# Patient Record
Sex: MALE | Race: OTHER | NOT HISPANIC OR LATINO
[De-identification: names, ages, dates, MRNs, and addresses within clinical notes are randomized per-mention and may not be internally consistent; named-entity substitution may affect disease eponyms.]

---

## 2017-01-04 ENCOUNTER — RX RENEWAL (OUTPATIENT)
Age: 57
End: 2017-01-04

## 2017-02-04 ENCOUNTER — RX RENEWAL (OUTPATIENT)
Age: 57
End: 2017-02-04

## 2017-02-21 ENCOUNTER — APPOINTMENT (OUTPATIENT)
Dept: INTERNAL MEDICINE | Facility: CLINIC | Age: 57
End: 2017-02-21

## 2017-02-21 VITALS
HEART RATE: 66 BPM | DIASTOLIC BLOOD PRESSURE: 78 MMHG | BODY MASS INDEX: 29.47 KG/M2 | RESPIRATION RATE: 14 BRPM | SYSTOLIC BLOOD PRESSURE: 126 MMHG | WEIGHT: 199 LBS | HEIGHT: 69 IN

## 2017-02-21 DIAGNOSIS — Z86.010 PERSONAL HISTORY OF COLONIC POLYPS: ICD-10-CM

## 2017-03-24 ENCOUNTER — RX RENEWAL (OUTPATIENT)
Age: 57
End: 2017-03-24

## 2017-04-02 ENCOUNTER — LABORATORY RESULT (OUTPATIENT)
Age: 57
End: 2017-04-02

## 2017-04-03 ENCOUNTER — APPOINTMENT (OUTPATIENT)
Dept: FAMILY MEDICINE | Facility: CLINIC | Age: 57
End: 2017-04-03

## 2017-04-03 VITALS
DIASTOLIC BLOOD PRESSURE: 82 MMHG | OXYGEN SATURATION: 97 % | TEMPERATURE: 99.1 F | SYSTOLIC BLOOD PRESSURE: 150 MMHG | HEART RATE: 77 BPM

## 2017-04-03 DIAGNOSIS — R73.09 OTHER ABNORMAL GLUCOSE: ICD-10-CM

## 2017-04-04 LAB
ALBUMIN SERPL ELPH-MCNC: 4.3 G/DL
ALP BLD-CCNC: 85 U/L
ALT SERPL-CCNC: 19 U/L
ANION GAP SERPL CALC-SCNC: 14 MMOL/L
AST SERPL-CCNC: 24 U/L
BASOPHILS # BLD AUTO: 0.17 K/UL
BASOPHILS NFR BLD AUTO: 1.7 %
BILIRUB SERPL-MCNC: 0.4 MG/DL
BUN SERPL-MCNC: 10 MG/DL
CALCIUM SERPL-MCNC: 9 MG/DL
CHLORIDE SERPL-SCNC: 102 MMOL/L
CHOLEST SERPL-MCNC: 159 MG/DL
CHOLEST/HDLC SERPL: 4 RATIO
CO2 SERPL-SCNC: 24 MMOL/L
CREAT SERPL-MCNC: 1.17 MG/DL
EOSINOPHIL # BLD AUTO: 0.68 K/UL
EOSINOPHIL NFR BLD AUTO: 7 %
GLUCOSE SERPL-MCNC: 121 MG/DL
HBA1C MFR BLD HPLC: 6 %
HCT VFR BLD CALC: 34 %
HDLC SERPL-MCNC: 40 MG/DL
HGB BLD-MCNC: 11.1 G/DL
IMM GRANULOCYTES NFR BLD AUTO: 0.4 %
LDLC SERPL CALC-MCNC: 97 MG/DL
LYMPHOCYTES # BLD AUTO: 1.76 K/UL
LYMPHOCYTES NFR BLD AUTO: 18 %
MAN DIFF?: NORMAL
MCHC RBC-ENTMCNC: 21.6 PG
MCHC RBC-ENTMCNC: 32.6 GM/DL
MCV RBC AUTO: 66.1 FL
MONOCYTES # BLD AUTO: 0.8 K/UL
MONOCYTES NFR BLD AUTO: 8.2 %
NEUTROPHILS # BLD AUTO: 6.32 K/UL
NEUTROPHILS NFR BLD AUTO: 64.7 %
PLATELET # BLD AUTO: 232 K/UL
POTASSIUM SERPL-SCNC: 4 MMOL/L
PROT SERPL-MCNC: 7 G/DL
RBC # BLD: 5.14 M/UL
RBC # FLD: 20 %
SODIUM SERPL-SCNC: 140 MMOL/L
TRIGL SERPL-MCNC: 110 MG/DL
TSH SERPL-ACNC: 1.21 UIU/ML
WBC # FLD AUTO: 9.77 K/UL

## 2017-06-15 ENCOUNTER — APPOINTMENT (OUTPATIENT)
Dept: FAMILY MEDICINE | Facility: CLINIC | Age: 57
End: 2017-06-15

## 2017-06-15 VITALS
DIASTOLIC BLOOD PRESSURE: 80 MMHG | HEIGHT: 69 IN | SYSTOLIC BLOOD PRESSURE: 140 MMHG | TEMPERATURE: 98.1 F | RESPIRATION RATE: 15 BRPM | WEIGHT: 202 LBS | OXYGEN SATURATION: 98 % | BODY MASS INDEX: 29.92 KG/M2 | HEART RATE: 74 BPM

## 2017-07-18 ENCOUNTER — NON-APPOINTMENT (OUTPATIENT)
Age: 57
End: 2017-07-18

## 2017-07-18 ENCOUNTER — APPOINTMENT (OUTPATIENT)
Dept: CARDIOLOGY | Facility: CLINIC | Age: 57
End: 2017-07-18

## 2017-07-18 VITALS
HEIGHT: 69 IN | WEIGHT: 205 LBS | OXYGEN SATURATION: 98 % | HEART RATE: 70 BPM | BODY MASS INDEX: 30.36 KG/M2 | SYSTOLIC BLOOD PRESSURE: 139 MMHG | RESPIRATION RATE: 17 BRPM | DIASTOLIC BLOOD PRESSURE: 83 MMHG

## 2017-07-18 VITALS — HEART RATE: 60 BPM | SYSTOLIC BLOOD PRESSURE: 138 MMHG | DIASTOLIC BLOOD PRESSURE: 80 MMHG

## 2017-07-31 ENCOUNTER — RX RENEWAL (OUTPATIENT)
Age: 57
End: 2017-07-31

## 2017-08-01 ENCOUNTER — APPOINTMENT (OUTPATIENT)
Dept: CARDIOLOGY | Facility: CLINIC | Age: 57
End: 2017-08-01
Payer: COMMERCIAL

## 2017-08-01 PROCEDURE — 93015 CV STRESS TEST SUPVJ I&R: CPT

## 2017-08-07 ENCOUNTER — RX RENEWAL (OUTPATIENT)
Age: 57
End: 2017-08-07

## 2017-08-22 ENCOUNTER — OUTPATIENT (OUTPATIENT)
Dept: OUTPATIENT SERVICES | Facility: HOSPITAL | Age: 57
LOS: 1 days | End: 2017-08-22
Payer: COMMERCIAL

## 2017-08-22 ENCOUNTER — APPOINTMENT (OUTPATIENT)
Dept: CARDIOLOGY | Facility: HOSPITAL | Age: 57
End: 2017-08-22

## 2017-08-22 DIAGNOSIS — I25.10 ATHEROSCLEROTIC HEART DISEASE OF NATIVE CORONARY ARTERY WITHOUT ANGINA PECTORIS: ICD-10-CM

## 2017-08-22 DIAGNOSIS — I35.0 NONRHEUMATIC AORTIC (VALVE) STENOSIS: ICD-10-CM

## 2017-08-22 DIAGNOSIS — R07.9 CHEST PAIN, UNSPECIFIED: ICD-10-CM

## 2017-08-22 DIAGNOSIS — I45.10 UNSPECIFIED RIGHT BUNDLE-BRANCH BLOCK: ICD-10-CM

## 2017-08-22 PROCEDURE — 75574 CT ANGIO HRT W/3D IMAGE: CPT

## 2017-09-19 ENCOUNTER — APPOINTMENT (OUTPATIENT)
Dept: FAMILY MEDICINE | Facility: CLINIC | Age: 57
End: 2017-09-19
Payer: COMMERCIAL

## 2017-09-19 ENCOUNTER — NON-APPOINTMENT (OUTPATIENT)
Age: 57
End: 2017-09-19

## 2017-09-19 ENCOUNTER — APPOINTMENT (OUTPATIENT)
Dept: CARDIOLOGY | Facility: CLINIC | Age: 57
End: 2017-09-19
Payer: COMMERCIAL

## 2017-09-19 VITALS
TEMPERATURE: 98 F | RESPIRATION RATE: 15 BRPM | DIASTOLIC BLOOD PRESSURE: 78 MMHG | SYSTOLIC BLOOD PRESSURE: 139 MMHG | WEIGHT: 202 LBS | OXYGEN SATURATION: 98 % | HEIGHT: 69 IN | BODY MASS INDEX: 29.92 KG/M2 | HEART RATE: 70 BPM

## 2017-09-19 VITALS
HEART RATE: 79 BPM | OXYGEN SATURATION: 97 % | DIASTOLIC BLOOD PRESSURE: 73 MMHG | RESPIRATION RATE: 17 BRPM | WEIGHT: 202 LBS | HEIGHT: 69 IN | BODY MASS INDEX: 29.92 KG/M2 | SYSTOLIC BLOOD PRESSURE: 138 MMHG

## 2017-09-19 VITALS — SYSTOLIC BLOOD PRESSURE: 130 MMHG | DIASTOLIC BLOOD PRESSURE: 82 MMHG

## 2017-09-19 VITALS — HEART RATE: 72 BPM

## 2017-09-19 PROCEDURE — 99213 OFFICE O/P EST LOW 20 MIN: CPT | Mod: 25

## 2017-09-19 PROCEDURE — 99215 OFFICE O/P EST HI 40 MIN: CPT

## 2017-09-19 PROCEDURE — 93000 ELECTROCARDIOGRAM COMPLETE: CPT

## 2017-09-19 PROCEDURE — 90688 IIV4 VACCINE SPLT 0.5 ML IM: CPT

## 2017-09-19 PROCEDURE — G0008: CPT

## 2017-11-30 ENCOUNTER — APPOINTMENT (OUTPATIENT)
Dept: FAMILY MEDICINE | Facility: CLINIC | Age: 57
End: 2017-11-30
Payer: COMMERCIAL

## 2017-11-30 ENCOUNTER — LABORATORY RESULT (OUTPATIENT)
Age: 57
End: 2017-11-30

## 2017-11-30 ENCOUNTER — APPOINTMENT (OUTPATIENT)
Dept: INTERNAL MEDICINE | Facility: CLINIC | Age: 57
End: 2017-11-30
Payer: COMMERCIAL

## 2017-11-30 VITALS
RESPIRATION RATE: 14 BRPM | SYSTOLIC BLOOD PRESSURE: 136 MMHG | BODY MASS INDEX: 30.51 KG/M2 | TEMPERATURE: 98.4 F | HEART RATE: 68 BPM | WEIGHT: 206 LBS | DIASTOLIC BLOOD PRESSURE: 78 MMHG | HEIGHT: 69 IN

## 2017-11-30 VITALS
HEIGHT: 69 IN | HEART RATE: 67 BPM | DIASTOLIC BLOOD PRESSURE: 86 MMHG | WEIGHT: 205 LBS | BODY MASS INDEX: 30.36 KG/M2 | SYSTOLIC BLOOD PRESSURE: 140 MMHG | OXYGEN SATURATION: 94 %

## 2017-11-30 DIAGNOSIS — Z78.9 OTHER SPECIFIED HEALTH STATUS: ICD-10-CM

## 2017-11-30 DIAGNOSIS — Z87.898 PERSONAL HISTORY OF OTHER SPECIFIED CONDITIONS: ICD-10-CM

## 2017-11-30 DIAGNOSIS — Z00.00 ENCOUNTER FOR GENERAL ADULT MEDICAL EXAMINATION W/OUT ABNORMAL FINDINGS: ICD-10-CM

## 2017-11-30 PROCEDURE — 99245 OFF/OP CONSLTJ NEW/EST HI 55: CPT

## 2017-11-30 PROCEDURE — 99214 OFFICE O/P EST MOD 30 MIN: CPT | Mod: 25

## 2017-11-30 RX ORDER — AMOXICILLIN AND CLAVULANATE POTASSIUM 500; 125 MG/1; MG/1
500-125 TABLET, FILM COATED ORAL 3 TIMES DAILY
Qty: 30 | Refills: 0 | Status: COMPLETED | COMMUNITY
Start: 2017-09-19 | End: 2017-11-30

## 2017-11-30 RX ORDER — BENZONATATE 200 MG/1
200 CAPSULE ORAL
Qty: 20 | Refills: 0 | Status: COMPLETED | COMMUNITY
Start: 2017-09-19 | End: 2017-11-30

## 2017-11-30 RX ORDER — RABEPRAZOLE SODIUM 20 MG/1
20 TABLET, DELAYED RELEASE ORAL DAILY
Qty: 30 | Refills: 3 | Status: COMPLETED | COMMUNITY
Start: 2017-07-31 | End: 2017-11-30

## 2017-12-01 LAB
25(OH)D3 SERPL-MCNC: 18.6 NG/ML
ALBUMIN SERPL ELPH-MCNC: 4.3 G/DL
ALP BLD-CCNC: 76 U/L
ALT SERPL-CCNC: 29 U/L
ANION GAP SERPL CALC-SCNC: 15 MMOL/L
APPEARANCE: CLEAR
AST SERPL-CCNC: 37 U/L
BASOPHILS # BLD AUTO: 0 K/UL
BASOPHILS NFR BLD AUTO: 0 %
BILIRUB DIRECT SERPL-MCNC: 0.1 MG/DL
BILIRUB INDIRECT SERPL-MCNC: 0.3 MG/DL
BILIRUB SERPL-MCNC: 0.4 MG/DL
BILIRUBIN URINE: NEGATIVE
BLOOD URINE: NEGATIVE
BUN SERPL-MCNC: 10 MG/DL
CALCIUM SERPL-MCNC: 8.8 MG/DL
CHLORIDE SERPL-SCNC: 103 MMOL/L
CHOLEST SERPL-MCNC: 175 MG/DL
CHOLEST/HDLC SERPL: 4 RATIO
CO2 SERPL-SCNC: 26 MMOL/L
COLOR: YELLOW
CREAT SERPL-MCNC: 1.23 MG/DL
CREAT SPEC-SCNC: 174 MG/DL
EOSINOPHIL # BLD AUTO: 1.04 K/UL
EOSINOPHIL NFR BLD AUTO: 11 %
GLUCOSE QUALITATIVE U: NEGATIVE MG/DL
GLUCOSE SERPL-MCNC: 87 MG/DL
HAV IGM SER QL: NONREACTIVE
HBA1C MFR BLD HPLC: 5.9 %
HBV CORE IGM SER QL: NONREACTIVE
HBV SURFACE AG SER QL: NONREACTIVE
HCT VFR BLD CALC: 32.3 %
HCV AB SER QL: NONREACTIVE
HCV S/CO RATIO: 0.28 S/CO
HDLC SERPL-MCNC: 44 MG/DL
HGB BLD-MCNC: 10.7 G/DL
HIV1+2 AB SPEC QL IA.RAPID: NONREACTIVE
KETONES URINE: NEGATIVE
LDLC SERPL CALC-MCNC: 116 MG/DL
LEUKOCYTE ESTERASE URINE: NEGATIVE
LYMPHOCYTES # BLD AUTO: 1.99 K/UL
LYMPHOCYTES NFR BLD AUTO: 21 %
MAN DIFF?: NORMAL
MCHC RBC-ENTMCNC: 21.8 PG
MCHC RBC-ENTMCNC: 33.1 GM/DL
MCV RBC AUTO: 65.9 FL
MICROALBUMIN 24H UR DL<=1MG/L-MCNC: 0.7 MG/DL
MICROALBUMIN/CREAT 24H UR-RTO: 4 MG/G
MONOCYTES # BLD AUTO: 0.66 K/UL
MONOCYTES NFR BLD AUTO: 7 %
NEUTROPHILS # BLD AUTO: 5.68 K/UL
NEUTROPHILS NFR BLD AUTO: 58 %
NITRITE URINE: NEGATIVE
PH URINE: 6.5
PLATELET # BLD AUTO: 250 K/UL
POTASSIUM SERPL-SCNC: 3.6 MMOL/L
PROT SERPL-MCNC: 7.3 G/DL
PROTEIN URINE: NEGATIVE MG/DL
RBC # BLD: 4.9 M/UL
RBC # FLD: 19.5 %
SODIUM SERPL-SCNC: 144 MMOL/L
SPECIFIC GRAVITY URINE: 1.01
TRIGL SERPL-MCNC: 74 MG/DL
TSH SERPL-ACNC: 1.12 UIU/ML
UROBILINOGEN URINE: NEGATIVE MG/DL
WBC # FLD AUTO: 9.47 K/UL

## 2018-01-11 ENCOUNTER — APPOINTMENT (OUTPATIENT)
Dept: FAMILY MEDICINE | Facility: CLINIC | Age: 58
End: 2018-01-11
Payer: COMMERCIAL

## 2018-01-11 VITALS
HEART RATE: 59 BPM | TEMPERATURE: 102.1 F | HEIGHT: 69 IN | DIASTOLIC BLOOD PRESSURE: 80 MMHG | WEIGHT: 198 LBS | BODY MASS INDEX: 29.33 KG/M2 | OXYGEN SATURATION: 97 % | SYSTOLIC BLOOD PRESSURE: 120 MMHG

## 2018-01-11 DIAGNOSIS — H91.90 UNSPECIFIED HEARING LOSS, UNSPECIFIED EAR: ICD-10-CM

## 2018-01-11 PROCEDURE — 99213 OFFICE O/P EST LOW 20 MIN: CPT

## 2018-01-11 RX ORDER — DOXYCYCLINE HYCLATE 100 MG/1
100 TABLET ORAL
Qty: 20 | Refills: 0 | Status: DISCONTINUED | COMMUNITY
Start: 2017-11-30 | End: 2018-01-11

## 2018-01-11 RX ORDER — PREDNISONE 10 MG/1
10 TABLET ORAL
Qty: 12 | Refills: 0 | Status: DISCONTINUED | COMMUNITY
Start: 2017-11-30 | End: 2018-01-11

## 2018-01-31 ENCOUNTER — APPOINTMENT (OUTPATIENT)
Dept: CARDIOLOGY | Facility: CLINIC | Age: 58
End: 2018-01-31
Payer: COMMERCIAL

## 2018-01-31 ENCOUNTER — NON-APPOINTMENT (OUTPATIENT)
Age: 58
End: 2018-01-31

## 2018-01-31 VITALS
SYSTOLIC BLOOD PRESSURE: 147 MMHG | OXYGEN SATURATION: 99 % | RESPIRATION RATE: 15 BRPM | DIASTOLIC BLOOD PRESSURE: 90 MMHG | HEIGHT: 69 IN | BODY MASS INDEX: 29.03 KG/M2 | WEIGHT: 196 LBS | HEART RATE: 85 BPM

## 2018-01-31 VITALS — SYSTOLIC BLOOD PRESSURE: 128 MMHG | HEART RATE: 76 BPM | DIASTOLIC BLOOD PRESSURE: 62 MMHG

## 2018-01-31 DIAGNOSIS — R94.31 ABNORMAL ELECTROCARDIOGRAM [ECG] [EKG]: ICD-10-CM

## 2018-01-31 DIAGNOSIS — I45.10 UNSPECIFIED RIGHT BUNDLE-BRANCH BLOCK: ICD-10-CM

## 2018-01-31 PROCEDURE — 93000 ELECTROCARDIOGRAM COMPLETE: CPT

## 2018-01-31 PROCEDURE — 99214 OFFICE O/P EST MOD 30 MIN: CPT

## 2018-03-26 ENCOUNTER — RX RENEWAL (OUTPATIENT)
Age: 58
End: 2018-03-26

## 2018-06-04 ENCOUNTER — RX RENEWAL (OUTPATIENT)
Age: 58
End: 2018-06-04

## 2018-06-07 ENCOUNTER — APPOINTMENT (OUTPATIENT)
Dept: FAMILY MEDICINE | Facility: CLINIC | Age: 58
End: 2018-06-07
Payer: COMMERCIAL

## 2018-06-07 VITALS
BODY MASS INDEX: 30.51 KG/M2 | HEIGHT: 69 IN | WEIGHT: 206 LBS | HEART RATE: 72 BPM | DIASTOLIC BLOOD PRESSURE: 82 MMHG | SYSTOLIC BLOOD PRESSURE: 144 MMHG | OXYGEN SATURATION: 97 % | TEMPERATURE: 98.5 F

## 2018-06-07 DIAGNOSIS — Z12.5 ENCOUNTER FOR SCREENING FOR MALIGNANT NEOPLASM OF PROSTATE: ICD-10-CM

## 2018-06-07 PROCEDURE — G0444 DEPRESSION SCREEN ANNUAL: CPT

## 2018-06-07 PROCEDURE — 99214 OFFICE O/P EST MOD 30 MIN: CPT | Mod: 25

## 2018-06-07 PROCEDURE — G0447 BEHAVIOR COUNSEL OBESITY 15M: CPT

## 2018-06-07 RX ORDER — AZITHROMYCIN 250 MG/1
250 TABLET, FILM COATED ORAL
Qty: 1 | Refills: 0 | Status: DISCONTINUED | COMMUNITY
Start: 2018-01-11 | End: 2018-06-07

## 2018-06-07 NOTE — HISTORY OF PRESENT ILLNESS
[Cough] : cough [Moderate] : moderate [___ Weeks ago] :  [unfilled] weeks ago [Constant] : constant [Congestion] : congestion [Sore Throat] : sore throat [OTC Remedies] : OTC remedies [Activity] : with activity [In Morning] : in the morning [Worsening] : worsening [Weight Gain ___ Pounds] : Weight gain of [unfilled] pounds [None] : No weight lost attempts [Sedentary] : Patient is sedentary [Contemplation] : Contemplation: patient is considering to lose weight within the next 6 months, patient did not attempt to lose weight in the last year. [Needs reinforcement, provided] : Patient needs reinforcement on understanding of disease, goals and obesity follow-up plan; reinforcement was provided [Wheezing] : no wheezing [Chills] : no chills [Anorexia] : no anorexia [Shortness Of Breath] : no shortness of breath [Earache] : no earache [Fatigue] : not fatigue [Headache] : no headache [Fever] : no fever [FreeTextEntry2] : small amount of phlegm , clear [FreeTextEntry5] : cold and cough remedies [de-identified] : no [FreeTextEntry8] : here for cough for 2 weeks. no sick contacts, no recent travel.

## 2018-06-07 NOTE — PLAN
[FreeTextEntry1] : f/u CPE to discuss labs. \par  weight loss, monitor BP. cardiology f/u. \par flonase nasal spray and tessalon. \par

## 2018-06-07 NOTE — REVIEW OF SYSTEMS
[Nasal Discharge] : nasal discharge [Cough] : cough [Negative] : Heme/Lymph [Hearing Loss] : no hearing loss [Nosebleeds] : no nosebleeds [Postnasal Drip] : no postnasal drip [Sore Throat] : no sore throat [Hoarseness] : no hoarseness [Shortness Of Breath] : no shortness of breath [Wheezing] : no wheezing

## 2018-06-07 NOTE — PHYSICAL EXAM

## 2018-06-07 NOTE — HEALTH RISK ASSESSMENT
[No falls in past year] : Patient reported no falls in the past year [0] : 2) Feeling down, depressed, or hopeless: Not at all (0) [Discussed at today's visit] : Advance Directives Discussed at today's visit [Patient reported colonoscopy was normal] : Patient reported colonoscopy was normal [Fully functional (bathing, dressing, toileting, transferring, walking, feeding)] : Fully functional (bathing, dressing, toileting, transferring, walking, feeding) [Fully functional (using the telephone, shopping, preparing meals, housekeeping, doing laundry, using] : Fully functional and needs no help or supervision to perform IADLs (using the telephone, shopping, preparing meals, housekeeping, doing laundry, using transportation, managing medications and managing finances) [Independent] : managing finances [Change in mental status noted] : No change in mental status noted [ColonoscopyDate] : 2016 [ColonoscopyComments] : polyp removed by dr. Layton [facility] [HIVDate] : 12/2017 [HepatitisCDate] : 12/2017 [] : No [[id number]] : 0

## 2018-06-07 NOTE — COUNSELING
[Activity counseling provided] : activity [Behavioral health counseling provided] : behavioral health  [Walking] : Walking [Engage in a relaxing activity] : Engage in a relaxing activity [None] : None [Needs reinforcement, provided] : Patient needs reinforcement on understanding lifestyle changes and  the steps needed to achieve self management goals and reinforcement was provided [ - Behavioral Counseling for Obesity (Face-to-Face for 15 Minutes)] : Behavioral Counseling for Obesity (Face-to-Face for 15 Minutes) [ - Annual Depression Screening] : Annual Depression Screening

## 2018-06-09 ENCOUNTER — LABORATORY RESULT (OUTPATIENT)
Age: 58
End: 2018-06-09

## 2018-06-22 ENCOUNTER — APPOINTMENT (OUTPATIENT)
Dept: FAMILY MEDICINE | Facility: CLINIC | Age: 58
End: 2018-06-22

## 2018-06-25 LAB
25(OH)D3 SERPL-MCNC: 22.3 NG/ML
ALBUMIN SERPL ELPH-MCNC: 4.3 G/DL
ALP BLD-CCNC: 81 U/L
ALT SERPL-CCNC: 23 U/L
ANION GAP SERPL CALC-SCNC: 14 MMOL/L
APPEARANCE: CLEAR
AST SERPL-CCNC: 26 U/L
BACTERIA: NEGATIVE
BASOPHILS # BLD AUTO: 0.05 K/UL
BASOPHILS NFR BLD AUTO: 0.6 %
BILIRUB SERPL-MCNC: 0.6 MG/DL
BILIRUBIN URINE: NEGATIVE
BLOOD URINE: NEGATIVE
BUN SERPL-MCNC: 15 MG/DL
CALCIUM SERPL-MCNC: 9.2 MG/DL
CHLORIDE SERPL-SCNC: 104 MMOL/L
CHOLEST SERPL-MCNC: 137 MG/DL
CHOLEST/HDLC SERPL: 3.5 RATIO
CO2 SERPL-SCNC: 24 MMOL/L
COLOR: YELLOW
CREAT SERPL-MCNC: 1.37 MG/DL
EOSINOPHIL # BLD AUTO: 0.37 K/UL
EOSINOPHIL NFR BLD AUTO: 4.5 %
GLUCOSE QUALITATIVE U: NEGATIVE MG/DL
GLUCOSE SERPL-MCNC: 89 MG/DL
HBA1C MFR BLD HPLC: 6 %
HCT VFR BLD CALC: 35.5 %
HDLC SERPL-MCNC: 39 MG/DL
HGB BLD-MCNC: 11.3 G/DL
HYALINE CASTS: 10 /LPF
IMM GRANULOCYTES NFR BLD AUTO: 0.5 %
KETONES URINE: NEGATIVE
LDLC SERPL CALC-MCNC: 79 MG/DL
LEUKOCYTE ESTERASE URINE: ABNORMAL
LYMPHOCYTES # BLD AUTO: 2.37 K/UL
LYMPHOCYTES NFR BLD AUTO: 28.7 %
MAN DIFF?: NORMAL
MCHC RBC-ENTMCNC: 20.8 PG
MCHC RBC-ENTMCNC: 31.8 GM/DL
MCV RBC AUTO: 65.4 FL
MICROSCOPIC-UA: NORMAL
MONOCYTES # BLD AUTO: 0.63 K/UL
MONOCYTES NFR BLD AUTO: 7.6 %
NEUTROPHILS # BLD AUTO: 4.8 K/UL
NEUTROPHILS NFR BLD AUTO: 58.1 %
NITRITE URINE: NEGATIVE
PH URINE: 6
PLATELET # BLD AUTO: 249 K/UL
POTASSIUM SERPL-SCNC: 4.3 MMOL/L
PROT SERPL-MCNC: 7.2 G/DL
PROTEIN URINE: ABNORMAL MG/DL
PSA FREE FLD-MCNC: 17.9
PSA FREE SERPL-MCNC: 0.36 NG/ML
PSA SERPL-MCNC: 2.01 NG/ML
RBC # BLD: 5.43 M/UL
RBC # FLD: 17.3 %
RED BLOOD CELLS URINE: 4 /HPF
SODIUM SERPL-SCNC: 142 MMOL/L
SPECIFIC GRAVITY URINE: 1.02
SQUAMOUS EPITHELIAL CELLS: 2 /HPF
TRIGL SERPL-MCNC: 97 MG/DL
TSH SERPL-ACNC: 1.28 UIU/ML
UROBILINOGEN URINE: NEGATIVE MG/DL
WBC # FLD AUTO: 8.26 K/UL
WHITE BLOOD CELLS URINE: 3 /HPF

## 2018-06-29 ENCOUNTER — OTHER (OUTPATIENT)
Age: 58
End: 2018-06-29

## 2018-06-30 ENCOUNTER — LABORATORY RESULT (OUTPATIENT)
Age: 58
End: 2018-06-30

## 2018-07-03 LAB
BASOPHILS # BLD AUTO: 0.08 K/UL
BASOPHILS NFR BLD AUTO: 1 %
EOSINOPHIL # BLD AUTO: 0.26 K/UL
EOSINOPHIL NFR BLD AUTO: 3.2 %
FERRITIN SERPL-MCNC: 148 NG/ML
FOLATE SERPL-MCNC: 10.7 NG/ML
HCT VFR BLD CALC: 33.4 %
HGB A MFR BLD: 94.4 %
HGB A2 MFR BLD: 5.6 %
HGB BLD-MCNC: 10.4 G/DL
HGB FRACT BLD-IMP: NORMAL
IMM GRANULOCYTES NFR BLD AUTO: 0.2 %
IRON SATN MFR SERPL: 26 %
IRON SERPL-MCNC: 75 UG/DL
LYMPHOCYTES # BLD AUTO: 1.86 K/UL
LYMPHOCYTES NFR BLD AUTO: 22.8 %
MAN DIFF?: NORMAL
MCHC RBC-ENTMCNC: 19.6 PG
MCHC RBC-ENTMCNC: 31.1 GM/DL
MCV RBC AUTO: 62.9 FL
MONOCYTES # BLD AUTO: 0.63 K/UL
MONOCYTES NFR BLD AUTO: 7.7 %
NEUTROPHILS # BLD AUTO: 5.32 K/UL
NEUTROPHILS NFR BLD AUTO: 65.1 %
PLATELET # BLD AUTO: 200 K/UL
RBC # BLD: 5.31 M/UL
RBC # FLD: 17 %
TIBC SERPL-MCNC: 289 UG/DL
UIBC SERPL-MCNC: 214 UG/DL
VIT B12 SERPL-MCNC: 368 PG/ML
WBC # FLD AUTO: 8.17 K/UL

## 2018-07-18 ENCOUNTER — RX RENEWAL (OUTPATIENT)
Age: 58
End: 2018-07-18

## 2018-07-26 ENCOUNTER — NON-APPOINTMENT (OUTPATIENT)
Age: 58
End: 2018-07-26

## 2018-07-26 ENCOUNTER — APPOINTMENT (OUTPATIENT)
Dept: CARDIOLOGY | Facility: CLINIC | Age: 58
End: 2018-07-26
Payer: COMMERCIAL

## 2018-07-26 VITALS
BODY MASS INDEX: 30.21 KG/M2 | RESPIRATION RATE: 15 BRPM | HEIGHT: 69 IN | DIASTOLIC BLOOD PRESSURE: 90 MMHG | HEART RATE: 66 BPM | OXYGEN SATURATION: 98 % | WEIGHT: 204 LBS | SYSTOLIC BLOOD PRESSURE: 163 MMHG

## 2018-07-26 VITALS — HEART RATE: 60 BPM | DIASTOLIC BLOOD PRESSURE: 84 MMHG | SYSTOLIC BLOOD PRESSURE: 150 MMHG

## 2018-07-26 PROCEDURE — 93000 ELECTROCARDIOGRAM COMPLETE: CPT

## 2018-07-26 PROCEDURE — 93306 TTE W/DOPPLER COMPLETE: CPT

## 2018-07-26 PROCEDURE — 99214 OFFICE O/P EST MOD 30 MIN: CPT

## 2018-08-09 ENCOUNTER — APPOINTMENT (OUTPATIENT)
Dept: FAMILY MEDICINE | Facility: CLINIC | Age: 58
End: 2018-08-09
Payer: COMMERCIAL

## 2018-08-09 VITALS
TEMPERATURE: 98 F | DIASTOLIC BLOOD PRESSURE: 80 MMHG | SYSTOLIC BLOOD PRESSURE: 140 MMHG | RESPIRATION RATE: 15 BRPM | BODY MASS INDEX: 30.21 KG/M2 | HEIGHT: 69 IN | OXYGEN SATURATION: 98 % | WEIGHT: 204 LBS | HEART RATE: 65 BPM

## 2018-08-09 PROCEDURE — 99214 OFFICE O/P EST MOD 30 MIN: CPT

## 2018-08-09 RX ORDER — BENZONATATE 200 MG/1
200 CAPSULE ORAL 3 TIMES DAILY
Qty: 20 | Refills: 0 | Status: DISCONTINUED | COMMUNITY
Start: 2018-06-07 | End: 2018-08-09

## 2018-08-09 NOTE — PLAN
[FreeTextEntry1] : meds refilled. low salt diet , low chol. diet. \par declined hematology referral. f/u in 3 months. pt. declined repeat blood test and urine test to monitor renal function and protein in urine.  home BP monitoring.\par  avoid sweets.

## 2018-08-09 NOTE — COUNSELING
[Activity counseling provided] : activity [Behavioral health counseling provided] : behavioral health  [Walking] : Walking [Engage in a relaxing activity] : Engage in a relaxing activity [Patient Non-adherent to care plan] : Patient non-adherent to care plan [Needs reinforcement, provided] : Patient needs reinforcement on understanding lifestyle changes and  the steps needed to achieve self management goals and reinforcement was provided

## 2018-08-09 NOTE — HEALTH RISK ASSESSMENT
[No falls in past year] : Patient reported no falls in the past year [0] : 2) Feeling down, depressed, or hopeless: Not at all (0) [] : No [de-identified] : cardiologist

## 2018-08-09 NOTE — HISTORY OF PRESENT ILLNESS
[FreeTextEntry1] : here for f/u HTN, HLD.  [de-identified] : here for f/u HTN, hyperlipidemia. denies chest pain , sob , palpitation, cough. He is taking his meds regularly. He saw cardiologist. He is reluctant to do CTA. 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

## 2018-08-30 ENCOUNTER — RX RENEWAL (OUTPATIENT)
Age: 58
End: 2018-08-30

## 2018-09-14 ENCOUNTER — APPOINTMENT (OUTPATIENT)
Dept: FAMILY MEDICINE | Facility: CLINIC | Age: 58
End: 2018-09-14
Payer: COMMERCIAL

## 2018-09-14 VITALS
OXYGEN SATURATION: 96 % | BODY MASS INDEX: 29.62 KG/M2 | HEIGHT: 69 IN | WEIGHT: 200 LBS | DIASTOLIC BLOOD PRESSURE: 78 MMHG | SYSTOLIC BLOOD PRESSURE: 140 MMHG | HEART RATE: 53 BPM | TEMPERATURE: 98.2 F

## 2018-09-14 PROCEDURE — 99213 OFFICE O/P EST LOW 20 MIN: CPT

## 2018-09-14 NOTE — HISTORY OF PRESENT ILLNESS
[Moderate] : moderate [___ Days ago] : [unfilled] days ago [Constant] : constant [Congestion] : congestion [Cough] : cough [Sore Throat] : sore throat [Fatigue] : fatigue [Rest] : rest [At Night] : at night [Worsening] : worsening [Wheezing] : no wheezing [Chills] : no chills [Anorexia] : no anorexia [Shortness Of Breath] : no shortness of breath [Earache] : no earache [Headache] : no headache [Fever] : no fever [FreeTextEntry8] : Patient is a 58-year-old gentleman presents today for an acute visit complaining of sore throat, congestion, mild cough. Conservative management has not been working it appears that the patient has an acute upper respiratory tract infection. Patient denies any chest pain, shortness of breath, nausea, or vomiting. Has not had any fevers or chills.

## 2018-09-14 NOTE — ASSESSMENT
[FreeTextEntry1] : Assessment and plan:\par \par By physical exam and symptoms. Patient has an acute upper sore tract infection. Recommend increase fluids, gargle with warm or red cells, Tylenol for the aches and pains and azithromycin 5 day course.

## 2018-09-14 NOTE — PHYSICAL EXAM
[No Acute Distress] : no acute distress [Well Nourished] : well nourished [Well Developed] : well developed [Well-Appearing] : well-appearing [Normal Sclera/Conjunctiva] : normal sclera/conjunctiva [PERRL] : pupils equal round and reactive to light [EOMI] : extraocular movements intact [No JVD] : no jugular venous distention [Supple] : supple [No Lymphadenopathy] : no lymphadenopathy [Thyroid Normal, No Nodules] : the thyroid was normal and there were no nodules present [No Respiratory Distress] : no respiratory distress  [Clear to Auscultation] : lungs were clear to auscultation bilaterally [No Accessory Muscle Use] : no accessory muscle use [Normal Rate] : normal rate  [Regular Rhythm] : with a regular rhythm [Normal S1, S2] : normal S1 and S2 [No Murmur] : no murmur heard [No Carotid Bruits] : no carotid bruits [No Abdominal Bruit] : a ~M bruit was not heard ~T in the abdomen [No Varicosities] : no varicosities [Pedal Pulses Present] : the pedal pulses are present [No Edema] : there was no peripheral edema [No Extremity Clubbing/Cyanosis] : no extremity clubbing/cyanosis [No Palpable Aorta] : no palpable aorta [Soft] : abdomen soft [Non Tender] : non-tender [Non-distended] : non-distended [No Masses] : no abdominal mass palpated [No HSM] : no HSM [Normal Bowel Sounds] : normal bowel sounds [Normal Posterior Cervical Nodes] : no posterior cervical lymphadenopathy [Normal Anterior Cervical Nodes] : no anterior cervical lymphadenopathy [No CVA Tenderness] : no CVA  tenderness [No Spinal Tenderness] : no spinal tenderness [No Joint Swelling] : no joint swelling [Grossly Normal Strength/Tone] : grossly normal strength/tone [No Rash] : no rash [Normal Gait] : normal gait [Coordination Grossly Intact] : coordination grossly intact [No Focal Deficits] : no focal deficits [Deep Tendon Reflexes (DTR)] : deep tendon reflexes were 2+ and symmetric [Normal Affect] : the affect was normal [Normal Insight/Judgement] : insight and judgment were intact [de-identified] : Mucous membranes injected, increased mucus production

## 2018-09-14 NOTE — REVIEW OF SYSTEMS
[Fatigue] : fatigue [Postnasal Drip] : postnasal drip [Sore Throat] : sore throat [Cough] : cough [Negative] : Heme/Lymph

## 2018-09-14 NOTE — HEALTH RISK ASSESSMENT
[No falls in past year] : Patient reported no falls in the past year [0] : 2) Feeling down, depressed, or hopeless: Not at all (0) [] : No [[id number]] : 0

## 2018-10-09 ENCOUNTER — RX RENEWAL (OUTPATIENT)
Age: 58
End: 2018-10-09

## 2018-10-22 ENCOUNTER — RX RENEWAL (OUTPATIENT)
Age: 58
End: 2018-10-22

## 2018-10-23 ENCOUNTER — APPOINTMENT (OUTPATIENT)
Dept: INTERNAL MEDICINE | Facility: CLINIC | Age: 58
End: 2018-10-23
Payer: COMMERCIAL

## 2018-10-23 VITALS — HEART RATE: 60 BPM | RESPIRATION RATE: 14 BRPM | DIASTOLIC BLOOD PRESSURE: 74 MMHG | SYSTOLIC BLOOD PRESSURE: 134 MMHG

## 2018-10-23 VITALS — WEIGHT: 205 LBS | BODY MASS INDEX: 30.36 KG/M2 | HEIGHT: 69 IN

## 2018-10-23 PROCEDURE — 99215 OFFICE O/P EST HI 40 MIN: CPT

## 2018-10-23 RX ORDER — AZITHROMYCIN 250 MG/1
250 TABLET, FILM COATED ORAL
Qty: 1 | Refills: 2 | Status: DISCONTINUED | COMMUNITY
Start: 2018-09-14 | End: 2018-10-23

## 2018-10-23 NOTE — REVIEW OF SYSTEMS
[Heartburn] : heartburn [Recent Change In Weight] : ~T recent weight change [Fever] : no fever [Chills] : no chills [Fatigue] : no fatigue [Hot Flashes] : no hot flashes [Night Sweats] : no night sweats [Discharge] : no discharge [Pain] : no pain [Redness] : no redness [Dryness] : no dryness [Vision Problems] : no vision problems [Itching] : no itching [Earache] : no earache [Hearing Loss] : no hearing loss [Nosebleeds] : no nosebleeds [Postnasal Drip] : no postnasal drip [Nasal Discharge] : no nasal discharge [Sore Throat] : no sore throat [Hoarseness] : no hoarseness [Chest Pain] : no chest pain [Palpitations] : no palpitations [Claudication] : no  leg claudication [Lower Ext Edema] : no lower extremity edema [Orthopena] : no orthopnea [Paroysmal Nocturnal Dyspnea] : no paroysmal nocturnal dyspnea [Shortness Of Breath] : no shortness of breath [Wheezing] : no wheezing [Dyspnea on Exertion] : not dyspnea on exertion [Abdominal Pain] : no abdominal pain [Nausea] : no nausea [Constipation] : no constipation [Diarrhea] : no diarrhea [Vomiting] : no vomiting [Melena] : no melena [Dysuria] : no dysuria [Incontinence] : no incontinence [Hesitancy] : no hesitancy [Nocturia] : no nocturia [Hematuria] : no hematuria [Frequency] : no frequency [Impotence] : no impotency [Poor Libido] : libido not poor [Joint Pain] : no joint pain [Joint Stiffness] : no joint stiffness [Muscle Pain] : no muscle pain [Muscle Weakness] : no muscle weakness [Back Pain] : no back pain [Joint Swelling] : no joint swelling [Itching] : no itching [Mole Changes] : no mole changes [Nail Changes] : no nail changes [Hair Changes] : no hair changes [Skin Rash] : no skin rash [Headache] : no headache [Dizziness] : no dizziness [Fainting] : no fainting [Confusion] : no confusion [Unsteady Walk] : no ataxia [Memory Loss] : no memory loss [Suicidal] : not suicidal [Insomnia] : no insomnia [Anxiety] : no anxiety [Depression] : no depression [Easy Bleeding] : no easy bleeding [Easy Bruising] : no easy bruising [Swollen Glands] : no swollen glands [FreeTextEntry2] : gain

## 2018-10-23 NOTE — PHYSICAL EXAM
[No Acute Distress] : no acute distress [Well Nourished] : well nourished [Well Developed] : well developed [Well-Appearing] : well-appearing [Normal Voice/Communication] : normal voice/communication [Normal Sclera/Conjunctiva] : normal sclera/conjunctiva [PERRL] : pupils equal round and reactive to light [EOMI] : extraocular movements intact [Normal Outer Ear/Nose] : the outer ears and nose were normal in appearance [Normal Oropharynx] : the oropharynx was normal [Normal TMs] : both tympanic membranes were normal [Normal Nasal Mucosa] : the nasal mucosa was normal [No JVD] : no jugular venous distention [Supple] : supple [No Lymphadenopathy] : no lymphadenopathy [Thyroid Normal, No Nodules] : the thyroid was normal and there were no nodules present [No Respiratory Distress] : no respiratory distress  [Clear to Auscultation] : lungs were clear to auscultation bilaterally [No Accessory Muscle Use] : no accessory muscle use [Normal Percussion] : the chest was normal to percussion [Normal Rate] : normal rate  [Regular Rhythm] : with a regular rhythm [Normal S1, S2] : normal S1 and S2 [No Murmur] : no murmur heard [No Carotid Bruits] : no carotid bruits [No Abdominal Bruit] : a ~M bruit was not heard ~T in the abdomen [No Varicosities] : no varicosities [Pedal Pulses Present] : the pedal pulses are present [No Edema] : there was no peripheral edema [No Extremity Clubbing/Cyanosis] : no extremity clubbing/cyanosis [No Palpable Aorta] : no palpable aorta [Declined Breast Exam] : declined breast exam  [Soft] : abdomen soft [Non Tender] : non-tender [Non-distended] : non-distended [No Masses] : no abdominal mass palpated [No HSM] : no HSM [Normal Bowel Sounds] : normal bowel sounds [No Hernias] : no hernias [Declined Rectal Exam] : declined rectal exam [Normal Supraclavicular Nodes] : no supraclavicular lymphadenopathy [Normal Posterior Cervical Nodes] : no posterior cervical lymphadenopathy [Normal Femoral Nodes] : no femoral lymphadenopathy [Normal Anterior Cervical Nodes] : no anterior cervical lymphadenopathy [No CVA Tenderness] : no CVA  tenderness [No Spinal Tenderness] : no spinal tenderness [No Joint Swelling] : no joint swelling [Grossly Normal Strength/Tone] : grossly normal strength/tone [No Rash] : no rash [Normal Gait] : normal gait [Coordination Grossly Intact] : coordination grossly intact [No Focal Deficits] : no focal deficits [Deep Tendon Reflexes (DTR)] : deep tendon reflexes were 2+ and symmetric [Normal Affect] : the affect was normal [Normal Insight/Judgement] : insight and judgment were intact [Kyphosis] : no kyphosis [Scoliosis] : no scoliosis

## 2018-10-23 NOTE — ASSESSMENT
[FreeTextEntry1] : Physical exam shows a well-developed man in no acute distress... blood pressure 134/74 height 5 foot 9 inches weight 205 pounds pulse is 60 respiratory rate of 15 HEENT was unremarkable chest was clear cardiovascular exam showed normal S1-S2 with a 2/6 systolic murmur abdomen was soft and it showed no clubbing cyanosis or edema neurologic is nonfocal.. patient's medication was renewed for his acid reflux . Long discussion with him concerning his diet and his increase in weight he is informed that these things are not going to help his acid reflux present no dysphasia minimal symptom will hold off on any endoscopies at this time

## 2018-10-23 NOTE — HISTORY OF PRESENT ILLNESS
[FreeTextEntry1] : Comes to the office referred by Dr. Neftali Hagan( his PCP ) gastroenterology consultation pertaining to his acid reflux [de-identified] : 58-year-old man him so the office for gastrointestinal followup with a history of esophagitis and hiatal hernia patient reports no dysphasia and only heartburn when he eats the wrong foods. His appetite has been good and his weight if anything has increased he denies abdominal pain nausea vomiting diarrhea or constipation bright red blood per rectum or dark stools he has had no chest pain shortness of breath exertional dyspnea

## 2018-11-08 ENCOUNTER — APPOINTMENT (OUTPATIENT)
Dept: FAMILY MEDICINE | Facility: CLINIC | Age: 58
End: 2018-11-08
Payer: COMMERCIAL

## 2018-11-08 VITALS
HEIGHT: 69 IN | BODY MASS INDEX: 29.92 KG/M2 | RESPIRATION RATE: 15 BRPM | HEART RATE: 75 BPM | SYSTOLIC BLOOD PRESSURE: 140 MMHG | DIASTOLIC BLOOD PRESSURE: 84 MMHG | TEMPERATURE: 97.5 F | OXYGEN SATURATION: 98 % | WEIGHT: 202 LBS

## 2018-11-08 PROCEDURE — 99214 OFFICE O/P EST MOD 30 MIN: CPT

## 2018-11-08 NOTE — PHYSICAL EXAM
[No Acute Distress] : no acute distress [Well Nourished] : well nourished [Well Developed] : well developed [Well-Appearing] : well-appearing [Normal Sclera/Conjunctiva] : normal sclera/conjunctiva [PERRL] : pupils equal round and reactive to light [EOMI] : extraocular movements intact [No JVD] : no jugular venous distention [Supple] : supple [No Lymphadenopathy] : no lymphadenopathy [Thyroid Normal, No Nodules] : the thyroid was normal and there were no nodules present [No Respiratory Distress] : no respiratory distress  [Clear to Auscultation] : lungs were clear to auscultation bilaterally [No Accessory Muscle Use] : no accessory muscle use [Normal Rate] : normal rate  [Regular Rhythm] : with a regular rhythm [Normal S1, S2] : normal S1 and S2 [No Carotid Bruits] : no carotid bruits [Pedal Pulses Present] : the pedal pulses are present [No Edema] : there was no peripheral edema [No Extremity Clubbing/Cyanosis] : no extremity clubbing/cyanosis [Soft] : abdomen soft [Non Tender] : non-tender [Non-distended] : non-distended [No Masses] : no abdominal mass palpated [No HSM] : no HSM [Normal Bowel Sounds] : normal bowel sounds [Normal Posterior Cervical Nodes] : no posterior cervical lymphadenopathy [Normal Anterior Cervical Nodes] : no anterior cervical lymphadenopathy [No CVA Tenderness] : no CVA  tenderness [No Spinal Tenderness] : no spinal tenderness [No Joint Swelling] : no joint swelling [Grossly Normal Strength/Tone] : grossly normal strength/tone [No Rash] : no rash [Normal Gait] : normal gait [Coordination Grossly Intact] : coordination grossly intact [No Focal Deficits] : no focal deficits [Deep Tendon Reflexes (DTR)] : deep tendon reflexes were 2+ and symmetric [Normal Affect] : the affect was normal [Normal Insight/Judgement] : insight and judgment were intact [de-identified] : systolic murmur near left and right upper sternal border

## 2018-11-08 NOTE — HEALTH RISK ASSESSMENT
[No falls in past year] : Patient reported no falls in the past year [0] : 2) Feeling down, depressed, or hopeless: Not at all (0) [] : No [[id number]] : 0

## 2018-11-08 NOTE — PLAN
[FreeTextEntry1] : Continue current meds. \par keep BP log at home. patient declined getting Bp monitor . Recommend CT coronary, pt. deferred. \par  f/u cardiology. \par  recommend weight loss. \par  low salt diet, low chol. diet. f/u in a month.

## 2018-11-08 NOTE — HISTORY OF PRESENT ILLNESS
[FreeTextEntry1] : Here for f/u HTN.  [de-identified] : Here for f/u HTN. no chest pain , sob , fever, chills, cough , nausea, dizziness, palpitations. He is taking his meds regularly. He is  not exercising but walks a lot.

## 2018-11-10 ENCOUNTER — LABORATORY RESULT (OUTPATIENT)
Age: 58
End: 2018-11-10

## 2018-11-12 LAB
25(OH)D3 SERPL-MCNC: 23.9 NG/ML
ALBUMIN SERPL ELPH-MCNC: 3.9 G/DL
ALP BLD-CCNC: 72 U/L
ALT SERPL-CCNC: 21 U/L
ANION GAP SERPL CALC-SCNC: 11 MMOL/L
APPEARANCE: CLEAR
AST SERPL-CCNC: 23 U/L
BACTERIA: NEGATIVE
BASOPHILS # BLD AUTO: 0.08 K/UL
BASOPHILS NFR BLD AUTO: 1 %
BILIRUB SERPL-MCNC: 0.7 MG/DL
BILIRUBIN URINE: NEGATIVE
BLOOD URINE: NEGATIVE
BUN SERPL-MCNC: 13 MG/DL
CALCIUM SERPL-MCNC: 9.1 MG/DL
CHLORIDE SERPL-SCNC: 105 MMOL/L
CHOLEST SERPL-MCNC: 125 MG/DL
CHOLEST/HDLC SERPL: 2.7 RATIO
CO2 SERPL-SCNC: 26 MMOL/L
COLOR: YELLOW
CREAT SERPL-MCNC: 1.12 MG/DL
EOSINOPHIL # BLD AUTO: 0.51 K/UL
EOSINOPHIL NFR BLD AUTO: 6.2 %
FERRITIN SERPL-MCNC: 166 NG/ML
FOLATE SERPL-MCNC: 12.7 NG/ML
GLUCOSE QUALITATIVE U: NEGATIVE MG/DL
GLUCOSE SERPL-MCNC: 99 MG/DL
HBA1C MFR BLD HPLC: 6 %
HCT VFR BLD CALC: 30.3 %
HDLC SERPL-MCNC: 46 MG/DL
HGB BLD-MCNC: 9.9 G/DL
HYALINE CASTS: 0 /LPF
IMM GRANULOCYTES NFR BLD AUTO: 0.2 %
IRON SATN MFR SERPL: 26 %
IRON SERPL-MCNC: 85 UG/DL
KETONES URINE: NEGATIVE
LDLC SERPL CALC-MCNC: 66 MG/DL
LEUKOCYTE ESTERASE URINE: NEGATIVE
LYMPHOCYTES # BLD AUTO: 1.88 K/UL
LYMPHOCYTES NFR BLD AUTO: 23 %
MAN DIFF?: NORMAL
MCHC RBC-ENTMCNC: 20.9 PG
MCHC RBC-ENTMCNC: 32.7 GM/DL
MCV RBC AUTO: 63.9 FL
MICROSCOPIC-UA: NORMAL
MONOCYTES # BLD AUTO: 0.72 K/UL
MONOCYTES NFR BLD AUTO: 8.8 %
NEUTROPHILS # BLD AUTO: 4.97 K/UL
NEUTROPHILS NFR BLD AUTO: 60.8 %
NITRITE URINE: NEGATIVE
PH URINE: 7.5
PLATELET # BLD AUTO: 200 K/UL
POTASSIUM SERPL-SCNC: 4.2 MMOL/L
PROT SERPL-MCNC: 6.9 G/DL
PROTEIN URINE: NEGATIVE MG/DL
RBC # BLD: 4.74 M/UL
RBC # FLD: 17.5 %
RED BLOOD CELLS URINE: 0 /HPF
SODIUM SERPL-SCNC: 142 MMOL/L
SPECIFIC GRAVITY URINE: 1.01
SQUAMOUS EPITHELIAL CELLS: 0 /HPF
TIBC SERPL-MCNC: 332 UG/DL
TRIGL SERPL-MCNC: 63 MG/DL
TSH SERPL-ACNC: 1.08 UIU/ML
UIBC SERPL-MCNC: 247 UG/DL
UROBILINOGEN URINE: NEGATIVE MG/DL
VIT B12 SERPL-MCNC: 357 PG/ML
WBC # FLD AUTO: 8.18 K/UL
WHITE BLOOD CELLS URINE: 0 /HPF

## 2019-01-02 ENCOUNTER — APPOINTMENT (OUTPATIENT)
Dept: FAMILY MEDICINE | Facility: CLINIC | Age: 59
End: 2019-01-02
Payer: COMMERCIAL

## 2019-01-02 VITALS
SYSTOLIC BLOOD PRESSURE: 124 MMHG | WEIGHT: 206 LBS | BODY MASS INDEX: 30.51 KG/M2 | TEMPERATURE: 99 F | HEIGHT: 69 IN | OXYGEN SATURATION: 96 % | HEART RATE: 88 BPM | DIASTOLIC BLOOD PRESSURE: 74 MMHG

## 2019-01-02 PROCEDURE — 99214 OFFICE O/P EST MOD 30 MIN: CPT

## 2019-01-02 NOTE — PHYSICAL EXAM
[No Acute Distress] : no acute distress [Well Nourished] : well nourished [Well Developed] : well developed [Well-Appearing] : well-appearing [Normal Sclera/Conjunctiva] : normal sclera/conjunctiva [PERRL] : pupils equal round and reactive to light [EOMI] : extraocular movements intact [Normal Oropharynx] : the oropharynx was normal [No JVD] : no jugular venous distention [Supple] : supple [No Lymphadenopathy] : no lymphadenopathy [Thyroid Normal, No Nodules] : the thyroid was normal and there were no nodules present [No Respiratory Distress] : no respiratory distress  [Clear to Auscultation] : lungs were clear to auscultation bilaterally [No Accessory Muscle Use] : no accessory muscle use [Normal Rate] : normal rate  [Regular Rhythm] : with a regular rhythm [Normal S1, S2] : normal S1 and S2 [No Murmur] : no murmur heard [No Carotid Bruits] : no carotid bruits [No Abdominal Bruit] : a ~M bruit was not heard ~T in the abdomen [No Varicosities] : no varicosities [Pedal Pulses Present] : the pedal pulses are present [No Edema] : there was no peripheral edema [No Extremity Clubbing/Cyanosis] : no extremity clubbing/cyanosis [No Palpable Aorta] : no palpable aorta [Soft] : abdomen soft [Non Tender] : non-tender [Non-distended] : non-distended [No Masses] : no abdominal mass palpated [No HSM] : no HSM [Normal Bowel Sounds] : normal bowel sounds [Normal Posterior Cervical Nodes] : no posterior cervical lymphadenopathy [Normal Anterior Cervical Nodes] : no anterior cervical lymphadenopathy [No CVA Tenderness] : no CVA  tenderness [No Spinal Tenderness] : no spinal tenderness [No Joint Swelling] : no joint swelling [Grossly Normal Strength/Tone] : grossly normal strength/tone [No Rash] : no rash [Normal Gait] : normal gait [Coordination Grossly Intact] : coordination grossly intact [No Focal Deficits] : no focal deficits [Deep Tendon Reflexes (DTR)] : deep tendon reflexes were 2+ and symmetric [Normal Affect] : the affect was normal [Normal Insight/Judgement] : insight and judgment were intact [de-identified] : nasal congestion

## 2019-01-02 NOTE — PLAN
[FreeTextEntry1] : salt water gargles.\par supportive care, fluids. \par  rest. tylenol prn. cough suppressant and  nasal spray. \par if no improvement, return to office.\par

## 2019-01-02 NOTE — HEALTH RISK ASSESSMENT
[No falls in past year] : Patient reported no falls in the past year [0] : 2) Feeling down, depressed, or hopeless: Not at all (0) [] : No [[id number]] : 0

## 2019-01-02 NOTE — HISTORY OF PRESENT ILLNESS
[Moderate] : moderate [___ Weeks ago] :  [unfilled] weeks ago [Paroxysmal] : paroxysmal [Congestion] : congestion [Cough] : cough [Fatigue] : fatigue [OTC Remedies] : OTC remedies [At Night] : at night [Worsening] : worsening [Sore Throat] : no sore throat [Wheezing] : no wheezing [Chills] : no chills [Anorexia] : no anorexia [Shortness Of Breath] : no shortness of breath [Earache] : no earache [Headache] : no headache [Fever] : no fever [FreeTextEntry2] : nasal congestion, mucus clear [FreeTextEntry5] : decongestant [FreeTextEntry8] : He has sick contacts at home.

## 2019-01-02 NOTE — REVIEW OF SYSTEMS
[Fatigue] : fatigue [Cough] : cough [Negative] : Heme/Lymph [Fever] : no fever [Chills] : no chills [Nasal Discharge] : no nasal discharge [Shortness Of Breath] : no shortness of breath [Wheezing] : no wheezing

## 2019-01-09 ENCOUNTER — APPOINTMENT (OUTPATIENT)
Dept: FAMILY MEDICINE | Facility: CLINIC | Age: 59
End: 2019-01-09
Payer: COMMERCIAL

## 2019-01-09 VITALS
DIASTOLIC BLOOD PRESSURE: 75 MMHG | OXYGEN SATURATION: 96 % | BODY MASS INDEX: 31.1 KG/M2 | TEMPERATURE: 98.5 F | HEART RATE: 76 BPM | WEIGHT: 210 LBS | HEIGHT: 69 IN | SYSTOLIC BLOOD PRESSURE: 136 MMHG

## 2019-01-09 PROCEDURE — 99214 OFFICE O/P EST MOD 30 MIN: CPT

## 2019-01-09 NOTE — HISTORY OF PRESENT ILLNESS
[Moderate] : moderate [___ Days ago] : [unfilled] days ago [Paroxysmal] : paroxysmal [Congestion] : congestion [Cough] : cough [At Night] : at night [Stable] : stable [Sore Throat] : no sore throat [Wheezing] : no wheezing [Chills] : no chills [Anorexia] : no anorexia [Shortness Of Breath] : no shortness of breath [Earache] : no earache [Fatigue] : not fatigue [Headache] : no headache [Fever] : no fever [FreeTextEntry5] : cough medicine [FreeTextEntry8] : no improvement with flonase and tessalon.

## 2019-01-09 NOTE — PLAN
[FreeTextEntry1] : \par supportive care, fluids. \par  rest. tylenol prn.\par if no improvement, return to office.\par medrol dose pack. \par  change cough medicine. \par

## 2019-01-09 NOTE — HEALTH RISK ASSESSMENT
[No falls in past year] : Patient reported no falls in the past year [0] : 2) Feeling down, depressed, or hopeless: Not at all (0) [] : No [[id number]] : 0

## 2019-05-14 ENCOUNTER — APPOINTMENT (OUTPATIENT)
Age: 59
End: 2019-05-14
Payer: COMMERCIAL

## 2019-05-14 VITALS
TEMPERATURE: 98 F | SYSTOLIC BLOOD PRESSURE: 120 MMHG | BODY MASS INDEX: 30.91 KG/M2 | HEIGHT: 69 IN | RESPIRATION RATE: 15 BRPM | OXYGEN SATURATION: 98 % | HEART RATE: 78 BPM | DIASTOLIC BLOOD PRESSURE: 80 MMHG | WEIGHT: 208.7 LBS

## 2019-05-14 PROCEDURE — 99214 OFFICE O/P EST MOD 30 MIN: CPT

## 2019-05-14 RX ORDER — FLUTICASONE PROPIONATE 50 UG/1
50 SPRAY, METERED NASAL
Qty: 16 | Refills: 0 | Status: DISCONTINUED | COMMUNITY
Start: 2018-06-07 | End: 2019-05-14

## 2019-05-14 RX ORDER — METHYLPREDNISOLONE 4 MG/1
4 TABLET ORAL
Qty: 1 | Refills: 0 | Status: DISCONTINUED | COMMUNITY
Start: 2019-01-09 | End: 2019-05-14

## 2019-05-14 RX ORDER — BENZONATATE 200 MG/1
200 CAPSULE ORAL 3 TIMES DAILY
Qty: 20 | Refills: 0 | Status: DISCONTINUED | COMMUNITY
Start: 2019-01-02 | End: 2019-05-14

## 2019-05-14 RX ORDER — PROMETHAZINE HYDROCHLORIDE AND DEXTROMETHORPHAN HYDROBROMIDE ORAL SOLUTION 15; 6.25 MG/5ML; MG/5ML
6.25-15 SOLUTION ORAL
Qty: 100 | Refills: 0 | Status: DISCONTINUED | COMMUNITY
Start: 2019-01-09 | End: 2019-05-14

## 2019-05-14 NOTE — HISTORY OF PRESENT ILLNESS
[de-identified] : Here for f/u HTN, HLD. C/o frequent urination. denies chest pain , SOB, fever,. chills, dizziness. He needs medication refills. He is AAOx3. he DENIES medication side effects.  [FreeTextEntry1] : HTN, HLD

## 2019-05-14 NOTE — PHYSICAL EXAM
[No Acute Distress] : no acute distress [Well Nourished] : well nourished [Well Developed] : well developed [Well-Appearing] : well-appearing [Normal Sclera/Conjunctiva] : normal sclera/conjunctiva [EOMI] : extraocular movements intact [PERRL] : pupils equal round and reactive to light [No JVD] : no jugular venous distention [No Lymphadenopathy] : no lymphadenopathy [Supple] : supple [Thyroid Normal, No Nodules] : the thyroid was normal and there were no nodules present [Clear to Auscultation] : lungs were clear to auscultation bilaterally [No Respiratory Distress] : no respiratory distress  [No Accessory Muscle Use] : no accessory muscle use [Normal Rate] : normal rate  [Regular Rhythm] : with a regular rhythm [Normal S1, S2] : normal S1 and S2 [No Murmur] : no murmur heard [No Abdominal Bruit] : a ~M bruit was not heard ~T in the abdomen [No Carotid Bruits] : no carotid bruits [No Varicosities] : no varicosities [Pedal Pulses Present] : the pedal pulses are present [No Edema] : there was no peripheral edema [No Palpable Aorta] : no palpable aorta [No Extremity Clubbing/Cyanosis] : no extremity clubbing/cyanosis [Soft] : abdomen soft [Non Tender] : non-tender [Non-distended] : non-distended [No Masses] : no abdominal mass palpated [No HSM] : no HSM [Normal Bowel Sounds] : normal bowel sounds [Normal Posterior Cervical Nodes] : no posterior cervical lymphadenopathy [Normal Anterior Cervical Nodes] : no anterior cervical lymphadenopathy [No CVA Tenderness] : no CVA  tenderness [No Spinal Tenderness] : no spinal tenderness [No Joint Swelling] : no joint swelling [Grossly Normal Strength/Tone] : grossly normal strength/tone [No Rash] : no rash [Normal Gait] : normal gait [Coordination Grossly Intact] : coordination grossly intact [Deep Tendon Reflexes (DTR)] : deep tendon reflexes were 2+ and symmetric [No Focal Deficits] : no focal deficits [Normal Affect] : the affect was normal [Normal Insight/Judgement] : insight and judgment were intact

## 2019-05-14 NOTE — COUNSELING
[Activity counseling provided] : activity [Behavioral health counseling provided] : behavioral health  [Healthy eating counseling provided] : healthy eating [Walking] : Walking [Engage in a relaxing activity] : Engage in a relaxing activity [Needs reinforcement, provided] : Patient needs reinforcement on understanding lifestyle changes and  the steps needed to achieve self management goals and reinforcement was provided [None] : None

## 2019-05-14 NOTE — HEALTH RISK ASSESSMENT
[No falls in past year] : Patient reported no falls in the past year [0] : 2) Feeling down, depressed, or hopeless: Not at all (0) [] : No [de-identified] : walking  [de-identified] : regular [[id number]] : 0

## 2019-05-14 NOTE — PLAN
[FreeTextEntry1] : HTN stable, refilled medication.\par  HLD: continue current med.\par Weight loss.

## 2019-05-18 ENCOUNTER — LABORATORY RESULT (OUTPATIENT)
Age: 59
End: 2019-05-18

## 2019-05-24 LAB
25(OH)D3 SERPL-MCNC: 18.8 NG/ML
ALBUMIN SERPL ELPH-MCNC: 4.5 G/DL
ALP BLD-CCNC: 85 U/L
ALT SERPL-CCNC: 23 U/L
ANION GAP SERPL CALC-SCNC: 13 MMOL/L
APPEARANCE: CLEAR
AST SERPL-CCNC: 26 U/L
BACTERIA: NEGATIVE
BASOPHILS # BLD AUTO: 0.14 K/UL
BASOPHILS NFR BLD AUTO: 1.7 %
BILIRUB SERPL-MCNC: 0.9 MG/DL
BILIRUBIN URINE: NEGATIVE
BLOOD URINE: NEGATIVE
BUN SERPL-MCNC: 11 MG/DL
CALCIUM SERPL-MCNC: 9.3 MG/DL
CHLORIDE SERPL-SCNC: 104 MMOL/L
CHOLEST SERPL-MCNC: 136 MG/DL
CHOLEST/HDLC SERPL: 3.1 RATIO
CO2 SERPL-SCNC: 25 MMOL/L
COLOR: NORMAL
CREAT SERPL-MCNC: 1.21 MG/DL
EOSINOPHIL # BLD AUTO: 0.84 K/UL
EOSINOPHIL NFR BLD AUTO: 10.3 %
ESTIMATED AVERAGE GLUCOSE: 126 MG/DL
FERRITIN SERPL-MCNC: 159 NG/ML
FOLATE SERPL-MCNC: 17.7 NG/ML
GLUCOSE QUALITATIVE U: NEGATIVE
GLUCOSE SERPL-MCNC: 95 MG/DL
HBA1C MFR BLD HPLC: 6 %
HCT VFR BLD CALC: 32.7 %
HDLC SERPL-MCNC: 44 MG/DL
HGB BLD-MCNC: 10.8 G/DL
HYALINE CASTS: 0 /LPF
IRON SATN MFR SERPL: 27 %
IRON SERPL-MCNC: 85 UG/DL
KETONES URINE: NEGATIVE
LDLC SERPL CALC-MCNC: 74 MG/DL
LEUKOCYTE ESTERASE URINE: NEGATIVE
LYMPHOCYTES # BLD AUTO: 1.33 K/UL
LYMPHOCYTES NFR BLD AUTO: 16.4 %
MAN DIFF?: NORMAL
MCHC RBC-ENTMCNC: 22.4 PG
MCHC RBC-ENTMCNC: 33 GM/DL
MCV RBC AUTO: 67.7 FL
MICROSCOPIC-UA: NORMAL
MONOCYTES # BLD AUTO: 0.63 K/UL
MONOCYTES NFR BLD AUTO: 7.8 %
NEUTROPHILS # BLD AUTO: 5.19 K/UL
NEUTROPHILS NFR BLD AUTO: 63.8 %
NITRITE URINE: NEGATIVE
PH URINE: 7
PLATELET # BLD AUTO: 228 K/UL
POTASSIUM SERPL-SCNC: 4.2 MMOL/L
PROT SERPL-MCNC: 6.8 G/DL
PROTEIN URINE: NORMAL
PSA FREE FLD-MCNC: 23 %
PSA FREE SERPL-MCNC: 0.62 NG/ML
PSA SERPL-MCNC: 2.71 NG/ML
RBC # BLD: 4.83 M/UL
RBC # FLD: 20.4 %
RED BLOOD CELLS URINE: 4 /HPF
SODIUM SERPL-SCNC: 142 MMOL/L
SPECIFIC GRAVITY URINE: 1.01
SQUAMOUS EPITHELIAL CELLS: 3 /HPF
TIBC SERPL-MCNC: 320 UG/DL
TRIGL SERPL-MCNC: 92 MG/DL
TSH SERPL-ACNC: 1.91 UIU/ML
UIBC SERPL-MCNC: 235 UG/DL
UROBILINOGEN URINE: NORMAL
VIT B12 SERPL-MCNC: 364 PG/ML
WBC # FLD AUTO: 8.13 K/UL
WHITE BLOOD CELLS URINE: 2 /HPF

## 2019-06-27 ENCOUNTER — APPOINTMENT (OUTPATIENT)
Dept: CARDIOLOGY | Facility: CLINIC | Age: 59
End: 2019-06-27

## 2019-07-10 ENCOUNTER — APPOINTMENT (OUTPATIENT)
Dept: CARDIOLOGY | Facility: CLINIC | Age: 59
End: 2019-07-10
Payer: COMMERCIAL

## 2019-07-10 ENCOUNTER — NON-APPOINTMENT (OUTPATIENT)
Age: 59
End: 2019-07-10

## 2019-07-10 VITALS
RESPIRATION RATE: 16 BRPM | WEIGHT: 208 LBS | BODY MASS INDEX: 30.81 KG/M2 | SYSTOLIC BLOOD PRESSURE: 146 MMHG | OXYGEN SATURATION: 96 % | HEIGHT: 69 IN | DIASTOLIC BLOOD PRESSURE: 75 MMHG

## 2019-07-10 VITALS — SYSTOLIC BLOOD PRESSURE: 130 MMHG | DIASTOLIC BLOOD PRESSURE: 80 MMHG

## 2019-07-10 PROCEDURE — 99214 OFFICE O/P EST MOD 30 MIN: CPT

## 2019-07-10 PROCEDURE — 93000 ELECTROCARDIOGRAM COMPLETE: CPT

## 2019-07-10 NOTE — REASON FOR VISIT
[Follow-Up - Clinic] : a clinic follow-up of [Aortic Stenosis] : aortic stenosis [Coronary Artery Disease] : coronary artery disease [FreeTextEntry1] : Patient returns for followup. He is a very vague historian but states that several days ago he had some "chest pain". He describes it with his fingers as a pulsating it lasted for several seconds.

## 2019-07-10 NOTE — ASSESSMENT
[FreeTextEntry1] : Depression:\par 1. Moderate calcific aortic stenosis \par 2.Presumed coronary disease based on stress test patient unable to cooperate on CTA and does not wish to consider further testing.\par 3.atrial arrythmia\par \par Plan:\par 1. Continue current medical regimen\par 2.Patient to have echocardiogram\par 3. Patient to report to me if symptoms persist or worsen

## 2019-07-10 NOTE — PHYSICAL EXAM
[General Appearance - Well Developed] : well developed [Normal Appearance] : normal appearance [Well Groomed] : well groomed [General Appearance - Well Nourished] : well nourished [General Appearance - In No Acute Distress] : no acute distress [No Deformities] : no deformities [No Oral Pallor] : no oral pallor [Normal Oral Mucosa] : normal oral mucosa [Normal Jugular Venous A Waves Present] : normal jugular venous A waves present [No Oral Cyanosis] : no oral cyanosis [Normal Jugular Venous V Waves Present] : normal jugular venous V waves present [No Jugular Venous Calderon A Waves] : no jugular venous calderon A waves [Respiration, Rhythm And Depth] : normal respiratory rhythm and effort [Exaggerated Use Of Accessory Muscles For Inspiration] : no accessory muscle use [Auscultation Breath Sounds / Voice Sounds] : lungs were clear to auscultation bilaterally [Abdomen Soft] : soft [Abdomen Tenderness] : non-tender [Abdomen Mass (___ Cm)] : no abdominal mass palpated [Abnormal Walk] : normal gait [Gait - Sufficient For Exercise Testing] : the gait was sufficient for exercise testing [Nail Clubbing] : no clubbing of the fingernails [Cyanosis, Localized] : no localized cyanosis [Petechial Hemorrhages (___cm)] : no petechial hemorrhages [Skin Color & Pigmentation] : normal skin color and pigmentation [] : no rash [No Venous Stasis] : no venous stasis [Skin Lesions] : no skin lesions [No Skin Ulcers] : no skin ulcer [No Xanthoma] : no  xanthoma was observed [Oriented To Time, Place, And Person] : oriented to person, place, and time [Affect] : the affect was normal [Mood] : the mood was normal [No Anxiety] : not feeling anxious [5th Left ICS - MCL] : palpated at the 5th LICS in the midclavicular line [Normal] : normal [Normal S1] : abnormal S1 [S1 Diminished] : was diminished [Normal S2] : normal S2 [II] : a grade 2 [Right Carotid Bruit] : no bruit heard over the right carotid [Left Carotid Bruit] : no bruit heard over the left carotid [Right Femoral Bruit] : no bruit heard over the right femoral artery [Left Femoral Bruit] : no bruit heard over the left femoral artery [No Abnormalities] : the abdominal aorta was not enlarged and no bruit was heard [No Pitting Edema] : no pitting edema present [Lt] : no varicose veins of the left leg

## 2019-09-05 ENCOUNTER — APPOINTMENT (OUTPATIENT)
Dept: CARDIOLOGY | Facility: CLINIC | Age: 59
End: 2019-09-05

## 2019-12-13 ENCOUNTER — APPOINTMENT (OUTPATIENT)
Dept: INTERNAL MEDICINE | Facility: CLINIC | Age: 59
End: 2019-12-13

## 2020-02-19 ENCOUNTER — APPOINTMENT (OUTPATIENT)
Age: 60
End: 2020-02-19
Payer: COMMERCIAL

## 2020-02-19 ENCOUNTER — RX RENEWAL (OUTPATIENT)
Age: 60
End: 2020-02-19

## 2020-02-19 VITALS
HEART RATE: 70 BPM | OXYGEN SATURATION: 97 % | BODY MASS INDEX: 31.1 KG/M2 | WEIGHT: 210 LBS | SYSTOLIC BLOOD PRESSURE: 134 MMHG | TEMPERATURE: 98 F | HEIGHT: 69 IN | DIASTOLIC BLOOD PRESSURE: 86 MMHG

## 2020-02-19 DIAGNOSIS — J06.9 ACUTE UPPER RESPIRATORY INFECTION, UNSPECIFIED: ICD-10-CM

## 2020-02-19 DIAGNOSIS — J31.0 CHRONIC RHINITIS: ICD-10-CM

## 2020-02-19 DIAGNOSIS — H65.90 UNSPECIFIED NONSUPPURATIVE OTITIS MEDIA, UNSPECIFIED EAR: ICD-10-CM

## 2020-02-19 DIAGNOSIS — Z87.09 PERSONAL HISTORY OF OTHER DISEASES OF THE RESPIRATORY SYSTEM: ICD-10-CM

## 2020-02-19 DIAGNOSIS — Z86.39 PERSONAL HISTORY OF OTHER ENDOCRINE, NUTRITIONAL AND METABOLIC DISEASE: ICD-10-CM

## 2020-02-19 PROCEDURE — 99214 OFFICE O/P EST MOD 30 MIN: CPT

## 2020-02-19 RX ORDER — FLUTICASONE PROPIONATE 50 UG/1
50 SPRAY, METERED NASAL
Qty: 1 | Refills: 2 | Status: DISCONTINUED | COMMUNITY
Start: 2019-01-02 | End: 2020-02-19

## 2020-02-19 NOTE — PLAN
[FreeTextEntry1] : f/u for CPE.\par  Htn: BP goal below 130/80, low salt diet.\par  Meds refilled.\par  HLD: check labs, statin refilled.\par  f/u cardiology.

## 2020-02-19 NOTE — HISTORY OF PRESENT ILLNESS
[de-identified] : Here for f/u HTN,HLD. He needs med refills. No chest pain,sob , fever, dizziness, cough. He needs medication refills, No side effects. He does not monitor BP at home. He eats junk food at times. c/o frequent urination after drinking soda after taking BP medicine with HCTZ.  [FreeTextEntry1] : HTN, HLD

## 2020-02-19 NOTE — HEALTH RISK ASSESSMENT
[No] : In the past 12 months have you used drugs other than those required for medical reasons? No [No falls in past year] : Patient reported no falls in the past year [0] : 2) Feeling down, depressed, or hopeless: Not at all (0) [] : No [de-identified] : regular [de-identified] : sedentary  [[id number]] : 0

## 2020-02-19 NOTE — PHYSICAL EXAM
[No Acute Distress] : no acute distress [Well Nourished] : well nourished [Well Developed] : well developed [Well-Appearing] : well-appearing [Normal Sclera/Conjunctiva] : normal sclera/conjunctiva [PERRL] : pupils equal round and reactive to light [EOMI] : extraocular movements intact [No JVD] : no jugular venous distention [No Lymphadenopathy] : no lymphadenopathy [Supple] : supple [Thyroid Normal, No Nodules] : the thyroid was normal and there were no nodules present [No Respiratory Distress] : no respiratory distress  [No Accessory Muscle Use] : no accessory muscle use [Clear to Auscultation] : lungs were clear to auscultation bilaterally [Normal Rate] : normal rate  [Regular Rhythm] : with a regular rhythm [Normal S1, S2] : normal S1 and S2 [No Carotid Bruits] : no carotid bruits [No Murmur] : no murmur heard [No Varicosities] : no varicosities [No Abdominal Bruit] : a ~M bruit was not heard ~T in the abdomen [Pedal Pulses Present] : the pedal pulses are present [No Edema] : there was no peripheral edema [No Palpable Aorta] : no palpable aorta [No Extremity Clubbing/Cyanosis] : no extremity clubbing/cyanosis [Soft] : abdomen soft [Non Tender] : non-tender [Non-distended] : non-distended [No Masses] : no abdominal mass palpated [No HSM] : no HSM [Normal Posterior Cervical Nodes] : no posterior cervical lymphadenopathy [Normal Bowel Sounds] : normal bowel sounds [Normal Anterior Cervical Nodes] : no anterior cervical lymphadenopathy [No CVA Tenderness] : no CVA  tenderness [No Spinal Tenderness] : no spinal tenderness [No Joint Swelling] : no joint swelling [Grossly Normal Strength/Tone] : grossly normal strength/tone [No Rash] : no rash [Coordination Grossly Intact] : coordination grossly intact [Normal Gait] : normal gait [No Focal Deficits] : no focal deficits [Normal Affect] : the affect was normal [Deep Tendon Reflexes (DTR)] : deep tendon reflexes were 2+ and symmetric [Normal Insight/Judgement] : insight and judgment were intact

## 2020-02-22 ENCOUNTER — LABORATORY RESULT (OUTPATIENT)
Age: 60
End: 2020-02-22

## 2020-02-26 LAB
25(OH)D3 SERPL-MCNC: 23 NG/ML
ALBUMIN SERPL ELPH-MCNC: 4.2 G/DL
ALP BLD-CCNC: 82 U/L
ALT SERPL-CCNC: 20 U/L
ANION GAP SERPL CALC-SCNC: 14 MMOL/L
APPEARANCE: CLEAR
AST SERPL-CCNC: 21 U/L
BACTERIA UR CULT: NORMAL
BACTERIA: NEGATIVE
BASOPHILS # BLD AUTO: 0.11 K/UL
BASOPHILS NFR BLD AUTO: 1.5 %
BILIRUB SERPL-MCNC: 0.6 MG/DL
BILIRUBIN URINE: NEGATIVE
BLOOD URINE: NEGATIVE
BUN SERPL-MCNC: 12 MG/DL
CALCIUM SERPL-MCNC: 8.9 MG/DL
CHLORIDE SERPL-SCNC: 103 MMOL/L
CHOLEST SERPL-MCNC: 154 MG/DL
CHOLEST/HDLC SERPL: 3.5 RATIO
CO2 SERPL-SCNC: 26 MMOL/L
COLOR: NORMAL
CREAT SERPL-MCNC: 1.1 MG/DL
EOSINOPHIL # BLD AUTO: 0.44 K/UL
EOSINOPHIL NFR BLD AUTO: 6 %
ESTIMATED AVERAGE GLUCOSE: 128 MG/DL
FOLATE SERPL-MCNC: 8.6 NG/ML
GLUCOSE QUALITATIVE U: NEGATIVE
GLUCOSE SERPL-MCNC: 91 MG/DL
HBA1C MFR BLD HPLC: 6.1 %
HCT VFR BLD CALC: 34.6 %
HDLC SERPL-MCNC: 44 MG/DL
HGB BLD-MCNC: 11.7 G/DL
HYALINE CASTS: 0 /LPF
IMM GRANULOCYTES NFR BLD AUTO: 0.3 %
KETONES URINE: NEGATIVE
LDLC SERPL CALC-MCNC: 91 MG/DL
LEUKOCYTE ESTERASE URINE: NEGATIVE
LYMPHOCYTES # BLD AUTO: 1.64 K/UL
LYMPHOCYTES NFR BLD AUTO: 22.4 %
MAN DIFF?: NORMAL
MCHC RBC-ENTMCNC: 23 PG
MCHC RBC-ENTMCNC: 33.8 GM/DL
MCV RBC AUTO: 68.1 FL
MICROSCOPIC-UA: NORMAL
MONOCYTES # BLD AUTO: 0.72 K/UL
MONOCYTES NFR BLD AUTO: 9.8 %
NEUTROPHILS # BLD AUTO: 4.38 K/UL
NEUTROPHILS NFR BLD AUTO: 60 %
NITRITE URINE: NEGATIVE
PH URINE: 8
PLATELET # BLD AUTO: 208 K/UL
POTASSIUM SERPL-SCNC: 4.3 MMOL/L
PROT SERPL-MCNC: 6.7 G/DL
PROTEIN URINE: NORMAL
PSA FREE FLD-MCNC: 20 %
PSA FREE SERPL-MCNC: 0.43 NG/ML
PSA SERPL-MCNC: 2.18 NG/ML
RBC # BLD: 5.08 M/UL
RBC # FLD: 21.2 %
RED BLOOD CELLS URINE: 2 /HPF
SODIUM SERPL-SCNC: 143 MMOL/L
SPECIFIC GRAVITY URINE: 1.02
SQUAMOUS EPITHELIAL CELLS: 0 /HPF
TRIGL SERPL-MCNC: 96 MG/DL
TSH SERPL-ACNC: 1.99 UIU/ML
UROBILINOGEN URINE: NORMAL
VIT B12 SERPL-MCNC: 349 PG/ML
WBC # FLD AUTO: 7.31 K/UL
WHITE BLOOD CELLS URINE: 1 /HPF

## 2020-05-20 ENCOUNTER — APPOINTMENT (OUTPATIENT)
Dept: FAMILY MEDICINE | Facility: CLINIC | Age: 60
End: 2020-05-20
Payer: COMMERCIAL

## 2020-05-20 VITALS
OXYGEN SATURATION: 97 % | HEIGHT: 69 IN | BODY MASS INDEX: 30.51 KG/M2 | TEMPERATURE: 98.1 F | HEART RATE: 74 BPM | DIASTOLIC BLOOD PRESSURE: 86 MMHG | WEIGHT: 206 LBS | SYSTOLIC BLOOD PRESSURE: 142 MMHG

## 2020-05-20 VITALS — SYSTOLIC BLOOD PRESSURE: 142 MMHG | DIASTOLIC BLOOD PRESSURE: 85 MMHG

## 2020-05-20 PROCEDURE — 99214 OFFICE O/P EST MOD 30 MIN: CPT

## 2020-05-20 RX ORDER — OMEPRAZOLE 20 MG/1
20 CAPSULE, DELAYED RELEASE ORAL
Qty: 180 | Refills: 3 | Status: DISCONTINUED | COMMUNITY
Start: 2017-03-24 | End: 2020-05-20

## 2020-05-20 NOTE — PLAN
[FreeTextEntry1] : f/u GI.\par  antireflux therapy discussed.\par  eat small frequent meals.  quit soda, caffeine.\par switch to Nexium. wt. loss. \par aortic stenosis,ashd-f/u cardiology. last seen in 07/2019.\par f/u next month for CPE and labs. \par  monitor BP.

## 2020-05-20 NOTE — REVIEW OF SYSTEMS
[Cough] : cough [Vomiting] : vomiting [Negative] : Psychiatric [Abdominal Pain] : no abdominal pain [Nausea] : no nausea [Constipation] : no constipation [Diarrhea] : no diarrhea [Melena] : no melena [Heartburn] : no heartburn

## 2020-05-20 NOTE — HEALTH RISK ASSESSMENT
[No] : No [No falls in past year] : Patient reported no falls in the past year [0] : 2) Feeling down, depressed, or hopeless: Not at all (0) [[id number]] : 0 [] : No

## 2020-05-20 NOTE — HISTORY OF PRESENT ILLNESS
[FreeTextEntry8] : c/o dry  cough and occasional vomiting an hour after lying down since several months. Coughing can last whole night if he does not vomit. No trouble swallowing, blood in stool, abd. pain, diarrhea, constipation. denies acid burning feeling in chest. \par He has HTN and takes hie medicine in morning. He drinks soda every day. He does not drink water much. \par He sees dr. Jackson and he had endoscopy and colonoscopy 3 years ago.. He has prediabetes, Vit. D def. and Heart disease. He eats regular diet.

## 2020-05-20 NOTE — PHYSICAL EXAM
[No Acute Distress] : no acute distress [Well Developed] : well developed [Well-Appearing] : well-appearing [Well Nourished] : well nourished [PERRL] : pupils equal round and reactive to light [Normal Sclera/Conjunctiva] : normal sclera/conjunctiva [EOMI] : extraocular movements intact [Normal Oropharynx] : the oropharynx was normal [Normal Outer Ear/Nose] : the outer ears and nose were normal in appearance [No Lymphadenopathy] : no lymphadenopathy [No JVD] : no jugular venous distention [Thyroid Normal, No Nodules] : the thyroid was normal and there were no nodules present [Supple] : supple [No Accessory Muscle Use] : no accessory muscle use [No Respiratory Distress] : no respiratory distress  [Regular Rhythm] : with a regular rhythm [Clear to Auscultation] : lungs were clear to auscultation bilaterally [Normal Rate] : normal rate  [Normal S1, S2] : normal S1 and S2 [No Murmur] : no murmur heard [No Carotid Bruits] : no carotid bruits [No Abdominal Bruit] : a ~M bruit was not heard ~T in the abdomen [Pedal Pulses Present] : the pedal pulses are present [No Varicosities] : no varicosities [No Edema] : there was no peripheral edema [No Extremity Clubbing/Cyanosis] : no extremity clubbing/cyanosis [No Palpable Aorta] : no palpable aorta [Soft] : abdomen soft [Non Tender] : non-tender [Non-distended] : non-distended [No Masses] : no abdominal mass palpated [No HSM] : no HSM [Normal Bowel Sounds] : normal bowel sounds [Normal Posterior Cervical Nodes] : no posterior cervical lymphadenopathy [Normal Anterior Cervical Nodes] : no anterior cervical lymphadenopathy [No CVA Tenderness] : no CVA  tenderness [No Spinal Tenderness] : no spinal tenderness [No Joint Swelling] : no joint swelling [Grossly Normal Strength/Tone] : grossly normal strength/tone [No Rash] : no rash [Coordination Grossly Intact] : coordination grossly intact [Normal Gait] : normal gait [No Focal Deficits] : no focal deficits [Deep Tendon Reflexes (DTR)] : deep tendon reflexes were 2+ and symmetric [Normal Affect] : the affect was normal [Normal Insight/Judgement] : insight and judgment were intact

## 2020-06-24 ENCOUNTER — APPOINTMENT (OUTPATIENT)
Dept: FAMILY MEDICINE | Facility: CLINIC | Age: 60
End: 2020-06-24
Payer: COMMERCIAL

## 2020-06-24 ENCOUNTER — MED ADMIN CHARGE (OUTPATIENT)
Age: 60
End: 2020-06-24

## 2020-06-24 VITALS
SYSTOLIC BLOOD PRESSURE: 130 MMHG | DIASTOLIC BLOOD PRESSURE: 80 MMHG | HEART RATE: 76 BPM | HEIGHT: 69 IN | TEMPERATURE: 98.5 F | OXYGEN SATURATION: 96 % | BODY MASS INDEX: 30.51 KG/M2 | WEIGHT: 206 LBS

## 2020-06-24 DIAGNOSIS — L23.7 ALLERGIC CONTACT DERMATITIS DUE TO PLANTS, EXCEPT FOOD: ICD-10-CM

## 2020-06-24 PROCEDURE — 96372 THER/PROPH/DIAG INJ SC/IM: CPT

## 2020-06-24 PROCEDURE — 99214 OFFICE O/P EST MOD 30 MIN: CPT | Mod: 25

## 2020-06-24 RX ORDER — METHYLPRED ACET/NACL,ISO-OS/PF 40 MG/ML
40 VIAL (ML) INJECTION
Qty: 1 | Refills: 0 | Status: COMPLETED | OUTPATIENT
Start: 2020-06-24

## 2020-06-24 RX ADMIN — METHYLPREDNISOLONE ACETATE 0 MG/ML: 40 INJECTION, SUSPENSION INTRA-ARTICULAR; INTRALESIONAL; INTRAMUSCULAR; SOFT TISSUE at 00:00

## 2020-06-29 NOTE — PHYSICAL EXAM
[Well Developed] : well developed [No Acute Distress] : no acute distress [Well Nourished] : well nourished [PERRL] : pupils equal round and reactive to light [Normal Sclera/Conjunctiva] : normal sclera/conjunctiva [Well-Appearing] : well-appearing [Normal Outer Ear/Nose] : the outer ears and nose were normal in appearance [EOMI] : extraocular movements intact [Normal Oropharynx] : the oropharynx was normal [No Lymphadenopathy] : no lymphadenopathy [Supple] : supple [No JVD] : no jugular venous distention [Thyroid Normal, No Nodules] : the thyroid was normal and there were no nodules present [No Respiratory Distress] : no respiratory distress  [Normal Rate] : normal rate  [Clear to Auscultation] : lungs were clear to auscultation bilaterally [No Accessory Muscle Use] : no accessory muscle use [Regular Rhythm] : with a regular rhythm [Normal S1, S2] : normal S1 and S2 [No Abdominal Bruit] : a ~M bruit was not heard ~T in the abdomen [No Murmur] : no murmur heard [No Carotid Bruits] : no carotid bruits [Pedal Pulses Present] : the pedal pulses are present [No Varicosities] : no varicosities [No Extremity Clubbing/Cyanosis] : no extremity clubbing/cyanosis [No Palpable Aorta] : no palpable aorta [No Edema] : there was no peripheral edema [Soft] : abdomen soft [Non Tender] : non-tender [Non-distended] : non-distended [No Masses] : no abdominal mass palpated [No HSM] : no HSM [Normal Bowel Sounds] : normal bowel sounds [Normal Posterior Cervical Nodes] : no posterior cervical lymphadenopathy [Normal Anterior Cervical Nodes] : no anterior cervical lymphadenopathy [No CVA Tenderness] : no CVA  tenderness [No Spinal Tenderness] : no spinal tenderness [No Joint Swelling] : no joint swelling [Grossly Normal Strength/Tone] : grossly normal strength/tone [Coordination Grossly Intact] : coordination grossly intact [No Focal Deficits] : no focal deficits [Normal Affect] : the affect was normal [Deep Tendon Reflexes (DTR)] : deep tendon reflexes were 2+ and symmetric [Normal Gait] : normal gait [Normal Insight/Judgement] : insight and judgment were intact [de-identified] : erythematous confluent papules on bilateral forearms and chest. NO warmth, induration or tenderness noted.

## 2020-06-29 NOTE — HISTORY OF PRESENT ILLNESS
[FreeTextEntry8] : Patient presents with an itchy rash located on both arms and chest. 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 Believes he came into contact with poison ivy on Monday and noted the rash on Tuesday morning. NOtes mild relief with Benadryl.\par \par Patient requesting "shot", and cream. declines po steroids. \par \par No fever or chills noted.

## 2020-07-01 ENCOUNTER — APPOINTMENT (OUTPATIENT)
Dept: INTERNAL MEDICINE | Facility: CLINIC | Age: 60
End: 2020-07-01
Payer: COMMERCIAL

## 2020-07-01 VITALS
SYSTOLIC BLOOD PRESSURE: 148 MMHG | RESPIRATION RATE: 16 BRPM | TEMPERATURE: 98.8 F | DIASTOLIC BLOOD PRESSURE: 80 MMHG | HEIGHT: 69 IN | WEIGHT: 208 LBS | HEART RATE: 81 BPM | BODY MASS INDEX: 30.81 KG/M2 | OXYGEN SATURATION: 97 %

## 2020-07-01 PROCEDURE — 99215 OFFICE O/P EST HI 40 MIN: CPT

## 2020-07-01 NOTE — REVIEW OF SYSTEMS
[Fever] : no fever [Chills] : no chills [Fatigue] : no fatigue [Hot Flashes] : no hot flashes [Discharge] : no discharge [Night Sweats] : no night sweats [Recent Change In Weight] : ~T recent weight change [Pain] : no pain [Redness] : no redness [Dryness] : no dryness [Vision Problems] : no vision problems [Hearing Loss] : no hearing loss [Itching] : no itching [Earache] : no earache [Postnasal Drip] : no postnasal drip [Nosebleeds] : no nosebleeds [Sore Throat] : no sore throat [Nasal Discharge] : no nasal discharge [Palpitations] : no palpitations [Chest Pain] : no chest pain [Hoarseness] : no hoarseness [Lower Ext Edema] : no lower extremity edema [Claudication] : no  leg claudication [Paroxysmal Nocturnal Dyspnea] : no paroxysmal nocturnal dyspnea [Orthopena] : no orthopnea [Shortness Of Breath] : no shortness of breath [Wheezing] : no wheezing [Abdominal Pain] : no abdominal pain [Dyspnea on Exertion] : not dyspnea on exertion [Nausea] : no nausea [Vomiting] : no vomiting [Constipation] : no constipation [Diarrhea] : no diarrhea [Melena] : no melena [Heartburn] : heartburn [Incontinence] : no incontinence [Dysuria] : no dysuria [Nocturia] : no nocturia [Hematuria] : no hematuria [Hesitancy] : no hesitancy [Frequency] : no frequency [Impotence] : no impotency [Poor Libido] : libido not poor [Joint Stiffness] : no joint stiffness [Joint Pain] : no joint pain [Muscle Pain] : no muscle pain [Muscle Weakness] : no muscle weakness [Joint Swelling] : no joint swelling [Back Pain] : no back pain [Nail Changes] : no nail changes [Mole Changes] : no mole changes [Hair Changes] : no hair changes [Skin Rash] : no skin rash [Headache] : no headache [Confusion] : no confusion [Fainting] : no fainting [Dizziness] : no dizziness [Memory Loss] : no memory loss [Unsteady Walk] : no ataxia [Insomnia] : no insomnia [Suicidal] : not suicidal [Anxiety] : no anxiety [Depression] : no depression [Easy Bleeding] : no easy bleeding [Easy Bruising] : no easy bruising [Swollen Glands] : no swollen glands [FreeTextEntry2] : gain

## 2020-07-01 NOTE — ASSESSMENT
[FreeTextEntry1] : Physical exam shows a well-developed man in no acute distress blood pressure 148/80 heart rate 81 respirations 16 height of 5 foot 9 inches weight of 208 pounds BMI 30.72 HEENT was unremarkable chest was clear cardiovascular exam showed normal S1 and S2 with no extra heart sounds or murmurs abdomen was soft and nontender with no hepatosplenomegaly extremities showed no edema neurologic exam was nonfocal agree with the previous suggestion of him eating earlier at least 3-4 hours before going to bed patient's weight was discussed at length and he was told a significant effect of weight on acid reflux methods of losing weight such as carbohydrate restriction portion control proper choices and exercise were discussed in weight watchers was suggested patient was told to limit caffeine citrus juices chocolate and to eat slowly agree with after being on omeprazole for a long time to switch to another agent I have called than the generic of Nexium and hopefully it will be cheaper for him to by this way then multiple boxes of over-the-counter medication he will call me in 2-4 weeks to let me know how he is feeling well not recommend an endoscopy at this point.is not due for a colonoscopy until 2021

## 2020-07-01 NOTE — HISTORY OF PRESENT ILLNESS
[FreeTextEntry1] : 59-year-old man referred by his PCP Dr CORTES for gastroenterology consultation concerning his GERD [de-identified] : 59 year-old man comes to the office the patient of Dr. Lynch for gastroenterology consultation patient is known to me for years for gastroesophageal reflux disease he has had previous endoscopies as well as colonoscopy in 2016 recently the patient has gained significant weight and has had an exacerbation of his acid reflux despite being on omeprazole twice a day patient has recently been suggested to switch to a different PPI and generic Nexium he has been obtaining from the pharmacy he believes it has made any impact he has also been trying to eat his meals earlier than he has as most of his severe reflux is at night or at bedtime temperature chills sweats or myalgias he's had no headache sinus congestion sore throat cough wheezing pleurisy chest pain shortness of breath exertional dyspnea lightheadedness palpitations dizziness vertigo or syncope he has had no urinary symptoms leg edema skin rashes or any problems sleeping he denies abdominal pain nausea vomiting diarrhea constipation bright red blood per rectum or black stools his appetite has been good his weight has been increasing

## 2020-07-01 NOTE — PHYSICAL EXAM
[Well Nourished] : well nourished [No Acute Distress] : no acute distress [Well Developed] : well developed [Well-Appearing] : well-appearing [Normal Voice/Communication] : normal voice/communication [Normal Sclera/Conjunctiva] : normal sclera/conjunctiva [PERRL] : pupils equal round and reactive to light [EOMI] : extraocular movements intact [Normal Outer Ear/Nose] : the outer ears and nose were normal in appearance [Normal Oropharynx] : the oropharynx was normal [Normal TMs] : both tympanic membranes were normal [Normal Nasal Mucosa] : the nasal mucosa was normal [No JVD] : no jugular venous distention [No Lymphadenopathy] : no lymphadenopathy [Supple] : supple [No Respiratory Distress] : no respiratory distress  [Thyroid Normal, No Nodules] : the thyroid was normal and there were no nodules present [No Accessory Muscle Use] : no accessory muscle use [Clear to Auscultation] : lungs were clear to auscultation bilaterally [Normal Rate] : normal rate  [No Murmur] : no murmur heard [Regular Rhythm] : with a regular rhythm [Normal S1, S2] : normal S1 and S2 [No Abdominal Bruit] : a ~M bruit was not heard ~T in the abdomen [No Carotid Bruits] : no carotid bruits [No Varicosities] : no varicosities [Pedal Pulses Present] : the pedal pulses are present [No Edema] : there was no peripheral edema [No Extremity Clubbing/Cyanosis] : no extremity clubbing/cyanosis [No Palpable Aorta] : no palpable aorta [Soft] : abdomen soft [Declined Breast Exam] : declined breast exam  [Non-distended] : non-distended [Non Tender] : non-tender [No Masses] : no abdominal mass palpated [No HSM] : no HSM [No Hernias] : no hernias [Declined Rectal Exam] : declined rectal exam [Normal Bowel Sounds] : normal bowel sounds [Normal Supraclavicular Nodes] : no supraclavicular lymphadenopathy [Normal Axillary Nodes] : no axillary lymphadenopathy [Normal Posterior Cervical Nodes] : no posterior cervical lymphadenopathy [Normal Anterior Cervical Nodes] : no anterior cervical lymphadenopathy [Normal Inguinal Nodes] : no inguinal lymphadenopathy [Normal Femoral Nodes] : no femoral lymphadenopathy [No Spinal Tenderness] : no spinal tenderness [No CVA Tenderness] : no CVA  tenderness [Scoliosis] : no scoliosis [Kyphosis] : no kyphosis [No Joint Swelling] : no joint swelling [Grossly Normal Strength/Tone] : grossly normal strength/tone [No Rash] : no rash [No Skin Lesions] : no skin lesions [Acne] : no acne [Coordination Grossly Intact] : coordination grossly intact [Deep Tendon Reflexes (DTR)] : deep tendon reflexes were 2+ and symmetric [Normal Gait] : normal gait [No Focal Deficits] : no focal deficits [Normal Affect] : the affect was normal [Memory Grossly Normal] : memory grossly normal [Speech Grossly Normal] : speech grossly normal [Normal Mood] : the mood was normal [Alert and Oriented x3] : oriented to person, place, and time [Normal Insight/Judgement] : insight and judgment were intact

## 2020-07-07 ENCOUNTER — APPOINTMENT (OUTPATIENT)
Dept: FAMILY MEDICINE | Facility: CLINIC | Age: 60
End: 2020-07-07

## 2020-09-04 ENCOUNTER — RX RENEWAL (OUTPATIENT)
Age: 60
End: 2020-09-04

## 2020-09-08 ENCOUNTER — RX RENEWAL (OUTPATIENT)
Age: 60
End: 2020-09-08

## 2020-10-27 ENCOUNTER — APPOINTMENT (OUTPATIENT)
Dept: FAMILY MEDICINE | Facility: CLINIC | Age: 60
End: 2020-10-27
Payer: COMMERCIAL

## 2020-10-27 ENCOUNTER — MED ADMIN CHARGE (OUTPATIENT)
Age: 60
End: 2020-10-27

## 2020-10-27 VITALS
RESPIRATION RATE: 16 BRPM | SYSTOLIC BLOOD PRESSURE: 134 MMHG | WEIGHT: 211 LBS | BODY MASS INDEX: 31.25 KG/M2 | DIASTOLIC BLOOD PRESSURE: 80 MMHG | OXYGEN SATURATION: 96 % | HEART RATE: 73 BPM | TEMPERATURE: 98.9 F | HEIGHT: 69 IN

## 2020-10-27 DIAGNOSIS — R80.9 PROTEINURIA, UNSPECIFIED: ICD-10-CM

## 2020-10-27 DIAGNOSIS — R35.0 FREQUENCY OF MICTURITION: ICD-10-CM

## 2020-10-27 DIAGNOSIS — Z23 ENCOUNTER FOR IMMUNIZATION: ICD-10-CM

## 2020-10-27 PROCEDURE — G0008: CPT

## 2020-10-27 PROCEDURE — 90686 IIV4 VACC NO PRSV 0.5 ML IM: CPT

## 2020-10-27 PROCEDURE — 99214 OFFICE O/P EST MOD 30 MIN: CPT | Mod: 25

## 2020-10-27 PROCEDURE — 36415 COLL VENOUS BLD VENIPUNCTURE: CPT

## 2020-10-27 PROCEDURE — 99072 ADDL SUPL MATRL&STAF TM PHE: CPT

## 2020-10-28 ENCOUNTER — LABORATORY RESULT (OUTPATIENT)
Age: 60
End: 2020-10-28

## 2020-10-28 PROBLEM — Z23 ENCOUNTER FOR IMMUNIZATION: Status: ACTIVE | Noted: 2020-10-28

## 2020-10-28 PROBLEM — Z23 NEED FOR IMMUNIZATION AGAINST INFLUENZA: Status: ACTIVE | Noted: 2017-09-19

## 2020-10-28 NOTE — HISTORY OF PRESENT ILLNESS
[FreeTextEntry1] : c/o increased urinary frequency , f/u HTN, obesity, HLD  [de-identified] : c/o urine frequency at night for few months. He gets up 3 to 4 times. No thin stream, urgency or hesitancy. No family h/o prostate cancer. He has  HTN, obesity and HLD. He is sedentary. No chest pain, SOB, dizziness, fever, chills. He is AAOX3, NAD.

## 2020-10-28 NOTE — HEALTH RISK ASSESSMENT
[No] : In the past 12 months have you used drugs other than those required for medical reasons? No [No falls in past year] : Patient reported no falls in the past year [0] : 2) Feeling down, depressed, or hopeless: Not at all (0) [] : No [de-identified] : regular [[id number]] : 0

## 2020-10-28 NOTE — PLAN
[FreeTextEntry1] : f/u urology for increased urinary frequency. \par  labs done. check urine test.\par low chol. diet.\par weight loss.\par healthy diet.\par HTN:LOW sodium diet. \par Flu vaccine given. \par

## 2020-10-29 LAB
25(OH)D3 SERPL-MCNC: 24.4 NG/ML
ALBUMIN SERPL ELPH-MCNC: 4.4 G/DL
ALP BLD-CCNC: 89 U/L
ALT SERPL-CCNC: 22 U/L
ANION GAP SERPL CALC-SCNC: 12 MMOL/L
APPEARANCE: CLEAR
AST SERPL-CCNC: 23 U/L
BASOPHILS # BLD AUTO: 0.12 K/UL
BASOPHILS NFR BLD AUTO: 1.4 %
BILIRUB SERPL-MCNC: 0.6 MG/DL
BILIRUBIN URINE: NEGATIVE
BLOOD URINE: NEGATIVE
BUN SERPL-MCNC: 11 MG/DL
CALCIUM SERPL-MCNC: 9 MG/DL
CHLORIDE SERPL-SCNC: 105 MMOL/L
CHOLEST SERPL-MCNC: 127 MG/DL
CO2 SERPL-SCNC: 24 MMOL/L
COLOR: NORMAL
CREAT SERPL-MCNC: 1.02 MG/DL
CREAT SPEC-SCNC: 122 MG/DL
EOSINOPHIL # BLD AUTO: 0.43 K/UL
EOSINOPHIL NFR BLD AUTO: 5.2 %
ESTIMATED AVERAGE GLUCOSE: 126 MG/DL
FERRITIN SERPL-MCNC: 179 NG/ML
FOLATE SERPL-MCNC: 12.4 NG/ML
GLUCOSE QUALITATIVE U: NEGATIVE
GLUCOSE SERPL-MCNC: 95 MG/DL
HBA1C MFR BLD HPLC: 6 %
HCT VFR BLD CALC: 30.5 %
HDLC SERPL-MCNC: 43 MG/DL
HGB BLD-MCNC: 10.5 G/DL
IMM GRANULOCYTES NFR BLD AUTO: 0.2 %
IRON SATN MFR SERPL: 26 %
IRON SERPL-MCNC: 86 UG/DL
KETONES URINE: NEGATIVE
LDLC SERPL CALC-MCNC: 62 MG/DL
LEUKOCYTE ESTERASE URINE: NEGATIVE
LYMPHOCYTES # BLD AUTO: 1.66 K/UL
LYMPHOCYTES NFR BLD AUTO: 20 %
MAN DIFF?: NORMAL
MCHC RBC-ENTMCNC: 24.1 PG
MCHC RBC-ENTMCNC: 34.4 GM/DL
MCV RBC AUTO: 70 FL
MICROALBUMIN 24H UR DL<=1MG/L-MCNC: 2 MG/DL
MICROALBUMIN/CREAT 24H UR-RTO: 16 MG/G
MONOCYTES # BLD AUTO: 0.75 K/UL
MONOCYTES NFR BLD AUTO: 9 %
NEUTROPHILS # BLD AUTO: 5.34 K/UL
NEUTROPHILS NFR BLD AUTO: 64.2 %
NITRITE URINE: NEGATIVE
NONHDLC SERPL-MCNC: 84 MG/DL
PH URINE: 6.5
PLATELET # BLD AUTO: 203 K/UL
POTASSIUM SERPL-SCNC: 3.4 MMOL/L
PROT SERPL-MCNC: 6.7 G/DL
PROTEIN URINE: NEGATIVE
RBC # BLD: 4.36 M/UL
RBC # FLD: 21.2 %
SODIUM SERPL-SCNC: 141 MMOL/L
SPECIFIC GRAVITY URINE: 1.01
TIBC SERPL-MCNC: 325 UG/DL
TRIGL SERPL-MCNC: 113 MG/DL
TSH SERPL-ACNC: 1.43 UIU/ML
UIBC SERPL-MCNC: 239 UG/DL
UROBILINOGEN URINE: NORMAL
VIT B12 SERPL-MCNC: 328 PG/ML
WBC # FLD AUTO: 8.32 K/UL

## 2020-11-10 ENCOUNTER — APPOINTMENT (OUTPATIENT)
Dept: INTERNAL MEDICINE | Facility: CLINIC | Age: 60
End: 2020-11-10
Payer: COMMERCIAL

## 2020-11-10 VITALS
BODY MASS INDEX: 30.51 KG/M2 | HEART RATE: 74 BPM | HEIGHT: 69 IN | TEMPERATURE: 98.1 F | RESPIRATION RATE: 16 BRPM | WEIGHT: 206 LBS | DIASTOLIC BLOOD PRESSURE: 80 MMHG | OXYGEN SATURATION: 96 % | SYSTOLIC BLOOD PRESSURE: 138 MMHG

## 2020-11-10 PROCEDURE — 99072 ADDL SUPL MATRL&STAF TM PHE: CPT

## 2020-11-10 PROCEDURE — 99215 OFFICE O/P EST HI 40 MIN: CPT

## 2020-11-10 RX ORDER — AMOXICILLIN 500 MG/1
500 TABLET, FILM COATED ORAL
Qty: 21 | Refills: 0 | Status: COMPLETED | COMMUNITY
Start: 2020-09-16

## 2020-11-10 RX ORDER — ESOMEPRAZOLE MAGNESIUM 40 MG/1
40 CAPSULE, DELAYED RELEASE ORAL TWICE DAILY
Qty: 60 | Refills: 2 | Status: DISCONTINUED | COMMUNITY
Start: 2020-05-20 | End: 2020-11-10

## 2020-11-10 NOTE — PHYSICAL EXAM
[No Acute Distress] : no acute distress [Well Nourished] : well nourished [Well Developed] : well developed [Well-Appearing] : well-appearing [Normal Voice/Communication] : normal voice/communication [Normal Sclera/Conjunctiva] : normal sclera/conjunctiva [PERRL] : pupils equal round and reactive to light [EOMI] : extraocular movements intact [Normal Outer Ear/Nose] : the outer ears and nose were normal in appearance [Normal Oropharynx] : the oropharynx was normal [Normal TMs] : both tympanic membranes were normal [Normal Nasal Mucosa] : the nasal mucosa was normal [No JVD] : no jugular venous distention [No Lymphadenopathy] : no lymphadenopathy [Supple] : supple [Thyroid Normal, No Nodules] : the thyroid was normal and there were no nodules present [No Respiratory Distress] : no respiratory distress  [No Accessory Muscle Use] : no accessory muscle use [Clear to Auscultation] : lungs were clear to auscultation bilaterally [Normal Rate] : normal rate  [Regular Rhythm] : with a regular rhythm [Normal S1, S2] : normal S1 and S2 [No Murmur] : no murmur heard [No Carotid Bruits] : no carotid bruits [No Abdominal Bruit] : a ~M bruit was not heard ~T in the abdomen [No Varicosities] : no varicosities [Pedal Pulses Present] : the pedal pulses are present [No Edema] : there was no peripheral edema [No Palpable Aorta] : no palpable aorta [No Extremity Clubbing/Cyanosis] : no extremity clubbing/cyanosis [Declined Breast Exam] : declined breast exam  [Soft] : abdomen soft [Non Tender] : non-tender [Non-distended] : non-distended [No Masses] : no abdominal mass palpated [No HSM] : no HSM [Normal Bowel Sounds] : normal bowel sounds [No Hernias] : no hernias [Declined Rectal Exam] : declined rectal exam [Normal Supraclavicular Nodes] : no supraclavicular lymphadenopathy [Normal Axillary Nodes] : no axillary lymphadenopathy [Normal Posterior Cervical Nodes] : no posterior cervical lymphadenopathy [Normal Anterior Cervical Nodes] : no anterior cervical lymphadenopathy [Normal Inguinal Nodes] : no inguinal lymphadenopathy [Normal Femoral Nodes] : no femoral lymphadenopathy [No CVA Tenderness] : no CVA  tenderness [No Spinal Tenderness] : no spinal tenderness [Kyphosis] : no kyphosis [Scoliosis] : no scoliosis [No Joint Swelling] : no joint swelling [Grossly Normal Strength/Tone] : grossly normal strength/tone [No Rash] : no rash [No Skin Lesions] : no skin lesions [Acne] : no acne [Coordination Grossly Intact] : coordination grossly intact [No Focal Deficits] : no focal deficits [Normal Gait] : normal gait [Deep Tendon Reflexes (DTR)] : deep tendon reflexes were 2+ and symmetric [Speech Grossly Normal] : speech grossly normal [Memory Grossly Normal] : memory grossly normal [Normal Affect] : the affect was normal [Alert and Oriented x3] : oriented to person, place, and time [Normal Mood] : the mood was normal [Normal Insight/Judgement] : insight and judgment were intact

## 2020-11-10 NOTE — ASSESSMENT
[FreeTextEntry1] : Physical  examination shows a well-developed man in no acute distress blood pressure 138/80 height 5 foot 9 inches weight 206 pounds BMI 30.42 temperature of 98.1 °F heart rate of 74 respirations at 16 HEENT was unremarkable chest was clear cardiovascular exam showed a normal S1 and S2 with no extra heart sounds or murmurs abdomen was soft and nontender extremities showed no clubbing cyanosis or edema neurologic exam was nonfocal patient to be scheduled for an upper endoscopy surveillance his symptoms have been minimal over the recent months with his Nexium 20 mg twice a day patient will be due for colonoscopy in 2021 in view of his history of polyps patient's diet for acid reflux was reviewed and suggestions made he has adapted to try to not eat food late at night

## 2020-11-10 NOTE — HISTORY OF PRESENT ILLNESS
[FreeTextEntry1] : 60-year-old man well-known to me comes to the office for a gastrointestinal consultation referred by his PCP Dr. Jay to review his known gastroesophageal reflux disease and colonic polyps [de-identified] : Comes to the office for gastroenterology consultation with a known history of severe esophagitis and GERD and colonic polyps patient recently denies abdominal pain has occasional heartburn with no dysphagia denies nausea vomiting constipation diarrhea bright red blood per rectum black stools his appetite has been good his weight is been stable if not increased his past medical history is also significant for hypertension atherosclerotic heart disease and hyperlipidemia currently denies temperature chills sweats or myalgias he has had no headache sinus congestion sore throat cough wheezing pleurisy chest pain shortness of breath exertional dyspnea lightheadedness palpitations dizziness vertigo or syncope he has had no significant musculoskeletal plane leg edema skin rashes and usually sleeps well with no episodes of nocturnal reflux

## 2020-11-10 NOTE — REVIEW OF SYSTEMS
[Fever] : no fever [Chills] : no chills [Fatigue] : no fatigue [Hot Flashes] : no hot flashes [Night Sweats] : no night sweats [Recent Change In Weight] : ~T recent weight change [Discharge] : no discharge [Pain] : no pain [Redness] : no redness [Dryness] : no dryness [Vision Problems] : no vision problems [Itching] : no itching [Earache] : no earache [Hearing Loss] : no hearing loss [Nosebleeds] : no nosebleeds [Postnasal Drip] : no postnasal drip [Nasal Discharge] : no nasal discharge [Sore Throat] : no sore throat [Hoarseness] : no hoarseness [Chest Pain] : no chest pain [Palpitations] : no palpitations [Claudication] : no  leg claudication [Lower Ext Edema] : no lower extremity edema [Orthopena] : no orthopnea [Paroxysmal Nocturnal Dyspnea] : no paroxysmal nocturnal dyspnea [Shortness Of Breath] : no shortness of breath [Wheezing] : no wheezing [Dyspnea on Exertion] : not dyspnea on exertion [Abdominal Pain] : no abdominal pain [Nausea] : no nausea [Constipation] : no constipation [Diarrhea] : no diarrhea [Vomiting] : no vomiting [Heartburn] : heartburn [Melena] : no melena [Dysuria] : no dysuria [Incontinence] : no incontinence [Hesitancy] : no hesitancy [Nocturia] : no nocturia [Hematuria] : no hematuria [Frequency] : no frequency [Impotence] : no impotency [Poor Libido] : libido not poor [Joint Pain] : no joint pain [Joint Stiffness] : no joint stiffness [Muscle Pain] : no muscle pain [Muscle Weakness] : no muscle weakness [Back Pain] : no back pain [Joint Swelling] : no joint swelling [Itching] : no itching [Mole Changes] : no mole changes [Nail Changes] : no nail changes [Hair Changes] : no hair changes [Skin Rash] : no skin rash [Headache] : no headache [Dizziness] : no dizziness [Fainting] : no fainting [Confusion] : no confusion [Unsteady Walk] : no ataxia [Memory Loss] : no memory loss [Suicidal] : not suicidal [Insomnia] : no insomnia [Anxiety] : no anxiety [Depression] : no depression [Easy Bleeding] : no easy bleeding [Easy Bruising] : no easy bruising [Swollen Glands] : no swollen glands [FreeTextEntry2] : gain [FreeTextEntry7] : no dysphagia

## 2020-11-14 ENCOUNTER — RX RENEWAL (OUTPATIENT)
Age: 60
End: 2020-11-14

## 2020-11-20 ENCOUNTER — APPOINTMENT (OUTPATIENT)
Dept: UROLOGY | Facility: CLINIC | Age: 60
End: 2020-11-20

## 2020-12-02 ENCOUNTER — NON-APPOINTMENT (OUTPATIENT)
Age: 60
End: 2020-12-02

## 2021-01-28 ENCOUNTER — LABORATORY RESULT (OUTPATIENT)
Age: 61
End: 2021-01-28

## 2021-01-28 ENCOUNTER — APPOINTMENT (OUTPATIENT)
Dept: FAMILY MEDICINE | Facility: CLINIC | Age: 61
End: 2021-01-28
Payer: COMMERCIAL

## 2021-01-28 VITALS
DIASTOLIC BLOOD PRESSURE: 80 MMHG | OXYGEN SATURATION: 95 % | HEIGHT: 69 IN | HEART RATE: 73 BPM | TEMPERATURE: 97.5 F | SYSTOLIC BLOOD PRESSURE: 140 MMHG | BODY MASS INDEX: 31.1 KG/M2 | RESPIRATION RATE: 18 BRPM | WEIGHT: 210 LBS

## 2021-01-28 DIAGNOSIS — Z11.59 ENCOUNTER FOR SCREENING FOR OTHER VIRAL DISEASES: ICD-10-CM

## 2021-01-28 PROCEDURE — 36415 COLL VENOUS BLD VENIPUNCTURE: CPT

## 2021-01-28 PROCEDURE — 99072 ADDL SUPL MATRL&STAF TM PHE: CPT

## 2021-01-28 PROCEDURE — 99214 OFFICE O/P EST MOD 30 MIN: CPT | Mod: 25

## 2021-01-28 NOTE — PHYSICAL EXAM
[Well Nourished] : well nourished [Well Developed] : well developed [Well-Appearing] : well-appearing [Normal Sclera/Conjunctiva] : normal sclera/conjunctiva [PERRL] : pupils equal round and reactive to light [EOMI] : extraocular movements intact [No JVD] : no jugular venous distention [No Lymphadenopathy] : no lymphadenopathy [Supple] : supple [Thyroid Normal, No Nodules] : the thyroid was normal and there were no nodules present [No Respiratory Distress] : no respiratory distress  [No Accessory Muscle Use] : no accessory muscle use [Clear to Auscultation] : lungs were clear to auscultation bilaterally [Normal Rate] : normal rate  [Regular Rhythm] : with a regular rhythm [Normal S1, S2] : normal S1 and S2 [No Murmur] : no murmur heard [No Carotid Bruits] : no carotid bruits [No Abdominal Bruit] : a ~M bruit was not heard ~T in the abdomen [No Varicosities] : no varicosities [Pedal Pulses Present] : the pedal pulses are present [No Edema] : there was no peripheral edema [No Extremity Clubbing/Cyanosis] : no extremity clubbing/cyanosis [No Palpable Aorta] : no palpable aorta [Soft] : abdomen soft [Non Tender] : non-tender [Non-distended] : non-distended [No Masses] : no abdominal mass palpated [No HSM] : no HSM [Normal Bowel Sounds] : normal bowel sounds [Normal Posterior Cervical Nodes] : no posterior cervical lymphadenopathy [Normal Anterior Cervical Nodes] : no anterior cervical lymphadenopathy [No CVA Tenderness] : no CVA  tenderness [No Spinal Tenderness] : no spinal tenderness [No Joint Swelling] : no joint swelling [Grossly Normal Strength/Tone] : grossly normal strength/tone [No Rash] : no rash [Coordination Grossly Intact] : coordination grossly intact [No Focal Deficits] : no focal deficits [Normal Gait] : normal gait [Normal Affect] : the affect was normal [Normal Insight/Judgement] : insight and judgment were intact

## 2021-01-28 NOTE — HISTORY OF PRESENT ILLNESS
[FreeTextEntry1] : f/u HTN, obesity, HLD ,GERD [de-identified] :  He has  HTN, obesity and HLD. He is sedentary. No chest pain, SOB, dizziness, fever, chills. He is AAOX3, NAD. He is here for refills for Atorvastatin for high cholesterol, and Amlodipine and Losartan.

## 2021-01-28 NOTE — HEALTH RISK ASSESSMENT
[No] : In the past 12 months have you used drugs other than those required for medical reasons? No [No falls in past year] : Patient reported no falls in the past year [0] : 2) Feeling down, depressed, or hopeless: Not at all (0) [] : No [de-identified] : regular [[id number]] : 0

## 2021-01-28 NOTE — PLAN
[FreeTextEntry1] : low chol. diet.Statin refilled.\par GERD: PPI, pt. is following GI.antireflux therapy \par weight loss.\par healthy diet.\par HTN:meds refilled. Low sodium diet. \par blood work.

## 2021-01-29 LAB
25(OH)D3 SERPL-MCNC: 31.9 NG/ML
ALBUMIN SERPL ELPH-MCNC: 4.6 G/DL
ALP BLD-CCNC: 88 U/L
ALT SERPL-CCNC: 18 U/L
ANION GAP SERPL CALC-SCNC: 11 MMOL/L
AST SERPL-CCNC: 17 U/L
BASOPHILS # BLD AUTO: 0.12 K/UL
BASOPHILS NFR BLD AUTO: 1.5 %
BILIRUB SERPL-MCNC: 0.6 MG/DL
BUN SERPL-MCNC: 14 MG/DL
CALCIUM SERPL-MCNC: 9.1 MG/DL
CHLORIDE SERPL-SCNC: 102 MMOL/L
CHOLEST SERPL-MCNC: 122 MG/DL
CO2 SERPL-SCNC: 24 MMOL/L
CREAT SERPL-MCNC: 1.12 MG/DL
EOSINOPHIL # BLD AUTO: 0.3 K/UL
EOSINOPHIL NFR BLD AUTO: 3.8 %
ESTIMATED AVERAGE GLUCOSE: 131 MG/DL
FOLATE SERPL-MCNC: 13.4 NG/ML
GLUCOSE SERPL-MCNC: 113 MG/DL
HBA1C MFR BLD HPLC: 6.2 %
HCT VFR BLD CALC: 32.8 %
HDLC SERPL-MCNC: 39 MG/DL
HGB BLD-MCNC: 11.5 G/DL
IMM GRANULOCYTES NFR BLD AUTO: 0.3 %
LDLC SERPL CALC-MCNC: 54 MG/DL
LYMPHOCYTES # BLD AUTO: 1.64 K/UL
LYMPHOCYTES NFR BLD AUTO: 21 %
MAN DIFF?: NORMAL
MCHC RBC-ENTMCNC: 24.3 PG
MCHC RBC-ENTMCNC: 35.1 GM/DL
MCV RBC AUTO: 69.2 FL
MONOCYTES # BLD AUTO: 0.78 K/UL
MONOCYTES NFR BLD AUTO: 10 %
NEUTROPHILS # BLD AUTO: 4.94 K/UL
NEUTROPHILS NFR BLD AUTO: 63.4 %
NONHDLC SERPL-MCNC: 83 MG/DL
PLATELET # BLD AUTO: 220 K/UL
POTASSIUM SERPL-SCNC: 4 MMOL/L
PROT SERPL-MCNC: 7.1 G/DL
RBC # BLD: 4.74 M/UL
RBC # FLD: 21 %
SODIUM SERPL-SCNC: 137 MMOL/L
TRIGL SERPL-MCNC: 144 MG/DL
VIT B12 SERPL-MCNC: 357 PG/ML
WBC # FLD AUTO: 7.8 K/UL

## 2021-02-12 ENCOUNTER — APPOINTMENT (OUTPATIENT)
Dept: INTERNAL MEDICINE | Facility: CLINIC | Age: 61
End: 2021-02-12
Payer: COMMERCIAL

## 2021-02-12 VITALS
SYSTOLIC BLOOD PRESSURE: 132 MMHG | HEIGHT: 69 IN | TEMPERATURE: 97.9 F | BODY MASS INDEX: 31.25 KG/M2 | WEIGHT: 211 LBS | DIASTOLIC BLOOD PRESSURE: 70 MMHG | RESPIRATION RATE: 16 BRPM | HEART RATE: 74 BPM | OXYGEN SATURATION: 98 %

## 2021-02-12 DIAGNOSIS — I35.0 NONRHEUMATIC AORTIC (VALVE) STENOSIS: ICD-10-CM

## 2021-02-12 PROCEDURE — 99215 OFFICE O/P EST HI 40 MIN: CPT | Mod: 25

## 2021-02-12 PROCEDURE — 99072 ADDL SUPL MATRL&STAF TM PHE: CPT

## 2021-02-12 PROCEDURE — G0444 DEPRESSION SCREEN ANNUAL: CPT | Mod: 59

## 2021-02-12 RX ORDER — TRIAMCINOLONE ACETONIDE 1 MG/G
0.1 OINTMENT TOPICAL 4 TIMES DAILY
Qty: 30 | Refills: 0 | Status: DISCONTINUED | COMMUNITY
Start: 2020-06-24 | End: 2021-02-12

## 2021-02-12 RX ORDER — CLOTRIMAZOLE AND BETAMETHASONE DIPROPIONATE 10; .5 MG/G; MG/G
1-0.05 CREAM TOPICAL
Qty: 45 | Refills: 0 | Status: DISCONTINUED | COMMUNITY
Start: 2020-04-30 | End: 2021-02-12

## 2021-02-12 RX ORDER — SKIN CLEANSER COMB NO.41
CLEANSER (ML) TOPICAL
Qty: 1 | Refills: 2 | Status: DISCONTINUED | COMMUNITY
Start: 2020-06-24 | End: 2021-02-12

## 2021-02-12 RX ORDER — BLOOD PRESSURE TEST KIT-LARGE
KIT MISCELLANEOUS
Qty: 1 | Refills: 0 | Status: DISCONTINUED | COMMUNITY
Start: 2020-05-20 | End: 2021-02-12

## 2021-02-13 NOTE — HISTORY OF PRESENT ILLNESS
[FreeTextEntry1] : 60 year-old man comes to the office for gastroenterology follow-up referred by his primary care physician Dr. Lynch with a past history of GERD and eosinophilic esophagitis and colonic polyps [de-identified] : Gastrointestinal follow-up with a history of GERD eosinophilic esophagitis colonic polyps his medical history is that of atherosclerotic heart disease aortic stenosis hypertension and hyperlipidemia patient denies abdominal pain has heartburn he denies dysphagia on a regular basis has had a few episodes with dry meat he has had no nausea vomiting diarrhea constipation bright red blood per rectum or black stools his appetite has been good his weight has been stable he denies temperature chills sweats or myalgias.He has had no headache sinus congestion sore throat cough wheezing pleurisy chest pain shortness of breath exertional dyspnea lightheadedness palpitations dizziness vertigo or syncope does get up at night to urinate but denies dysuria or gross hematuria has had no leg edema skin rashes and has been sleeping well

## 2021-02-13 NOTE — REVIEW OF SYSTEMS
[Heartburn] : heartburn [Fever] : no fever [Chills] : no chills [Fatigue] : no fatigue [Hot Flashes] : no hot flashes [Night Sweats] : no night sweats [Discharge] : no discharge [Recent Change In Weight] : ~T no recent weight change [Pain] : no pain [Redness] : no redness [Dryness] : no dryness [Itching] : no itching [Vision Problems] : no vision problems [Earache] : no earache [Hearing Loss] : no hearing loss [Nosebleeds] : no nosebleeds [Nasal Discharge] : no nasal discharge [Postnasal Drip] : no postnasal drip [Sore Throat] : no sore throat [Hoarseness] : no hoarseness [Chest Pain] : no chest pain [Palpitations] : no palpitations [Claudication] : no  leg claudication [Lower Ext Edema] : no lower extremity edema [Orthopena] : no orthopnea [Paroxysmal Nocturnal Dyspnea] : no paroxysmal nocturnal dyspnea [Shortness Of Breath] : no shortness of breath [Wheezing] : no wheezing [Dyspnea on Exertion] : not dyspnea on exertion [Abdominal Pain] : no abdominal pain [Nausea] : no nausea [Constipation] : no constipation [Diarrhea] : no diarrhea [Vomiting] : no vomiting [Melena] : no melena [Dysuria] : no dysuria [Incontinence] : no incontinence [Hesitancy] : no hesitancy [Hematuria] : no hematuria [Nocturia] : no nocturia [Frequency] : no frequency [Impotence] : no impotency [Poor Libido] : libido not poor [Joint Pain] : no joint pain [Joint Stiffness] : no joint stiffness [Muscle Pain] : no muscle pain [Muscle Weakness] : no muscle weakness [Back Pain] : no back pain [Joint Swelling] : no joint swelling [Itching] : no itching [Mole Changes] : no mole changes [Nail Changes] : no nail changes [Hair Changes] : no hair changes [Skin Rash] : no skin rash [Headache] : no headache [Dizziness] : no dizziness [Fainting] : no fainting [Confusion] : no confusion [Unsteady Walk] : no ataxia [Memory Loss] : no memory loss [Suicidal] : not suicidal [Insomnia] : no insomnia [Anxiety] : no anxiety [Depression] : no depression [Easy Bleeding] : no easy bleeding [Swollen Glands] : no swollen glands [Easy Bruising] : no easy bruising [FreeTextEntry7] : no dysphagia

## 2021-02-13 NOTE — HEALTH RISK ASSESSMENT
[No] : No [No falls in past year] : Patient reported no falls in the past year [0] : 2) Feeling down, depressed, or hopeless: Not at all (0) [[id number]] : 0 [] : No

## 2021-02-13 NOTE — ASSESSMENT
[FreeTextEntry1] : Physical examination shows a well-developed anxious man in no acute distress pressure 132/70 height 5 feet 9 inches weight 211 pounds BMI 31.16 heart rate is 74 respiration 16 HEENT was unremarkable chest was clear cardiovascular exam was regular with no extra heart sounds or murmurs abdomen was soft and nontender extremities showed no clubbing cyanosis or edema neurologic exam was nonfocal patient was advised to make sure she does not eat dry foods or meats and that he cut all foods into her appropriate small sizes patient will be scheduled for an endoscopy over the next several weeks he had his last colonoscopy in October 2016 making him eligible at the end of 2021 his last upper endoscopy was approximately 5 years ago he will continue his current medications he is followed regularly by his primary care physician

## 2021-02-13 NOTE — PHYSICAL EXAM
[Well Nourished] : well nourished [Well Developed] : well developed [Well-Appearing] : well-appearing [Normal Voice/Communication] : normal voice/communication [Normal Sclera/Conjunctiva] : normal sclera/conjunctiva [PERRL] : pupils equal round and reactive to light [EOMI] : extraocular movements intact [Normal Outer Ear/Nose] : the outer ears and nose were normal in appearance [Normal Oropharynx] : the oropharynx was normal [Normal TMs] : both tympanic membranes were normal [Normal Nasal Mucosa] : the nasal mucosa was normal [No JVD] : no jugular venous distention [No Lymphadenopathy] : no lymphadenopathy [Supple] : supple [Thyroid Normal, No Nodules] : the thyroid was normal and there were no nodules present [No Respiratory Distress] : no respiratory distress  [No Accessory Muscle Use] : no accessory muscle use [Clear to Auscultation] : lungs were clear to auscultation bilaterally [Normal Rate] : normal rate  [Regular Rhythm] : with a regular rhythm [Normal S1, S2] : normal S1 and S2 [No Murmur] : no murmur heard [No Carotid Bruits] : no carotid bruits [No Abdominal Bruit] : a ~M bruit was not heard ~T in the abdomen [No Varicosities] : no varicosities [Pedal Pulses Present] : the pedal pulses are present [No Edema] : there was no peripheral edema [No Palpable Aorta] : no palpable aorta [No Extremity Clubbing/Cyanosis] : no extremity clubbing/cyanosis [Soft] : abdomen soft [Declined Breast Exam] : declined breast exam  [Non Tender] : non-tender [Non-distended] : non-distended [No Masses] : no abdominal mass palpated [Normal Bowel Sounds] : normal bowel sounds [No HSM] : no HSM [No Hernias] : no hernias [Declined Rectal Exam] : declined rectal exam [Normal Supraclavicular Nodes] : no supraclavicular lymphadenopathy [Normal Axillary Nodes] : no axillary lymphadenopathy [Normal Anterior Cervical Nodes] : no anterior cervical lymphadenopathy [Normal Posterior Cervical Nodes] : no posterior cervical lymphadenopathy [Normal Inguinal Nodes] : no inguinal lymphadenopathy [Normal Femoral Nodes] : no femoral lymphadenopathy [No CVA Tenderness] : no CVA  tenderness [No Spinal Tenderness] : no spinal tenderness [No Joint Swelling] : no joint swelling [Grossly Normal Strength/Tone] : grossly normal strength/tone [No Rash] : no rash [No Skin Lesions] : no skin lesions [Coordination Grossly Intact] : coordination grossly intact [No Focal Deficits] : no focal deficits [Normal Gait] : normal gait [Deep Tendon Reflexes (DTR)] : deep tendon reflexes were 2+ and symmetric [Speech Grossly Normal] : speech grossly normal [Memory Grossly Normal] : memory grossly normal [Normal Affect] : the affect was normal [Alert and Oriented x3] : oriented to person, place, and time [Normal Mood] : the mood was normal [Normal Insight/Judgement] : insight and judgment were intact [No Acute Distress] : no acute distress [Kyphosis] : no kyphosis [Scoliosis] : no scoliosis [Acne] : no acne

## 2021-02-22 ENCOUNTER — OUTPATIENT (OUTPATIENT)
Dept: OUTPATIENT SERVICES | Facility: HOSPITAL | Age: 61
LOS: 1 days | End: 2021-02-22
Payer: COMMERCIAL

## 2021-02-22 DIAGNOSIS — Z20.828 CONTACT WITH AND (SUSPECTED) EXPOSURE TO OTHER VIRAL COMMUNICABLE DISEASES: ICD-10-CM

## 2021-02-22 LAB — SARS-COV-2 RNA SPEC QL NAA+PROBE: SIGNIFICANT CHANGE UP

## 2021-02-22 PROCEDURE — U0005: CPT

## 2021-02-22 PROCEDURE — U0003: CPT

## 2021-02-24 ENCOUNTER — RESULT REVIEW (OUTPATIENT)
Age: 61
End: 2021-02-24

## 2021-02-24 ENCOUNTER — OUTPATIENT (OUTPATIENT)
Dept: OUTPATIENT SERVICES | Facility: HOSPITAL | Age: 61
LOS: 1 days | End: 2021-02-24
Payer: COMMERCIAL

## 2021-02-24 ENCOUNTER — APPOINTMENT (OUTPATIENT)
Dept: INTERNAL MEDICINE | Facility: HOSPITAL | Age: 61
End: 2021-02-24
Payer: COMMERCIAL

## 2021-02-24 DIAGNOSIS — K21.9 GASTRO-ESOPHAGEAL REFLUX DISEASE WITHOUT ESOPHAGITIS: ICD-10-CM

## 2021-02-24 PROCEDURE — 88312 SPECIAL STAINS GROUP 1: CPT

## 2021-02-24 PROCEDURE — 43239 EGD BIOPSY SINGLE/MULTIPLE: CPT

## 2021-02-24 PROCEDURE — 88312 SPECIAL STAINS GROUP 1: CPT | Mod: 26

## 2021-02-24 PROCEDURE — 88305 TISSUE EXAM BY PATHOLOGIST: CPT

## 2021-02-24 PROCEDURE — 88305 TISSUE EXAM BY PATHOLOGIST: CPT | Mod: 26

## 2021-02-24 RX ORDER — SODIUM CHLORIDE 9 MG/ML
500 INJECTION INTRAMUSCULAR; INTRAVENOUS; SUBCUTANEOUS
Refills: 0 | Status: COMPLETED | OUTPATIENT
Start: 2021-02-24 | End: 2021-02-24

## 2021-02-24 RX ADMIN — SODIUM CHLORIDE 75 MILLILITER(S): 9 INJECTION INTRAMUSCULAR; INTRAVENOUS; SUBCUTANEOUS at 07:43

## 2021-02-26 LAB — SURGICAL PATHOLOGY STUDY: SIGNIFICANT CHANGE UP

## 2021-07-06 ENCOUNTER — TRANSCRIPTION ENCOUNTER (OUTPATIENT)
Age: 61
End: 2021-07-06

## 2021-07-08 ENCOUNTER — APPOINTMENT (OUTPATIENT)
Dept: FAMILY MEDICINE | Facility: CLINIC | Age: 61
End: 2021-07-08
Payer: COMMERCIAL

## 2021-07-08 VITALS
RESPIRATION RATE: 16 BRPM | SYSTOLIC BLOOD PRESSURE: 130 MMHG | BODY MASS INDEX: 30.81 KG/M2 | OXYGEN SATURATION: 95 % | HEIGHT: 69 IN | HEART RATE: 91 BPM | TEMPERATURE: 97.1 F | DIASTOLIC BLOOD PRESSURE: 80 MMHG | WEIGHT: 208 LBS

## 2021-07-08 DIAGNOSIS — R09.81 NASAL CONGESTION: ICD-10-CM

## 2021-07-08 PROCEDURE — 99072 ADDL SUPL MATRL&STAF TM PHE: CPT

## 2021-07-08 PROCEDURE — 99214 OFFICE O/P EST MOD 30 MIN: CPT

## 2021-07-08 NOTE — PHYSICAL EXAM
[No Acute Distress] : no acute distress [Well Nourished] : well nourished [Well Developed] : well developed [Well-Appearing] : well-appearing [Normal Sclera/Conjunctiva] : normal sclera/conjunctiva [PERRL] : pupils equal round and reactive to light [EOMI] : extraocular movements intact [Normal Oropharynx] : the oropharynx was normal [No JVD] : no jugular venous distention [No Lymphadenopathy] : no lymphadenopathy [Supple] : supple [Thyroid Normal, No Nodules] : the thyroid was normal and there were no nodules present [No Respiratory Distress] : no respiratory distress  [No Accessory Muscle Use] : no accessory muscle use [Clear to Auscultation] : lungs were clear to auscultation bilaterally [Normal Rate] : normal rate  [Regular Rhythm] : with a regular rhythm [Normal S1, S2] : normal S1 and S2 [No Carotid Bruits] : no carotid bruits [No Abdominal Bruit] : a ~M bruit was not heard ~T in the abdomen [No Varicosities] : no varicosities [Pedal Pulses Present] : the pedal pulses are present [No Edema] : there was no peripheral edema [No Palpable Aorta] : no palpable aorta [No Extremity Clubbing/Cyanosis] : no extremity clubbing/cyanosis [Soft] : abdomen soft [Non Tender] : non-tender [Non-distended] : non-distended [No Masses] : no abdominal mass palpated [No HSM] : no HSM [Normal Bowel Sounds] : normal bowel sounds [Normal Posterior Cervical Nodes] : no posterior cervical lymphadenopathy [Normal Anterior Cervical Nodes] : no anterior cervical lymphadenopathy [No CVA Tenderness] : no CVA  tenderness [No Spinal Tenderness] : no spinal tenderness [No Joint Swelling] : no joint swelling [Grossly Normal Strength/Tone] : grossly normal strength/tone [No Rash] : no rash [Coordination Grossly Intact] : coordination grossly intact [No Focal Deficits] : no focal deficits [Normal Gait] : normal gait [Deep Tendon Reflexes (DTR)] : deep tendon reflexes were 2+ and symmetric [Normal Affect] : the affect was normal [Normal Insight/Judgement] : insight and judgment were intact [de-identified] : nasal congestion , sinus pressure [de-identified] : cardiac murmur

## 2021-07-08 NOTE — HISTORY OF PRESENT ILLNESS
[Moderate] : moderate [___ Weeks ago] :  [unfilled] weeks ago [Sudden] : suddenly [Paroxysmal] : paroxysmal [Congestion] : congestion [Cough] : cough [Worsening] : worsening [Sore Throat] : no sore throat [Wheezing] : no wheezing [Chills] : no chills [Anorexia] : no anorexia [Shortness Of Breath] : no shortness of breath [Earache] : no earache [Fatigue] : not fatigue [Headache] : no headache [Fever] : no fever [FreeTextEntry8] : No sick contacts. He went to urgent care [facility] A FEW DAYS AGO which was negative. He has GERD controlled with POI. He had endoscopy this year. He feels SOB after coughing a lot. He has not followed up with cardiology in few years. No chest pain, dizziness.

## 2021-07-08 NOTE — HEALTH RISK ASSESSMENT
[No] : In the past 12 months have you used drugs other than those required for medical reasons? No [No falls in past year] : Patient reported no falls in the past year [0] : 2) Feeling down, depressed, or hopeless: Not at all (0) [PHQ-2 Negative - No further assessment needed] : PHQ-2 Negative - No further assessment needed [] : No

## 2021-07-08 NOTE — PLAN
[FreeTextEntry1] : salt water gargles.\par supportive care, fluids. \par  rest. Tylenol prn.\par if no improvement, return to office. \par GERD: pt. follows GI. PPI. \par  F/U CARDIOLOGY.

## 2021-07-08 NOTE — CURRENT MEDS
[None] : Patient does not have any barriers to medication adherence [Yes] : Reviewed medication list for presence of high-risk medications. [Blood Thinners] : blood thinners

## 2021-08-23 ENCOUNTER — APPOINTMENT (OUTPATIENT)
Dept: INTERNAL MEDICINE | Facility: CLINIC | Age: 61
End: 2021-08-23
Payer: COMMERCIAL

## 2021-08-23 VITALS
SYSTOLIC BLOOD PRESSURE: 126 MMHG | TEMPERATURE: 97.8 F | DIASTOLIC BLOOD PRESSURE: 70 MMHG | BODY MASS INDEX: 30.96 KG/M2 | HEART RATE: 78 BPM | WEIGHT: 209 LBS | HEIGHT: 69 IN | RESPIRATION RATE: 16 BRPM | OXYGEN SATURATION: 97 %

## 2021-08-23 DIAGNOSIS — I25.10 ATHEROSCLEROTIC HEART DISEASE OF NATIVE CORONARY ARTERY W/OUT ANGINA PECTORIS: ICD-10-CM

## 2021-08-23 PROCEDURE — 99215 OFFICE O/P EST HI 40 MIN: CPT

## 2021-08-23 RX ORDER — AZITHROMYCIN 250 MG/1
250 TABLET, FILM COATED ORAL
Qty: 1 | Refills: 0 | Status: DISCONTINUED | COMMUNITY
Start: 2021-07-08 | End: 2021-08-23

## 2021-08-26 NOTE — HISTORY OF PRESENT ILLNESS
[FreeTextEntry1] : 61-year-old man comes to the office for gastrointestinal follow-up referred by his PCP  to review his medications and discuss his overall health main symptoms are heartburn recently undergoing an upper endoscopy [de-identified] : Comes to the office for gastro enterology follow-up with a history of gastroesophageal reflux small hiatal hernia eosinophilic esophagitis and colonic polyps medically with hypertension hyperlipidemia aortic stenosis and atherosclerotic heart disease patient denies abdominal pain has occasional heartburn without dysphagia he has had no nausea vomiting diarrhea constipation bright red blood per rectum or black stools his appetite has been good his weight has been stable otherwise he denies temperature chills sweats or myalgias he has had no headaches sinus congestion sore throat cough wheezing pleurisy chest pain shortness of breath exertional dyspnea headedness palpitations dizziness use vertigo or syncope patient has had no urinary symptoms leg edema skin rashes and has been sleeping adequately

## 2021-08-26 NOTE — REVIEW OF SYSTEMS
[Heartburn] : heartburn [Fever] : no fever [Chills] : no chills [Fatigue] : no fatigue [Hot Flashes] : no hot flashes [Night Sweats] : no night sweats [Recent Change In Weight] : ~T no recent weight change [Discharge] : no discharge [Pain] : no pain [Redness] : no redness [Dryness] : no dryness [Vision Problems] : no vision problems [Itching] : no itching [Earache] : no earache [Hearing Loss] : no hearing loss [Nosebleeds] : no nosebleeds [Postnasal Drip] : no postnasal drip [Nasal Discharge] : no nasal discharge [Sore Throat] : no sore throat [Hoarseness] : no hoarseness [Chest Pain] : no chest pain [Palpitations] : no palpitations [Claudication] : no  leg claudication [Lower Ext Edema] : no lower extremity edema [Orthopena] : no orthopnea [Paroxysmal Nocturnal Dyspnea] : no paroxysmal nocturnal dyspnea [Shortness Of Breath] : no shortness of breath [Wheezing] : no wheezing [Dyspnea on Exertion] : not dyspnea on exertion [Abdominal Pain] : no abdominal pain [Nausea] : no nausea [Constipation] : no constipation [Diarrhea] : no diarrhea [Vomiting] : no vomiting [Melena] : no melena [Dysuria] : no dysuria [Incontinence] : no incontinence [Hesitancy] : no hesitancy [Nocturia] : no nocturia [Hematuria] : no hematuria [Frequency] : no frequency [Impotence] : no impotency [Poor Libido] : libido not poor [Joint Pain] : no joint pain [Joint Stiffness] : no joint stiffness [Muscle Pain] : no muscle pain [Muscle Weakness] : no muscle weakness [Back Pain] : no back pain [Joint Swelling] : no joint swelling [Itching] : no itching [Mole Changes] : no mole changes [Nail Changes] : no nail changes [Hair Changes] : no hair changes [Skin Rash] : no skin rash [Headache] : no headache [Dizziness] : no dizziness [Fainting] : no fainting [Confusion] : no confusion [Unsteady Walk] : no ataxia [Memory Loss] : no memory loss [Suicidal] : not suicidal [Insomnia] : no insomnia [Anxiety] : no anxiety [Depression] : no depression [Easy Bleeding] : no easy bleeding [Easy Bruising] : no easy bruising [Swollen Glands] : no swollen glands [FreeTextEntry7] : no dysphagia

## 2021-08-26 NOTE — ASSESSMENT
[FreeTextEntry1] : Physical exam shows a well-developed man in no acute distress blood pressure 126/70 height 5 feet 9 inches weight 209 pounds BMI 30.86 temperature 97.8 °F heart rate of 78 respirations 16 HEENT was unremarkable chest was clear cardiovascular exam was regular abdomen was soft and nontender extremities showed no clubbing cyanosis or edema neurologic exam was nonfocal patient had a recent upper endoscopy in February 2021 showing mild distal esophagitis hiatal hernia biopsies showing inflammation questioning eosinophilic esophagitis patient has been doing well on his proton pump inhibitor with no evidence of dysphagia and minimal episodes of heartburn will be due for his colonoscopy the end of 2021 or early 2022 patient's esomeprazole was renewed

## 2021-10-13 ENCOUNTER — APPOINTMENT (OUTPATIENT)
Dept: FAMILY MEDICINE | Facility: CLINIC | Age: 61
End: 2021-10-13
Payer: COMMERCIAL

## 2021-10-13 VITALS
DIASTOLIC BLOOD PRESSURE: 78 MMHG | RESPIRATION RATE: 17 BRPM | WEIGHT: 209 LBS | BODY MASS INDEX: 30.96 KG/M2 | TEMPERATURE: 97.3 F | OXYGEN SATURATION: 95 % | HEIGHT: 69 IN | SYSTOLIC BLOOD PRESSURE: 132 MMHG | HEART RATE: 64 BPM

## 2021-10-13 DIAGNOSIS — R79.89 OTHER SPECIFIED ABNORMAL FINDINGS OF BLOOD CHEMISTRY: ICD-10-CM

## 2021-10-13 DIAGNOSIS — Z23 ENCOUNTER FOR IMMUNIZATION: ICD-10-CM

## 2021-10-13 DIAGNOSIS — E55.9 VITAMIN D DEFICIENCY, UNSPECIFIED: ICD-10-CM

## 2021-10-13 PROCEDURE — G0008: CPT

## 2021-10-13 PROCEDURE — 90686 IIV4 VACC NO PRSV 0.5 ML IM: CPT

## 2021-10-13 PROCEDURE — 99214 OFFICE O/P EST MOD 30 MIN: CPT | Mod: 25

## 2021-10-13 PROCEDURE — 36415 COLL VENOUS BLD VENIPUNCTURE: CPT

## 2021-10-13 NOTE — PHYSICAL EXAM
[Well Nourished] : well nourished [Well Developed] : well developed [Well-Appearing] : well-appearing [Normal Sclera/Conjunctiva] : normal sclera/conjunctiva [PERRL] : pupils equal round and reactive to light [EOMI] : extraocular movements intact [No JVD] : no jugular venous distention [No Lymphadenopathy] : no lymphadenopathy [Supple] : supple [Thyroid Normal, No Nodules] : the thyroid was normal and there were no nodules present [No Respiratory Distress] : no respiratory distress  [No Accessory Muscle Use] : no accessory muscle use [Clear to Auscultation] : lungs were clear to auscultation bilaterally [Normal Rate] : normal rate  [Regular Rhythm] : with a regular rhythm [Normal S1, S2] : normal S1 and S2 [No Carotid Bruits] : no carotid bruits [No Abdominal Bruit] : a ~M bruit was not heard ~T in the abdomen [No Varicosities] : no varicosities [Pedal Pulses Present] : the pedal pulses are present [No Edema] : there was no peripheral edema [No Palpable Aorta] : no palpable aorta [No Extremity Clubbing/Cyanosis] : no extremity clubbing/cyanosis [Soft] : abdomen soft [Non Tender] : non-tender [Non-distended] : non-distended [No Masses] : no abdominal mass palpated [No HSM] : no HSM [Normal Bowel Sounds] : normal bowel sounds [Normal Posterior Cervical Nodes] : no posterior cervical lymphadenopathy [Normal Anterior Cervical Nodes] : no anterior cervical lymphadenopathy [No CVA Tenderness] : no CVA  tenderness [No Spinal Tenderness] : no spinal tenderness [No Joint Swelling] : no joint swelling [Grossly Normal Strength/Tone] : grossly normal strength/tone [No Rash] : no rash [Coordination Grossly Intact] : coordination grossly intact [No Focal Deficits] : no focal deficits [Normal Gait] : normal gait [Normal Affect] : the affect was normal [Normal Insight/Judgement] : insight and judgment were intact [de-identified] : LUSB systolic murmur

## 2021-10-13 NOTE — PLAN
[FreeTextEntry1] : low chol. diet.Statin refilled.\par GERD: PPI, pt. is following GI.antireflux therapy \par weight loss recommended.\par healthy diet.\par HTN:Meds refilled. Low sodium diet. \par Flu vaccine.

## 2021-10-13 NOTE — HEALTH RISK ASSESSMENT
[No] : In the past 12 months have you used drugs other than those required for medical reasons? No [No falls in past year] : Patient reported no falls in the past year [0] : 2) Feeling down, depressed, or hopeless: Not at all (0) [With Patient/Caregiver] : , with patient/caregiver [] : No [de-identified] : regular [[id number]] : 0 [AdvancecareDate] : 10/21

## 2021-10-13 NOTE — HISTORY OF PRESENT ILLNESS
[FreeTextEntry1] : f/u HTN, obesity, HLD ,GERD [de-identified] :  He has  HTN, obesity and HLD. He is sedentary. No chest pain, SOB, dizziness, fever, chills. He is AAOX3, NAD. He is here for refills for his medicine.He takes Atorvastatin for high cholesterol, and Amlodipine and Losartan for HTN. He is requesting Flu vaccine. He was taking Amoxicillin for? dental infection and he thought it was for constipation . He is watching his diet and exercising. He follos GI for GERD and had endoscopy done this year. hE IS HERE TODAY FOR COMPREHENSIVE BLOOD WORK.

## 2021-10-14 LAB
25(OH)D3 SERPL-MCNC: 25.9 NG/ML
ALBUMIN SERPL ELPH-MCNC: 4.4 G/DL
ALP BLD-CCNC: 91 U/L
ALT SERPL-CCNC: 19 U/L
ANION GAP SERPL CALC-SCNC: 13 MMOL/L
APPEARANCE: CLEAR
AST SERPL-CCNC: 20 U/L
BASOPHILS # BLD AUTO: 0.15 K/UL
BASOPHILS NFR BLD AUTO: 1.8 %
BILIRUB SERPL-MCNC: 0.6 MG/DL
BILIRUBIN URINE: NEGATIVE
BLOOD URINE: NEGATIVE
BUN SERPL-MCNC: 9 MG/DL
CALCIUM SERPL-MCNC: 9.2 MG/DL
CHLORIDE SERPL-SCNC: 104 MMOL/L
CHOLEST SERPL-MCNC: 173 MG/DL
CO2 SERPL-SCNC: 24 MMOL/L
COLOR: NORMAL
CREAT SERPL-MCNC: 0.99 MG/DL
CREAT SPEC-SCNC: 69 MG/DL
EOSINOPHIL # BLD AUTO: 0.43 K/UL
EOSINOPHIL NFR BLD AUTO: 5.2 %
FERRITIN SERPL-MCNC: 131 NG/ML
FOLATE SERPL-MCNC: 10.6 NG/ML
GLUCOSE QUALITATIVE U: NEGATIVE
GLUCOSE SERPL-MCNC: 104 MG/DL
HCT VFR BLD CALC: 36 %
HDLC SERPL-MCNC: 43 MG/DL
HGB BLD-MCNC: 11.6 G/DL
IMM GRANULOCYTES NFR BLD AUTO: 0.4 %
IRON SATN MFR SERPL: 29 %
IRON SERPL-MCNC: 95 UG/DL
KETONES URINE: NEGATIVE
LDLC SERPL CALC-MCNC: 109 MG/DL
LEUKOCYTE ESTERASE URINE: NEGATIVE
LYMPHOCYTES # BLD AUTO: 1.16 K/UL
LYMPHOCYTES NFR BLD AUTO: 13.9 %
MAN DIFF?: NORMAL
MCHC RBC-ENTMCNC: 21.4 PG
MCHC RBC-ENTMCNC: 32.2 GM/DL
MCV RBC AUTO: 66.3 FL
MICROALBUMIN 24H UR DL<=1MG/L-MCNC: 1.5 MG/DL
MICROALBUMIN/CREAT 24H UR-RTO: 22 MG/G
MONOCYTES # BLD AUTO: 0.76 K/UL
MONOCYTES NFR BLD AUTO: 9.1 %
NEUTROPHILS # BLD AUTO: 5.8 K/UL
NEUTROPHILS NFR BLD AUTO: 69.6 %
NITRITE URINE: NEGATIVE
NONHDLC SERPL-MCNC: 130 MG/DL
PH URINE: 8
PLATELET # BLD AUTO: 205 K/UL
POTASSIUM SERPL-SCNC: 4 MMOL/L
PROT SERPL-MCNC: 6.9 G/DL
PROTEIN URINE: NEGATIVE
PSA SERPL-MCNC: 2.11 NG/ML
RBC # BLD: 5.43 M/UL
RBC # FLD: 20 %
SODIUM SERPL-SCNC: 142 MMOL/L
SPECIFIC GRAVITY URINE: 1.01
TIBC SERPL-MCNC: 329 UG/DL
TRIGL SERPL-MCNC: 106 MG/DL
UIBC SERPL-MCNC: 234 UG/DL
UROBILINOGEN URINE: NORMAL
VIT B12 SERPL-MCNC: 327 PG/ML
WBC # FLD AUTO: 8.33 K/UL

## 2021-10-18 LAB
ESTIMATED AVERAGE GLUCOSE: 126 MG/DL
HBA1C MFR BLD HPLC: 6 %
HGB A MFR BLD: 94.6 %
HGB A2 MFR BLD: 5.4 %
HGB FRACT BLD-IMP: NORMAL

## 2021-11-10 ENCOUNTER — TRANSCRIPTION ENCOUNTER (OUTPATIENT)
Age: 61
End: 2021-11-10

## 2021-11-16 ENCOUNTER — APPOINTMENT (OUTPATIENT)
Dept: FAMILY MEDICINE | Facility: CLINIC | Age: 61
End: 2021-11-16
Payer: COMMERCIAL

## 2021-11-16 VITALS
WEIGHT: 210 LBS | RESPIRATION RATE: 15 BRPM | HEART RATE: 86 BPM | SYSTOLIC BLOOD PRESSURE: 130 MMHG | HEIGHT: 69 IN | OXYGEN SATURATION: 98 % | BODY MASS INDEX: 31.1 KG/M2 | TEMPERATURE: 98.3 F | DIASTOLIC BLOOD PRESSURE: 60 MMHG

## 2021-11-16 PROCEDURE — 99213 OFFICE O/P EST LOW 20 MIN: CPT

## 2021-11-16 NOTE — HISTORY OF PRESENT ILLNESS
[FreeTextEntry8] : Productive cough, with associated mild VANCE that has been persistent for two weeks. Cough is worse at night. Lisa fever, chills,  wheezing, sob, chest pain.\par Nonsmoker. Denies hx of chronic lung conditions. Has received 2 injections of the covid vaccine ( unsure if Pfizer or Moderna). Denies sick contacts. Bring in negative COVID test from two weeks ago. \par \par Has taken halls cough drops with minimal relief\par

## 2021-11-16 NOTE — PHYSICAL EXAM
[No Acute Distress] : no acute distress [Well Nourished] : well nourished [Well Developed] : well developed [Well-Appearing] : well-appearing [Normal Sclera/Conjunctiva] : normal sclera/conjunctiva [PERRL] : pupils equal round and reactive to light [EOMI] : extraocular movements intact [Normal Outer Ear/Nose] : the outer ears and nose were normal in appearance [Normal Oropharynx] : the oropharynx was normal [No JVD] : no jugular venous distention [No Lymphadenopathy] : no lymphadenopathy [Supple] : supple [Thyroid Normal, No Nodules] : the thyroid was normal and there were no nodules present [No Respiratory Distress] : no respiratory distress  [No Accessory Muscle Use] : no accessory muscle use [Clear to Auscultation] : lungs were clear to auscultation bilaterally [Normal Rate] : normal rate  [Regular Rhythm] : with a regular rhythm [Normal S1, S2] : normal S1 and S2 [No Carotid Bruits] : no carotid bruits [No Abdominal Bruit] : a ~M bruit was not heard ~T in the abdomen [No Varicosities] : no varicosities [Pedal Pulses Present] : the pedal pulses are present [No Edema] : there was no peripheral edema [No Palpable Aorta] : no palpable aorta [No Extremity Clubbing/Cyanosis] : no extremity clubbing/cyanosis [Soft] : abdomen soft [Non Tender] : non-tender [Non-distended] : non-distended [No Masses] : no abdominal mass palpated [No HSM] : no HSM [Normal Bowel Sounds] : normal bowel sounds [Normal Posterior Cervical Nodes] : no posterior cervical lymphadenopathy [Normal Anterior Cervical Nodes] : no anterior cervical lymphadenopathy [No CVA Tenderness] : no CVA  tenderness [No Spinal Tenderness] : no spinal tenderness [No Joint Swelling] : no joint swelling [Grossly Normal Strength/Tone] : grossly normal strength/tone [No Rash] : no rash [Coordination Grossly Intact] : coordination grossly intact [No Focal Deficits] : no focal deficits [Normal Gait] : normal gait [Deep Tendon Reflexes (DTR)] : deep tendon reflexes were 2+ and symmetric [Normal Affect] : the affect was normal [Normal Insight/Judgement] : insight and judgment were intact [de-identified] : INjected nasal turbinates  [de-identified] : Clear breath sounds  [de-identified] : Significant murmur noted

## 2021-11-16 NOTE — HEALTH RISK ASSESSMENT
[No] : In the past 12 months have you used drugs other than those required for medical reasons? No [No falls in past year] : Patient reported no falls in the past year [0] : 2) Feeling down, depressed, or hopeless: Not at all (0) [] : No [de-identified] : none

## 2021-11-17 ENCOUNTER — APPOINTMENT (OUTPATIENT)
Dept: FAMILY MEDICINE | Facility: CLINIC | Age: 61
End: 2021-11-17

## 2022-01-12 ENCOUNTER — APPOINTMENT (OUTPATIENT)
Dept: FAMILY MEDICINE | Facility: CLINIC | Age: 62
End: 2022-01-12

## 2022-01-12 ENCOUNTER — APPOINTMENT (OUTPATIENT)
Dept: FAMILY MEDICINE | Facility: CLINIC | Age: 62
End: 2022-01-12
Payer: COMMERCIAL

## 2022-01-12 PROCEDURE — 99442: CPT

## 2022-01-12 NOTE — HISTORY OF PRESENT ILLNESS
[FreeTextEntry8] : Productive cough that has been persistent for two weeks that is worse at night. \par Denies fever, chills, wheezing, sob, or chest pain.\par Sick contact - other family members with cough\par Has not been tested for COVID.\par Requesting Z pack

## 2022-01-12 NOTE — PLAN
[FreeTextEntry1] : Will treat with  Z Pack\par fluids\par rest\par RVP - RO COIVD\par RTO if symptoms worsen or persist

## 2022-06-25 ENCOUNTER — NON-APPOINTMENT (OUTPATIENT)
Age: 62
End: 2022-06-25

## 2022-07-14 ENCOUNTER — RX RENEWAL (OUTPATIENT)
Age: 62
End: 2022-07-14

## 2022-07-27 ENCOUNTER — EMERGENCY (EMERGENCY)
Facility: HOSPITAL | Age: 62
LOS: 1 days | Discharge: HOME | End: 2022-07-27
Attending: STUDENT IN AN ORGANIZED HEALTH CARE EDUCATION/TRAINING PROGRAM | Admitting: STUDENT IN AN ORGANIZED HEALTH CARE EDUCATION/TRAINING PROGRAM
Payer: COMMERCIAL

## 2022-07-27 VITALS — WEIGHT: 210.98 LBS | HEIGHT: 68 IN

## 2022-07-27 PROCEDURE — 90715 TDAP VACCINE 7 YRS/> IM: CPT

## 2022-07-27 PROCEDURE — 12001 RPR S/N/AX/GEN/TRNK 2.5CM/<: CPT

## 2022-07-27 PROCEDURE — 99283 EMERGENCY DEPT VISIT LOW MDM: CPT | Mod: 25

## 2022-07-27 PROCEDURE — 90471 IMMUNIZATION ADMIN: CPT

## 2022-07-27 RX ORDER — TETANUS TOXOID, REDUCED DIPHTHERIA TOXOID AND ACELLULAR PERTUSSIS VACCINE, ADSORBED 5; 2.5; 8; 8; 2.5 [IU]/.5ML; [IU]/.5ML; UG/.5ML; UG/.5ML; UG/.5ML
0.5 SUSPENSION INTRAMUSCULAR ONCE
Refills: 0 | Status: COMPLETED | OUTPATIENT
Start: 2022-07-27 | End: 2022-07-27

## 2022-07-27 RX ADMIN — TETANUS TOXOID, REDUCED DIPHTHERIA TOXOID AND ACELLULAR PERTUSSIS VACCINE, ADSORBED 0.5 MILLILITER(S): 5; 2.5; 8; 8; 2.5 SUSPENSION INTRAMUSCULAR at 20:19

## 2022-07-27 RX ADMIN — Medication 1 TABLET(S): at 20:19

## 2022-07-27 NOTE — ED PROVIDER NOTE - ATTENDING APP SHARED VISIT CONTRIBUTION OF CARE
Agree with PA assessment and plan. Pt with finger laceration. Full flexion and extension. No sign of tednon injury. N/V intact. Tetanus updated. Wound irrigated and repaired with demrabond. DC with keflex for ppx against wound infection.

## 2022-07-27 NOTE — ED ADULT NURSE NOTE - OBJECTIVE STATEMENT
Pt came from home with c/o left 4th finger laceration. Pt states that he sliced his finger with a can lid earlier this afternoon. Pt states that bleeding stopped as soon as he put pressure on his finger and wrapped it with several band aids. Pt states that he takes aspirin at home. PMH HTN. Pt not UTD with tdap vaccine.

## 2022-07-27 NOTE — ED PROCEDURE NOTE - CPROC ED POST PROC CARE GUIDE1
Verbal/written post procedure instructions were given to patient/caregiver./Instructed patient/caregiver to follow-up with primary care physician./Instructed patient/caregiver regarding signs and symptoms of infection./Keep the cast/splint/dressing clean and dry. Paramedian Forehead Flap Text: A decision was made to reconstruct the defect utilizing an interpolation axial flap and a staged reconstruction.  A telfa template was made of the defect.  This telfa template was then used to outline the paramedian forehead pedicle flap.  The donor area for the pedicle flap was then injected with anesthesia.  The flap was excised through the skin and subcutaneous tissue down to the layer of the underlying musculature.  The pedicle flap was carefully excised within this deep plane to maintain its blood supply.  The edges of the donor site were undermined.   The donor site was closed in a primary fashion.  The pedicle was then rotated into position and sutured.  Once the tube was sutured into place, adequate blood supply was confirmed with blanching and refill.  The pedicle was then wrapped with xeroform gauze and dressed appropriately with a telfa and gauze bandage to ensure continued blood supply and protect the attached pedicle.

## 2022-07-27 NOTE — ED PROVIDER NOTE - OBJECTIVE STATEMENT
pt c/o left 4th finger laceration. Pt states that he sliced his finger with a can lid this AM. Pt states that bleeding stopped as soon as he put pressure on his finger and wrapped it with several band aids. Pt states that he takes aspirin at home.   Pt not UTD with tdap vaccine.  denies pain or bleeding, denies numbness, tingling, weakness

## 2022-07-27 NOTE — ED PROVIDER NOTE - PATIENT PORTAL LINK FT
You can access the FollowMyHealth Patient Portal offered by [facility] by registering at the following website: [email or web address]. By joining [facility]’s FollowMyHealth portal, you will also be able to view your health information using other applications (apps) compatible with our system.

## 2022-07-27 NOTE — ED PROVIDER NOTE - CLINICAL SUMMARY MEDICAL DECISION MAKING FREE TEXT BOX
pt c/o left 4th finger laceration. Pt states that he sliced his finger with a can lid this AM. Pt states that bleeding stopped as soon as he put pressure on his finger and wrapped it with several band aids. Pt states that he takes aspirin at home.   Pt not UTD with tdap vaccine.  denies pain or bleeding, denies numbness, tingling, weakness  superficial laceration. dermabond applied.   Discussed with patient need to return to ED if symptoms don't continue to improve or recur or develops any new or worsening symptoms that are of concern.

## 2022-08-12 ENCOUNTER — APPOINTMENT (OUTPATIENT)
Dept: INTERNAL MEDICINE | Facility: CLINIC | Age: 62
End: 2022-08-12

## 2022-08-12 VITALS
RESPIRATION RATE: 16 BRPM | TEMPERATURE: 98 F | HEART RATE: 74 BPM | SYSTOLIC BLOOD PRESSURE: 118 MMHG | BODY MASS INDEX: 30.51 KG/M2 | WEIGHT: 206 LBS | HEIGHT: 69 IN | OXYGEN SATURATION: 97 % | DIASTOLIC BLOOD PRESSURE: 60 MMHG

## 2022-08-12 PROCEDURE — 99215 OFFICE O/P EST HI 40 MIN: CPT

## 2022-08-12 RX ORDER — AZITHROMYCIN 250 MG/1
250 TABLET, FILM COATED ORAL
Qty: 1 | Refills: 0 | Status: DISCONTINUED | COMMUNITY
Start: 2021-11-16 | End: 2022-08-12

## 2022-08-12 RX ORDER — ESOMEPRAZOLE MAGNESIUM 20 MG/1
20 CAPSULE, DELAYED RELEASE ORAL TWICE DAILY
Qty: 180 | Refills: 1 | Status: DISCONTINUED | COMMUNITY
Start: 2020-11-10 | End: 2022-08-12

## 2022-08-12 RX ORDER — BENZONATATE 100 MG/1
100 CAPSULE ORAL 3 TIMES DAILY
Qty: 21 | Refills: 0 | Status: DISCONTINUED | COMMUNITY
Start: 2022-04-14 | End: 2022-08-12

## 2022-08-22 NOTE — ASSESSMENT
[FreeTextEntry1] : Physical examination shows a well-developed man in no acute distress blood pressure 118/60 heart rate of 74 respirations 16 height 5 foot 9 inches weight 206 pounds BMI 30.42 HEENT was unremarkable chest was clear cardiovascular exam was regular abdomen was soft and nontender extremities showed no clubbing cyanosis or edema and neurologic exam was nonfocal patient has had minimal symptoms on his current dose of his omeprazole 20 mg twice daily patient is due for a follow-up colonoscopy" he will call to schedule an endoscopy will be performed as well

## 2022-08-22 NOTE — HISTORY OF PRESENT ILLNESS
[FreeTextEntry1] : 62-year-old man comes to the office for gastroenterology follow-up to review his medications and discuss his overall health referred by his primary care physician Dr. Rod [de-identified] : Comes to the office for gastroenterology follow-up with a history of GERD hiatal hernia and colonic polyps has occasional heartburn.  Denies dysphagia aphasia has had no nausea vomiting diarrhea constipation bright red blood per rectum or black stools his appetite has been good his weight has been stable

## 2022-08-22 NOTE — HEALTH RISK ASSESSMENT
[Never] : Never [No] : No [No falls in past year] : Patient reported no falls in the past year [0] : 2) Feeling down, depressed, or hopeless: Not at all (0) [PHQ-2 Negative - No further assessment needed] : PHQ-2 Negative - No further assessment needed [[id number]] : 0

## 2022-08-22 NOTE — REVIEW OF SYSTEMS
[Heartburn] : heartburn [Fever] : no fever [Chills] : no chills [Fatigue] : no fatigue [Hot Flashes] : no hot flashes [Night Sweats] : no night sweats [Recent Change In Weight] : ~T no recent weight change [Discharge] : no discharge [Pain] : no pain [Redness] : no redness [Dryness] : no dryness [Vision Problems] : no vision problems [Itching] : no itching [Earache] : no earache [Hearing Loss] : no hearing loss [Nosebleeds] : no nosebleeds [Postnasal Drip] : no postnasal drip [Nasal Discharge] : no nasal discharge [Sore Throat] : no sore throat [Hoarseness] : no hoarseness [Chest Pain] : no chest pain [Palpitations] : no palpitations [Claudication] : no  leg claudication [Lower Ext Edema] : no lower extremity edema [Orthopena] : no orthopnea [Paroxysmal Nocturnal Dyspnea] : no paroxysmal nocturnal dyspnea [Shortness Of Breath] : no shortness of breath [Wheezing] : no wheezing [Cough] : no cough [Dyspnea on Exertion] : not dyspnea on exertion [Abdominal Pain] : no abdominal pain [Nausea] : no nausea [Constipation] : no constipation [Diarrhea] : no diarrhea [Vomiting] : no vomiting [Melena] : no melena [Dysuria] : no dysuria [Incontinence] : no incontinence [Hesitancy] : no hesitancy [Nocturia] : no nocturia [Hematuria] : no hematuria [Frequency] : no frequency [Impotence] : no impotency [Poor Libido] : libido not poor [Joint Pain] : no joint pain [Joint Stiffness] : no joint stiffness [Muscle Pain] : no muscle pain [Muscle Weakness] : no muscle weakness [Back Pain] : no back pain [Joint Swelling] : no joint swelling [Itching] : no itching [Mole Changes] : no mole changes [Nail Changes] : no nail changes [Hair Changes] : no hair changes [Skin Rash] : no skin rash [Headache] : no headache [Dizziness] : no dizziness [Fainting] : no fainting [Confusion] : no confusion [Unsteady Walk] : no ataxia [Memory Loss] : no memory loss [Suicidal] : not suicidal [Insomnia] : no insomnia [Anxiety] : no anxiety [Depression] : no depression [Easy Bleeding] : no easy bleeding [Easy Bruising] : no easy bruising [Swollen Glands] : no swollen glands [FreeTextEntry7] : no dysphagia

## 2022-09-12 ENCOUNTER — LABORATORY RESULT (OUTPATIENT)
Age: 62
End: 2022-09-12

## 2022-09-12 ENCOUNTER — APPOINTMENT (OUTPATIENT)
Dept: FAMILY MEDICINE | Facility: CLINIC | Age: 62
End: 2022-09-12

## 2022-09-12 VITALS
WEIGHT: 208 LBS | BODY MASS INDEX: 30.81 KG/M2 | HEART RATE: 76 BPM | HEIGHT: 69 IN | SYSTOLIC BLOOD PRESSURE: 126 MMHG | DIASTOLIC BLOOD PRESSURE: 76 MMHG | RESPIRATION RATE: 20 BRPM

## 2022-09-12 DIAGNOSIS — Z23 ENCOUNTER FOR IMMUNIZATION: ICD-10-CM

## 2022-09-12 DIAGNOSIS — R73.01 IMPAIRED FASTING GLUCOSE: ICD-10-CM

## 2022-09-12 PROCEDURE — 90686 IIV4 VACC NO PRSV 0.5 ML IM: CPT

## 2022-09-12 PROCEDURE — 36415 COLL VENOUS BLD VENIPUNCTURE: CPT

## 2022-09-12 PROCEDURE — G0008: CPT

## 2022-09-12 PROCEDURE — 99214 OFFICE O/P EST MOD 30 MIN: CPT | Mod: 25

## 2022-09-12 NOTE — HISTORY OF PRESENT ILLNESS
[de-identified] : Presents for BP check, labs, and general follow up.  Also  requests flu vaccine at this encounter.  Discussed diet with goal of wt loss.

## 2022-09-13 LAB
ALBUMIN SERPL ELPH-MCNC: 4.4 G/DL
ALP BLD-CCNC: 86 U/L
ALT SERPL-CCNC: 15 U/L
ANION GAP SERPL CALC-SCNC: 10 MMOL/L
AST SERPL-CCNC: 19 U/L
BASOPHILS # BLD AUTO: 0.09 K/UL
BASOPHILS NFR BLD AUTO: 1.1 %
BILIRUB SERPL-MCNC: 0.6 MG/DL
BUN SERPL-MCNC: 6 MG/DL
CALCIUM SERPL-MCNC: 9 MG/DL
CHLORIDE SERPL-SCNC: 107 MMOL/L
CHOLEST SERPL-MCNC: 171 MG/DL
CO2 SERPL-SCNC: 25 MMOL/L
CREAT SERPL-MCNC: 1.13 MG/DL
EGFR: 73 ML/MIN/1.73M2
EOSINOPHIL # BLD AUTO: 0.24 K/UL
EOSINOPHIL NFR BLD AUTO: 3.1 %
ESTIMATED AVERAGE GLUCOSE: 131 MG/DL
FOLATE SERPL-MCNC: 7.5 NG/ML
GLUCOSE SERPL-MCNC: 107 MG/DL
HBA1C MFR BLD HPLC: 6.2 %
HCT VFR BLD CALC: 33.5 %
HDLC SERPL-MCNC: 42 MG/DL
HGB BLD-MCNC: 11.1 G/DL
IMM GRANULOCYTES NFR BLD AUTO: 0.4 %
LDLC SERPL CALC-MCNC: 104 MG/DL
LYMPHOCYTES # BLD AUTO: 1.6 K/UL
LYMPHOCYTES NFR BLD AUTO: 20.4 %
MAN DIFF?: NORMAL
MCHC RBC-ENTMCNC: 22.2 PG
MCHC RBC-ENTMCNC: 33.1 GM/DL
MCV RBC AUTO: 66.9 FL
MONOCYTES # BLD AUTO: 0.77 K/UL
MONOCYTES NFR BLD AUTO: 9.8 %
NEUTROPHILS # BLD AUTO: 5.12 K/UL
NEUTROPHILS NFR BLD AUTO: 65.2 %
NONHDLC SERPL-MCNC: 128 MG/DL
PLATELET # BLD AUTO: 222 K/UL
POTASSIUM SERPL-SCNC: 3.4 MMOL/L
PROT SERPL-MCNC: 7.1 G/DL
RBC # BLD: 5.01 M/UL
RBC # FLD: 20.6 %
SODIUM SERPL-SCNC: 142 MMOL/L
T4 FREE SERPL-MCNC: 1.1 NG/DL
TRIGL SERPL-MCNC: 120 MG/DL
TSH SERPL-ACNC: 1.3 UIU/ML
VIT B12 SERPL-MCNC: 249 PG/ML
WBC # FLD AUTO: 7.85 K/UL

## 2022-09-15 ENCOUNTER — NON-APPOINTMENT (OUTPATIENT)
Age: 62
End: 2022-09-15

## 2022-11-29 ENCOUNTER — APPOINTMENT (OUTPATIENT)
Dept: FAMILY MEDICINE | Facility: CLINIC | Age: 62
End: 2022-11-29

## 2022-11-29 VITALS
RESPIRATION RATE: 18 BRPM | TEMPERATURE: 97.7 F | HEART RATE: 98 BPM | HEIGHT: 69 IN | SYSTOLIC BLOOD PRESSURE: 144 MMHG | WEIGHT: 211 LBS | BODY MASS INDEX: 31.25 KG/M2 | DIASTOLIC BLOOD PRESSURE: 88 MMHG | OXYGEN SATURATION: 98 %

## 2022-11-29 DIAGNOSIS — M54.2 CERVICALGIA: ICD-10-CM

## 2022-11-29 DIAGNOSIS — B02.9 ZOSTER W/OUT COMPLICATIONS: ICD-10-CM

## 2022-11-29 PROCEDURE — 99214 OFFICE O/P EST MOD 30 MIN: CPT

## 2022-11-29 RX ORDER — AZITHROMYCIN 250 MG/1
250 TABLET, FILM COATED ORAL
Qty: 1 | Refills: 0 | Status: DISCONTINUED | COMMUNITY
Start: 2022-09-22 | End: 2022-11-29

## 2022-11-29 NOTE — PLAN
[FreeTextEntry1] : cooler shower.\par  Gabapentin at bedtime for neck pain and Valtrex for Shingles. \par Contact precautions.

## 2022-11-29 NOTE — PHYSICAL EXAM
[No Acute Distress] : no acute distress [Well Nourished] : well nourished [Well Developed] : well developed [Well-Appearing] : well-appearing [Normal Sclera/Conjunctiva] : normal sclera/conjunctiva [PERRL] : pupils equal round and reactive to light [EOMI] : extraocular movements intact [Normal Outer Ear/Nose] : the outer ears and nose were normal in appearance [No JVD] : no jugular venous distention [No Lymphadenopathy] : no lymphadenopathy [Supple] : supple [Thyroid Normal, No Nodules] : the thyroid was normal and there were no nodules present [No Respiratory Distress] : no respiratory distress  [No Accessory Muscle Use] : no accessory muscle use [Clear to Auscultation] : lungs were clear to auscultation bilaterally [Normal Rate] : normal rate  [Regular Rhythm] : with a regular rhythm [Normal S1, S2] : normal S1 and S2 [No Murmur] : no murmur heard [Pedal Pulses Present] : the pedal pulses are present [No Edema] : there was no peripheral edema [Soft] : abdomen soft [Non Tender] : non-tender [Non-distended] : non-distended [No Masses] : no abdominal mass palpated [No HSM] : no HSM [Normal Bowel Sounds] : normal bowel sounds [Normal Posterior Cervical Nodes] : no posterior cervical lymphadenopathy [Normal Anterior Cervical Nodes] : no anterior cervical lymphadenopathy [No CVA Tenderness] : no CVA  tenderness [No Spinal Tenderness] : no spinal tenderness [No Joint Swelling] : no joint swelling [Grossly Normal Strength/Tone] : grossly normal strength/tone [Coordination Grossly Intact] : coordination grossly intact [No Focal Deficits] : no focal deficits [Normal Gait] : normal gait [Deep Tendon Reflexes (DTR)] : deep tendon reflexes were 2+ and symmetric [Normal Affect] : the affect was normal [Normal Insight/Judgement] : insight and judgment were intact [de-identified] : right anterior chest and neck red raised rash with papules in patches

## 2022-11-29 NOTE — HISTORY OF PRESENT ILLNESS
[Rash (Location) ___] : rash on [unfilled] [Moderate] : moderate [___ Days ago] : [unfilled] days ago [Constant] : constant [At Night] : at night [In Morning] : in the morning [Worsening] : worsening [de-identified] : right upper chest  [FreeTextEntry2] : neck pain  [FreeTextEntry5] : none [FreeTextEntry8] : No sick contacts.

## 2022-11-29 NOTE — HEALTH RISK ASSESSMENT
[Never] : Never [No] : In the past 12 months have you used drugs other than those required for medical reasons? No [No falls in past year] : Patient reported no falls in the past year [0] : 2) Feeling down, depressed, or hopeless: Not at all (0) [PHQ-2 Negative - No further assessment needed] : PHQ-2 Negative - No further assessment needed [de-identified] : regular [[id number]] : 0

## 2022-12-02 ENCOUNTER — EMERGENCY (EMERGENCY)
Facility: HOSPITAL | Age: 62
LOS: 1 days | Discharge: HOME | End: 2022-12-02
Attending: INTERNAL MEDICINE | Admitting: INTERNAL MEDICINE
Payer: COMMERCIAL

## 2022-12-02 VITALS
DIASTOLIC BLOOD PRESSURE: 78 MMHG | SYSTOLIC BLOOD PRESSURE: 155 MMHG | RESPIRATION RATE: 18 BRPM | HEART RATE: 104 BPM | OXYGEN SATURATION: 96 %

## 2022-12-02 VITALS
HEART RATE: 130 BPM | DIASTOLIC BLOOD PRESSURE: 79 MMHG | HEIGHT: 66 IN | RESPIRATION RATE: 18 BRPM | SYSTOLIC BLOOD PRESSURE: 164 MMHG | WEIGHT: 210.98 LBS | OXYGEN SATURATION: 96 % | TEMPERATURE: 98 F

## 2022-12-02 PROCEDURE — 99282 EMERGENCY DEPT VISIT SF MDM: CPT

## 2022-12-02 PROCEDURE — 99283 EMERGENCY DEPT VISIT LOW MDM: CPT

## 2022-12-02 RX ORDER — BENZOCAINE/CALAMINE
1 CREAM (GRAM) TOPICAL
Qty: 1 | Refills: 0
Start: 2022-12-02

## 2022-12-02 NOTE — ED PROVIDER NOTE - NSFOLLOWUPINSTRUCTIONS_ED_ALL_ED_FT
Follow up with your PMD within 1-2 days.  Rest, increase your fluids, advance your activity as tolerated.   Take all of your other medications as previously prescribed.   Worsening, continued or ANY new concerning symptoms return to the emergency department.  continue valtrex and gabapentin as rx   calamine lotion for itching             SHINGLES - Discharge Care            Shingles    WHAT YOU NEED TO KNOW:    Shingles is a viral infection that causes a painful rash. Shingles is caused by the varicella-zoster virus. This is the same virus that causes chickenpox. The virus stays in your body after you have chickenpox, without causing any symptoms. Shingles occurs when the virus becomes active again. The active virus travels along a nerve to your skin and causes a rash. The rash usually lasts 2 to 3 weeks. Most people have shingles one time, but it is possible to develop it again.  Shingles         DISCHARGE INSTRUCTIONS:    Call your local emergency number (911 in the [place]) if:   •You have trouble moving your arms, legs, or face.      •You become confused, or have trouble speaking.      •You have a seizure.      Seek care immediately if:   •You have weakness in an arm or leg.      •You have dizziness, a severe headache, or hearing or vision loss.      •You have painful, red, warm skin around the blisters, or the blisters drain pus.      •Your neck is stiff or you have trouble moving it.      Call your doctor if:   •A painful rash appears near your eye.      •The rash spreads to more areas and your pain worsens.      •You feel weak or have a headache.      •You have a cough, chills, or a fever.      •You have abdominal pain or nausea, or you are vomiting.      •You have questions or concerns about your condition or care.      Medicines: You may need any of the following:  •Antiviral medicine fights the virus causing your shingles. Start this medicine within 3 days after you notice the first symptoms. This may help prevent nerve pain. A shingles outbreak can cause nerve pain called post-herpetic neuralgia (PHN). PHN can last a long time after you heal from shingles.      •Topical anesthetics are used to numb the skin and decrease pain. They can be a cream, gel, spray, or patch.      •Anticonvulsants and antidepressants decrease nerve pain and may help you sleep at night.      •Antihistamines may help decrease itching.      •Acetaminophen decreases pain and fever. It is available without a doctor's order. Ask how much to take and how often to take it. Follow directions. Read the labels of all other medicines you are using to see if they also contain acetaminophen, or ask your doctor or pharmacist. Acetaminophen can cause liver damage if not taken correctly.      •NSAIDs, such as ibuprofen, help decrease swelling, pain, and fever. This medicine is available with or without a doctor's order. NSAIDs can cause stomach bleeding or kidney problems in certain people. If you take blood thinner medicine, always ask your healthcare provider if NSAIDs are safe for you. Always read the medicine label and follow directions.      •A steroid and numbing medicine injection may decrease severe pain that does not get better with other medicines.      •Take your medicine as directed. Contact your healthcare provider if you think your medicine is not helping or if you have side effects. Tell your provider if you are allergic to any medicine. Keep a list of the medicines, vitamins, and herbs you take. Include the amounts, and when and why you take them. Bring the list or the pill bottles to follow-up visits. Carry your medicine list with you in case of an emergency.      Self-care:   •Apply a cool, wet compress or take a cool bath. This may help decrease itching and pain.      •Keep your rash clean and dry. Cover your rash with a bandage. Do not use bandages with adhesive. Clothes may irritate your skin.      Prevent the spread of the shingles virus: The virus can be passed to a person who has never had chickenpox. This usually happens if the other person comes in contact with your open sores. This person may get chickenpox, but not shingles. You are contagious until your blisters scab over. Stay away from people who have not had chickenpox or the chickenpox vaccine. Avoid pregnant women, newborns, and people with weak immune systems. They have a higher risk of infection.   •Wash your hands often. Wash your hands several times each day. Wash after you use the bathroom, change a child's diaper, and before you prepare or eat food. Use soap and water every time. Rub your soapy hands together, lacing your fingers. Wash the front and back of your hands, and in between your fingers. Use the fingers of one hand to scrub under the fingernails of the other hand. Wash for at least 20 seconds. Rinse with warm, running water for several seconds. Then dry your hands with a clean towel or paper towel. Use hand  that contains alcohol if soap and water are not available. Do not touch your eyes, nose, or mouth without washing your hands first.  Handwashing           •Cover a sneeze or cough. Use a tissue that covers your mouth and nose. Throw the tissue away in a trash can right away. Use the bend of your arm if a tissue is not available. Wash your hands well with soap and water or use a hand .             Prevent shingles or another shingles outbreak:   •A vaccine may be given to help prevent shingles. You can get the vaccine even if you already had shingles. The vaccine comes in 2 forms. A 2-dose vaccine is usually given to adults 50 years or older. A 1-dose vaccine may be given to adults 60 years or older.      •The vaccine can help prevent a future outbreak. If you do get shingles again, the vaccine can keep it from becoming severe. Ask your healthcare provider about other vaccines you may need.      Follow up with your doctor as directed: Write down your questions so you remember to ask them during your visits.    For more information:   •[employer] for Disease Control and Prevention  [address] [place], [place]  Phone: 0-171-7686626  Phone: 2-267-6444832  Web Address: http://www.cdc.gov           © Copyright [employer] 2022           back to top                          © Copyright [employer] 2022

## 2022-12-02 NOTE — ED ADULT NURSE NOTE - OBJECTIVE STATEMENT
pt axo3 c/o  rash to right side of chest and back since x 5 days. Was started on Valacyclovir and Gabapentin for shingles. pt states that he did not get shingles vaccine. pt denies chest pain or SOB. safety maintained.

## 2022-12-02 NOTE — ED PROVIDER NOTE - PATIENT PORTAL LINK FT
You can access the FollowMyHealth Patient Portal offered by [facility] by registering at the following website: [email or web address]. By joining [employer]s FollowMyHealth portal, you will also be able to view your health information using other applications (apps) compatible with our system.

## 2022-12-02 NOTE — ED ADULT TRIAGE NOTE - CHIEF COMPLAINT QUOTE
Pt c/o rash to right side of chest and back since x 5 days. Was started on Valacyclovir and Gabapentin

## 2022-12-02 NOTE — ED PROVIDER NOTE - PHYSICAL EXAMINATION
General:     NAD, well-nourished, well-appearing  Head:     NC/AT, EOMI, oral mucosa moist  Neck:     trachea midline  Lungs:     CTA b/l, no w/r/r  CVS:     S1S2, RRR, no m/g/r  Abd:     +BS, s/nt/nd, no organomegaly  Ext:    2+ radial and pedal pulses, no c/c/e  Neuro: AAOx3, no sensory/motor deficits  derm R upper back and chest wall area maculopapular rash with scabs no eye or ear involvement

## 2022-12-02 NOTE — ED PROVIDER NOTE - OBJECTIVE STATEMENT
rash on upper back and upper chest area 4 days started on valtrex and gabapentin came to ed for second opinion

## 2023-03-31 ENCOUNTER — RX RENEWAL (OUTPATIENT)
Age: 63
End: 2023-03-31

## 2023-05-29 ENCOUNTER — EMERGENCY (EMERGENCY)
Facility: HOSPITAL | Age: 63
LOS: 1 days | Discharge: HOME | End: 2023-05-29
Attending: EMERGENCY MEDICINE | Admitting: EMERGENCY MEDICINE
Payer: COMMERCIAL

## 2023-05-29 VITALS
WEIGHT: 213.19 LBS | HEART RATE: 82 BPM | RESPIRATION RATE: 16 BRPM | DIASTOLIC BLOOD PRESSURE: 77 MMHG | TEMPERATURE: 98 F | OXYGEN SATURATION: 96 % | SYSTOLIC BLOOD PRESSURE: 161 MMHG

## 2023-05-29 PROCEDURE — 99284 EMERGENCY DEPT VISIT MOD MDM: CPT

## 2023-05-29 PROCEDURE — 73562 X-RAY EXAM OF KNEE 3: CPT | Mod: 26,RT

## 2023-05-29 PROCEDURE — 73562 X-RAY EXAM OF KNEE 3: CPT

## 2023-05-29 PROCEDURE — 99283 EMERGENCY DEPT VISIT LOW MDM: CPT

## 2023-05-29 RX ORDER — ACETAMINOPHEN 500 MG
650 TABLET ORAL ONCE
Refills: 0 | Status: COMPLETED | OUTPATIENT
Start: 2023-05-29 | End: 2023-05-29

## 2023-05-29 RX ADMIN — Medication 650 MILLIGRAM(S): at 16:32

## 2023-05-29 NOTE — ED PROVIDER NOTE - ATTENDING APP SHARED VISIT CONTRIBUTION OF CARE
right knee pain s/p twisting x 1 week ago  denies direct trauma  ecchymoses and abrasion to medial knee  NV intact  ambulatory    nv intact  no open wound

## 2023-05-29 NOTE — ED ADULT TRIAGE NOTE - ARRIVAL INFO ADDITIONAL COMMENTS
Patient BIB self after fall down stairs last Friday, sustaining significant bruising to inner left knee. Patient has been icing his knee for the week, though wound is now open, bleeding. Patient has increasing pain in knee and presents today for evaluation. Denies fevers. Patient has not taken any painkillers today.

## 2023-05-29 NOTE — ED ADULT NURSE NOTE - OBJECTIVE STATEMENT
Pt presents to ED from home for right knee pain. Pt states he twisted his knee 1 week ago and fell to the ground. A blood blister developed and then opened. Able to ambulate.

## 2023-05-29 NOTE — ED PROVIDER NOTE - NSFOLLOWUPINSTRUCTIONS_ED_ALL_ED_FT
Follow up with your pmd within 48 hours- show copies of ER results   Take motrin 400mg every 6 hours as needed for pain   Rest, ice, elevate   Follow up with ortho if pain persists     clean abrasion with soap and water and apply bacitracin.   Return to the ED if any worsening or persistent symptoms       Acute Knee Pain, Adult  Acute knee pain is sudden and may be caused by damage, swelling, or irritation of the muscles and tissues that support the knee. Pain may result from:  A fall.  An injury to the knee from twisting motions.  A hit to the knee.  Infection.  Acute knee pain may go away on its own with time and rest. If it does not, your health care provider may order tests to find the cause of the pain. These may include:  Imaging tests, such as an X-ray, MRI, CT scan, or ultrasound.  Joint aspiration. In this test, fluid is removed from the knee and evaluated.  Arthroscopy. In this test, a lighted tube is inserted into the knee and an image is projected onto a TV screen.  Biopsy. In this test, a sample of tissue is removed from the body and studied under a microscope.  Follow these instructions at home:  If you have a knee sleeve or brace:      Wear the knee sleeve or brace as told by your health care provider. Remove it only as told by your health care provider.  Loosen it if your toes tingle, become numb, or turn cold and blue.  Keep it clean.  If the knee sleeve or brace is not waterproof:  Do not let it get wet.  Cover it with a watertight covering when you take a bath or shower.  Activity    Rest your knee.  Do not do things that cause pain or make pain worse.  Avoid high-impact activities or exercises, such as running, jumping rope, or doing jumping jacks.  Work with a physical therapist to make a safe exercise program, as recommended by your health care provider. Do exercises as told by your physical therapist.  Managing pain, stiffness, and swelling      If directed, put ice on the affected knee. To do this:  If you have a removable knee sleeve or brace, remove it as told by your health care provider.  Put ice in a plastic bag.  Place a towel between your skin and the bag.  Leave the ice on for 20 minutes, 2–3 times a day.  Remove the ice if your skin turns bright red. This is very important. If you cannot feel pain, heat, or cold, you have a greater risk of damage to the area.  If directed, use an elastic bandage to put pressure (compression) on your injured knee. This may control swelling, give support, and help with discomfort.  Raise (elevate) your knee above the level of your heart while you are sitting or lying down.  Sleep with a pillow under your knee.  General instructions    Take over-the-counter and prescription medicines only as told by your health care provider.  Do not use any products that contain nicotine or tobacco, such as cigarettes, e-cigarettes, and chewing tobacco. If you need help quitting, ask your health care provider.  If you are overweight, work with your health care provider and a dietitian to set a weight-loss goal that is healthy and reasonable for you. Extra weight can put pressure on your knee.  Pay attention to any changes in your symptoms.  Keep all follow-up visits. This is important.  Contact a health care provider if:  Your knee pain continues, changes, or gets worse.  You have a fever along with knee pain.  Your knee feels warm to the touch or is red.  Your knee yolanda or locks up.  Get help right away if:  Your knee swells, and the swelling becomes worse.  You cannot move your knee.  You have severe pain in your knee that cannot be managed with pain medicine.  Summary  Acute knee pain can be caused by a fall, an injury, an infection, or damage, swelling, or irritation of the tissues that support your knee.  Your health care provider may perform tests to find out the cause of the pain.  Pay attention to any changes in your symptoms. Relieve your pain with rest, medicines, light activity, and the use of ice.  Get help right away if your knee swells, you cannot move your knee, or you have severe pain that cannot be managed with medicine.  This information is not intended to replace advice given to you by your health care provider. Make sure you discuss any questions you have with your health care provider.    Document Revised: 06/02/2021 Document Reviewed: 06/02/2021

## 2023-05-29 NOTE — ED ADULT NURSE NOTE - NSFALLUNIVINTERV_ED_ALL_ED
Bed/Stretcher in lowest position, wheels locked, appropriate side rails in place/Call bell, personal items and telephone in reach/Instruct patient to call for assistance before getting out of bed/chair/stretcher/Non-slip footwear applied when patient is off stretcher/[place] to call system/Physically safe environment - no spills, clutter or unnecessary equipment/Purposeful proactive rounding/Room/bathroom lighting operational, light cord in reach

## 2023-05-29 NOTE — ED PROVIDER NOTE - PATIENT PORTAL LINK FT
You can access the FollowMyHealth Patient Portal offered by [facility] by registering at the following website: [email or web address]. By joining [employer]’s FollowMyHealth portal, you will also be able to view your health information using other applications (apps) compatible with our system.

## 2023-05-29 NOTE — ED PROVIDER NOTE - PHYSICAL EXAMINATION
right knee: slight medial tenderness, abrasion noted, no pain on ROM, positive 2+ pedal pulse, less than 2 sec cap refill. no signs of infection.

## 2023-05-29 NOTE — ED PROVIDER NOTE - CLINICAL SUMMARY MEDICAL DECISION MAKING FREE TEXT BOX
62 yr old male with hx of HTN, HLD presents with right knee pain s/p twisting knee about 1 week ago. Pt denies falling to the ground. Pt reports he had a blood blister to knee which popped. No other injuries. Pt ambulating.  right knee: slight medial tenderness, abrasion noted, no pain on ROM, positive 2+ pedal pulse, less than 2 sec cap refill. no signs of infection.   xray showing no acute findings   pt stable for dc and to follow up with ortho if pain persists.

## 2023-05-29 NOTE — ED PROVIDER NOTE - CARE PROVIDER_API CALL
Marty Seymour.  Orthopaedic Surgery  [address], [address]  [place] [address]  Phone: [phone]  Fax: [phone]  Follow Up Time:

## 2023-06-08 ENCOUNTER — APPOINTMENT (OUTPATIENT)
Dept: MRI IMAGING | Facility: HOSPITAL | Age: 63
End: 2023-06-08

## 2023-06-26 ENCOUNTER — APPOINTMENT (OUTPATIENT)
Dept: FAMILY MEDICINE | Facility: CLINIC | Age: 63
End: 2023-06-26
Payer: COMMERCIAL

## 2023-06-26 VITALS
SYSTOLIC BLOOD PRESSURE: 135 MMHG | OXYGEN SATURATION: 97 % | BODY MASS INDEX: 30.36 KG/M2 | TEMPERATURE: 97.2 F | WEIGHT: 205 LBS | DIASTOLIC BLOOD PRESSURE: 82 MMHG | HEART RATE: 89 BPM | HEIGHT: 69 IN | RESPIRATION RATE: 16 BRPM

## 2023-06-26 DIAGNOSIS — M25.561 PAIN IN RIGHT KNEE: ICD-10-CM

## 2023-06-26 PROCEDURE — 99214 OFFICE O/P EST MOD 30 MIN: CPT

## 2023-06-26 NOTE — PHYSICAL EXAM
[No Acute Distress] : no acute distress [Well Nourished] : well nourished [EOMI] : extraocular movements intact [Supple] : supple [Clear to Auscultation] : lungs were clear to auscultation bilaterally [Normal S1, S2] : normal S1 and S2 [No Edema] : there was no peripheral edema [Non Tender] : non-tender [Normal Affect] : the affect was normal [de-identified] : right knee pain with rom, pain in the medial aspect [de-identified] : antalgic gait

## 2023-06-26 NOTE — HISTORY OF PRESENT ILLNESS
[FreeTextEntry8] : Mr Robert Butcher is a 61 yo male presents today for right knee pain, that has been persistent for the last 2.5 weeks, states he had twisted his right knee. Pt was seen in the ER had xray which was WNL. Pt then followed up with Dr. Arreaga orthopedist who had MRI ordered, currently pending. Pt presents today for continued pain, states has been using NSAIDs, ibuprofen and naproxen which helps but not completely reducing pain. Pt advised will send for Mobic in the interim, and recommended to follow up back with orthopedist.

## 2023-06-26 NOTE — ASSESSMENT
[FreeTextEntry1] : Approximately 30 min of face to face time spent, reviewed chart, prior labwork, addressed  health concerns, ordered Mobic, advised to take post meals, AM and PM, extra strength tylenol in the afternoon if needed. Advised get MRI and follow up back with orthopedist. Answered all pt questions, coordinated care for patient.\par

## 2023-07-26 ENCOUNTER — APPOINTMENT (OUTPATIENT)
Dept: FAMILY MEDICINE | Facility: CLINIC | Age: 63
End: 2023-07-26
Payer: COMMERCIAL

## 2023-07-26 VITALS
HEART RATE: 87 BPM | BODY MASS INDEX: 30.72 KG/M2 | WEIGHT: 208 LBS | SYSTOLIC BLOOD PRESSURE: 132 MMHG | OXYGEN SATURATION: 97 % | RESPIRATION RATE: 17 BRPM | DIASTOLIC BLOOD PRESSURE: 76 MMHG | TEMPERATURE: 97.3 F

## 2023-07-26 DIAGNOSIS — R05.9 COUGH, UNSPECIFIED: ICD-10-CM

## 2023-07-26 PROCEDURE — 99213 OFFICE O/P EST LOW 20 MIN: CPT

## 2023-07-26 NOTE — HISTORY OF PRESENT ILLNESS
[de-identified] : Patient presents for persistent cough that is most notable at night for 1 week now.  Overall symptoms are worsening.  Denies any shortness of breath, wheezing, or chest pain.  Denies any history of smoking or any other lung conditions.  Does suffer from chronic GERD and is unaware of what medications he is taking.  He believes he takes 3 and NSAIDs a day.  Also believes the cough is not from GERD.\par Requesting Z-Piter

## 2023-07-26 NOTE — PHYSICAL EXAM
[No Acute Distress] : no acute distress [Well Nourished] : well nourished [Well Developed] : well developed [Well-Appearing] : well-appearing [Normal Sclera/Conjunctiva] : normal sclera/conjunctiva [PERRL] : pupils equal round and reactive to light [EOMI] : extraocular movements intact [Normal Outer Ear/Nose] : the outer ears and nose were normal in appearance [Normal Oropharynx] : the oropharynx was normal [No JVD] : no jugular venous distention [No Lymphadenopathy] : no lymphadenopathy [Supple] : supple [Thyroid Normal, No Nodules] : the thyroid was normal and there were no nodules present [No Respiratory Distress] : no respiratory distress  [No Accessory Muscle Use] : no accessory muscle use [Clear to Auscultation] : lungs were clear to auscultation bilaterally [Normal Rate] : normal rate  [Regular Rhythm] : with a regular rhythm [Normal S1, S2] : normal S1 and S2 [No Murmur] : no murmur heard [No Carotid Bruits] : no carotid bruits [No Abdominal Bruit] : a ~M bruit was not heard ~T in the abdomen [No Varicosities] : no varicosities [Pedal Pulses Present] : the pedal pulses are present [No Edema] : there was no peripheral edema [No Palpable Aorta] : no palpable aorta [No Extremity Clubbing/Cyanosis] : no extremity clubbing/cyanosis [Soft] : abdomen soft [Non Tender] : non-tender [Non-distended] : non-distended [No Masses] : no abdominal mass palpated [No HSM] : no HSM [Normal Bowel Sounds] : normal bowel sounds [Normal Posterior Cervical Nodes] : no posterior cervical lymphadenopathy [Normal Anterior Cervical Nodes] : no anterior cervical lymphadenopathy [No CVA Tenderness] : no CVA  tenderness [No Spinal Tenderness] : no spinal tenderness [No Joint Swelling] : no joint swelling [Grossly Normal Strength/Tone] : grossly normal strength/tone [No Rash] : no rash [Coordination Grossly Intact] : coordination grossly intact [No Focal Deficits] : no focal deficits [Normal Gait] : normal gait [Deep Tendon Reflexes (DTR)] : deep tendon reflexes were 2+ and symmetric [Normal Affect] : the affect was normal [Normal Insight/Judgement] : insight and judgment were intact [de-identified] : Slight expiratory wheeze noted on the left base.

## 2023-07-26 NOTE — PLAN
[FreeTextEntry1] : Questionable bronchitis versus GERD.\par Due to persistent moderate symptoms will cover with azithromycin.\par Discussed rest and hydration.\par Follow-up in 1 week to reassess-bring in medications to further assess management for GERD.\par

## 2023-08-15 ENCOUNTER — EMERGENCY (EMERGENCY)
Facility: HOSPITAL | Age: 63
LOS: 1 days | Discharge: HOME | End: 2023-08-15
Attending: STUDENT IN AN ORGANIZED HEALTH CARE EDUCATION/TRAINING PROGRAM | Admitting: STUDENT IN AN ORGANIZED HEALTH CARE EDUCATION/TRAINING PROGRAM
Payer: COMMERCIAL

## 2023-08-15 VITALS
OXYGEN SATURATION: 94 % | RESPIRATION RATE: 16 BRPM | WEIGHT: 289.91 LBS | HEIGHT: 66 IN | TEMPERATURE: 98 F | SYSTOLIC BLOOD PRESSURE: 152 MMHG | DIASTOLIC BLOOD PRESSURE: 87 MMHG | HEART RATE: 89 BPM

## 2023-08-15 PROCEDURE — 99283 EMERGENCY DEPT VISIT LOW MDM: CPT

## 2023-08-15 RX ADMIN — Medication 60 MILLIGRAM(S): at 11:08

## 2023-08-15 NOTE — ED PROVIDER NOTE - CLINICAL SUMMARY MEDICAL DECISION MAKING FREE TEXT BOX
63-year-old male past medical history of high blood pressure, acid reflux, CAD, BPH presents to the ED with complaint of swelling around the eyes since last night status post bee sting to the forehead at 3:30 PM yesterday.  Patient denies any hives or rash, chest pain, shortness of breath, wheezing, difficulty speaking or feeling like his throat is tightening up.  Reports he took Benadryl last night and this morning which helped with the eye swelling. PE as noted above. will start prednisone and instructed patient to continue taking Benadryl as needed. Denies hx of allergies to bee stings, reports he was sting 1 week ago in the leg by a bee, had no reaction 63-year-old male past medical history of high blood pressure, acid reflux, CAD, BPH presents to the ED with complaint of swelling around the eyes since last night status post bee sting to the forehead at 3:30 PM yesterday.  Patient denies any hives or rash, chest pain, shortness of breath, wheezing, difficulty speaking or feeling like his throat is tightening up.  Reports he took Benadryl last night and this morning which helped with the eye swelling. PE as noted above. will start prednisone and instructed patient to continue taking Benadryl as needed. Denies hx of allergies to bee stings, reports he was sting 1 week ago in the leg by a bee, had no reaction    Attending Lex: 62 y/o M w/ PMH of GERD, CAD, BPH, HTN presenting w/ eye swelling s/p bee sting. Agree w/ above documented HPI/PE/MDM. Pt w/ bee sting yesterday, developed swelling below eyes. Swelling improved w/ benadryl. No difficulty breathing or tongue swelling. Exam w/ mild b/l periorbital swelling. Remainder of exam unremarkable. Will start on prednisone, recommend to continue benadryl as needed. Will send w/ epi pen in case develops anaphylaxis w/ future stings.

## 2023-08-15 NOTE — ED ADULT TRIAGE NOTE - CHIEF COMPLAINT QUOTE
Pt states he got stung by a bee yesterday at around 1530, c/o bilateral eye swelling. Airway patent. Took benadryl 2 tabs at 0600 this morning.

## 2023-08-15 NOTE — ED ADULT NURSE NOTE - NSFALLUNIVINTERV_ED_ALL_ED
Bed/Stretcher in lowest position, wheels locked, appropriate side rails in place/Call bell, personal items and telephone in reach/Instruct patient to call for assistance before getting out of bed/chair/stretcher/Non-slip footwear applied when patient is off stretcher/[place] to call system/Physically safe environment - no spills, clutter or unnecessary equipment/Purposeful proactive rounding/Room/bathroom lighting operational, light cord in reach

## 2023-08-15 NOTE — ED PROVIDER NOTE - PATIENT PORTAL LINK FT
You can access the FollowMyHealth Patient Portal offered by [facility] by registering at the following website: [email or web address]. By joining [employer]’s FollowMyHealth portal, you will also be able to view your health information using other applications (apps) compatible with our system.

## 2023-08-15 NOTE — ED PROVIDER NOTE - NSFOLLOWUPINSTRUCTIONS_ED_ALL_ED_FT
Follow up with your primary care physician within 2-3 days.     Take the prescribed medication as directed. Continue taking benadryl as needed      Stay hydrated    Return to the ER if your symptoms worsen or for any other medical emergencies  ********    Bee, Wasp, or Hornet Sting, Adult  Bees, wasps, and hornets are part of a family of insects that sting. Normally, a sting will cause pain, redness, and swelling at the sting site. However, some people have an allergy to these stings, and their reactions can be much more serious.    What increases the risk?  You may be at a greater risk of getting stung if you:  Provoke a stinging insect by swatting or disturbing it.  Wear strong-smelling soaps, deodorants, or body sprays.  Spend time outdoors near gardens with flowers or fruit trees or in clothes that expose skin.  Eat or drink outside.  What are the signs or symptoms?  The reaction to an insect sting can vary from a mild, normal response to life-threatening anaphylaxis. The sting site is often a red lump in the skin, sometimes with a tiny hole in the center, that may still have the stinger in the center of the wound.    Normal reaction    A normal reaction is experienced by most people after an insect sting. Symptoms include:  Pain, redness, and swelling at the sting site. These can develop over 24–48 hours.  Pain, redness, and swelling that may spread to a larger, connected area beyond the sting site. The spreading can continue over 24–48 hours.  Allergic reaction    An allergic reaction can vary in severity and includes symptoms in other areas of the body beyond the sting site. People who experience an allergic reaction have a higher risk of having similar or worse symptoms the next time they are stung. Symptoms may include:  Hives, itching, and swelling.  Abdominal symptoms including cramping, nausea, vomiting, and diarrhea.  Severe symptoms that require immediate medical attention include:  Chest pain or tightness.  Wheezing or trouble breathing.  Swelling of the tongue, throat, or lips.  Trouble swallowing or hoarse voice.  Anaphylactic reaction    An anaphylactic reaction is a severe, life-threatening allergy and requires immediate medical attention. The symptoms often include severe allergic reaction symptoms that develop rapidly and lead to:  A sudden and sharp drop in blood pressure.  Dizziness.  Loss of consciousness.  How is this diagnosed?  This condition is usually diagnosed based on your symptoms and medical history as well as a physical exam. You may have an allergy test to determine if you are allergic to the insect venom.    How is this treated?  If you were stung by a bee, the stinger and a small sac of venom may be in the wound. Remove the stinger as soon as possible. Do this by brushing across the wound with gauze, a clean fingernail, or a flat card such as a credit card. This can help reduce the severity of your body's reaction to the sting.    Normal reactions can be treated with:  Washing the area thoroughly with soap and water.  Applying ice to the area to reduce swelling.  Oral or topical medicines to help reduce pain and itching, if present.  Pay close attention to your symptoms after you have been stung. If possible, have someone stay with you to see if an allergic reaction develops. If allergic symptoms develop, oral antihistamines can be taken and you will need medical help right away.    If you had an allergic reaction before, you may need to:  Use an auto-injector "pen"(pre-filled automatic epinephrine injection device)at the first sign of an allergic reaction.  Get medical help right away because the epinephrine is short-acting. It is intended to give you more time to get to an emergency room.  Follow these instructions at home:  Bag of ice on a towel on the skin.   Wash the sting site 2–3 times a day with soap and water as told by your health care provider.  Apply or take over-the-counter and prescription medicines only as told by your health care provider.  If directed, apply ice to the sting area.  Put ice in a plastic bag.  Place a towel between your skin and the bag.  Leave the ice on for 20 minutes, 2–3 times a day.  Do not scratch the sting area.  If you had a severe allergic reaction to a sting, you may need to:  Wear a medical bracelet or necklace that lists the allergy.  Carry an anaphylaxis kit or an epinephrine auto-injector "pen" with you at all times. Tell your family members, friends, and coworkers when and how to use it. Use it at the first sign of an allergic reaction.  How is this prevented?  Avoid swatting at stinging insects and disturbing insect nests.  Do not use fragrant soaps or lotions and avoid sitting near flowering plants, if possible.  Wear shoes, pants, and long sleeves when spending time outdoors, especially in grassy areas where stinging insects are common.  Keep outdoor areas free from nests or hives.  Keep food and drink containers covered when eating outdoors.  Wear gloves if you are gardening or working outdoors.  Find a barrier between you and the insect(s), such as a door, if an attack by a stinging insect or a swarm seems likely.  Contact a health care provider if:  Your symptoms do not get better in 2–3 days.  You have redness, swelling, or pain that spreads beyond the area of the sting.  You have a fever.  Get help right away if:  You have symptoms of a severe allergic reaction. These include:  Chest tightness or pain.  Wheezing, or trouble swallowing or breathing.  Light-headedness, dizziness, or fainting.  Itchy, raised, red patches on the skin beyond the sting site.  Abdominal cramping, nausea, vomiting, or diarrhea.  Trouble swallowing or a swollen tongue, throat, or lips.  These symptoms may be an emergency. Get help right away. Call 911.  Do not wait to see if the symptoms will go away.  Do not drive yourself to the hospital.  Summary  Stings from bees, wasps, and hornets can cause pain and swelling, but they are usually not serious. However, some people may have an allergic reaction to a sting. This can cause the symptoms to be more severe.  Pay close attention to your symptoms after you have been stung. If possible, have someone stay with you to look for signs of worsening symptoms.  Call your health care provider if you have any signs of an allergic reaction.  This information is not intended to replace advice given to you by your health care provider. Make sure you discuss any questions you have with your health care provider.

## 2023-08-15 NOTE — ED PROVIDER NOTE - OBJECTIVE STATEMENT
63-year-old male past medical history of high blood pressure, acid reflux, CAD, BPH presents to the ED with complaint of swelling around the eyes since last night status post bee sting to the forehead at 3:30 PM yesterday.  Patient denies any hives or rash, chest pain, shortness of breath, wheezing, difficulty speaking or feeling like his throat is tightening up.  Reports he took Benadryl last night and this morning which helped with the eye swelling.

## 2023-08-15 NOTE — ED PROVIDER NOTE - PHYSICAL EXAMINATION
Gen: Well appearing in NAD.   Eyes: PERRLA, EOMI. +b/l mild periorbital swelling  ENT: oral mucosa moist. No Uvular edema, tongue swelling or lip swelling  Head: atraumatic  Heart: s1/s2, RRR  Lung: CTA b/l, no wheezing/rhonchi or rales.   Abd: soft, NT/ND  Neuro: AAO x3,  Skin: Normal for race. No rashes  Psych: Alert and oriented

## 2023-08-15 NOTE — ED PROVIDER NOTE - ATTENDING APP SHARED VISIT CONTRIBUTION OF CARE
Attending Lex: I performed a history and physical exam of the patient and discussed their management with the SABA. I have reviewed the SABA note and agree with the documented findings and plan of care, except as noted. This was a shared visit with an SABA. I reviewed and verified the documentation and independently performed my own history/exam/medical decision making. My medical decision making and observations are found above. Please refer to any progress notes for updates on clinical course. My notes supersedes the above ACP note in case of discrepancy

## 2023-08-15 NOTE — ED ADULT NURSE NOTE - OBJECTIVE STATEMENT
Pt states he got stung by a bee yesterday at around 1530, c/o bilateral eye swelling. Airway patent. Took benadryl 2 tabs at 0600 this morning and last night.

## 2023-08-18 PROBLEM — K21.9 GASTRO-ESOPHAGEAL REFLUX DISEASE WITHOUT ESOPHAGITIS: Chronic | Status: ACTIVE | Noted: 2023-08-15

## 2023-09-13 ENCOUNTER — APPOINTMENT (OUTPATIENT)
Dept: FAMILY MEDICINE | Facility: CLINIC | Age: 63
End: 2023-09-13
Payer: COMMERCIAL

## 2023-09-13 VITALS
WEIGHT: 209 LBS | BODY MASS INDEX: 30.96 KG/M2 | TEMPERATURE: 97.6 F | OXYGEN SATURATION: 96 % | HEART RATE: 96 BPM | DIASTOLIC BLOOD PRESSURE: 74 MMHG | SYSTOLIC BLOOD PRESSURE: 140 MMHG | RESPIRATION RATE: 18 BRPM | HEIGHT: 69 IN

## 2023-09-13 PROCEDURE — 99213 OFFICE O/P EST LOW 20 MIN: CPT

## 2023-09-28 ENCOUNTER — RX RENEWAL (OUTPATIENT)
Age: 63
End: 2023-09-28

## 2023-10-16 ENCOUNTER — APPOINTMENT (OUTPATIENT)
Dept: GASTROENTEROLOGY | Facility: CLINIC | Age: 63
End: 2023-10-16
Payer: COMMERCIAL

## 2023-10-16 VITALS
TEMPERATURE: 98 F | OXYGEN SATURATION: 97 % | RESPIRATION RATE: 16 BRPM | DIASTOLIC BLOOD PRESSURE: 90 MMHG | HEIGHT: 69 IN | SYSTOLIC BLOOD PRESSURE: 162 MMHG | BODY MASS INDEX: 30.81 KG/M2 | HEART RATE: 98 BPM | WEIGHT: 208 LBS

## 2023-10-16 DIAGNOSIS — K44.9 DIAPHRAGMATIC HERNIA W/OUT OBSTRUCTION OR GANGRENE: ICD-10-CM

## 2023-10-16 DIAGNOSIS — Z86.010 PERSONAL HISTORY OF COLONIC POLYPS: ICD-10-CM

## 2023-10-16 PROCEDURE — 99214 OFFICE O/P EST MOD 30 MIN: CPT

## 2023-10-16 RX ORDER — ESOMEPRAZOLE MAGNESIUM 20 MG/1
20 CAPSULE, DELAYED RELEASE ORAL TWICE DAILY
Qty: 180 | Refills: 3 | Status: DISCONTINUED | COMMUNITY
Start: 2021-08-23 | End: 2023-10-16

## 2023-10-16 RX ORDER — AZITHROMYCIN 250 MG/1
250 TABLET, FILM COATED ORAL
Qty: 1 | Refills: 0 | Status: COMPLETED | COMMUNITY
Start: 2023-09-15 | End: 2023-10-16

## 2023-10-16 RX ORDER — AZITHROMYCIN 250 MG/1
250 TABLET, FILM COATED ORAL
Qty: 1 | Refills: 0 | Status: COMPLETED | COMMUNITY
Start: 2023-07-26 | End: 2023-10-16

## 2023-10-16 RX ORDER — PROMETHAZINE HYDROCHLORIDE AND DEXTROMETHORPHAN HYDROBROMIDE ORAL SOLUTION 15; 6.25 MG/5ML; MG/5ML
6.25-15 SOLUTION ORAL
Qty: 200 | Refills: 0 | Status: COMPLETED | COMMUNITY
Start: 2023-09-13 | End: 2023-10-16

## 2023-11-24 ENCOUNTER — APPOINTMENT (OUTPATIENT)
Dept: ULTRASOUND IMAGING | Facility: CLINIC | Age: 63
End: 2023-11-24

## 2023-12-01 ENCOUNTER — APPOINTMENT (OUTPATIENT)
Dept: FAMILY MEDICINE | Facility: CLINIC | Age: 63
End: 2023-12-01

## 2023-12-05 ENCOUNTER — EMERGENCY (EMERGENCY)
Facility: HOSPITAL | Age: 63
LOS: 1 days | Discharge: HOME | End: 2023-12-05
Attending: EMERGENCY MEDICINE | Admitting: EMERGENCY MEDICINE
Payer: COMMERCIAL

## 2023-12-05 VITALS
HEIGHT: 70 IN | RESPIRATION RATE: 18 BRPM | DIASTOLIC BLOOD PRESSURE: 107 MMHG | WEIGHT: 210.98 LBS | TEMPERATURE: 97 F | OXYGEN SATURATION: 98 % | SYSTOLIC BLOOD PRESSURE: 157 MMHG | HEART RATE: 116 BPM

## 2023-12-05 PROCEDURE — 99283 EMERGENCY DEPT VISIT LOW MDM: CPT

## 2023-12-05 RX ORDER — IBUPROFEN 200 MG
200 TABLET ORAL ONCE
Refills: 0 | Status: COMPLETED | OUTPATIENT
Start: 2023-12-05 | End: 2023-12-05

## 2023-12-05 RX ORDER — ACETAMINOPHEN 500 MG
650 TABLET ORAL ONCE
Refills: 0 | Status: COMPLETED | OUTPATIENT
Start: 2023-12-05 | End: 2023-12-05

## 2023-12-05 RX ORDER — LIDOCAINE 4 G/100G
1 CREAM TOPICAL ONCE
Refills: 0 | Status: COMPLETED | OUTPATIENT
Start: 2023-12-05 | End: 2023-12-05

## 2023-12-05 RX ADMIN — Medication 200 MILLIGRAM(S): at 08:36

## 2023-12-05 RX ADMIN — Medication 200 MILLIGRAM(S): at 07:33

## 2023-12-05 RX ADMIN — Medication 650 MILLIGRAM(S): at 08:36

## 2023-12-05 RX ADMIN — Medication 650 MILLIGRAM(S): at 07:33

## 2023-12-05 RX ADMIN — LIDOCAINE 1 PATCH: 4 CREAM TOPICAL at 07:33

## 2023-12-05 NOTE — ED PROVIDER NOTE - PATIENT PORTAL LINK FT
You can access the FollowMyHealth Patient Portal offered by [facility] by registering at the following website: [email or web address]. By joining [employer]’s FollowMyHealth portal, you will also be able to view your health information using other applications (apps) compatible with our system. You can access the FollowMyHealth Patient Portal offered by [facility] by registering at the following website: [email or web address]. By joining [email or web address]’s FollowMyHealth portal, you will also be able to view your health information using other applications (apps) compatible with our system.

## 2023-12-05 NOTE — ED ADULT NURSE NOTE - CADM POA PRESS ULCER
pt presenting with worsening cough, sob, hypoxia, fever, rash to arms. +wheezing. will obtain cardiac and pulm workup, abx, sepsis workup, needs admission given hypoxia No

## 2023-12-05 NOTE — ED PROVIDER NOTE - PROGRESS NOTE DETAILS
The patient has expressed the desire to leave against medical advice.  It has been explained to the patient that leaving prior to completion of work up and treatment may result in recurrent or worsening of symptoms, severe permanent disability, pain and suffering, and/or even death.  The patient has demonstrated comprehension and verbalizes understanding of these risks.  The patient has been told that he/she must return to the ER immediately for return of symptoms, worsening symptoms, or any concerning symptoms.  The patient has also been told that he/she may return at any time if he/she wishes to resume care.The patient has been given the opportunity to ask questions.  Pt deferred blood work, xr and repeat vital signs.

## 2023-12-05 NOTE — ED PROVIDER NOTE - OBJECTIVE STATEMENT
Low back pain since about 12am today.  Pt has been walking with a cane for past 3-4 weeks due to right knee injury.  Pt took ibuprofen 400mg about 4am and applied Hot n Cold patch c some relief.   Pt had similar about 2 months but resolved.  No other complaints.

## 2023-12-05 NOTE — ED PROVIDER NOTE - CARE PROVIDER_API CALL
Marty Seymour.  Orthopaedic Surgery  [address], [address]  [place] [address]  Phone: [phone]  Fax: [phone]  Follow Up Time: 1-3 Days   Marty Seymour.  Orthopaedic Surgery  [address], [address]  [place] [address]  Phone: [phone]  Fax: [phone]  Follow Up Time: 1-3 Days

## 2023-12-05 NOTE — ED PROVIDER NOTE - NSFOLLOWUPINSTRUCTIONS_ED_ALL_ED_FT
1.  Take Motrin 400mg every 6 hours for 5 days with meal.  2.  Take Tylenol 650mg every 6 hours for 5 days.  1.  Apply Lidocaine patch to the affected area as prescribed over the counter for 5 days.

## 2023-12-05 NOTE — ED ADULT NURSE NOTE - NSFALLUNIVINTERV_ED_ALL_ED
Bed/Stretcher in lowest position, wheels locked, appropriate side rails in place/Call bell, personal items and telephone in reach/Instruct patient to call for assistance before getting out of bed/chair/stretcher/Non-slip footwear applied when patient is off stretcher/[place] to call system/Physically safe environment - no spills, clutter or unnecessary equipment/Purposeful proactive rounding/Room/bathroom lighting operational, light cord in reach Bed/Stretcher in lowest position, wheels locked, appropriate side rails in place/Call bell, personal items and telephone in reach/Instruct patient to call for assistance before getting out of bed/chair/stretcher/Non-slip footwear applied when patient is off stretcher/[place] to call system/Physically safe environment - no spills, clutter or unnecessary equipment/Purposeful proactive rounding/Room/bathroom lighting operational, light cord in reach

## 2023-12-05 NOTE — ED PROVIDER NOTE - PROVIDER TOKENS
PROVIDER:[TOKEN:[[id number]],FOLLOWUP:[1-3 Days]] PROVIDER:[TOKEN:[[id number]],FOLLOWUP:[1-3 Days]]

## 2023-12-05 NOTE — ED PROVIDER NOTE - CHPI ED SYMPTOMS NEG
no bladder dysfunction/no bowel dysfunction/no constipation/no neck tenderness/no numbness/no tingling/no difficulty bearing weight/no motor function loss

## 2023-12-05 NOTE — ED ADULT NURSE NOTE - OBJECTIVE STATEMENT
Pt with atraumatic back pain x1 day. Ambulatory in triage. No urinary symptoms.  Pt states he has had back pain for some time, "on and off".    Pt took advil, used heating pad and pain patch but no relief.

## 2023-12-11 ENCOUNTER — APPOINTMENT (OUTPATIENT)
Dept: FAMILY MEDICINE | Facility: CLINIC | Age: 63
End: 2023-12-11
Payer: COMMERCIAL

## 2023-12-11 VITALS
BODY MASS INDEX: 31.1 KG/M2 | WEIGHT: 210 LBS | TEMPERATURE: 97.8 F | HEIGHT: 69 IN | DIASTOLIC BLOOD PRESSURE: 88 MMHG | RESPIRATION RATE: 16 BRPM | HEART RATE: 97 BPM | OXYGEN SATURATION: 97 % | SYSTOLIC BLOOD PRESSURE: 138 MMHG

## 2023-12-11 DIAGNOSIS — J20.9 ACUTE BRONCHITIS, UNSPECIFIED: ICD-10-CM

## 2023-12-11 PROCEDURE — 99214 OFFICE O/P EST MOD 30 MIN: CPT

## 2024-01-07 ENCOUNTER — EMERGENCY (EMERGENCY)
Facility: HOSPITAL | Age: 64
LOS: 1 days | Discharge: HOME | End: 2024-01-07
Attending: INTERNAL MEDICINE | Admitting: INTERNAL MEDICINE
Payer: COMMERCIAL

## 2024-01-07 VITALS
HEART RATE: 89 BPM | OXYGEN SATURATION: 99 % | SYSTOLIC BLOOD PRESSURE: 133 MMHG | TEMPERATURE: 99 F | DIASTOLIC BLOOD PRESSURE: 84 MMHG | RESPIRATION RATE: 16 BRPM

## 2024-01-07 VITALS
DIASTOLIC BLOOD PRESSURE: 135 MMHG | OXYGEN SATURATION: 94 % | SYSTOLIC BLOOD PRESSURE: 203 MMHG | HEART RATE: 84 BPM | RESPIRATION RATE: 16 BRPM | WEIGHT: 210.98 LBS | HEIGHT: 70 IN | TEMPERATURE: 98 F

## 2024-01-07 LAB
ALBUMIN SERPL ELPH-MCNC: 2.8 G/DL — LOW (ref 3.3–5)
ALBUMIN SERPL ELPH-MCNC: 2.8 G/DL — LOW (ref 3.3–5)
ALP SERPL-CCNC: 94 U/L — SIGNIFICANT CHANGE UP (ref 40–120)
ALP SERPL-CCNC: 94 U/L — SIGNIFICANT CHANGE UP (ref 40–120)
ALT FLD-CCNC: 37 U/L — SIGNIFICANT CHANGE UP (ref 10–45)
ALT FLD-CCNC: 37 U/L — SIGNIFICANT CHANGE UP (ref 10–45)
ANION GAP SERPL CALC-SCNC: 10 MMOL/L — SIGNIFICANT CHANGE UP (ref 5–17)
ANION GAP SERPL CALC-SCNC: 10 MMOL/L — SIGNIFICANT CHANGE UP (ref 5–17)
AST SERPL-CCNC: 18 U/L — SIGNIFICANT CHANGE UP (ref 10–40)
AST SERPL-CCNC: 18 U/L — SIGNIFICANT CHANGE UP (ref 10–40)
BASOPHILS # BLD AUTO: 0.06 K/UL — SIGNIFICANT CHANGE UP (ref 0–0.2)
BASOPHILS # BLD AUTO: 0.06 K/UL — SIGNIFICANT CHANGE UP (ref 0–0.2)
BASOPHILS NFR BLD AUTO: 0.7 % — SIGNIFICANT CHANGE UP (ref 0–2)
BASOPHILS NFR BLD AUTO: 0.7 % — SIGNIFICANT CHANGE UP (ref 0–2)
BILIRUB SERPL-MCNC: 1.2 MG/DL — SIGNIFICANT CHANGE UP (ref 0.2–1.2)
BILIRUB SERPL-MCNC: 1.2 MG/DL — SIGNIFICANT CHANGE UP (ref 0.2–1.2)
BUN SERPL-MCNC: 23 MG/DL — SIGNIFICANT CHANGE UP (ref 7–23)
BUN SERPL-MCNC: 23 MG/DL — SIGNIFICANT CHANGE UP (ref 7–23)
CALCIUM SERPL-MCNC: 8.6 MG/DL — SIGNIFICANT CHANGE UP (ref 8.4–10.5)
CALCIUM SERPL-MCNC: 8.6 MG/DL — SIGNIFICANT CHANGE UP (ref 8.4–10.5)
CHLORIDE SERPL-SCNC: 104 MMOL/L — SIGNIFICANT CHANGE UP (ref 96–108)
CHLORIDE SERPL-SCNC: 104 MMOL/L — SIGNIFICANT CHANGE UP (ref 96–108)
CO2 SERPL-SCNC: 26 MMOL/L — SIGNIFICANT CHANGE UP (ref 22–31)
CO2 SERPL-SCNC: 26 MMOL/L — SIGNIFICANT CHANGE UP (ref 22–31)
CREAT SERPL-MCNC: 1.4 MG/DL — HIGH (ref 0.5–1.3)
CREAT SERPL-MCNC: 1.4 MG/DL — HIGH (ref 0.5–1.3)
EGFR: 57 ML/MIN/1.73M2 — LOW
EGFR: 57 ML/MIN/1.73M2 — LOW
EOSINOPHIL # BLD AUTO: 0.04 K/UL — SIGNIFICANT CHANGE UP (ref 0–0.5)
EOSINOPHIL # BLD AUTO: 0.04 K/UL — SIGNIFICANT CHANGE UP (ref 0–0.5)
EOSINOPHIL NFR BLD AUTO: 0.5 % — SIGNIFICANT CHANGE UP (ref 0–6)
EOSINOPHIL NFR BLD AUTO: 0.5 % — SIGNIFICANT CHANGE UP (ref 0–6)
GLUCOSE SERPL-MCNC: 105 MG/DL — HIGH (ref 70–99)
GLUCOSE SERPL-MCNC: 105 MG/DL — HIGH (ref 70–99)
HCT VFR BLD CALC: 29.9 % — LOW (ref 39–50)
HCT VFR BLD CALC: 29.9 % — LOW (ref 39–50)
HGB BLD-MCNC: 9.1 G/DL — LOW (ref 13–17)
HGB BLD-MCNC: 9.1 G/DL — LOW (ref 13–17)
IMM GRANULOCYTES NFR BLD AUTO: 0.4 % — SIGNIFICANT CHANGE UP (ref 0–0.9)
IMM GRANULOCYTES NFR BLD AUTO: 0.4 % — SIGNIFICANT CHANGE UP (ref 0–0.9)
LYMPHOCYTES # BLD AUTO: 1.15 K/UL — SIGNIFICANT CHANGE UP (ref 1–3.3)
LYMPHOCYTES # BLD AUTO: 1.15 K/UL — SIGNIFICANT CHANGE UP (ref 1–3.3)
LYMPHOCYTES # BLD AUTO: 13.8 % — SIGNIFICANT CHANGE UP (ref 13–44)
LYMPHOCYTES # BLD AUTO: 13.8 % — SIGNIFICANT CHANGE UP (ref 13–44)
MCHC RBC-ENTMCNC: 17.6 PG — LOW (ref 27–34)
MCHC RBC-ENTMCNC: 17.6 PG — LOW (ref 27–34)
MCHC RBC-ENTMCNC: 30.4 GM/DL — LOW (ref 32–36)
MCHC RBC-ENTMCNC: 30.4 GM/DL — LOW (ref 32–36)
MCV RBC AUTO: 57.7 FL — LOW (ref 80–100)
MCV RBC AUTO: 57.7 FL — LOW (ref 80–100)
MONOCYTES # BLD AUTO: 0.82 K/UL — SIGNIFICANT CHANGE UP (ref 0–0.9)
MONOCYTES # BLD AUTO: 0.82 K/UL — SIGNIFICANT CHANGE UP (ref 0–0.9)
MONOCYTES NFR BLD AUTO: 9.8 % — SIGNIFICANT CHANGE UP (ref 2–14)
MONOCYTES NFR BLD AUTO: 9.8 % — SIGNIFICANT CHANGE UP (ref 2–14)
NEUTROPHILS # BLD AUTO: 6.25 K/UL — SIGNIFICANT CHANGE UP (ref 1.8–7.4)
NEUTROPHILS # BLD AUTO: 6.25 K/UL — SIGNIFICANT CHANGE UP (ref 1.8–7.4)
NEUTROPHILS NFR BLD AUTO: 74.8 % — SIGNIFICANT CHANGE UP (ref 43–77)
NEUTROPHILS NFR BLD AUTO: 74.8 % — SIGNIFICANT CHANGE UP (ref 43–77)
NRBC # BLD: 0 /100 WBCS — SIGNIFICANT CHANGE UP (ref 0–0)
NRBC # BLD: 0 /100 WBCS — SIGNIFICANT CHANGE UP (ref 0–0)
PLATELET # BLD AUTO: 200 K/UL — SIGNIFICANT CHANGE UP (ref 150–400)
PLATELET # BLD AUTO: 200 K/UL — SIGNIFICANT CHANGE UP (ref 150–400)
POTASSIUM SERPL-MCNC: 3.9 MMOL/L — SIGNIFICANT CHANGE UP (ref 3.5–5.3)
POTASSIUM SERPL-MCNC: 3.9 MMOL/L — SIGNIFICANT CHANGE UP (ref 3.5–5.3)
POTASSIUM SERPL-SCNC: 3.9 MMOL/L — SIGNIFICANT CHANGE UP (ref 3.5–5.3)
POTASSIUM SERPL-SCNC: 3.9 MMOL/L — SIGNIFICANT CHANGE UP (ref 3.5–5.3)
PROT SERPL-MCNC: 6.7 G/DL — SIGNIFICANT CHANGE UP (ref 6–8.3)
PROT SERPL-MCNC: 6.7 G/DL — SIGNIFICANT CHANGE UP (ref 6–8.3)
RBC # BLD: 5.18 M/UL — SIGNIFICANT CHANGE UP (ref 4.2–5.8)
RBC # BLD: 5.18 M/UL — SIGNIFICANT CHANGE UP (ref 4.2–5.8)
RBC # FLD: 18.6 % — HIGH (ref 10.3–14.5)
RBC # FLD: 18.6 % — HIGH (ref 10.3–14.5)
SODIUM SERPL-SCNC: 140 MMOL/L — SIGNIFICANT CHANGE UP (ref 135–145)
SODIUM SERPL-SCNC: 140 MMOL/L — SIGNIFICANT CHANGE UP (ref 135–145)
WBC # BLD: 8.35 K/UL — SIGNIFICANT CHANGE UP (ref 3.8–10.5)
WBC # BLD: 8.35 K/UL — SIGNIFICANT CHANGE UP (ref 3.8–10.5)
WBC # FLD AUTO: 8.35 K/UL — SIGNIFICANT CHANGE UP (ref 3.8–10.5)
WBC # FLD AUTO: 8.35 K/UL — SIGNIFICANT CHANGE UP (ref 3.8–10.5)

## 2024-01-07 PROCEDURE — 87070 CULTURE OTHR SPECIMN AEROBIC: CPT

## 2024-01-07 PROCEDURE — 99285 EMERGENCY DEPT VISIT HI MDM: CPT

## 2024-01-07 PROCEDURE — 87186 SC STD MICRODIL/AGAR DIL: CPT

## 2024-01-07 PROCEDURE — 80053 COMPREHEN METABOLIC PANEL: CPT

## 2024-01-07 PROCEDURE — 96365 THER/PROPH/DIAG IV INF INIT: CPT

## 2024-01-07 PROCEDURE — 96375 TX/PRO/DX INJ NEW DRUG ADDON: CPT

## 2024-01-07 PROCEDURE — 93971 EXTREMITY STUDY: CPT

## 2024-01-07 PROCEDURE — 36415 COLL VENOUS BLD VENIPUNCTURE: CPT

## 2024-01-07 PROCEDURE — 99285 EMERGENCY DEPT VISIT HI MDM: CPT | Mod: 25

## 2024-01-07 PROCEDURE — 87077 CULTURE AEROBIC IDENTIFY: CPT

## 2024-01-07 PROCEDURE — 93971 EXTREMITY STUDY: CPT | Mod: 26,RT

## 2024-01-07 PROCEDURE — 85025 COMPLETE CBC W/AUTO DIFF WBC: CPT

## 2024-01-07 PROCEDURE — 93005 ELECTROCARDIOGRAM TRACING: CPT

## 2024-01-07 PROCEDURE — 93010 ELECTROCARDIOGRAM REPORT: CPT

## 2024-01-07 RX ORDER — VANCOMYCIN HCL 1 G
1000 VIAL (EA) INTRAVENOUS ONCE
Refills: 0 | Status: COMPLETED | OUTPATIENT
Start: 2024-01-07 | End: 2024-01-07

## 2024-01-07 RX ORDER — MUPIROCIN 20 MG/G
1 OINTMENT TOPICAL
Qty: 1 | Refills: 1
Start: 2024-01-07 | End: 2024-03-06

## 2024-01-07 RX ORDER — PIPERACILLIN AND TAZOBACTAM 4; .5 G/20ML; G/20ML
3.38 INJECTION, POWDER, LYOPHILIZED, FOR SOLUTION INTRAVENOUS ONCE
Refills: 0 | Status: COMPLETED | OUTPATIENT
Start: 2024-01-07 | End: 2024-01-07

## 2024-01-07 RX ORDER — SODIUM CHLORIDE 9 MG/ML
1000 INJECTION INTRAMUSCULAR; INTRAVENOUS; SUBCUTANEOUS ONCE
Refills: 0 | Status: COMPLETED | OUTPATIENT
Start: 2024-01-07 | End: 2024-01-07

## 2024-01-07 RX ADMIN — SODIUM CHLORIDE 1000 MILLILITER(S): 9 INJECTION INTRAMUSCULAR; INTRAVENOUS; SUBCUTANEOUS at 11:21

## 2024-01-07 RX ADMIN — SODIUM CHLORIDE 1000 MILLILITER(S): 9 INJECTION INTRAMUSCULAR; INTRAVENOUS; SUBCUTANEOUS at 10:21

## 2024-01-07 RX ADMIN — PIPERACILLIN AND TAZOBACTAM 3.38 GRAM(S): 4; .5 INJECTION, POWDER, LYOPHILIZED, FOR SOLUTION INTRAVENOUS at 10:51

## 2024-01-07 RX ADMIN — Medication 250 MILLIGRAM(S): at 11:19

## 2024-01-07 RX ADMIN — PIPERACILLIN AND TAZOBACTAM 200 GRAM(S): 4; .5 INJECTION, POWDER, LYOPHILIZED, FOR SOLUTION INTRAVENOUS at 10:21

## 2024-01-07 NOTE — ED ADULT NURSE NOTE - NSFALLRISKINTERV_ED_ALL_ED
room air Assistance OOB with selected safe patient handling equipment if applicable/Assistance with ambulation/Communicate fall risk and risk factors to all staff, patient, and family/Monitor gait and stability/Provide visual cue: yellow wristband, yellow gown, etc/Reinforce activity limits and safety measures with patient and family/Call bell, personal items and telephone in reach/Instruct patient to call for assistance before getting out of bed/chair/stretcher/Non-slip footwear applied when patient is off stretcher/[place] to call system/Physically safe environment - no spills, clutter or unnecessary equipment/Purposeful Proactive Rounding/Room/bathroom lighting operational, light cord in reach Assistance OOB with selected safe patient handling equipment if applicable/Assistance with ambulation/Communicate fall risk and risk factors to all staff, patient, and family/Monitor gait and stability/Provide visual cue: yellow wristband, yellow gown, etc/Reinforce activity limits and safety measures with patient and family/Call bell, personal items and telephone in reach/Instruct patient to call for assistance before getting out of bed/chair/stretcher/Non-slip footwear applied when patient is off stretcher/[place] to call system/Physically safe environment - no spills, clutter or unnecessary equipment/Purposeful Proactive Rounding/Room/bathroom lighting operational, light cord in reach

## 2024-01-07 NOTE — ED PROVIDER NOTE - CLINICAL SUMMARY MEDICAL DECISION MAKING FREE TEXT BOX
Progress Note    Patient Name: Edis Villagomez  Date: 11/23/2021         Service Type: Individual  Video Visit: No     Session Start Time: 4:00pm  Session End Time: 4:45pm     Session Length: 45minutes    Session #: 67    Attendees: Client attended alone      Telemedicine Visit: The patient's condition can be safely assessed and treated via synchronous audio and visual telemedicine encounter.      Reason for Telemedicine Visit: Services only offered telehealth    Originating Site (Patient Location): Patient's home    Distant Site (Provider Location): Provider Remote Setting    Consent:  The patient/guardian has verbally consented to: the potential risks and benefits of telemedicine (video visit) versus in person care; bill my insurance or make self-payment for services provided; and responsibility for payment of non-covered services.     Mode of Communication:  Video Conference via [employer]    As the provider I attest to compliance with applicable laws and regulations related to telemedicine.     Treatment Plan Last Reviewed: 10/5/2021  PHQ-9 / SOHEILA-7 : n/a    DATA  Interactive Complexity: No  Crisis: No       Progress Since Last Session (Related to Symptoms / Goals / Homework):  Symptoms: Improving behaviors are improving at home and outbursts are not as long or frequent.     Homework: Partially completed      Episode of Care Goals: Minimal progress - PREPARATION (Decided to change - considering how); Intervened by negotiating a change plan and determining options / strategies for behavior change, identifying triggers, exploring social supports, and working towards setting a date to begin behavior change     Current / Ongoing Stressors and Concerns:  Mother and father , co-parenting struggles, difficulties with transitions between the homes. Has been home with mother and step-father all week and goes back to father for Thanksgiving. He has been responding  well to different directions and trying to set himself up for success.     Treatment Objective(s) Addressed in This Session:   emotional expression   Following directions     Intervention:  CBT: identifying different activities that he has been engaging in that are working on improving his daily independent skills and improving his interactions with others. Looking at ways he can continue routines that are helping with his success.      ASSESSMENT: Current Emotional / Mental Status (status of significant symptoms):   Risk status (Self / Other harm or suicidal ideation)   Patient denies current fears or concerns for personal safety.   Patient denies current or recent suicidal ideation or behaviors.   Patientdenies current or recent homicidal ideation or behaviors.   Patient denies current or recent self injurious behavior or ideation.   Patient denies other safety concerns.   Patient Patient reports there has been no change in risk factors since their last session.     PatientPatient reports there has been no change in protective factors since their last session.     Recommended that patient call 911 or go to the local ED should there be a change in any of these risk factors.     Appearance:   Appropriate    Eye Contact:   Fair    Psychomotor Behavior: Restless    Attitude:   Cooperative    Orientation:   All   Speech    Rate / Production: Normal/ Responsive    Volume:  Normal    Mood:    Euthymic   Affect:    Appropriate    Thought Content:  Clear    Thought Form:  Coherent  Logical    Insight:    Fair      Medication Review:   No current psychiatric medications prescribed     Medication Compliance:   NA     Changes in Health Issues:   None reported     Chemical Use Review:   Substance Use: Chemical use reviewed, no active concerns identified      Tobacco Use: No current tobacco use.      Diagnosis:  1. Attention deficit hyperactivity disorder (ADHD), other type    2. Other specified anxiety disorders         Collateral Reports Completed:   Not Applicable    PLAN: (Patient Tasks / Therapist Tasks / Other)  Continue with individual therapy. Homework to continue independent skills and listening skills.  Lucía Thornton LPC                                                   Treatment Plan    Client's Name: Edis Villagomez  YOB: 2014    Date: 6/1/2021    DSM-V Diagnoses: Attention-Deficit/Hyperactivity Disorder  314.01 (F90.1) Predominantly hyperactive / impulsive presentation Severity: Mild or 300.09 (F41.8) Other Specified Anxiety Disorder   Psychosocial / Contextual Factors: parents are , younger brother at home  WHODAS: n/a    Referral / Collaboration:  Referral to another professional/service is not indicated at this time..    Anticipated number of session or this episode of care: 20-26      MeasurableTreatment Goal(s) related to diagnosis / functional impairment(s)  Goal 1: Client will understand his diagnosis of ADHD and how it impacts him.     Objective #A (Client Action)    Client will identify 3 ways that ADHD causes difficulties in getting along with others.  Status: Revised- Date: 10/5/2012      Intervention(s)  Therapist will teach social skills and empathy, and perspective taking    Objective #B  Client will explore options for medication if needed.    Status: Continued - Date: 10/5/2012     Intervention(s)  Therapist will make referrals to primary care physician.    Objective #C  Client will identify 3 ways that ADHD causes difficulties in getting along with others.  Status: Continued - Date(s): 10/5/2021     Intervention(s)  Therapist will role-play impulse control and body awareness.    Goal 2: Client will express his emotions effectively.    Objective #A (Client Action)    Status: Completed- Date: 10/5/2021    Client will identify 2 stressors which contribute to feelings of anxiety.    Intervention(s)  Therapist will teach emotion identification.    Objective #B  Client will  63-year-old male history of chronic pain recently had hyaluronic acid injections in the right knee by orthopedics Dr. Arreaga stated that for the past 2 weeks has been having pain redness open wound right leg discharge patient was started on Augmentin twice a day patient is taking 10 days states that the wounds and the infection is not getting better  Wound cultures were sent CBC CMP within normal limits DVT study negative patient given option for admission for IV antibiotics since he failed the p.o. antibiotics patient declined IV Vanco and Zosyn  Dose was given patient has Augmentin at home.  He will continue and follow-up with Dr. Arreaga Orthopedic doctor at [address] [place] [address] identify 2 stressors which contribute to feelings of depression.    Status: Completed- Date: 10/5/2021     Intervention(s)  Therapist will help understand different emotions.    Objective #C  Client will identify 4 strategies to more effectively address stressors.  Status: Continued - Date: 10/5/2021      Intervention(s)  Therapist will help Edis and his family develop coping skills.      Goal 3: Client will increase his compliance with following rules and directions.    Objective #A (Client Action)    Status: Continued - Date: 6/1/2021       Client will increase listening skills.    Intervention(s)  Therapist will role-play effective communication skills.    Objective #B  Client will decrease talking back.    Status: Continued - Date: 10/5/2021       Intervention(s)  Therapist will teach parents appropriate interventions for negative behaviors.    Objective #C  Client will improve compliance with directions.  Status: Continued - Date: 10/5/2021      Intervention(s)  Therapist will use positive parenting models for parents.      Patient and Parent / Guardian have reviewed and agreed to the above plan.      Lucía Thornton, TATIANNA   November 24, 2021                                 63-year-old male history of chronic pain recently had hyaluronic acid injections in the right knee by orthopedics Dr. Arreaga stated that for the past 2 weeks has been having pain redness open wound right leg discharge patient was started on Augmentin twice a day patient is taking 10 days states that the wounds and the infection is not getting better  Wound cultures were sent CBC CMP within normal limits DVT study negative patient given option for admission for IV antibiotics since he failed the p.o. antibiotics patient declined IV Vanco and Zosyn  Dose was given patient has Augmentin at home.  He will continue and follow-up with Dr. Arreaga Orthopedic doctor at [address] [place] [address]

## 2024-01-07 NOTE — ED ADULT TRIAGE NOTE - RESPIRATORY RATE (BREATHS/MIN)
Physical Therapy Treatment Note       12/23/19 4818   Pain Assessment   Pain Assessment 0-10   Pain Score Worst Possible Pain   Pain Type Acute pain   Pain Location Back;Head   Pain Frequency Constant/continuous   Hospital Pain Intervention(s) Ambulation/increased activity; Emotional support; Rest   Response to Interventions tolerated PT   Restrictions/Precautions   RLE Weight Bearing Per Order WBAT   LLE Weight Bearing Per Order WBAT   Braces or Orthoses LSO  (total assist to don and doff)   Other Precautions Cognitive; Bed Alarm; Fall Risk;Pain;Spinal precautions; Seizure   General   Chart Reviewed Yes   Family/Caregiver Present Yes  (daughter)   Cognition   Overall Cognitive Status Impaired   Arousal/Participation Alert   Attention Difficulty attending to directions   Orientation Level Oriented to person;Oriented to place  (genreal to place " hospital "  )   Memory Decreased short term memory;Decreased recall of recent events;Decreased recall of precautions   Following Commands Follows one step commands with increased time or repetition   Subjective   Subjective " What happened ? I don't understand  This never happended to me before '     Bed Mobility   Rolling R 4  Minimal assistance   Additional items Assist x 2;Bedrails; Increased time required;Verbal cues;LE management   Supine to Sit 3  Moderate assistance   Additional items Assist x 1; Increased time required;Verbal cues;LE management; Bedrails   Additional Comments verbal cues for technique  Transfers   Sit to Stand 4  Minimal assistance   Additional items Assist x 1; Increased time required;Verbal cues   Stand to Sit 4  Minimal assistance   Additional items Assist x 1; Increased time required;Verbal cues   Additional Comments cues for handplacement and safe technique  Ambulation/Elevation   Gait pattern Narrow LESLY; Forward Flexion; Excessively slow;Decreased foot clearance   Gait Assistance 4  Minimal assist   Additional items Assist x 1;Verbal cues Assistive Device Rolling walker   Distance 115' x2   Balance   Static Sitting Fair   Dynamic Sitting Fair -   Static Standing Poor +   Dynamic Standing Poor +   Ambulatory Poor +   Endurance Deficit   Endurance Deficit Description pain, fatigue  Activity Tolerance   Activity Tolerance Patient limited by pain; Patient limited by fatigue   Medical Staff Made Aware ting [place]   Equipment Use   Comments Pt ed on log rolling techniques, use, wear and application of LSO   total assist to don LSO in standing  Assessment   Prognosis Fair   Problem List Decreased strength;Decreased range of motion;Decreased endurance; Impaired balance;Decreased mobility; Decreased cognition;Orthopedic restrictions;Pain;Decreased safety awareness   Assessment Pt rec'd supine in bed  Pt reports increased back pain and headache  Pt demonstrates difficulty in performing rolling and sidelying to sit transfers requiring mod assist x1 and verbal cues for safe and proper logrolling techniques  Pt performs sit to stand with min assist x1 with verbal cues for handplacement and safety  Pt progressed with ambulation distances to 115' x2 with min assist x1 and standby assist of another for safety  Pt requires verbal cues for improved gait pattern and mobility techniques  Pt demonstrates slow reciprocal gait pattern with decreased strides, b/l knee flexion and forward flexed posture  Pt repositioned in bed with pillow under b/l knees for comfort  Pt requires total assist to don and doff LSO  Recommend inpt rehab at d/c [facility] to maximize safe functional mobility and independence  Goals   Patient Goals " To feel better and be able to walk "     [facility] Expiration Date 12/30/19   PT Treatment Day 3   Plan   Treatment/Interventions Functional transfer training;LE strengthening/ROM; Therapeutic exercise; Endurance training;Patient/family training;Equipment eval/education;Gait training;Bed mobility;Spoke to nursing;OT;Cognitive reorientation; Family Progress Progressing toward goals   PT Frequency   (3-5x/week)   Recommendation   Recommendation Short-term skilled PT   PT - OK to Discharge Yes        12/23/19 8096   Pain Assessment   Pain Assessment 0-10   Pain Score Worst Possible Pain   Pain Type Acute pain   Pain Location Back;Head   Pain Frequency Constant/continuous   Hospital Pain Intervention(s) Ambulation/increased activity; Emotional support; Rest   Response to Interventions tolerated PT   Restrictions/Precautions   RLE Weight Bearing Per Order WBAT   LLE Weight Bearing Per Order WBAT   Braces or Orthoses LSO  (total assist to don and doff)   Other Precautions Cognitive; Bed Alarm; Fall Risk;Pain;Spinal precautions; Seizure   General   Chart Reviewed Yes   Family/Caregiver Present Yes  (daughter)   Cognition   Overall Cognitive Status Impaired   Arousal/Participation Alert   Attention Difficulty attending to directions   Orientation Level Oriented to person;Oriented to place  (genreal to place " hospital "  )   Memory Decreased short term memory;Decreased recall of recent events;Decreased recall of precautions   Following Commands Follows one step commands with increased time or repetition   Subjective   Subjective " What happened ? I don't understand  This never happended to me before '     Bed Mobility   Rolling R 4  Minimal assistance   Additional items Assist x 2;Bedrails; Increased time required;Verbal cues;LE management   Supine to Sit 3  Moderate assistance   Additional items Assist x 1; Increased time required;Verbal cues;LE management; Bedrails   Additional Comments verbal cues for technique  Transfers   Sit to Stand 4  Minimal assistance   Additional items Assist x 1; Increased time required;Verbal cues   Stand to Sit 4  Minimal assistance   Additional items Assist x 1; Increased time required;Verbal cues   Additional Comments cues for handplacement and safe technique  Ambulation/Elevation   Gait pattern Narrow LESLY; Forward Flexion; Excessively slow;Decreased foot clearance   Gait Assistance 4  Minimal assist   Additional items Assist x 1;Verbal cues   Assistive Device Rolling walker   Distance 115' x2   Balance   Static Sitting Fair   Dynamic Sitting Fair -   Static Standing Poor +   Dynamic Standing Poor +   Ambulatory Poor +   Endurance Deficit   Endurance Deficit Description pain, fatigue  Activity Tolerance   Activity Tolerance Patient limited by pain; Patient limited by fatigue   Medical Staff Made Aware ting [place]   Equipment Use   Comments Pt ed on log rolling techniques, use, wear and application of LSO   total assist to don LSO in standing  Assessment   Prognosis Fair   Problem List Decreased strength;Decreased range of motion;Decreased endurance; Impaired balance;Decreased mobility; Decreased cognition;Orthopedic restrictions;Pain;Decreased safety awareness   Assessment Pt rec'd supine in bed  Pt reports increased back pain and headache  Pt demonstrates difficulty in performing rolling and sidelying to sit transfers requiring mod assist x1 and verbal cues for safe and proper logrolling techniques  Pt performs sit to stand with min assist x1 with verbal cues for handplacement and safety  Pt progressed with ambulation distances to 115' x2 with min assist x1 and standby assist of another for safety  Pt requires verbal cues for improved gait pattern and mobility techniques  Pt demonstrates slow reciprocal gait pattern with decreased strides, b/l knee flexion and forward flexed posture  Pt repositioned in bed with pillow under b/l knees for comfort  Pt requires total assist to don and doff LSO  Recommend inpt rehab at d/c [facility] to maximize safe functional mobility and independence  Goals   Patient Goals " To feel better and be able to walk "     [facility] Expiration Date 12/30/19   PT Treatment Day 3   Plan   Treatment/Interventions Functional transfer training;LE strengthening/ROM; Therapeutic exercise; Endurance training;Patient/family training;Equipment eval/education;Gait training;Bed mobility;Spoke to nursing;OT;Cognitive reorientation; Family   Progress Progressing toward goals   PT Frequency   (3-5x/week)   Recommendation   Recommendation Short-term skilled PT   PT - OK to Discharge Yes       Sole Burger, PTA 16

## 2024-01-07 NOTE — ED ADULT TRIAGE NOTE - CHIEF COMPLAINT QUOTE
My leg is swollen and red, it is weeping for a while, I was on antibiotics but they are not working.

## 2024-01-07 NOTE — ED PROVIDER NOTE - OBJECTIVE STATEMENT
63-year-old male history of chronic pain recently had hyaluronic acid injections in the right knee by orthopedics Dr. Arreaga stated that for the past 2 weeks has been having pain redness open wound right leg discharge patient was started on Augmentin twice a day patient is taking 10 days states that the wounds and the infection is not getting better

## 2024-01-07 NOTE — ED PROVIDER NOTE - CARE PLAN
1 Principal Discharge DX:	Cellulitis of right leg  Goal:	Cellulitis of right leg not responding to oral antibiotics

## 2024-01-07 NOTE — ED PROVIDER NOTE - CARE PROVIDER_API CALL
Van Arreaga  Orthopaedic Surgery  [address] [place] [address], [address]  [place] [address]  Phone: [phone]  Fax: [phone]  Follow Up Time:    Van Arreaga  Orthopaedic Surgery  [address] [place] [address], [address]  [place] [address]  Phone: [phone]  Fax: [phone]  Follow Up Time:

## 2024-01-07 NOTE — ED PROVIDER NOTE - NSFOLLOWUPINSTRUCTIONS_ED_ALL_ED_FT
Follow up with your PMD within 1-2 days.  Rest, increase your fluids, advance your activity as tolerated.   Take all of your other medications as previously prescribed.   Worsening, continued or ANY new concerning symptoms return to the emergency department.  Continue with Augmentin.  Patient is advised  The patient is clinically sober, A&Ox3, free from distracting injury.  Throughout our interactions in the ED today, the pt has demonstrated concrete thinking/reasoning, has maintained an orderly/reasonable conversation, appears to have intact insight/judgment/reason and therefore in our opinion has capacity to make decisions.  Given the patient's presentation, we have communicated our concern for ___________.    The patient has verbalized an understanding of our concerns and still requests to leave against medical advice.  We have discussed the range of possible diagnoses, potential testing & treatment options.  We’ve made  numerous efforts to prevent the patient from leaving AMA.  Our discussions included the potential outcomes of leaving AMA, including worsening of their condition, becoming permanently disabled/in pain/critically ill, or death.  Despite these efforts, we were unable to convince the patient to stay.  The patient is refusing any  further care and is leaving against medical advice.  We have answered all questions and have implored the patient to return ASAP to complete the work up or with ANY worsening or new concerning symptoms.  Staff member Makayla SANON __________ witnessed the patient consenting to AMA.

## 2024-01-07 NOTE — ED ADULT NURSE NOTE - NSFALLRISKASMT_ED_ALL_ED_DT
Physical Therapy  2/8/2023    Chart reviewed in prep for PT evaluation and tx. Discussed with RN at bedside who confirms pt remains medically inappropriate for participation in therapy today. Will hold and follow as able and appropriate. Thank you.     Enedina Flores, PT, DPT 07-Jan-2024 10:17

## 2024-01-07 NOTE — ED PROVIDER NOTE - CARE PROVIDERS DIRECT ADDRESSES
[email or web address]

## 2024-01-07 NOTE — ED PROVIDER NOTE - PATIENT PORTAL LINK FT
You can access the FollowMyHealth Patient Portal offered by [facility] by registering at the following website: [email or web address]. By joining [employer]’s FollowMyHealth portal, you will also be able to view your health information using other applications (apps) compatible with our system. You can access the FollowMyHealth Patient Portal offered by [facility] by registering at the following website: [email or web address]. By joining [employer]’s FollowMyHealth portal, you will also be able to view your health information using other applications (apps) compatible with our system.

## 2024-01-07 NOTE — ED PROVIDER NOTE - PHYSICAL EXAMINATION
General:     NAD, well-nourished, well-appearing  Head:     NC/AT, EOMI, oral mucosa moist  Neck:     trachea midline  Lungs:     CTA b/l, no w/r/r  CVS:     S1S2, RRR, no m/g/r  Abd:     +BS, s/nt/nd, no organomegaly  Ext:    2+ radial and pedal pulses, no c/c/e  Right leg tender calf cap refill less than 2 seconds erythematous anterior right leg with multiple open wounds and discharge purulent yellow  Neuro: AAOx3, no sensory/motor deficits

## 2024-01-07 NOTE — ED ADULT NURSE NOTE - OBJECTIVE STATEMENT
62y/o male presents to the ED from home c/o right lower leg pain/swelling since 12/25, pt reports "it kept swelling and started leaking this juice, so I put gauze on it". Pt is AOx4, patent airway, clear lung sounds, soft non tender abdomen, no changes in bowel/bladder patterns. Patient denies fever, chills, n/v, weakness, abd pain, diarrhea/constipation, numbness/tingling, urinary s/s, in no respiratory distress, no chest pain, Patient safety provided with call bell within reach and bed in the lowest position. 64y/o male presents to the ED from home c/o right lower leg pain/swelling since 12/25, pt reports "it kept swelling and started leaking this juice, so I put gauze on it". Pt is AOx4, patent airway, clear lung sounds, soft non tender abdomen, no changes in bowel/bladder patterns. Patient denies fever, chills, n/v, weakness, abd pain, diarrhea/constipation, numbness/tingling, urinary s/s, in no respiratory distress, no chest pain, Patient safety provided with call bell within reach and bed in the lowest position.

## 2024-01-10 LAB
-  AMOXICILLIN/CLAVULANIC ACID: SIGNIFICANT CHANGE UP
-  AMOXICILLIN/CLAVULANIC ACID: SIGNIFICANT CHANGE UP
-  AMPICILLIN/SULBACTAM: SIGNIFICANT CHANGE UP
-  AMPICILLIN/SULBACTAM: SIGNIFICANT CHANGE UP
-  AMPICILLIN: SIGNIFICANT CHANGE UP
-  AMPICILLIN: SIGNIFICANT CHANGE UP
-  AZTREONAM: SIGNIFICANT CHANGE UP
-  AZTREONAM: SIGNIFICANT CHANGE UP
-  CEFAZOLIN: SIGNIFICANT CHANGE UP
-  CEFAZOLIN: SIGNIFICANT CHANGE UP
-  CEFEPIME: SIGNIFICANT CHANGE UP
-  CEFEPIME: SIGNIFICANT CHANGE UP
-  CEFOXITIN: SIGNIFICANT CHANGE UP
-  CEFOXITIN: SIGNIFICANT CHANGE UP
-  CEFTRIAXONE: SIGNIFICANT CHANGE UP
-  CEFTRIAXONE: SIGNIFICANT CHANGE UP
-  CIPROFLOXACIN: SIGNIFICANT CHANGE UP
-  CIPROFLOXACIN: SIGNIFICANT CHANGE UP
-  ERTAPENEM: SIGNIFICANT CHANGE UP
-  ERTAPENEM: SIGNIFICANT CHANGE UP
-  GENTAMICIN: SIGNIFICANT CHANGE UP
-  GENTAMICIN: SIGNIFICANT CHANGE UP
-  LEVOFLOXACIN: SIGNIFICANT CHANGE UP
-  LEVOFLOXACIN: SIGNIFICANT CHANGE UP
-  MEROPENEM: SIGNIFICANT CHANGE UP
-  MEROPENEM: SIGNIFICANT CHANGE UP
-  PIPERACILLIN/TAZOBACTAM: SIGNIFICANT CHANGE UP
-  PIPERACILLIN/TAZOBACTAM: SIGNIFICANT CHANGE UP
-  TOBRAMYCIN: SIGNIFICANT CHANGE UP
-  TOBRAMYCIN: SIGNIFICANT CHANGE UP
-  TRIMETHOPRIM/SULFAMETHOXAZOLE: SIGNIFICANT CHANGE UP
-  TRIMETHOPRIM/SULFAMETHOXAZOLE: SIGNIFICANT CHANGE UP
METHOD TYPE: SIGNIFICANT CHANGE UP
METHOD TYPE: SIGNIFICANT CHANGE UP

## 2024-01-11 ENCOUNTER — EMERGENCY (EMERGENCY)
Facility: HOSPITAL | Age: 64
LOS: 1 days | Discharge: HOME | End: 2024-01-11
Attending: EMERGENCY MEDICINE | Admitting: EMERGENCY MEDICINE
Payer: COMMERCIAL

## 2024-01-11 VITALS
HEIGHT: 70 IN | SYSTOLIC BLOOD PRESSURE: 141 MMHG | DIASTOLIC BLOOD PRESSURE: 99 MMHG | RESPIRATION RATE: 20 BRPM | WEIGHT: 210.1 LBS | TEMPERATURE: 98 F | OXYGEN SATURATION: 98 % | HEART RATE: 99 BPM

## 2024-01-11 PROCEDURE — 99282 EMERGENCY DEPT VISIT SF MDM: CPT

## 2024-01-11 PROCEDURE — 99284 EMERGENCY DEPT VISIT MOD MDM: CPT

## 2024-01-11 NOTE — ED ADULT TRIAGE NOTE - NS ED NURSE AMBULANCES
[facility]

## 2024-01-11 NOTE — ED ADULT NURSE NOTE - OBJECTIVE STATEMENT
pt axo3, BIBA from home c/o constipation for 24 hours. c/o lower abdominal pain, denies n/v, chest pain, or sob. open RLE wound present.

## 2024-01-11 NOTE — ED ADULT NURSE NOTE - NSFALLUNIVINTERV_ED_ALL_ED
Bed/Stretcher in lowest position, wheels locked, appropriate side rails in place/Call bell, personal items and telephone in reach/Instruct patient to call for assistance before getting out of bed/chair/stretcher/Non-slip footwear applied when patient is off stretcher/[place] to call system/Physically safe environment - no spills, clutter or unnecessary equipment/Purposeful proactive rounding/Room/bathroom lighting operational, light cord in reach Bed/Stretcher in lowest position, wheels locked, appropriate side rails in place/Call bell, personal items and telephone in reach/Instruct patient to call for assistance before getting out of bed/chair/stretcher/Non-slip footwear applied when patient is off stretcher/[place] to call system/Physically safe environment - no spills, clutter or unnecessary equipment/Purposeful proactive rounding/Room/bathroom lighting operational, light cord in reach

## 2024-01-11 NOTE — ED ADULT TRIAGE NOTE - CHIEF COMPLAINT QUOTE
pt axo3, BIBA from home c/o constipation for 24 hours. denies abdominal pain, n/v, chest pain, or sob. open RLE wound present.

## 2024-01-12 VITALS
HEART RATE: 98 BPM | RESPIRATION RATE: 18 BRPM | SYSTOLIC BLOOD PRESSURE: 132 MMHG | DIASTOLIC BLOOD PRESSURE: 80 MMHG | OXYGEN SATURATION: 99 %

## 2024-01-12 LAB
CULTURE RESULTS: ABNORMAL
CULTURE RESULTS: ABNORMAL
ORGANISM # SPEC MICROSCOPIC CNT: ABNORMAL
ORGANISM # SPEC MICROSCOPIC CNT: ABNORMAL
ORGANISM # SPEC MICROSCOPIC CNT: SIGNIFICANT CHANGE UP
ORGANISM # SPEC MICROSCOPIC CNT: SIGNIFICANT CHANGE UP
SPECIMEN SOURCE: SIGNIFICANT CHANGE UP
SPECIMEN SOURCE: SIGNIFICANT CHANGE UP

## 2024-01-12 NOTE — ED PROVIDER NOTE - OBJECTIVE STATEMENT
63-year-old male presents emerged from today with 2 days of no bowel movement.  He states that he feels constipated is making him uncomfortable.  He also has a history of hypertension and dyslipidemia.  He is able to tolerate p.o. has no nausea vomiting.  He has abdominal pain only in certain positions secondary to feeling like he has to have a bowel movement.

## 2024-01-12 NOTE — ED PROVIDER NOTE - PATIENT PORTAL LINK FT
You can access the FollowMyHealth Patient Portal offered by [facility] by registering at the following website: [email or web address]. By joining [employer]’s FollowMyHealth portal, you will also be able to view your health information using other applications (apps) compatible with our system. You can access the FollowMyHealth Patient Portal offered by [facility] by registering at the following website: [email or web address]. By joining [employer]’s FollowMyHealth portal, you will also be able to view your health information using other applications (apps) compatible with our system.

## 2024-01-12 NOTE — ED PROVIDER NOTE - PHYSICAL EXAMINATION
Vitals: I have reviewed the patients vital signs  General: nontoxic appearing  HEENT: Atraumatic, normocephalic, airway patent  Eyes: EOMI, tracking appropriately  Neck: no tracheal deviation  Chest/Lungs: no trauma, symmetric chest rise, speaking in complete sentences,  no resp distress  Heart: skin and extremities well perfused, regular rate and rhythm  Neuro: A+Ox3, appears non focal  MSK: no deformities  Skin: no cyanosis, no jaundice   Psych:  Normal mood and affect  Abdomen: Soft nontender nondistended.  Rectal soft stool in the vault

## 2024-01-12 NOTE — ED PROVIDER NOTE - PROGRESS NOTE DETAILS
Patient had large bowel movement in the emergency department and feels significantly better.  Will discharge at this time

## 2024-01-12 NOTE — ED PROVIDER NOTE - CLINICAL SUMMARY MEDICAL DECISION MAKING FREE TEXT BOX
63-year-old male with constipation.  Differential includes but is not limited to bowel obstruction, constipation, fecal impaction.  Based on exam it seems constipation is most likely in this situation.  I will give the patient a smog enema and reassess.  Will consider adding imaging at that point in time

## 2024-01-17 ENCOUNTER — EMERGENCY (EMERGENCY)
Facility: HOSPITAL | Age: 64
LOS: 1 days | Discharge: HOME | End: 2024-01-17
Attending: EMERGENCY MEDICINE | Admitting: EMERGENCY MEDICINE
Payer: COMMERCIAL

## 2024-01-17 PROCEDURE — 99284 EMERGENCY DEPT VISIT MOD MDM: CPT

## 2024-01-17 NOTE — ED PROVIDER NOTE - WR INTERPRETATION DATE TIME  1
If you are a smoker, it is important for your health to stop smoking. Please be aware that second hand smoke is also harmful. 18-Jan-2024 00:49

## 2024-01-17 NOTE — ED PROVIDER NOTE - OBJECTIVE STATEMENT
64 y/o male presents to Ed c/o upper back pain fo past 2-3 days, pain is dull, constant and mod to severe, no loss of bowel or bladder control , no neurological complaints.

## 2024-01-17 NOTE — ED PROVIDER NOTE - CLINICAL SUMMARY MEDICAL DECISION MAKING FREE TEXT BOX
64 y/o male presents to Ed c/o upper back pain fo past 2-3 days, pain is dull, constant and mod to severe, no loss of bowel or bladder control , no neurological complaints., xray shows severe DJD

## 2024-01-17 NOTE — ED PROVIDER NOTE - PATIENT PORTAL LINK FT
You can access the FollowMyHealth Patient Portal offered by [facility] by registering at the following website: [email or web address]. By joining [employer]’s FollowMyHealth portal, you will also be able to view your health information using other applications (apps) compatible with our system.

## 2024-01-17 NOTE — ED PROVIDER NOTE - NSFOLLOWUPINSTRUCTIONS_ED_ALL_ED_FT
Back Pain    Back pain is very common in adults. The cause of back pain is rarely dangerous and the pain often gets better over time. The cause of your back pain may not be known and may include strain of muscles or ligaments, degeneration of the spinal disks, or arthritis. Occasionally the pain may radiate down your leg(s). Over-the-counter medicines to reduce pain and inflammation are often the most helpful. Stretching and remaining active frequently helps the healing process.   take Tramadol as prescribed   follow up with Dr. gorman orthopedic as advised  SEEK IMMEDIATE MEDICAL CARE IF YOU HAVE ANY OF THE FOLLOWING SYMPTOMS: bowel or bladder control problems, unusual weakness or numbness in your arms or legs, nausea or vomiting, abdominal pain, fever, dizziness/lightheadedness.

## 2024-01-17 NOTE — ED PROVIDER NOTE - CARE PROVIDER_API CALL
Van Arreaga  Orthopaedic Surgery  [address] [place] [address], [address]  [place] [address]  Phone: [phone]  Fax: [phone]  Follow Up Time:

## 2024-01-18 VITALS
TEMPERATURE: 99 F | RESPIRATION RATE: 19 BRPM | DIASTOLIC BLOOD PRESSURE: 76 MMHG | OXYGEN SATURATION: 97 % | SYSTOLIC BLOOD PRESSURE: 120 MMHG | HEART RATE: 79 BPM

## 2024-01-18 VITALS
WEIGHT: 166.89 LBS | HEART RATE: 81 BPM | DIASTOLIC BLOOD PRESSURE: 74 MMHG | OXYGEN SATURATION: 97 % | RESPIRATION RATE: 21 BRPM | HEIGHT: 70 IN | TEMPERATURE: 98 F | SYSTOLIC BLOOD PRESSURE: 121 MMHG

## 2024-01-18 PROCEDURE — 99284 EMERGENCY DEPT VISIT MOD MDM: CPT

## 2024-01-18 PROCEDURE — 72074 X-RAY EXAM THORAC SPINE4/>VW: CPT | Mod: 26

## 2024-01-18 PROCEDURE — 72074 X-RAY EXAM THORAC SPINE4/>VW: CPT

## 2024-01-18 PROCEDURE — 72110 X-RAY EXAM L-2 SPINE 4/>VWS: CPT

## 2024-01-18 PROCEDURE — 72110 X-RAY EXAM L-2 SPINE 4/>VWS: CPT | Mod: 26

## 2024-01-18 PROCEDURE — 96372 THER/PROPH/DIAG INJ SC/IM: CPT

## 2024-01-18 RX ORDER — TRAMADOL HYDROCHLORIDE 50 MG/1
1 TABLET ORAL
Qty: 7 | Refills: 0
Start: 2024-01-18 | End: 2024-01-24

## 2024-01-18 RX ORDER — TRAMADOL HYDROCHLORIDE 50 MG/1
1 TABLET ORAL
Qty: 7 | Refills: 0 | DISCHARGE
Start: 2024-01-18 | End: 2024-01-24

## 2024-01-18 RX ORDER — KETOROLAC TROMETHAMINE 30 MG/ML
30 SYRINGE (ML) INJECTION ONCE
Refills: 0 | Status: DISCONTINUED | OUTPATIENT
Start: 2024-01-17 | End: 2024-01-17

## 2024-01-18 RX ADMIN — Medication 30 MILLIGRAM(S): at 00:29

## 2024-01-24 ENCOUNTER — APPOINTMENT (OUTPATIENT)
Dept: FAMILY MEDICINE | Facility: CLINIC | Age: 64
End: 2024-01-24
Payer: COMMERCIAL

## 2024-01-24 ENCOUNTER — LABORATORY RESULT (OUTPATIENT)
Age: 64
End: 2024-01-24

## 2024-01-24 VITALS
RESPIRATION RATE: 16 BRPM | DIASTOLIC BLOOD PRESSURE: 86 MMHG | HEART RATE: 87 BPM | OXYGEN SATURATION: 97 % | WEIGHT: 195 LBS | SYSTOLIC BLOOD PRESSURE: 132 MMHG | TEMPERATURE: 97.4 F | BODY MASS INDEX: 28.88 KG/M2 | HEIGHT: 69 IN

## 2024-01-24 DIAGNOSIS — R60.0 LOCALIZED EDEMA: ICD-10-CM

## 2024-01-24 DIAGNOSIS — L03.90 CELLULITIS, UNSPECIFIED: ICD-10-CM

## 2024-01-24 DIAGNOSIS — J01.90 ACUTE SINUSITIS, UNSPECIFIED: ICD-10-CM

## 2024-01-24 DIAGNOSIS — I35.0 NONRHEUMATIC AORTIC (VALVE) STENOSIS: ICD-10-CM

## 2024-01-24 PROCEDURE — 99215 OFFICE O/P EST HI 40 MIN: CPT

## 2024-01-24 RX ORDER — FUROSEMIDE 40 MG
0 TABLET ORAL
Refills: 0 | DISCHARGE
Start: 2024-01-24

## 2024-01-25 ENCOUNTER — INPATIENT (INPATIENT)
Facility: HOSPITAL | Age: 64
LOS: 10 days | Discharge: ANOTHER HOSPITAL | DRG: 291 | End: 2024-02-05
Attending: HOSPITALIST | Admitting: STUDENT IN AN ORGANIZED HEALTH CARE EDUCATION/TRAINING PROGRAM
Payer: COMMERCIAL

## 2024-01-25 VITALS
DIASTOLIC BLOOD PRESSURE: 67 MMHG | WEIGHT: 195.11 LBS | HEIGHT: 70 IN | SYSTOLIC BLOOD PRESSURE: 110 MMHG | HEART RATE: 86 BPM | OXYGEN SATURATION: 96 % | TEMPERATURE: 98 F | RESPIRATION RATE: 18 BRPM

## 2024-01-25 DIAGNOSIS — L03.90 CELLULITIS, UNSPECIFIED: ICD-10-CM

## 2024-01-25 DIAGNOSIS — C71.9 MALIGNANT NEOPLASM OF BRAIN, UNSPECIFIED: Chronic | ICD-10-CM

## 2024-01-25 LAB
ACANTHOCYTES BLD QL SMEAR: SLIGHT — SIGNIFICANT CHANGE UP
ALBUMIN SERPL ELPH-MCNC: 2.3 G/DL — LOW (ref 3.3–5)
ALBUMIN SERPL ELPH-MCNC: 3.3 G/DL
ALP BLD-CCNC: 85 U/L
ALP SERPL-CCNC: 70 U/L — SIGNIFICANT CHANGE UP (ref 40–120)
ALT FLD-CCNC: 379 U/L — HIGH (ref 10–45)
ALT SERPL-CCNC: 481 U/L
ANION GAP SERPL CALC-SCNC: 14 MMOL/L
ANION GAP SERPL CALC-SCNC: 6 MMOL/L — SIGNIFICANT CHANGE UP (ref 5–17)
ANISOCYTOSIS BLD QL: SIGNIFICANT CHANGE UP
APTT BLD: 27.6 SEC — SIGNIFICANT CHANGE UP (ref 24.5–35.6)
AST SERPL-CCNC: 128 U/L — HIGH (ref 10–40)
AST SERPL-CCNC: 408 U/L
BASOPHILS # BLD AUTO: 0.08 K/UL — SIGNIFICANT CHANGE UP (ref 0–0.2)
BASOPHILS # BLD AUTO: 0.09 K/UL
BASOPHILS NFR BLD AUTO: 0.5 % — SIGNIFICANT CHANGE UP (ref 0–2)
BASOPHILS NFR BLD AUTO: 0.7 %
BILIRUB SERPL-MCNC: 1.1 MG/DL — SIGNIFICANT CHANGE UP (ref 0.2–1.2)
BILIRUB SERPL-MCNC: 1.3 MG/DL
BUN SERPL-MCNC: 34 MG/DL
BUN SERPL-MCNC: 35 MG/DL — HIGH (ref 7–23)
CALCIUM SERPL-MCNC: 8.3 MG/DL
CALCIUM SERPL-MCNC: 8.3 MG/DL — LOW (ref 8.4–10.5)
CHLORIDE SERPL-SCNC: 104 MMOL/L — SIGNIFICANT CHANGE UP (ref 96–108)
CHLORIDE SERPL-SCNC: 105 MMOL/L
CHOLEST SERPL-MCNC: 111 MG/DL
CO2 SERPL-SCNC: 16 MMOL/L
CO2 SERPL-SCNC: 23 MMOL/L — SIGNIFICANT CHANGE UP (ref 22–31)
CREAT SERPL-MCNC: 1.68 MG/DL
CREAT SERPL-MCNC: 1.76 MG/DL — HIGH (ref 0.5–1.3)
DACRYOCYTES BLD QL SMEAR: SLIGHT — SIGNIFICANT CHANGE UP
EGFR: 43 ML/MIN/1.73M2 — LOW
EGFR: 45 ML/MIN/1.73M2
ELLIPTOCYTES BLD QL SMEAR: SIGNIFICANT CHANGE UP
EOSINOPHIL # BLD AUTO: 0.02 K/UL
EOSINOPHIL # BLD AUTO: 0.07 K/UL — SIGNIFICANT CHANGE UP (ref 0–0.5)
EOSINOPHIL NFR BLD AUTO: 0.2 %
EOSINOPHIL NFR BLD AUTO: 0.5 % — SIGNIFICANT CHANGE UP (ref 0–6)
ESTIMATED AVERAGE GLUCOSE: 134 MG/DL
GLUCOSE SERPL-MCNC: 112 MG/DL — HIGH (ref 70–99)
GLUCOSE SERPL-MCNC: 87 MG/DL
HBA1C MFR BLD HPLC: 6.3 %
HCT VFR BLD CALC: 23.8 % — LOW (ref 39–50)
HCT VFR BLD CALC: 25 %
HDLC SERPL-MCNC: 43 MG/DL
HGB BLD-MCNC: 7.5 G/DL — LOW (ref 13–17)
HGB BLD-MCNC: 7.9 G/DL
HYPOCHROMIA BLD QL: SIGNIFICANT CHANGE UP
IMM GRANULOCYTES NFR BLD AUTO: 0.6 %
IMM GRANULOCYTES NFR BLD AUTO: 0.8 % — SIGNIFICANT CHANGE UP (ref 0–0.9)
INR BLD: 1.37 RATIO — HIGH (ref 0.85–1.18)
LDLC SERPL CALC-MCNC: 54 MG/DL
LYMPHOCYTES # BLD AUTO: 0.86 K/UL — LOW (ref 1–3.3)
LYMPHOCYTES # BLD AUTO: 1.34 K/UL
LYMPHOCYTES # BLD AUTO: 5.5 % — LOW (ref 13–44)
LYMPHOCYTES NFR BLD AUTO: 11.1 %
MAN DIFF?: NORMAL
MANUAL SMEAR VERIFICATION: SIGNIFICANT CHANGE UP
MCHC RBC-ENTMCNC: 17.5 PG — LOW (ref 27–34)
MCHC RBC-ENTMCNC: 19.7 PG
MCHC RBC-ENTMCNC: 31.5 GM/DL — LOW (ref 32–36)
MCHC RBC-ENTMCNC: 31.6 GM/DL
MCV RBC AUTO: 55.6 FL — LOW (ref 80–100)
MCV RBC AUTO: 62.3 FL
MICROCYTES BLD QL: SIGNIFICANT CHANGE UP
MONOCYTES # BLD AUTO: 1.15 K/UL
MONOCYTES # BLD AUTO: 1.27 K/UL — HIGH (ref 0–0.9)
MONOCYTES NFR BLD AUTO: 8.2 % — SIGNIFICANT CHANGE UP (ref 2–14)
MONOCYTES NFR BLD AUTO: 9.5 %
NEUTROPHILS # BLD AUTO: 13.12 K/UL — HIGH (ref 1.8–7.4)
NEUTROPHILS # BLD AUTO: 9.38 K/UL
NEUTROPHILS NFR BLD AUTO: 77.9 %
NEUTROPHILS NFR BLD AUTO: 84.5 % — HIGH (ref 43–77)
NONHDLC SERPL-MCNC: 68 MG/DL
NRBC # BLD: 0 /100 WBCS — SIGNIFICANT CHANGE UP (ref 0–0)
NT-PROBNP SERPL-MCNC: ABNORMAL PG/ML
NT-PROBNP SERPL-SCNC: HIGH PG/ML (ref 0–300)
PLAT MORPH BLD: NORMAL — SIGNIFICANT CHANGE UP
PLATELET # BLD AUTO: 239 K/UL — SIGNIFICANT CHANGE UP (ref 150–400)
PLATELET # BLD AUTO: 322 K/UL
POIKILOCYTOSIS BLD QL AUTO: SIGNIFICANT CHANGE UP
POLYCHROMASIA BLD QL SMEAR: SLIGHT — SIGNIFICANT CHANGE UP
POTASSIUM SERPL-MCNC: 3.7 MMOL/L — SIGNIFICANT CHANGE UP (ref 3.5–5.3)
POTASSIUM SERPL-SCNC: 3.7 MMOL/L — SIGNIFICANT CHANGE UP (ref 3.5–5.3)
POTASSIUM SERPL-SCNC: 4.8 MMOL/L
PROT SERPL-MCNC: 5.5 G/DL
PROT SERPL-MCNC: 6 G/DL — SIGNIFICANT CHANGE UP (ref 6–8.3)
PROTHROM AB SERPL-ACNC: 15.9 SEC — HIGH (ref 9.5–13)
RAPID RVP RESULT: SIGNIFICANT CHANGE UP
RBC # BLD: 4.01 M/UL
RBC # BLD: 4.28 M/UL — SIGNIFICANT CHANGE UP (ref 4.2–5.8)
RBC # FLD: 17.6 % — HIGH (ref 10.3–14.5)
RBC # FLD: 20.8 %
RBC BLD AUTO: ABNORMAL
SARS-COV-2 RNA SPEC QL NAA+PROBE: SIGNIFICANT CHANGE UP
SCHISTOCYTES BLD QL AUTO: SLIGHT — SIGNIFICANT CHANGE UP
SODIUM SERPL-SCNC: 133 MMOL/L — LOW (ref 135–145)
SODIUM SERPL-SCNC: 136 MMOL/L
TARGETS BLD QL SMEAR: SLIGHT — SIGNIFICANT CHANGE UP
TRIGL SERPL-MCNC: 58 MG/DL
TROPONIN I, HIGH SENSITIVITY RESULT: 24 NG/L — SIGNIFICANT CHANGE UP
TSH SERPL-ACNC: 2.42 UIU/ML
WBC # BLD: 15.52 K/UL — HIGH (ref 3.8–10.5)
WBC # FLD AUTO: 12.05 K/UL
WBC # FLD AUTO: 15.52 K/UL — HIGH (ref 3.8–10.5)

## 2024-01-25 PROCEDURE — 71045 X-RAY EXAM CHEST 1 VIEW: CPT | Mod: 26

## 2024-01-25 PROCEDURE — 93970 EXTREMITY STUDY: CPT | Mod: 26

## 2024-01-25 PROCEDURE — 99285 EMERGENCY DEPT VISIT HI MDM: CPT

## 2024-01-25 PROCEDURE — 99223 1ST HOSP IP/OBS HIGH 75: CPT | Mod: GC

## 2024-01-25 PROCEDURE — 93010 ELECTROCARDIOGRAM REPORT: CPT

## 2024-01-25 RX ORDER — PIPERACILLIN AND TAZOBACTAM 4; .5 G/20ML; G/20ML
3.38 INJECTION, POWDER, LYOPHILIZED, FOR SOLUTION INTRAVENOUS EVERY 8 HOURS
Refills: 0 | Status: DISCONTINUED | OUTPATIENT
Start: 2024-01-25 | End: 2024-01-26

## 2024-01-25 RX ORDER — CEFTRIAXONE 500 MG/1
1000 INJECTION, POWDER, FOR SOLUTION INTRAMUSCULAR; INTRAVENOUS ONCE
Refills: 0 | Status: COMPLETED | OUTPATIENT
Start: 2024-01-25 | End: 2024-01-25

## 2024-01-25 RX ORDER — HEPARIN SODIUM 5000 [USP'U]/ML
5000 INJECTION INTRAVENOUS; SUBCUTANEOUS EVERY 8 HOURS
Refills: 0 | Status: DISCONTINUED | OUTPATIENT
Start: 2024-01-25 | End: 2024-02-05

## 2024-01-25 RX ORDER — TRAMADOL HYDROCHLORIDE 50 MG/1
50 TABLET ORAL
Refills: 0 | Status: DISCONTINUED | OUTPATIENT
Start: 2024-01-25 | End: 2024-02-01

## 2024-01-25 RX ORDER — AMLODIPINE BESYLATE 2.5 MG/1
10 TABLET ORAL DAILY
Refills: 0 | Status: DISCONTINUED | OUTPATIENT
Start: 2024-01-25 | End: 2024-01-25

## 2024-01-25 RX ORDER — ACETAMINOPHEN, DIPHENHYDRAMINE HYDROCHLORIDE, AND PHENYLEPHRINE HYDROCHLORIDE 325; 25; 5 MG/1; MG/1; MG/1
2 TABLET, COATED ORAL
Refills: 0 | DISCHARGE

## 2024-01-25 RX ORDER — ONDANSETRON 8 MG/1
4 TABLET, FILM COATED ORAL EVERY 8 HOURS
Refills: 0 | Status: DISCONTINUED | OUTPATIENT
Start: 2024-01-25 | End: 2024-02-05

## 2024-01-25 RX ORDER — PANTOPRAZOLE SODIUM 20 MG/1
40 TABLET, DELAYED RELEASE ORAL
Refills: 0 | Status: DISCONTINUED | OUTPATIENT
Start: 2024-01-25 | End: 2024-02-03

## 2024-01-25 RX ORDER — LANOLIN ALCOHOL/MO/W.PET/CERES
3 CREAM (GRAM) TOPICAL AT BEDTIME
Refills: 0 | Status: DISCONTINUED | OUTPATIENT
Start: 2024-01-25 | End: 2024-02-05

## 2024-01-25 RX ORDER — MORPHINE SULFATE 50 MG/1
4 CAPSULE, EXTENDED RELEASE ORAL ONCE
Refills: 0 | Status: DISCONTINUED | OUTPATIENT
Start: 2024-01-25 | End: 2024-01-25

## 2024-01-25 RX ORDER — ASPIRIN/CALCIUM CARB/MAGNESIUM 324 MG
325 TABLET ORAL DAILY
Refills: 0 | Status: DISCONTINUED | OUTPATIENT
Start: 2024-01-25 | End: 2024-01-28

## 2024-01-25 RX ORDER — TAMSULOSIN HYDROCHLORIDE 0.4 MG/1
0.4 CAPSULE ORAL AT BEDTIME
Refills: 0 | Status: DISCONTINUED | OUTPATIENT
Start: 2024-01-25 | End: 2024-02-05

## 2024-01-25 RX ORDER — FUROSEMIDE 40 MG
20 TABLET ORAL ONCE
Refills: 0 | Status: COMPLETED | OUTPATIENT
Start: 2024-01-25 | End: 2024-01-25

## 2024-01-25 RX ADMIN — MORPHINE SULFATE 4 MILLIGRAM(S): 50 CAPSULE, EXTENDED RELEASE ORAL at 18:03

## 2024-01-25 RX ADMIN — TRAMADOL HYDROCHLORIDE 50 MILLIGRAM(S): 50 TABLET ORAL at 22:14

## 2024-01-25 RX ADMIN — CEFTRIAXONE 100 MILLIGRAM(S): 500 INJECTION, POWDER, FOR SOLUTION INTRAMUSCULAR; INTRAVENOUS at 17:54

## 2024-01-25 RX ADMIN — TRAMADOL HYDROCHLORIDE 50 MILLIGRAM(S): 50 TABLET ORAL at 23:19

## 2024-01-25 RX ADMIN — TAMSULOSIN HYDROCHLORIDE 0.4 MILLIGRAM(S): 0.4 CAPSULE ORAL at 22:19

## 2024-01-25 NOTE — ED PROVIDER NOTE - OBJECTIVE STATEMENT
63 year old male with bilateral leg swelling, failed outpatient therapy with bactrim for cellulitis. Seen by PCP and was advised to go to ED for admission, r/o DVT and IV abx. Patient states that the swelling got worse for the past 1 week, denies fever, NVD, dysuria, cough, chest/abdominal/back/neck pain, HA, focal weakness/numbness. Denies any other symptoms.

## 2024-01-25 NOTE — ED ADULT NURSE NOTE - NSFALLHARMRISKINTERV_ED_ALL_ED
Assistance OOB with selected safe patient handling equipment if applicable/Assistance with ambulation/Communicate risk of Fall with Harm to all staff, patient, and family/Monitor gait and stability/Provide visual cue: red socks, yellow wristband, yellow gown, etc/Reinforce activity limits and safety measures with patient and family/Bed in lowest position, wheels locked, appropriate side rails in place/Call bell, personal items and telephone in reach/Instruct patient to call for assistance before getting out of bed/chair/stretcher/Non-slip footwear applied when patient is off stretcher/[place] to call system/Physically safe environment - no spills, clutter or unnecessary equipment/Purposeful Proactive Rounding/Room/bathroom lighting operational, light cord in reach

## 2024-01-25 NOTE — H&P ADULT - HISTORY OF PRESENT ILLNESS
63 year old male with Hx of HTN, GERD, dyslipidemia, chronic intermittent leg pain, aortic stenosis, with bilateral leg swelling, presents to  ED sent by PCP for failed outpatient therapy with bactrim for cellulitis.  As per outaptient chart review, Initial symptoms developed in September after gel injection by orthopedist. Since then he has noted worsening swelling and redness and ulcerations noted in bilateral lower extremities. Does have a history of significant aortic stenosis-but has yet to follow-up with cardiologist.   Was seen at [facility] on 1/7 for persistent cellulitis. Patient declined hospital admission. Wound culture was positive for Streptococcus dysgalactiae.  Continues to note intermittent bilateral calf pain that he notes at times at rest and at movement . Seen by PCP yesterday and was advised to go to ED for admission, r/o DVT and IV abx. Patient states that the swelling got worse for the past 1 week. Reports he hasn't noticed increase in SOB and denies dyspnea, but states that he sleeps with 3 pillows at night for neck pain, and he gets shortness of breath on exertion. Shortness of breath noticed during the interview. Denies fevers, chills, dysuria, cough, chest, abdominal, back, or neck pain. Says that he he has decreased sensation in his legs which makes him unsteady on his feet.     In the ED temp 98.2, HR 86, 110/67, RR, SPO2 96%   Labs significant for BNP 93746, WBC 15, troponin 24, INR 1.37, Cr 1.7   He received morphine 2mg and CTX x1   CXR personally reviewed cardiomegaly on wet read   EKG personally reviewed NSR with PACS and PVCs, and RBBB (similar to prior)     63 year old male with Hx of HTN, GERD, dyslipidemia, chronic intermittent leg pain, aortic stenosis, chronic venous stasis dermatitis with bilateral leg swelling, presents to  ED sent by PCP for failed outpatient therapy with bactrim for cellulitis.  As per outpatient chart review, Initial symptoms developed in September after gel injection by orthopedist. Since then he has noted worsening swelling and redness and ulcerations noted in bilateral lower extremities. Does have a history of significant aortic stenosis-but has yet to follow-up with cardiologist.   Was seen at [facility] on 1/7 for persistent cellulitis. Patient declined hospital admission at that time. Wound culture was positive for Streptococcus dysgalactiae. Was started on Bactrim po.   Continues to note intermittent bilateral calf pain that he notes at times at rest and at movement . Seen by PCP yesterday and was advised to go to ED for admission, r/o DVT and IV abx. Patient states that the swelling got worse for the past 1 week. Reports he hasn't noticed increase in SOB and denies dyspnea, but states that he sleeps with 3 pillows at night for neck pain, and he gets shortness of breath on exertion. Shortness of breath noticed during the interview. Denies fevers, chills, dysuria, cough, chest, abdominal, back, or neck pain. Says that he has decreased sensation in his legs which makes him unsteady on his feet.     In the ED temp 98.2, HR 86, 110/67, RR, SPO2 96%   Labs significant for BNP 39648, WBC 15, troponin 24, INR 1.37, Cr 1.7   He received morphine 2mg and CTX x1   CXR personally reviewed cardiomegaly on wet read   EKG personally reviewed NSR with PACS and PVCs, and RBBB (similar to prior)

## 2024-01-25 NOTE — H&P ADULT - NSHPPHYSICALEXAM_GEN_ALL_CORE
Vital Signs Last 24 Hrs  T(C): 36.8 (25 Jan 2024 16:19), Max: 36.8 (25 Jan 2024 16:19)  T(F): 98.2 (25 Jan 2024 16:19), Max: 98.2 (25 Jan 2024 16:19)  HR: 86 (25 Jan 2024 16:19) (86 - 86)  BP: 110/67 (25 Jan 2024 16:19) (110/67 - 110/67)  BP(mean): --  RR: 18 (25 Jan 2024 16:19) (18 - 18)  SpO2: 96% (25 Jan 2024 16:19) (96% - 96%)    Parameters below as of 25 Jan 2024 16:19  Patient On (Oxygen Delivery Method): room air Vital Signs Last 24 Hrs  T(C): 36.8 (25 Jan 2024 16:19), Max: 36.8 (25 Jan 2024 16:19)  T(F): 98.2 (25 Jan 2024 16:19), Max: 98.2 (25 Jan 2024 16:19)  HR: 86 (25 Jan 2024 16:19) (86 - 86)  BP: 110/67 (25 Jan 2024 16:19) (110/67 - 110/67)  BP(mean): --  RR: 18 (25 Jan 2024 16:19) (18 - 18)  SpO2: 96% (25 Jan 2024 16:19) (96% - 96%)    Parameters below as of 25 Jan 2024 16:19  Patient On (Oxygen Delivery Method): room air    PHYSICAL EXAM:    GENERAL: NAD, lying in bed comfortably  HEAD:  Atraumatic, Normocephalic  EYES: EOMI, PERRLA, conjunctiva and sclera clear  ENT: Moist mucous membranes  NECK: Supple, No JVD  CHEST/LUNG: Clear to auscultation bilaterally, good air entry bilaterally; No wheezing, rales, or rhonchi. Unlabored respirations  HEART: irregular rhythm, +harsh aortic systolic murmur   ABDOMEN: distended and protuberant, non tender.  Bowel sounds present x4 quadrants  EXTREMITIES:  1+ Peripheral Pulses. bilateral legs with chronic stasis dermatitis, edematous, 3+ pitting edema   NERVOUS SYSTEM:  Alert & Oriented X3, speech clear. No deficits   MSK: FROM all 4 extremities, full and equal strength  SKIN: right leg with 4cm venous ulcer, posterior calve ulcer

## 2024-01-25 NOTE — ED PROVIDER NOTE - CLINICAL SUMMARY MEDICAL DECISION MAKING FREE TEXT BOX
64yearold male with bilateral lower leg cellulitis and swelling, labs reviewed- leukocytosis, elevated BNP/LFT noted, culture reviewed- sensitive to rocephine, first dose started, blood culture sent, spoke with his primary care NP- patient is extremely non compliant, needs echo and cardiac workup for his aortic stenosis, admitted to medicine for further management

## 2024-01-25 NOTE — ED ADULT NURSE NOTE - OBJECTIVE STATEMENT
Patient c/o bilateral lower leg redness, pain, swelling and serous drainage. Patient denies chest pain, SOB, headache, dizziness and blurry vision. Patient endorses hx of diabetes.

## 2024-01-25 NOTE — H&P ADULT - ASSESSMENT
#CHF exacerbation   - admit to telemtry    - bilateral leg swelling likely due to CHF   - CXR wet read with cardiomegaly   - known hx of moderare calcific aortic stenosis aortic stenosis   - BNP 58793 (19761 yesterday)   - TTE in the AM  - daily weights, I&Os   - fluid restriction   - cardio consult      #Right leg ulcer on chronic stasis dermatitis   - admit to medicine   - does not meet sepsis critera on admission   - WBC 15.52, trend   - DVT b/l  negative   - follw up BCx  - drainage culture from 1/7 with serratia marcescnes with sensitiives - sensitive for Bactrim and Ceftriaxone  - likely colonization, but can continue ceftiraxone 1g QD and vancomycin   -  ID consult for descalation  - wound care consult   - physical therapy eval     #aortic stenosis   - Moderate aortic stenosis in 2018  - follow up TTE to grade   - vascular consult     #Hypervolemic Hyponatremia   - in the setting of HF exacerbation   - Na 133   - reports no change in po intake   - fluid restriction   - trend       #LINDA  - Cr 1.76 ; BUN 35 (baseline ~1.4)   - possible cardiorenal syndrome in the setting of aortic stenosis with HF   - trial lasix 40 IV x 1  - follow up CMP in AM     #microcytic anemia   - hgb 7.5   - continue to monitor   - iron studies   - type and screen   - transfuse <7    #elevated tranaminases  - /    - denies alcohol use, denies abdominal pain   - downtrending from day before (408/481)  - f/u abdomnen US   - may consider futher evaluation with CT abdomen but patient states he is claustrophobic and will need to be sedated     #chronic intermittent leg pain   - tramadol 50 BID prn     #GERD  - protonix 40mg po     #HTN  - continue amlodpine 10mg       #full code   Vte: lovenox  63 year old male with Hx of HTN, GERD, dyslipidemia, chronic intermittent leg pain, aortic stenosis, chronic venous stasis dermatitis with bilateral leg swelling, presents for b/l leg pain and swelling, Admitted for:         #CHF exacerbation   - admit to telemetry    - bilateral leg swelling likely due to CHF   - CXR wet read with cardiomegaly   - known hx of moderate calcific aortic stenosis aortic stenosis   - BNP 50986 (19761 yesterday)   - TTE in the AM  - daily weights, I&Os   - Low salt, low cholesterol diet  - fluid restriction   - cardio consult      #Right leg venous ulcer superimposed on chronic stasis dermatitis    - does not meet sepsis criteria on admission   - WBC 15.52, trend   - DVT b/l  negative   - follow up BCx  - drainage culture from 1/7 with serratia marcescens with sensitivities - sensitive for Bactrim and Ceftriaxone  - likely colonization, but can continue ceftriaxone 1g QD and vancomycin   -  ID consult for abx deescalation  - wound care consult   - physical therapy eval     #Aortic stenosis   - Moderate aortic stenosis in 2018  - harsh systolic murmur on exam, may have symptomatic HF now   - follow up TTE to grade   - vascular consult     #Hypervolemic Hyponatremia   - in the setting of HF exacerbation   - Na 133   - reports no change in po intake   - fluid restriction   - trend       #LINDA  - Cr 1.76 ; BUN 35 (baseline ~1.4)   - possible cardiorenal syndrome in the setting of aortic stenosis with HF   - trial lasix 40 IV x 1  - follow up CMP in AM     #microcytic anemia   - hgb 7.5   - continue to monitor   - iron studies   - type and screen   - transfuse <7    #elevated transaminases  - /    - denies alcohol use, denies abdominal pain   - downtrending from day before (408/481)  - f/u abdomen US   - hepatitis panel   - may consider further evaluation with CT abdomen but patient states he is claustrophobic and will need to be sedated     #chronic intermittent leg pain   -continue  tramadol 50 BID prn     #GERD  - protonix 40mg po     #HTN  - continue amlodipine 10mg       #full code   Vte ppx: lovenox     Discussed with Dr. Mullins  63 year old male with Hx of HTN, GERD, dyslipidemia, chronic intermittent leg pain, aortic stenosis, chronic venous stasis dermatitis with bilateral leg swelling, presents for b/l leg pain and swelling, Admitted for:       #CHF exacerbation   - admit to telemetry    - bilateral leg swelling likely due to CHF   - CXR wet read with cardiomegaly   - known hx of moderate calcific aortic stenosis aortic stenosis   - BNP 90135 (19761 yesterday)   - TTE in the AM  - daily weights, I&Os   - Low salt, low cholesterol diet  - fluid restriction   - cardio consult      #Right leg venous ulcer superimposed on chronic stasis dermatitis    - does not meet sepsis criteria on admission   - WBC 15.52, trend   - DVT b/l  negative   - follow up BCx  - drainage culture from 1/7 with serratia marcescens and rare strep galacticae with sensitivities - sensitive for Bactrim and Ceftriaxone  - likely colonization, but can continue ceftriaxone 1g QD and vancomycin   -  ID consult for abx deescalation  - wound care consult   - physical therapy eval     #Aortic stenosis   - Moderate aortic stenosis in 2018  - harsh systolic murmur on exam, may have symptomatic HF now   - continue home med   - follow up TTE to grade   - vascular consult     #Hypervolemic Hyponatremia   - in the setting of HF exacerbation   - Na 133   - reports no change in po intake   - fluid restriction   - trend     #LINDA  - Cr 1.76 ; BUN 35 (baseline ~1.4)   - possible cardiorenal syndrome in the setting of aortic stenosis with HF   - trial lasix 40 IV x 1  - follow up CMP in AM     #microcytic anemia   - hgb 7.5   - continue to monitor   - iron studies   - type and screen   - transfuse <7    #elevated transaminases  - /    - denies alcohol use, denies abdominal pain   - downtrending from day before (408/481)  - f/u abdomen US   - hepatitis panel   - may consider further evaluation with CT abdomen but patient states he is claustrophobic and will need to be sedated     #chronic intermittent leg pain   -continue  tramadol 50 BID prn     #GERD  - protonix 40mg po     #HTN  - continue amlodipine 10mg     #BPH  - tamsulosin 0.5 mg qhs       #full code   Vte ppx: lovenox     Discussed with Dr. Mullins  63 year old male with Hx of HTN, GERD, dyslipidemia, chronic intermittent leg pain, aortic stenosis, chronic venous stasis dermatitis with bilateral leg swelling, presents for b/l leg pain and swelling, Admitted for:       #Concern for new onset HF  - admit to telemetry    - bilateral leg swelling likely due to CHF   - CXR wet read with cardiomegaly   - known hx of moderate calcific aortic stenosis aortic stenosis   - BNP 90971 (19761 yesterday)   - TTE in the AM  - daily weights, I&Os   - Low salt, low cholesterol diet  - fluid restriction   - lasix 20 IV x1   - cardio consult      #Aortic stenosis   - Moderate aortic stenosis in 2018  - harsh systolic murmur on exam, may have symptomatic HF now   - continue home med   - follow up TTE to grade   - vascular consult     #Right leg venous ulcer superimposed on chronic stasis dermatitis    - does not meet sepsis criteria on admission   - WBC 15.52, trend   - DVT b/l  negative   - follow up BCx  - drainage culture from 1/7 with serratia marcescens and rare strep galacticae with sensitivities - sensitive for Bactrim and Ceftriaxone and zoyn  - start zosyn   - MRSA swab  - ID consult for abx deescalation  - wound care consult   - physical therapy eval       #Hypervolemic Hyponatremia   - in the setting of HF exacerbation   - Na 133   - reports no change in po intake   - fluid restriction   - trend     #LINDA  - Cr 1.76 ; BUN 35 (baseline ~1.4)   - possible cardiorenal syndrome in the setting of aortic stenosis with HF   - trial lasix 20 IV x 1   - follow up CMP in AM     #microcytic anemia   - hgb 7.5   - continue to monitor   - iron studies   - type and screen   - denies hematochezia   - f/u occult blood   - never had colonoscopy, outpt referral on dc   - transfuse <7    #elevated transaminases  - likely from congestive hepatopathy  - /    - denies alcohol use, denies abdominal pain   - downtrending from day before (408/481)  - trend     #chronic intermittent leg pain   -continue  tramadol 50 BID prn     #GERD  - protonix 40mg po     #HTN  - discontinue amlodipine 10mg   - normotensive   - revaluate HTN meds after echo results for optimization (if HF)    #BPH  - tamsulosin 0.5 mg qhs       #full code   Vte ppx: heparin    Discussed with Dr. Mullins  63 year old male with Hx of HTN, GERD, dyslipidemia, chronic intermittent leg pain, aortic stenosis, chronic venous stasis dermatitis with bilateral leg swelling, presents for b/l leg pain and swelling, Admitted for:       #Concern for new onset HF  - admit to telemetry    - bilateral leg swelling likely due to CHF   - CXR wet read with cardiomegaly   - known hx of moderate calcific aortic stenosis aortic stenosis   - BNP 18040 (19761 yesterday)   - TTE in the AM  - daily weights, I&Os   - Low salt, low cholesterol diet  - fluid restriction   - lasix 20 IV x1   - cardio consult      #Aortic stenosis   - Moderate aortic stenosis in 2018  - harsh systolic murmur on exam, may have symptomatic HF now   - continue home med   - follow up TTE to grade       #Right leg venous ulcer superimposed on chronic stasis dermatitis    - does not meet sepsis criteria on admission   - WBC 15.52, trend   - DVT b/l  negative   - follow up BCx  - drainage culture from 1/7 with serratia marcescens and rare strep galacticae with sensitivities - sensitive for Bactrim and Ceftriaxone and zosyn  - start zosyn   - MRSA swab  - ID consult for abx deescalation  - wound care consult   - physical therapy eval   - can consider vascular consult       #Hypervolemic Hyponatremia   - in the setting of HF exacerbation   - Na 133   - reports no change in po intake   - fluid restriction   - trend     #LINDA  - Cr 1.76 ; BUN 35 (baseline ~1.4)   - possible cardiorenal syndrome in the setting of aortic stenosis with HF   - trial lasix 20 IV x 1   - follow up CMP in AM     #microcytic anemia   - hgb 7.5   - continue to monitor   - iron studies   - type and screen   - denies hematochezia   - f/u occult blood   - never had colonoscopy, outpt referral on dc   - transfuse <7    #elevated transaminases  - likely from congestive hepatopathy  - /    - denies alcohol use, denies abdominal pain   - downtrending from day before (408/481)  - trend     #chronic intermittent leg pain   -continue  tramadol 50 BID prn     #GERD  - protonix 40mg po     #HTN  - discontinue amlodipine 10mg   - normotensive   - revaluate HTN meds after echo results for optimization (if HF)    #BPH  - tamsulosin 0.5 mg qhs       #full code   Vte ppx: heparin    Discussed with Dr. Mullins

## 2024-01-25 NOTE — H&P ADULT - NSICDXFAMILYHX_GEN_ALL_CORE_FT
FAMILY HISTORY:  Father  Still living? Unknown  FH: congestive heart failure, Age at diagnosis: Age Unknown    Mother  Still living? Unknown  FH: brain cancer, Age at diagnosis: Age Unknown    Sibling  Still living? Unknown  FH: congestive heart failure, Age at diagnosis: Age Unknown  FH: coronary artery disease, Age at diagnosis: Age Unknown

## 2024-01-25 NOTE — ED ADULT TRIAGE NOTE - CHIEF COMPLAINT QUOTE
Patient sent to ED by Dr. Nguyen. Patient has bilateral lower extremity redness & swelling, recommending r/o DVT and IV abx.

## 2024-01-25 NOTE — H&P ADULT - NSICDXPASTMEDICALHX_GEN_ALL_CORE_FT
PAST MEDICAL HISTORY:  Chronic GERD     Dyslipidemia     History of aortic stenosis     Hypertension

## 2024-01-25 NOTE — H&P ADULT - NSHPREVIEWOFSYSTEMS_GEN_ALL_CORE
REVIEW OF SYSTEMS:    CONSTITUTIONAL: (-) weakness, (-) fevers, (-) chills, (-) coughing, (-)sneezing  EYES/ENT: (-) visual changes,  (-) vertigo,  (-) throat pain   NECK:  (-) pain, (-) stiffness  RESPIRATORY:  (-) shortness of breath, (-) cough, (-) sneezing, (-) wheezing,  (-) hemoptysis   CARDIOVASCULAR:  (-) chest pain, (-) palpitations  GASTROINTESTINAL:  (-) abdominal or epigastric pain, (-) nausea, (-) vomiting, (-) diarrhea, (-) constipation, (-) melena,  (-) hematemesis,  (-) hematochezia  GENITOURINARY: (-) dysuria, (-) frequency, (-) hematuria  NEUROLOGICAL: (+) numbness, (-) weakness  SKIN: (-) itching, (-) rashes, (-) lesions

## 2024-01-25 NOTE — ED PROVIDER NOTE - NS_EDPROVIDERDISPOUSERTYPE_ED_A_ED
Pt c/o left rib pain that started 3 hrs ago. Pain worse with inspiration, movement, palpation. Denies cough or fever. Breath sounds clear anteriorly     Aline Mcclure, RN  03/02/20 [id number]     Attending Attestation (For Attendings USE Only)...

## 2024-01-25 NOTE — H&P ADULT - ATTENDING COMMENTS
63M HTN, GERD, HLD, aortic stenosis, stasis dermatitis presents w/ bilateral leg swelling and pain worsening over past week.       #Concern new onset heart failure  #Aortic stenosis   #Hyponatremia  - Lasix 20 IV push once  - BNP 06030  - bilateral leg swelling w/ stasis dermatitis   - telemetry  - echo  - cardiology eval  - cariomegaly on xray   - daily weight I&Os  - low salt diet, fluid restriction    #R leg venous ulcer   - s/p ceftriaxone in ED   - Zosyn   - 1 dose of vancomycin  - MRSA swab  - Wound consult  - ID consult  - Vascular consult  - physical therapy   - LE dvt negative    #LINDA  - Crt 1.76 (baseline 1.4)   - Urine lytes, Urine urea  - UA    # Microcystic anemia  - Iron, TIBC, Ferritin  - Never had colonoscopy - outpatient referal on discharge  - Denies blood in stool  - T+S, transfuse if <7  - Occult blood test  - Trend hgb    #Transaminitis   - likely from congestive hepatopathy   - continue to trend

## 2024-01-25 NOTE — ED ADULT NURSE NOTE - CADM POA PRESS ULCER
Consult Note  IM    Consult Requested By: Maicol Hogan MD  Reason for Consult: osteoarthritis, vitamin D deficiency, anxiety and ADD    SUBJECTIVE:     History of Present Illness:  Patient is a 46 y.o. female presents with a scheduled left hip repair. Epic and paper chart reviewed.  Seen in room, family at bedside. Denies CP,SOB,F,C,N,V.    Past Medical History:   Diagnosis Date    ADD (attention deficit disorder)     Arthritis     Chronic hip pain     Depression      Past Surgical History:   Procedure Laterality Date    BELT ABDOMINOPLASTY       SECTION      hip reconstruction      hip reconstruction      tummy todd       Family History   Problem Relation Age of Onset    Diabetes Other     Diabetes Maternal Grandmother     Diabetes Maternal Grandfather     Diabetes Paternal Grandmother     Diabetes Paternal Grandfather      Social History     Tobacco Use    Smoking status: Never Smoker    Smokeless tobacco: Never Used   Substance Use Topics    Alcohol use: Yes    Drug use: Not on file       Review of patient's allergies indicates:  No Known Allergies     Review of Systems:  Constitutional: No fever or chills  Respiratory: No cough or shortness of breath  Cardiovascular: No chest pain or palpitations  Gastrointestinal: No nausea or vomiting  Neurological: No confusion or weakness    OBJECTIVE:     Vital Signs (Most Recent)  Temp: 97.8 °F (36.6 °C) (09/10/18 1553)  Pulse: 92 (09/10/18 1725)  Resp: 18 (09/10/18 1725)  BP: (!) 109/57 (09/10/18 1725)  SpO2: 97 % (09/10/18 1725)    Vital Signs Range (Last 24H):  Temp:  [97.8 °F (36.6 °C)-98.8 °F (37.1 °C)]   Pulse:  []   Resp:  [18]   BP: (101-134)/(52-61)   SpO2:  [95 %-100 %]       Intake/Output Summary (Last 24 hours) at 9/10/2018 1734  Last data filed at 9/10/2018 1730  Gross per 24 hour   Intake 2450 ml   Output 925 ml   Net 1525 ml       Physical Exam:  General appearance: Well developed, well nourished  Eyes:  
Conjunctivae/corneas clear. PERRL.  Lungs: Normal respiratory effort,   clear to auscultation bilaterally   Heart: Regular rate and rhythm, S1, S2 normal, no murmur, rub or nadia.  Abdomen: Soft, non-tender non-distended; bowel sounds normal; no masses,  no organomegaly  Extremities: No cyanosis or clubbing. No edema.  +2 pulses BLE, abductor pillow in place  Skin: Skin color, texture, turgor normal. No rashes or lesions, JAZZ dressing left hip CDI  Neurologic: Normal strength and tone. No focal numbness or weakness   Connelly    Laboratory:    Reviewed    Diagnostic Results:      ASSESSMENT/PLAN:     1. Left hip repair (T84.84XA): per therapy and ortho teams  2. Leukocytosis (D72.829): noted on pre-op labs, monitor  3. Elevated hemoglobin (D58.2): noted on pre-op labs, monitor  4. Anxiety (F41.9): continue home med but once daily PRN dosing  5. ADD (F98.8): defer  6. Vit D Deficiency (E55.9): resume ergocalciferol after discharge  7. DVT prophy:  mg BID, ARMANDO and SCD    Plan: Thanks for consult, See above recommendations and orders. Will follow along.    
Normal for race
No

## 2024-01-26 LAB
ALBUMIN SERPL ELPH-MCNC: 2.4 G/DL — LOW (ref 3.3–5)
ALP SERPL-CCNC: 79 U/L — SIGNIFICANT CHANGE UP (ref 40–120)
ALT FLD-CCNC: 350 U/L — HIGH (ref 10–45)
ANION GAP SERPL CALC-SCNC: 8 MMOL/L — SIGNIFICANT CHANGE UP (ref 5–17)
AST SERPL-CCNC: 88 U/L — HIGH (ref 10–40)
BASOPHILS # BLD AUTO: 0.07 K/UL — SIGNIFICANT CHANGE UP (ref 0–0.2)
BASOPHILS NFR BLD AUTO: 0.8 % — SIGNIFICANT CHANGE UP (ref 0–2)
BILIRUB SERPL-MCNC: 0.9 MG/DL — SIGNIFICANT CHANGE UP (ref 0.2–1.2)
BUN SERPL-MCNC: 25 MG/DL — HIGH (ref 7–23)
CALCIUM SERPL-MCNC: 8.4 MG/DL — SIGNIFICANT CHANGE UP (ref 8.4–10.5)
CHLORIDE SERPL-SCNC: 106 MMOL/L — SIGNIFICANT CHANGE UP (ref 96–108)
CO2 SERPL-SCNC: 25 MMOL/L — SIGNIFICANT CHANGE UP (ref 22–31)
CREAT SERPL-MCNC: 1.58 MG/DL — HIGH (ref 0.5–1.3)
EGFR: 49 ML/MIN/1.73M2 — LOW
EOSINOPHIL # BLD AUTO: 0.13 K/UL — SIGNIFICANT CHANGE UP (ref 0–0.5)
EOSINOPHIL NFR BLD AUTO: 1.4 % — SIGNIFICANT CHANGE UP (ref 0–6)
FERRITIN SERPL-MCNC: 99 NG/ML — SIGNIFICANT CHANGE UP (ref 30–400)
GLUCOSE SERPL-MCNC: 104 MG/DL — HIGH (ref 70–99)
HCT VFR BLD CALC: 23.6 % — LOW (ref 39–50)
HCV AB S/CO SERPL IA: 0.24 S/CO — SIGNIFICANT CHANGE UP (ref 0–0.99)
HCV AB SERPL-IMP: SIGNIFICANT CHANGE UP
HGB BLD-MCNC: 7.4 G/DL — LOW (ref 13–17)
IMM GRANULOCYTES NFR BLD AUTO: 0.3 % — SIGNIFICANT CHANGE UP (ref 0–0.9)
INR BLD: 1.37 RATIO — HIGH (ref 0.85–1.18)
IRON SATN MFR SERPL: 15 UG/DL — LOW (ref 45–165)
IRON SATN MFR SERPL: 5 % — LOW (ref 16–55)
LYMPHOCYTES # BLD AUTO: 0.98 K/UL — LOW (ref 1–3.3)
LYMPHOCYTES # BLD AUTO: 10.8 % — LOW (ref 13–44)
MAGNESIUM SERPL-MCNC: 1.9 MG/DL — SIGNIFICANT CHANGE UP (ref 1.6–2.6)
MCHC RBC-ENTMCNC: 19.6 PG — LOW (ref 27–34)
MCHC RBC-ENTMCNC: 31.4 GM/DL — LOW (ref 32–36)
MCV RBC AUTO: 62.6 FL — LOW (ref 80–100)
MONOCYTES # BLD AUTO: 0.88 K/UL — SIGNIFICANT CHANGE UP (ref 0–0.9)
MONOCYTES NFR BLD AUTO: 9.7 % — SIGNIFICANT CHANGE UP (ref 2–14)
MRSA PCR RESULT.: SIGNIFICANT CHANGE UP
NEUTROPHILS # BLD AUTO: 6.95 K/UL — SIGNIFICANT CHANGE UP (ref 1.8–7.4)
NEUTROPHILS NFR BLD AUTO: 77 % — SIGNIFICANT CHANGE UP (ref 43–77)
NRBC # BLD: 0 /100 WBCS — SIGNIFICANT CHANGE UP (ref 0–0)
NT-PROBNP SERPL-SCNC: HIGH PG/ML (ref 0–300)
OSMOLALITY UR: 708 MOSM/KG — SIGNIFICANT CHANGE UP (ref 50–1200)
PHOSPHATE SERPL-MCNC: 4.4 MG/DL — SIGNIFICANT CHANGE UP (ref 2.5–4.5)
PLATELET # BLD AUTO: 234 K/UL — SIGNIFICANT CHANGE UP (ref 150–400)
POTASSIUM SERPL-MCNC: 3.7 MMOL/L — SIGNIFICANT CHANGE UP (ref 3.5–5.3)
POTASSIUM SERPL-SCNC: 3.7 MMOL/L — SIGNIFICANT CHANGE UP (ref 3.5–5.3)
PROT SERPL-MCNC: 6.3 G/DL — SIGNIFICANT CHANGE UP (ref 6–8.3)
PROTHROM AB SERPL-ACNC: 15.5 SEC — HIGH (ref 9.5–13)
RBC # BLD: 3.77 M/UL — LOW (ref 4.2–5.8)
RBC # FLD: 22 % — HIGH (ref 10.3–14.5)
S AUREUS DNA NOSE QL NAA+PROBE: SIGNIFICANT CHANGE UP
SODIUM SERPL-SCNC: 139 MMOL/L — SIGNIFICANT CHANGE UP (ref 135–145)
SODIUM UR-SCNC: 36 MMOL/L — SIGNIFICANT CHANGE UP
TIBC SERPL-MCNC: 335 UG/DL — SIGNIFICANT CHANGE UP (ref 220–430)
UIBC SERPL-MCNC: 320 UG/DL — SIGNIFICANT CHANGE UP (ref 110–370)
UUN UR-MCNC: 882 MG/DL — SIGNIFICANT CHANGE UP
WBC # BLD: 9.04 K/UL — SIGNIFICANT CHANGE UP (ref 3.8–10.5)
WBC # FLD AUTO: 9.04 K/UL — SIGNIFICANT CHANGE UP (ref 3.8–10.5)

## 2024-01-26 PROCEDURE — 93306 TTE W/DOPPLER COMPLETE: CPT | Mod: 26

## 2024-01-26 PROCEDURE — 99284 EMERGENCY DEPT VISIT MOD MDM: CPT

## 2024-01-26 PROCEDURE — 99233 SBSQ HOSP IP/OBS HIGH 50: CPT

## 2024-01-26 PROCEDURE — 76705 ECHO EXAM OF ABDOMEN: CPT | Mod: 26

## 2024-01-26 RX ORDER — FUROSEMIDE 40 MG
40 TABLET ORAL ONCE
Refills: 0 | Status: COMPLETED | OUTPATIENT
Start: 2024-01-26 | End: 2024-01-26

## 2024-01-26 RX ORDER — CEPHALEXIN 500 MG
500 CAPSULE ORAL THREE TIMES A DAY
Refills: 0 | Status: COMPLETED | OUTPATIENT
Start: 2024-01-26 | End: 2024-01-31

## 2024-01-26 RX ORDER — VANCOMYCIN HCL 1 G
1500 VIAL (EA) INTRAVENOUS EVERY 24 HOURS
Refills: 0 | Status: DISCONTINUED | OUTPATIENT
Start: 2024-01-26 | End: 2024-01-26

## 2024-01-26 RX ORDER — VANCOMYCIN HCL 1 G
1000 VIAL (EA) INTRAVENOUS ONCE
Refills: 0 | Status: COMPLETED | OUTPATIENT
Start: 2024-01-26 | End: 2024-01-26

## 2024-01-26 RX ORDER — INFLUENZA VIRUS VACCINE 15; 15; 15; 15 UG/.5ML; UG/.5ML; UG/.5ML; UG/.5ML
0.5 SUSPENSION INTRAMUSCULAR ONCE
Refills: 0 | Status: DISCONTINUED | OUTPATIENT
Start: 2024-01-26 | End: 2024-02-05

## 2024-01-26 RX ORDER — METOPROLOL TARTRATE 50 MG
25 TABLET ORAL DAILY
Refills: 0 | Status: DISCONTINUED | OUTPATIENT
Start: 2024-01-26 | End: 2024-02-05

## 2024-01-26 RX ORDER — VANCOMYCIN HCL 1 G
1000 VIAL (EA) INTRAVENOUS ONCE
Refills: 0 | Status: DISCONTINUED | OUTPATIENT
Start: 2024-01-26 | End: 2024-01-26

## 2024-01-26 RX ADMIN — Medication 40 MILLIGRAM(S): at 13:30

## 2024-01-26 RX ADMIN — Medication 500 MILLIGRAM(S): at 22:41

## 2024-01-26 RX ADMIN — TRAMADOL HYDROCHLORIDE 50 MILLIGRAM(S): 50 TABLET ORAL at 13:05

## 2024-01-26 RX ADMIN — Medication 500 MILLIGRAM(S): at 13:53

## 2024-01-26 RX ADMIN — Medication 325 MILLIGRAM(S): at 11:04

## 2024-01-26 RX ADMIN — Medication 250 MILLIGRAM(S): at 01:39

## 2024-01-26 RX ADMIN — Medication 20 MILLIGRAM(S): at 00:32

## 2024-01-26 RX ADMIN — PIPERACILLIN AND TAZOBACTAM 25 GRAM(S): 4; .5 INJECTION, POWDER, LYOPHILIZED, FOR SOLUTION INTRAVENOUS at 06:37

## 2024-01-26 RX ADMIN — HEPARIN SODIUM 5000 UNIT(S): 5000 INJECTION INTRAVENOUS; SUBCUTANEOUS at 13:30

## 2024-01-26 RX ADMIN — PANTOPRAZOLE SODIUM 40 MILLIGRAM(S): 20 TABLET, DELAYED RELEASE ORAL at 06:37

## 2024-01-26 RX ADMIN — HEPARIN SODIUM 5000 UNIT(S): 5000 INJECTION INTRAVENOUS; SUBCUTANEOUS at 22:41

## 2024-01-26 RX ADMIN — TAMSULOSIN HYDROCHLORIDE 0.4 MILLIGRAM(S): 0.4 CAPSULE ORAL at 22:41

## 2024-01-26 RX ADMIN — HEPARIN SODIUM 5000 UNIT(S): 5000 INJECTION INTRAVENOUS; SUBCUTANEOUS at 06:37

## 2024-01-26 NOTE — HISTORY OF PRESENT ILLNESS
[de-identified] : Patient presents with concerns of cellulitis, lower extremity edema, multiple wounds, and intermittent leg pain that has been persistent for some time now .     Initial symptoms developed in September after gel injection by orthopedist.  Since then he has noted worsening swelling and redness and ulcerations noted in bilateral lower extremities.  Does have a history of significant aortic stenosis-but has yet to follow-up with cardiologist.  Weight has actually been decreasing, and denies shortness of breath or chest pain.  Was seen at [facility] on 1/7 for persistent cellulitis.  Patient declined hospital admission.  Wound culture was positive for Streptococcus dysgalactiae .  Has been taking Bactrim.  Brings in bottle with at least 8 pills still noted.  He reports that medication is for pain.  Unsure if patient is taking correctly.  Continues to note intermittent bilateral calf pain that he notes at times at rest and at movement

## 2024-01-26 NOTE — PATIENT PROFILE ADULT - FALL HARM RISK - HARM RISK INTERVENTIONS

## 2024-01-26 NOTE — PATIENT PROFILE ADULT - FUNCTIONAL ASSESSMENT - DAILY ACTIVITY 5.
MIREILLE Gan  Acute Care Surgery  [phone]  4/4/2023    After 5 PM please page the on call surgeon for the Acute Care Service at [phone] with any emergent concerns     3 = A little assistance

## 2024-01-26 NOTE — PROGRESS NOTE ADULT - ASSESSMENT
63 year old male with Hx of HTN, GERD, dyslipidemia, chronic intermittent leg pain, aortic stenosis, chronic venous stasis dermatitis with bilateral leg swelling, presents for b/l leg pain and swelling,    Leg swelling  - Infectious Diseases eval noted. Changed to keflex. likely stasis dermatitis   - TTE pending   - Cardio to eval  - will give lasix 40mg ivp now. dose diuretic based on renal function     Microcytic Anemia  - monitor  - never had colonoscopy   - transfuse if <7     63 year old male with Hx of HTN, GERD, dyslipidemia, chronic intermittent leg pain, aortic stenosis, chronic venous stasis dermatitis with bilateral leg swelling, presents for b/l leg pain and swelling,    Leg swelling - unlikely infectious process. r/o CHF  - Infectious Diseases eval noted. Changed to keflex. likely stasis dermatitis   - TTE pending   - Cardio to eval  - will give lasix 40mg ivp now. dose diuretic based on renal function     Hypertension  - norvasc dc'd   - will start toprol     Microcytic Anemia  - monitor  - never had colonoscopy   - transfuse if <7    LINDA vs Chronic Kidney Disease   - cautiously diuresing   - avoid nephrotoxins     Transaminitis   - unclear  - will send RUQ US, Hep panel    GERD  - continue PPI     DVT Prophylaxis:  - Heparin    Declines courtesy family update. States his wife will be too anxious if she gets called by the hospital

## 2024-01-26 NOTE — CONSULT NOTE ADULT - SUBJECTIVE AND OBJECTIVE BOX
HPI:   Patient is a 63y male with a past history of AS, HTN,HLD, venbous stasis disease,and GERD who came to ER on 1/25 with leg pain and swelling.  He had been given bactrim a few weeks ago, treatment for "cellulitis", no impact.  He denies any fever or chills.He has known AS, a cardiac evaluation is planned.He had a woiund culture from [facility] a few weeks ago with a group B strep.  Zosyn has been started in ER.    REVIEW OF SYSTEMS:  All other review of systems negative (Comprehensive ROS)    PAST MEDICAL & SURGICAL HISTORY:  Dyslipidemia      Hypertension      Chronic GERD      History of aortic stenosis      No significant past surgical history          Allergies    No Known Allergies    Intolerances        Antimicrobials Day #  :  piperacillin/tazobactam IVPB.. 3.375 Gram(s) IV Intermittent every 8 hours    Other Medications:  aluminum hydroxide/magnesium hydroxide/simethicone Suspension 30 milliLiter(s) Oral every 4 hours PRN  aspirin 325 milliGRAM(s) Oral daily  heparin   Injectable 5000 Unit(s) SubCutaneous every 8 hours  melatonin 3 milliGRAM(s) Oral at bedtime PRN  ondansetron Injectable 4 milliGRAM(s) IV Push every 8 hours PRN  pantoprazole    Tablet 40 milliGRAM(s) Oral before breakfast  tamsulosin 0.4 milliGRAM(s) Oral at bedtime  traMADol 50 milliGRAM(s) Oral two times a day PRN      FAMILY HISTORY:  FH: congestive heart failure (Father, Sibling)    FH: coronary artery disease (Sibling)    FH: brain cancer (Mother)        SOCIAL HISTORY:  Smoking:  ?   ETOH: ?    Drug Use: ?   Single     T(F): 97.6 (01-26-24 @ 09:16), Max: 98.2 (01-25-24 @ 16:19)  HR: 90 (01-26-24 @ 09:16)  BP: 116/75 (01-26-24 @ 09:16)  RR: 15 (01-26-24 @ 09:16)  SpO2: 97% (01-26-24 @ 09:16)  Wt(kg): --    PHYSICAL EXAM:  General: alert, no acute distress  Eyes:  anicteric, no conjunctival injection, no discharge  Oropharynx: no lesions or injection 	  Neck: supple, without adenopathy  Lungs: clear to auscultation  Heart: regular rate and rhythm; loud KAYLA  Abdomen: soft, nondistended, nontender, without mass or organomegaly  Skin: B/L venous stasis changes to legs. Mild b/l erythema  Extremities: no clubbing, cyanosis,+ edema, healing RLE ulcer   Neurologic: alert, oriented, moves all extremities    LAB RESULTS:                        7.4    9.04  )-----------( 234      ( 26 Jan 2024 06:45 )             23.6     01-26    139  |  106  |  25<H>  ----------------------------<  104<H>  3.7   |  25  |  1.58<H>    Ca    8.4      26 Jan 2024 06:45  Phos  4.4     01-26  Mg     1.9     01-26    TPro  6.3  /  Alb  2.4<L>  /  TBili  0.9  /  DBili  x   /  AST  88<H>  /  ALT  350<H>  /  AlkPhos  79  01-26    LIVER FUNCTIONS - ( 26 Jan 2024 06:45 )  Alb: 2.4 g/dL / Pro: 6.3 g/dL / ALK PHOS: 79 U/L / ALT: 350 U/L / AST: 88 U/L / GGT: x                   MICROBIOLOGY:  RECENT CULTURES:        RADIOLOGY REVIEWED:  < from: US Duplex Venous Lower Ext Complete, Bilateral (01.25.24 @ 18:20) >  IMPRESSION:  No evidence of deep venous thrombosis in either lower extremity.    < end of copied text >  < from: Xray Chest 1 View- PORTABLE-Urgent (Xray Chest 1 View- PORTABLE-Urgent .) (01.25.24 @ 17:23) >  IMPRESSION:    No evidence of acute infiltrate or pleural effusion.    --- End of Rep    < end of copied text >

## 2024-01-26 NOTE — PROGRESS NOTE ADULT - SUBJECTIVE AND OBJECTIVE BOX
Patient is a 63y old  Male who presents with a chief complaint of CHF exacerbation (26 Jan 2024 11:15)    Patient seen and examined at bedside. Frustrated by leg swelling    Reviewed results thus far from hospitalization with patient - concerning results of microcytic anemia. States he never had colonoscopy and doesn't want one - "it's my body and I get to control it"    ALLERGIES:  No Known Allergies    MEDICATIONS  (STANDING):  aspirin 325 milliGRAM(s) Oral daily  cephalexin 500 milliGRAM(s) Oral three times a day  heparin   Injectable 5000 Unit(s) SubCutaneous every 8 hours  pantoprazole    Tablet 40 milliGRAM(s) Oral before breakfast  tamsulosin 0.4 milliGRAM(s) Oral at bedtime    MEDICATIONS  (PRN):  aluminum hydroxide/magnesium hydroxide/simethicone Suspension 30 milliLiter(s) Oral every 4 hours PRN Dyspepsia  melatonin 3 milliGRAM(s) Oral at bedtime PRN Insomnia  ondansetron Injectable 4 milliGRAM(s) IV Push every 8 hours PRN Nausea and/or Vomiting  traMADol 50 milliGRAM(s) Oral two times a day PRN Moderate Pain (4 - 6)    Vital Signs Last 24 Hrs  T(F): 97.6 (26 Jan 2024 09:16), Max: 98.2 (25 Jan 2024 16:19)  HR: 90 (26 Jan 2024 09:16) (86 - 99)  BP: 116/75 (26 Jan 2024 09:16) (110/67 - 128/92)  RR: 15 (26 Jan 2024 09:16) (12 - 19)  SpO2: 97% (26 Jan 2024 09:16) (96% - 100%)  I&O's Summary    BMI (kg/m2): 30.8 (01-25-24 @ 19:05)    PHYSICAL EXAM:  General: NAD, awake, alert   ENT: MMM, no tonsilar exudate  Neck: Supple, No JVD  Lungs: good air entry bilaterally, faint crackles bilaterally    Cardio: RRR, S1/S2, +systolic murmur  Abdomen: Soft, Nontender, Nondistended; Bowel sounds present  Extremities: No calf tenderness, 2+ edema over b/l LE, redness over RLE anterior aspect     LABS:                        7.4    9.04  )-----------( 234      ( 26 Jan 2024 06:45 )             23.6       01-26    139  |  106  |  25  ----------------------------<  104  3.7   |  25  |  1.58    Ca    8.4      26 Jan 2024 06:45  Phos  4.4     01-26  Mg     1.9     01-26    TPro  6.3  /  Alb  2.4  /  TBili  0.9  /  DBili  x   /  AST  88  /  ALT  350  /  AlkPhos  79  01-26     PT/INR - ( 26 Jan 2024 06:45 )   PT: 15.5 sec;   INR: 1.37 ratio       PTT - ( 25 Jan 2024 16:50 )  PTT:27.6 sec     CARDIAC MARKERS ( 25 Jan 2024 16:50 )  x     / 24.0 ng/L / x     / x     / x        Urinalysis Basic - ( 26 Jan 2024 06:45 )    Color: x / Appearance: x / SG: x / pH: x  Gluc: 104 mg/dL / Ketone: x  / Bili: x / Urobili: x   Blood: x / Protein: x / Nitrite: x   Leuk Esterase: x / RBC: x / WBC x   Sq Epi: x / Non Sq Epi: x / Bacteria: x      RADIOLOGY & ADDITIONAL TESTS:     Care Discussed with Consultants/Other Providers:

## 2024-01-26 NOTE — PHYSICAL EXAM
[Normal Rate] : normal rate  [Normal] : no rash [de-identified] : Multiple open wounds with periwound erythema and slight warmth to the shin and posterior aspect of the calf of right lower extremity [de-identified] : +4 edema to bilateral lower extremities [de-identified] : Noted significant systolic murmur

## 2024-01-26 NOTE — PATIENT PROFILE ADULT - CONTRAINDICATIONS & PRECAUTIONS (SELECT ALL THAT APPLY)
Quality 431: Preventive Care And Screening: Unhealthy Alcohol Use - Screening: Patient not identified as an unhealthy alcohol user when screened for unhealthy alcohol use using a systematic screening method Quality 226: Preventive Care And Screening: Tobacco Use: Screening And Cessation Intervention: Patient screened for tobacco use and is an ex/non-smoker Detail Level: Simple none...

## 2024-01-26 NOTE — ASSESSMENT
[FreeTextEntry1] : Likely venous ulcerations? Persistent cellulitis?  Will continue to take completed Bactrim.  Will order topical Bactroban.  Wound care performed at visit-triple antibiotic ointment was placed, Telfa and Kerlix dressing placed over. Will likely need wound care-will first send to vascular surgery for concern for venous insufficiency  Comprehensive blood work ordered to reassess cellulitis, and to evaluate electrolytes and Bactrim. Significant edema-will add 20 mg of furosemide, increase dietary potassium, will add BNP to blood work, stressed importance with following up with cardiologist-symptoms could be secondary to severe aortic stenosis   Continue to elevate extremities  Close follow-up next Tuesday

## 2024-01-26 NOTE — CONSULT NOTE ADULT - SUBJECTIVE AND OBJECTIVE BOX
Chief Complaint:     63 year old male with Hx of HTN, GERD, dyslipidemia, chronic intermittent leg pain, aortic stenosis, chronic venous stasis dermatitis with bilateral leg swelling, presents to  ED sent by PCP for failed outpatient therapy with bactrim for cellulitis.  As per outpatient chart review, Initial symptoms developed in September after gel injection by orthopedist. Since then he has noted worsening swelling and redness and ulcerations noted in bilateral lower extremities. Does have a history of significant aortic stenosis-but has yet to follow-up with cardiologist. Was seen at [facility] on 1/7 for persistent cellulitis. Patient declined hospital admission at that time. Wound culture was positive for Streptococcus dysgalactiae. Was started on Bactrim po.   Continues to note intermittent bilateral calf pain that he notes at times at rest and at movement . Seen by PCP yesterday and was advised to go to ED for admission, r/o DVT and IV abx. Patient states that the swelling got worse for the past 1 week. Reports he hasn't noticed increase in SOB and denies dyspnea, but states that he sleeps with 3 pillows at night for neck pain, and he gets shortness of breath on exertion. Shortness of breath noticed during the interview. Denies fevers, chills, dysuria, cough, chest, abdominal, back, or neck pain. Says that he has decreased sensation in his legs which makes him unsteady on his feet. In the ED temp 98.2, HR 86, 110/67, RR, SPO2 96% Labs significant for BNP 35997, WBC 15, troponin 24, INR 1.37, Cr 1.7   He received morphine 2mg and CTX x1  cardio eval requested    HPI:    PMH:   Dyslipidemia    Hypertension    Chronic GERD    History of aortic stenosis      PSH:   No Past Medical History    Brain cancer    No significant past surgical history      Family History:  FAMILY HISTORY:  FH: congestive heart failure (Father, Sibling)    FH: coronary artery disease (Sibling)    FH: brain cancer (Mother)        Social History:  Smoking:  Alcohol:  Drugs:    Allergies:  No Known Allergies      Medications:  aluminum hydroxide/magnesium hydroxide/simethicone Suspension 30 milliLiter(s) Oral every 4 hours PRN  aspirin 325 milliGRAM(s) Oral daily  cephalexin 500 milliGRAM(s) Oral three times a day  heparin   Injectable 5000 Unit(s) SubCutaneous every 8 hours  melatonin 3 milliGRAM(s) Oral at bedtime PRN  metoprolol succinate ER 25 milliGRAM(s) Oral daily  ondansetron Injectable 4 milliGRAM(s) IV Push every 8 hours PRN  pantoprazole    Tablet 40 milliGRAM(s) Oral before breakfast  tamsulosin 0.4 milliGRAM(s) Oral at bedtime  traMADol 50 milliGRAM(s) Oral two times a day PRN      REVIEW OF SYSTEMS:  CONSTITUTIONAL: No fever, weight loss, or fatigue  EYES: No eye pain, visual disturbances, or discharge  ENMT:  No difficulty hearing, tinnitus, vertigo; No sinus or throat pain  NECK: No pain or stiffness  BREASTS: No pain, masses, or nipple discharge  RESPIRATORY: No cough, wheezing, chills or hemoptysis; No shortness of breath  CARDIOVASCULAR: No chest pain, palpitations, dizziness, or leg swelling  GASTROINTESTINAL: No abdominal or epigastric pain. No nausea, vomiting, or hematemesis; No diarrhea or constipation. No melena or hematochezia.  GENITOURINARY: No dysuria, frequency, hematuria, or incontinence  NEUROLOGICAL: No headaches, memory loss, loss of strength, numbness, or tremors  SKIN: No itching, burning, rashes, or lesions   LYMPH NODES: No enlarged glands  ENDOCRINE: No heat or cold intolerance; No hair loss  MUSCULOSKELETAL: No joint pain or swelling; No muscle, back, or extremity pain  PSYCHIATRIC: No depression, anxiety, mood swings, or difficulty sleeping  HEME/LYMPH: No easy bruising, or bleeding gums  ALLERY AND IMMUNOLOGIC: No hives or eczema    Physical Exam:  T(C): 36.9 (01-26-24 @ 15:24), Max: 36.9 (01-26-24 @ 15:24)  HR: 83 (01-26-24 @ 15:24) (83 - 99)  BP: 111/82 (01-26-24 @ 15:24) (111/82 - 128/92)  RR: 12 (01-26-24 @ 15:24) (12 - 19)  SpO2: 98% (01-26-24 @ 15:24) (97% - 100%)  Wt(kg): --    GENERAL: NAD, pale appearing poor historian brother at [facility] (states he has a heart valve problem too)  HEAD:  Atraumatic, Normocephalic  EYES: EOMI, conjunctiva and sclera clear  ENT: Moist mucous membranes,  NECK: Supple, No JVD, no bruits  CHEST/LUNG: Clear to percussion bilaterally; No rales, rhonchi, wheezing, or rubs  HEART: Regular rate and rhythm; No murmurs, rubs, or gallops PMI non displaced.  ABDOMEN: Soft, Nontender, Nondistended; Bowel sounds present  EXTREMITIES:  2+ Peripheral Pulses, No clubbing, cyanosis, or edema  SKIN: No rashes or lesions  NERVOUS SYSTEM:  Cranial Nerves II-XII intact     Cardiovascular Diagnostic Testing:  ECG:    ECHO:    < from: TTE Echo Complete w/o Contrast w/ Doppler (01.26.24 @ 08:26) >  Summary:   1. Left ventricular ejection fraction, by visual estimation, is 35 to   40%.   2. Moderately decreased global left ventricular systolic function.   3. Basal inferior segment, mid inferolateral segment, mid anterolateral   segment, and mid inferior segment are abnormal as described above.   4. Severely enlarged left atrium.   5. Increased LV wall thickness.   6. Mildly increased left ventricular internal cavity size.   7. Spectral Doppler shows restrictivepattern of left ventricular   myocardial filling (Grade III diastolic dysfunction).   8. There is mild concentric left ventricular hypertrophy.   9. Moderately enlarged right ventricle.  10. Right atrial enlargement.  11. Trivial pericardial effusion.  12. Moderate mitral valve regurgitation.  13. Thickening of the anterior and posterior mitral valve leaflets.  14. Mild-moderate tricuspid regurgitation.  15. Mild aortic regurgitation.  16. Severe aortic valve stenosis.  17. Dilatation of the ascending aorta.  18. Estimated pulmonary artery systolic pressure is 58.6 mmHg assuming a   right atrial pressure of 15 mmHg, which is consistent with moderate   pulmonary hypertension.  19. LA volume Index is 56.0 ml/m² ml/m2.  20. The Dimesionless Index value is .17.    [id number] Micah Nguyễn , Electronically signed on 1/26/2024 at   2:58:40 PM            *** Final ***    < end of copied text >      Labs:                        7.4    9.04  )-----------( 234      ( 26 Jan 2024 06:45 )             23.6     01-26    139  |  106  |  25<H>  ----------------------------<  104<H>  3.7   |  25  |  1.58<H>    Ca    8.4      26 Jan 2024 06:45  Phos  4.4     01-26  Mg     1.9     01-26    TPro  6.3  /  Alb  2.4<L>  /  TBili  0.9  /  DBili  x   /  AST  88<H>  /  ALT  350<H>  /  AlkPhos  79  01-26    PT/INR - ( 26 Jan 2024 06:45 )   PT: 15.5 sec;   INR: 1.37 ratio         PTT - ( 25 Jan 2024 16:50 )  PTT:27.6 sec      Imaging:    impessio   hb 7.4 mcv 62 bun 25 cr 1.58 ot 88 pt 350  alb 2.4 dvt - ekg rsr rbbb.   aortic stenosis CAD coronary artery disease anemia renal failure liver enzymes elevation hfref  65 yo gentleman last seen 5 years ago now wit a cellulitis and noted to have sever AS ad left venticular dysfunction. this combination posses high cardiovascular r risk. in addition renal failure posses challenges in terms of angiography we also note  a microcytic anemia and liver abnormalities with serratia noted in drainage from wound . it was recommended that his patient have a full cardiac workup 5 years ago, however he has not returned for completion. he will need a full evaluation of his medical status but should be consider for invasive and non invasive cardia evaluation pending above. ACE ARB ARNI spironolactone Farxiga contradicted with renal failure consider carvedilol or metoprolol succinate.  Chief Complaint:     63 year old male with Hx of HTN, GERD, dyslipidemia, chronic intermittent leg pain, aortic stenosis, chronic venous stasis dermatitis with bilateral leg swelling, presents to  ED sent by PCP for failed outpatient therapy with bactrim for cellulitis.  As per outpatient chart review, Initial symptoms developed in September after gel injection by orthopedist. Since then he has noted worsening swelling and redness and ulcerations noted in bilateral lower extremities. Does have a history of significant aortic stenosis-but has yet to follow-up with cardiologist. Was seen at [facility] on 1/7 for persistent cellulitis. Patient declined hospital admission at that time. Wound culture was positive for Streptococcus dysgalactiae. Was started on Bactrim po.   Continues to note intermittent bilateral calf pain that he notes at times at rest and at movement . Seen by PCP yesterday and was advised to go to ED for admission, r/o DVT and IV abx. Patient states that the swelling got worse for the past 1 week. Reports he hasn't noticed increase in SOB and denies dyspnea, but states that he sleeps with 3 pillows at night for neck pain, and he gets shortness of breath on exertion. Shortness of breath noticed during the interview. Denies fevers, chills, dysuria, cough, chest, abdominal, back, or neck pain. Says that he has decreased sensation in his legs which makes him unsteady on his feet. In the ED temp 98.2, HR 86, 110/67, RR, SPO2 96% Labs significant for BNP 24427, WBC 15, troponin 24, INR 1.37, Cr 1.7   He received morphine 2mg and CTX x1  cardio eval requested    HPI:    PMH:   Dyslipidemia    Hypertension    Chronic GERD    History of aortic stenosis      PSH:   No Past Medical History    Brain cancer    No significant past surgical history      Family History:  FAMILY HISTORY:  FH: congestive heart failure (Father, Sibling)    FH: coronary artery disease (Sibling)    FH: brain cancer (Mother)        Social History:  Smoking:  Alcohol:  Drugs:    Allergies:  No Known Allergies      Medications:  aluminum hydroxide/magnesium hydroxide/simethicone Suspension 30 milliLiter(s) Oral every 4 hours PRN  aspirin 325 milliGRAM(s) Oral daily  cephalexin 500 milliGRAM(s) Oral three times a day  heparin   Injectable 5000 Unit(s) SubCutaneous every 8 hours  melatonin 3 milliGRAM(s) Oral at bedtime PRN  metoprolol succinate ER 25 milliGRAM(s) Oral daily  ondansetron Injectable 4 milliGRAM(s) IV Push every 8 hours PRN  pantoprazole    Tablet 40 milliGRAM(s) Oral before breakfast  tamsulosin 0.4 milliGRAM(s) Oral at bedtime  traMADol 50 milliGRAM(s) Oral two times a day PRN      REVIEW OF SYSTEMS:  CONSTITUTIONAL: No fever, weight loss, or fatigue  EYES: No eye pain, visual disturbances, or discharge  ENMT:  No difficulty hearing, tinnitus, vertigo; No sinus or throat pain  NECK: No pain or stiffness  BREASTS: No pain, masses, or nipple discharge  RESPIRATORY: No cough, wheezing, chills or hemoptysis; No shortness of breath  CARDIOVASCULAR: No chest pain, palpitations, dizziness, or leg swelling  GASTROINTESTINAL: No abdominal or epigastric pain. No nausea, vomiting, or hematemesis; No diarrhea or constipation. No melena or hematochezia.  GENITOURINARY: No dysuria, frequency, hematuria, or incontinence  NEUROLOGICAL: No headaches, memory loss, loss of strength, numbness, or tremors  SKIN: No itching, burning, rashes, or lesions   LYMPH NODES: No enlarged glands  ENDOCRINE: No heat or cold intolerance; No hair loss  MUSCULOSKELETAL: No joint pain or swelling; No muscle, back, or extremity pain  PSYCHIATRIC: No depression, anxiety, mood swings, or difficulty sleeping  HEME/LYMPH: No easy bruising, or bleeding gums  ALLERY AND IMMUNOLOGIC: No hives or eczema    Physical Exam:  T(C): 36.9 (01-26-24 @ 15:24), Max: 36.9 (01-26-24 @ 15:24)  HR: 83 (01-26-24 @ 15:24) (83 - 99)  BP: 111/82 (01-26-24 @ 15:24) (111/82 - 128/92)  RR: 12 (01-26-24 @ 15:24) (12 - 19)  SpO2: 98% (01-26-24 @ 15:24) (97% - 100%)  Wt(kg): --    GENERAL: NAD, pale appearing poor historian brother at [facility] (states he has a heart valve problem too)  HEAD:  Atraumatic, Normocephalic  EYES: EOMI, conjunctiva and sclera clear  ENT: Moist mucous membranes,  NECK: Supple, No JVD, no bruits  CHEST/LUNG: Clear to percussion bilaterally; No rales, rhonchi, wheezing, or rubs  HEART: Regular rate and rhythm; No murmurs, rubs, or gallops PMI non displaced.  ABDOMEN: Soft, Nontender, Nondistended; Bowel sounds present  EXTREMITIES:  2+ Peripheral Pulses, No clubbing, cyanosis, or edema  SKIN: No rashes or lesions  NERVOUS SYSTEM:  Cranial Nerves II-XII intact     Cardiovascular Diagnostic Testing:  ECG:    ECHO:    < from: TTE Echo Complete w/o Contrast w/ Doppler (01.26.24 @ 08:26) >  Summary:   1. Left ventricular ejection fraction, by visual estimation, is 35 to   40%.   2. Moderately decreased global left ventricular systolic function.   3. Basal inferior segment, mid inferolateral segment, mid anterolateral   segment, and mid inferior segment are abnormal as described above.   4. Severely enlarged left atrium.   5. Increased LV wall thickness.   6. Mildly increased left ventricular internal cavity size.   7. Spectral Doppler shows restrictivepattern of left ventricular   myocardial filling (Grade III diastolic dysfunction).   8. There is mild concentric left ventricular hypertrophy.   9. Moderately enlarged right ventricle.  10. Right atrial enlargement.  11. Trivial pericardial effusion.  12. Moderate mitral valve regurgitation.  13. Thickening of the anterior and posterior mitral valve leaflets.  14. Mild-moderate tricuspid regurgitation.  15. Mild aortic regurgitation.  16. Severe aortic valve stenosis.  17. Dilatation of the ascending aorta.  18. Estimated pulmonary artery systolic pressure is 58.6 mmHg assuming a   right atrial pressure of 15 mmHg, which is consistent with moderate   pulmonary hypertension.  19. LA volume Index is 56.0 ml/m² ml/m2.  20. The Dimesionless Index value is .17.    [id number] Micah Nguyễn , Electronically signed on 1/26/2024 at   2:58:40 PM            *** Final ***    < end of copied text >      Labs:                        7.4    9.04  )-----------( 234      ( 26 Jan 2024 06:45 )             23.6     01-26    139  |  106  |  25<H>  ----------------------------<  104<H>  3.7   |  25  |  1.58<H>    Ca    8.4      26 Jan 2024 06:45  Phos  4.4     01-26  Mg     1.9     01-26    TPro  6.3  /  Alb  2.4<L>  /  TBili  0.9  /  DBili  x   /  AST  88<H>  /  ALT  350<H>  /  AlkPhos  79  01-26    PT/INR - ( 26 Jan 2024 06:45 )   PT: 15.5 sec;   INR: 1.37 ratio         PTT - ( 25 Jan 2024 16:50 )  PTT:27.6 sec      Imaging:    impression   hb 7.4 mcv 62 bun 25 cr 1.58 ot 88 pt 350  alb 2.4 dvt - EKG rsr RBBB   aortic stenosis CAD coronary artery disease anemia renal failure liver enzymes elevation hfref  63 yo gentleman last seen 5 years ago now wit a cellulitis and noted to have sever AS ad left ventricular dysfunction. this combination posses high cardiovascular r risk. in addition renal failure posses challenges in terms of angiography we also note  a microcytic anemia and liver abnormalities with serratia noted in drainage from wound . it was recommended that his patient have a full cardiac workup 5 years ago, however he has not returned for completion. he will need a full evaluation of his medical status but should be consider for invasive and non invasive cardia evaluation pending above. ACE ARB ARNI spironolactone Farxiga contradicted with renal failure consider carvedilol or metoprolol succinate.     d/w Chanelle foster by phone and dr kendrick multiple medical and cardiac issues discussed

## 2024-01-26 NOTE — PHYSICAL THERAPY INITIAL EVALUATION ADULT - PERTINENT HX OF CURRENT PROBLEM, REHAB EVAL
63 year old male with Hx of HTN, GERD, dyslipidemia, chronic intermittent leg pain, aortic stenosis, chronic venous stasis dermatitis with bilateral leg swelling, presents to  ED sent by PCP for failed outpatient therapy with bactrim for cellulitis.  As per outpatient chart review, Initial symptoms developed in September after gel injection by orthopedist. Since then he has noted worsening swelling and redness and ulcerations noted in bilateral lower extremities. Does have a history of significant aortic stenosis-but has yet to follow-up with cardiologist.   Was seen at [facility] on 1/7 for persistent cellulitis. Patient declined hospital admission at that time. Wound culture was positive for Streptococcus dysgalactiae. Was started on Bactrim po.   Continues to note intermittent bilateral calf pain that he notes at times at rest and at movement . Seen by PCP yesterday and was advised to go to ED for admission, r/o DVT and IV abx. Patient states that the swelling got worse for the past 1 week. Reports he hasn't noticed increase in SOB and denies dyspnea, but states that he sleeps with 3 pillows at night for neck pain, and he gets shortness of breath on exertion. Shortness of breath noticed during the interview. Denies fevers, chills, dysuria, cough, chest, abdominal, back, or neck pain. Says that he has decreased sensation in his legs which makes him unsteady on his feet.

## 2024-01-27 LAB
A1C WITH ESTIMATED AVERAGE GLUCOSE RESULT: 6.2 % — HIGH (ref 4–5.6)
ALBUMIN SERPL ELPH-MCNC: 2.4 G/DL — LOW (ref 3.3–5)
ALP SERPL-CCNC: 84 U/L — SIGNIFICANT CHANGE UP (ref 40–120)
ALT FLD-CCNC: 278 U/L — HIGH (ref 10–45)
ANION GAP SERPL CALC-SCNC: 9 MMOL/L — SIGNIFICANT CHANGE UP (ref 5–17)
AST SERPL-CCNC: 54 U/L — HIGH (ref 10–40)
BILIRUB SERPL-MCNC: 1 MG/DL — SIGNIFICANT CHANGE UP (ref 0.2–1.2)
BUN SERPL-MCNC: 21 MG/DL — SIGNIFICANT CHANGE UP (ref 7–23)
CALCIUM SERPL-MCNC: 8.6 MG/DL — SIGNIFICANT CHANGE UP (ref 8.4–10.5)
CHLORIDE SERPL-SCNC: 106 MMOL/L — SIGNIFICANT CHANGE UP (ref 96–108)
CHOLEST SERPL-MCNC: 106 MG/DL — SIGNIFICANT CHANGE UP
CO2 SERPL-SCNC: 24 MMOL/L — SIGNIFICANT CHANGE UP (ref 22–31)
CREAT SERPL-MCNC: 1.56 MG/DL — HIGH (ref 0.5–1.3)
EGFR: 50 ML/MIN/1.73M2 — LOW
ESTIMATED AVERAGE GLUCOSE: 131 MG/DL — HIGH (ref 68–114)
GLUCOSE SERPL-MCNC: 89 MG/DL — SIGNIFICANT CHANGE UP (ref 70–99)
HCT VFR BLD CALC: 23.8 % — LOW (ref 39–50)
HDLC SERPL-MCNC: 38 MG/DL — LOW
HGB BLD-MCNC: 7.3 G/DL — LOW (ref 13–17)
LIPID PNL WITH DIRECT LDL SERPL: 54 MG/DL — SIGNIFICANT CHANGE UP
MCHC RBC-ENTMCNC: 17.5 PG — LOW (ref 27–34)
MCHC RBC-ENTMCNC: 30.7 GM/DL — LOW (ref 32–36)
MCV RBC AUTO: 57.2 FL — LOW (ref 80–100)
NON HDL CHOLESTEROL: 67 MG/DL — SIGNIFICANT CHANGE UP
NRBC # BLD: 0 /100 WBCS — SIGNIFICANT CHANGE UP (ref 0–0)
PLATELET # BLD AUTO: 241 K/UL — SIGNIFICANT CHANGE UP (ref 150–400)
POTASSIUM SERPL-MCNC: 3.9 MMOL/L — SIGNIFICANT CHANGE UP (ref 3.5–5.3)
POTASSIUM SERPL-SCNC: 3.9 MMOL/L — SIGNIFICANT CHANGE UP (ref 3.5–5.3)
PROT SERPL-MCNC: 6.3 G/DL — SIGNIFICANT CHANGE UP (ref 6–8.3)
RBC # BLD: 4.16 M/UL — LOW (ref 4.2–5.8)
RBC # FLD: 17.9 % — HIGH (ref 10.3–14.5)
SODIUM SERPL-SCNC: 139 MMOL/L — SIGNIFICANT CHANGE UP (ref 135–145)
TRIGL SERPL-MCNC: 58 MG/DL — SIGNIFICANT CHANGE UP
TSH SERPL-MCNC: 2.17 UIU/ML — SIGNIFICANT CHANGE UP (ref 0.36–3.74)
WBC # BLD: 8.06 K/UL — SIGNIFICANT CHANGE UP (ref 3.8–10.5)
WBC # FLD AUTO: 8.06 K/UL — SIGNIFICANT CHANGE UP (ref 3.8–10.5)

## 2024-01-27 PROCEDURE — 99233 SBSQ HOSP IP/OBS HIGH 50: CPT

## 2024-01-27 PROCEDURE — 99232 SBSQ HOSP IP/OBS MODERATE 35: CPT

## 2024-01-27 RX ORDER — FUROSEMIDE 40 MG
40 TABLET ORAL ONCE
Refills: 0 | Status: COMPLETED | OUTPATIENT
Start: 2024-01-27 | End: 2024-01-27

## 2024-01-27 RX ADMIN — Medication 500 MILLIGRAM(S): at 14:21

## 2024-01-27 RX ADMIN — PANTOPRAZOLE SODIUM 40 MILLIGRAM(S): 20 TABLET, DELAYED RELEASE ORAL at 06:03

## 2024-01-27 RX ADMIN — Medication 500 MILLIGRAM(S): at 06:03

## 2024-01-27 RX ADMIN — Medication 40 MILLIGRAM(S): at 14:22

## 2024-01-27 RX ADMIN — Medication 500 MILLIGRAM(S): at 21:14

## 2024-01-27 RX ADMIN — HEPARIN SODIUM 5000 UNIT(S): 5000 INJECTION INTRAVENOUS; SUBCUTANEOUS at 21:14

## 2024-01-27 RX ADMIN — HEPARIN SODIUM 5000 UNIT(S): 5000 INJECTION INTRAVENOUS; SUBCUTANEOUS at 14:22

## 2024-01-27 RX ADMIN — TAMSULOSIN HYDROCHLORIDE 0.4 MILLIGRAM(S): 0.4 CAPSULE ORAL at 21:14

## 2024-01-27 RX ADMIN — HEPARIN SODIUM 5000 UNIT(S): 5000 INJECTION INTRAVENOUS; SUBCUTANEOUS at 06:04

## 2024-01-27 RX ADMIN — Medication 325 MILLIGRAM(S): at 14:21

## 2024-01-27 NOTE — PROGRESS NOTE ADULT - SUBJECTIVE AND OBJECTIVE BOX
Patient is a 63y old  Male who presents with a chief complaint of CHF exacerbation (26 Jan 2024 16:46)    Patient seen and examined at bedside. No acute overnight events. Denies pain/discomfort. Slept well.     ALLERGIES:  No Known Allergies    MEDICATIONS  (STANDING):  aspirin 325 milliGRAM(s) Oral daily  cephalexin 500 milliGRAM(s) Oral three times a day  heparin   Injectable 5000 Unit(s) SubCutaneous every 8 hours  influenza   Vaccine 0.5 milliLiter(s) IntraMuscular once  metoprolol succinate ER 25 milliGRAM(s) Oral daily  pantoprazole    Tablet 40 milliGRAM(s) Oral before breakfast  tamsulosin 0.4 milliGRAM(s) Oral at bedtime    MEDICATIONS  (PRN):  aluminum hydroxide/magnesium hydroxide/simethicone Suspension 30 milliLiter(s) Oral every 4 hours PRN Dyspepsia  melatonin 3 milliGRAM(s) Oral at bedtime PRN Insomnia  ondansetron Injectable 4 milliGRAM(s) IV Push every 8 hours PRN Nausea and/or Vomiting  traMADol 50 milliGRAM(s) Oral two times a day PRN Moderate Pain (4 - 6)    Vital Signs Last 24 Hrs  T(F): 97.7 (27 Jan 2024 05:00), Max: 98.4 (26 Jan 2024 15:24)  HR: 86 (27 Jan 2024 05:00) (82 - 87)  BP: 114/78 (27 Jan 2024 05:00) (111/82 - 118/67)  RR: 15 (27 Jan 2024 05:00) (12 - 17)  SpO2: 97% (27 Jan 2024 05:00) (97% - 100%)  I&O's Summary    26 Jan 2024 07:01  -  27 Jan 2024 07:00  --------------------------------------------------------  IN: 0 mL / OUT: 500 mL / NET: -500 mL    BMI (kg/m2): 30.8 (01-26-24 @ 23:43)    PHYSICAL EXAM:  General: NAD, A/O x 3  ENT: MMM, no tonsilar exudate  Neck: Supple, No thyromegaly   Lungs: faint crackles b/l; no wheezing   Cardio: RRR, S1/S2, No murmurs  Abdomen: Soft, Nontender, Nondistended; Bowel sounds present  Extremities: No calf tenderness, 2+ piting b/l LE;     LABS:                        7.4    9.04  )-----------( 234      ( 26 Jan 2024 06:45 )             23.6       01-26    139  |  106  |  25  ----------------------------<  104  3.7   |  25  |  1.58    Ca    8.4      26 Jan 2024 06:45  Phos  4.4     01-26  Mg     1.9     01-26    TPro  6.3  /  Alb  2.4  /  TBili  0.9  /  DBili  x   /  AST  88  /  ALT  350  /  AlkPhos  79  01-26     PT/INR - ( 26 Jan 2024 06:45 )   PT: 15.5 sec;   INR: 1.37 ratio      PTT - ( 25 Jan 2024 16:50 )  PTT:27.6 sec     CARDIAC MARKERS ( 25 Jan 2024 16:50 )  x     / 24.0 ng/L / x     / x     / x        Urinalysis Basic - ( 26 Jan 2024 06:45 )    Color: x / Appearance: x / SG: x / pH: x  Gluc: 104 mg/dL / Ketone: x  / Bili: x / Urobili: x   Blood: x / Protein: x / Nitrite: x   Leuk Esterase: x / RBC: x / WBC x   Sq Epi: x / Non Sq Epi: x / Bacteria: x    Culture - Blood (collected 25 Jan 2024 16:55)  Source: .Blood Blood-Peripheral  Preliminary Report (27 Jan 2024 01:04):    No growth at 24 hours    Culture - Blood (collected 25 Jan 2024 16:50)    Source: .Blood Blood-Peripheral  Preliminary Report (27 Jan 2024 01:04):    No growth at 24 hours    RADIOLOGY & ADDITIONAL TESTS:     Care Discussed with Consultants/Other Providers: d/w Dr. Rajan

## 2024-01-27 NOTE — DIETITIAN INITIAL EVALUATION ADULT - PERTINENT LABORATORY DATA
01-27    139  |  106  |  21  ----------------------------<  89  3.9   |  24  |  1.56<H>    Ca    8.6      27 Jan 2024 09:00  Phos  4.4     01-26  Mg     1.9     01-26    TPro  6.3  /  Alb  2.4<L>  /  TBili  1.0  /  DBili  x   /  AST  54<H>  /  ALT  278<H>  /  AlkPhos  84  01-27

## 2024-01-27 NOTE — PROGRESS NOTE ADULT - ASSESSMENT
63 year old male with Hx of HTN, GERD, dyslipidemia, chronic intermittent leg pain, aortic stenosis, chronic venous stasis dermatitis with bilateral leg swelling, presents for b/l leg pain and swelling,    HFrEF (EF 35-40% 1/2024)  - Infectious Diseases eval noted. Changed to keflex. likely stasis dermatitis   - TTE reviewed   - Toprol 25mg daily. Not on ACE/ARB/ARNI d/t renal insufficiency   - Cardio following   - hypervolemic on exam. diuresis based on renal function     Hypertension  - toprol     Microcytic Anemia  - monitor  - never had colonoscopy, refusing at this time  - transfuse if <7    LINDA vs Chronic Kidney Disease   - cautiously diuresing   - avoid nephrotoxins     Transaminitis   - unclear  - follow up hep panel  - RUQ US unremarkable     GERD  - continue PPI     DVT Prophylaxis:  - Heparin    Unsuccessful attempt to reach wife Chanelle [phone]. will try again in afternoon  63 year old male with Hx of HTN, GERD, dyslipidemia, chronic intermittent leg pain, aortic stenosis, chronic venous stasis dermatitis with bilateral leg swelling, presents for b/l leg pain and swelling,    HFrEF (EF 35-40% 1/2024)  - Infectious Diseases eval noted. Changed to keflex. likely stasis dermatitis   - TTE reviewed   - Toprol 25mg daily. Not on ACE/ARB/ARNI d/t renal insufficiency   - Cardio following   - hypervolemic on exam. diuresis based on renal function     Hypertension  - toprol     Microcytic Anemia  - monitor  - will send FOBT   - transfuse if <7    LINDA vs Chronic Kidney Disease   - cautiously diuresing   - avoid nephrotoxins     Transaminitis   - unclear  - follow up hep panel  - RUQ US unremarkable     GERD  - continue PPI     DVT Prophylaxis:  - Heparin    Updated wife Chanelle [phone]

## 2024-01-27 NOTE — DIETITIAN INITIAL EVALUATION ADULT - OTHER INFO
63 year old male with Hx of HTN, GERD, dyslipidemia, chronic intermittent leg pain, aortic stenosis, chronic venous stasis dermatitis with bilateral leg swelling, presents to  ED sent by PCP for failed outpatient therapy with bactrim for cellulitis.  As per outpatient chart review, Initial symptoms developed in September after gel injection by orthopedist. Since then he has noted worsening swelling and redness and ulcerations noted in bilateral lower extremities. Does have a history of significant aortic stenosis-but has yet to follow-up with cardiologist.   Was seen at [facility] on 1/7 for persistent cellulitis. Patient declined hospital admission at that time. Wound culture was positive for Streptococcus dysgalactiae. Was started on Bactrim po.   Continues to note intermittent bilateral calf pain that he notes at times at rest and at movement . Seen by PCP yesterday and was advised to go to ED for admission, r/o DVT and IV abx. Patient states that the swelling got worse for the past 1 week. Reports he hasn't noticed increase in SOB and denies dyspnea, but states that he sleeps with 3 pillows at night for neck pain, and he gets shortness of breath on exertion. Shortness of breath noticed during the interview. Denies fevers, chills, dysuria, cough, chest, abdominal, back, or neck pain. Says that he has decreased sensation in his legs which makes him unsteady on his feet.

## 2024-01-27 NOTE — DIETITIAN INITIAL EVALUATION ADULT - ADD RECOMMEND
1. DASH/TLC Diet; Fluid Restriction 1500 ml   2. Ongoing Diet Education  3. Provide food preferences as able

## 2024-01-27 NOTE — PROGRESS NOTE ADULT - SUBJECTIVE AND OBJECTIVE BOX
Follow up for cardiac issues  SUBJ: PATIENT OFFERS NO COMPLAINTS. KNOWN TO ME FROM MANY YEARS AGO. HAD NL CORS BY CATH IN 2013. HAD MILD AS THAT PROGRESSED TO MODERATE AND NOW SEVERE. WAS ADVISED TO GET CTA 5 YRS AGO DUE TO SOB AND DECREASED FUNCTIONAL CAPACITY ON TREADMILL BUT REFUSED DUE TO FEAR OF MACHINE. HAS FEAR OF MANY MEDICAL TESTS.  PMH  Dyslipidemia    Hypertension    Chronic GERD    History of aortic stenosis        MEDICATIONS  (STANDING):  aspirin 325 milliGRAM(s) Oral daily  cephalexin 500 milliGRAM(s) Oral three times a day  furosemide   Injectable 40 milliGRAM(s) IV Push once  heparin   Injectable 5000 Unit(s) SubCutaneous every 8 hours  influenza   Vaccine 0.5 milliLiter(s) IntraMuscular once  metoprolol succinate ER 25 milliGRAM(s) Oral daily  pantoprazole    Tablet 40 milliGRAM(s) Oral before breakfast  tamsulosin 0.4 milliGRAM(s) Oral at bedtime    MEDICATIONS  (PRN):  aluminum hydroxide/magnesium hydroxide/simethicone Suspension 30 milliLiter(s) Oral every 4 hours PRN Dyspepsia  melatonin 3 milliGRAM(s) Oral at bedtime PRN Insomnia  ondansetron Injectable 4 milliGRAM(s) IV Push every 8 hours PRN Nausea and/or Vomiting  traMADol 50 milliGRAM(s) Oral two times a day PRN Moderate Pain (4 - 6)        PHYSICAL EXAM:  Vital Signs Last 24 Hrs  T(C): 36.5 (27 Jan 2024 10:14), Max: 36.9 (26 Jan 2024 15:24)  T(F): 97.7 (27 Jan 2024 10:14), Max: 98.4 (26 Jan 2024 15:24)  HR: 77 (27 Jan 2024 10:14) (77 - 87)  BP: 95/66 (27 Jan 2024 10:14) (95/66 - 118/67)  BP(mean): 75 (27 Jan 2024 10:14) (75 - 90)  RR: 15 (27 Jan 2024 10:14) (12 - 17)  SpO2: 94% (27 Jan 2024 10:14) (94% - 100%)    Parameters below as of 27 Jan 2024 10:14  Patient On (Oxygen Delivery Method): room air        GENERAL: NAD, well-groomed, well-developed  HEAD:  Atraumatic, Normocephalic  EYES: EOMI, PERRLA, conjunctiva and sclera clear  ENT: Moist mucous membranes,  NECK: Supple, No JVD, no bruits  CHEST/LUNG: Clear to percussion bilaterally; No rales, rhonchi, wheezing, or rubs  HEART: s1 s2 diminished 2/6 teresa.  ABDOMEN: Soft, Nontender, Nondistended; Bowel sounds present  EXTREMITIES:  2+ Peripheral Pulses, No clubbing, cyanosis, or edema  SKIN: No rashes or lesions        TELEMETRY:rsr with apc's        LABS:                        7.3    8.06  )-----------( 241      ( 27 Jan 2024 09:00 )             23.8     01-27    139  |  106  |  21  ----------------------------<  89  3.9   |  24  |  1.56<H>    Ca    8.6      27 Jan 2024 09:00  Phos  4.4     01-26  Mg     1.9     01-26    TPro  6.3  /  Alb  2.4<L>  /  TBili  1.0  /  DBili  x   /  AST  54<H>  /  ALT  278<H>  /  AlkPhos  84  01-27        PT/INR - ( 26 Jan 2024 06:45 )   PT: 15.5 sec;   INR: 1.37 ratio         PTT - ( 25 Jan 2024 16:50 )  PTT:27.6 sec    I&O's Summary    26 Jan 2024 07:01  -  27 Jan 2024 07:00  --------------------------------------------------------  IN: 0 mL / OUT: 500 mL / NET: -500 mL      BNP    RADIOLOGY & ADDITIONAL STUDIES:    ECHO:

## 2024-01-27 NOTE — DIETITIAN INITIAL EVALUATION ADULT - PERTINENT MEDS FT
MEDICATIONS  (STANDING):  aspirin 325 milliGRAM(s) Oral daily  cephalexin 500 milliGRAM(s) Oral three times a day  heparin   Injectable 5000 Unit(s) SubCutaneous every 8 hours  influenza   Vaccine 0.5 milliLiter(s) IntraMuscular once  metoprolol succinate ER 25 milliGRAM(s) Oral daily  pantoprazole    Tablet 40 milliGRAM(s) Oral before breakfast  tamsulosin 0.4 milliGRAM(s) Oral at bedtime    MEDICATIONS  (PRN):  aluminum hydroxide/magnesium hydroxide/simethicone Suspension 30 milliLiter(s) Oral every 4 hours PRN Dyspepsia  melatonin 3 milliGRAM(s) Oral at bedtime PRN Insomnia  ondansetron Injectable 4 milliGRAM(s) IV Push every 8 hours PRN Nausea and/or Vomiting  traMADol 50 milliGRAM(s) Oral two times a day PRN Moderate Pain (4 - 6)

## 2024-01-27 NOTE — DIETITIAN INITIAL EVALUATION ADULT - ORAL INTAKE PTA/DIET HISTORY
Patient reports good appetite PTA and during admission. Patient with multiple food restrictions, documented with diet office. Provided CHF diet education and handout for reference, patient verbalized understanding.

## 2024-01-27 NOTE — PROGRESS NOTE ADULT - ASSESSMENT
IMP    SEVERE AS WITH SIGNIFICANT LV SYSTOLIC DYSFUNCTION.  UNEXPLAINED HYPOCHROMIC MICROCYTIC ANEMIA  NO EVIDENCE OF ACS    SUGGEST    FULL ANEMIA EVALUATION TO RULE OUT MALIGNANCY PRIOR TO EVALUATION FOR TAVR

## 2024-01-28 LAB
ALBUMIN SERPL ELPH-MCNC: 2.2 G/DL — LOW (ref 3.3–5)
ALP SERPL-CCNC: 92 U/L — SIGNIFICANT CHANGE UP (ref 40–120)
ALT FLD-CCNC: 217 U/L — HIGH (ref 10–45)
ANION GAP SERPL CALC-SCNC: 10 MMOL/L — SIGNIFICANT CHANGE UP (ref 5–17)
APPEARANCE UR: CLEAR — SIGNIFICANT CHANGE UP
AST SERPL-CCNC: 39 U/L — SIGNIFICANT CHANGE UP (ref 10–40)
BACTERIA # UR AUTO: NEGATIVE /HPF — SIGNIFICANT CHANGE UP
BILIRUB SERPL-MCNC: 0.8 MG/DL — SIGNIFICANT CHANGE UP (ref 0.2–1.2)
BILIRUB UR-MCNC: NEGATIVE — SIGNIFICANT CHANGE UP
BUN SERPL-MCNC: 20 MG/DL — SIGNIFICANT CHANGE UP (ref 7–23)
CALCIUM SERPL-MCNC: 8.2 MG/DL — LOW (ref 8.4–10.5)
CHLORIDE SERPL-SCNC: 105 MMOL/L — SIGNIFICANT CHANGE UP (ref 96–108)
CO2 SERPL-SCNC: 24 MMOL/L — SIGNIFICANT CHANGE UP (ref 22–31)
COLOR SPEC: YELLOW — SIGNIFICANT CHANGE UP
CREAT ?TM UR-MCNC: 16 MG/DL — SIGNIFICANT CHANGE UP
CREAT SERPL-MCNC: 1.56 MG/DL — HIGH (ref 0.5–1.3)
DIFF PNL FLD: NEGATIVE — SIGNIFICANT CHANGE UP
EGFR: 49 ML/MIN/1.73M2 — LOW
EPI CELLS # UR: 1 — SIGNIFICANT CHANGE UP
GLUCOSE SERPL-MCNC: 93 MG/DL — SIGNIFICANT CHANGE UP (ref 70–99)
GLUCOSE UR QL: NEGATIVE MG/DL — SIGNIFICANT CHANGE UP
HAV IGM SER-ACNC: SIGNIFICANT CHANGE UP
HBV CORE IGM SER-ACNC: SIGNIFICANT CHANGE UP
HBV SURFACE AG SER-ACNC: SIGNIFICANT CHANGE UP
HCT VFR BLD CALC: 23.9 % — LOW (ref 39–50)
HCV AB S/CO SERPL IA: 0.32 S/CO — SIGNIFICANT CHANGE UP (ref 0–0.99)
HCV AB SERPL-IMP: SIGNIFICANT CHANGE UP
HGB BLD-MCNC: 7.3 G/DL — LOW (ref 13–17)
KETONES UR-MCNC: NEGATIVE MG/DL — SIGNIFICANT CHANGE UP
LEUKOCYTE ESTERASE UR-ACNC: NEGATIVE — SIGNIFICANT CHANGE UP
MCHC RBC-ENTMCNC: 18.7 PG — LOW (ref 27–34)
MCHC RBC-ENTMCNC: 30.5 GM/DL — LOW (ref 32–36)
MCV RBC AUTO: 61.1 FL — LOW (ref 80–100)
NITRITE UR-MCNC: NEGATIVE — SIGNIFICANT CHANGE UP
NRBC # BLD: 0 /100 WBCS — SIGNIFICANT CHANGE UP (ref 0–0)
OB PNL STL: NEGATIVE — SIGNIFICANT CHANGE UP
PH UR: 6 — SIGNIFICANT CHANGE UP (ref 5–8)
PLATELET # BLD AUTO: 210 K/UL — SIGNIFICANT CHANGE UP (ref 150–400)
POTASSIUM SERPL-MCNC: 3.7 MMOL/L — SIGNIFICANT CHANGE UP (ref 3.5–5.3)
POTASSIUM SERPL-SCNC: 3.7 MMOL/L — SIGNIFICANT CHANGE UP (ref 3.5–5.3)
PROT ?TM UR-MCNC: 6 MG/DL — SIGNIFICANT CHANGE UP (ref 0–12)
PROT SERPL-MCNC: 5.9 G/DL — LOW (ref 6–8.3)
PROT UR-MCNC: NEGATIVE MG/DL — SIGNIFICANT CHANGE UP
PROT/CREAT UR-RTO: 0.4 RATIO — HIGH (ref 0–0.2)
RBC # BLD: 3.91 M/UL — LOW (ref 4.2–5.8)
RBC # FLD: 20.3 % — HIGH (ref 10.3–14.5)
RBC CASTS # UR COMP ASSIST: 0 /HPF — SIGNIFICANT CHANGE UP (ref 0–4)
SODIUM SERPL-SCNC: 139 MMOL/L — SIGNIFICANT CHANGE UP (ref 135–145)
SP GR SPEC: 1 — SIGNIFICANT CHANGE UP (ref 1–1.03)
UROBILINOGEN FLD QL: 1 MG/DL — SIGNIFICANT CHANGE UP (ref 0.2–1)
WBC # BLD: 9.31 K/UL — SIGNIFICANT CHANGE UP (ref 3.8–10.5)
WBC # FLD AUTO: 9.31 K/UL — SIGNIFICANT CHANGE UP (ref 3.8–10.5)
WBC UR QL: 1 /HPF — SIGNIFICANT CHANGE UP (ref 0–5)

## 2024-01-28 PROCEDURE — 74176 CT ABD & PELVIS W/O CONTRAST: CPT | Mod: 26

## 2024-01-28 PROCEDURE — 99232 SBSQ HOSP IP/OBS MODERATE 35: CPT

## 2024-01-28 PROCEDURE — 71250 CT THORAX DX C-: CPT | Mod: 26

## 2024-01-28 RX ORDER — ALPRAZOLAM 0.25 MG
1 TABLET ORAL ONCE
Refills: 0 | Status: DISCONTINUED | OUTPATIENT
Start: 2024-01-28 | End: 2024-01-28

## 2024-01-28 RX ORDER — IRON SUCROSE 20 MG/ML
200 INJECTION, SOLUTION INTRAVENOUS ONCE
Refills: 0 | Status: COMPLETED | OUTPATIENT
Start: 2024-01-29 | End: 2024-01-29

## 2024-01-28 RX ORDER — FUROSEMIDE 40 MG
40 TABLET ORAL ONCE
Refills: 0 | Status: COMPLETED | OUTPATIENT
Start: 2024-01-28 | End: 2024-01-28

## 2024-01-28 RX ORDER — ASPIRIN/CALCIUM CARB/MAGNESIUM 324 MG
81 TABLET ORAL DAILY
Refills: 0 | Status: DISCONTINUED | OUTPATIENT
Start: 2024-01-28 | End: 2024-02-05

## 2024-01-28 RX ADMIN — Medication 3 MILLIGRAM(S): at 02:05

## 2024-01-28 RX ADMIN — HEPARIN SODIUM 5000 UNIT(S): 5000 INJECTION INTRAVENOUS; SUBCUTANEOUS at 15:11

## 2024-01-28 RX ADMIN — HEPARIN SODIUM 5000 UNIT(S): 5000 INJECTION INTRAVENOUS; SUBCUTANEOUS at 21:17

## 2024-01-28 RX ADMIN — Medication 1 MILLIGRAM(S): at 10:09

## 2024-01-28 RX ADMIN — HEPARIN SODIUM 5000 UNIT(S): 5000 INJECTION INTRAVENOUS; SUBCUTANEOUS at 06:22

## 2024-01-28 RX ADMIN — Medication 500 MILLIGRAM(S): at 15:11

## 2024-01-28 RX ADMIN — TAMSULOSIN HYDROCHLORIDE 0.4 MILLIGRAM(S): 0.4 CAPSULE ORAL at 21:18

## 2024-01-28 RX ADMIN — Medication 40 MILLIGRAM(S): at 10:10

## 2024-01-28 RX ADMIN — Medication 500 MILLIGRAM(S): at 06:22

## 2024-01-28 RX ADMIN — PANTOPRAZOLE SODIUM 40 MILLIGRAM(S): 20 TABLET, DELAYED RELEASE ORAL at 06:21

## 2024-01-28 RX ADMIN — Medication 81 MILLIGRAM(S): at 15:14

## 2024-01-28 RX ADMIN — Medication 500 MILLIGRAM(S): at 21:18

## 2024-01-28 RX ADMIN — Medication 40 MILLIGRAM(S): at 15:11

## 2024-01-28 RX ADMIN — Medication 25 MILLIGRAM(S): at 06:21

## 2024-01-28 NOTE — PROGRESS NOTE ADULT - ASSESSMENT
63 year old male with Hx of HTN, GERD, dyslipidemia, chronic intermittent leg pain, aortic stenosis, chronic venous stasis dermatitis with bilateral leg swelling, presents for b/l leg pain and swelling,    HFrEF (EF 35-40% 1/2024)  Severe AS  - Infectious Diseases eval noted. Changed to keflex. likely stasis dermatitis   - TTE reviewed   - Toprol 25mg daily. Not on ACE/ARB/ARNI d/t renal insufficiency   - Cardio following   - hypervolemic on exam. diuresis based on renal function     Hypertension  - toprol     Microcytic Anemia  - monitor  - FOBT negative  - transfuse if <7  - will send for CT chest/abdomen/pelvis    LINDA vs Chronic Kidney Disease   - cautiously diuresing   - avoid nephrotoxins     Transaminitis   - unclear  - follow up hep panel  - RUQ US unremarkable     GERD  - continue PPI     DVT Prophylaxis:  - Heparin    Unsuccessful attempt to reach wife Chanelle [phone]

## 2024-01-28 NOTE — PROGRESS NOTE ADULT - SUBJECTIVE AND OBJECTIVE BOX
Patient is a 63y old  Male who presents with a chief complaint of CHF exacerbation (27 Jan 2024 22:42)    Patient seen and examined at bedside. No acute overnight events. Denies pain/discomfort.  Reviewed anemia with patient, need for further imaging to workup malignancy - is claustrophobic     ALLERGIES:  No Known Drug Allergies  garlic (Angioedema; Swelling; Anaphylaxis)    MEDICATIONS  (STANDING):  ALPRAZolam 1 milliGRAM(s) Oral once  cephalexin 500 milliGRAM(s) Oral three times a day  furosemide   Injectable 40 milliGRAM(s) IV Push once  furosemide   Injectable 40 milliGRAM(s) IV Push once  heparin   Injectable 5000 Unit(s) SubCutaneous every 8 hours  influenza   Vaccine 0.5 milliLiter(s) IntraMuscular once  metoprolol succinate ER 25 milliGRAM(s) Oral daily  pantoprazole    Tablet 40 milliGRAM(s) Oral before breakfast  tamsulosin 0.4 milliGRAM(s) Oral at bedtime    MEDICATIONS  (PRN):  aluminum hydroxide/magnesium hydroxide/simethicone Suspension 30 milliLiter(s) Oral every 4 hours PRN Dyspepsia  melatonin 3 milliGRAM(s) Oral at bedtime PRN Insomnia  ondansetron Injectable 4 milliGRAM(s) IV Push every 8 hours PRN Nausea and/or Vomiting  traMADol 50 milliGRAM(s) Oral two times a day PRN Moderate Pain (4 - 6)    Vital Signs Last 24 Hrs  T(F): 97.8 (28 Jan 2024 05:00), Max: 97.8 (28 Jan 2024 05:00)  HR: 88 (28 Jan 2024 05:00) (77 - 92)  BP: 120/72 (28 Jan 2024 05:00) (95/66 - 121/68)  RR: 15 (28 Jan 2024 05:00) (15 - 15)  SpO2: 99% (28 Jan 2024 05:00) (94% - 99%)  I&O's Summary    27 Jan 2024 07:01  -  28 Jan 2024 07:00  --------------------------------------------------------  IN: 0 mL / OUT: 1100 mL / NET: -1100 mL    BMI (kg/m2): 30.8 (01-26-24 @ 23:43)    PHYSICAL EXAM:  General: NAD, awake, alert   ENT: MMM, no tonsilar exudate  Neck: Supple, No JVD  Lungs: Clear to auscultation bilaterally, no wheezes. Good air entry bilaterally   Cardio: RRR, S1/S2, No murmurs  Abdomen: Soft, Nontender, Nondistended; Bowel sounds present  Extremities: No calf tenderness, 2+ edema     LABS:                        7.3    9.31  )-----------( 210      ( 28 Jan 2024 06:53 )             23.9       01-28    139  |  105  |  20  ----------------------------<  93  3.7   |  24  |  1.56    Ca    8.2      28 Jan 2024 06:53  Phos  4.4     01-26  Mg     1.9     01-26    TPro  5.9  /  Alb  2.2  /  TBili  0.8  /  DBili  x   /  AST  39  /  ALT  217  /  AlkPhos  92  01-28     PT/INR - ( 26 Jan 2024 06:45 )   PT: 15.5 sec;   INR: 1.37 ratio      PTT - ( 25 Jan 2024 16:50 )  PTT:27.6 sec     CARDIAC MARKERS ( 25 Jan 2024 16:50 )  x     / 24.0 ng/L / x     / x     / x        01-27 Chol 106 mg/dL LDL -- HDL 38 mg/dL Trig 58 mg/dL  TSH 2.165   TSH with FT4 reflex --  Total T3 --    Urinalysis Basic - ( 28 Jan 2024 06:53 )    Color: x / Appearance: x / SG: x / pH: x  Gluc: 93 mg/dL / Ketone: x  / Bili: x / Urobili: x   Blood: x / Protein: x / Nitrite: x   Leuk Esterase: x / RBC: x / WBC x   Sq Epi: x / Non Sq Epi: x / Bacteria: x    Culture - Blood (collected 25 Jan 2024 16:55)  Source: .Blood Blood-Peripheral  Preliminary Report (28 Jan 2024 01:02):    No growth at 48 Hours    Culture - Blood (collected 25 Jan 2024 16:50)  Source: .Blood Blood-Peripheral  Preliminary Report (28 Jan 2024 01:02):    No growth at 48 Hours    RADIOLOGY & ADDITIONAL TESTS:     Care Discussed with Consultants/Other Providers:    Patient is a 63y old  Male who presents with a chief complaint of CHF exacerbation (27 Jan 2024 22:42)    Patient seen and examined at bedside. No acute overnight events. Denies pain/discomfort.  Reviewed anemia with patient, need for further imaging to workup malignancy - is claustrophobic     ALLERGIES:  No Known Drug Allergies  garlic (Angioedema; Swelling; Anaphylaxis)    MEDICATIONS  (STANDING):  ALPRAZolam 1 milliGRAM(s) Oral once  cephalexin 500 milliGRAM(s) Oral three times a day  furosemide   Injectable 40 milliGRAM(s) IV Push once  furosemide   Injectable 40 milliGRAM(s) IV Push once  heparin   Injectable 5000 Unit(s) SubCutaneous every 8 hours  influenza   Vaccine 0.5 milliLiter(s) IntraMuscular once  metoprolol succinate ER 25 milliGRAM(s) Oral daily  pantoprazole    Tablet 40 milliGRAM(s) Oral before breakfast  tamsulosin 0.4 milliGRAM(s) Oral at bedtime    MEDICATIONS  (PRN):  aluminum hydroxide/magnesium hydroxide/simethicone Suspension 30 milliLiter(s) Oral every 4 hours PRN Dyspepsia  melatonin 3 milliGRAM(s) Oral at bedtime PRN Insomnia  ondansetron Injectable 4 milliGRAM(s) IV Push every 8 hours PRN Nausea and/or Vomiting  traMADol 50 milliGRAM(s) Oral two times a day PRN Moderate Pain (4 - 6)    Vital Signs Last 24 Hrs  T(F): 97.8 (28 Jan 2024 05:00), Max: 97.8 (28 Jan 2024 05:00)  HR: 88 (28 Jan 2024 05:00) (77 - 92)  BP: 120/72 (28 Jan 2024 05:00) (95/66 - 121/68)  RR: 15 (28 Jan 2024 05:00) (15 - 15)  SpO2: 99% (28 Jan 2024 05:00) (94% - 99%)  I&O's Summary    27 Jan 2024 07:01  -  28 Jan 2024 07:00  --------------------------------------------------------  IN: 0 mL / OUT: 1100 mL / NET: -1100 mL    BMI (kg/m2): 30.8 (01-26-24 @ 23:43)    PHYSICAL EXAM:  General: NAD, awake, alert   ENT: MMM, no tonsilar exudate  Neck: Supple, No JVD  Lungs: Clear to auscultation bilaterally, no wheezes. Good air entry bilaterally   Cardio: RRR, S1/S2, +murmur  Abdomen: Soft, Nontender, Nondistended; Bowel sounds present  Extremities: No calf tenderness, 2+ edema     LABS:                        7.3    9.31  )-----------( 210      ( 28 Jan 2024 06:53 )             23.9       01-28    139  |  105  |  20  ----------------------------<  93  3.7   |  24  |  1.56    Ca    8.2      28 Jan 2024 06:53  Phos  4.4     01-26  Mg     1.9     01-26    TPro  5.9  /  Alb  2.2  /  TBili  0.8  /  DBili  x   /  AST  39  /  ALT  217  /  AlkPhos  92  01-28     PT/INR - ( 26 Jan 2024 06:45 )   PT: 15.5 sec;   INR: 1.37 ratio      PTT - ( 25 Jan 2024 16:50 )  PTT:27.6 sec     CARDIAC MARKERS ( 25 Jan 2024 16:50 )  x     / 24.0 ng/L / x     / x     / x        01-27 Chol 106 mg/dL LDL -- HDL 38 mg/dL Trig 58 mg/dL  TSH 2.165   TSH with FT4 reflex --  Total T3 --    Urinalysis Basic - ( 28 Jan 2024 06:53 )    Color: x / Appearance: x / SG: x / pH: x  Gluc: 93 mg/dL / Ketone: x  / Bili: x / Urobili: x   Blood: x / Protein: x / Nitrite: x   Leuk Esterase: x / RBC: x / WBC x   Sq Epi: x / Non Sq Epi: x / Bacteria: x    Culture - Blood (collected 25 Jan 2024 16:55)  Source: .Blood Blood-Peripheral  Preliminary Report (28 Jan 2024 01:02):    No growth at 48 Hours    Culture - Blood (collected 25 Jan 2024 16:50)  Source: .Blood Blood-Peripheral  Preliminary Report (28 Jan 2024 01:02):    No growth at 48 Hours    RADIOLOGY & ADDITIONAL TESTS:     Care Discussed with Consultants/Other Providers:

## 2024-01-28 NOTE — PROGRESS NOTE ADULT - SUBJECTIVE AND OBJECTIVE BOX
[facility] NEPHROLOGY SERVICES, [facility]  NEPHROLOGY AND HYPERTENSION  [address]  [address]  [place] [address]  [phone]    MD JEAN PARSONS, MD JULIANNA BLANKENSHIP, MD OJ MENDOZA, MD SUMMER SIMPSON MD          Patient events noted  No distress    MEDICATIONS  (STANDING):  aspirin  chewable 81 milliGRAM(s) Oral daily  cephalexin 500 milliGRAM(s) Oral three times a day  heparin   Injectable 5000 Unit(s) SubCutaneous every 8 hours  influenza   Vaccine 0.5 milliLiter(s) IntraMuscular once  metoprolol succinate ER 25 milliGRAM(s) Oral daily  pantoprazole    Tablet 40 milliGRAM(s) Oral before breakfast  tamsulosin 0.4 milliGRAM(s) Oral at bedtime    MEDICATIONS  (PRN):  aluminum hydroxide/magnesium hydroxide/simethicone Suspension 30 milliLiter(s) Oral every 4 hours PRN Dyspepsia  melatonin 3 milliGRAM(s) Oral at bedtime PRN Insomnia  ondansetron Injectable 4 milliGRAM(s) IV Push every 8 hours PRN Nausea and/or Vomiting  traMADol 50 milliGRAM(s) Oral two times a day PRN Moderate Pain (4 - 6)      01-27-24 @ 07:01  -  01-28-24 @ 07:00  --------------------------------------------------------  IN: 0 mL / OUT: 1100 mL / NET: -1100 mL    01-28-24 @ 07:01  -  01-28-24 @ 21:21  --------------------------------------------------------  IN: 0 mL / OUT: 400 mL / NET: -400 mL      PHYSICAL EXAM:      T(C): 36.2 (01-28-24 @ 13:40), Max: 36.6 (01-28-24 @ 05:00)  HR: 72 (01-28-24 @ 13:40) (72 - 88)  BP: 129/71 (01-28-24 @ 13:40) (118/77 - 129/71)  RR: 18 (01-28-24 @ 13:40) (15 - 18)  SpO2: 98% (01-28-24 @ 13:40) (98% - 99%)  Wt(kg): --  Lungs clear  Heart S1S2 + KAYLA  Abd soft NT ND  Extremities:   2  edema                                    7.3    9.31  )-----------( 210      ( 28 Jan 2024 06:53 )             23.9     01-28    139  |  105  |  20  ----------------------------<  93  3.7   |  24  |  1.56<H>    Ca    8.2<L>      28 Jan 2024 06:53    TPro  5.9<L>  /  Alb  2.2<L>  /  TBili  0.8  /  DBili  x   /  AST  39  /  ALT  217<H>  /  AlkPhos  92  01-28      Ferritin in AM (01.26.24 @ 06:45)    Ferritin: 99 ng/mL    Iron with Total Binding Capacity in AM (01.26.24 @ 06:45)    Iron - Total Binding Capacity.: 335 ug/dL   % Saturation, Iron: 5 %   Iron Total: 15 ug/dL   Unsaturated Iron Binding Capacity: 320 ug/dL        LIVER FUNCTIONS - ( 28 Jan 2024 06:53 )  Alb: 2.2 g/dL / Pro: 5.9 g/dL / ALK PHOS: 92 U/L / ALT: 217 U/L / AST: 39 U/L / GGT: x           Creatinine Trend: 1.56<--, 1.56<--, 1.58<--, 1.76<--, 1.40<--      Assessment   CKD 3; fluid overload   Fe def anemia     Plan  Continue warranted diuresis   IV Fe  Will follow         Pedrito Irving MD

## 2024-01-29 DIAGNOSIS — D63.8 ANEMIA IN OTHER CHRONIC DISEASES CLASSIFIED ELSEWHERE: ICD-10-CM

## 2024-01-29 LAB
ANION GAP SERPL CALC-SCNC: 8 MMOL/L — SIGNIFICANT CHANGE UP (ref 5–17)
BUN SERPL-MCNC: 22 MG/DL — SIGNIFICANT CHANGE UP (ref 7–23)
CALCIUM SERPL-MCNC: 8.9 MG/DL — SIGNIFICANT CHANGE UP (ref 8.4–10.5)
CHLORIDE SERPL-SCNC: 105 MMOL/L — SIGNIFICANT CHANGE UP (ref 96–108)
CO2 SERPL-SCNC: 27 MMOL/L — SIGNIFICANT CHANGE UP (ref 22–31)
CREAT SERPL-MCNC: 1.43 MG/DL — HIGH (ref 0.5–1.3)
EGFR: 55 ML/MIN/1.73M2 — LOW
GLUCOSE SERPL-MCNC: 118 MG/DL — HIGH (ref 70–99)
HCT VFR BLD CALC: 24.2 % — LOW (ref 39–50)
HGB BLD-MCNC: 7.4 G/DL — LOW (ref 13–17)
MCHC RBC-ENTMCNC: 17.1 PG — LOW (ref 27–34)
MCHC RBC-ENTMCNC: 30.6 GM/DL — LOW (ref 32–36)
MCV RBC AUTO: 55.9 FL — LOW (ref 80–100)
NRBC # BLD: 0 /100 WBCS — SIGNIFICANT CHANGE UP (ref 0–0)
PLATELET # BLD AUTO: 238 K/UL — SIGNIFICANT CHANGE UP (ref 150–400)
POTASSIUM SERPL-MCNC: 3.4 MMOL/L — LOW (ref 3.5–5.3)
POTASSIUM SERPL-SCNC: 3.4 MMOL/L — LOW (ref 3.5–5.3)
RBC # BLD: 4.33 M/UL — SIGNIFICANT CHANGE UP (ref 4.2–5.8)
RBC # FLD: 18.2 % — HIGH (ref 10.3–14.5)
SODIUM SERPL-SCNC: 140 MMOL/L — SIGNIFICANT CHANGE UP (ref 135–145)
WBC # BLD: 9.68 K/UL — SIGNIFICANT CHANGE UP (ref 3.8–10.5)
WBC # FLD AUTO: 9.68 K/UL — SIGNIFICANT CHANGE UP (ref 3.8–10.5)

## 2024-01-29 PROCEDURE — 99232 SBSQ HOSP IP/OBS MODERATE 35: CPT

## 2024-01-29 PROCEDURE — 99223 1ST HOSP IP/OBS HIGH 75: CPT

## 2024-01-29 RX ORDER — FUROSEMIDE 40 MG
40 TABLET ORAL ONCE
Refills: 0 | Status: COMPLETED | OUTPATIENT
Start: 2024-01-29 | End: 2024-01-29

## 2024-01-29 RX ADMIN — HEPARIN SODIUM 5000 UNIT(S): 5000 INJECTION INTRAVENOUS; SUBCUTANEOUS at 14:08

## 2024-01-29 RX ADMIN — Medication 40 MILLIGRAM(S): at 15:31

## 2024-01-29 RX ADMIN — HEPARIN SODIUM 5000 UNIT(S): 5000 INJECTION INTRAVENOUS; SUBCUTANEOUS at 22:14

## 2024-01-29 RX ADMIN — Medication 25 MILLIGRAM(S): at 05:52

## 2024-01-29 RX ADMIN — TRAMADOL HYDROCHLORIDE 50 MILLIGRAM(S): 50 TABLET ORAL at 01:32

## 2024-01-29 RX ADMIN — TRAMADOL HYDROCHLORIDE 50 MILLIGRAM(S): 50 TABLET ORAL at 02:20

## 2024-01-29 RX ADMIN — Medication 500 MILLIGRAM(S): at 22:15

## 2024-01-29 RX ADMIN — Medication 81 MILLIGRAM(S): at 14:09

## 2024-01-29 RX ADMIN — HEPARIN SODIUM 5000 UNIT(S): 5000 INJECTION INTRAVENOUS; SUBCUTANEOUS at 05:52

## 2024-01-29 RX ADMIN — PANTOPRAZOLE SODIUM 40 MILLIGRAM(S): 20 TABLET, DELAYED RELEASE ORAL at 05:53

## 2024-01-29 RX ADMIN — Medication 500 MILLIGRAM(S): at 05:52

## 2024-01-29 RX ADMIN — Medication 3 MILLIGRAM(S): at 01:28

## 2024-01-29 RX ADMIN — IRON SUCROSE 110 MILLIGRAM(S): 20 INJECTION, SOLUTION INTRAVENOUS at 09:09

## 2024-01-29 RX ADMIN — Medication 500 MILLIGRAM(S): at 14:08

## 2024-01-29 RX ADMIN — TAMSULOSIN HYDROCHLORIDE 0.4 MILLIGRAM(S): 0.4 CAPSULE ORAL at 22:14

## 2024-01-29 NOTE — PROGRESS NOTE ADULT - ASSESSMENT
63 year old male with Hx of HTN, GERD, dyslipidemia, chronic intermittent leg pain, aortic stenosis, chronic venous stasis dermatitis with bilateral leg swelling, presents for b/l leg pain and swelling.    HFrEF (EF 35-40% 1/2024)  Severe AS  - TTE reviewed; severe AS, mod pulm htn, mod mitral regurg, grade 3 diastolic dysfunction  - cont Toprol 25mg daily. Not on ACE/ARB/ARNI d/t renal insufficiency   - Cardio following   - dosing lasix daily per renal function  - probnp very elevated  - CXR with no pleural effusions    Hypertension  - cont toprol     Microcytic Anemia  - Heme consult pending, Dr. Guevara notified  - per pt. he thinks he has a hx of blood disorder ?Thalessemia  - FOBT negative  - transfuse if <7  - CT chest/abdomen/pelvis noted; pt with multiple findings--small volume ascites, chronic left 7th rib fx, trace right pleural effusion, prominent lymph nodes    LINDA vs Chronic Kidney Disease   - cautiously diuresing, follow daily Creat  - avoid nephrotoxins     Transaminitis   - unclear etiology  - follow up hep panel  - RUQ US unremarkable     GERD  - continue PPI     DVT Prophylaxis:  - Heparin    Dispo:  pt will update his wife Chanelle [phone]  63 year old male with Hx of HTN, GERD, dyslipidemia, chronic intermittent leg pain, aortic stenosis, chronic venous stasis dermatitis with bilateral leg swelling, presents for b/l leg pain and swelling.    HFrEF (EF 35-40% 1/2024)  Severe AS  - TTE reviewed; severe AS, mod pulm htn, mod mitral regurg, grade 3 diastolic dysfunction  - cont Toprol 25mg daily  - Not on ACE/ARB/ARNI d/t renal insufficiency   - Cardio following; pt may need a Cath, TAVR eval  - dosing lasix daily per renal function--40 mg. IV push x 1 today  - probnp very elevated  - CXR with no pleural effusions    Hypertension  - cont toprol     Microcytic Anemia  - Heme consult pending, Dr. Guevara notified  - per pt. he thinks he has a hx of blood disorder ?Thalessemia  - FOBT negative  - transfuse if <7  - CT chest/abdomen/pelvis noted; pt with multiple findings--small volume ascites, chronic left 7th rib fx, trace right pleural effusion, prominent lymph nodes    LINDA vs Chronic Kidney Disease   - cautiously diuresing, follow daily Creat  - avoid nephrotoxins   - Renal following; IV Venofer ordered    Transaminitis   - unclear etiology  - follow up hep panel  - RUQ US unremarkable     GERD  - continue PPI     DVT Prophylaxis:  - Heparin    Dispo:  pt will update his wife Chanelle [phone]

## 2024-01-29 NOTE — CONSULT NOTE ADULT - PROBLEM SELECTOR RECOMMENDATION 9
Patient with history of cellulitis, on antibiotics, and extensive cardiac history.  All factors may contribute to anemia–complex mechanism.  Transfuse PRBC as needed.  Mechanical hemolysis due to severe AS cannot be excluded.  Will continue to support with transfusion on as-needed basis.  Discontinued antibiotic as there is no evidence of infectious disease.  Reduce amount of unwanted phlebotomies/venipunctures.  Cardiac workup and treatment in progress.

## 2024-01-29 NOTE — PROGRESS NOTE ADULT - SUBJECTIVE AND OBJECTIVE BOX
CC: f/u for B/L venous stasis , r/o cellulitis    Patient reports: he is anxious to complete cardiac evaluation    REVIEW OF SYSTEMS:  All other review of systems negative (Comprehensive ROS)    Antimicrobials Day #  :day 4/5  cephalexin 500 milliGRAM(s) Oral three times a day    Other Medications Reviewed  MEDICATIONS  (STANDING):  aspirin  chewable 81 milliGRAM(s) Oral daily  cephalexin 500 milliGRAM(s) Oral three times a day  furosemide   Injectable 40 milliGRAM(s) IV Push once  heparin   Injectable 5000 Unit(s) SubCutaneous every 8 hours  influenza   Vaccine 0.5 milliLiter(s) IntraMuscular once  metoprolol succinate ER 25 milliGRAM(s) Oral daily  pantoprazole    Tablet 40 milliGRAM(s) Oral before breakfast  tamsulosin 0.4 milliGRAM(s) Oral at bedtime    T(F): 98.4 (01-29-24 @ 12:00), Max: 98.4 (01-28-24 @ 21:05)  HR: 74 (01-29-24 @ 12:00)  BP: 121/69 (01-29-24 @ 12:00)  RR: 17 (01-29-24 @ 12:00)  SpO2: 95% (01-29-24 @ 12:00)  Wt(kg): --    PHYSICAL EXAM:  General: alert, no acute distress  Eyes:  anicteric, no conjunctival injection, no discharge  Oropharynx: no lesions or injection 	  Neck: supple, without adenopathy  Lungs: clear to auscultation  Heart: regular rate and rhythm; +teresa  Abdomen: soft, nondistended, nontender, without mass or organomegaly  Skin: B/L venous stasis changes with healed Rt calf ulceration  Extremities: no clubbing, cyanosis+ edema  Neurologic: alert, oriented, moves all extremities    LAB RESULTS:                        7.3    9.31  )-----------( 210      ( 28 Jan 2024 06:53 )             23.9     01-28    139  |  105  |  20  ----------------------------<  93  3.7   |  24  |  1.56<H>    Ca    8.2<L>      28 Jan 2024 06:53    TPro  5.9<L>  /  Alb  2.2<L>  /  TBili  0.8  /  DBili  x   /  AST  39  /  ALT  217<H>  /  AlkPhos  92  01-28    LIVER FUNCTIONS - ( 28 Jan 2024 06:53 )  Alb: 2.2 g/dL / Pro: 5.9 g/dL / ALK PHOS: 92 U/L / ALT: 217 U/L / AST: 39 U/L / GGT: x                 MICROBIOLOGY:  RECENT CULTURES:  01-25 @ 16:55 .Blood Blood-Peripheral     No growth at 72 Hours      01-25 @ 16:50 .Blood Blood-Peripheral     No growth at 72 Hours          RADIOLOGY REVIEWED:    < from: TTE Echo Complete w/o Contrast w/ Doppler (01.26.24 @ 08:26) >  Summary:   1. Left ventricular ejection fraction, by visual estimation, is 35 to   40%.   2. Moderately decreased global left ventricular systolic function.   3. Basal inferior segment, mid inferolateral segment, mid anterolateral   segment, and mid inferior segment are abnormal as described above.   4. Severely enlarged left atrium.   5. Increased LV wall thickness.   6. Mildly increased left ventricular internal cavity size.   7. Spectral Doppler shows restrictivepattern of left ventricular   myocardial filling (Grade III diastolic dysfunction).   8. There is mild concentric left ventricular hypertrophy.   9. Moderately enlarged right ventricle.  10. Right atrial enlargement.  11. Trivial pericardial effusion.  12. Moderate mitral valve regurgitation.  13. Thickening of the anterior and posterior mitral valve leaflets.  14. Mild-moderate tricuspid regurgitation.  15. Mild aortic regurgitation.  16. Severe aortic valve stenosis.  17. Dilatation of the ascending aorta.  18. Estimated pulmonary artery systolic pressure is 58.6 mmHg assuming a   right atrial pressure of 15 mmHg, which is consistent with moderate   pulmonary hypertension.  19. LA volume Index is 56.0 ml/m² ml/m2.  20. The Dimesionless Index value is .17.    [id number] SHIRA Abel    < end of copied text >

## 2024-01-29 NOTE — PROGRESS NOTE ADULT - SUBJECTIVE AND OBJECTIVE BOX
Resting    Vital Signs Last 24 Hrs  T(C): 36.6 (01-29-24 @ 20:49), Max: 36.9 (01-29-24 @ 12:00)  T(F): 97.9 (01-29-24 @ 20:49), Max: 98.4 (01-29-24 @ 12:00)  HR: 77 (01-29-24 @ 20:49) (71 - 77)  BP: 114/68 (01-29-24 @ 20:49) (114/68 - 123/80)  RR: 17 (01-29-24 @ 20:49) (17 - 18)  SpO2: 93% (01-29-24 @ 20:49) (93% - 98%)    Lungs b/l air entry  Heart S1S2   Abd soft ND  Extremities: sm edema                        7.4    9.68  )-----------( 238      ( 29 Jan 2024 16:50 )             24.2     29 Jan 2024 16:50    140    |  105    |  22     ----------------------------<  118    3.4     |  27     |  1.43     Ca    8.9        29 Jan 2024 16:50    TPro  5.9    /  Alb  2.2    /  TBili  0.8    /  DBili  x      /  AST  39     /  ALT  217    /  AlkPhos  92     28 Jan 2024 06:53    LIVER FUNCTIONS - ( 28 Jan 2024 06:53 )  Alb: 2.2 g/dL / Pro: 5.9 g/dL / ALK PHOS: 92 U/L / ALT: 217 U/L / AST: 39 U/L / GGT: x           aluminum hydroxide/magnesium hydroxide/simethicone Suspension 30 milliLiter(s) Oral every 4 hours PRN  aspirin  chewable 81 milliGRAM(s) Oral daily  cephalexin 500 milliGRAM(s) Oral three times a day  heparin   Injectable 5000 Unit(s) SubCutaneous every 8 hours  influenza   Vaccine 0.5 milliLiter(s) IntraMuscular once  melatonin 3 milliGRAM(s) Oral at bedtime PRN  metoprolol succinate ER 25 milliGRAM(s) Oral daily  ondansetron Injectable 4 milliGRAM(s) IV Push every 8 hours PRN  pantoprazole    Tablet 40 milliGRAM(s) Oral before breakfast  tamsulosin 0.4 milliGRAM(s) Oral at bedtime  traMADol 50 milliGRAM(s) Oral two times a day PRN    Assessment/Plan:    CM, EF 35-40%, severe AS  CKD 3 near baseline   UA negative   Imaging w/o hydro   Suppl K  F/u BMP, Mg in am  No objection to diuretics as needed  JESI, anemia, heme/onc eval   Will follow    [phone]

## 2024-01-29 NOTE — CONSULT NOTE ADULT - SUBJECTIVE AND OBJECTIVE BOX
GRACE FERNANDEZ,  63y Male  MRN: [id number]  ATTENDING: Dr. Abdullahi Kellogg      HPI:  63 year old male with Hx of HTN, GERD, dyslipidemia, chronic intermittent leg pain, aortic stenosis, chronic venous stasis dermatitis with bilateral leg swelling, presents to  ED sent by PCP for failed outpatient therapy with bactrim for cellulitis.  As per outpatient chart review, Initial symptoms developed in September after gel injection by orthopedist. Since then he has noted worsening swelling and redness and ulcerations noted in bilateral lower extremities. Does have a history of significant aortic stenosis-but has yet to follow-up with cardiologist.   Was seen at [facility] on 1/7 for persistent cellulitis. Patient declined hospital admission at that time. Wound culture was positive for Streptococcus dysgalactiae. Was started on Bactrim po.   Continues to note intermittent bilateral calf pain that he notes at times at rest and at movement . Seen by PCP yesterday and was advised to go to ED for admission, r/o DVT and IV abx. Patient states that the swelling got worse for the past 1 week. Reports he hasn't noticed increase in SOB and denies dyspnea, but states that he sleeps with 3 pillows at night for neck pain, and he gets shortness of breath on exertion. Shortness of breath noticed during the interview. Denies fevers, chills, dysuria, cough, chest, abdominal, back, or neck pain. Says that he has decreased sensation in his legs which makes him unsteady on his feet.     In the ED temp 98.2, HR 86, 110/67, RR, SPO2 96%   Labs significant for BNP 57571, WBC 15, troponin 24, INR 1.37, Cr 1.7   He received morphine 2mg and CTX x1   CXR personally reviewed cardiomegaly on wet read   EKG personally reviewed NSR with PACS and PVCs, and RBBB (similar to prior)     (25 Jan 2024 19:43)      PAST MEDICAL & SURGICAL HISTORY:  Dyslipidemia      Hypertension      Chronic GERD      History of aortic stenosis      No significant past surgical history          MEDICATION:  aspirin  chewable 81 milliGRAM(s) Oral daily  cephalexin 500 milliGRAM(s) Oral three times a day  heparin   Injectable 5000 Unit(s) SubCutaneous every 8 hours  influenza   Vaccine 0.5 milliLiter(s) IntraMuscular once  metoprolol succinate ER 25 milliGRAM(s) Oral daily  pantoprazole    Tablet 40 milliGRAM(s) Oral before breakfast  tamsulosin 0.4 milliGRAM(s) Oral at bedtime      ALLERGIES:  No Known Drug Allergies  garlic (Angioedema; Swelling; Anaphylaxis)      FAMILY HISTORY:  Reviewed, non-contributory: [ ]   Maternal-  Paternal-    SOCIAL HISTORY:  Tobacco: YES [ ]  ; NO [ ]; Former smoker [ ]  Alcohol:   YES [ ]  ; NO [ ]; Social alcohol user [ ]  Occupation/ marital status/ children:    REVIEW SYSTEMS:  Constitutional: no fever, chills, night sweats, no weight loss  HEENT: denies visual changes; no oral ulcers, dysphagia, no epistaxis;   Respiratory: no dyspnea , wheezing, cough, hemoptysis  Cardiovascular: denies acute chest pain, palpitations  GI: no loss of appetite, dark stools, or abdominal tenderness / pain; no change in bowel habits.  Musculoskeletal: no new back pain, bone/ joint pain ,no extremity swelling  Integumentary: denies pruritus; no skin rash, bruises, no  suspicious skin lesions  Neurologic: denies peripheral numbness, no dizziness, no gait problems.  Heme: no reported easy bruisability; no lymph node enlargement    VITALS:  T(C): 36.9, Max: 36.9 (01-28-24 @ 21:05)  T(F): 98.4, Max: 98.4 (01-28-24 @ 21:05)  HR: 72 (71 - 75)  BP: 123/80 (103/65 - 123/80)  SpO2: 95% (91% - 98%)    PHYSICAL EXAM:  Constitutional: alert, well developed  HEENT: normocephalic, anicteric sclerae, no mucositis or thrush  Respiratory: bilateral clear to auscultation anteriorly  Cardiovascular : S1, S2 regular, rhythmic, no murmurs, gallops or rubs  Abdomen: soft, distended, + normoactive BS, no palpable HS- megaly  Extremities: no tenderness;  -c/c/e, pulses equal bilaterally  Integumentary: no rashes, scars, or lesions suggestive of malignancy  Neurologic: no gross focal deficits    LABS:  (01-28) WBC: 9.31 K/uL,Hemoglobin: 7.3 g/dL, Hematocrit: 23.9 %,    Platelet: 210 K/uL          RADIOLOGY:               GRACE FERNANDEZ,  63y Male  MRN: [id number]  ATTENDING: Dr. Abdullahi Kellogg    HPI:  63M, PMHx HTN, GERD, dyslipidemia, chronic intermittent leg pain, aortic stenosis, chronic venous stasis dermatitis with bilateral leg swelling, treated outpatient with Bactrim for cellulitis .  Wound culture from the lower extremity positive for Streptococcus dysgalactiae.  Patient currently evaluated by cardiology for TAVR due to severe aortic stenosis; surgery to be performed at [facility].  In the emergency room he was found anemic and with a reactive leukocytosis.  Currently receiving PRBC transfusion for hemoglobin around 7.3 g/dL.  Chest x-ray showed cardiomegaly.  Hematology consulted for anemia.    PAST MEDICAL & SURGICAL HISTORY:  Dyslipidemia  Hypertension  Chronic GERD  History of aortic stenosis  No significant past surgical history    MEDICATION:  aspirin  chewable 81 milliGRAM(s) Oral daily  cephalexin 500 milliGRAM(s) Oral three times a day  heparin   Injectable 5000 Unit(s) SubCutaneous every 8 hours  influenza   Vaccine 0.5 milliLiter(s) IntraMuscular once  metoprolol succinate ER 25 milliGRAM(s) Oral daily  pantoprazole    Tablet 40 milliGRAM(s) Oral before breakfast  tamsulosin 0.4 milliGRAM(s) Oral at bedtime    ALLERGIES:  No Known Drug Allergies  garlic (Angioedema; Swelling; Anaphylaxis)    FAMILY HISTORY:  Reviewed, non-contributory: [x ]     SOCIAL HISTORY:  Tobacco: YES [ ]  ; NO [ ]; Former smoker [x ]  Alcohol:   YES [ ]  ; NO [ x]; Social alcohol user [ ]    REVIEW SYSTEMS:  Constitutional: no fever, chills, night sweats, no weight loss  HEENT: denies visual changes; no oral ulcers, dysphagia, no epistaxis;   Respiratory: no dyspnea , wheezing, cough, hemoptysis  Cardiovascular: denies acute chest pain, palpitations  GI: no loss of appetite, dark stools, or abdominal tenderness / pain; no change in bowel habits.  Musculoskeletal: no new back pain, bone/ joint pain ,no extremity swelling  Integumentary: denies pruritus; no skin rash, bruises, no  suspicious skin lesions  Neurologic: denies peripheral numbness, no dizziness, no gait problems.  Heme: no reported easy bruisability; no lymph node enlargement    VITALS:  T(C): 36.9, Max: 36.9 (01-28-24 @ 21:05)  T(F): 98.4, Max: 98.4 (01-28-24 @ 21:05)  HR: 72 (71 - 75)  BP: 123/80 (103/65 - 123/80)  SpO2: 95% (91% - 98%)    PHYSICAL EXAM:  Constitutional: alert, well developed  HEENT: normocephalic, anicteric sclerae, no mucositis or thrush  Respiratory: bilateral clear to auscultation anteriorly  Cardiovascular : S1, S2 regular, rhythmic, no murmurs, gallops or rubs  Abdomen: soft, distended, + normoactive BS, no palpable HS- megaly  Extremities: no tenderness;  -c/c/e, pulses equal bilaterally  Integumentary: no rashes, scars, or lesions suggestive of malignancy  Neurologic: no gross focal deficits    LABS:  (01-28) WBC: 9.31 K/uL,Hemoglobin: 7.3 g/dL, Hematocrit: 23.9 %,  Platelet: 210 K/uL    RADIOLOGY:    ACC: [id number] EXAM:  CT ABDOMEN AND PELVIS   ORDERED BY: MICHELLE HALL     ACC: [id number] EXAM:  CT CHEST   ORDERED BY: MICHELLE HALL     PROCEDURE DATE:  01/28/2024          INTERPRETATION:  CLINICAL INFORMATION: Anemia. Evaluate for malignancy.    COMPARISON: Right upper quadrant ultrasound 1/26/2024.    CONTRAST/COMPLICATIONS:  IV Contrast: NONE  Oral Contrast: NONE  Complications: None reported at time of study completion    PROCEDURE:  CT of the Chest, Abdomen and Pelvis was performed.  Sagittal and coronal reformats were performed.    FINDINGS:  Limited evaluation of the vasculature and viscera in the absence of   intravenous contrast.    CHEST:  LUNGS AND LARGE AIRWAYS: Patent central airways. Imaging of the lung   fields, particularly inferiorly, is motion degraded. This may limit   evaluation. Otherwise, no discrete infiltrate or nodule identified.   Bilateral subsegmental atelectasis.  PLEURA: Trace right pleural effusion.  VESSELS: Atherosclerotic changes.  HEART: Marked cardiomegaly. Aortic valve calcification. Hypoattenuation   of the blood pool relative to myocardium, consistent with anemia. No   pericardial effusion.  MEDIASTINUM AND KATIA: Nonspecific mildly prominent mediastinal lymph   nodes. For reference, a para-aortic node measures 2.2 x 1.0 cm (series 2   image 26).  CHEST WALL AND LOWER NECK: Within normal limits.    ABDOMEN AND PELVIS:  Imaging of the upper abdomen is motion degraded.    LIVER: Hepatomegaly, the right lobe measuring 19.8 cm craniocaudally.  BILE DUCTS: Normal caliber.  GALLBLADDER: Within normal limits.  SPLEEN: Within normal limits.  PANCREAS: Within normal limits.  ADRENALS: Indeterminate right adrenal nodule measuring 1.3 cm.  KIDNEYS/URETERS: Right upper pole renal cyst. Subcentimeter   hyperattenuating foci in the left kidney that likely represent   proteinaceous/hemorrhagic cysts. No hydronephrosis.    BLADDER: Mild bladder wall thickening.  REPRODUCTIVE ORGANS: The prostate is mildly enlarged.    BOWEL: Moderate hiatal hernia. No bowel obstruction. Colonic diverticula.   No evidence for acute diverticulitis.  PERITONEUM: Small volume ascites.  VESSELS: Atherosclerotic changes.  RETROPERITONEUM/LYMPH NODES: Nonspecific prominent retroperitoneal,   bilateral iliac chain, and bilateral inguinal lymph nodes. For example, a   left inguinal node measures 2.6 x 1.6 cm (series 2 image 175), and a left   para-aortic node measures 1.5 x 1.4 cm (series 2 image 100).  ABDOMINAL WALL: Generalized subcutaneous edema. Subcutaneous cystic   appearing nodular focus measuring 1.2 cm in the left anterior upper   abdominal/lower chest wall (series 2 image 78).  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 Degenerative changes.    IMPRESSION:  *  Exam may be limited by noncontrast technique and motion.  *  Prominent nonspecific mediastinal, retroperitoneal, iliac chain, and   inguinal lymph nodes.  *  Marked cardiomegaly. Aortic valve calcification.  *  Trace right pleural effusion.  *  Hepatomegaly.  *  Indeterminate right adrenal nodule measuring 1.3 cm.  *  Mild urinary bladder wall thickening, which could be due to   underdistention, chronic bladder outlet obstruction, or cystitis.   Correlate with urinalysis.  *  Mildly enlarged prostate.  *  Small volume ascites.  *  Subcutaneous cystic-appearing nodular focus measuring 1.2 cm in the   left anterior upper abdominal/lower chest wall, which may represent an   epidermal inclusion cyst.  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 Recommend correlation for point tenderness.

## 2024-01-29 NOTE — PROGRESS NOTE ADULT - ASSESSMENT
Patient is a 63y male with a past history of AS, HTN,HLD, venbous stasis disease,and GERD who came to ER on 1/25 with leg pain and swelling.  He had been given bactrim a few weeks ago, treatment for "cellulitis", no impact.  He denies any fever or chills.He has known AS, a cardiac evaluation is planned.He had a woiund culture from [facility] a few weeks ago with a group B strep.  Zosyn has been started in ER.  I do not see any evidence of an active cellulitis.He has fairly symmetric leg findings which I think are all c/w venous stasis.  He is afebrile with a normal wbc count, failure of antibiotics may imply that underlying process is not infection  He has received 4 days of cephalexin.Cardiology is evaluating for CAD and AS  Suggest:  1 Leg elevation as needed  2 Can limit cephalexin to a total of 5 days- day 4/5  3 No ID objection to cardiac cath and TAVR evaluation.  4 With stable appearance from ID viewpoint and no signs of ongoing infection we will stop actively following, please call if ID issues arise

## 2024-01-29 NOTE — PROGRESS NOTE ADULT - NS ATTEND AMEND GEN_ALL_CORE FT
Pt seen and examined at bedside.  No acute events overnight  Pt denies cp, palpitations, sob, abd pain, N/V, fever, chills    Pt w/ known Severe AS, p/w LE swelling, noted to have new onset CHF exacerbation.   Continue diuresis  Cardiology on board

## 2024-01-29 NOTE — PROGRESS NOTE ADULT - SUBJECTIVE AND OBJECTIVE BOX
Patient is a 63y old  Male who presents with a chief complaint of CHF exacerbation (2024 21:20)      Patient seen and examined at bedside. No overnight events reported.   He report b/l leg swelling, but states his breathing is "ok".     ALLERGIES:  No Known Drug Allergies  garlic (Angioedema; Swelling; Anaphylaxis)    MEDICATIONS  (STANDING):  aspirin  chewable 81 milliGRAM(s) Oral daily  cephalexin 500 milliGRAM(s) Oral three times a day  heparin   Injectable 5000 Unit(s) SubCutaneous every 8 hours  influenza   Vaccine 0.5 milliLiter(s) IntraMuscular once  metoprolol succinate ER 25 milliGRAM(s) Oral daily  pantoprazole    Tablet 40 milliGRAM(s) Oral before breakfast  tamsulosin 0.4 milliGRAM(s) Oral at bedtime    MEDICATIONS  (PRN):  aluminum hydroxide/magnesium hydroxide/simethicone Suspension 30 milliLiter(s) Oral every 4 hours PRN Dyspepsia  melatonin 3 milliGRAM(s) Oral at bedtime PRN Insomnia  ondansetron Injectable 4 milliGRAM(s) IV Push every 8 hours PRN Nausea and/or Vomiting  traMADol 50 milliGRAM(s) Oral two times a day PRN Moderate Pain (4 - 6)    Vital Signs Last 24 Hrs  T(F): 98.1 (2024 05:25), Max: 98.4 (2024 21:05)  HR: 71 (2024 05:25) (71 - 75)  BP: 121/78 (2024 05:25) (103/65 - 129/71)  RR: 18 (2024 05:25) (18 - 18)  SpO2: 98% (2024 05:25) (91% - 98%)  I&O's Summary    2024 07:01  -  2024 07:00  --------------------------------------------------------  IN: 0 mL / OUT: 400 mL / NET: -400 mL      PHYSICAL EXAM:  General: NAD, A/O x 3  ENT: No gross hearing impairment, Moist mucous membranes, no thrush  Neck: Supple, No JVD  Lungs: good air entry, non-labored breathing, decreased breath sounds at bases  Cardio: RRR, S1/S2, +blowing murmur  Abdomen: Soft, Nontender, Nondistended; Bowel sounds present  Extremities: b/l 2+ pitting edema at lower exts, skin with erythema R >L, No calf tenderness, No cyanosis  Psych: Appropriate mood and affect    LABS:                        7.3    9.31  )-----------( 210      ( 2024 06:53 )             23.9         139  |  105  |  20  ----------------------------<  93  3.7   |  24  |  1.56    Ca    8.2      2024 06:53    TPro  5.9  /  Alb  2.2  /  TBili  0.8  /  DBili  x   /  AST  39  /  ALT  217  /  AlkPhos  92   Chol 106 mg/dL LDL -- HDL 38 mg/dL Trig 58 mg/dL  TSH 2.165   TSH with FT4 reflex --  Total T3 --                  Urinalysis Basic - ( 2024 15:21 )    Color: Yellow / Appearance: Clear / S.005 / pH: x  Gluc: x / Ketone: Negative mg/dL  / Bili: Negative / Urobili: 1.0 mg/dL   Blood: x / Protein: Negative mg/dL / Nitrite: Negative   Leuk Esterase: Negative / RBC: 0 /HPF / WBC 1 /HPF   Sq Epi: x / Non Sq Epi: x / Bacteria: Negative /HPF        Culture - Blood (collected 2024 16:55)  Source: .Blood Blood-Peripheral  Preliminary Report (2024 01:02):    No growth at 72 Hours    Culture - Blood (collected 2024 16:50)  Source: .Blood Blood-Peripheral  Preliminary Report (2024 01:02):    No growth at 72 Hours        RADIOLOGY & ADDITIONAL TESTS:  < from: TTE Echo Complete w/o Contrast w/ Doppler (24 @ 08:26) >     1. Left ventricular ejection fraction, by visual estimation, is 35 to   40%.   2. Moderately decreased global left ventricular systolic function.   3. Basal inferior segment, mid inferolateral segment, mid anterolateral   segment, and mid inferior segment are abnormal as described above.   4. Severely enlarged left atrium.   5. Increased LV wall thickness.   6. Mildly increased left ventricular internal cavity size.   7. Spectral Doppler shows restrictivepattern of left ventricular   myocardial filling (Grade III diastolic dysfunction).   8. There is mild concentric left ventricular hypertrophy.   9. Moderately enlarged right ventricle.  10. Right atrial enlargement.  11. Trivial pericardial effusion.  12. Moderate mitral valve regurgitation.  13. Thickening of the anterior and posterior mitral valve leaflets.  14. Mild-moderate tricuspid regurgitation.  15. Mild aortic regurgitation.  16. Severe aortic valve stenosis.  17. Dilatation of the ascending aorta.  18. Estimated pulmonary artery systolic pressure is 58.6 mmHg assuming a   right atrial pressure of 15 mmHg, which is consistent with moderate   pulmonary hypertension.  19. LA volume Index is 56.0 ml/m² ml/m2.  20. The Dimesionless Index value is .17.    < end of copied text >  < from: CT Abdomen and Pelvis No Cont (24 @ 12:17) >    IMPRESSION:  *  Exam may be limited by noncontrast technique and motion.  *  Prominent nonspecific mediastinal, retroperitoneal, iliac chain, and   inguinal lymph nodes.  *  Marked cardiomegaly. Aortic valve calcification.  *  Trace right pleural effusion.  *  Hepatomegaly.  *  Indeterminate right adrenal nodule measuring 1.3 cm.  *  Mild urinary bladder wall thickening, which could be due to   underdistention, chronic bladder outlet obstruction, or cystitis.   Correlate with urinalysis.  *  Mildly enlarged prostate.  *  Small volume ascites.  *  Subcutaneous cystic-appearing nodular focus measuring 1.2 cm in the   left anterior upper abdominal/lower chest wall, which may represent an   epidermal inclusion cyst.  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 Recommend correlation for point tenderness.      < end of copied text >    Care Discussed with Consultants/Other Providers:    Patient is a 63y old  Male who presents with a chief complaint of CHF exacerbation (2024 21:20)      Patient seen and examined at bedside. No overnight events reported.   He reports b/l leg swelling, but states his breathing is "ok".     ALLERGIES:  No Known Drug Allergies  garlic (Angioedema; Swelling; Anaphylaxis)    MEDICATIONS  (STANDING):  aspirin  chewable 81 milliGRAM(s) Oral daily  cephalexin 500 milliGRAM(s) Oral three times a day  heparin   Injectable 5000 Unit(s) SubCutaneous every 8 hours  influenza   Vaccine 0.5 milliLiter(s) IntraMuscular once  metoprolol succinate ER 25 milliGRAM(s) Oral daily  pantoprazole    Tablet 40 milliGRAM(s) Oral before breakfast  tamsulosin 0.4 milliGRAM(s) Oral at bedtime    MEDICATIONS  (PRN):  aluminum hydroxide/magnesium hydroxide/simethicone Suspension 30 milliLiter(s) Oral every 4 hours PRN Dyspepsia  melatonin 3 milliGRAM(s) Oral at bedtime PRN Insomnia  ondansetron Injectable 4 milliGRAM(s) IV Push every 8 hours PRN Nausea and/or Vomiting  traMADol 50 milliGRAM(s) Oral two times a day PRN Moderate Pain (4 - 6)    Vital Signs Last 24 Hrs  T(F): 98.1 (2024 05:25), Max: 98.4 (2024 21:05)  HR: 71 (2024 05:25) (71 - 75)  BP: 121/78 (2024 05:25) (103/65 - 129/71)  RR: 18 (2024 05:25) (18 - 18)  SpO2: 98% (2024 05:25) (91% - 98%)  I&O's Summary    2024 07:01  -  2024 07:00  --------------------------------------------------------  IN: 0 mL / OUT: 400 mL / NET: -400 mL      PHYSICAL EXAM:  General: NAD, A/O x 3  ENT: No gross hearing impairment, Moist mucous membranes, no thrush  Neck: Supple, No JVD  Lungs: good air entry, non-labored breathing, decreased breath sounds at bases  Cardio: RRR, S1/S2, +blowing murmur  Abdomen: Soft, Nontender, Nondistended; Bowel sounds present  Extremities: b/l 2+ pitting edema at lower exts, skin with erythema R >L, No calf tenderness, No cyanosis  Psych: Appropriate mood and affect    LABS:                        7.3    9.31  )-----------( 210      ( 2024 06:53 )             23.9         139  |  105  |  20  ----------------------------<  93  3.7   |  24  |  1.56    Ca    8.2      2024 06:53    TPro  5.9  /  Alb  2.2  /  TBili  0.8  /  DBili  x   /  AST  39  /  ALT  217  /  AlkPhos  92   Chol 106 mg/dL LDL -- HDL 38 mg/dL Trig 58 mg/dL  TSH 2.165   TSH with FT4 reflex --  Total T3 --                  Urinalysis Basic - ( 2024 15:21 )    Color: Yellow / Appearance: Clear / S.005 / pH: x  Gluc: x / Ketone: Negative mg/dL  / Bili: Negative / Urobili: 1.0 mg/dL   Blood: x / Protein: Negative mg/dL / Nitrite: Negative   Leuk Esterase: Negative / RBC: 0 /HPF / WBC 1 /HPF   Sq Epi: x / Non Sq Epi: x / Bacteria: Negative /HPF        Culture - Blood (collected 2024 16:55)  Source: .Blood Blood-Peripheral  Preliminary Report (2024 01:02):    No growth at 72 Hours    Culture - Blood (collected 2024 16:50)  Source: .Blood Blood-Peripheral  Preliminary Report (2024 01:02):    No growth at 72 Hours        RADIOLOGY & ADDITIONAL TESTS:  < from: TTE Echo Complete w/o Contrast w/ Doppler (24 @ 08:26) >     1. Left ventricular ejection fraction, by visual estimation, is 35 to   40%.   2. Moderately decreased global left ventricular systolic function.   3. Basal inferior segment, mid inferolateral segment, mid anterolateral   segment, and mid inferior segment are abnormal as described above.   4. Severely enlarged left atrium.   5. Increased LV wall thickness.   6. Mildly increased left ventricular internal cavity size.   7. Spectral Doppler shows restrictivepattern of left ventricular   myocardial filling (Grade III diastolic dysfunction).   8. There is mild concentric left ventricular hypertrophy.   9. Moderately enlarged right ventricle.  10. Right atrial enlargement.  11. Trivial pericardial effusion.  12. Moderate mitral valve regurgitation.  13. Thickening of the anterior and posterior mitral valve leaflets.  14. Mild-moderate tricuspid regurgitation.  15. Mild aortic regurgitation.  16. Severe aortic valve stenosis.  17. Dilatation of the ascending aorta.  18. Estimated pulmonary artery systolic pressure is 58.6 mmHg assuming a   right atrial pressure of 15 mmHg, which is consistent with moderate   pulmonary hypertension.  19. LA volume Index is 56.0 ml/m² ml/m2.  20. The Dimesionless Index value is .17.    < end of copied text >  < from: CT Abdomen and Pelvis No Cont (24 @ 12:17) >    IMPRESSION:  *  Exam may be limited by noncontrast technique and motion.  *  Prominent nonspecific mediastinal, retroperitoneal, iliac chain, and   inguinal lymph nodes.  *  Marked cardiomegaly. Aortic valve calcification.  *  Trace right pleural effusion.  *  Hepatomegaly.  *  Indeterminate right adrenal nodule measuring 1.3 cm.  *  Mild urinary bladder wall thickening, which could be due to   underdistention, chronic bladder outlet obstruction, or cystitis.   Correlate with urinalysis.  *  Mildly enlarged prostate.  *  Small volume ascites.  *  Subcutaneous cystic-appearing nodular focus measuring 1.2 cm in the   left anterior upper abdominal/lower chest wall, which may represent an   epidermal inclusion cyst.  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 Recommend correlation for point tenderness.      < end of copied text >    Care Discussed with Consultants/Other Providers:

## 2024-01-30 ENCOUNTER — APPOINTMENT (OUTPATIENT)
Dept: FAMILY MEDICINE | Facility: CLINIC | Age: 64
End: 2024-01-30

## 2024-01-30 LAB
ANION GAP SERPL CALC-SCNC: 9 MMOL/L — SIGNIFICANT CHANGE UP (ref 5–17)
BUN SERPL-MCNC: 21 MG/DL — SIGNIFICANT CHANGE UP (ref 7–23)
CALCIUM SERPL-MCNC: 8.5 MG/DL — SIGNIFICANT CHANGE UP (ref 8.4–10.5)
CHLORIDE SERPL-SCNC: 105 MMOL/L — SIGNIFICANT CHANGE UP (ref 96–108)
CO2 SERPL-SCNC: 27 MMOL/L — SIGNIFICANT CHANGE UP (ref 22–31)
CREAT SERPL-MCNC: 1.61 MG/DL — HIGH (ref 0.5–1.3)
EGFR: 48 ML/MIN/1.73M2 — LOW
GLUCOSE SERPL-MCNC: 98 MG/DL — SIGNIFICANT CHANGE UP (ref 70–99)
HCT VFR BLD CALC: 24.9 % — LOW (ref 39–50)
HGB BLD-MCNC: 7.6 G/DL — LOW (ref 13–17)
MCHC RBC-ENTMCNC: 18.8 PG — LOW (ref 27–34)
MCHC RBC-ENTMCNC: 30.5 GM/DL — LOW (ref 32–36)
MCV RBC AUTO: 61.5 FL — LOW (ref 80–100)
NRBC # BLD: 1 /100 WBCS — HIGH (ref 0–0)
NT-PROBNP SERPL-SCNC: HIGH PG/ML (ref 0–300)
PLATELET # BLD AUTO: 237 K/UL — SIGNIFICANT CHANGE UP (ref 150–400)
POTASSIUM SERPL-MCNC: 3.7 MMOL/L — SIGNIFICANT CHANGE UP (ref 3.5–5.3)
POTASSIUM SERPL-SCNC: 3.7 MMOL/L — SIGNIFICANT CHANGE UP (ref 3.5–5.3)
RBC # BLD: 4.05 M/UL — LOW (ref 4.2–5.8)
RBC # FLD: 21.1 % — HIGH (ref 10.3–14.5)
SODIUM SERPL-SCNC: 141 MMOL/L — SIGNIFICANT CHANGE UP (ref 135–145)
WBC # BLD: 10.73 K/UL — HIGH (ref 3.8–10.5)
WBC # FLD AUTO: 10.73 K/UL — HIGH (ref 3.8–10.5)

## 2024-01-30 PROCEDURE — 99232 SBSQ HOSP IP/OBS MODERATE 35: CPT

## 2024-01-30 RX ORDER — IRON SUCROSE 20 MG/ML
300 INJECTION, SOLUTION INTRAVENOUS EVERY 24 HOURS
Refills: 0 | Status: COMPLETED | OUTPATIENT
Start: 2024-01-30 | End: 2024-01-31

## 2024-01-30 RX ADMIN — Medication 81 MILLIGRAM(S): at 13:54

## 2024-01-30 RX ADMIN — PANTOPRAZOLE SODIUM 40 MILLIGRAM(S): 20 TABLET, DELAYED RELEASE ORAL at 05:53

## 2024-01-30 RX ADMIN — TRAMADOL HYDROCHLORIDE 50 MILLIGRAM(S): 50 TABLET ORAL at 01:27

## 2024-01-30 RX ADMIN — Medication 500 MILLIGRAM(S): at 05:54

## 2024-01-30 RX ADMIN — TRAMADOL HYDROCHLORIDE 50 MILLIGRAM(S): 50 TABLET ORAL at 19:08

## 2024-01-30 RX ADMIN — TRAMADOL HYDROCHLORIDE 50 MILLIGRAM(S): 50 TABLET ORAL at 19:29

## 2024-01-30 RX ADMIN — TAMSULOSIN HYDROCHLORIDE 0.4 MILLIGRAM(S): 0.4 CAPSULE ORAL at 21:29

## 2024-01-30 RX ADMIN — HEPARIN SODIUM 5000 UNIT(S): 5000 INJECTION INTRAVENOUS; SUBCUTANEOUS at 21:29

## 2024-01-30 RX ADMIN — Medication 500 MILLIGRAM(S): at 21:29

## 2024-01-30 RX ADMIN — HEPARIN SODIUM 5000 UNIT(S): 5000 INJECTION INTRAVENOUS; SUBCUTANEOUS at 13:54

## 2024-01-30 RX ADMIN — Medication 3 MILLIGRAM(S): at 00:27

## 2024-01-30 RX ADMIN — Medication 25 MILLIGRAM(S): at 05:53

## 2024-01-30 RX ADMIN — Medication 500 MILLIGRAM(S): at 13:54

## 2024-01-30 RX ADMIN — HEPARIN SODIUM 5000 UNIT(S): 5000 INJECTION INTRAVENOUS; SUBCUTANEOUS at 05:54

## 2024-01-30 RX ADMIN — IRON SUCROSE 176.67 MILLIGRAM(S): 20 INJECTION, SOLUTION INTRAVENOUS at 21:30

## 2024-01-30 RX ADMIN — TRAMADOL HYDROCHLORIDE 50 MILLIGRAM(S): 50 TABLET ORAL at 00:27

## 2024-01-30 NOTE — PROGRESS NOTE ADULT - PROBLEM SELECTOR PLAN 1
Patient with history of cellulitis, on antibiotics, and extensive cardiac history.  All factors may contribute to anemia–complex mechanism.  Transfuse PRBC as needed.  Mechanical hemolysis due to severe AS cannot be excluded.  Will continue to support with transfusion on as-needed basis.  Discontinued antibiotic as there is no evidence of infectious disease.  Reduce amount of unwanted phlebotomies/venipunctures.  Cardiac workup and treatment in progress. Chronic anemia, transfusion dependent, possible a chronic hemolytic component given severity of valvulopathy.  Patient's hemoglobin stable at 7.6 g/dL.  Continue transfusion support to maintain hemoglobin above 8 g/dL.  Continue treatment for CHF per cardiology.  Will monitor CBC.

## 2024-01-30 NOTE — PROGRESS NOTE ADULT - NS ATTEND AMEND GEN_ALL_CORE FT
Pt seen and examined at bedside.  No acute events overnight  Pt denies cp, palpitations, sob, abd pain, N/V, fever, chills    Pt w/ known Severe AS, p/w LE swelling, noted to have new onset CHF exacerbation.   Continue diuresis  Cardiology on board    Hematology recommendations appreciated regarding Anemia  Component of Iron deficiency noted  Start Iron IV Infusion

## 2024-01-30 NOTE — PROGRESS NOTE ADULT - SUBJECTIVE AND OBJECTIVE BOX
Resting    Vital Signs Last 24 Hrs  T(C): 36.7 (01-30-24 @ 20:45), Max: 36.8 (01-30-24 @ 04:59)  T(F): 98 (01-30-24 @ 20:45), Max: 98.3 (01-30-24 @ 04:59)  HR: 79 (01-30-24 @ 20:45) (74 - 90)  BP: 115/68 (01-30-24 @ 20:45) (115/68 - 120/80)  RR: 17 (01-30-24 @ 20:45) (17 - 18)  SpO2: 96% (01-30-24 @ 20:45) (96% - 97%)    Lungs b/l air entry  Heart S1S2   Abd soft ND  Extremities: sm edema                                 7.6    10.73 )-----------( 237      ( 30 Jan 2024 05:35 )             24.9     30 Jan 2024 05:35    141    |  105    |  21     ----------------------------<  98     3.7     |  27     |  1.61     Ca    8.5        30 Jan 2024 05:35    aluminum hydroxide/magnesium hydroxide/simethicone Suspension 30 milliLiter(s) Oral every 4 hours PRN  aspirin  chewable 81 milliGRAM(s) Oral daily  cephalexin 500 milliGRAM(s) Oral three times a day  heparin   Injectable 5000 Unit(s) SubCutaneous every 8 hours  influenza   Vaccine 0.5 milliLiter(s) IntraMuscular once  iron sucrose IVPB 300 milliGRAM(s) IV Intermittent every 24 hours  melatonin 3 milliGRAM(s) Oral at bedtime PRN  metoprolol succinate ER 25 milliGRAM(s) Oral daily  ondansetron Injectable 4 milliGRAM(s) IV Push every 8 hours PRN  pantoprazole    Tablet 40 milliGRAM(s) Oral before breakfast  tamsulosin 0.4 milliGRAM(s) Oral at bedtime  traMADol 50 milliGRAM(s) Oral two times a day PRN    Assessment/Plan:    CM, EF 35-40%, severe AS  CKD 3, stable   UA negative   Imaging w/o hydro   F/u BMP, Mg   Diuretics as needed  JESI, anemia, heme/onc eval   Will follow    [phone]

## 2024-01-30 NOTE — PROGRESS NOTE ADULT - SUBJECTIVE AND OBJECTIVE BOX
Patient is a 63y old  Male who presents with a chief complaint of CHF exacerbation (29 Jan 2024 22:49)    Patient seen and examined at bedside.  no acute overnight events    ALLERGIES:  No Known Drug Allergies  garlic (Angioedema; Swelling; Anaphylaxis)        Vital Signs Last 24 Hrs  T(F): 98.3 (30 Jan 2024 04:59), Max: 98.4 (29 Jan 2024 12:00)  HR: 90 (30 Jan 2024 04:59) (72 - 90)  BP: 115/72 (30 Jan 2024 04:59) (114/68 - 123/80)  RR: 17 (30 Jan 2024 04:59) (17 - 17)  SpO2: 96% (30 Jan 2024 04:59) (93% - 96%)  I&O's Summary    29 Jan 2024 07:01  -  30 Jan 2024 07:00  --------------------------------------------------------  IN: 200 mL / OUT: 0 mL / NET: 200 mL      MEDICATIONS:  aluminum hydroxide/magnesium hydroxide/simethicone Suspension 30 milliLiter(s) Oral every 4 hours PRN  aspirin  chewable 81 milliGRAM(s) Oral daily  cephalexin 500 milliGRAM(s) Oral three times a day  heparin   Injectable 5000 Unit(s) SubCutaneous every 8 hours  influenza   Vaccine 0.5 milliLiter(s) IntraMuscular once  melatonin 3 milliGRAM(s) Oral at bedtime PRN  metoprolol succinate ER 25 milliGRAM(s) Oral daily  ondansetron Injectable 4 milliGRAM(s) IV Push every 8 hours PRN  pantoprazole    Tablet 40 milliGRAM(s) Oral before breakfast  tamsulosin 0.4 milliGRAM(s) Oral at bedtime  traMADol 50 milliGRAM(s) Oral two times a day PRN      PHYSICAL EXAM:  General: NAD, A/O x 3  ENT: MMM, no thrush  Neck: Supple, No JVD  Lungs: Clear BS, Bilateral air entry, non labored  Cardio: S1S2 regular, +murmur  Abdomen: Soft, Nontender, Nondistended; Bowel sounds present  Extremities: No cyanosis, B/L LE edema, Bilateral venous stasis with skin changes    LABS:                        7.6    10.73 )-----------( 237      ( 30 Jan 2024 05:35 )             24.9     01-30    141  |  105  |  21  ----------------------------<  98  3.7   |  27  |  1.61    Ca    8.5      30 Jan 2024 05:35    TPro  5.9  /  Alb  2.2  /  TBili  0.8  /  DBili  x   /  AST  39  /  ALT  217  /  AlkPhos  92  01-28 01-27 Chol 106 mg/dL LDL -- HDL 38 mg/dL Trig 58 mg/dL                  Urinalysis Basic - ( 30 Jan 2024 05:35 )    Color: x / Appearance: x / SG: x / pH: x  Gluc: 98 mg/dL / Ketone: x  / Bili: x / Urobili: x   Blood: x / Protein: x / Nitrite: x   Leuk Esterase: x / RBC: x / WBC x   Sq Epi: x / Non Sq Epi: x / Bacteria: x        Culture - Blood (collected 25 Jan 2024 16:55)  Source: .Blood Blood-Peripheral  Preliminary Report (30 Jan 2024 01:01):    No growth at 4 days    Culture - Blood (collected 25 Jan 2024 16:50)  Source: .Blood Blood-Peripheral  Preliminary Report (30 Jan 2024 01:01):    No growth at 4 days          RADIOLOGY & ADDITIONAL TESTS:    Care Discussed with Consultants/Other Providers:

## 2024-01-30 NOTE — PROGRESS NOTE ADULT - SUBJECTIVE AND OBJECTIVE BOX
GRACE FERNNADEZ, 63y Male  MRN: [id number]  ATTENDING: Abdullahi Kellogg    HPI:  63M, PMHx HTN, GERD, dyslipidemia, chronic intermittent leg pain, aortic stenosis, chronic venous stasis dermatitis with bilateral leg swelling, treated outpatient with Bactrim for cellulitis .  Wound culture from the lower extremity positive for Streptococcus dysgalactiae.  Patient currently evaluated by cardiology for TAVR due to severe aortic stenosis; surgery to be performed at [facility].  In the emergency room he was found anemic and with a reactive leukocytosis.  Currently receiving PRBC transfusion for hemoglobin around 7.3 g/dL.  Chest x-ray showed cardiomegaly.  Hematology consulted for anemia.    MEDICATIONS:  aluminum hydroxide/magnesium hydroxide/simethicone Suspension 30 milliLiter(s) Oral every 4 hours PRN  aspirin  chewable 81 milliGRAM(s) Oral daily  cephalexin 500 milliGRAM(s) Oral three times a day  heparin   Injectable 5000 Unit(s) SubCutaneous every 8 hours  influenza   Vaccine 0.5 milliLiter(s) IntraMuscular once  iron sucrose IVPB 300 milliGRAM(s) IV Intermittent every 24 hours  melatonin 3 milliGRAM(s) Oral at bedtime PRN  metoprolol succinate ER 25 milliGRAM(s) Oral daily  ondansetron Injectable 4 milliGRAM(s) IV Push every 8 hours PRN  pantoprazole    Tablet 40 milliGRAM(s) Oral before breakfast  tamsulosin 0.4 milliGRAM(s) Oral at bedtime  traMADol 50 milliGRAM(s) Oral two times a day PRN    All other medications reviewed.    SUBJECTIVE:    VITALS:  T(C): 36.8 (01-30-24 @ 04:59), Max: 36.8 (01-30-24 @ 04:59)  T(F): 98.3 (01-30-24 @ 04:59), Max: 98.3 (01-30-24 @ 04:59)  HR: 90 (01-30-24 @ 04:59) (77 - 90)  BP: 115/72 (01-30-24 @ 04:59) (114/68 - 115/72)      PHYSICAL EXAM:  Constitutional: alert, well developed  HEENT: normocephalic, anicteric sclerae, no mucositis or thrush  Respiratory: bilateral clear to auscultation anteriorly  Cardiovascular : S1, S2 regular, rhythmic, no murmurs, gallops or rubs  Abdomen: soft, distended, + normoactive BS, no palpable HS- megaly  Extremities: no tenderness;  -c/c/e, pulses equal bilaterally    LABS:  (01-30) WBC: 10.73 K/uL,Hemoglobin: 7.6 g/dL, Hematocrit: 24.9 %,  Platelet: 237 K/uL  (01-30) Na: 141 mmol/L ; K: 3.7 mmol/L ; BUN: 21 mg/dL ; Cr: 1.61 mg/dL.    RADIOLOGY:  ACC: [id number] EXAM:  CT ABDOMEN AND PELVIS   ORDERED BY: MICHELLE HALL     ACC: [id number] EXAM:  CT CHEST   ORDERED BY: MICHELLE HALL     PROCEDURE DATE:  01/28/2024          INTERPRETATION:  CLINICAL INFORMATION: Anemia. Evaluate for malignancy.    COMPARISON: Right upper quadrant ultrasound 1/26/2024.    CONTRAST/COMPLICATIONS:  IV Contrast: NONE  Oral Contrast: NONE  Complications: None reported at time of study completion    PROCEDURE:  CT of the Chest, Abdomen and Pelvis was performed.  Sagittal and coronal reformats were performed.    FINDINGS:  Limited evaluation of the vasculature and viscera in the absence of   intravenous contrast.    CHEST:  LUNGS AND LARGE AIRWAYS: Patent central airways. Imaging of the lung   fields, particularly inferiorly, is motion degraded. This may limit   evaluation. Otherwise, no discrete infiltrate or nodule identified.   Bilateral subsegmental atelectasis.  PLEURA: Trace right pleural effusion.  VESSELS: Atherosclerotic changes.  HEART: Marked cardiomegaly. Aortic valve calcification. Hypoattenuation   of the blood pool relative to myocardium, consistent with anemia. No   pericardial effusion.  MEDIASTINUM AND KATIA: Nonspecific mildly prominent mediastinal lymph   nodes. For reference, a para-aortic node measures 2.2 x 1.0 cm (series 2   image 26).  CHEST WALL AND LOWER NECK: Within normal limits.    ABDOMEN AND PELVIS:  Imaging of the upper abdomen is motion degraded.    LIVER: Hepatomegaly, the right lobe measuring 19.8 cm craniocaudally.  BILE DUCTS: Normal caliber.  GALLBLADDER: Within normal limits.  SPLEEN: Within normal limits.  PANCREAS: Within normal limits.  ADRENALS: Indeterminate right adrenal nodule measuring 1.3 cm.  KIDNEYS/URETERS: Right upper pole renal cyst. Subcentimeter   hyperattenuating foci in the left kidney that likely represent   proteinaceous/hemorrhagic cysts. No hydronephrosis.    BLADDER: Mild bladder wall thickening.  REPRODUCTIVE ORGANS: The prostate is mildly enlarged.    BOWEL: Moderate hiatal hernia. No bowel obstruction. Colonic diverticula.   No evidence for acute diverticulitis.  PERITONEUM: Small volume ascites.  VESSELS: Atherosclerotic changes.  RETROPERITONEUM/LYMPH NODES: Nonspecific prominent retroperitoneal,   bilateral iliac chain, and bilateral inguinal lymph nodes. For example, a   left inguinal node measures 2.6 x 1.6 cm (series 2 image 175), and a left   para-aortic node measures 1.5 x 1.4 cm (series 2 image 100).  ABDOMINAL WALL: Generalized subcutaneous edema. Subcutaneous cystic   appearing nodular focus measuring 1.2 cm in the left anterior upper   abdominal/lower chest wall (series 2 image 78).  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 Degenerative changes.    IMPRESSION:  *  Exam may be limited by noncontrast technique and motion.  *  Prominent nonspecific mediastinal, retroperitoneal, iliac chain, and   inguinal lymph nodes.  *  Marked cardiomegaly. Aortic valve calcification.  *  Trace right pleural effusion.  *  Hepatomegaly.  *  Indeterminate right adrenal nodule measuring 1.3 cm.  *  Mild urinary bladder wall thickening, which could be due to   underdistention, chronic bladder outlet obstruction, or cystitis.   Correlate with urinalysis.  *  Mildly enlarged prostate.  *  Small volume ascites.  *  Subcutaneous cystic-appearing nodular focus measuring 1.2 cm in the   left anterior upper abdominal/lower chest wall, which may represent an   epidermal inclusion cyst.  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 Recommend correlation for point tenderness.     GRACE FERNANDEZ, 63y Male  MRN: [id number]  ATTENDING: Abdullahi Kellogg    HPI:  63M, PMHx HTN, GERD, dyslipidemia, chronic intermittent leg pain, aortic stenosis, chronic venous stasis dermatitis with bilateral leg swelling, treated outpatient with Bactrim for cellulitis .  Wound culture from the lower extremity positive for Streptococcus dysgalactiae.  Patient currently evaluated by cardiology for TAVR due to severe aortic stenosis; surgery to be performed at [facility].  In the emergency room he was found anemic and with a reactive leukocytosis.  Currently receiving PRBC transfusion for hemoglobin around 7.3 g/dL.  Chest x-ray showed cardiomegaly.  Hematology consulted for anemia.    MEDICATIONS:  aluminum hydroxide/magnesium hydroxide/simethicone Suspension 30 milliLiter(s) Oral every 4 hours PRN  aspirin  chewable 81 milliGRAM(s) Oral daily  cephalexin 500 milliGRAM(s) Oral three times a day  heparin   Injectable 5000 Unit(s) SubCutaneous every 8 hours  influenza   Vaccine 0.5 milliLiter(s) IntraMuscular once  iron sucrose IVPB 300 milliGRAM(s) IV Intermittent every 24 hours  melatonin 3 milliGRAM(s) Oral at bedtime PRN  metoprolol succinate ER 25 milliGRAM(s) Oral daily  ondansetron Injectable 4 milliGRAM(s) IV Push every 8 hours PRN  pantoprazole    Tablet 40 milliGRAM(s) Oral before breakfast  tamsulosin 0.4 milliGRAM(s) Oral at bedtime  traMADol 50 milliGRAM(s) Oral two times a day PRN    All other medications reviewed.    SUBJECTIVE:  Alert, denies chest pain or shortness of breath at rest.  Appears depressed.  Tolerated PRBC and iron infusion well.    VITALS:  T(C): 36.8 (01-30-24 @ 04:59), Max: 36.8 (01-30-24 @ 04:59)  T(F): 98.3 (01-30-24 @ 04:59), Max: 98.3 (01-30-24 @ 04:59)  HR: 90 (01-30-24 @ 04:59) (77 - 90)  BP: 115/72 (01-30-24 @ 04:59) (114/68 - 115/72)    PHYSICAL EXAM:  Constitutional: alert, well developed  HEENT: normocephalic, anicteric sclerae, no mucositis or thrush  Respiratory: bilateral clear to auscultation anteriorly  Cardiovascular : S1, S2 regular, rhythmic, no murmurs, gallops or rubs  Abdomen: soft, distended, + normoactive BS, no palpable HS- megaly  Extremities: no tenderness;  -c/c/e, pulses equal bilaterally    LABS:  (01-30) WBC: 10.73 K/uL,Hemoglobin: 7.6 g/dL, Hematocrit: 24.9 %,  Platelet: 237 K/uL  (01-30) Na: 141 mmol/L ; K: 3.7 mmol/L ; BUN: 21 mg/dL ; Cr: 1.61 mg/dL.    RADIOLOGY:  ACC: [id number] EXAM:  CT ABDOMEN AND PELVIS   ORDERED BY: MICHELLE HALL     ACC: [id number] EXAM:  CT CHEST   ORDERED BY: MICHELLE HALL     PROCEDURE DATE:  01/28/2024          INTERPRETATION:  CLINICAL INFORMATION: Anemia. Evaluate for malignancy.    COMPARISON: Right upper quadrant ultrasound 1/26/2024.    CONTRAST/COMPLICATIONS:  IV Contrast: NONE  Oral Contrast: NONE  Complications: None reported at time of study completion    PROCEDURE:  CT of the Chest, Abdomen and Pelvis was performed.  Sagittal and coronal reformats were performed.    FINDINGS:  Limited evaluation of the vasculature and viscera in the absence of   intravenous contrast.    CHEST:  LUNGS AND LARGE AIRWAYS: Patent central airways. Imaging of the lung   fields, particularly inferiorly, is motion degraded. This may limit   evaluation. Otherwise, no discrete infiltrate or nodule identified.   Bilateral subsegmental atelectasis.  PLEURA: Trace right pleural effusion.  VESSELS: Atherosclerotic changes.  HEART: Marked cardiomegaly. Aortic valve calcification. Hypoattenuation   of the blood pool relative to myocardium, consistent with anemia. No   pericardial effusion.  MEDIASTINUM AND KATIA: Nonspecific mildly prominent mediastinal lymph   nodes. For reference, a para-aortic node measures 2.2 x 1.0 cm (series 2   image 26).  CHEST WALL AND LOWER NECK: Within normal limits.    ABDOMEN AND PELVIS:  Imaging of the upper abdomen is motion degraded.    LIVER: Hepatomegaly, the right lobe measuring 19.8 cm craniocaudally.  BILE DUCTS: Normal caliber.  GALLBLADDER: Within normal limits.  SPLEEN: Within normal limits.  PANCREAS: Within normal limits.  ADRENALS: Indeterminate right adrenal nodule measuring 1.3 cm.  KIDNEYS/URETERS: Right upper pole renal cyst. Subcentimeter   hyperattenuating foci in the left kidney that likely represent   proteinaceous/hemorrhagic cysts. No hydronephrosis.    BLADDER: Mild bladder wall thickening.  REPRODUCTIVE ORGANS: The prostate is mildly enlarged.    BOWEL: Moderate hiatal hernia. No bowel obstruction. Colonic diverticula.   No evidence for acute diverticulitis.  PERITONEUM: Small volume ascites.  VESSELS: Atherosclerotic changes.  RETROPERITONEUM/LYMPH NODES: Nonspecific prominent retroperitoneal,   bilateral iliac chain, and bilateral inguinal lymph nodes. For example, a   left inguinal node measures 2.6 x 1.6 cm (series 2 image 175), and a left   para-aortic node measures 1.5 x 1.4 cm (series 2 image 100).  ABDOMINAL WALL: Generalized subcutaneous edema. Subcutaneous cystic   appearing nodular focus measuring 1.2 cm in the left anterior upper   abdominal/lower chest wall (series 2 image 78).  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 Degenerative changes.    IMPRESSION:  *  Exam may be limited by noncontrast technique and motion.  *  Prominent nonspecific mediastinal, retroperitoneal, iliac chain, and   inguinal lymph nodes.  *  Marked cardiomegaly. Aortic valve calcification.  *  Trace right pleural effusion.  *  Hepatomegaly.  *  Indeterminate right adrenal nodule measuring 1.3 cm.  *  Mild urinary bladder wall thickening, which could be due to   underdistention, chronic bladder outlet obstruction, or cystitis.   Correlate with urinalysis.  *  Mildly enlarged prostate.  *  Small volume ascites.  *  Subcutaneous cystic-appearing nodular focus measuring 1.2 cm in the   left anterior upper abdominal/lower chest wall, which may represent an   epidermal inclusion cyst.  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 Recommend correlation for point tenderness.

## 2024-01-30 NOTE — PROGRESS NOTE ADULT - ASSESSMENT
63 year old male with Hx of HTN, GERD, dyslipidemia, chronic intermittent leg pain, aortic stenosis, chronic venous stasis dermatitis with bilateral leg swelling, presents for b/l leg pain and swelling.    HFrEF (EF 35-40% 1/2024)  Severe AS  - TTE reviewed; severe AS, mod pulm htn, mod mitral regurg, grade 3 diastolic dysfunction, EF 35-40%  - cont Toprol 25mg daily, not a candidate for ACE/ARB/ARNI due to renal function  - Cardio following; pt received one dose of lasix 40 mg IV on 1/29, continue to dose as needed  -CXR with no pleural effusions, ProBNP very elevated, appears chronic systolic CHF, continue to monitor fluid status  -PT will need cardiac workup for AS - TAVR and Cath - pending medical and cardiac clearance    Anemia of Chronic Disease  Heme appreciated, MF etiology for anemia  Continue to transfuse PRBCs as needed, mechanical hemolysis due to severe AS also possible  FOBT negative, CT APC showed small volume ascites, chronic left 7th rib fx, trace right pleural effusion and prominent lymph nodes  Maintain supportive care    Hypertension  - cont toprol     LINDA vs Chronic Kidney Disease   -Nephrology following, CKD 3 at or near baseline  -UA negative, imaging without hydro, no objection to cautious diuresis as needed  - avoid nephrotoxins, monitor BMP    Transaminitis   - unclear etiology  - follow up hep panel  - RUQ US unremarkable     GERD  - continue PPI     DVT Prophylaxis  - Heparin    Dispo:  pt will update his wife Chanelle [phone]

## 2024-01-31 LAB
ABO RH CONFIRMATION: SIGNIFICANT CHANGE UP
ALBUMIN SERPL ELPH-MCNC: 2.4 G/DL — LOW (ref 3.3–5)
ALP SERPL-CCNC: 82 U/L — SIGNIFICANT CHANGE UP (ref 40–120)
ALT FLD-CCNC: 120 U/L — HIGH (ref 10–45)
ANION GAP SERPL CALC-SCNC: 12 MMOL/L — SIGNIFICANT CHANGE UP (ref 5–17)
AST SERPL-CCNC: 18 U/L — SIGNIFICANT CHANGE UP (ref 10–40)
BASOPHILS # BLD AUTO: 0.12 K/UL — SIGNIFICANT CHANGE UP (ref 0–0.2)
BASOPHILS NFR BLD AUTO: 1.1 % — SIGNIFICANT CHANGE UP (ref 0–2)
BILIRUB SERPL-MCNC: 1 MG/DL — SIGNIFICANT CHANGE UP (ref 0.2–1.2)
BLD GP AB SCN SERPL QL: SIGNIFICANT CHANGE UP
BUN SERPL-MCNC: 24 MG/DL — HIGH (ref 7–23)
CALCIUM SERPL-MCNC: 8.8 MG/DL — SIGNIFICANT CHANGE UP (ref 8.4–10.5)
CHLORIDE SERPL-SCNC: 105 MMOL/L — SIGNIFICANT CHANGE UP (ref 96–108)
CO2 SERPL-SCNC: 25 MMOL/L — SIGNIFICANT CHANGE UP (ref 22–31)
CREAT SERPL-MCNC: 1.5 MG/DL — HIGH (ref 0.5–1.3)
CULTURE RESULTS: SIGNIFICANT CHANGE UP
CULTURE RESULTS: SIGNIFICANT CHANGE UP
DACRYOCYTES BLD QL SMEAR: SLIGHT — SIGNIFICANT CHANGE UP
EGFR: 52 ML/MIN/1.73M2 — LOW
ELLIPTOCYTES BLD QL SMEAR: SLIGHT — SIGNIFICANT CHANGE UP
EOSINOPHIL # BLD AUTO: 0.06 K/UL — SIGNIFICANT CHANGE UP (ref 0–0.5)
EOSINOPHIL NFR BLD AUTO: 0.6 % — SIGNIFICANT CHANGE UP (ref 0–6)
FOLATE SERPL-MCNC: 10.5 NG/ML — SIGNIFICANT CHANGE UP
GLUCOSE SERPL-MCNC: 103 MG/DL — HIGH (ref 70–99)
HAPTOGLOB SERPL-MCNC: 102 MG/DL — SIGNIFICANT CHANGE UP (ref 34–200)
HCT VFR BLD CALC: 23.5 % — LOW (ref 39–50)
HGB BLD-MCNC: 7.5 G/DL — LOW (ref 13–17)
HYPOCHROMIA BLD QL: SIGNIFICANT CHANGE UP
IMM GRANULOCYTES NFR BLD AUTO: 0.8 % — SIGNIFICANT CHANGE UP (ref 0–0.9)
LDH SERPL L TO P-CCNC: 300 U/L — HIGH (ref 50–242)
LYMPHOCYTES # BLD AUTO: 1.17 K/UL — SIGNIFICANT CHANGE UP (ref 1–3.3)
LYMPHOCYTES # BLD AUTO: 10.8 % — LOW (ref 13–44)
MAGNESIUM SERPL-MCNC: 1.7 MG/DL — SIGNIFICANT CHANGE UP (ref 1.6–2.6)
MANUAL SMEAR VERIFICATION: SIGNIFICANT CHANGE UP
MCHC RBC-ENTMCNC: 19.1 PG — LOW (ref 27–34)
MCHC RBC-ENTMCNC: 31.9 GM/DL — LOW (ref 32–36)
MCV RBC AUTO: 59.8 FL — LOW (ref 80–100)
MICROCYTES BLD QL: SIGNIFICANT CHANGE UP
MONOCYTES # BLD AUTO: 0.9 K/UL — SIGNIFICANT CHANGE UP (ref 0–0.9)
MONOCYTES NFR BLD AUTO: 8.3 % — SIGNIFICANT CHANGE UP (ref 2–14)
NEUTROPHILS # BLD AUTO: 8.48 K/UL — HIGH (ref 1.8–7.4)
NEUTROPHILS NFR BLD AUTO: 78.4 % — HIGH (ref 43–77)
NRBC # BLD: 1 /100 WBCS — HIGH (ref 0–0)
PLAT MORPH BLD: NORMAL — SIGNIFICANT CHANGE UP
PLATELET # BLD AUTO: 227 K/UL — SIGNIFICANT CHANGE UP (ref 150–400)
POTASSIUM SERPL-MCNC: 3.5 MMOL/L — SIGNIFICANT CHANGE UP (ref 3.5–5.3)
POTASSIUM SERPL-SCNC: 3.5 MMOL/L — SIGNIFICANT CHANGE UP (ref 3.5–5.3)
PROT SERPL-MCNC: 5.8 G/DL — LOW (ref 6–8.3)
PROT SERPL-MCNC: 6.3 G/DL — SIGNIFICANT CHANGE UP (ref 6–8.3)
RBC # BLD: 3.93 M/UL — LOW (ref 4.2–5.8)
RBC # FLD: 21.3 % — HIGH (ref 10.3–14.5)
RBC BLD AUTO: ABNORMAL
SCHISTOCYTES BLD QL AUTO: SLIGHT — SIGNIFICANT CHANGE UP
SODIUM SERPL-SCNC: 142 MMOL/L — SIGNIFICANT CHANGE UP (ref 135–145)
SPECIMEN SOURCE: SIGNIFICANT CHANGE UP
SPECIMEN SOURCE: SIGNIFICANT CHANGE UP
VIT B12 SERPL-MCNC: 556 PG/ML — SIGNIFICANT CHANGE UP (ref 232–1245)
WBC # BLD: 10.82 K/UL — HIGH (ref 3.8–10.5)
WBC # FLD AUTO: 10.82 K/UL — HIGH (ref 3.8–10.5)

## 2024-01-31 PROCEDURE — 99232 SBSQ HOSP IP/OBS MODERATE 35: CPT

## 2024-01-31 RX ORDER — POTASSIUM CHLORIDE 20 MEQ
40 PACKET (EA) ORAL ONCE
Refills: 0 | Status: COMPLETED | OUTPATIENT
Start: 2024-01-31 | End: 2024-01-31

## 2024-01-31 RX ORDER — FUROSEMIDE 40 MG
20 TABLET ORAL ONCE
Refills: 0 | Status: COMPLETED | OUTPATIENT
Start: 2024-01-31 | End: 2024-01-31

## 2024-01-31 RX ORDER — MAGNESIUM SULFATE 500 MG/ML
1 VIAL (ML) INJECTION ONCE
Refills: 0 | Status: COMPLETED | OUTPATIENT
Start: 2024-01-31 | End: 2024-01-31

## 2024-01-31 RX ORDER — FUROSEMIDE 40 MG
40 TABLET ORAL DAILY
Refills: 0 | Status: DISCONTINUED | OUTPATIENT
Start: 2024-01-31 | End: 2024-02-02

## 2024-01-31 RX ADMIN — HEPARIN SODIUM 5000 UNIT(S): 5000 INJECTION INTRAVENOUS; SUBCUTANEOUS at 21:35

## 2024-01-31 RX ADMIN — Medication 100 GRAM(S): at 15:11

## 2024-01-31 RX ADMIN — Medication 40 MILLIEQUIVALENT(S): at 12:59

## 2024-01-31 RX ADMIN — PANTOPRAZOLE SODIUM 40 MILLIGRAM(S): 20 TABLET, DELAYED RELEASE ORAL at 05:56

## 2024-01-31 RX ADMIN — TRAMADOL HYDROCHLORIDE 50 MILLIGRAM(S): 50 TABLET ORAL at 08:12

## 2024-01-31 RX ADMIN — Medication 20 MILLIGRAM(S): at 21:15

## 2024-01-31 RX ADMIN — HEPARIN SODIUM 5000 UNIT(S): 5000 INJECTION INTRAVENOUS; SUBCUTANEOUS at 05:57

## 2024-01-31 RX ADMIN — TAMSULOSIN HYDROCHLORIDE 0.4 MILLIGRAM(S): 0.4 CAPSULE ORAL at 21:35

## 2024-01-31 RX ADMIN — HEPARIN SODIUM 5000 UNIT(S): 5000 INJECTION INTRAVENOUS; SUBCUTANEOUS at 15:18

## 2024-01-31 RX ADMIN — Medication 500 MILLIGRAM(S): at 05:55

## 2024-01-31 RX ADMIN — TRAMADOL HYDROCHLORIDE 50 MILLIGRAM(S): 50 TABLET ORAL at 09:12

## 2024-01-31 RX ADMIN — IRON SUCROSE 176.67 MILLIGRAM(S): 20 INJECTION, SOLUTION INTRAVENOUS at 21:35

## 2024-01-31 RX ADMIN — Medication 81 MILLIGRAM(S): at 13:00

## 2024-01-31 NOTE — PROGRESS NOTE ADULT - NS ATTEND AMEND GEN_ALL_CORE FT
Pt seen and examined at bedside.  No acute events overnight  Pt denies cp, palpitations, sob, abd pain, N/V, fever, chills    Pt w/ known Severe AS, p/w LE swelling, noted to have new onset CHF exacerbation.   Lasix 40mg IV daily   Cardiology on board    Hematology recommendations appreciated regarding Anemia  Component of Iron deficiency noted  Continue Iron IV Infusion    Once euvolemic and hemodynamically stable, plan would be for transfer for TAVR + C

## 2024-01-31 NOTE — PROGRESS NOTE ADULT - ASSESSMENT
63 year old male with Hx of HTN, GERD, dyslipidemia, chronic intermittent leg pain, aortic stenosis, chronic venous stasis dermatitis with bilateral leg swelling, presents for b/l leg pain and swelling.    Acute New Onset HFrEF (EF 35-40% 1/2024)  Severe AS  - TTE reviewed; severe AS, mod pulm htn, mod mitral regurg, grade 3 diastolic dysfunction, EF 35-40%  - cont Toprol 25mg daily, not a candidate for ACE/ARB/ARNI due to renal function, started lasix 40 IV daily  - Cardio following; pt will require GDMT, has chronic CKD stage 3  - CXR with no pleural effusions, ProBNP very elevated, follow up am labs, monitor fluid status  - PT will need cardiac workup for AS - TAVR and Cath - pending medical and cardiac clearance    Anemia of Chronic Disease  - Heme appreciated, MF etiology for anemia  - Continue to transfuse PRBCs as needed, mechanical hemolysis due to severe AS also possible  - FOBT negative, CT APC showed small volume ascites, chronic left 7th rib fx, trace right pleural effusion and prominent lymph nodes  - Maintain supportive care, started IV Iron     Bilateral Venous Stasis with component of Cellulitis  - ID appreciated, completed five days of keflex  - leg elevation as needed, no ID objection to cardiac workup when cleared    Hypertension  - cont toprol     LINDA vs Chronic Kidney Disease   - Nephrology following, CKD 3 at or near baseline  - UA negative, imaging without hydro, no objection to cautious diuresis as needed  - avoid nephrotoxins, monitor BMP    Transaminitis   - unclear etiology, downtrending  - Hepatitis panel negative  - RUQ US unremarkable     GERD  - continue PPI     DVT Prophylaxis  - Heparin    Dispo:  pt will update his wife Chanelle [phone]

## 2024-01-31 NOTE — PROGRESS NOTE ADULT - SUBJECTIVE AND OBJECTIVE BOX
GRACE REBECCA  [id number]      HPI:  63 year old male with Hx of HTN, GERD, dyslipidemia, chronic intermittent leg pain, aortic stenosis, chronic venous stasis dermatitis with bilateral leg swelling, presents to  ED sent by PCP for failed outpatient therapy with bactrim for cellulitis.  As per outpatient chart review, Initial symptoms developed in September after gel injection by orthopedist. Since then he has noted worsening swelling and redness and ulcerations noted in bilateral lower extremities. Does have a history of significant aortic stenosis-but has yet to follow-up with cardiologist.   Was seen at [facility] on 1/7 for persistent cellulitis. Patient declined hospital admission at that time. Wound culture was positive for Streptococcus dysgalactiae. Was started on Bactrim po.   Continues to note intermittent bilateral calf pain that he notes at times at rest and at movement . Seen by PCP and was advised to go to ED for admission, r/o DVT and IV abx. Patient states that the swelling got worse for the past 1 week. Reports he hasn't noticed increase in SOB and denies dyspnea, but states that he sleeps with 3 pillows at night for neck pain, and he gets shortness of breath on exertion. Shortness of breath noticed during the interview. Denies fevers, chills, dysuria, cough, chest, abdominal, back, or neck pain. Says that he has decreased sensation in his legs which makes him unsteady on his feet.     In the ED temp 98.2, HR 86, 110/67, RR, SPO2 96%   Labs significant for BNP 56362, WBC 15, troponin 24, INR 1.37, Cr 1.7   He received morphine 2mg and CTX x1      (25 Jan 2024 19:43)        ALLERGIES:  No Known Drug Allergies  garlic (Angioedema; Swelling; Anaphylaxis)      PAST MEDICAL & SURGICAL HISTORY:  Dyslipidemia      Hypertension      Chronic GERD      History of aortic stenosis      No significant past surgical history            CURRENT MEDICATIONS:  MEDICATIONS  (STANDING):  aspirin  chewable 81 milliGRAM(s) Oral daily  furosemide   Injectable 40 milliGRAM(s) IV Push daily  heparin   Injectable 5000 Unit(s) SubCutaneous every 8 hours  influenza   Vaccine 0.5 milliLiter(s) IntraMuscular once  iron sucrose IVPB 300 milliGRAM(s) IV Intermittent every 24 hours  magnesium sulfate  IVPB 1 Gram(s) IV Intermittent once  metoprolol succinate ER 25 milliGRAM(s) Oral daily  pantoprazole    Tablet 40 milliGRAM(s) Oral before breakfast  potassium chloride    Tablet ER 40 milliEquivalent(s) Oral once  tamsulosin 0.4 milliGRAM(s) Oral at bedtime    MEDICATIONS  (PRN):  aluminum hydroxide/magnesium hydroxide/simethicone Suspension 30 milliLiter(s) Oral every 4 hours PRN Dyspepsia  melatonin 3 milliGRAM(s) Oral at bedtime PRN Insomnia  ondansetron Injectable 4 milliGRAM(s) IV Push every 8 hours PRN Nausea and/or Vomiting  traMADol 50 milliGRAM(s) Oral two times a day PRN Moderate Pain (4 - 6)      SOCIAL HISTORY:      FAMILY HISTORY:  FH: congestive heart failure (Father, Sibling)    FH: coronary artery disease (Sibling)    FH: brain cancer (Mother)        ROS:  All 10 systems reviewed and positives noted in HPI    OBJECTIVE:    VITAL SIGNS:  Vital Signs Last 24 Hrs  T(C): 36.6 (31 Jan 2024 05:08), Max: 36.7 (30 Jan 2024 14:00)  T(F): 97.8 (31 Jan 2024 05:08), Max: 98 (30 Jan 2024 14:00)  HR: 77 (31 Jan 2024 05:08) (74 - 79)  BP: 105/66 (31 Jan 2024 05:08) (105/66 - 120/80)  BP(mean): 79 (31 Jan 2024 05:08) (79 - 79)  RR: 17 (31 Jan 2024 05:08) (17 - 18)  SpO2: 97% (31 Jan 2024 05:08) (96% - 97%)    Parameters below as of 31 Jan 2024 05:08  Patient On (Oxygen Delivery Method): room air        PHYSICAL EXAM:  General:  no distress  HEENT: sclera anicteric  Neck: supple, no carotid bruits b/l  CVS: JVP ~ 7 cm H20, RRR, + murmur  Chest: unlabored respirations, diminished breath sounds  Abdomen: non-distended  Extremities: b/l l/e +1 pitting edema and erythema  Neuro: awake, alert & oriented x 3  Psych: normal affect      LABS:                        7.5    10.82 )-----------( 227      ( 31 Jan 2024 07:19 )             23.5     01-31    142  |  105  |  24<H>  ----------------------------<  103<H>  3.5   |  25  |  1.50<H>    Ca    8.8      31 Jan 2024 07:19  Mg     1.7     01-31    TPro  6.3  /  Alb  2.4<L>  /  TBili  1.0  /  DBili  x   /  AST  18  /  ALT  120<H>  /  AlkPhos  82  01-31              ECG: Sinus with PVC and RBBB       TTE: < from: TTE Echo Complete w/o Contrast w/ Doppler (01.26.24 @ 08:26) >   1. Left ventricular ejection fraction, by visual estimation, is 35 to   40%.   2. Moderately decreased global left ventricular systolic function.   3. Basal inferior segment, mid inferolateral segment, mid anterolateral   segment, and mid inferior segment are abnormal as described above.   4. Severely enlarged left atrium.   5. Increased LV wall thickness.   6. Mildly increased left ventricular internal cavity size.   7. Spectral Doppler shows restrictivepattern of left ventricular   myocardial filling (Grade III diastolic dysfunction).   8. There is mild concentric left ventricular hypertrophy.   9. Moderately enlarged right ventricle.  10. Right atrial enlargement.  11. Trivial pericardial effusion.  12. Moderate mitral valve regurgitation.  13. Thickening of the anterior and posterior mitral valve leaflets.  14. Mild-moderate tricuspid regurgitation.  15. Mild aortic regurgitation.  16. Severe aortic valve stenosis.  17. Dilatation of the ascending aorta.  18. Estimated pulmonary artery systolic pressure is 58.6 mmHg assuming a   right atrial pressure of 15 mmHg, which is consistent with moderate   pulmonary hypertension.  19. LA volume Index is 56.0 ml/m² ml/m2.  20. The Dimesionless Index value is .17.    The basal inferior segment is akinetic. The mid inferolateral segment, mid  anterolateral segment, and mid inferior segment are hypokinetic. All   remaining scored segments are normal.           GRACE REBECCA  [id number]      HPI:  63 year old male with Hx of HTN, GERD, dyslipidemia, chronic intermittent leg pain, aortic stenosis, chronic venous stasis dermatitis with bilateral leg swelling, presents to  ED sent by PCP for failed outpatient therapy with bactrim for cellulitis.    ALLERGIES:  No Known Drug Allergies  garlic (Angioedema; Swelling; Anaphylaxis)      CURRENT MEDICATIONS:  MEDICATIONS  (STANDING):  aspirin  chewable 81 milliGRAM(s) Oral daily  furosemide   Injectable 40 milliGRAM(s) IV Push daily  heparin   Injectable 5000 Unit(s) SubCutaneous every 8 hours  influenza   Vaccine 0.5 milliLiter(s) IntraMuscular once  iron sucrose IVPB 300 milliGRAM(s) IV Intermittent every 24 hours  magnesium sulfate  IVPB 1 Gram(s) IV Intermittent once  metoprolol succinate ER 25 milliGRAM(s) Oral daily  pantoprazole    Tablet 40 milliGRAM(s) Oral before breakfast  potassium chloride    Tablet ER 40 milliEquivalent(s) Oral once  tamsulosin 0.4 milliGRAM(s) Oral at bedtime      ROS:  All 10 systems reviewed and positives noted in HPI    OBJECTIVE:    VITAL SIGNS:  Vital Signs Last 24 Hrs  T(C): 36.6 (31 Jan 2024 05:08), Max: 36.7 (30 Jan 2024 14:00)  T(F): 97.8 (31 Jan 2024 05:08), Max: 98 (30 Jan 2024 14:00)  HR: 77 (31 Jan 2024 05:08) (74 - 79)  BP: 105/66 (31 Jan 2024 05:08) (105/66 - 120/80)  BP(mean): 79 (31 Jan 2024 05:08) (79 - 79)  RR: 17 (31 Jan 2024 05:08) (17 - 18)  SpO2: 97% (31 Jan 2024 05:08) (96% - 97%)    Parameters below as of 31 Jan 2024 05:08  Patient On (Oxygen Delivery Method): room air      PHYSICAL EXAM:  General:  no distress  HEENT: sclera anicteric  Neck: supple  CVS: JVP ~ 7 cm H20, RRR, + murmur  Chest: unlabored respirations, diminished breath sounds  Abdomen: non-distended  Extremities: b/l l/e +1 pitting edema and erythema  Neuro: awake, alert & oriented  Psych: normal affect      LABS:                        7.5    10.82 )-----------( 227      ( 31 Jan 2024 07:19 )             23.5     01-31    142  |  105  |  24<H>  ----------------------------<  103<H>  3.5   |  25  |  1.50<H>    Ca    8.8      31 Jan 2024 07:19  Mg     1.7     01-31    TPro  6.3  /  Alb  2.4<L>  /  TBili  1.0  /  DBili  x   /  AST  18  /  ALT  120<H>  /  AlkPhos  82  01-31            ECG: Sinus with PVC and RBBB       TTE: < from: TTE Echo Complete w/o Contrast w/ Doppler (01.26.24 @ 08:26) >   1. Left ventricular ejection fraction, by visual estimation, is 35 to   40%.   2. Moderately decreased global left ventricular systolic function.   3. Basal inferior segment, mid inferolateral segment, mid anterolateral   segment, and mid inferior segment are abnormal as described above.   4. Severely enlarged left atrium.   5. Increased LV wall thickness.   6. Mildly increased left ventricular internal cavity size.   7. Spectral Doppler shows restrictivepattern of left ventricular   myocardial filling (Grade III diastolic dysfunction).   8. There is mild concentric left ventricular hypertrophy.   9. Moderately enlarged right ventricle.  10. Right atrial enlargement.  11. Trivial pericardial effusion.  12. Moderate mitral valve regurgitation.  13. Thickening of the anterior and posterior mitral valve leaflets.  14. Mild-moderate tricuspid regurgitation.  15. Mild aortic regurgitation.  16. Severe aortic valve stenosis.  17. Dilatation of the ascending aorta.  18. Estimated pulmonary artery systolic pressure is 58.6 mmHg assuming a   right atrial pressure of 15 mmHg, which is consistent with moderate   pulmonary hypertension.  19. LA volume Index is 56.0 ml/m² ml/m2.  20. The Dimesionless Index value is .17.    The basal inferior segment is akinetic. The mid inferolateral segment, mid  anterolateral segment, and mid inferior segment are hypokinetic. All   remaining scored segments are normal.

## 2024-01-31 NOTE — PROGRESS NOTE ADULT - SUBJECTIVE AND OBJECTIVE BOX
GRACE FERNANDEZ, 63y Male  MRN: [id number]  ATTENDING: Abdullahi Kellogg    HPI:  63M, PMHx HTN, GERD, dyslipidemia, chronic intermittent leg pain, aortic stenosis, chronic venous stasis dermatitis with bilateral leg swelling, treated outpatient with Bactrim for cellulitis .  Wound culture from the lower extremity positive for Streptococcus dysgalactiae.  Patient currently evaluated by cardiology for TAVR due to severe aortic stenosis; surgery to be performed at [facility].  In the emergency room he was found anemic and with a reactive leukocytosis.  Currently receiving PRBC transfusion for hemoglobin around 7.3 g/dL.  Chest x-ray showed cardiomegaly.  Hematology consulted for anemia.    MEDICATIONS:  aluminum hydroxide/magnesium hydroxide/simethicone Suspension 30 milliLiter(s) Oral every 4 hours PRN  aspirin  chewable 81 milliGRAM(s) Oral daily  cephalexin 500 milliGRAM(s) Oral three times a day  heparin   Injectable 5000 Unit(s) SubCutaneous every 8 hours  influenza   Vaccine 0.5 milliLiter(s) IntraMuscular once  iron sucrose IVPB 300 milliGRAM(s) IV Intermittent every 24 hours  melatonin 3 milliGRAM(s) Oral at bedtime PRN  metoprolol succinate ER 25 milliGRAM(s) Oral daily  ondansetron Injectable 4 milliGRAM(s) IV Push every 8 hours PRN  pantoprazole    Tablet 40 milliGRAM(s) Oral before breakfast  tamsulosin 0.4 milliGRAM(s) Oral at bedtime  traMADol 50 milliGRAM(s) Oral two times a day PRN    All other medications reviewed.    SUBJECTIVE:  Alert, denies chest pain or shortness of breath at rest.  Appears depressed.  Tolerated PRBC and iron infusion well.    VITALS:  T(C): 36.8 (01-30-24 @ 04:59), Max: 36.8 (01-30-24 @ 04:59)  T(F): 98.3 (01-30-24 @ 04:59), Max: 98.3 (01-30-24 @ 04:59)  HR: 90 (01-30-24 @ 04:59) (77 - 90)  BP: 115/72 (01-30-24 @ 04:59) (114/68 - 115/72)    PHYSICAL EXAM:  Constitutional: alert, well developed  HEENT: normocephalic, anicteric sclerae, no mucositis or thrush  Respiratory: bilateral clear to auscultation anteriorly  Cardiovascular : S1, S2 regular, rhythmic, no murmurs, gallops or rubs  Abdomen: soft, distended, + normoactive BS, no palpable HS- megaly  Extremities: no tenderness;  -c/c/e, pulses equal bilaterally    LABS:  (01-30) WBC: 10.73 K/uL,Hemoglobin: 7.6 g/dL, Hematocrit: 24.9 %,  Platelet: 237 K/uL  (01-30) Na: 141 mmol/L ; K: 3.7 mmol/L ; BUN: 21 mg/dL ; Cr: 1.61 mg/dL.    RADIOLOGY:  ACC: [id number] EXAM:  CT ABDOMEN AND PELVIS   ORDERED BY: MICHELLE HALL     ACC: [id number] EXAM:  CT CHEST   ORDERED BY: MICHELLE HALL     PROCEDURE DATE:  01/28/2024          INTERPRETATION:  CLINICAL INFORMATION: Anemia. Evaluate for malignancy.    COMPARISON: Right upper quadrant ultrasound 1/26/2024.    CONTRAST/COMPLICATIONS:  IV Contrast: NONE  Oral Contrast: NONE  Complications: None reported at time of study completion    PROCEDURE:  CT of the Chest, Abdomen and Pelvis was performed.  Sagittal and coronal reformats were performed.    FINDINGS:  Limited evaluation of the vasculature and viscera in the absence of   intravenous contrast.    CHEST:  LUNGS AND LARGE AIRWAYS: Patent central airways. Imaging of the lung   fields, particularly inferiorly, is motion degraded. This may limit   evaluation. Otherwise, no discrete infiltrate or nodule identified.   Bilateral subsegmental atelectasis.  PLEURA: Trace right pleural effusion.  VESSELS: Atherosclerotic changes.  HEART: Marked cardiomegaly. Aortic valve calcification. Hypoattenuation   of the blood pool relative to myocardium, consistent with anemia. No   pericardial effusion.  MEDIASTINUM AND KATIA: Nonspecific mildly prominent mediastinal lymph   nodes. For reference, a para-aortic node measures 2.2 x 1.0 cm (series 2   image 26).  CHEST WALL AND LOWER NECK: Within normal limits.    ABDOMEN AND PELVIS:  Imaging of the upper abdomen is motion degraded.    LIVER: Hepatomegaly, the right lobe measuring 19.8 cm craniocaudally.  BILE DUCTS: Normal caliber.  GALLBLADDER: Within normal limits.  SPLEEN: Within normal limits.  PANCREAS: Within normal limits.  ADRENALS: Indeterminate right adrenal nodule measuring 1.3 cm.  KIDNEYS/URETERS: Right upper pole renal cyst. Subcentimeter   hyperattenuating foci in the left kidney that likely represent   proteinaceous/hemorrhagic cysts. No hydronephrosis.    BLADDER: Mild bladder wall thickening.  REPRODUCTIVE ORGANS: The prostate is mildly enlarged.    BOWEL: Moderate hiatal hernia. No bowel obstruction. Colonic diverticula.   No evidence for acute diverticulitis.  PERITONEUM: Small volume ascites.  VESSELS: Atherosclerotic changes.  RETROPERITONEUM/LYMPH NODES: Nonspecific prominent retroperitoneal,   bilateral iliac chain, and bilateral inguinal lymph nodes. For example, a   left inguinal node measures 2.6 x 1.6 cm (series 2 image 175), and a left   para-aortic node measures 1.5 x 1.4 cm (series 2 image 100).  ABDOMINAL WALL: Generalized subcutaneous edema. Subcutaneous cystic   appearing nodular focus measuring 1.2 cm in the left anterior upper   abdominal/lower chest wall (series 2 image 78).  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 Degenerative changes.    IMPRESSION:  *  Exam may be limited by noncontrast technique and motion.  *  Prominent nonspecific mediastinal, retroperitoneal, iliac chain, and   inguinal lymph nodes.  *  Marked cardiomegaly. Aortic valve calcification.  *  Trace right pleural effusion.  *  Hepatomegaly.  *  Indeterminate right adrenal nodule measuring 1.3 cm.  *  Mild urinary bladder wall thickening, which could be due to   underdistention, chronic bladder outlet obstruction, or cystitis.   Correlate with urinalysis.  *  Mildly enlarged prostate.  *  Small volume ascites.  *  Subcutaneous cystic-appearing nodular focus measuring 1.2 cm in the   left anterior upper abdominal/lower chest wall, which may represent an   epidermal inclusion cyst.  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 Recommend correlation for point tenderness.     GRACE FERNANDEZ, 63y Male  MRN: [id number]  ATTENDING: Abdullahi Kellogg    HPI:  63M, PMHx HTN, GERD, dyslipidemia, chronic intermittent leg pain, aortic stenosis, chronic venous stasis dermatitis with bilateral leg swelling, treated outpatient with Bactrim for cellulitis .  Wound culture from the lower extremity positive for Streptococcus dysgalactiae.  Patient currently evaluated by cardiology for TAVR due to severe aortic stenosis; surgery to be performed at [facility].  In the emergency room he was found anemic and with a reactive leukocytosis.  Currently receiving PRBC transfusion for hemoglobin around 7.3 g/dL.  Chest x-ray showed cardiomegaly.  Hematology consulted for anemia.    MEDICATIONS:  aluminum hydroxide/magnesium hydroxide/simethicone Suspension 30 milliLiter(s) Oral every 4 hours PRN  aspirin  chewable 81 milliGRAM(s) Oral daily  cephalexin 500 milliGRAM(s) Oral three times a day  heparin   Injectable 5000 Unit(s) SubCutaneous every 8 hours  influenza   Vaccine 0.5 milliLiter(s) IntraMuscular once  iron sucrose IVPB 300 milliGRAM(s) IV Intermittent every 24 hours  melatonin 3 milliGRAM(s) Oral at bedtime PRN  metoprolol succinate ER 25 milliGRAM(s) Oral daily  ondansetron Injectable 4 milliGRAM(s) IV Push every 8 hours PRN  pantoprazole    Tablet 40 milliGRAM(s) Oral before breakfast  tamsulosin 0.4 milliGRAM(s) Oral at bedtime  traMADol 50 milliGRAM(s) Oral two times a day PRN    All other medications reviewed.    SUBJECTIVE:  Hopes to be able to be transferred to [facility] for TAVR.    VITALS:  T(C): 36.8 (01-30-24 @ 04:59), Max: 36.8 (01-30-24 @ 04:59)  T(F): 98.3 (01-30-24 @ 04:59), Max: 98.3 (01-30-24 @ 04:59)  HR: 90 (01-30-24 @ 04:59) (77 - 90)  BP: 115/72 (01-30-24 @ 04:59) (114/68 - 115/72)    PHYSICAL EXAM:  Constitutional: alert, well developed  HEENT: normocephalic, anicteric sclerae, no mucositis or thrush  Respiratory: bilateral clear to auscultation anteriorly  Cardiovascular : S1, S2 regular, rhythmic, no murmurs, gallops or rubs  Abdomen: soft, distended, + normoactive BS, no palpable HS- megaly  Extremities: no tenderness;  -c/c/e, pulses equal bilaterally    LABS:  (01-30) WBC: 10.73 K/uL,Hemoglobin: 7.6 g/dL, Hematocrit: 24.9 %,  Platelet: 237 K/uL  (01-30) Na: 141 mmol/L ; K: 3.7 mmol/L ; BUN: 21 mg/dL ; Cr: 1.61 mg/dL.    RADIOLOGY:  ACC: [id number] EXAM:  CT ABDOMEN AND PELVIS   ORDERED BY: MICHELLE HALL   ACC: [id number] EXAM:  CT CHEST   ORDERED BY: MICHELLE HALL   PROCEDURE DATE:  01/28/2024    INTERPRETATION:  CLINICAL INFORMATION: Anemia. Evaluate for malignancy.    COMPARISON: Right upper quadrant ultrasound 1/26/2024.    CONTRAST/COMPLICATIONS:  IV Contrast: NONE  Oral Contrast: NONE  Complications: None reported at time of study completion    PROCEDURE:  CT of the Chest, Abdomen and Pelvis was performed.  Sagittal and coronal reformats were performed.    FINDINGS:  Limited evaluation of the vasculature and viscera in the absence of   intravenous contrast.    CHEST:  LUNGS AND LARGE AIRWAYS: Patent central airways. Imaging of the lung   fields, particularly inferiorly, is motion degraded. This may limit   evaluation. Otherwise, no discrete infiltrate or nodule identified.   Bilateral subsegmental atelectasis.  PLEURA: Trace right pleural effusion.  VESSELS: Atherosclerotic changes.  HEART: Marked cardiomegaly. Aortic valve calcification. Hypoattenuation   of the blood pool relative to myocardium, consistent with anemia. No   pericardial effusion.  MEDIASTINUM AND KATIA: Nonspecific mildly prominent mediastinal lymph   nodes. For reference, a para-aortic node measures 2.2 x 1.0 cm (series 2   image 26).  CHEST WALL AND LOWER NECK: Within normal limits.    ABDOMEN AND PELVIS:  Imaging of the upper abdomen is motion degraded.    LIVER: Hepatomegaly, the right lobe measuring 19.8 cm craniocaudally.  BILE DUCTS: Normal caliber.  GALLBLADDER: Within normal limits.  SPLEEN: Within normal limits.  PANCREAS: Within normal limits.  ADRENALS: Indeterminate right adrenal nodule measuring 1.3 cm.  KIDNEYS/URETERS: Right upper pole renal cyst. Subcentimeter   hyperattenuating foci in the left kidney that likely represent   proteinaceous/hemorrhagic cysts. No hydronephrosis.    BLADDER: Mild bladder wall thickening.  REPRODUCTIVE ORGANS: The prostate is mildly enlarged.    BOWEL: Moderate hiatal hernia. No bowel obstruction. Colonic diverticula.   No evidence for acute diverticulitis.  PERITONEUM: Small volume ascites.  VESSELS: Atherosclerotic changes.  RETROPERITONEUM/LYMPH NODES: Nonspecific prominent retroperitoneal,   bilateral iliac chain, and bilateral inguinal lymph nodes. For example, a   left inguinal node measures 2.6 x 1.6 cm (series 2 image 175), and a left   para-aortic node measures 1.5 x 1.4 cm (series 2 image 100).  ABDOMINAL WALL: Generalized subcutaneous edema. Subcutaneous cystic   appearing nodular focus measuring 1.2 cm in the left anterior upper   abdominal/lower chest wall (series 2 image 78).  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 Degenerative changes.    IMPRESSION:  *  Exam may be limited by noncontrast technique and motion.  *  Prominent nonspecific mediastinal, retroperitoneal, iliac chain, and   inguinal lymph nodes.  *  Marked cardiomegaly. Aortic valve calcification.  *  Trace right pleural effusion.  *  Hepatomegaly.  *  Indeterminate right adrenal nodule measuring 1.3 cm.  *  Mild urinary bladder wall thickening, which could be due to   underdistention, chronic bladder outlet obstruction, or cystitis.   Correlate with urinalysis.  *  Mildly enlarged prostate.  *  Small volume ascites.  *  Subcutaneous cystic-appearing nodular focus measuring 1.2 cm in the   left anterior upper abdominal/lower chest wall, which may represent an   epidermal inclusion cyst.  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 Recommend correlation for point tenderness.

## 2024-01-31 NOTE — PROGRESS NOTE ADULT - PROBLEM SELECTOR PLAN 1
Chronic anemia, transfusion dependent, possible a chronic hemolytic component given severity of valvulopathy.  Patient's hemoglobin stable at 7.6 g/dL.  Continue transfusion support to maintain hemoglobin above 8 g/dL.  Continue treatment for CHF per cardiology.  Will monitor CBC. Anemia of chronic disease, cardiac comorbidities, frequent phlebotomies,?  mechanical hemolysis (severe AS), treated supportively with iron infusions and infrequent PRBC transfusion.  Hemoglobin unchanged at 7.5 g/dL.  Ideally in patients with severe aortic stenosis hemoglobin should be above 8 g/dL, therefore recommend PRBC towards that goal.  Pending cardiology clearance for transfer to [facility].

## 2024-01-31 NOTE — PROGRESS NOTE ADULT - SUBJECTIVE AND OBJECTIVE BOX
No CP, no SOB at rest, persistent VANCE    Vital Signs Last 24 Hrs  T(C): 36.6 (01-31-24 @ 05:08), Max: 36.7 (01-30-24 @ 14:00)  T(F): 97.8 (01-31-24 @ 05:08), Max: 98 (01-30-24 @ 14:00)  HR: 77 (01-31-24 @ 05:08) (74 - 79)  BP: 105/66 (01-31-24 @ 05:08) (105/66 - 120/80)  BP(mean): 79 (01-31-24 @ 05:08) (79 - 79)  RR: 17 (01-31-24 @ 05:08) (17 - 18)  SpO2: 97% (01-31-24 @ 05:08) (96% - 97%)    I&O's Detail    30 Jan 2024 07:01  -  31 Jan 2024 07:00  --------------------------------------------------------  IN:    Oral Fluid: 200 mL  Total IN: 200 mL    OUT:    Voided (mL): 300 mL  Total OUT: 300 mL    Lungs b/l air entry  Heart S1S2   Abd soft ND  Extremities: sm edema                                          7.5    10.82 )-----------( 227      ( 31 Jan 2024 07:19 )             23.5     31 Jan 2024 07:19    142    |  105    |  24     ----------------------------<  103    3.5     |  25     |  1.50     Ca    8.8        31 Jan 2024 07:19  Mg     1.7       31 Jan 2024 07:19    TPro  6.3    /  Alb  2.4    /  TBili  1.0    /  DBili  x      /  AST  18     /  ALT  120    /  AlkPhos  82     31 Jan 2024 07:19    LIVER FUNCTIONS - ( 31 Jan 2024 07:19 )  Alb: 2.4 g/dL / Pro: 6.3 g/dL / ALK PHOS: 82 U/L / ALT: 120 U/L / AST: 18 U/L / GGT: x           aluminum hydroxide/magnesium hydroxide/simethicone Suspension 30 milliLiter(s) Oral every 4 hours PRN  aspirin  chewable 81 milliGRAM(s) Oral daily  furosemide   Injectable 40 milliGRAM(s) IV Push daily  heparin   Injectable 5000 Unit(s) SubCutaneous every 8 hours  influenza   Vaccine 0.5 milliLiter(s) IntraMuscular once  iron sucrose IVPB 300 milliGRAM(s) IV Intermittent every 24 hours  magnesium sulfate  IVPB 1 Gram(s) IV Intermittent once  melatonin 3 milliGRAM(s) Oral at bedtime PRN  metoprolol succinate ER 25 milliGRAM(s) Oral daily  ondansetron Injectable 4 milliGRAM(s) IV Push every 8 hours PRN  pantoprazole    Tablet 40 milliGRAM(s) Oral before breakfast  potassium chloride    Tablet ER 40 milliEquivalent(s) Oral once  tamsulosin 0.4 milliGRAM(s) Oral at bedtime  traMADol 50 milliGRAM(s) Oral two times a day PRN    Assessment/Plan:    CM, EF 35-40%, severe AS  Cardio-renal   CKD 3 near baseline  UA negative   Imaging w/o hydro   F/u BMP, Mg   Agree w/IV Lasix   Will follow    [phone]

## 2024-01-31 NOTE — PROGRESS NOTE ADULT - SUBJECTIVE AND OBJECTIVE BOX
Patient is a 63y old  Male who presents with a chief complaint of CHF exacerbation (31 Jan 2024 08:37)    Patient seen and examined at bedside.  no acute overnight events    ALLERGIES:  No Known Drug Allergies  garlic (Angioedema; Swelling; Anaphylaxis)        Vital Signs Last 24 Hrs  T(F): 97.8 (31 Jan 2024 05:08), Max: 98 (30 Jan 2024 14:00)  HR: 77 (31 Jan 2024 05:08) (74 - 79)  BP: 105/66 (31 Jan 2024 05:08) (105/66 - 120/80)  RR: 17 (31 Jan 2024 05:08) (17 - 18)  SpO2: 97% (31 Jan 2024 05:08) (96% - 97%)  I&O's Summary    30 Jan 2024 07:01  -  31 Jan 2024 07:00  --------------------------------------------------------  IN: 200 mL / OUT: 300 mL / NET: -100 mL      MEDICATIONS:  aluminum hydroxide/magnesium hydroxide/simethicone Suspension 30 milliLiter(s) Oral every 4 hours PRN  aspirin  chewable 81 milliGRAM(s) Oral daily  furosemide   Injectable 40 milliGRAM(s) IV Push daily  heparin   Injectable 5000 Unit(s) SubCutaneous every 8 hours  influenza   Vaccine 0.5 milliLiter(s) IntraMuscular once  iron sucrose IVPB 300 milliGRAM(s) IV Intermittent every 24 hours  magnesium sulfate  IVPB 1 Gram(s) IV Intermittent once  melatonin 3 milliGRAM(s) Oral at bedtime PRN  metoprolol succinate ER 25 milliGRAM(s) Oral daily  ondansetron Injectable 4 milliGRAM(s) IV Push every 8 hours PRN  pantoprazole    Tablet 40 milliGRAM(s) Oral before breakfast  potassium chloride    Tablet ER 40 milliEquivalent(s) Oral once  tamsulosin 0.4 milliGRAM(s) Oral at bedtime  traMADol 50 milliGRAM(s) Oral two times a day PRN      PHYSICAL EXAM:  General: NAD, A/O x 3  ENT: MMM, no thrush  Neck: Supple, No JVD  Lungs: Clear to auscultation bilaterally, bilateral air entry, non labored breathing  Cardio: S1S2 regular, +murmur  Abdomen: Soft, Nontender, Nondistended; Bowel sounds present  Extremities: No cyanosis, B/L LE edema, chronic skin changes/venous stasis    LABS:                        7.5    10.82 )-----------( 227      ( 31 Jan 2024 07:19 )             23.5     01-31    142  |  105  |  24  ----------------------------<  103  3.5   |  25  |  1.50    Ca    8.8      31 Jan 2024 07:19  Mg     1.7     01-31    TPro  6.3  /  Alb  2.4  /  TBili  1.0  /  DBili  x   /  AST  18  /  ALT  120  /  AlkPhos  82  01-31 01-27 Chol 106 mg/dL LDL -- HDL 38 mg/dL Trig 58 mg/dL                  Urinalysis Basic - ( 31 Jan 2024 07:19 )    Color: x / Appearance: x / SG: x / pH: x  Gluc: 103 mg/dL / Ketone: x  / Bili: x / Urobili: x   Blood: x / Protein: x / Nitrite: x   Leuk Esterase: x / RBC: x / WBC x   Sq Epi: x / Non Sq Epi: x / Bacteria: x        Culture - Blood (collected 25 Jan 2024 16:55)  Source: .Blood Blood-Peripheral  Final Report (31 Jan 2024 01:01):    No growth at 5 days    Culture - Blood (collected 25 Jan 2024 16:50)  Source: .Blood Blood-Peripheral  Final Report (31 Jan 2024 01:01):    No growth at 5 days          RADIOLOGY & ADDITIONAL TESTS:    Care Discussed with Consultants/Other Providers:

## 2024-01-31 NOTE — PROGRESS NOTE ADULT - NS ATTEND AMEND GEN_ALL_CORE FT
I have seen and examined the patient. I have discussed case with JOHANN Remy.    Robert Butcher is a 63 year old man with past medical history of Moderate aortic valve stenosis (on TTE in 2018), Hypertension, Hyperlipidemia who presented with bilateral leg swelling, found to have systolic heart failure with severe aortic valve stenosis and venous stasis of legs.     ECG consistent with sinus rhythm, RBBB and LAFB - which is similar to outpatient ECG from 2019.  Echo report consistent with LVEF 35-40%, moderate mitral regurgitation, severe aortic valve stenosis and moderate pulmonary hypertension (images reviewed). Prior echo report from 2018 consistent with LVEF 61% with normal RV size and function at that time. Coronary angiogram from 2013 was consistent with normal coronaries at that time. I have seen and examined the patient. I have discussed case with JOHANN Remy.    Robert Butcher is a 63 year old man with past medical history of Moderate aortic valve stenosis (on TTE in 2018), Hypertension, Hyperlipidemia who presented with bilateral leg swelling, found to have systolic heart failure with severe aortic valve stenosis.    ECG consistent with sinus rhythm, RBBB and LAFB - which is similar to outpatient ECG from 2019.  Echo report consistent with LVEF 35-40%, moderate mitral regurgitation, severe aortic valve stenosis and moderate pulmonary hypertension (images reviewed). Prior echo report from 2018 consistent with LVEF 61% with normal RV size and function at that time. Coronary angiogram from 2013 was consistent with normal coronaries at that time. Labs notable for significant anemia with Hgb 7.5, patient had Hgb of 11.1 on 2022 outpatient labs and so has significantly declined. Cr elevated at 1.5. Troponin negative. Pro BNP markedly elevated. Overall, due to symptoms of new systolic heart failure secondary to severe aortic valve stenosis, recommend inpatient TAVR evaluation. Discussed TAVR procedure with patient and he agrees with proceed. He will require ischemic workup with coronary angiogram and full structural heart team evaluation. Please obtain Hematology clearance for TAVR given significant anemia. If patient cleared for TAVR by Hematology then recommend inpatient transfer to [facility] for TAVR evaluation, patient agrees with plan, continue IV diuresis in meantime. Further GDMT not started due to renal dysfunction.     Will sign out this case to cardiologist to follow along tomorrow.    Nimisha Sotomayor MD  Cardiology

## 2024-01-31 NOTE — PROGRESS NOTE ADULT - ASSESSMENT
Assessment: 63 year old male with Hx of HTN, GERD, dyslipidemia, chronic intermittent leg pain, aortic stenosis, chronic venous stasis dermatitis with bilateral leg swelling, admitted for failed outpatient therapy with bactrim for cellulitis. pt is non compliant with medical care, was reccomended for a full medical, cardiac eval 5 years ago in which he has not completed. cardiology following for severe AS. pt currently denies cp or sob but reports VANCE    Recommendations:  #severe AS  - TTE with EF 35-40%, severely enlarged LA, enlarged RV and RA, mod MR and pulm htn and severe AS. basal inferior segment is akinetic and mid inferolateral, mid anterolateral and mid inferior segment are hypokinetic. pt not ideal candidate for cardiac angiogram 2/2 CKD  - pt noted with b/l pitting edema and erythema. doppler neg for DVT. likely multifactorial 2/2 cellulities and HFrEF  - due to pt CKD, pt is not a candidate for ACE, ARB, ARNI, spirinolactone or farxiga. continue with toprol  - as per renal, ok for diuresis, continue with iv lasix and monitor electrolytes and kidney function  - due to CV hx, goal for hgb >/= 8, transfuse as needed as per heme  - pt needs further medical optimization prior to TAVR eval    Rosmery ROGERS-BC   Assessment: 63 year old male with Hx of HTN, GERD, dyslipidemia, chronic intermittent leg pain, aortic stenosis, chronic venous stasis dermatitis with bilateral leg swelling, admitted for failed outpatient therapy with bactrim for cellulitis, found to have congestive heart failure. Patient is non compliant with medical care, was recommended for a full medical, cardiac eval 5 years ago in which he has not completed. Cardiology following for severe AS. Patient currently denies chest pain.     Recommendations:  #severe AS  - TTE with EF 35-40%, severely enlarged LA, enlarged RV and RA, mod MR and pulm htn and severe AS. basal inferior segment is akinetic and mid inferolateral, mid anterolateral and mid inferior segment are hypokinetic.   - pt noted with b/l pitting edema and erythema. doppler neg for DVT. likely multifactorial 2/2 venous stasis and HFrEF  - due to pt CKD, patient may not be the best candidate for ACE-I, ARB, ARNI, spirinolactone or farxiga. Continue with toprol  - as per renal, ok for diuresis, continue with iv lasix and monitor electrolytes and kidney function  - due to CV hx, goal for hgb >/= 8, transfuse as needed as per heme  - pt needs further medical optimization prior to TAVR eval    Rosmery ROGERS-BC

## 2024-02-01 ENCOUNTER — TRANSCRIPTION ENCOUNTER (OUTPATIENT)
Age: 64
End: 2024-02-01

## 2024-02-01 DIAGNOSIS — D64.9 ANEMIA, UNSPECIFIED: ICD-10-CM

## 2024-02-01 LAB
ANION GAP SERPL CALC-SCNC: 11 MMOL/L — SIGNIFICANT CHANGE UP (ref 5–17)
BUN SERPL-MCNC: 26 MG/DL — HIGH (ref 7–23)
CALCIUM SERPL-MCNC: 8.9 MG/DL — SIGNIFICANT CHANGE UP (ref 8.4–10.5)
CHLORIDE SERPL-SCNC: 103 MMOL/L — SIGNIFICANT CHANGE UP (ref 96–108)
CO2 SERPL-SCNC: 26 MMOL/L — SIGNIFICANT CHANGE UP (ref 22–31)
CREAT SERPL-MCNC: 1.69 MG/DL — HIGH (ref 0.5–1.3)
EGFR: 45 ML/MIN/1.73M2 — LOW
GLUCOSE SERPL-MCNC: 98 MG/DL — SIGNIFICANT CHANGE UP (ref 70–99)
HCT VFR BLD CALC: 25.9 % — LOW (ref 39–50)
HGB BLD-MCNC: 8.1 G/DL — LOW (ref 13–17)
MCHC RBC-ENTMCNC: 18.7 PG — LOW (ref 27–34)
MCHC RBC-ENTMCNC: 31.3 GM/DL — LOW (ref 32–36)
MCV RBC AUTO: 59.8 FL — LOW (ref 80–100)
NRBC # BLD: 3 /100 WBCS — HIGH (ref 0–0)
PLATELET # BLD AUTO: 229 K/UL — SIGNIFICANT CHANGE UP (ref 150–400)
POTASSIUM SERPL-MCNC: 3.9 MMOL/L — SIGNIFICANT CHANGE UP (ref 3.5–5.3)
POTASSIUM SERPL-SCNC: 3.9 MMOL/L — SIGNIFICANT CHANGE UP (ref 3.5–5.3)
RBC # BLD: 4.33 M/UL — SIGNIFICANT CHANGE UP (ref 4.2–5.8)
RBC # FLD: 23.3 % — HIGH (ref 10.3–14.5)
SODIUM SERPL-SCNC: 140 MMOL/L — SIGNIFICANT CHANGE UP (ref 135–145)
WBC # BLD: 10.5 K/UL — SIGNIFICANT CHANGE UP (ref 3.8–10.5)
WBC # FLD AUTO: 10.5 K/UL — SIGNIFICANT CHANGE UP (ref 3.8–10.5)

## 2024-02-01 PROCEDURE — 99232 SBSQ HOSP IP/OBS MODERATE 35: CPT

## 2024-02-01 PROCEDURE — 99233 SBSQ HOSP IP/OBS HIGH 50: CPT

## 2024-02-01 PROCEDURE — 99223 1ST HOSP IP/OBS HIGH 75: CPT

## 2024-02-01 RX ORDER — ERYTHROPOIETIN 10000 [IU]/ML
10000 INJECTION, SOLUTION INTRAVENOUS; SUBCUTANEOUS
Refills: 0 | Status: DISCONTINUED | OUTPATIENT
Start: 2024-02-01 | End: 2024-02-01

## 2024-02-01 RX ORDER — METOPROLOL TARTRATE 50 MG
1 TABLET ORAL
Refills: 0 | DISCHARGE
Start: 2024-02-01

## 2024-02-01 RX ORDER — ASPIRIN/CALCIUM CARB/MAGNESIUM 324 MG
1 TABLET ORAL
Refills: 0 | DISCHARGE

## 2024-02-01 RX ORDER — ASPIRIN/CALCIUM CARB/MAGNESIUM 324 MG
1 TABLET ORAL
Refills: 0 | DISCHARGE
Start: 2024-02-01

## 2024-02-01 RX ORDER — FUROSEMIDE 40 MG
1 TABLET ORAL
Refills: 0 | DISCHARGE

## 2024-02-01 RX ORDER — FERROUS SULFATE 325(65) MG
325 TABLET ORAL DAILY
Refills: 0 | Status: DISCONTINUED | OUTPATIENT
Start: 2024-02-01 | End: 2024-02-05

## 2024-02-01 RX ORDER — AMLODIPINE BESYLATE 2.5 MG/1
1 TABLET ORAL
Refills: 0 | DISCHARGE

## 2024-02-01 RX ORDER — ERYTHROPOIETIN 10000 [IU]/ML
10000 INJECTION, SOLUTION INTRAVENOUS; SUBCUTANEOUS
Refills: 0 | Status: DISCONTINUED | OUTPATIENT
Start: 2024-02-01 | End: 2024-02-05

## 2024-02-01 RX ORDER — FUROSEMIDE 40 MG
40 TABLET ORAL
Qty: 0 | Refills: 0 | DISCHARGE
Start: 2024-02-01

## 2024-02-01 RX ADMIN — HEPARIN SODIUM 5000 UNIT(S): 5000 INJECTION INTRAVENOUS; SUBCUTANEOUS at 06:34

## 2024-02-01 RX ADMIN — Medication 25 MILLIGRAM(S): at 06:35

## 2024-02-01 RX ADMIN — Medication 40 MILLIGRAM(S): at 06:35

## 2024-02-01 RX ADMIN — Medication 81 MILLIGRAM(S): at 11:34

## 2024-02-01 RX ADMIN — Medication 325 MILLIGRAM(S): at 21:42

## 2024-02-01 RX ADMIN — TRAMADOL HYDROCHLORIDE 50 MILLIGRAM(S): 50 TABLET ORAL at 04:05

## 2024-02-01 RX ADMIN — HEPARIN SODIUM 5000 UNIT(S): 5000 INJECTION INTRAVENOUS; SUBCUTANEOUS at 21:42

## 2024-02-01 RX ADMIN — HEPARIN SODIUM 5000 UNIT(S): 5000 INJECTION INTRAVENOUS; SUBCUTANEOUS at 16:01

## 2024-02-01 RX ADMIN — TRAMADOL HYDROCHLORIDE 50 MILLIGRAM(S): 50 TABLET ORAL at 03:06

## 2024-02-01 RX ADMIN — PANTOPRAZOLE SODIUM 40 MILLIGRAM(S): 20 TABLET, DELAYED RELEASE ORAL at 06:35

## 2024-02-01 NOTE — DISCHARGE NOTE PROVIDER - HOSPITAL COURSE
Hospital Course  HPI:  63 year old male with Hx of HTN, GERD, dyslipidemia, chronic intermittent leg pain, aortic stenosis, chronic venous stasis dermatitis with bilateral leg swelling, presented to ED for for bilateral leg pain and swelling. He was admitted to medicine and was found to have new onset acute HFrEF (EF 35-40% 1/2024) in setting of severe AS. He was treated with IV lasix, beta blocker. Not a candidate for ACE/ARB/ARNI due to renal function. Also was treated with 5 days keflex for possible lower extremity cellulitis.     While admitted patient found to be anemic. FOBT negative. Heme was consutled, advised multifactoral etiology for anemia, mechanical hemolysis due to severe AS also possible. He was given IV iron, as well as 1 unit PRBCs on 1/31 with improvement in Hbg. GI was also consulted.      Patient will require ischemic workup with coronary angiogram and full structural heart team evaluation for TAVR - accepted for transfer at [facility] by hospitalist Dr Joiner in consult with Dr Catalan (interventional cardiology).      You were admitted for   You were diagnosed with   You were treated with   You were prescribed the following new medications:    You will need to follow up with your primary care physician.    Source of Infection:  Antibiotic / Last Day:    Palliative Care / Advanced Care Planning  Code Status:  Patient/Family agreeable to Hospice/Palliative (Y/N)?  Summary of Goals of Care Conversation:    Discharging Provider:  Shayna Buenrostro NP  Contact Info: Cell [phone] - Please call with any questions or concerns.    Outpatient Provider:    Signout given to  SNF Provider:   Hospital Course  HPI:  63 year old male with Hx of HTN, GERD, dyslipidemia, chronic intermittent leg pain, aortic stenosis, chronic venous stasis dermatitis with bilateral leg swelling, presented to ED for for bilateral leg pain and swelling. He was admitted to medicine and was found to have new onset acute HFrEF (EF 35-40% 1/2024) in setting of severe AS. He was treated with IV lasix, beta blocker. Not a candidate for ACE/ARB/ARNI due to renal function. Also was treated with 5 days keflex for possible lower extremity cellulitis.     While admitted patient found to be anemic. FOBT negative. Heme was consulted, advised multifactoral etiology for anemia, mechanical hemolysis due to severe AS also possible. Patient was given IV iron, as well as 1 unit PRBCs on 1/31 with improvement in Hbg. GI was also consulted.    Patient will require ischemic workup with coronary angiogram and full structural heart team evaluation for TAVR - accepted for transfer at [facility] by hospitalist Dr Joiner in consult with Dr Catalan (interventional cardiology).    Patient is stable for transfer to [facility]        Source of Infection: Possible cellulitis   Antibiotic / Last Day: Completed keflex     Palliative Care / Advanced Care Planning  Code Status: Full Code     Discharging Provider:  Shayna Buenrostro NP  Contact Info: Cell [phone] - Please call with any questions or concerns.    Outpatient Provider: Dr Nguyen - notified

## 2024-02-01 NOTE — DISCHARGE NOTE PROVIDER - NSDCMRMEDTOKEN_GEN_ALL_CORE_FT
alfuzosin 10 mg oral tablet, extended release: 1 tab(s) orally once a day (at bedtime)  amLODIPine 10 mg oral tablet: 1 tab(s) orally once a day  aspirin 325 mg oral capsule: 1 orally once a day  Bactrim  mg-160 mg oral tablet: 1 tab(s) orally 2 times a day  furosemide 20 mg oral tablet: 1 tab(s) orally once a day  mupirocin 2% topical cream: Apply topically to affected area 2 times a day  traMADol 50 mg oral tablet: 1 tab(s) orally once a day (at bedtime) MDD: 1 tab   alfuzosin 10 mg oral tablet, extended release: 1 tab(s) orally once a day (at bedtime)  aspirin 81 mg oral tablet, chewable: 1 tab(s) orally once a day  furosemide 100 mg/100 mL-0.9% intravenous solution: 40 milliliter(s) intravenous once a day  metoprolol succinate 25 mg oral tablet, extended release: 1 tab(s) orally once a day  traMADol 50 mg oral tablet: 1 tab(s) orally once a day (at bedtime) MDD: 1 tab   alfuzosin 10 mg oral tablet, extended release: 1 tab(s) orally once a day (at bedtime)  aspirin 81 mg oral tablet, chewable: 1 tab(s) orally once a day  ferrous sulfate 325 mg (65 mg elemental iron) oral tablet: 1 tab(s) orally once a day  metoprolol succinate 25 mg oral tablet, extended release: 1 tab(s) orally once a day  traMADol 50 mg oral tablet: 1 tab(s) orally once a day (at bedtime) MDD: 1 tab

## 2024-02-01 NOTE — PROGRESS NOTE ADULT - ASSESSMENT
Assessment: 63 year old male with Hx of HTN, GERD, dyslipidemia, chronic intermittent leg pain, aortic stenosis, chronic venous stasis dermatitis with bilateral leg swelling, admitted for failed outpatient therapy with bactrim for cellulitis, found to have congestive heart failure. Patient is non compliant with medical care, was recommended for a full medical, cardiac eval 5 years ago in which he has not completed. Cardiology following for severe AS. Patient currently denies chest pain.     Recommendations:  #severe AS  - TTE with EF 35-40%, severely enlarged LA, enlarged RV and RA, mod MR and pulm htn and severe AS. basal inferior segment is akinetic and mid inferolateral, mid anterolateral and mid inferior segment are hypokinetic.   - pt noted with b/l pitting edema and erythema. doppler neg for DVT. likely multifactorial 2/2 venous stasis and HFrEF  - due to pt CKD, patient may not be the best candidate for ACE-I, ARB, ARNI, spirinolactone or farxiga. Continue with toprol  - as per renal, ok for diuresis, continue with iv lasix and monitor electrolytes and kidney function  - due to CV hx, goal for hgb >/= 8, transfuse as needed as per heme.   - pt needs further medical optimization prior to TAVR eval. pt is s/p 1 prbc 1/31, pending heme clearanc for TAVR eval as well as GI consult r/o gi bleed    Rosmery ROGERS-BC   Assessment: 63 year old male with Hx of HTN, GERD, dyslipidemia, chronic intermittent leg pain, aortic stenosis, chronic venous stasis dermatitis with bilateral leg swelling, admitted for failed outpatient therapy with bactrim for cellulitis, found to have congestive heart failure. Patient is non compliant with medical care, was recommended for a full medical, cardiac eval 5 years ago in which he has not completed. Cardiology following for severe AS. Patient currently denies chest pain.     Recommendations:  #severe AS  - TTE with EF 35-40%, severely enlarged LA, enlarged RV and RA, mod MR and pulm htn and severe AS. basal inferior segment is akinetic and mid inferolateral, mid anterolateral and mid inferior segment are hypokinetic.   - pt noted with b/l pitting edema and erythema. doppler neg for DVT. likely multifactorial 2/2 venous stasis and HFrEF  - due to pt CKD, patient may not be the best candidate for ACE-I, ARB, ARNI, spirinolactone or farxiga. Continue with toprol  - as per renal, ok for diuresis, continue with iv lasix and monitor electrolytes and kidney function  - due to CV hx, goal for hgb >/= 8, transfuse as needed as per heme.   - pt needs further medical optimization prior to TAVR eval. pt is s/p 1 prbc 1/31, pending heme clearanc for TAVR eval as well as GI consult r/o gi bleed    Rosmery Remy AGACNP-BC    impression  hb 8.1 hct 26 mcv 60 bun 26 c 1.7 bnp [id number] ef .35-.4  severe AS with LV dysfunction/renal fail/anmeia  given high risk features. favor transfer for invasive evaluations with structural heart team  patient in agreement       care coordinated with dr kendrick and carrie.  previously discussed at length with wife Chanelle

## 2024-02-01 NOTE — CONSULT NOTE ADULT - ASSESSMENT
Patient is a 63y male with a past history of AS, HTN,HLD, venbous stasis disease,and GERD who came to ER on 1/25 with leg pain and swelling.  He had been given bactrim a few weeks ago, treatment for "cellulitis", no impact.  He denies any fever or chills.He has known AS, a cardiac evaluation is planned.He had a woiund culture from [facility] a few weeks ago with a group B strep.  Zosyn has been started in ER.  I do not see any evidence of an active cellulitis.He has fairly symmetric leg findings which I think are all c/w venous stasis.  He is afebrile with a normal wbc count, failure of antibiotics may imply that underlying process is not infection  Suggest:  1 Stop zosyn  2 Leg elevation  3 Cardiac evaluation of AS  4 Can either observe of antibiotics or give a 5 day course of cephalexin, 500 TID, however i do not see a need for antibiotics.
[facility] NEPHROLOGY SERVICES, [facility]  NEPHROLOGY AND HYPERTENSION  [address]  [address]  [place] [address]  [phone]    MD EJAN PARSONS MD YELENA ROSENBERG, MD BINNY KOSHY, MD CHRISTOPHER CAPUTO, MD EDWARD BOVER, MD      Information from chart:  "Patient is a 63y old  Male who presents with a chief complaint of Cellulitis     (2024 15:06)    HPI:  63 year old male with Hx of HTN, GERD, dyslipidemia, chronic intermittent leg pain, aortic stenosis, chronic venous stasis dermatitis with bilateral leg swelling, presents to  ED sent by PCP for failed outpatient therapy with bactrim for cellulitis.  As per outpatient chart review, Initial symptoms developed in September after gel injection by orthopedist. Since then he has noted worsening swelling and redness and ulcerations noted in bilateral lower extremities. Does have a history of significant aortic stenosis-but has yet to follow-up with cardiologist.   Was seen at [facility] on  for persistent cellulitis. Patient declined hospital admission at that time. Wound culture was positive for Streptococcus dysgalactiae. Was started on Bactrim po.   Continues to note intermittent bilateral calf pain that he notes at times at rest and at movement . Seen by PCP yesterday and was advised to go to ED for admission, r/o DVT and IV abx. Patient states that the swelling got worse for the past 1 week. Reports he hasn't noticed increase in SOB and denies dyspnea, but states that he sleeps with 3 pillows at night for neck pain, and he gets shortness of breath on exertion. Shortness of breath noticed during the interview. Denies fevers, chills, dysuria, cough, chest, abdominal, back, or neck pain. Says that he has decreased sensation in his legs which makes him unsteady on his feet.     In the ED temp 98.2, HR 86, 110/67, RR, SPO2 96%   Labs significant for BNP 79513, WBC 15, troponin 24, INR 1.37, Cr 1.7   He received morphine 2mg and CTX x1   CXR personally reviewed cardiomegaly on wet read   EKG personally reviewed NSR with PACS and PVCs, and RBBB (similar to prior)     (2024 19:43)   "  No distress  PAST MEDICAL & SURGICAL HISTORY:  Dyslipidemia      Hypertension      Chronic GERD      History of aortic stenosis      No significant past surgical history        FAMILY HISTORY:  FH: congestive heart failure (Father, Sibling)    FH: coronary artery disease (Sibling)    FH: brain cancer (Mother)      Allergies    Drug Allergies Not Recorded  garlic (Angioedema; Swelling; Anaphylaxis)    Intolerances      Home Medications:  alfuzosin 10 mg oral tablet, extended release: 1 tab(s) orally once a day (at bedtime) (2024 21:10)  amLODIPine 10 mg oral tablet: 1 tab(s) orally once a day (2024 21:10)  aspirin 325 mg oral capsule: 1 orally once a day (15 Aug 2023 10:45)  furosemide 20 mg oral tablet: 1 tab(s) orally once a day (2024 21:41)    MEDICATIONS  (STANDING):  aspirin 325 milliGRAM(s) Oral daily  cephalexin 500 milliGRAM(s) Oral three times a day  heparin   Injectable 5000 Unit(s) SubCutaneous every 8 hours  influenza   Vaccine 0.5 milliLiter(s) IntraMuscular once  metoprolol succinate ER 25 milliGRAM(s) Oral daily  pantoprazole    Tablet 40 milliGRAM(s) Oral before breakfast  tamsulosin 0.4 milliGRAM(s) Oral at bedtime    MEDICATIONS  (PRN):  aluminum hydroxide/magnesium hydroxide/simethicone Suspension 30 milliLiter(s) Oral every 4 hours PRN Dyspepsia  melatonin 3 milliGRAM(s) Oral at bedtime PRN Insomnia  ondansetron Injectable 4 milliGRAM(s) IV Push every 8 hours PRN Nausea and/or Vomiting  traMADol 50 milliGRAM(s) Oral two times a day PRN Moderate Pain (4 - 6)    Vital Signs Last 24 Hrs  T(C): 36.5 (2024 10:14), Max: 36.5 (2024 05:00)  T(F): 97.7 (2024 10:14), Max: 97.7 (2024 05:00)  HR: 92 (2024 13:25) (77 - 92)  BP: 121/68 (2024 13:25) (95/66 - 121/68)  BP(mean): 75 (2024 10:14) (75 - 90)  RR: 15 (2024 10:14) (13 - 15)  SpO2: 94% (2024 10:14) (94% - 98%)    Parameters below as of 2024 10:14  Patient On (Oxygen Delivery Method): room air        Daily Height in cm: 177.8 (2024 23:43)    Daily Weight in k.3 (2024 05:00)    24 @ 07:01  -  24 @ 07:00  --------------------------------------------------------  IN: 0 mL / OUT: 500 mL / NET: -500 mL      CAPILLARY BLOOD GLUCOSE        PHYSICAL EXAM:      T(C): 36.5 (24 @ 10:14), Max: 36.5 (24 @ 05:00)  HR: 92 (24 @ 13:25) (77 - 92)  BP: 121/68 (24 @ 13:25) (95/66 - 121/68)  RR: 15 (24 @ 10:14) (13 - 15)  SpO2: 94% (24 @ 10:14) (94% - 98%)  Wt(kg): --  Lungs decreased BS bases   Heart S1S2  Abd soft NT ND  Extremities:   2 edema                  139  |  106  |  21  ----------------------------<  89  3.9   |  24  |  1.56<H>        Ca    8.6      2024 09:00  Phos  4.4       Mg     1.9         TPro  6.3  /  Alb  2.4<L>  /  TBili  1.0  /  DBili  x   /  AST  54<H>  /  ALT  278<H>  /  AlkPhos  84                            7.3    8.06  )-----------( 241      ( 2024 09:00 )             23.8     Creatinine Trend: 1.56<--, 1.58<--, 1.76<--, 1.40<--  Urinalysis Basic - ( 2024 09:00 )    Color: x / Appearance: x / SG: x / pH: x  Gluc: 89 mg/dL / Ketone: x  / Bili: x / Urobili: x   Blood: x / Protein: x / Nitrite: x   Leuk Esterase: x / RBC: x / WBC x   Sq Epi: x / Non Sq Epi: x / Bacteria: x            Assessment   CKD 3; fluid overload   Fe def anemia     Plan  Continue warranted diuresis   IV Fe  Will follow       Pedrito Irving MD
GI consulted to see patient due to anemia. Patient seen and examined at bedside. He denies nausea, vomiting, hematemesis, coffee ground emesis, BRBPR, or melena. No abdominal pain. No straining with bowel movements. Normal brown stool, last bm today. No dysphagia or odynophagia. Denies unintentional weight loss. Patient reports life long history of anemia. Last upper endoscopy was 2021 and showed hiatal hernia and esophagitis Last colonoscopy was in 2016, and one polyp noted. Both procedures performed by Dr. Layton.

## 2024-02-01 NOTE — DISCHARGE NOTE PROVIDER - NSDCFUSCHEDAPPT_GEN_ALL_CORE_FT
Ronald Vega  [facility]  GASTRO [address] [facility]  Scheduled Appointment: 04/09/2024

## 2024-02-01 NOTE — DISCHARGE NOTE PROVIDER - ATTENDING DISCHARGE PHYSICAL EXAMINATION:
General: NAD, A/O x 3  ENT: MMM, no thrush  Neck: Supple, No JVD  Lungs: Clear to auscultation bilaterally, bilateral air entry, non labored breathing  Cardio: S1S2 regular, +murmur  Abdomen: Soft, Nontender, Nondistended; Bowel sounds present  Extremities: No cyanosis, B/L LE edema, chronic skin changes/venous stasis

## 2024-02-01 NOTE — PROGRESS NOTE ADULT - SUBJECTIVE AND OBJECTIVE BOX
GRACE REBECCA  [id number]      HPI:  63 year old male with Hx of HTN, GERD, dyslipidemia, chronic intermittent leg pain, aortic stenosis, chronic venous stasis dermatitis with bilateral leg swelling, presents to  ED sent by PCP for failed outpatient therapy with bactrim for cellulitis.    ALLERGIES:  No Known Drug Allergies  garlic (Angioedema; Swelling; Anaphylaxis)      CURRENT MEDICATIONS:  MEDICATIONS  (STANDING):  aspirin  chewable 81 milliGRAM(s) Oral daily  furosemide   Injectable 40 milliGRAM(s) IV Push daily  heparin   Injectable 5000 Unit(s) SubCutaneous every 8 hours  influenza   Vaccine 0.5 milliLiter(s) IntraMuscular once  iron sucrose IVPB 300 milliGRAM(s) IV Intermittent every 24 hours  magnesium sulfate  IVPB 1 Gram(s) IV Intermittent once  metoprolol succinate ER 25 milliGRAM(s) Oral daily  pantoprazole    Tablet 40 milliGRAM(s) Oral before breakfast  potassium chloride    Tablet ER 40 milliEquivalent(s) Oral once  tamsulosin 0.4 milliGRAM(s) Oral at bedtime      ROS:  All 10 systems reviewed and positives noted in HPI    OBJECTIVE:    VITAL SIGNS:  Vital Signs Last 24 Hrs  T(C): 36.6 (31 Jan 2024 05:08), Max: 36.7 (30 Jan 2024 14:00)  T(F): 97.8 (31 Jan 2024 05:08), Max: 98 (30 Jan 2024 14:00)  HR: 77 (31 Jan 2024 05:08) (74 - 79)  BP: 105/66 (31 Jan 2024 05:08) (105/66 - 120/80)  BP(mean): 79 (31 Jan 2024 05:08) (79 - 79)  RR: 17 (31 Jan 2024 05:08) (17 - 18)  SpO2: 97% (31 Jan 2024 05:08) (96% - 97%)    Parameters below as of 31 Jan 2024 05:08  Patient On (Oxygen Delivery Method): room air      PHYSICAL EXAM:  General:  no distress  HEENT: sclera anicteric  Neck: supple  CVS: JVP ~ 7 cm H20, RRR, + murmur  Chest: unlabored respirations, diminished breath sounds  Abdomen: non-distended  Extremities: b/l l/e +1 pitting edema and erythema  Neuro: awake, alert & oriented  Psych: normal affect      LABS:                        7.5    10.82 )-----------( 227      ( 31 Jan 2024 07:19 )             23.5     01-31    142  |  105  |  24<H>  ----------------------------<  103<H>  3.5   |  25  |  1.50<H>    Ca    8.8      31 Jan 2024 07:19  Mg     1.7     01-31    TPro  6.3  /  Alb  2.4<L>  /  TBili  1.0  /  DBili  x   /  AST  18  /  ALT  120<H>  /  AlkPhos  82  01-31            ECG: Sinus with PVC and RBBB       TTE: < from: TTE Echo Complete w/o Contrast w/ Doppler (01.26.24 @ 08:26) >   1. Left ventricular ejection fraction, by visual estimation, is 35 to   40%.   2. Moderately decreased global left ventricular systolic function.   3. Basal inferior segment, mid inferolateral segment, mid anterolateral   segment, and mid inferior segment are abnormal as described above.   4. Severely enlarged left atrium.   5. Increased LV wall thickness.   6. Mildly increased left ventricular internal cavity size.   7. Spectral Doppler shows restrictivepattern of left ventricular   myocardial filling (Grade III diastolic dysfunction).   8. There is mild concentric left ventricular hypertrophy.   9. Moderately enlarged right ventricle.  10. Right atrial enlargement.  11. Trivial pericardial effusion.  12. Moderate mitral valve regurgitation.  13. Thickening of the anterior and posterior mitral valve leaflets.  14. Mild-moderate tricuspid regurgitation.  15. Mild aortic regurgitation.  16. Severe aortic valve stenosis.  17. Dilatation of the ascending aorta.  18. Estimated pulmonary artery systolic pressure is 58.6 mmHg assuming a   right atrial pressure of 15 mmHg, which is consistent with moderate   pulmonary hypertension.  19. LA volume Index is 56.0 ml/m² ml/m2.  20. The Dimesionless Index value is .17.    The basal inferior segment is akinetic. The mid inferolateral segment, mid  anterolateral segment, and mid inferior segment are hypokinetic. All   remaining scored segments are normal.

## 2024-02-01 NOTE — PROGRESS NOTE ADULT - SUBJECTIVE AND OBJECTIVE BOX
Patient is a 63y old  Male who presents with a chief complaint of CHF exacerbation (01 Feb 2024 09:53)    Patient seen and examined at bedside. No overnight events reported.     ALLERGIES:  No Known Drug Allergies  garlic (Angioedema; Swelling; Anaphylaxis)    MEDICATIONS  (STANDING):  aspirin  chewable 81 milliGRAM(s) Oral daily  furosemide   Injectable 40 milliGRAM(s) IV Push daily  heparin   Injectable 5000 Unit(s) SubCutaneous every 8 hours  influenza   Vaccine 0.5 milliLiter(s) IntraMuscular once  metoprolol succinate ER 25 milliGRAM(s) Oral daily  pantoprazole    Tablet 40 milliGRAM(s) Oral before breakfast  tamsulosin 0.4 milliGRAM(s) Oral at bedtime    MEDICATIONS  (PRN):  aluminum hydroxide/magnesium hydroxide/simethicone Suspension 30 milliLiter(s) Oral every 4 hours PRN Dyspepsia  melatonin 3 milliGRAM(s) Oral at bedtime PRN Insomnia  ondansetron Injectable 4 milliGRAM(s) IV Push every 8 hours PRN Nausea and/or Vomiting  traMADol 50 milliGRAM(s) Oral two times a day PRN Moderate Pain (4 - 6)    Vital Signs Last 24 Hrs  T(F): 98 (01 Feb 2024 06:00), Max: 98 (31 Jan 2024 21:14)  HR: 77 (01 Feb 2024 06:00) (77 - 84)  BP: 155/77 (01 Feb 2024 06:00) (109/74 - 155/77)  RR: 18 (01 Feb 2024 06:00) (18 - 18)  SpO2: 97% (01 Feb 2024 06:00) (97% - 97%)  I&O's Summary    31 Jan 2024 07:01  -  01 Feb 2024 07:00  --------------------------------------------------------  IN: 200 mL / OUT: 400 mL / NET: -200 mL    PHYSICAL EXAM:  General: NAD, A/O x 3  ENT: MMM, no thrush  Neck: Supple, No JVD  Lungs: Clear to auscultation bilaterally, bilateral air entry, non labored breathing  Cardio: S1S2 regular, +murmur  Abdomen: Soft, Nontender, Nondistended; Bowel sounds present  Extremities: No cyanosis, B/L LE edema, chronic skin changes/venous stasis    LABS:                        8.1    10.50 )-----------( 229      ( 01 Feb 2024 06:05 )             25.9     02-01    140  |  103  |  26  ----------------------------<  98  3.9   |  26  |  1.69    Ca    8.9      01 Feb 2024 06:05  Mg     1.7     01-31    TPro  6.3  /  Alb  2.4  /  TBili  1.0  /  DBili  x   /  AST  18  /  ALT  120  /  AlkPhos  82  01-31 01-27 Chol 106 mg/dL LDL -- HDL 38 mg/dL Trig 58 mg/dL                  Urinalysis Basic - ( 01 Feb 2024 06:05 )    Color: x / Appearance: x / SG: x / pH: x  Gluc: 98 mg/dL / Ketone: x  / Bili: x / Urobili: x   Blood: x / Protein: x / Nitrite: x   Leuk Esterase: x / RBC: x / WBC x   Sq Epi: x / Non Sq Epi: x / Bacteria: x        Culture - Blood (collected 25 Jan 2024 16:55)  Source: .Blood Blood-Peripheral  Final Report (31 Jan 2024 01:01):    No growth at 5 days    Culture - Blood (collected 25 Jan 2024 16:50)  Source: .Blood Blood-Peripheral  Final Report (31 Jan 2024 01:01):    No growth at 5 days        RADIOLOGY & ADDITIONAL TESTS:    Care Discussed with Consultants/Other Providers:

## 2024-02-01 NOTE — DISCHARGE NOTE PROVIDER - NSDCCPCAREPLAN_GEN_ALL_CORE_FT
PRINCIPAL DISCHARGE DIAGNOSIS  Diagnosis: Heart failure  Assessment and Plan of Treatment: You will be transferred to [facility] for evaluation by the structural heart team. Please follow up with your cardiologist and with your primary care doctor.      SECONDARY DISCHARGE DIAGNOSES  Diagnosis: Heart failure  Assessment and Plan of Treatment:

## 2024-02-01 NOTE — CONSULT NOTE ADULT - PROBLEM SELECTOR RECOMMENDATION 9
DDX chronic disease, CHF,   Monitor H/H  Monitor bowel movements   Continue PPI  Most recent EGD 2021- hiatal hernia and esophagitis   Colonoscopy 2016- 1 polyp removed  Would transfer patient for TVAR DDX chronic disease, CHF,   Monitor H/H  Monitor bowel movements   Continue PPI  Most recent EGD 2021- hiatal hernia and esophagitis   Colonoscopy 2016- 1 polyp removed  High risk for endoscopic intervention  Would transfer patient for TVAR

## 2024-02-01 NOTE — PROGRESS NOTE ADULT - NS ATTEND AMEND GEN_ALL_CORE FT
d/w Dr. Nguyễn    GI and Hematology evals noted.  Keep Hg>8 given severe AS    will present case to [facility] for transfer - new HFrEF and TAVR eval

## 2024-02-01 NOTE — DISCHARGE NOTE PROVIDER - NSDCHOSPICE_GEN_A_CORE
DATE:  01/11/19



SUPERVISING PHYSICIAN:  Larry Singh M.D.



SUBJECTIVE:  The patient is sitting up in his bed.  He is watching television.  
He still complains of right upper arm pain but it is fairly well controlled with
his medications.  I discussed with him that Dr. Benavides wanted him to have 2 weeks
of outpatient vancomycin treatment here at the [facility] and he agreed to that.  
Otherwise he has no complaints of chest pain, shortness of breath, nausea, 
vomiting or diarrhea.



OBJECTIVE:  VITAL SIGNS: Temperature is 98, heart rate 92, blood pressure 
112/71, respiratory rate 16, O2 sat 100% on room air.  RESPIRATORY: Essentially 
clear to auscultation bilaterally.  CARDIAC: Regular rate and rhythm.  
GASTROINTESTINAL: Abdomen is soft, nondistended, non-tender.  Bowel sounds are 
positive.  EXTREMITIES: His right upper arm continues to be somewhat reddened 
and warm but it has improved since yesterday.  The area of induration is about 8
cm.  There is no fluctuance or drainage.  NEUROLOGIC: He is awake, alert and 
oriented times three.



LABORATORY:  CBC is basically within normal limits.  Electrolytes are within 
normal limits.  Bilirubin is slightly high at 1.1.  Preliminary blood cultures 
show no growth after 24 hours.  



Soft tissue ultrasound of the right upper extremity shows subcutaneous soft 
tissue interstitial edema at the area of concern without abscess, mass or other 
focal soft tissue lesion.  Differential considerations include cellulitis or 
contusion.  All other labs and films have been reviewed via the EMR.



ASSESSMENT: 

1.   Cellulitis of the right upper extremity with the patient's significant 
history of MRSA

      infections requiring hospitalization and frequent sepsis.

2.   Thrombotic thrombocytopenic purpura by history.

3.   Hypertension that is stable on medication.

4.   Asthma with no evidence of an acute exacerbation.

5.   General anxiety disorder.

6.   Gastroesophageal reflux disease.



PLAN:  We will continue present supportive care.   Plan for discharge tomorrow. 
I have added Align to his medication regimen.  I have also written orders for 
his outpatient therapy to begin Sunday morning.  He will need to continue his 
vancomycin infusion through the 24th of this month and then he will need a 
followup appointment with Dr. Stearns as well as Dr. Benavides in [place].  I 
will repeat his lab in the morning.  Will continue to monitor closely and follow
as needed.



#[id number]

[facility]
No

## 2024-02-01 NOTE — CONSULT NOTE ADULT - SUBJECTIVE AND OBJECTIVE BOX
INTERVAL HPI/OVERNIGHT EVENTS:  HPI:  63 year old male with Hx of HTN, GERD, dyslipidemia, chronic intermittent leg pain, aortic stenosis, chronic venous stasis dermatitis with bilateral leg swelling, presents to  ED sent by PCP for failed outpatient therapy with bactrim for cellulitis.  As per outpatient chart review, Initial symptoms developed in September after gel injection by orthopedist. Since then he has noted worsening swelling and redness and ulcerations noted in bilateral lower extremities. Does have a history of significant aortic stenosis-but has yet to follow-up with cardiologist.   Was seen at [facility] on 1/7 for persistent cellulitis. Patient declined hospital admission at that time. Wound culture was positive for Streptococcus dysgalactiae. Was started on Bactrim po.   Continues to note intermittent bilateral calf pain that he notes at times at rest and at movement . Seen by PCP yesterday and was advised to go to ED for admission, r/o DVT and IV abx. Patient states that the swelling got worse for the past 1 week. Reports he hasn't noticed increase in SOB and denies dyspnea, but states that he sleeps with 3 pillows at night for neck pain, and he gets shortness of breath on exertion. Shortness of breath noticed during the interview. Denies fevers, chills, dysuria, cough, chest, abdominal, back, or neck pain. Says that he has decreased sensation in his legs which makes him unsteady on his feet.       GI consulted to see patient due to anemia. Patient seen and examined at bedside. He denies nausea, vomiting, hematemesis, coffee ground emesis, BRBPR, or melena. No abdominal pain. No straining with bowel movements. Normal brown stool, last bm today. No dysphagia or odynophagia. Denies unintentional weight loss. Patient reports life long history of anemia. Last upper endoscopy was 2021 and showed hiatal hernia and esophagitis Last colonoscopy was in 2016, and one polyp noted. Both procedures performed by Dr. Layton.     MEDICATIONS  (STANDING):  aspirin  chewable 81 milliGRAM(s) Oral daily  furosemide   Injectable 40 milliGRAM(s) IV Push daily  heparin   Injectable 5000 Unit(s) SubCutaneous every 8 hours  influenza   Vaccine 0.5 milliLiter(s) IntraMuscular once  metoprolol succinate ER 25 milliGRAM(s) Oral daily  pantoprazole    Tablet 40 milliGRAM(s) Oral before breakfast  tamsulosin 0.4 milliGRAM(s) Oral at bedtime    MEDICATIONS  (PRN):  aluminum hydroxide/magnesium hydroxide/simethicone Suspension 30 milliLiter(s) Oral every 4 hours PRN Dyspepsia  melatonin 3 milliGRAM(s) Oral at bedtime PRN Insomnia  ondansetron Injectable 4 milliGRAM(s) IV Push every 8 hours PRN Nausea and/or Vomiting  traMADol 50 milliGRAM(s) Oral two times a day PRN Moderate Pain (4 - 6)      Allergies    No Known Drug Allergies  garlic (Angioedema; Swelling; Anaphylaxis)    Intolerances        PAST MEDICAL & SURGICAL HISTORY:  Dyslipidemia      Hypertension      Chronic GERD      History of aortic stenosis      No significant past surgical history          REVIEW OF SYSTEMS: negative unless indicated in HPI    non smoker  No ETOH abuse       PHYSICAL EXAM:   Vital Signs:  Vital Signs Last 24 Hrs  T(C): 36.7 (01 Feb 2024 06:00), Max: 36.7 (31 Jan 2024 21:14)  T(F): 98 (01 Feb 2024 06:00), Max: 98 (31 Jan 2024 21:14)  HR: 77 (01 Feb 2024 06:00) (77 - 84)  BP: 155/77 (01 Feb 2024 06:00) (109/74 - 155/77)  BP(mean): --  RR: 18 (01 Feb 2024 06:00) (18 - 18)  SpO2: 97% (01 Feb 2024 06:00) (97% - 97%)    Parameters below as of 01 Feb 2024 06:00  Patient On (Oxygen Delivery Method): room air      Daily     Daily I&O's Summary    31 Jan 2024 07:01  -  01 Feb 2024 07:00  --------------------------------------------------------  IN: 200 mL / OUT: 400 mL / NET: -200 mL        GENERAL:  Appears stated age,   HEENT:  NC/AT,  conjunctivae clear and pink, sclera -anicteric  CHEST:  Full & symmetric excursion, no increased effort, breath sounds clear  HEART:  Regular rhythm, S1, S2,   ABDOMEN:  Soft, non-tender, distended, normoactive bowel sounds, no signs of chronic liver disease  EXTEREMITIES:  + 1 edema b/l le, + pulses   SKIN:  No rash/warm/dry/ erythema bl le feet to calf  NEURO:  Alert, oriented      LABS:                        8.1    10.50 )-----------( 229      ( 01 Feb 2024 06:05 )             25.9     02-01    140  |  103  |  26<H>  ----------------------------<  98  3.9   |  26  |  1.69<H>    Ca    8.9      01 Feb 2024 06:05  Mg     1.7     01-31    TPro  6.3  /  Alb  2.4<L>  /  TBili  1.0  /  DBili  x   /  AST  18  /  ALT  120<H>  /  AlkPhos  82  01-31      Urinalysis Basic - ( 01 Feb 2024 06:05 )    Color: x / Appearance: x / SG: x / pH: x  Gluc: 98 mg/dL / Ketone: x  / Bili: x / Urobili: x   Blood: x / Protein: x / Nitrite: x   Leuk Esterase: x / RBC: x / WBC x   Sq Epi: x / Non Sq Epi: x / Bacteria: x      amylase   lipase  RADIOLOGY & ADDITIONAL TESTS:

## 2024-02-01 NOTE — DISCHARGE NOTE PROVIDER - CARE PROVIDER_API CALL
Kristian Nguyen.  [facility]  [address] [place] [id number]  Phone: [phone]  Fax: [phone]  Follow Up Time:

## 2024-02-01 NOTE — PROGRESS NOTE ADULT - PROBLEM SELECTOR PLAN 1
Anemia of chronic disease, cardiac comorbidities, frequent phlebotomies,?  mechanical hemolysis (severe AS), treated supportively with iron infusions and infrequent PRBC transfusion.  Awaiting CBC results today.  As discussed with hospitalist team, efforts should be made to maintain hemoglobin above 8 g/dL due to patient's advanced aortic stenosis and need for TAVR.  Should posttransfusion hemoglobin be above 8 g/dL, patient is cleared from hematology perspective for transfer to [facility] for cardiac procedure.

## 2024-02-01 NOTE — PROGRESS NOTE ADULT - SUBJECTIVE AND OBJECTIVE BOX
GRACE FERNANDEZ, 63y Male  MRN: [id number]  ATTENDING: Abdullahi Kellogg    HPI:  63M, PMHx HTN, GERD, dyslipidemia, chronic intermittent leg pain, aortic stenosis, chronic venous stasis dermatitis with bilateral leg swelling, treated outpatient with Bactrim for cellulitis .  Wound culture from the lower extremity positive for Streptococcus dysgalactiae.  Patient currently evaluated by cardiology for TAVR due to severe aortic stenosis; surgery to be performed at [facility].  In the emergency room he was found anemic and with a reactive leukocytosis.  Currently receiving PRBC transfusion for hemoglobin around 7.3 g/dL.  Chest x-ray showed cardiomegaly.  Hematology consulted for anemia.    MEDICATIONS:  aluminum hydroxide/magnesium hydroxide/simethicone Suspension 30 milliLiter(s) Oral every 4 hours PRN  aspirin  chewable 81 milliGRAM(s) Oral daily  cephalexin 500 milliGRAM(s) Oral three times a day  heparin   Injectable 5000 Unit(s) SubCutaneous every 8 hours  influenza   Vaccine 0.5 milliLiter(s) IntraMuscular once  iron sucrose IVPB 300 milliGRAM(s) IV Intermittent every 24 hours  melatonin 3 milliGRAM(s) Oral at bedtime PRN  metoprolol succinate ER 25 milliGRAM(s) Oral daily  ondansetron Injectable 4 milliGRAM(s) IV Push every 8 hours PRN  pantoprazole    Tablet 40 milliGRAM(s) Oral before breakfast  tamsulosin 0.4 milliGRAM(s) Oral at bedtime  traMADol 50 milliGRAM(s) Oral two times a day PRN    All other medications reviewed.    SUBJECTIVE:  Stable clinically; no events over night.    VITALS:  T(C): 36.8 (01-30-24 @ 04:59), Max: 36.8 (01-30-24 @ 04:59)  T(F): 98.3 (01-30-24 @ 04:59), Max: 98.3 (01-30-24 @ 04:59)  HR: 90 (01-30-24 @ 04:59) (77 - 90)  BP: 115/72 (01-30-24 @ 04:59) (114/68 - 115/72)    PHYSICAL EXAM:  Constitutional: alert, well developed  HEENT: normocephalic, anicteric sclerae, no mucositis or thrush  Respiratory: bilateral clear to auscultation anteriorly  Cardiovascular : S1, S2 regular, rhythmic, no murmurs, gallops or rubs  Abdomen: soft, distended, + normoactive BS, no palpable HS- megaly  Extremities: no tenderness;  -c/c/e, pulses equal bilaterally    LABS:  (01-30) WBC: 10.73 K/uL,Hemoglobin: 7.6 g/dL, Hematocrit: 24.9 %,  Platelet: 237 K/uL  (01-30) Na: 141 mmol/L ; K: 3.7 mmol/L ; BUN: 21 mg/dL ; Cr: 1.61 mg/dL.    RADIOLOGY:  ACC: [id number] EXAM:  CT ABDOMEN AND PELVIS   ORDERED BY: MICHELLE HALL   ACC: [id number] EXAM:  CT CHEST   ORDERED BY: MICHELLE HALL   PROCEDURE DATE:  01/28/2024    INTERPRETATION:  CLINICAL INFORMATION: Anemia. Evaluate for malignancy.    COMPARISON: Right upper quadrant ultrasound 1/26/2024.    CONTRAST/COMPLICATIONS:  IV Contrast: NONE  Oral Contrast: NONE  Complications: None reported at time of study completion    PROCEDURE:  CT of the Chest, Abdomen and Pelvis was performed.  Sagittal and coronal reformats were performed.    FINDINGS:  Limited evaluation of the vasculature and viscera in the absence of   intravenous contrast.    CHEST:  LUNGS AND LARGE AIRWAYS: Patent central airways. Imaging of the lung   fields, particularly inferiorly, is motion degraded. This may limit   evaluation. Otherwise, no discrete infiltrate or nodule identified.   Bilateral subsegmental atelectasis.  PLEURA: Trace right pleural effusion.  VESSELS: Atherosclerotic changes.  HEART: Marked cardiomegaly. Aortic valve calcification. Hypoattenuation   of the blood pool relative to myocardium, consistent with anemia. No   pericardial effusion.  MEDIASTINUM AND KATIA: Nonspecific mildly prominent mediastinal lymph   nodes. For reference, a para-aortic node measures 2.2 x 1.0 cm (series 2   image 26).  CHEST WALL AND LOWER NECK: Within normal limits.    ABDOMEN AND PELVIS:  Imaging of the upper abdomen is motion degraded.    LIVER: Hepatomegaly, the right lobe measuring 19.8 cm craniocaudally.  BILE DUCTS: Normal caliber.  GALLBLADDER: Within normal limits.  SPLEEN: Within normal limits.  PANCREAS: Within normal limits.  ADRENALS: Indeterminate right adrenal nodule measuring 1.3 cm.  KIDNEYS/URETERS: Right upper pole renal cyst. Subcentimeter   hyperattenuating foci in the left kidney that likely represent   proteinaceous/hemorrhagic cysts. No hydronephrosis.    BLADDER: Mild bladder wall thickening.  REPRODUCTIVE ORGANS: The prostate is mildly enlarged.    BOWEL: Moderate hiatal hernia. No bowel obstruction. Colonic diverticula.   No evidence for acute diverticulitis.  PERITONEUM: Small volume ascites.  VESSELS: Atherosclerotic changes.  RETROPERITONEUM/LYMPH NODES: Nonspecific prominent retroperitoneal,   bilateral iliac chain, and bilateral inguinal lymph nodes. For example, a   left inguinal node measures 2.6 x 1.6 cm (series 2 image 175), and a left   para-aortic node measures 1.5 x 1.4 cm (series 2 image 100).  ABDOMINAL WALL: Generalized subcutaneous edema. Subcutaneous cystic   appearing nodular focus measuring 1.2 cm in the left anterior upper   abdominal/lower chest wall (series 2 image 78).  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 Degenerative changes.    IMPRESSION:  *  Exam may be limited by noncontrast technique and motion.  *  Prominent nonspecific mediastinal, retroperitoneal, iliac chain, and   inguinal lymph nodes.  *  Marked cardiomegaly. Aortic valve calcification.  *  Trace right pleural effusion.  *  Hepatomegaly.  *  Indeterminate right adrenal nodule measuring 1.3 cm.  *  Mild urinary bladder wall thickening, which could be due to   underdistention, chronic bladder outlet obstruction, or cystitis.   Correlate with urinalysis.  *  Mildly enlarged prostate.  *  Small volume ascites.  *  Subcutaneous cystic-appearing nodular focus measuring 1.2 cm in the   left anterior upper abdominal/lower chest wall, which may represent an   epidermal inclusion cyst.  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 Recommend correlation for point tenderness.

## 2024-02-01 NOTE — PROGRESS NOTE ADULT - SUBJECTIVE AND OBJECTIVE BOX
No CP or SOB at rest, persistent VANCE    Vital Signs Last 24 Hrs  T(C): 36.6 (02-01-24 @ 20:26), Max: 36.7 (01-31-24 @ 21:14)  T(F): 97.9 (02-01-24 @ 20:26), Max: 98 (01-31-24 @ 21:14)  HR: 77 (02-01-24 @ 20:26) (76 - 84)  BP: 118/76 (02-01-24 @ 20:26) (111/75 - 155/77)  RR: 16 (02-01-24 @ 20:26) (16 - 18)  SpO2: 95% (02-01-24 @ 20:26) (95% - 97%)    I&O's Detail    31 Jan 2024 07:01  -  01 Feb 2024 07:00  --------------------------------------------------------  IN:    Oral Fluid: 200 mL  Total IN: 200 mL    OUT:    Voided (mL): 400 mL  Total OUT: 400 mL    Lungs b/l air entry  Heart S1S2   Abd soft ND  Extremities: edema                                                 8.1    10.50 )-----------( 229      ( 01 Feb 2024 06:05 )             25.9     01 Feb 2024 06:05    140    |  103    |  26     ----------------------------<  98     3.9     |  26     |  1.69     Ca    8.9        01 Feb 2024 06:05  Mg     1.7       31 Jan 2024 07:19    TPro  6.3    /  Alb  2.4    /  TBili  1.0    /  DBili  x      /  AST  18     /  ALT  120    /  AlkPhos  82     31 Jan 2024 07:19    LIVER FUNCTIONS - ( 31 Jan 2024 07:19 )  Alb: 2.4 g/dL / Pro: 6.3 g/dL / ALK PHOS: 82 U/L / ALT: 120 U/L / AST: 18 U/L / GGT: x           aluminum hydroxide/magnesium hydroxide/simethicone Suspension 30 milliLiter(s) Oral every 4 hours PRN  aspirin  chewable 81 milliGRAM(s) Oral daily  furosemide   Injectable 40 milliGRAM(s) IV Push daily  heparin   Injectable 5000 Unit(s) SubCutaneous every 8 hours  influenza   Vaccine 0.5 milliLiter(s) IntraMuscular once  melatonin 3 milliGRAM(s) Oral at bedtime PRN  metoprolol succinate ER 25 milliGRAM(s) Oral daily  ondansetron Injectable 4 milliGRAM(s) IV Push every 8 hours PRN  pantoprazole    Tablet 40 milliGRAM(s) Oral before breakfast  tamsulosin 0.4 milliGRAM(s) Oral at bedtime  traMADol 50 milliGRAM(s) Oral two times a day PRN    Assessment/Plan:    CM, EF 35-40%, severe AS  Cardio-renal   CKD 3 is fairly stable   UA negative   Imaging w/o hydro   F/u BMP, Mg   Continue IV Lasix   Will follow    [phone]

## 2024-02-02 LAB
ANION GAP SERPL CALC-SCNC: 12 MMOL/L — SIGNIFICANT CHANGE UP (ref 5–17)
BUN SERPL-MCNC: 34 MG/DL — HIGH (ref 7–23)
CALCIUM SERPL-MCNC: 9.1 MG/DL — SIGNIFICANT CHANGE UP (ref 8.4–10.5)
CHLORIDE SERPL-SCNC: 104 MMOL/L — SIGNIFICANT CHANGE UP (ref 96–108)
CO2 SERPL-SCNC: 25 MMOL/L — SIGNIFICANT CHANGE UP (ref 22–31)
CREAT SERPL-MCNC: 2 MG/DL — HIGH (ref 0.5–1.3)
EGFR: 37 ML/MIN/1.73M2 — LOW
GLUCOSE SERPL-MCNC: 103 MG/DL — HIGH (ref 70–99)
HCT VFR BLD CALC: 26 % — LOW (ref 39–50)
HGB BLD-MCNC: 8.2 G/DL — LOW (ref 13–17)
MAGNESIUM SERPL-MCNC: 1.8 MG/DL — SIGNIFICANT CHANGE UP (ref 1.6–2.6)
MCHC RBC-ENTMCNC: 19.6 PG — LOW (ref 27–34)
MCHC RBC-ENTMCNC: 31.5 GM/DL — LOW (ref 32–36)
MCV RBC AUTO: 62.1 FL — LOW (ref 80–100)
NRBC # BLD: 4 /100 WBCS — HIGH (ref 0–0)
PLATELET # BLD AUTO: 250 K/UL — SIGNIFICANT CHANGE UP (ref 150–400)
POTASSIUM SERPL-MCNC: 4.6 MMOL/L — SIGNIFICANT CHANGE UP (ref 3.5–5.3)
POTASSIUM SERPL-SCNC: 4.6 MMOL/L — SIGNIFICANT CHANGE UP (ref 3.5–5.3)
RBC # BLD: 4.19 M/UL — LOW (ref 4.2–5.8)
RBC # FLD: 24.3 % — HIGH (ref 10.3–14.5)
SODIUM SERPL-SCNC: 141 MMOL/L — SIGNIFICANT CHANGE UP (ref 135–145)
WBC # BLD: 10.74 K/UL — HIGH (ref 3.8–10.5)
WBC # FLD AUTO: 10.74 K/UL — HIGH (ref 3.8–10.5)

## 2024-02-02 PROCEDURE — 99231 SBSQ HOSP IP/OBS SF/LOW 25: CPT

## 2024-02-02 PROCEDURE — 99232 SBSQ HOSP IP/OBS MODERATE 35: CPT

## 2024-02-02 RX ORDER — FERROUS SULFATE 325(65) MG
1 TABLET ORAL
Refills: 0 | DISCHARGE
Start: 2024-02-02

## 2024-02-02 RX ORDER — ACETAMINOPHEN 500 MG
975 TABLET ORAL ONCE
Refills: 0 | Status: COMPLETED | OUTPATIENT
Start: 2024-02-02 | End: 2024-02-02

## 2024-02-02 RX ADMIN — Medication 975 MILLIGRAM(S): at 18:30

## 2024-02-02 RX ADMIN — Medication 25 MILLIGRAM(S): at 05:40

## 2024-02-02 RX ADMIN — HEPARIN SODIUM 5000 UNIT(S): 5000 INJECTION INTRAVENOUS; SUBCUTANEOUS at 13:21

## 2024-02-02 RX ADMIN — Medication 975 MILLIGRAM(S): at 17:50

## 2024-02-02 RX ADMIN — HEPARIN SODIUM 5000 UNIT(S): 5000 INJECTION INTRAVENOUS; SUBCUTANEOUS at 21:18

## 2024-02-02 RX ADMIN — Medication 325 MILLIGRAM(S): at 17:49

## 2024-02-02 RX ADMIN — TAMSULOSIN HYDROCHLORIDE 0.4 MILLIGRAM(S): 0.4 CAPSULE ORAL at 21:18

## 2024-02-02 RX ADMIN — PANTOPRAZOLE SODIUM 40 MILLIGRAM(S): 20 TABLET, DELAYED RELEASE ORAL at 05:40

## 2024-02-02 RX ADMIN — HEPARIN SODIUM 5000 UNIT(S): 5000 INJECTION INTRAVENOUS; SUBCUTANEOUS at 05:40

## 2024-02-02 RX ADMIN — Medication 81 MILLIGRAM(S): at 13:21

## 2024-02-02 RX ADMIN — Medication 40 MILLIGRAM(S): at 05:40

## 2024-02-02 RX ADMIN — ERYTHROPOIETIN 10000 UNIT(S): 10000 INJECTION, SOLUTION INTRAVENOUS; SUBCUTANEOUS at 17:50

## 2024-02-02 NOTE — PROGRESS NOTE ADULT - SUBJECTIVE AND OBJECTIVE BOX
INTERVAL HPI/OVERNIGHT EVENTS:  HPI:    62 y/o male seen and examined at bedside. No reports of GI bleeding.     MEDICATIONS  (STANDING):  acetaminophen     Tablet .. 975 milliGRAM(s) Oral once  aspirin  chewable 81 milliGRAM(s) Oral daily  epoetin loulou-epbx (RETACRIT) Injectable 86735 Unit(s) SubCutaneous every 7 days  ferrous    sulfate 325 milliGRAM(s) Oral daily  heparin   Injectable 5000 Unit(s) SubCutaneous every 8 hours  influenza   Vaccine 0.5 milliLiter(s) IntraMuscular once  metoprolol succinate ER 25 milliGRAM(s) Oral daily  pantoprazole    Tablet 40 milliGRAM(s) Oral before breakfast  tamsulosin 0.4 milliGRAM(s) Oral at bedtime    MEDICATIONS  (PRN):  aluminum hydroxide/magnesium hydroxide/simethicone Suspension 30 milliLiter(s) Oral every 4 hours PRN Dyspepsia  melatonin 3 milliGRAM(s) Oral at bedtime PRN Insomnia  ondansetron Injectable 4 milliGRAM(s) IV Push every 8 hours PRN Nausea and/or Vomiting      Allergies    No Known Drug Allergies  garlic (Angioedema; Swelling; Anaphylaxis)    Intolerances        PAST MEDICAL & SURGICAL HISTORY:  Dyslipidemia      Hypertension      Chronic GERD      History of aortic stenosis      No significant past surgical history      PHYSICAL EXAM:   Vital Signs:  Vital Signs Last 24 Hrs  T(C): 36.7 (2024 05:19), Max: 36.7 (2024 05:19)  T(F): 98 (2024 05:19), Max: 98 (2024 05:19)  HR: 70 (2024 05:19) (70 - 77)  BP: 119/75 (2024 05:19) (118/76 - 128/73)  BP(mean): --  RR: 17 (2024 05:19) (16 - 17)  SpO2: 98% (2024 05:19) (95% - 98%)    Parameters below as of 2024 05:19  Patient On (Oxygen Delivery Method): room air      Daily     Daily Weight in k (2024 05:19)I&O's Summary    2024 07:01  -  2024 07:00  --------------------------------------------------------  IN: 0 mL / OUT: 200 mL / NET: -200 mL        GENERAL:  Appears stated age, well-groomed, well-nourished, no distress  HEENT:  NC/AT,  conjunctivae clear and pink  CHEST:  Full & symmetric excursion, no increased effort, breath sounds clear  HEART:  Regular rhythm, S1, S2, no murmur  ABDOMEN:  Soft, non-tender, non-distended, normoactive bowel sounds,  EXTREMITIES  +1 b/l le edema   SKIN:  No rash/warm/dry  NEURO:  Alert, oriented,       LABS:                        8.2    10.74 )-----------( 250      ( 2024 06:34 )             26.0     02-02    141  |  104  |  34<H>  ----------------------------<  103<H>  4.6   |  25  |  2.00<H>    Ca    9.1      2024 06:34  Mg     1.8     02-02        Urinalysis Basic - ( 2024 06:34 )    Color: x / Appearance: x / SG: x / pH: x  Gluc: 103 mg/dL / Ketone: x  / Bili: x / Urobili: x   Blood: x / Protein: x / Nitrite: x   Leuk Esterase: x / RBC: x / WBC x   Sq Epi: x / Non Sq Epi: x / Bacteria: x

## 2024-02-02 NOTE — PROGRESS NOTE ADULT - PROBLEM SELECTOR PLAN 1
DDX chronic disease, CHF,   Monitor H/H  Monitor bowel movements   Continue PPI  Most recent EGD 2021- hiatal hernia and esophagitis   Colonoscopy 2016- 1 polyp removed  High risk for endoscopic intervention  Would transfer patient for TVAR

## 2024-02-02 NOTE — PROGRESS NOTE ADULT - ASSESSMENT
Assessment: 63 year old male with Hx of HTN, GERD, dyslipidemia, chronic intermittent leg pain, aortic stenosis, chronic venous stasis dermatitis with bilateral leg swelling, admitted for failed outpatient therapy with bactrim for cellulitis, found to have congestive heart failure. Patient is non compliant with medical care, was recommended for a full medical, cardiac eval 5 years ago in which he has not completed. Cardiology following for severe AS. Patient currently denies chest pain.     Recommendations:  #severe AS  - TTE with EF 35-40%, severely enlarged LA, enlarged RV and RA, mod MR and pulm htn and severe AS. basal inferior segment is akinetic and mid inferolateral, mid anterolateral and mid inferior segment are hypokinetic.   - pt noted with b/l pitting edema and erythema. doppler neg for DVT. likely multifactorial 2/2 venous stasis and HFrEF  - due to pt CKD, patient may not be the best candidate for ACE-I, ARB, ARNI, spirinolactone or farxiga. Continue with toprol  - as per renal, ok for diuresis, continue with iv lasix and monitor electrolytes and kidney function  - due to CV hx, goal for hgb >/= 8, transfuse as needed as per heme.   - pt cleared for TAVR by heme and GI, awaiting bed for transfer to [facility] for TAVR diaz [facility]       Assessment: 63 year old male with Hx of HTN, GERD, dyslipidemia, chronic intermittent leg pain, aortic stenosis, chronic venous stasis dermatitis with bilateral leg swelling, admitted for failed outpatient therapy with bactrim for cellulitis, found to have congestive heart failure. Patient is non compliant with medical care, was recommended for a full medical, cardiac eval 5 years ago in which he has not completed. Cardiology following for severe AS. Patient currently denies chest pain.     Recommendations:  #severe AS  - TTE with EF 35-40%, severely enlarged LA, enlarged RV and RA, mod MR and pulm htn and severe AS. basal inferior segment is akinetic and mid inferolateral, mid anterolateral and mid inferior segment are hypokinetic.   - pt noted with b/l pitting edema and erythema. doppler neg for DVT. likely multifactorial 2/2 venous stasis and HFrEF  - due to pt CKD, patient may not be the best candidate for ACE-I, ARB, ARNI, spirinolactone or farxiga. Continue with toprol  - as per renal, ok for diuresis, continue with iv lasix and monitor electrolytes and kidney function  - due to CV hx, goal for hgb >/= 8, transfuse as needed as per heme.   - pt cleared for TAVR by heme and GI, awaiting bed for transfer to [facility] for TAVR eval    Rosmery Remy [facility]      cardio  hct 26 bnp [id number]  no new cardiac recommendations. care coordinated with wife sarita by phonem1/1/24 awaiting transfer for tertiary care.

## 2024-02-02 NOTE — PROGRESS NOTE ADULT - SUBJECTIVE AND OBJECTIVE BOX
No distress    Vital Signs Last 24 Hrs  T(C): 36.8 (02-02-24 @ 13:47), Max: 36.8 (02-02-24 @ 13:47)  T(F): 98.2 (02-02-24 @ 13:47), Max: 98.2 (02-02-24 @ 13:47)  HR: 68 (02-02-24 @ 13:47) (68 - 70)  BP: 102/75 (02-02-24 @ 13:47) (102/75 - 119/75)  RR: 16 (02-02-24 @ 13:47) (16 - 17)  SpO2: 95% (02-02-24 @ 13:47) (95% - 98%)    Lungs b/l air entry  Heart S1S2   Abd soft ND  Extremities: edema                                                 8.2    10.74 )-----------( 250      ( 02 Feb 2024 06:34 )             26.0     02 Feb 2024 06:34    141    |  104    |  34     ----------------------------<  103    4.6     |  25     |  2.00     Ca    9.1        02 Feb 2024 06:34  Mg     1.8       02 Feb 2024 06:34    aluminum hydroxide/magnesium hydroxide/simethicone Suspension 30 milliLiter(s) Oral every 4 hours PRN  aspirin  chewable 81 milliGRAM(s) Oral daily  epoetin loulou-epbx (RETACRIT) Injectable 25986 Unit(s) SubCutaneous every 7 days  ferrous    sulfate 325 milliGRAM(s) Oral daily  heparin   Injectable 5000 Unit(s) SubCutaneous every 8 hours  influenza   Vaccine 0.5 milliLiter(s) IntraMuscular once  melatonin 3 milliGRAM(s) Oral at bedtime PRN  metoprolol succinate ER 25 milliGRAM(s) Oral daily  ondansetron Injectable 4 milliGRAM(s) IV Push every 8 hours PRN  pantoprazole    Tablet 40 milliGRAM(s) Oral before breakfast  tamsulosin 0.4 milliGRAM(s) Oral at bedtime    Assessment/Plan:    CM, EF 35-40%, severe AS  Cardio-renal LINDA/CKD 3  UA negative   Imaging w/o hydro   F/u BMP  Holding Lasix for now   Awaits tx to [facility] for TAVR eval    [phone]

## 2024-02-02 NOTE — CHART NOTE - NSCHARTNOTEFT_GEN_A_CORE
Nutrition Follow Up Note  Hospital Course (Per Electronic Medical Record):   Source: Medical Record [X] Patient [X]  Nursing Staff [X]     Diet: DASH/TLC 1500 ml FR    Patient tolerating diet , appetite reported good % , Labs reviewed, patient awaiting transfer to [facility] for TAVR     Current Weight: (2/2)  198.4/90kg     Pertinent Medications: MEDICATIONS  (STANDING):  acetaminophen     Tablet .. 975 milliGRAM(s) Oral once  aspirin  chewable 81 milliGRAM(s) Oral daily  epoetin loulou-epbx (RETACRIT) Injectable 71161 Unit(s) SubCutaneous every 7 days  ferrous    sulfate 325 milliGRAM(s) Oral daily  heparin   Injectable 5000 Unit(s) SubCutaneous every 8 hours  influenza   Vaccine 0.5 milliLiter(s) IntraMuscular once  metoprolol succinate ER 25 milliGRAM(s) Oral daily  pantoprazole    Tablet 40 milliGRAM(s) Oral before breakfast  tamsulosin 0.4 milliGRAM(s) Oral at bedtime    MEDICATIONS  (PRN):  aluminum hydroxide/magnesium hydroxide/simethicone Suspension 30 milliLiter(s) Oral every 4 hours PRN Dyspepsia  melatonin 3 milliGRAM(s) Oral at bedtime PRN Insomnia  ondansetron Injectable 4 milliGRAM(s) IV Push every 8 hours PRN Nausea and/or Vomiting      Pertinent Labs:  02-02 Na141 mmol/L Glu 103 mg/dL<H> K+ 4.6 mmol/L Cr  2.00 mg/dL<H> BUN 34 mg/dL<H> 01-31 Alb 2.4 g/dL<L> 01-27 Chol 106 mg/dL LDL --    HDL 38 mg/dL<L> Trig 58 mg/dL        Skin: b/l cellulitis     Edema: (+2) (L) Leg , (+3) (R) leg /ankle    Last BM: (1/29)     Estimated Needs:   [X] No Change since Previous Assessment      Previous Nutrition Diagnosis: Food & Nutrition Related Knowledge Deficit    Nutrition Diagnosis is [X] addressed , education provided          New Nutrition Diagnosis: [X] Not Applicable      Interventions:   1. continue current diet       Monitoring & Evaluation: will monitor:  [X] Weights   [X] PO Intake   [X] Follow Up (Per Protocol)  [X] Tolerance to Diet Prescription       RD to follow as per Nutrition protocol  Alie Lopez RDN, CDN

## 2024-02-02 NOTE — PROGRESS NOTE ADULT - ASSESSMENT
GI consulted to see patient due to anemia. Patient seen and examined at bedside. He denies nausea, vomiting, hematemesis, coffee ground emesis, BRBPR, or melena. No abdominal pain. No straining with bowel movements. Normal brown stool, last bm today. No dysphagia or odynophagia. Denies unintentional weight loss. Patient reports life long history of anemia. Last upper endoscopy was 2021 and showed hiatal hernia and esophagitis Last colonoscopy was in 2016, and one polyp noted. Both procedures performed by Dr. Layton.

## 2024-02-02 NOTE — PROGRESS NOTE ADULT - PROBLEM SELECTOR PLAN 1
Anemia of chronic disease, cardiac comorbidities, frequent phlebotomies,?  mechanical hemolysis (severe AS), treated supportively with iron infusions and infrequent PRBC transfusion.  Awaiting CBC results today.  As discussed with hospitalist team, efforts should be made to maintain hemoglobin above 8 g/dL due to patient's advanced aortic stenosis and need for TAVR.  Should posttransfusion hemoglobin be above 8 g/dL, patient is cleared from hematology perspective for transfer to [facility] for cardiac procedure. Multifactorial anemia–complex mechanism.  Evaluated by GI–deemed high risk for endoscopic intervention given severe valvulopathy.  No evidence of active bleeding so far.  After transfusion PRBC hemoglobin improved above 8 g/dL.  In the absence of evident bleeding and no plans for endoscopic procedures, patient is stable from hematologic perspective for transfer to [facility] for attempt to TAVR for advanced aortic stenosis.  Will continue transfusion on as-needed basis.  No absolute contraindication to anticoagulation after cardiac procedure.  Case discussed with cardiology at [place].

## 2024-02-02 NOTE — PROGRESS NOTE ADULT - ASSESSMENT
63 year old male with Hx of HTN, GERD, dyslipidemia, chronic intermittent leg pain, aortic stenosis, chronic venous stasis dermatitis with bilateral leg swelling, presents for b/l leg pain and swelling    #Acute New Onset HFrEF (EF 35-40% 1/2024)  #Severe AS  - TTE reviewed; severe AS, mod pulm htn, mod mitral regurg, grade 3 diastolic dysfunction, EF 35-40%  - CXR with no pleural effusions  - Cont Toprol 25mg daily  - Not a candidate for ACE/ARB/ARNI due to renal function  - Continue lasix 40 IV daily  - Cardiology consulted, following   - Patient will require ischemic workup with coronary angiogram and full structural heart team evaluation for TAVR - accepted at [facility] by hospitalist Dr Joiner, interventional cardiologist Dr Catalan     #Anemia of chronic disease  - FOBT negative, CT APC showed small volume ascites, chronic left 7th rib fx, trace right pleural effusion and prominent lymph nodes  - Heme appreciated, MF etiology for anemia, mechanical hemolysis due to severe AS also possible  - s/p 1 unit PRBCs on 1/31  - s/p IV iron   - Goal to maintain hemoglobin above 8 g/dL due to patient's advanced aortic stenosis and need for TAVR.   - Heme consulted   - GI consulted     #Bilateral Venous Stasis with component of Cellulitis  - ID appreciated, completed five days of keflex  - Leg elevation as needed, no ID objection to cardiac workup when cleared    #Hypertension  - Cont toprol     #LINDA vs Chronic Kidney Disease   - Nephrology following, CKD 3 at or near baseline  - UA negative, imaging without hydro, no objection to cautious diuresis as needed  - Avoid nephrotoxins, monitor BMP    #Transaminitis   - Unclear etiology, downtrending  - Hepatitis panel negative  - RUQ US unremarkable     #GERD  - Continue PPI     #DVT Prophylaxis  - Heparin    Dispo: Transfer to [facility], awaiting bed assignment     Patient will update his wife Chanelle [phone]  63 year old male with Hx of HTN, GERD, dyslipidemia, chronic intermittent leg pain, aortic stenosis, chronic venous stasis dermatitis with bilateral leg swelling, presents for b/l leg pain and swelling    #Acute New Onset HFrEF (EF 35-40% 1/2024)  #Severe AS  - TTE reviewed; severe AS, mod pulm htn, mod mitral regurg, grade 3 diastolic dysfunction, EF 35-40%  - CXR with no pleural effusions  - Cont Toprol 25mg daily  - Not a candidate for ACE/ARB/ARNI due to renal function  - Was on lasix 40 mg IVP daily - will hold for now due to uptrending Cr   - Cardiology consulted, following   - Patient will require ischemic workup with coronary angiogram and full structural heart team evaluation for TAVR - accepted at [facility] by hospitalist Dr Joiner, interventional cardiologist Dr Catalan     #Anemia of chronic disease  - FOBT negative, CT APC showed small volume ascites, chronic left 7th rib fx, trace right pleural effusion and prominent lymph nodes  - Heme appreciated, MF etiology for anemia, mechanical hemolysis due to severe AS also possible  - s/p 1 unit PRBCs on 1/31  - s/p IV iron   - Goal to maintain hemoglobin above 8 g/dL due to patient's advanced aortic stenosis and need for TAVR.   - Heme consulted   - GI consulted     #Bilateral Venous Stasis with component of Cellulitis  - ID appreciated, completed five days of keflex  - Leg elevation as needed, no ID objection to cardiac workup when cleared    #Hypertension  - Cont toprol     #LINDA vs Chronic Kidney Disease   - Nephrology following, CKD 3 at or near baseline  - UA negative, imaging without hydro, no objection to cautious diuresis as needed  - Avoid nephrotoxins, monitor BMP  - Hold lasix for now     #Transaminitis   - Unclear etiology, downtrending  - Hepatitis panel negative  - RUQ US unremarkable     #GERD  - Continue PPI     #DVT Prophylaxis  - Heparin    Dispo: Transfer to [facility], awaiting bed assignment      wife Chanelle [phone]  63 year old male with Hx of HTN, GERD, dyslipidemia, chronic intermittent leg pain, aortic stenosis, chronic venous stasis dermatitis with bilateral leg swelling, presents for b/l leg pain and swelling    #Acute New Onset HFrEF (EF 35-40% 1/2024)  #Severe AS  - TTE reviewed; severe AS, mod pulm htn, mod mitral regurg, grade 3 diastolic dysfunction, EF 35-40%  - CXR with no pleural effusions  - Cont Toprol 25mg daily  - Not a candidate for ACE/ARB/ARNI due to renal function  - Was on lasix 40 mg IVP daily - will hold for now due to uptrending Cr   - Cardiology consulted, following   - Patient will require ischemic workup with coronary angiogram and full structural heart team evaluation for TAVR - accepted at [facility] by hospitalist Dr Joiner, interventional cardiologist Dr Catalan     #Anemia of chronic disease  - FOBT negative, CT APC showed small volume ascites, chronic left 7th rib fx, trace right pleural effusion and prominent lymph nodes  - Heme appreciated, MF etiology for anemia, mechanical hemolysis due to severe AS also possible  - s/p 1 unit PRBCs on 1/31  - s/p IV iron   - Goal to maintain hemoglobin above 8 g/dL due to patient's advanced aortic stenosis and need for TAVR.   - Heme consulted   - GI consulted     #Bilateral Venous Stasis with component of Cellulitis  - ID appreciated, completed five days of keflex  - Leg elevation as needed, no ID objection to cardiac workup when cleared    #Hypertension  - Cont toprol     #LINDA vs Chronic Kidney Disease   - Nephrology following, CKD 3 at or near baseline  - UA negative, imaging without hydro, no objection to cautious diuresis as needed  - Avoid nephrotoxins, monitor BMP  - Hold lasix for now     #Transaminitis   - Unclear etiology, downtrending  - Hepatitis panel negative  - RUQ US unremarkable     #GERD  - Continue PPI     #DVT Prophylaxis  - Heparin    Dispo: Transfer to [facility], awaiting bed assignment     2/2: Left voicemail with callback # for wife Chanelle [phone]  63 year old male with Hx of HTN, GERD, dyslipidemia, chronic intermittent leg pain, aortic stenosis, chronic venous stasis dermatitis with bilateral leg swelling, presents for b/l leg pain and swelling    #Acute New Onset HFrEF (EF 35-40% 1/2024)  #Severe AS  - TTE reviewed; severe AS, mod pulm htn, mod mitral regurg, grade 3 diastolic dysfunction, EF 35-40%  - CXR with no pleural effusions  - Cont Toprol 25mg daily  - Not a candidate for ACE/ARB/ARNI due to renal function  - Was on lasix 40 mg IVP daily - will hold for now due to uptrending Cr   - Cardiology consulted, following   - Patient will require ischemic workup with coronary angiogram and full structural heart team evaluation for TAVR - accepted at [facility] by hospitalist Dr Joiner, interventional cardiologist Dr Catalan     #Anemia of chronic disease  - FOBT negative, CT APC showed small volume ascites, chronic left 7th rib fx, trace right pleural effusion and prominent lymph nodes  - Heme appreciated, MF etiology for anemia, mechanical hemolysis due to severe AS also possible  - s/p 1 unit PRBCs on 1/31  - s/p IV iron   - Goal to maintain hemoglobin above 8 g/dL due to patient's advanced aortic stenosis and need for TAVR.   - Heme consulted   - GI consulted     #Bilateral Venous Stasis with component of Cellulitis  - ID appreciated, completed five days of keflex  - Leg elevation as needed, no ID objection to cardiac workup when cleared    #Hypertension  - Cont toprol     #LINDA vs Chronic Kidney Disease   - Nephrology following, CKD 3 at or near baseline  - UA negative, imaging without hydro, no objection to cautious diuresis as needed  - Avoid nephrotoxins, monitor BMP  - Hold lasix for now     #Transaminitis   - Unclear etiology, downtrending  - Hepatitis panel negative  - RUQ US unremarkable     #GERD  - Continue PPI     #DVT Prophylaxis  - Heparin    Dispo: Transfer to [facility], awaiting bed assignment     2/2: updated wife Chanelle [phone]

## 2024-02-02 NOTE — PROGRESS NOTE ADULT - SUBJECTIVE AND OBJECTIVE BOX
Patient is a 63y old  Male who presents with a chief complaint of CHF exacerbation (02 Feb 2024 07:45)    Patient seen and examined at bedside. No overnight events reported.     ALLERGIES:  No Known Drug Allergies  garlic (Angioedema; Swelling; Anaphylaxis)    MEDICATIONS  (STANDING):  acetaminophen     Tablet .. 975 milliGRAM(s) Oral once  aspirin  chewable 81 milliGRAM(s) Oral daily  epoetin loulou-epbx (RETACRIT) Injectable 54107 Unit(s) SubCutaneous every 7 days  ferrous    sulfate 325 milliGRAM(s) Oral daily  heparin   Injectable 5000 Unit(s) SubCutaneous every 8 hours  influenza   Vaccine 0.5 milliLiter(s) IntraMuscular once  metoprolol succinate ER 25 milliGRAM(s) Oral daily  pantoprazole    Tablet 40 milliGRAM(s) Oral before breakfast  tamsulosin 0.4 milliGRAM(s) Oral at bedtime    MEDICATIONS  (PRN):  aluminum hydroxide/magnesium hydroxide/simethicone Suspension 30 milliLiter(s) Oral every 4 hours PRN Dyspepsia  melatonin 3 milliGRAM(s) Oral at bedtime PRN Insomnia  ondansetron Injectable 4 milliGRAM(s) IV Push every 8 hours PRN Nausea and/or Vomiting    Vital Signs Last 24 Hrs  T(F): 98 (02 Feb 2024 05:19), Max: 98 (02 Feb 2024 05:19)  HR: 70 (02 Feb 2024 05:19) (70 - 77)  BP: 119/75 (02 Feb 2024 05:19) (118/76 - 128/73)  RR: 17 (02 Feb 2024 05:19) (16 - 17)  SpO2: 98% (02 Feb 2024 05:19) (95% - 98%)    I&O's Summary  01 Feb 2024 07:01  -  02 Feb 2024 07:00  --------------------------------------------------------  IN: 0 mL / OUT: 200 mL / NET: -200 mL    PHYSICAL EXAM:  General: NAD, A/O x 3  ENT: MMM, no thrush  Neck: Supple, No JVD  Lungs: Clear to auscultation bilaterally, bilateral air entry, non labored breathing  Cardio: S1S2 regular, +murmur  Abdomen: Soft, Nontender, Nondistended; Bowel sounds present  Extremities: No cyanosis, B/L LE edema, chronic skin changes/venous stasis    LABS:                        8.2    10.74 )-----------( 250      ( 02 Feb 2024 06:34 )             26.0     02-02    141  |  104  |  34  ----------------------------<  103  4.6   |  25  |  2.00    Ca    9.1      02 Feb 2024 06:34  Mg     1.8     02-02    TPro  6.3  /  Alb  2.4  /  TBili  1.0  /  DBili  x   /  AST  18  /  ALT  120  /  AlkPhos  82  01-31 01-27 Chol 106 mg/dL LDL -- HDL 38 mg/dL Trig 58 mg/dL  Urinalysis Basic - ( 02 Feb 2024 06:34 )    Color: x / Appearance: x / SG: x / pH: x  Gluc: 103 mg/dL / Ketone: x  / Bili: x / Urobili: x   Blood: x / Protein: x / Nitrite: x   Leuk Esterase: x / RBC: x / WBC x   Sq Epi: x / Non Sq Epi: x / Bacteria: x    RADIOLOGY & ADDITIONAL TESTS:    Care Discussed with Consultants/Other Providers:

## 2024-02-02 NOTE — PROGRESS NOTE ADULT - SUBJECTIVE AND OBJECTIVE BOX
[place]  [id number]    Chief Complaint: cellulitis, Severe AS  Interval events: pt seen and examined, labs and chart reviewed. denies cardiopulmonary complaints. ambulatory around room. Sinus 80s on tele    ALLERGIES:  No Known Drug Allergies  garlic (Angioedema; Swelling; Anaphylaxis)      CURRENT MEDICATIONS:  MEDICATIONS  (STANDING):  aspirin  chewable 81 milliGRAM(s) Oral daily  furosemide   Injectable 40 milliGRAM(s) IV Push daily  heparin   Injectable 5000 Unit(s) SubCutaneous every 8 hours  influenza   Vaccine 0.5 milliLiter(s) IntraMuscular once  iron sucrose IVPB 300 milliGRAM(s) IV Intermittent every 24 hours  magnesium sulfate  IVPB 1 Gram(s) IV Intermittent once  metoprolol succinate ER 25 milliGRAM(s) Oral daily  pantoprazole    Tablet 40 milliGRAM(s) Oral before breakfast  potassium chloride    Tablet ER 40 milliEquivalent(s) Oral once  tamsulosin 0.4 milliGRAM(s) Oral at bedtime      ROS:  All 10 systems reviewed and positives noted in HPI    OBJECTIVE:    VITAL SIGNS:  Vital Signs Last 24 Hrs  T(C): 36.6 (31 Jan 2024 05:08), Max: 36.7 (30 Jan 2024 14:00)  T(F): 97.8 (31 Jan 2024 05:08), Max: 98 (30 Jan 2024 14:00)  HR: 77 (31 Jan 2024 05:08) (74 - 79)  BP: 105/66 (31 Jan 2024 05:08) (105/66 - 120/80)  BP(mean): 79 (31 Jan 2024 05:08) (79 - 79)  RR: 17 (31 Jan 2024 05:08) (17 - 18)  SpO2: 97% (31 Jan 2024 05:08) (96% - 97%)    Parameters below as of 31 Jan 2024 05:08  Patient On (Oxygen Delivery Method): room air      PHYSICAL EXAM:  General:  no distress  HEENT: sclera anicteric  Neck: supple  CVS: JVP ~ 7 cm H20, RRR, + murmur  Chest: unlabored respirations, CTA b/l  Abdomen: non-distended  Extremities: b/l l/e +1 pitting edema and erythema  Neuro: awake, alert & oriented  Psych: normal affect      LABS:                        7.5    10.82 )-----------( 227      ( 31 Jan 2024 07:19 )             23.5     01-31    142  |  105  |  24<H>  ----------------------------<  103<H>  3.5   |  25  |  1.50<H>    Ca    8.8      31 Jan 2024 07:19  Mg     1.7     01-31    TPro  6.3  /  Alb  2.4<L>  /  TBili  1.0  /  DBili  x   /  AST  18  /  ALT  120<H>  /  AlkPhos  82  01-31            ECG: Sinus with PVC and RBBB       TTE: < from: TTE Echo Complete w/o Contrast w/ Doppler (01.26.24 @ 08:26) >   1. Left ventricular ejection fraction, by visual estimation, is 35 to   40%.   2. Moderately decreased global left ventricular systolic function.   3. Basal inferior segment, mid inferolateral segment, mid anterolateral   segment, and mid inferior segment are abnormal as described above.   4. Severely enlarged left atrium.   5. Increased LV wall thickness.   6. Mildly increased left ventricular internal cavity size.   7. Spectral Doppler shows restrictivepattern of left ventricular   myocardial filling (Grade III diastolic dysfunction).   8. There is mild concentric left ventricular hypertrophy.   9. Moderately enlarged right ventricle.  10. Right atrial enlargement.  11. Trivial pericardial effusion.  12. Moderate mitral valve regurgitation.  13. Thickening of the anterior and posterior mitral valve leaflets.  14. Mild-moderate tricuspid regurgitation.  15. Mild aortic regurgitation.  16. Severe aortic valve stenosis.  17. Dilatation of the ascending aorta.  18. Estimated pulmonary artery systolic pressure is 58.6 mmHg assuming a   right atrial pressure of 15 mmHg, which is consistent with moderate   pulmonary hypertension.  19. LA volume Index is 56.0 ml/m² ml/m2.  20. The Dimesionless Index value is .17.    The basal inferior segment is akinetic. The mid inferolateral segment, mid  anterolateral segment, and mid inferior segment are hypokinetic. All   remaining scored segments are normal.

## 2024-02-02 NOTE — PROGRESS NOTE ADULT - SUBJECTIVE AND OBJECTIVE BOX
GRACE FERNANDEZ, 63y Male  MRN: [id number]  ATTENDING: Abdullahi Kellogg    HPI:  63M, PMHx HTN, GERD, dyslipidemia, chronic intermittent leg pain, aortic stenosis, chronic venous stasis dermatitis with bilateral leg swelling, treated outpatient with Bactrim for cellulitis .  Wound culture from the lower extremity positive for Streptococcus dysgalactiae.  Patient currently evaluated by cardiology for TAVR due to severe aortic stenosis; surgery to be performed at [facility].  In the emergency room he was found anemic and with a reactive leukocytosis.  Currently receiving PRBC transfusion for hemoglobin around 7.3 g/dL.  Chest x-ray showed cardiomegaly.  Hematology consulted for anemia.    MEDICATIONS:  aluminum hydroxide/magnesium hydroxide/simethicone Suspension 30 milliLiter(s) Oral every 4 hours PRN  aspirin  chewable 81 milliGRAM(s) Oral daily  cephalexin 500 milliGRAM(s) Oral three times a day  heparin   Injectable 5000 Unit(s) SubCutaneous every 8 hours  influenza   Vaccine 0.5 milliLiter(s) IntraMuscular once  iron sucrose IVPB 300 milliGRAM(s) IV Intermittent every 24 hours  melatonin 3 milliGRAM(s) Oral at bedtime PRN  metoprolol succinate ER 25 milliGRAM(s) Oral daily  ondansetron Injectable 4 milliGRAM(s) IV Push every 8 hours PRN  pantoprazole    Tablet 40 milliGRAM(s) Oral before breakfast  tamsulosin 0.4 milliGRAM(s) Oral at bedtime  traMADol 50 milliGRAM(s) Oral two times a day PRN    All other medications reviewed.    SUBJECTIVE:  Stable clinically; no events over night.    VITALS:  T(C): 36.8 (01-30-24 @ 04:59), Max: 36.8 (01-30-24 @ 04:59)  T(F): 98.3 (01-30-24 @ 04:59), Max: 98.3 (01-30-24 @ 04:59)  HR: 90 (01-30-24 @ 04:59) (77 - 90)  BP: 115/72 (01-30-24 @ 04:59) (114/68 - 115/72)    PHYSICAL EXAM:  Constitutional: alert, well developed  HEENT: normocephalic, anicteric sclerae, no mucositis or thrush  Respiratory: bilateral clear to auscultation anteriorly  Cardiovascular : S1, S2 regular, rhythmic, no murmurs, gallops or rubs  Abdomen: soft, distended, + normoactive BS, no palpable HS- megaly  Extremities: no tenderness;  -c/c/e, pulses equal bilaterally    LABS:  (01-30) WBC: 10.73 K/uL,Hemoglobin: 7.6 g/dL, Hematocrit: 24.9 %,  Platelet: 237 K/uL  (01-30) Na: 141 mmol/L ; K: 3.7 mmol/L ; BUN: 21 mg/dL ; Cr: 1.61 mg/dL.    RADIOLOGY:  ACC: [id number] EXAM:  CT ABDOMEN AND PELVIS   ORDERED BY: MICHELLE HALL   ACC: [id number] EXAM:  CT CHEST   ORDERED BY: MICHELLE HALL   PROCEDURE DATE:  01/28/2024    INTERPRETATION:  CLINICAL INFORMATION: Anemia. Evaluate for malignancy.    COMPARISON: Right upper quadrant ultrasound 1/26/2024.    CONTRAST/COMPLICATIONS:  IV Contrast: NONE  Oral Contrast: NONE  Complications: None reported at time of study completion    PROCEDURE:  CT of the Chest, Abdomen and Pelvis was performed.  Sagittal and coronal reformats were performed.    FINDINGS:  Limited evaluation of the vasculature and viscera in the absence of   intravenous contrast.    CHEST:  LUNGS AND LARGE AIRWAYS: Patent central airways. Imaging of the lung   fields, particularly inferiorly, is motion degraded. This may limit   evaluation. Otherwise, no discrete infiltrate or nodule identified.   Bilateral subsegmental atelectasis.  PLEURA: Trace right pleural effusion.  VESSELS: Atherosclerotic changes.  HEART: Marked cardiomegaly. Aortic valve calcification. Hypoattenuation   of the blood pool relative to myocardium, consistent with anemia. No   pericardial effusion.  MEDIASTINUM AND KATIA: Nonspecific mildly prominent mediastinal lymph   nodes. For reference, a para-aortic node measures 2.2 x 1.0 cm (series 2   image 26).  CHEST WALL AND LOWER NECK: Within normal limits.    ABDOMEN AND PELVIS:  Imaging of the upper abdomen is motion degraded.    LIVER: Hepatomegaly, the right lobe measuring 19.8 cm craniocaudally.  BILE DUCTS: Normal caliber.  GALLBLADDER: Within normal limits.  SPLEEN: Within normal limits.  PANCREAS: Within normal limits.  ADRENALS: Indeterminate right adrenal nodule measuring 1.3 cm.  KIDNEYS/URETERS: Right upper pole renal cyst. Subcentimeter   hyperattenuating foci in the left kidney that likely represent   proteinaceous/hemorrhagic cysts. No hydronephrosis.    BLADDER: Mild bladder wall thickening.  REPRODUCTIVE ORGANS: The prostate is mildly enlarged.    BOWEL: Moderate hiatal hernia. No bowel obstruction. Colonic diverticula.   No evidence for acute diverticulitis.  PERITONEUM: Small volume ascites.  VESSELS: Atherosclerotic changes.  RETROPERITONEUM/LYMPH NODES: Nonspecific prominent retroperitoneal,   bilateral iliac chain, and bilateral inguinal lymph nodes. For example, a   left inguinal node measures 2.6 x 1.6 cm (series 2 image 175), and a left   para-aortic node measures 1.5 x 1.4 cm (series 2 image 100).  ABDOMINAL WALL: Generalized subcutaneous edema. Subcutaneous cystic   appearing nodular focus measuring 1.2 cm in the left anterior upper   abdominal/lower chest wall (series 2 image 78).  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 Degenerative changes.    IMPRESSION:  *  Exam may be limited by noncontrast technique and motion.  *  Prominent nonspecific mediastinal, retroperitoneal, iliac chain, and   inguinal lymph nodes.  *  Marked cardiomegaly. Aortic valve calcification.  *  Trace right pleural effusion.  *  Hepatomegaly.  *  Indeterminate right adrenal nodule measuring 1.3 cm.  *  Mild urinary bladder wall thickening, which could be due to   underdistention, chronic bladder outlet obstruction, or cystitis.   Correlate with urinalysis.  *  Mildly enlarged prostate.  *  Small volume ascites.  *  Subcutaneous cystic-appearing nodular focus measuring 1.2 cm in the   left anterior upper abdominal/lower chest wall, which may represent an   epidermal inclusion cyst.  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 Recommend correlation for point tenderness.     GRACE FERNANDEZ, 63y Male  MRN: [id number]  ATTENDING: Abdullahi Kellogg    HPI:  63M, PMHx HTN, GERD, dyslipidemia, chronic intermittent leg pain, aortic stenosis, chronic venous stasis dermatitis with bilateral leg swelling, treated outpatient with Bactrim for cellulitis .  Wound culture from the lower extremity positive for Streptococcus dysgalactiae.  Patient currently evaluated by cardiology for TAVR due to severe aortic stenosis; surgery to be performed at [facility].  In the emergency room he was found anemic and with a reactive leukocytosis.  Currently receiving PRBC transfusion for hemoglobin around 7.3 g/dL.  Chest x-ray showed cardiomegaly.  Hematology consulted for anemia.    MEDICATIONS:  aluminum hydroxide/magnesium hydroxide/simethicone Suspension 30 milliLiter(s) Oral every 4 hours PRN  aspirin  chewable 81 milliGRAM(s) Oral daily  cephalexin 500 milliGRAM(s) Oral three times a day  heparin   Injectable 5000 Unit(s) SubCutaneous every 8 hours  influenza   Vaccine 0.5 milliLiter(s) IntraMuscular once  iron sucrose IVPB 300 milliGRAM(s) IV Intermittent every 24 hours  melatonin 3 milliGRAM(s) Oral at bedtime PRN  metoprolol succinate ER 25 milliGRAM(s) Oral daily  ondansetron Injectable 4 milliGRAM(s) IV Push every 8 hours PRN  pantoprazole    Tablet 40 milliGRAM(s) Oral before breakfast  tamsulosin 0.4 milliGRAM(s) Oral at bedtime  traMADol 50 milliGRAM(s) Oral two times a day PRN    All other medications reviewed.    SUBJECTIVE:  Appears in no distress; no change in clinical status.  Hemoglobin stable at 8.2 g/dL    VITALS:  T(C): 36.8 (01-30-24 @ 04:59), Max: 36.8 (01-30-24 @ 04:59)  T(F): 98.3 (01-30-24 @ 04:59), Max: 98.3 (01-30-24 @ 04:59)  HR: 90 (01-30-24 @ 04:59) (77 - 90)  BP: 115/72 (01-30-24 @ 04:59) (114/68 - 115/72)    PHYSICAL EXAM:  Constitutional: alert, well developed  HEENT: normocephalic, anicteric sclerae, no mucositis or thrush  Respiratory: bilateral clear to auscultation anteriorly  Cardiovascular : S1, S2 regular, rhythmic, no murmurs, gallops or rubs  Abdomen: soft, distended, + normoactive BS, no palpable HS- megaly  Extremities: no tenderness;  -c/c/e, pulses equal bilaterally    LABS:  (01-30) WBC: 10.73 K/uL,Hemoglobin: 7.6 g/dL, Hematocrit: 24.9 %,  Platelet: 237 K/uL  (01-30) Na: 141 mmol/L ; K: 3.7 mmol/L ; BUN: 21 mg/dL ; Cr: 1.61 mg/dL.    RADIOLOGY:  ACC: [id number] EXAM:  CT ABDOMEN AND PELVIS   ORDERED BY: MICHELLE HALL   ACC: [id number] EXAM:  CT CHEST   ORDERED BY: MICHELLE HALL   PROCEDURE DATE:  01/28/2024    INTERPRETATION:  CLINICAL INFORMATION: Anemia. Evaluate for malignancy.    COMPARISON: Right upper quadrant ultrasound 1/26/2024.    CONTRAST/COMPLICATIONS:  IV Contrast: NONE  Oral Contrast: NONE  Complications: None reported at time of study completion    PROCEDURE:  CT of the Chest, Abdomen and Pelvis was performed.  Sagittal and coronal reformats were performed.    FINDINGS:  Limited evaluation of the vasculature and viscera in the absence of   intravenous contrast.    CHEST:  LUNGS AND LARGE AIRWAYS: Patent central airways. Imaging of the lung   fields, particularly inferiorly, is motion degraded. This may limit   evaluation. Otherwise, no discrete infiltrate or nodule identified.   Bilateral subsegmental atelectasis.  PLEURA: Trace right pleural effusion.  VESSELS: Atherosclerotic changes.  HEART: Marked cardiomegaly. Aortic valve calcification. Hypoattenuation   of the blood pool relative to myocardium, consistent with anemia. No   pericardial effusion.  MEDIASTINUM AND KATIA: Nonspecific mildly prominent mediastinal lymph   nodes. For reference, a para-aortic node measures 2.2 x 1.0 cm (series 2   image 26).  CHEST WALL AND LOWER NECK: Within normal limits.    ABDOMEN AND PELVIS:  Imaging of the upper abdomen is motion degraded.    LIVER: Hepatomegaly, the right lobe measuring 19.8 cm craniocaudally.  BILE DUCTS: Normal caliber.  GALLBLADDER: Within normal limits.  SPLEEN: Within normal limits.  PANCREAS: Within normal limits.  ADRENALS: Indeterminate right adrenal nodule measuring 1.3 cm.  KIDNEYS/URETERS: Right upper pole renal cyst. Subcentimeter   hyperattenuating foci in the left kidney that likely represent   proteinaceous/hemorrhagic cysts. No hydronephrosis.    BLADDER: Mild bladder wall thickening.  REPRODUCTIVE ORGANS: The prostate is mildly enlarged.    BOWEL: Moderate hiatal hernia. No bowel obstruction. Colonic diverticula.   No evidence for acute diverticulitis.  PERITONEUM: Small volume ascites.  VESSELS: Atherosclerotic changes.  RETROPERITONEUM/LYMPH NODES: Nonspecific prominent retroperitoneal,   bilateral iliac chain, and bilateral inguinal lymph nodes. For example, a   left inguinal node measures 2.6 x 1.6 cm (series 2 image 175), and a left   para-aortic node measures 1.5 x 1.4 cm (series 2 image 100).  ABDOMINAL WALL: Generalized subcutaneous edema. Subcutaneous cystic   appearing nodular focus measuring 1.2 cm in the left anterior upper   abdominal/lower chest wall (series 2 image 78).  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 Degenerative changes.    IMPRESSION:  *  Exam may be limited by noncontrast technique and motion.  *  Prominent nonspecific mediastinal, retroperitoneal, iliac chain, and   inguinal lymph nodes.  *  Marked cardiomegaly. Aortic valve calcification.  *  Trace right pleural effusion.  *  Hepatomegaly.  *  Indeterminate right adrenal nodule measuring 1.3 cm.  *  Mild urinary bladder wall thickening, which could be due to   underdistention, chronic bladder outlet obstruction, or cystitis.   Correlate with urinalysis.  *  Mildly enlarged prostate.  *  Small volume ascites.  *  Subcutaneous cystic-appearing nodular focus measuring 1.2 cm in the   left anterior upper abdominal/lower chest wall, which may represent an   epidermal inclusion cyst.  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 Recommend correlation for point tenderness.

## 2024-02-03 LAB
% ALBUMIN: 48.9 % — SIGNIFICANT CHANGE UP
% ALPHA 1: 8.7 % — SIGNIFICANT CHANGE UP
% ALPHA 2: 10.3 % — SIGNIFICANT CHANGE UP
% BETA: 13.6 % — SIGNIFICANT CHANGE UP
% GAMMA: 18.5 % — SIGNIFICANT CHANGE UP
% M SPIKE: SIGNIFICANT CHANGE UP
ALBUMIN SERPL ELPH-MCNC: 2.8 G/DL — LOW (ref 3.6–5.5)
ALBUMIN/GLOB SERPL ELPH: 0.9 RATIO — SIGNIFICANT CHANGE UP
ALPHA1 GLOB SERPL ELPH-MCNC: 0.5 G/DL — HIGH (ref 0.1–0.4)
ALPHA2 GLOB SERPL ELPH-MCNC: 0.6 G/DL — SIGNIFICANT CHANGE UP (ref 0.5–1)
ANION GAP SERPL CALC-SCNC: 14 MMOL/L — SIGNIFICANT CHANGE UP (ref 5–17)
B-GLOBULIN SERPL ELPH-MCNC: 0.8 G/DL — SIGNIFICANT CHANGE UP (ref 0.5–1)
BUN SERPL-MCNC: 44 MG/DL — HIGH (ref 7–23)
CALCIUM SERPL-MCNC: 9 MG/DL — SIGNIFICANT CHANGE UP (ref 8.4–10.5)
CHLORIDE SERPL-SCNC: 103 MMOL/L — SIGNIFICANT CHANGE UP (ref 96–108)
CO2 SERPL-SCNC: 25 MMOL/L — SIGNIFICANT CHANGE UP (ref 22–31)
CREAT SERPL-MCNC: 2.5 MG/DL — HIGH (ref 0.5–1.3)
EGFR: 28 ML/MIN/1.73M2 — LOW
GAMMA GLOBULIN: 1.1 G/DL — SIGNIFICANT CHANGE UP (ref 0.6–1.6)
GLUCOSE SERPL-MCNC: 81 MG/DL — SIGNIFICANT CHANGE UP (ref 70–99)
HCT VFR BLD CALC: 27.1 % — LOW (ref 39–50)
HGB BLD-MCNC: 8.4 G/DL — LOW (ref 13–17)
INTERPRETATION SERPL IFE-IMP: SIGNIFICANT CHANGE UP
M-SPIKE: SIGNIFICANT CHANGE UP (ref 0–0)
MCHC RBC-ENTMCNC: 19.4 PG — LOW (ref 27–34)
MCHC RBC-ENTMCNC: 31 GM/DL — LOW (ref 32–36)
MCV RBC AUTO: 62.6 FL — LOW (ref 80–100)
NRBC # BLD: 5 /100 WBCS — HIGH (ref 0–0)
PLATELET # BLD AUTO: 248 K/UL — SIGNIFICANT CHANGE UP (ref 150–400)
POTASSIUM SERPL-MCNC: 4.3 MMOL/L — SIGNIFICANT CHANGE UP (ref 3.5–5.3)
POTASSIUM SERPL-SCNC: 4.3 MMOL/L — SIGNIFICANT CHANGE UP (ref 3.5–5.3)
PROT PATTERN SERPL ELPH-IMP: SIGNIFICANT CHANGE UP
PROT SERPL-MCNC: 5.8 G/DL — LOW (ref 6–8.3)
RBC # BLD: 4.33 M/UL — SIGNIFICANT CHANGE UP (ref 4.2–5.8)
RBC # FLD: 25 % — HIGH (ref 10.3–14.5)
SODIUM SERPL-SCNC: 142 MMOL/L — SIGNIFICANT CHANGE UP (ref 135–145)
WBC # BLD: 10.92 K/UL — HIGH (ref 3.8–10.5)
WBC # FLD AUTO: 10.92 K/UL — HIGH (ref 3.8–10.5)

## 2024-02-03 PROCEDURE — 99232 SBSQ HOSP IP/OBS MODERATE 35: CPT

## 2024-02-03 PROCEDURE — 99231 SBSQ HOSP IP/OBS SF/LOW 25: CPT

## 2024-02-03 RX ORDER — OXYCODONE HYDROCHLORIDE 5 MG/1
5 TABLET ORAL ONCE
Refills: 0 | Status: DISCONTINUED | OUTPATIENT
Start: 2024-02-03 | End: 2024-02-03

## 2024-02-03 RX ORDER — ACETAMINOPHEN 500 MG
650 TABLET ORAL ONCE
Refills: 0 | Status: COMPLETED | OUTPATIENT
Start: 2024-02-03 | End: 2024-02-03

## 2024-02-03 RX ORDER — TRAMADOL HYDROCHLORIDE 50 MG/1
50 TABLET ORAL EVERY 6 HOURS
Refills: 0 | Status: DISCONTINUED | OUTPATIENT
Start: 2024-02-03 | End: 2024-02-05

## 2024-02-03 RX ADMIN — HEPARIN SODIUM 5000 UNIT(S): 5000 INJECTION INTRAVENOUS; SUBCUTANEOUS at 21:18

## 2024-02-03 RX ADMIN — Medication 650 MILLIGRAM(S): at 01:03

## 2024-02-03 RX ADMIN — Medication 81 MILLIGRAM(S): at 11:21

## 2024-02-03 RX ADMIN — PANTOPRAZOLE SODIUM 40 MILLIGRAM(S): 20 TABLET, DELAYED RELEASE ORAL at 05:12

## 2024-02-03 RX ADMIN — Medication 650 MILLIGRAM(S): at 02:00

## 2024-02-03 RX ADMIN — Medication 3 MILLIGRAM(S): at 01:44

## 2024-02-03 RX ADMIN — TRAMADOL HYDROCHLORIDE 50 MILLIGRAM(S): 50 TABLET ORAL at 23:16

## 2024-02-03 RX ADMIN — TRAMADOL HYDROCHLORIDE 50 MILLIGRAM(S): 50 TABLET ORAL at 09:30

## 2024-02-03 RX ADMIN — TRAMADOL HYDROCHLORIDE 50 MILLIGRAM(S): 50 TABLET ORAL at 23:46

## 2024-02-03 RX ADMIN — Medication 325 MILLIGRAM(S): at 11:21

## 2024-02-03 RX ADMIN — HEPARIN SODIUM 5000 UNIT(S): 5000 INJECTION INTRAVENOUS; SUBCUTANEOUS at 14:41

## 2024-02-03 RX ADMIN — OXYCODONE HYDROCHLORIDE 5 MILLIGRAM(S): 5 TABLET ORAL at 11:05

## 2024-02-03 RX ADMIN — OXYCODONE HYDROCHLORIDE 5 MILLIGRAM(S): 5 TABLET ORAL at 12:00

## 2024-02-03 RX ADMIN — TRAMADOL HYDROCHLORIDE 50 MILLIGRAM(S): 50 TABLET ORAL at 08:31

## 2024-02-03 RX ADMIN — HEPARIN SODIUM 5000 UNIT(S): 5000 INJECTION INTRAVENOUS; SUBCUTANEOUS at 05:12

## 2024-02-03 RX ADMIN — TAMSULOSIN HYDROCHLORIDE 0.4 MILLIGRAM(S): 0.4 CAPSULE ORAL at 21:18

## 2024-02-03 NOTE — PROGRESS NOTE ADULT - SUBJECTIVE AND OBJECTIVE BOX
No distress    Vital Signs Last 24 Hrs  T(C): 36.4 (02-03-24 @ 12:18), Max: 36.4 (02-03-24 @ 12:18)  T(F): 97.6 (02-03-24 @ 12:18), Max: 97.6 (02-03-24 @ 12:18)  HR: 78 (02-03-24 @ 12:18) (73 - 78)  BP: 132/75 (02-03-24 @ 12:18) (106/61 - 132/75)  BP(mean): 76 (02-03-24 @ 05:06) (76 - 76)  RR: 18 (02-03-24 @ 12:18) (16 - 18)  SpO2: 95% (02-03-24 @ 12:18) (95% - 98%)    Lungs b/l air entry  Heart S1S2   Abd soft ND  Extremities: less edema                                                          8.4    10.92 )-----------( 248      ( 03 Feb 2024 06:34 )             27.1     03 Feb 2024 06:34    142    |  103    |  44     ----------------------------<  81     4.3     |  25     |  2.50     Ca    9.0        03 Feb 2024 06:34  Mg     1.8       02 Feb 2024 06:34    aluminum hydroxide/magnesium hydroxide/simethicone Suspension 30 milliLiter(s) Oral every 4 hours PRN  aspirin  chewable 81 milliGRAM(s) Oral daily  epoetin loulou-epbx (RETACRIT) Injectable 47978 Unit(s) SubCutaneous every 7 days  ferrous    sulfate 325 milliGRAM(s) Oral daily  heparin   Injectable 5000 Unit(s) SubCutaneous every 8 hours  influenza   Vaccine 0.5 milliLiter(s) IntraMuscular once  melatonin 3 milliGRAM(s) Oral at bedtime PRN  metoprolol succinate ER 25 milliGRAM(s) Oral daily  ondansetron Injectable 4 milliGRAM(s) IV Push every 8 hours PRN  tamsulosin 0.4 milliGRAM(s) Oral at bedtime  traMADol 50 milliGRAM(s) Oral every 6 hours PRN    Assessment/Plan:    CM, EF 35-40%, severe AS  Cardio-renal LINDA/CKD 3  UA negative   Imaging w/o hydro   F/u BMP in am  Agree w/holding Lasix for now   Awaits tx to [facility] for TAVR eval    [phone]

## 2024-02-03 NOTE — PROGRESS NOTE ADULT - SUBJECTIVE AND OBJECTIVE BOX
Follow up for :   AS    SUBJ:  VANCE short distance, no chest pain. also LE discomfort, chronic    PMH  Dyslipidemia    Hypertension    Chronic GERD    History of aortic stenosis        MEDICATIONS  (STANDING):  aspirin  chewable 81 milliGRAM(s) Oral daily  epoetin loulou-epbx (RETACRIT) Injectable 85008 Unit(s) SubCutaneous every 7 days  ferrous    sulfate 325 milliGRAM(s) Oral daily  heparin   Injectable 5000 Unit(s) SubCutaneous every 8 hours  influenza   Vaccine 0.5 milliLiter(s) IntraMuscular once  metoprolol succinate ER 25 milliGRAM(s) Oral daily  tamsulosin 0.4 milliGRAM(s) Oral at bedtime    MEDICATIONS  (PRN):  aluminum hydroxide/magnesium hydroxide/simethicone Suspension 30 milliLiter(s) Oral every 4 hours PRN Dyspepsia  melatonin 3 milliGRAM(s) Oral at bedtime PRN Insomnia  ondansetron Injectable 4 milliGRAM(s) IV Push every 8 hours PRN Nausea and/or Vomiting  traMADol 50 milliGRAM(s) Oral every 6 hours PRN Severe Pain (7 - 10)        PHYSICAL EXAM:  Vital Signs Last 24 Hrs  T(C): 36.4 (2024 12:18), Max: 36.8 (2024 13:47)  T(F): 97.6 (2024 12:18), Max: 98.2 (2024 13:47)  HR: 78 (2024 12:18) (68 - 78)  BP: 132/75 (2024 12:18) (102/75 - 132/75)  BP(mean): 76 (2024 05:06) (76 - 76)  RR: 18 (2024 12:18) (16 - 18)  SpO2: 95% (2024 12:18) (95% - 98%)    Parameters below as of 2024 12:18  Patient On (Oxygen Delivery Method): room air        GENERAL: NAD, well-groomed, well-developed  HEAD:  Atraumatic, Normocephalic  EYES:  conjunctiva and sclera clear  ENT: Moist mucous membranes,  NECK: Supple, No JVD, no bruits  CHEST/LUNG: Clear to auscultation bilaterally; No rales, rhonchi, wheezing, or rubs  HEART: Regular rate and rhythm;   ABDOMEN: Soft, Nontender, Nondistended; Bowel sounds present  EXTREMITIES:  No clubbing, cyanosis, or edema  NERVOUS SYSTEM:  Alert       TELEMETRY: sinus tach    ECG:  < from: 12 Lead ECG (24 @ 16:33) >    Ventricular Rate 89 BPM    Atrial Rate 89 BPM    P-R Interval 152 ms    QRS Duration 124 ms    Q-T Interval 342 ms    QTC Calculation(Bazett) 416 ms    P Axis 45 degrees    R Axis -38 degrees    T Axis 118 degrees    Diagnosis Line Sinus rhythm with occasional premature ventricular complexes and premature atrial complexes  Left axis deviation  Right bundle branch block  Cannot rule out Anterior infarct , age undetermined  Poor R wave progression  Abnormal ECG  When compared with ECG of 2024 10:20,  premature ventricular complexes are now present  QT has shortened    Confirmed by CHRISTOPHER ORNELAS MD () on 2024 8:13:26 AM    < end of copied text >      LABS:                        8.4    10.92 )-----------( 248      ( 2024 06:34 )             27.1     02-    142  |  103  |  44<H>  ----------------------------<  81  4.3   |  25  |  2.50<H>    Ca    9.0      2024 06:34  Mg     1.8     02-02        RADIOLOGY & ADDITIONAL STUDIES:    < from: US Duplex Venous Lower Ext Complete, Bilateral (24 @ 18:20) >    ACC: [id number] EXAM:  US DPLX LWR EXT VEINS COMPL BI   ORDERED BY:    MARYANNE CARMEN     PROCEDURE DATE:  2024          INTERPRETATION:  CLINICAL INFORMATION: Evaluate for DVT.    COMPARISON: Right lower extremity venous Doppler 2024    TECHNIQUE: Duplex sonography of the BILATERAL LOWER extremity veins with   color and spectral Doppler, with and without compression.    FINDINGS:    RIGHT:  Normal compressibility of the RIGHT common femoral, femoral and popliteal   veins.  Doppler examination shows normal spontaneous and phasic flow.  No RIGHT calf vein thrombosis is detected.    LEFT:  Normal compressibility of the LEFT common femoral, femoral and popliteal   veins.  Doppler examination shows normal spontaneous and phasic flow.  No LEFT calf vein thrombosis is detected.    IMPRESSION:  No evidence of deep venous thrombosis in either lower extremity.        < end of copied text >  < from: CT Chest No Cont (24 @ 12:32) >  ACC: [id number] EXAM:  CT ABDOMEN AND PELVIS   ORDERED BY: MICHELLE HALL     ACC: [id number] EXAM:  CT CHEST   ORDERED BY: MICHELLE HALL     PROCEDURE DATE:  2024          INTERPRETATION:  CLINICAL INFORMATION: Anemia. Evaluate for malignancy.    COMPARISON: Right upper quadrant ultrasound 2024.    CONTRAST/COMPLICATIONS:  IV Contrast: NONE  Oral Contrast: NONE  Complications: None reported at time of study completion    PROCEDURE:  CT of the Chest, Abdomen and Pelvis was performed.  Sagittal and coronal reformats were performed.    FINDINGS:  Limited evaluation of the vasculature and viscera in the absence of   intravenous contrast.    CHEST:  LUNGS AND LARGE AIRWAYS: Patent central airways. Imaging of the lung   fields, particularly inferiorly, is motion degraded. This may limit   evaluation. Otherwise, no discrete infiltrate or nodule identified.   Bilateral subsegmental atelectasis.  PLEURA: Trace right pleural effusion.  VESSELS: Atherosclerotic changes.  HEART: Marked cardiomegaly. Aortic valve calcification. Hypoattenuation   of the blood pool relative to myocardium, consistent with anemia. No   pericardial effusion.  MEDIASTINUM AND KATIA: Nonspecific mildly prominent mediastinal lymph   nodes. For reference, a para-aortic node measures 2.2 x 1.0 cm (series 2   image 26).  CHEST WALL AND LOWER NECK: Within normal limits.    ABDOMEN AND PELVIS:  Imaging of the upper abdomen is motion degraded.    LIVER: Hepatomegaly, the right lobe measuring 19.8 cm craniocaudally.  BILE DUCTS: Normal caliber.  GALLBLADDER: Within normal limits.  SPLEEN: Within normal limits.  PANCREAS: Within normal limits.  ADRENALS: Indeterminate right adrenal nodule measuring 1.3 cm.  KIDNEYS/URETERS: Right upper pole renal cyst. Subcentimeter   hyperattenuating foci in the left kidney that likely represent   proteinaceous/hemorrhagic cysts. No hydronephrosis.    BLADDER: Mild bladder wall thickening.  REPRODUCTIVE ORGANS: The prostate is mildly enlarged.    BOWEL: Moderate hiatal hernia. No bowel obstruction. Colonic diverticula.   No evidence for acute diverticulitis.  PERITONEUM: Small volume ascites.  VESSELS: Atherosclerotic changes.  RETROPERITONEUM/LYMPH NODES: Nonspecific prominent retroperitoneal,   bilateral iliac chain, and bilateral inguinal lymph nodes. For example, a   left inguinal node measures 2.6 x 1.6 cm (series 2 image 175), and a left   para-aortic node measures 1.5 x 1.4 cm (series 2 image 100).  ABDOMINAL WALL: Generalized subcutaneous edema. Subcutaneous cystic   appearing nodular focus measuring 1.2 cm in the left anterior upper   abdominal/lower chest wall (series 2 image 78).  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 Degenerative changes.    IMPRESSION:  *  Exam may be limited by noncontrast technique and motion.  *  Prominent nonspecific mediastinal, retroperitoneal, iliac chain, and   inguinal lymph nodes.  *  Marked cardiomegaly. Aortic valve calcification.  *  Trace right pleural effusion.  *  Hepatomegaly.  *  Indeterminate right adrenal nodule measuring 1.3 cm.  *  Mild urinary bladder wall thickening, which could be due to   underdistention, chronic bladder outlet obstruction, or cystitis.   Correlate with urinalysis.  *  Mildly enlarged prostate.  *  Small volume ascites.  *  Subcutaneous cystic-appearing nodular focus measuring 1.2 cm in the   left anterior upper abdominal/lower chest wall, which may represent an   epidermal inclusion cyst.  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 Recommend correlation for point tenderness.        --- End of Report ---        < end of copied text >  < from: TTE Echo Complete w/o Contrast w/ Doppler (24 @ 08:26) >    ACC: [id number] EXAM:  ECHO TTE WO CON COMP W DOPP                          PROCEDURE DATE:  2024          INTERPRETATION:  TRANSTHORACIC ECHOCARDIOGRAM REPORT        Patient Name:   GRACE FERNANDEZ Patient Location: [address] Rec #:  [id number]           Accession #:      [id number]  Account #:      [id number]            Height:           70.0 in 177.8 cm  YOB: 1960          Weight:           214.5 lb 97.30 kg  Patient Age:    63 years          BSA:              2.15 m²  PatientGender: M                 BP:               128/92 mmHg      Date of Exam:        2024 8:26:00 AM  Sonographer:         Rhys Vasquez  Referring Physician: SARAH CUBA    Procedure:     2D Echo/Doppler/Color Doppler Complete.  Indications:I50.9 - Heart failure, unspecified  Diagnosis:     I35.0 - Nonrheumatic aortic (valve) stenosis  Study Details: Technically good study.        2D AND M-MODE MEASUREMENTS (normal ranges within parentheses):  Left                 Normal   Aorta/Left        Normal  Ventricle:                    Atrium:  IVSd (2D):    1.51  (0.7-1.1) Aortic Root   3.66  (2.4-3.7)                 cm             (2D):          cm  LVPWd (2D):   1.31  (0.7-1.1) Aortic Root   3.74  (2.4-3.7)                 cm     (Mmode):       cm  LVIDd (2D):   6.13  (3.4-5.7) Left Atrium   5.69  (1.9-4.0)                 cm             (2D):          cm  LVIDs (2D):   5.01            Left Atrium   5.63  (1.9-4.0)                 cm             (Mmode):       cm  LV FS (2D):   18.3   (>25%)   LA Volume     56.0                  %             Index        ml/m²  LV EF (2D):   37 %   (>55%)   Right Ventricle:  Relative Wall 0.43   (<0.42)  TAPSE:           1.60 cm  Thickness    LV SYSTOLIC FUNCTION BY 2D PLANIMETRY (MOD):  EF-A4C View: 40.6 % EF-A2C View: 34.8 % EF-Biplane: 38 %    LV DIASTOLIC FUNCTION:  MV Peak E: 0.92 m/s  MV Peak A: 0.39 m/s  E/A Ratio: 2.37    SPECTRAL DOPPLER ANALYSIS (where applicable):  Mitral Insufficiency by PISA:  MR Volume: 45.78 ml MRFlow Rate: 145.97 ml/s MR EROA: 27.96 mm²    Aortic Valve: AoV Max Salbador: 3.94 m/s AoV Peak P.1 mmHg AoV Mean P.0 mmHg    LVOT Vmax: 0.66 m/s LVOT VTI: 0.166 m LVOT Diameter: 2.40 cm    AoV Area, Vmax: 0.76 cm² AoV Area, VTI: 0.76 cm² AoV Area, Vmn: 0.71 cm²  Ao VTI: 0.991  Aortic Insufficiency:  AI Half-time:  498 msec  AI Decel Rate: 2.06 m/s²    Tricuspid Valve and PA/RV Systolic Pressure: TR Max Velocity: 3.30 m/s RA   Pressure: 15 mmHg RVSP/PASP: 58.6 mmHg      PHYSICIAN INTERPRETATION:  Left Ventricle: The left ventricular internal cavity size is mildly   increased. Left ventricular wall thickness is increased. There is mild   concentric left ventricular hypertrophy involving the global wall. The   LVH involves global walls.  Global LV systolic function was moderately decreased. Left ventricular   ejection fraction, by visual estimation, is 35 to 40%. Spectral Doppler   shows restrictive pattern of left ventricular myocardial filling (Grade   III diastolic dysfunction).      LV Wall Scoring:  The basal inferior segment is akinetic. The mid inferolateral segment, mid  anterolateral segment, and mid inferior segment are hypokinetic. All   remaining  scored segments are normal.    Right Ventricle: The right ventricular size is moderately enlarged. RV   systolic function is low normal. TV S' 0.1 m/s.  Left Atrium: Severely enlarged left atrium. LA volume Index is 56.0 ml/m²   ml/m2.  Right Atrium: Right atrial enlargement.  Pericardium: Trivial pericardial effusion is present. The pericardial   effusion is localized near the left ventricle and localized near the   right atrium.  Mitral Valve: Thickening of the anterior and posterior mitral valve   leaflets. Mitral leaflet mobility is normal. Moderate mitral valve   regurgitation is seen. The MR jet is centrally-directed. Diastolic mitral   regurgitation is present.  Tricuspid Valve: Structurally normal tricuspid valve, with normal leaflet   excursion. Mild-moderate tricuspid regurgitation is visualized. Estimated   pulmonary artery systolic pressure is 58.6 mmHg assuming a right atrial   pressure of 15 mmHg, which is consistent with moderate pulmonary   hypertension.  Aortic Valve: The aortic valve is trileaflet. Severe aortic stenosis is   present. The peak aortic velocity was obtained from the apical view. Mild   aortic valve regurgitation is seen. The Dimesionless Index value is .17.  Pulmonic Valve: The pulmonic valve was not well visualized. Trace   pulmonic valve regurgitation.  Aorta: The aortic root is normal in size and structure. There is   dilatation of the ascending aorta.  Pulmonary Artery: The main pulmonary artery is normal in size.  Venous: The inferior vena cava is abnormal. The inferior vena cava was   dilated, with respiratory size variation less than 50%.      Summary:   1. Left ventricular ejection fraction, by visual estimation, is 35 to   40%.   2. Moderately decreased global left ventricular systolic function.   3. Basal inferior segment, mid inferolateral segment, mid anterolateral   segment, and mid inferior segment are abnormal as described above.   4. Severely enlarged left atrium.   5. Increased LV wall thickness.   6. Mildly increased left ventricular internal cavity size.   7. Spectral Doppler shows restrictivepattern of left ventricular   myocardial filling (Grade III diastolic dysfunction).   8. There is mild concentric left ventricular hypertrophy.   9. Moderately enlarged right ventricle.  10. Right atrial enlargement.  11. Trivial pericardial effusion.  12. Moderate mitral valve regurgitation.  13. Thickening of the anterior and posterior mitral valve leaflets.  14. Mild-moderate tricuspid regurgitation.  15. Mild aortic regurgitation.  16. Severe aortic valve stenosis.  17. Dilatation of the ascending aorta.  18. Estimated pulmonary artery systolic pressure is 58.6 mmHg assuming a   right atrial pressure of 15 mmHg, which is consistent with moderate   pulmonary hypertension.  19. LA volume Index is 56.0 ml/m² ml/m2.  20. The Dimesionless Index value is .17.    [id number] Christopher Ornelas , Electronically signed on 2024 at   2:58:40 PM    < end of copied text >      ECHO:

## 2024-02-03 NOTE — PROGRESS NOTE ADULT - ASSESSMENT
Severe aortic stenosis, LV dysfunction, awaiting tx for further cardiac evaluation/cath possible tavr  d/w patient brother and dr kendrick

## 2024-02-03 NOTE — PROGRESS NOTE ADULT - ASSESSMENT
63 year old male with Hx of HTN, GERD, dyslipidemia, chronic intermittent leg pain, aortic stenosis, chronic venous stasis dermatitis with bilateral leg swelling, presents for b/l leg pain and swelling    #Acute New Onset HFrEF (EF 35-40% 1/2024)  #Severe AS  - TTE reviewed; severe AS, mod pulm htn, mod mitral regurg, grade 3 diastolic dysfunction, EF 35-40%  - CXR with no pleural effusions  - Cont Toprol 25mg daily  - Not a candidate for ACE/ARB/ARNI due to renal function  - Was on lasix 40 mg IVP daily - will hold for now due to uptrending Cr   - Cardiology consulted, following   - Patient will require ischemic workup with coronary angiogram and full structural heart team evaluation for TAVR - accepted at [facility] by hospitalist Dr Joiner, interventional cardiologist Dr Catalan     #Anemia of chronic disease  - FOBT negative, CT APC showed small volume ascites, chronic left 7th rib fx, trace right pleural effusion and prominent lymph nodes  - Heme appreciated, MF etiology for anemia, mechanical hemolysis due to severe AS also possible  - s/p 1 unit PRBCs on 1/31  - s/p IV iron   - Goal to maintain hemoglobin above 8 g/dL due to patient's advanced aortic stenosis and need for TAVR.   - Heme consulted   - GI consulted     #Bilateral Venous Stasis with component of Cellulitis  - ID appreciated, completed five days of keflex  - Leg elevation as needed, no ID objection to cardiac workup when cleared    #Hypertension  - Cont toprol     #LINDA vs Chronic Kidney Disease   - Nephrology following, CKD 3 at or near baseline  - UA negative, imaging without hydro, no objection to cautious diuresis as needed  - Avoid nephrotoxins, monitor BMP  - Hold lasix for now     #Transaminitis   - Unclear etiology, downtrending  - Hepatitis panel negative  - RUQ US unremarkable     #GERD  - Continue PPI     #DVT Prophylaxis  - Heparin    Dispo: Transfer to [facility], awaiting bed assignment     will update wife Chanelle [phone]

## 2024-02-03 NOTE — PROGRESS NOTE ADULT - SUBJECTIVE AND OBJECTIVE BOX
Patient is a 63y old  Male who presents with a chief complaint of CHF exacerbation (02 Feb 2024 20:25)    Patient seen and examined at bedside. No acute overnight events. Endorses LE pain.     ALLERGIES:  No Known Drug Allergies  garlic (Angioedema; Swelling; Anaphylaxis)    MEDICATIONS  (STANDING):  aspirin  chewable 81 milliGRAM(s) Oral daily  epoetin loulou-epbx (RETACRIT) Injectable 83456 Unit(s) SubCutaneous every 7 days  ferrous    sulfate 325 milliGRAM(s) Oral daily  heparin   Injectable 5000 Unit(s) SubCutaneous every 8 hours  influenza   Vaccine 0.5 milliLiter(s) IntraMuscular once  metoprolol succinate ER 25 milliGRAM(s) Oral daily  oxyCODONE    IR 5 milliGRAM(s) Oral once  tamsulosin 0.4 milliGRAM(s) Oral at bedtime    MEDICATIONS  (PRN):  aluminum hydroxide/magnesium hydroxide/simethicone Suspension 30 milliLiter(s) Oral every 4 hours PRN Dyspepsia  melatonin 3 milliGRAM(s) Oral at bedtime PRN Insomnia  ondansetron Injectable 4 milliGRAM(s) IV Push every 8 hours PRN Nausea and/or Vomiting  traMADol 50 milliGRAM(s) Oral every 6 hours PRN Severe Pain (7 - 10)    Vital Signs Last 24 Hrs  T(F): 97.2 (03 Feb 2024 05:06), Max: 98.2 (02 Feb 2024 13:47)  HR: 76 (03 Feb 2024 05:06) (68 - 76)  BP: 106/61 (03 Feb 2024 05:06) (102/75 - 120/77)  RR: 16 (03 Feb 2024 05:06) (16 - 16)  SpO2: 97% (03 Feb 2024 05:06) (95% - 98%)  I&O's Summary    PHYSICAL EXAM:  General: NAD, A/O x 3  ENT: MMM, no thrush  Neck: Supple, No JVD  Lungs: Clear to auscultation bilaterally, bilateral air entry, non labored breathing  Cardio: S1S2 regular, +murmur  Abdomen: Soft, Nontender, Nondistended; Bowel sounds present  Extremities: No cyanosis, B/L LE edema, chronic skin changes/venous stasis    LABS:                        8.4    10.92 )-----------( 248      ( 03 Feb 2024 06:34 )             27.1       02-03    142  |  103  |  44  ----------------------------<  81  4.3   |  25  |  2.50    Ca    9.0      03 Feb 2024 06:34  Mg     1.8     02-02    01-27 Chol 106 mg/dL LDL -- HDL 38 mg/dL Trig 58 mg/dL    Urinalysis Basic - ( 03 Feb 2024 06:34 )    Color: x / Appearance: x / SG: x / pH: x  Gluc: 81 mg/dL / Ketone: x  / Bili: x / Urobili: x   Blood: x / Protein: x / Nitrite: x   Leuk Esterase: x / RBC: x / WBC x   Sq Epi: x / Non Sq Epi: x / Bacteria: x    RADIOLOGY & ADDITIONAL TESTS:     Care Discussed with Consultants/Other Providers:

## 2024-02-04 LAB
ANION GAP SERPL CALC-SCNC: 14 MMOL/L — SIGNIFICANT CHANGE UP (ref 5–17)
BUN SERPL-MCNC: 42 MG/DL — HIGH (ref 7–23)
CALCIUM SERPL-MCNC: 8.9 MG/DL — SIGNIFICANT CHANGE UP (ref 8.4–10.5)
CHLORIDE SERPL-SCNC: 101 MMOL/L — SIGNIFICANT CHANGE UP (ref 96–108)
CO2 SERPL-SCNC: 24 MMOL/L — SIGNIFICANT CHANGE UP (ref 22–31)
CREAT SERPL-MCNC: 2.17 MG/DL — HIGH (ref 0.5–1.3)
EGFR: 33 ML/MIN/1.73M2 — LOW
GLUCOSE SERPL-MCNC: 110 MG/DL — HIGH (ref 70–99)
HCT VFR BLD CALC: 28.8 % — LOW (ref 39–50)
HGB BLD-MCNC: 8.9 G/DL — LOW (ref 13–17)
MCHC RBC-ENTMCNC: 19 PG — LOW (ref 27–34)
MCHC RBC-ENTMCNC: 30.9 GM/DL — LOW (ref 32–36)
MCV RBC AUTO: 61.5 FL — LOW (ref 80–100)
NRBC # BLD: 3 /100 WBCS — HIGH (ref 0–0)
PLATELET # BLD AUTO: 223 K/UL — SIGNIFICANT CHANGE UP (ref 150–400)
POTASSIUM SERPL-MCNC: 3.8 MMOL/L — SIGNIFICANT CHANGE UP (ref 3.5–5.3)
POTASSIUM SERPL-SCNC: 3.8 MMOL/L — SIGNIFICANT CHANGE UP (ref 3.5–5.3)
RBC # BLD: 4.68 M/UL — SIGNIFICANT CHANGE UP (ref 4.2–5.8)
RBC # FLD: 25.2 % — HIGH (ref 10.3–14.5)
SODIUM SERPL-SCNC: 139 MMOL/L — SIGNIFICANT CHANGE UP (ref 135–145)
WBC # BLD: 8.46 K/UL — SIGNIFICANT CHANGE UP (ref 3.8–10.5)
WBC # FLD AUTO: 8.46 K/UL — SIGNIFICANT CHANGE UP (ref 3.8–10.5)

## 2024-02-04 PROCEDURE — 99232 SBSQ HOSP IP/OBS MODERATE 35: CPT

## 2024-02-04 RX ADMIN — Medication 25 MILLIGRAM(S): at 05:36

## 2024-02-04 RX ADMIN — Medication 325 MILLIGRAM(S): at 11:45

## 2024-02-04 RX ADMIN — Medication 81 MILLIGRAM(S): at 11:45

## 2024-02-04 RX ADMIN — TAMSULOSIN HYDROCHLORIDE 0.4 MILLIGRAM(S): 0.4 CAPSULE ORAL at 21:28

## 2024-02-04 RX ADMIN — HEPARIN SODIUM 5000 UNIT(S): 5000 INJECTION INTRAVENOUS; SUBCUTANEOUS at 13:46

## 2024-02-04 RX ADMIN — HEPARIN SODIUM 5000 UNIT(S): 5000 INJECTION INTRAVENOUS; SUBCUTANEOUS at 21:28

## 2024-02-04 RX ADMIN — TRAMADOL HYDROCHLORIDE 50 MILLIGRAM(S): 50 TABLET ORAL at 23:52

## 2024-02-04 RX ADMIN — HEPARIN SODIUM 5000 UNIT(S): 5000 INJECTION INTRAVENOUS; SUBCUTANEOUS at 05:36

## 2024-02-04 NOTE — PROGRESS NOTE ADULT - SUBJECTIVE AND OBJECTIVE BOX
Patient is a 63y old  Male who presents with a chief complaint of CHF exacerbation (03 Feb 2024 15:19)    Patient seen and examined at bedside. No acute overnight events. Pain controlled this morning. Reports sleeping well.  Frustrated by long wait for transfer.     ALLERGIES:  No Known Drug Allergies  garlic (Angioedema; Swelling; Anaphylaxis)    MEDICATIONS  (STANDING):  aspirin  chewable 81 milliGRAM(s) Oral daily  epoetin loulou-epbx (RETACRIT) Injectable 49266 Unit(s) SubCutaneous every 7 days  ferrous    sulfate 325 milliGRAM(s) Oral daily  heparin   Injectable 5000 Unit(s) SubCutaneous every 8 hours  influenza   Vaccine 0.5 milliLiter(s) IntraMuscular once  metoprolol succinate ER 25 milliGRAM(s) Oral daily  tamsulosin 0.4 milliGRAM(s) Oral at bedtime    MEDICATIONS  (PRN):  aluminum hydroxide/magnesium hydroxide/simethicone Suspension 30 milliLiter(s) Oral every 4 hours PRN Dyspepsia  melatonin 3 milliGRAM(s) Oral at bedtime PRN Insomnia  ondansetron Injectable 4 milliGRAM(s) IV Push every 8 hours PRN Nausea and/or Vomiting  traMADol 50 milliGRAM(s) Oral every 6 hours PRN Severe Pain (7 - 10)    Vital Signs Last 24 Hrs  T(F): 97.3 (04 Feb 2024 05:21), Max: 97.8 (03 Feb 2024 20:54)  HR: 81 (04 Feb 2024 05:21) (78 - 85)  BP: 129/74 (04 Feb 2024 05:21) (123/73 - 132/75)  RR: 18 (04 Feb 2024 05:21) (18 - 18)  SpO2: 95% (04 Feb 2024 05:21) (95% - 97%)  I&O's Summary    03 Feb 2024 07:01  -  04 Feb 2024 07:00  --------------------------------------------------------  IN: 200 mL / OUT: 0 mL / NET: 200 mL    PHYSICAL EXAM:  General: NAD, A/O x 3  ENT: MMM, no tonsilar exudate  Neck: Supple, No JVD  Lungs: Clear to auscultation bilaterally, no wheezes. Good air entry bilaterally   Cardio: RRR, S1/S2, +systolic murmur  Abdomen: Soft, Nontender, Nondistended; Bowel sounds present  Extremities: +edema b/l LE with venous stasis skin changes     LABS:                        8.9    8.46  )-----------( 223      ( 04 Feb 2024 06:45 )             28.8       02-04    139  |  101  |  42  ----------------------------<  110  3.8   |  24  |  2.17    Ca    8.9      04 Feb 2024 06:45  Mg     1.8     02-02    01-27 Chol 106 mg/dL LDL -- HDL 38 mg/dL Trig 58 mg/dL    Urinalysis Basic - ( 04 Feb 2024 06:45 )    Color: x / Appearance: x / SG: x / pH: x  Gluc: 110 mg/dL / Ketone: x  / Bili: x / Urobili: x   Blood: x / Protein: x / Nitrite: x   Leuk Esterase: x / RBC: x / WBC x   Sq Epi: x / Non Sq Epi: x / Bacteria: x    RADIOLOGY & ADDITIONAL TESTS:     Care Discussed with Consultants/Other Providers:

## 2024-02-04 NOTE — PROGRESS NOTE ADULT - SUBJECTIVE AND OBJECTIVE BOX
Follow up for :   aortic stenosis    Interval history:  awaits tx [facility]    SUBJ:   less sob, no c/p    PMH  Dyslipidemia    Hypertension    Chronic GERD    History of aortic stenosis        MEDICATIONS  (STANDING):  aspirin  chewable 81 milliGRAM(s) Oral daily  epoetin loulou-epbx (RETACRIT) Injectable 89102 Unit(s) SubCutaneous every 7 days  ferrous    sulfate 325 milliGRAM(s) Oral daily  heparin   Injectable 5000 Unit(s) SubCutaneous every 8 hours  influenza   Vaccine 0.5 milliLiter(s) IntraMuscular once  metoprolol succinate ER 25 milliGRAM(s) Oral daily  tamsulosin 0.4 milliGRAM(s) Oral at bedtime    MEDICATIONS  (PRN):  aluminum hydroxide/magnesium hydroxide/simethicone Suspension 30 milliLiter(s) Oral every 4 hours PRN Dyspepsia  melatonin 3 milliGRAM(s) Oral at bedtime PRN Insomnia  ondansetron Injectable 4 milliGRAM(s) IV Push every 8 hours PRN Nausea and/or Vomiting  traMADol 50 milliGRAM(s) Oral every 6 hours PRN Severe Pain (7 - 10)        PHYSICAL EXAM:  Vital Signs Last 24 Hrs  T(C): 36.3 (2024 05:21), Max: 36.6 (2024 20:54)  T(F): 97.3 (2024 05:21), Max: 97.8 (2024 20:54)  HR: 81 (2024 05:21) (78 - 85)  BP: 129/74 (2024 05:21) (123/73 - 132/75)  BP(mean): 92 (2024 05:21) (92 - 92)  RR: 18 (2024 05:21) (18 - 18)  SpO2: 95% (2024 05:21) (95% - 97%)    Parameters below as of 2024 05:21  Patient On (Oxygen Delivery Method): room air        GENERAL: NAD, well-groomed, well-developed  HEAD:  Atraumatic, Normocephalic  EYES:  conjunctiva and sclera clear  ENT: Moist mucous membranes,  NECK: Supple, No JVD, no bruits  CHEST/LUNG: Clear to auscultation bilaterally; No rales, rhonchi, wheezing, or rubs  HEART: Regular rate and rhythm;   ABDOMEN: Soft, Nontender, Nondistended; Bowel sounds present  EXTREMITIES:   + edema rle  NERVOUS SYSTEM:  Alert       TELEMETRY:  sinus    ECG:  < from: 12 Lead ECG (24 @ 16:33) >    Ventricular Rate 89 BPM    Atrial Rate 89 BPM    P-R Interval 152 ms    QRS Duration 124 ms    Q-T Interval 342 ms    QTC Calculation(Bazett) 416 ms    P Axis 45 degrees    R Axis -38 degrees    T Axis 118 degrees    Diagnosis Line Sinus rhythm with occasional premature ventricular complexes and premature atrial complexes  Left axis deviation  Right bundle branch block  Cannot rule out Anterior infarct , age undetermined  Poor R wave progression  Abnormal ECG  When compared with ECG of 2024 10:20,  premature ventricular complexes are now present  QT has shortened    Confirmed by CHRISTOPHER ORNELAS MD () on 2024 8:13:26 AM    < end of copied text >  < from: CT Chest No Cont (24 @ 12:32) >    ACC: [id number] EXAM:  CT ABDOMEN AND PELVIS   ORDERED BY: MICHELLE HALL     ACC: [id number] EXAM:  CT CHEST   ORDERED BY: MICHELLE HALL     PROCEDURE DATE:  2024          INTERPRETATION:  CLINICAL INFORMATION: Anemia. Evaluate for malignancy.    COMPARISON: Right upper quadrant ultrasound 2024.    CONTRAST/COMPLICATIONS:  IV Contrast: NONE  Oral Contrast: NONE  Complications: None reported at time of study completion    PROCEDURE:  CT of the Chest, Abdomen and Pelvis was performed.  Sagittal and coronal reformats were performed.    FINDINGS:  Limited evaluation of the vasculature and viscera in the absence of   intravenous contrast.    CHEST:  LUNGS AND LARGE AIRWAYS: Patent central airways. Imaging of the lung   fields, particularly inferiorly, is motion degraded. This may limit   evaluation. Otherwise, no discrete infiltrate or nodule identified.   Bilateral subsegmental atelectasis.  PLEURA: Trace right pleural effusion.  VESSELS: Atherosclerotic changes.  HEART: Marked cardiomegaly. Aortic valve calcification. Hypoattenuation   of the blood pool relative to myocardium, consistent with anemia. No   pericardial effusion.  MEDIASTINUM AND KATIA: Nonspecific mildly prominent mediastinal lymph   nodes. For reference, a para-aortic node measures 2.2 x 1.0 cm (series 2   image 26).  CHEST WALL AND LOWER NECK: Within normal limits.    ABDOMEN AND PELVIS:  Imaging of the upper abdomen is motion degraded.    LIVER: Hepatomegaly, the right lobe measuring 19.8 cm craniocaudally.  BILE DUCTS: Normal caliber.  GALLBLADDER: Within normal limits.  SPLEEN: Within normal limits.  PANCREAS: Within normal limits.  ADRENALS: Indeterminate right adrenal nodule measuring 1.3 cm.  KIDNEYS/URETERS: Right upper pole renal cyst. Subcentimeter   hyperattenuating foci in the left kidney that likely represent   proteinaceous/hemorrhagic cysts. No hydronephrosis.    BLADDER: Mild bladder wall thickening.  REPRODUCTIVE ORGANS: The prostate is mildly enlarged.    BOWEL: Moderate hiatal hernia. No bowel obstruction. Colonic diverticula.   No evidence for acute diverticulitis.  PERITONEUM: Small volume ascites.  VESSELS: Atherosclerotic changes.  RETROPERITONEUM/LYMPH NODES: Nonspecific prominent retroperitoneal,   bilateral iliac chain, and bilateral inguinal lymph nodes. For example, a   left inguinal node measures 2.6 x 1.6 cm (series 2 image 175), and a left   para-aortic node measures 1.5 x 1.4 cm (series 2 image 100).  ABDOMINAL WALL: Generalized subcutaneous edema. Subcutaneous cystic   appearing nodular focus measuring 1.2 cm in the left anterior upper   abdominal/lower chest wall (series 2 image 78).  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 Degenerative changes.    IMPRESSION:  *  Exam may be limited by noncontrast technique and motion.  *  Prominent nonspecific mediastinal, retroperitoneal, iliac chain, and   inguinal lymph nodes.  *  Marked cardiomegaly. Aortic valve calcification.  *  Trace right pleural effusion.  *  Hepatomegaly.  *  Indeterminate right adrenal nodule measuring 1.3 cm.  *  Mild urinary bladder wall thickening, which could be due to   underdistention, chronic bladder outlet obstruction, or cystitis.   Correlate with urinalysis.  *  Mildly enlarged prostate.  *  Small volume ascites.  *  Subcutaneous cystic-appearing nodular focus measuring 1.2 cm in the   left anterior upper abdominal/lower chest wall, which may represent an   epidermal inclusion cyst.  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 Recommend correlation for point tenderness.        --- End of Report ---            CHITO PANG MD; Attending Radiologist  This document has been electronically signed. 2024  5:22PM    < end of copied text >  < from: TTE Echo Complete w/o Contrast w/ Doppler (24 @ 08:26) >    ACC: [id number] EXAM:  ECHO TTE WO CON COMP W DOPP                          PROCEDURE DATE:  2024          INTERPRETATION:  TRANSTHORACIC ECHOCARDIOGRAM REPORT        Patient Name:   GRACE FERNANDEZ Patient Location: [address] Rec #:  [id number]           Accession #:      [id number]  Account #:      [id number]            Height:           70.0 in 177.8 cm  YOB: 1960          Weight:           214.5 lb 97.30 kg  Patient Age:    63 years          BSA:              2.15 m²  PatientGender: M                 BP:               128/92 mmHg      Date of Exam:        2024 8:26:00 AM  Sonographer:         Rhys Vasquez  Referring Physician: SARAH CUBA    Procedure:     2D Echo/Doppler/Color Doppler Complete.  Indications:I50.9 - Heart failure, unspecified  Diagnosis:     I35.0 - Nonrheumatic aortic (valve) stenosis  Study Details: Technically good study.        2D AND M-MODE MEASUREMENTS (normal ranges within parentheses):  Left                 Normal   Aorta/Left        Normal  Ventricle:                    Atrium:  IVSd (2D):    1.51  (0.7-1.1) Aortic Root   3.66  (2.4-3.7)                 cm             (2D):          cm  LVPWd (2D):   1.31  (0.7-1.1) Aortic Root   3.74  (2.4-3.7)                 cm     (Mmode):       cm  LVIDd (2D):   6.13  (3.4-5.7) Left Atrium   5.69  (1.9-4.0)                 cm             (2D):          cm  LVIDs (2D):   5.01            Left Atrium   5.63  (1.9-4.0)                 cm             (Mmode):       cm  LV FS (2D):   18.3   (>25%)   LA Volume     56.0                  %             Index        ml/m²  LV EF (2D):   37 %   (>55%)   Right Ventricle:  Relative Wall 0.43   (<0.42)  TAPSE:           1.60 cm  Thickness    LV SYSTOLIC FUNCTION BY 2D PLANIMETRY (MOD):  EF-A4C View: 40.6 % EF-A2C View: 34.8 % EF-Biplane: 38 %    LV DIASTOLIC FUNCTION:  MV Peak E: 0.92 m/s  MV Peak A: 0.39 m/s  E/A Ratio: 2.37    SPECTRAL DOPPLER ANALYSIS (where applicable):  Mitral Insufficiency by PISA:  MR Volume: 45.78 ml MRFlow Rate: 145.97 ml/s MR EROA: 27.96 mm²    Aortic Valve: AoV Max Salbador: 3.94 m/s AoV Peak P.1 mmHg AoV Mean P.0 mmHg    LVOT Vmax: 0.66 m/s LVOT VTI: 0.166 m LVOT Diameter: 2.40 cm    AoV Area, Vmax: 0.76 cm² AoV Area, VTI: 0.76 cm² AoV Area, Vmn: 0.71 cm²  Ao VTI: 0.991  Aortic Insufficiency:  AI Half-time:  498 msec  AI Decel Rate: 2.06 m/s²    Tricuspid Valve and PA/RV Systolic Pressure: TR Max Velocity: 3.30 m/s RA   Pressure: 15 mmHg RVSP/PASP: 58.6 mmHg      PHYSICIAN INTERPRETATION:  Left Ventricle: The left ventricular internal cavity size is mildly   increased. Left ventricular wall thickness is increased. There is mild   concentric left ventricular hypertrophy involving the global wall. The   LVH involves global walls.  Global LV systolic function was moderately decreased. Left ventricular   ejection fraction, by visual estimation, is 35 to 40%. Spectral Doppler   shows restrictive pattern of left ventricular myocardial filling (Grade   III diastolic dysfunction).      LV Wall Scoring:  The basal inferior segment is akinetic. The mid inferolateral segment, mid  anterolateral segment, and mid inferior segment are hypokinetic. All   remaining  scored segments are normal.    Right Ventricle: The right ventricular size is moderately enlarged. RV   systolic function is low normal. TV S' 0.1 m/s.  Left Atrium: Severely enlarged left atrium. LA volume Index is 56.0 ml/m²   ml/m2.  Right Atrium: Right atrial enlargement.  Pericardium: Trivial pericardial effusion is present. The pericardial   effusion is localized near the left ventricle and localized near the   right atrium.  Mitral Valve: Thickening of the anterior and posterior mitral valve   leaflets. Mitral leaflet mobility is normal. Moderate mitral valve   regurgitation is seen. The MR jet is centrally-directed. Diastolic mitral   regurgitation is present.  Tricuspid Valve: Structurally normal tricuspid valve, with normal leaflet   excursion. Mild-moderate tricuspid regurgitation is visualized. Estimated   pulmonary artery systolic pressure is 58.6 mmHg assuming a right atrial   pressure of 15 mmHg, which is consistent with moderate pulmonary   hypertension.  Aortic Valve: The aortic valve is trileaflet. Severe aortic stenosis is   present. The peak aortic velocity was obtained from the apical view. Mild   aortic valve regurgitation is seen. The Dimesionless Index value is .17.  Pulmonic Valve: The pulmonic valve was not well visualized. Trace   pulmonic valve regurgitation.  Aorta: The aortic root is normal in size and structure. There is   dilatation of the ascending aorta.  Pulmonary Artery: The main pulmonary artery is normal in size.  Venous: The inferior vena cava is abnormal. The inferior vena cava was   dilated, with respiratory size variation less than 50%.      Summary:   1. Left ventricular ejection fraction, by visual estimation, is 35 to   40%.   2. Moderately decreased global left ventricular systolic function.   3. Basal inferior segment, mid inferolateral segment, mid anterolateral   segment, and mid inferior segment are abnormal as described above.   4. Severely enlarged left atrium.   5. Increased LV wall thickness.   6. Mildly increased left ventricular internal cavity size.   7. Spectral Doppler shows restrictivepattern of left ventricular   myocardial filling (Grade III diastolic dysfunction).   8. There is mild concentric left ventricular hypertrophy.   9. Moderately enlarged right ventricle.  10. Right atrial enlargement.  11. Trivial pericardial effusion.  12. Moderate mitral valve regurgitation.  13. Thickening of the anterior and posterior mitral valve leaflets.  14. Mild-moderate tricuspid regurgitation.  15. Mild aortic regurgitation.  16. Severe aortic valve stenosis.  17. Dilatation of the ascending aorta.  18. Estimated pulmonary artery systolic pressure is 58.6 mmHg assuming a   right atrial pressure of 15 mmHg, which is consistent with moderate   pulmonary hypertension.  19. LA volume Index is 56.0 ml/m² ml/m2.  20. The Dimesionless Index value is .17.    [id number] Christopher Ornelas , Electronically signed on 2024 at   2:58:40 PM      < end of copied text >      LABS:                        8.9    8.46  )-----------( 223      ( 2024 06:45 )             28.8     02-04    139  |  101  |  42<H>  ----------------------------<  110<H>  3.8   |  24  |  2.17<H>    Ca    8.9      2024 06:45                I&O's Summary    2024 07:01  -  2024 07:00  --------------------------------------------------------  IN: 200 mL / OUT: 0 mL / NET: 200 mL          RADIOLOGY & ADDITIONAL STUDIES:    ECHO:

## 2024-02-04 NOTE — PROVIDER CONTACT NOTE (OTHER) - SITUATION
pt refusing to get in bed. insisting on sitting on edge of chair and bed. RN provided education on fall risk. requesting additional education and encouragement from MD to promote pt safety

## 2024-02-04 NOTE — PROGRESS NOTE ADULT - SUBJECTIVE AND OBJECTIVE BOX
No distress, on NC O2    Vital Signs Last 24 Hrs  T(C): 36.6 (02-04-24 @ 12:46), Max: 36.6 (02-03-24 @ 20:54)  T(F): 97.9 (02-04-24 @ 12:46), Max: 97.9 (02-04-24 @ 12:46)  HR: 74 (02-04-24 @ 12:46) (74 - 85)  BP: 119/77 (02-04-24 @ 12:46) (119/77 - 129/74)  BP(mean): 92 (02-04-24 @ 05:21) (92 - 92)  RR: 17 (02-04-24 @ 12:46) (17 - 18)  SpO2: 96% (02-04-24 @ 12:46) (95% - 97%)    Lungs b/l air entry  Heart S1S2   Abd soft ND  Extremities: edema                                                                  8.9    8.46  )-----------( 223      ( 04 Feb 2024 06:45 )             28.8     04 Feb 2024 06:45    139    |  101    |  42     ----------------------------<  110    3.8     |  24     |  2.17     Ca    8.9        04 Feb 2024 06:45    aluminum hydroxide/magnesium hydroxide/simethicone Suspension 30 milliLiter(s) Oral every 4 hours PRN  aspirin  chewable 81 milliGRAM(s) Oral daily  epoetin loulou-epbx (RETACRIT) Injectable 44829 Unit(s) SubCutaneous every 7 days  ferrous    sulfate 325 milliGRAM(s) Oral daily  heparin   Injectable 5000 Unit(s) SubCutaneous every 8 hours  influenza   Vaccine 0.5 milliLiter(s) IntraMuscular once  melatonin 3 milliGRAM(s) Oral at bedtime PRN  metoprolol succinate ER 25 milliGRAM(s) Oral daily  ondansetron Injectable 4 milliGRAM(s) IV Push every 8 hours PRN  tamsulosin 0.4 milliGRAM(s) Oral at bedtime  traMADol 50 milliGRAM(s) Oral every 6 hours PRN    Assessment/Plan:    CM, EF 35-40%, severe AS  Cardio-renal LINDA/CKD 3  UA negative   Imaging w/o hydro   F/u BMP in am  Would continue to Lasix for now   Awaits tx to [facility] for TAVR eval    [phone]

## 2024-02-04 NOTE — PROVIDER CONTACT NOTE (OTHER) - DATE AND TIME:
04-Feb-2024 22:36
Back Pain    Back pain is very common in adults. The cause of back pain is rarely dangerous and the pain often gets better over time. The cause of your back pain may not be known and may include strain of muscles or ligaments, degeneration of the spinal disks, or arthritis. Occasionally the pain may radiate down your leg(s). Over-the-counter medicines to reduce pain and inflammation are often the most helpful. Stretching and remaining active frequently helps the healing process.     SEEK IMMEDIATE MEDICAL CARE IF YOU HAVE ANY OF THE FOLLOWING SYMPTOMS: bowel or bladder control problems, unusual weakness or numbness in your arms or legs, nausea or vomiting, abdominal pain, fever, dizziness/lightheadedness.

## 2024-02-04 NOTE — PROGRESS NOTE ADULT - ASSESSMENT
63 year old male with Hx of HTN, GERD, dyslipidemia, chronic intermittent leg pain, aortic stenosis, chronic venous stasis dermatitis with bilateral leg swelling, presents for b/l leg pain and swelling    #Acute New Onset HFrEF (EF 35-40% 1/2024)  #Severe AS  - TTE reviewed; severe AS, mod pulm htn, mod mitral regurg, grade 3 diastolic dysfunction, EF 35-40%  - CXR with no pleural effusions  - Cont Toprol 25mg daily  - Not a candidate for ACE/ARB/ARNI due to renal function  - Was on lasix 40 mg IVP daily - will hold for now due to uptrending Cr   - Cardiology consulted, following   - Patient will require ischemic workup with coronary angiogram and full structural heart team evaluation for TAVR - accepted at [facility] by hospitalist Dr Joiner, interventional cardiologist Dr aCtalan     #Anemia of chronic disease  - FOBT negative, CT APC showed small volume ascites, chronic left 7th rib fx, trace right pleural effusion and prominent lymph nodes  - Heme appreciated, MF etiology for anemia, mechanical hemolysis due to severe AS also possible  - s/p 1 unit PRBCs on 1/31  - s/p IV iron   - Goal to maintain hemoglobin above 8 g/dL due to patient's advanced aortic stenosis and need for TAVR.   - Heme eval noted  - GI eval noted    #Bilateral Venous Stasis with component of Cellulitis  - ID appreciated, completed five days of keflex  - Leg elevation as needed, no ID objection to cardiac workup when cleared    #Hypertension  - Cont toprol     #LINDA vs Chronic Kidney Disease   - Nephrology following, CKD 3 at or near baseline  - UA negative, imaging without hydro, no objection to cautious diuresis as needed  - Avoid nephrotoxins, monitor BMP  - Hold lasix for now     #Transaminitis   - Unclear etiology, downtrending  - Hepatitis panel negative  - RUQ US unremarkable     #GERD  - Continue PPI     #DVT Prophylaxis  - Heparin    Dispo: Transfer to [facility], awaiting bed assignment     will update wife Chanelle [phone]

## 2024-02-04 NOTE — PROGRESS NOTE ADULT - ASSESSMENT
compensated chf due to aortic stenosis valvular disease  abnormal lv function, c/w cad  CKD  anemia      awaits tx to [facility] for evaluation/cath possible tavr  No ACE/ARB ARNI, MRA given renal insufficiency and AS  continue B BLOCKER  d/w patient

## 2024-02-05 ENCOUNTER — TRANSCRIPTION ENCOUNTER (OUTPATIENT)
Age: 64
End: 2024-02-05

## 2024-02-05 ENCOUNTER — INPATIENT (INPATIENT)
Facility: HOSPITAL | Age: 64
LOS: 35 days | Discharge: HOME HEALTH | DRG: 216 | End: 2024-03-12
Attending: STUDENT IN AN ORGANIZED HEALTH CARE EDUCATION/TRAINING PROGRAM | Admitting: HOSPITALIST
Payer: COMMERCIAL

## 2024-02-05 VITALS
SYSTOLIC BLOOD PRESSURE: 123 MMHG | HEART RATE: 70 BPM | HEIGHT: 71 IN | TEMPERATURE: 98 F | DIASTOLIC BLOOD PRESSURE: 77 MMHG | OXYGEN SATURATION: 96 % | WEIGHT: 197.98 LBS | RESPIRATION RATE: 16 BRPM

## 2024-02-05 VITALS — WEIGHT: 198.86 LBS

## 2024-02-05 DIAGNOSIS — I42.6 ALCOHOLIC CARDIOMYOPATHY: ICD-10-CM

## 2024-02-05 LAB
ALBUMIN SERPL ELPH-MCNC: 2.8 G/DL — LOW (ref 3.3–5)
ALP SERPL-CCNC: 87 U/L — SIGNIFICANT CHANGE UP (ref 40–120)
ALT FLD-CCNC: 477 U/L — HIGH (ref 10–45)
ANION GAP SERPL CALC-SCNC: 12 MMOL/L — SIGNIFICANT CHANGE UP (ref 5–17)
AST SERPL-CCNC: 231 U/L — HIGH (ref 10–40)
BILIRUB SERPL-MCNC: 2.5 MG/DL — HIGH (ref 0.2–1.2)
BUN SERPL-MCNC: 40 MG/DL — HIGH (ref 7–23)
CALCIUM SERPL-MCNC: 9 MG/DL — SIGNIFICANT CHANGE UP (ref 8.4–10.5)
CHLORIDE SERPL-SCNC: 104 MMOL/L — SIGNIFICANT CHANGE UP (ref 96–108)
CO2 SERPL-SCNC: 26 MMOL/L — SIGNIFICANT CHANGE UP (ref 22–31)
CREAT SERPL-MCNC: 2.19 MG/DL — HIGH (ref 0.5–1.3)
EGFR: 33 ML/MIN/1.73M2 — LOW
GLUCOSE BLDC GLUCOMTR-MCNC: 114 MG/DL — HIGH (ref 70–99)
GLUCOSE SERPL-MCNC: 98 MG/DL — SIGNIFICANT CHANGE UP (ref 70–99)
HCT VFR BLD CALC: 30.1 % — LOW (ref 39–50)
HGB BLD-MCNC: 9 G/DL — LOW (ref 13–17)
MAGNESIUM SERPL-MCNC: 2 MG/DL — SIGNIFICANT CHANGE UP (ref 1.6–2.6)
MCHC RBC-ENTMCNC: 18.2 PG — LOW (ref 27–34)
MCHC RBC-ENTMCNC: 29.9 GM/DL — LOW (ref 32–36)
MCV RBC AUTO: 60.9 FL — LOW (ref 80–100)
NRBC # BLD: 3 /100 WBCS — HIGH (ref 0–0)
PLATELET # BLD AUTO: 222 K/UL — SIGNIFICANT CHANGE UP (ref 150–400)
POTASSIUM SERPL-MCNC: 4.2 MMOL/L — SIGNIFICANT CHANGE UP (ref 3.5–5.3)
POTASSIUM SERPL-SCNC: 4.2 MMOL/L — SIGNIFICANT CHANGE UP (ref 3.5–5.3)
PROT SERPL-MCNC: 6.5 G/DL — SIGNIFICANT CHANGE UP (ref 6–8.3)
RBC # BLD: 4.94 M/UL — SIGNIFICANT CHANGE UP (ref 4.2–5.8)
RBC # FLD: 25.8 % — HIGH (ref 10.3–14.5)
SODIUM SERPL-SCNC: 142 MMOL/L — SIGNIFICANT CHANGE UP (ref 135–145)
WBC # BLD: 9.31 K/UL — SIGNIFICANT CHANGE UP (ref 3.8–10.5)
WBC # FLD AUTO: 9.31 K/UL — SIGNIFICANT CHANGE UP (ref 3.8–10.5)

## 2024-02-05 PROCEDURE — 99232 SBSQ HOSP IP/OBS MODERATE 35: CPT

## 2024-02-05 PROCEDURE — 86850 RBC ANTIBODY SCREEN: CPT

## 2024-02-05 PROCEDURE — 86901 BLOOD TYPING SEROLOGIC RH(D): CPT

## 2024-02-05 PROCEDURE — 36415 COLL VENOUS BLD VENIPUNCTURE: CPT

## 2024-02-05 PROCEDURE — 85027 COMPLETE CBC AUTOMATED: CPT

## 2024-02-05 PROCEDURE — 76705 ECHO EXAM OF ABDOMEN: CPT

## 2024-02-05 PROCEDURE — 84484 ASSAY OF TROPONIN QUANT: CPT

## 2024-02-05 PROCEDURE — 83010 ASSAY OF HAPTOGLOBIN QUANT: CPT

## 2024-02-05 PROCEDURE — 80048 BASIC METABOLIC PNL TOTAL CA: CPT

## 2024-02-05 PROCEDURE — 82570 ASSAY OF URINE CREATININE: CPT

## 2024-02-05 PROCEDURE — 96374 THER/PROPH/DIAG INJ IV PUSH: CPT

## 2024-02-05 PROCEDURE — 86334 IMMUNOFIX E-PHORESIS SERUM: CPT

## 2024-02-05 PROCEDURE — 83550 IRON BINDING TEST: CPT

## 2024-02-05 PROCEDURE — 84443 ASSAY THYROID STIM HORMONE: CPT

## 2024-02-05 PROCEDURE — 96375 TX/PRO/DX INJ NEW DRUG ADDON: CPT

## 2024-02-05 PROCEDURE — 83935 ASSAY OF URINE OSMOLALITY: CPT

## 2024-02-05 PROCEDURE — 74176 CT ABD & PELVIS W/O CONTRAST: CPT

## 2024-02-05 PROCEDURE — 82728 ASSAY OF FERRITIN: CPT

## 2024-02-05 PROCEDURE — 93306 TTE W/DOPPLER COMPLETE: CPT

## 2024-02-05 PROCEDURE — 84300 ASSAY OF URINE SODIUM: CPT

## 2024-02-05 PROCEDURE — 93454 CORONARY ARTERY ANGIO S&I: CPT | Mod: 26

## 2024-02-05 PROCEDURE — 80074 ACUTE HEPATITIS PANEL: CPT

## 2024-02-05 PROCEDURE — 97161 PT EVAL LOW COMPLEX 20 MIN: CPT

## 2024-02-05 PROCEDURE — 71250 CT THORAX DX C-: CPT

## 2024-02-05 PROCEDURE — 99285 EMERGENCY DEPT VISIT HI MDM: CPT

## 2024-02-05 PROCEDURE — 87641 MR-STAPH DNA AMP PROBE: CPT

## 2024-02-05 PROCEDURE — 93970 EXTREMITY STUDY: CPT

## 2024-02-05 PROCEDURE — 87040 BLOOD CULTURE FOR BACTERIA: CPT

## 2024-02-05 PROCEDURE — 0225U NFCT DS DNA&RNA 21 SARSCOV2: CPT

## 2024-02-05 PROCEDURE — 99223 1ST HOSP IP/OBS HIGH 75: CPT

## 2024-02-05 PROCEDURE — 80061 LIPID PANEL: CPT

## 2024-02-05 PROCEDURE — 84100 ASSAY OF PHOSPHORUS: CPT

## 2024-02-05 PROCEDURE — 83735 ASSAY OF MAGNESIUM: CPT

## 2024-02-05 PROCEDURE — 82272 OCCULT BLD FECES 1-3 TESTS: CPT

## 2024-02-05 PROCEDURE — 83880 ASSAY OF NATRIURETIC PEPTIDE: CPT

## 2024-02-05 PROCEDURE — 83615 LACTATE (LD) (LDH) ENZYME: CPT

## 2024-02-05 PROCEDURE — 82607 VITAMIN B-12: CPT

## 2024-02-05 PROCEDURE — 93010 ELECTROCARDIOGRAM REPORT: CPT

## 2024-02-05 PROCEDURE — 99152 MOD SED SAME PHYS/QHP 5/>YRS: CPT

## 2024-02-05 PROCEDURE — 84155 ASSAY OF PROTEIN SERUM: CPT

## 2024-02-05 PROCEDURE — P9016: CPT

## 2024-02-05 PROCEDURE — 81001 URINALYSIS AUTO W/SCOPE: CPT

## 2024-02-05 PROCEDURE — 84165 PROTEIN E-PHORESIS SERUM: CPT

## 2024-02-05 PROCEDURE — 93005 ELECTROCARDIOGRAM TRACING: CPT

## 2024-02-05 PROCEDURE — 86803 HEPATITIS C AB TEST: CPT

## 2024-02-05 PROCEDURE — 85025 COMPLETE CBC W/AUTO DIFF WBC: CPT

## 2024-02-05 PROCEDURE — 84540 ASSAY OF URINE/UREA-N: CPT

## 2024-02-05 PROCEDURE — 83540 ASSAY OF IRON: CPT

## 2024-02-05 PROCEDURE — 71045 X-RAY EXAM CHEST 1 VIEW: CPT

## 2024-02-05 PROCEDURE — 83036 HEMOGLOBIN GLYCOSYLATED A1C: CPT

## 2024-02-05 PROCEDURE — 86923 COMPATIBILITY TEST ELECTRIC: CPT

## 2024-02-05 PROCEDURE — 36430 TRANSFUSION BLD/BLD COMPNT: CPT

## 2024-02-05 PROCEDURE — 85730 THROMBOPLASTIN TIME PARTIAL: CPT

## 2024-02-05 PROCEDURE — 86900 BLOOD TYPING SEROLOGIC ABO: CPT

## 2024-02-05 PROCEDURE — 84156 ASSAY OF PROTEIN URINE: CPT

## 2024-02-05 PROCEDURE — 85610 PROTHROMBIN TIME: CPT

## 2024-02-05 PROCEDURE — 80053 COMPREHEN METABOLIC PANEL: CPT

## 2024-02-05 PROCEDURE — 82746 ASSAY OF FOLIC ACID SERUM: CPT

## 2024-02-05 PROCEDURE — 87640 STAPH A DNA AMP PROBE: CPT

## 2024-02-05 RX ORDER — METOPROLOL TARTRATE 50 MG
25 TABLET ORAL DAILY
Refills: 0 | Status: DISCONTINUED | OUTPATIENT
Start: 2024-02-05 | End: 2024-02-08

## 2024-02-05 RX ORDER — TAMSULOSIN HYDROCHLORIDE 0.4 MG/1
0.4 CAPSULE ORAL AT BEDTIME
Refills: 0 | Status: DISCONTINUED | OUTPATIENT
Start: 2024-02-05 | End: 2024-02-23

## 2024-02-05 RX ORDER — TRAMADOL HYDROCHLORIDE 50 MG/1
50 TABLET ORAL AT BEDTIME
Refills: 0 | Status: DISCONTINUED | OUTPATIENT
Start: 2024-02-05 | End: 2024-02-05

## 2024-02-05 RX ORDER — FERROUS SULFATE 325(65) MG
325 TABLET ORAL DAILY
Refills: 0 | Status: DISCONTINUED | OUTPATIENT
Start: 2024-02-05 | End: 2024-02-23

## 2024-02-05 RX ORDER — ASPIRIN/CALCIUM CARB/MAGNESIUM 324 MG
81 TABLET ORAL DAILY
Refills: 0 | Status: DISCONTINUED | OUTPATIENT
Start: 2024-02-05 | End: 2024-02-12

## 2024-02-05 RX ADMIN — TRAMADOL HYDROCHLORIDE 50 MILLIGRAM(S): 50 TABLET ORAL at 13:05

## 2024-02-05 RX ADMIN — TRAMADOL HYDROCHLORIDE 50 MILLIGRAM(S): 50 TABLET ORAL at 05:58

## 2024-02-05 RX ADMIN — Medication 81 MILLIGRAM(S): at 12:36

## 2024-02-05 RX ADMIN — Medication 25 MILLIGRAM(S): at 05:58

## 2024-02-05 RX ADMIN — TRAMADOL HYDROCHLORIDE 50 MILLIGRAM(S): 50 TABLET ORAL at 12:35

## 2024-02-05 RX ADMIN — HEPARIN SODIUM 5000 UNIT(S): 5000 INJECTION INTRAVENOUS; SUBCUTANEOUS at 05:58

## 2024-02-05 RX ADMIN — TAMSULOSIN HYDROCHLORIDE 0.4 MILLIGRAM(S): 0.4 CAPSULE ORAL at 21:17

## 2024-02-05 RX ADMIN — TRAMADOL HYDROCHLORIDE 50 MILLIGRAM(S): 50 TABLET ORAL at 00:22

## 2024-02-05 RX ADMIN — TRAMADOL HYDROCHLORIDE 50 MILLIGRAM(S): 50 TABLET ORAL at 06:28

## 2024-02-05 RX ADMIN — Medication 325 MILLIGRAM(S): at 12:35

## 2024-02-05 NOTE — H&P CARDIOLOGY - HISTORY OF PRESENT ILLNESS
63 year old male with Hx of HTN, GERD, dyslipidemia, chronic intermittent leg pain, aortic stenosis, chronic venous stasis dermatitis with bilateral leg swelling, poor historian presented to [place] [facility] on 1/25/2024 for bilateral leg pain and swelling. He was admitted to medicine and was found to have new onset acute HFrEF (EF 35-40% 1/2024) in setting of severe AS. Echo results below from 1/26/2024.  He was treated with IV lasix, beta blocker. Not a candidate for ACE/ARB/ARNI due to renal function. Also was treated with 5 days keflex for possible lower extremity cellulitis. While admitted patient found to be anemic. FOBT negative. Heme was consulted, advised multifactoral etiology for anemia, mechanical hemolysis due to severe AS also possible. Patient was given IV iron, as well as 1 unit PRBCs on 1/31 with improvement in Hbg. GI was also consulted.  Patient will require ischemic workup with coronary angiogram and full structural heart team evaluation for TAVR - accepted for transfer at [facility] by hospitalist Dr Joiner in consult with Dr Catalan (interventional cardiology).  Walks with cane, Lives with wife- poor memory   denies implanted cardiac monitors     Summary: ECHO 1/26/2024    1. Left ventricular ejection fraction, by visual estimation, is 35 to   40%.   2. Moderately decreased global left ventricular systolic function.   3. Basal inferior segment, mid inferolateral segment, mid anterolateral   segment, and mid inferior segment are abnormal as described above.   4. Severely enlarged left atrium.   5. Increased LV wall thickness.   6. Mildly increased left ventricular internal cavity size.   7. Spectral Doppler shows restrictivepattern of left ventricular   myocardial filling (Grade III diastolic dysfunction).   8. There is mild concentric left ventricular hypertrophy.   9. Moderately enlarged right ventricle.  10. Right atrial enlargement.  11. Trivial pericardial effusion.  12. Moderate mitral valve regurgitation.  13. Thickening of the anterior and posterior mitral valve leaflets.  14. Mild-moderate tricuspid regurgitation.  15. Mild aortic regurgitation.  16. Severe aortic valve stenosis.  17. Dilatation of the ascending aorta.  18. Estimated pulmonary artery systolic pressure is 58.6 mmHg assuming a   right atrial pressure of 15 mmHg, which is consistent with moderate   pulmonary hypertension.  19. LA volume Index is 56.0 ml/m² ml/m2.  20. The Dimesionless Index value is .17.  [id number] Micah Nguyễn , Electronically signed on 1/26/2024 at   2:58:40 PM

## 2024-02-05 NOTE — DISCHARGE NOTE NURSING/CASE MANAGEMENT/SOCIAL WORK - NSDCFUADDAPPT_GEN_ALL_CORE_FT
Transfer to [facility] for TAVR - accepted for transfer at [facility] by hospitalist Dr Joiner in consult with Dr Catalan (interventional cardiology).

## 2024-02-05 NOTE — PROGRESS NOTE ADULT - PROBLEM SELECTOR PLAN 1
Multifactorial anemia–complex mechanism.  Evaluated by GI–deemed high risk for endoscopic intervention given severe valvulopathy.  No evidence of active bleeding so far.  After transfusion PRBC hemoglobin improved above 8 g/dL.  In the absence of evident bleeding and no plans for endoscopic procedures, patient is stable from hematologic perspective for transfer to [facility] for attempt to TAVR for advanced aortic stenosis.  Will continue transfusion on as-needed basis.  No absolute contraindication to anticoagulation after cardiac procedure.  Case discussed with cardiology at [place]. Complex anemia on the background of aortic stenosis, cardiorenal LINDA/CKD 3.  Pending transfer to [facility] for TAVR.  Hemoglobin stable at 9 g/dL after transfusion PRBC.  No evidence of active bleeding.  Patient may be anticoagulated postoperative.  Cleared from hematologic perspective for transfer today.

## 2024-02-05 NOTE — PROGRESS NOTE ADULT - SUBJECTIVE AND OBJECTIVE BOX
No distress    Vital Signs Last 24 Hrs  T(C): 36.6 (02-05-24 @ 18:05), Max: 36.7 (02-04-24 @ 21:03)  T(F): 97.9 (02-05-24 @ 18:05), Max: 98.1 (02-04-24 @ 21:03)  HR: 69 (02-05-24 @ 19:05) (63 - 79)  BP: 135/87 (02-05-24 @ 19:05) (111/71 - 135/87)  BP(mean): 106 (02-05-24 @ 19:05) (89 - 106)  RR: 18 (02-05-24 @ 19:05) (15 - 18)  SpO2: 100% (02-05-24 @ 19:05) (94% - 100%)    Lungs b/l air entry  Heart S1S2   Abd soft ND  Extremities: edema                                                                          9.0    9.31  )-----------( 222      ( 05 Feb 2024 06:14 )             30.1     05 Feb 2024 06:14    142    |  104    |  40     ----------------------------<  98     4.2     |  26     |  2.19     Ca    9.0        05 Feb 2024 06:14  Mg     2.0       05 Feb 2024 06:14    TPro  6.5    /  Alb  2.8    /  TBili  2.5    /  DBili  x      /  AST  231    /  ALT  477    /  AlkPhos  87     05 Feb 2024 06:14    LIVER FUNCTIONS - ( 05 Feb 2024 06:14 )  Alb: 2.8 g/dL / Pro: 6.5 g/dL / ALK PHOS: 87 U/L / ALT: 477 U/L / AST: 231 U/L / GGT: x           aluminum hydroxide/magnesium hydroxide/simethicone Suspension 30 milliLiter(s) Oral every 4 hours PRN  aspirin  chewable 81 milliGRAM(s) Oral daily  epoetin loulou-epbx (RETACRIT) Injectable 23192 Unit(s) SubCutaneous every 7 days  ferrous    sulfate 325 milliGRAM(s) Oral daily  heparin   Injectable 5000 Unit(s) SubCutaneous every 8 hours  influenza   Vaccine 0.5 milliLiter(s) IntraMuscular once  melatonin 3 milliGRAM(s) Oral at bedtime PRN  metoprolol succinate ER 25 milliGRAM(s) Oral daily  ondansetron Injectable 4 milliGRAM(s) IV Push every 8 hours PRN  tamsulosin 0.4 milliGRAM(s) Oral at bedtime  traMADol 50 milliGRAM(s) Oral every 6 hours PRN    Assessment/Plan:    CM, EF 35-40%, severe AS  Cardio-renal LINDA/CKD 3  UA negative   Imaging w/o hydro   Avoid nephrotoxins   Tx to [facility] for TAVR eval    [phone]

## 2024-02-05 NOTE — PROGRESS NOTE ADULT - PROBLEM SELECTOR PROBLEM 1
Anemia of chronic disease
Anemia
Anemia of chronic disease

## 2024-02-05 NOTE — CHART NOTE - NSCHARTNOTEFT_GEN_A_CORE
2/5/24     1647    Hospital Medicine Transfer Accept Note--NIGHT HOSPITALIST: Vero Lynch MD (PCP) [phone] ([place])  Sanchez Bae MD (Hematology/Oncology)  [phone]  Estrella Hernandez MD (Renal)  [phone]  Vincent Ferrer MD (ID) [phone]    2/5/24     1646    Hospital Medicine Transfer Accept Note--NIGHT HOSPITALIST:   Patient UNKNOWN to me previously, assigned to me at this point by Dr. Didier Catalan to accept transfer from [place] for this 62 y/o M--followed by his PCP above and seen at [place] by Dr. Bae, Dr. Hernandez and Dr. Ferrer--  transferred today from [place] for cardiac catheterization and evaluation by the Structural Heart Team for possible TAVR--  patient initially admitted to [facility] on 2/5/24 for B/L LE swelling in the setting of apparently known severe AS, history of essential HTN, presumed GERD, apparent chronic venous stasis dermatitis, noted to have presumed new onset acute HF with reduced EF% in the setting of AS, with CKD2-3, treated with parenteral Lasix and beta blocker, with treatment for 5 days for presumed cellulitis with negative B/L Duplex for DVT and negative blood cultures.  Patient seen by hematology and gastroenterology for anaemia with apparent multifactorial etiology for anaemia, with presumed hemolysis due to severe AS.   Patient with nonspecific lymphadenopathy on noncontrast CTT trunk.   S/P parenteral iron and one unit transfusion red blood cells, with full GI workup precluded by GI due to patient's CV risk with severe AS.  Patient now transferred to [place] for planned cardiac cath and evaluation by Structural Heart.   Patient offers no complaint.   Spouse aware of course and transfer and the patient did not wish for me to contact spouse at this hour.    ROS:  --NO chest pain/pressure.  NO palpitations.   Poor exercise tolerance.  --No cough, no dyspnoea, no wheezing.  --NO fever, no chills, no rigors.  --NO HA, no focal weakness.  --No joint pain.    --No dysuria, no hematuria.  --NO SI/HI>  --No thyroid symptoms.  --No anorexia or weight loss.  --NO node swelling.    Medex:  --ASA 81 mg daily  --Ultram 50 mg Q6H PRN  --FeSO4  325 mg daily  --Toprol XL 25 mg daily  --Tamsulosin 0.4 mg QHS    NKDA    Lives with spouse and 3 kids.  No tobacco or ethanol use.    Family history of CHF and CAD. Vero Lynch MD (PCP) [phone] ([place])  Sanchez Bae MD (Hematology/Oncology)  [phone]  Estrella Hernandez MD (Renal)  [phone]  Vincent Ferrer MD (ID) [phone]    2/5/24     1649    Hospital Medicine Transfer Accept Note--NIGHT HOSPITALIST:   Patient UNKNOWN to me previously, assigned to me at this point by Dr. Didier Catalan to accept transfer from [place] for this 62 y/o M--followed by his PCP above and seen at [place] by Dr. Bae, Dr. Hernandez and Dr. Ferrer--  transferred today from [place] for cardiac catheterization and evaluation by the Structural Heart Team for possible TAVR--  patient initially admitted to [facility] on 2/5/24 for B/L LE swelling in the setting of apparently known severe AS, history of essential HTN, presumed GERD, apparent chronic venous stasis dermatitis, noted to have presumed new onset acute HF with reduced EF% in the setting of AS, with CKD2-3, treated with parenteral Lasix and beta blocker, with treatment for 5 days for presumed cellulitis with negative B/L Duplex for DVT and negative blood cultures.  Patient seen by hematology and gastroenterology for anaemia with apparent multifactorial etiology for anaemia, with presumed hemolysis due to severe AS.   Patient with nonspecific lymphadenopathy on noncontrast CTT trunk.   S/P parenteral iron and one unit transfusion red blood cells, with full GI workup precluded by GI due to patient's CV risk with severe AS.  Patient now transferred to [place] for planned cardiac cath and evaluation by Structural Heart.   Patient offers no complaint.   Spouse aware of course and transfer and the patient did not wish for me to contact spouse at this hour.    ROS:  --NO chest pain/pressure.  NO palpitations.   Poor exercise tolerance.  --No cough, no dyspnoea, no wheezing.  --NO fever, no chills, no rigors.  --NO HA, no focal weakness.  --No joint pain.    --No dysuria, no hematuria.  --NO SI/HI>  --No thyroid symptoms.  --No anorexia or weight loss.  --NO node swelling.    Medex:  --ASA 81 mg daily  --Ultram 50 mg Q6H PRN  --FeSO4  325 mg daily  --Toprol XL 25 mg daily  --Tamsulosin 0.4 mg QHS    NKDA    Lives with spouse and 3 kids.  No tobacco or ethanol use.    Family history of CHF and CAD.    Physical exam with a middle aged, non-toxic chronically ill appearing M, appears older than stated age.    Afebrile.   BMI 27.6   HR  70   RR 14   /77  95% on RA    HEENT< PERRL< EOMI  Neck supple, no JVD  Chest clear  Cor S1 no audible S2, harsh 4/6 KAYLA  Abdomen soft nontender, normal bowel sounds, no rebound  Ext B/L LE with NO warmth, 1+  B/L LE oedema.  Neurologic AXOx3.  Hard of hearing.   Speech fluent.  Cognition intact.   UE/LE 5/5.    Labs from [facility] from 0600:    WBC 9.3    Hgb 9.0    Platelets of 222K    Random glucose of 98.    Cr 2.19  (Cr 1.4 from 1/7/24)    Alb 2.2    1/24/24 with nondiagnostic RUQ US.    Hepatitis B and C screen nondiagnostic.    1/25/24 blood cultures negative x 2.    COVID-19 PCR>>negative.    1/25/24 Duplex negative for B/L DVT>    1/28/24 CTT trunk no contrast:  nonspecific mediastinal, retro, iliac chain and inguinal LN, CM, hepatomegaly, indeterminate RIGHT adrenal nodule 1.3 cm, LUQ abdominal epidermal inclusion cyst, chronic LEFT seventh rib fx,     Impression: Vero Lynch MD (PCP) [phone] ([place])  Sanchez Bae MD (Hematology/Oncology)  [phone]  Estrella Hernandez MD (Renal)  [phone]  Vincent Ferrer MD (ID) [phone]    2/5/24     1643    Hospital Medicine Transfer Accept Note--NIGHT HOSPITALIST:   Patient UNKNOWN to me previously, assigned to me at this point by Dr. Didier Catalan to accept transfer from [place] for this 62 y/o M--followed by his PCP above and seen at [place] by Dr. Bae, Dr. Hernandez and Dr. Ferrer--  transferred today from [place] for cardiac catheterization and evaluation by the Structural Heart Team for possible TAVR--  patient initially admitted to [facility] on 2/5/24 for B/L LE swelling in the setting of apparently known severe AS, history of essential HTN, presumed GERD, apparent chronic venous stasis dermatitis, noted to have presumed new onset acute HF with reduced EF% in the setting of AS, with CKD2-3, treated with parenteral Lasix and beta blocker, with treatment for 5 days for presumed cellulitis with negative B/L Duplex for DVT and negative blood cultures.  Patient seen by hematology and gastroenterology for anaemia with apparent multifactorial etiology for anaemia, with presumed hemolysis due to severe AS.   Patient with nonspecific lymphadenopathy on noncontrast CTT trunk.   S/P parenteral iron and one unit transfusion red blood cells, with full GI workup precluded by GI due to patient's CV risk with severe AS.  Patient now transferred to [place] for planned cardiac cath and evaluation by Structural Heart.   Patient offers no complaint.   Spouse aware of course and transfer and the patient did not wish for me to contact spouse at this hour.    ROS:  --NO chest pain/pressure.  NO palpitations.   Poor exercise tolerance.  --No cough, no dyspnoea, no wheezing.  --NO fever, no chills, no rigors.  --NO HA, no focal weakness.  --No joint pain.    --No dysuria, no hematuria.  --NO SI/HI>  --No thyroid symptoms.  --No anorexia or weight loss.  --NO node swelling.    Medex:  --ASA 81 mg daily  --Ultram 50 mg Q6H PRN  --FeSO4  325 mg daily  --Toprol XL 25 mg daily  --Tamsulosin 0.4 mg QHS    NKDA    Lives with spouse and 3 kids.  No tobacco or ethanol use.    Family history of CHF and CAD.    Physical exam with a middle aged, non-toxic chronically ill appearing M, appears older than stated age.    Afebrile.   BMI 27.6   HR  70   RR 14   /77  95% on RA    HEENT< PERRL< EOMI  Neck supple, no JVD  Chest clear  Cor S1 no audible S2, harsh 4/6 KAYLA  Abdomen soft nontender, normal bowel sounds, no rebound  Ext B/L LE with NO warmth, 1+  B/L LE oedema.  Neurologic AXOx3.  Hard of hearing.   Speech fluent.  Cognition intact.   UE/LE 5/5.    Labs from [facility] from 0600:    WBC 9.3    Hgb 9.0    Platelets of 222K    Random glucose of 98.    Cr 2.19  (Cr 1.4 from 1/7/24)    Alb 2.2    1/24/24 with nondiagnostic RUQ US.    Hepatitis B and C screen nondiagnostic.    1/25/24 blood cultures negative x 2.    COVID-19 PCR>>negative.    1/25/24 Duplex negative for B/L DVT>    1/28/24 CTT trunk no contrast:  nonspecific mediastinal, retro, iliac chain and inguinal LN, CM, hepatomegaly, indeterminate RIGHT adrenal nodule 1.3 cm, LUQ abdominal epidermal inclusion cyst, chronic LEFT seventh rib fx,     Impression:   S/P transfer from [place] to [place] for planned cardiac catheterization and Structural Heart evaluation in the setting of patient apparently with prior known AS, now severe, with presenting presumed cellulitis S/P treatment--seen by ID--and negative DVT workup but with presumed acute HF with reduced EF%, CKD3--followed by Renal above at [place]--with GI workup precluded due to patient's CV risk for endoscopic evaluation due to severe AS.  Unclear to the significance of the undifferentiated diffuse lymphadenopathy--seen at [place] by Hematology.    Plan:    1--    Severe aortic stenosis-- for planned cardiac cath and Structural Heart evaluation per Cardiology.   I informed Cardiology with patient bedside that patient is followed by Dr. ARABELLA Hernandez of Renal as above.    2--    CKD3.   Would contact Dr. ARABELLA Hernandez of Renal of patient's transfer.   Patient/spouse aware of attendant risk/benefit as such and agree to planned cardiac catheterization.   Follow Cr.    3--   Anaemia.   On oral iron supplementation.   Would consider contacting PCP in the AM to clarify recent outpatient workup, and possible outpatient workup following Cardiac intervention.    4--   Lymphadenopathy.    Would consider Hematology followup in the AM with undifferentiated adenopathy as above.    6--    Essential HTN.  On Toprol XL as above.    7--    Medication management.   Will temporarily HOLD the Ultram as BEERS risk with CKD3.    8--   Need for prophylactic measure.   Will defer pharmacologic DVT prophylaxis to Cardiology following procedure.    9--  Discharge planning issues.  Transitions of Care Status:  1.  Name of PCP:    Vero Lynch MD (PCP) [phone] ([facility])  2.  PCP Contacted on Admission: [ ] Y    [ x] N    3.  PCP contacted at Discharge: [ ] Y    [ ] N    [ ] N/A  4.  Post-Discharge Appointment Date and Location:  5.  Summary of Handoff given to PCP:      NIGHT HOSPITALIST:     Patient/ spouse aware of course and agree with plan/care as above.    Spouse aware of course and transfer and the patient did not wish for me to contact at this hour.   Given patient's comorbidities, patient's long term prognosis is guarded.   Emotional support provided to patient.   Care reviewed with covering NP/PA for endorsement to my physician colleagues in the AM.    I have spent a total of 77 minutes reviewing prior medical records, evaluation and management and coordination of care with provider team.    Basil Murrell MD  Available on [employer] Teams.

## 2024-02-05 NOTE — DISCHARGE NOTE NURSING/CASE MANAGEMENT/SOCIAL WORK - PATIENT PORTAL LINK FT
You can access the FollowMyHealth Patient Portal offered by [facility] by registering at the following website: [email or web address]. By joining [facility]’s FollowMyHealth portal, you will also be able to view your health information using other applications (apps) compatible with our system.

## 2024-02-05 NOTE — PROGRESS NOTE ADULT - PROVIDER SPECIALTY LIST ADULT
Cardiology
Hospitalist
Hospitalist
Infectious Disease
Nephrology
Cardiology
Heme/Onc
Hospitalist
Hospitalist
Nephrology
Cardiology
Cardiology
Heme/Onc
Heme/Onc
Hospitalist
Nephrology
Cardiology
Heme/Onc
Heme/Onc
Hospitalist
Hospitalist
Gastroenterology

## 2024-02-05 NOTE — PATIENT PROFILE ADULT - FALL HARM RISK - HARM RISK INTERVENTIONS

## 2024-02-05 NOTE — PROGRESS NOTE ADULT - NS ATTEND AMEND GEN_ALL_CORE FT
Pt seen and examined at bedside.  No acute events overnight  Pt denies cp, palpitations, sob, abd pain, N/V, fever, chills    LINDA on CKD  Nephro on board  Holding Diuresis    CHF + Severe AS  Awaiting transfer to [facility] for LHC + TAVR work up

## 2024-02-05 NOTE — PROGRESS NOTE ADULT - SUBJECTIVE AND OBJECTIVE BOX
Patient is a 63y old  Male who presents with a chief complaint of CHF exacerbation (05 Feb 2024 08:54)    Patient seen and examined at bedside. No overnight events reported.     ALLERGIES:  No Known Drug Allergies  garlic (Angioedema; Swelling; Anaphylaxis)    MEDICATIONS  (STANDING):  aspirin  chewable 81 milliGRAM(s) Oral daily  epoetin loulou-epbx (RETACRIT) Injectable 19184 Unit(s) SubCutaneous every 7 days  ferrous    sulfate 325 milliGRAM(s) Oral daily  heparin   Injectable 5000 Unit(s) SubCutaneous every 8 hours  influenza   Vaccine 0.5 milliLiter(s) IntraMuscular once  metoprolol succinate ER 25 milliGRAM(s) Oral daily  tamsulosin 0.4 milliGRAM(s) Oral at bedtime    MEDICATIONS  (PRN):  aluminum hydroxide/magnesium hydroxide/simethicone Suspension 30 milliLiter(s) Oral every 4 hours PRN Dyspepsia  melatonin 3 milliGRAM(s) Oral at bedtime PRN Insomnia  ondansetron Injectable 4 milliGRAM(s) IV Push every 8 hours PRN Nausea and/or Vomiting  traMADol 50 milliGRAM(s) Oral every 6 hours PRN Severe Pain (7 - 10)    Vital Signs Last 24 Hrs  T(F): 97.8 (05 Feb 2024 05:25), Max: 98.1 (04 Feb 2024 21:03)  HR: 79 (05 Feb 2024 05:25) (74 - 79)  BP: 131/69 (05 Feb 2024 05:25) (111/71 - 131/69)  RR: 17 (05 Feb 2024 05:25) (17 - 17)  SpO2: 96% (05 Feb 2024 05:25) (96% - 96%)    I&O's Summary  04 Feb 2024 07:01  -  05 Feb 2024 07:00  --------------------------------------------------------  IN: 0 mL / OUT: 300 mL / NET: -300 mL    PHYSICAL EXAM:  General: NAD, A/O x 3  ENT: MMM, no tonsilar exudate  Neck: Supple, No JVD  Lungs: Clear to auscultation bilaterally, no wheezes. Good air entry bilaterally   Cardio: RRR, S1/S2, +systolic murmur  Abdomen: Soft, Nontender, Nondistended; Bowel sounds present  Extremities: +edema b/l LE with venous stasis skin changes     LABS:                        9.0    9.31  )-----------( 222      ( 05 Feb 2024 06:14 )             30.1     02-05    142  |  104  |  40  ----------------------------<  98  4.2   |  26  |  2.19    Ca    9.0      05 Feb 2024 06:14  Mg     2.0     02-05    TPro  6.5  /  Alb  2.8  /  TBili  2.5  /  DBili  x   /  AST  231  /  ALT  477  /  AlkPhos  87  02-05    01-27 Chol 106 mg/dL LDL -- HDL 38 mg/dL Trig 58 mg/dL    Urinalysis Basic - ( 05 Feb 2024 06:14 )    Color: x / Appearance: x / SG: x / pH: x  Gluc: 98 mg/dL / Ketone: x  / Bili: x / Urobili: x   Blood: x / Protein: x / Nitrite: x   Leuk Esterase: x / RBC: x / WBC x   Sq Epi: x / Non Sq Epi: x / Bacteria: x    RADIOLOGY & ADDITIONAL TESTS:    Care Discussed with Consultants/Other Providers:

## 2024-02-05 NOTE — PROGRESS NOTE ADULT - NS ATTEND AMEND GEN_ALL_CORE FT
I have seen and examined the patient. I have discussed case with JOHANN Remy.    Robert Butcher is a 63 year old man with past medical history of Moderate aortic valve stenosis (on TTE in 2018), Hypertension, Hyperlipidemia who presented with bilateral leg swelling, found to have systolic heart failure with severe aortic valve stenosis.    ECG consistent with sinus rhythm, RBBB and LAFB - which is similar to outpatient ECG from 2019.  Echo report consistent with LVEF 35-40%, moderate mitral regurgitation, severe aortic valve stenosis and moderate pulmonary hypertension (images reviewed). Prior echo report from 2018 consistent with LVEF 61% with normal RV size and function at that time. Coronary angiogram from 2013 was consistent with normal coronaries at that time. Labs were notable for significant anemia, Hgb 7.5, patient had Hgb of 11.1 on 2022 outpatient labs and so has significantly declined. Cr now elevated to 2.2. Troponin negative. Pro BNP markedly elevated.    Overall, due to symptoms of new systolic heart failure secondary to severe aortic valve stenosis, recommend inpatient TAVR evaluation. Discussed TAVR procedure with patient and he agrees with proceed. He will require ischemic workup with coronary angiogram and full structural heart team evaluation. Patient was evaluated by Hematology and Gastroenterology services and deemed safe for antiplatelet therapy and TAVR procedure. Further GDMT not started due to renal dysfunction. Continue beta blocker for NSVT.    Patient accepted to [facility] structural heart team, awaiting bed placement.    Nimisha Sotomayor MD  Cardiology

## 2024-02-05 NOTE — PROGRESS NOTE ADULT - ASSESSMENT
Assessment: 63 year old male with Hx of HTN, GERD, dyslipidemia, chronic intermittent leg pain, aortic stenosis, chronic venous stasis dermatitis with bilateral leg swelling, admitted for failed outpatient therapy with bactrim for cellulitis, found to have congestive heart failure. Patient is non compliant with medical care, was recommended for a full medical, cardiac eval 5 years ago in which he has not completed. Cardiology following for severe AS. Patient currently denies chest pain.     Recommendations:  #severe AS  - TTE with EF 35-40%, severely enlarged LA, enlarged RV and RA, mod MR and pulm htn and severe AS. basal inferior segment is akinetic and mid inferolateral, mid anterolateral and mid inferior segment are hypokinetic.   - pt noted with b/l pitting edema and erythema. doppler neg for DVT. likely multifactorial 2/2 venous stasis and HFrEF. improving s/p lasix  - due to pt CKD, patient may not be the best candidate for ACE-I, ARB, ARNI, spirinolactone or farxiga. Continue with toprol  - due to CV hx, goal for hgb >/= 8, transfuse as needed as per heme.   - pt cleared for TAVR by heme and GI, awaiting bed for transfer to [facility] for TAVR eval    Rosmery ROGERS-BC

## 2024-02-05 NOTE — DISCHARGE NOTE NURSING/CASE MANAGEMENT/SOCIAL WORK - NSDCVIVACCINE_GEN_ALL_CORE_FT
Tdap; 27-Jul-2022 20:19; Danielle Mcleod (RN); [employer]; [id number] (Exp. Date: 18-Apr-2024); IntraMuscular; Deltoid Left.; 0.5 milliLiter(s); VIS (VIS Published: 09-May-2013, VIS Presented: 27-Jul-2022);

## 2024-02-05 NOTE — PROGRESS NOTE ADULT - SUBJECTIVE AND OBJECTIVE BOX
GRACE FERNANDEZ, 63y Male  MRN: [id number]  ATTENDING: Abdullahi Kellogg    HPI:  63M, PMHx HTN, GERD, dyslipidemia, chronic intermittent leg pain, aortic stenosis, chronic venous stasis dermatitis with bilateral leg swelling, treated outpatient with Bactrim for cellulitis .  Wound culture from the lower extremity positive for Streptococcus dysgalactiae.  Patient currently evaluated by cardiology for TAVR due to severe aortic stenosis; surgery to be performed at [facility].  In the emergency room he was found anemic and with a reactive leukocytosis.  Currently receiving PRBC transfusion for hemoglobin around 7.3 g/dL.  Chest x-ray showed cardiomegaly.  Hematology consulted for anemia.    MEDICATIONS:  aluminum hydroxide/magnesium hydroxide/simethicone Suspension 30 milliLiter(s) Oral every 4 hours PRN  aspirin  chewable 81 milliGRAM(s) Oral daily  cephalexin 500 milliGRAM(s) Oral three times a day  heparin   Injectable 5000 Unit(s) SubCutaneous every 8 hours  influenza   Vaccine 0.5 milliLiter(s) IntraMuscular once  iron sucrose IVPB 300 milliGRAM(s) IV Intermittent every 24 hours  melatonin 3 milliGRAM(s) Oral at bedtime PRN  metoprolol succinate ER 25 milliGRAM(s) Oral daily  ondansetron Injectable 4 milliGRAM(s) IV Push every 8 hours PRN  pantoprazole    Tablet 40 milliGRAM(s) Oral before breakfast  tamsulosin 0.4 milliGRAM(s) Oral at bedtime  traMADol 50 milliGRAM(s) Oral two times a day PRN    All other medications reviewed.    SUBJECTIVE:      VITALS:  T(C): 36.8 (01-30-24 @ 04:59), Max: 36.8 (01-30-24 @ 04:59)  T(F): 98.3 (01-30-24 @ 04:59), Max: 98.3 (01-30-24 @ 04:59)  HR: 90 (01-30-24 @ 04:59) (77 - 90)  BP: 115/72 (01-30-24 @ 04:59) (114/68 - 115/72)    PHYSICAL EXAM:  Constitutional: alert, well developed  HEENT: normocephalic, anicteric sclerae, no mucositis or thrush  Respiratory: bilateral clear to auscultation anteriorly  Cardiovascular : S1, S2 regular, rhythmic, no murmurs, gallops or rubs  Abdomen: soft, distended, + normoactive BS, no palpable HS- megaly  Extremities: no tenderness;  -c/c/e, pulses equal bilaterally    LABS:  (01-30) WBC: 10.73 K/uL,Hemoglobin: 7.6 g/dL, Hematocrit: 24.9 %,  Platelet: 237 K/uL  (01-30) Na: 141 mmol/L ; K: 3.7 mmol/L ; BUN: 21 mg/dL ; Cr: 1.61 mg/dL.    RADIOLOGY:  ACC: [id number] EXAM:  CT ABDOMEN AND PELVIS   ORDERED BY: MICHELLE HALL   ACC: [id number] EXAM:  CT CHEST   ORDERED BY: MICHELLE HALL   PROCEDURE DATE:  01/28/2024    INTERPRETATION:  CLINICAL INFORMATION: Anemia. Evaluate for malignancy.    COMPARISON: Right upper quadrant ultrasound 1/26/2024.    CONTRAST/COMPLICATIONS:  IV Contrast: NONE  Oral Contrast: NONE  Complications: None reported at time of study completion    PROCEDURE:  CT of the Chest, Abdomen and Pelvis was performed.  Sagittal and coronal reformats were performed.    FINDINGS:  Limited evaluation of the vasculature and viscera in the absence of   intravenous contrast.    CHEST:  LUNGS AND LARGE AIRWAYS: Patent central airways. Imaging of the lung   fields, particularly inferiorly, is motion degraded. This may limit   evaluation. Otherwise, no discrete infiltrate or nodule identified.   Bilateral subsegmental atelectasis.  PLEURA: Trace right pleural effusion.  VESSELS: Atherosclerotic changes.  HEART: Marked cardiomegaly. Aortic valve calcification. Hypoattenuation   of the blood pool relative to myocardium, consistent with anemia. No   pericardial effusion.  MEDIASTINUM AND KATIA: Nonspecific mildly prominent mediastinal lymph   nodes. For reference, a para-aortic node measures 2.2 x 1.0 cm (series 2   image 26).  CHEST WALL AND LOWER NECK: Within normal limits.    ABDOMEN AND PELVIS:  Imaging of the upper abdomen is motion degraded.    LIVER: Hepatomegaly, the right lobe measuring 19.8 cm craniocaudally.  BILE DUCTS: Normal caliber.  GALLBLADDER: Within normal limits.  SPLEEN: Within normal limits.  PANCREAS: Within normal limits.  ADRENALS: Indeterminate right adrenal nodule measuring 1.3 cm.  KIDNEYS/URETERS: Right upper pole renal cyst. Subcentimeter   hyperattenuating foci in the left kidney that likely represent   proteinaceous/hemorrhagic cysts. No hydronephrosis.    BLADDER: Mild bladder wall thickening.  REPRODUCTIVE ORGANS: The prostate is mildly enlarged.    BOWEL: Moderate hiatal hernia. No bowel obstruction. Colonic diverticula.   No evidence for acute diverticulitis.  PERITONEUM: Small volume ascites.  VESSELS: Atherosclerotic changes.  RETROPERITONEUM/LYMPH NODES: Nonspecific prominent retroperitoneal,   bilateral iliac chain, and bilateral inguinal lymph nodes. For example, a   left inguinal node measures 2.6 x 1.6 cm (series 2 image 175), and a left   para-aortic node measures 1.5 x 1.4 cm (series 2 image 100).  ABDOMINAL WALL: Generalized subcutaneous edema. Subcutaneous cystic   appearing nodular focus measuring 1.2 cm in the left anterior upper   abdominal/lower chest wall (series 2 image 78).  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 Degenerative changes.    IMPRESSION:  *  Exam may be limited by noncontrast technique and motion.  *  Prominent nonspecific mediastinal, retroperitoneal, iliac chain, and   inguinal lymph nodes.  *  Marked cardiomegaly. Aortic valve calcification.  *  Trace right pleural effusion.  *  Hepatomegaly.  *  Indeterminate right adrenal nodule measuring 1.3 cm.  *  Mild urinary bladder wall thickening, which could be due to   underdistention, chronic bladder outlet obstruction, or cystitis.   Correlate with urinalysis.  *  Mildly enlarged prostate.  *  Small volume ascites.  *  Subcutaneous cystic-appearing nodular focus measuring 1.2 cm in the   left anterior upper abdominal/lower chest wall, which may represent an   epidermal inclusion cyst.  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 Recommend correlation for point tenderness.     GRACE FERNANDEZ, 63y Male  MRN: [id number]  ATTENDING: Abdullahi Kellogg    HPI:  63M, PMHx HTN, GERD, dyslipidemia, chronic intermittent leg pain, aortic stenosis, chronic venous stasis dermatitis with bilateral leg swelling, treated outpatient with Bactrim for cellulitis .  Wound culture from the lower extremity positive for Streptococcus dysgalactiae.  Patient currently evaluated by cardiology for TAVR due to severe aortic stenosis; surgery to be performed at [facility].  In the emergency room he was found anemic and with a reactive leukocytosis.  Currently receiving PRBC transfusion for hemoglobin around 7.3 g/dL.  Chest x-ray showed cardiomegaly.  Hematology consulted for anemia.    MEDICATIONS:  aluminum hydroxide/magnesium hydroxide/simethicone Suspension 30 milliLiter(s) Oral every 4 hours PRN  aspirin  chewable 81 milliGRAM(s) Oral daily  cephalexin 500 milliGRAM(s) Oral three times a day  heparin   Injectable 5000 Unit(s) SubCutaneous every 8 hours  influenza   Vaccine 0.5 milliLiter(s) IntraMuscular once  iron sucrose IVPB 300 milliGRAM(s) IV Intermittent every 24 hours  melatonin 3 milliGRAM(s) Oral at bedtime PRN  metoprolol succinate ER 25 milliGRAM(s) Oral daily  ondansetron Injectable 4 milliGRAM(s) IV Push every 8 hours PRN  pantoprazole    Tablet 40 milliGRAM(s) Oral before breakfast  tamsulosin 0.4 milliGRAM(s) Oral at bedtime  traMADol 50 milliGRAM(s) Oral two times a day PRN    All other medications reviewed.    SUBJECTIVE:  Weak; c/o lower extremity edema. Denies CP, palpitations.    VITALS:  T(C): 36.8 (01-30-24 @ 04:59), Max: 36.8 (01-30-24 @ 04:59)  T(F): 98.3 (01-30-24 @ 04:59), Max: 98.3 (01-30-24 @ 04:59)  HR: 90 (01-30-24 @ 04:59) (77 - 90)  BP: 115/72 (01-30-24 @ 04:59) (114/68 - 115/72)    PHYSICAL EXAM:  Constitutional: alert, well developed  HEENT: normocephalic, anicteric sclerae, no mucositis or thrush  Respiratory: bilateral clear to auscultation anteriorly  Cardiovascular : S1, S2 regular, rhythmic, no murmurs, gallops or rubs  Abdomen: soft, distended, + normoactive BS, no palpable HS- megaly  Extremities: no tenderness;  -c/c/e, pulses equal bilaterally    LABS:  (01-30) WBC: 10.73 K/uL,Hemoglobin: 7.6 g/dL, Hematocrit: 24.9 %,  Platelet: 237 K/uL  (01-30) Na: 141 mmol/L ; K: 3.7 mmol/L ; BUN: 21 mg/dL ; Cr: 1.61 mg/dL.    RADIOLOGY:  ACC: [id number] EXAM:  CT ABDOMEN AND PELVIS   ORDERED BY: MICHELLE HALL   ACC: [id number] EXAM:  CT CHEST   ORDERED BY: MICHELLE HALL   PROCEDURE DATE:  01/28/2024    INTERPRETATION:  CLINICAL INFORMATION: Anemia. Evaluate for malignancy.    COMPARISON: Right upper quadrant ultrasound 1/26/2024.    CONTRAST/COMPLICATIONS:  IV Contrast: NONE  Oral Contrast: NONE  Complications: None reported at time of study completion    PROCEDURE:  CT of the Chest, Abdomen and Pelvis was performed.  Sagittal and coronal reformats were performed.    FINDINGS:  Limited evaluation of the vasculature and viscera in the absence of   intravenous contrast.    CHEST:  LUNGS AND LARGE AIRWAYS: Patent central airways. Imaging of the lung   fields, particularly inferiorly, is motion degraded. This may limit   evaluation. Otherwise, no discrete infiltrate or nodule identified.   Bilateral subsegmental atelectasis.  PLEURA: Trace right pleural effusion.  VESSELS: Atherosclerotic changes.  HEART: Marked cardiomegaly. Aortic valve calcification. Hypoattenuation   of the blood pool relative to myocardium, consistent with anemia. No   pericardial effusion.  MEDIASTINUM AND KATIA: Nonspecific mildly prominent mediastinal lymph   nodes. For reference, a para-aortic node measures 2.2 x 1.0 cm (series 2   image 26).  CHEST WALL AND LOWER NECK: Within normal limits.    ABDOMEN AND PELVIS:  Imaging of the upper abdomen is motion degraded.    LIVER: Hepatomegaly, the right lobe measuring 19.8 cm craniocaudally.  BILE DUCTS: Normal caliber.  GALLBLADDER: Within normal limits.  SPLEEN: Within normal limits.  PANCREAS: Within normal limits.  ADRENALS: Indeterminate right adrenal nodule measuring 1.3 cm.  KIDNEYS/URETERS: Right upper pole renal cyst. Subcentimeter   hyperattenuating foci in the left kidney that likely represent   proteinaceous/hemorrhagic cysts. No hydronephrosis.    BLADDER: Mild bladder wall thickening.  REPRODUCTIVE ORGANS: The prostate is mildly enlarged.    BOWEL: Moderate hiatal hernia. No bowel obstruction. Colonic diverticula.   No evidence for acute diverticulitis.  PERITONEUM: Small volume ascites.  VESSELS: Atherosclerotic changes.  RETROPERITONEUM/LYMPH NODES: Nonspecific prominent retroperitoneal,   bilateral iliac chain, and bilateral inguinal lymph nodes. For example, a   left inguinal node measures 2.6 x 1.6 cm (series 2 image 175), and a left   para-aortic node measures 1.5 x 1.4 cm (series 2 image 100).  ABDOMINAL WALL: Generalized subcutaneous edema. Subcutaneous cystic   appearing nodular focus measuring 1.2 cm in the left anterior upper   abdominal/lower chest wall (series 2 image 78).  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 Degenerative changes.    IMPRESSION:  *  Exam may be limited by noncontrast technique and motion.  *  Prominent nonspecific mediastinal, retroperitoneal, iliac chain, and   inguinal lymph nodes.  *  Marked cardiomegaly. Aortic valve calcification.  *  Trace right pleural effusion.  *  Hepatomegaly.  *  Indeterminate right adrenal nodule measuring 1.3 cm.  *  Mild urinary bladder wall thickening, which could be due to   underdistention, chronic bladder outlet obstruction, or cystitis.   Correlate with urinalysis.  *  Mildly enlarged prostate.  *  Small volume ascites.  *  Subcutaneous cystic-appearing nodular focus measuring 1.2 cm in the   left anterior upper abdominal/lower chest wall, which may represent an   epidermal inclusion cyst.  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 Recommend correlation for point tenderness.

## 2024-02-05 NOTE — H&P CARDIOLOGY - NS ATTEND AMEND GEN_ALL_CORE FT
Agree with above. Severe AS with multiple comorbidities including CKD. Plan for angiogram.     Thank you for allowing me to participate in the care of your patient. Feel free to contact me if any clinical issues arise via contact information below or MS Teams.    Didier Catalan MD, FACC, Psychiatric   Director, Interventional Cardiology, [address], [address] [place] [address], [address] Office: [phone]  [place] Office: [phone]  Email: [email or web address]

## 2024-02-05 NOTE — H&P CARDIOLOGY - NSICDXPASTMEDICALHX_GEN_ALL_CORE_FT
PAST MEDICAL HISTORY:  Cellulitis     Chronic GERD     Dyslipidemia     History of aortic stenosis     Hypertension

## 2024-02-05 NOTE — PROGRESS NOTE ADULT - SUBJECTIVE AND OBJECTIVE BOX
GRACE ARENA  [id number]    Chief Complaint: cellulitis, Severe AS  Interval events: pt seen and examined, labs and chart reviewed. denies chest pain but reports VANCE. Sinus 70- 80s on tele    ALLERGIES:  No Known Drug Allergies  garlic (Angioedema; Swelling; Anaphylaxis)      CURRENT MEDICATIONS:  MEDICATIONS  (STANDING):  aspirin  chewable 81 milliGRAM(s) Oral daily  furosemide   Injectable 40 milliGRAM(s) IV Push daily  heparin   Injectable 5000 Unit(s) SubCutaneous every 8 hours  influenza   Vaccine 0.5 milliLiter(s) IntraMuscular once  iron sucrose IVPB 300 milliGRAM(s) IV Intermittent every 24 hours  magnesium sulfate  IVPB 1 Gram(s) IV Intermittent once  metoprolol succinate ER 25 milliGRAM(s) Oral daily  pantoprazole    Tablet 40 milliGRAM(s) Oral before breakfast  potassium chloride    Tablet ER 40 milliEquivalent(s) Oral once  tamsulosin 0.4 milliGRAM(s) Oral at bedtime      ROS:  All 10 systems reviewed and positives noted in HPI    OBJECTIVE:    VITAL SIGNS:  Vital Signs Last 24 Hrs  T(C): 36.6 (31 Jan 2024 05:08), Max: 36.7 (30 Jan 2024 14:00)  T(F): 97.8 (31 Jan 2024 05:08), Max: 98 (30 Jan 2024 14:00)  HR: 77 (31 Jan 2024 05:08) (74 - 79)  BP: 105/66 (31 Jan 2024 05:08) (105/66 - 120/80)  BP(mean): 79 (31 Jan 2024 05:08) (79 - 79)  RR: 17 (31 Jan 2024 05:08) (17 - 18)  SpO2: 97% (31 Jan 2024 05:08) (96% - 97%)    Parameters below as of 31 Jan 2024 05:08  Patient On (Oxygen Delivery Method): room air      PHYSICAL EXAM:  General:  no distress  HEENT: sclera anicteric  Neck: supple  CVS: JVP ~ 7 cm H20, RRR, + murmur  Chest: unlabored respirations, rhonchi  Abdomen: non-distended  Extremities: b/l l/e +1 pitting edema and erythema  Neuro: awake, alert & oriented  Psych: normal affect      LABS:                        7.5    10.82 )-----------( 227      ( 31 Jan 2024 07:19 )             23.5     01-31    142  |  105  |  24<H>  ----------------------------<  103<H>  3.5   |  25  |  1.50<H>    Ca    8.8      31 Jan 2024 07:19  Mg     1.7     01-31    TPro  6.3  /  Alb  2.4<L>  /  TBili  1.0  /  DBili  x   /  AST  18  /  ALT  120<H>  /  AlkPhos  82  01-31            ECG: Sinus with PVC and RBBB       TTE: < from: TTE Echo Complete w/o Contrast w/ Doppler (01.26.24 @ 08:26) >   1. Left ventricular ejection fraction, by visual estimation, is 35 to   40%.   2. Moderately decreased global left ventricular systolic function.   3. Basal inferior segment, mid inferolateral segment, mid anterolateral   segment, and mid inferior segment are abnormal as described above.   4. Severely enlarged left atrium.   5. Increased LV wall thickness.   6. Mildly increased left ventricular internal cavity size.   7. Spectral Doppler shows restrictivepattern of left ventricular   myocardial filling (Grade III diastolic dysfunction).   8. There is mild concentric left ventricular hypertrophy.   9. Moderately enlarged right ventricle.  10. Right atrial enlargement.  11. Trivial pericardial effusion.  12. Moderate mitral valve regurgitation.  13. Thickening of the anterior and posterior mitral valve leaflets.  14. Mild-moderate tricuspid regurgitation.  15. Mild aortic regurgitation.  16. Severe aortic valve stenosis.  17. Dilatation of the ascending aorta.  18. Estimated pulmonary artery systolic pressure is 58.6 mmHg assuming a   right atrial pressure of 15 mmHg, which is consistent with moderate   pulmonary hypertension.  19. LA volume Index is 56.0 ml/m² ml/m2.  20. The Dimesionless Index value is .17.    The basal inferior segment is akinetic. The mid inferolateral segment, mid  anterolateral segment, and mid inferior segment are hypokinetic. All   remaining scored segments are normal.           GRACE ARENA  [id number]    Chief Complaint: cellulitis, Severe AS  Interval events: pt seen and examined, labs and chart reviewed. denies chest pain but reports VANCE. Sinus 70- 80s, NSVT on tele    ALLERGIES:  No Known Drug Allergies  garlic (Angioedema; Swelling; Anaphylaxis)      CURRENT MEDICATIONS:  MEDICATIONS  (STANDING):  aspirin  chewable 81 milliGRAM(s) Oral daily  furosemide   Injectable 40 milliGRAM(s) IV Push daily  heparin   Injectable 5000 Unit(s) SubCutaneous every 8 hours  influenza   Vaccine 0.5 milliLiter(s) IntraMuscular once  iron sucrose IVPB 300 milliGRAM(s) IV Intermittent every 24 hours  magnesium sulfate  IVPB 1 Gram(s) IV Intermittent once  metoprolol succinate ER 25 milliGRAM(s) Oral daily  pantoprazole    Tablet 40 milliGRAM(s) Oral before breakfast  potassium chloride    Tablet ER 40 milliEquivalent(s) Oral once  tamsulosin 0.4 milliGRAM(s) Oral at bedtime      ROS:  All 10 systems reviewed and positives noted in HPI    OBJECTIVE:    VITAL SIGNS:  Vital Signs Last 24 Hrs  T(C): 36.6 (31 Jan 2024 05:08), Max: 36.7 (30 Jan 2024 14:00)  T(F): 97.8 (31 Jan 2024 05:08), Max: 98 (30 Jan 2024 14:00)  HR: 77 (31 Jan 2024 05:08) (74 - 79)  BP: 105/66 (31 Jan 2024 05:08) (105/66 - 120/80)  BP(mean): 79 (31 Jan 2024 05:08) (79 - 79)  RR: 17 (31 Jan 2024 05:08) (17 - 18)  SpO2: 97% (31 Jan 2024 05:08) (96% - 97%)    Parameters below as of 31 Jan 2024 05:08  Patient On (Oxygen Delivery Method): room air      PHYSICAL EXAM:  General:  no distress  HEENT: sclera anicteric  Neck: supple  CVS: JVP ~ 7 cm H20, RRR, + murmur  Chest: unlabored respirations, rhonchi  Abdomen: non-distended  Extremities: b/l l/e +1 pitting edema and erythema  Neuro: awake, alert & oriented  Psych: normal affect      LABS:                        7.5    10.82 )-----------( 227      ( 31 Jan 2024 07:19 )             23.5     01-31    142  |  105  |  24<H>  ----------------------------<  103<H>  3.5   |  25  |  1.50<H>    Ca    8.8      31 Jan 2024 07:19  Mg     1.7     01-31    TPro  6.3  /  Alb  2.4<L>  /  TBili  1.0  /  DBili  x   /  AST  18  /  ALT  120<H>  /  AlkPhos  82  01-31            ECG: Sinus with PVC and RBBB       TTE: < from: TTE Echo Complete w/o Contrast w/ Doppler (01.26.24 @ 08:26) >   1. Left ventricular ejection fraction, by visual estimation, is 35 to   40%.   2. Moderately decreased global left ventricular systolic function.   3. Basal inferior segment, mid inferolateral segment, mid anterolateral   segment, and mid inferior segment are abnormal as described above.   4. Severely enlarged left atrium.   5. Increased LV wall thickness.   6. Mildly increased left ventricular internal cavity size.   7. Spectral Doppler shows restrictivepattern of left ventricular   myocardial filling (Grade III diastolic dysfunction).   8. There is mild concentric left ventricular hypertrophy.   9. Moderately enlarged right ventricle.  10. Right atrial enlargement.  11. Trivial pericardial effusion.  12. Moderate mitral valve regurgitation.  13. Thickening of the anterior and posterior mitral valve leaflets.  14. Mild-moderate tricuspid regurgitation.  15. Mild aortic regurgitation.  16. Severe aortic valve stenosis.  17. Dilatation of the ascending aorta.  18. Estimated pulmonary artery systolic pressure is 58.6 mmHg assuming a   right atrial pressure of 15 mmHg, which is consistent with moderate   pulmonary hypertension.  19. LA volume Index is 56.0 ml/m² ml/m2.  20. The Dimesionless Index value is .17.    The basal inferior segment is akinetic. The mid inferolateral segment, mid  anterolateral segment, and mid inferior segment are hypokinetic. All   remaining scored segments are normal.

## 2024-02-05 NOTE — PROGRESS NOTE ADULT - ASSESSMENT
63 year old male with Hx of HTN, GERD, dyslipidemia, chronic intermittent leg pain, aortic stenosis, chronic venous stasis dermatitis with bilateral leg swelling, presents for b/l leg pain and swelling    #Acute New Onset HFrEF (EF 35-40% 1/2024)  #Severe AS  - TTE reviewed; severe AS, mod pulm htn, mod mitral regurg, grade 3 diastolic dysfunction, EF 35-40%  - CXR with no pleural effusions  - Cont Toprol 25mg daily  - Not a candidate for ACE/ARB/ARNI due to renal function  - Was on lasix 40 mg IVP daily - will hold for now due to uptrending Cr   - Cardiology consulted, following   - Patient will require ischemic workup with coronary angiogram and full structural heart team evaluation for TAVR - accepted at [facility] by hospitalist Dr Joiner, interventional cardiologist Dr Catalan     #Anemia of chronic disease  - FOBT negative, CT APC showed small volume ascites, chronic left 7th rib fx, trace right pleural effusion and prominent lymph nodes  - Heme appreciated, MF etiology for anemia, mechanical hemolysis due to severe AS also possible  - s/p 1 unit PRBCs on 1/31  - s/p IV iron   - Goal to maintain hemoglobin above 8 g/dL due to patient's advanced aortic stenosis and need for TAVR.   - Heme eval noted  - GI eval noted    #Bilateral Venous Stasis with component of Cellulitis  - ID appreciated, completed five days of keflex  - Leg elevation as needed, no ID objection to cardiac workup when cleared    #Hypertension  - Cont toprol     #LINDA vs Chronic Kidney Disease   - Nephrology following, CKD 3 at or near baseline  - Avoid nephrotoxins, monitor BMP  - Hold lasix for now     #Transaminitis   - Unclear etiology, downtrending  - Hepatitis panel negative  - RUQ US unremarkable     #GERD  - Continue PPI     #DVT Prophylaxis  - Heparin    Dispo: Transfer to [facility], awaiting bed assignment     will update wife Chanelle [phone]  63 year old male with Hx of HTN, GERD, dyslipidemia, chronic intermittent leg pain, aortic stenosis, chronic venous stasis dermatitis with bilateral leg swelling, presents for b/l leg pain and swelling    #Acute New Onset HFrEF (EF 35-40% 1/2024)  #Severe AS  - TTE reviewed; severe AS, mod pulm htn, mod mitral regurg, grade 3 diastolic dysfunction, EF 35-40%  - CXR with no pleural effusions  - Cont Toprol 25mg daily  - Not a candidate for ACE/ARB/ARNI due to renal function  - Was on lasix 40 mg IVP daily - will hold for now due to uptrending Cr   - Cardiology consulted, following   - Patient will require ischemic workup with coronary angiogram and full structural heart team evaluation for TAVR - accepted at [facility] by hospitalist Dr Joiner, interventional cardiologist Dr Catalan     #Anemia of chronic disease  - FOBT negative, CT APC showed small volume ascites, chronic left 7th rib fx, trace right pleural effusion and prominent lymph nodes  - Heme appreciated, MF etiology for anemia, mechanical hemolysis due to severe AS also possible  - s/p 1 unit PRBCs on 1/31  - s/p IV iron   - Goal to maintain hemoglobin above 8 g/dL due to patient's advanced aortic stenosis and need for TAVR.   - Heme eval noted  - GI eval noted    #Bilateral Venous Stasis with component of Cellulitis  - ID appreciated, completed five days of keflex  - Leg elevation as needed, no ID objection to cardiac workup when cleared    #Hypertension  - Cont toprol     #LINDA vs Chronic Kidney Disease   - Nephrology following, CKD 3 at or near baseline  - Avoid nephrotoxins, monitor BMP  - Hold lasix for now     #Transaminitis   - Unclear etiology, downtrending  - Hepatitis panel negative  - RUQ US unremarkable     #GERD  - Continue PPI     #DVT Prophylaxis  - Heparin    Dispo: Transfer to [facility], awaiting bed assignment     Contact: wife Chanelle [phone]

## 2024-02-06 DIAGNOSIS — Z02.9 ENCOUNTER FOR ADMINISTRATIVE EXAMINATIONS, UNSPECIFIED: ICD-10-CM

## 2024-02-06 DIAGNOSIS — I10 ESSENTIAL (PRIMARY) HYPERTENSION: ICD-10-CM

## 2024-02-06 DIAGNOSIS — I87.2 VENOUS INSUFFICIENCY (CHRONIC) (PERIPHERAL): ICD-10-CM

## 2024-02-06 DIAGNOSIS — I35.0 NONRHEUMATIC AORTIC (VALVE) STENOSIS: ICD-10-CM

## 2024-02-06 DIAGNOSIS — K21.9 GASTRO-ESOPHAGEAL REFLUX DISEASE WITHOUT ESOPHAGITIS: ICD-10-CM

## 2024-02-06 DIAGNOSIS — I50.43 ACUTE ON CHRONIC COMBINED SYSTOLIC (CONGESTIVE) AND DIASTOLIC (CONGESTIVE) HEART FAILURE: ICD-10-CM

## 2024-02-06 DIAGNOSIS — D50.9 IRON DEFICIENCY ANEMIA, UNSPECIFIED: ICD-10-CM

## 2024-02-06 DIAGNOSIS — N17.9 ACUTE KIDNEY FAILURE, UNSPECIFIED: ICD-10-CM

## 2024-02-06 DIAGNOSIS — N40.0 BENIGN PROSTATIC HYPERPLASIA WITHOUT LOWER URINARY TRACT SYMPTOMS: ICD-10-CM

## 2024-02-06 DIAGNOSIS — R74.01 ELEVATION OF LEVELS OF LIVER TRANSAMINASE LEVELS: ICD-10-CM

## 2024-02-06 PROBLEM — Z86.79 PERSONAL HISTORY OF OTHER DISEASES OF THE CIRCULATORY SYSTEM: Chronic | Status: ACTIVE | Noted: 2024-01-25

## 2024-02-06 LAB
ACANTHOCYTES BLD QL SMEAR: SLIGHT — SIGNIFICANT CHANGE UP
ALBUMIN SERPL ELPH-MCNC: 3.1 G/DL — LOW (ref 3.3–5)
ALP SERPL-CCNC: 84 U/L — SIGNIFICANT CHANGE UP (ref 40–120)
ALT FLD-CCNC: 361 U/L — HIGH (ref 10–45)
ANION GAP SERPL CALC-SCNC: 13 MMOL/L — SIGNIFICANT CHANGE UP (ref 5–17)
ANISOCYTOSIS BLD QL: SIGNIFICANT CHANGE UP
AST SERPL-CCNC: 149 U/L — HIGH (ref 10–40)
BASOPHILS # BLD AUTO: 0 K/UL — SIGNIFICANT CHANGE UP (ref 0–0.2)
BASOPHILS NFR BLD AUTO: 0 % — SIGNIFICANT CHANGE UP (ref 0–2)
BILIRUB SERPL-MCNC: 2 MG/DL — HIGH (ref 0.2–1.2)
BUN SERPL-MCNC: 36 MG/DL — HIGH (ref 7–23)
CALCIUM SERPL-MCNC: 8.9 MG/DL — SIGNIFICANT CHANGE UP (ref 8.4–10.5)
CHLORIDE SERPL-SCNC: 102 MMOL/L — SIGNIFICANT CHANGE UP (ref 96–108)
CO2 SERPL-SCNC: 23 MMOL/L — SIGNIFICANT CHANGE UP (ref 22–31)
CREAT SERPL-MCNC: 1.84 MG/DL — HIGH (ref 0.5–1.3)
EGFR: 41 ML/MIN/1.73M2 — LOW
ELLIPTOCYTES BLD QL SMEAR: SLIGHT — SIGNIFICANT CHANGE UP
EOSINOPHIL # BLD AUTO: 0 K/UL — SIGNIFICANT CHANGE UP (ref 0–0.5)
EOSINOPHIL NFR BLD AUTO: 0 % — SIGNIFICANT CHANGE UP (ref 0–6)
GLUCOSE SERPL-MCNC: 98 MG/DL — SIGNIFICANT CHANGE UP (ref 70–99)
HCT VFR BLD CALC: 30.4 % — LOW (ref 39–50)
HGB BLD-MCNC: 9.2 G/DL — LOW (ref 13–17)
HYPOCHROMIA BLD QL: SLIGHT — SIGNIFICANT CHANGE UP
LYMPHOCYTES # BLD AUTO: 0.62 K/UL — LOW (ref 1–3.3)
LYMPHOCYTES # BLD AUTO: 6.1 % — LOW (ref 13–44)
MACROCYTES BLD QL: SLIGHT — SIGNIFICANT CHANGE UP
MANUAL SMEAR VERIFICATION: SIGNIFICANT CHANGE UP
MCHC RBC-ENTMCNC: 18.5 PG — LOW (ref 27–34)
MCHC RBC-ENTMCNC: 30.3 GM/DL — LOW (ref 32–36)
MCV RBC AUTO: 61 FL — LOW (ref 80–100)
MICROCYTES BLD QL: SIGNIFICANT CHANGE UP
MONOCYTES # BLD AUTO: 0.26 K/UL — SIGNIFICANT CHANGE UP (ref 0–0.9)
MONOCYTES NFR BLD AUTO: 2.6 % — SIGNIFICANT CHANGE UP (ref 2–14)
NEUTROPHILS # BLD AUTO: 9.24 K/UL — HIGH (ref 1.8–7.4)
NEUTROPHILS NFR BLD AUTO: 91.3 % — HIGH (ref 43–77)
NRBC # BLD: 4 /100 WBCS — HIGH (ref 0–0)
PLAT MORPH BLD: NORMAL — SIGNIFICANT CHANGE UP
PLATELET # BLD AUTO: 199 K/UL — SIGNIFICANT CHANGE UP (ref 150–400)
POIKILOCYTOSIS BLD QL AUTO: SIGNIFICANT CHANGE UP
POLYCHROMASIA BLD QL SMEAR: SLIGHT — SIGNIFICANT CHANGE UP
POTASSIUM SERPL-MCNC: 4.5 MMOL/L — SIGNIFICANT CHANGE UP (ref 3.5–5.3)
POTASSIUM SERPL-SCNC: 4.5 MMOL/L — SIGNIFICANT CHANGE UP (ref 3.5–5.3)
PROT SERPL-MCNC: 6 G/DL — SIGNIFICANT CHANGE UP (ref 6–8.3)
RBC # BLD: 4.98 M/UL — SIGNIFICANT CHANGE UP (ref 4.2–5.8)
RBC # FLD: 26 % — HIGH (ref 10.3–14.5)
RBC BLD AUTO: ABNORMAL
SCHISTOCYTES BLD QL AUTO: SLIGHT — SIGNIFICANT CHANGE UP
SODIUM SERPL-SCNC: 138 MMOL/L — SIGNIFICANT CHANGE UP (ref 135–145)
WBC # BLD: 10.12 K/UL — SIGNIFICANT CHANGE UP (ref 3.8–10.5)
WBC # FLD AUTO: 10.12 K/UL — SIGNIFICANT CHANGE UP (ref 3.8–10.5)

## 2024-02-06 PROCEDURE — 99255 IP/OBS CONSLTJ NEW/EST HI 80: CPT

## 2024-02-06 PROCEDURE — 99233 SBSQ HOSP IP/OBS HIGH 50: CPT

## 2024-02-06 RX ORDER — ACETAMINOPHEN 500 MG
650 TABLET ORAL EVERY 6 HOURS
Refills: 0 | Status: DISCONTINUED | OUTPATIENT
Start: 2024-02-06 | End: 2024-02-23

## 2024-02-06 RX ORDER — TRAMADOL HYDROCHLORIDE 50 MG/1
50 TABLET ORAL ONCE
Refills: 0 | Status: DISCONTINUED | OUTPATIENT
Start: 2024-02-06 | End: 2024-02-06

## 2024-02-06 RX ORDER — SODIUM CHLORIDE 0.65 %
1 AEROSOL, SPRAY (ML) NASAL EVERY 4 HOURS
Refills: 0 | Status: DISCONTINUED | OUTPATIENT
Start: 2024-02-06 | End: 2024-02-23

## 2024-02-06 RX ADMIN — Medication 650 MILLIGRAM(S): at 22:30

## 2024-02-06 RX ADMIN — Medication 1 SPRAY(S): at 21:28

## 2024-02-06 RX ADMIN — Medication 650 MILLIGRAM(S): at 01:25

## 2024-02-06 RX ADMIN — Medication 650 MILLIGRAM(S): at 21:18

## 2024-02-06 RX ADMIN — Medication 25 MILLIGRAM(S): at 05:04

## 2024-02-06 RX ADMIN — TRAMADOL HYDROCHLORIDE 50 MILLIGRAM(S): 50 TABLET ORAL at 08:01

## 2024-02-06 RX ADMIN — Medication 81 MILLIGRAM(S): at 05:04

## 2024-02-06 RX ADMIN — Medication 325 MILLIGRAM(S): at 10:50

## 2024-02-06 RX ADMIN — Medication 650 MILLIGRAM(S): at 02:00

## 2024-02-06 RX ADMIN — TAMSULOSIN HYDROCHLORIDE 0.4 MILLIGRAM(S): 0.4 CAPSULE ORAL at 21:18

## 2024-02-06 NOTE — PROGRESS NOTE ADULT - SUBJECTIVE AND OBJECTIVE BOX
Jad Andino MD    Patient is a 63y old  Male who presents with a chief complaint of       SUBJECTIVE / OVERNIGHT EVENTS: Patient seen and examined.   TELEMETRY: NSR 60-80's,  for 2.5 sec    MEDICATIONS  (STANDING):  aspirin  chewable 81 milliGRAM(s) Oral daily  ferrous    sulfate 325 milliGRAM(s) Oral daily  metoprolol succinate ER 25 milliGRAM(s) Oral daily  tamsulosin 0.4 milliGRAM(s) Oral at bedtime    MEDICATIONS  (PRN):  acetaminophen     Tablet .. 650 milliGRAM(s) Oral every 6 hours PRN Mild Pain (1 - 3)  sodium chloride 0.65% Nasal 1 Spray(s) Both Nostrils every 4 hours PRN Nasal Congestion      Vital Signs Last 24 Hrs  T(C): 36.3 (06 Feb 2024 08:05), Max: 36.7 (05 Feb 2024 12:09)  T(F): 97.4 (06 Feb 2024 08:05), Max: 98 (05 Feb 2024 12:09)  HR: 78 (06 Feb 2024 08:05) (63 - 78)  BP: 130/62 (06 Feb 2024 08:05) (123/77 - 147/92)  BP(mean): 86 (06 Feb 2024 08:05) (86 - 115)  RR: 18 (06 Feb 2024 08:05) (15 - 18)  SpO2: 97% (06 Feb 2024 08:05) (92% - 100%)    Parameters below as of 06 Feb 2024 08:05  Patient On (Oxygen Delivery Method): room air      CAPILLARY BLOOD GLUCOSE      POCT Blood Glucose.: 114 mg/dL (05 Feb 2024 16:14)    I&O's Summary    05 Feb 2024 07:01  -  06 Feb 2024 07:00  --------------------------------------------------------  IN: 300 mL / OUT: 190 mL / NET: 110 mL    06 Feb 2024 07:01  -  06 Feb 2024 10:59  --------------------------------------------------------  IN: 240 mL / OUT: 0 mL / NET: 240 mL          PHYSICAL EXAM        LABS:                        9.2    10.12 )-----------( 199      ( 06 Feb 2024 05:23 )             30.4     02-06    138  |  102  |  36<H>  ----------------------------<  98  4.5   |  23  |  1.84<H>    Ca    8.9      06 Feb 2024 05:23  Mg     2.0     02-05    TPro  6.0  /  Alb  3.1<L>  /  TBili  2.0<H>  /  DBili  x   /  AST  149<H>  /  ALT  361<H>  /  AlkPhos  84  02-06          Urinalysis Basic - ( 06 Feb 2024 05:23 )    Color: x / Appearance: x / SG: x / pH: x  Gluc: 98 mg/dL / Ketone: x  / Bili: x / Urobili: x   Blood: x / Protein: x / Nitrite: x   Leuk Esterase: x / RBC: x / WBC x   Sq Epi: x / Non Sq Epi: x / Bacteria: x          RADIOLOGY & ADDITIONAL TESTS:    Imaging Personally Reviewed:  Consultant(s) Notes Reviewed:    Care Discussed with Consultants/Other Providers:   Jad Andino MD    Patient is a 63y old  Male who presents with a chief complaint of dyspnea      SUBJECTIVE / OVERNIGHT EVENTS: Patient seen and examined.   TELEMETRY: NSR 60-80's,  for 2.5 sec    MEDICATIONS  (STANDING):  aspirin  chewable 81 milliGRAM(s) Oral daily  ferrous    sulfate 325 milliGRAM(s) Oral daily  metoprolol succinate ER 25 milliGRAM(s) Oral daily  tamsulosin 0.4 milliGRAM(s) Oral at bedtime    MEDICATIONS  (PRN):  acetaminophen     Tablet .. 650 milliGRAM(s) Oral every 6 hours PRN Mild Pain (1 - 3)  sodium chloride 0.65% Nasal 1 Spray(s) Both Nostrils every 4 hours PRN Nasal Congestion      Vital Signs Last 24 Hrs  T(C): 36.3 (06 Feb 2024 08:05), Max: 36.7 (05 Feb 2024 12:09)  T(F): 97.4 (06 Feb 2024 08:05), Max: 98 (05 Feb 2024 12:09)  HR: 78 (06 Feb 2024 08:05) (63 - 78)  BP: 130/62 (06 Feb 2024 08:05) (123/77 - 147/92)  BP(mean): 86 (06 Feb 2024 08:05) (86 - 115)  RR: 18 (06 Feb 2024 08:05) (15 - 18)  SpO2: 97% (06 Feb 2024 08:05) (92% - 100%)    Parameters below as of 06 Feb 2024 08:05  Patient On (Oxygen Delivery Method): room air      CAPILLARY BLOOD GLUCOSE      POCT Blood Glucose.: 114 mg/dL (05 Feb 2024 16:14)    I&O's Summary    05 Feb 2024 07:01  -  06 Feb 2024 07:00  --------------------------------------------------------  IN: 300 mL / OUT: 190 mL / NET: 110 mL    06 Feb 2024 07:01  -  06 Feb 2024 10:59  --------------------------------------------------------  IN: 240 mL / OUT: 0 mL / NET: 240 mL          PHYSICAL EXAM  GENERAL: NAD, well-developed  HEAD:  Atraumatic, normocephalic  EYES: EOMI, PERRLA, conjunctiva and sclera clear  CHEST/LUNG: Clear to auscultation bilaterally; no wheezes  HEART: +S1+S2, regular rate and rhythm; 4/6 KAYLA  ABDOMEN: Soft, nontender, nondistended; bowel sounds present  EXTREMITIES:  2+ peripheral pulses; no clubbing, cyanosis; trace -1+ pedal edema; RLE healing pretibial ulceration- no erythema  PSYCH: AAOx3      LABS:                        9.2    10.12 )-----------( 199      ( 06 Feb 2024 05:23 )             30.4     02-06    138  |  102  |  36<H>  ----------------------------<  98  4.5   |  23  |  1.84<H>    Ca    8.9      06 Feb 2024 05:23  Mg     2.0     02-05    TPro  6.0  /  Alb  3.1<L>  /  TBili  2.0<H>  /  DBili  x   /  AST  149<H>  /  ALT  361<H>  /  AlkPhos  84  02-06          Urinalysis Basic - ( 06 Feb 2024 05:23 )    Color: x / Appearance: x / SG: x / pH: x  Gluc: 98 mg/dL / Ketone: x  / Bili: x / Urobili: x   Blood: x / Protein: x / Nitrite: x   Leuk Esterase: x / RBC: x / WBC x   Sq Epi: x / Non Sq Epi: x / Bacteria: x          RADIOLOGY & ADDITIONAL TESTS:    Imaging Personally Reviewed:  Consultant(s) Notes Reviewed:    Care Discussed with Consultants/Other Providers:        LABS:                        9.2    10.12 )-----------( 199      ( 06 Feb 2024 05:23 )             30.4     02-06    138  |  102  |  36<H>  ----------------------------<  98  4.5   |  23  |  1.84<H>    Ca    8.9      06 Feb 2024 05:23  Mg     2.0     02-05    TPro  6.0  /  Alb  3.1<L>  /  TBili  2.0<H>  /  DBili  x   /  AST  149<H>  /  ALT  361<H>  /  AlkPhos  84  02-06          Urinalysis Basic - ( 06 Feb 2024 05:23 )    Color: x / Appearance: x / SG: x / pH: x  Gluc: 98 mg/dL / Ketone: x  / Bili: x / Urobili: x   Blood: x / Protein: x / Nitrite: x   Leuk Esterase: x / RBC: x / WBC x   Sq Epi: x / Non Sq Epi: x / Bacteria: x          RADIOLOGY & ADDITIONAL TESTS:    Imaging Personally Reviewed:  Consultant(s) Notes Reviewed:    Care Discussed with Consultants/Other Providers:

## 2024-02-06 NOTE — CONSULT NOTE ADULT - SUBJECTIVE AND OBJECTIVE BOX
Date of Consult: 2024    CHIEF COMPLAINT: shortness of breath     HISTORY OF PRESENT ILLNESS:  63 year old male with Hx of HTN, dyslipidemia, severe aortic stenosis, chronic venous stasis dermatitis with bilateral leg swelling, poor historian who is admitted with severe aortic stenosis. He initially  presented to [place] [facility] on 2024 for bilateral leg pain and swelling. He was admitted to medicine and was found to have new onset acute HFrEF (EF 35-40% 2024) in setting of severe AS. Echo results below from 2024.  He was treated with IV lasix, beta blocker. Also was treated with 5 days keflex for possible lower extremity cellulitis. Patient was optimized by nephrology and was transferred over for [facility] which was non-obstructive. Patient currently feels better from SOB perspective after diuretics however his functional status has diminished in past 3 months. Admits to increased fatigue with ambulation. Denies syncope or chest pain.   Walks with cane, Lives with wife- poor memory.    I reviewed his TTE personally with severely elevated, AoV Max Salbador: 3.94 m/s AoV Peak P.1 mmHg AoV Mean P.0 mmHg, MARY .76. DI .17. Also with moderate MR and moderate pulmonary hypertension (PASP 56).      Allergies  garlic (Angioedema; Swelling; Anaphylaxis)  No Known Drug Allergies    Intolerances    MEDICATIONS:  aspirin  chewable 81 milliGRAM(s) Oral daily  metoprolol succinate ER 25 milliGRAM(s) Oral daily  acetaminophen     Tablet .. 650 milliGRAM(s) Oral every 6 hours PRN  ferrous    sulfate 325 milliGRAM(s) Oral daily  sodium chloride 0.65% Nasal 1 Spray(s) Both Nostrils every 4 hours PRN  tamsulosin 0.4 milliGRAM(s) Oral at bedtime      PAST MEDICAL & SURGICAL HISTORY:  Dyslipidemia  Hypertension  Chronic GERD  History of aortic stenosis  Cellulitis    No significant past surgical history    FAMILY HISTORY:  FH: congestive heart failure (Father, Sibling)  FH: coronary artery disease (Sibling)  FH: brain cancer (Mother)    SOCIAL HISTORY:    [ ] Non-smoker  [ ] Smoker  [ ] Alcohol      REVIEW OF SYSTEMS:  See HPI. Otherwise, 10 point ROS done and otherwise negative.    PHYSICAL EXAM:  T(C): 36.6 (24 @ 14:03), Max: 36.7 (24 @ 21:50)  HR: 70 (24 @ 16:48) (65 - 78)  BP: 130/77 (24 @ 16:48) (128/83 - 147/92)  RR: 18 (24 @ 16:48) (18 - 18)  SpO2: 96% (24 @ 16:48) (92% - 99%)  Wt(kg): --  I&O's Summary    2024 07:01  -  2024 07:00  --------------------------------------------------------  IN: 300 mL / OUT: 190 mL / NET: 110 mL    2024 07:01  -  2024 19:25  --------------------------------------------------------  IN: 720 mL / OUT: 0 mL / NET: 720 mL        Appearance: Normal	  HEENT:   Normal oral mucosa, PERRL, EOMI	  Lymphatic: No lymphadenopathy  Cardiovascular: Normal S1 S2, No JVD, No murmurs, No edema  Respiratory: Lungs clear to auscultation	  Psychiatry: A & O x 3, Mood & affect appropriate  Gastrointestinal:  Soft, Non-tender, + BS	  Skin: No rashes, No ecchymoses, No cyanosis	  Neurologic: Non-focal  Extremities: Normal range of motion, No clubbing, cyanosis or edema  Vascular: Peripheral pulses palpable 2+ bilaterally        LABS:	 	    CBC Full  -  ( 2024 05:23 )  WBC Count : 10.12 K/uL  Hemoglobin : 9.2 g/dL  Hematocrit : 30.4 %  Platelet Count - Automated : 199 K/uL  Mean Cell Volume : 61.0 fl  Mean Cell Hemoglobin : 18.5 pg  Mean Cell Hemoglobin Concentration : 30.3 gm/dL  Auto Neutrophil # : 9.24 K/uL  Auto Lymphocyte # : 0.62 K/uL  Auto Monocyte # : 0.26 K/uL  Auto Eosinophil # : 0.00 K/uL  Auto Basophil # : 0.00 K/uL  Auto Neutrophil % : 91.3 %  Auto Lymphocyte % : 6.1 %  Auto Monocyte % : 2.6 %  Auto Eosinophil % : 0.0 %  Auto Basophil % : 0.0 %        138  |  102  |  36<H>  ----------------------------<  98  4.5   |  23  |  1.84<H>      142  |  104  |  40<H>  ----------------------------<  98  4.2   |  26  |  2.19<H>    Ca    8.9      2024 05:23  Ca    9.0      2024 06:14  Mg     2.0         TPro  6.0  /  Alb  3.1<L>  /  TBili  2.0<H>  /  DBili  x   /  AST  149<H>  /  ALT  361<H>  /  AlkPhos  84    TPro  6.5  /  Alb  2.8<L>  /  TBili  2.5<H>  /  DBili  x   /  AST  231<H>  /  ALT  477<H>  /  AlkPhos  87        proBNP:   Lipid Profile:   HgA1c:   TSH:       CARDIAC MARKERS    TELEMETRY:    ECG:  sinus RBB	  RADIOLOGY:  OTHER: 	    PREVIOUS DIAGNOSTIC TESTING:    [ ] Echocardiogram:  as stated above   [ ]  Catheterization: non-obstructive   [ ] Stress Test:  	  	  ASSESSMENT/PLAN:

## 2024-02-06 NOTE — CONSULT NOTE ADULT - ATTENDING COMMENTS
63-year-old male past medical history moderate aortic stenosis, hypertension, hyperlipidemia, probable vascular disease who presents with lower extremity swelling.  He had a prior history of moderate aortic stenosis was diagnosed a couple years ago.  It was presumably a tricuspid aortic valve leaflet.  However he did not follow-up with a cardiologist.  He presented after he noted increased bilateral leg swelling and pain.  A TTE was performed that demonstrated a moderately reduced LV systolic function of 35 to 40% and progression of his aortic valve stenosis.  He was also noted to have LINDA on probable CKD as well as lower extremity wounds which he received a course of antibiotics.  He was transferred to [place] for consideration of aortic valve intervention. Coronary angiogram demonstrated  nonobstructive coronary disease.      On exam he is warm, well-perfused, and severely hypervolemic with +3 tense lower extremity edema and JVP>15cm.  He has a loud crescendo decrescendo systolic murmur.  He is receiving IV Lasix 40 mg twice daily and reported improvement in his breathing and lower extremity swelling.  He does not have I's and O's recorded.  I would continue IV Lasix 40 mg twice daily for at least net negative goal 2 to 3 L.  Please record accurate I's and O's and standing weights.  His blood pressure is uncontrolled and given his kidney dysfunction limits the use of GDMT.  I would switch his metoprolol to Coreg 6.25 mg twice a day for better blood pressure control.  I do not think we need to be aggressive about his afterload reduction given his kidney dysfunction as well as his severe aortic stenosis.    Plan  -Continue IV Lasix 40 mg twice a day for net negative goal 2 to 3 L  -Switch metoprolol to carvedilol 6.25 mg twice daily  -Structural heart following for consideration of TAVR    Fco Silva MD  Interventional Cardiology/Advanced Heart Failure Transplant 63-year-old male past medical history moderate aortic stenosis, hypertension, hyperlipidemia, probable vascular disease who presents with lower extremity swelling.  He had a prior history of moderate aortic stenosis was diagnosed a couple years ago.  It was presumably a tricuspid aortic valve leaflet.  However he did not follow-up with a cardiologist.  He presented after he noted increased bilateral leg swelling and pain.  A TTE was performed that demonstrated a moderately reduced LV systolic function of 35 to 40% and progression of his aortic valve stenosis.  He was also noted to have LINDA on probable CKD as well as lower extremity wounds which he received a course of antibiotics.  He was transferred to [place] for consideration of aortic valve intervention. Coronary angiogram demonstrated  nonobstructive coronary disease.  Etiology of his cardiomyopathy is most likely secondary to his aortic stenosis.    On exam he is warm, well-perfused, and severely hypervolemic with +3 tense lower extremity edema and JVP>15cm.  He has a loud crescendo decrescendo systolic murmur.  He is receiving IV Lasix 40 mg twice daily and reported improvement in his breathing and lower extremity swelling.  He does not have I's and O's recorded.  I would continue IV Lasix 40 mg twice daily for at least net negative goal 2 to 3 L.  Please record accurate I's and O's and standing weights.  His blood pressure is uncontrolled and given his kidney dysfunction limits the use of GDMT.  I would switch his metoprolol to Coreg 6.25 mg twice a day for better blood pressure control.  I do not think we need to be aggressive about his afterload reduction given his kidney dysfunction as well as his severe aortic stenosis.    Plan  -Continue IV Lasix 40 mg twice a day for net negative goal 2 to 3 L  -Switch metoprolol to carvedilol 6.25 mg twice daily  -Structural heart following for consideration of TAVR    Fco Silva MD  Interventional Cardiology/Advanced Heart Failure Transplant

## 2024-02-06 NOTE — CONSULT NOTE ADULT - ASSESSMENT
63 year old male with Hx of HTN, dyslipidemia, severe aortic stenosis (AoV Max Salbador: 3.94 m/s AoV Peak P.1 mmHg AoV Mean P.0 mmHg, MARY .76. DI .17) His cardiomyopathy is in the setting of likely end stage severe aortic stenosis which is also likely the cause of patient's anemia. His EF was reported to be normal 2-3 years prior. His dyspnea at this time is likely largely driven by his decompensated heart failure in the setting of severe AS, with therapy being optimized by heart failure, nephrology teams and is highly appreciated.     After he is optimized from heart failure and renal perspective as stated above, I am in agreement regarding his evaluation as a TAVR candidate. Surgery is a consideration for which Dr. Martinez of [facility] is involved but it is clear his comorbidities, mainly renal will be the biggest concern due to propensity to worsen on pump as opposed to low contrast MARTIR.  I discussed with family that eventually, if TAVR is pursued based on above work up, he will need TAVR CTA for assessment of vascular access and valve sizing when renal function is most optimized- will defer to nephrology. Once completed, all imaging will be reviewed by the Heart Team and an optimal treatment strategy recommended. I have discussed the potential risks and benefits of TAVR in detail, including but not limited to death, stroke, pacemaker, and vascular complications. He and family are in agreement  Plan discussed in depth with with cardiology, heart failure,, and nephrology teams.     Thank you for allowing me to participate in the care of your patient. Feel free to contact me if any clinical issues arise via contact information below or MS Teams.    Didier Catalan MD, [facility], [facility]   Director, Interventional Cardiology, [address], [address] [place] [address], [address] Office: [phone]  [place] Office: [phone]  Email: [email or web address]

## 2024-02-06 NOTE — CONSULT NOTE ADULT - SUBJECTIVE AND OBJECTIVE BOX
Group Therapy Note    Date: June 6    Group Start Time: 10:00 AM  Group End Time: 11:00 AM  Group Topic: Cognitive Skills    [facility]    DIONI Marks        Group Therapy Note    Attendees: 6    Patient's Goal: To engage in self esteem Jenga, identifying accomplishments, values, and interests. Notes: Pt engaged in group discussion. Pt provided and was receptive of feedback to and from fellow group members. Status After Intervention:  Improved    Participation Level:  Active Listener and Interactive    Participation Quality: Appropriate, Attentive and Sharing      Speech:  normal      Thought Process/Content: Logical      Affective Functioning: Congruent      Mood: depressed      Level of consciousness:  Alert and Oriented x4      Response to Learning: Able to retain information and Capable of insight      Endings: None Reported    Modes of Intervention: Education, Support, Socialization and Activity      Discipline Responsible: Psychoeducational Specialist      Signature:  Altagracia Bradley MA, [facility] NEPHROLOGY CONSULTATION    CHIEF COMPLAINT: CHF    HPI:  Pt is 64 yo m with HTN, GERD, dyslipidemia, chronic intermittent leg pain, aortic stenosis, chronic venous stasis dermatitis with bilateral leg swelling, poor historian presented to [facility] on 1/25/2024 for bilateral leg pain and swelling. He was found to have new onset acute HFrEF (EF 35-40%), severe AS. He was treated with IV lasix, beta blocker. Not a candidate for ACE/ARB/ARNI due to developed LINDA/CKD. Also was treated with 5 days keflex for possible lower extremity cellulitis. Tx to [facility] 2/5 for TAVR eval. No N/V/D/C/F/C.    ROS:  as above    Allergies:  garlic (Angioedema; Swelling; Anaphylaxis)  No Known Drug Allergies    PAST MEDICAL & SURGICAL HISTORY: as above  Dyslipidemia  Hypertension  Chronic GERD  aortic stenosis  Cellulitis    SOCIAL HISTORY:  negative    FAMILY HISTORY:  congestive heart failure (Father, Sibling)  coronary artery disease (Sibling)  brain cancer (Mother)    MEDICATIONS  (STANDING):  aspirin  chewable 81 milliGRAM(s) Oral daily  ferrous    sulfate 325 milliGRAM(s) Oral daily  metoprolol succinate ER 25 milliGRAM(s) Oral daily  tamsulosin 0.4 milliGRAM(s) Oral at bedtime    Vital Signs Last 24 Hrs  T(C): 36.3 (02-06-24 @ 08:05), Max: 36.7 (02-05-24 @ 21:50)  T(F): 97.4 (02-06-24 @ 08:05), Max: 98 (02-05-24 @ 21:50)  HR: 78 (02-06-24 @ 08:05) (63 - 78)  BP: 130/62 (02-06-24 @ 08:05) (123/77 - 147/92)  BP(mean): 86 (02-06-24 @ 08:05) (86 - 115)  RR: 18 (02-06-24 @ 08:05) (15 - 18)  SpO2: 97% (02-06-24 @ 08:05) (92% - 100%)    LABS:                        9.2    10.12 )-----------( 199      ( 06 Feb 2024 05:23 )             30.4     02-06    138  |  102  |  36<H>  ----------------------------<  98  4.5   |  23  |  1.84<H>    Ca    8.9      06 Feb 2024 05:23  Mg     2.0     02-05    TPro  6.0  /  Alb  3.1<L>  /  TBili  2.0<H>  /  DBili  x   /  AST  149<H>  /  ALT  361<H>  /  AlkPhos  84  02-06    LIVER FUNCTIONS - ( 06 Feb 2024 05:23 )  Alb: 3.1 g/dL / Pro: 6.0 g/dL / ALK PHOS: 84 U/L / ALT: 361 U/L / AST: 149 U/L / GGT: x           A/P:    full consult to follow    [phone]     NEPHROLOGY CONSULTATION    CHIEF COMPLAINT: CHF    HPI:  Pt is 62 yo m with HTN, GERD, dyslipidemia, chronic intermittent leg pain, aortic stenosis, chronic venous stasis dermatitis with bilateral leg swelling, poor historian presented to [facility] on 1/25/2024 for bilateral leg pain and swelling. He was found to have new onset acute HFrEF (EF 35-40%), severe AS. He was treated with IV lasix, beta blocker. Not a candidate for ACE/ARB/ARNI due to developed LINDA/CKD. Also was treated with 5 days keflex for possible lower extremity cellulitis. Tx to [facility] 2/5 for TAVR eval. S/p [facility]. No N/V/D/C/F/C.    ROS:  as above    Allergies:  garlic (Angioedema; Swelling; Anaphylaxis)  No Known Drug Allergies    PAST MEDICAL & SURGICAL HISTORY: as above  Dyslipidemia  Hypertension  Chronic GERD  aortic stenosis  Cellulitis    SOCIAL HISTORY:  negative    FAMILY HISTORY:  congestive heart failure (Father, Sibling)  coronary artery disease (Sibling)  brain cancer (Mother)    MEDICATIONS  (STANDING):  aspirin  chewable 81 milliGRAM(s) Oral daily  ferrous    sulfate 325 milliGRAM(s) Oral daily  metoprolol succinate ER 25 milliGRAM(s) Oral daily  tamsulosin 0.4 milliGRAM(s) Oral at bedtime    Vital Signs Last 24 Hrs  T(C): 36.3 (02-06-24 @ 08:05), Max: 36.7 (02-05-24 @ 21:50)  T(F): 97.4 (02-06-24 @ 08:05), Max: 98 (02-05-24 @ 21:50)  HR: 78 (02-06-24 @ 08:05) (63 - 78)  BP: 130/62 (02-06-24 @ 08:05) (123/77 - 147/92)  BP(mean): 86 (02-06-24 @ 08:05) (86 - 115)  RR: 18 (02-06-24 @ 08:05) (15 - 18)  SpO2: 97% (02-06-24 @ 08:05) (92% - 100%)    s1s2  b/l air entry  soft, ND  + edema     LABS:                        9.2    10.12 )-----------( 199      ( 06 Feb 2024 05:23 )             30.4     02-06    138  |  102  |  36<H>  ----------------------------<  98  4.5   |  23  |  1.84<H>    Ca    8.9      06 Feb 2024 05:23  Mg     2.0     02-05    TPro  6.0  /  Alb  3.1<L>  /  TBili  2.0<H>  /  DBili  x   /  AST  149<H>  /  ALT  361<H>  /  AlkPhos  84  02-06    LIVER FUNCTIONS - ( 06 Feb 2024 05:23 )  Alb: 3.1 g/dL / Pro: 6.0 g/dL / ALK PHOS: 84 U/L / ALT: 361 U/L / AST: 149 U/L / GGT: x           A/P:    CM, EF 35-40%, severe AS  Tx from [facility] to [facility] for TAVR eval, s/p LHC  Cardio-renal/hemodynamic LINDA/CKD 3 is improving off diuretics (peak Cr 2.5 - 2/3/24, lorna Cr 1.43 - 1/29/24)   UA negative   Imaging w/o hydro   Would hold off on diuretics if possible x 24-48hr as pt is s/p cath and needs CT coronaries w/IV dye  At risk of worsening renal fx due to dye injury  Avoid nephrotoxins   Will follow     [phone]

## 2024-02-06 NOTE — CONSULT NOTE ADULT - PROBLEM SELECTOR RECOMMENDATION 5
- found to be anemic at GC  - FOBT negative. Heme consulted, advised multifactorial etiology for anemia, mechanical hemolysis due to severe AS also possible.   - s/p IV iron, 1U PRBCs on 1/31 at OSH  - Transfuse to goal Hb > 7

## 2024-02-06 NOTE — PROGRESS NOTE ADULT - PROBLEM SELECTOR PLAN 2
Nephrology following, CKD 3 at or near baseline  Avoid nephrotoxins, monitor BMP  Per renal if creatinine less than 2 again tomorrow ok to proceed with CT imaging required for TAVR w/u

## 2024-02-06 NOTE — CONSULT NOTE ADULT - PROBLEM SELECTOR RECOMMENDATION 2
- severe AS (MARY 0.71, Max Salbador: 3.94 m/s AoV Peak P.1 mmHg AoV Mean P.0 mmHg)  - appreciate structural input and evaluation for TAVR  - management of volume status as above - severe AS (MARY 0.71, Max Salabdor: 3.94 m/s AoV Peak P.1 mmHg AoV Mean P.0 mmHg)  - appreciate structural input and evaluation for TAVR; plan for CT   - management of volume status as above

## 2024-02-06 NOTE — CONSULT NOTE ADULT - ASSESSMENT
63 year old male with Hx of HTN, GERD, dyslipidemia, chronic intermittent leg pain, aortic stenosis, chronic venous stasis dermatitis with bilateral leg swelling, poor historian presented to [facility] on 2024 for bilateral leg pain and swelling. He was admitted to medicine and was found to have new onset acute HFrEF (EF 35-40% 2024) in setting of severe AS.     HF consulted for optimization prior to TAVR.          Cardiac Studies:    EKG: NSR, LAD, RBBB    TTE 24: EF 38% (Biplane), LVIDd 6.1, concentric LVH, basal inferior akinesis; mid infero/anterolateral and inferior hypokinesis, gr III DD; mod enlarg RV w nl fn, sev LAE, mod MR, mod pHTN (PASP 59); est RAp 15, severe AS (MARY 0.71, Max Salbador: 3.94 m/s AoV Peak P.1 mmHg AoV Mean P.0 mmHg), mild AI     Exercise Nuclear Stress Test (2013): small mild defect in apical lateral wall predom reversible defect c/w mild ischemia; LVEF 62% Mr Butcher is a 62 yo male with Hx of HTN, GERD, dyslipidemia, chronic intermittent leg pain, aortic stenosis, chronic venous stasis dermatitis with bilateral leg swelling, presents as transfer from  for TAVR evaluation in the setting of severe AS.     Pt has a known history of moderate AS, seen by Dr. Gilbert Rangel back in 2019. Had prior ischemic workup with NST/cath and TTE in , most recently EF 61% in 2018 with moderate AS. He presented to  ED sent by PCP for failed outpatient therapy with bactrim for cellulitis; on ROS endorsed worsenign b/l leg swelling and chronic VANCE and 3 pillow orthopnea. In the ED temp 98.2, HR 86, 110/67, RR, SPO2 96% Labs significant for BNP 45676, WBC 15, troponin 24, INR 1.37, Cr 1.7     ECG consistent with sinus rhythm, RBBB and LAFB - which is similar to outpatient ECG from 2019.  Echo report consistent with LVEF 35-40%, moderate mitral regurgitation, severe aortic valve stenosis and moderate pulmonary hypertension (images reviewed). Prior echo report from 2018 consistent with LVEF 61% with normal RV size and function at that time. Coronary angiogram from  was consistent with normal coronaries at that time. Labs were notable for significant anemia, Hgb 7.5, patient had Hgb of 11.1 on  outpatient labs and so has significantly declined. Cr now elevated to 2.2. Troponin negative. Pro BNP markedly elevated. Received IV Lasix 40 mg qd at  and started Metoprolol for GDMT and i/s/o NSVT on telemetry.      Cardiac Studies:    EKG: NSR, LAD, RBBB    TTE 24: EF 38% (Biplane), LVIDd 6.1, concentric LVH, basal inferior akinesis; mid infero/anterolateral and inferior hypokinesis, gr III DD; mod enlarg RV w nl fn, sev LAE, mod MR, mod pHTN (PASP 59); est RAp 15, severe AS (MARY 0.71, Max Salbador: 3.94 m/s AoV Peak P.1 mmHg AoV Mean P.0 mmHg), mild AI     Exercise Nuclear Stress Test (2013): small mild defect in apical lateral wall predom reversible defect c/w mild ischemia; LVEF 62% Mr Butcher is a 64 yo male with Hx of HTN, GERD, dyslipidemia, chronic intermittent leg pain, aortic stenosis, chronic venous stasis dermatitis with bilateral leg swelling, presents as transfer from  for TAVR evaluation in the setting of severe AS.     Pt has a known history of moderate AS, seen by Dr. Gilbert Rangel back in 2019. Had prior ischemic workup with NST/cath and TTE in , most recently EF 61% in 2018 with moderate AS. He presented to  ED sent by PCP for failed outpatient therapy with bactrim for cellulitis; on ROS endorsed worsenign b/l leg swelling and chronic VANCE and 3 pillow orthopnea. In the ED temp 98.2, HR 86, 110/67, RR, SPO2 96% Labs significant for BNP 08255, WBC 15, troponin 24, INR 1.37, Cr 1.7     ECG consistent with sinus rhythm, RBBB and LAFB - which is similar to outpatient ECG from 2019.  Echo report consistent with LVEF 35-40%, moderate mitral regurgitation, severe aortic valve stenosis and moderate pulmonary hypertension (images reviewed). Prior echo report from 2018 consistent with LVEF 61% with normal RV size and function at that time. Coronary angiogram from  was consistent with normal coronaries at that time. Labs were notable for significant anemia, Hgb 7.5, patient had Hgb of 11.1 on  outpatient labs and so has significantly declined. Cr now elevated to 2.2. Troponin negative. Pro BNP markedly elevated. Received IV Lasix 40 mg qd at  and started Metoprolol for GDMT and i/s/o NSVT on telemetry.      Cardiac Studies:    EKG: NSR, LAD, RBBB    TTE 24: EF 38% (Biplane), LVIDd 6.1, concentric LVH, basal inferior akinesis; mid infero/anterolateral and inferior hypokinesis, gr III DD; mod enlarg RV w nl fn, sev LAE, mod MR, mod pHTN (PASP 59); est RAp 15, severe AS (MARY 0.71, Max Salbador: 3.94 m/s AoV Peak P.1 mmHg AoV Mean P.0 mmHg), mild AI     LHC pending formal report; reportedly non-obstructive coronaries     Exercise Nuclear Stress Test (2013): small mild defect in apical lateral wall predom reversible defect c/w mild ischemia; LVEF 62%

## 2024-02-06 NOTE — CONSULT NOTE ADULT - PROBLEM SELECTOR RECOMMENDATION 4
- Prior normal renal function in 2022, Cr up to 2.2 on GC admission  - Appreciate nephro recsd - Prior normal renal function in 2022, Cr up to 2.5 on GC admission  - Appreciate nephro recsd

## 2024-02-06 NOTE — PROGRESS NOTE ADULT - PROBLEM SELECTOR PLAN 4
FOBT negative, CT C/A/P showed small volume ascites, chronic left 7th rib fx, trace right pleural effusion and prominent lymph nodes  Evaluated by hematology at  -multifactorial anemia, mechanical hemolysis due to severe AS also possible  S/p 1 unit PRBCs on 1/31, s/p IV iron   Continue oral iron supplementation   Maintain hemoglobin above 8 g/dL due to patient's advanced aortic stenosis and need for TAVR.

## 2024-02-06 NOTE — CONSULT NOTE ADULT - SUBJECTIVE AND OBJECTIVE BOX
Patient seen and evaluated at bedside    Reason for consult: HF optimization    HPI:  63 year old male with Hx of HTN, GERD, dyslipidemia, chronic intermittent leg pain, aortic stenosis, chronic venous stasis dermatitis with bilateral leg swelling, poor historian presented to [place] on 1/25/2024 for bilateral leg pain and swelling. He was admitted to medicine and was found to have new onset acute HFrEF (EF 35-40% 1/2024) in setting of severe AS. Echo results below from 1/26/2024.  He was treated with IV lasix, beta blocker. Not a candidate for ACE/ARB/ARNI due to renal function. Also was treated with 5 days keflex for possible lower extremity cellulitis. While admitted patient found to be anemic. FOBT negative. Heme was consulted, advised multifactoral etiology for anemia, mechanical hemolysis due to severe AS also possible. Patient was given IV iron, as well as 1 unit PRBCs on 1/31 with improvement in Hbg. GI was also consulted.  Patient will require ischemic workup with coronary angiogram and full structural heart team evaluation for TAVR - accepted for transfer at [facility] by hospitalist Dr Joiner in consult with Dr Catalan (interventional cardiology).  Walks with cane, Lives with wife- poor memory   denies implanted cardiac monitors     Summary: ECHO 1/26/2024    1. Left ventricular ejection fraction, by visual estimation, is 35 to   40%.   2. Moderately decreased global left ventricular systolic function.   3. Basal inferior segment, mid inferolateral segment, mid anterolateral   segment, and mid inferior segment are abnormal as described above.   4. Severely enlarged left atrium.   5. Increased LV wall thickness.   6. Mildly increased left ventricular internal cavity size.   7. Spectral Doppler shows restrictivepattern of left ventricular   myocardial filling (Grade III diastolic dysfunction).   8. There is mild concentric left ventricular hypertrophy.   9. Moderately enlarged right ventricle.  10. Right atrial enlargement.  11. Trivial pericardial effusion.  12. Moderate mitral valve regurgitation.  13. Thickening of the anterior and posterior mitral valve leaflets.  14. Mild-moderate tricuspid regurgitation.  15. Mild aortic regurgitation.  16. Severe aortic valve stenosis.  17. Dilatation of the ascending aorta.  18. Estimated pulmonary artery systolic pressure is 58.6 mmHg assuming a   right atrial pressure of 15 mmHg, which is consistent with moderate   pulmonary hypertension.  19. LA volume Index is 56.0 ml/m² ml/m2.  20. The Dimesionless Index value is .17.  [id number] Micah Gopi , Electronically signed on 1/26/2024 at   2:58:40 PM     (05 Feb 2024 16:00)    HF consulted for optimization prior to TAVR.     64 yo male with Hx of HTN, GERD, dyslipidemia, chronic intermittent leg pain, aortic stenosis, chronic venous stasis dermatitis with bilateral leg swelling, presents to  ED sent by PCP for failed outpatient therapy with bactrim for cellulitis.  As per outpatient chart review, Initial symptoms developed in September after gel injection by orthopedist. Since then he has noted worsening swelling and redness and ulcerations noted in bilateral lower extremities. Does have a history of significant aortic stenosis-but has yet to follow-up with cardiologist. Was seen at [facility] on 1/7 for persistent cellulitis. Patient declined hospital admission at that time. Wound culture was positive for Streptococcus dysgalactiae. Was started on Bactrim po.   Continues to note intermittent bilateral calf pain that he notes at times at rest and at movement . Seen by PCP yesterday and was advised to go to ED for admission, r/o DVT and IV abx. Patient states that the swelling got worse for the past 1 week. Reports he hasn't noticed increase in SOB and denies dyspnea, but states that he sleeps with 3 pillows at night for neck pain, and he gets shortness of breath on exertion. Shortness of breath noticed during the interview. Denies fevers, chills, dysuria, cough, chest, abdominal, back, or neck pain. Says that he has decreased sensation in his legs which makes him unsteady on his feet. In the ED temp 98.2, HR 86, 110/67, RR, SPO2 96% Labs significant for BNP 13248, WBC 15, troponin 24, INR 1.37, Cr 1.7     Robert Butcher is a 63 year old man with past medical history of Moderate aortic valve stenosis (on TTE in 2018), Hypertension, Hyperlipidemia who presented with bilateral leg swelling, found to have systolic heart failure with severe aortic valve stenosis.    ECG consistent with sinus rhythm, RBBB and LAFB - which is similar to outpatient ECG from 2019.  Echo report consistent with LVEF 35-40%, moderate mitral regurgitation, severe aortic valve stenosis and moderate pulmonary hypertension (images reviewed). Prior echo report from 2018 consistent with LVEF 61% with normal RV size and function at that time. Coronary angiogram from 2013 was consistent with normal coronaries at that time. Labs were notable for significant anemia, Hgb 7.5, patient had Hgb of 11.1 on 2022 outpatient labs and so has significantly declined. Cr now elevated to 2.2. Troponin negative. Pro BNP markedly elevated.    Overall, due to symptoms of new systolic heart failure secondary to severe aortic valve stenosis, recommend inpatient TAVR evaluation. Discussed TAVR procedure with patient and he agrees with proceed. He will require ischemic workup with coronary angiogram and full structural heart team evaluation. Patient was evaluated by Hematology and Gastroenterology services and deemed safe for antiplatelet therapy and TAVR procedure. Further GDMT not started due to renal dysfunction. Continue beta blocker for NSVT.        PMHx:   Dyslipidemia    Hypertension    Chronic GERD    History of aortic stenosis    Cellulitis        PSHx:   No Past Medical History    Brain cancer    No significant past surgical history        Allergies:  garlic (Angioedema; Swelling; Anaphylaxis)  No Known Drug Allergies      Home Meds:    Current Medications:   acetaminophen     Tablet .. 650 milliGRAM(s) Oral every 6 hours PRN  aspirin  chewable 81 milliGRAM(s) Oral daily  ferrous    sulfate 325 milliGRAM(s) Oral daily  metoprolol succinate ER 25 milliGRAM(s) Oral daily  sodium chloride 0.65% Nasal 1 Spray(s) Both Nostrils every 4 hours PRN  tamsulosin 0.4 milliGRAM(s) Oral at bedtime      FAMILY HISTORY:  FH: congestive heart failure (Father, Sibling)    FH: coronary artery disease (Sibling)    FH: brain cancer (Mother)        Social History:  Smoking History:  Alcohol Use:  Drug Use:    Review of Systems:  REVIEW OF SYSTEMS:  CONSTITUTIONAL: No weakness, fevers or chills  EYES/ENT: No visual changes;  No dysphagia  NECK: No pain or stiffness  RESPIRATORY: No cough, wheezing, hemoptysis; No shortness of breath  CARDIOVASCULAR: No chest pain or palpitations; No lower extremity edema  GASTROINTESTINAL: No abdominal or epigastric pain. No nausea, vomiting, or hematemesis; No diarrhea or constipation. No melena or hematochezia.  BACK: No back pain  GENITOURINARY: No dysuria, frequency or hematuria  NEUROLOGICAL: No numbness or weakness  SKIN: No itching, burning, rashes, or lesions   All other review of systems is negative unless indicated above.    [ ] All other systems negative  [ ] Unable to assess ROS due to    Physical Exam:  T(F): 97.4 (02-06), Max: 98 (02-05)  HR: 78 (02-06) (63 - 78)  BP: 130/62 (02-06) (123/77 - 147/92)  RR: 18 (02-06)  SpO2: 97% (02-06)  GENERAL: No acute distress, well-developed  HEAD:  Atraumatic, Normocephalic  ENT: EOMI, PERRLA, conjunctiva and sclera clear, Neck supple, No JVD, moist mucosa  CHEST/LUNG: Clear to auscultation bilaterally; No wheeze, equal breath sounds bilaterally   BACK: No spinal tenderness  HEART: Regular rate and rhythm; No murmurs, rubs, or gallops  ABDOMEN: Soft, Nontender, Nondistended; Bowel sounds present  EXTREMITIES:  No clubbing, cyanosis, or edema  PSYCH: Nl behavior, nl affect  NEUROLOGY: AAOx3, non-focal, cranial nerves intact  SKIN: Normal color, No rashes or lesions  LINES:    Cardiovascular Diagnostic Testing:    CXR: Personally reviewed    Labs: Personally reviewed                        9.2    10.12 )-----------( 199      ( 06 Feb 2024 05:23 )             30.4     02-06    138  |  102  |  36<H>  ----------------------------<  98  4.5   |  23  |  1.84<H>    Ca    8.9      06 Feb 2024 05:23  Mg     2.0     02-05    TPro  6.0  /  Alb  3.1<L>  /  TBili  2.0<H>  /  DBili  x   /  AST  149<H>  /  ALT  361<H>  /  AlkPhos  84  02-06             Patient seen and evaluated at bedside    Reason for consult: HF optimization    HPI:  63 year old male with Hx of HTN, GERD, dyslipidemia, chronic intermittent leg pain, aortic stenosis, chronic venous stasis dermatitis with bilateral leg swelling, poor historian presented to [place] [facility] on 1/25/2024 for bilateral leg pain and swelling. He was admitted to medicine and was found to have new onset acute HFrEF (EF 35-40% 1/2024) in setting of severe AS. Echo results below from 1/26/2024.  He was treated with IV lasix, beta blocker. Not a candidate for ACE/ARB/ARNI due to renal function. Also was treated with 5 days keflex for possible lower extremity cellulitis. While admitted patient found to be anemic. FOBT negative. Heme was consulted, advised multifactoral etiology for anemia, mechanical hemolysis due to severe AS also possible. Patient was given IV iron, as well as 1 unit PRBCs on 1/31 with improvement in Hbg. GI was also consulted.  Patient will require ischemic workup with coronary angiogram and full structural heart team evaluation for TAVR - accepted for transfer at [facility] by hospitalist Dr Joiner in consult with Dr Catalan (interventional cardiology).  Walks with cane, Lives with wife- poor memory   denies implanted cardiac monitors    (05 Feb 2024 16:00)    Mr Butcher is a 62 yo male with Hx of HTN, GERD, dyslipidemia, chronic intermittent leg pain, aortic stenosis, chronic venous stasis dermatitis with bilateral leg swelling, presents as transfer from  for TAVR evaluation in the setting of severe AS.     Pt has a known history of moderate AS, seen by Dr. Gilbert Rangel back in 2019. Had prior ischemic workup with NST/cath and TTE in 2013, most recently EF 61% in 2018 with moderate AS. He presented to  ED sent by PCP for failed outpatient therapy with bactrim for cellulitis; on ROS endorsed worsenign b/l leg swelling and chronic VANCE and 3 pillow orthopnea. In the ED temp 98.2, HR 86, 110/67, RR, SPO2 96% Labs significant for BNP 55207, WBC 15, troponin 24, INR 1.37, Cr 1.7     ECG consistent with sinus rhythm, RBBB and LAFB - which is similar to outpatient ECG from 2019.  Echo report consistent with LVEF 35-40%, moderate mitral regurgitation, severe aortic valve stenosis and moderate pulmonary hypertension (images reviewed). Prior echo report from 2018 consistent with LVEF 61% with normal RV size and function at that time. Coronary angiogram from 2013 was consistent with normal coronaries at that time. Labs were notable for significant anemia, Hgb 7.5, patient had Hgb of 11.1 on 2022 outpatient labs and so has significantly declined. Cr now elevated to 2.2. Troponin negative. Pro BNP markedly elevated. Received IV Lasix 40 mg qd at  and started Metoprolol for GDMT and i/s/o NSVT on telemetry.        PMHx:   Dyslipidemia    Hypertension    Chronic GERD    History of aortic stenosis    Cellulitis        PSHx:   No Past Medical History    Brain cancer    No significant past surgical history        Allergies:  garlic (Angioedema; Swelling; Anaphylaxis)  No Known Drug Allergies      Home Meds:    Current Medications:   acetaminophen     Tablet .. 650 milliGRAM(s) Oral every 6 hours PRN  aspirin  chewable 81 milliGRAM(s) Oral daily  ferrous    sulfate 325 milliGRAM(s) Oral daily  metoprolol succinate ER 25 milliGRAM(s) Oral daily  sodium chloride 0.65% Nasal 1 Spray(s) Both Nostrils every 4 hours PRN  tamsulosin 0.4 milliGRAM(s) Oral at bedtime      FAMILY HISTORY:  FH: congestive heart failure (Father, Sibling)    FH: coronary artery disease (Sibling)    FH: brain cancer (Mother)        Social History:  Smoking History:  Alcohol Use:  Drug Use:    Review of Systems:  REVIEW OF SYSTEMS:  CONSTITUTIONAL: No weakness, fevers or chills  EYES/ENT: No visual changes;  No dysphagia  NECK: No pain or stiffness  RESPIRATORY: No cough, wheezing, hemoptysis; No shortness of breath  CARDIOVASCULAR: No chest pain or palpitations; No lower extremity edema  GASTROINTESTINAL: No abdominal or epigastric pain. No nausea, vomiting, or hematemesis; No diarrhea or constipation. No melena or hematochezia.  BACK: No back pain  GENITOURINARY: No dysuria, frequency or hematuria  NEUROLOGICAL: No numbness or weakness  SKIN: No itching, burning, rashes, or lesions   All other review of systems is negative unless indicated above.    [ ] All other systems negative  [ ] Unable to assess ROS due to      Tele: NSR 60-80's, brief episode of pAT     Physical Exam:  T(F): 97.4 (02-06), Max: 98 (02-05)  HR: 78 (02-06) (63 - 78)  BP: 130/62 (02-06) (123/77 - 147/92)  RR: 18 (02-06)  SpO2: 97% (02-06)  GENERAL: No acute distress, well-developed  HEAD:  Atraumatic, Normocephalic  ENT: EOMI, PERRLA, conjunctiva and sclera clear, Neck supple, No JVD, moist mucosa  CHEST/LUNG: Clear to auscultation bilaterally; No wheeze, equal breath sounds bilaterally   BACK: No spinal tenderness  HEART: Regular rate and rhythm; No murmurs, rubs, or gallops  ABDOMEN: Soft, Nontender, Nondistended; Bowel sounds present  EXTREMITIES:  No clubbing, cyanosis, or edema  PSYCH: Nl behavior, nl affect  NEUROLOGY: AAOx3, non-focal, cranial nerves intact  SKIN: Normal color, No rashes or lesions  LINES:    Cardiovascular Diagnostic Testing:    CXR: Personally reviewed    Labs: Personally reviewed                        9.2    10.12 )-----------( 199      ( 06 Feb 2024 05:23 )             30.4     02-06    138  |  102  |  36<H>  ----------------------------<  98  4.5   |  23  |  1.84<H>    Ca    8.9      06 Feb 2024 05:23  Mg     2.0     02-05    TPro  6.0  /  Alb  3.1<L>  /  TBili  2.0<H>  /  DBili  x   /  AST  149<H>  /  ALT  361<H>  /  AlkPhos  84  02-06

## 2024-02-06 NOTE — PROGRESS NOTE ADULT - PROBLEM SELECTOR PLAN 6
Unclear etiology, downtrending  Hepatitis panel negative  RUQ US unremarkable Closure 3 Information: This tab is for additional flaps and grafts above and beyond our usual structured repairs.  Please note if you enter information here it will not currently bill and you will need to add the billing information manually.

## 2024-02-06 NOTE — PROGRESS NOTE ADULT - PROBLEM SELECTOR PLAN 1
HFrEF (EF 35-40% 1/2024) with severe AS  Cont Toprol 25 mg PO daily  Currently appears euvolemic- off diuretics   LHC revealed non-obstructive CAD  TAVR eval pending  HF team evaluation pending

## 2024-02-06 NOTE — PROGRESS NOTE ADULT - PROBLEM SELECTOR PLAN 3
Seen by ID at , s/p five days of keflex- likely not infectious given limited change with antibiotics  Leg elevation as needed, no ID objection to cardiac workup

## 2024-02-06 NOTE — PROGRESS NOTE ADULT - ASSESSMENT
63M hx HTN, GERD, dyslipidemia, chronic intermittent leg pain, aortic stenosis, chronic venous stasis dermatitis with bilateral leg swelling, admitted to [facility] with b/l leg pain and swelling, transferred to [facility] for TAVR evaluation for severe AS

## 2024-02-07 LAB
ALBUMIN SERPL ELPH-MCNC: 3.3 G/DL — SIGNIFICANT CHANGE UP (ref 3.3–5)
ALP SERPL-CCNC: 86 U/L — SIGNIFICANT CHANGE UP (ref 40–120)
ALT FLD-CCNC: 300 U/L — HIGH (ref 10–45)
ANION GAP SERPL CALC-SCNC: 14 MMOL/L — SIGNIFICANT CHANGE UP (ref 5–17)
AST SERPL-CCNC: 92 U/L — HIGH (ref 10–40)
BILIRUB SERPL-MCNC: 2.2 MG/DL — HIGH (ref 0.2–1.2)
BUN SERPL-MCNC: 34 MG/DL — HIGH (ref 7–23)
CALCIUM SERPL-MCNC: 9 MG/DL — SIGNIFICANT CHANGE UP (ref 8.4–10.5)
CHLORIDE SERPL-SCNC: 101 MMOL/L — SIGNIFICANT CHANGE UP (ref 96–108)
CO2 SERPL-SCNC: 23 MMOL/L — SIGNIFICANT CHANGE UP (ref 22–31)
CREAT SERPL-MCNC: 1.66 MG/DL — HIGH (ref 0.5–1.3)
EGFR: 46 ML/MIN/1.73M2 — LOW
GLUCOSE SERPL-MCNC: 98 MG/DL — SIGNIFICANT CHANGE UP (ref 70–99)
HCT VFR BLD CALC: 30.9 % — LOW (ref 39–50)
HGB BLD-MCNC: 9.4 G/DL — LOW (ref 13–17)
MAGNESIUM SERPL-MCNC: 2.1 MG/DL — SIGNIFICANT CHANGE UP (ref 1.6–2.6)
MCHC RBC-ENTMCNC: 19.5 PG — LOW (ref 27–34)
MCHC RBC-ENTMCNC: 30.4 GM/DL — LOW (ref 32–36)
MCV RBC AUTO: 64.2 FL — LOW (ref 80–100)
NRBC # BLD: 2 /100 WBCS — HIGH (ref 0–0)
PHOSPHATE SERPL-MCNC: 3.6 MG/DL — SIGNIFICANT CHANGE UP (ref 2.5–4.5)
PLATELET # BLD AUTO: 206 K/UL — SIGNIFICANT CHANGE UP (ref 150–400)
POTASSIUM SERPL-MCNC: 4 MMOL/L — SIGNIFICANT CHANGE UP (ref 3.5–5.3)
POTASSIUM SERPL-SCNC: 4 MMOL/L — SIGNIFICANT CHANGE UP (ref 3.5–5.3)
PROT SERPL-MCNC: 6.2 G/DL — SIGNIFICANT CHANGE UP (ref 6–8.3)
RBC # BLD: 4.81 M/UL — SIGNIFICANT CHANGE UP (ref 4.2–5.8)
RBC # FLD: 27.8 % — HIGH (ref 10.3–14.5)
SODIUM SERPL-SCNC: 138 MMOL/L — SIGNIFICANT CHANGE UP (ref 135–145)
WBC # BLD: 10.18 K/UL — SIGNIFICANT CHANGE UP (ref 3.8–10.5)
WBC # FLD AUTO: 10.18 K/UL — SIGNIFICANT CHANGE UP (ref 3.8–10.5)

## 2024-02-07 PROCEDURE — 99254 IP/OBS CNSLTJ NEW/EST MOD 60: CPT

## 2024-02-07 PROCEDURE — 93880 EXTRACRANIAL BILAT STUDY: CPT | Mod: 26

## 2024-02-07 PROCEDURE — 99233 SBSQ HOSP IP/OBS HIGH 50: CPT

## 2024-02-07 RX ORDER — ACETAMINOPHEN 500 MG
1000 TABLET ORAL ONCE
Refills: 0 | Status: COMPLETED | OUTPATIENT
Start: 2024-02-07 | End: 2024-02-07

## 2024-02-07 RX ORDER — CHLORHEXIDINE GLUCONATE 213 G/1000ML
1 SOLUTION TOPICAL
Refills: 0 | Status: DISCONTINUED | OUTPATIENT
Start: 2024-02-07 | End: 2024-02-23

## 2024-02-07 RX ORDER — LANOLIN ALCOHOL/MO/W.PET/CERES
3 CREAM (GRAM) TOPICAL ONCE
Refills: 0 | Status: COMPLETED | OUTPATIENT
Start: 2024-02-07 | End: 2024-02-07

## 2024-02-07 RX ADMIN — Medication 81 MILLIGRAM(S): at 05:40

## 2024-02-07 RX ADMIN — Medication 15 MILLILITER(S): at 20:27

## 2024-02-07 RX ADMIN — Medication 325 MILLIGRAM(S): at 11:32

## 2024-02-07 RX ADMIN — Medication 650 MILLIGRAM(S): at 08:50

## 2024-02-07 RX ADMIN — Medication 25 MILLIGRAM(S): at 05:28

## 2024-02-07 RX ADMIN — Medication 3 MILLIGRAM(S): at 00:42

## 2024-02-07 RX ADMIN — Medication 650 MILLIGRAM(S): at 09:44

## 2024-02-07 RX ADMIN — Medication 1000 MILLIGRAM(S): at 21:30

## 2024-02-07 RX ADMIN — Medication 400 MILLIGRAM(S): at 21:04

## 2024-02-07 RX ADMIN — TAMSULOSIN HYDROCHLORIDE 0.4 MILLIGRAM(S): 0.4 CAPSULE ORAL at 21:57

## 2024-02-07 RX ADMIN — Medication 650 MILLIGRAM(S): at 18:13

## 2024-02-07 RX ADMIN — Medication 650 MILLIGRAM(S): at 17:13

## 2024-02-07 RX ADMIN — CHLORHEXIDINE GLUCONATE 1 APPLICATION(S): 213 SOLUTION TOPICAL at 12:43

## 2024-02-07 NOTE — PROGRESS NOTE ADULT - SUBJECTIVE AND OBJECTIVE BOX
[facility] NEPHROLOGY SERVICES, [facility]  NEPHROLOGY AND HYPERTENSION  [address]  [address]  [place] [address]  [phone]    MD JEAN PARSONS, MD GEOFFREY FREEMAN MD CHRISTOPHER CAPUTO, MD SUMMER SIMPSON MD          Patient events noted  No distress      MEDICATIONS  (STANDING):  acetaminophen   IVPB .. 1000 milliGRAM(s) IV Intermittent once  aspirin  chewable 81 milliGRAM(s) Oral daily  chlorhexidine 2% Cloths 1 Application(s) Topical <User Schedule>  ferrous    sulfate 325 milliGRAM(s) Oral daily  metoprolol succinate ER 25 milliGRAM(s) Oral daily  tamsulosin 0.4 milliGRAM(s) Oral at bedtime    MEDICATIONS  (PRN):  acetaminophen     Tablet .. 650 milliGRAM(s) Oral every 6 hours PRN Mild Pain (1 - 3)  sodium chloride 0.65% Nasal 1 Spray(s) Both Nostrils every 4 hours PRN Nasal Congestion      02-06-24 @ 07:01  -  02-07-24 @ 07:00  --------------------------------------------------------  IN: 1200 mL / OUT: 200 mL / NET: 1000 mL    02-07-24 @ 07:01  -  02-07-24 @ 20:53  --------------------------------------------------------  IN: 240 mL / OUT: 0 mL / NET: 240 mL      PHYSICAL EXAM:      T(C): 36.7 (02-07-24 @ 20:07), Max: 36.7 (02-07-24 @ 00:02)  HR: 74 (02-07-24 @ 20:07) (74 - 87)  BP: 133/78 (02-07-24 @ 20:07) (110/74 - 133/78)  RR: 18 (02-07-24 @ 20:07) (18 - 18)  SpO2: 98% (02-07-24 @ 20:07) (95% - 99%)  Wt(kg): --  Lungs clear  Heart S1S2 + KAYLA  Abd soft NT ND  Extremities:   1-2  edema                                    9.4    10.18 )-----------( 206      ( 07 Feb 2024 06:36 )             30.9     02-07    138  |  101  |  34<H>  ----------------------------<  98  4.0   |  23  |  1.66<H>    Ca    9.0      07 Feb 2024 06:36  Phos  3.6     02-07  Mg     2.1     02-07    TPro  6.2  /  Alb  3.3  /  TBili  2.2<H>  /  DBili  x   /  AST  92<H>  /  ALT  300<H>  /  AlkPhos  86  02-07          LIVER FUNCTIONS - ( 07 Feb 2024 06:36 )  Alb: 3.3 g/dL / Pro: 6.2 g/dL / ALK PHOS: 86 U/L / ALT: 300 U/L / AST: 92 U/L / GGT: x           Creatinine Trend: 1.66<--, 1.84<--, 2.19<--, 2.17<--, 2.50<--, 2.00<--    A/P:    CM, EF 35-40%, severe AS  Tx from [facility] to [facility] for TAVR eval, s/p LHC  Cardio-renal/hemodynamic LINDA/CKD 3 is improving off diuretics (peak Cr 2.5 - 2/3/24, lorna Cr 1.43 - 1/29/24)   UA negative   Imaging w/o hydro   Would hold off on diuretics if possibl as pt is s/p cath and needs CT coronaries w/IV dye  At risk of worsening renal fx due to dye injury  No absolute renal contraindication to contrast procedure  Avoid nephrotoxins   Will follow         Pedrito Irving MD

## 2024-02-07 NOTE — CONSULT NOTE ADULT - ASSESSMENT
A/P:    Wound Consult requested to assist w/ management of    BLE elevation & Compression  Consider BILL/PVR, Duplex, Xray, A/P BLE CT or MRI  Abx per Medicine/ ID  Moisturize intact skin w/ SWEEN cream BID  Nutrition Consult for optimization in pt w/ Severe Protein Calorie Malnutrition,        Inadequate PO intake, & Increased nutritional needs            encourage high quality protein, eliot/ prosource, MVI & Vit C to promote wound healing  Hyperglycemia - improving w/ ADA diet and Lantus/ NPH & FS w/ ISS, consider Endo Consult, consider HgA1c  Anemia- improving w/ transfusions, Fe studies, protonix  Continue turning and positioning w/ offloading assistive devices as per protocol  Buttocks/ Sacrum Dorcas/ TRIAD BID /CAVILON ADVANCE TIW and prn soiling        Continue w/ attends under pads and Pericare w/ garcia/ condom cath maintenance / purewick care as per protocol  Waffle Cushion to chair when oob to chair  Continue w/ low air loss pressure redistribution bed surface   Pt will need Group 2 mattress on hospital bed and ROHO cushion for wheel chair upon discharge home  Care as per medicine, will follow w/ you/ remain available as requested  Upon discharge f/u as outpatient at [facility] [address] [facility] [phone]  Seen w/ attng & RN and D/w team & RN  Thank you for this consult,  MARLON Rodrigez, CWON  Nights/ Weekends/ Holidays please call:  General Surgery Consult pager [phone]) for emergencies  Wound PT for multilayer leg wrapping or VAC issues ([phone])  A/P:63 year old male with Hx of HTN, GERD, dyslipidemia, chronic intermittent leg pain, aortic stenosis, chronic venous stasis dermatitis with bilateral leg swelling, poor historian presented to [facility] ER on 1/25/2024 for bilateral leg pain and swelling. He was admitted to medicine and was found to have new onset acute HFrEF (EF 35-40% 1/2024) in setting of severe AS    Wound Consult requested to assist w/ management of  BLE venous ulcers    Recommendation  Medihoney daily  Elevate BLE  Consider BILL/PVR, to determine is legs can be compressed    Moisturize intact skin w/ SWEEN cream BID  Nutrition Consult for optimization in pt w/  Increased nutritional needs            encourage high quality protein, eliot/ prosource, MVI & Vit C to promote wound healing  Continue turning and positioning w/ offloading assistive devices as per protocol  Continue w/ attends under pads and Pericare as per protocol  Waffle Cushion to chair when oob to chair  Continue w/ low air loss pressure redistribution bed surface   Care as per medicine, will remain available as requested  Upon discharge f/u as outpatient at Wound Center [address] [place] [address] [phone]  Seen and D/w  RN  Thank you for this consult,  MARLON Rodrigez, [facility]  [phone]  Nights/ Weekends/ Holidays please call:  General Surgery Consult pager [phone]) for emergencies  Wound PT for multilayer leg wrapping or VAC issues ([phone])

## 2024-02-07 NOTE — PROGRESS NOTE ADULT - ASSESSMENT
Mr Butcher is a 62 yo male with Hx of HTN, GERD, dyslipidemia, chronic intermittent leg pain, aortic stenosis, chronic venous stasis dermatitis with bilateral leg swelling, presents as transfer from  for TAVR evaluation in the setting of severe AS.     Pt has a known history of moderate AS, seen by Dr. Gilbert Rangel back in 2019. Had prior ischemic workup with NST/cath and TTE in , most recently EF 61% in 2018 with moderate AS. He presented to  ED sent by PCP for failed outpatient therapy with bactrim for cellulitis; on ROS endorsed worsenign b/l leg swelling and chronic VANCE and 3 pillow orthopnea. In the ED temp 98.2, HR 86, 110/67, RR, SPO2 96% Labs significant for BNP 62200, WBC 15, troponin 24, INR 1.37, Cr 1.7     ECG consistent with sinus rhythm, RBBB and LAFB - which is similar to outpatient ECG from 2019.  Echo report consistent with LVEF 35-40%, moderate mitral regurgitation, severe aortic valve stenosis and moderate pulmonary hypertension (images reviewed). Prior echo report from 2018 consistent with LVEF 61% with normal RV size and function at that time. Coronary angiogram from  was consistent with normal coronaries at that time. Labs were notable for significant anemia, Hgb 7.5, patient had Hgb of 11.1 on  outpatient labs and so has significantly declined. Cr now elevated to 2.2. Troponin negative. Pro BNP markedly elevated. Received IV Lasix 40 mg qd at  and started Metoprolol for GDMT and i/s/o NSVT on telemetry.      Cardiac Studies:    EKG: NSR, LAD, RBBB    TTE 24: EF 38% (Biplane), LVIDd 6.1, concentric LVH, basal inferior akinesis; mid infero/anterolateral and inferior hypokinesis, gr III DD; mod enlarg RV w nl fn, sev LAE, mod MR, mod pHTN (PASP 59); est RAp 15, severe AS (MARY 0.71, Max Salbador: 3.94 m/s AoV Peak P.1 mmHg AoV Mean P.0 mmHg), mild AI     LHC pending formal report; reportedly non-obstructive coronaries     Exercise Nuclear Stress Test (2013): small mild defect in apical lateral wall predom reversible defect c/w mild ischemia; LVEF 62%

## 2024-02-07 NOTE — CONSULT NOTE ADULT - SUBJECTIVE AND OBJECTIVE BOX
Wound SURGERY CONSULT NOTE    HPI:  63 year old male with Hx of HTN, GERD, dyslipidemia, chronic intermittent leg pain, aortic stenosis, chronic venous stasis dermatitis with bilateral leg swelling, poor historian presented to [place] ER on 1/25/2024 for bilateral leg pain and swelling. He was admitted to medicine and was found to have new onset acute HFrEF (EF 35-40% 1/2024) in setting of severe AS. Echo results from 1/26/2024.  He was treated with IV lasix, beta blocker. Not a candidate for ACE/ARB/ARNI due to renal function. Also was treated with 5 days keflex for possible lower extremity cellulitis. While admitted patient found to be anemic. FOBT negative. Heme was consulted, advised multifactoral etiology for anemia, mechanical hemolysis due to severe AS also possible. Patient was given IV iron, as well as 1 unit PRBCs on 1/31 with improvement in Hbg. GI was also consulted.  Patient will require ischemic workup with coronary angiogram and full structural heart team evaluation for TAVR - accepted for transfer at [facility] by hospitalist Dr Joiner in consult with Dr Catalan (interventional cardiology).  Walks with cane, Lives with wife- poor memory   denies implanted cardiac monitors        (05 Feb 2024 16:00)      Wound consult requested by team to assist w/ management of  BLE venous ulcers that he states he has had for a long time.   Pt c/o pain, drainage, and worsening swelling. Offloading and pericare initiated upon admission as pt Increasingly sedentary 2/2 to illness. Pt is continent of urine & stool.   Appetite good. All questions asked and answered to pt's expressed understanding and satisfaction.    Current Diet: Diet, DASH/TLC:   Sodium & Cholesterol Restricted (02-05-24 @ 18:11)      PAST MEDICAL & SURGICAL HISTORY:  Dyslipidemia      Hypertension      Chronic GERD      History of aortic stenosis      Cellulitis      No significant past surgical history      REVIEW OF SYSTEMS:  General/ Breast/ Skin/Vasc/ Neuro/ MSK: see HPI  All other systems negative    MEDICATIONS  (STANDING):  aspirin  chewable 81 milliGRAM(s) Oral daily  chlorhexidine 2% Cloths 1 Application(s) Topical <User Schedule>  ferrous    sulfate 325 milliGRAM(s) Oral daily  metoprolol succinate ER 25 milliGRAM(s) Oral daily  tamsulosin 0.4 milliGRAM(s) Oral at bedtime    MEDICATIONS  (PRN):  acetaminophen     Tablet .. 650 milliGRAM(s) Oral every 6 hours PRN Mild Pain (1 - 3)  sodium chloride 0.65% Nasal 1 Spray(s) Both Nostrils every 4 hours PRN Nasal Congestion      Allergies    garlic (Angioedema; Swelling; Anaphylaxis)  No Known Drug Allergies    Intolerances        SOCIAL HISTORY:  / Denies smoking, ETOH, drugs    FAMILY HISTORY:  FH: congestive heart failure (Father, Sibling)    FH: coronary artery disease (Sibling)    FH: brain cancer (Mother)      PHYSICAL EXAM:  Vital Signs Last 24 Hrs  T(C): 36.7 (07 Feb 2024 08:59), Max: 36.7 (06 Feb 2024 20:07)  T(F): 98 (07 Feb 2024 08:59), Max: 98.1 (06 Feb 2024 20:07)  HR: 77 (07 Feb 2024 08:59) (68 - 87)  BP: 125/81 (07 Feb 2024 08:59) (110/74 - 130/77)  BP(mean): --  RR: 18 (07 Feb 2024 08:59) (18 - 18)  SpO2: 98% (07 Feb 2024 08:59) (95% - 99%)    Parameters below as of 07 Feb 2024 08:59  Patient On (Oxygen Delivery Method): room air      NAD,  A&Ox3     HEENT:  sclera clear, mucosa moist, throat clear, trachea midline, neck supple  Respiratory: nonlabored w/ equal chest rise  Gastrointestinal: soft NT/ND   Neurology:  weakened strength & sensation grossly intact  verbal,  follows commands  Psych: calm/ appropriate  Musculoskeletal:  no deformities/ contractures  Vascular: BLE equally cool, no cyanosis, clubbing,  nor acute ischemia                BLE edema equal                BLE DP pulses not palpable                BLE hemosiderin staining  LLE anterior excoriated  LLE posterior 3.0cm x 2.8cm x 0.2cm with brown and yellow slough stable and dry no drainage        No odor,  increased warmth, tenderness, induration, fluctuance, nor crepitus  Skin:  thin, dry, pale, frail  blistering  or serosanguinous drainage  No odor, erythema, increased warmth, tenderness, induration, fluctuance, nor crepitus    LABS/ CULTURES/ RADIOLOGY:                        9.4    10.18 )-----------( 206      ( 07 Feb 2024 06:36 )             30.9       138  |  101  |  34  ----------------------------<  98      [02-07-24 @ 06:36]  4.0   |  23  |  1.66        Ca     9.0     [02-07-24 @ 06:36]      Mg     2.1     [02-07-24 @ 06:36]      Phos  3.6     [02-07-24 @ 06:36]    TPro  6.2  /  Alb  3.3  /  TBili  2.2  /  DBili  x   /  AST  92  /  ALT  300  /  AlkPhos  86  [02-07-24 @ 06:36]            A1C with Estimated Average Glucose Result: 6.2 % (01-27-24 @ 09:00)                             Wound SURGERY CONSULT NOTE    HPI:  63 year old male with Hx of HTN, GERD, dyslipidemia, chronic intermittent leg pain, aortic stenosis, chronic venous stasis dermatitis with bilateral leg swelling, poor historian presented to [place] ER on 1/25/2024 for bilateral leg pain and swelling. He was admitted to medicine and was found to have new onset acute HFrEF (EF 35-40% 1/2024) in setting of severe AS. Echo results from 1/26/2024.  He was treated with IV lasix, beta blocker. Not a candidate for ACE/ARB/ARNI due to renal function. Also was treated with 5 days keflex for possible lower extremity cellulitis. While admitted patient found to be anemic. FOBT negative. Heme was consulted, advised multifactoral etiology for anemia, mechanical hemolysis due to severe AS also possible. Patient was given IV iron, as well as 1 unit PRBCs on 1/31 with improvement in Hbg. GI was also consulted.  Patient will require ischemic workup with coronary angiogram and full structural heart team evaluation for TAVR - accepted for transfer at [facility] by hospitalist Dr Joiner in consult with Dr Catalan (interventional cardiology).  Walks with cane, Lives with wife- poor memory   denies implanted cardiac monitors        (05 Feb 2024 16:00)      Wound consult requested by team to assist w/ management of  BLE venous ulcers that he states he has had for a long time.   Pt c/o pain, drainage, and worsening swelling. Offloading and pericare initiated upon admission as pt Increasingly sedentary 2/2 to illness. Pt is continent of urine & stool.   Appetite good. All questions asked and answered to pt's expressed understanding and satisfaction.    Current Diet: Diet, DASH/TLC:   Sodium & Cholesterol Restricted (02-05-24 @ 18:11)      PAST MEDICAL & SURGICAL HISTORY:  Dyslipidemia      Hypertension      Chronic GERD      History of aortic stenosis      Cellulitis      No significant past surgical history      REVIEW OF SYSTEMS:  General/ Breast/ Skin/Vasc/ Neuro/ MSK: see HPI  All other systems negative    MEDICATIONS  (STANDING):  aspirin  chewable 81 milliGRAM(s) Oral daily  chlorhexidine 2% Cloths 1 Application(s) Topical <User Schedule>  ferrous    sulfate 325 milliGRAM(s) Oral daily  metoprolol succinate ER 25 milliGRAM(s) Oral daily  tamsulosin 0.4 milliGRAM(s) Oral at bedtime    MEDICATIONS  (PRN):  acetaminophen     Tablet .. 650 milliGRAM(s) Oral every 6 hours PRN Mild Pain (1 - 3)  sodium chloride 0.65% Nasal 1 Spray(s) Both Nostrils every 4 hours PRN Nasal Congestion      Allergies    garlic (Angioedema; Swelling; Anaphylaxis)  No Known Drug Allergies    Intolerances        SOCIAL HISTORY:  / Denies smoking, ETOH, drugs    FAMILY HISTORY:  FH: congestive heart failure (Father, Sibling)    FH: coronary artery disease (Sibling)    FH: brain cancer (Mother)      PHYSICAL EXAM:  Vital Signs Last 24 Hrs  T(C): 36.7 (07 Feb 2024 08:59), Max: 36.7 (06 Feb 2024 20:07)  T(F): 98 (07 Feb 2024 08:59), Max: 98.1 (06 Feb 2024 20:07)  HR: 77 (07 Feb 2024 08:59) (68 - 87)  BP: 125/81 (07 Feb 2024 08:59) (110/74 - 130/77)  BP(mean): --  RR: 18 (07 Feb 2024 08:59) (18 - 18)  SpO2: 98% (07 Feb 2024 08:59) (95% - 99%)    Parameters below as of 07 Feb 2024 08:59  Patient On (Oxygen Delivery Method): room air      NAD,  A&Ox3     HEENT:  sclera clear, mucosa moist, throat clear, trachea midline, neck supple  Respiratory: nonlabored w/ equal chest rise  Gastrointestinal: soft NT/ND   Neurology:  weakened strength & sensation grossly intact  verbal,  follows commands  Psych: calm/ appropriate  Musculoskeletal:  no deformities/ contractures  Vascular: BLE equally cool, no cyanosis, clubbing,  nor acute ischemia                BLE edema equal                BLE DP pulses not palpable                BLE hemosiderin staining  LLE anterior excoriated  LLE posterior 3.0cm x 2.8cm x 0.2cm with brown and yellow slough stable and dry no drainage        No odor,  increased warmth, tenderness, induration, fluctuance, nor crepitus periwound erythema  RLE Anterior 4.8cm x 6.2cm x0.1cm soft yellow slough mild/moderate serosanguinous drainage            No odor,  increased warmth, tenderness, induration, fluctuance, nor crepitus periwound erythema  RLE posterior 9.0cm cm x 3.5cm x0.2cm soft yellow slough scant serosanguinous drainage            No odor,  increased warmth, tenderness, induration, fluctuance, nor crepitus periwound erythema  BLE xerosis  Skin:  thin, dry, pale, frail      LABS/ CULTURES/ RADIOLOGY:                        9.4    10.18 )-----------( 206      ( 07 Feb 2024 06:36 )             30.9       138  |  101  |  34  ----------------------------<  98      [02-07-24 @ 06:36]  4.0   |  23  |  1.66        Ca     9.0     [02-07-24 @ 06:36]      Mg     2.1     [02-07-24 @ 06:36]      Phos  3.6     [02-07-24 @ 06:36]    TPro  6.2  /  Alb  3.3  /  TBili  2.2  /  DBili  x   /  AST  92  /  ALT  300  /  AlkPhos  86  [02-07-24 @ 06:36]            A1C with Estimated Average Glucose Result: 6.2 % (01-27-24 @ 09:00)  < from: US Duplex Venous Lower Ext Complete, Bilateral (01.25.24 @ 18:20) >    ACC: [id number] EXAM:  US DPLX LWR EXT VEINS COMPL BI   ORDERED BY:    MARYANNE CARMEN     PROCEDURE DATE:  01/25/2024          INTERPRETATION:  CLINICAL INFORMATION: Evaluate for DVT.    COMPARISON: Right lower extremity venous Doppler 1/7/2024    TECHNIQUE: Duplex sonography of the BILATERAL LOWER extremity veins with   color and spectral Doppler, with and without compression.    FINDINGS:    RIGHT:  Normal compressibility of the RIGHT common femoral, femoral and popliteal   veins.  Doppler examination shows normal spontaneous and phasic flow.  No RIGHT calf vein thrombosis is detected.    LEFT:  Normal compressibility of the LEFT common femoral, femoral and popliteal   veins.  Doppler examination shows normal spontaneous and phasic flow.  No LEFT calf vein thrombosis is detected.    IMPRESSION:  No evidence of deep venous thrombosis in either lower extremity.            --- End of Report ---            LIZETT TANNER MD; Attending Radiologist  This document has been electronically signed. Jan 25 2024  6:37PM    < end of copied text >

## 2024-02-07 NOTE — PROGRESS NOTE ADULT - ASSESSMENT
63M hx HTN, GERD, dyslipidemia, chronic intermittent leg pain, aortic stenosis, chronic venous stasis dermatitis with bilateral leg swelling, admitted to [facility] with b/l leg pain and swelling, transferred to [facility] for TAVR evaluation for severe AS

## 2024-02-07 NOTE — PROGRESS NOTE ADULT - PROBLEM SELECTOR PLAN 1
- had normal LVEF and systolic function in 2018; now appears to be in new systolic heart failure 2/2 severe aortic valve stenosis  - inpatient TAVR evaluation as below  - Diuresis with IV Lasix 40 mg bid today; goal NN 2-3L, will monitor I/O's, daily weights and adjust as needed  - would transition Toprol to Coreg 6.25 mg bid for added BP control   - will continue to follow and cautiously introduce GDMT (ARB/ARNi, MRA, SGLT2i) while monitoring renal function as just received IV contrast today.

## 2024-02-07 NOTE — PROGRESS NOTE ADULT - SUBJECTIVE AND OBJECTIVE BOX
Jad Andino MD    Patient is a 63y old  Male who presents with a chief complaint of       SUBJECTIVE / OVERNIGHT EVENTS: Patient seen and examined.   TELEMETRY SR 60-80's, 4 episodes of -150 2-12 secs    MEDICATIONS  (STANDING):  aspirin  chewable 81 milliGRAM(s) Oral daily  ferrous    sulfate 325 milliGRAM(s) Oral daily  metoprolol succinate ER 25 milliGRAM(s) Oral daily  tamsulosin 0.4 milliGRAM(s) Oral at bedtime    MEDICATIONS  (PRN):  acetaminophen     Tablet .. 650 milliGRAM(s) Oral every 6 hours PRN Mild Pain (1 - 3)  sodium chloride 0.65% Nasal 1 Spray(s) Both Nostrils every 4 hours PRN Nasal Congestion      Vital Signs Last 24 Hrs  T(C): 36.7 (07 Feb 2024 04:00), Max: 36.7 (06 Feb 2024 20:07)  T(F): 98 (07 Feb 2024 04:00), Max: 98.1 (06 Feb 2024 20:07)  HR: 87 (07 Feb 2024 04:00) (68 - 87)  BP: 117/74 (07 Feb 2024 04:00) (110/74 - 135/82)  BP(mean): 86 (06 Feb 2024 08:05) (86 - 86)  RR: 18 (07 Feb 2024 04:00) (18 - 18)  SpO2: 95% (07 Feb 2024 04:00) (95% - 99%)    Parameters below as of 07 Feb 2024 04:00  Patient On (Oxygen Delivery Method): room air      CAPILLARY BLOOD GLUCOSE        I&O's Summary    06 Feb 2024 07:01  -  07 Feb 2024 07:00  --------------------------------------------------------  IN: 1200 mL / OUT: 200 mL / NET: 1000 mL          PHYSICAL EXAM      LABS:                        9.2    10.12 )-----------( 199      ( 06 Feb 2024 05:23 )             30.4     02-07    138  |  101  |  34<H>  ----------------------------<  98  4.0   |  23  |  1.66<H>    Ca    9.0      07 Feb 2024 06:36  Phos  3.6     02-07  Mg     2.1     02-07    TPro  6.2  /  Alb  3.3  /  TBili  2.2<H>  /  DBili  x   /  AST  92<H>  /  ALT  300<H>  /  AlkPhos  86  02-07          Urinalysis Basic - ( 07 Feb 2024 06:36 )    Color: x / Appearance: x / SG: x / pH: x  Gluc: 98 mg/dL / Ketone: x  / Bili: x / Urobili: x   Blood: x / Protein: x / Nitrite: x   Leuk Esterase: x / RBC: x / WBC x   Sq Epi: x / Non Sq Epi: x / Bacteria: x          RADIOLOGY & ADDITIONAL TESTS:    Imaging Personally Reviewed:  Consultant(s) Notes Reviewed:    Care Discussed with Consultants/Other Providers:   Jad Andino MD    Patient is a 63y old  Male who presents with a chief complaint of       SUBJECTIVE / OVERNIGHT EVENTS: Patient seen and examined.   TELEMETRY SR 60-80's, 4 episodes of -150 2-12 secs    MEDICATIONS  (STANDING):  aspirin  chewable 81 milliGRAM(s) Oral daily  ferrous    sulfate 325 milliGRAM(s) Oral daily  metoprolol succinate ER 25 milliGRAM(s) Oral daily  tamsulosin 0.4 milliGRAM(s) Oral at bedtime    MEDICATIONS  (PRN):  acetaminophen     Tablet .. 650 milliGRAM(s) Oral every 6 hours PRN Mild Pain (1 - 3)  sodium chloride 0.65% Nasal 1 Spray(s) Both Nostrils every 4 hours PRN Nasal Congestion      Vital Signs Last 24 Hrs  T(C): 36.7 (07 Feb 2024 04:00), Max: 36.7 (06 Feb 2024 20:07)  T(F): 98 (07 Feb 2024 04:00), Max: 98.1 (06 Feb 2024 20:07)  HR: 87 (07 Feb 2024 04:00) (68 - 87)  BP: 117/74 (07 Feb 2024 04:00) (110/74 - 135/82)  BP(mean): 86 (06 Feb 2024 08:05) (86 - 86)  RR: 18 (07 Feb 2024 04:00) (18 - 18)  SpO2: 95% (07 Feb 2024 04:00) (95% - 99%)    Parameters below as of 07 Feb 2024 04:00  Patient On (Oxygen Delivery Method): room air      CAPILLARY BLOOD GLUCOSE        I&O's Summary    06 Feb 2024 07:01  -  07 Feb 2024 07:00  --------------------------------------------------------  IN: 1200 mL / OUT: 200 mL / NET: 1000 mL          PHYSICAL EXAM  GENERAL: NAD, well-developed  HEAD:  Atraumatic, normocephalic  EYES: EOMI, PERRLA, conjunctiva and sclera clear  CHEST/LUNG: Clear to auscultation bilaterally; no wheezes  HEART: +S1+S2, regular rate and rhythm; 4/6 KAYLA  ABDOMEN: Soft, nontender, nondistended; bowel sounds present  EXTREMITIES:  2+ peripheral pulses; no clubbing, cyanosis; trace -1+ pedal edema; RLE healing pretibial ulceration- no erythema  PSYCH: AAOx3      LABS:                        9.2    10.12 )-----------( 199      ( 06 Feb 2024 05:23 )             30.4     02-07    138  |  101  |  34<H>  ----------------------------<  98  4.0   |  23  |  1.66<H>    Ca    9.0      07 Feb 2024 06:36  Phos  3.6     02-07  Mg     2.1     02-07    TPro  6.2  /  Alb  3.3  /  TBili  2.2<H>  /  DBili  x   /  AST  92<H>  /  ALT  300<H>  /  AlkPhos  86  02-07          Urinalysis Basic - ( 07 Feb 2024 06:36 )    Color: x / Appearance: x / SG: x / pH: x  Gluc: 98 mg/dL / Ketone: x  / Bili: x / Urobili: x   Blood: x / Protein: x / Nitrite: x   Leuk Esterase: x / RBC: x / WBC x   Sq Epi: x / Non Sq Epi: x / Bacteria: x          RADIOLOGY & ADDITIONAL TESTS:    Imaging Personally Reviewed:  Consultant(s) Notes Reviewed:    Care Discussed with Consultants/Other Providers:

## 2024-02-07 NOTE — PROGRESS NOTE ADULT - PROBLEM SELECTOR PLAN 2
Nephrology following, CKD 3 at or near baseline  Avoid nephrotoxins, monitor BMP  D/w renal- Ok to proceed with CT MARTIR protocol today

## 2024-02-07 NOTE — PROGRESS NOTE ADULT - PROBLEM SELECTOR PLAN 5
- found to be anemic at [facility]  - FOBT negative. Heme consulted, advised multifactorial etiology for anemia, mechanical hemolysis due to severe AS also possible.   - s/p IV iron, 1U PRBCs on 1/31 at OSH  - Transfuse to goal Hb > 7.

## 2024-02-07 NOTE — PROGRESS NOTE ADULT - SUBJECTIVE AND OBJECTIVE BOX
Patient seen and examined at bedside.    Overnight Events: No acute events overnight. Denies chest pain or SOB      REVIEW OF SYSTEMS:  Constitutional:     [ x] negative [ ] fevers [ ] chills [ ] weight loss [ ] weight gain  HEENT:                  [ x] negative [ ] dry eyes [ ] eye irritation [ ] postnasal drip [ ] nasal congestion  CV:                         [x ] negative  [ ] chest pain [ ] orthopnea [ ] palpitations [ ] murmur  Resp:                     [ x] negative [ ] cough [ ] shortness of breath [ ] dyspnea [ ] wheezing [ ] sputum [ ]hemoptysis  GI:                          [ x] negative [ ] nausea [ ] vomiting [ ] diarrhea [ ] constipation [ ] abd pain [ ] dysphagia   :                        [ x] negative [ ] dysuria [ ] nocturia [ ] hematuria [ ] increased urinary frequency  Musculoskeletal: [ x] negative [ ] back pain [ ] myalgias [ ] arthralgias [ ] fracture  Skin:                       [ x] negative [ ] rash [ ] itch  Neurological:        [ x] negative [ ] headache [ ] dizziness [ ] syncope [ ] weakness [ ] numbness  Psychiatric:           [ x] negative [ ] anxiety [ ] depression  Endocrine:            [ x] negative [ ] diabetes [ ] thyroid problem  Heme/Lymph:      [ x] negative [ ] anemia [ ] bleeding problem  Allergic/Immune: [x ] negative [ ] itchy eyes [ ] nasal discharge [ ] hives [ ] angioedema    [x ] All other systems negative  [ ] Unable to assess ROS due to    Current Meds:  acetaminophen     Tablet .. 650 milliGRAM(s) Oral every 6 hours PRN  aspirin  chewable 81 milliGRAM(s) Oral daily  ferrous    sulfate 325 milliGRAM(s) Oral daily  metoprolol succinate ER 25 milliGRAM(s) Oral daily  sodium chloride 0.65% Nasal 1 Spray(s) Both Nostrils every 4 hours PRN  tamsulosin 0.4 milliGRAM(s) Oral at bedtime      PAST MEDICAL & SURGICAL HISTORY:  Dyslipidemia      Hypertension      Chronic GERD      History of aortic stenosis      Cellulitis      No significant past surgical history          Vitals:  T(F): 98 (02-07), Max: 98.1 (02-06)  HR: 87 (02-07) (68 - 87)  BP: 117/74 (02-07) (110/74 - 135/82)  RR: 18 (02-07)  SpO2: 95% (02-07)  I&O's Summary    06 Feb 2024 07:01  -  07 Feb 2024 07:00  --------------------------------------------------------  IN: 1200 mL / OUT: 200 mL / NET: 1000 mL        Physical Exam:  Appearance: No acute distress; well appearing  Eyes: PERRL, EOMI, pink conjunctiva  HENT: Normal oral mucosa  Cardiovascular: RRR, S1, S2, systolic crec-decr murmur., rubs, or gallops; 3+ pitting b/l edema; JVD >15 cm up to jawq  Respiratory: Clear to auscultation bilaterally  Gastrointestinal: soft, non-tender, non-distended with normal bowel sounds  Musculoskeletal: No clubbing; no joint deformity   Neurologic: Non-focal  Lymphatic: No lymphadenopathy  Psychiatry: AAOx3, mood & affect appropriate  Skin: No rashes, ecchymoses, or cyanosis                          9.4    10.18 )-----------( 206      ( 07 Feb 2024 06:36 )             30.9     02-07    138  |  101  |  34<H>  ----------------------------<  98  4.0   |  23  |  1.66<H>    Ca    9.0      07 Feb 2024 06:36  Phos  3.6     02-07  Mg     2.1     02-07    TPro  6.2  /  Alb  3.3  /  TBili  2.2<H>  /  DBili  x   /  AST  92<H>  /  ALT  300<H>  /  AlkPhos  86  02-07

## 2024-02-07 NOTE — PROGRESS NOTE ADULT - PROBLEM SELECTOR PLAN 2
- severe AS (MARY 0.71, Max Salbador: 3.94 m/s AoV Peak P.1 mmHg AoV Mean P.0 mmHg)  - appreciate structural input and evaluation for TAVR; plan for CT   - management of volume status as above.

## 2024-02-07 NOTE — PROGRESS NOTE ADULT - PROBLEM SELECTOR PLAN 1
HFrEF (EF 35-40% 1/2024) with severe AS  Cont Toprol 25 mg PO daily  Holding diuretics in preparation for contrast CT  LHC revealed non-obstructive CAD  TAVR w/u underway  HF team evaluation pending

## 2024-02-08 LAB
ALBUMIN SERPL ELPH-MCNC: 3.4 G/DL — SIGNIFICANT CHANGE UP (ref 3.3–5)
ALP SERPL-CCNC: 86 U/L — SIGNIFICANT CHANGE UP (ref 40–120)
ALT FLD-CCNC: 238 U/L — HIGH (ref 10–45)
ANION GAP SERPL CALC-SCNC: 14 MMOL/L — SIGNIFICANT CHANGE UP (ref 5–17)
AST SERPL-CCNC: 65 U/L — HIGH (ref 10–40)
BILIRUB SERPL-MCNC: 2.4 MG/DL — HIGH (ref 0.2–1.2)
BUN SERPL-MCNC: 33 MG/DL — HIGH (ref 7–23)
CALCIUM SERPL-MCNC: 8.8 MG/DL — SIGNIFICANT CHANGE UP (ref 8.4–10.5)
CHLORIDE SERPL-SCNC: 101 MMOL/L — SIGNIFICANT CHANGE UP (ref 96–108)
CO2 SERPL-SCNC: 21 MMOL/L — LOW (ref 22–31)
CREAT SERPL-MCNC: 1.62 MG/DL — HIGH (ref 0.5–1.3)
EGFR: 47 ML/MIN/1.73M2 — LOW
GLUCOSE SERPL-MCNC: 90 MG/DL — SIGNIFICANT CHANGE UP (ref 70–99)
HCT VFR BLD CALC: 31.8 % — LOW (ref 39–50)
HGB BLD-MCNC: 9.5 G/DL — LOW (ref 13–17)
MAGNESIUM SERPL-MCNC: 2.1 MG/DL — SIGNIFICANT CHANGE UP (ref 1.6–2.6)
MCHC RBC-ENTMCNC: 18.3 PG — LOW (ref 27–34)
MCHC RBC-ENTMCNC: 29.9 GM/DL — LOW (ref 32–36)
MCV RBC AUTO: 61.4 FL — LOW (ref 80–100)
MRSA PCR RESULT.: SIGNIFICANT CHANGE UP
NRBC # BLD: 1 /100 WBCS — HIGH (ref 0–0)
PLATELET # BLD AUTO: 206 K/UL — SIGNIFICANT CHANGE UP (ref 150–400)
POTASSIUM SERPL-MCNC: 3.9 MMOL/L — SIGNIFICANT CHANGE UP (ref 3.5–5.3)
POTASSIUM SERPL-SCNC: 3.9 MMOL/L — SIGNIFICANT CHANGE UP (ref 3.5–5.3)
PROT SERPL-MCNC: 6.1 G/DL — SIGNIFICANT CHANGE UP (ref 6–8.3)
RBC # BLD: 5.18 M/UL — SIGNIFICANT CHANGE UP (ref 4.2–5.8)
RBC # FLD: 26.8 % — HIGH (ref 10.3–14.5)
S AUREUS DNA NOSE QL NAA+PROBE: SIGNIFICANT CHANGE UP
SODIUM SERPL-SCNC: 136 MMOL/L — SIGNIFICANT CHANGE UP (ref 135–145)
WBC # BLD: 10.41 K/UL — SIGNIFICANT CHANGE UP (ref 3.8–10.5)
WBC # FLD AUTO: 10.41 K/UL — SIGNIFICANT CHANGE UP (ref 3.8–10.5)

## 2024-02-08 PROCEDURE — 76981 USE PARENCHYMA: CPT | Mod: 26

## 2024-02-08 PROCEDURE — 94010 BREATHING CAPACITY TEST: CPT | Mod: 26

## 2024-02-08 PROCEDURE — 74174 CTA ABD&PLVS W/CONTRAST: CPT | Mod: 26

## 2024-02-08 PROCEDURE — 99254 IP/OBS CNSLTJ NEW/EST MOD 60: CPT

## 2024-02-08 PROCEDURE — 75574 CT ANGIO HRT W/3D IMAGE: CPT | Mod: 26

## 2024-02-08 PROCEDURE — 71275 CT ANGIOGRAPHY CHEST: CPT | Mod: 26

## 2024-02-08 PROCEDURE — 99233 SBSQ HOSP IP/OBS HIGH 50: CPT

## 2024-02-08 RX ORDER — ERYTHROPOIETIN 10000 [IU]/ML
10000 INJECTION, SOLUTION INTRAVENOUS; SUBCUTANEOUS
Refills: 0 | Status: DISCONTINUED | OUTPATIENT
Start: 2024-02-08 | End: 2024-02-23

## 2024-02-08 RX ORDER — LANOLIN ALCOHOL/MO/W.PET/CERES
3 CREAM (GRAM) TOPICAL ONCE
Refills: 0 | Status: COMPLETED | OUTPATIENT
Start: 2024-02-08 | End: 2024-02-08

## 2024-02-08 RX ORDER — FUROSEMIDE 40 MG
40 TABLET ORAL
Refills: 0 | Status: DISCONTINUED | OUTPATIENT
Start: 2024-02-09 | End: 2024-02-14

## 2024-02-08 RX ORDER — ACETAMINOPHEN 500 MG
1000 TABLET ORAL ONCE
Refills: 0 | Status: DISCONTINUED | OUTPATIENT
Start: 2024-02-08 | End: 2024-02-08

## 2024-02-08 RX ORDER — ERYTHROPOIETIN 10000 [IU]/ML
10000 INJECTION, SOLUTION INTRAVENOUS; SUBCUTANEOUS
Refills: 0 | Status: DISCONTINUED | OUTPATIENT
Start: 2024-02-08 | End: 2024-02-08

## 2024-02-08 RX ORDER — METOPROLOL TARTRATE 50 MG
25 TABLET ORAL ONCE
Refills: 0 | Status: COMPLETED | OUTPATIENT
Start: 2024-02-08 | End: 2024-02-08

## 2024-02-08 RX ORDER — LIDOCAINE 4 G/100G
1 CREAM TOPICAL ONCE
Refills: 0 | Status: COMPLETED | OUTPATIENT
Start: 2024-02-08 | End: 2024-02-08

## 2024-02-08 RX ORDER — ALPRAZOLAM 0.25 MG
0.5 TABLET ORAL ONCE
Refills: 0 | Status: DISCONTINUED | OUTPATIENT
Start: 2024-02-08 | End: 2024-02-08

## 2024-02-08 RX ORDER — TRAMADOL HYDROCHLORIDE 50 MG/1
50 TABLET ORAL EVERY 6 HOURS
Refills: 0 | Status: DISCONTINUED | OUTPATIENT
Start: 2024-02-08 | End: 2024-02-10

## 2024-02-08 RX ORDER — METOPROLOL TARTRATE 50 MG
50 TABLET ORAL DAILY
Refills: 0 | Status: DISCONTINUED | OUTPATIENT
Start: 2024-02-09 | End: 2024-02-23

## 2024-02-08 RX ADMIN — LIDOCAINE 1 PATCH: 4 CREAM TOPICAL at 18:45

## 2024-02-08 RX ADMIN — LIDOCAINE 1 PATCH: 4 CREAM TOPICAL at 18:02

## 2024-02-08 RX ADMIN — Medication 25 MILLIGRAM(S): at 11:34

## 2024-02-08 RX ADMIN — Medication 81 MILLIGRAM(S): at 05:17

## 2024-02-08 RX ADMIN — Medication 325 MILLIGRAM(S): at 11:34

## 2024-02-08 RX ADMIN — Medication 650 MILLIGRAM(S): at 07:50

## 2024-02-08 RX ADMIN — ERYTHROPOIETIN 10000 UNIT(S): 10000 INJECTION, SOLUTION INTRAVENOUS; SUBCUTANEOUS at 13:38

## 2024-02-08 RX ADMIN — TRAMADOL HYDROCHLORIDE 50 MILLIGRAM(S): 50 TABLET ORAL at 15:23

## 2024-02-08 RX ADMIN — Medication 650 MILLIGRAM(S): at 08:50

## 2024-02-08 RX ADMIN — Medication 0.5 MILLIGRAM(S): at 11:34

## 2024-02-08 RX ADMIN — Medication 3 MILLIGRAM(S): at 23:57

## 2024-02-08 RX ADMIN — Medication 3 MILLIGRAM(S): at 02:05

## 2024-02-08 RX ADMIN — TAMSULOSIN HYDROCHLORIDE 0.4 MILLIGRAM(S): 0.4 CAPSULE ORAL at 23:57

## 2024-02-08 RX ADMIN — Medication 25 MILLIGRAM(S): at 05:17

## 2024-02-08 RX ADMIN — CHLORHEXIDINE GLUCONATE 1 APPLICATION(S): 213 SOLUTION TOPICAL at 05:17

## 2024-02-08 RX ADMIN — TRAMADOL HYDROCHLORIDE 50 MILLIGRAM(S): 50 TABLET ORAL at 16:23

## 2024-02-08 NOTE — PROGRESS NOTE ADULT - ASSESSMENT
63 year old male with Hx of HTN, dyslipidemia, severe aortic stenosis (AoV Max Salbador: 3.94 m/s AoV Peak P.1 mmHg AoV Mean P.0 mmHg, MARY .76. DI .17) His cardiomyopathy is in the setting of likely end stage severe aortic stenosis which is also likely the cause of patient's anemia. His EF was reported to be normal 2-3 years prior. His dyspnea at this time is likely largely driven by his decompensated heart failure in the setting of severe AS, with therapy being optimized by heart failure, nephrology teams and is highly appreciated.     CT done today which was reviewed. High Calcium score >5,000 along with fusion of N-R cusps. His anatomy is more favorable for surgery especially with dilated ascending aorta and bicuspid. However, his liver disease, including elevated INR, imaging may put him at high bleeding risk during surgery. Will await liver work up including imaging. Plan discussed in depth with CTS, Dr. Martinez, as well as primary team as well as patient, family.     Thank you for allowing me to participate in the care of your patient. Feel free to contact me if any clinical issues arise via contact information below or MS Teams.    Didier Catalan MD, FACC, [facility]   Director, Interventional Cardiology, [address], [facility] Floor, [address] Office: [phone]  [place] Office: [phone]  Email: [email or web address]

## 2024-02-08 NOTE — PROGRESS NOTE ADULT - ASSESSMENT
63M hx HTN, GERD, dyslipidemia, chronic intermittent leg pain, aortic stenosis, chronic venous stasis dermatitis with bilateral leg swelling, admitted to [facility] with b/l leg pain and swelling, transferred to [facility] for TAVR evaluation for severe AS

## 2024-02-08 NOTE — CONSULT NOTE ADULT - ASSESSMENT
Impression:     63 yrs old male w/ hx of HTN, GERD, dyslipidemia, chronic intermittent leg pain, aortic stenosis, chronic venous stasis dermatitis who p/w b/l leg edema. Patient was seen in [place] ER on 1/25/2024 for bilateral leg pain and swelling. Hospital course c/w new onset HFrEF along with severe AS, now s/p LHC which showed non obstructive CAD and being considered for o/p TAVR, now has elevated liver enzymes.     #Elevated liver enzymes  On admission, had mildly elevated AST/ALT on 1/25, which has been stable, now AST/ALT 65/238, and slow rise on bilirubin 2.6 which was normal on admission. On 1/7/24, he had normal liver enzymes and bilirubin. Patient also had mildly elevated INR 1.37 (no prior values), and normal platelets.    - Denied prior hx of liver disease or alcohol use.   - Imaging showed hepatomegaly, and small ascites, spleen appears normal.   - TTE showed EF 30%, moderate MR and severe TR.   - Concerned for possible hepatic congestion leading to elevated liver enzymes, not a typical pattern for alcohol related liver disease. Other differentials include chronic hepatitis infection vs. possible autoimmune disease, although less likely. Does not clearly show clinical signs of liver cirrhosis at this time but could have a component of fibrosis. Less concerned for biliary obstruction with no elevated ALP and abd pain at this time.     Recommendations:   - please send HAV, HBVsAb, HBVsAg, HBVcAb, HCV Ab, HCV RNA for viral etiologies  - please send alpha-1 anti-trypsin (phenotype), ceruloplasm, AFP  - please send NEIL, anti-mitochondrial antibody, anti-smooth muscle antibody, anti-liver kidney antibody, immunoglobulins (IgG, IgM, IgA quantitative) for autoimmune etiologies   - Please obtain US doppler.   - CMP, PT/INR, and CBC daily.     All recommendations are tentative until note is attested by an attending.     Corona Novoa, PGY-5  Gastroenterology/Hepatology Fellow  Available on Microsoft Teams  [phone] (Short Range Pager)  [phone] (Long Range Pager)    After 5pm, please contact the on-call GI fellow. [phone] ([phone]) swallows foods and liquids w/o difficulty

## 2024-02-08 NOTE — PROGRESS NOTE ADULT - PROBLEM SELECTOR PLAN 1
HFrEF (EF 35-40% 1/2024) with severe AS  Cont Toprol 25 mg PO daily  CTA MARTIR protocol today  Resume diuretics tomorrow after contrast CT  LHC revealed non-obstructive CAD  HF and structural heart following, surgery also a consideration

## 2024-02-08 NOTE — PROGRESS NOTE ADULT - SUBJECTIVE AND OBJECTIVE BOX
No distress, on RA    Vital Signs Last 24 Hrs  T(C): 36.6 (02-08-24 @ 11:14), Max: 36.9 (02-07-24 @ 23:45)  T(F): 97.8 (02-08-24 @ 11:14), Max: 98.5 (02-07-24 @ 23:45)  HR: 83 (02-08-24 @ 11:14) (74 - 92)  BP: 127/73 (02-08-24 @ 11:14) (127/73 - 143/88)  RR: 18 (02-08-24 @ 11:14) (18 - 18)  SpO2: 99% (02-08-24 @ 11:14) (98% - 99%)    I&O's Detail    07 Feb 2024 07:01  -  08 Feb 2024 07:00  --------------------------------------------------------  IN:    Oral Fluid: 720 mL  Total IN: 720 mL    OUT:    Voided (mL): 500 mL  Total OUT: 500 mL    s1s2  b/l air entry  soft, ND  + edema, improved                         9.5    10.41 )-----------( 206      ( 08 Feb 2024 06:30 )             31.8     08 Feb 2024 06:31    136    |  101    |  33     ----------------------------<  90     3.9     |  21     |  1.62     Ca    8.8        08 Feb 2024 06:31  Phos  3.6       07 Feb 2024 06:36  Mg     2.1       08 Feb 2024 06:31    TPro  6.1    /  Alb  3.4    /  TBili  2.4    /  DBili  x      /  AST  65     /  ALT  238    /  AlkPhos  86     08 Feb 2024 06:31    LIVER FUNCTIONS - ( 08 Feb 2024 06:31 )  Alb: 3.4 g/dL / Pro: 6.1 g/dL / ALK PHOS: 86 U/L / ALT: 238 U/L / AST: 65 U/L / GGT: x           acetaminophen     Tablet .. 650 milliGRAM(s) Oral every 6 hours PRN  aspirin  chewable 81 milliGRAM(s) Oral daily  chlorhexidine 2% Cloths 1 Application(s) Topical <User Schedule>  epoetin loulou-epbx (RETACRIT) Injectable 32861 Unit(s) SubCutaneous every 7 days  ferrous    sulfate 325 milliGRAM(s) Oral daily  sodium chloride 0.65% Nasal 1 Spray(s) Both Nostrils every 4 hours PRN  tamsulosin 0.4 milliGRAM(s) Oral at bedtime  traMADol 50 milliGRAM(s) Oral every 6 hours PRN    A/P:    CM, EF 35-40%, severe AS  Tx from [facility] to [facility] 2/5 for TAVR eval, s/p C 2/5  Cardio-renal/hemodynamic LINDA/CKD 3 has improved off diuretics (peak Cr 2.5 - 2/3/24, prior Cr 1.43 - 1/29/24)   UA negative   Imaging w/o hydro   S/p CT w/IV dye today  Would hold off on diuretics if possible x 24-48hr as pt is at risk of dye injury  Avoid nephrotoxins   BMP in am  Will follow     [phone]

## 2024-02-08 NOTE — CONSULT NOTE ADULT - ATTENDING COMMENTS
62 yo Male w/ Hx of HTN, dyslipidemia, aortic stenosis, GERD, and chronic intermittent leg pain, chronic venous stasis dermatitis presented to [facility] 1/25/24 w/ b/l LE edema, found to have new onset HFrEF (LVEF 35-40%), with severe AS, moderate MR and TR, moderate pulmonary hypertension, been also treated w/ Keflex for cellulitis, and was transferred to [facility] 2/5/24 for LHC that subsequently showed non-obstructive CAD. He was also found to have anemia, without signs of overt bleeding, and w/ concern for hemolysis (although LDH mildly elevated, haptoglobin normal).  Patient is being evaluated for TAVR that was initially planned outpatient, but as per discussion w/ cardiology, it is planned now for second half of next week.   Hepatology was consulted b/o small ascites on imaging, along with mild coagulopathy, mild hyperbilirubinemia and elevated liver enzymes.  Patient has not been aware of liver disease, denied alcohol use. Patient had normal liver enzymes and bilirubin 1/7/24 and thai from 1/24/24. PLT count and spleen size are normal, no stigmata of advanced CLD on exam, IVC and hepatic veins mildly dilated, all pointing towards congestive hepatopathy. Hepatic vasculature was reported patent. His US elastography suggested F2 fibrosis, however, can be overestimated in setting of hepatic congestion.  On other hand, patient was unable to tolerate diuresis given severe AS and development of LINDA, thus less likely that a repeat elastography in few days would give different result.   In case of advanced fibrosis would be found, based on current blood work,  MELD 3.0 would be 18, CPTB8, and w/ Vocal Hayward score suggests 4.6 % 30 day mortality and 10.6% 90 day decompensation.   - To accurately assess his liver disease, after d/w cardiology, would proceed w/ transjugular liver biopsy w/ pressure measurements.  - Obtain, f/u CLD workup per note above and 2/9 chart note.    Thank you for consult  Will continue to follow

## 2024-02-08 NOTE — PROGRESS NOTE ADULT - SUBJECTIVE AND OBJECTIVE BOX
Date of Consult: 2024    CHIEF COMPLAINT: shortness of breath     HISTORY OF PRESENT ILLNESS:  63 year old male with Hx of HTN, dyslipidemia, severe aortic stenosis, chronic venous stasis dermatitis with bilateral leg swelling, poor historian who is admitted with severe aortic stenosis. He initially  presented to [place] ER on 2024 for bilateral leg pain and swelling. He was admitted to medicine and was found to have new onset acute HFrEF (EF 35-40% 2024) in setting of severe AS. Echo results below from 2024.  He was treated with IV lasix, beta blocker. Also was treated with 5 days keflex for possible lower extremity cellulitis. Patient was optimized by nephrology and was transferred over for [facility] which was non-obstructive. Patient currently feels better from SOB perspective after diuretics however his functional status has diminished in past 3 months. Admits to increased fatigue with ambulation. Denies syncope or chest pain.   Walks with cane, Lives with wife- poor memory.    I reviewed his TTE personally with severely elevated, AoV Max Salbador: 3.94 m/s AoV Peak P.1 mmHg AoV Mean P.0 mmHg, MARY .76. DI .17. Also with moderate MR and moderate pulmonary hypertension (PASP 56).     No overnight events. CT done today.      Allergies  garlic (Angioedema; Swelling; Anaphylaxis)  No Known Drug Allergies    Intolerances    MEDICATIONS:  aspirin  chewable 81 milliGRAM(s) Oral daily  metoprolol succinate ER 25 milliGRAM(s) Oral daily  acetaminophen     Tablet .. 650 milliGRAM(s) Oral every 6 hours PRN  ferrous    sulfate 325 milliGRAM(s) Oral daily  sodium chloride 0.65% Nasal 1 Spray(s) Both Nostrils every 4 hours PRN  tamsulosin 0.4 milliGRAM(s) Oral at bedtime      PAST MEDICAL & SURGICAL HISTORY:  Dyslipidemia  Hypertension  Chronic GERD  History of aortic stenosis  Cellulitis    No significant past surgical history    FAMILY HISTORY:  FH: congestive heart failure (Father, Sibling)  FH: coronary artery disease (Sibling)  FH: brain cancer (Mother)    SOCIAL HISTORY:    [ ] Non-smoker  [ ] Smoker  [ ] Alcohol      REVIEW OF SYSTEMS:  See HPI. Otherwise, 10 point ROS done and otherwise negative.    PHYSICAL EXAM:  T(C): 36.6 (24 @ 14:03), Max: 36.7 (24 @ 21:50)  HR: 70 (24 @ 16:48) (65 - 78)  BP: 130/77 (24 @ 16:48) (128/83 - 147/92)  RR: 18 (24 @ 16:48) (18 - 18)  SpO2: 96% (24 @ 16:48) (92% - 99%)  Wt(kg): --  I&O's Summary    2024 07:01  -  2024 07:00  --------------------------------------------------------  IN: 300 mL / OUT: 190 mL / NET: 110 mL    2024 07:01  -  2024 19:25  --------------------------------------------------------  IN: 720 mL / OUT: 0 mL / NET: 720 mL        Appearance: Normal	  HEENT:   Normal oral mucosa, PERRL, EOMI	  Lymphatic: No lymphadenopathy  Cardiovascular: Normal S1 S2, No JVD, No murmurs, No edema  Respiratory: Lungs clear to auscultation	  Psychiatry: A & O x 3, Mood & affect appropriate  Gastrointestinal:  Soft, Non-tender, + BS	  Skin: No rashes, No ecchymoses, No cyanosis	  Neurologic: Non-focal  Extremities: Normal range of motion, No clubbing, cyanosis or edema  Vascular: Peripheral pulses palpable 2+ bilaterally        LABS:	 	    CBC Full  -  ( 2024 05:23 )  WBC Count : 10.12 K/uL  Hemoglobin : 9.2 g/dL  Hematocrit : 30.4 %  Platelet Count - Automated : 199 K/uL  Mean Cell Volume : 61.0 fl  Mean Cell Hemoglobin : 18.5 pg  Mean Cell Hemoglobin Concentration : 30.3 gm/dL  Auto Neutrophil # : 9.24 K/uL  Auto Lymphocyte # : 0.62 K/uL  Auto Monocyte # : 0.26 K/uL  Auto Eosinophil # : 0.00 K/uL  Auto Basophil # : 0.00 K/uL  Auto Neutrophil % : 91.3 %  Auto Lymphocyte % : 6.1 %  Auto Monocyte % : 2.6 %  Auto Eosinophil % : 0.0 %  Auto Basophil % : 0.0 %        138  |  102  |  36<H>  ----------------------------<  98  4.5   |  23  |  1.84<H>      142  |  104  |  40<H>  ----------------------------<  98  4.2   |  26  |  2.19<H>    Ca    8.9      2024 05:23  Ca    9.0      2024 06:14  Mg     2.0         TPro  6.0  /  Alb  3.1<L>  /  TBili  2.0<H>  /  DBili  x   /  AST  149<H>  /  ALT  361<H>  /  AlkPhos  84    TPro  6.5  /  Alb  2.8<L>  /  TBili  2.5<H>  /  DBili  x   /  AST  231<H>  /  ALT  477<H>  /  AlkPhos  87        proBNP:   Lipid Profile:   HgA1c:   TSH:       CARDIAC MARKERS    TELEMETRY:    ECG:  sinus RBB	  RADIOLOGY:  OTHER: 	    PREVIOUS DIAGNOSTIC TESTING:    [ ] Echocardiogram:  as stated above   [ ]  Catheterization: non-obstructive   [ ] Stress Test:  	  	  ASSESSMENT/PLAN:

## 2024-02-08 NOTE — PROGRESS NOTE ADULT - PROBLEM SELECTOR PLAN 4
Evaluated by hematology at  -multifactorial anemia, mechanical hemolysis due to severe AS also possible  S/p 1 unit PRBCs on 1/31, s/p IV iron   Continue oral iron supplementation   Maintain hemoglobin above 8 g/dL due to patient's advanced aortic stenosis and need for TAVR.

## 2024-02-08 NOTE — PROGRESS NOTE ADULT - SUBJECTIVE AND OBJECTIVE BOX
Jad Andino MD    Patient is a 63y old  Male who presents with a chief complaint of       SUBJECTIVE / OVERNIGHT EVENTS: Patient seen and examined.   TELEMETRY: NSR 70-80's, WCT x 2 4 and 9 beats, PAT 2.2 secs to 140    MEDICATIONS  (STANDING):  aspirin  chewable 81 milliGRAM(s) Oral daily  chlorhexidine 2% Cloths 1 Application(s) Topical <User Schedule>  ferrous    sulfate 325 milliGRAM(s) Oral daily  metoprolol succinate ER 25 milliGRAM(s) Oral daily  tamsulosin 0.4 milliGRAM(s) Oral at bedtime    MEDICATIONS  (PRN):  acetaminophen     Tablet .. 650 milliGRAM(s) Oral every 6 hours PRN Mild Pain (1 - 3)  sodium chloride 0.65% Nasal 1 Spray(s) Both Nostrils every 4 hours PRN Nasal Congestion      Vital Signs Last 24 Hrs  T(C): 36.6 (08 Feb 2024 04:00), Max: 36.9 (07 Feb 2024 23:45)  T(F): 97.9 (08 Feb 2024 04:00), Max: 98.5 (07 Feb 2024 23:45)  HR: 75 (08 Feb 2024 04:00) (74 - 77)  BP: 128/77 (08 Feb 2024 04:00) (125/81 - 135/79)  BP(mean): --  RR: 18 (08 Feb 2024 04:00) (18 - 18)  SpO2: 98% (08 Feb 2024 04:00) (98% - 99%)    Parameters below as of 08 Feb 2024 04:00  Patient On (Oxygen Delivery Method): room air      CAPILLARY BLOOD GLUCOSE        I&O's Summary    07 Feb 2024 07:01  -  08 Feb 2024 07:00  --------------------------------------------------------  IN: 720 mL / OUT: 500 mL / NET: 220 mL          PHYSICAL EXAM        LABS:                        9.5    10.41 )-----------( 206      ( 08 Feb 2024 06:30 )             31.8     02-08    136  |  101  |  33<H>  ----------------------------<  90  3.9   |  21<L>  |  1.62<H>    Ca    8.8      08 Feb 2024 06:31  Phos  3.6     02-07  Mg     2.1     02-07    TPro  6.1  /  Alb  3.4  /  TBili  2.4<H>  /  DBili  x   /  AST  65<H>  /  ALT  238<H>  /  AlkPhos  86  02-08          Urinalysis Basic - ( 08 Feb 2024 06:31 )    Color: x / Appearance: x / SG: x / pH: x  Gluc: 90 mg/dL / Ketone: x  / Bili: x / Urobili: x   Blood: x / Protein: x / Nitrite: x   Leuk Esterase: x / RBC: x / WBC x   Sq Epi: x / Non Sq Epi: x / Bacteria: x          RADIOLOGY & ADDITIONAL TESTS:    Imaging Personally Reviewed:  Consultant(s) Notes Reviewed:    Care Discussed with Consultants/Other Providers:   Jad Andino MD    Patient is a 63y old  Male who presents with a chief complaint of       SUBJECTIVE / OVERNIGHT EVENTS: Patient seen and examined.   TELEMETRY: NSR 70-80's, WCT x 2 4 and 9 beats, PAT 2.2 secs to 140    MEDICATIONS  (STANDING):  aspirin  chewable 81 milliGRAM(s) Oral daily  chlorhexidine 2% Cloths 1 Application(s) Topical <User Schedule>  ferrous    sulfate 325 milliGRAM(s) Oral daily  metoprolol succinate ER 25 milliGRAM(s) Oral daily  tamsulosin 0.4 milliGRAM(s) Oral at bedtime    MEDICATIONS  (PRN):  acetaminophen     Tablet .. 650 milliGRAM(s) Oral every 6 hours PRN Mild Pain (1 - 3)  sodium chloride 0.65% Nasal 1 Spray(s) Both Nostrils every 4 hours PRN Nasal Congestion      Vital Signs Last 24 Hrs  T(C): 36.6 (08 Feb 2024 04:00), Max: 36.9 (07 Feb 2024 23:45)  T(F): 97.9 (08 Feb 2024 04:00), Max: 98.5 (07 Feb 2024 23:45)  HR: 75 (08 Feb 2024 04:00) (74 - 77)  BP: 128/77 (08 Feb 2024 04:00) (125/81 - 135/79)  BP(mean): --  RR: 18 (08 Feb 2024 04:00) (18 - 18)  SpO2: 98% (08 Feb 2024 04:00) (98% - 99%)    Parameters below as of 08 Feb 2024 04:00  Patient On (Oxygen Delivery Method): room air      CAPILLARY BLOOD GLUCOSE        I&O's Summary    07 Feb 2024 07:01  -  08 Feb 2024 07:00  --------------------------------------------------------  IN: 720 mL / OUT: 500 mL / NET: 220 mL          PHYSICAL EXAM  GENERAL: NAD, well-developed  HEAD:  Atraumatic, normocephalic  EYES: EOMI, PERRLA, conjunctiva and sclera clear  CHEST/LUNG: Clear to auscultation bilaterally; no wheezes  HEART: +S1+S2, regular rate and rhythm; 4/6 KAYLA  ABDOMEN: Soft, nontender, nondistended; bowel sounds present  EXTREMITIES:  2+ peripheral pulses; no clubbing, cyanosis; 1+ pedal edema; RLE healing pretibial ulceration- no erythema  PSYCH: AAOx3      LABS:                        9.5    10.41 )-----------( 206      ( 08 Feb 2024 06:30 )             31.8     02-08    136  |  101  |  33<H>  ----------------------------<  90  3.9   |  21<L>  |  1.62<H>    Ca    8.8      08 Feb 2024 06:31  Phos  3.6     02-07  Mg     2.1     02-07    TPro  6.1  /  Alb  3.4  /  TBili  2.4<H>  /  DBili  x   /  AST  65<H>  /  ALT  238<H>  /  AlkPhos  86  02-08          Urinalysis Basic - ( 08 Feb 2024 06:31 )    Color: x / Appearance: x / SG: x / pH: x  Gluc: 90 mg/dL / Ketone: x  / Bili: x / Urobili: x   Blood: x / Protein: x / Nitrite: x   Leuk Esterase: x / RBC: x / WBC x   Sq Epi: x / Non Sq Epi: x / Bacteria: x          RADIOLOGY & ADDITIONAL TESTS:    Imaging Personally Reviewed:  Consultant(s) Notes Reviewed:    Care Discussed with Consultants/Other Providers:

## 2024-02-08 NOTE — CONSULT NOTE ADULT - SUBJECTIVE AND OBJECTIVE BOX
HPI:    Mr. Butcher is a 63 yrs old male w/ hx of HTN, GERD, dyslipidemia, chronic intermittent leg pain, aortic stenosis, chronic venous stasis dermatitis who p/w b/l leg edema. Patient was seen in [place] ER on 2024 for bilateral leg pain and swelling. Patient found to have new onset acute HFrEF (EF 35-40% 2024) in setting of severe AS. He was initially tx with diuresis, but could not tolerate due to LINDA. Patient was also treated with Keflex for lower extremity cellulitis. Patient was anemic which was thought to be related to hemolysis from AS due to elevated LDH and low haptoglobin, no overt signs of GI bleeding noted. Patient was transferred for possible LHC in [facility]. Hospital course now s/p LHC which showed non obstructive CAD, so being evaluated for TAVR which will be completed as o/p. Patient has elevated liver enzymes during this admission. Patient denied alcohol use or prior liver disease. Denied any fam hx of liver disease. He denied use of herbal supplement use as well. Hepatology consulted for elevated liver enzymes.     Allergies:  garlic (Angioedema; Swelling; Anaphylaxis)  No Known Drug Allergies· 	    Home Medications:  ferrous sulfate 325 mg (65 mg elemental iron) oral tablet: Last Dose Taken:  , 1 tab(s) orally once a day hospital  · 	metoprolol succinate 25 mg oral tablet, extended release: Last Dose Taken:  , 1 tab(s) orally once a day Hospital  · 	aspirin 81 mg oral tablet, chewable: Last Dose Taken:  , 1 tab(s) orally once a day hospital  · 	Furosemide 20 MG Oral Tablet: Last Dose Taken:  , 1 x a day--- home medication  · 	traMADol 50 mg oral tablet: Last Dose Taken:  , 1 tab(s) orally once a day (at bedtime) MDD: 1 tab  · 	traMADol 50 mg oral tablet: Last Dose Taken:  , 1 tab(s) orally once a day (at bedtime) as needed for pain home and hospital MDD: 1 tab  · 	Flomax 0.4 mg oral capsule: Last Dose Taken:  , 1 cap(s) orally once a day hospital med  · 	alfuzosin 10 mg oral tablet, extended release: Last Dose Taken:  , 1 tab(s) orally once a day (at bedtime) HOme      Hospital Medications:  acetaminophen     Tablet .. 650 milliGRAM(s) Oral every 6 hours PRN  aspirin  chewable 81 milliGRAM(s) Oral daily  chlorhexidine 2% Cloths 1 Application(s) Topical <User Schedule>  epoetin loulou-epbx (RETACRIT) Injectable 88546 Unit(s) SubCutaneous every 7 days  ferrous    sulfate 325 milliGRAM(s) Oral daily  sodium chloride 0.65% Nasal 1 Spray(s) Both Nostrils every 4 hours PRN  tamsulosin 0.4 milliGRAM(s) Oral at bedtime      PMHX/PSHX:  Dyslipidemia    Hypertension    Chronic GERD    History of aortic stenosis    Cellulitis    No Past Medical History    Brain cancer    No significant past surgical history        Family history:  FH: congestive heart failure (Father, Sibling)    FH: coronary artery disease (Sibling)    FH: brain cancer (Mother)      Denied liver disease    Social History:   Tob: Denies  EtOH: Denies  Illicit Drugs: Denies    ROS:     General:  No wt loss, fevers, chills, night sweats, fatigue  Eyes:  Good vision, no reported pain  ENT:  No sore throat, pain, runny nose, dysphagia  CV:  No pain, palpitations, hypo/hypertension  Pulm:  No dyspnea, cough, tachypnea, wheezing  GI:  see HPI  :  No pain, bleeding, incontinence, nocturia  Muscle:  No pain, weakness  Neuro:  No weakness, tingling, memory problems  Psych:  No fatigue, insomnia, mood problems, depression  Endocrine:  No polyuria, polydipsia, cold/heat intolerance  Heme:  No petechiae, ecchymosis, easy bruisability  Skin:  No rash, tattoos, scars, edema    PHYSICAL EXAM:     GENERAL:  No acute distress, appears older than stated age, sitting on the bed.   HEENT:  NCAT, no scleral icterus   CHEST:  no respiratory distress.   HEART:  Regular rate and rhythm  ABDOMEN:  Soft, non-tender, moderately distended, has reducible hernia, no masses  EXTREMITIES: Has 2 pitting edema w/ chronic venous stasis.   SKIN: Has erythema and ulceration in the right LE, and no spider angioma.   NEURO:  Alert and oriented x 3, no asterixis, no tremors.     Vital Signs:  Vital Signs Last 24 Hrs  T(C): 36.6 (2024 11:14), Max: 36.9 (2024 23:45)  T(F): 97.8 (2024 11:14), Max: 98.5 (2024 23:45)  HR: 83 (2024 11:14) (74 - 92)  BP: 127/73 (2024 11:14) (127/73 - 143/88)  BP(mean): --  RR: 18 (2024 11:14) (18 - 18)  SpO2: 99% (2024 11:14) (98% - 99%)    Parameters below as of 2024 11:14  Patient On (Oxygen Delivery Method): room air      Daily     Daily Weight in k.9 (2024 08:38)    LABS:                        9.5    10.41 )-----------( 206      ( 2024 06:30 )             31.8     Mean Cell Volume: 61.4 fl (24 @ 06:30)    02-    136  |  101  |  33<H>  ----------------------------<  90  3.9   |  21<L>  |  1.62<H>    Ca    8.8      2024 06:31  Phos  3.6     02-07  Mg     2.1     02-08    TPro  6.1  /  Alb  3.4  /  TBili  2.4<H>  /  DBili  x   /  AST  65<H>  /  ALT  238<H>  /  AlkPhos  86  02-08    LIVER FUNCTIONS - ( 2024 06:31 )  Alb: 3.4 g/dL / Pro: 6.1 g/dL / ALK PHOS: 86 U/L / ALT: 238 U/L / AST: 65 U/L / GGT: x             Urinalysis Basic - ( 2024 06:31 )    Color: x / Appearance: x / SG: x / pH: x  Gluc: 90 mg/dL / Ketone: x  / Bili: x / Urobili: x   Blood: x / Protein: x / Nitrite: x   Leuk Esterase: x / RBC: x / WBC x   Sq Epi: x / Non Sq Epi: x / Bacteria: x                              9.5    10.41 )-----------( 206      ( 2024 06:30 )             31.8                         9.4    10.18 )-----------( 206      ( 2024 06:36 )             30.9                         9.2    10.12 )-----------( 199      ( 2024 05:23 )             30.4       Imaging:    CT A/P on 24  FINDINGS:  Limited evaluation of the vasculature and viscera in the absence of   intravenous contrast.    CHEST:  LUNGS AND LARGE AIRWAYS: Patent central airways. Imaging of the lung   fields, particularly inferiorly, is motion degraded. This may limit   evaluation. Otherwise, no discrete infiltrate or nodule identified.   Bilateral subsegmental atelectasis.  PLEURA: Trace right pleural effusion.  VESSELS: Atherosclerotic changes.  HEART: Marked cardiomegaly. Aortic valve calcification. Hypoattenuation   of the blood pool relative to myocardium, consistent with anemia. No   pericardial effusion.  MEDIASTINUM AND KATIA: Nonspecific mildly prominent mediastinal lymph   nodes. For reference, a para-aortic node measures 2.2 x 1.0 cm (series 2   image 26).  CHEST WALL AND LOWER NECK: Within normal limits.    ABDOMEN AND PELVIS:  Imaging of the upper abdomen is motion degraded.    LIVER: Hepatomegaly, the right lobe measuring 19.8 cm craniocaudally.  BILE DUCTS: Normal caliber.  GALLBLADDER: Within normal limits.  SPLEEN: Within normal limits.  PANCREAS: Within normal limits.  ADRENALS: Indeterminate right adrenal nodule measuring 1.3 cm.  KIDNEYS/URETERS: Right upper pole renal cyst. Subcentimeter   hyperattenuating foci in the left kidney that likely represent   proteinaceous/hemorrhagic cysts. No hydronephrosis.    BLADDER: Mild bladder wall thickening.  REPRODUCTIVE ORGANS: The prostate is mildly enlarged.    BOWEL: Moderate hiatal hernia. No bowel obstruction. Colonic diverticula.   No evidence for acute diverticulitis.  PERITONEUM: Small volume ascites.  VESSELS: Atherosclerotic changes.  RETROPERITONEUM/LYMPH NODES: Nonspecific prominent retroperitoneal,   bilateral iliac chain, and bilateral inguinal lymph nodes. For example, a   left inguinal node measures 2.6 x 1.6 cm (series 2 image 175), and a left   para-aortic node measures 1.5 x 1.4 cm (series 2 image 100).  ABDOMINAL WALL: Generalized subcutaneous edema. Subcutaneous cystic   appearing nodular focus measuring 1.2 cm in the left anterior upper   abdominal/lower chest wall (series 2 image 78).  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 Degenerative changes.    IMPRESSION:  *  Exam may be limited by noncontrast technique and motion.  *  Prominent nonspecific mediastinal, retroperitoneal, iliac chain, and   inguinal lymph nodes.  *  Marked cardiomegaly. Aortic valve calcification.  *  Trace right pleural effusion.  *  Hepatomegaly.  *  Indeterminate right adrenal nodule measuring 1.3 cm.  *  Mild urinary bladder wall thickening, which could be due to   underdistention, chronic bladder outlet obstruction, or cystitis.   Correlate with urinalysis.  *  Mildly enlarged prostate.  *  Small volume ascites.  *  Subcutaneous cystic-appearing nodular focus measuring 1.2 cm in the   left anterior upper abdominal/lower chest wall, which may represent an   epidermal inclusion cyst.  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 Recommend correlation for point tenderness.    RUQ US on 24  FINDINGS:  Liver: Normal in size and echogenicity. No focal lesions are identified.   The main portal vein is patent with normal directional flow.  Bile ducts: Normal caliber. Common bile duct measures 3 mm.  Gallbladder: Within normal limits. No stones or gallbladder wall   thickening. No sonographic Julian's sign.  Pancreas: Visualized portions are within normal limits.  Right kidney: 10.9 cm. No hydronephrosis. There is 5.5 cm simple   appearing exophytic cyst in upper pole  Ascites: Small amount of perihepatic ascites.  IVC: Visualized portions are within normal limits.    IMPRESSION:  Small amount of perihepatic ascites, otherwise unremarkable abdominal   ultrasound.    Simple appearing right renal cyst.

## 2024-02-08 NOTE — PROGRESS NOTE ADULT - PROBLEM SELECTOR PLAN 6
Unclear etiology, downtrending  Hepatitis panel negative  RUQ US unremarkable  Hepatology eval requested to evaluate degree of liver dysfunction for candidacy for valve intervention

## 2024-02-08 NOTE — CHART NOTE - NSCHARTNOTEFT_GEN_A_CORE
Patient is off the floor for cardiac CTA. His wife is at the bedside and had many questions about TAVR and the required testing and timing. I answered all her questions in detail.  Will review cardiac CTA measurements when available. If he is a candidate for TAVR we will likely schedule the procedure as an outpatient.  Along with nephrology, we will monitor his kidney function after the cardiac CTA.    ZEE Gibbs

## 2024-02-09 LAB
ALBUMIN SERPL ELPH-MCNC: 3.1 G/DL — LOW (ref 3.3–5)
ALP SERPL-CCNC: 76 U/L — SIGNIFICANT CHANGE UP (ref 40–120)
ALT FLD-CCNC: 178 U/L — HIGH (ref 10–45)
ANION GAP SERPL CALC-SCNC: 13 MMOL/L — SIGNIFICANT CHANGE UP (ref 5–17)
AST SERPL-CCNC: 44 U/L — HIGH (ref 10–40)
BILIRUB SERPL-MCNC: 2.2 MG/DL — HIGH (ref 0.2–1.2)
BUN SERPL-MCNC: 34 MG/DL — HIGH (ref 7–23)
CALCIUM SERPL-MCNC: 8.8 MG/DL — SIGNIFICANT CHANGE UP (ref 8.4–10.5)
CHLORIDE SERPL-SCNC: 103 MMOL/L — SIGNIFICANT CHANGE UP (ref 96–108)
CO2 SERPL-SCNC: 22 MMOL/L — SIGNIFICANT CHANGE UP (ref 22–31)
CREAT SERPL-MCNC: 1.58 MG/DL — HIGH (ref 0.5–1.3)
EGFR: 49 ML/MIN/1.73M2 — LOW
GLUCOSE SERPL-MCNC: 110 MG/DL — HIGH (ref 70–99)
HCT VFR BLD CALC: 31.6 % — LOW (ref 39–50)
HGB BLD-MCNC: 9.5 G/DL — LOW (ref 13–17)
MAGNESIUM SERPL-MCNC: 2.2 MG/DL — SIGNIFICANT CHANGE UP (ref 1.6–2.6)
MCHC RBC-ENTMCNC: 18.5 PG — LOW (ref 27–34)
MCHC RBC-ENTMCNC: 30.1 GM/DL — LOW (ref 32–36)
MCV RBC AUTO: 61.6 FL — LOW (ref 80–100)
NRBC # BLD: 1 /100 WBCS — HIGH (ref 0–0)
PLATELET # BLD AUTO: 201 K/UL — SIGNIFICANT CHANGE UP (ref 150–400)
POTASSIUM SERPL-MCNC: 4.3 MMOL/L — SIGNIFICANT CHANGE UP (ref 3.5–5.3)
POTASSIUM SERPL-SCNC: 4.3 MMOL/L — SIGNIFICANT CHANGE UP (ref 3.5–5.3)
PROT SERPL-MCNC: 6 G/DL — SIGNIFICANT CHANGE UP (ref 6–8.3)
RBC # BLD: 5.13 M/UL — SIGNIFICANT CHANGE UP (ref 4.2–5.8)
RBC # FLD: 26.8 % — HIGH (ref 10.3–14.5)
SODIUM SERPL-SCNC: 138 MMOL/L — SIGNIFICANT CHANGE UP (ref 135–145)
WBC # BLD: 10.36 K/UL — SIGNIFICANT CHANGE UP (ref 3.8–10.5)
WBC # FLD AUTO: 10.36 K/UL — SIGNIFICANT CHANGE UP (ref 3.8–10.5)

## 2024-02-09 PROCEDURE — 99233 SBSQ HOSP IP/OBS HIGH 50: CPT

## 2024-02-09 RX ORDER — LIDOCAINE 4 G/100G
1 CREAM TOPICAL DAILY
Refills: 0 | Status: DISCONTINUED | OUTPATIENT
Start: 2024-02-09 | End: 2024-02-23

## 2024-02-09 RX ORDER — SPIRONOLACTONE 25 MG/1
25 TABLET, FILM COATED ORAL DAILY
Refills: 0 | Status: DISCONTINUED | OUTPATIENT
Start: 2024-02-09 | End: 2024-02-23

## 2024-02-09 RX ADMIN — LIDOCAINE 1 PATCH: 4 CREAM TOPICAL at 06:41

## 2024-02-09 RX ADMIN — TRAMADOL HYDROCHLORIDE 50 MILLIGRAM(S): 50 TABLET ORAL at 05:51

## 2024-02-09 RX ADMIN — Medication 50 MILLIGRAM(S): at 05:56

## 2024-02-09 RX ADMIN — Medication 81 MILLIGRAM(S): at 05:51

## 2024-02-09 RX ADMIN — CHLORHEXIDINE GLUCONATE 1 APPLICATION(S): 213 SOLUTION TOPICAL at 05:57

## 2024-02-09 RX ADMIN — TRAMADOL HYDROCHLORIDE 50 MILLIGRAM(S): 50 TABLET ORAL at 06:51

## 2024-02-09 RX ADMIN — TAMSULOSIN HYDROCHLORIDE 0.4 MILLIGRAM(S): 0.4 CAPSULE ORAL at 21:14

## 2024-02-09 RX ADMIN — LIDOCAINE 1 PATCH: 4 CREAM TOPICAL at 19:21

## 2024-02-09 RX ADMIN — LIDOCAINE 1 PATCH: 4 CREAM TOPICAL at 09:24

## 2024-02-09 RX ADMIN — TRAMADOL HYDROCHLORIDE 50 MILLIGRAM(S): 50 TABLET ORAL at 11:16

## 2024-02-09 RX ADMIN — Medication 40 MILLIGRAM(S): at 13:50

## 2024-02-09 RX ADMIN — LIDOCAINE 1 PATCH: 4 CREAM TOPICAL at 21:12

## 2024-02-09 RX ADMIN — Medication 40 MILLIGRAM(S): at 05:51

## 2024-02-09 RX ADMIN — TRAMADOL HYDROCHLORIDE 50 MILLIGRAM(S): 50 TABLET ORAL at 12:17

## 2024-02-09 RX ADMIN — Medication 325 MILLIGRAM(S): at 11:03

## 2024-02-09 NOTE — CHART NOTE - NSCHARTNOTEFT_GEN_A_CORE
Late Documentation:     Notified by RN, patient with tele alert of VTach. Patient seen and examined, awake and alert, sitting up, reports chronic back pain worsened 2/2 poor sleep/mattress. Patient states pain feels similar to pain at home with some improvement with lidocaine patch/hot packs and tramadol PRN. Denies diaphoresis, SOB, CP, palpitations, dizziness/lightheadedness, n/v, abdominal pain or any other complaints at this time. Discussed with telemetry tech, no NSVT on telemetry, ventricular triplets seen on monitor at time event alerted. Discussed with Sina, plan as below.     > Electrolytes WNL, on IV lasix. Continue to monitor BMP and supplement as needed  > Maintain current treatment  > Continue to encourage compliance with telemetry    > Continue to monitor closely  > Patient status and events signed out to night ACP for continued management and care    Alejandra Schmitt PA-C

## 2024-02-09 NOTE — PROGRESS NOTE ADULT - PROBLEM SELECTOR PLAN 4
- Prior normal renal function in 2022, Cr up to 2.5 on [facility] admission  - Appreciate nephro recs; withheld diuresis in light of pending contrasted studies  - Diuresis as above

## 2024-02-09 NOTE — PROGRESS NOTE ADULT - PROBLEM SELECTOR PLAN 6
- mildly elevated AST/ALT on 1/25, which has been stable, now AST/ALT 65/238, and slow rise on bilirubin 2.6 which was normal on admission. (nl enzymes and bili 1/7); also w mild INR elev 1.37, nl plts  - Hepatology following, appreciate assistance regarding further w/u

## 2024-02-09 NOTE — PROGRESS NOTE ADULT - PROBLEM SELECTOR PLAN 1
- had normal LVEF and systolic function in 2018; now appears to be in new systolic heart failure 2/2 severe aortic valve stenosis  - inpatient TAVR evaluation as below  - Diuresis with IV Lasix 40 mg bid; will monitor I/O's, daily weights and adjust as needed  - Toprol 50 mg qd  - will continue to follow and cautiously introduce GDMT (ARB/ARNi, MRA, SGLT2i) while monitoring renal function i/s/o recent contrasted studies - had normal LVEF and systolic function in 2018; now appears to be in new systolic heart failure 2/2 severe aortic valve stenosis  - inpatient TAVR evaluation as below  - Diuresis with IV Lasix 40 mg bid; will monitor I/O's, daily weights and adjust as needed  - GDMT: would start Spironolactone 25 mg qd; c/w Toprol 50 mg qd  - will continue to follow and cautiously introduce GDMT (ARB/ARNi, MRA, SGLT2i) while monitoring renal function i/s/o recent contrasted studies

## 2024-02-09 NOTE — PHYSICAL THERAPY INITIAL EVALUATION ADULT - NSPTDMEREC_GEN_A_CORE
rolling walker Pt will require a rolling walker due to dx of HFrEF to help complete MRADLS's./rolling walker

## 2024-02-09 NOTE — PROGRESS NOTE ADULT - SUBJECTIVE AND OBJECTIVE BOX
Patient seen and examined at bedside.    Overnight Events: No acute events overnight. Denies chest pain or SOB      REVIEW OF SYSTEMS:  Constitutional:     [ x] negative [ ] fevers [ ] chills [ ] weight loss [ ] weight gain  HEENT:                  [ x] negative [ ] dry eyes [ ] eye irritation [ ] postnasal drip [ ] nasal congestion  CV:                         [x ] negative  [ ] chest pain [ ] orthopnea [ ] palpitations [ ] murmur  Resp:                     [ x] negative [ ] cough [ ] shortness of breath [ ] dyspnea [ ] wheezing [ ] sputum [ ]hemoptysis  GI:                          [ x] negative [ ] nausea [ ] vomiting [ ] diarrhea [ ] constipation [ ] abd pain [ ] dysphagia   :                        [ x] negative [ ] dysuria [ ] nocturia [ ] hematuria [ ] increased urinary frequency  Musculoskeletal: [ x] negative [ ] back pain [ ] myalgias [ ] arthralgias [ ] fracture  Skin:                       [ x] negative [ ] rash [ ] itch  Neurological:        [ x] negative [ ] headache [ ] dizziness [ ] syncope [ ] weakness [ ] numbness  Psychiatric:           [ x] negative [ ] anxiety [ ] depression  Endocrine:            [ x] negative [ ] diabetes [ ] thyroid problem  Heme/Lymph:      [ x] negative [ ] anemia [ ] bleeding problem  Allergic/Immune: [x ] negative [ ] itchy eyes [ ] nasal discharge [ ] hives [ ] angioedema    [x ] All other systems negative  [ ] Unable to assess ROS due to    Current Meds:  acetaminophen     Tablet .. 650 milliGRAM(s) Oral every 6 hours PRN  aspirin  chewable 81 milliGRAM(s) Oral daily  chlorhexidine 2% Cloths 1 Application(s) Topical <User Schedule>  epoetin loulou-epbx (RETACRIT) Injectable 45012 Unit(s) SubCutaneous every 7 days  ferrous    sulfate 325 milliGRAM(s) Oral daily  furosemide   Injectable 40 milliGRAM(s) IV Push two times a day  lidocaine   4% Patch 1 Patch Transdermal daily  metoprolol succinate ER 50 milliGRAM(s) Oral daily  sodium chloride 0.65% Nasal 1 Spray(s) Both Nostrils every 4 hours PRN  tamsulosin 0.4 milliGRAM(s) Oral at bedtime  traMADol 50 milliGRAM(s) Oral every 6 hours PRN      PAST MEDICAL & SURGICAL HISTORY:  Dyslipidemia      Hypertension      Chronic GERD      History of aortic stenosis      Cellulitis      No significant past surgical history          Vitals:  T(F): 98.4 (02-09), Max: 98.4 (02-09)  HR: 89 (02-09) (72 - 92)  BP: 129/80 (02-09) (122/78 - 143/88)  RR: 17 (02-09)  SpO2: 94% (02-09)  I&O's Summary    08 Feb 2024 07:01  -  09 Feb 2024 07:00  --------------------------------------------------------  IN: 320 mL / OUT: 100 mL / NET: 220 mL        Physical Exam:  Appearance: No acute distress; well appearing  Eyes: PERRL, EOMI, pink conjunctiva  HENT: Normal oral mucosa  Cardiovascular: RRR, S1, S2, no murmurs, rubs, or gallops; no edema; no JVD  Respiratory: Clear to auscultation bilaterally  Gastrointestinal: soft, non-tender, non-distended with normal bowel sounds  Musculoskeletal: No clubbing; no joint deformity   Neurologic: Non-focal  Lymphatic: No lymphadenopathy  Psychiatry: AAOx3, mood & affect appropriate  Skin: No rashes, ecchymoses, or cyanosis                          9.5    10.36 )-----------( 201      ( 09 Feb 2024 05:14 )             31.6     02-09    138  |  103  |  34<H>  ----------------------------<  110<H>  4.3   |  22  |  1.58<H>    Ca    8.8      09 Feb 2024 05:14  Mg     2.1     02-08    TPro  6.0  /  Alb  3.1<L>  /  TBili  2.2<H>  /  DBili  x   /  AST  44<H>  /  ALT  178<H>  /  AlkPhos  76  02-09                     Patient seen and examined at bedside.    Overnight Events: No acute events overnight. Denies chest pain or SOB.      REVIEW OF SYSTEMS:  Constitutional:     [ x] negative [ ] fevers [ ] chills [ ] weight loss [ ] weight gain  HEENT:                  [ x] negative [ ] dry eyes [ ] eye irritation [ ] postnasal drip [ ] nasal congestion  CV:                         [x ] negative  [ ] chest pain [ ] orthopnea [ ] palpitations [ ] murmur  Resp:                     [ x] negative [ ] cough [ ] shortness of breath [ ] dyspnea [ ] wheezing [ ] sputum [ ]hemoptysis  GI:                          [ x] negative [ ] nausea [ ] vomiting [ ] diarrhea [ ] constipation [ ] abd pain [ ] dysphagia   :                        [ x] negative [ ] dysuria [ ] nocturia [ ] hematuria [ ] increased urinary frequency  Musculoskeletal: [ x] negative [ ] back pain [ ] myalgias [ ] arthralgias [ ] fracture  Skin:                       [ x] negative [ ] rash [ ] itch  Neurological:        [ x] negative [ ] headache [ ] dizziness [ ] syncope [ ] weakness [ ] numbness  Psychiatric:           [ x] negative [ ] anxiety [ ] depression  Endocrine:            [ x] negative [ ] diabetes [ ] thyroid problem  Heme/Lymph:      [ x] negative [ ] anemia [ ] bleeding problem  Allergic/Immune: [x ] negative [ ] itchy eyes [ ] nasal discharge [ ] hives [ ] angioedema    [x ] All other systems negative  [ ] Unable to assess ROS due to    Current Meds:  acetaminophen     Tablet .. 650 milliGRAM(s) Oral every 6 hours PRN  aspirin  chewable 81 milliGRAM(s) Oral daily  chlorhexidine 2% Cloths 1 Application(s) Topical <User Schedule>  epoetin loulou-epbx (RETACRIT) Injectable 76590 Unit(s) SubCutaneous every 7 days  ferrous    sulfate 325 milliGRAM(s) Oral daily  furosemide   Injectable 40 milliGRAM(s) IV Push two times a day  lidocaine   4% Patch 1 Patch Transdermal daily  metoprolol succinate ER 50 milliGRAM(s) Oral daily  sodium chloride 0.65% Nasal 1 Spray(s) Both Nostrils every 4 hours PRN  tamsulosin 0.4 milliGRAM(s) Oral at bedtime  traMADol 50 milliGRAM(s) Oral every 6 hours PRN      PAST MEDICAL & SURGICAL HISTORY:  Dyslipidemia      Hypertension      Chronic GERD      History of aortic stenosis      Cellulitis      No significant past surgical history          Vitals:  T(F): 98.4 (02-09), Max: 98.4 (02-09)  HR: 89 (02-09) (72 - 92)  BP: 129/80 (02-09) (122/78 - 143/88)  RR: 17 (02-09)  SpO2: 94% (02-09)  I&O's Summary    08 Feb 2024 07:01  -  09 Feb 2024 07:00  --------------------------------------------------------  IN: 320 mL / OUT: 100 mL / NET: 220 mL    Tele: refused, no tele data    Physical Exam:  Appearance: No acute distress; well appearing  Eyes: PERRL, EOMI, pink conjunctiva  HENT: Normal oral mucosa  Cardiovascular: RRR, S1, S2, no murmurs, rubs, or gallops; no edema; no JVD  Respiratory: Clear to auscultation bilaterally  Gastrointestinal: soft, non-tender, non-distended with normal bowel sounds  Musculoskeletal: No clubbing; no joint deformity   Neurologic: Non-focal  Lymphatic: No lymphadenopathy  Psychiatry: AAOx3, mood & affect appropriate  Skin: No rashes, ecchymoses, or cyanosis                          9.5    10.36 )-----------( 201      ( 09 Feb 2024 05:14 )             31.6     02-09    138  |  103  |  34<H>  ----------------------------<  110<H>  4.3   |  22  |  1.58<H>    Ca    8.8      09 Feb 2024 05:14  Mg     2.1     02-08    TPro  6.0  /  Alb  3.1<L>  /  TBili  2.2<H>  /  DBili  x   /  AST  44<H>  /  ALT  178<H>  /  AlkPhos  76  02-09

## 2024-02-09 NOTE — PROGRESS NOTE ADULT - PROBLEM SELECTOR PLAN 1
HFrEF (EF 35-40% 1/2024) with severe AS  Resume IV Lasix  Cont Toprol   CTA MARTIR completed  [facility] revealed non-obstructive CAD  HF and structural heart following, surgery also a consideration

## 2024-02-09 NOTE — PROGRESS NOTE ADULT - SUBJECTIVE AND OBJECTIVE BOX
Jad Andino MD    Patient is a 63y old  Male who presents with a chief complaint of aortic stenosis (08 Feb 2024 18:30)        SUBJECTIVE / OVERNIGHT EVENTS: Patient seen and examined.   TELEMETRY: SR 70-80;s,  for 2.8 secs    MEDICATIONS  (STANDING):  aspirin  chewable 81 milliGRAM(s) Oral daily  chlorhexidine 2% Cloths 1 Application(s) Topical <User Schedule>  epoetin loulou-epbx (RETACRIT) Injectable 62415 Unit(s) SubCutaneous every 7 days  ferrous    sulfate 325 milliGRAM(s) Oral daily  furosemide   Injectable 40 milliGRAM(s) IV Push two times a day  lidocaine   4% Patch 1 Patch Transdermal daily  metoprolol succinate ER 50 milliGRAM(s) Oral daily  tamsulosin 0.4 milliGRAM(s) Oral at bedtime    MEDICATIONS  (PRN):  acetaminophen     Tablet .. 650 milliGRAM(s) Oral every 6 hours PRN Mild Pain (1 - 3)  sodium chloride 0.65% Nasal 1 Spray(s) Both Nostrils every 4 hours PRN Nasal Congestion  traMADol 50 milliGRAM(s) Oral every 6 hours PRN Severe Pain (7 - 10)      Vital Signs Last 24 Hrs  T(C): 36.9 (09 Feb 2024 04:00), Max: 36.9 (09 Feb 2024 04:00)  T(F): 98.4 (09 Feb 2024 04:00), Max: 98.4 (09 Feb 2024 04:00)  HR: 89 (09 Feb 2024 04:00) (72 - 92)  BP: 129/80 (09 Feb 2024 04:00) (122/78 - 143/88)  BP(mean): --  RR: 17 (09 Feb 2024 04:00) (17 - 18)  SpO2: 94% (09 Feb 2024 04:00) (94% - 99%)    Parameters below as of 09 Feb 2024 04:00  Patient On (Oxygen Delivery Method): room air      CAPILLARY BLOOD GLUCOSE        I&O's Summary    08 Feb 2024 07:01  -  09 Feb 2024 07:00  --------------------------------------------------------  IN: 320 mL / OUT: 100 mL / NET: 220 mL          PHYSICAL EXAM        LABS:                        9.5    10.36 )-----------( 201      ( 09 Feb 2024 05:14 )             31.6     02-09    138  |  103  |  34<H>  ----------------------------<  110<H>  4.3   |  22  |  1.58<H>    Ca    8.8      09 Feb 2024 05:14  Mg     2.1     02-08    TPro  6.0  /  Alb  3.1<L>  /  TBili  2.2<H>  /  DBili  x   /  AST  44<H>  /  ALT  178<H>  /  AlkPhos  76  02-09          Urinalysis Basic - ( 09 Feb 2024 05:14 )    Color: x / Appearance: x / SG: x / pH: x  Gluc: 110 mg/dL / Ketone: x  / Bili: x / Urobili: x   Blood: x / Protein: x / Nitrite: x   Leuk Esterase: x / RBC: x / WBC x   Sq Epi: x / Non Sq Epi: x / Bacteria: x          RADIOLOGY & ADDITIONAL TESTS:    Imaging Personally Reviewed:  Consultant(s) Notes Reviewed:    Care Discussed with Consultants/Other Providers:   Jad Andino MD    Patient is a 63y old  Male who presents with a chief complaint of aortic stenosis (08 Feb 2024 18:30)        SUBJECTIVE / OVERNIGHT EVENTS: Patient seen and examined.   TELEMETRY: SR 70-80;s,  for 2.8 secs    MEDICATIONS  (STANDING):  aspirin  chewable 81 milliGRAM(s) Oral daily  chlorhexidine 2% Cloths 1 Application(s) Topical <User Schedule>  epoetin loulou-epbx (RETACRIT) Injectable 64391 Unit(s) SubCutaneous every 7 days  ferrous    sulfate 325 milliGRAM(s) Oral daily  furosemide   Injectable 40 milliGRAM(s) IV Push two times a day  lidocaine   4% Patch 1 Patch Transdermal daily  metoprolol succinate ER 50 milliGRAM(s) Oral daily  tamsulosin 0.4 milliGRAM(s) Oral at bedtime    MEDICATIONS  (PRN):  acetaminophen     Tablet .. 650 milliGRAM(s) Oral every 6 hours PRN Mild Pain (1 - 3)  sodium chloride 0.65% Nasal 1 Spray(s) Both Nostrils every 4 hours PRN Nasal Congestion  traMADol 50 milliGRAM(s) Oral every 6 hours PRN Severe Pain (7 - 10)      Vital Signs Last 24 Hrs  T(C): 36.9 (09 Feb 2024 04:00), Max: 36.9 (09 Feb 2024 04:00)  T(F): 98.4 (09 Feb 2024 04:00), Max: 98.4 (09 Feb 2024 04:00)  HR: 89 (09 Feb 2024 04:00) (72 - 92)  BP: 129/80 (09 Feb 2024 04:00) (122/78 - 143/88)  BP(mean): --  RR: 17 (09 Feb 2024 04:00) (17 - 18)  SpO2: 94% (09 Feb 2024 04:00) (94% - 99%)    Parameters below as of 09 Feb 2024 04:00  Patient On (Oxygen Delivery Method): room air      CAPILLARY BLOOD GLUCOSE        I&O's Summary    08 Feb 2024 07:01  -  09 Feb 2024 07:00  --------------------------------------------------------  IN: 320 mL / OUT: 100 mL / NET: 220 mL          PHYSICAL EXAM  GENERAL: NAD, well-developed  HEAD:  Atraumatic, normocephalic  EYES: EOMI, PERRLA, conjunctiva and sclera clear  CHEST/LUNG: Clear to auscultation bilaterally; no wheezes  HEART: +S1+S2, regular rate and rhythm; 4/6 KAYLA  ABDOMEN: Soft, nontender, nondistended; bowel sounds present  EXTREMITIES:  2+ peripheral pulses; no clubbing, cyanosis; 1+ pedal edema; RLE healing pretibial ulceration- no erythema  PSYCH: AAOx3        LABS:                        9.5    10.36 )-----------( 201      ( 09 Feb 2024 05:14 )             31.6     02-09    138  |  103  |  34<H>  ----------------------------<  110<H>  4.3   |  22  |  1.58<H>    Ca    8.8      09 Feb 2024 05:14  Mg     2.1     02-08    TPro  6.0  /  Alb  3.1<L>  /  TBili  2.2<H>  /  DBili  x   /  AST  44<H>  /  ALT  178<H>  /  AlkPhos  76  02-09          Urinalysis Basic - ( 09 Feb 2024 05:14 )    Color: x / Appearance: x / SG: x / pH: x  Gluc: 110 mg/dL / Ketone: x  / Bili: x / Urobili: x   Blood: x / Protein: x / Nitrite: x   Leuk Esterase: x / RBC: x / WBC x   Sq Epi: x / Non Sq Epi: x / Bacteria: x          RADIOLOGY & ADDITIONAL TESTS:    Imaging Personally Reviewed:  Consultant(s) Notes Reviewed:    Care Discussed with Consultants/Other Providers:

## 2024-02-09 NOTE — PROGRESS NOTE ADULT - ASSESSMENT
Mr Butcher is a 64 yo male with Hx of HTN, GERD, dyslipidemia, chronic intermittent leg pain, aortic stenosis, chronic venous stasis dermatitis with bilateral leg swelling, presents as transfer from  for TAVR evaluation in the setting of severe AS.     Pt has a known history of moderate AS, seen by Dr. Gilbert Rangel back in 2019. Had prior ischemic workup with NST/cath and TTE in , most recently EF 61% in 2018 with moderate AS. He presented to  ED sent by PCP for failed outpatient therapy with bactrim for cellulitis; on ROS endorsed worsenign b/l leg swelling and chronic VANCE and 3 pillow orthopnea. In the ED temp 98.2, HR 86, 110/67, RR, SPO2 96% Labs significant for BNP 91770, WBC 15, troponin 24, INR 1.37, Cr 1.7     ECG consistent with sinus rhythm, RBBB and LAFB - which is similar to outpatient ECG from 2019.  Echo report consistent with LVEF 35-40%, moderate mitral regurgitation, severe aortic valve stenosis and moderate pulmonary hypertension (images reviewed). Prior echo report from 2018 consistent with LVEF 61% with normal RV size and function at that time. Coronary angiogram from  was consistent with normal coronaries at that time. Labs were notable for significant anemia, Hgb 7.5, patient had Hgb of 11.1 on  outpatient labs and so has significantly declined. Cr now elevated to 2.2. Troponin negative. Pro BNP markedly elevated. Received IV Lasix 40 mg qd at  and started Metoprolol for GDMT and i/s/o NSVT on telemetry.      Cardiac Studies:    EKG: NSR, LAD, RBBB    TTE 24: EF 38% (Biplane), LVIDd 6.1, concentric LVH, basal inferior akinesis; mid infero/anterolateral and inferior hypokinesis, gr III DD; mod enlarg RV w nl fn, sev LAE, mod MR, mod pHTN (PASP 59); est RAp 15, severe AS (MARY 0.71, Max Salbador: 3.94 m/s AoV Peak P.1 mmHg AoV Mean P.0 mmHg), mild AI     LHC pending formal report; reportedly non-obstructive coronaries     Exercise Nuclear Stress Test (2013): small mild defect in apical lateral wall predom reversible defect c/w mild ischemia; LVEF 62%

## 2024-02-09 NOTE — PROGRESS NOTE ADULT - ASSESSMENT
63M hx HTN, GERD, dyslipidemia, chronic intermittent leg pain, aortic stenosis, chronic venous stasis dermatitis with bilateral leg swelling, admitted to [facility] with b/l leg pain and swelling, transferred to [facility] for TAVR evaluation for severe AS

## 2024-02-09 NOTE — CHART NOTE - NSCHARTNOTEFT_GEN_A_CORE
Impression:   63 yrs old male w/ hx of HTN, GERD, dyslipidemia, chronic intermittent leg pain, aortic stenosis, chronic venous stasis dermatitis who p/w b/l leg edema. Patient was seen in [place] [facility] on 1/25/2024 for bilateral leg pain and swelling. Hospital course c/w new onset HFrEF along with severe AS, now s/p LHC which showed non obstructive CAD and being considered for o/p TAVR, now has elevated liver enzymes.     #Elevated liver enzymes  On admission, had mildly elevated AST/ALT on 1/25, which has been stable, now AST/ALT 65/238, and slow rise on bilirubin 2.6 which was normal on admission. On 1/7/24, he had normal liver enzymes and bilirubin. Patient also had mildly elevated INR 1.37 (no prior values), and normal platelets.    - Denied prior hx of liver disease or alcohol use.   - Imaging showed hepatomegaly, and small ascites, spleen appears normal.   - TTE showed EF 30%, moderate MR and severe TR.   - Concerned for possible hepatic congestion leading to elevated liver enzymes, not a typical pattern for alcohol related liver disease. Other differentials include chronic hepatitis infection vs. possible autoimmune disease, although less likely. Does not clearly show clinical signs of liver cirrhosis at this time but could have a component of fibrosis. Less concerned for biliary obstruction with no elevated ALP and abd pain at this time.   - Other lab serologies: Hep B/C/A neg.   - Patient underwent Abd elastography on 2/8 - moderate risk fibrosis w/ hepatomegaly, hepatic steatosis, dilated IVC w/ passive congestion. Difficult to interpret the results, especially in the setting of systolic heart failure and volume overload.     Recommendations:   - If considering cardiac surgery later next week, recommend repeating elastography when the patient is more euvolemic. Will need to discuss with cardiology team.   - Lab serologies: alpha-1 anti-trypsin (phenotype), ceruloplasm, AFP, NEIL, anti-mitochondrial antibody, anti-smooth muscle antibody, anti-liver kidney antibody, immunoglobulins (IgG, IgM, IgA quantitative) for autoimmune etiologies pending.   - Please obtain US doppler.   - CMP, PT/INR, and CBC daily.     Discussed the case with Dr. Hernandez.       Corona Novoa, PGY-5  Gastroenterology/Hepatology Fellow  Available on [employer] Teams  [phone] (Short Range Pager)  [phone] (Long Range Pager)    After 5pm, please contact the on-call GI fellow. [phone] Impression:   63 yrs old male w/ hx of HTN, GERD, dyslipidemia, chronic intermittent leg pain, aortic stenosis, chronic venous stasis dermatitis who p/w b/l leg edema. Patient was seen in [place] ER on 1/25/2024 for bilateral leg pain and swelling. Hospital course c/w new onset HFrEF along with severe AS, now s/p LHC which showed non obstructive CAD and being considered for o/p TAVR, now has elevated liver enzymes.     #Elevated liver enzymes  On admission, had mildly elevated AST/ALT on 1/25, which has been stable, now AST/ALT 65/238, and slow rise on bilirubin 2.6 which was normal on admission. On 1/7/24, he had normal liver enzymes and bilirubin. Patient also had mildly elevated INR 1.37 (no prior values), and normal platelets.    - Denied prior hx of liver disease or alcohol use.   - Imaging showed hepatomegaly, and small ascites, spleen appears normal.   - TTE showed EF 30%, moderate MR and severe TR.   - Concerned for possible hepatic congestion leading to elevated liver enzymes, not a typical pattern for alcohol related liver disease. Other differentials include chronic hepatitis infection vs. possible autoimmune disease, although less likely. Does not clearly show clinical signs of liver cirrhosis at this time but could have a component of fibrosis. Less concerned for biliary obstruction with no elevated ALP and abd pain at this time.   - Other lab serologies: Hep B/C/A neg.   - Patient underwent Abd elastography on 2/8 - moderate risk fibrosis w/ hepatomegaly, hepatic steatosis, dilated IVC w/ passive congestion. Difficult to interpret the results, especially in the setting of systolic heart failure and volume overload.     Recommendations:   - Discussed with cardiology team, would like to proceed w/ surgery sometime next week, recommend transjugular liver biopsy w/ pressure measurement before proceed w/ cardiac surgery.   - Lab serologies: alpha-1 anti-trypsin (phenotype), ceruloplasm, AFP, NEIL, anti-mitochondrial antibody, anti-smooth muscle antibody, anti-liver kidney antibody, immunoglobulins (IgG, IgM, IgA quantitative) for autoimmune etiologies pending.   - Please obtain US doppler.   - CMP, PT/INR, and CBC daily.     Discussed the case with Dr. Hernandez.       Corona Novoa, PGY-5  Gastroenterology/Hepatology Fellow  Available on Microsoft Teams  [phone] (Short Range Pager)  [phone] (Long Range Pager)    After 5pm, please contact the on-call GI fellow. [phone]

## 2024-02-09 NOTE — PROGRESS NOTE ADULT - PROBLEM SELECTOR PLAN 3
With liver dysfunction  Hepatitis panel negative  RUQ US unremarkable  Elastography revealed borderline moderate risk of clinically significant fibrosis  Hepatology following

## 2024-02-09 NOTE — PROGRESS NOTE ADULT - PROBLEM SELECTOR PLAN 2
- severe AS (MARY 0.71, Max Salbador: 3.94 m/s AoV Peak P.1 mmHg AoV Mean P.0 mmHg)  - appreciate structural input and evaluation for TAVR; CT w significant Ca score >5k, (+) fusion of N-R cusps  - anatomy favors surgical AVR laura i/s/o dilated ascending aorta; however liver dz portends high surgical risk, hepatology w/u as below  - management of volume status as above.

## 2024-02-09 NOTE — PROGRESS NOTE ADULT - SUBJECTIVE AND OBJECTIVE BOX
NEPHROLOGY PROGRESS NOTE    CHIEF COMPLAINT:  LINDA/CKD    HPI:  Renal function has been stable 24 hrs post IV contrast study.    EXAM:  Vital Signs Last 24 Hrs  T(C): 36.2 (2024 11:13), Max: 36.9 (2024 04:00)  T(F): 97.2 (2024 11:13), Max: 98.4 (2024 04:00)  HR: 72 (:34) (72 - 89)  BP: 130/77 (:34) (115/79 - 141/69)  BP(mean): --  RR: 18 (2024 11:13) (17 - 18)  SpO2: 96% (:34) (94% - 98%)    Parameters below as of :34  Patient On (Oxygen Delivery Method): room air      I&O's Summary    2024 07:01  -  2024 07:00  --------------------------------------------------------  IN: 320 mL / OUT: 100 mL / NET: 220 mL      Daily     Daily Weight in k.4 (2024 08:00)    Conversant, in no apparent distress  Normal respiratory effort, lungs clear bilaterally  Heart RRR with no murmur, no peripheral edema    LABS                        9.5    10.36 )-----------( 201      ( 2024 05:14 )             31.6     02-09    138  |  103  |  34<H>  ----------------------------<  110<H>  4.3   |  22  |  1.58<H>    Ca    8.8      2024 05:14  Mg     2.1     02-08    TPro  6.0  /  Alb  3.1<L>  /  TBili  2.2<H>  /  DBili  x   /  AST  44<H>  /  ALT  178<H>  /  AlkPhos  76  02-09        A/P:    CM, EF 35-40%, severe AS  Tx from [facility] to [facility]  for TAVR eval, s/p [facility]   Cardio-renal/hemodynamic LIDNA/CKD 3 has improved off diuretics (peak Cr 2.5 - 2/3/24, prior Cr 1.43 - 24)   No objection to resume diuretics now  Avoid nephrotoxins   [facility] in     [phone]

## 2024-02-10 DIAGNOSIS — M54.9 DORSALGIA, UNSPECIFIED: ICD-10-CM

## 2024-02-10 LAB
ALBUMIN SERPL ELPH-MCNC: 3.1 G/DL — LOW (ref 3.3–5)
ALP SERPL-CCNC: 79 U/L — SIGNIFICANT CHANGE UP (ref 40–120)
ALT FLD-CCNC: 142 U/L — HIGH (ref 10–45)
ANION GAP SERPL CALC-SCNC: 15 MMOL/L — SIGNIFICANT CHANGE UP (ref 5–17)
AST SERPL-CCNC: 32 U/L — SIGNIFICANT CHANGE UP (ref 10–40)
BILIRUB SERPL-MCNC: 2.3 MG/DL — HIGH (ref 0.2–1.2)
BUN SERPL-MCNC: 34 MG/DL — HIGH (ref 7–23)
CALCIUM SERPL-MCNC: 8.7 MG/DL — SIGNIFICANT CHANGE UP (ref 8.4–10.5)
CERULOPLASMIN SERPL-MCNC: 42 MG/DL — HIGH (ref 15–30)
CHLORIDE SERPL-SCNC: 101 MMOL/L — SIGNIFICANT CHANGE UP (ref 96–108)
CO2 SERPL-SCNC: 23 MMOL/L — SIGNIFICANT CHANGE UP (ref 22–31)
CREAT SERPL-MCNC: 1.63 MG/DL — HIGH (ref 0.5–1.3)
EGFR: 47 ML/MIN/1.73M2 — LOW
GLUCOSE SERPL-MCNC: 79 MG/DL — SIGNIFICANT CHANGE UP (ref 70–99)
HCT VFR BLD CALC: 32.5 % — LOW (ref 39–50)
HGB BLD-MCNC: 9.8 G/DL — LOW (ref 13–17)
INR BLD: 1.24 RATIO — HIGH (ref 0.85–1.18)
MAGNESIUM SERPL-MCNC: 2.2 MG/DL — SIGNIFICANT CHANGE UP (ref 1.6–2.6)
MCHC RBC-ENTMCNC: 18.7 PG — LOW (ref 27–34)
MCHC RBC-ENTMCNC: 30.2 GM/DL — LOW (ref 32–36)
MCV RBC AUTO: 62.1 FL — LOW (ref 80–100)
NRBC # BLD: 0 /100 WBCS — SIGNIFICANT CHANGE UP (ref 0–0)
PLATELET # BLD AUTO: 200 K/UL — SIGNIFICANT CHANGE UP (ref 150–400)
POTASSIUM SERPL-MCNC: 3.8 MMOL/L — SIGNIFICANT CHANGE UP (ref 3.5–5.3)
POTASSIUM SERPL-SCNC: 3.8 MMOL/L — SIGNIFICANT CHANGE UP (ref 3.5–5.3)
PROT SERPL-MCNC: 5.9 G/DL — LOW (ref 6–8.3)
PROTHROM AB SERPL-ACNC: 13.6 SEC — HIGH (ref 9.5–13)
RBC # BLD: 5.23 M/UL — SIGNIFICANT CHANGE UP (ref 4.2–5.8)
RBC # FLD: 27 % — HIGH (ref 10.3–14.5)
SODIUM SERPL-SCNC: 139 MMOL/L — SIGNIFICANT CHANGE UP (ref 135–145)
WBC # BLD: 9.48 K/UL — SIGNIFICANT CHANGE UP (ref 3.8–10.5)
WBC # FLD AUTO: 9.48 K/UL — SIGNIFICANT CHANGE UP (ref 3.8–10.5)

## 2024-02-10 PROCEDURE — 99233 SBSQ HOSP IP/OBS HIGH 50: CPT

## 2024-02-10 RX ORDER — POTASSIUM CHLORIDE 20 MEQ
20 PACKET (EA) ORAL ONCE
Refills: 0 | Status: COMPLETED | OUTPATIENT
Start: 2024-02-10 | End: 2024-02-10

## 2024-02-10 RX ADMIN — CHLORHEXIDINE GLUCONATE 1 APPLICATION(S): 213 SOLUTION TOPICAL at 05:19

## 2024-02-10 RX ADMIN — Medication 50 MILLIGRAM(S): at 05:18

## 2024-02-10 RX ADMIN — Medication 40 MILLIGRAM(S): at 05:19

## 2024-02-10 RX ADMIN — Medication 650 MILLIGRAM(S): at 19:20

## 2024-02-10 RX ADMIN — Medication 40 MILLIGRAM(S): at 14:17

## 2024-02-10 RX ADMIN — LIDOCAINE 1 PATCH: 4 CREAM TOPICAL at 20:35

## 2024-02-10 RX ADMIN — Medication 650 MILLIGRAM(S): at 17:35

## 2024-02-10 RX ADMIN — LIDOCAINE 1 PATCH: 4 CREAM TOPICAL at 23:54

## 2024-02-10 RX ADMIN — Medication 81 MILLIGRAM(S): at 05:19

## 2024-02-10 RX ADMIN — Medication 325 MILLIGRAM(S): at 11:20

## 2024-02-10 RX ADMIN — TAMSULOSIN HYDROCHLORIDE 0.4 MILLIGRAM(S): 0.4 CAPSULE ORAL at 21:03

## 2024-02-10 RX ADMIN — Medication 20 MILLIEQUIVALENT(S): at 08:48

## 2024-02-10 RX ADMIN — LIDOCAINE 1 PATCH: 4 CREAM TOPICAL at 11:21

## 2024-02-10 RX ADMIN — SPIRONOLACTONE 25 MILLIGRAM(S): 25 TABLET, FILM COATED ORAL at 05:18

## 2024-02-10 NOTE — PROGRESS NOTE ADULT - PROBLEM SELECTOR PLAN 6
Continue Flomax Evaluated by hematology at  -multifactorial anemia, mechanical hemolysis due to severe AS also possible  S/p 1 unit PRBCs on 1/31, s/p IV iron   Continue oral iron supplementation   Maintain hemoglobin above 8 g/dL due to patient's advanced aortic stenosis and need for TAVR.

## 2024-02-10 NOTE — PROGRESS NOTE ADULT - PROBLEM SELECTOR PLAN 5
Evaluated by hematology at  -multifactorial anemia, mechanical hemolysis due to severe AS also possible  S/p 1 unit PRBCs on 1/31, s/p IV iron   Continue oral iron supplementation   Maintain hemoglobin above 8 g/dL due to patient's advanced aortic stenosis and need for TAVR. Seen by ID at , s/p five days of keflex- likely not infectious given limited change with antibiotics  Leg elevation as needed, no ID objection to cardiac workup

## 2024-02-10 NOTE — PROGRESS NOTE ADULT - PROBLEM SELECTOR PLAN 4
Seen by ID at , s/p five days of keflex- likely not infectious given limited change with antibiotics  Leg elevation as needed, no ID objection to cardiac workup Old mid back pain, no bladder or bowel incontinence  - CT T spine  - Percocet, Lido patch Old mid back pain, no bladder or bowel incontinence  - CT T spine ( patient refused)  - Perctasia, Lido patch

## 2024-02-10 NOTE — PROGRESS NOTE ADULT - PROBLEM SELECTOR PLAN 1
HFrEF (EF 35-40% 1/2024) with severe AS  Resumed IV Lasix  Cont Toprol   CTA MARTIR completed  [facility] revealed non-obstructive CAD  HF and structural heart following, surgery also a consideration HFrEF (EF 35-40% 1/2024) with severe AS  Resumed IV Lasix  Cont Toprol   CTA MARTIR completed  [facility] revealed non-obstructive CAD  HF and structural heart following, surgery also a consideration  ** spoke to family at bedside

## 2024-02-10 NOTE — PROGRESS NOTE ADULT - SUBJECTIVE AND OBJECTIVE BOX
[facility] NEPHROLOGY SERVICES, [facility]  NEPHROLOGY AND HYPERTENSION  [address]  [address]  [place] [address]  [phone]    MD JEAN PARSONS, MD JULIANNA BLANKENSHIP, MD OJ MENDOZA, MD SUMMER SIMPSON MD          Patient events noted  No distress      MEDICATIONS  (STANDING):  aspirin  chewable 81 milliGRAM(s) Oral daily  chlorhexidine 2% Cloths 1 Application(s) Topical <User Schedule>  epoetin loulou-epbx (RETACRIT) Injectable 34428 Unit(s) SubCutaneous every 7 days  ferrous    sulfate 325 milliGRAM(s) Oral daily  furosemide   Injectable 40 milliGRAM(s) IV Push two times a day  lidocaine   4% Patch 1 Patch Transdermal daily  metoprolol succinate ER 50 milliGRAM(s) Oral daily  spironolactone 25 milliGRAM(s) Oral daily  tamsulosin 0.4 milliGRAM(s) Oral at bedtime    MEDICATIONS  (PRN):  acetaminophen     Tablet .. 650 milliGRAM(s) Oral every 6 hours PRN Mild Pain (1 - 3)  oxycodone    5 mG/acetaminophen 325 mG 1 Tablet(s) Oral every 6 hours PRN Severe Pain (7 - 10)  sodium chloride 0.65% Nasal 1 Spray(s) Both Nostrils every 4 hours PRN Nasal Congestion      02-09-24 @ 07:01  -  02-10-24 @ 07:00  --------------------------------------------------------  IN: 740 mL / OUT: 900 mL / NET: -160 mL    02-10-24 @ 07:01  -  02-10-24 @ 22:06  --------------------------------------------------------  IN: 480 mL / OUT: 1800 mL / NET: -1320 mL      PHYSICAL EXAM:      T(C): 36.4 (02-10-24 @ 20:14), Max: 36.8 (02-10-24 @ 08:37)  HR: 68 (02-10-24 @ 20:14) (58 - 78)  BP: 117/68 (02-10-24 @ 20:14) (117/68 - 138/78)  RR: 18 (02-10-24 @ 20:14) (18 - 18)  SpO2: 98% (02-10-24 @ 20:14) (93% - 99%)  Wt(kg): --  Lungs clear  Heart S1S2  + KAYLA  Abd soft NT ND  Extremities:   tr edema                                    9.8    9.48  )-----------( 200      ( 10 Feb 2024 06:29 )             32.5     02-10    139  |  101  |  34<H>  ----------------------------<  79  3.8   |  23  |  1.63<H>    Ca    8.7      10 Feb 2024 06:28  Mg     2.2     02-10    TPro  5.9<L>  /  Alb  3.1<L>  /  TBili  2.3<H>  /  DBili  x   /  AST  32  /  ALT  142<H>  /  AlkPhos  79  02-10      LIVER FUNCTIONS - ( 10 Feb 2024 06:28 )  Alb: 3.1 g/dL / Pro: 5.9 g/dL / ALK PHOS: 79 U/L / ALT: 142 U/L / AST: 32 U/L / GGT: x           Creatinine Trend: 1.63<--, 1.58<--, 1.62<--, 1.66<--, 1.84<--, 2.19<--        A/P:    CM, EF 35-40%, severe AS  Tx from [facility] to [facility] 2/5 for TAVR eval, s/p [facility] 2/5  Cardio-renal/hemodynamic LINDA/CKD 3 has improved off diuretics (peak Cr 2.5 - 2/3/24, prior Cr 1.43 - 1/29/24)   Avoid nephrotoxins   BMP in am      Pedrito Irving MD

## 2024-02-10 NOTE — PROGRESS NOTE ADULT - SUBJECTIVE AND OBJECTIVE BOX
Mohsin Khan, MD  Attending Physician, Division Of Hospital Medicine  Pager: [phone], Office: [phone]  Off hour pager: [phone]    Patient is a 63y old  Male who presents with a chief complaint of aortic stenosis     SUBJECTIVE / OVERNIGHT EVENTS:  Seen, examined the patient this am    MEDICATIONS  (STANDING):  aspirin  chewable 81 milliGRAM(s) Oral daily  chlorhexidine 2% Cloths 1 Application(s) Topical <User Schedule>  epoetin loulou-epbx (RETACRIT) Injectable 11298 Unit(s) SubCutaneous every 7 days  ferrous    sulfate 325 milliGRAM(s) Oral daily  furosemide   Injectable 40 milliGRAM(s) IV Push two times a day  lidocaine   4% Patch 1 Patch Transdermal daily  metoprolol succinate ER 50 milliGRAM(s) Oral daily  spironolactone 25 milliGRAM(s) Oral daily  tamsulosin 0.4 milliGRAM(s) Oral at bedtime    MEDICATIONS  (PRN):  acetaminophen     Tablet .. 650 milliGRAM(s) Oral every 6 hours PRN Mild Pain (1 - 3)  sodium chloride 0.65% Nasal 1 Spray(s) Both Nostrils every 4 hours PRN Nasal Congestion  traMADol 50 milliGRAM(s) Oral every 6 hours PRN Severe Pain (7 - 10)      Vital Signs Last 24 Hrs  T(C): 36.8 (10 Feb 2024 08:37), Max: 36.8 (10 Feb 2024 08:37)  T(F): 98.3 (10 Feb 2024 08:37), Max: 98.3 (10 Feb 2024 08:37)  HR: 61 (10 Feb 2024 08:37) (58 - 78)  BP: 120/75 (10 Feb 2024 08:37) (118/51 - 133/83)  BP(mean): --  RR: 18 (10 Feb 2024 08:37) (17 - 18)  SpO2: 95% (10 Feb 2024 08:37) (92% - 99%)    Parameters below as of 10 Feb 2024 08:37  Patient On (Oxygen Delivery Method): room air      CAPILLARY BLOOD GLUCOSE        I&O's Summary    09 Feb 2024 07:01  -  10 Feb 2024 07:00  --------------------------------------------------------  IN: 740 mL / OUT: 900 mL / NET: -160 mL    10 Feb 2024 07:01  -  10 Feb 2024 11:22  --------------------------------------------------------  IN: 240 mL / OUT: 600 mL / NET: -360 mL        PHYSICAL EXAM:-  GENERAL: NAD, well-developed  EYES: EOMI, PERRLA, conjunctiva and sclera clear  NECK: Supple, No JVD, no thyromegaly  CHEST/LUNG: Clear to auscultation bilaterally; No wheeze  HEART: Regular rate and rhythm; S1, S2 audible, No murmurs, rubs, or gallops  ABDOMEN: Soft, Nontender, Nondistended; Bowel sounds present  EXTREMITIES:  2+ Peripheral Pulses, No clubbing, cyanosis, or edema  NEURO: AAOx3, no focal deficit      LABS:                        9.8    9.48  )-----------( 200      ( 10 Feb 2024 06:29 )             32.5     02-10    139  |  101  |  34<H>  ----------------------------<  79  3.8   |  23  |  1.63<H>    Ca    8.7      10 Feb 2024 06:28  Mg     2.2     02-10    TPro  5.9<L>  /  Alb  3.1<L>  /  TBili  2.3<H>  /  DBili  x   /  AST  32  /  ALT  142<H>  /  AlkPhos  79  02-10    PT/INR - ( 10 Feb 2024 06:29 )   PT: 13.6 sec;   INR: 1.24 ratio               Urinalysis Basic - ( 10 Feb 2024 06:28 )    Color: x / Appearance: x / SG: x / pH: x  Gluc: 79 mg/dL / Ketone: x  / Bili: x / Urobili: x   Blood: x / Protein: x / Nitrite: x   Leuk Esterase: x / RBC: x / WBC x   Sq Epi: x / Non Sq Epi: x / Bacteria: x        RADIOLOGY & ADDITIONAL TESTS:    Imaging Personally Reviewed: CXR, cho, Cath  Consultant(s) Notes Reviewed: Card, Structural     Mohsin Khan, MD  Attending Physician, Division Of Hospital Medicine  Pager: [phone], Office: [phone]  Off hour pager: [phone]    Patient is a 63y old  Male who presents with a chief complaint of aortic stenosis     SUBJECTIVE / OVERNIGHT EVENTS:  Seen, examined the patient this am  c/o back pain, no SOB or chest pain but exertional dyspnea, VSS, no Tele events    MEDICATIONS  (STANDING):  aspirin  chewable 81 milliGRAM(s) Oral daily  chlorhexidine 2% Cloths 1 Application(s) Topical <User Schedule>  epoetin loulou-epbx (RETACRIT) Injectable 95689 Unit(s) SubCutaneous every 7 days  ferrous    sulfate 325 milliGRAM(s) Oral daily  furosemide   Injectable 40 milliGRAM(s) IV Push two times a day  lidocaine   4% Patch 1 Patch Transdermal daily  metoprolol succinate ER 50 milliGRAM(s) Oral daily  spironolactone 25 milliGRAM(s) Oral daily  tamsulosin 0.4 milliGRAM(s) Oral at bedtime    MEDICATIONS  (PRN):  acetaminophen     Tablet .. 650 milliGRAM(s) Oral every 6 hours PRN Mild Pain (1 - 3)  sodium chloride 0.65% Nasal 1 Spray(s) Both Nostrils every 4 hours PRN Nasal Congestion  traMADol 50 milliGRAM(s) Oral every 6 hours PRN Severe Pain (7 - 10)      Vital Signs Last 24 Hrs  T(C): 36.8 (10 Feb 2024 08:37), Max: 36.8 (10 Feb 2024 08:37)  T(F): 98.3 (10 Feb 2024 08:37), Max: 98.3 (10 Feb 2024 08:37)  HR: 61 (10 Feb 2024 08:37) (58 - 78)  BP: 120/75 (10 Feb 2024 08:37) (118/51 - 133/83)  BP(mean): --  RR: 18 (10 Feb 2024 08:37) (17 - 18)  SpO2: 95% (10 Feb 2024 08:37) (92% - 99%)    Parameters below as of 10 Feb 2024 08:37  Patient On (Oxygen Delivery Method): room air      CAPILLARY BLOOD GLUCOSE        I&O's Summary    09 Feb 2024 07:01  -  10 Feb 2024 07:00  --------------------------------------------------------  IN: 740 mL / OUT: 900 mL / NET: -160 mL    10 Feb 2024 07:01  -  10 Feb 2024 11:22  --------------------------------------------------------  IN: 240 mL / OUT: 600 mL / NET: -360 mL        PHYSICAL EXAM:-  GENERAL: NAD, well-developed  EYES: EOMI, PERRLA, conjunctiva and sclera clear  NECK: Supple, No JVD, no thyromegaly  CHEST/LUNG: Clear to auscultation bilaterally; No wheeze  HEART: Regular rate and rhythm; S1, S2 audible, No murmurs, rubs, or gallops  ABDOMEN: Soft, Nontender, Nondistended; Bowel sounds present  EXTREMITIES:  2+ Peripheral Pulses, No clubbing, cyanosis, or edema  NEURO: AAOx3, no focal deficit      LABS:                        9.8    9.48  )-----------( 200      ( 10 Feb 2024 06:29 )             32.5     02-10    139  |  101  |  34<H>  ----------------------------<  79  3.8   |  23  |  1.63<H>    Ca    8.7      10 Feb 2024 06:28  Mg     2.2     02-10    TPro  5.9<L>  /  Alb  3.1<L>  /  TBili  2.3<H>  /  DBili  x   /  AST  32  /  ALT  142<H>  /  AlkPhos  79  02-10    PT/INR - ( 10 Feb 2024 06:29 )   PT: 13.6 sec;   INR: 1.24 ratio               Urinalysis Basic - ( 10 Feb 2024 06:28 )    Color: x / Appearance: x / SG: x / pH: x  Gluc: 79 mg/dL / Ketone: x  / Bili: x / Urobili: x   Blood: x / Protein: x / Nitrite: x   Leuk Esterase: x / RBC: x / WBC x   Sq Epi: x / Non Sq Epi: x / Bacteria: x        RADIOLOGY & ADDITIONAL TESTS:    Imaging Personally Reviewed: CXR, cho, Cath  Consultant(s) Notes Reviewed: Card, Structural     Mohsin Khan, MD  Attending Physician, Division Of Hospital Medicine  Pager: [phone], Office: [phone]  Off hour pager: [phone]    Patient is a 63y old  Male who presents with a chief complaint of aortic stenosis     SUBJECTIVE / OVERNIGHT EVENTS:  Seen, examined the patient this am  c/o back pain, no SOB or chest pain but exertional dyspnea, VSS, no Tele events  Family at bedside    MEDICATIONS  (STANDING):  aspirin  chewable 81 milliGRAM(s) Oral daily  chlorhexidine 2% Cloths 1 Application(s) Topical <User Schedule>  epoetin loulou-epbx (RETACRIT) Injectable 50725 Unit(s) SubCutaneous every 7 days  ferrous    sulfate 325 milliGRAM(s) Oral daily  furosemide   Injectable 40 milliGRAM(s) IV Push two times a day  lidocaine   4% Patch 1 Patch Transdermal daily  metoprolol succinate ER 50 milliGRAM(s) Oral daily  spironolactone 25 milliGRAM(s) Oral daily  tamsulosin 0.4 milliGRAM(s) Oral at bedtime    MEDICATIONS  (PRN):  acetaminophen     Tablet .. 650 milliGRAM(s) Oral every 6 hours PRN Mild Pain (1 - 3)  sodium chloride 0.65% Nasal 1 Spray(s) Both Nostrils every 4 hours PRN Nasal Congestion  traMADol 50 milliGRAM(s) Oral every 6 hours PRN Severe Pain (7 - 10)      Vital Signs Last 24 Hrs  T(C): 36.8 (10 Feb 2024 08:37), Max: 36.8 (10 Feb 2024 08:37)  T(F): 98.3 (10 Feb 2024 08:37), Max: 98.3 (10 Feb 2024 08:37)  HR: 61 (10 Feb 2024 08:37) (58 - 78)  BP: 120/75 (10 Feb 2024 08:37) (118/51 - 133/83)  BP(mean): --  RR: 18 (10 Feb 2024 08:37) (17 - 18)  SpO2: 95% (10 Feb 2024 08:37) (92% - 99%)    Parameters below as of 10 Feb 2024 08:37  Patient On (Oxygen Delivery Method): room air      CAPILLARY BLOOD GLUCOSE        I&O's Summary    09 Feb 2024 07:01  -  10 Feb 2024 07:00  --------------------------------------------------------  IN: 740 mL / OUT: 900 mL / NET: -160 mL    10 Feb 2024 07:01  -  10 Feb 2024 11:22  --------------------------------------------------------  IN: 240 mL / OUT: 600 mL / NET: -360 mL        PHYSICAL EXAM:-  GENERAL: NAD, well-developed  EYES: EOMI, PERRLA, conjunctiva and sclera clear  NECK: Supple, No JVD, no thyromegaly  CHEST/LUNG: Clear to auscultation bilaterally; No wheeze  HEART: Regular rate and rhythm; S1, S2 audible, No murmurs, rubs, or gallops  ABDOMEN: Soft, Nontender, Nondistended; Bowel sounds present  EXTREMITIES:  2+ Peripheral Pulses, No clubbing, cyanosis, or edema  NEURO: AAOx3, no focal deficit      LABS:                        9.8    9.48  )-----------( 200      ( 10 Feb 2024 06:29 )             32.5     02-10    139  |  101  |  34<H>  ----------------------------<  79  3.8   |  23  |  1.63<H>    Ca    8.7      10 Feb 2024 06:28  Mg     2.2     02-10    TPro  5.9<L>  /  Alb  3.1<L>  /  TBili  2.3<H>  /  DBili  x   /  AST  32  /  ALT  142<H>  /  AlkPhos  79  02-10    PT/INR - ( 10 Feb 2024 06:29 )   PT: 13.6 sec;   INR: 1.24 ratio               Urinalysis Basic - ( 10 Feb 2024 06:28 )    Color: x / Appearance: x / SG: x / pH: x  Gluc: 79 mg/dL / Ketone: x  / Bili: x / Urobili: x   Blood: x / Protein: x / Nitrite: x   Leuk Esterase: x / RBC: x / WBC x   Sq Epi: x / Non Sq Epi: x / Bacteria: x        RADIOLOGY & ADDITIONAL TESTS:    Imaging Personally Reviewed: CXR, cho, Cath  Consultant(s) Notes Reviewed: Card, Structural

## 2024-02-10 NOTE — PROGRESS NOTE ADULT - ASSESSMENT
63M hx HTN, GERD, dyslipidemia, chronic intermittent leg pain, aortic stenosis, chronic venous stasis dermatitis with bilateral leg swelling, admitted to [facility] with b/l leg pain and swelling, transferred to [facility] for TAVR evaluation for severe AS

## 2024-02-11 LAB
ALBUMIN SERPL ELPH-MCNC: 3.3 G/DL — SIGNIFICANT CHANGE UP (ref 3.3–5)
ALP SERPL-CCNC: 79 U/L — SIGNIFICANT CHANGE UP (ref 40–120)
ALT FLD-CCNC: 120 U/L — HIGH (ref 10–45)
ANION GAP SERPL CALC-SCNC: 16 MMOL/L — SIGNIFICANT CHANGE UP (ref 5–17)
AST SERPL-CCNC: 30 U/L — SIGNIFICANT CHANGE UP (ref 10–40)
BILIRUB SERPL-MCNC: 2.4 MG/DL — HIGH (ref 0.2–1.2)
BUN SERPL-MCNC: 32 MG/DL — HIGH (ref 7–23)
CALCIUM SERPL-MCNC: 8.8 MG/DL — SIGNIFICANT CHANGE UP (ref 8.4–10.5)
CHLORIDE SERPL-SCNC: 100 MMOL/L — SIGNIFICANT CHANGE UP (ref 96–108)
CO2 SERPL-SCNC: 24 MMOL/L — SIGNIFICANT CHANGE UP (ref 22–31)
CREAT SERPL-MCNC: 1.48 MG/DL — HIGH (ref 0.5–1.3)
EGFR: 53 ML/MIN/1.73M2 — LOW
GLUCOSE SERPL-MCNC: 78 MG/DL — SIGNIFICANT CHANGE UP (ref 70–99)
HCT VFR BLD CALC: 34 % — LOW (ref 39–50)
HGB BLD-MCNC: 10.3 G/DL — LOW (ref 13–17)
INR BLD: 1.3 RATIO — HIGH (ref 0.85–1.18)
MCHC RBC-ENTMCNC: 19.3 PG — LOW (ref 27–34)
MCHC RBC-ENTMCNC: 30.3 GM/DL — LOW (ref 32–36)
MCV RBC AUTO: 63.8 FL — LOW (ref 80–100)
MITOCHONDRIA AB SER-ACNC: SIGNIFICANT CHANGE UP
NRBC # BLD: 0 /100 WBCS — SIGNIFICANT CHANGE UP (ref 0–0)
PLATELET # BLD AUTO: 186 K/UL — SIGNIFICANT CHANGE UP (ref 150–400)
POTASSIUM SERPL-MCNC: 3.7 MMOL/L — SIGNIFICANT CHANGE UP (ref 3.5–5.3)
POTASSIUM SERPL-SCNC: 3.7 MMOL/L — SIGNIFICANT CHANGE UP (ref 3.5–5.3)
PROT SERPL-MCNC: 6.3 G/DL — SIGNIFICANT CHANGE UP (ref 6–8.3)
PROTHROM AB SERPL-ACNC: 14.2 SEC — HIGH (ref 9.5–13)
RBC # BLD: 5.33 M/UL — SIGNIFICANT CHANGE UP (ref 4.2–5.8)
RBC # FLD: 28 % — HIGH (ref 10.3–14.5)
SMOOTH MUSCLE AB SER-ACNC: SIGNIFICANT CHANGE UP
SODIUM SERPL-SCNC: 140 MMOL/L — SIGNIFICANT CHANGE UP (ref 135–145)
WBC # BLD: 9.18 K/UL — SIGNIFICANT CHANGE UP (ref 3.8–10.5)
WBC # FLD AUTO: 9.18 K/UL — SIGNIFICANT CHANGE UP (ref 3.8–10.5)

## 2024-02-11 PROCEDURE — 99233 SBSQ HOSP IP/OBS HIGH 50: CPT

## 2024-02-11 RX ORDER — POLYETHYLENE GLYCOL 3350 17 G/17G
17 POWDER, FOR SOLUTION ORAL DAILY
Refills: 0 | Status: DISCONTINUED | OUTPATIENT
Start: 2024-02-11 | End: 2024-02-23

## 2024-02-11 RX ADMIN — TAMSULOSIN HYDROCHLORIDE 0.4 MILLIGRAM(S): 0.4 CAPSULE ORAL at 21:28

## 2024-02-11 RX ADMIN — CHLORHEXIDINE GLUCONATE 1 APPLICATION(S): 213 SOLUTION TOPICAL at 06:44

## 2024-02-11 RX ADMIN — SPIRONOLACTONE 25 MILLIGRAM(S): 25 TABLET, FILM COATED ORAL at 05:04

## 2024-02-11 RX ADMIN — Medication 650 MILLIGRAM(S): at 04:18

## 2024-02-11 RX ADMIN — Medication 81 MILLIGRAM(S): at 05:04

## 2024-02-11 RX ADMIN — Medication 50 MILLIGRAM(S): at 05:05

## 2024-02-11 RX ADMIN — Medication 650 MILLIGRAM(S): at 04:03

## 2024-02-11 RX ADMIN — Medication 325 MILLIGRAM(S): at 11:25

## 2024-02-11 RX ADMIN — Medication 15 MILLILITER(S): at 21:28

## 2024-02-11 RX ADMIN — Medication 40 MILLIGRAM(S): at 05:05

## 2024-02-11 RX ADMIN — Medication 40 MILLIGRAM(S): at 14:18

## 2024-02-11 NOTE — PROGRESS NOTE ADULT - SUBJECTIVE AND OBJECTIVE BOX
Mohsin Khan, MD  Attending Physician, Division Of Hospital Medicine  Pager: [phone], Office: [phone]  Off hour pager: [phone]    Patient is a 63y old  Male who presents with a chief complaint of TAVR eval     SUBJECTIVE / OVERNIGHT EVENTS:  Seen, examined the patient this am  No acute SOB, chest pain, still c/o mid back pain 4/10 on and off, no fever, bowel or bladder incontinence, VSS    MEDICATIONS  (STANDING):  aspirin  chewable 81 milliGRAM(s) Oral daily  chlorhexidine 2% Cloths 1 Application(s) Topical <User Schedule>  epoetin loulou-epbx (RETACRIT) Injectable 00860 Unit(s) SubCutaneous every 7 days  ferrous    sulfate 325 milliGRAM(s) Oral daily  furosemide   Injectable 40 milliGRAM(s) IV Push two times a day  lidocaine   4% Patch 1 Patch Transdermal daily  metoprolol succinate ER 50 milliGRAM(s) Oral daily  spironolactone 25 milliGRAM(s) Oral daily  tamsulosin 0.4 milliGRAM(s) Oral at bedtime    MEDICATIONS  (PRN):  acetaminophen     Tablet .. 650 milliGRAM(s) Oral every 6 hours PRN Mild Pain (1 - 3)  oxycodone    5 mG/acetaminophen 325 mG 1 Tablet(s) Oral every 6 hours PRN Severe Pain (7 - 10)  sodium chloride 0.65% Nasal 1 Spray(s) Both Nostrils every 4 hours PRN Nasal Congestion      Vital Signs Last 24 Hrs  T(C): 36.6 (11 Feb 2024 11:20), Max: 36.6 (11 Feb 2024 00:13)  T(F): 97.8 (11 Feb 2024 11:20), Max: 97.9 (11 Feb 2024 00:13)  HR: 80 (11 Feb 2024 11:20) (62 - 80)  BP: 105/70 (11 Feb 2024 11:20) (105/70 - 138/78)  BP(mean): --  RR: 18 (11 Feb 2024 11:20) (18 - 18)  SpO2: 95% (11 Feb 2024 11:20) (95% - 98%)    Parameters below as of 11 Feb 2024 11:20  Patient On (Oxygen Delivery Method): room air      CAPILLARY BLOOD GLUCOSE        I&O's Summary    10 Feb 2024 07:01  -  11 Feb 2024 07:00  --------------------------------------------------------  IN: 480 mL / OUT: 2400 mL / NET: -1920 mL    11 Feb 2024 07:01  -  11 Feb 2024 11:42  --------------------------------------------------------  IN: 240 mL / OUT: 800 mL / NET: -560 mL        PHYSICAL EXAM:-  GENERAL: NAD, well-developed  EYES: EOMI, PERRLA, conjunctiva and sclera clear  NECK: Supple, No JVD, no thyromegaly  CHEST/LUNG: Clear to auscultation bilaterally; No wheeze  HEART: Regular rate and rhythm; S1, S2 audible, No murmurs, rubs, or gallops  ABDOMEN: Soft, Nontender, Nondistended; Bowel sounds present  EXTREMITIES:  2+ Peripheral Pulses, No clubbing, cyanosis, 1+ LE edema  NEURO: AAOx3, no focal deficit      LABS:                        10.3   9.18  )-----------( 186      ( 11 Feb 2024 07:28 )             34.0     02-11    140  |  100  |  32<H>  ----------------------------<  78  3.7   |  24  |  1.48<H>    Ca    8.8      11 Feb 2024 07:31  Mg     2.2     02-10    TPro  6.3  /  Alb  3.3  /  TBili  2.4<H>  /  DBili  x   /  AST  30  /  ALT  120<H>  /  AlkPhos  79  02-11    PT/INR - ( 11 Feb 2024 07:29 )   PT: 14.2 sec;   INR: 1.30 ratio        RADIOLOGY & ADDITIONAL TESTS:    Imaging Personally Reviewed: CXR, Echo  Consultant(s) Notes Reviewed: Card

## 2024-02-11 NOTE — PROGRESS NOTE ADULT - SUBJECTIVE AND OBJECTIVE BOX
[facility] NEPHROLOGY SERVICES, [facility]  NEPHROLOGY AND HYPERTENSION  [address]  [address]  [place] [address]  [phone]    MD JEAN PARSONS, MD JULIANNA BLANKENSHIP, MD OJ MENDOZA, MD SUMMER SIMPSON MD          Patient events noted    MEDICATIONS  (STANDING):  aspirin  chewable 81 milliGRAM(s) Oral daily  chlorhexidine 2% Cloths 1 Application(s) Topical <User Schedule>  epoetin loulou-epbx (RETACRIT) Injectable 87645 Unit(s) SubCutaneous every 7 days  ferrous    sulfate 325 milliGRAM(s) Oral daily  furosemide   Injectable 40 milliGRAM(s) IV Push two times a day  lidocaine   4% Patch 1 Patch Transdermal daily  metoprolol succinate ER 50 milliGRAM(s) Oral daily  spironolactone 25 milliGRAM(s) Oral daily  tamsulosin 0.4 milliGRAM(s) Oral at bedtime    MEDICATIONS  (PRN):  acetaminophen     Tablet .. 650 milliGRAM(s) Oral every 6 hours PRN Mild Pain (1 - 3)  oxycodone    5 mG/acetaminophen 325 mG 1 Tablet(s) Oral every 6 hours PRN Severe Pain (7 - 10)  polyethylene glycol 3350 17 Gram(s) Oral daily PRN Constipation  sodium chloride 0.65% Nasal 1 Spray(s) Both Nostrils every 4 hours PRN Nasal Congestion      02-10-24 @ 07:01  -  02-11-24 @ 07:00  --------------------------------------------------------  IN: 480 mL / OUT: 2400 mL / NET: -1920 mL    02-11-24 @ 07:01  -  02-11-24 @ 23:10  --------------------------------------------------------  IN: 240 mL / OUT: 2200 mL / NET: -1960 mL      PHYSICAL EXAM:      T(C): 36.9 (02-11-24 @ 20:44), Max: 36.9 (02-11-24 @ 20:44)  HR: 66 (02-11-24 @ 20:44) (66 - 81)  BP: 138/72 (02-11-24 @ 20:44) (105/70 - 138/72)  RR: 18 (02-11-24 @ 20:44) (18 - 18)  SpO2: 96% (02-11-24 @ 20:44) (95% - 96%)  Wt(kg): --  Lungs clear  Heart S1S2  Abd soft NT ND  Extremities:   tr edema                                    10.3   9.18  )-----------( 186      ( 11 Feb 2024 07:28 )             34.0     02-11    140  |  100  |  32<H>  ----------------------------<  78  3.7   |  24  |  1.48<H>    Ca    8.8      11 Feb 2024 07:31  Mg     2.2     02-10    TPro  6.3  /  Alb  3.3  /  TBili  2.4<H>  /  DBili  x   /  AST  30  /  ALT  120<H>  /  AlkPhos  79  02-11      LIVER FUNCTIONS - ( 11 Feb 2024 07:31 )  Alb: 3.3 g/dL / Pro: 6.3 g/dL / ALK PHOS: 79 U/L / ALT: 120 U/L / AST: 30 U/L / GGT: x           Creatinine Trend: 1.48<--, 1.63<--, 1.58<--, 1.62<--, 1.66<--, 1.84<--        A/P:    CM, EF 35-40%, severe AS  Tx from [facility] to [facility] 2/5 for TAVR eval, s/p [facility] 2/5  Cardio-renal/hemodynamic LINDA/CKD 3 has improved off diuretics (peak Cr 2.5 - 2/3/24, prior Cr 1.43 - 1/29/24)   Avoid nephrotoxins   BMP in am  Stable from renal view      Pedrito Irving MD

## 2024-02-11 NOTE — PROGRESS NOTE ADULT - PROBLEM SELECTOR PLAN 4
Old mid back pain, no bladder or bowel incontinence  - CT T spine ( patient refused)  - Perctasia, Lido patch

## 2024-02-11 NOTE — PROGRESS NOTE ADULT - PROBLEM SELECTOR PLAN 1
HFrEF (EF 35-40% 1/2024) with severe AS  Resumed IV Lasix 40mg bid, Aldactone 25mg/d  Cont Toprol 50mg/d  CTA MARTIR completed  [facility] revealed non-obstructive CAD  HF and structural heart following, surgery also a consideration  ** spoke to family at bedside

## 2024-02-11 NOTE — PROGRESS NOTE ADULT - ASSESSMENT
63M hx HTN, GERD, dyslipidemia, chronic intermittent leg pain, aortic stenosis, chronic venous stasis dermatitis with bilateral leg swelling, admitted to [facility] with b/l leg pain and swelling, transferred to [facility] for TAVR evaluation for severe AS

## 2024-02-11 NOTE — PROGRESS NOTE ADULT - PROBLEM SELECTOR PLAN 6
Evaluated by hematology at  -multifactorial anemia, mechanical hemolysis due to severe AS also possible  S/p 1 unit PRBCs on 1/31, s/p IV iron   Continue oral iron supplementation, Hb 10.3  Maintain hemoglobin above 8 g/dL due to patient's advanced aortic stenosis and need for TAVR.

## 2024-02-12 LAB
ALBUMIN SERPL ELPH-MCNC: 3.3 G/DL — SIGNIFICANT CHANGE UP (ref 3.3–5)
ALP SERPL-CCNC: 71 U/L — SIGNIFICANT CHANGE UP (ref 40–120)
ALPHA-1-FETOPROTEIN-L3: 8.6 % — SIGNIFICANT CHANGE UP (ref 0–9.9)
ALPHA-1-FETOPROTEIN: 3.5 NG/ML — SIGNIFICANT CHANGE UP (ref 0–8.4)
ALT FLD-CCNC: 100 U/L — HIGH (ref 10–45)
ANION GAP SERPL CALC-SCNC: 11 MMOL/L — SIGNIFICANT CHANGE UP (ref 5–17)
ANION GAP SERPL CALC-SCNC: 12 MMOL/L — SIGNIFICANT CHANGE UP (ref 5–17)
AST SERPL-CCNC: 27 U/L — SIGNIFICANT CHANGE UP (ref 10–40)
BILIRUB SERPL-MCNC: 2.2 MG/DL — HIGH (ref 0.2–1.2)
BUN SERPL-MCNC: 30 MG/DL — HIGH (ref 7–23)
BUN SERPL-MCNC: 30 MG/DL — HIGH (ref 7–23)
CALCIUM SERPL-MCNC: 8.4 MG/DL — SIGNIFICANT CHANGE UP (ref 8.4–10.5)
CALCIUM SERPL-MCNC: 8.4 MG/DL — SIGNIFICANT CHANGE UP (ref 8.4–10.5)
CHLORIDE SERPL-SCNC: 101 MMOL/L — SIGNIFICANT CHANGE UP (ref 96–108)
CHLORIDE SERPL-SCNC: 99 MMOL/L — SIGNIFICANT CHANGE UP (ref 96–108)
CO2 SERPL-SCNC: 25 MMOL/L — SIGNIFICANT CHANGE UP (ref 22–31)
CO2 SERPL-SCNC: 27 MMOL/L — SIGNIFICANT CHANGE UP (ref 22–31)
CREAT SERPL-MCNC: 1.31 MG/DL — HIGH (ref 0.5–1.3)
CREAT SERPL-MCNC: 1.33 MG/DL — HIGH (ref 0.5–1.3)
EGFR: 60 ML/MIN/1.73M2 — SIGNIFICANT CHANGE UP
EGFR: 61 ML/MIN/1.73M2 — SIGNIFICANT CHANGE UP
GLUCOSE SERPL-MCNC: 109 MG/DL — HIGH (ref 70–99)
GLUCOSE SERPL-MCNC: 115 MG/DL — HIGH (ref 70–99)
HCT VFR BLD CALC: 31.4 % — LOW (ref 39–50)
HGB BLD-MCNC: 9.6 G/DL — LOW (ref 13–17)
IGA FLD-MCNC: 331 MG/DL — SIGNIFICANT CHANGE UP (ref 84–499)
IGG FLD-MCNC: 1188 MG/DL — SIGNIFICANT CHANGE UP (ref 610–1660)
IGM SERPL-MCNC: 196 MG/DL — SIGNIFICANT CHANGE UP (ref 35–242)
INR BLD: 1.37 RATIO — HIGH (ref 0.85–1.18)
KAPPA LC SER QL IFE: 3.85 MG/DL — HIGH (ref 0.33–1.94)
KAPPA/LAMBDA FREE LIGHT CHAIN RATIO, SERUM: 1.39 RATIO — SIGNIFICANT CHANGE UP (ref 0.26–1.65)
LAMBDA LC SER QL IFE: 2.76 MG/DL — HIGH (ref 0.57–2.63)
LKM AB SER-ACNC: <20.1 UNITS — SIGNIFICANT CHANGE UP (ref 0–20)
MAGNESIUM SERPL-MCNC: 2 MG/DL — SIGNIFICANT CHANGE UP (ref 1.6–2.6)
MCHC RBC-ENTMCNC: 18.9 PG — LOW (ref 27–34)
MCHC RBC-ENTMCNC: 30.6 GM/DL — LOW (ref 32–36)
MCV RBC AUTO: 61.8 FL — LOW (ref 80–100)
NRBC # BLD: 0 /100 WBCS — SIGNIFICANT CHANGE UP (ref 0–0)
PLATELET # BLD AUTO: 233 K/UL — SIGNIFICANT CHANGE UP (ref 150–400)
POTASSIUM SERPL-MCNC: 3.8 MMOL/L — SIGNIFICANT CHANGE UP (ref 3.5–5.3)
POTASSIUM SERPL-MCNC: 3.8 MMOL/L — SIGNIFICANT CHANGE UP (ref 3.5–5.3)
POTASSIUM SERPL-SCNC: 3.8 MMOL/L — SIGNIFICANT CHANGE UP (ref 3.5–5.3)
POTASSIUM SERPL-SCNC: 3.8 MMOL/L — SIGNIFICANT CHANGE UP (ref 3.5–5.3)
PROT SERPL-MCNC: 5.9 G/DL — LOW (ref 6–8.3)
PROTHROM AB SERPL-ACNC: 14.9 SEC — HIGH (ref 9.5–13)
RBC # BLD: 5.08 M/UL — SIGNIFICANT CHANGE UP (ref 4.2–5.8)
RBC # FLD: 26.7 % — HIGH (ref 10.3–14.5)
SODIUM SERPL-SCNC: 137 MMOL/L — SIGNIFICANT CHANGE UP (ref 135–145)
SODIUM SERPL-SCNC: 138 MMOL/L — SIGNIFICANT CHANGE UP (ref 135–145)
WBC # BLD: 9.02 K/UL — SIGNIFICANT CHANGE UP (ref 3.8–10.5)
WBC # FLD AUTO: 9.02 K/UL — SIGNIFICANT CHANGE UP (ref 3.8–10.5)

## 2024-02-12 PROCEDURE — 99233 SBSQ HOSP IP/OBS HIGH 50: CPT

## 2024-02-12 PROCEDURE — 93976 VASCULAR STUDY: CPT | Mod: 26

## 2024-02-12 PROCEDURE — 99232 SBSQ HOSP IP/OBS MODERATE 35: CPT

## 2024-02-12 RX ORDER — LOSARTAN POTASSIUM 100 MG/1
25 TABLET, FILM COATED ORAL DAILY
Refills: 0 | Status: DISCONTINUED | OUTPATIENT
Start: 2024-02-12 | End: 2024-02-22

## 2024-02-12 RX ORDER — ALPRAZOLAM 0.25 MG
0.5 TABLET ORAL ONCE
Refills: 0 | Status: DISCONTINUED | OUTPATIENT
Start: 2024-02-12 | End: 2024-02-12

## 2024-02-12 RX ADMIN — Medication 50 MILLIGRAM(S): at 05:50

## 2024-02-12 RX ADMIN — Medication 81 MILLIGRAM(S): at 05:50

## 2024-02-12 RX ADMIN — Medication 40 MILLIGRAM(S): at 13:05

## 2024-02-12 RX ADMIN — Medication 325 MILLIGRAM(S): at 11:22

## 2024-02-12 RX ADMIN — Medication 650 MILLIGRAM(S): at 12:40

## 2024-02-12 RX ADMIN — Medication 650 MILLIGRAM(S): at 13:10

## 2024-02-12 RX ADMIN — CHLORHEXIDINE GLUCONATE 1 APPLICATION(S): 213 SOLUTION TOPICAL at 05:50

## 2024-02-12 RX ADMIN — Medication 0.5 MILLIGRAM(S): at 07:59

## 2024-02-12 RX ADMIN — TAMSULOSIN HYDROCHLORIDE 0.4 MILLIGRAM(S): 0.4 CAPSULE ORAL at 21:35

## 2024-02-12 RX ADMIN — SPIRONOLACTONE 25 MILLIGRAM(S): 25 TABLET, FILM COATED ORAL at 05:50

## 2024-02-12 RX ADMIN — Medication 40 MILLIGRAM(S): at 05:50

## 2024-02-12 NOTE — PROGRESS NOTE ADULT - PROBLEM SELECTOR PLAN 3
With liver dysfunction  Hepatitis panel negative  RUQ US unremarkable  Elastography revealed borderline moderate risk of clinically significant fibrosis  Hepatology following  F/u Hepatology/IR for Biopsy

## 2024-02-12 NOTE — PROGRESS NOTE ADULT - PROBLEM SELECTOR PLAN 1
- had normal LVEF and systolic function in 2018; now appears to be in new systolic heart failure 2/2 severe aortic valve stenosis  - inpatient TAVR evaluation as below  - Continue IV Lasix 40 mg bid; will monitor I/O's, daily weights and adjust as needed  - GDMT: Continue Spironolactone 25 mg qd,  Toprol 50 mg qd  - Would start Losartan 25mg QD today  - will continue to follow and cautiously introduce GDMT (MRA, SGLT2i) while monitoring renal function i/s/o recent contrasted studies - had normal LVEF and systolic function in 2018; now appears to be in new systolic heart failure 2/2 severe aortic valve stenosis  - inpatient TAVR evaluation as below  - Continue IV Lasix 40 mg bid; will monitor I/O's, daily weights and adjust as needed  - GDMT: Continue Spironolactone 25 mg qd,  Toprol 50 mg qd  - Would start Losartan 25mg QD today  - will continue to follow and cautiously introduce GDMTwhile monitoring renal function i/s/o recent contrasted studies  - SGLT2i to be added prior to DC once all procedures have been performed

## 2024-02-12 NOTE — PROGRESS NOTE ADULT - SUBJECTIVE AND OBJECTIVE BOX
[facility] Division of Hospital Medicine  Renu Early MD  MS Teams PREFERRED        SUBJECTIVE / OVERNIGHT EVENTS: Seen and examined at bedside. NAD.    MEDICATIONS  (STANDING):  aspirin  chewable 81 milliGRAM(s) Oral daily  chlorhexidine 2% Cloths 1 Application(s) Topical <User Schedule>  epoetin loulou-epbx (RETACRIT) Injectable 16717 Unit(s) SubCutaneous every 7 days  ferrous    sulfate 325 milliGRAM(s) Oral daily  furosemide   Injectable 40 milliGRAM(s) IV Push two times a day  lidocaine   4% Patch 1 Patch Transdermal daily  metoprolol succinate ER 50 milliGRAM(s) Oral daily  spironolactone 25 milliGRAM(s) Oral daily  tamsulosin 0.4 milliGRAM(s) Oral at bedtime    MEDICATIONS  (PRN):  acetaminophen     Tablet .. 650 milliGRAM(s) Oral every 6 hours PRN Mild Pain (1 - 3)  oxycodone    5 mG/acetaminophen 325 mG 1 Tablet(s) Oral every 6 hours PRN Severe Pain (7 - 10)  polyethylene glycol 3350 17 Gram(s) Oral daily PRN Constipation  sodium chloride 0.65% Nasal 1 Spray(s) Both Nostrils every 4 hours PRN Nasal Congestion      I&O's Summary    11 Feb 2024 07:01  -  12 Feb 2024 07:00  --------------------------------------------------------  IN: 240 mL / OUT: 2400 mL / NET: -2160 mL        PHYSICAL EXAM:  Vital Signs Last 24 Hrs  T(C): 36.6 (12 Feb 2024 11:38), Max: 37.1 (12 Feb 2024 04:21)  T(F): 97.9 (12 Feb 2024 11:38), Max: 98.8 (12 Feb 2024 04:21)  HR: 80 (12 Feb 2024 11:38) (66 - 88)  BP: 117/71 (12 Feb 2024 11:38) (112/74 - 138/72)  BP(mean): --  RR: 18 (12 Feb 2024 11:38) (18 - 18)  SpO2: 94% (12 Feb 2024 11:38) (94% - 98%)    Parameters below as of 12 Feb 2024 11:38  Patient On (Oxygen Delivery Method): room air      PHYSICAL EXAM:-  GENERAL: NAD, well-developed  EYES: EOMI, PERRLA, conjunctiva and sclera clear  NECK: Supple, No JVD, no thyromegaly  CHEST/LUNG: Clear to auscultation bilaterally; No wheeze  HEART: Regular rate and rhythm; S1, S2 audible, No murmurs, rubs, or gallops  ABDOMEN: Soft, Nontender, Nondistended; Bowel sounds present  EXTREMITIES:  2+ Peripheral Pulses, No clubbing, cyanosis, 1+ LE edema  NEURO: AAOx3, no focal deficit    LABS:                        9.6    9.02  )-----------( 233      ( 12 Feb 2024 04:39 )             31.4     02-12    137  |  101  |  30<H>  ----------------------------<  115<H>  3.8   |  25  |  1.33<H>    Ca    8.4      12 Feb 2024 04:39  Mg     2.0     02-12    TPro  5.9<L>  /  Alb  3.3  /  TBili  2.2<H>  /  DBili  x   /  AST  27  /  ALT  100<H>  /  AlkPhos  71  02-12    PT/INR - ( 12 Feb 2024 04:39 )   PT: 14.9 sec;   INR: 1.37 ratio               Urinalysis Basic - ( 12 Feb 2024 04:39 )    Color: x / Appearance: x / SG: x / pH: x  Gluc: 115 mg/dL / Ketone: x  / Bili: x / Urobili: x   Blood: x / Protein: x / Nitrite: x   Leuk Esterase: x / RBC: x / WBC x   Sq Epi: x / Non Sq Epi: x / Bacteria: x

## 2024-02-12 NOTE — CHART NOTE - NSCHARTNOTEFT_GEN_A_CORE
Patient found sitting up with son in room. Patient refused visit asking to be seen tomorrow in the morning "dressings where already changed"

## 2024-02-12 NOTE — PROGRESS NOTE ADULT - SUBJECTIVE AND OBJECTIVE BOX
No distress, on RA    Vital Signs Last 24 Hrs  T(C): 36.7 (02-12-24 @ 13:47), Max: 37.1 (02-12-24 @ 04:21)  T(F): 98 (02-12-24 @ 13:47), Max: 98.8 (02-12-24 @ 04:21)  HR: 92 (02-12-24 @ 16:42) (66 - 92)  BP: 125/88 (02-12-24 @ 16:42) (117/71 - 138/72)  RR: 18 (02-12-24 @ 13:47) (18 - 18)  SpO2: 95% (02-12-24 @ 13:47) (94% - 98%)    I&O's Detail    11 Feb 2024 07:01  -  12 Feb 2024 07:00  --------------------------------------------------------  IN:    Oral Fluid: 240 mL  Total IN: 240 mL    OUT:    Voided (mL): 2400 mL  Total OUT: 2400 mL    12 Feb 2024 07:01  -  12 Feb 2024 19:13  --------------------------------------------------------  IN:    Oral Fluid: 480 mL  Total IN: 480 mL    OUT:    Voided (mL): 550 mL  Total OUT: 550 mL    s1s2  b/l air entry  soft, ND  + edema                                9.6    9.02  )-----------( 233      ( 12 Feb 2024 04:39 )             31.4     12 Feb 2024 04:39    137    |  101    |  30     ----------------------------<  115    3.8     |  25     |  1.33     Ca    8.4        12 Feb 2024 04:39  Mg     2.0       12 Feb 2024 01:03    TPro  5.9    /  Alb  3.3    /  TBili  2.2    /  DBili  x      /  AST  27     /  ALT  100    /  AlkPhos  71     12 Feb 2024 04:39    LIVER FUNCTIONS - ( 12 Feb 2024 04:39 )  Alb: 3.3 g/dL / Pro: 5.9 g/dL / ALK PHOS: 71 U/L / ALT: 100 U/L / AST: 27 U/L / GGT: x           PT/INR - ( 12 Feb 2024 04:39 )   PT: 14.9 sec;   INR: 1.37 ratio      acetaminophen     Tablet .. 650 milliGRAM(s) Oral every 6 hours PRN  chlorhexidine 2% Cloths 1 Application(s) Topical <User Schedule>  epoetin loulou-epbx (RETACRIT) Injectable 73105 Unit(s) SubCutaneous every 7 days  ferrous    sulfate 325 milliGRAM(s) Oral daily  furosemide   Injectable 40 milliGRAM(s) IV Push two times a day  lidocaine   4% Patch 1 Patch Transdermal daily  losartan 25 milliGRAM(s) Oral daily  metoprolol succinate ER 50 milliGRAM(s) Oral daily  oxycodone    5 mG/acetaminophen 325 mG 1 Tablet(s) Oral every 6 hours PRN  polyethylene glycol 3350 17 Gram(s) Oral daily PRN  sodium chloride 0.65% Nasal 1 Spray(s) Both Nostrils every 4 hours PRN  spironolactone 25 milliGRAM(s) Oral daily  tamsulosin 0.4 milliGRAM(s) Oral at bedtime    A/P:    CM, EF 35-40%, severe AS  Tx from [facility] to [facility] 2/5 for TAVR eval, s/p LHC 2/5  Cardio-renal/hemodynamic LINDA/CKD 3 has improved (peak Cr 2.5 - 2/3/24)   UA negative   Imaging w/o hydro   S/p CT w/IV dye 2/8/24  Stable renal fx  Avoid nephrotoxins   Plan for liver biopsy noted   F/u BMP on Lasix, ARB, Aldactone    [phone]

## 2024-02-12 NOTE — PROGRESS NOTE ADULT - ASSESSMENT
63M hx HTN, GERD, dyslipidemia, chronic intermittent leg pain, aortic stenosis, chronic venous stasis dermatitis with bilateral leg swelling, admitted to [facility] with b/l leg pain and swelling, transferred to [facility] for TAVR evaluation for severe AS

## 2024-02-12 NOTE — PROGRESS NOTE ADULT - ASSESSMENT
Mr Butcher is a 64 yo male with Hx of HTN, GERD, dyslipidemia, chronic intermittent leg pain, aortic stenosis, chronic venous stasis dermatitis with bilateral leg swelling, presents as transfer from  for TAVR evaluation in the setting of severe AS.     Pt has a known history of moderate AS, seen by Dr. Gilbert Rangel back in 2019. Had prior ischemic workup with NST/cath and TTE in , most recently EF 61% in 2018 with moderate AS. He presented to  ED sent by PCP for failed outpatient therapy with bactrim for cellulitis; on ROS endorsed worsenign b/l leg swelling and chronic VANCE and 3 pillow orthopnea. In the ED temp 98.2, HR 86, 110/67, RR, SPO2 96% Labs significant for BNP 14977, WBC 15, troponin 24, INR 1.37, Cr 1.7     ECG consistent with sinus rhythm, RBBB and LAFB - which is similar to outpatient ECG from 2019.  Echo report consistent with LVEF 35-40%, moderate mitral regurgitation, severe aortic valve stenosis and moderate pulmonary hypertension (images reviewed). Prior echo report from 2018 consistent with LVEF 61% with normal RV size and function at that time. Coronary angiogram from  was consistent with normal coronaries at that time. Labs were notable for significant anemia, Hgb 7.5, patient had Hgb of 11.1 on  outpatient labs and so has significantly declined. Cr now elevated to 2.2. Troponin negative. Pro BNP markedly elevated. Received IV Lasix 40 mg qd at  and started Metoprolol for GDMT and i/s/o NSVT on telemetry.      Cardiac Studies:    EKG: NSR, LAD, RBBB    TTE 24: EF 38% (Biplane), LVIDd 6.1, concentric LVH, basal inferior akinesis; mid infero/anterolateral and inferior hypokinesis, gr III DD; mod enlarg RV w nl fn, sev LAE, mod MR, mod pHTN (PASP 59); est RAp 15, severe AS (MARY 0.71, Max Salbador: 3.94 m/s AoV Peak P.1 mmHg AoV Mean P.0 mmHg), mild AI     LHC pending formal report; reportedly non-obstructive coronaries     Exercise Nuclear Stress Test (2013): small mild defect in apical lateral wall predom reversible defect c/w mild ischemia; LVEF 62%

## 2024-02-12 NOTE — CONSULT NOTE ADULT - SUBJECTIVE AND OBJECTIVE BOX
Interventional Radiology    Evaluate for Procedure:     HPI: 63M hx HTN, GERD, dyslipidemia, chronic intermittent leg pain, aortic stenosis, chronic venous stasis dermatitis with bilateral leg swelling, admitted to [facility] with b/l leg pain and swelling, transferred to [facility] for TAVR evaluation for severe AS found to have transaminitis. Hepatology recommending liver biopsy prior to surgical procedure. IR now consulted for Transjugular liver biopsy with portal pressure measurements.     Allergies: No Known Drug Allergies    Medications (Abx/Cardiac/Anticoagulation/Blood Products)  aspirin  chewable: 81 milliGRAM(s) Oral (02-12 @ 05:50)  furosemide   Injectable: 40 milliGRAM(s) IV Push (02-12 @ 05:50)  metoprolol succinate ER: 50 milliGRAM(s) Oral (02-12 @ 05:50)  spironolactone: 25 milliGRAM(s) Oral (02-12 @ 05:50)    Data:    T(C): 36.6  HR: 80  BP: 117/71  RR: 18  SpO2: 94%    -WBC 9.02 / HgB 9.6 / Hct 31.4 / Plt 233  -Na 137 / Cl 101 / BUN 30 / Glucose 115  -K 3.8 / CO2 25 / Cr 1.33  - / Alk Phos 71 / T.Bili 2.2  -INR 1.37 / PTT --    Radiology: Reviewed     Assessment/Plan:   63M hx HTN, GERD, dyslipidemia, chronic intermittent leg pain, aortic stenosis, chronic venous stasis dermatitis with bilateral leg swelling, admitted to [facility] with b/l leg pain and swelling, transferred to [facility] for TAVR evaluation for severe AS found to have transaminitis. Hepatology recommending liver biopsy prior to surgical procedure. IR now consulted for Transjugular liver biopsy with portal pressure measurements.       - case reviewed and approved for Transjugular Liver Biopsy with portal pressure measurement on Monday 2/19  - please place IR procedure order under Dr. Kerr   - STAT labs in AM (cbc,coags, bmp, T&S)  - Last asa 2/12; continue to hold prior to procedure for at least 5 days  - NPO on 2/18 at 11pm  - d/w primary team Interventional Radiology    Evaluate for Procedure:     HPI: 63M hx HTN, GERD, dyslipidemia, chronic intermittent leg pain, aortic stenosis, chronic venous stasis dermatitis with bilateral leg swelling, admitted to [facility] with b/l leg pain and swelling, transferred to [facility] for TAVR evaluation for severe AS found to have transaminitis. Hepatology recommending liver biopsy prior to surgical procedure. IR now consulted for Transjugular liver biopsy with portal pressure measurements.     Allergies: No Known Drug Allergies    Medications (Abx/Cardiac/Anticoagulation/Blood Products)  aspirin  chewable: 81 milliGRAM(s) Oral (02-12 @ 05:50)  furosemide   Injectable: 40 milliGRAM(s) IV Push (02-12 @ 05:50)  metoprolol succinate ER: 50 milliGRAM(s) Oral (02-12 @ 05:50)  spironolactone: 25 milliGRAM(s) Oral (02-12 @ 05:50)    Data:    T(C): 36.6  HR: 80  BP: 117/71  RR: 18  SpO2: 94%    -WBC 9.02 / HgB 9.6 / Hct 31.4 / Plt 233  -Na 137 / Cl 101 / BUN 30 / Glucose 115  -K 3.8 / CO2 25 / Cr 1.33  - / Alk Phos 71 / T.Bili 2.2  -INR 1.37 / PTT --    Radiology: Reviewed     Assessment/Plan:   63M hx HTN, GERD, dyslipidemia, chronic intermittent leg pain, aortic stenosis, chronic venous stasis dermatitis with bilateral leg swelling, admitted to [facility] with b/l leg pain and swelling, transferred to [facility] for TAVR evaluation for severe AS found to have transaminitis. Hepatology recommending liver biopsy prior to surgical procedure. IR now consulted for Transjugular liver biopsy with portal pressure measurements.       - case reviewed and approved for Transjugular Liver Biopsy with portal pressure measurement on Tuesday 2/20  - please place IR procedure order under Dr. Kerr   - STAT labs in AM (cbc,coags, bmp, T&S)  - Last asa 2/12; continue to hold prior to procedure for at least 5 days  - NPO on 2/18 at 11pm  - d/w primary team

## 2024-02-12 NOTE — PROGRESS NOTE ADULT - PROBLEM SELECTOR PLAN 6
- mildly elevated AST/ALT on 1/25, which has been stable, now AST/ALT 65/238, and slow rise on bilirubin 2.6 which was normal on admission. (nl enzymes and bili 1/7); also w mild INR elev 1.37, nl plts  - Hepatology following, appreciate assistance regarding further w/u. Pending Liver Biopsy

## 2024-02-12 NOTE — PROGRESS NOTE ADULT - NS ATTEND AMEND GEN_ALL_CORE FT
Pt feels well.   Physical exam remarkable for hypervolemia with LE edema up to shins. JVD also remains elevated.  Responding well to IV lasix.  Continue diuresis and GDMT optimization.  Undergoing work up for TAVR.

## 2024-02-12 NOTE — PROGRESS NOTE ADULT - SUBJECTIVE AND OBJECTIVE BOX
ADVANCED HEART FAILURE & TRANSPLANT  - PROGRESS NOTE  *To reach the NS2 Team from 8am to 5pm (MON-FRI), please call [phone].   _______________________________________________________________________________________________________    Subjective:  - Seen sitting upright, ambulating throughout his room  - States his breathing is better.     Medications:  acetaminophen     Tablet .. 650 milliGRAM(s) Oral every 6 hours PRN  chlorhexidine 2% Cloths 1 Application(s) Topical <User Schedule>  epoetin loulou-epbx (RETACRIT) Injectable 20080 Unit(s) SubCutaneous every 7 days  ferrous    sulfate 325 milliGRAM(s) Oral daily  furosemide   Injectable 40 milliGRAM(s) IV Push two times a day  lidocaine   4% Patch 1 Patch Transdermal daily  losartan 25 milliGRAM(s) Oral daily  metoprolol succinate ER 50 milliGRAM(s) Oral daily  oxycodone    5 mG/acetaminophen 325 mG 1 Tablet(s) Oral every 6 hours PRN  polyethylene glycol 3350 17 Gram(s) Oral daily PRN  sodium chloride 0.65% Nasal 1 Spray(s) Both Nostrils every 4 hours PRN  spironolactone 25 milliGRAM(s) Oral daily  tamsulosin 0.4 milliGRAM(s) Oral at bedtime      Physical Exam:    Vitals:  Vital Signs Last 24 Hours  T(C): 36.7 (24 @ 13:47), Max: 37.1 (24 @ 04:21)  HR: 81 (24 @ 13:47) (66 - 88)  BP: 121/74 (24 @ 13:47) (112/74 - 138/72)  RR: 18 (24 @ 13:47) (18 - 18)  SpO2: 95% (24 @ 13:47) (94% - 98%)    Weight in k.8 ( @ 07:03)    I&O's Summary    2024 07:01  -  2024 07:00  --------------------------------------------------------  IN: 240 mL / OUT: 2400 mL / NET: -2160 mL    2024 07:01  -  2024 16:19  --------------------------------------------------------  IN: 480 mL / OUT: 550 mL / NET: -70 mL    Tele: Sr 80's, PAF 16sec and 2.5 secs (up to 150)     General: No distress. Comfortable.  HEENT: EOM intact.  Neck: JVP elevated   Chest: Clear to auscultation bilaterally  CV: Normal S1 and S2. No murmurs, rub, or gallops. Radial pulses normal, warm peripherally   Abdomen: Soft, non-distended, non-tender  Skin: No rashes or skin breakdown  Extremities: No LE edema  Neurology: Alert and oriented times three. Sensation intact  Psych: Affect normal    Labs:                        9.6    9.02  )-----------( 233      ( 2024 04:39 )             31.4     02-12    137  |  101  |  30<H>  ----------------------------<  115<H>  3.8   |  25  |  1.33<H>    Ca    8.4      2024 04:39  Mg     2.0         TPro  5.9<L>  /  Alb  3.3  /  TBili  2.2<H>  /  DBili  x   /  AST  27  /  ALT  100<H>  /  AlkPhos  71  02-12    PT/INR - ( 2024 04:39 )   PT: 14.9 sec;   INR: 1.37 ratio

## 2024-02-12 NOTE — PROGRESS NOTE ADULT - PROBLEM SELECTOR PLAN 1
HFrEF (EF 35-40% 1/2024) with severe AS  Resumed IV Lasix 40mg bid, Aldactone 25mg/d  Cont Toprol 50mg/d  CTA MARTIR completed  [facility] revealed non-obstructive CAD  HF and structural heart following, surgery also a consideration  ** spoke to family at bedside  F/u Structural

## 2024-02-13 LAB
ALBUMIN SERPL ELPH-MCNC: 3.5 G/DL — SIGNIFICANT CHANGE UP (ref 3.3–5)
ALP SERPL-CCNC: 74 U/L — SIGNIFICANT CHANGE UP (ref 40–120)
ALT FLD-CCNC: 85 U/L — HIGH (ref 10–45)
ANION GAP SERPL CALC-SCNC: 10 MMOL/L — SIGNIFICANT CHANGE UP (ref 5–17)
ANION GAP SERPL CALC-SCNC: 12 MMOL/L — SIGNIFICANT CHANGE UP (ref 5–17)
AST SERPL-CCNC: 29 U/L — SIGNIFICANT CHANGE UP (ref 10–40)
BILIRUB SERPL-MCNC: 1.9 MG/DL — HIGH (ref 0.2–1.2)
BUN SERPL-MCNC: 33 MG/DL — HIGH (ref 7–23)
BUN SERPL-MCNC: 35 MG/DL — HIGH (ref 7–23)
CALCIUM SERPL-MCNC: 8.1 MG/DL — LOW (ref 8.4–10.5)
CALCIUM SERPL-MCNC: 8.7 MG/DL — SIGNIFICANT CHANGE UP (ref 8.4–10.5)
CHLORIDE SERPL-SCNC: 98 MMOL/L — SIGNIFICANT CHANGE UP (ref 96–108)
CHLORIDE SERPL-SCNC: 99 MMOL/L — SIGNIFICANT CHANGE UP (ref 96–108)
CO2 SERPL-SCNC: 29 MMOL/L — SIGNIFICANT CHANGE UP (ref 22–31)
CO2 SERPL-SCNC: 29 MMOL/L — SIGNIFICANT CHANGE UP (ref 22–31)
CREAT SERPL-MCNC: 1.33 MG/DL — HIGH (ref 0.5–1.3)
CREAT SERPL-MCNC: 1.38 MG/DL — HIGH (ref 0.5–1.3)
EGFR: 57 ML/MIN/1.73M2 — LOW
EGFR: 60 ML/MIN/1.73M2 — SIGNIFICANT CHANGE UP
GLUCOSE SERPL-MCNC: 108 MG/DL — HIGH (ref 70–99)
GLUCOSE SERPL-MCNC: 116 MG/DL — HIGH (ref 70–99)
HCT VFR BLD CALC: 30.7 % — LOW (ref 39–50)
HGB BLD-MCNC: 9.2 G/DL — LOW (ref 13–17)
INR BLD: 1.2 RATIO — HIGH (ref 0.85–1.18)
MAGNESIUM SERPL-MCNC: 2 MG/DL — SIGNIFICANT CHANGE UP (ref 1.6–2.6)
MCHC RBC-ENTMCNC: 19.4 PG — LOW (ref 27–34)
MCHC RBC-ENTMCNC: 30 GM/DL — LOW (ref 32–36)
MCV RBC AUTO: 64.8 FL — LOW (ref 80–100)
NRBC # BLD: 0 /100 WBCS — SIGNIFICANT CHANGE UP (ref 0–0)
PHOSPHATE SERPL-MCNC: 2.7 MG/DL — SIGNIFICANT CHANGE UP (ref 2.5–4.5)
PLATELET # BLD AUTO: 223 K/UL — SIGNIFICANT CHANGE UP (ref 150–400)
POTASSIUM SERPL-MCNC: 3.6 MMOL/L — SIGNIFICANT CHANGE UP (ref 3.5–5.3)
POTASSIUM SERPL-MCNC: 3.7 MMOL/L — SIGNIFICANT CHANGE UP (ref 3.5–5.3)
POTASSIUM SERPL-SCNC: 3.6 MMOL/L — SIGNIFICANT CHANGE UP (ref 3.5–5.3)
POTASSIUM SERPL-SCNC: 3.7 MMOL/L — SIGNIFICANT CHANGE UP (ref 3.5–5.3)
PROT SERPL-MCNC: 6.2 G/DL — SIGNIFICANT CHANGE UP (ref 6–8.3)
PROTHROM AB SERPL-ACNC: 13.1 SEC — HIGH (ref 9.5–13)
RBC # BLD: 4.74 M/UL — SIGNIFICANT CHANGE UP (ref 4.2–5.8)
RBC # FLD: 27.9 % — HIGH (ref 10.3–14.5)
SODIUM SERPL-SCNC: 138 MMOL/L — SIGNIFICANT CHANGE UP (ref 135–145)
SODIUM SERPL-SCNC: 139 MMOL/L — SIGNIFICANT CHANGE UP (ref 135–145)
WBC # BLD: 9.83 K/UL — SIGNIFICANT CHANGE UP (ref 3.8–10.5)
WBC # FLD AUTO: 9.83 K/UL — SIGNIFICANT CHANGE UP (ref 3.8–10.5)

## 2024-02-13 PROCEDURE — 93010 ELECTROCARDIOGRAM REPORT: CPT

## 2024-02-13 PROCEDURE — 99233 SBSQ HOSP IP/OBS HIGH 50: CPT

## 2024-02-13 RX ORDER — POTASSIUM CHLORIDE 20 MEQ
40 PACKET (EA) ORAL ONCE
Refills: 0 | Status: COMPLETED | OUTPATIENT
Start: 2024-02-13 | End: 2024-02-13

## 2024-02-13 RX ORDER — LANOLIN ALCOHOL/MO/W.PET/CERES
5 CREAM (GRAM) TOPICAL ONCE
Refills: 0 | Status: COMPLETED | OUTPATIENT
Start: 2024-02-13 | End: 2024-02-13

## 2024-02-13 RX ADMIN — Medication 40 MILLIEQUIVALENT(S): at 05:20

## 2024-02-13 RX ADMIN — Medication 40 MILLIGRAM(S): at 05:21

## 2024-02-13 RX ADMIN — LOSARTAN POTASSIUM 25 MILLIGRAM(S): 100 TABLET, FILM COATED ORAL at 05:20

## 2024-02-13 RX ADMIN — Medication 40 MILLIGRAM(S): at 13:11

## 2024-02-13 RX ADMIN — TAMSULOSIN HYDROCHLORIDE 0.4 MILLIGRAM(S): 0.4 CAPSULE ORAL at 21:22

## 2024-02-13 RX ADMIN — SPIRONOLACTONE 25 MILLIGRAM(S): 25 TABLET, FILM COATED ORAL at 05:20

## 2024-02-13 RX ADMIN — Medication 325 MILLIGRAM(S): at 11:09

## 2024-02-13 RX ADMIN — Medication 50 MILLIGRAM(S): at 05:21

## 2024-02-13 RX ADMIN — CHLORHEXIDINE GLUCONATE 1 APPLICATION(S): 213 SOLUTION TOPICAL at 05:21

## 2024-02-13 NOTE — PROGRESS NOTE ADULT - PROBLEM SELECTOR PLAN 1
HFrEF (EF 35-40% 1/2024) with severe AS  Resumed IV Lasix 40mg bid, Aldactone 25mg/d  Cont Toprol 50mg/d  CTA MARTIR completed  [place] revealed non-obstructive CAD  HF and structural heart following, surgery also a consideration  ** spoke to family at bedside  F/u Structural HFrEF (EF 35-40% 1/2024) with severe AS  Resumed IV Lasix 40mg bid, Aldactone 25mg/d  Cont Toprol 50mg/d  CTA MARTIR completed  [facility] revealed non-obstructive CAD  HF and structural heart following, surgery also a consideration  ** spoke to family on the phone  F/u Structural to determine if surgery can be outpt

## 2024-02-13 NOTE — PROGRESS NOTE ADULT - ASSESSMENT
63M hx HTN, GERD, dyslipidemia, chronic intermittent leg pain, aortic stenosis, chronic venous stasis dermatitis with bilateral leg swelling, admitted to [facility] with b/l leg pain and swelling, transferred to [facility] for TAVR evaluation for severe AS

## 2024-02-13 NOTE — PROGRESS NOTE ADULT - PROBLEM SELECTOR PLAN 3
With liver dysfunction  Hepatitis panel negative  RUQ US unremarkable  Elastography revealed borderline moderate risk of clinically significant fibrosis  Hepatology following  F/u Hepatology/IR for Biopsy With liver dysfunction  Hepatitis panel negative  RUQ US unremarkable  Elastography revealed borderline moderate risk of clinically significant fibrosis  Hepatology following  F/u Hepatology/IR for Biopsy - want to do next week, f/u if can be safely done as outpt

## 2024-02-13 NOTE — PROGRESS NOTE ADULT - SUBJECTIVE AND OBJECTIVE BOX
[facility] Division of Hospital Medicine  Renu Early MD  MS Teams PREFERRED        SUBJECTIVE / OVERNIGHT EVENTS:  ADDITIONAL REVIEW OF SYSTEMS:    MEDICATIONS  (STANDING):  chlorhexidine 2% Cloths 1 Application(s) Topical <User Schedule>  epoetin loulou-epbx (RETACRIT) Injectable 64994 Unit(s) SubCutaneous every 7 days  ferrous    sulfate 325 milliGRAM(s) Oral daily  furosemide   Injectable 40 milliGRAM(s) IV Push two times a day  lidocaine   4% Patch 1 Patch Transdermal daily  losartan 25 milliGRAM(s) Oral daily  metoprolol succinate ER 50 milliGRAM(s) Oral daily  spironolactone 25 milliGRAM(s) Oral daily  tamsulosin 0.4 milliGRAM(s) Oral at bedtime    MEDICATIONS  (PRN):  acetaminophen     Tablet .. 650 milliGRAM(s) Oral every 6 hours PRN Mild Pain (1 - 3)  oxycodone    5 mG/acetaminophen 325 mG 1 Tablet(s) Oral every 6 hours PRN Severe Pain (7 - 10)  polyethylene glycol 3350 17 Gram(s) Oral daily PRN Constipation  sodium chloride 0.65% Nasal 1 Spray(s) Both Nostrils every 4 hours PRN Nasal Congestion      I&O's Summary    12 Feb 2024 07:01  -  13 Feb 2024 07:00  --------------------------------------------------------  IN: 480 mL / OUT: 850 mL / NET: -370 mL    13 Feb 2024 07:01  -  13 Feb 2024 13:17  --------------------------------------------------------  IN: 730 mL / OUT: 750 mL / NET: -20 mL        PHYSICAL EXAM:  Vital Signs Last 24 Hrs  T(C): 36.7 (13 Feb 2024 11:30), Max: 37.2 (12 Feb 2024 20:08)  T(F): 98.1 (13 Feb 2024 11:30), Max: 99 (12 Feb 2024 20:08)  HR: 78 (13 Feb 2024 11:30) (76 - 92)  BP: 105/70 (13 Feb 2024 11:30) (105/70 - 126/72)  BP(mean): --  RR: 18 (13 Feb 2024 11:30) (18 - 18)  SpO2: 94% (13 Feb 2024 11:30) (94% - 96%)    Parameters below as of 13 Feb 2024 11:30  Patient On (Oxygen Delivery Method): room air      CONSTITUTIONAL: NAD, well-developed, well-groomed  EYES: PERRLA; conjunctiva and sclera clear  ENMT: Moist oral mucosa, no pharyngeal injection or exudates; normal dentition  NECK: Supple, no palpable masses; no thyromegaly  RESPIRATORY: Normal respiratory effort; lungs are clear to auscultation bilaterally  CARDIOVASCULAR: Regular rate and rhythm, normal S1 and S2, no murmur/rub/gallop; No lower extremity edema; Peripheral pulses are 2+ bilaterally  ABDOMEN: Nontender to palpation, normoactive bowel sounds, no rebound/guarding; No hepatosplenomegaly  MUSCULOSKELETAL:  Normal gait; no clubbing or cyanosis of digits; no joint swelling or tenderness to palpation  PSYCH: A+O to person, place, and time; affect appropriate  NEUROLOGY: CN 2-12 are intact and symmetric; no gross sensory deficits   SKIN: No rashes; no palpable lesions    LABS:                        9.2    9.83  )-----------( 223      ( 13 Feb 2024 07:06 )             30.7     02-13    139  |  98  |  35<H>  ----------------------------<  108<H>  3.6   |  29  |  1.38<H>    Ca    8.7      13 Feb 2024 07:06  Phos  2.7     02-13  Mg     2.0     02-13    TPro  6.2  /  Alb  3.5  /  TBili  1.9<H>  /  DBili  x   /  AST  29  /  ALT  85<H>  /  AlkPhos  74  02-13    PT/INR - ( 13 Feb 2024 07:06 )   PT: 13.1 sec;   INR: 1.20 ratio               Urinalysis Basic - ( 13 Feb 2024 07:06 )    Color: x / Appearance: x / SG: x / pH: x  Gluc: 108 mg/dL / Ketone: x  / Bili: x / Urobili: x   Blood: x / Protein: x / Nitrite: x   Leuk Esterase: x / RBC: x / WBC x   Sq Epi: x / Non Sq Epi: x / Bacteria: x          RADIOLOGY & ADDITIONAL TESTS:  Results Reviewed:   Imaging Personally Reviewed:  Electrocardiogram Personally Reviewed:    COORDINATION OF CARE:  Care Discussed with Consultants/Other Providers [Y/N]:  Prior or Outpatient Records Reviewed [Y/N]:   [facility] Division of Hospital Medicine  Renu Early MD  MS Teams PREFERRED        SUBJECTIVE / OVERNIGHT EVENTS: Seen and examined at bedside. NAD.    MEDICATIONS  (STANDING):  chlorhexidine 2% Cloths 1 Application(s) Topical <User Schedule>  epoetin loulou-epbx (RETACRIT) Injectable 86014 Unit(s) SubCutaneous every 7 days  ferrous    sulfate 325 milliGRAM(s) Oral daily  furosemide   Injectable 40 milliGRAM(s) IV Push two times a day  lidocaine   4% Patch 1 Patch Transdermal daily  losartan 25 milliGRAM(s) Oral daily  metoprolol succinate ER 50 milliGRAM(s) Oral daily  spironolactone 25 milliGRAM(s) Oral daily  tamsulosin 0.4 milliGRAM(s) Oral at bedtime    MEDICATIONS  (PRN):  acetaminophen     Tablet .. 650 milliGRAM(s) Oral every 6 hours PRN Mild Pain (1 - 3)  oxycodone    5 mG/acetaminophen 325 mG 1 Tablet(s) Oral every 6 hours PRN Severe Pain (7 - 10)  polyethylene glycol 3350 17 Gram(s) Oral daily PRN Constipation  sodium chloride 0.65% Nasal 1 Spray(s) Both Nostrils every 4 hours PRN Nasal Congestion      I&O's Summary    12 Feb 2024 07:01  -  13 Feb 2024 07:00  --------------------------------------------------------  IN: 480 mL / OUT: 850 mL / NET: -370 mL    13 Feb 2024 07:01  -  13 Feb 2024 13:17  --------------------------------------------------------  IN: 730 mL / OUT: 750 mL / NET: -20 mL        PHYSICAL EXAM:  Vital Signs Last 24 Hrs  T(C): 36.7 (13 Feb 2024 11:30), Max: 37.2 (12 Feb 2024 20:08)  T(F): 98.1 (13 Feb 2024 11:30), Max: 99 (12 Feb 2024 20:08)  HR: 78 (13 Feb 2024 11:30) (76 - 92)  BP: 105/70 (13 Feb 2024 11:30) (105/70 - 126/72)  BP(mean): --  RR: 18 (13 Feb 2024 11:30) (18 - 18)  SpO2: 94% (13 Feb 2024 11:30) (94% - 96%)    Parameters below as of 13 Feb 2024 11:30  Patient On (Oxygen Delivery Method): room air      PHYSICAL EXAM:-  GENERAL: NAD, well-developed  EYES: EOMI, PERRLA, conjunctiva and sclera clear  NECK: Supple, No JVD, no thyromegaly  CHEST/LUNG: Clear to auscultation bilaterally; No wheeze  HEART: Regular rate and rhythm; S1, S2 audible, No murmurs, rubs, or gallops  ABDOMEN: Soft, Nontender, Nondistended; Bowel sounds present  EXTREMITIES:  2+ Peripheral Pulses, No clubbing, cyanosis, 1+ LE edema  NEURO: AAOx3, no focal deficit  LABS:                        9.2    9.83  )-----------( 223      ( 13 Feb 2024 07:06 )             30.7     02-13    139  |  98  |  35<H>  ----------------------------<  108<H>  3.6   |  29  |  1.38<H>    Ca    8.7      13 Feb 2024 07:06  Phos  2.7     02-13  Mg     2.0     02-13    TPro  6.2  /  Alb  3.5  /  TBili  1.9<H>  /  DBili  x   /  AST  29  /  ALT  85<H>  /  AlkPhos  74  02-13    PT/INR - ( 13 Feb 2024 07:06 )   PT: 13.1 sec;   INR: 1.20 ratio               Urinalysis Basic - ( 13 Feb 2024 07:06 )    Color: x / Appearance: x / SG: x / pH: x  Gluc: 108 mg/dL / Ketone: x  / Bili: x / Urobili: x   Blood: x / Protein: x / Nitrite: x   Leuk Esterase: x / RBC: x / WBC x   Sq Epi: x / Non Sq Epi: x / Bacteria: x

## 2024-02-13 NOTE — CHART NOTE - NSCHARTNOTEFT_GEN_A_CORE
Impression:   63 yrs old male w/ hx of HTN, GERD, dyslipidemia, chronic intermittent leg pain, aortic stenosis, chronic venous stasis dermatitis who p/w b/l leg edema. Patient was seen in [facility] on 1/25/2024 for bilateral leg pain and swelling. Hospital course c/w new onset HFrEF along with severe AS, now s/p LHC which showed non obstructive CAD and being considered for o/p TAVR, now has elevated liver enzymes.     #Elevated liver enzymes  On admission, had mildly elevated AST/ALT on 1/25, which has been stable, now AST/ALT 65/238, and slow rise on bilirubin 2.6 which was normal on admission. On 1/7/24, he had normal liver enzymes and bilirubin. Patient also had mildly elevated INR 1.37 (no prior values), and normal platelets.    - Denied prior hx of liver disease or alcohol use.   - Imaging showed hepatomegaly, and small ascites, spleen appears normal.   - TTE showed EF 30%, moderate MR and severe TR.   - Concerned for possible hepatic congestion leading to elevated liver enzymes, not a typical pattern for alcohol related liver disease. Other differentials include chronic hepatitis infection vs. possible autoimmune disease, although less likely. Does not clearly show clinical signs of liver cirrhosis at this time but could have a component of fibrosis. Less concerned for biliary obstruction with no elevated ALP and abd pain at this time.   - Other lab serologies: Hep B/C/A neg. AMA, ASA neg. Normal AFP levels.   - Patient underwent Abd elastography on 2/8 - moderate risk fibrosis w/ hepatomegaly, hepatic steatosis, dilated IVC w/ passive congestion. Difficult to interpret the results, especially in the setting of systolic heart failure and volume overload.     Recommendations:   - Patient is scheduled for liver biopsy next week.   - Lab serologies: alpha-1 anti-trypsin (phenotype), ceruloplasm, NEIL, immunoglobulins (IgG, IgM, IgA quantitative) for autoimmune etiologies pending.   - US doppler will need to be ordered.   - CMP, PT/INR, and CBC daily.     Hepatology will sign off. Please call us back when the patient gets his biopsy.     Thank you for the consult. Please call us back if you have any questions or concerns.     Corona Novoa, PGY-5  Gastroenterology/Hepatology Fellow  Available on Microsoft Teams  [phone] (Short Range Pager)  [phone] (Long Range Pager)    After 5pm, please contact the on-call GI fellow. [phone]

## 2024-02-14 LAB
ALBUMIN SERPL ELPH-MCNC: 3.4 G/DL — SIGNIFICANT CHANGE UP (ref 3.3–5)
ALP SERPL-CCNC: 67 U/L — SIGNIFICANT CHANGE UP (ref 40–120)
ALT FLD-CCNC: 68 U/L — HIGH (ref 10–45)
ANION GAP SERPL CALC-SCNC: 12 MMOL/L — SIGNIFICANT CHANGE UP (ref 5–17)
AST SERPL-CCNC: 23 U/L — SIGNIFICANT CHANGE UP (ref 10–40)
BILIRUB SERPL-MCNC: 1.6 MG/DL — HIGH (ref 0.2–1.2)
BUN SERPL-MCNC: 46 MG/DL — HIGH (ref 7–23)
CALCIUM SERPL-MCNC: 8.7 MG/DL — SIGNIFICANT CHANGE UP (ref 8.4–10.5)
CHLORIDE SERPL-SCNC: 100 MMOL/L — SIGNIFICANT CHANGE UP (ref 96–108)
CO2 SERPL-SCNC: 27 MMOL/L — SIGNIFICANT CHANGE UP (ref 22–31)
CREAT SERPL-MCNC: 1.32 MG/DL — HIGH (ref 0.5–1.3)
EGFR: 61 ML/MIN/1.73M2 — SIGNIFICANT CHANGE UP
GLUCOSE SERPL-MCNC: 130 MG/DL — HIGH (ref 70–99)
HCT VFR BLD CALC: 27.5 % — LOW (ref 39–50)
HGB BLD-MCNC: 8.3 G/DL — LOW (ref 13–17)
INR BLD: 1.25 RATIO — HIGH (ref 0.85–1.18)
MAGNESIUM SERPL-MCNC: 1.9 MG/DL — SIGNIFICANT CHANGE UP (ref 1.6–2.6)
MCHC RBC-ENTMCNC: 19.7 PG — LOW (ref 27–34)
MCHC RBC-ENTMCNC: 30.2 GM/DL — LOW (ref 32–36)
MCV RBC AUTO: 65.3 FL — LOW (ref 80–100)
NRBC # BLD: 2 /100 WBCS — HIGH (ref 0–0)
PHOSPHATE SERPL-MCNC: 2.4 MG/DL — LOW (ref 2.5–4.5)
PLATELET # BLD AUTO: 219 K/UL — SIGNIFICANT CHANGE UP (ref 150–400)
POTASSIUM SERPL-MCNC: 4.1 MMOL/L — SIGNIFICANT CHANGE UP (ref 3.5–5.3)
POTASSIUM SERPL-SCNC: 4.1 MMOL/L — SIGNIFICANT CHANGE UP (ref 3.5–5.3)
PROT SERPL-MCNC: 6 G/DL — SIGNIFICANT CHANGE UP (ref 6–8.3)
PROTHROM AB SERPL-ACNC: 13.7 SEC — HIGH (ref 9.5–13)
RBC # BLD: 4.21 M/UL — SIGNIFICANT CHANGE UP (ref 4.2–5.8)
RBC # FLD: 27.9 % — HIGH (ref 10.3–14.5)
SODIUM SERPL-SCNC: 139 MMOL/L — SIGNIFICANT CHANGE UP (ref 135–145)
WBC # BLD: 8.62 K/UL — SIGNIFICANT CHANGE UP (ref 3.8–10.5)
WBC # FLD AUTO: 8.62 K/UL — SIGNIFICANT CHANGE UP (ref 3.8–10.5)

## 2024-02-14 PROCEDURE — 99233 SBSQ HOSP IP/OBS HIGH 50: CPT

## 2024-02-14 PROCEDURE — 99254 IP/OBS CNSLTJ NEW/EST MOD 60: CPT | Mod: GC

## 2024-02-14 RX ORDER — SODIUM,POTASSIUM PHOSPHATES 278-250MG
1 POWDER IN PACKET (EA) ORAL ONCE
Refills: 0 | Status: COMPLETED | OUTPATIENT
Start: 2024-02-14 | End: 2024-02-15

## 2024-02-14 RX ORDER — FUROSEMIDE 40 MG
60 TABLET ORAL
Refills: 0 | Status: DISCONTINUED | OUTPATIENT
Start: 2024-02-14 | End: 2024-02-22

## 2024-02-14 RX ORDER — CARBAMIDE PEROXIDE 81.86 MG/ML
4 SOLUTION/ DROPS AURICULAR (OTIC)
Refills: 0 | Status: DISCONTINUED | OUTPATIENT
Start: 2024-02-14 | End: 2024-02-23

## 2024-02-14 RX ORDER — MUPIROCIN 20 MG/G
1 OINTMENT TOPICAL
Refills: 0 | Status: DISCONTINUED | OUTPATIENT
Start: 2024-02-14 | End: 2024-02-23

## 2024-02-14 RX ORDER — LANOLIN ALCOHOL/MO/W.PET/CERES
3 CREAM (GRAM) TOPICAL AT BEDTIME
Refills: 0 | Status: DISCONTINUED | OUTPATIENT
Start: 2024-02-14 | End: 2024-02-23

## 2024-02-14 RX ADMIN — Medication 3 MILLIGRAM(S): at 23:26

## 2024-02-14 RX ADMIN — CARBAMIDE PEROXIDE 4 DROP(S): 81.86 SOLUTION/ DROPS AURICULAR (OTIC) at 21:44

## 2024-02-14 RX ADMIN — TAMSULOSIN HYDROCHLORIDE 0.4 MILLIGRAM(S): 0.4 CAPSULE ORAL at 21:44

## 2024-02-14 RX ADMIN — SPIRONOLACTONE 25 MILLIGRAM(S): 25 TABLET, FILM COATED ORAL at 05:06

## 2024-02-14 RX ADMIN — LOSARTAN POTASSIUM 25 MILLIGRAM(S): 100 TABLET, FILM COATED ORAL at 05:06

## 2024-02-14 RX ADMIN — Medication 40 MILLIGRAM(S): at 05:06

## 2024-02-14 RX ADMIN — CHLORHEXIDINE GLUCONATE 1 APPLICATION(S): 213 SOLUTION TOPICAL at 05:06

## 2024-02-14 RX ADMIN — Medication 50 MILLIGRAM(S): at 05:06

## 2024-02-14 RX ADMIN — Medication 325 MILLIGRAM(S): at 14:41

## 2024-02-14 RX ADMIN — Medication 40 MILLIGRAM(S): at 14:41

## 2024-02-14 NOTE — PROGRESS NOTE ADULT - NS ATTEND AMEND GEN_ALL_CORE FT
Pt seen and examined with ACP.  Assessment and plan reviewed and discussed.  Agree with above.    Status of wounds and treatment recommendations d/w  pt.  All questions answered.   Pt expressed understanding.    I spent 50  minutes face to face w/ this pt of which more than 50% of the time was spent counseling & coordinating care of this pt.

## 2024-02-14 NOTE — PROGRESS NOTE ADULT - PROBLEM SELECTOR PLAN 5
Seen by ID at , s/p five days of keflex- likely not infectious given limited change with antibiotics  Leg elevation as needed, no ID objection to cardiac workup Seen by ID at , s/p five days of keflex- likely not infectious given limited change with antibiotics  Wound care feels that it is now infected and requested ID consult  Leg elevation as needed

## 2024-02-14 NOTE — PROGRESS NOTE ADULT - SUBJECTIVE AND OBJECTIVE BOX
[facility] NEPHROLOGY SERVICES, [facility]  NEPHROLOGY AND HYPERTENSION  [address]  [address]  [place] [address]  [phone]    MD JEAN PARSONS MD YELENA ROSENBERG, MD BINNY KOSHY, MD CHRISTOPHER CAPUTO, MD SUMMER SIMPSON MD          Patient events noted    MEDICATIONS  (STANDING):  chlorhexidine 2% Cloths 1 Application(s) Topical <User Schedule>  epoetin loulou-epbx (RETACRIT) Injectable 98871 Unit(s) SubCutaneous every 7 days  ferrous    sulfate 325 milliGRAM(s) Oral daily  furosemide   Injectable 60 milliGRAM(s) IV Push two times a day  lidocaine   4% Patch 1 Patch Transdermal daily  losartan 25 milliGRAM(s) Oral daily  melatonin 3 milliGRAM(s) Oral at bedtime  metoprolol succinate ER 50 milliGRAM(s) Oral daily  mupirocin 2% Ointment 1 Application(s) Topical <User Schedule>  potassium phosphate / sodium phosphate Powder (PHOS-NaK) 1 Packet(s) Oral once  spironolactone 25 milliGRAM(s) Oral daily  tamsulosin 0.4 milliGRAM(s) Oral at bedtime    MEDICATIONS  (PRN):  acetaminophen     Tablet .. 650 milliGRAM(s) Oral every 6 hours PRN Mild Pain (1 - 3)  carbamide peroxide Otic Solution 4 Drop(s) Right Ear two times a day PRN clogged ear  oxycodone    5 mG/acetaminophen 325 mG 1 Tablet(s) Oral every 6 hours PRN Severe Pain (7 - 10)  polyethylene glycol 3350 17 Gram(s) Oral daily PRN Constipation  sodium chloride 0.65% Nasal 1 Spray(s) Both Nostrils every 4 hours PRN Nasal Congestion      02-13-24 @ 07:01  -  02-14-24 @ 07:00  --------------------------------------------------------  IN: 1030 mL / OUT: 2350 mL / NET: -1320 mL    02-14-24 @ 07:01  -  02-14-24 @ 21:48  --------------------------------------------------------  IN: 640 mL / OUT: 1500 mL / NET: -860 mL      PHYSICAL EXAM:      T(C): 36.7 (02-14-24 @ 20:22), Max: 36.8 (02-14-24 @ 04:40)  HR: 80 (02-14-24 @ 20:22) (69 - 87)  BP: 113/71 (02-14-24 @ 20:22) (104/68 - 118/70)  RR: 18 (02-14-24 @ 20:22) (17 - 18)  SpO2: 97% (02-14-24 @ 20:22) (95% - 100%)  Wt(kg): --  Lungs clear  Heart S1S2  Abd soft NT ND  Extremities:   tr edema                                    8.3    8.62  )-----------( 219      ( 14 Feb 2024 07:13 )             27.5     02-14    139  |  100  |  46<H>  ----------------------------<  130<H>  4.1   |  27  |  1.32<H>    Ca    8.7      14 Feb 2024 07:28  Phos  2.4     02-14  Mg     1.9     02-14    TPro  6.0  /  Alb  3.4  /  TBili  1.6<H>  /  DBili  x   /  AST  23  /  ALT  68<H>  /  AlkPhos  67  02-14      LIVER FUNCTIONS - ( 14 Feb 2024 07:28 )  Alb: 3.4 g/dL / Pro: 6.0 g/dL / ALK PHOS: 67 U/L / ALT: 68 U/L / AST: 23 U/L / GGT: x           Creatinine Trend: 1.32<--, 1.38<--, 1.33<--, 1.33<--, 1.31<--, 1.48<--    A/P:    CM, EF 35-40%, severe AS  Tx from [facility] to [facility] 2/5 for TAVR eval, s/p [facility] 2/5  Cardio-renal/hemodynamic LINDA/CKD 3 has improved (peak Cr 2.5 - 2/3/24)   UA negative   Imaging w/o hydro   S/p CT w/IV dye 2/8/24  Stable renal fx  F/u BMP on Lasix, ARB, Aldactone      Pedrito Irving MD

## 2024-02-14 NOTE — CONSULT NOTE ADULT - ASSESSMENT
63 year old patient with left heel and bilateral hallux fissures  - Patient seen and evaluated  - no signs of infection in feet  - Recommend z flow boots in bed at all times  - Recommend mupirocin to bilateral hallux and left heel fissures, allevyn foam to left heel, change daily  - Recommend moisturizing feet twice daily  - Podiatry signing off at this time, reconsult as needed

## 2024-02-14 NOTE — PROGRESS NOTE ADULT - ASSESSMENT
63M hx HTN, GERD, dyslipidemia, chronic intermittent leg pain, aortic stenosis, chronic venous stasis dermatitis with bilateral leg swelling, admitted to [facility] with b/l leg pain and swelling, transferred to [facility] for TAVR evaluation for severe AS

## 2024-02-14 NOTE — PROGRESS NOTE ADULT - SUBJECTIVE AND OBJECTIVE BOX
[facility] Division of Hospital Medicine  Renu Early MD  MS Teams PREFERRED        SUBJECTIVE / OVERNIGHT EVENTS:  ADDITIONAL REVIEW OF SYSTEMS:    MEDICATIONS  (STANDING):  chlorhexidine 2% Cloths 1 Application(s) Topical <User Schedule>  epoetin loulou-epbx (RETACRIT) Injectable 13677 Unit(s) SubCutaneous every 7 days  ferrous    sulfate 325 milliGRAM(s) Oral daily  furosemide   Injectable 40 milliGRAM(s) IV Push two times a day  lidocaine   4% Patch 1 Patch Transdermal daily  losartan 25 milliGRAM(s) Oral daily  metoprolol succinate ER 50 milliGRAM(s) Oral daily  spironolactone 25 milliGRAM(s) Oral daily  tamsulosin 0.4 milliGRAM(s) Oral at bedtime    MEDICATIONS  (PRN):  acetaminophen     Tablet .. 650 milliGRAM(s) Oral every 6 hours PRN Mild Pain (1 - 3)  oxycodone    5 mG/acetaminophen 325 mG 1 Tablet(s) Oral every 6 hours PRN Severe Pain (7 - 10)  polyethylene glycol 3350 17 Gram(s) Oral daily PRN Constipation  sodium chloride 0.65% Nasal 1 Spray(s) Both Nostrils every 4 hours PRN Nasal Congestion      I&O's Summary    13 Feb 2024 07:01  -  14 Feb 2024 07:00  --------------------------------------------------------  IN: 1030 mL / OUT: 2350 mL / NET: -1320 mL        PHYSICAL EXAM:  Vital Signs Last 24 Hrs  T(C): 36.6 (14 Feb 2024 12:11), Max: 37.2 (13 Feb 2024 19:57)  T(F): 97.9 (14 Feb 2024 12:11), Max: 99 (13 Feb 2024 19:57)  HR: 69 (14 Feb 2024 12:11) (69 - 87)  BP: 104/68 (14 Feb 2024 12:11) (104/68 - 118/70)  BP(mean): --  RR: 18 (14 Feb 2024 12:11) (17 - 18)  SpO2: 97% (14 Feb 2024 12:11) (95% - 100%)    Parameters below as of 14 Feb 2024 12:11  Patient On (Oxygen Delivery Method): room air      CONSTITUTIONAL: NAD, well-developed, well-groomed  EYES: PERRLA; conjunctiva and sclera clear  ENMT: Moist oral mucosa, no pharyngeal injection or exudates; normal dentition  NECK: Supple, no palpable masses; no thyromegaly  RESPIRATORY: Normal respiratory effort; lungs are clear to auscultation bilaterally  CARDIOVASCULAR: Regular rate and rhythm, normal S1 and S2, no murmur/rub/gallop; No lower extremity edema; Peripheral pulses are 2+ bilaterally  ABDOMEN: Nontender to palpation, normoactive bowel sounds, no rebound/guarding; No hepatosplenomegaly  MUSCULOSKELETAL:  Normal gait; no clubbing or cyanosis of digits; no joint swelling or tenderness to palpation  PSYCH: A+O to person, place, and time; affect appropriate  NEUROLOGY: CN 2-12 are intact and symmetric; no gross sensory deficits   SKIN: No rashes; no palpable lesions    LABS:                        8.3    8.62  )-----------( 219      ( 14 Feb 2024 07:13 )             27.5     02-14    139  |  100  |  46<H>  ----------------------------<  130<H>  4.1   |  27  |  1.32<H>    Ca    8.7      14 Feb 2024 07:28  Phos  2.4     02-14  Mg     1.9     02-14    TPro  6.0  /  Alb  3.4  /  TBili  1.6<H>  /  DBili  x   /  AST  23  /  ALT  68<H>  /  AlkPhos  67  02-14    PT/INR - ( 14 Feb 2024 07:13 )   PT: 13.7 sec;   INR: 1.25 ratio               Urinalysis Basic - ( 14 Feb 2024 07:28 )    Color: x / Appearance: x / SG: x / pH: x  Gluc: 130 mg/dL / Ketone: x  / Bili: x / Urobili: x   Blood: x / Protein: x / Nitrite: x   Leuk Esterase: x / RBC: x / WBC x   Sq Epi: x / Non Sq Epi: x / Bacteria: x          RADIOLOGY & ADDITIONAL TESTS:  Results Reviewed:   Imaging Personally Reviewed:  Electrocardiogram Personally Reviewed:    COORDINATION OF CARE:  Care Discussed with Consultants/Other Providers [Y/N]:  Prior or Outpatient Records Reviewed [Y/N]:   [facility] Division of Hospital Medicine  Renu Early MD  MS Teams PREFERRED        SUBJECTIVE / OVERNIGHT EVENTS: Seen and examined at bedside walking in his room. NAD.    MEDICATIONS  (STANDING):  chlorhexidine 2% Cloths 1 Application(s) Topical <User Schedule>  epoetin loulou-epbx (RETACRIT) Injectable 81990 Unit(s) SubCutaneous every 7 days  ferrous    sulfate 325 milliGRAM(s) Oral daily  furosemide   Injectable 40 milliGRAM(s) IV Push two times a day  lidocaine   4% Patch 1 Patch Transdermal daily  losartan 25 milliGRAM(s) Oral daily  metoprolol succinate ER 50 milliGRAM(s) Oral daily  spironolactone 25 milliGRAM(s) Oral daily  tamsulosin 0.4 milliGRAM(s) Oral at bedtime    MEDICATIONS  (PRN):  acetaminophen     Tablet .. 650 milliGRAM(s) Oral every 6 hours PRN Mild Pain (1 - 3)  oxycodone    5 mG/acetaminophen 325 mG 1 Tablet(s) Oral every 6 hours PRN Severe Pain (7 - 10)  polyethylene glycol 3350 17 Gram(s) Oral daily PRN Constipation  sodium chloride 0.65% Nasal 1 Spray(s) Both Nostrils every 4 hours PRN Nasal Congestion      I&O's Summary    13 Feb 2024 07:01  -  14 Feb 2024 07:00  --------------------------------------------------------  IN: 1030 mL / OUT: 2350 mL / NET: -1320 mL        PHYSICAL EXAM:  Vital Signs Last 24 Hrs  T(C): 36.6 (14 Feb 2024 12:11), Max: 37.2 (13 Feb 2024 19:57)  T(F): 97.9 (14 Feb 2024 12:11), Max: 99 (13 Feb 2024 19:57)  HR: 69 (14 Feb 2024 12:11) (69 - 87)  BP: 104/68 (14 Feb 2024 12:11) (104/68 - 118/70)  BP(mean): --  RR: 18 (14 Feb 2024 12:11) (17 - 18)  SpO2: 97% (14 Feb 2024 12:11) (95% - 100%)    Parameters below as of 14 Feb 2024 12:11  Patient On (Oxygen Delivery Method): room air      PHYSICAL EXAM:-  GENERAL: NAD, well-developed  EYES: EOMI, PERRLA, conjunctiva and sclera clear  NECK: Supple, No JVD, no thyromegaly  CHEST/LUNG: Clear to auscultation bilaterally; No wheeze  HEART: Regular rate and rhythm; S1, S2 audible, No murmurs, rubs, or gallops  ABDOMEN: Soft, Nontender, Nondistended; Bowel sounds present  EXTREMITIES:  2+ Peripheral Pulses, No clubbing, cyanosis, 1+ LE edema  NEURO: AAOx3, no focal deficit    LABS:                        8.3    8.62  )-----------( 219      ( 14 Feb 2024 07:13 )             27.5     02-14    139  |  100  |  46<H>  ----------------------------<  130<H>  4.1   |  27  |  1.32<H>    Ca    8.7      14 Feb 2024 07:28  Phos  2.4     02-14  Mg     1.9     02-14    TPro  6.0  /  Alb  3.4  /  TBili  1.6<H>  /  DBili  x   /  AST  23  /  ALT  68<H>  /  AlkPhos  67  02-14    PT/INR - ( 14 Feb 2024 07:13 )   PT: 13.7 sec;   INR: 1.25 ratio               Urinalysis Basic - ( 14 Feb 2024 07:28 )    Color: x / Appearance: x / SG: x / pH: x  Gluc: 130 mg/dL / Ketone: x  / Bili: x / Urobili: x   Blood: x / Protein: x / Nitrite: x   Leuk Esterase: x / RBC: x / WBC x   Sq Epi: x / Non Sq Epi: x / Bacteria: x

## 2024-02-14 NOTE — PROGRESS NOTE ADULT - ASSESSMENT
Assessment and Recommendation:   · Assessment    A/P:63 year old male with Hx of HTN, GERD, dyslipidemia, chronic intermittent leg pain, aortic stenosis, chronic venous stasis dermatitis with bilateral leg swelling, poor historian presented to [place] ER on 1/25/2024 for bilateral leg pain and swelling. He was admitted to medicine and was found to have new onset acute HFrEF (EF 35-40% 1/2024) in setting of severe AS    Wound Consult requested to assist w/ management of  BLE venous ulcers    Recommendation  Medihoney daily  ACE  Elevate BLE  Consider BILL/PVR,     Moisturize intact skin w/ SWEEN cream BID  Nutrition Consult for optimization in pt w/  Increased nutritional needs            encourage high quality protein, eliot/ prosource, MVI & Vit C to promote wound healing  Continue turning and positioning w/ offloading assistive devices as per protocol  Continue w/ attends under pads and Pericare as per protocol  Waffle Cushion to chair when oob to chair  Continue w/ low air loss pressure redistribution bed surface   Care as per medicine, will remain available as requested  Upon discharge f/u as outpatient at Wound Center [address] [place] [address] [phone]  Seen with attending and D/w  RN  Thank you for this consult,  LEANN Rodrigez-BC, [facility]  [phone]  Nights/ Weekends/ Holidays please call:  General Surgery Consult pager ([phone]) for emergencies  Wound PT for multilayer leg wrapping or VAC issues (x [phone])    Assessment and Recommendation:   · Assessment    A/P:63 year old male with Hx of HTN, GERD, dyslipidemia, chronic intermittent leg pain, aortic stenosis, chronic venous stasis dermatitis with bilateral leg swelling, poor historian presented to [place] ER on 1/25/2024 for bilateral leg pain and swelling. He was admitted to medicine and was found to have new onset acute HFrEF (EF 35-40% 1/2024) in setting of severe AS    Wound Consult requested to assist w/ management of  BLE venous ulcers  Lt hallux plantar fissure open scant drainage  RT lower extremity erythema and warmth > LLE    Recommendation  BLE Jos, shayy, ABD, Flavio ACE daily  Lt Hallux POD   RLE Discussed with ACP Karuna [id number] consider ID  Elevate BLE  Consider BILL/PVR,     Moisturize intact skin w/ SWEEN cream BID  Nutrition Consult for optimization in pt w/  Increased nutritional needs            encourage high quality protein, eliot/ prosource, MVI & Vit C to promote wound healing  Continue turning and positioning w/ offloading assistive devices as per protocol  Continue w/ attends under pads and Pericare as per protocol  Waffle Cushion to chair when oob to chair  Continue w/ low air loss pressure redistribution bed surface   Care as per medicine, will remain available as requested  Upon discharge f/u as outpatient at Wound Center [address] [facility] [phone]  Seen with attending and D/w  RN  Thank you for this consult,  LEANN Rodrigez-BC, [facility]  [phone]  Nights/ Weekends/ Holidays please call:  General Surgery Consult pager ([phone]) for emergencies  Wound PT for multilayer leg wrapping or VAC issues (x [phone])    Assessment and Recommendation:   · Assessment    A/P:63 year old male with Hx of HTN, GERD, dyslipidemia, chronic intermittent leg pain, aortic stenosis, chronic venous stasis dermatitis with bilateral leg swelling, poor historian presented to [place] ER on 1/25/2024 for bilateral leg pain and swelling. He was admitted to medicine and was found to have new onset acute HFrEF (EF 35-40% 1/2024) in setting of severe AS    Wound Consult requested to assist w/ management of  BLE venous ulcers  Lt hallux plantar fissure   RT lower extremity erythema and warmth    Recommendation  BLE Jos, shayy, ABD, Flavio ACE daily  Lt Hallux POD   RLE Discussed with ACP Karuna [id number] consider ID  Elevate BLE  Consider BILL/PVR,     Moisturize intact skin w/ SWEEN cream BID  Nutrition Consult for optimization in pt w/  Increased nutritional needs            encourage high quality protein, eliot/ prosource, MVI & Vit C to promote wound healing  Continue turning and positioning w/ offloading assistive devices as per protocol  Continue w/ attends under pads and Pericare as per protocol  Waffle Cushion to chair when oob to chair  Continue w/ low air loss pressure redistribution bed surface   Care as per medicine, will remain available as requested  Upon discharge f/u as outpatient at Wound Center [address] [facility] [phone]  Seen with attending and D/w  RN and ACP  Thank you for this consult,  LEANN Rodrigez-BC, [facility]  [phone]  Nights/ Weekends/ Holidays please call:  General Surgery Consult pager ([phone]) for emergencies  Wound PT for multilayer leg wrapping or VAC issues (x [phone])    Assessment and Recommendation:   · Assessment    A/P:63 year old male with Hx of HTN, GERD, dyslipidemia, chronic intermittent leg pain, aortic stenosis, chronic venous stasis dermatitis with bilateral leg swelling, poor historian presented to [place] ER on 1/25/2024 for bilateral leg pain and swelling. He was admitted to medicine and was found to have new onset acute HFrEF (EF 35-40% 1/2024) in setting of severe AS    Wound Consult requested to assist w/ management of  BLE wounds  PVD  Lt hallux plantar fissure   RT lower extremity with increased erythema and warmth as compared to LLE    Recommendation  BLE Jos, shayy, ABD, Flavio ACE daily  Lt Hallux POD   RLE Discussed with ACP Karuna [id number] consider ID  Elevate BLE  Consider BILL/PVR,     Moisturize intact skin w/ SWEEN cream BID  Nutrition Consult for optimization in pt w/  Increased nutritional needs            encourage high quality protein, eliot/ prosource, MVI & Vit C to promote wound healing  Continue turning and positioning w/ offloading assistive devices as per protocol  Continue w/ attends under pads and Pericare as per protocol  Waffle Cushion to chair when oob to chair  Continue w/ low air loss pressure redistribution bed surface   Care as per medicine, will remain available as requested  Upon discharge f/u as outpatient at Wound Center [address] [facility] [phone]  Seen with attending and D/w  RN and ACP  Thank you for this consult,  LEANN Rodrigez-BC, [facility]  [phone]  Nights/ Weekends/ Holidays please call:  General Surgery Consult pager [phone]) for emergencies  Wound PT for multilayer leg wrapping or VAC issues (x [phone])

## 2024-02-14 NOTE — CONSULT NOTE ADULT - ATTENDING COMMENTS
63 m with HTN, GERD, AS, chronic venous stasis dermatitis with bilateral leg swelling, initially admitted to [facility] with JOCELIN and b/l leg pain and swelling, was seen by ID and was considered not infected just stasis dermatitis, received 5 days of keflex, wound cx showed serratia and strep dysgalactiae,  blood cx negative  now transferred to [facility] for cardiology and TAVR  afebrile, no WBC  wound care stated the wounds could be infected now  was also seen by podiatry, no infection in feet    AS and CHF plan for TAVR  b/l LE stasis dermatitis and chronic wounds 63 m with HTN, GERD, AS, chronic venous stasis dermatitis with bilateral leg swelling, initially admitted to [facility] with JOCELIN and b/l leg pain and swelling, was seen by ID and was considered not infected just stasis dermatitis, received 5 days of keflex, wound cx showed serratia and strep dysgalactiae,  blood cx negative  now transferred to [facility] for cardiology and TAVR  afebrile, no WBC  wound care stated the wounds could be infected now  was also seen by podiatry, no infection in feet    AS and CHF plan for TAVR  b/l LE stasis dermatitis and chronic wounds, no tenderness and appears stasis dermatitis not infection    * wound care and leg elevation  * would monitor off antibiotics    The above assessment and plan was discussed with the primary team    Pamela Bellamy MD  contact on teams  After 5pm and on weekends call [phone] 63 m with HTN, GERD, AS, chronic venous stasis dermatitis with bilateral leg swelling, initially admitted to [facility] with JOCELIN and b/l leg pain and swelling, was seen by ID and was considered not infected just stasis dermatitis, received 5 days of keflex, wound cx showed serratia and strep dysgalactiae,  blood cx negative  now transferred to [facility] for cardiology and TAVR  afebrile, no WBC  wound care stated the wounds could be infected now  was also seen by podiatry, no infection in feet    AS and CHF plan for TAVR  b/l LE stasis dermatitis and chronic wounds, no tenderness and appears stasis dermatitis not infection    * wound care and leg elevation  * would monitor off antibiotics  * please call with questions  The above assessment and plan was discussed with the primary team    Pamela Bellamy MD  contact on teams  After 5pm and on weekends call [phone]

## 2024-02-14 NOTE — PROGRESS NOTE ADULT - PROBLEM SELECTOR PLAN 3
With liver dysfunction  Hepatitis panel negative  RUQ US unremarkable  Elastography revealed borderline moderate risk of clinically significant fibrosis  Hepatology following  F/u Hepatology/IR for Biopsy - want to do next week, f/u if can be safely done as outpt With liver dysfunction  Hepatitis panel negative  RUQ US unremarkable  Elastography revealed borderline moderate risk of clinically significant fibrosis  Hepatology following  F/u Hepatology/IR for Biopsy - tentatively scheduled for 2/18 if patient is still admitted otherwise will schedule as outpt

## 2024-02-14 NOTE — CONSULT NOTE ADULT - SUBJECTIVE AND OBJECTIVE BOX
Patient is a 63y old  Male who presents with a chief complaint of TAVR eval (10 Feb 2024 11:21)      HPI:  63 year old male with Hx of HTN, GERD, dyslipidemia, chronic intermittent leg pain, aortic stenosis, chronic venous stasis dermatitis with bilateral leg swelling, poor historian presented to [place] [facility] on 1/25/2024 for bilateral leg pain and swelling. He was admitted to medicine and was found to have new onset acute HFrEF (EF 35-40% 1/2024) in setting of severe AS. Echo results below from 1/26/2024.  He was treated with IV lasix, beta blocker. Not a candidate for ACE/ARB/ARNI due to renal function. Also was treated with 5 days keflex for possible lower extremity cellulitis. While admitted patient found to be anemic. FOBT negative. Heme was consulted, advised multifactoral etiology for anemia, mechanical hemolysis due to severe AS also possible. Patient was given IV iron, as well as 1 unit PRBCs on 1/31 with improvement in Hbg. GI was also consulted.  Patient will require ischemic workup with coronary angiogram and full structural heart team evaluation for TAVR - accepted for transfer at [facility] by hospitalist Dr Joiner in consult with Dr Catalan (interventional cardiology).  Walks with cane, Lives with wife- poor memory   denies implanted cardiac monitors     Podiatry consulted for bilateral foot fissures.    PAST MEDICAL & SURGICAL HISTORY:  Dyslipidemia      Hypertension      Chronic GERD      History of aortic stenosis      Cellulitis      No significant past surgical history          MEDICATIONS  (STANDING):  chlorhexidine 2% Cloths 1 Application(s) Topical <User Schedule>  epoetin loulou-epbx (RETACRIT) Injectable 25386 Unit(s) SubCutaneous every 7 days  ferrous    sulfate 325 milliGRAM(s) Oral daily  furosemide   Injectable 40 milliGRAM(s) IV Push two times a day  lidocaine   4% Patch 1 Patch Transdermal daily  losartan 25 milliGRAM(s) Oral daily  metoprolol succinate ER 50 milliGRAM(s) Oral daily  spironolactone 25 milliGRAM(s) Oral daily  tamsulosin 0.4 milliGRAM(s) Oral at bedtime    MEDICATIONS  (PRN):  acetaminophen     Tablet .. 650 milliGRAM(s) Oral every 6 hours PRN Mild Pain (1 - 3)  oxycodone    5 mG/acetaminophen 325 mG 1 Tablet(s) Oral every 6 hours PRN Severe Pain (7 - 10)  polyethylene glycol 3350 17 Gram(s) Oral daily PRN Constipation  sodium chloride 0.65% Nasal 1 Spray(s) Both Nostrils every 4 hours PRN Nasal Congestion      Allergies    garlic (Angioedema; Swelling; Anaphylaxis)  No Known Drug Allergies    Intolerances        VITALS:    Vital Signs Last 24 Hrs  T(C): 36.7 (14 Feb 2024 14:40), Max: 37.2 (13 Feb 2024 19:57)  T(F): 98 (14 Feb 2024 14:40), Max: 99 (13 Feb 2024 19:57)  HR: 74 (14 Feb 2024 14:40) (69 - 87)  BP: 113/70 (14 Feb 2024 14:40) (104/68 - 118/70)  BP(mean): --  RR: 18 (14 Feb 2024 14:40) (17 - 18)  SpO2: 96% (14 Feb 2024 14:40) (95% - 100%)    Parameters below as of 14 Feb 2024 14:40  Patient On (Oxygen Delivery Method): room air        LABS:                          8.3    8.62  )-----------( 219      ( 14 Feb 2024 07:13 )             27.5       02-14    139  |  100  |  46<H>  ----------------------------<  130<H>  4.1   |  27  |  1.32<H>    Ca    8.7      14 Feb 2024 07:28  Phos  2.4     02-14  Mg     1.9     02-14    TPro  6.0  /  Alb  3.4  /  TBili  1.6<H>  /  DBili  x   /  AST  23  /  ALT  68<H>  /  AlkPhos  67  02-14      CAPILLARY BLOOD GLUCOSE          PT/INR - ( 14 Feb 2024 07:13 )   PT: 13.7 sec;   INR: 1.25 ratio             LOWER EXTREMITY PHYSICAL EXAM:    Vasular: DP 2/4, PT 0/4 B/L, CFT <3 seconds B/L, Temperature gradient WNL, B/L.   Neuro: Epicritic sensation intact to the level of foot, B/L.  Musculoskeletal/Ortho: pain with palpation of left heel fissures  Skin: left heel and bilateral hallux fissures with no acute signs of infection noted, severe dry skin bilateral foot noted

## 2024-02-14 NOTE — CHART NOTE - NSCHARTNOTEFT_GEN_A_CORE
Chart reviewed case discussed with HF attending Dr. Nix. Would further escalate diuretics with increasing Lasix to 60mg IVP BID starting today

## 2024-02-14 NOTE — PROGRESS NOTE ADULT - SUBJECTIVE AND OBJECTIVE BOX
[facility]-- WOUND TEAM -- FOLLOW UP NOTE  --------------------------------------------------------------------------------    24 hour events/subjective:    Pt found sitting in chair  Afebrile  Tolerating po w/o n/v  Chart reviewed  Hx of   63 year old male with Hx of HTN, GERD, dyslipidemia, chronic intermittent leg pain, aortic stenosis, chronic venous stasis dermatitis with bilateral leg swelling, poor historian presented to Health system on 1/25/2024 for bilateral leg pain and swelling. He was admitted to medicine and was found to have new onset acute HFrEF (EF 35-40% 1/2024) in setting of severe AS. Echo results from 1/26/2024.  He was treated with IV lasix, beta blocker. Not a candidate for ACE/ARB/ARNI due to renal function. Also was treated with 5 days keflex for possible lower extremity cellulitis.   dc planning ongoing        Diet:  Diet, DASH/TLC:   Sodium & Cholesterol Restricted (02-05-24 @ 18:11)      ROS: General/ Skin/ Msk/ Neuro/ GI see HPI  all other systems negative      ALLERGIES & MEDICATIONS  --------------------------------------------------------------------------------  Allergies    garlic (Angioedema; Swelling; Anaphylaxis)  No Known Drug Allergies    Intolerances          STANDING INPATIENT MEDICATIONS    chlorhexidine 2% Cloths 1 Application(s) Topical <User Schedule>  epoetin loulou-epbx (RETACRIT) Injectable 70214 Unit(s) SubCutaneous every 7 days  ferrous sulfate 325 milliGRAM(s) Oral daily  furosemide   Injectable 40 milliGRAM(s) IV Push two times a day  lidocaine   4% Patch 1 Patch Transdermal daily  losartan 25 milliGRAM(s) Oral daily  metoprolol succinate ER 50 milliGRAM(s) Oral daily  spironolactone 25 milliGRAM(s) Oral daily  tamsulosin 0.4 milliGRAM(s) Oral at bedtime      PRN INPATIENT MEDICATION  acetaminophen     Tablet .. 650 milliGRAM(s) Oral every 6 hours PRN  oxycodone    5 mG/acetaminophen 325 mG 1 Tablet(s) Oral every 6 hours PRN  polyethylene glycol 3350 17 Gram(s) Oral daily PRN  sodium chloride 0.65% Nasal 1 Spray(s) Both Nostrils every 4 hours PRN        VITALS/PHYSICAL EXAM  --------------------------------------------------------------------------------  T(C): 36.6 (02-14-24 @ 12:11), Max: 37.2 (02-13-24 @ 19:57)  HR: 69 (02-14-24 @ 12:11) (69 - 87)  BP: 104/68 (02-14-24 @ 12:11) (104/68 - 118/70)  RR: 18 (02-14-24 @ 12:11) (17 - 18)  SpO2: 97% (02-14-24 @ 12:11) (95% - 100%)  Wt(kg): --        02-13-24 @ 07:01  -  02-14-24 @ 07:00  --------------------------------------------------------  IN: 1030 mL / OUT: 2350 mL / NET: -1320 mL    ASSESSMENT    NAD,  A&Ox3     HEENT:  sclera clear, mucosa moist, throat clear, trachea midline, neck supple  Respiratory: nonlabored w/ equal chest rise  Gastrointestinal: soft NT/ND   Neurology:  weakened strength & sensation grossly intact  verbal,  follows commands  Psych: calm/ appropriate  Musculoskeletal:  no deformities/ contractures  Vascular: BLE equally cool, no cyanosis, clubbing,  nor acute ischemia                BLE edema equal                BLE DP pulses faint                BLE hemosiderin staining  LLE anterior excoriated  LLE posterior 3.0cm x 2.8cm x 0.2cm with brown and yellow slough stable and dry scant drainage (+) TTP        (+) periwound erythema, No odor, no increased warmth, induration, fluctuance, nor crepitus   RLE anterior 4.8cm x 6.2cm x0.1cm soft yellow slough mild/moderate serosanguinous drainage (+) TTP           (+)  periwound erythema, No odor,  increased warmth, induration, fluctuance, nor crepitus  RLE posterior 9.0cm cm x 3.5cm x0.2cm soft yellow slough scant serosanguinous drainage (+) TTP            (+) periwound erythema, No odor,  increased warmth, induration, fluctuance, nor crepitus   BLE xerosis  Skin:  thin, dry, pale, fr    LABS/ CULTURES/ RADIOLOGY:                     8.3    8.62  >-----------<  219      [02-14-24 @ 07:13]              27.5     139  |  100  |  46  ----------------------------<  130      [02-14-24 @ 07:28]  4.1   |  27  |  1.32        Ca     8.7     [02-14-24 @ 07:28]      Mg     1.9     [02-14-24 @ 07:28]      Phos  2.4     [02-14-24 @ 07:28]    TPro  6.0  /  Alb  3.4  /  TBili  1.6  /  DBili  x   /  AST  23  /  ALT  68  /  AlkPhos  67  [02-14-24 @ 07:28]    PT/INR: PT 13.7 , INR 1.25       [02-14-24 @ 07:13]      CAPILLARY BLOOD GLUCOSE      Triglycerides, Serum: 58 mg/dL (01-27-24 @ 09:00)      A1C with Estimated Average Glucose Result: 6.2 % (01-27-24 @ 09:00)    < from: US Duplex Venous Lower Ext Complete, Bilateral (01.25.24 @ 18:20) >    ACC: [id number] EXAM:  US DPLX LWR EXT VEINS COMPL BI   ORDERED BY:    MARYANNE CARMEN     PROCEDURE DATE:  01/25/2024          INTERPRETATION:  CLINICAL INFORMATION: Evaluate for DVT.    COMPARISON: Right lower extremity venous Doppler 1/7/2024    TECHNIQUE: Duplex sonography of the BILATERAL LOWER extremity veins with   color and spectral Doppler, with and without compression.    FINDINGS:    RIGHT:  Normal compressibility of the RIGHT common femoral, femoral and popliteal   veins.  Doppler examination shows normal spontaneous and phasic flow.  No RIGHT calf vein thrombosis is detected.    LEFT:  Normal compressibility of the LEFT common femoral, femoral and popliteal   veins.  Doppler examination shows normal spontaneous and phasic flow.  No LEFT calf vein thrombosis is detected.    IMPRESSION:  No evidence of deep venous thrombosis in either lower extremity.            --- End of Report ---            LIZETT TANNER MD; Attending Radiologist  This document has been electronically signed. Jan 25 2024  6:37PM    < end of copied text >         [facility]-- WOUND TEAM -- FOLLOW UP NOTE  --------------------------------------------------------------------------------    24 hour events/subjective:    Pt found sitting in chair  Afebrile  Tolerating po w/o n/v  Chart reviewed  Hx of   63 year old male with Hx of HTN, GERD, dyslipidemia, chronic intermittent leg pain, aortic stenosis, chronic venous stasis dermatitis with bilateral leg swelling, poor historian presented to [facility] on 1/25/2024 for bilateral leg pain and swelling. He was admitted to medicine and was found to have new onset acute HFrEF (EF 35-40% 1/2024) in setting of severe AS. Echo results from 1/26/2024.  He was treated with IV lasix, beta blocker. Not a candidate for ACE/ARB/ARNI due to renal function. Also was treated with 5 days keflex for possible lower extremity cellulitis.   dc planning ongoing        Diet:  Diet, DASH/TLC:   Sodium & Cholesterol Restricted (02-05-24 @ 18:11)      ROS: General/ Skin/ Msk/ Neuro/ GI see HPI  all other systems negative      ALLERGIES & MEDICATIONS  --------------------------------------------------------------------------------  Allergies    garlic (Angioedema; Swelling; Anaphylaxis)  No Known Drug Allergies    Intolerances          STANDING INPATIENT MEDICATIONS    chlorhexidine 2% Cloths 1 Application(s) Topical <User Schedule>  epoetin loulou-epbx (RETACRIT) Injectable 91658 Unit(s) SubCutaneous every 7 days  ferrous sulfate 325 milliGRAM(s) Oral daily  furosemide   Injectable 40 milliGRAM(s) IV Push two times a day  lidocaine   4% Patch 1 Patch Transdermal daily  losartan 25 milliGRAM(s) Oral daily  metoprolol succinate ER 50 milliGRAM(s) Oral daily  spironolactone 25 milliGRAM(s) Oral daily  tamsulosin 0.4 milliGRAM(s) Oral at bedtime      PRN INPATIENT MEDICATION  acetaminophen     Tablet .. 650 milliGRAM(s) Oral every 6 hours PRN  oxycodone    5 mG/acetaminophen 325 mG 1 Tablet(s) Oral every 6 hours PRN  polyethylene glycol 3350 17 Gram(s) Oral daily PRN  sodium chloride 0.65% Nasal 1 Spray(s) Both Nostrils every 4 hours PRN        VITALS/PHYSICAL EXAM  --------------------------------------------------------------------------------  T(C): 36.6 (02-14-24 @ 12:11), Max: 37.2 (02-13-24 @ 19:57)  HR: 69 (02-14-24 @ 12:11) (69 - 87)  BP: 104/68 (02-14-24 @ 12:11) (104/68 - 118/70)  RR: 18 (02-14-24 @ 12:11) (17 - 18)  SpO2: 97% (02-14-24 @ 12:11) (95% - 100%)  Wt(kg): --        02-13-24 @ 07:01  -  02-14-24 @ 07:00  --------------------------------------------------------  IN: 1030 mL / OUT: 2350 mL / NET: -1320 mL    ASSESSMENT    NAD,  A&Ox3     HEENT:  sclera clear, mucosa moist, throat clear, trachea midline, neck supple  Respiratory: nonlabored w/ equal chest rise  Gastrointestinal: soft NT/ND   Neurology:  weakened strength & sensation grossly intact  verbal,  follows commands  Psych: calm/ appropriate  Musculoskeletal:  no deformities/ contractures  Vascular: BLE equally cool, no cyanosis, clubbing,  nor acute ischemia                BLE edema equal                BLE DP pulses faint                BLE hemosiderin staining  LLE anterior excoriated  LLE posterior 3.0cm x 2.8cm x 0.2cm with brown and yellow slough stable and dry scant drainage (+) TTP        (+) periwound erythema, No odor, no increased warmth, induration, fluctuance, nor crepitus   RLE anterior 4.8cm x 6.2cm x0.1cm soft yellow slough mild/moderate serosanguinous drainage (+) TTP           (+)  periwound erythema, No odor,  increased warmth, induration, fluctuance, nor crepitus  RLE posterior 9.0cm cm x 3.5cm x0.2cm soft yellow slough scant serosanguinous drainage (+) TTP            (+) periwound erythema, No odor,  increased warmth, induration, fluctuance, nor crepitus   Lt hallux plantar open fissure red tissue scant drainage, No odor, increased warmth, induration, fluctuance, nor crepitus   Skin:  thin, dry, pale, frail    LABS/ CULTURES/ RADIOLOGY:                     8.3    8.62  >-----------<  219      [02-14-24 @ 07:13]              27.5     139  |  100  |  46  ----------------------------<  130      [02-14-24 @ 07:28]  4.1   |  27  |  1.32        Ca     8.7     [02-14-24 @ 07:28]      Mg     1.9     [02-14-24 @ 07:28]      Phos  2.4     [02-14-24 @ 07:28]    TPro  6.0  /  Alb  3.4  /  TBili  1.6  /  DBili  x   /  AST  23  /  ALT  68  /  AlkPhos  67  [02-14-24 @ 07:28]    PT/INR: PT 13.7 , INR 1.25       [02-14-24 @ 07:13]      CAPILLARY BLOOD GLUCOSE      Triglycerides, Serum: 58 mg/dL (01-27-24 @ 09:00)      A1C with Estimated Average Glucose Result: 6.2 % (01-27-24 @ 09:00)    < from: US Duplex Venous Lower Ext Complete, Bilateral (01.25.24 @ 18:20) >    ACC: [id number] EXAM:  US DPLX LWR EXT VEINS COMPL BI   ORDERED BY:    MARYANNE CARMEN     PROCEDURE DATE:  01/25/2024          INTERPRETATION:  CLINICAL INFORMATION: Evaluate for DVT.    COMPARISON: Right lower extremity venous Doppler 1/7/2024    TECHNIQUE: Duplex sonography of the BILATERAL LOWER extremity veins with   color and spectral Doppler, with and without compression.    FINDINGS:    RIGHT:  Normal compressibility of the RIGHT common femoral, femoral and popliteal   veins.  Doppler examination shows normal spontaneous and phasic flow.  No RIGHT calf vein thrombosis is detected.    LEFT:  Normal compressibility of the LEFT common femoral, femoral and popliteal   veins.  Doppler examination shows normal spontaneous and phasic flow.  No LEFT calf vein thrombosis is detected.    IMPRESSION:  No evidence of deep venous thrombosis in either lower extremity.            --- End of Report ---            LIZETT TANNER MD; Attending Radiologist  This document has been electronically signed. Jan 25 2024  6:37PM    < end of copied text >         [facility]-- WOUND TEAM -- FOLLOW UP NOTE  --------------------------------------------------------------------------------    24 hour events/subjective:    Pt found sitting in chair  Afebrile  Tolerating po w/o n/v  Chart reviewed  Hx of   63 year old male with Hx of HTN, GERD, dyslipidemia, chronic intermittent leg pain, aortic stenosis, chronic venous stasis dermatitis with bilateral leg swelling, poor historian presented to [facility] on 1/25/2024 for bilateral leg pain and swelling. He was admitted to medicine and was found to have new onset acute HFrEF (EF 35-40% 1/2024) in setting of severe AS. Echo results from 1/26/2024.  He was treated with IV lasix, beta blocker. Not a candidate for ACE/ARB due to renal function. Also was treated with 5 days keflex for possible lower extremity cellulitis.   dc planning ongoing        Diet:  Diet, DASH/TLC:   Sodium & Cholesterol Restricted (02-05-24 @ 18:11)      ROS: General/ Skin/ Msk/ Neuro/ GI see HPI  all other systems negative      ALLERGIES & MEDICATIONS  --------------------------------------------------------------------------------  Allergies    garlic (Angioedema; Swelling; Anaphylaxis)  No Known Drug Allergies    Intolerances          STANDING INPATIENT MEDICATIONS    chlorhexidine 2% Cloths 1 Application(s) Topical <User Schedule>  epoetin loulou-epbx (RETACRIT) Injectable 71960 Unit(s) SubCutaneous every 7 days  ferrous sulfate 325 milliGRAM(s) Oral daily  furosemide   Injectable 40 milliGRAM(s) IV Push two times a day  lidocaine   4% Patch 1 Patch Transdermal daily  losartan 25 milliGRAM(s) Oral daily  metoprolol succinate ER 50 milliGRAM(s) Oral daily  spironolactone 25 milliGRAM(s) Oral daily  tamsulosin 0.4 milliGRAM(s) Oral at bedtime      PRN INPATIENT MEDICATION  acetaminophen     Tablet .. 650 milliGRAM(s) Oral every 6 hours PRN  oxycodone    5 mG/acetaminophen 325 mG 1 Tablet(s) Oral every 6 hours PRN  polyethylene glycol 3350 17 Gram(s) Oral daily PRN  sodium chloride 0.65% Nasal 1 Spray(s) Both Nostrils every 4 hours PRN        VITALS/PHYSICAL EXAM  --------------------------------------------------------------------------------  T(C): 36.6 (02-14-24 @ 12:11), Max: 37.2 (02-13-24 @ 19:57)  HR: 69 (02-14-24 @ 12:11) (69 - 87)  BP: 104/68 (02-14-24 @ 12:11) (104/68 - 118/70)  RR: 18 (02-14-24 @ 12:11) (17 - 18)  SpO2: 97% (02-14-24 @ 12:11) (95% - 100%)  Wt(kg): --        02-13-24 @ 07:01  -  02-14-24 @ 07:00  --------------------------------------------------------  IN: 1030 mL / OUT: 2350 mL / NET: -1320 mL    ASSESSMENT    NAD,  A&Ox3     HEENT:  sclera clear, mucosa moist, throat clear, trachea midline, neck supple  Respiratory: nonlabored w/ equal chest rise  Gastrointestinal: soft NT/ND   Neurology:  weakened strength & sensation grossly intact  verbal,  follows commands  Psych: calm/ appropriate  Musculoskeletal:  no deformities/ contractures  Vascular: BLE equally cool, no cyanosis, clubbing,  nor acute ischemia                BLE edema equal                BLE DP pulses faint                BLE hemosiderin staining  LLE anterior excoriated  LLE posterior 3.0cm x 2.8cm x 0.2cm with brown and yellow slough stable and dry, scant serosanguinous drainage (+) TTP        (+) periwound erythema, No odor, no increased warmth, induration, fluctuance, nor crepitus   RLE anterior 4.8cm x 6.2cm x0.1cm soft yellow slough mild/moderate serosanguinous drainage (+) TTP           (+)  periwound erythema, No odor,  increased warmth, induration, fluctuance, nor crepitus  RLE posterior 9.0cm cm x 3.5cm x 0.2cm soft yellow slough scant serosanguinous drainage (+) TTP            (+) periwound erythema, No odor,  increased warmth, induration, fluctuance, nor crepitus   Lt hallux plantar open fissure red tissue scant drainage, No odor, no increased warmth, induration, fluctuance, nor crepitus   Skin:  thin, dry, pale, frail    LABS/ CULTURES/ RADIOLOGY:                     8.3    8.62  >-----------<  219      [02-14-24 @ 07:13]              27.5     139  |  100  |  46  ----------------------------<  130      [02-14-24 @ 07:28]  4.1   |  27  |  1.32        Ca     8.7     [02-14-24 @ 07:28]      Mg     1.9     [02-14-24 @ 07:28]      Phos  2.4     [02-14-24 @ 07:28]    TPro  6.0  /  Alb  3.4  /  TBili  1.6  /  DBili  x   /  AST  23  /  ALT  68  /  AlkPhos  67  [02-14-24 @ 07:28]    PT/INR: PT 13.7 , INR 1.25       [02-14-24 @ 07:13]      CAPILLARY BLOOD GLUCOSE      Triglycerides, Serum: 58 mg/dL (01-27-24 @ 09:00)      A1C with Estimated Average Glucose Result: 6.2 % (01-27-24 @ 09:00)    < from: US Duplex Venous Lower Ext Complete, Bilateral (01.25.24 @ 18:20) >    ACC: [id number] EXAM:  US DPLX LWR EXT VEINS COMPL BI   ORDERED BY:    MARYANNE CARMEN     PROCEDURE DATE:  01/25/2024          INTERPRETATION:  CLINICAL INFORMATION: Evaluate for DVT.    COMPARISON: Right lower extremity venous Doppler 1/7/2024    TECHNIQUE: Duplex sonography of the BILATERAL LOWER extremity veins with   color and spectral Doppler, with and without compression.    FINDINGS:    RIGHT:  Normal compressibility of the RIGHT common femoral, femoral and popliteal   veins.  Doppler examination shows normal spontaneous and phasic flow.  No RIGHT calf vein thrombosis is detected.    LEFT:  Normal compressibility of the LEFT common femoral, femoral and popliteal   veins.  Doppler examination shows normal spontaneous and phasic flow.  No LEFT calf vein thrombosis is detected.    IMPRESSION:  No evidence of deep venous thrombosis in either lower extremity.            --- End of Report ---            LIZETT TANNER MD; Attending Radiologist  This document has been electronically signed. Jan 25 2024  6:37PM    < end of copied text >         [facility]-- WOUND TEAM -- FOLLOW UP NOTE  --------------------------------------------------------------------------------    24 hour events/subjective:    Pt found sitting in chair  Afebrile  Tolerating po w/o n/v  Chart reviewed  Hx of   63 year old male with Hx of HTN, GERD, dyslipidemia, chronic intermittent leg pain, aortic stenosis, chronic venous stasis dermatitis with bilateral leg swelling, poor historian presented to [facility] on 1/25/2024 for bilateral leg pain and swelling. He was admitted to medicine and was found to have new onset acute HFrEF (EF 35-40% 1/2024) in setting of severe AS. Echo results from 1/26/2024.  He was treated with IV lasix, beta blocker. Not a candidate for ACE/ARB due to renal function. Also was treated with 5 days keflex for possible lower extremity cellulitis.   dc planning ongoing        Diet:  Diet, DASH/TLC:   Sodium & Cholesterol Restricted (02-05-24 @ 18:11)      ROS: General/ Skin/ Msk/ Neuro/ GI see HPI  all other systems negative      ALLERGIES & MEDICATIONS  --------------------------------------------------------------------------------  Allergies    garlic (Angioedema; Swelling; Anaphylaxis)  No Known Drug Allergies    Intolerances          STANDING INPATIENT MEDICATIONS    chlorhexidine 2% Cloths 1 Application(s) Topical <User Schedule>  epoetin loulou-epbx (RETACRIT) Injectable 97439 Unit(s) SubCutaneous every 7 days  ferrous sulfate 325 milliGRAM(s) Oral daily  furosemide   Injectable 40 milliGRAM(s) IV Push two times a day  lidocaine   4% Patch 1 Patch Transdermal daily  losartan 25 milliGRAM(s) Oral daily  metoprolol succinate ER 50 milliGRAM(s) Oral daily  spironolactone 25 milliGRAM(s) Oral daily  tamsulosin 0.4 milliGRAM(s) Oral at bedtime      PRN INPATIENT MEDICATION  acetaminophen     Tablet .. 650 milliGRAM(s) Oral every 6 hours PRN  oxycodone    5 mG/acetaminophen 325 mG 1 Tablet(s) Oral every 6 hours PRN  polyethylene glycol 3350 17 Gram(s) Oral daily PRN  sodium chloride 0.65% Nasal 1 Spray(s) Both Nostrils every 4 hours PRN        VITALS/PHYSICAL EXAM  --------------------------------------------------------------------------------  T(C): 36.6 (02-14-24 @ 12:11), Max: 37.2 (02-13-24 @ 19:57)  HR: 69 (02-14-24 @ 12:11) (69 - 87)  BP: 104/68 (02-14-24 @ 12:11) (104/68 - 118/70)  RR: 18 (02-14-24 @ 12:11) (17 - 18)  SpO2: 97% (02-14-24 @ 12:11) (95% - 100%)  Wt(kg): --        02-13-24 @ 07:01  -  02-14-24 @ 07:00  --------------------------------------------------------  IN: 1030 mL / OUT: 2350 mL / NET: -1320 mL    ASSESSMENT    NAD,  A&Ox3     HEENT:  sclera clear, mucosa moist, throat clear, trachea midline, neck supple  Respiratory: nonlabored w/ equal chest rise  Gastrointestinal: soft NT/ND   Neurology:  weakened strength & sensation grossly intact  verbal,  follows commands  Psych: calm/ appropriate  Musculoskeletal:  no deformities/ contractures  Vascular: BLE equally cool, no cyanosis, clubbing,  nor acute ischemia                BLE edema equal                BLE DP pulses faint                BLE hemosiderin staining  LLE anterior excoriated  LLE posterior 3.0cm x 2.8cm x 0.2cm with brown and yellow slough stable and dry, scant serosanguinous drainage (+) TTP        (+) periwound erythema, No odor, no increased warmth, induration, fluctuance, nor crepitus   RLE anterior 4.8cm x 6.2cm x0.1cm soft yellow slough mild/moderate serosanguinous drainage (+) TTP           (+)  periwound erythema and slight increased warmth,  No odor,  induration, fluctuance, nor crepitus  RLE posterior 9.0cm cm x 3.5cm x 0.2cm soft yellow slough scant serosanguinous drainage (+) TTP            (+) periwound erythema and slight increased wamrth, No odor,  induration, fluctuance, nor crepitus   Lt hallux plantar open fissure red tissue scant drainage, No odor, no increased warmth, induration, fluctuance, nor crepitus   Skin:  thin, dry, pale, frail    LABS/ CULTURES/ RADIOLOGY:                     8.3    8.62  >-----------<  219      [02-14-24 @ 07:13]              27.5     139  |  100  |  46  ----------------------------<  130      [02-14-24 @ 07:28]  4.1   |  27  |  1.32        Ca     8.7     [02-14-24 @ 07:28]      Mg     1.9     [02-14-24 @ 07:28]      Phos  2.4     [02-14-24 @ 07:28]    TPro  6.0  /  Alb  3.4  /  TBili  1.6  /  DBili  x   /  AST  23  /  ALT  68  /  AlkPhos  67  [02-14-24 @ 07:28]    PT/INR: PT 13.7 , INR 1.25       [02-14-24 @ 07:13]      CAPILLARY BLOOD GLUCOSE      Triglycerides, Serum: 58 mg/dL (01-27-24 @ 09:00)      A1C with Estimated Average Glucose Result: 6.2 % (01-27-24 @ 09:00)    < from: US Duplex Venous Lower Ext Complete, Bilateral (01.25.24 @ 18:20) >    ACC: [id number] EXAM:  US DPLX LWR EXT VEINS COMPL BI   ORDERED BY:    MARYANNE CARMEN     PROCEDURE DATE:  01/25/2024          INTERPRETATION:  CLINICAL INFORMATION: Evaluate for DVT.    COMPARISON: Right lower extremity venous Doppler 1/7/2024    TECHNIQUE: Duplex sonography of the BILATERAL LOWER extremity veins with   color and spectral Doppler, with and without compression.    FINDINGS:    RIGHT:  Normal compressibility of the RIGHT common femoral, femoral and popliteal   veins.  Doppler examination shows normal spontaneous and phasic flow.  No RIGHT calf vein thrombosis is detected.    LEFT:  Normal compressibility of the LEFT common femoral, femoral and popliteal   veins.  Doppler examination shows normal spontaneous and phasic flow.  No LEFT calf vein thrombosis is detected.    IMPRESSION:  No evidence of deep venous thrombosis in either lower extremity.            --- End of Report ---            LIZETT TANNER MD; Attending Radiologist  This document has been electronically signed. Jan 25 2024  6:37PM    < end of copied text >

## 2024-02-14 NOTE — CONSULT NOTE ADULT - ASSESSMENT
63M hx HTN, GERD, dyslipidemia, chronic intermittent leg pain, aortic stenosis, chronic venous stasis dermatitis with bilateral leg swelling, initially admitted to [facility] with b/l leg pain and swelling.    Patient was transferred to [facility] for TAVR/MARTIR evaluation in the setting of severe aortic stenosis complicated by heart failure.  Patient was previously evaluated by ID at [facility] for LE cellulitis - there was low suspicion for cellulitis and patient was treated with a 5 day course of cephalexin.    There is no fever or leukocytosis  Blood cultures were negative on admission  Previous wound culture (1/6) grew Serratia and Strep dysgalactiae  Patient was seen by wound care and noted R lower extremity erythema and warmth greater than the LLE with BLE venous ulcers    to be seen. 63M hx HTN, GERD, dyslipidemia, chronic intermittent leg pain, aortic stenosis, chronic venous stasis dermatitis with bilateral leg swelling, initially admitted to [facility] with b/l leg pain and swelling.    Patient was transferred to [facility] for TAVR/MARTIR evaluation in the setting of severe aortic stenosis complicated by heart failure.  Patient was previously evaluated by ID at [facility] for LE cellulitis - there was low suspicion for cellulitis and patient was treated with an empiric 5 day course of cephalexin.    There is no fever or leukocytosis  Blood cultures were negative on admission to   Previous wound culture (1/6) grew Serratia and Strep dysgalactiae  He has open ulcers on RLE anterior and posterior shin and LLE anterior shin with surrounding erythema, mild surrounding tenderness, and minimal warmth 63M hx HTN, GERD, dyslipidemia, chronic intermittent leg pain, aortic stenosis, chronic venous stasis dermatitis with bilateral leg swelling, initially admitted to [facility] with b/l leg pain and swelling.    Patient was transferred to [facility] for TAVR/MARTIR evaluation in the setting of severe aortic stenosis complicated by heart failure.  Patient was previously evaluated by ID at [facility] for LE cellulitis - there was low suspicion for cellulitis and patient was treated with an empiric 5 day course of cephalexin.    There is no fever or leukocytosis  Blood cultures were negative on admission to   Previous wound culture (1/6) grew Serratia and Strep dysgalactiae  He has open ulcers on RLE anterior and posterior shin and LLE anterior shin with surrounding erythema, mild surrounding tenderness, and minimal warmth    He has symmetrical leg findings; this appears like venous stasis and not cellulitis.    Suggest:  - Monitor off antibiotics  - Monitor for fever and worsening pain/warmth/tenderness of LE    Phoenix Novoa, PGY4  ID Fellow  [employer] Teams Preferred  After 5pm/weekends call [phone]

## 2024-02-15 LAB
A1AT PHENOTYP SERPL-IMP: SIGNIFICANT CHANGE UP
A1AT SERPL-MCNC: 238 MG/DL — HIGH (ref 101–187)
ANA TITR SER: NEGATIVE — SIGNIFICANT CHANGE UP
ANION GAP SERPL CALC-SCNC: 10 MMOL/L — SIGNIFICANT CHANGE UP (ref 5–17)
BUN SERPL-MCNC: 46 MG/DL — HIGH (ref 7–23)
CALCIUM SERPL-MCNC: 8.7 MG/DL — SIGNIFICANT CHANGE UP (ref 8.4–10.5)
CHLORIDE SERPL-SCNC: 100 MMOL/L — SIGNIFICANT CHANGE UP (ref 96–108)
CO2 SERPL-SCNC: 30 MMOL/L — SIGNIFICANT CHANGE UP (ref 22–31)
CREAT SERPL-MCNC: 1.39 MG/DL — HIGH (ref 0.5–1.3)
EGFR: 57 ML/MIN/1.73M2 — LOW
GLUCOSE SERPL-MCNC: 95 MG/DL — SIGNIFICANT CHANGE UP (ref 70–99)
HCT VFR BLD CALC: 23.7 % — LOW (ref 39–50)
HCT VFR BLD CALC: 23.7 % — LOW (ref 39–50)
HGB BLD-MCNC: 7.1 G/DL — LOW (ref 13–17)
HGB BLD-MCNC: 7.3 G/DL — LOW (ref 13–17)
MCHC RBC-ENTMCNC: 18.4 PG — LOW (ref 27–34)
MCHC RBC-ENTMCNC: 20.9 PG — LOW (ref 27–34)
MCHC RBC-ENTMCNC: 30 GM/DL — LOW (ref 32–36)
MCHC RBC-ENTMCNC: 30.8 GM/DL — LOW (ref 32–36)
MCV RBC AUTO: 61.4 FL — LOW (ref 80–100)
MCV RBC AUTO: 67.7 FL — LOW (ref 80–100)
NRBC # BLD: 2 /100 WBCS — HIGH (ref 0–0)
NRBC # BLD: 2 /100 WBCS — HIGH (ref 0–0)
PLATELET # BLD AUTO: 200 K/UL — SIGNIFICANT CHANGE UP (ref 150–400)
PLATELET # BLD AUTO: 205 K/UL — SIGNIFICANT CHANGE UP (ref 150–400)
POTASSIUM SERPL-MCNC: 3.8 MMOL/L — SIGNIFICANT CHANGE UP (ref 3.5–5.3)
POTASSIUM SERPL-SCNC: 3.8 MMOL/L — SIGNIFICANT CHANGE UP (ref 3.5–5.3)
RBC # BLD: 3.5 M/UL — LOW (ref 4.2–5.8)
RBC # BLD: 3.86 M/UL — LOW (ref 4.2–5.8)
RBC # FLD: 26.2 % — HIGH (ref 10.3–14.5)
RBC # FLD: 28.8 % — HIGH (ref 10.3–14.5)
RH IG SCN BLD-IMP: POSITIVE — SIGNIFICANT CHANGE UP
SODIUM SERPL-SCNC: 140 MMOL/L — SIGNIFICANT CHANGE UP (ref 135–145)
WBC # BLD: 7.82 K/UL — SIGNIFICANT CHANGE UP (ref 3.8–10.5)
WBC # BLD: 7.94 K/UL — SIGNIFICANT CHANGE UP (ref 3.8–10.5)
WBC # FLD AUTO: 7.82 K/UL — SIGNIFICANT CHANGE UP (ref 3.8–10.5)
WBC # FLD AUTO: 7.94 K/UL — SIGNIFICANT CHANGE UP (ref 3.8–10.5)

## 2024-02-15 PROCEDURE — 99233 SBSQ HOSP IP/OBS HIGH 50: CPT

## 2024-02-15 RX ADMIN — Medication 60 MILLIGRAM(S): at 05:50

## 2024-02-15 RX ADMIN — CHLORHEXIDINE GLUCONATE 1 APPLICATION(S): 213 SOLUTION TOPICAL at 05:50

## 2024-02-15 RX ADMIN — LIDOCAINE 1 PATCH: 4 CREAM TOPICAL at 17:41

## 2024-02-15 RX ADMIN — TAMSULOSIN HYDROCHLORIDE 0.4 MILLIGRAM(S): 0.4 CAPSULE ORAL at 22:04

## 2024-02-15 RX ADMIN — Medication 60 MILLIGRAM(S): at 13:35

## 2024-02-15 RX ADMIN — Medication 325 MILLIGRAM(S): at 12:57

## 2024-02-15 RX ADMIN — LOSARTAN POTASSIUM 25 MILLIGRAM(S): 100 TABLET, FILM COATED ORAL at 05:50

## 2024-02-15 RX ADMIN — ERYTHROPOIETIN 10000 UNIT(S): 10000 INJECTION, SOLUTION INTRAVENOUS; SUBCUTANEOUS at 13:35

## 2024-02-15 RX ADMIN — Medication 3 MILLIGRAM(S): at 22:04

## 2024-02-15 RX ADMIN — Medication 50 MILLIGRAM(S): at 05:50

## 2024-02-15 RX ADMIN — LIDOCAINE 1 PATCH: 4 CREAM TOPICAL at 12:56

## 2024-02-15 RX ADMIN — MUPIROCIN 1 APPLICATION(S): 20 OINTMENT TOPICAL at 09:15

## 2024-02-15 RX ADMIN — CARBAMIDE PEROXIDE 4 DROP(S): 81.86 SOLUTION/ DROPS AURICULAR (OTIC) at 09:16

## 2024-02-15 RX ADMIN — Medication 1 PACKET(S): at 05:49

## 2024-02-15 RX ADMIN — SPIRONOLACTONE 25 MILLIGRAM(S): 25 TABLET, FILM COATED ORAL at 05:49

## 2024-02-15 NOTE — PROGRESS NOTE ADULT - SUBJECTIVE AND OBJECTIVE BOX
Justina Marte MD  Division of Hospital Medicine  Available via MS Teams      Patient is a 63y old  Male who presents with a chief complaint of TAVR eval (10 Feb 2024 11:21)        SUBJECTIVE / OVERNIGHT EVENTS:  overnight no events  asking when he can go home  his LE edema is improved but still present  no n/v/f/chills, cp, sob, abd pain        I&O's Summary    14 Feb 2024 07:01  -  15 Feb 2024 07:00  --------------------------------------------------------  IN: 640 mL / OUT: 3450 mL / NET: -2810 mL    15 Feb 2024 07:01  -  15 Feb 2024 12:28  --------------------------------------------------------  IN: 0 mL / OUT: 800 mL / NET: -800 mL      Vital Signs Last 24 Hrs  T(C): 36.6 (15 Feb 2024 11:19), Max: 37.1 (15 Feb 2024 00:10)  T(F): 97.9 (15 Feb 2024 11:19), Max: 98.7 (15 Feb 2024 00:10)  HR: 74 (15 Feb 2024 11:19) (74 - 82)  BP: 92/60 (15 Feb 2024 11:19) (92/60 - 113/71)  BP(mean): --  RR: 18 (15 Feb 2024 11:19) (17 - 18)  SpO2: 94% (15 Feb 2024 11:19) (93% - 100%)    Parameters below as of 15 Feb 2024 11:19  Patient On (Oxygen Delivery Method): room air        PHYSICAL EXAM:  GENERAL:  Well appearing, in NAD  HEAD:  NCAT  EYES: conjunctiva clear  NECK: Supple  CHEST/LUNG: CTA B/L. No w/r/r.  HEART: Reg rate. Normal S1, S2  ABDOMEN: SNTND  EXTREMITIES:  2+ Peripheral Pulses, No clubbing, cyanosis  2+ pitting LE edema up to knees  PSYCH: AAOx3  SKIN: No rashes or lesions    LABS:                        7.3    7.94  )-----------( 200      ( 15 Feb 2024 06:49 )             23.7     02-15    140  |  100  |  46<H>  ----------------------------<  95  3.8   |  30  |  1.39<H>    Ca    8.7      15 Feb 2024 06:50  Phos  2.4     02-14  Mg     1.9     02-14    TPro  6.0  /  Alb  3.4  /  TBili  1.6<H>  /  DBili  x   /  AST  23  /  ALT  68<H>  /  AlkPhos  67  02-14    PT/INR - ( 14 Feb 2024 07:13 )   PT: 13.7 sec;   INR: 1.25 ratio               Urinalysis Basic - ( 15 Feb 2024 06:50 )    Color: x / Appearance: x / SG: x / pH: x  Gluc: 95 mg/dL / Ketone: x  / Bili: x / Urobili: x   Blood: x / Protein: x / Nitrite: x   Leuk Esterase: x / RBC: x / WBC x   Sq Epi: x / Non Sq Epi: x / Bacteria: x        SARS-CoV-2: NotDetec (25 Jan 2024 21:19)      CAPILLARY BLOOD GLUCOSE        MEDICATIONS  (STANDING):  chlorhexidine 2% Cloths 1 Application(s) Topical <User Schedule>  epoetin loulou-epbx (RETACRIT) Injectable 77938 Unit(s) SubCutaneous every 7 days  ferrous    sulfate 325 milliGRAM(s) Oral daily  furosemide   Injectable 60 milliGRAM(s) IV Push two times a day  lidocaine   4% Patch 1 Patch Transdermal daily  losartan 25 milliGRAM(s) Oral daily  melatonin 3 milliGRAM(s) Oral at bedtime  metoprolol succinate ER 50 milliGRAM(s) Oral daily  mupirocin 2% Ointment 1 Application(s) Topical <User Schedule>  spironolactone 25 milliGRAM(s) Oral daily  tamsulosin 0.4 milliGRAM(s) Oral at bedtime    MEDICATIONS  (PRN):  acetaminophen     Tablet .. 650 milliGRAM(s) Oral every 6 hours PRN Mild Pain (1 - 3)  carbamide peroxide Otic Solution 4 Drop(s) Right Ear two times a day PRN clogged ear  oxycodone    5 mG/acetaminophen 325 mG 1 Tablet(s) Oral every 6 hours PRN Severe Pain (7 - 10)  polyethylene glycol 3350 17 Gram(s) Oral daily PRN Constipation  sodium chloride 0.65% Nasal 1 Spray(s) Both Nostrils every 4 hours PRN Nasal Congestion

## 2024-02-15 NOTE — PROGRESS NOTE ADULT - PROBLEM SELECTOR PLAN 5
Seen by ID at s/p five days of keflex- likely not infectious given limited change with antibiotics  seen by ID 2/14, not infected  podiatry following  Leg elevation as needed

## 2024-02-15 NOTE — CHART NOTE - NSCHARTNOTEFT_GEN_A_CORE
Medicine PA Note    Given Hgb 7.3/Hct 23.7 this AM, repeated bloodwork this afternoon with the following results:                        7.1    7.82  )-----------( 205      ( 15 Feb 2024 12:53 )             23.7     Advised by Dr. Marte to order PRBC transfusion to aim to keep Hgb > 8 in the setting of pt's cardiac history of severe AS.  Pt consented for transfusion by wife Chanelle via telephone with RN witness.  Will transfuse 2 1/2 units given volume status.  Keep active type and screen.  Will advise night ACP to f/u post-transfusion CBC.    Karuna Vasquez PA-C  [id number]

## 2024-02-15 NOTE — PROGRESS NOTE ADULT - TIME BILLING
- Reviewing, and interpreting labs and testing.  - Independently obtaining a review of systems and performing a physical exam  - Reviewing consultant documentation/recommendations   - Counselling and educating patient regarding interpretation of aforementioned items and plan of care.

## 2024-02-15 NOTE — PROGRESS NOTE ADULT - PROBLEM SELECTOR PLAN 1
HFrEF (EF 35-40% 1/2024) with severe AS  Diuretics: IV Lasix 40mg to 60mg bid  MRA: Aldactone 25mg/d  ARB/ARNI: losartan 25 po daily - would transition to entresto pending HF  BB: Cont Toprol 50mg/d  SGLT2: Would start farxiga prior to dc planning once off iv diuretics  LHC revealed non-obstructive CAD  HF and structural heart following, surgery eventually after liver biopsy 2/18 if still here  D/w Structural - if pt can be discharged earlier then will do as outpt

## 2024-02-15 NOTE — PROGRESS NOTE ADULT - PROBLEM SELECTOR PLAN 3
With liver dysfunction  Hepatitis panel negative  RUQ US unremarkable  Elastography revealed borderline moderate risk of clinically significant fibrosis  F/u Hepatology/IR for Biopsy - tentatively scheduled for 2/18 if patient is still admitted otherwise will schedule as outpt

## 2024-02-15 NOTE — PROGRESS NOTE ADULT - SUBJECTIVE AND OBJECTIVE BOX
No distress, on RA    Vital Signs Last 24 Hrs  T(C): 36.6 (02-15-24 @ 11:19), Max: 37.1 (02-15-24 @ 00:10)  T(F): 97.9 (02-15-24 @ 11:19), Max: 98.7 (02-15-24 @ 00:10)  HR: 66 (02-15-24 @ 13:30) (66 - 82)  BP: 96/61 (02-15-24 @ 13:30) (92/60 - 113/71)  RR: 18 (02-15-24 @ 11:19) (18 - 18)  SpO2: 94% (02-15-24 @ 11:19) (93% - 100%)    I&O's Detail    14 Feb 2024 07:01  -  15 Feb 2024 07:00  --------------------------------------------------------  IN:    Oral Fluid: 640 mL  Total IN: 640 mL    OUT:    Voided (mL): 3450 mL  Total OUT: 3450 mL    15 Feb 2024 07:01  -  15 Feb 2024 17:29  --------------------------------------------------------  OUT:    Voided (mL): 800 mL  Total OUT: 800 mL    s1s2  b/l air entry  soft, ND  + edema, improving                                          7.1    7.82  )-----------( 205      ( 15 Feb 2024 12:53 )             23.7     15 Feb 2024 06:50    140    |  100    |  46     ----------------------------<  95     3.8     |  30     |  1.39     Ca    8.7        15 Feb 2024 06:50  Phos  2.4       14 Feb 2024 07:28  Mg     1.9       14 Feb 2024 07:28    TPro  6.0    /  Alb  3.4    /  TBili  1.6    /  DBili  x      /  AST  23     /  ALT  68     /  AlkPhos  67     14 Feb 2024 07:28    LIVER FUNCTIONS - ( 14 Feb 2024 07:28 )  Alb: 3.4 g/dL / Pro: 6.0 g/dL / ALK PHOS: 67 U/L / ALT: 68 U/L / AST: 23 U/L / GGT: x           PT/INR - ( 14 Feb 2024 07:13 )   PT: 13.7 sec;   INR: 1.25 ratio      acetaminophen     Tablet .. 650 milliGRAM(s) Oral every 6 hours PRN  carbamide peroxide Otic Solution 4 Drop(s) Right Ear two times a day PRN  chlorhexidine 2% Cloths 1 Application(s) Topical <User Schedule>  epoetin loulou-epbx (RETACRIT) Injectable 08033 Unit(s) SubCutaneous every 7 days  ferrous    sulfate 325 milliGRAM(s) Oral daily  furosemide   Injectable 60 milliGRAM(s) IV Push two times a day  lidocaine   4% Patch 1 Patch Transdermal daily  losartan 25 milliGRAM(s) Oral daily  melatonin 3 milliGRAM(s) Oral at bedtime  metoprolol succinate ER 50 milliGRAM(s) Oral daily  mupirocin 2% Ointment 1 Application(s) Topical <User Schedule>  oxycodone    5 mG/acetaminophen 325 mG 1 Tablet(s) Oral every 6 hours PRN  polyethylene glycol 3350 17 Gram(s) Oral daily PRN  sodium chloride 0.65% Nasal 1 Spray(s) Both Nostrils every 4 hours PRN  spironolactone 25 milliGRAM(s) Oral daily  tamsulosin 0.4 milliGRAM(s) Oral at bedtime    A/P:    CM, EF 35-40%, severe AS  Tx from [facility] to [facility] 2/5 for TAVR eval, s/p LHC 2/5  Cardio-renal/hemodynamic LINDA/CKD 3 has improved (peak Cr 2.5 - 2/3/24)   UA negative   Imaging w/o hydro   S/p CT w/IV dye 2/8/24  Presently stable renal fx on Lasix, ARB, Aldactone  Avoid nephrotoxins   Anemia w/up, role for bld tx per medicine     [phone]

## 2024-02-15 NOTE — PROGRESS NOTE ADULT - PROBLEM SELECTOR PLAN 8
DVT ppx: SCDs given worsening anemia req prbcs  Dispo: Pending clinical course, possible TAVR inpt vs outpt, c/w iv diuresis  Independent- walks with cane

## 2024-02-15 NOTE — CHART NOTE - NSCHARTNOTEFT_GEN_A_CORE
I have reviewed Mr Hadley's details and CT. I agree with Dr. Catalan that surgery with AVR is ideal for him.    However, he should be shown not to have cirrhosis before we proceed with surgery.    I will continue to follow the investigations.

## 2024-02-15 NOTE — PROGRESS NOTE ADULT - PROBLEM SELECTOR PLAN 6
Evaluated by hematology at  -multifactorial anemia, mechanical hemolysis due to severe AS also possible  S/p 1 unit PRBCs on 1/31, s/p IV iron   Continue oral iron supplementation  Maintain hemoglobin above 8 g/dL due to patient's advanced aortic stenosis and need for TAVR - repeat CBC today given hgb 7 --> transfuse if <8

## 2024-02-16 LAB
ALBUMIN SERPL ELPH-MCNC: 3.2 G/DL — LOW (ref 3.3–5)
ALP SERPL-CCNC: 71 U/L — SIGNIFICANT CHANGE UP (ref 40–120)
ALT FLD-CCNC: 54 U/L — HIGH (ref 10–45)
ANION GAP SERPL CALC-SCNC: 10 MMOL/L — SIGNIFICANT CHANGE UP (ref 5–17)
AST SERPL-CCNC: 25 U/L — SIGNIFICANT CHANGE UP (ref 10–40)
BILIRUB SERPL-MCNC: 2.3 MG/DL — HIGH (ref 0.2–1.2)
BUN SERPL-MCNC: 33 MG/DL — HIGH (ref 7–23)
CALCIUM SERPL-MCNC: 8.7 MG/DL — SIGNIFICANT CHANGE UP (ref 8.4–10.5)
CHLORIDE SERPL-SCNC: 99 MMOL/L — SIGNIFICANT CHANGE UP (ref 96–108)
CO2 SERPL-SCNC: 30 MMOL/L — SIGNIFICANT CHANGE UP (ref 22–31)
CREAT SERPL-MCNC: 1.37 MG/DL — HIGH (ref 0.5–1.3)
EGFR: 58 ML/MIN/1.73M2 — LOW
GLUCOSE BLDC GLUCOMTR-MCNC: 255 MG/DL — HIGH (ref 70–99)
GLUCOSE SERPL-MCNC: 97 MG/DL — SIGNIFICANT CHANGE UP (ref 70–99)
HCT VFR BLD CALC: 27.6 % — LOW (ref 39–50)
HGB BLD-MCNC: 8.3 G/DL — LOW (ref 13–17)
M PROTEIN 24H UR ELPH-MRATE: SIGNIFICANT CHANGE UP
MCHC RBC-ENTMCNC: 19.3 PG — LOW (ref 27–34)
MCHC RBC-ENTMCNC: 30.1 GM/DL — LOW (ref 32–36)
MCV RBC AUTO: 64.2 FL — LOW (ref 80–100)
NRBC # BLD: 0 /100 WBCS — SIGNIFICANT CHANGE UP (ref 0–0)
PLATELET # BLD AUTO: 211 K/UL — SIGNIFICANT CHANGE UP (ref 150–400)
POTASSIUM SERPL-MCNC: 3.6 MMOL/L — SIGNIFICANT CHANGE UP (ref 3.5–5.3)
POTASSIUM SERPL-SCNC: 3.6 MMOL/L — SIGNIFICANT CHANGE UP (ref 3.5–5.3)
PROT ?TM UR-MCNC: <4 MG/DL — SIGNIFICANT CHANGE UP (ref 0–12)
PROT SERPL-MCNC: 5.9 G/DL — LOW (ref 6–8.3)
RBC # BLD: 4.3 M/UL — SIGNIFICANT CHANGE UP (ref 4.2–5.8)
RBC # FLD: 27.9 % — HIGH (ref 10.3–14.5)
SODIUM SERPL-SCNC: 139 MMOL/L — SIGNIFICANT CHANGE UP (ref 135–145)
WBC # BLD: 9.49 K/UL — SIGNIFICANT CHANGE UP (ref 3.8–10.5)
WBC # FLD AUTO: 9.49 K/UL — SIGNIFICANT CHANGE UP (ref 3.8–10.5)

## 2024-02-16 PROCEDURE — 99233 SBSQ HOSP IP/OBS HIGH 50: CPT

## 2024-02-16 RX ADMIN — Medication 60 MILLIGRAM(S): at 13:57

## 2024-02-16 RX ADMIN — LOSARTAN POTASSIUM 25 MILLIGRAM(S): 100 TABLET, FILM COATED ORAL at 05:23

## 2024-02-16 RX ADMIN — MUPIROCIN 1 APPLICATION(S): 20 OINTMENT TOPICAL at 10:44

## 2024-02-16 RX ADMIN — Medication 325 MILLIGRAM(S): at 12:02

## 2024-02-16 RX ADMIN — TAMSULOSIN HYDROCHLORIDE 0.4 MILLIGRAM(S): 0.4 CAPSULE ORAL at 21:45

## 2024-02-16 RX ADMIN — Medication 3 MILLIGRAM(S): at 21:46

## 2024-02-16 RX ADMIN — CHLORHEXIDINE GLUCONATE 1 APPLICATION(S): 213 SOLUTION TOPICAL at 05:25

## 2024-02-16 RX ADMIN — LIDOCAINE 1 PATCH: 4 CREAM TOPICAL at 00:56

## 2024-02-16 RX ADMIN — Medication 60 MILLIGRAM(S): at 05:24

## 2024-02-16 RX ADMIN — SPIRONOLACTONE 25 MILLIGRAM(S): 25 TABLET, FILM COATED ORAL at 05:24

## 2024-02-16 RX ADMIN — Medication 50 MILLIGRAM(S): at 05:24

## 2024-02-16 NOTE — PROGRESS NOTE ADULT - PROBLEM SELECTOR PLAN 6
Evaluated by hematology at  -multifactorial anemia, mechanical hemolysis due to severe AS also possible  S/p 1 unit PRBCs on 1/31, s/p IV iron   Continue oral iron supplementation  Maintain hemoglobin above 8 g/dL due to patient's advanced aortic stenosis and need for TAVR - s/p 1U PRBC

## 2024-02-16 NOTE — PROGRESS NOTE ADULT - SUBJECTIVE AND OBJECTIVE BOX
[facility] Division of Hospital Medicine  Renu Early MD  MS Teams PREFERRED        SUBJECTIVE / OVERNIGHT EVENTS: Seen and examined at bedside. NAD.    MEDICATIONS  (STANDING):  chlorhexidine 2% Cloths 1 Application(s) Topical <User Schedule>  epoetin loulou-epbx (RETACRIT) Injectable 81928 Unit(s) SubCutaneous every 7 days  ferrous    sulfate 325 milliGRAM(s) Oral daily  furosemide   Injectable 60 milliGRAM(s) IV Push two times a day  lidocaine   4% Patch 1 Patch Transdermal daily  losartan 25 milliGRAM(s) Oral daily  melatonin 3 milliGRAM(s) Oral at bedtime  metoprolol succinate ER 50 milliGRAM(s) Oral daily  mupirocin 2% Ointment 1 Application(s) Topical <User Schedule>  spironolactone 25 milliGRAM(s) Oral daily  tamsulosin 0.4 milliGRAM(s) Oral at bedtime    MEDICATIONS  (PRN):  acetaminophen     Tablet .. 650 milliGRAM(s) Oral every 6 hours PRN Mild Pain (1 - 3)  carbamide peroxide Otic Solution 4 Drop(s) Right Ear two times a day PRN clogged ear  oxycodone    5 mG/acetaminophen 325 mG 1 Tablet(s) Oral every 6 hours PRN Severe Pain (7 - 10)  polyethylene glycol 3350 17 Gram(s) Oral daily PRN Constipation  sodium chloride 0.65% Nasal 1 Spray(s) Both Nostrils every 4 hours PRN Nasal Congestion      I&O's Summary    15 Feb 2024 07:01  -  16 Feb 2024 07:00  --------------------------------------------------------  IN: 0 mL / OUT: 2825 mL / NET: -2825 mL    16 Feb 2024 07:01  -  16 Feb 2024 13:33  --------------------------------------------------------  IN: 400 mL / OUT: 400 mL / NET: 0 mL        PHYSICAL EXAM:  Vital Signs Last 24 Hrs  T(C): 36.4 (16 Feb 2024 04:00), Max: 36.4 (15 Feb 2024 18:03)  T(F): 97.5 (16 Feb 2024 04:00), Max: 97.6 (15 Feb 2024 18:03)  HR: 69 (16 Feb 2024 11:55) (67 - 74)  BP: 97/63 (16 Feb 2024 11:55) (91/57 - 103/66)  BP(mean): --  RR: 18 (16 Feb 2024 04:00) (18 - 18)  SpO2: 98% (16 Feb 2024 11:55) (95% - 98%)    Parameters below as of 16 Feb 2024 11:55  Patient On (Oxygen Delivery Method): room air      PHYSICAL EXAM:-  GENERAL: NAD, well-developed  EYES: EOMI, PERRLA, conjunctiva and sclera clear  NECK: Supple, No JVD, no thyromegaly  CHEST/LUNG: Clear to auscultation bilaterally; No wheeze  HEART: Regular rate and rhythm; S1, S2 audible, No murmurs, rubs, or gallops  ABDOMEN: Soft, Nontender, Nondistended; Bowel sounds present  EXTREMITIES:  2+ Peripheral Pulses, No clubbing, cyanosis, 1+ LE edema  NEURO: AAOx3, no focal deficit  LABS:                        8.3    9.49  )-----------( 211      ( 16 Feb 2024 05:04 )             27.6     02-16    139  |  99  |  33<H>  ----------------------------<  97  3.6   |  30  |  1.37<H>    Ca    8.7      16 Feb 2024 05:04    TPro  5.9<L>  /  Alb  3.2<L>  /  TBili  2.3<H>  /  DBili  x   /  AST  25  /  ALT  54<H>  /  AlkPhos  71  02-16          Urinalysis Basic - ( 16 Feb 2024 05:04 )    Color: x / Appearance: x / SG: x / pH: x  Gluc: 97 mg/dL / Ketone: x  / Bili: x / Urobili: x   Blood: x / Protein: x / Nitrite: x   Leuk Esterase: x / RBC: x / WBC x   Sq Epi: x / Non Sq Epi: x / Bacteria: x

## 2024-02-16 NOTE — PROGRESS NOTE ADULT - ASSESSMENT
64 yo M hx HfrEF, HTN, GERD, dyslipidemia, chronic intermittent leg pain, aortic stenosis, chronic venous stasis dermatitis with bilateral leg swelling, admitted to [facility] with b/l leg pain and swelling, transferred to [facility] for TAVR evaluation for severe AS, s/p LHC nonobstructive, with acute decompensated HFrEF exacerbation.

## 2024-02-16 NOTE — PROGRESS NOTE ADULT - PROBLEM SELECTOR PLAN 8
DVT ppx: SCDs given worsening anemia req prbcs  Dispo: Pending clinical course, possible TAVR inpt vs outpt, c/w iv diuresis  Independent- walks with cane    d/w pt, called wife Chanelle to update  reached out to CTS to discuss dc plan

## 2024-02-16 NOTE — CHART NOTE - NSCHARTNOTEFT_GEN_A_CORE
I was notified by RN that pt had fallen onto his forehead.  He was sitting on the edge of the bed, sleeping and rocking himself and he fell forward.  Pt admits to hitting his forehead but denies injury to his hands, knees, face or any other body part.  He denies any associated dizziness or palpitations.  He is not an any anticoagulation at this time.      Vital Signs Last 24 Hrs  T(C): 36.5 (16 Feb 2024 14:24), Max: 36.5 (16 Feb 2024 14:24)  T(F): 97.7 (16 Feb 2024 14:24), Max: 97.7 (16 Feb 2024 14:24)  HR: 72 (16 Feb 2024 14:24) (67 - 74)  BP: 107/65 (16 Feb 2024 14:24) (91/57 - 107/65)  BP(mean): --  RR: 18 (16 Feb 2024 14:24) (18 - 18)  SpO2: 98% (16 Feb 2024 14:24) (95% - 98%)    Parameters below as of 16 Feb 2024 14:24  Patient On (Oxygen Delivery Method): room air    Exam:  Pt awake and alert and answering all my questions appropriately  Forehead with area of skin excoriation but no deep break in the skin barrier, no bleeding or drainage.  Some mild swelling noted to the area and some mild ecchymosis.  Face without any signs of trauma, no nose bleed or skin breakdown.  Hands, arms and knees without swelling, old ecchymotic areas but left 3rd finger with skin breakdown that patient says he caused himself when trying to open a bag.     A/P:  Dr. Early notified  CTH ordered urgently to eval for trauma  clean finger wound with soap and water and clean bandage for now, monitor

## 2024-02-16 NOTE — PROVIDER CONTACT NOTE (CHANGE IN STATUS NOTIFICATION) - ASSESSMENT
Prior to incident patient independent w/ walker. After fall patient A&Ox4, VSS on RA, no complaints of pain, dizziness or blurred vision.

## 2024-02-16 NOTE — PROVIDER CONTACT NOTE (CHANGE IN STATUS NOTIFICATION) - SITUATION
Patient sitting at the side of the bed and states he fell asleep and fell forward onto the floor- hitting his head on the floor. Patient sitting at the side of the bed and states he fell asleep and fell forward onto the floor- hitting his head on the floor. Patient is non compliant with bed and chair alarm.

## 2024-02-16 NOTE — PROGRESS NOTE ADULT - PROBLEM SELECTOR PLAN 1
HFrEF (EF 35-40% 1/2024) with severe AS  Diuretics: IV Lasix 40mg to 60mg bid  MRA: Aldactone 25mg/d  ARB/ARNI: losartan 25 po daily - would transition to entresto pending HF  BB: Cont Toprol 50mg/d  SGLT2: start farxiga prior to dc planning once off iv diuretics  LHC revealed non-obstructive CAD  HF and structural heart following, surgery eventually after liver biopsy 2/19  D/w Structural - if pt can be discharged earlier then will do as outpt

## 2024-02-17 LAB
ADD ON TEST-SPECIMEN IN LAB: SIGNIFICANT CHANGE UP
ALBUMIN SERPL ELPH-MCNC: 3.3 G/DL — SIGNIFICANT CHANGE UP (ref 3.3–5)
ALP SERPL-CCNC: 75 U/L — SIGNIFICANT CHANGE UP (ref 40–120)
ALT FLD-CCNC: 45 U/L — SIGNIFICANT CHANGE UP (ref 10–45)
ANION GAP SERPL CALC-SCNC: 10 MMOL/L — SIGNIFICANT CHANGE UP (ref 5–17)
AST SERPL-CCNC: 22 U/L — SIGNIFICANT CHANGE UP (ref 10–40)
BILIRUB SERPL-MCNC: 2.1 MG/DL — HIGH (ref 0.2–1.2)
BUN SERPL-MCNC: 26 MG/DL — HIGH (ref 7–23)
CALCIUM SERPL-MCNC: 8.9 MG/DL — SIGNIFICANT CHANGE UP (ref 8.4–10.5)
CHLORIDE SERPL-SCNC: 99 MMOL/L — SIGNIFICANT CHANGE UP (ref 96–108)
CO2 SERPL-SCNC: 30 MMOL/L — SIGNIFICANT CHANGE UP (ref 22–31)
CREAT SERPL-MCNC: 1.39 MG/DL — HIGH (ref 0.5–1.3)
EGFR: 57 ML/MIN/1.73M2 — LOW
GLUCOSE SERPL-MCNC: 90 MG/DL — SIGNIFICANT CHANGE UP (ref 70–99)
HCT VFR BLD CALC: 28.9 % — LOW (ref 39–50)
HGB BLD-MCNC: 8.9 G/DL — LOW (ref 13–17)
MAGNESIUM SERPL-MCNC: 2.1 MG/DL — SIGNIFICANT CHANGE UP (ref 1.6–2.6)
MCHC RBC-ENTMCNC: 20.7 PG — LOW (ref 27–34)
MCHC RBC-ENTMCNC: 30.8 GM/DL — LOW (ref 32–36)
MCV RBC AUTO: 67.4 FL — LOW (ref 80–100)
NRBC # BLD: 0 /100 WBCS — SIGNIFICANT CHANGE UP (ref 0–0)
PHOSPHATE SERPL-MCNC: 3.7 MG/DL — SIGNIFICANT CHANGE UP (ref 2.5–4.5)
PLATELET # BLD AUTO: 216 K/UL — SIGNIFICANT CHANGE UP (ref 150–400)
POTASSIUM SERPL-MCNC: 3.5 MMOL/L — SIGNIFICANT CHANGE UP (ref 3.5–5.3)
POTASSIUM SERPL-SCNC: 3.5 MMOL/L — SIGNIFICANT CHANGE UP (ref 3.5–5.3)
PROT SERPL-MCNC: 5.9 G/DL — LOW (ref 6–8.3)
RBC # BLD: 4.29 M/UL — SIGNIFICANT CHANGE UP (ref 4.2–5.8)
RBC # FLD: 29.2 % — HIGH (ref 10.3–14.5)
SODIUM SERPL-SCNC: 139 MMOL/L — SIGNIFICANT CHANGE UP (ref 135–145)
WBC # BLD: 11.72 K/UL — HIGH (ref 3.8–10.5)
WBC # FLD AUTO: 11.72 K/UL — HIGH (ref 3.8–10.5)

## 2024-02-17 PROCEDURE — 99232 SBSQ HOSP IP/OBS MODERATE 35: CPT

## 2024-02-17 RX ORDER — BENZOCAINE AND MENTHOL 5; 1 G/100ML; G/100ML
1 LIQUID ORAL ONCE
Refills: 0 | Status: COMPLETED | OUTPATIENT
Start: 2024-02-17 | End: 2024-02-18

## 2024-02-17 RX ADMIN — LOSARTAN POTASSIUM 25 MILLIGRAM(S): 100 TABLET, FILM COATED ORAL at 05:30

## 2024-02-17 RX ADMIN — CHLORHEXIDINE GLUCONATE 1 APPLICATION(S): 213 SOLUTION TOPICAL at 05:30

## 2024-02-17 RX ADMIN — Medication 3 MILLIGRAM(S): at 21:13

## 2024-02-17 RX ADMIN — SPIRONOLACTONE 25 MILLIGRAM(S): 25 TABLET, FILM COATED ORAL at 05:30

## 2024-02-17 RX ADMIN — Medication 50 MILLIGRAM(S): at 05:30

## 2024-02-17 RX ADMIN — Medication 60 MILLIGRAM(S): at 05:30

## 2024-02-17 RX ADMIN — Medication 60 MILLIGRAM(S): at 15:00

## 2024-02-17 RX ADMIN — TAMSULOSIN HYDROCHLORIDE 0.4 MILLIGRAM(S): 0.4 CAPSULE ORAL at 21:13

## 2024-02-17 RX ADMIN — Medication 325 MILLIGRAM(S): at 12:37

## 2024-02-17 RX ADMIN — MUPIROCIN 1 APPLICATION(S): 20 OINTMENT TOPICAL at 09:37

## 2024-02-17 NOTE — CHART NOTE - NSCHARTNOTEFT_GEN_A_CORE
Pt had a fall yesterday and hit his forehead.  He has been advised to have a CTH to evaluate for possible internal trauma.  Pt has denied CTH 3 times despite education him on the possibility of internal brain trauma.        EXAM:  Forehead ecchymosis appears to be resolving, skin break obvious  Neuro: Pt alert and oriented, ambulating has full strength in all 4 extremities. Ambulates with a walker. No neuro deficits noted. No facial asymmetery.    A/P:  Will cancel CTH  Continue to monitor Neuro exam.

## 2024-02-17 NOTE — PROGRESS NOTE ADULT - SUBJECTIVE AND OBJECTIVE BOX
[facility] Division of Hospital Medicine  Nithin Edmonds MD  Available via MS Teams    SUBJECTIVE / OVERNIGHT EVENTS: Patient seen and examined at bedside, resting comfortably and in no acute distress. Denies chest pain, palpitations. Denies shortness of breath or cough. Reports significant urine output. Denies abdominal pain or diarrhea. ROS otherwise noncontributory.     MEDICATIONS  (STANDING):  chlorhexidine 2% Cloths 1 Application(s) Topical <User Schedule>  epoetin loulou-epbx (RETACRIT) Injectable 08533 Unit(s) SubCutaneous every 7 days  ferrous    sulfate 325 milliGRAM(s) Oral daily  furosemide   Injectable 60 milliGRAM(s) IV Push two times a day  lidocaine   4% Patch 1 Patch Transdermal daily  losartan 25 milliGRAM(s) Oral daily  melatonin 3 milliGRAM(s) Oral at bedtime  metoprolol succinate ER 50 milliGRAM(s) Oral daily  mupirocin 2% Ointment 1 Application(s) Topical <User Schedule>  spironolactone 25 milliGRAM(s) Oral daily  tamsulosin 0.4 milliGRAM(s) Oral at bedtime    MEDICATIONS  (PRN):  acetaminophen     Tablet .. 650 milliGRAM(s) Oral every 6 hours PRN Mild Pain (1 - 3)  carbamide peroxide Otic Solution 4 Drop(s) Right Ear two times a day PRN clogged ear  oxycodone    5 mG/acetaminophen 325 mG 1 Tablet(s) Oral every 6 hours PRN Severe Pain (7 - 10)  polyethylene glycol 3350 17 Gram(s) Oral daily PRN Constipation  sodium chloride 0.65% Nasal 1 Spray(s) Both Nostrils every 4 hours PRN Nasal Congestion      I&O's Summary    16 Feb 2024 07:01  -  17 Feb 2024 07:00  --------------------------------------------------------  IN: 810 mL / OUT: 1900 mL / NET: -1090 mL    17 Feb 2024 07:01  -  17 Feb 2024 12:46  --------------------------------------------------------  IN: 500 mL / OUT: 300 mL / NET: 200 mL        PHYSICAL EXAM:  Vital Signs Last 24 Hrs  T(C): 36.4 (17 Feb 2024 11:42), Max: 36.6 (17 Feb 2024 04:00)  T(F): 97.6 (17 Feb 2024 11:42), Max: 97.9 (17 Feb 2024 04:00)  HR: 70 (17 Feb 2024 11:42) (63 - 81)  BP: 94/52 (17 Feb 2024 11:42) (94/52 - 111/64)  RR: 18 (17 Feb 2024 11:42) (17 - 18)  SpO2: 97% (17 Feb 2024 11:42) (96% - 98%)    Parameters below as of 17 Feb 2024 11:42  Patient On (Oxygen Delivery Method): room air      CONSTITUTIONAL: NAD, well-groomed  EYES: PERRLA; conjunctiva and sclera clear  ENMT: Moist oral mucosa, no pharyngeal injection or exudates; normal dentition  NECK: Supple, no palpable masses; no thyromegaly  RESPIRATORY: Normal respiratory effort; lungs are clear to auscultation bilaterally  CARDIOVASCULAR: normal S1 and S2, no murmur/rub/gallop; Significant LE edema  ABDOMEN: Nontender to palpation, normoactive bowel sounds, no rebound/guarding; No hepatosplenomegaly  MUSCULOSKELETAL:  no clubbing or cyanosis of digits; no joint swelling or tenderness to palpation  PSYCH: A+O to person, place, and time; affect appropriate  NEUROLOGY: CN 2-12 are intact and symmetric; no gross sensory deficits   SKIN: No rashes; no palpable lesions    LABS:                        8.9    11.72 )-----------( 216      ( 17 Feb 2024 06:41 )             28.9     02-17    139  |  99  |  26<H>  ----------------------------<  90  3.5   |  30  |  1.39<H>    Ca    8.9      17 Feb 2024 06:41    TPro  5.9<L>  /  Alb  3.3  /  TBili  2.1<H>  /  DBili  x   /  AST  22  /  ALT  45  /  AlkPhos  75  02-17          Urinalysis Basic - ( 17 Feb 2024 06:41 )    Color: x / Appearance: x / SG: x / pH: x  Gluc: 90 mg/dL / Ketone: x  / Bili: x / Urobili: x   Blood: x / Protein: x / Nitrite: x   Leuk Esterase: x / RBC: x / WBC x   Sq Epi: x / Non Sq Epi: x / Bacteria: x        SARS-CoV-2: NotDetec (25 Jan 2024 21:19)

## 2024-02-17 NOTE — PROGRESS NOTE ADULT - PROBLEM SELECTOR PLAN 3
With liver dysfunction  Hepatitis panel negative  RUQ US unremarkable  Elastography revealed borderline moderate risk of clinically significant fibrosis  F/u Hepatology/IR for Biopsy - tentatively scheduled for 2/19 if patient is still admitted otherwise will schedule as outpt

## 2024-02-17 NOTE — PROGRESS NOTE ADULT - PROBLEM SELECTOR PLAN 1
HFrEF (EF 35-40% 1/2024) with severe AS  Diuretics: IV Lasix 60mg bid  MRA: Aldactone 25mg/d  ARB/ARNI: losartan 25 po daily - would transition to entresto pending HF  BB: Cont Toprol 50mg/d  SGLT2: start farxiga prior to dc planning once off iv diuretics  LHC revealed non-obstructive CAD  HF and structural heart following, surgery eventually after liver biopsy 2/19  D/w Structural - if pt can be discharged earlier then will do as outpt

## 2024-02-17 NOTE — PROGRESS NOTE ADULT - ASSESSMENT
62 yo M hx HfrEF, HTN, GERD, dyslipidemia, chronic intermittent leg pain, aortic stenosis, chronic venous stasis dermatitis with bilateral leg swelling, admitted to [facility] with b/l leg pain and swelling, transferred to [facility] for TAVR evaluation for severe AS, s/p LHC nonobstructive, with acute decompensated HFrEF exacerbation. Remains on IV diuresis.

## 2024-02-18 LAB
ALBUMIN SERPL ELPH-MCNC: 3.1 G/DL — LOW (ref 3.3–5)
ALP SERPL-CCNC: 72 U/L — SIGNIFICANT CHANGE UP (ref 40–120)
ALT FLD-CCNC: 38 U/L — SIGNIFICANT CHANGE UP (ref 10–45)
ANION GAP SERPL CALC-SCNC: 10 MMOL/L — SIGNIFICANT CHANGE UP (ref 5–17)
AST SERPL-CCNC: 21 U/L — SIGNIFICANT CHANGE UP (ref 10–40)
BILIRUB SERPL-MCNC: 1.7 MG/DL — HIGH (ref 0.2–1.2)
BUN SERPL-MCNC: 22 MG/DL — SIGNIFICANT CHANGE UP (ref 7–23)
CALCIUM SERPL-MCNC: 8.6 MG/DL — SIGNIFICANT CHANGE UP (ref 8.4–10.5)
CHLORIDE SERPL-SCNC: 99 MMOL/L — SIGNIFICANT CHANGE UP (ref 96–108)
CO2 SERPL-SCNC: 29 MMOL/L — SIGNIFICANT CHANGE UP (ref 22–31)
CREAT SERPL-MCNC: 1.27 MG/DL — SIGNIFICANT CHANGE UP (ref 0.5–1.3)
EGFR: 63 ML/MIN/1.73M2 — SIGNIFICANT CHANGE UP
GLUCOSE SERPL-MCNC: 94 MG/DL — SIGNIFICANT CHANGE UP (ref 70–99)
HCT VFR BLD CALC: 27.7 % — LOW (ref 39–50)
HGB BLD-MCNC: 9 G/DL — LOW (ref 13–17)
MCHC RBC-ENTMCNC: 22.8 PG — LOW (ref 27–34)
MCHC RBC-ENTMCNC: 32.5 GM/DL — SIGNIFICANT CHANGE UP (ref 32–36)
MCV RBC AUTO: 70.1 FL — LOW (ref 80–100)
NRBC # BLD: 0 /100 WBCS — SIGNIFICANT CHANGE UP (ref 0–0)
PLATELET # BLD AUTO: 221 K/UL — SIGNIFICANT CHANGE UP (ref 150–400)
POTASSIUM SERPL-MCNC: 3.7 MMOL/L — SIGNIFICANT CHANGE UP (ref 3.5–5.3)
POTASSIUM SERPL-SCNC: 3.7 MMOL/L — SIGNIFICANT CHANGE UP (ref 3.5–5.3)
PROT SERPL-MCNC: 5.8 G/DL — LOW (ref 6–8.3)
RBC # BLD: 3.95 M/UL — LOW (ref 4.2–5.8)
RBC # FLD: 30.5 % — HIGH (ref 10.3–14.5)
SODIUM SERPL-SCNC: 138 MMOL/L — SIGNIFICANT CHANGE UP (ref 135–145)
WBC # BLD: 11.39 K/UL — HIGH (ref 3.8–10.5)
WBC # FLD AUTO: 11.39 K/UL — HIGH (ref 3.8–10.5)

## 2024-02-18 PROCEDURE — 99232 SBSQ HOSP IP/OBS MODERATE 35: CPT

## 2024-02-18 RX ORDER — POTASSIUM CHLORIDE 20 MEQ
40 PACKET (EA) ORAL ONCE
Refills: 0 | Status: COMPLETED | OUTPATIENT
Start: 2024-02-18 | End: 2024-02-18

## 2024-02-18 RX ADMIN — Medication 40 MILLIEQUIVALENT(S): at 11:54

## 2024-02-18 RX ADMIN — Medication 325 MILLIGRAM(S): at 14:15

## 2024-02-18 RX ADMIN — POLYETHYLENE GLYCOL 3350 17 GRAM(S): 17 POWDER, FOR SOLUTION ORAL at 11:24

## 2024-02-18 RX ADMIN — Medication 650 MILLIGRAM(S): at 12:47

## 2024-02-18 RX ADMIN — CHLORHEXIDINE GLUCONATE 1 APPLICATION(S): 213 SOLUTION TOPICAL at 05:10

## 2024-02-18 RX ADMIN — Medication 650 MILLIGRAM(S): at 05:10

## 2024-02-18 RX ADMIN — SPIRONOLACTONE 25 MILLIGRAM(S): 25 TABLET, FILM COATED ORAL at 05:11

## 2024-02-18 RX ADMIN — Medication 50 MILLIGRAM(S): at 05:25

## 2024-02-18 RX ADMIN — Medication 650 MILLIGRAM(S): at 11:47

## 2024-02-18 RX ADMIN — LOSARTAN POTASSIUM 25 MILLIGRAM(S): 100 TABLET, FILM COATED ORAL at 05:12

## 2024-02-18 RX ADMIN — Medication 60 MILLIGRAM(S): at 14:15

## 2024-02-18 RX ADMIN — Medication 3 MILLIGRAM(S): at 21:06

## 2024-02-18 RX ADMIN — Medication 60 MILLIGRAM(S): at 05:10

## 2024-02-18 RX ADMIN — TAMSULOSIN HYDROCHLORIDE 0.4 MILLIGRAM(S): 0.4 CAPSULE ORAL at 21:06

## 2024-02-18 RX ADMIN — BENZOCAINE AND MENTHOL 1 LOZENGE: 5; 1 LIQUID ORAL at 00:03

## 2024-02-18 RX ADMIN — MUPIROCIN 1 APPLICATION(S): 20 OINTMENT TOPICAL at 09:50

## 2024-02-18 NOTE — PROGRESS NOTE ADULT - ASSESSMENT
63M PMH HFrEF, HTN, GERD, dyslipidemia, chronic intermittent leg pain, aortic stenosis, chronic venous stasis dermatitis with bilateral leg swelling, admitted to [facility] with b/l leg pain and swelling, transferred to [facility] for TAVR evaluation for severe AS, s/p LHC nonobstructive, with acute decompensated HFrEF exacerbation. Remains on IV diuresis.

## 2024-02-18 NOTE — PROGRESS NOTE ADULT - PROBLEM SELECTOR PLAN 1
HFrEF (EF 35-40% 1/2024) with severe AS  Diuretics: IV Lasix 60mg bid  MRA: Aldactone 25mg/d  ARB/ARNI: losartan 25 po daily - would transition to entresto pending HF  BB: Cont Toprol 50mg/d  SGLT2: start farxiga prior to dc planning once off iv diuretics  LHC revealed non-obstructive CAD  HF and structural heart following, surgery eventually after liver biopsy this coming week  Case previously discussed with Structural - if pt can be discharged earlier then will do as outpt

## 2024-02-18 NOTE — CHART NOTE - NSCHARTNOTEFT_GEN_A_CORE
Notified by RN of pain on R groin. Pt s/p angiogram on 2/5/24. R groin with small soft swelling. NO ecchymosis.  tender to touch. Pedal pulses +1. CBC stable  PLAN  > US R groin r/o hematoma   > Attending notified

## 2024-02-18 NOTE — PROGRESS NOTE ADULT - SUBJECTIVE AND OBJECTIVE BOX
[facility] Division of Hospital Medicine  Nithin Edmonds MD  Available via MS Teams    SUBJECTIVE / OVERNIGHT EVENTS: Patient seen and examined at bedside, resting comfortably and in no acute distress. Denies chest pain, palpitations. Denies shortness of breath or cough. Reports good urine output. Reports he is ambulatory. Denies syncope or near syncope. ROS otherwise noncontributory.     MEDICATIONS  (STANDING):  chlorhexidine 2% Cloths 1 Application(s) Topical <User Schedule>  epoetin loulou-epbx (RETACRIT) Injectable 33575 Unit(s) SubCutaneous every 7 days  ferrous    sulfate 325 milliGRAM(s) Oral daily  furosemide   Injectable 60 milliGRAM(s) IV Push two times a day  lidocaine   4% Patch 1 Patch Transdermal daily  losartan 25 milliGRAM(s) Oral daily  melatonin 3 milliGRAM(s) Oral at bedtime  metoprolol succinate ER 50 milliGRAM(s) Oral daily  mupirocin 2% Ointment 1 Application(s) Topical <User Schedule>  potassium chloride    Tablet ER 40 milliEquivalent(s) Oral once  spironolactone 25 milliGRAM(s) Oral daily  tamsulosin 0.4 milliGRAM(s) Oral at bedtime    MEDICATIONS  (PRN):  acetaminophen     Tablet .. 650 milliGRAM(s) Oral every 6 hours PRN Mild Pain (1 - 3)  carbamide peroxide Otic Solution 4 Drop(s) Right Ear two times a day PRN clogged ear  oxycodone    5 mG/acetaminophen 325 mG 1 Tablet(s) Oral every 6 hours PRN Severe Pain (7 - 10)  polyethylene glycol 3350 17 Gram(s) Oral daily PRN Constipation  sodium chloride 0.65% Nasal 1 Spray(s) Both Nostrils every 4 hours PRN Nasal Congestion      I&O's Summary    17 Feb 2024 07:01  -  18 Feb 2024 07:00  --------------------------------------------------------  IN: 850 mL / OUT: 1700 mL / NET: -850 mL    18 Feb 2024 07:01  -  18 Feb 2024 11:24  --------------------------------------------------------  IN: 330 mL / OUT: 0 mL / NET: 330 mL        PHYSICAL EXAM:  Vital Signs Last 24 Hrs  T(C): 36.7 (18 Feb 2024 04:00), Max: 36.7 (18 Feb 2024 04:00)  T(F): 98 (18 Feb 2024 04:00), Max: 98 (18 Feb 2024 04:00)  HR: 82 (18 Feb 2024 08:05) (64 - 82)  BP: 103/61 (18 Feb 2024 08:05) (93/56 - 104/71)  RR: 18 (18 Feb 2024 04:00) (18 - 18)  SpO2: 97% (18 Feb 2024 04:00) (96% - 97%)    Parameters below as of 18 Feb 2024 04:00  Patient On (Oxygen Delivery Method): room air    CONSTITUTIONAL: NAD, well-groomed  EYES: PERRLA; conjunctiva and sclera clear  ENMT: Moist oral mucosa, no pharyngeal injection or exudates; normal dentition  NECK: Supple, no palpable masses; no thyromegaly  RESPIRATORY: Normal respiratory effort; lungs are clear to auscultation bilaterally  CARDIOVASCULAR: normal S1 and S2, no murmur/rub/gallop; Continued LE edema   ABDOMEN: Nontender to palpation, normoactive bowel sounds, no rebound/guarding; No hepatosplenomegaly  MUSCULOSKELETAL:  no clubbing or cyanosis of digits; no joint swelling or tenderness to palpation  PSYCH: A+O to person, place, and time; affect appropriate  NEUROLOGY: CN 2-12 are intact and symmetric; no gross sensory deficits   SKIN: No rashes; no palpable lesions    LABS:              9.0    11.39 )-----------( 221      ( 18 Feb 2024 06:31 )             27.7     138  |  99  |  22  ----------------------------<  94  3.7   |  29  |  1.27    Ca    8.6      18 Feb 2024 06:30  Phos  3.7     02-17  Mg     2.1     02-17    TPro  5.8<L>  /  Alb  3.1<L>  /  TBili  1.7<H>  /  DBili  x   /  AST  21  /  ALT  38  /  AlkPhos  72  02-18    Urinalysis Basic - ( 18 Feb 2024 06:30 )    Color: x / Appearance: x / SG: x / pH: x  Gluc: 94 mg/dL / Ketone: x  / Bili: x / Urobili: x   Blood: x / Protein: x / Nitrite: x   Leuk Esterase: x / RBC: x / WBC x   Sq Epi: x / Non Sq Epi: x / Bacteria: x    SARS-CoV-2: NotDetec (25 Jan 2024 21:19)

## 2024-02-18 NOTE — PROGRESS NOTE ADULT - PROBLEM SELECTOR PLAN 3
With liver dysfunction  Hepatitis panel negative  RUQ US unremarkable  Elastography revealed borderline moderate risk of clinically significant fibrosis  F/u Hepatology/IR for Biopsy - tentatively scheduled for 2/20 if patient is still admitted otherwise will schedule as outpt

## 2024-02-19 LAB
ANION GAP SERPL CALC-SCNC: 10 MMOL/L — SIGNIFICANT CHANGE UP (ref 5–17)
BUN SERPL-MCNC: 21 MG/DL — SIGNIFICANT CHANGE UP (ref 7–23)
CALCIUM SERPL-MCNC: 8.7 MG/DL — SIGNIFICANT CHANGE UP (ref 8.4–10.5)
CHLORIDE SERPL-SCNC: 98 MMOL/L — SIGNIFICANT CHANGE UP (ref 96–108)
CO2 SERPL-SCNC: 29 MMOL/L — SIGNIFICANT CHANGE UP (ref 22–31)
CREAT SERPL-MCNC: 1.33 MG/DL — HIGH (ref 0.5–1.3)
EGFR: 60 ML/MIN/1.73M2 — SIGNIFICANT CHANGE UP
GLUCOSE SERPL-MCNC: 96 MG/DL — SIGNIFICANT CHANGE UP (ref 70–99)
HCT VFR BLD CALC: 30.4 % — LOW (ref 39–50)
HGB BLD-MCNC: 9.5 G/DL — LOW (ref 13–17)
MAGNESIUM SERPL-MCNC: 2.2 MG/DL — SIGNIFICANT CHANGE UP (ref 1.6–2.6)
MCHC RBC-ENTMCNC: 21.2 PG — LOW (ref 27–34)
MCHC RBC-ENTMCNC: 31.3 GM/DL — LOW (ref 32–36)
MCV RBC AUTO: 67.9 FL — LOW (ref 80–100)
NRBC # BLD: 0 /100 WBCS — SIGNIFICANT CHANGE UP (ref 0–0)
OB PNL STL: NEGATIVE — SIGNIFICANT CHANGE UP
PLATELET # BLD AUTO: 232 K/UL — SIGNIFICANT CHANGE UP (ref 150–400)
POTASSIUM SERPL-MCNC: 3.9 MMOL/L — SIGNIFICANT CHANGE UP (ref 3.5–5.3)
POTASSIUM SERPL-SCNC: 3.9 MMOL/L — SIGNIFICANT CHANGE UP (ref 3.5–5.3)
RBC # BLD: 4.48 M/UL — SIGNIFICANT CHANGE UP (ref 4.2–5.8)
RBC # FLD: 29.5 % — HIGH (ref 10.3–14.5)
SODIUM SERPL-SCNC: 137 MMOL/L — SIGNIFICANT CHANGE UP (ref 135–145)
WBC # BLD: 12.36 K/UL — HIGH (ref 3.8–10.5)
WBC # FLD AUTO: 12.36 K/UL — HIGH (ref 3.8–10.5)

## 2024-02-19 PROCEDURE — 99232 SBSQ HOSP IP/OBS MODERATE 35: CPT

## 2024-02-19 RX ADMIN — Medication 3 MILLIGRAM(S): at 21:26

## 2024-02-19 RX ADMIN — LOSARTAN POTASSIUM 25 MILLIGRAM(S): 100 TABLET, FILM COATED ORAL at 05:16

## 2024-02-19 RX ADMIN — TAMSULOSIN HYDROCHLORIDE 0.4 MILLIGRAM(S): 0.4 CAPSULE ORAL at 21:26

## 2024-02-19 RX ADMIN — CHLORHEXIDINE GLUCONATE 1 APPLICATION(S): 213 SOLUTION TOPICAL at 05:15

## 2024-02-19 RX ADMIN — SPIRONOLACTONE 25 MILLIGRAM(S): 25 TABLET, FILM COATED ORAL at 05:15

## 2024-02-19 RX ADMIN — Medication 50 MILLIGRAM(S): at 05:16

## 2024-02-19 RX ADMIN — Medication 325 MILLIGRAM(S): at 12:21

## 2024-02-19 RX ADMIN — Medication 60 MILLIGRAM(S): at 13:45

## 2024-02-19 RX ADMIN — Medication 60 MILLIGRAM(S): at 05:15

## 2024-02-19 RX ADMIN — MUPIROCIN 1 APPLICATION(S): 20 OINTMENT TOPICAL at 09:36

## 2024-02-19 NOTE — PROGRESS NOTE ADULT - PROBLEM SELECTOR PLAN 1
HFrEF (EF 35-40% 1/2024) with severe AS  Diuretics: IV Lasix 60mg bid  MRA: Aldactone 25mg/d  ARB/ARNI: losartan 25 po daily - would transition to entresto pending HF  BB: Cont Toprol 50mg/d  SGLT2: start farxiga prior to dc planning once off iv diuretics  LHC revealed non-obstructive CAD  HF and structural heart following, surgery eventually after liver biopsy this week, tentitivly 2/20  Case previously discussed with Structural - if pt can be discharged earlier then will do as outpt

## 2024-02-19 NOTE — PROGRESS NOTE ADULT - SUBJECTIVE AND OBJECTIVE BOX
[facility] Division of Hospital Medicine  Nithin Edmonds MD  Available via MS Teams    SUBJECTIVE / OVERNIGHT EVENTS: Patient seen and examined at bedside, resting comfortably and in no acute distress. Denies chest pain, palpitations. Denies shortness of breath or cough. ROS otherwise noncontributory.     MEDICATIONS  (STANDING):  chlorhexidine 2% Cloths 1 Application(s) Topical <User Schedule>  epoetin loulou-epbx (RETACRIT) Injectable 99233 Unit(s) SubCutaneous every 7 days  ferrous    sulfate 325 milliGRAM(s) Oral daily  furosemide   Injectable 60 milliGRAM(s) IV Push two times a day  lidocaine   4% Patch 1 Patch Transdermal daily  losartan 25 milliGRAM(s) Oral daily  melatonin 3 milliGRAM(s) Oral at bedtime  metoprolol succinate ER 50 milliGRAM(s) Oral daily  mupirocin 2% Ointment 1 Application(s) Topical <User Schedule>  spironolactone 25 milliGRAM(s) Oral daily  tamsulosin 0.4 milliGRAM(s) Oral at bedtime    MEDICATIONS  (PRN):  acetaminophen     Tablet .. 650 milliGRAM(s) Oral every 6 hours PRN Mild Pain (1 - 3)  carbamide peroxide Otic Solution 4 Drop(s) Right Ear two times a day PRN clogged ear  oxycodone    5 mG/acetaminophen 325 mG 1 Tablet(s) Oral every 6 hours PRN Severe Pain (7 - 10)  polyethylene glycol 3350 17 Gram(s) Oral daily PRN Constipation  sodium chloride 0.65% Nasal 1 Spray(s) Both Nostrils every 4 hours PRN Nasal Congestion      I&O's Summary    18 Feb 2024 07:01  -  19 Feb 2024 07:00  --------------------------------------------------------  IN: 970 mL / OUT: 802 mL / NET: 168 mL    19 Feb 2024 07:01  -  19 Feb 2024 12:54  --------------------------------------------------------  IN: 540 mL / OUT: 0 mL / NET: 540 mL        PHYSICAL EXAM:  Vital Signs Last 24 Hrs  T(C): 36.3 (19 Feb 2024 11:00), Max: 36.7 (18 Feb 2024 21:11)  T(F): 97.3 (19 Feb 2024 11:00), Max: 98.1 (18 Feb 2024 21:11)  HR: 77 (19 Feb 2024 11:00) (66 - 77)  BP: 97/57 (19 Feb 2024 11:00) (92/52 - 115/63)  BP(mean): --  RR: 18 (19 Feb 2024 11:00) (18 - 18)  SpO2: 98% (19 Feb 2024 11:00) (95% - 98%)    Parameters below as of 19 Feb 2024 11:00  Patient On (Oxygen Delivery Method): room air      CONSTITUTIONAL: NAD, well-groomed  EYES: PERRLA; conjunctiva and sclera clear  ENMT: Moist oral mucosa, no pharyngeal injection or exudates; normal dentition  NECK: Supple, no palpable masses; no thyromegaly  RESPIRATORY: Normal respiratory effort; lungs are clear to auscultation bilaterally  CARDIOVASCULAR: normal S1 and S2, no murmur/rub/gallop; Remains with pitting LE edema   ABDOMEN: Nontender to palpation, normoactive bowel sounds, no rebound/guarding; No hepatosplenomegaly  MUSCULOSKELETAL:  no clubbing or cyanosis of digits; no joint swelling or tenderness to palpation  PSYCH: A+O to person, place, and time; affect appropriate  NEUROLOGY: CN 2-12 are intact and symmetric; no gross sensory deficits   SKIN: No rashes; no palpable lesions    LABS:                        9.5    12.36 )-----------( 232      ( 19 Feb 2024 06:13 )             30.4     02-19    137  |  98  |  21  ----------------------------<  96  3.9   |  29  |  1.33<H>    Ca    8.7      19 Feb 2024 06:13  Mg     2.2     02-19    TPro  5.8<L>  /  Alb  3.1<L>  /  TBili  1.7<H>  /  DBili  x   /  AST  21  /  ALT  38  /  AlkPhos  72  02-18          Urinalysis Basic - ( 19 Feb 2024 06:13 )    Color: x / Appearance: x / SG: x / pH: x  Gluc: 96 mg/dL / Ketone: x  / Bili: x / Urobili: x   Blood: x / Protein: x / Nitrite: x   Leuk Esterase: x / RBC: x / WBC x   Sq Epi: x / Non Sq Epi: x / Bacteria: x        SARS-CoV-2: NotDetec (25 Jan 2024 21:19)

## 2024-02-19 NOTE — PROGRESS NOTE ADULT - SUBJECTIVE AND OBJECTIVE BOX
No distress    Vital Signs Last 24 Hrs  T(C): 36.3 (02-19-24 @ 11:00), Max: 36.7 (02-18-24 @ 21:11)  T(F): 97.3 (02-19-24 @ 11:00), Max: 98.1 (02-18-24 @ 21:11)  HR: 68 (02-19-24 @ 16:15) (68 - 77)  BP: 94/58 (02-19-24 @ 16:15) (94/57 - 102/62)  RR: 18 (02-19-24 @ 16:15) (18 - 18)  SpO2: 100% (02-19-24 @ 16:15) (95% - 100%)    I&O's Detail    18 Feb 2024 07:01  -  19 Feb 2024 07:00  --------------------------------------------------------  IN:    Oral Fluid: 970 mL  Total IN: 970 mL    OUT:    Voided (mL): 800 mL    s1s2  b/l air entry  soft, ND   edema                                                  9.5    12.36 )-----------( 232      ( 19 Feb 2024 06:13 )             30.4     19 Feb 2024 06:13    137    |  98     |  21     ----------------------------<  96     3.9     |  29     |  1.33     Ca    8.7        19 Feb 2024 06:13  Mg     2.2       19 Feb 2024 06:13    TPro  5.8    /  Alb  3.1    /  TBili  1.7    /  DBili  x      /  AST  21     /  ALT  38     /  AlkPhos  72     18 Feb 2024 06:30    LIVER FUNCTIONS - ( 18 Feb 2024 06:30 )  Alb: 3.1 g/dL / Pro: 5.8 g/dL / ALK PHOS: 72 U/L / ALT: 38 U/L / AST: 21 U/L / GGT: x           acetaminophen     Tablet .. 650 milliGRAM(s) Oral every 6 hours PRN  carbamide peroxide Otic Solution 4 Drop(s) Right Ear two times a day PRN  chlorhexidine 2% Cloths 1 Application(s) Topical <User Schedule>  epoetin loulou-epbx (RETACRIT) Injectable 52576 Unit(s) SubCutaneous every 7 days  ferrous    sulfate 325 milliGRAM(s) Oral daily  furosemide   Injectable 60 milliGRAM(s) IV Push two times a day  lidocaine   4% Patch 1 Patch Transdermal daily  losartan 25 milliGRAM(s) Oral daily  melatonin 3 milliGRAM(s) Oral at bedtime  metoprolol succinate ER 50 milliGRAM(s) Oral daily  mupirocin 2% Ointment 1 Application(s) Topical <User Schedule>  oxycodone    5 mG/acetaminophen 325 mG 1 Tablet(s) Oral every 6 hours PRN  polyethylene glycol 3350 17 Gram(s) Oral daily PRN  sodium chloride 0.65% Nasal 1 Spray(s) Both Nostrils every 4 hours PRN  spironolactone 25 milliGRAM(s) Oral daily  tamsulosin 0.4 milliGRAM(s) Oral at bedtime    A/P:    CM, EF 35-40%, severe AS  Tx from [facility] to [facility] 2/5 for TAVR eval, s/p LHC 2/5  Cardio-renal/hemodynamic LINDA/CKD 3 has improved (peak Cr 2.5 - 2/3/24)   UA negative   Imaging w/o hydro   S/p CT w/IV dye 2/8/24  Stable renal fx on Lasix, ARB, Aldactone  Avoid nephrotoxins   Plan for liver biopsy noted  F/u CBC, BMP  Avoid hypotension     [phone]

## 2024-02-19 NOTE — PROGRESS NOTE ADULT - ASSESSMENT
63M PMH HFrEF, HTN, GERD, dyslipidemia, chronic intermittent leg pain, aortic stenosis, chronic venous stasis dermatitis with bilateral leg swelling, admitted to [facility] with b/l leg pain and swelling, transferred to [facility] for TAVR evaluation for severe AS, s/p LHC nonobstructive, with acute decompensated HFrEF exacerbation. Remains on IV diuresis.

## 2024-02-20 DIAGNOSIS — I35.0 NONRHEUMATIC AORTIC (VALVE) STENOSIS: ICD-10-CM

## 2024-02-20 LAB
ANION GAP SERPL CALC-SCNC: 11 MMOL/L — SIGNIFICANT CHANGE UP (ref 5–17)
APTT BLD: 28.8 SEC — SIGNIFICANT CHANGE UP (ref 24.5–35.6)
BLD GP AB SCN SERPL QL: NEGATIVE — SIGNIFICANT CHANGE UP
BUN SERPL-MCNC: 20 MG/DL — SIGNIFICANT CHANGE UP (ref 7–23)
CALCIUM SERPL-MCNC: 8.5 MG/DL — SIGNIFICANT CHANGE UP (ref 8.4–10.5)
CHLORIDE SERPL-SCNC: 99 MMOL/L — SIGNIFICANT CHANGE UP (ref 96–108)
CO2 SERPL-SCNC: 28 MMOL/L — SIGNIFICANT CHANGE UP (ref 22–31)
CREAT SERPL-MCNC: 1.29 MG/DL — SIGNIFICANT CHANGE UP (ref 0.5–1.3)
EGFR: 62 ML/MIN/1.73M2 — SIGNIFICANT CHANGE UP
GLUCOSE SERPL-MCNC: 81 MG/DL — SIGNIFICANT CHANGE UP (ref 70–99)
HCT VFR BLD CALC: 29.8 % — LOW (ref 39–50)
HCT VFR BLD CALC: 30.8 % — LOW (ref 39–50)
HGB BLD-MCNC: 9 G/DL — LOW (ref 13–17)
HGB BLD-MCNC: 9.3 G/DL — LOW (ref 13–17)
INR BLD: 1.06 RATIO — SIGNIFICANT CHANGE UP (ref 0.85–1.18)
MCHC RBC-ENTMCNC: 19.7 PG — LOW (ref 27–34)
MCHC RBC-ENTMCNC: 21.2 PG — LOW (ref 27–34)
MCHC RBC-ENTMCNC: 29.2 GM/DL — LOW (ref 32–36)
MCHC RBC-ENTMCNC: 31.2 GM/DL — LOW (ref 32–36)
MCV RBC AUTO: 67.5 FL — LOW (ref 80–100)
MCV RBC AUTO: 67.9 FL — LOW (ref 80–100)
NRBC # BLD: 0 /100 WBCS — SIGNIFICANT CHANGE UP (ref 0–0)
NRBC # BLD: 0 /100 WBCS — SIGNIFICANT CHANGE UP (ref 0–0)
PLATELET # BLD AUTO: 223 K/UL — SIGNIFICANT CHANGE UP (ref 150–400)
PLATELET # BLD AUTO: 252 K/UL — SIGNIFICANT CHANGE UP (ref 150–400)
POTASSIUM SERPL-MCNC: 3.9 MMOL/L — SIGNIFICANT CHANGE UP (ref 3.5–5.3)
POTASSIUM SERPL-SCNC: 3.9 MMOL/L — SIGNIFICANT CHANGE UP (ref 3.5–5.3)
PROTHROM AB SERPL-ACNC: 11.1 SEC — SIGNIFICANT CHANGE UP (ref 9.5–13)
RBC # BLD: 4.39 M/UL — SIGNIFICANT CHANGE UP (ref 4.2–5.8)
RBC # BLD: 4.56 M/UL — SIGNIFICANT CHANGE UP (ref 4.2–5.8)
RBC # FLD: 28.8 % — HIGH (ref 10.3–14.5)
RBC # FLD: 29.6 % — HIGH (ref 10.3–14.5)
RH IG SCN BLD-IMP: POSITIVE — SIGNIFICANT CHANGE UP
SODIUM SERPL-SCNC: 138 MMOL/L — SIGNIFICANT CHANGE UP (ref 135–145)
WBC # BLD: 10.36 K/UL — SIGNIFICANT CHANGE UP (ref 3.8–10.5)
WBC # BLD: 10.38 K/UL — SIGNIFICANT CHANGE UP (ref 3.8–10.5)
WBC # FLD AUTO: 10.36 K/UL — SIGNIFICANT CHANGE UP (ref 3.8–10.5)
WBC # FLD AUTO: 10.38 K/UL — SIGNIFICANT CHANGE UP (ref 3.8–10.5)

## 2024-02-20 PROCEDURE — 99233 SBSQ HOSP IP/OBS HIGH 50: CPT

## 2024-02-20 PROCEDURE — 88307 TISSUE EXAM BY PATHOLOGIST: CPT | Mod: 26

## 2024-02-20 PROCEDURE — 76937 US GUIDE VASCULAR ACCESS: CPT | Mod: 26

## 2024-02-20 PROCEDURE — 36011 PLACE CATHETER IN VEIN: CPT

## 2024-02-20 PROCEDURE — 99254 IP/OBS CNSLTJ NEW/EST MOD 60: CPT | Mod: 57

## 2024-02-20 PROCEDURE — 75889 VEIN X-RAY LIVER W/HEMODYNAM: CPT | Mod: 26,59

## 2024-02-20 PROCEDURE — 75970 VASCULAR BIOPSY: CPT | Mod: 26

## 2024-02-20 PROCEDURE — 99222 1ST HOSP IP/OBS MODERATE 55: CPT

## 2024-02-20 PROCEDURE — 37200 TRANSCATHETER BIOPSY: CPT

## 2024-02-20 PROCEDURE — 88313 SPECIAL STAINS GROUP 2: CPT | Mod: 26

## 2024-02-20 RX ORDER — LIDOCAINE 4 G/100G
1 CREAM TOPICAL ONCE
Refills: 0 | Status: DISCONTINUED | OUTPATIENT
Start: 2024-02-20 | End: 2024-02-20

## 2024-02-20 RX ORDER — ACETAMINOPHEN 500 MG
1000 TABLET ORAL ONCE
Refills: 0 | Status: COMPLETED | OUTPATIENT
Start: 2024-02-20 | End: 2024-02-20

## 2024-02-20 RX ORDER — SODIUM CHLORIDE 9 MG/ML
500 INJECTION INTRAMUSCULAR; INTRAVENOUS; SUBCUTANEOUS ONCE
Refills: 0 | Status: COMPLETED | OUTPATIENT
Start: 2024-02-20 | End: 2024-02-20

## 2024-02-20 RX ADMIN — LOSARTAN POTASSIUM 25 MILLIGRAM(S): 100 TABLET, FILM COATED ORAL at 06:06

## 2024-02-20 RX ADMIN — Medication 1000 MILLIGRAM(S): at 22:49

## 2024-02-20 RX ADMIN — Medication 400 MILLIGRAM(S): at 21:47

## 2024-02-20 RX ADMIN — TAMSULOSIN HYDROCHLORIDE 0.4 MILLIGRAM(S): 0.4 CAPSULE ORAL at 23:19

## 2024-02-20 RX ADMIN — SODIUM CHLORIDE 9999 MILLILITER(S): 9 INJECTION INTRAMUSCULAR; INTRAVENOUS; SUBCUTANEOUS at 17:50

## 2024-02-20 RX ADMIN — Medication 325 MILLIGRAM(S): at 11:45

## 2024-02-20 RX ADMIN — CHLORHEXIDINE GLUCONATE 1 APPLICATION(S): 213 SOLUTION TOPICAL at 06:05

## 2024-02-20 RX ADMIN — SODIUM CHLORIDE 500 MILLILITER(S): 9 INJECTION INTRAMUSCULAR; INTRAVENOUS; SUBCUTANEOUS at 17:02

## 2024-02-20 RX ADMIN — MUPIROCIN 1 APPLICATION(S): 20 OINTMENT TOPICAL at 09:26

## 2024-02-20 RX ADMIN — Medication 3 MILLIGRAM(S): at 23:19

## 2024-02-20 RX ADMIN — SPIRONOLACTONE 25 MILLIGRAM(S): 25 TABLET, FILM COATED ORAL at 06:05

## 2024-02-20 RX ADMIN — Medication 60 MILLIGRAM(S): at 06:08

## 2024-02-20 RX ADMIN — Medication 50 MILLIGRAM(S): at 06:06

## 2024-02-20 NOTE — CHART NOTE - NSCHARTNOTEFT_GEN_A_CORE
Notified by RN, patient hypotensive w/ systolic BP in 70s. Patient  s/p liver biopsy today with IR. Received 500cc bolus x2 in IR suite for hypotension. Patient seen and examined at bedside eating dinner, AxO4. Patient only complaint is regarding R shoulder pain, around the area of the biopsy. Patient also states he was down in the IR suite all day, did not have anything to eat or drink all day.  Patient denies chest pain, dizziness, lightheadedness, chest pain, abdominal pain, N/V/D.     Vital Signs Last 24 Hrs  T(C): 36.4 (20 Feb 2024 20:09), Max: 36.7 (20 Feb 2024 04:00)  T(F): 97.5 (20 Feb 2024 20:09), Max: 98 (20 Feb 2024 04:00)  HR: 68 (20 Feb 2024 20:09) (60 - 76)  BP: 95/65 (20 Feb 2024 18:10) (80/58 - 117/72)  BP(mean): 76 (20 Feb 2024 18:10) (63 - 92)  RR: 18 (20 Feb 2024 20:09) (10 - 18)  SpO2: 94% (20 Feb 2024 20:09) (93% - 100%)    Parameters below as of 20 Feb 2024 20:09  Patient On (Oxygen Delivery Method): room air                          9.0    10.36 )-----------( 252      ( 20 Feb 2024 20:28 )             30.8       #Hypotension  - STAT CBC ordered, stable  - Advised patient to eat/drink fluids and will repeat BP  - Ordered lidocaine patch and IV tylenol for pain  - Discussed with patient & RN at bedside will hold off on oxy/acetaminophen that is currently ordered for pian i/s/o hypotension  - Would consider additional fluid bolus if hypotension does not improve s/p PO fluids   - Would consider repeat CBC/imaging if patient becomes further hemodynamic instability/change in mental status to r/o bleed  - Will continue to monitor vital signs   - Will endorse to day team     Kim Francis PA-C  Internal Medicine   [id number] Notified by RN, patient hypotensive w/ systolic BP in 70s. Patient  s/p liver biopsy today with IR. Received 500cc bolus x2 in IR suite for hypotension. Patient seen and examined at bedside eating dinner, AxO4. Patient only complaint is regarding R shoulder pain, around the area of the biopsy. Patient also states he was down in the IR suite all day, did not have anything to eat or drink all day.  Patient denies chest pain, dizziness, lightheadedness, chest pain, abdominal pain, N/V/D.     Vital Signs Last 24 Hrs  T(C): 36.4 (20 Feb 2024 20:09), Max: 36.7 (20 Feb 2024 04:00)  T(F): 97.5 (20 Feb 2024 20:09), Max: 98 (20 Feb 2024 04:00)  HR: 68 (20 Feb 2024 20:09) (60 - 76)  BP: 95/65 (20 Feb 2024 18:10) (80/58 - 117/72)  BP(mean): 76 (20 Feb 2024 18:10) (63 - 92)  RR: 18 (20 Feb 2024 20:09) (10 - 18)  SpO2: 94% (20 Feb 2024 20:09) (93% - 100%)    Parameters below as of 20 Feb 2024 20:09  Patient On (Oxygen Delivery Method): room air                          9.0    10.36 )-----------( 252      ( 20 Feb 2024 20:28 )             30.8       #Hypotension  - STAT CBC ordered, stable  - Advised patient to eat/drink fluids and will repeat BP  - Ordered lidocaine patch and IV tylenol for pain  - Discussed with patient & RN at bedside will hold off on oxy/acetaminophen & IV lasix i/s/o hypotension  - Would consider additional fluid bolus if hypotension does not improve s/p PO fluids   - Would consider repeat CBC/imaging if patient becomes further hemodynamic instability/change in mental status to r/o bleed  - Will continue to monitor vital signs   - Will endorse to day team     Kim Francis PA-C  Internal Medicine   [id number]

## 2024-02-20 NOTE — CONSULT NOTE ADULT - SUBJECTIVE AND OBJECTIVE BOX
History of Present Illness:    63 year old male with Hx of HTN, GERD, dyslipidemia, chronic intermittent leg pain, aortic stenosis, chronic venous stasis dermatitis with bilateral leg swelling, poor historian presented to [facility] on 2024 for bilateral leg pain and swelling. He was admitted to medicine and was found to have new onset acute HFrEF (EF 35-40% 2024) in setting of severe AS (AoV Max Salbador: 3.94 m/s AoV Peak P.1 mmHg AoV Mean P.0 mmHg, MARY .76. DI .17).  Patient was tranferred to [facility] on 24 for ischemic workup with coronary angiogram and full structural heart team evaluation for TAVR.  Structural TAVR CT completed and revealed High Calcium score >5,000 along with fusion of N-R cusps. Per Structural Team based on anotomy patient was deemed better suited for cardiac surgery due to dilated ascending aorta and bicuspid AV.  CTS Consult called to evaluate patient for cardiac surgery with Dr. Martinez.     Of Note: On  Abominald elastography revealed moderate risk fibrosis w/ hepatomegaly, hepatic steatosis, dilated IVC w/ passive congestion. Difficult to interpret the results, especially in the setting of systolic heart failure and volume overload. Plan for Liver Biopsy today .     Past Medical History  Dyslipidemia    Hypertension    Chronic GERD    History of aortic stenosis    Cellulitis    Past Surgical History  No Past Medical History    Brain cancer    No significant past surgical history    MEDICATIONS (STANDING):  chlorhexidine 2% Cloths 1 Application(s) Topical <User Schedule>  epoetin loulou-epbx (RETACRIT) Injectable 66151 Unit(s) SubCutaneous every 7 days  ferrous sulfate 325 milliGRAM(s) Oral daily  furosemide Injectable 60 milliGRAM(s) IV Push two times a day  lidocaine 4% Patch 1 Patch Transdermal daily  losartan 25 milliGRAM(s) Oral daily  melatonin 3 milliGRAM(s) Oral at bedtime  metoprolol succinate ER 50 milliGRAM(s) Oral daily  mupirocin 2% Ointment 1 Application(s) Topical <User Schedule>  spironolactone 25 milliGRAM(s) Oral daily  tamsulosin 0.4 milliGRAM(s) Oral at bedtime    MEDICATIONS  (PRN):  acetaminophen Tablet .. 650 milliGRAM(s) Oral every 6 hours PRN Mild Pain (1 - 3)  carbamide peroxide Otic Solution 4 Drop(s) Right Ear two times a day PRN clogged ear  oxycodone 5 mG/acetaminophen 325 mG 1 Tablet(s) Oral every 6 hours PRN Severe Pain (7 - 10)  polyethylene glycol 3350 17 Gram(s) Oral daily PRN Constipation  sodium chloride 0.65% Nasal 1 Spray(s) Both Nostrils every 4 hours PRN Nasal Congestion    Antiplatelet therapy: N/A                       Last dose/amt:    Allergies: garlic (Angioedema; Swelling; Anaphylaxis)  No Known Drug Allergies    SOCIAL HISTORY:  Smoker: [ ] Yes  [X ] No        PACK YEARS:                         WHEN QUIT? 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[employer])   Live with:  to   Assist device use: Ambulated with cane     Relevant Family History  FAMILY HISTORY:  FH: congestive heart failure (Father, Sibling)    FH: coronary artery disease (Sibling)    FH: brain cancer (Mother)    Review of Systems  GENERAL: Fevers[] chills[] sweats[] fatigue[] weight loss[] weight gain []                                        NEURO: parathesias [] seizures []  syncope []  confusion []                                                                                  EYES: glasses[]  blurry vision[]  discharge[] pain[] glaucoma []                                                                            ENMT: difficulty hearing []  vertigo[]  dysphagia[] epistaxis[] recent dental work []                                      CV:  chest pain[] palpitations[] VANCE [] diaphoresis [] edema[]                                                                                             RESPIRATORY:  wheezing[] SOB[] cough [] sputum[] hemoptysis[]                                                                    GI:  nausea[]  vomiting []  diarrhea[] constipation [] melena []                                                                        : hematuria[ ]  dysuria[ ] urgency[] incontinence[]                                                                                              MUSCULOSKELETAL  arthritis[ ]  joint swelling [ ] muscle weakness [ ]                                                                  SKIN/BREAST:  rash[ ] itching [ ]  hair loss[ ] masses[ ]                                                                                                PSYCH:  dementia [ ] depression [ ] anxiety[ ]                                                                                                                  HEME/LYMPH:  bruises easily[ ] enlarged lymph nodes[ ] tender lymph nodes[ ]                                                 ENDOCRINE:  cold intolerance[ ] heat intolerance[ ] polydipsia[ ]                                                                              PHYSICAL EXAM  Vital Signs Last 24 Hrs  T(C): 35.9 (2024 14:25), Max: 36.7 (2024 04:00)  T(F): 96.6 (2024 14:25), Max: 98 (2024 04:00)  HR: 76 (2024 15:25) (60 - 76)  BP: 100/60 (2024 15:25) (94/58 - 117/72)  BP(mean): 72 (2024 15:25) (71 - 92)  RR: 16 (2024 15:) (10 - 18)  SpO2: 99% (2024 15:25) (93% - 100%)    Parameters below as of 2024 14:25  Patient On (Oxygen Delivery Method): nasal cannula  O2 Flow (L/min): 2    General: Well nourished, well developed, no acute distress.                                                         Neuro: Normal exam oriented to person/place & time with no focal motor or sensory  deficits.                    Eyes: Normal exam of conjunctiva & lids, pupils equally reactive.   ENT: Normal exam of nasal/oral mucosa with absence of cyanosis.   Neck: Normal exam of jugular veins, trachea & thyroid.   Chest: Normal lung exam with good air movement absence of wheezes, rales, or rhonchi:                                                                          CV:  Auscultation: normal [ ] S3[ ] S4[ ] Irregular [ ] Rub[ ] Clicks[ ]  Murmurs none:[ ]systolic [ ]  diastolic [ ] holosystolic [ ]  Carotids: No Bruits[ ] Other____________ Abdominal Aorta: normal [ ] nonpalpable[ ]                                                                         GI: Normal exam of abdomen, liver & spleen with no noted masses or tenderness.  (+) BS X 4 Quadrants, Nontender / Non Distended.                                                                                             Extremities: Normal no evidence of cyanosis or deformity Edema: none[ ]trace[ ]1+[ ]2+[ ]3+[ ]4+[ ]  Lower Extremity Pulses: Right[ ] Left[ ]Varicosities[ ]  SKIN : Normal exam to inspection & palpation.                                                           LABS:                        9.3    10.38 )-----------( 223      ( 2024 06:55 )             29.8     02-20    138  |  99  |  20  ----------------------------<  81  3.9   |  28  |  1.29    Ca    8.5      2024 06:55  Mg     2.2     02-19    PT/INR - ( 2024 06:55 )   PT: 11.1 sec;   INR: 1.06 ratio      PTT - ( 2024 06:55 )  PTT:28.8 sec  Urinalysis Basic - ( 2024 06:55 )    Color: x / Appearance: x / SG: x / pH: x  Gluc: 81 mg/dL / Ketone: x  / Bili: x / Urobili: x   Blood: x / Protein: x / Nitrite: x   Leuk Esterase: x / RBC: x / WBC x   Sq Epi: x / Non Sq Epi: x / Bacteria: x    Cardiac Cath: < from: Cardiac Catheterization (24 @ 17:39) Coronary Angiography The coronary circulation is right dominant.      LM   Left main artery: Angiography shows no disease.      LAD   Left anterior descending artery: Angiography shows minor  irregularities.    CX   Circumflex: Angiography shows no disease.      RCA   Right coronary artery: Angiography shows no disease.      TTE / LESLEY: < from: TTE Echo Complete w/o Contrast w/ Doppler (24 @ 08:26) 1. Left ventricular ejection fraction, by visual estimation, is 35 to 40%.   2. Moderately decreased global left ventricular systolic function.   3. Basal inferior segment, mid inferolateral segment, mid anterolateral   segment, and mid inferior segment are abnormal as described above.   4. Severely enlarged left atrium.   5. Increased LV wall thickness.   6. Mildly increased left ventricular internal cavity size.   7. Spectral Doppler shows restrictivepattern of left ventricular   myocardial filling (Grade III diastolic dysfunction).   8. There is mild concentric left ventricular hypertrophy.   9. Moderately enlarged right ventricle.  10. Right atrial enlargement.  11. Trivial pericardial effusion.  12. Moderate mitral valve regurgitation.  13. Thickening of the anterior and posterior mitral valve leaflets.  14. Mild-moderate tricuspid regurgitation.  15. Mild aortic regurgitation.  16. Severe aortic valve stenosis.  17. Dilatation of the ascending aorta.  18. Estimated pulmonary artery systolic pressure is 58.6 mmHg assuming a   right atrial pressure of 15 mmHg, which is consistent with moderate   pulmonary hypertension.  19. LA volume Index is 56.0 ml/m² ml/m2.  20. The Dimesionless Index value is .17.                 History of Present Illness:    63 year old male with Hx of HTN, GERD, dyslipidemia, chronic intermittent leg pain, aortic stenosis, chronic venous stasis dermatitis with bilateral leg swelling, poor historian presented to [facility] on 2024 for bilateral leg pain and swelling. He was admitted to medicine and was found to have new onset acute HFrEF (EF 35-40% 2024) in setting of severe AS (AoV Max Salbador: 3.94 m/s AoV Peak P.1 mmHg AoV Mean P.0 mmHg, MARY .76. DI .17).  Patient was transferred to [facility] on 24 for ischemic workup with coronary angiogram and full structural heart team evaluation for TAVR. Structural TAVR CT completed and revealed High Calcium score >5,000 along with fusion of N-R cusps. Per Structural Team based on anatomy patient was deemed better suited for cardiac surgery due to dilated ascending aorta and bicuspid AV.  CTS Consult called to evaluate patient for cardiac surgery with Dr. Martinez.     Of Note: On  Abdominal elastography revealed moderate risk fibrosis w/ hepatomegaly, hepatic steatosis, dilated IVC w/ passive congestion. Difficult to interpret the results, especially in the setting of systolic heart failure and volume overload. Plan for Liver Biopsy today .     Past Medical History  Dyslipidemia    Hypertension    Chronic GERD    History of aortic stenosis    Cellulitis    Past Surgical History  No Past Medical History    Brain cancer    No significant past surgical history    MEDICATIONS (STANDING):  chlorhexidine 2% Cloths 1 Application(s) Topical <User Schedule>  epoetin loulou-epbx (RETACRIT) Injectable 42301 Unit(s) SubCutaneous every 7 days  ferrous sulfate 325 milliGRAM(s) Oral daily  furosemide Injectable 60 milliGRAM(s) IV Push two times a day  lidocaine 4% Patch 1 Patch Transdermal daily  losartan 25 milliGRAM(s) Oral daily  melatonin 3 milliGRAM(s) Oral at bedtime  metoprolol succinate ER 50 milliGRAM(s) Oral daily  mupirocin 2% Ointment 1 Application(s) Topical <User Schedule>  spironolactone 25 milliGRAM(s) Oral daily  tamsulosin 0.4 milliGRAM(s) Oral at bedtime    MEDICATIONS  (PRN):  acetaminophen Tablet .. 650 milliGRAM(s) Oral every 6 hours PRN Mild Pain (1 - 3)  carbamide peroxide Otic Solution 4 Drop(s) Right Ear two times a day PRN clogged ear  oxycodone 5 mG/acetaminophen 325 mG 1 Tablet(s) Oral every 6 hours PRN Severe Pain (7 - 10)  polyethylene glycol 3350 17 Gram(s) Oral daily PRN Constipation  sodium chloride 0.65% Nasal 1 Spray(s) Both Nostrils every 4 hours PRN Nasal Congestion    Antiplatelet therapy: N/A                       Last dose/amt:    Allergies: garlic (Angioedema; Swelling; Anaphylaxis)  No Known Drug Allergies    SOCIAL HISTORY:  Smoker: [ ] Yes  [X ] No        PACK YEARS:                         WHEN QUIT? 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[employer])   Live with:  to   Assist device use: Ambulated with cane     Relevant Family History  FAMILY HISTORY:  FH: congestive heart failure (Father, Sibling)    FH: coronary artery disease (Sibling)    FH: brain cancer (Mother)    Review of Systems  GENERAL: Fevers[] chills[] sweats[] fatigue[] weight loss[] weight gain []                                        NEURO: parathesias [] seizures []  syncope []  confusion []                                                                                  EYES: glasses[]  blurry vision[]  discharge[] pain[] glaucoma []                                                                            ENMT: difficulty hearing []  vertigo[]  dysphagia[] epistaxis[] recent dental work []                                      CV:  chest pain[] palpitations[] VANCE [X] diaphoresis [] edema[X]                                                                                             RESPIRATORY:  wheezing[] SOB[] cough [] sputum[] hemoptysis[]                                                                    GI:  nausea[]  vomiting []  diarrhea[] constipation [] melena []                                                                        : hematuria[ ]  dysuria[ ] urgency[] incontinence[]                                                                                              MUSCULOSKELETAL  arthritis[ ]  joint swelling [ ] muscle weakness [ ]                                                                 SKIN/BREAST:  rash[ ] itching [ ]  hair loss[ ] masses[ ]  B/L LE leg wounds                                                                                             PSYCH:  dementia [ ] depression [ ] anxiety[ ]                                                                                                                  HEME/LYMPH:  bruises easily[ ] enlarged lymph nodes[ ] tender lymph nodes[ ]                                                 ENDOCRINE:  cold intolerance[ ] heat intolerance[ ] polydipsia[ ]                                                                              PHYSICAL EXAM  Vital Signs Last 24 Hrs  T(C): 35.9 (2024 14:25), Max: 36.7 (2024 04:00)  T(F): 96.6 (2024 14:25), Max: 98 (2024 04:00)  HR: 76 (2024 15:25) (60 - 76)  BP: 100/60 (2024 15:25) (94/58 - 117/72)  BP(mean): 72 (2024 15:25) (71 - 92)  RR: 16 (2024 15:25) (10 - 18)  SpO2: 99% (2024 15:25) (93% - 100%)    Parameters below as of 2024 14:25  Patient On (Oxygen Delivery Method): nasal cannula  O2 Flow (L/min): 2    General: Well nourished, well developed, no acute distress.                                                         Neuro: Normal exam oriented to person/place & time with no focal motor or sensory  deficits.                    Eyes: Normal exam of conjunctiva & lids, pupils equally reactive.   ENT: Normal exam of nasal/oral mucosa with absence of cyanosis.   Neck: Normal exam of jugular veins, trachea & thyroid.   Chest: B/L Expiratory wheeze                                                                        CV: Auscultation: normal [X ] S3[ ] S4[ ] Irregular [ ] Rub[ ] Clicks[ ]  Murmurs none:[ ]systolic [X ]  diastolic [ ] holosystolic [ ]  Carotids: No Bruits [X] Other____________ Abdominal Aorta: normal [X ] nonpalpable[X ]                                                                         GI: Normal exam of abdomen, liver & spleen with no noted masses or tenderness.  (+) BS X 4 Quadrants, Nontender / Non Distended.                                                                                             Extremities: Normal no evidence of cyanosis or deformity Edema: none[ ]trace[ ]1+[X ]2+[ ]3+[ ]4+[ ]  Lower Extremity Pulses: Right [+1 ] Left [+1 ]Varicosities [- ]  SKIN: Normal exam to inspection & palpation. B/L anterior and posterior partial thickness wound --> YAQUELIN                                                              LABS:                        9.3    10.38 )-----------( 223      ( 2024 06:55 )             29.8     02-20    138  |  99  |  20  ----------------------------<  81  3.9   |  28  |  1.29    Ca    8.5      2024 06:55  Mg     2.2         PT/INR - ( 2024 06:55 )   PT: 11.1 sec;   INR: 1.06 ratio      PTT - ( 2024 06:55 )  PTT:28.8 sec  Urinalysis Basic - ( 2024 06:55 )    Color: x / Appearance: x / SG: x / pH: x  Gluc: 81 mg/dL / Ketone: x  / Bili: x / Urobili: x   Blood: x / Protein: x / Nitrite: x   Leuk Esterase: x / RBC: x / WBC x   Sq Epi: x / Non Sq Epi: x / Bacteria: x    Cardiac Cath: < from: Cardiac Catheterization (24 @ 17:39) Coronary Angiography The coronary circulation is right dominant.      LM   Left main artery: Angiography shows no disease.      LAD   Left anterior descending artery: Angiography shows minor  irregularities.    CX   Circumflex: Angiography shows no disease.      RCA   Right coronary artery: Angiography shows no disease.      TTE / LESLEY: < from: TTE Echo Complete w/o Contrast w/ Doppler (24 @ 08:26) 1. Left ventricular ejection fraction, by visual estimation, is 35 to 40%.   2. Moderately decreased global left ventricular systolic function.   3. Basal inferior segment, mid inferolateral segment, mid anterolateral   segment, and mid inferior segment are abnormal as described above.   4. Severely enlarged left atrium.   5. Increased LV wall thickness.   6. Mildly increased left ventricular internal cavity size.   7. Spectral Doppler shows restrictive pattern of left ventricular   myocardial filling (Grade III diastolic dysfunction).   8. There is mild concentric left ventricular hypertrophy.   9. Moderately enlarged right ventricle.  10. Right atrial enlargement.  11. Trivial pericardial effusion.  12. Moderate mitral valve regurgitation.  13. Thickening of the anterior and posterior mitral valve leaflets.  14. Mild-moderate tricuspid regurgitation.  15. Mild aortic regurgitation.  16. Severe aortic valve stenosis.  17. Dilatation of the ascending aorta.  18. Estimated pulmonary artery systolic pressure is 58.6 mmHg assuming a   right atrial pressure of 15 mmHg, which is consistent with moderate   pulmonary hypertension.  19. LA volume Index is 56.0 ml/m² ml/m2.  20. The Dimension less Index value is .17.

## 2024-02-20 NOTE — PROGRESS NOTE ADULT - SUBJECTIVE AND OBJECTIVE BOX
ADVANCED HEART FAILURE & TRANSPLANT  - PROGRESS NOTE  *To reach the NS2 Team from 8am to 5pm (MON-FRI), please call [phone].   _______________________________________________________________________________________________________    Subjective:    Medications:  acetaminophen     Tablet .. 650 milliGRAM(s) Oral every 6 hours PRN  carbamide peroxide Otic Solution 4 Drop(s) Right Ear two times a day PRN  chlorhexidine 2% Cloths 1 Application(s) Topical <User Schedule>  epoetin loulou-epbx (RETACRIT) Injectable 60090 Unit(s) SubCutaneous every 7 days  ferrous    sulfate 325 milliGRAM(s) Oral daily  furosemide   Injectable 60 milliGRAM(s) IV Push two times a day  lidocaine   4% Patch 1 Patch Transdermal daily  losartan 25 milliGRAM(s) Oral daily  melatonin 3 milliGRAM(s) Oral at bedtime  metoprolol succinate ER 50 milliGRAM(s) Oral daily  mupirocin 2% Ointment 1 Application(s) Topical <User Schedule>  oxycodone    5 mG/acetaminophen 325 mG 1 Tablet(s) Oral every 6 hours PRN  polyethylene glycol 3350 17 Gram(s) Oral daily PRN  sodium chloride 0.65% Nasal 1 Spray(s) Both Nostrils every 4 hours PRN  spironolactone 25 milliGRAM(s) Oral daily  tamsulosin 0.4 milliGRAM(s) Oral at bedtime      Physical Exam:    Vitals:  Vital Signs Last 24 Hours  T(C): 35.9 (24 @ 14:25), Max: 36.7 (24 @ 04:00)  HR: 68 (24 @ 15:00) (60 - 75)  BP: 102/85 (24 @ 15:00) (94/58 - 117/72)  RR: 14 (24 @ 15:00) (10 - 18)  SpO2: 100% (24 @ 15:00) (93% - 100%)    Weight in k.3 ( @ 07:56)    I&O's Summary    2024 07:01  -  2024 07:00  --------------------------------------------------------  IN: 760 mL / OUT: 250 mL / NET: 510 mL        Tele:    General: No distress. Comfortable.  HEENT: EOM intact.  Neck: Neck supple. JVP not elevated. No masses  Chest: Clear to auscultation bilaterally  CV: Normal S1 and S2. No murmurs, rub, or gallops. Radial pulses normal.  Abdomen: Soft, non-distended, non-tender  Skin: No rashes or skin breakdown  Extremities: No LE edema  Neurology: Alert and oriented times three. Sensation intact  Psych: Affect normal    Labs:                        9.3    10.38 )-----------( 223      ( 2024 06:55 )             29.8         138  |  99  |  20  ----------------------------<  81  3.9   |  28  |  1.29    Ca    8.5      2024 06:55  Mg     2.2           PT/INR - ( 2024 06:55 )   PT: 11.1 sec;   INR: 1.06 ratio         PTT - ( 2024 06:55 )  PTT:28.8 sec

## 2024-02-20 NOTE — CONSULT NOTE ADULT - ASSESSMENT
63 year old male with Hx of HTN, GERD, dyslipidemia, chronic intermittent leg pain, aortic stenosis, chronic venous stasis dermatitis with bilateral leg swelling, poor historian presented to [facility] on 2024 for bilateral leg pain and swelling. He was admitted to medicine and was found to have new onset acute HFrEF (EF 35-40% 2024) in setting of severe AS (AoV Max Salbador: 3.94 m/s AoV Peak P.1 mmHg AoV Mean P.0 mmHg, MARY .76. DI .17).  Patient was tranferred to [facility] on 24 for ischemic workup with coronary angiogram and full structural heart team evaluation for TAVR.  Structural TAVR CT completed and revealed High Calcium score >5,000 along with fusion of N-R cusps. Per Structural Team based on anotomy patient was deemed better suited for cardiac surgery due to dilated ascending aorta and bicuspid AV.  CTS Consult called to evaluate patient for cardiac surgery with Dr. Martinez.     Of Note: On  Abominald elastography revealed moderate risk fibrosis w/ hepatomegaly, hepatic steatosis, dilated IVC w/ passive congestion. Difficult to interpret the results, especially in the setting of systolic heart failure and volume overload. Plan for Liver Biopsy today .      63 year old male with Hx of HTN, GERD, dyslipidemia, chronic intermittent leg pain, aortic stenosis, chronic venous stasis dermatitis with bilateral leg swelling, poor historian presented to [facility] on 2024 for bilateral leg pain and swelling. He was admitted to medicine and was found to have new onset acute HFrEF (EF 35-40% 2024) in setting of severe AS (AoV Max Salbador: 3.94 m/s AoV Peak P.1 mmHg AoV Mean P.0 mmHg, MARY .76. DI .17).  Patient was transferred to [facility] on 24 for ischemic workup with coronary angiogram and full structural heart team evaluation for TAVR. Structural TAVR CT completed and revealed High Calcium score >5,000 along with fusion of N-R cusps. Per Structural Team based on anatomy patient was deemed better suited for cardiac surgery due to dilated ascending aorta and bicuspid AV.  CTS Consult called to evaluate patient for cardiac surgery with Dr. Martinez.     Of Note: On  Abdominal elastography revealed moderate risk fibrosis w/ hepatomegaly, hepatic steatosis, dilated IVC w/ passive congestion. Difficult to interpret the results, especially in the setting of systolic heart failure and volume overload. Plan for Liver Biopsy today .     Plan:   Pre op Cardiac Surgery initiated; awaiting Liver Biopsy results for cardiac surgery risk stratification.

## 2024-02-20 NOTE — CONSULT NOTE ADULT - PROBLEM SELECTOR RECOMMENDATION 9
Pre op Cardiac surgery work up initiated   ASA on hold for Liver Bx today   Carotid Duplex Study   MRSA / MSSA  Nasal swab   Type and Screen x 2   Awaiting Liver Bx results   Plan for Cardiac Surgery OR date Tentative pending biopsy results Pre op Cardiac surgery work up initiated   ASA on hold for Liver Bx today   Carotid Duplex Study   PFT's   MRSA / MSSA  Nasal swab   Type and Screen x 2   Awaiting Liver Bx results   Plan for Cardiac Surgery OR date Tentative pending biopsy results

## 2024-02-20 NOTE — PROGRESS NOTE ADULT - PROBLEM SELECTOR PLAN 1
HFrEF (EF 35-40% 1/2024) with severe AS  Diuretics: IV Lasix 60mg bid  MRA: Aldactone 25mg/d  ARB/ARNI: losartan 25 po daily - would transition to entresto pending HF  BB: Cont Toprol 50mg/d  SGLT2: start farxiga prior to dc planning once off iv diuretics  LHC revealed non-obstructive CAD  HF and structural heart following, surgery eventually after liver biopsy this week, tentitivly 2/20  Case previously discussed with Structural - if pt can be discharged earlier then will do as outpt HFrEF (EF 35-40% 1/2024) with severe AS  Diuretics: IV Lasix 60mg bid  MRA: Aldactone 25mg/d  ARB/ARNI: losartan 25 po daily - would transition to entresto pending HF  BB: Cont Toprol 50mg/d  SGLT2: start farxiga prior to dc planning once off iv diuretics  LHC revealed non-obstructive CAD  HF and structural heart following, surgery eventually after liver biopsy today 2/20, if results can be obtained quickly then surgery can be performed on Friday 2/23  Case discussed with Structural and with CTS this morning

## 2024-02-20 NOTE — PROGRESS NOTE ADULT - SUBJECTIVE AND OBJECTIVE BOX
[facility] Division of Hospital Medicine  Renu Early MD  MS Teams PREFERRED        SUBJECTIVE / OVERNIGHT EVENTS:  ADDITIONAL REVIEW OF SYSTEMS:    MEDICATIONS  (STANDING):  chlorhexidine 2% Cloths 1 Application(s) Topical <User Schedule>  epoetin loulou-epbx (RETACRIT) Injectable 46434 Unit(s) SubCutaneous every 7 days  ferrous    sulfate 325 milliGRAM(s) Oral daily  furosemide   Injectable 60 milliGRAM(s) IV Push two times a day  lidocaine   4% Patch 1 Patch Transdermal daily  losartan 25 milliGRAM(s) Oral daily  melatonin 3 milliGRAM(s) Oral at bedtime  metoprolol succinate ER 50 milliGRAM(s) Oral daily  mupirocin 2% Ointment 1 Application(s) Topical <User Schedule>  spironolactone 25 milliGRAM(s) Oral daily  tamsulosin 0.4 milliGRAM(s) Oral at bedtime    MEDICATIONS  (PRN):  acetaminophen     Tablet .. 650 milliGRAM(s) Oral every 6 hours PRN Mild Pain (1 - 3)  carbamide peroxide Otic Solution 4 Drop(s) Right Ear two times a day PRN clogged ear  oxycodone    5 mG/acetaminophen 325 mG 1 Tablet(s) Oral every 6 hours PRN Severe Pain (7 - 10)  polyethylene glycol 3350 17 Gram(s) Oral daily PRN Constipation  sodium chloride 0.65% Nasal 1 Spray(s) Both Nostrils every 4 hours PRN Nasal Congestion      I&O's Summary    19 Feb 2024 07:01  -  20 Feb 2024 07:00  --------------------------------------------------------  IN: 760 mL / OUT: 250 mL / NET: 510 mL        PHYSICAL EXAM:  Vital Signs Last 24 Hrs  T(C): 36.7 (20 Feb 2024 11:25), Max: 36.7 (20 Feb 2024 04:00)  T(F): 98 (20 Feb 2024 11:25), Max: 98 (20 Feb 2024 04:00)  HR: 74 (20 Feb 2024 11:41) (67 - 75)  BP: 105/65 (20 Feb 2024 11:41) (94/58 - 117/72)  BP(mean): --  RR: 18 (20 Feb 2024 11:25) (18 - 18)  SpO2: 94% (20 Feb 2024 11:25) (93% - 100%)    Parameters below as of 20 Feb 2024 11:25  Patient On (Oxygen Delivery Method): room air      CONSTITUTIONAL: NAD, well-developed, well-groomed  EYES: PERRLA; conjunctiva and sclera clear  ENMT: Moist oral mucosa, no pharyngeal injection or exudates; normal dentition  NECK: Supple, no palpable masses; no thyromegaly  RESPIRATORY: Normal respiratory effort; lungs are clear to auscultation bilaterally  CARDIOVASCULAR: Regular rate and rhythm, normal S1 and S2, no murmur/rub/gallop; No lower extremity edema; Peripheral pulses are 2+ bilaterally  ABDOMEN: Nontender to palpation, normoactive bowel sounds, no rebound/guarding; No hepatosplenomegaly  MUSCULOSKELETAL:  Normal gait; no clubbing or cyanosis of digits; no joint swelling or tenderness to palpation  PSYCH: A+O to person, place, and time; affect appropriate  NEUROLOGY: CN 2-12 are intact and symmetric; no gross sensory deficits   SKIN: No rashes; no palpable lesions    LABS:                        9.3    10.38 )-----------( 223      ( 20 Feb 2024 06:55 )             29.8     02-20    138  |  99  |  20  ----------------------------<  81  3.9   |  28  |  1.29    Ca    8.5      20 Feb 2024 06:55  Mg     2.2     02-19      PT/INR - ( 20 Feb 2024 06:55 )   PT: 11.1 sec;   INR: 1.06 ratio         PTT - ( 20 Feb 2024 06:55 )  PTT:28.8 sec      Urinalysis Basic - ( 20 Feb 2024 06:55 )    Color: x / Appearance: x / SG: x / pH: x  Gluc: 81 mg/dL / Ketone: x  / Bili: x / Urobili: x   Blood: x / Protein: x / Nitrite: x   Leuk Esterase: x / RBC: x / WBC x   Sq Epi: x / Non Sq Epi: x / Bacteria: x          RADIOLOGY & ADDITIONAL TESTS:  Results Reviewed:   Imaging Personally Reviewed:  Electrocardiogram Personally Reviewed:    COORDINATION OF CARE:  Care Discussed with Consultants/Other Providers [Y/N]:  Prior or Outpatient Records Reviewed [Y/N]:   [facility] Division of Hospital Medicine  Renu Early MD  MS Teams PREFERRED        SUBJECTIVE / OVERNIGHT EVENTS: Seen and examined at bedside. NAD.    MEDICATIONS  (STANDING):  chlorhexidine 2% Cloths 1 Application(s) Topical <User Schedule>  epoetin loulou-epbx (RETACRIT) Injectable 60398 Unit(s) SubCutaneous every 7 days  ferrous    sulfate 325 milliGRAM(s) Oral daily  furosemide   Injectable 60 milliGRAM(s) IV Push two times a day  lidocaine   4% Patch 1 Patch Transdermal daily  losartan 25 milliGRAM(s) Oral daily  melatonin 3 milliGRAM(s) Oral at bedtime  metoprolol succinate ER 50 milliGRAM(s) Oral daily  mupirocin 2% Ointment 1 Application(s) Topical <User Schedule>  spironolactone 25 milliGRAM(s) Oral daily  tamsulosin 0.4 milliGRAM(s) Oral at bedtime    MEDICATIONS  (PRN):  acetaminophen     Tablet .. 650 milliGRAM(s) Oral every 6 hours PRN Mild Pain (1 - 3)  carbamide peroxide Otic Solution 4 Drop(s) Right Ear two times a day PRN clogged ear  oxycodone    5 mG/acetaminophen 325 mG 1 Tablet(s) Oral every 6 hours PRN Severe Pain (7 - 10)  polyethylene glycol 3350 17 Gram(s) Oral daily PRN Constipation  sodium chloride 0.65% Nasal 1 Spray(s) Both Nostrils every 4 hours PRN Nasal Congestion      I&O's Summary    19 Feb 2024 07:01  -  20 Feb 2024 07:00  --------------------------------------------------------  IN: 760 mL / OUT: 250 mL / NET: 510 mL        PHYSICAL EXAM:  Vital Signs Last 24 Hrs  T(C): 36.7 (20 Feb 2024 11:25), Max: 36.7 (20 Feb 2024 04:00)  T(F): 98 (20 Feb 2024 11:25), Max: 98 (20 Feb 2024 04:00)  HR: 74 (20 Feb 2024 11:41) (67 - 75)  BP: 105/65 (20 Feb 2024 11:41) (94/58 - 117/72)  BP(mean): --  RR: 18 (20 Feb 2024 11:25) (18 - 18)  SpO2: 94% (20 Feb 2024 11:25) (93% - 100%)    Parameters below as of 20 Feb 2024 11:25  Patient On (Oxygen Delivery Method): room air      PHYSICAL EXAM:-  GENERAL: NAD, well-developed  EYES: EOMI, PERRLA, conjunctiva and sclera clear  NECK: Supple, No JVD, no thyromegaly  CHEST/LUNG: Clear to auscultation bilaterally; No wheeze  HEART: Regular rate and rhythm; S1, S2 audible, No murmurs, rubs, or gallops  ABDOMEN: Soft, Nontender, Nondistended; Bowel sounds present  EXTREMITIES:  2+ Peripheral Pulses, No clubbing, cyanosis, 1+ LE edema  NEURO: AAOx3, no focal deficit    LABS:                        9.3    10.38 )-----------( 223      ( 20 Feb 2024 06:55 )             29.8     02-20    138  |  99  |  20  ----------------------------<  81  3.9   |  28  |  1.29    Ca    8.5      20 Feb 2024 06:55  Mg     2.2     02-19      PT/INR - ( 20 Feb 2024 06:55 )   PT: 11.1 sec;   INR: 1.06 ratio         PTT - ( 20 Feb 2024 06:55 )  PTT:28.8 sec      Urinalysis Basic - ( 20 Feb 2024 06:55 )    Color: x / Appearance: x / SG: x / pH: x  Gluc: 81 mg/dL / Ketone: x  / Bili: x / Urobili: x   Blood: x / Protein: x / Nitrite: x   Leuk Esterase: x / RBC: x / WBC x   Sq Epi: x / Non Sq Epi: x / Bacteria: x

## 2024-02-20 NOTE — PROGRESS NOTE ADULT - PROBLEM SELECTOR PLAN 8
DVT ppx: SCDs given worsening anemia req prbcs  Dispo: Pending clinical course, possible TAVR inpt vs outpt, c/w iv diuresis  Independent- walks with cane DVT ppx: SCDs given worsening anemia req prbcs  Dispo: Pending clinical course, possible TAVR inpt vs outpt, c/w iv diuresis  Independent- walks with cane      d/w pt and wife in detail  d/w CTS

## 2024-02-20 NOTE — PROCEDURE NOTE - PROCEDURE FINDINGS AND DETAILS
Successful transjugular liver biopsy & pressure measurements  Wedge pressure: 4 mmHg  Hepatic pressure: 3 mmHg  RA pressure: 2 mmHg  Patient tolerated the procedure well with no immediate complication

## 2024-02-20 NOTE — PROGRESS NOTE ADULT - ASSESSMENT
63M PMH HFrEF, HTN, GERD, dyslipidemia, chronic intermittent leg pain, aortic stenosis, chronic venous stasis dermatitis with bilateral leg swelling, admitted to [facility] with b/l leg pain and swelling, transferred to [facility] for TAVR evaluation for severe AS, s/p LHC nonobstructive, with acute decompensated HFrEF exacerbation. Remains on IV diuresis.

## 2024-02-20 NOTE — PROGRESS NOTE ADULT - SUBJECTIVE AND OBJECTIVE BOX
No distress    Vital Signs Last 24 Hrs  T(C): 35.9 (02-20-24 @ 13:13), Max: 36.7 (02-20-24 @ 04:00)  T(F): 96.7 (02-20-24 @ 13:00), Max: 98 (02-20-24 @ 04:00)  HR: 61 (02-20-24 @ 13:13) (61 - 75)  BP: 95/57 (02-20-24 @ 13:13) (94/58 - 117/72)  BP(mean): 86 (02-20-24 @ 13:13) (86 - 86)  RR: 18 (02-20-24 @ 13:13) (18 - 18)  SpO2: 97% (02-20-24 @ 13:13) (93% - 100%)    s1s2  b/l air entry  soft, ND   edema                                                         9.3    10.38 )-----------( 223      ( 20 Feb 2024 06:55 )             29.8     20 Feb 2024 06:55    138    |  99     |  20     ----------------------------<  81     3.9     |  28     |  1.29     Ca    8.5        20 Feb 2024 06:55  Mg     2.2       19 Feb 2024 06:13    PT/INR - ( 20 Feb 2024 06:55 )   PT: 11.1 sec;   INR: 1.06 ratio      acetaminophen     Tablet .. 650 milliGRAM(s) Oral every 6 hours PRN  carbamide peroxide Otic Solution 4 Drop(s) Right Ear two times a day PRN  chlorhexidine 2% Cloths 1 Application(s) Topical <User Schedule>  epoetin loulou-epbx (RETACRIT) Injectable 59812 Unit(s) SubCutaneous every 7 days  ferrous    sulfate 325 milliGRAM(s) Oral daily  furosemide   Injectable 60 milliGRAM(s) IV Push two times a day  lidocaine   4% Patch 1 Patch Transdermal daily  losartan 25 milliGRAM(s) Oral daily  melatonin 3 milliGRAM(s) Oral at bedtime  metoprolol succinate ER 50 milliGRAM(s) Oral daily  mupirocin 2% Ointment 1 Application(s) Topical <User Schedule>  oxycodone    5 mG/acetaminophen 325 mG 1 Tablet(s) Oral every 6 hours PRN  polyethylene glycol 3350 17 Gram(s) Oral daily PRN  sodium chloride 0.65% Nasal 1 Spray(s) Both Nostrils every 4 hours PRN  spironolactone 25 milliGRAM(s) Oral daily  tamsulosin 0.4 milliGRAM(s) Oral at bedtime    A/P:    CM, EF 35-40%, severe AS  Tx from [facility] to [facility] 2/5 for TAVR eval, s/p [facility] 2/5  Cardio-renal/hemodynamic LINDA/CKD 3 has improved (peak Cr 2.5 - 2/3/24)   UA negative   Imaging w/o hydro   S/p CT w/IV dye 2/8/24  Stable renal fx on Lasix, ARB, Aldactone  Avoid nephrotoxins   Plan for liver biopsy   F/u CBC, BMP  Avoid hypotension     [phone]

## 2024-02-20 NOTE — PRE PROCEDURE NOTE - PRE PROCEDURE EVALUATION
Interventional Radiology    HPI: 63M hx HTN, GERD, dyslipidemia, chronic intermittent leg pain, aortic stenosis, chronic venous stasis dermatitis with bilateral leg swelling, admitted to [facility] with b/l leg pain and swelling, transferred to [facility] for TAVR evaluation for severe AS found to have transaminitis. Hepatology recommending liver biopsy prior to surgical procedure.    Allergies: No Known Drug Allergies    Medications (Abx/Cardiac/Anticoagulation/Blood Products)    furosemide   Injectable: 60 milliGRAM(s) IV Push (02-20 @ 06:08)  losartan: 25 milliGRAM(s) Oral (02-20 @ 06:06)  metoprolol succinate ER: 50 milliGRAM(s) Oral (02-20 @ 06:06)  spironolactone: 25 milliGRAM(s) Oral (02-20 @ 06:05)    Data:  180.3  89.8  T(C): 35.9  HR: 61  BP: 95/57  RR: 18  SpO2: 97%    Exam  General: No acute distress  Chest: Non labored breathing  Abdomen: Non-distended  Extremities: No swelling, warm    -WBC 10.38 / HgB 9.3 / Hct 29.8 / Plt 223  -Na 138 / Cl 99 / BUN 20 / Glucose 81  -K 3.9 / CO2 28 / Cr 1.29  -INR1.06    Plan:   63M hx HTN, GERD, dyslipidemia, chronic intermittent leg pain, aortic stenosis, chronic venous stasis dermatitis with bilateral leg swelling, admitted to [facility] with b/l leg pain and swelling, transferred to [facility] for TAVR evaluation for severe AS found to have transaminitis. IR to perform transjugular liver biopsy with pressure measurements.  -- Relevant imaging and labs were reviewed.   -- Risks, benefits, and alternatives were explained to the patient and informed consent was obtained.

## 2024-02-21 LAB
ANION GAP SERPL CALC-SCNC: 9 MMOL/L — SIGNIFICANT CHANGE UP (ref 5–17)
BUN SERPL-MCNC: 23 MG/DL — SIGNIFICANT CHANGE UP (ref 7–23)
CALCIUM SERPL-MCNC: 8.6 MG/DL — SIGNIFICANT CHANGE UP (ref 8.4–10.5)
CHLORIDE SERPL-SCNC: 98 MMOL/L — SIGNIFICANT CHANGE UP (ref 96–108)
CO2 SERPL-SCNC: 28 MMOL/L — SIGNIFICANT CHANGE UP (ref 22–31)
CREAT SERPL-MCNC: 1.23 MG/DL — SIGNIFICANT CHANGE UP (ref 0.5–1.3)
EGFR: 66 ML/MIN/1.73M2 — SIGNIFICANT CHANGE UP
GLUCOSE SERPL-MCNC: 93 MG/DL — SIGNIFICANT CHANGE UP (ref 70–99)
HCT VFR BLD CALC: 28.2 % — LOW (ref 39–50)
HGB BLD-MCNC: 8.3 G/DL — LOW (ref 13–17)
MCHC RBC-ENTMCNC: 19.7 PG — LOW (ref 27–34)
MCHC RBC-ENTMCNC: 29.4 GM/DL — LOW (ref 32–36)
MCV RBC AUTO: 67 FL — LOW (ref 80–100)
NRBC # BLD: 0 /100 WBCS — SIGNIFICANT CHANGE UP (ref 0–0)
PLATELET # BLD AUTO: 229 K/UL — SIGNIFICANT CHANGE UP (ref 150–400)
POTASSIUM SERPL-MCNC: 3.8 MMOL/L — SIGNIFICANT CHANGE UP (ref 3.5–5.3)
POTASSIUM SERPL-SCNC: 3.8 MMOL/L — SIGNIFICANT CHANGE UP (ref 3.5–5.3)
RBC # BLD: 4.21 M/UL — SIGNIFICANT CHANGE UP (ref 4.2–5.8)
RBC # FLD: 28.5 % — HIGH (ref 10.3–14.5)
SODIUM SERPL-SCNC: 135 MMOL/L — SIGNIFICANT CHANGE UP (ref 135–145)
WBC # BLD: 11.86 K/UL — HIGH (ref 3.8–10.5)
WBC # FLD AUTO: 11.86 K/UL — HIGH (ref 3.8–10.5)

## 2024-02-21 PROCEDURE — 99233 SBSQ HOSP IP/OBS HIGH 50: CPT

## 2024-02-21 PROCEDURE — 93926 LOWER EXTREMITY STUDY: CPT | Mod: 26,RT

## 2024-02-21 PROCEDURE — 74019 RADEX ABDOMEN 2 VIEWS: CPT | Mod: 26

## 2024-02-21 RX ADMIN — SPIRONOLACTONE 25 MILLIGRAM(S): 25 TABLET, FILM COATED ORAL at 09:59

## 2024-02-21 RX ADMIN — Medication 650 MILLIGRAM(S): at 20:26

## 2024-02-21 RX ADMIN — POLYETHYLENE GLYCOL 3350 17 GRAM(S): 17 POWDER, FOR SOLUTION ORAL at 08:20

## 2024-02-21 RX ADMIN — Medication 650 MILLIGRAM(S): at 19:56

## 2024-02-21 RX ADMIN — Medication 60 MILLIGRAM(S): at 06:43

## 2024-02-21 RX ADMIN — Medication 650 MILLIGRAM(S): at 09:59

## 2024-02-21 RX ADMIN — Medication 650 MILLIGRAM(S): at 10:59

## 2024-02-21 RX ADMIN — CHLORHEXIDINE GLUCONATE 1 APPLICATION(S): 213 SOLUTION TOPICAL at 06:57

## 2024-02-21 RX ADMIN — Medication 325 MILLIGRAM(S): at 12:21

## 2024-02-21 RX ADMIN — TAMSULOSIN HYDROCHLORIDE 0.4 MILLIGRAM(S): 0.4 CAPSULE ORAL at 21:29

## 2024-02-21 RX ADMIN — MUPIROCIN 1 APPLICATION(S): 20 OINTMENT TOPICAL at 10:03

## 2024-02-21 RX ADMIN — Medication 60 MILLIGRAM(S): at 17:17

## 2024-02-21 RX ADMIN — Medication 3 MILLIGRAM(S): at 21:30

## 2024-02-21 NOTE — PROGRESS NOTE ADULT - NS ATTEND AMEND GEN_ALL_CORE FT
63yr  HTN,, lipids.   Known AS since 2018.   Presented to [facility]  with refractory cellulitis from venous stasis.  Found on TTE to have severe AS. Feb 5th Transferred here for Martir eval.  Currently being evaluated for Martir.   Also being seen by hepatology for abn LFTs - history of ETOH use. S/p transjugular liver bx on 2.20: RA 2. hepatic wedge 4.   Since being here- on IV lasix 60 IV bid.   Transfer weight 192lbs Current 168lbs.   current meds; lasix 60 IV bid, losartan 25 QD, toprol 50 QD, Ald 25. Fe.   TTE 1.26: LVEF 38, LVEDD 6.1, RWMA, mod RVE, normal RVF, mod MR, severe AS. MARY .7.   HR 70SR, 97/-122/ RA   02-21    135  |  98  |  23  ----------------------------<  93  3.8   |  28  |  1.23    Ca    8.6      21 Feb 2024 06:24                        8.3    11.86 )-----------( 229      ( 21 Feb 2024 06:24 )             28.2   Patient is now euvolemic.   Would d/c IV lasix.   Diuretic holiday tomorrow and then Lasix 40 PO QD.   Make losartan 25 QHS and toprol Qam.   Further uptitration of GDMT after MARTIR.   Consider wound care eval for his legs  Omid Castillo 63yr  HTN,, lipids.   Known AS since 2018.   Presented to [facility]  with refractory cellulitis from venous stasis.  Found on TTE to have severe AS. Feb 5th Transferred here for Martir eval.  Currently being evaluated for Martir.   Also being seen by hepatology for abn LFTs - history of ETOH use. S/p transjugular liver bx on 2.20: RA 2. hepatic wedge 4.   Since being here- on IV lasix 60 IV bid.   Transfer weight 192lbs Current 168lbs.   current meds; lasix 60 IV bid, losartan 25 QD, toprol 50 QD, Ald 25. Fe.   TTE 1.26: LVEF 38, LVEDD 6.1, RWMA, mod RVE, normal RVF, mod MR, severe AS. MARY .7.   HR 70SR, 97/-122/ RA   patient not in room for exam   02-21    135  |  98  |  23  ----------------------------<  93  3.8   |  28  |  1.23    Ca    8.6      21 Feb 2024 06:24                        8.3    11.86 )-----------( 229      ( 21 Feb 2024 06:24 )             28.2   Patient is now euvolemic.   Would d/c IV lasix.   Diuretic holiday tomorrow and then Lasix 40 PO QD.   Make losartan 25 QHS and toprol Qam.   Further uptitration of GDMT after MARTIR.   Consider wound care eval for his legs  Omid Castillo

## 2024-02-21 NOTE — PROGRESS NOTE ADULT - PROBLEM SELECTOR PLAN 1
HFrEF (EF 35-40% 1/2024) with severe AS  Diuretics: IV Lasix 60mg bid - hold for hypotension, received AM dose, can switch to PO depending on weight  MRA: Aldactone 25mg/d  ARB/ARNI: losartan 25 po daily - would transition to entresto pending HF  BB: Cont Toprol 50mg/d  SGLT2: start farxiga prior to dc planning once off iv diuretics  LHC revealed non-obstructive CAD  HF and structural heart following, s/p liver biopsy 2/20, if results can be obtained quickly then surgery can be performed on Friday 2/23  Case discussed with Structural and with CTS this morning

## 2024-02-21 NOTE — CHART NOTE - NSCHARTNOTEFT_GEN_A_CORE
Patient refused to be examined at bedside during morning rounds today. Pt is s/p transjugular bx 2/20/24 in IR with Dr. Kerr. Dressing appeared to be dry on visual assessment. Spoke to nurse who stated patient SBP continues to be in the 90s.    IR signing off, please reconsult as needed Patient refused to be examined at bedside during morning rounds today. Pt is s/p transjugular bx 2/20/24 in IR with Dr. Kerr. Dressing appeared to be dry on visual assessment. Spoke to nurse who stated patient SBP continues to be in the 90s. Patient was ambulating around the unit prior to encounter.    IR signing off, please reconsult as needed

## 2024-02-21 NOTE — PROGRESS NOTE ADULT - ASSESSMENT
63M PMH HFrEF, HTN, GERD, dyslipidemia, chronic intermittent leg pain, aortic stenosis, chronic venous stasis dermatitis with bilateral leg swelling, admitted to [facility] with b/l leg pain and swelling, transferred to [facility] for TAVR evaluation for severe AS, s/p LHC nonobstructive, with acute decompensated HFrEF exacerbation. Remains on IV diuresis.

## 2024-02-21 NOTE — PROGRESS NOTE ADULT - SUBJECTIVE AND OBJECTIVE BOX
[facility] Division of Hospital Medicine  Renu Early MD  MS Teams PREFERRED        SUBJECTIVE / OVERNIGHT EVENTS: Seen and examined at bedside sitting up at the side of the bed. I advised him to sit in the chair or the bed so he does not fall. He declined and said he had control to support himself.    MEDICATIONS  (STANDING):  chlorhexidine 2% Cloths 1 Application(s) Topical <User Schedule>  epoetin loulou-epbx (RETACRIT) Injectable 73141 Unit(s) SubCutaneous every 7 days  ferrous    sulfate 325 milliGRAM(s) Oral daily  furosemide   Injectable 60 milliGRAM(s) IV Push two times a day  lidocaine   4% Patch 1 Patch Transdermal daily  losartan 25 milliGRAM(s) Oral daily  melatonin 3 milliGRAM(s) Oral at bedtime  metoprolol succinate ER 50 milliGRAM(s) Oral daily  mupirocin 2% Ointment 1 Application(s) Topical <User Schedule>  spironolactone 25 milliGRAM(s) Oral daily  tamsulosin 0.4 milliGRAM(s) Oral at bedtime    MEDICATIONS  (PRN):  acetaminophen     Tablet .. 650 milliGRAM(s) Oral every 6 hours PRN Mild Pain (1 - 3)  carbamide peroxide Otic Solution 4 Drop(s) Right Ear two times a day PRN clogged ear  oxycodone    5 mG/acetaminophen 325 mG 1 Tablet(s) Oral every 6 hours PRN Severe Pain (7 - 10)  polyethylene glycol 3350 17 Gram(s) Oral daily PRN Constipation  sodium chloride 0.65% Nasal 1 Spray(s) Both Nostrils every 4 hours PRN Nasal Congestion      I&O's Summary    20 Feb 2024 07:01  -  21 Feb 2024 07:00  --------------------------------------------------------  IN: 100 mL / OUT: 300 mL / NET: -200 mL        PHYSICAL EXAM:  Vital Signs Last 24 Hrs  T(C): 36.3 (21 Feb 2024 08:43), Max: 36.7 (21 Feb 2024 04:00)  T(F): 97.4 (21 Feb 2024 08:43), Max: 98.1 (21 Feb 2024 04:00)  HR: 73 (21 Feb 2024 09:51) (60 - 77)  BP: 94/58 (21 Feb 2024 09:51) (74/56 - 105/65)  BP(mean): 76 (20 Feb 2024 18:10) (63 - 92)  RR: 18 (21 Feb 2024 08:43) (10 - 18)  SpO2: 100% (21 Feb 2024 08:43) (94% - 100%)    Parameters below as of 21 Feb 2024 08:43  Patient On (Oxygen Delivery Method): room air      PHYSICAL EXAM:-  GENERAL: NAD, well-developed  EYES: EOMI, PERRLA, conjunctiva and sclera clear  NECK: Supple, No JVD, no thyromegaly  CHEST/LUNG: Clear to auscultation bilaterally; No wheeze  HEART: Regular rate and rhythm; S1, S2 audible, No murmurs, rubs, or gallops  ABDOMEN: Soft, Nontender, Nondistended; Bowel sounds present  EXTREMITIES:  2+ Peripheral Pulses, No clubbing, cyanosis, 1+ LE edema  NEURO: AAOx3, no focal deficit    LABS:                        8.3    11.86 )-----------( 229      ( 21 Feb 2024 06:24 )             28.2     02-21    135  |  98  |  23  ----------------------------<  93  3.8   |  28  |  1.23    Ca    8.6      21 Feb 2024 06:24      PT/INR - ( 20 Feb 2024 06:55 )   PT: 11.1 sec;   INR: 1.06 ratio         PTT - ( 20 Feb 2024 06:55 )  PTT:28.8 sec      Urinalysis Basic - ( 21 Feb 2024 06:24 )    Color: x / Appearance: x / SG: x / pH: x  Gluc: 93 mg/dL / Ketone: x  / Bili: x / Urobili: x   Blood: x / Protein: x / Nitrite: x   Leuk Esterase: x / RBC: x / WBC x   Sq Epi: x / Non Sq Epi: x / Bacteria: x

## 2024-02-21 NOTE — PROGRESS NOTE ADULT - PROBLEM SELECTOR PLAN 8
DVT ppx: SCDs given worsening anemia req prbcs  Dispo: Pending clinical course, possible TAVR inpt vs outpt, c/w iv diuresis  Independent- walks with cane      d/w pt in detail

## 2024-02-21 NOTE — PROGRESS NOTE ADULT - SUBJECTIVE AND OBJECTIVE BOX
[facility]  - PROGRESS NOTE  *To reach the NS2 Team from 8am to 5pm (MON-FRI), please call [phone].   _______________________________________________________________________________________________________    Subjective:    Medications:  acetaminophen     Tablet .. 650 milliGRAM(s) Oral every 6 hours PRN  carbamide peroxide Otic Solution 4 Drop(s) Right Ear two times a day PRN  chlorhexidine 2% Cloths 1 Application(s) Topical <User Schedule>  epoetin loulou-epbx (RETACRIT) Injectable 06722 Unit(s) SubCutaneous every 7 days  ferrous    sulfate 325 milliGRAM(s) Oral daily  furosemide   Injectable 60 milliGRAM(s) IV Push two times a day  lidocaine   4% Patch 1 Patch Transdermal daily  losartan 25 milliGRAM(s) Oral daily  melatonin 3 milliGRAM(s) Oral at bedtime  metoprolol succinate ER 50 milliGRAM(s) Oral daily  mupirocin 2% Ointment 1 Application(s) Topical <User Schedule>  oxycodone    5 mG/acetaminophen 325 mG 1 Tablet(s) Oral every 6 hours PRN  polyethylene glycol 3350 17 Gram(s) Oral daily PRN  sodium chloride 0.65% Nasal 1 Spray(s) Both Nostrils every 4 hours PRN  spironolactone 25 milliGRAM(s) Oral daily  tamsulosin 0.4 milliGRAM(s) Oral at bedtime      Physical Exam:    Vitals:  Vital Signs Last 24 Hours  T(C): 36.3 (24 @ 12:10), Max: 36.7 (24 @ 04:00)  HR: 76 (24 @ 16:55) (48 - 77)  BP: 122/76 (24 @ 16:55) (74/56 - 122/76)  RR: 18 (24 @ 12:10) (18 - 18)  SpO2: 100% (24 @ 12:10) (94% - 100%)    Weight in k.2 ( @ 10:30)    I&O's Summary    2024 07:01  -  2024 07:00  --------------------------------------------------------  IN: 100 mL / OUT: 300 mL / NET: -200 mL    2024 07:01  -  2024 18:15  --------------------------------------------------------  IN: 40 mL / OUT: 0 mL / NET: 40 mL        Tele:    General: No distress. Comfortable.  HEENT: EOM intact.  Neck: Neck supple. JVP not elevated. No masses  Chest: Clear to auscultation bilaterally  CV: Normal S1 and S2. No murmurs, rub, or gallops. Radial pulses normal.  Abdomen: Soft, non-distended, non-tender  Skin: No rashes or skin breakdown  Extremities: No LE edema  Neurology: Alert and oriented times three. Sensation intact  Psych: Affect normal    Labs:                        8.3    11.86 )-----------( 229      ( 2024 06:24 )             28.2         135  |  98  |  23  ----------------------------<  93  3.8   |  28  |  1.23    Ca    8.6      2024 06:24      PT/INR - ( 2024 06:55 )   PT: 11.1 sec;   INR: 1.06 ratio         PTT - ( 2024 06:55 )  PTT:28.8 sec

## 2024-02-22 ENCOUNTER — TRANSCRIPTION ENCOUNTER (OUTPATIENT)
Age: 64
End: 2024-02-22

## 2024-02-22 LAB
ADD ON TEST-SPECIMEN IN LAB: SIGNIFICANT CHANGE UP
ALBUMIN SERPL ELPH-MCNC: 3.2 G/DL — LOW (ref 3.3–5)
ALP SERPL-CCNC: 93 U/L — SIGNIFICANT CHANGE UP (ref 40–120)
ALT FLD-CCNC: 31 U/L — SIGNIFICANT CHANGE UP (ref 10–45)
ANISOCYTOSIS BLD QL: SIGNIFICANT CHANGE UP
APPEARANCE UR: CLEAR — SIGNIFICANT CHANGE UP
AST SERPL-CCNC: 27 U/L — SIGNIFICANT CHANGE UP (ref 10–40)
BASOPHILS # BLD AUTO: 0.1 K/UL — SIGNIFICANT CHANGE UP (ref 0–0.2)
BASOPHILS NFR BLD AUTO: 0.9 % — SIGNIFICANT CHANGE UP (ref 0–2)
BILIRUB DIRECT SERPL-MCNC: 0.6 MG/DL — HIGH (ref 0–0.3)
BILIRUB INDIRECT FLD-MCNC: 1.2 MG/DL — HIGH (ref 0.2–1)
BILIRUB SERPL-MCNC: 1.8 MG/DL — HIGH (ref 0.2–1.2)
BILIRUB UR-MCNC: ABNORMAL
BLD GP AB SCN SERPL QL: NEGATIVE — SIGNIFICANT CHANGE UP
BURR CELLS BLD QL SMEAR: PRESENT — SIGNIFICANT CHANGE UP
CHOLEST SERPL-MCNC: 135 MG/DL — SIGNIFICANT CHANGE UP
COLOR SPEC: SIGNIFICANT CHANGE UP
DIFF PNL FLD: NEGATIVE — SIGNIFICANT CHANGE UP
ELLIPTOCYTES BLD QL SMEAR: SLIGHT — SIGNIFICANT CHANGE UP
EOSINOPHIL # BLD AUTO: 0.1 K/UL — SIGNIFICANT CHANGE UP (ref 0–0.5)
EOSINOPHIL NFR BLD AUTO: 0.9 % — SIGNIFICANT CHANGE UP (ref 0–6)
GLUCOSE UR QL: NEGATIVE MG/DL — SIGNIFICANT CHANGE UP
HCT VFR BLD CALC: 26.8 % — LOW (ref 39–50)
HDLC SERPL-MCNC: 56 MG/DL — SIGNIFICANT CHANGE UP
HGB BLD-MCNC: 8.2 G/DL — LOW (ref 13–17)
HYPOCHROMIA BLD QL: SIGNIFICANT CHANGE UP
KETONES UR-MCNC: NEGATIVE MG/DL — SIGNIFICANT CHANGE UP
LEUKOCYTE ESTERASE UR-ACNC: NEGATIVE — SIGNIFICANT CHANGE UP
LIPID PNL WITH DIRECT LDL SERPL: 67 MG/DL — SIGNIFICANT CHANGE UP
LYMPHOCYTES # BLD AUTO: 0.29 K/UL — LOW (ref 1–3.3)
LYMPHOCYTES # BLD AUTO: 2.6 % — LOW (ref 13–44)
MACROCYTES BLD QL: SLIGHT — SIGNIFICANT CHANGE UP
MANUAL SMEAR VERIFICATION: SIGNIFICANT CHANGE UP
MCHC RBC-ENTMCNC: 20.6 PG — LOW (ref 27–34)
MCHC RBC-ENTMCNC: 30.6 GM/DL — LOW (ref 32–36)
MCV RBC AUTO: 67.3 FL — LOW (ref 80–100)
MICROCYTES BLD QL: SLIGHT — SIGNIFICANT CHANGE UP
MONOCYTES # BLD AUTO: 0.3 K/UL — SIGNIFICANT CHANGE UP (ref 0–0.9)
MONOCYTES NFR BLD AUTO: 2.7 % — SIGNIFICANT CHANGE UP (ref 2–14)
MRSA PCR RESULT.: SIGNIFICANT CHANGE UP
NEUTROPHILS # BLD AUTO: 10.44 K/UL — HIGH (ref 1.8–7.4)
NEUTROPHILS NFR BLD AUTO: 92.9 % — HIGH (ref 43–77)
NITRITE UR-MCNC: NEGATIVE — SIGNIFICANT CHANGE UP
NON HDL CHOLESTEROL: 79 MG/DL — SIGNIFICANT CHANGE UP
NRBC # BLD: 0 /100 WBCS — SIGNIFICANT CHANGE UP (ref 0–0)
NT-PROBNP SERPL-SCNC: 8375 PG/ML — HIGH (ref 0–300)
PA ADP PRP-ACNC: 283 PRU — SIGNIFICANT CHANGE UP (ref 194–417)
PH UR: 5.5 — SIGNIFICANT CHANGE UP (ref 5–8)
PLAT MORPH BLD: NORMAL — SIGNIFICANT CHANGE UP
PLATELET # BLD AUTO: 209 K/UL — SIGNIFICANT CHANGE UP (ref 150–400)
POIKILOCYTOSIS BLD QL AUTO: SIGNIFICANT CHANGE UP
POLYCHROMASIA BLD QL SMEAR: SLIGHT — SIGNIFICANT CHANGE UP
PROT SERPL-MCNC: 6.1 G/DL — SIGNIFICANT CHANGE UP (ref 6–8.3)
PROT UR-MCNC: SIGNIFICANT CHANGE UP MG/DL
RBC # BLD: 3.98 M/UL — LOW (ref 4.2–5.8)
RBC # FLD: 28.8 % — HIGH (ref 10.3–14.5)
RBC BLD AUTO: ABNORMAL
RH IG SCN BLD-IMP: POSITIVE — SIGNIFICANT CHANGE UP
S AUREUS DNA NOSE QL NAA+PROBE: SIGNIFICANT CHANGE UP
SCHISTOCYTES BLD QL AUTO: SLIGHT — SIGNIFICANT CHANGE UP
SP GR SPEC: 1.03 — SIGNIFICANT CHANGE UP (ref 1–1.03)
T3 SERPL-MCNC: 74 NG/DL — LOW (ref 80–200)
TARGETS BLD QL SMEAR: SLIGHT — SIGNIFICANT CHANGE UP
TRIGL SERPL-MCNC: 58 MG/DL — SIGNIFICANT CHANGE UP
TSH SERPL-MCNC: 10.8 UIU/ML — HIGH (ref 0.27–4.2)
UROBILINOGEN FLD QL: 1 MG/DL — SIGNIFICANT CHANGE UP (ref 0.2–1)
WBC # BLD: 11.24 K/UL — HIGH (ref 3.8–10.5)
WBC # FLD AUTO: 11.24 K/UL — HIGH (ref 3.8–10.5)

## 2024-02-22 PROCEDURE — 99232 SBSQ HOSP IP/OBS MODERATE 35: CPT | Mod: GC

## 2024-02-22 PROCEDURE — 71045 X-RAY EXAM CHEST 1 VIEW: CPT | Mod: 26

## 2024-02-22 PROCEDURE — 99232 SBSQ HOSP IP/OBS MODERATE 35: CPT | Mod: 24

## 2024-02-22 PROCEDURE — 99233 SBSQ HOSP IP/OBS HIGH 50: CPT

## 2024-02-22 RX ORDER — LOSARTAN POTASSIUM 100 MG/1
25 TABLET, FILM COATED ORAL AT BEDTIME
Refills: 0 | Status: DISCONTINUED | OUTPATIENT
Start: 2024-02-22 | End: 2024-02-22

## 2024-02-22 RX ORDER — AMIODARONE HYDROCHLORIDE 400 MG/1
400 TABLET ORAL
Refills: 0 | Status: DISCONTINUED | OUTPATIENT
Start: 2024-02-23 | End: 2024-02-25

## 2024-02-22 RX ORDER — CHLORHEXIDINE GLUCONATE 213 G/1000ML
1 SOLUTION TOPICAL ONCE
Refills: 0 | Status: COMPLETED | OUTPATIENT
Start: 2024-02-23 | End: 2024-02-23

## 2024-02-22 RX ORDER — GABAPENTIN 400 MG/1
100 CAPSULE ORAL EVERY 8 HOURS
Refills: 0 | Status: COMPLETED | OUTPATIENT
Start: 2024-02-23 | End: 2024-02-28

## 2024-02-22 RX ORDER — ACETAMINOPHEN 500 MG
650 TABLET ORAL EVERY 6 HOURS
Refills: 0 | Status: COMPLETED | OUTPATIENT
Start: 2024-02-23 | End: 2024-02-26

## 2024-02-22 RX ORDER — CHLORHEXIDINE GLUCONATE 213 G/1000ML
30 SOLUTION TOPICAL ONCE
Refills: 0 | Status: DISCONTINUED | OUTPATIENT
Start: 2024-02-22 | End: 2024-02-23

## 2024-02-22 RX ORDER — DEXTROSE 50 % IN WATER 50 %
50 SYRINGE (ML) INTRAVENOUS
Refills: 0 | Status: DISCONTINUED | OUTPATIENT
Start: 2024-02-23 | End: 2024-02-25

## 2024-02-22 RX ORDER — POTASSIUM CHLORIDE 20 MEQ
10 PACKET (EA) ORAL
Refills: 0 | Status: DISCONTINUED | OUTPATIENT
Start: 2024-02-23 | End: 2024-02-23

## 2024-02-22 RX ORDER — PANTOPRAZOLE SODIUM 20 MG/1
40 TABLET, DELAYED RELEASE ORAL DAILY
Refills: 0 | Status: DISCONTINUED | OUTPATIENT
Start: 2024-02-23 | End: 2024-03-07

## 2024-02-22 RX ORDER — OXYCODONE HYDROCHLORIDE 5 MG/1
5 TABLET ORAL EVERY 4 HOURS
Refills: 0 | Status: DISCONTINUED | OUTPATIENT
Start: 2024-02-23 | End: 2024-02-29

## 2024-02-22 RX ORDER — SENNA PLUS 8.6 MG/1
2 TABLET ORAL AT BEDTIME
Refills: 0 | Status: DISCONTINUED | OUTPATIENT
Start: 2024-02-24 | End: 2024-03-12

## 2024-02-22 RX ORDER — CHLORHEXIDINE GLUCONATE 213 G/1000ML
1 SOLUTION TOPICAL DAILY
Refills: 0 | Status: DISCONTINUED | OUTPATIENT
Start: 2024-02-23 | End: 2024-03-12

## 2024-02-22 RX ORDER — DEXTROSE 50 % IN WATER 50 %
25 SYRINGE (ML) INTRAVENOUS
Refills: 0 | Status: DISCONTINUED | OUTPATIENT
Start: 2024-02-23 | End: 2024-02-23

## 2024-02-22 RX ORDER — ASPIRIN/CALCIUM CARB/MAGNESIUM 324 MG
81 TABLET ORAL DAILY
Refills: 0 | Status: DISCONTINUED | OUTPATIENT
Start: 2024-02-23 | End: 2024-02-23

## 2024-02-22 RX ORDER — SODIUM CHLORIDE 9 MG/ML
1000 INJECTION INTRAMUSCULAR; INTRAVENOUS; SUBCUTANEOUS
Refills: 0 | Status: DISCONTINUED | OUTPATIENT
Start: 2024-02-23 | End: 2024-02-25

## 2024-02-22 RX ORDER — ASCORBIC ACID 60 MG
500 TABLET,CHEWABLE ORAL DAILY
Refills: 0 | Status: DISCONTINUED | OUTPATIENT
Start: 2024-02-22 | End: 2024-02-23

## 2024-02-22 RX ORDER — INSULIN HUMAN 100 [IU]/ML
3 INJECTION, SOLUTION SUBCUTANEOUS
Qty: 100 | Refills: 0 | Status: DISCONTINUED | OUTPATIENT
Start: 2024-02-23 | End: 2024-02-25

## 2024-02-22 RX ORDER — POLYETHYLENE GLYCOL 3350 17 G/17G
17 POWDER, FOR SOLUTION ORAL DAILY
Refills: 0 | Status: DISCONTINUED | OUTPATIENT
Start: 2024-02-24 | End: 2024-03-12

## 2024-02-22 RX ORDER — ASCORBIC ACID 60 MG
500 TABLET,CHEWABLE ORAL
Refills: 0 | Status: COMPLETED | OUTPATIENT
Start: 2024-02-23 | End: 2024-02-28

## 2024-02-22 RX ORDER — CHLORHEXIDINE GLUCONATE 213 G/1000ML
1 SOLUTION TOPICAL ONCE
Refills: 0 | Status: COMPLETED | OUTPATIENT
Start: 2024-02-22 | End: 2024-02-22

## 2024-02-22 RX ORDER — OXYCODONE HYDROCHLORIDE 5 MG/1
10 TABLET ORAL EVERY 4 HOURS
Refills: 0 | Status: DISCONTINUED | OUTPATIENT
Start: 2024-02-23 | End: 2024-02-23

## 2024-02-22 RX ORDER — CEFUROXIME AXETIL 250 MG
1500 TABLET ORAL ONCE
Refills: 0 | Status: COMPLETED | OUTPATIENT
Start: 2024-02-22 | End: 2024-02-22

## 2024-02-22 RX ORDER — HYDROMORPHONE HYDROCHLORIDE 2 MG/ML
0.5 INJECTION INTRAMUSCULAR; INTRAVENOUS; SUBCUTANEOUS EVERY 6 HOURS
Refills: 0 | Status: DISCONTINUED | OUTPATIENT
Start: 2024-02-23 | End: 2024-02-25

## 2024-02-22 RX ADMIN — Medication 50 MILLIGRAM(S): at 05:18

## 2024-02-22 RX ADMIN — CHLORHEXIDINE GLUCONATE 1 APPLICATION(S): 213 SOLUTION TOPICAL at 21:13

## 2024-02-22 RX ADMIN — MUPIROCIN 1 APPLICATION(S): 20 OINTMENT TOPICAL at 08:12

## 2024-02-22 RX ADMIN — ERYTHROPOIETIN 10000 UNIT(S): 10000 INJECTION, SOLUTION INTRAVENOUS; SUBCUTANEOUS at 16:09

## 2024-02-22 RX ADMIN — CHLORHEXIDINE GLUCONATE 1 APPLICATION(S): 213 SOLUTION TOPICAL at 05:17

## 2024-02-22 RX ADMIN — SPIRONOLACTONE 25 MILLIGRAM(S): 25 TABLET, FILM COATED ORAL at 05:18

## 2024-02-22 RX ADMIN — Medication 325 MILLIGRAM(S): at 13:04

## 2024-02-22 RX ADMIN — TAMSULOSIN HYDROCHLORIDE 0.4 MILLIGRAM(S): 0.4 CAPSULE ORAL at 21:14

## 2024-02-22 RX ADMIN — Medication 3 MILLIGRAM(S): at 21:13

## 2024-02-22 NOTE — PROGRESS NOTE ADULT - SUBJECTIVE AND OBJECTIVE BOX
[facility]-- WOUND TEAM -- FOLLOW UP NOTE  --------------------------------------------------------------------------------    24 hour events/subjective:          Diet:  Diet, NPO after Midnight:      NPO Start Date: 22-Feb-2024,   NPO Start Time: 23:59  Except Medications     Special Instructions for Nursing:  Except Medications (02-22-24 @ 13:20)  Diet, DASH/TLC:   Sodium & Cholesterol Restricted (02-05-24 @ 18:11)      ROS: General/ SKIN/ MSK/ Neuro/ GI see HPI  all other systems negative  pt unable to offer    ALLERGIES & MEDICATIONS  --------------------------------------------------------------------------------  Allergies    garlic (Angioedema; Swelling; Anaphylaxis)  No Known Drug Allergies    Intolerances          STANDING INPATIENT MEDICATIONS    chlorhexidine 0.12% Liquid 30 milliLiter(s) Swish and Spit once  chlorhexidine 2% Cloths 1 Application(s) Topical <User Schedule>  chlorhexidine 4% Liquid 1 Application(s) Topical once  epoetin loulou-epbx (RETACRIT) Injectable 90837 Unit(s) SubCutaneous every 7 days  ferrous    sulfate 325 milliGRAM(s) Oral daily  lidocaine   4% Patch 1 Patch Transdermal daily  melatonin 3 milliGRAM(s) Oral at bedtime  metoprolol succinate ER 50 milliGRAM(s) Oral daily  mupirocin 2% Ointment 1 Application(s) Topical <User Schedule>  spironolactone 25 milliGRAM(s) Oral daily  tamsulosin 0.4 milliGRAM(s) Oral at bedtime      PRN INPATIENT MEDICATION  acetaminophen     Tablet .. 650 milliGRAM(s) Oral every 6 hours PRN  carbamide peroxide Otic Solution 4 Drop(s) Right Ear two times a day PRN  oxycodone    5 mG/acetaminophen 325 mG 1 Tablet(s) Oral every 6 hours PRN  polyethylene glycol 3350 17 Gram(s) Oral daily PRN  sodium chloride 0.65% Nasal 1 Spray(s) Both Nostrils every 4 hours PRN        VITALS/PHYSICAL EXAM  --------------------------------------------------------------------------------  T(C): 36.4 (02-22-24 @ 11:27), Max: 36.6 (02-22-24 @ 08:06)  HR: 121 (02-22-24 @ 11:27) (76 - 121)  BP: 106/64 (02-22-24 @ 11:27) (102/67 - 122/76)  RR: 18 (02-22-24 @ 11:27) (18 - 18)  SpO2: 96% (02-22-24 @ 11:27) (95% - 100%)  Wt(kg): --        02-21-24 @ 07:01  -  02-22-24 @ 07:00  --------------------------------------------------------  IN: 40 mL / OUT: 300 mL / NET: -260 mL    02-22-24 @ 07:01  -  02-22-24 @ 14:49  --------------------------------------------------------  IN: 60 mL / OUT: 0 mL / NET: 60 mL            LABS/ CULTURES/ RADIOLOGY:              8.2    11.24 >-----------<  209      [02-22-24 @ 06:41]              26.8     135  |  98  |  23  ----------------------------<  93      [02-21-24 @ 06:24]  3.8   |  28  |  1.23        Ca     8.6     [02-21-24 @ 06:24]    TPro  6.1  /  Alb  3.2  /  TBili  1.8  /  DBili  0.6  /  AST  27  /  ALT  31  /  AlkPhos  x   [02-22-24 @ 11:07]      A1C with Estimated Average Glucose Result: 6.2 % (01-27-24 @ 09:00)   [facility]-- WOUND TEAM -- FOLLOW UP NOTE  --------------------------------------------------------------------------------    24 hour events/subjective:    Afebrile  tolerating po w/o n/v  Ambulating w/ assist of walker  pre op for tomorrow  legs wounds improving - no pain or drainage  swelling improving      Diet:  Diet, NPO after Midnight:      NPO Start Date: 22-Feb-2024,   NPO Start Time: 23:59  Except Medications     Special Instructions for Nursing:  Except Medications (02-22-24 @ 13:20)  Diet, DASH/TLC:   Sodium & Cholesterol Restricted (02-05-24 @ 18:11)      ROS: General/ SKIN/ MSK/ Vasc see HPI  all other systems negative      ALLERGIES & MEDICATIONS  --------------------------------------------------------------------------------  Allergies  garlic (Angioedema; Swelling; Anaphylaxis)  No Known Drug Allergies        STANDING INPATIENT MEDICATIONS  chlorhexidine 0.12% Liquid 30 milliLiter(s) Swish and Spit once  chlorhexidine 2% Cloths 1 Application(s) Topical <User Schedule>  chlorhexidine 4% Liquid 1 Application(s) Topical once  epoetin loulou-epbx (RETACRIT) Injectable 92599 Unit(s) SubCutaneous every 7 days  ferrous    sulfate 325 milliGRAM(s) Oral daily  lidocaine   4% Patch 1 Patch Transdermal daily  melatonin 3 milliGRAM(s) Oral at bedtime  metoprolol succinate ER 50 milliGRAM(s) Oral daily  mupirocin 2% Ointment 1 Application(s) Topical <User Schedule>  spironolactone 25 milliGRAM(s) Oral daily  tamsulosin 0.4 milliGRAM(s) Oral at bedtime      PRN INPATIENT MEDICATION  acetaminophen     Tablet .. 650 milliGRAM(s) Oral every 6 hours PRN  carbamide peroxide Otic Solution 4 Drop(s) Right Ear two times a day PRN  oxycodone    5 mG/acetaminophen 325 mG 1 Tablet(s) Oral every 6 hours PRN  polyethylene glycol 3350 17 Gram(s) Oral daily PRN  sodium chloride 0.65% Nasal 1 Spray(s) Both Nostrils every 4 hours PRN        VITALS/PHYSICAL EXAM  --------------------------------------------------------------------------------  T(C): 36.4 (02-22-24 @ 11:27), Max: 36.6 (02-22-24 @ 08:06)  HR: 121 (02-22-24 @ 11:27) (76 - 121)  BP: 106/64 (02-22-24 @ 11:27) (102/67 - 122/76)  RR: 18 (02-22-24 @ 11:27) (18 - 18)  SpO2: 96% (02-22-24 @ 11:27) (95% - 100%)  Wt(kg): --    Parameters below as of 22 Feb 2024 16:19  Patient On (Oxygen Delivery Method): room air      NAD,  A&Ox3, frail,  WD/ WN/ WG,    Versa Care P500  bed  HEENT:  NC/AT,, EOMI, sclera clear, mucosa moist, throat clear, trachea midline, neck supple  Respiratory: nonlabored w/ equal chest rise  Gastrointestinal: soft NT/ND  Neurology:   strength & sensation grossly intact  Psych: calm/ appropriate  Musculoskeletal:  FROM, no deformities/ contractures  Vascular: BLE equally warml, no cyanosis, clubbing, edema nor acute ischemia           >LE //BLE edema equal           BLE DP/PT pulses palpable          BLE hemosiderin staining, varicose veins      Bilateral calves w/ dried serous crusted material and scabs       small denuded areas within area      no blistering  or  drainage  No odor, erythema, increased warmth, tenderness, induration, fluctuance, nor crepitus  Skin:  moist w/ good turgor        LABS/ CULTURES/ RADIOLOGY:              8.2    11.24 >-----------<  209      [02-22-24 @ 06:41]              26.8     135  |  98  |  23  ----------------------------<  93      [02-21-24 @ 06:24]  3.8   |  28  |  1.23        Ca     8.6     [02-21-24 @ 06:24]    TPro  6.1  /  Alb  3.2  /  TBili  1.8  /  DBili  0.6  /  AST  27  /  ALT  31  /  AlkPhos  x   [02-22-24 @ 11:07]      A1C with Estimated Average Glucose Result: 6.2 % (01-27-24 @ 09:00)

## 2024-02-22 NOTE — PROGRESS NOTE ADULT - SUBJECTIVE AND OBJECTIVE BOX
Chief Complaint:  Patient is a 63y old  Male who presents with a chief complaint of Alcoholic cardiomyopathy     (2024 12:17)      Interval Events:  liver biopsy; prelim results showing signs of congestive hepatopathy    Allergies:  garlic (Angioedema; Swelling; Anaphylaxis)  No Known Drug Allergies        Hospital Medications:  acetaminophen     Tablet .. 650 milliGRAM(s) Oral every 6 hours PRN  carbamide peroxide Otic Solution 4 Drop(s) Right Ear two times a day PRN  chlorhexidine 0.12% Liquid 30 milliLiter(s) Swish and Spit once  chlorhexidine 2% Cloths 1 Application(s) Topical <User Schedule>  chlorhexidine 4% Liquid 1 Application(s) Topical once  epoetin loulou-epbx (RETACRIT) Injectable 13828 Unit(s) SubCutaneous every 7 days  ferrous    sulfate 325 milliGRAM(s) Oral daily  lidocaine   4% Patch 1 Patch Transdermal daily  melatonin 3 milliGRAM(s) Oral at bedtime  metoprolol succinate ER 50 milliGRAM(s) Oral daily  mupirocin 2% Ointment 1 Application(s) Topical <User Schedule>  oxycodone    5 mG/acetaminophen 325 mG 1 Tablet(s) Oral every 6 hours PRN  polyethylene glycol 3350 17 Gram(s) Oral daily PRN  sodium chloride 0.65% Nasal 1 Spray(s) Both Nostrils every 4 hours PRN  spironolactone 25 milliGRAM(s) Oral daily  tamsulosin 0.4 milliGRAM(s) Oral at bedtime      PMHX/PSHX:  Dyslipidemia    Hypertension    Chronic GERD    History of aortic stenosis    Cellulitis    No Past Medical History    Brain cancer    No significant past surgical history        Family history:  FH: congestive heart failure (Father, Sibling)    FH: coronary artery disease (Sibling)    FH: brain cancer (Mother)        PHYSICAL EXAM:   Vital Signs:  Vital Signs Last 24 Hrs  T(C): 36.4 (2024 11:27), Max: 36.6 (2024 08:06)  T(F): 97.6 (2024 11:27), Max: 97.8 (2024 08:06)  HR: 79 (2024 16:19) (76 - 121)  BP: 91/61 (2024 16:19) (91/61 - 122/76)  BP(mean): --  RR: 18 (2024 16:19) (18 - 18)  SpO2: 100% (2024 16:19) (95% - 100%)    Parameters below as of 2024 16:19  Patient On (Oxygen Delivery Method): room air      Daily     Daily Weight in k.1 (2024 08:06)    GENERAL:  No acute distress  HEENT:  no scleral icterus  CHEST:  no accessory muscle use  HEART:  Regular rate and rhythm  ABDOMEN:  Soft, non-tender, non-distended  EXTREMITIES:  No edema  SKIN:  No rash/ecchymoses  NEURO:  Alert and oriented x 3    LABS:                        8.2    11.24 )-----------( 209      ( 2024 06:41 )             26.8     Mean Cell Volume: 67.3 fl (24 @ 06:41)        135  |  98  |  23  ----------------------------<  93  3.8   |  28  |  1.23    Ca    8.6      2024 06:24    TPro  6.1  /  Alb  3.2<L>  /  TBili  1.8<H>  /  DBili  0.6<H>  /  AST  27  /  ALT  31  /  AlkPhos  x       LIVER FUNCTIONS - ( 2024 11:07 )  Alb: 3.2 g/dL / Pro: 6.1 g/dL / ALK PHOS: x     / ALT: 31 U/L / AST: 27 U/L / GGT: x             Urinalysis Basic - ( 2024 11:07 )    Color: Dark Yellow / Appearance: Clear / S.026 / pH: x  Gluc: x / Ketone: Negative mg/dL  / Bili: Small / Urobili: 1.0 mg/dL   Blood: x / Protein: Trace mg/dL / Nitrite: Negative   Leuk Esterase: Negative / RBC: x / WBC x   Sq Epi: x / Non Sq Epi: x / Bacteria: x                              8.2    11.24 )-----------( 209      ( 2024 06:41 )             26.8                         8.3    11.86 )-----------( 229      ( 2024 06:24 )             28.2                         9.0    10.36 )-----------( 252      ( 2024 20:28 )             30.8                         9.3    10.38 )-----------( 223      ( 2024 06:55 )             29.8

## 2024-02-22 NOTE — PROGRESS NOTE ADULT - SUBJECTIVE AND OBJECTIVE BOX
[facility] Division of Hospital Medicine  Renu Early MD  MS Teams PREFERRED        SUBJECTIVE / OVERNIGHT EVENTS: Seen and examined at bedside. NAD. Optimistic about surgery.    MEDICATIONS  (STANDING):  chlorhexidine 2% Cloths 1 Application(s) Topical <User Schedule>  epoetin loulou-epbx (RETACRIT) Injectable 39154 Unit(s) SubCutaneous every 7 days  ferrous    sulfate 325 milliGRAM(s) Oral daily  lidocaine   4% Patch 1 Patch Transdermal daily  melatonin 3 milliGRAM(s) Oral at bedtime  metoprolol succinate ER 50 milliGRAM(s) Oral daily  mupirocin 2% Ointment 1 Application(s) Topical <User Schedule>  spironolactone 25 milliGRAM(s) Oral daily  tamsulosin 0.4 milliGRAM(s) Oral at bedtime    MEDICATIONS  (PRN):  acetaminophen     Tablet .. 650 milliGRAM(s) Oral every 6 hours PRN Mild Pain (1 - 3)  carbamide peroxide Otic Solution 4 Drop(s) Right Ear two times a day PRN clogged ear  oxycodone    5 mG/acetaminophen 325 mG 1 Tablet(s) Oral every 6 hours PRN Severe Pain (7 - 10)  polyethylene glycol 3350 17 Gram(s) Oral daily PRN Constipation  sodium chloride 0.65% Nasal 1 Spray(s) Both Nostrils every 4 hours PRN Nasal Congestion      I&O's Summary    2024 07:01  -  2024 07:00  --------------------------------------------------------  IN: 40 mL / OUT: 300 mL / NET: -260 mL        PHYSICAL EXAM:  Vital Signs Last 24 Hrs  T(C): 36.4 (2024 11:27), Max: 36.6 (2024 08:06)  T(F): 97.6 (2024 11:27), Max: 97.8 (2024 08:06)  HR: 121 (2024 11:27) (48 - 121)  BP: 106/64 (2024 11:27) (97/67 - 122/76)  BP(mean): --  RR: 18 (2024 11:27) (18 - 18)  SpO2: 96% (2024 11:27) (95% - 100%)    Parameters below as of 2024 11:27  Patient On (Oxygen Delivery Method): room air    PHYSICAL EXAM:-  GENERAL: NAD, well-developed  EYES: EOMI, PERRLA, conjunctiva and sclera clear  NECK: Supple, No JVD, no thyromegaly  CHEST/LUNG: Clear to auscultation bilaterally; No wheeze  HEART: Regular rate and rhythm; S1, S2 audible, No murmurs, rubs, or gallops  ABDOMEN: Soft, Nontender, Nondistended; Bowel sounds present  EXTREMITIES:  2+ Peripheral Pulses, No clubbing, cyanosis, 1+ LE edema  NEURO: AAOx3, no focal deficit  LABS:                        8.2     )-----------( 209      ( 2024 06:41 )             26.8     02-21    135  |  98  |  23  ----------------------------<  93  3.8   |  28  |  1.23    Ca    8.6      2024 06:24            Urinalysis Basic - ( 2024 11:07 )    Color: Dark Yellow / Appearance: Clear / S.026 / pH: x  Gluc: x / Ketone: Negative mg/dL  / Bili: Small / Urobili: 1.0 mg/dL   Blood: x / Protein: Trace mg/dL / Nitrite: Negative   Leuk Esterase: Negative / RBC: x / WBC x   Sq Epi: x / Non Sq Epi: x / Bacteria: x

## 2024-02-22 NOTE — DIETITIAN INITIAL EVALUATION ADULT - ENERGY INTAKE
Adequate (%) Pt endorses ~50-75% intake of meals on average in-house. Does not like institutionalized food as much as food from OSH, amenable to receiving UGO[employer] Standard 1.0 oral supplement 1x/day to aid in protein intake.

## 2024-02-22 NOTE — DIETITIAN INITIAL EVALUATION ADULT - OTHER INFO
- Renal: LINDA on CKD3 per nephrology, LINDA improved, renal electrolytes WDL currently. Noted with iron deficiency anemia. Ordered for aldactone, retacrit, and elemental iron in-house  - Endo: pt with no hx of DM per chart, latest A1C 6.2% (1/27) indicates suboptimal glycemic control, not currently ordered for DM medications in-house, no meds noted in home medications  - Cardio: pt with hx of CHF, undergoing TAVR evaluation in-house  - Hepatology: pt with transaminitis, now s/p transjugular liver biopsy by IR 2/20, awaiting results

## 2024-02-22 NOTE — DIETITIAN INITIAL EVALUATION ADULT - ORAL NUTRITION SUPPLEMENTS
Add [employer] Standard 1.0 1x/day to optimize protein energy intake (provides 325kcal, 16g protein /shake)

## 2024-02-22 NOTE — PRE PROCEDURE NOTE - PRE PROCEDURE EVALUATION
CARDIAC SURGERY PRE-OP NOTE    CC: Patient is a 63y old  Male who presents with a chief complaint of Alcoholic cardiomyopathy     (2024 12:17)      Referring Physician:                                                                                                           Surgeon:    Procedure: (Date) (Procedure)    Allergies    garlic (Angioedema; Swelling; Anaphylaxis)  No Known Drug Allergies    Intolerances        HPI:  63 year old male with Hx of HTN, GERD, dyslipidemia, chronic intermittent leg pain, aortic stenosis, chronic venous stasis dermatitis with bilateral leg swelling, poor historian presented to [facility] ER on 2024 for bilateral leg pain and swelling. He was admitted to medicine and was found to have new onset acute HFrEF (EF 35-40% 2024) in setting of severe AS. Echo results below from 2024.  He was treated with IV lasix, beta blocker. Not a candidate for ACE/ARB/ARNI due to renal function. Also was treated with 5 days keflex for possible lower extremity cellulitis. While admitted patient found to be anemic. FOBT negative. Heme was consulted, advised multifactoral etiology for anemia, mechanical hemolysis due to severe AS also possible. Patient was given IV iron, as well as 1 unit PRBCs on  with improvement in Hbg. GI was also consulted.  Patient will require ischemic workup with coronary angiogram and full structural heart team evaluation for TAVR - accepted for transfer at [facility] by hospitalist Dr Joiner in consult with Dr Catalan (interventional cardiology).  Walks with cane, Lives with wife- poor memory   denies implanted cardiac monitors     Summary: ECHO 2024    1. Left ventricular ejection fraction, by visual estimation, is 35 to   40%.   2. Moderately decreased global left ventricular systolic function.   3. Basal inferior segment, mid inferolateral segment, mid anterolateral   segment, and mid inferior segment are abnormal as described above.   4. Severely enlarged left atrium.   5. Increased LV wall thickness.   6. Mildly increased left ventricular internal cavity size.   7. Spectral Doppler shows restrictivepattern of left ventricular   myocardial filling (Grade III diastolic dysfunction).   8. There is mild concentric left ventricular hypertrophy.   9. Moderately enlarged right ventricle.  10. Right atrial enlargement.  11. Trivial pericardial effusion.  12. Moderate mitral valve regurgitation.  13. Thickening of the anterior and posterior mitral valve leaflets.  14. Mild-moderate tricuspid regurgitation.  15. Mild aortic regurgitation.  16. Severe aortic valve stenosis.  17. Dilatation of the ascending aorta.  18. Estimated pulmonary artery systolic pressure is 58.6 mmHg assuming a   right atrial pressure of 15 mmHg, which is consistent with moderate   pulmonary hypertension.  19. LA volume Index is 56.0 ml/m² ml/m2.  20. The Dimesionless Index value is .17.  [id number] Micah Gopi , Electronically signed on 2024 at   2:58:40 PM     (2024 16:00)      PAST MEDICAL & SURGICAL HISTORY:  Dyslipidemia      Hypertension      Chronic GERD      History of aortic stenosis      Cellulitis      No significant past surgical history          MEDICATIONS  (STANDING):  chlorhexidine 0.12% Liquid 30 milliLiter(s) Swish and Spit once  chlorhexidine 2% Cloths 1 Application(s) Topical <User Schedule>  chlorhexidine 4% Liquid 1 Application(s) Topical once  epoetin loulou-epbx (RETACRIT) Injectable 56362 Unit(s) SubCutaneous every 7 days  ferrous    sulfate 325 milliGRAM(s) Oral daily  lidocaine   4% Patch 1 Patch Transdermal daily  melatonin 3 milliGRAM(s) Oral at bedtime  metoprolol succinate ER 50 milliGRAM(s) Oral daily  mupirocin 2% Ointment 1 Application(s) Topical <User Schedule>  spironolactone 25 milliGRAM(s) Oral daily  tamsulosin 0.4 milliGRAM(s) Oral at bedtime    MEDICATIONS  (PRN):  acetaminophen     Tablet .. 650 milliGRAM(s) Oral every 6 hours PRN Mild Pain (1 - 3)  carbamide peroxide Otic Solution 4 Drop(s) Right Ear two times a day PRN clogged ear  oxycodone    5 mG/acetaminophen 325 mG 1 Tablet(s) Oral every 6 hours PRN Severe Pain (7 - 10)  polyethylene glycol 3350 17 Gram(s) Oral daily PRN Constipation  sodium chloride 0.65% Nasal 1 Spray(s) Both Nostrils every 4 hours PRN Nasal Congestion      Vital Signs Last 24 Hrs  T(C): 36.4 (24 @ 11:27), Max: 36.6 (24 @ 08:06)  T(F): 97.6 (24 @ 11:27), Max: 97.8 (24 @ 08:06)  HR: 79 (02-22-24 @ 16:19) (79 - 121)  BP: 91/61 (24 @ 16:19) (91/61 - 113/68)  RR: 18 (24 @ 16:19) (18 - 18)  SpO2: 100% (24 @ 16:19) (95% - 100%)          ABO Interpretation: B ( @ 09:35)     Daily     Daily Weight in k.1 (2024 08:06)  Admit Wt: Drug Dosing Weight  Height (cm): 180.3 (2024 13:13)  Weight (kg): 89.8 (2024 13:13)  BMI (kg/m2): 27.6 (2024 13:13)  BSA (m2): 2.1 (2024 13:13)    Labs:                        8.2    11.24 )-----------( 209      ( 2024 06:41 )             26.8         135  |  98  |  23  ----------------------------<  93  3.8   |  28  |  1.23    Ca    8.6      2024 06:24    TPro  6.1  /  Alb  3.2<L>  /  TBili  1.8<H>  /  DBili  0.6<H>  /  AST  27  /  ALT  31  /  AlkPhos  93        P2Y12: P2Y12 Plt Reactivity: 283 PRU (24 @ 09:10)      Blood Type: ABO Interpretation: B ( @ 09:35)    HGB A1C:   Prealbumin:   Pro-BNP:   Thyroid Panel:   MRSA:  / MSSA:   Urinalysis Basic - ( 2024 11:07 )    Color: Dark Yellow / Appearance: Clear / S.026 / pH: x  Gluc: x / Ketone: Negative mg/dL  / Bili: Small / Urobili: 1.0 mg/dL   Blood: x / Protein: Trace mg/dL / Nitrite: Negative   Leuk Esterase: Negative / RBC: x / WBC x   Sq Epi: x / Non Sq Epi: x / Bacteria: x          CXR:     EKG:    Carotid Duplex:      PFT's:    Echocardiogram:    Cardiac catheterization:    Vein Mapping:    PHYSICAL EXAM:  Gen: WN/WD NAD  Neuro: AAOx3, nonfocal  Pulm: CTA B/L  CV: S1S2  Abd: Soft, NT, ND +BS in all four quadrants  Ext: No edema, + peripheral pulses      Type & Cross  [ ] Yes  [ ] No  NPO after Midnight [ ] Yes  [ ] No  Pre-op ABX ordered, to be taped on chart:  [ ] Yes  [ ] No     Hibiclens/Peridex ordered [ ] Yes  [ ] No  Intraop on Hold: PRBCs, CXR, LESLEY [ ]   Consent obtained  [ ] Yes  [ ] No CARDIAC SURGERY PRE-OP NOTE    CC: Patient is a 63y old  Male who presents with a chief complaint of Alcoholic cardiomyopathy (2024 12:17)                                                                                                           Surgeon: Dr. Ronni Martinez    Procedure: 24, AVR    Allergies:   garlic (Angioedema; Swelling; Anaphylaxis)  No Known Drug Allergies    HPI:  63 year old male with Hx of HTN, GERD, dyslipidemia, chronic intermittent leg pain, aortic stenosis, chronic venous stasis dermatitis with bilateral leg swelling, poor historian presented to [place] ER on 2024 for bilateral leg pain and swelling. He was admitted to medicine and was found to have new onset acute HFrEF (EF 35-40% 2024) in setting of severe AS. Echo results below from 2024.  He was treated with IV lasix, beta blocker. Not a candidate for ACE/ARB/ARNI due to renal function. Also was treated with 5 days keflex for possible lower extremity cellulitis at [facility]. While pt was admitted patient found to be anemic. FOBT negative. Heme was consulted, advised multifactoral etiology for anemia, mechanical hemolysis due to severe AS also possible. Patient was given IV iron, as well as 1 unit PRBCs on  with improvement in Hbg. GI was also consulted. Patient was transferred to [facility] for TAVR/MARTIR evaluation in the setting of severe aortic stenosis complicated by heart failure. Patient also evaluated by ID, Podiatry, and WOC for LE findings. Hepatology also following for elevated LFTs. Patient s/p Liver biopsy , cleared from Hepatology-stand point for cardiac surgery. Patient now planned for AVR with Dr. Martinez 24.     Past Medical & Surgical History:  Dyslipidemia  Hypertension  Chronic GERD  History of aortic stenosis  Cellulitis    Active Medications:  chlorhexidine 0.12% Liquid 30 milliLiter(s) Swish and Spit once  chlorhexidine 2% Cloths 1 Application(s) Topical <User Schedule>  chlorhexidine 4% Liquid 1 Application(s) Topical once  epoetin loulou-epbx (RETACRIT) Injectable 84412 Unit(s) SubCutaneous every 7 days  ferrous    sulfate 325 milliGRAM(s) Oral daily  lidocaine   4% Patch 1 Patch Transdermal daily  melatonin 3 milliGRAM(s) Oral at bedtime  metoprolol succinate ER 50 milliGRAM(s) Oral daily  mupirocin 2% Ointment 1 Application(s) Topical <User Schedule>  spironolactone 25 milliGRAM(s) Oral daily  tamsulosin 0.4 milliGRAM(s) Oral at bedtime  acetaminophen     Tablet .. 650 milliGRAM(s) Oral every 6 hours PRN Mild Pain (1 - 3)  carbamide peroxide Otic Solution 4 Drop(s) Right Ear two times a day PRN clogged ear  oxycodone    5 mG/acetaminophen 325 mG 1 Tablet(s) Oral every 6 hours PRN Severe Pain (7 - 10)  polyethylene glycol 3350 17 Gram(s) Oral daily PRN Constipation  sodium chloride 0.65% Nasal 1 Spray(s) Both Nostrils every 4 hours PRN Nasal Congestion      Vital Signs Last 24 Hrs  T(C): 36.4 (24 @ 11:27), Max: 36.6 (24 @ 08:06)  T(F): 97.6 (24 @ 11:27), Max: 97.8 (24 @ 08:06)  HR: 79 (24 @ 16:19) (79 - 121)  BP: 91/61 (24 @ 16:19) (91/61 - 113/68)  RR: 18 (24 @ 16:19) (18 - 18)  SpO2: 100% (24 @ 16:19) (95% - 100%)         Daily Weight in k.1 (2024 08:06)  Admit Wt: Drug Dosing Weight  Height (cm): 180.3 (2024 13:13)  Weight (kg): 89.8 (2024 13:13)  BMI (kg/m2): 27.6 (2024 13:13)  BSA (m2): 2.1 (2024 13:13)    Labs:             8.2    11.24 )-----------( 209      ( 2024 06:41 )             26.8     135  |  98  |  23  ----------------------------<  93  3.8   |  28  |  1.23  Ca    8.6      2024 06:24  TPro  6.1  /  Alb  3.2<L>  /  TBili  1.8<H>  /  DBili  0.6<H>  /  AST  27  /  ALT  31  /  AlkPhos  93    P2Y12: P2Y12 Plt Reactivity: 283 PRU (24 @ 09:10)  Blood Type: ABO Interpretation: B ( @ 09:35)  HGB A1C: 6.2 (24 @ 09:00)   Pro-BNP: 8375 pg/mL (24 @ 11:07)   Thyroid Panel: 2.165 uIU/mL(24 @ 09:00)   MRSA:  / MSSA: *pending rpt 24; MRSA PCR Result.: NotDetec: (24 @ 22:42)     Urinalysis Basic - ( 2024 11:07 )  Color: Dark Yellow / Appearance: Clear / S.026 / pH: x  Gluc: x / Ketone: Negative mg/dL  / Bili: Small / Urobili: 1.0 mg/dL   Blood: x / Protein: Trace mg/dL / Nitrite: Negative   Leuk Esterase: Negative / RBC: x / WBC x   Sq Epi: x / Non Sq Epi: x / Bacteria: x    CXR: < from: Xray Chest 1 View- PORTABLE-Urgent (Xray Chest 1 View- PORTABLE-Urgent .) (24 @ 09:21) >IMPRESSION: No focal consolidations. < end of copied text >    EKG: performed 24    Carotid Duplex:  < from: VA Duplex Carotid, Bilat (24 @ 10:44) > IMPRESSION: No significant hemodynamic stenosis of either carotid artery. < end of copied text >    PFT's: performed 24    Echocardiogram:   < from: TTE Echo Complete w/o Contrast w/ Doppler (24 @ 08:26) >  Summary:   1. Left ventricular ejection fraction, by visual estimation, is 35 to   40%.   2. Moderately decreased global left ventricular systolic function.   3. Basal inferior segment, mid inferolateral segment, mid anterolateral   segment, and mid inferior segment are abnormal as described above.   4. Severely enlarged left atrium.   5. Increased LV wall thickness.   6. Mildly increased left ventricular internal cavity size.   7. Spectral Doppler shows restrictivepattern of left ventricular   myocardial filling (Grade III diastolic dysfunction).   8. There is mild concentric left ventricular hypertrophy.   9. Moderately enlarged right ventricle.  10. Right atrial enlargement.  11. Trivial pericardial effusion.  12. Moderate mitral valve regurgitation.  13. Thickening of the anterior and posterior mitral valve leaflets.  14. Mild-moderate tricuspid regurgitation.  15. Mild aortic regurgitation.  16. Severe aortic valve stenosis.  17. Dilatation of the ascending aorta.  18. Estimated pulmonary artery systolic pressure is 58.6 mmHg assuming a   right atrial pressure of 15 mmHg, which is consistent with moderate   pulmonary hypertension.  19. LA volume Index is 56.0 ml/m² ml/m2.  20. The Dimesionless Index value is .17.    [id number] Micah Nguyễn , Electronically signed on 2024 at   2:58:40 PM    < end of copied text >    Cardiac catheterization:   < from: Cardiac Catheterization (24 @ 17:39) >  Diagnostic Findings:     Coronary Angiography   The coronary circulation is right dominant.      LM   Left main artery: Angiography shows no disease.      LAD   Left anterior descending artery: Angiography shows minor  irregularities.      < from: Cardiac Catheterization (24 @ 17:39) >  CX   Circumflex: Angiography shows no disease.      RCA   Right coronary artery: Angiography shows no disease.      < end of copied text >    CTA Coronary TAVR OCTAVIA w IV Contrast:   < from: CT Angio Coronary TAVR OCTAVIA w/ IV Cont (24 @ 12:41) >  IMPRESSION:    1. Severelycalcified (Agatston calcium score 5251) trileaflet aortic   valve.    2. Aortic and peripheral access vessel measurements as reported above.    3. Markedly enlarged heart. Correlate with echocardiogram. Evidence of   third spacing with chest/abdominal wall soft tissue edema and small   ascites.    4. Findings suggestive of mid/distal esophagitis. Correlate clinically.    5. Indeterminate right adrenal nodule measures 14 mm. Correlate with   abdominal MRI for characterization.    6. [id number] mm upper anterior subcutaneous dermal lesion, image   103 series 15, incompletely evaluated. Again, epidermal inclusion cyst is   in the differential. Recommend dermatology evaluation.    --- End of Report ---    < end of copied text >        PHYSICAL EXAM:  Gen: NAD  Neuro: AAOx3, nonfocal  Pulm: CTA B/L, non-labored respirations  CV: S1S2  Abd: Soft, +BS in all four quadrants, +flatus  Ext: +B/L LE ulcers with erythema; denies calf tenderness, +peripheral pulses  (Physical Exam findings reviewed with CTS Attending Dr. Martinez; who also evaluated patient. Per Dr. Martinez, ok to proceed with surgery).     Type & Cross  [x ] Yes  [ ] No  NPO after Midnight [x ] Yes  [ ] No  Pre-op ABX ordered, to be taped on chart:  [x ] Yes  [ ] No     Hibiclens/Peridex ordered [x ] Yes  [ ] No  Intraop on Hold: PRBCs, CXR, LESLEY [x ]   Consent obtained  [x ] Yes  [ ] No

## 2024-02-22 NOTE — DIETITIAN INITIAL EVALUATION ADULT - OTHER CALCULATIONS
Calculations based on lowest daily wt/presumed dry wt (2/18) with consideration for deep tissue injuries, malnutrition, CKD3 not on HD  Fluid needs deferred to team

## 2024-02-22 NOTE — DIETITIAN INITIAL EVALUATION ADULT - REASON INDICATOR FOR ASSESSMENT
Assessment indicated for length of stay  Sources: Medical Record, patient  Chart reviewed, events noted.

## 2024-02-22 NOTE — PROGRESS NOTE ADULT - PROBLEM SELECTOR PLAN 1
HFrEF (EF 35-40% 1/2024) with severe AS  Diuretics: IV Lasix 60mg bid - hold for hypotension, received AM dose, can switch to PO depending on weight  MRA: Aldactone 25mg/d  ARB/ARNI: losartan 25 po daily - would transition to entresto pending HF  BB: Cont Toprol 50mg/d  SGLT2: start farxiga prior to dc planning once off iv diuretics  LHC revealed non-obstructive CAD  HF and structural heart following  Plan for CTS tomorrow pending biopsy results, expedited today  d/w CTS today

## 2024-02-22 NOTE — DIETITIAN INITIAL EVALUATION ADULT - NS FNS WEIGHT CHANGE REASON
Pt reports UBW of 211 and endorses ~20 pound wt loss though believes it could be more. Dosing wt 198 pounds.    Daily wts in pounds: 174 (2/22), 175 (2/21), 165 (2/18), 167 (2/17), 179 (2/14), 180 (2/13), 185 (2/10, 2/11) 200 (2/7)    Wt hx per [facility] HISHIRA in pounds: 198 (2/5), 215 (1/26), 195 (1/25), 211 (1/7), 211 (12/5/23), 208 (7/26)    Noted pt with 13% wt loss x2 weeks, clinically significant. Wt fluctuations also may be in setting of fluid shifts, pt noted with edema and ordered for diuretics. Moderate edema may be masking further wt loss.    RD to continue to monitor wt as able     IBW: 172 pounds/unintentional

## 2024-02-22 NOTE — PROGRESS NOTE ADULT - ASSESSMENT
A/P:63 year old male with Hx of HTN, GERD, dyslipidemia, chronic intermittent leg pain, aortic stenosis, chronic venous stasis dermatitis with bilateral leg swelling, poor historian presented to [place] ER on 1/25/2024 for bilateral leg pain and swelling. He was admitted to medicine and was found to have new onset acute HFrEF (EF 35-40% 1/2024) in setting of severe AS    Wound Consult requested to assist w/ management of PVD w/ stasis dermatitis  BLE ulcers    BLE wound Medihoney daily  Elevate BLE  Consider BILL/PVR, to determine is legs can be compressed  Moisturize intact skin w/ SWEEN cream BID  Nutrition Consult for optimization in pt w/Increased nutritional needs          encourage high quality protein, eliot/ prosource, MVI & Vit C to promote wound healing  Continue turning and positioning w/ offloading assistive devices as per protocol  Continue w/ attends under pads and Pericare as per protocol  Waffle Cushion to chair when oob to chair  Continue w/ low air loss pressure redistribution bed surface   Care as per medicine, will remain available as requested  Upon discharge f/u as outpatient at [facility] [address] [place] [address] [phone]  RN & attng & RN  Nights/ Weekends/ Holidays please call:  General Surgery Consult pager ([phone]) for emergencies  Wound PT for multilayer leg wrapping or VAC issues (x [phone])   I spent 35minutes face to face w/ this pt of which more than 50% of the time was spent counseling & coordinating care of this pt.  A/P:63 year old male with Hx of HTN, GERD, dyslipidemia, chronic intermittent leg pain, aortic stenosis, chronic venous stasis dermatitis with bilateral leg swelling, poor historian presented to [place] ER on 1/25/2024 for bilateral leg pain and swelling. He was admitted to medicine and was found to have new onset acute HFrEF (EF 35-40% 1/2024) in setting of severe AS    Wound Consult requested to assist w/ management of PVD w/ stasis dermatitis  BLE ulcers    BLE wound Medihoney daily  Elevate BLE & compression  Consider BILL/PVR, to determine prior to profore/ multilayer compression  Moisturize intact skin w/ SWEEN cream BID  Nutrition Consult for optimization in pt w/Increased nutritional needs          encourage high quality protein, eliot/ prosource, MVI & Vit C to promote wound healing  Continue turning and positioning w/ offloading assistive devices as per protocol  Continue w/ attends under pads and Pericare as per protocol  Waffle Cushion to chair when oob to chair  Continue w/ low air loss pressure redistribution bed surface   Care as per medicine, will remain available as requested  Upon discharge f/u as outpatient at W[facility] Center [address] [place] [address] [phone]  RN & attng & RN  Nights/ Weekends/ Holidays please call:  General Surgery Consult pager ([phone]) for emergencies  Wound PT for multilayer leg wrapping or VAC issues (x [phone])   I spent 35minutes face to face w/ this pt of which more than 50% of the time was spent counseling & coordinating care of this pt.

## 2024-02-22 NOTE — DIETITIAN INITIAL EVALUATION ADULT - PERTINENT MEDS FT
MEDICATIONS  (STANDING):  chlorhexidine 2% Cloths 1 Application(s) Topical <User Schedule>  epoetin loulou-epbx (RETACRIT) Injectable 04398 Unit(s) SubCutaneous every 7 days  ferrous    sulfate 325 milliGRAM(s) Oral daily  lidocaine   4% Patch 1 Patch Transdermal daily  melatonin 3 milliGRAM(s) Oral at bedtime  metoprolol succinate ER 50 milliGRAM(s) Oral daily  mupirocin 2% Ointment 1 Application(s) Topical <User Schedule>  spironolactone 25 milliGRAM(s) Oral daily  tamsulosin 0.4 milliGRAM(s) Oral at bedtime    MEDICATIONS  (PRN):  acetaminophen     Tablet .. 650 milliGRAM(s) Oral every 6 hours PRN Mild Pain (1 - 3)  carbamide peroxide Otic Solution 4 Drop(s) Right Ear two times a day PRN clogged ear  oxycodone    5 mG/acetaminophen 325 mG 1 Tablet(s) Oral every 6 hours PRN Severe Pain (7 - 10)  polyethylene glycol 3350 17 Gram(s) Oral daily PRN Constipation  sodium chloride 0.65% Nasal 1 Spray(s) Both Nostrils every 4 hours PRN Nasal Congestion

## 2024-02-22 NOTE — PROGRESS NOTE ADULT - ASSESSMENT
63 yrs old male w/ hx of HTN, GERD, dyslipidemia, chronic intermittent leg pain, aortic stenosis, chronic venous stasis dermatitis who p/w b/l leg edema. Patient was seen in [place] [facility] on 1/25/2024 for bilateral leg pain and swelling. Hospital course c/w new onset HFrEF along with severe AS, now s/p LHC which showed non obstructive CAD and being considered for o/p TAVR, now has elevated liver enzymes.     #Elevated liver enzymes, downtrending  On admission, had mildly elevated AST/ALT on 1/25, which has been stable, now AST/ALT 65/238, and slow rise on bilirubin 2.6 which was normal on admission. On 1/7/24, he had normal liver enzymes and bilirubin. Patient also had mildly elevated INR 1.37 (no prior values), and normal platelets.    - Denied prior hx of liver disease or alcohol use.   - Imaging showed hepatomegaly, and small ascites, spleen appears normal.   - TTE showed EF 30%, moderate MR and severe TR.  - Other lab serologies: Hep B/C/A neg. AMA, ASA neg. Normal AFP levels.   - Patient underwent Abd elastography on 2/8 - moderate risk fibrosis w/ hepatomegaly, hepatic steatosis, dilated IVC w/ passive congestion. Difficult to interpret the results, especially in the setting of systolic heart failure and volume overload.   - liver biopsy 2/20; reviewed with pathologist; prelim results showing signs of congestive hepatopathy    Recommendations:   - reviewed liver biopsy results with pathologist; showing signs of congestive hepatopathy; no hepatologic contraindicaiton to proceeding with surgery    Discussed with Dr. Melton    Note and recommendations are incomplete until reviewed and attested by attending.    Bebeto Prescott MD  GI/Hepatology Fellow, PGY4  Teams preferred (7AM to 5PM); after 5PM, call GI fellow on call    NEW CONSULTS:  Please email [email or web address] 63 yrs old male w/ hx of HTN, GERD, dyslipidemia, chronic intermittent leg pain, aortic stenosis, chronic venous stasis dermatitis who p/w b/l leg edema. Patient was seen in [place] [facility] on 1/25/2024 for bilateral leg pain and swelling. Hospital course c/w new onset HFrEF along with severe AS, now s/p LHC which showed non obstructive CAD and being considered for o/p TAVR, now has elevated liver enzymes.     #Elevated liver enzymes, downtrending  On admission, had mildly elevated AST/ALT on 1/25, which has been stable, now AST/ALT 65/238, and slow rise on bilirubin 2.6 which was normal on admission. On 1/7/24, he had normal liver enzymes and bilirubin. Patient also had mildly elevated INR 1.37 (no prior values), and normal platelets.    - Denied prior hx of liver disease or alcohol use.   - Imaging showed hepatomegaly, and small ascites, spleen appears normal.   - TTE showed EF 30%, moderate MR and severe TR.  - Other lab serologies: Hep B/C/A neg. AMA, ASA neg. Normal AFP levels.   - Patient underwent Abd elastography on 2/8 - moderate risk fibrosis w/ hepatomegaly, hepatic steatosis, dilated IVC w/ passive congestion. Difficult to interpret the results, especially in the setting of systolic heart failure and volume overload.   - liver biopsy 2/20; reviewed with pathologist; showing signs of congestive hepatopathy    Recommendations:   - reviewed liver biopsy results with pathologist; showing signs of congestive hepatopathy; no signs suggestive of significant cirrhosis; no hepatologic contraindication to proceeding with surgery    Discussed with Dr. Melton    Note and recommendations are incomplete until reviewed and attested by attending.    Bebeto Prescott MD  GI/Hepatology Fellow, PGY4  Teams preferred (7AM to 5PM); after 5PM, call GI fellow on call    NEW CONSULTS:  Please email [email or web address] 63 yrs old male w/ hx of HTN, GERD, dyslipidemia, chronic intermittent leg pain, aortic stenosis, chronic venous stasis dermatitis who p/w b/l leg edema. Patient was seen in [place] ER on 1/25/2024 for bilateral leg pain and swelling. Hospital course c/w new onset HFrEF along with severe AS, now s/p LHC which showed non obstructive CAD and being considered for o/p TAVR, now has elevated liver enzymes.     #Elevated liver enzymes, normalized. Mild bilirubin elevation.  On admission, had mildly elevated AST/ALT on 1/25, which has been stable, now AST/ALT 65/238, and slow rise on bilirubin 2.6 which was normal on admission. On 1/7/24, he had normal liver enzymes and bilirubin. Patient also had mildly elevated INR 1.37 (no prior values), and normal platelets.    - Denied prior hx of liver disease or alcohol use.   - Imaging showed hepatomegaly, and small ascites, spleen appears normal.   - TTE showed EF 30%, moderate MR and severe TR.  - Other lab serologies: Hep B/C/A neg. AMA, ASA neg. Normal AFP levels.   - Patient underwent Abd elastography on 2/8 - moderate risk fibrosis w/ hepatomegaly, hepatic steatosis, dilated IVC w/ passive congestion. Difficult to interpret the results, especially in the setting of systolic heart failure and volume overload.   - liver biopsy 2/20; reviewed with pathologist; showing signs of congestive hepatopathy      - reviewed liver biopsy results with pathologist; showing signs of congestive hepatopathy; no signs suggestive of significant cirrhosis; no hepatologic contraindication to proceeding with surgery    Discussed with Dr. Melton    Note and recommendations are incomplete until reviewed and attested by attending.    Bebeto Prescott MD  GI/Hepatology Fellow, PGY4  Teams preferred (7AM to 5PM); after 5PM, call GI fellow on call    NEW CONSULTS:  Please email [email or web address]

## 2024-02-22 NOTE — PROGRESS NOTE ADULT - ASSESSMENT
63M PMH HFrEF, HTN, GERD, dyslipidemia, chronic intermittent leg pain, aortic stenosis, chronic venous stasis dermatitis with bilateral leg swelling, admitted to [facility] with b/l leg pain and swelling, transferred to [facility] for TAVR evaluation for severe AS, s/p LHC nonobstructive, with acute decompensated HFrEF exacerbation. Remains on IV diuresis.

## 2024-02-22 NOTE — DIETITIAN INITIAL EVALUATION ADULT - REASON
Nutrition focused physical exam deferred at this time, unable to perform as pt requested to be walking in hallway during interview. Some visual depletions observed.

## 2024-02-22 NOTE — DIETITIAN INITIAL EVALUATION ADULT - ADD RECOMMEND
1) Continue DASH diet, defer diet texture/fluid consistency to team   2) Add TVTY Standard 1.0 1x/day, encourage protein-rich foods, obtain and honor preferences as able   3) Add multivitamin daily for micronutrient coverage pending no medical contraindications   4) Malnutrition sticker placed in chart   5) Nutrition education provided, RD available to answer questions and reinforce prn   6) Add vitamin C and eliot 1x/day if not medially contraindicated in setting of deep tissue injuries  7) Monitor nutritional intake, diet tolerance, labs, hydration, GI function, skin integrity and wt trends

## 2024-02-22 NOTE — DIETITIAN INITIAL EVALUATION ADULT - PERTINENT LABORATORY DATA
02-21    135  |  98  |  23  ----------------------------<  93  3.8   |  28  |  1.23    Ca    8.6      21 Feb 2024 06:24    TPro  6.1  /  Alb  3.2<L>  /  TBili  1.8<H>  /  DBili  0.6<H>  /  AST  27  /  ALT  31  /  AlkPhos  x   02-22  A1C with Estimated Average Glucose Result: 6.2 % (01-27-24 @ 09:00)

## 2024-02-22 NOTE — DIETITIAN INITIAL EVALUATION ADULT - NSFNSGIIOFT_GEN_A_CORE
I&O's Detail    21 Feb 2024 07:01  -  22 Feb 2024 07:00  --------------------------------------------------------  IN:    Oral Fluid: 40 mL  Total IN: 40 mL    OUT:    Voided (mL): 300 mL  Total OUT: 300 mL    Total NET: -260 mL

## 2024-02-22 NOTE — PROGRESS NOTE ADULT - PROBLEM SELECTOR PLAN 5
Seen by ID at s/p five days of keflex- likely not infectious given limited change with antibiotics  seen by ID 2/14, not infected  podiatry following  Leg elevation as needed  Wound care on board

## 2024-02-22 NOTE — DIETITIAN INITIAL EVALUATION ADULT - NSFNSGIASSESSMENTFT_GEN_A_CORE
Denies N/V, endorses constipation at times noting when he gets an upset stomach he receives pepto bismol which then "binds him up." Last BM 2/19 per chart.  Bowel regimen includes: miralax

## 2024-02-22 NOTE — DIETITIAN INITIAL EVALUATION ADULT - ORAL INTAKE PTA/DIET HISTORY
Pt reporting good typical intake, eating 3 meals/day at home. Likes red meat, mashed potatoes, and rice. Confirms allergy to garlic and notes he stays away from foods with lots of spices as to avoid all garlic. Notes that the milk in-house upsets his stomach, denies lactose intolerance. Denies any recent changes in appetite. Endorses that swallowing can cause some pain/irritation at times, though denies any issues with chewing. No vitamins/supplements noted in chart, pt confirms he has not had oral protein supplements.

## 2024-02-22 NOTE — DIETITIAN INITIAL EVALUATION ADULT - PERSON TAUGHT/METHOD
Provided education on heart failure nutrition therapy. Recommended limited salt intake. Reviewed foods high in salt and amount of salt recommended per day. Discussed nutrition label reading. Stressed the importance of limited fried foods and saturated fat consumption. Reviewed recommended foods within therapeutic diet.   Provided education on meeting adequate protein-energy needs, emphasized the importance of adequate calorie and protein intake during times of acute illness. Encouraged prioritizing protein foods and consuming adequate amounts of protein at each meal for preservation of lean muscle mass. Obtained food preferences, will honor as able. Made aware RD remains available./verbal instruction/patient instructed

## 2024-02-23 ENCOUNTER — APPOINTMENT (OUTPATIENT)
Dept: CARDIOTHORACIC SURGERY | Facility: HOSPITAL | Age: 64
End: 2024-02-23

## 2024-02-23 ENCOUNTER — RESULT REVIEW (OUTPATIENT)
Age: 64
End: 2024-02-23

## 2024-02-23 LAB
ALBUMIN SERPL ELPH-MCNC: 2.9 G/DL — LOW (ref 3.3–5)
ALBUMIN SERPL ELPH-MCNC: 3.3 G/DL — SIGNIFICANT CHANGE UP (ref 3.3–5)
ALP SERPL-CCNC: 57 U/L — SIGNIFICANT CHANGE UP (ref 40–120)
ALP SERPL-CCNC: 87 U/L — SIGNIFICANT CHANGE UP (ref 40–120)
ALT FLD-CCNC: 20 U/L — SIGNIFICANT CHANGE UP (ref 10–45)
ALT FLD-CCNC: 31 U/L — SIGNIFICANT CHANGE UP (ref 10–45)
ANION GAP SERPL CALC-SCNC: 13 MMOL/L — SIGNIFICANT CHANGE UP (ref 5–17)
ANION GAP SERPL CALC-SCNC: 13 MMOL/L — SIGNIFICANT CHANGE UP (ref 5–17)
APTT BLD: 29.2 SEC — SIGNIFICANT CHANGE UP (ref 24.5–35.6)
APTT BLD: 30.1 SEC — SIGNIFICANT CHANGE UP (ref 24.5–35.6)
AST SERPL-CCNC: 35 U/L — SIGNIFICANT CHANGE UP (ref 10–40)
AST SERPL-CCNC: 36 U/L — SIGNIFICANT CHANGE UP (ref 10–40)
BASE EXCESS BLDV CALC-SCNC: 0 MMOL/L — SIGNIFICANT CHANGE UP (ref -2–3)
BASE EXCESS BLDV CALC-SCNC: 0.1 MMOL/L — SIGNIFICANT CHANGE UP (ref -2–3)
BASE EXCESS BLDV CALC-SCNC: 0.3 MMOL/L — SIGNIFICANT CHANGE UP (ref -2–3)
BASE EXCESS BLDV CALC-SCNC: 0.7 MMOL/L — SIGNIFICANT CHANGE UP (ref -2–3)
BASE EXCESS BLDV CALC-SCNC: 1.7 MMOL/L — SIGNIFICANT CHANGE UP (ref -2–3)
BASE EXCESS BLDV CALC-SCNC: 1.8 MMOL/L — SIGNIFICANT CHANGE UP (ref -2–3)
BASE EXCESS BLDV CALC-SCNC: 3.6 MMOL/L — HIGH (ref -2–3)
BASOPHILS # BLD AUTO: 0.02 K/UL — SIGNIFICANT CHANGE UP (ref 0–0.2)
BASOPHILS NFR BLD AUTO: 0.2 % — SIGNIFICANT CHANGE UP (ref 0–2)
BILIRUB SERPL-MCNC: 1.8 MG/DL — HIGH (ref 0.2–1.2)
BILIRUB SERPL-MCNC: 2.4 MG/DL — HIGH (ref 0.2–1.2)
BUN SERPL-MCNC: 18 MG/DL — SIGNIFICANT CHANGE UP (ref 7–23)
BUN SERPL-MCNC: 23 MG/DL — SIGNIFICANT CHANGE UP (ref 7–23)
CA-I SERPL-SCNC: 0.98 MMOL/L — LOW (ref 1.15–1.33)
CA-I SERPL-SCNC: 1.06 MMOL/L — LOW (ref 1.15–1.33)
CA-I SERPL-SCNC: 1.08 MMOL/L — LOW (ref 1.15–1.33)
CA-I SERPL-SCNC: 1.26 MMOL/L — SIGNIFICANT CHANGE UP (ref 1.15–1.33)
CALCIUM SERPL-MCNC: 7.5 MG/DL — LOW (ref 8.4–10.5)
CALCIUM SERPL-MCNC: 8.7 MG/DL — SIGNIFICANT CHANGE UP (ref 8.4–10.5)
CHLORIDE BLDV-SCNC: 100 MMOL/L — SIGNIFICANT CHANGE UP (ref 96–108)
CHLORIDE BLDV-SCNC: 102 MMOL/L — SIGNIFICANT CHANGE UP (ref 96–108)
CHLORIDE BLDV-SCNC: 102 MMOL/L — SIGNIFICANT CHANGE UP (ref 96–108)
CHLORIDE BLDV-SCNC: 99 MMOL/L — SIGNIFICANT CHANGE UP (ref 96–108)
CHLORIDE SERPL-SCNC: 101 MMOL/L — SIGNIFICANT CHANGE UP (ref 96–108)
CHLORIDE SERPL-SCNC: 96 MMOL/L — SIGNIFICANT CHANGE UP (ref 96–108)
CK MB BLD-MCNC: 8.9 % — HIGH (ref 0–3.5)
CK MB CFR SERPL CALC: 21.7 NG/ML — HIGH (ref 0–6.7)
CK SERPL-CCNC: 244 U/L — HIGH (ref 30–200)
CO2 BLDV-SCNC: 27 MMOL/L — HIGH (ref 22–26)
CO2 BLDV-SCNC: 28 MMOL/L — HIGH (ref 22–26)
CO2 BLDV-SCNC: 29 MMOL/L — HIGH (ref 22–26)
CO2 BLDV-SCNC: 31 MMOL/L — HIGH (ref 22–26)
CO2 SERPL-SCNC: 24 MMOL/L — SIGNIFICANT CHANGE UP (ref 22–31)
CO2 SERPL-SCNC: 26 MMOL/L — SIGNIFICANT CHANGE UP (ref 22–31)
CREAT SERPL-MCNC: 0.93 MG/DL — SIGNIFICANT CHANGE UP (ref 0.5–1.3)
CREAT SERPL-MCNC: 1.23 MG/DL — SIGNIFICANT CHANGE UP (ref 0.5–1.3)
EGFR: 66 ML/MIN/1.73M2 — SIGNIFICANT CHANGE UP
EGFR: 92 ML/MIN/1.73M2 — SIGNIFICANT CHANGE UP
EOSINOPHIL # BLD AUTO: 0.02 K/UL — SIGNIFICANT CHANGE UP (ref 0–0.5)
EOSINOPHIL NFR BLD AUTO: 0.2 % — SIGNIFICANT CHANGE UP (ref 0–6)
FIBRINOGEN PPP-MCNC: 270 MG/DL — SIGNIFICANT CHANGE UP (ref 200–445)
GAS PNL BLDA: SIGNIFICANT CHANGE UP
GAS PNL BLDV: 131 MMOL/L — LOW (ref 136–145)
GAS PNL BLDV: 134 MMOL/L — LOW (ref 136–145)
GAS PNL BLDV: SIGNIFICANT CHANGE UP
GLUCOSE BLDC GLUCOMTR-MCNC: 108 MG/DL — HIGH (ref 70–99)
GLUCOSE BLDC GLUCOMTR-MCNC: 114 MG/DL — HIGH (ref 70–99)
GLUCOSE BLDC GLUCOMTR-MCNC: 130 MG/DL — HIGH (ref 70–99)
GLUCOSE BLDC GLUCOMTR-MCNC: 132 MG/DL — HIGH (ref 70–99)
GLUCOSE BLDC GLUCOMTR-MCNC: 136 MG/DL — HIGH (ref 70–99)
GLUCOSE BLDC GLUCOMTR-MCNC: 142 MG/DL — HIGH (ref 70–99)
GLUCOSE BLDC GLUCOMTR-MCNC: 158 MG/DL — HIGH (ref 70–99)
GLUCOSE BLDC GLUCOMTR-MCNC: 57 MG/DL — LOW (ref 70–99)
GLUCOSE BLDV-MCNC: 114 MG/DL — HIGH (ref 70–99)
GLUCOSE BLDV-MCNC: 122 MG/DL — HIGH (ref 70–99)
GLUCOSE BLDV-MCNC: 125 MG/DL — HIGH (ref 70–99)
GLUCOSE BLDV-MCNC: 97 MG/DL — SIGNIFICANT CHANGE UP (ref 70–99)
GLUCOSE SERPL-MCNC: 127 MG/DL — HIGH (ref 70–99)
GLUCOSE SERPL-MCNC: 91 MG/DL — SIGNIFICANT CHANGE UP (ref 70–99)
HCO3 BLDV-SCNC: 26 MMOL/L — SIGNIFICANT CHANGE UP (ref 22–29)
HCO3 BLDV-SCNC: 27 MMOL/L — SIGNIFICANT CHANGE UP (ref 22–29)
HCO3 BLDV-SCNC: 27 MMOL/L — SIGNIFICANT CHANGE UP (ref 22–29)
HCO3 BLDV-SCNC: 29 MMOL/L — SIGNIFICANT CHANGE UP (ref 22–29)
HCT VFR BLD CALC: 22.8 % — LOW (ref 39–50)
HCT VFR BLD CALC: 26.1 % — LOW (ref 39–50)
HCT VFR BLD CALC: 26.8 % — LOW (ref 39–50)
HCT VFR BLDA CALC: 21 % — CRITICAL LOW (ref 39–51)
HCT VFR BLDA CALC: 23 % — LOW (ref 39–51)
HCT VFR BLDA CALC: 25 % — LOW (ref 39–51)
HCT VFR BLDA CALC: 27 % — LOW (ref 39–51)
HEPARINASE TEG R TIME: 9.7 MIN — HIGH (ref 4.3–8.3)
HGB BLD CALC-MCNC: 7.1 G/DL — LOW (ref 12.6–17.4)
HGB BLD CALC-MCNC: 7.5 G/DL — LOW (ref 12.6–17.4)
HGB BLD CALC-MCNC: 8.3 G/DL — LOW (ref 12.6–17.4)
HGB BLD CALC-MCNC: 9 G/DL — LOW (ref 12.6–17.4)
HGB BLD-MCNC: 7.3 G/DL — LOW (ref 13–17)
HGB BLD-MCNC: 7.9 G/DL — LOW (ref 13–17)
HGB BLD-MCNC: 8.8 G/DL — LOW (ref 13–17)
HOROWITZ INDEX BLDV+IHG-RTO: 100 — SIGNIFICANT CHANGE UP
HOROWITZ INDEX BLDV+IHG-RTO: 28 — SIGNIFICANT CHANGE UP
HOROWITZ INDEX BLDV+IHG-RTO: 35 — SIGNIFICANT CHANGE UP
HOROWITZ INDEX BLDV+IHG-RTO: 40 — SIGNIFICANT CHANGE UP
HOROWITZ INDEX BLDV+IHG-RTO: 50 — SIGNIFICANT CHANGE UP
IMM GRANULOCYTES NFR BLD AUTO: 1 % — HIGH (ref 0–0.9)
INR BLD: 1.09 RATIO — SIGNIFICANT CHANGE UP (ref 0.85–1.18)
INR BLD: 1.1 RATIO — SIGNIFICANT CHANGE UP (ref 0.85–1.18)
INR BLD: 1.22 RATIO — HIGH (ref 0.85–1.18)
LACTATE BLDV-MCNC: 0.7 MMOL/L — SIGNIFICANT CHANGE UP (ref 0.5–2)
LACTATE BLDV-MCNC: 0.8 MMOL/L — SIGNIFICANT CHANGE UP (ref 0.5–2)
LACTATE BLDV-MCNC: 1 MMOL/L — SIGNIFICANT CHANGE UP (ref 0.5–2)
LACTATE BLDV-MCNC: 1.2 MMOL/L — SIGNIFICANT CHANGE UP (ref 0.5–2)
LYMPHOCYTES # BLD AUTO: 0.66 K/UL — LOW (ref 1–3.3)
LYMPHOCYTES # BLD AUTO: 7.2 % — LOW (ref 13–44)
MAGNESIUM SERPL-MCNC: 2.6 MG/DL — SIGNIFICANT CHANGE UP (ref 1.6–2.6)
MCHC RBC-ENTMCNC: 20 PG — LOW (ref 27–34)
MCHC RBC-ENTMCNC: 22.3 PG — LOW (ref 27–34)
MCHC RBC-ENTMCNC: 23.5 PG — LOW (ref 27–34)
MCHC RBC-ENTMCNC: 30.3 GM/DL — LOW (ref 32–36)
MCHC RBC-ENTMCNC: 32 GM/DL — SIGNIFICANT CHANGE UP (ref 32–36)
MCHC RBC-ENTMCNC: 32.8 GM/DL — SIGNIFICANT CHANGE UP (ref 32–36)
MCV RBC AUTO: 66.1 FL — LOW (ref 80–100)
MCV RBC AUTO: 69.5 FL — LOW (ref 80–100)
MCV RBC AUTO: 71.7 FL — LOW (ref 80–100)
MONOCYTES # BLD AUTO: 0.75 K/UL — SIGNIFICANT CHANGE UP (ref 0–0.9)
MONOCYTES NFR BLD AUTO: 8.1 % — SIGNIFICANT CHANGE UP (ref 2–14)
NEUTROPHILS # BLD AUTO: 7.68 K/UL — HIGH (ref 1.8–7.4)
NEUTROPHILS NFR BLD AUTO: 83.3 % — HIGH (ref 43–77)
NRBC # BLD: 0 /100 WBCS — SIGNIFICANT CHANGE UP (ref 0–0)
PCO2 BLDV: 42 MMHG — SIGNIFICANT CHANGE UP (ref 42–55)
PCO2 BLDV: 46 MMHG — SIGNIFICANT CHANGE UP (ref 42–55)
PCO2 BLDV: 46 MMHG — SIGNIFICANT CHANGE UP (ref 42–55)
PCO2 BLDV: 48 MMHG — SIGNIFICANT CHANGE UP (ref 42–55)
PCO2 BLDV: 49 MMHG — SIGNIFICANT CHANGE UP (ref 42–55)
PH BLDV: 7.34 — SIGNIFICANT CHANGE UP (ref 7.32–7.43)
PH BLDV: 7.35 — SIGNIFICANT CHANGE UP (ref 7.32–7.43)
PH BLDV: 7.35 — SIGNIFICANT CHANGE UP (ref 7.32–7.43)
PH BLDV: 7.36 — SIGNIFICANT CHANGE UP (ref 7.32–7.43)
PH BLDV: 7.38 — SIGNIFICANT CHANGE UP (ref 7.32–7.43)
PH BLDV: 7.39 — SIGNIFICANT CHANGE UP (ref 7.32–7.43)
PH BLDV: 7.41 — SIGNIFICANT CHANGE UP (ref 7.32–7.43)
PHOSPHATE SERPL-MCNC: 2.8 MG/DL — SIGNIFICANT CHANGE UP (ref 2.5–4.5)
PLATELET # BLD AUTO: 126 K/UL — LOW (ref 150–400)
PLATELET # BLD AUTO: 130 K/UL — LOW (ref 150–400)
PLATELET # BLD AUTO: 208 K/UL — SIGNIFICANT CHANGE UP (ref 150–400)
PO2 BLDV: 38 MMHG — SIGNIFICANT CHANGE UP (ref 25–45)
PO2 BLDV: 40 MMHG — SIGNIFICANT CHANGE UP (ref 25–45)
PO2 BLDV: 41 MMHG — SIGNIFICANT CHANGE UP (ref 25–45)
PO2 BLDV: 42 MMHG — SIGNIFICANT CHANGE UP (ref 25–45)
PO2 BLDV: 67 MMHG — HIGH (ref 25–45)
PO2 BLDV: 71 MMHG — HIGH (ref 25–45)
PO2 BLDV: 72 MMHG — HIGH (ref 25–45)
POTASSIUM BLDV-SCNC: 3.5 MMOL/L — SIGNIFICANT CHANGE UP (ref 3.5–5.1)
POTASSIUM BLDV-SCNC: 3.7 MMOL/L — SIGNIFICANT CHANGE UP (ref 3.5–5.1)
POTASSIUM BLDV-SCNC: 4.2 MMOL/L — SIGNIFICANT CHANGE UP (ref 3.5–5.1)
POTASSIUM BLDV-SCNC: 4.5 MMOL/L — SIGNIFICANT CHANGE UP (ref 3.5–5.1)
POTASSIUM SERPL-MCNC: 3.7 MMOL/L — SIGNIFICANT CHANGE UP (ref 3.5–5.3)
POTASSIUM SERPL-MCNC: 3.8 MMOL/L — SIGNIFICANT CHANGE UP (ref 3.5–5.3)
POTASSIUM SERPL-SCNC: 3.7 MMOL/L — SIGNIFICANT CHANGE UP (ref 3.5–5.3)
POTASSIUM SERPL-SCNC: 3.8 MMOL/L — SIGNIFICANT CHANGE UP (ref 3.5–5.3)
PROT SERPL-MCNC: 4.8 G/DL — LOW (ref 6–8.3)
PROT SERPL-MCNC: 6.2 G/DL — SIGNIFICANT CHANGE UP (ref 6–8.3)
PROTHROM AB SERPL-ACNC: 11.5 SEC — SIGNIFICANT CHANGE UP (ref 9.5–13)
PROTHROM AB SERPL-ACNC: 12 SEC — SIGNIFICANT CHANGE UP (ref 9.5–13)
PROTHROM AB SERPL-ACNC: 13.3 SEC — HIGH (ref 9.5–13)
RAPIDTEG MAXIMUM AMPLITUDE: 59.8 MM — SIGNIFICANT CHANGE UP (ref 52–70)
RBC # BLD: 3.28 M/UL — LOW (ref 4.2–5.8)
RBC # BLD: 3.74 M/UL — LOW (ref 4.2–5.8)
RBC # BLD: 3.95 M/UL — LOW (ref 4.2–5.8)
RBC # FLD: 26.1 % — HIGH (ref 10.3–14.5)
RBC # FLD: 27.2 % — HIGH (ref 10.3–14.5)
RBC # FLD: 28.3 % — HIGH (ref 10.3–14.5)
SAO2 % BLDV: 64.5 % — LOW (ref 67–88)
SAO2 % BLDV: 71.8 % — SIGNIFICANT CHANGE UP (ref 67–88)
SAO2 % BLDV: 72.1 % — SIGNIFICANT CHANGE UP (ref 67–88)
SAO2 % BLDV: 73.2 % — SIGNIFICANT CHANGE UP (ref 67–88)
SAO2 % BLDV: 94.9 % — HIGH (ref 67–88)
SAO2 % BLDV: 96.4 % — HIGH (ref 67–88)
SAO2 % BLDV: 97.6 % — HIGH (ref 67–88)
SODIUM SERPL-SCNC: 135 MMOL/L — SIGNIFICANT CHANGE UP (ref 135–145)
SODIUM SERPL-SCNC: 138 MMOL/L — SIGNIFICANT CHANGE UP (ref 135–145)
TEG FUNCTIONAL FIBRINOGEN: 19.9 MM — SIGNIFICANT CHANGE UP (ref 15–32)
TEG MAXIMUM AMPLITUDE: <40 MM — LOW (ref 52–69)
TEG REACTION TIME: 8.9 MIN — SIGNIFICANT CHANGE UP (ref 4.6–9.1)
TROPONIN T, HIGH SENSITIVITY RESULT: 295 NG/L — HIGH (ref 0–51)
WBC # BLD: 8.5 K/UL — SIGNIFICANT CHANGE UP (ref 3.8–10.5)
WBC # BLD: 9.22 K/UL — SIGNIFICANT CHANGE UP (ref 3.8–10.5)
WBC # BLD: 9.75 K/UL — SIGNIFICANT CHANGE UP (ref 3.8–10.5)
WBC # FLD AUTO: 8.5 K/UL — SIGNIFICANT CHANGE UP (ref 3.8–10.5)
WBC # FLD AUTO: 9.22 K/UL — SIGNIFICANT CHANGE UP (ref 3.8–10.5)
WBC # FLD AUTO: 9.75 K/UL — SIGNIFICANT CHANGE UP (ref 3.8–10.5)

## 2024-02-23 PROCEDURE — 99291 CRITICAL CARE FIRST HOUR: CPT

## 2024-02-23 PROCEDURE — 88305 TISSUE EXAM BY PATHOLOGIST: CPT | Mod: 26

## 2024-02-23 PROCEDURE — 33405 REPLACEMENT AORTIC VALVE OPN: CPT

## 2024-02-23 PROCEDURE — 71045 X-RAY EXAM CHEST 1 VIEW: CPT | Mod: 26

## 2024-02-23 PROCEDURE — 93010 ELECTROCARDIOGRAM REPORT: CPT

## 2024-02-23 PROCEDURE — 33268 EXCL LAA OPN OTH PX ANY METH: CPT

## 2024-02-23 DEVICE — CANNULA CORONARY STR SELF INFLATING 3.0MM: Type: IMPLANTABLE DEVICE | Status: FUNCTIONAL

## 2024-02-23 DEVICE — CANNULA RETROGRADE CARDIOPLEGIA SELF-INFLATING 14FR PRE-SHAPED STYLET/HANDLE: Type: IMPLANTABLE DEVICE | Status: FUNCTIONAL

## 2024-02-23 DEVICE — KIT A-LINE 1LUM 20G X 12CM SAFE KIT: Type: IMPLANTABLE DEVICE | Status: FUNCTIONAL

## 2024-02-23 DEVICE — ATRICLIP LAA EXCLUSION DEVICE 50MM FLEX-V: Type: IMPLANTABLE DEVICE | Status: FUNCTIONAL

## 2024-02-23 DEVICE — PACING WIRE ORANGE M-25 WINGED WIRE 37MM X 89MM: Type: IMPLANTABLE DEVICE | Status: FUNCTIONAL

## 2024-02-23 DEVICE — IMPLANTABLE DEVICE: Type: IMPLANTABLE DEVICE | Status: FUNCTIONAL

## 2024-02-23 DEVICE — CATH VENT VENTRICULAR PVC 18FR X 4.25" TIP PERFORATION: Type: IMPLANTABLE DEVICE | Status: FUNCTIONAL

## 2024-02-23 DEVICE — CHEST DRAIN THORACIC ARGYLE PVC 28FR RIGHT ANGLE: Type: IMPLANTABLE DEVICE | Status: FUNCTIONAL

## 2024-02-23 DEVICE — CHEST DRAIN THORACIC ARGYLE PVC 28FR STRAIGHT: Type: IMPLANTABLE DEVICE | Status: FUNCTIONAL

## 2024-02-23 DEVICE — VALVE AORTIC INSPIRIS RESILIA 27MM: Type: IMPLANTABLE DEVICE | Status: FUNCTIONAL

## 2024-02-23 DEVICE — PACING WIRE WHITE M-22 LOOP 89MM: Type: IMPLANTABLE DEVICE | Status: FUNCTIONAL

## 2024-02-23 DEVICE — CANNULA ARTERIAL OPTISITE 22FR X 3/8" VENTED: Type: IMPLANTABLE DEVICE | Status: FUNCTIONAL

## 2024-02-23 DEVICE — CANNULA PERFUSION  BALLOON 6MM: Type: IMPLANTABLE DEVICE | Status: FUNCTIONAL

## 2024-02-23 DEVICE — CANNULA CORONARY STR PERF 4MM: Type: IMPLANTABLE DEVICE | Status: FUNCTIONAL

## 2024-02-23 DEVICE — CANNULA W/VENT ANTEGRADE 14 GA STRL: Type: IMPLANTABLE DEVICE | Status: FUNCTIONAL

## 2024-02-23 DEVICE — CANNULA VENOUS 2 STAGE THIN FLEX 36/46FR X 1/2" WITH RETURN DIAL: Type: IMPLANTABLE DEVICE | Status: FUNCTIONAL

## 2024-02-23 DEVICE — KIT CVC 2LUM MAC 9FR CHG: Type: IMPLANTABLE DEVICE | Status: FUNCTIONAL

## 2024-02-23 DEVICE — LIGATING CLIPS WECK HORIZON SMALL-WIDE (RED) 24: Type: IMPLANTABLE DEVICE | Status: FUNCTIONAL

## 2024-02-23 RX ORDER — ALBUMIN HUMAN 25 %
250 VIAL (ML) INTRAVENOUS ONCE
Refills: 0 | Status: COMPLETED | OUTPATIENT
Start: 2024-02-23 | End: 2024-02-23

## 2024-02-23 RX ORDER — CHLORHEXIDINE GLUCONATE 213 G/1000ML
15 SOLUTION TOPICAL EVERY 12 HOURS
Refills: 0 | Status: DISCONTINUED | OUTPATIENT
Start: 2024-02-23 | End: 2024-02-23

## 2024-02-23 RX ORDER — AMIODARONE HYDROCHLORIDE 400 MG/1
150 TABLET ORAL ONCE
Refills: 0 | Status: COMPLETED | OUTPATIENT
Start: 2024-02-23 | End: 2024-02-23

## 2024-02-23 RX ORDER — MILRINONE LACTATE 1 MG/ML
0.1 INJECTION, SOLUTION INTRAVENOUS
Qty: 20 | Refills: 0 | Status: DISCONTINUED | OUTPATIENT
Start: 2024-02-23 | End: 2024-02-25

## 2024-02-23 RX ORDER — DEXMEDETOMIDINE HYDROCHLORIDE IN 0.9% SODIUM CHLORIDE 4 UG/ML
0.7 INJECTION INTRAVENOUS
Qty: 200 | Refills: 0 | Status: DISCONTINUED | OUTPATIENT
Start: 2024-02-23 | End: 2024-02-23

## 2024-02-23 RX ORDER — CALCIUM GLUCONATE 100 MG/ML
1 VIAL (ML) INTRAVENOUS ONCE
Refills: 0 | Status: COMPLETED | OUTPATIENT
Start: 2024-02-23 | End: 2024-02-23

## 2024-02-23 RX ORDER — VANCOMYCIN HCL 1 G
1000 VIAL (EA) INTRAVENOUS EVERY 12 HOURS
Refills: 0 | Status: DISCONTINUED | OUTPATIENT
Start: 2024-02-23 | End: 2024-02-25

## 2024-02-23 RX ORDER — CEFUROXIME AXETIL 250 MG
TABLET ORAL
Refills: 0 | Status: COMPLETED | OUTPATIENT
Start: 2024-02-23 | End: 2024-02-25

## 2024-02-23 RX ORDER — CEFUROXIME AXETIL 250 MG
1500 TABLET ORAL EVERY 8 HOURS
Refills: 0 | Status: DISCONTINUED | OUTPATIENT
Start: 2024-02-23 | End: 2024-02-23

## 2024-02-23 RX ORDER — CALCIUM GLUCONATE 100 MG/ML
2 VIAL (ML) INTRAVENOUS ONCE
Refills: 0 | Status: COMPLETED | OUTPATIENT
Start: 2024-02-23 | End: 2024-02-23

## 2024-02-23 RX ORDER — DOBUTAMINE HCL 250MG/20ML
2.5 VIAL (ML) INTRAVENOUS
Qty: 500 | Refills: 0 | Status: DISCONTINUED | OUTPATIENT
Start: 2024-02-23 | End: 2024-02-23

## 2024-02-23 RX ORDER — SUGAMMADEX 100 MG/ML
200 INJECTION, SOLUTION INTRAVENOUS ONCE
Refills: 0 | Status: COMPLETED | OUTPATIENT
Start: 2024-02-23 | End: 2024-02-23

## 2024-02-23 RX ORDER — CEFUROXIME AXETIL 250 MG
1500 TABLET ORAL EVERY 8 HOURS
Refills: 0 | Status: COMPLETED | OUTPATIENT
Start: 2024-02-23 | End: 2024-02-25

## 2024-02-23 RX ORDER — POTASSIUM CHLORIDE 20 MEQ
10 PACKET (EA) ORAL
Refills: 0 | Status: COMPLETED | OUTPATIENT
Start: 2024-02-23 | End: 2024-02-23

## 2024-02-23 RX ORDER — CEFUROXIME AXETIL 250 MG
1500 TABLET ORAL ONCE
Refills: 0 | Status: COMPLETED | OUTPATIENT
Start: 2024-02-23 | End: 2024-02-23

## 2024-02-23 RX ADMIN — AMIODARONE HYDROCHLORIDE 600 MILLIGRAM(S): 400 TABLET ORAL at 23:39

## 2024-02-23 RX ADMIN — CHLORHEXIDINE GLUCONATE 1 APPLICATION(S): 213 SOLUTION TOPICAL at 05:44

## 2024-02-23 RX ADMIN — Medication 50 MILLIGRAM(S): at 05:44

## 2024-02-23 RX ADMIN — Medication 100 GRAM(S): at 18:29

## 2024-02-23 RX ADMIN — Medication 100 MILLIEQUIVALENT(S): at 14:46

## 2024-02-23 RX ADMIN — Medication 100 MILLIEQUIVALENT(S): at 14:20

## 2024-02-23 RX ADMIN — Medication 100 GRAM(S): at 15:07

## 2024-02-23 RX ADMIN — SPIRONOLACTONE 25 MILLIGRAM(S): 25 TABLET, FILM COATED ORAL at 05:44

## 2024-02-23 RX ADMIN — Medication 125 MILLILITER(S): at 16:15

## 2024-02-23 RX ADMIN — CHLORHEXIDINE GLUCONATE 1 APPLICATION(S): 213 SOLUTION TOPICAL at 05:43

## 2024-02-23 RX ADMIN — Medication 100 MILLIEQUIVALENT(S): at 14:21

## 2024-02-23 RX ADMIN — Medication 125 MILLILITER(S): at 13:19

## 2024-02-23 RX ADMIN — Medication 100 MILLIGRAM(S): at 22:00

## 2024-02-23 RX ADMIN — GABAPENTIN 100 MILLIGRAM(S): 400 CAPSULE ORAL at 22:00

## 2024-02-23 RX ADMIN — PANTOPRAZOLE SODIUM 40 MILLIGRAM(S): 20 TABLET, DELAYED RELEASE ORAL at 18:41

## 2024-02-23 RX ADMIN — Medication 13.5 MICROGRAM(S)/KG/MIN: at 20:31

## 2024-02-23 RX ADMIN — Medication 250 MILLIGRAM(S): at 20:31

## 2024-02-23 RX ADMIN — Medication 200 GRAM(S): at 14:07

## 2024-02-23 RX ADMIN — SUGAMMADEX 200 MILLIGRAM(S): 100 INJECTION, SOLUTION INTRAVENOUS at 13:00

## 2024-02-23 RX ADMIN — Medication 100 MILLIGRAM(S): at 14:41

## 2024-02-23 NOTE — PROGRESS NOTE ADULT - SUBJECTIVE AND OBJECTIVE BOX
Patient seen and examined at the bedside.    Remained critically ill on continuous ICU monitoring.    OBJECTIVE:  Vital Signs Last 24 Hrs  T(C): 36.6 (23 Feb 2024 16:00), Max: 36.8 (23 Feb 2024 04:48)  T(F): 97.9 (23 Feb 2024 16:00), Max: 98.2 (23 Feb 2024 04:48)  HR: 72 (23 Feb 2024 17:00) (61 - 89)  BP: 105/64 (23 Feb 2024 06:36) (90/53 - 105/64)  BP(mean): 76 (23 Feb 2024 06:36) (76 - 76)  RR: 28 (23 Feb 2024 15:15) (7 - 80)  SpO2: 99% (23 Feb 2024 17:00) (94% - 100%)    Parameters below as of 23 Feb 2024 16:00  Patient On (Oxygen Delivery Method): nasal cannula  O2 Flow (L/min): 2      REVIEW OF SYSTEMS:       Physical Exam:  General: NAD  Neurology: nonfocal  Eyes: bilateral pupils equal and reactive   ENT/Neck: Neck supple, trachea midline, No JVD   Respiratory: Rales noted bilaterally   CV: RRR, S1S2, no murmurs        [x] Sternal dressing, [x] Mediastinal CT x2,          [x] Sinus rhythm, [x] Temporary pacing- VVI 40  Abdominal: Soft, NT, ND +BS,   Extremities: 1-2+ pedal edema noted, + peripheral pulses   Skin: No Rashes, Hematoma, Ecchymosis                           Assessment:    Plan:   ***Neuro***   [x] Nonfocal     Post operative neuro assessment   Tylenol, Gabapentin, PRN Oxycodone, and PRN Dilaudid for pain management.     ***Cardiovascular***  Invasive hemodynamic monitoring, assess perfusion indices   SR / CVP 8/ MAP 82/ SvO2 ___ / Hct 22.8%/ Lactate 0.9  [x] Dobutamine 5 mcg/kg/min  Volume: [x] PRBC 3U, [x] Plts x1, [x] FEIBA 1000 in OR  CTU: [x] PRBC 2U, [x] Albumin 250ccs  Reassessment of hemodynamics post resuscitation   Monitor chest tube outputs   [x] Bleeding    [x] ASA    Serial EKG and cardiac enzymes     ***Pulmonary***  [x] 2L NC   Encourage incentive spirometry, continue pulse ox monitoring, follow ABGs     Mode: CPAP with PS  FiO2: 40  PEEP: 5  PS: 10                 ***GI***  [x] Diet: Tolerating PO consistent carb diet.     [x] Protonix       ***Renal***  GFR f/u  Continue to monitor I/Os, BUN/Creatinine.   Replete lytes PRN  Olivares present [x ] positive     ***ID***  Perioperative antibiotics     ***Endocrine***  [x] Hyperglycemia: HbA1c 6.2%              - [x] Insulin gtt  [] ISS  [] NPH  [] Lantus             - Need tight glycemic control to prevent wound infection.           Patient requires continuous monitoring with bedside rhythm monitoring, pulse oximetry monitoring, and continuous central venous and arterial pressure monitoring; and intermittent blood gas analysis. Care plan discussed with the ICU care team.   Patient remained critical, at risk for life threatening decompensation.    I have spent 30 minutes providing critical care management to this patient.    By signing my name below, I, Redd Jackson, attest that this documentation has been prepared under the direction and in the presence of Kia Colin MD   Electronically signed: Gordon Restrepo, 02-23-24 @ 17:05    I, Kia Colin, personally performed the services described in this documentation. all medical record entries made by the medibe were at my direction and in my presence. I have reviewed the chart and agree that the record reflects my personal performance and is accurate and complete  Electronically signed: Kia Colin MD  Patient seen and examined at the bedside.    Remained critically ill on continuous ICU monitoring.    OBJECTIVE:  Vital Signs Last 24 Hrs  T(C): 36.6 (23 Feb 2024 16:00), Max: 36.8 (23 Feb 2024 04:48)  T(F): 97.9 (23 Feb 2024 16:00), Max: 98.2 (23 Feb 2024 04:48)  HR: 72 (23 Feb 2024 17:00) (61 - 89)  BP: 105/64 (23 Feb 2024 06:36) (90/53 - 105/64)  BP(mean): 76 (23 Feb 2024 06:36) (76 - 76)  RR: 28 (23 Feb 2024 15:15) (7 - 80)  SpO2: 99% (23 Feb 2024 17:00) (94% - 100%)    Parameters below as of 23 Feb 2024 16:00  Patient On (Oxygen Delivery Method): nasal cannula  O2 Flow (L/min): 2      REVIEW OF SYSTEMS:       Physical Exam:  General: NAD  Neurology: nonfocal  Eyes: bilateral pupils equal and reactive   ENT/Neck: Neck supple, trachea midline, No JVD   Respiratory: Rales noted bilaterally   CV: RRR, S1S2, no murmurs        [x] Sternal dressing, [x] Mediastinal CT x2,          [x] Sinus rhythm, [x] Temporary pacing- VVI 40  Abdominal: Soft, NT, ND +BS,   Extremities: 1-2+ pedal edema noted, + peripheral pulses   Skin: No Rashes, Hematoma, Ecchymosis                           Assessment:    Plan:   ***Neuro***   [x] Nonfocal     Post operative neuro assessment   Tylenol, Gabapentin, PRN Oxycodone, and PRN Dilaudid for pain management.     ***Cardiovascular***  Invasive hemodynamic monitoring, assess perfusion indices   SR / CVP 8/ MAP 82/ SvO2 ___ / Hct 22.8%/ Lactate 0.9  [x] Dobutamine 5 mcg/kg/min  Volume: [x] PRBC 3U, [x] Plts x1, [x] FEIBA 1000 in OR  CTU: [x] PRBC 2U, [x] Albumin 250ccs  Reassessment of hemodynamics post resuscitation   Monitor chest tube outputs   [x] Bleeding    [x] ASA    Serial EKG and cardiac enzymes     ***Pulmonary***  [x] 2L NC   Encourage incentive spirometry, continue pulse ox monitoring, follow ABGs     Mode: CPAP with PS  FiO2: 40  PEEP: 5  PS: 10                 ***GI***  [x] Diet: Tolerating PO consistent carb diet.     [x] Protonix       ***Renal***  GFR f/u  Continue to monitor I/Os, BUN/Creatinine.   Replete lytes PRN  Olivares present [x ] positive     ***ID***  Perioperative antibiotics     ***Endocrine***  [x] Hyperglycemia: HbA1c 6.2%              - [x] Insulin gtt  [] ISS  [] NPH  [] Lantus             - Need tight glycemic control to prevent wound infection.           Patient requires continuous monitoring with bedside rhythm monitoring, pulse oximetry monitoring, and continuous central venous and arterial pressure monitoring; and intermittent blood gas analysis. Care plan discussed with the ICU care team.   Patient remained critical, at risk for life threatening decompensation.    I have spent 40 minutes providing critical care management to this patient.    By signing my name below, I, Redd Jackson, attest that this documentation has been prepared under the direction and in the presence of Kia Colin MD   Electronically signed: Gordon Restrepo, 02-23-24 @ 17:05    I, Kia Colin, personally performed the services described in this documentation. all medical record entries made by the medibe were at my direction and in my presence. I have reviewed the chart and agree that the record reflects my personal performance and is accurate and complete  Electronically signed: Kia Colin MD  Patient seen and examined at the bedside.    Remained critically ill on continuous ICU monitoring.    OBJECTIVE:  Vital Signs Last 24 Hrs  T(C): 36.6 (23 Feb 2024 16:00), Max: 36.8 (23 Feb 2024 04:48)  T(F): 97.9 (23 Feb 2024 16:00), Max: 98.2 (23 Feb 2024 04:48)  HR: 72 (23 Feb 2024 17:00) (61 - 89)  BP: 105/64 (23 Feb 2024 06:36) (90/53 - 105/64)  BP(mean): 76 (23 Feb 2024 06:36) (76 - 76)  RR: 28 (23 Feb 2024 15:15) (7 - 80)  SpO2: 99% (23 Feb 2024 17:00) (94% - 100%)    Parameters below as of 23 Feb 2024 16:00  Patient On (Oxygen Delivery Method): nasal cannula  O2 Flow (L/min): 2      REVIEW OF SYSTEMS:       Physical Exam:  General: in bed with multiple lines, gtt & tubes   Neurology: nonfocal  Eyes: bilateral pupils equal and reactive   ENT/Neck: Neck supple, trachea midline, No JVD   Respiratory: Rales noted bilaterally   CV: RRR, S1S2, no murmurs        [x] Sternal dressing, [x] Mediastinal CT x2,          [x] Sinus rhythm, [x] Temporary pacing- VVI 40  Abdominal: Soft, NT, ND +BS,   Extremities: 1-2+ pedal edema noted, + peripheral pulses   Skin: No Rashes, Hematoma, Ecchymosis                           Assessment:  63 yr male HTN / HLD / AS / Congestive hepatopathy    S/P Bio AVR , LAAC   Post op labile BP related to hypovolemia & cardiac dysfunction   intra op bleeding requiring blood & products   Acute blood loss anemia  ^ CT outputs    Hyperglycemia     Plan:   ***Neuro***   [x] Nonfocal     Post operative neuro assessment   Tylenol, Gabapentin Dilaudid for pain management.     ***Cardiovascular***  Labile BP requiring large volume  resuscitation on Dobutamine                 Invasive hemodynamic monitoring, assess perfusion indices   SR 70  / CVP 2-8/ MAP 55-70 / SvO2 64 / Hct 22.8%/ Lactate 0.9  [x] Albumin 750 cc [x] LR 1500 cc   [x] Dobutamine 5 mcg/kg/min  Volume: [x] PRBC 3U, [x] Plts x1, [x] FEIBA 1000 in OR               CTU: [x] PRBC 2U,                f/u CBC / INR / Fibrinogen    [x] Monitor chest tube outputs   [x] Statin   [x] ASA on hold ^CT outputs   Serial EKG and cardiac enzymes     ***Pulmonary***  [x] Post extubation monitoring        Analgesia , lung expansion maneuvers   Encourage incentive spirometry, continue pulse ox monitoring, follow ABGs                      ***GI***  [x] Diet: Tolerating PO consistent carb diet.     [x] Protonix       ***Renal***  GFR f/u  Continue to monitor I/Os, BUN/Creatinine.   Replete lytes PRN  Olivares present [x ] positive     ***ID***  Perioperative antibiotics     ***Endocrine***  [x] Hyperglycemia: HbA1c 6.2%              - [x] Insulin gtt  [] ISS  [] NPH  [] Lantus             - Need tight glycemic control to prevent wound infection.           Patient requires continuous monitoring with bedside rhythm monitoring, pulse oximetry monitoring, and continuous central venous and arterial pressure monitoring; and intermittent blood gas analysis. Care plan discussed with the ICU care team.   Patient remained critical, at risk for life threatening decompensation.    I have spent 40 minutes providing critical care management to this patient.    By signing my name below, I, Redd Jackson, attest that this documentation has been prepared under the direction and in the presence of Kia Colin MD   Electronically signed: Gordon Restrepo, 02-23-24 @ 17:05    I, Kia Colin, personally performed the services described in this documentation. all medical record entries made by the medibe were at my direction and in my presence. I have reviewed the chart and agree that the record reflects my personal performance and is accurate and complete  Electronically signed: Kia Colin MD

## 2024-02-23 NOTE — AIRWAY REMOVAL NOTE  ADULT & PEDS - ARTIFICAL AIRWAY REMOVAL COMMENTS
Written order for extubation verified. The patient was identified by full name and birth date compared to the identification band.  Present during the procedure was  the RN

## 2024-02-23 NOTE — BRIEF OPERATIVE NOTE - COMMENTS
*** ESTIMATED BLOOD LOSS NOT APPLICABLE DUE TO USE OF CARDIOTOMY AND CELL SAVER SUCTION ***   *** ESTIMATED BLOOD LOSS NOT APPLICABLE DUE TO USE OF CARDIOTOMY AND CELL SAVER SUCTION ***  No unexpected foreign bodies were apparent on preliminary review of post-op x-ray. Reviewed by Dr. Martinez

## 2024-02-24 DIAGNOSIS — Z95.2 PRESENCE OF PROSTHETIC HEART VALVE: ICD-10-CM

## 2024-02-24 LAB
ALBUMIN SERPL ELPH-MCNC: 3.3 G/DL — SIGNIFICANT CHANGE UP (ref 3.3–5)
ALP SERPL-CCNC: 59 U/L — SIGNIFICANT CHANGE UP (ref 40–120)
ALT FLD-CCNC: 21 U/L — SIGNIFICANT CHANGE UP (ref 10–45)
ANION GAP SERPL CALC-SCNC: 11 MMOL/L — SIGNIFICANT CHANGE UP (ref 5–17)
APTT BLD: 28.8 SEC — SIGNIFICANT CHANGE UP (ref 24.5–35.6)
AST SERPL-CCNC: 34 U/L — SIGNIFICANT CHANGE UP (ref 10–40)
BASE EXCESS BLDV CALC-SCNC: 0.3 MMOL/L — SIGNIFICANT CHANGE UP (ref -2–3)
BASE EXCESS BLDV CALC-SCNC: 0.6 MMOL/L — SIGNIFICANT CHANGE UP (ref -2–3)
BASE EXCESS BLDV CALC-SCNC: 1.7 MMOL/L — SIGNIFICANT CHANGE UP (ref -2–3)
BASE EXCESS BLDV CALC-SCNC: 3.2 MMOL/L — HIGH (ref -2–3)
BASOPHILS # BLD AUTO: 0.01 K/UL — SIGNIFICANT CHANGE UP (ref 0–0.2)
BASOPHILS NFR BLD AUTO: 0.1 % — SIGNIFICANT CHANGE UP (ref 0–2)
BILIRUB SERPL-MCNC: 3.2 MG/DL — HIGH (ref 0.2–1.2)
BUN SERPL-MCNC: 21 MG/DL — SIGNIFICANT CHANGE UP (ref 7–23)
CA-I SERPL-SCNC: 1.25 MMOL/L — SIGNIFICANT CHANGE UP (ref 1.15–1.33)
CALCIUM SERPL-MCNC: 8.5 MG/DL — SIGNIFICANT CHANGE UP (ref 8.4–10.5)
CHLORIDE BLDV-SCNC: 105 MMOL/L — SIGNIFICANT CHANGE UP (ref 96–108)
CHLORIDE SERPL-SCNC: 103 MMOL/L — SIGNIFICANT CHANGE UP (ref 96–108)
CO2 BLDV-SCNC: 27 MMOL/L — HIGH (ref 22–26)
CO2 BLDV-SCNC: 28 MMOL/L — HIGH (ref 22–26)
CO2 BLDV-SCNC: 29 MMOL/L — HIGH (ref 22–26)
CO2 BLDV-SCNC: 30 MMOL/L — HIGH (ref 22–26)
CO2 SERPL-SCNC: 23 MMOL/L — SIGNIFICANT CHANGE UP (ref 22–31)
CREAT SERPL-MCNC: 1.04 MG/DL — SIGNIFICANT CHANGE UP (ref 0.5–1.3)
EGFR: 81 ML/MIN/1.73M2 — SIGNIFICANT CHANGE UP
EOSINOPHIL # BLD AUTO: 0 K/UL — SIGNIFICANT CHANGE UP (ref 0–0.5)
EOSINOPHIL NFR BLD AUTO: 0 % — SIGNIFICANT CHANGE UP (ref 0–6)
FIBRINOGEN PPP-MCNC: 293 MG/DL — SIGNIFICANT CHANGE UP (ref 200–445)
GAS PNL BLDA: SIGNIFICANT CHANGE UP
GAS PNL BLDV: 134 MMOL/L — LOW (ref 136–145)
GAS PNL BLDV: SIGNIFICANT CHANGE UP
GLUCOSE BLDC GLUCOMTR-MCNC: 102 MG/DL — HIGH (ref 70–99)
GLUCOSE BLDC GLUCOMTR-MCNC: 102 MG/DL — HIGH (ref 70–99)
GLUCOSE BLDC GLUCOMTR-MCNC: 103 MG/DL — HIGH (ref 70–99)
GLUCOSE BLDC GLUCOMTR-MCNC: 104 MG/DL — HIGH (ref 70–99)
GLUCOSE BLDC GLUCOMTR-MCNC: 105 MG/DL — HIGH (ref 70–99)
GLUCOSE BLDC GLUCOMTR-MCNC: 112 MG/DL — HIGH (ref 70–99)
GLUCOSE BLDC GLUCOMTR-MCNC: 116 MG/DL — HIGH (ref 70–99)
GLUCOSE BLDC GLUCOMTR-MCNC: 122 MG/DL — HIGH (ref 70–99)
GLUCOSE BLDC GLUCOMTR-MCNC: 124 MG/DL — HIGH (ref 70–99)
GLUCOSE BLDC GLUCOMTR-MCNC: 139 MG/DL — HIGH (ref 70–99)
GLUCOSE BLDC GLUCOMTR-MCNC: 139 MG/DL — HIGH (ref 70–99)
GLUCOSE BLDC GLUCOMTR-MCNC: 146 MG/DL — HIGH (ref 70–99)
GLUCOSE BLDC GLUCOMTR-MCNC: 157 MG/DL — HIGH (ref 70–99)
GLUCOSE BLDC GLUCOMTR-MCNC: 97 MG/DL — SIGNIFICANT CHANGE UP (ref 70–99)
GLUCOSE BLDC GLUCOMTR-MCNC: 99 MG/DL — SIGNIFICANT CHANGE UP (ref 70–99)
GLUCOSE BLDV-MCNC: 99 MG/DL — SIGNIFICANT CHANGE UP (ref 70–99)
GLUCOSE SERPL-MCNC: 134 MG/DL — HIGH (ref 70–99)
HCO3 BLDV-SCNC: 26 MMOL/L — SIGNIFICANT CHANGE UP (ref 22–29)
HCO3 BLDV-SCNC: 27 MMOL/L — SIGNIFICANT CHANGE UP (ref 22–29)
HCO3 BLDV-SCNC: 28 MMOL/L — SIGNIFICANT CHANGE UP (ref 22–29)
HCO3 BLDV-SCNC: 28 MMOL/L — SIGNIFICANT CHANGE UP (ref 22–29)
HCT VFR BLD CALC: 26.3 % — LOW (ref 39–50)
HCT VFR BLDA CALC: 26 % — LOW (ref 39–51)
HGB BLD CALC-MCNC: 8.8 G/DL — LOW (ref 12.6–17.4)
HGB BLD-MCNC: 8.7 G/DL — LOW (ref 13–17)
HOROWITZ INDEX BLDV+IHG-RTO: 28 — SIGNIFICANT CHANGE UP
HOROWITZ INDEX BLDV+IHG-RTO: 36 — SIGNIFICANT CHANGE UP
IMM GRANULOCYTES NFR BLD AUTO: 0.8 % — SIGNIFICANT CHANGE UP (ref 0–0.9)
INR BLD: 1.1 RATIO — SIGNIFICANT CHANGE UP (ref 0.85–1.18)
LACTATE BLDV-MCNC: 0.9 MMOL/L — SIGNIFICANT CHANGE UP (ref 0.5–2)
LYMPHOCYTES # BLD AUTO: 0.34 K/UL — LOW (ref 1–3.3)
LYMPHOCYTES # BLD AUTO: 3.7 % — LOW (ref 13–44)
MAGNESIUM SERPL-MCNC: 2.5 MG/DL — SIGNIFICANT CHANGE UP (ref 1.6–2.6)
MCHC RBC-ENTMCNC: 24 PG — LOW (ref 27–34)
MCHC RBC-ENTMCNC: 33.1 GM/DL — SIGNIFICANT CHANGE UP (ref 32–36)
MCV RBC AUTO: 72.5 FL — LOW (ref 80–100)
MONOCYTES # BLD AUTO: 0.84 K/UL — SIGNIFICANT CHANGE UP (ref 0–0.9)
MONOCYTES NFR BLD AUTO: 9.1 % — SIGNIFICANT CHANGE UP (ref 2–14)
NEUTROPHILS # BLD AUTO: 7.94 K/UL — HIGH (ref 1.8–7.4)
NEUTROPHILS NFR BLD AUTO: 86.3 % — HIGH (ref 43–77)
NRBC # BLD: 0 /100 WBCS — SIGNIFICANT CHANGE UP (ref 0–0)
PCO2 BLDV: 45 MMHG — SIGNIFICANT CHANGE UP (ref 42–55)
PCO2 BLDV: 46 MMHG — SIGNIFICANT CHANGE UP (ref 42–55)
PCO2 BLDV: 47 MMHG — SIGNIFICANT CHANGE UP (ref 42–55)
PCO2 BLDV: 48 MMHG — SIGNIFICANT CHANGE UP (ref 42–55)
PH BLDV: 7.36 — SIGNIFICANT CHANGE UP (ref 7.32–7.43)
PH BLDV: 7.37 — SIGNIFICANT CHANGE UP (ref 7.32–7.43)
PH BLDV: 7.37 — SIGNIFICANT CHANGE UP (ref 7.32–7.43)
PH BLDV: 7.4 — SIGNIFICANT CHANGE UP (ref 7.32–7.43)
PHOSPHATE SERPL-MCNC: 3.2 MG/DL — SIGNIFICANT CHANGE UP (ref 2.5–4.5)
PLATELET # BLD AUTO: 139 K/UL — LOW (ref 150–400)
PO2 BLDV: 36 MMHG — SIGNIFICANT CHANGE UP (ref 25–45)
PO2 BLDV: 37 MMHG — SIGNIFICANT CHANGE UP (ref 25–45)
PO2 BLDV: 39 MMHG — SIGNIFICANT CHANGE UP (ref 25–45)
PO2 BLDV: 39 MMHG — SIGNIFICANT CHANGE UP (ref 25–45)
POTASSIUM BLDV-SCNC: 4.1 MMOL/L — SIGNIFICANT CHANGE UP (ref 3.5–5.1)
POTASSIUM SERPL-MCNC: 4.2 MMOL/L — SIGNIFICANT CHANGE UP (ref 3.5–5.3)
POTASSIUM SERPL-SCNC: 4.2 MMOL/L — SIGNIFICANT CHANGE UP (ref 3.5–5.3)
PROT SERPL-MCNC: 5.3 G/DL — LOW (ref 6–8.3)
PROTHROM AB SERPL-ACNC: 12.1 SEC — SIGNIFICANT CHANGE UP (ref 9.5–13)
RBC # BLD: 3.63 M/UL — LOW (ref 4.2–5.8)
RBC # FLD: 26.7 % — HIGH (ref 10.3–14.5)
SAO2 % BLDV: 60.4 % — LOW (ref 67–88)
SAO2 % BLDV: 60.8 % — LOW (ref 67–88)
SAO2 % BLDV: 64.8 % — LOW (ref 67–88)
SAO2 % BLDV: 68.2 % — SIGNIFICANT CHANGE UP (ref 67–88)
SODIUM SERPL-SCNC: 137 MMOL/L — SIGNIFICANT CHANGE UP (ref 135–145)
VANCOMYCIN TROUGH SERPL-MCNC: 15.5 UG/ML — SIGNIFICANT CHANGE UP (ref 10–20)
VANCOMYCIN TROUGH SERPL-MCNC: 8.7 UG/ML — LOW (ref 10–20)
WBC # BLD: 9.2 K/UL — SIGNIFICANT CHANGE UP (ref 3.8–10.5)
WBC # FLD AUTO: 9.2 K/UL — SIGNIFICANT CHANGE UP (ref 3.8–10.5)

## 2024-02-24 PROCEDURE — 93010 ELECTROCARDIOGRAM REPORT: CPT

## 2024-02-24 PROCEDURE — 71045 X-RAY EXAM CHEST 1 VIEW: CPT | Mod: 26

## 2024-02-24 PROCEDURE — 99291 CRITICAL CARE FIRST HOUR: CPT

## 2024-02-24 RX ORDER — ENOXAPARIN SODIUM 100 MG/ML
40 INJECTION SUBCUTANEOUS EVERY 24 HOURS
Refills: 0 | Status: DISCONTINUED | OUTPATIENT
Start: 2024-02-24 | End: 2024-02-27

## 2024-02-24 RX ORDER — FUROSEMIDE 40 MG
20 TABLET ORAL ONCE
Refills: 0 | Status: COMPLETED | OUTPATIENT
Start: 2024-02-24 | End: 2024-02-24

## 2024-02-24 RX ORDER — DEXTROSE 50 % IN WATER 50 %
15 SYRINGE (ML) INTRAVENOUS ONCE
Refills: 0 | Status: DISCONTINUED | OUTPATIENT
Start: 2024-02-24 | End: 2024-02-25

## 2024-02-24 RX ORDER — ALBUMIN HUMAN 25 %
100 VIAL (ML) INTRAVENOUS ONCE
Refills: 0 | Status: COMPLETED | OUTPATIENT
Start: 2024-02-24 | End: 2024-02-24

## 2024-02-24 RX ORDER — INSULIN LISPRO 100/ML
VIAL (ML) SUBCUTANEOUS AT BEDTIME
Refills: 0 | Status: DISCONTINUED | OUTPATIENT
Start: 2024-02-24 | End: 2024-02-25

## 2024-02-24 RX ORDER — MAGNESIUM SULFATE 500 MG/ML
2 VIAL (ML) INJECTION ONCE
Refills: 0 | Status: COMPLETED | OUTPATIENT
Start: 2024-02-24 | End: 2024-02-24

## 2024-02-24 RX ORDER — SODIUM CHLORIDE 9 MG/ML
1000 INJECTION, SOLUTION INTRAVENOUS
Refills: 0 | Status: DISCONTINUED | OUTPATIENT
Start: 2024-02-24 | End: 2024-02-25

## 2024-02-24 RX ORDER — ASPIRIN/CALCIUM CARB/MAGNESIUM 324 MG
81 TABLET ORAL DAILY
Refills: 0 | Status: DISCONTINUED | OUTPATIENT
Start: 2024-02-24 | End: 2024-03-06

## 2024-02-24 RX ORDER — TRAMADOL HYDROCHLORIDE 50 MG/1
50 TABLET ORAL ONCE
Refills: 0 | Status: DISCONTINUED | OUTPATIENT
Start: 2024-02-24 | End: 2024-02-24

## 2024-02-24 RX ORDER — GLUCAGON INJECTION, SOLUTION 0.5 MG/.1ML
1 INJECTION, SOLUTION SUBCUTANEOUS ONCE
Refills: 0 | Status: DISCONTINUED | OUTPATIENT
Start: 2024-02-24 | End: 2024-02-25

## 2024-02-24 RX ORDER — INSULIN LISPRO 100/ML
VIAL (ML) SUBCUTANEOUS
Refills: 0 | Status: DISCONTINUED | OUTPATIENT
Start: 2024-02-24 | End: 2024-02-24

## 2024-02-24 RX ADMIN — Medication 650 MILLIGRAM(S): at 00:15

## 2024-02-24 RX ADMIN — Medication 25 GRAM(S): at 19:55

## 2024-02-24 RX ADMIN — MILRINONE LACTATE 5.39 MICROGRAM(S)/KG/MIN: 1 INJECTION, SOLUTION INTRAVENOUS at 08:17

## 2024-02-24 RX ADMIN — AMIODARONE HYDROCHLORIDE 400 MILLIGRAM(S): 400 TABLET ORAL at 05:05

## 2024-02-24 RX ADMIN — CHLORHEXIDINE GLUCONATE 1 APPLICATION(S): 213 SOLUTION TOPICAL at 23:13

## 2024-02-24 RX ADMIN — Medication 50 MILLILITER(S): at 14:09

## 2024-02-24 RX ADMIN — HYDROMORPHONE HYDROCHLORIDE 0.5 MILLIGRAM(S): 2 INJECTION INTRAMUSCULAR; INTRAVENOUS; SUBCUTANEOUS at 00:00

## 2024-02-24 RX ADMIN — Medication 650 MILLIGRAM(S): at 00:00

## 2024-02-24 RX ADMIN — Medication 20 MILLIGRAM(S): at 15:30

## 2024-02-24 RX ADMIN — Medication 100 MILLIGRAM(S): at 05:44

## 2024-02-24 RX ADMIN — PANTOPRAZOLE SODIUM 40 MILLIGRAM(S): 20 TABLET, DELAYED RELEASE ORAL at 11:59

## 2024-02-24 RX ADMIN — Medication 500 MILLIGRAM(S): at 17:36

## 2024-02-24 RX ADMIN — HYDROMORPHONE HYDROCHLORIDE 0.5 MILLIGRAM(S): 2 INJECTION INTRAMUSCULAR; INTRAVENOUS; SUBCUTANEOUS at 00:15

## 2024-02-24 RX ADMIN — Medication 650 MILLIGRAM(S): at 05:05

## 2024-02-24 RX ADMIN — Medication 250 MILLIGRAM(S): at 23:13

## 2024-02-24 RX ADMIN — GABAPENTIN 100 MILLIGRAM(S): 400 CAPSULE ORAL at 23:12

## 2024-02-24 RX ADMIN — GABAPENTIN 100 MILLIGRAM(S): 400 CAPSULE ORAL at 14:09

## 2024-02-24 RX ADMIN — Medication 500 MILLIGRAM(S): at 05:05

## 2024-02-24 RX ADMIN — Medication 650 MILLIGRAM(S): at 05:30

## 2024-02-24 RX ADMIN — TRAMADOL HYDROCHLORIDE 50 MILLIGRAM(S): 50 TABLET ORAL at 18:10

## 2024-02-24 RX ADMIN — Medication 250 MILLIGRAM(S): at 08:16

## 2024-02-24 RX ADMIN — Medication 650 MILLIGRAM(S): at 11:59

## 2024-02-24 RX ADMIN — Medication 650 MILLIGRAM(S): at 17:36

## 2024-02-24 RX ADMIN — SENNA PLUS 2 TABLET(S): 8.6 TABLET ORAL at 23:12

## 2024-02-24 RX ADMIN — Medication 650 MILLIGRAM(S): at 18:10

## 2024-02-24 RX ADMIN — ENOXAPARIN SODIUM 40 MILLIGRAM(S): 100 INJECTION SUBCUTANEOUS at 14:09

## 2024-02-24 RX ADMIN — GABAPENTIN 100 MILLIGRAM(S): 400 CAPSULE ORAL at 05:15

## 2024-02-24 RX ADMIN — Medication 100 MILLIGRAM(S): at 14:11

## 2024-02-24 RX ADMIN — AMIODARONE HYDROCHLORIDE 400 MILLIGRAM(S): 400 TABLET ORAL at 17:36

## 2024-02-24 RX ADMIN — TRAMADOL HYDROCHLORIDE 50 MILLIGRAM(S): 50 TABLET ORAL at 17:36

## 2024-02-24 RX ADMIN — POLYETHYLENE GLYCOL 3350 17 GRAM(S): 17 POWDER, FOR SOLUTION ORAL at 11:59

## 2024-02-24 RX ADMIN — Medication 650 MILLIGRAM(S): at 23:12

## 2024-02-24 RX ADMIN — Medication 650 MILLIGRAM(S): at 12:30

## 2024-02-24 NOTE — PROGRESS NOTE ADULT - ASSESSMENT
63M admitted 2/5/24 PMHx HTN, GERD, dyslipidemia, chronic intermittent leg pain, aortic stenosis, chronic venous stasis dermatitis with bilateral leg swelling, poor historian presented to [place] ER on 1/25/2024 for bilateral leg pain and swelling. He was admitted to medicine and was found to have new onset acute HFrEF (EF 35-40% 1/2024) in setting of severe AS. Echo results below from 1/26/2024.  He was treated with IV lasix, beta blocker. Not a candidate for ACE/ARB/ARNI due to renal function. Also was treated with 5 days keflex for possible lower extremity cellulitis. While admitted patient found to be anemic. FOBT negative. Heme was consulted, advised multifactoral etiology for anemia, mechanical hemolysis due to severe AS also possible. Patient was given IV iron, as well as 1 unit PRBCs on 1/31 with improvement in Hbg. GI was also consulted. Patient will require ischemic workup with coronary angiogram and full structural heart team evaluation for TAVR - accepted for transfer at [facility] by hospitalist Dr Joiner in consult with Dr Catalan (interventional cardiology). Hospital course: CT ANGIO CORONARY: Severely calcified (Agatston calcium score 5251) trileaflet aortic valve.Aortic and peripheral access vessel measurements as reported above.Markedly enlarged heart. Correlate with echocardiogram. Evidence of third spacing with chest/abdominal wall soft tissue edema and small ascites. Findings suggestive of mid/distal esophagitis. Correlate clinically. Indeterminate right adrenal nodule measures 14 mm. Correlate with abdominal MRI for characterization.  Redemonstrated 14 mm upper anterior subcutaneous dermal lesion, image 103 series 15, incompletely evaluated. Again, epidermal inclusion cyst is in the differential. Recommend dermatology evaluation. 2/16 s/p fall with headastrike, 2/17:  PATs 5.2 secs, , pt has a hx of PATs, asymptomatic, VSS. BMP, Mag and Phos ordered 2/20 s/p Liver biopsy. Plan for Cardiac Surgery OR date Tentative pending biopsy results. BP soft received 500ml bolus x 2 in IR  2/21 Awaiting path report, 2/22 Tentative plan for AVR with CTSx tomorrow. NPO, labs, Abx ordered; s/p bio AVR, LAAC 2/23/24; Post op labile BP related to hypovolemia & cardiac dysfunction, intra op bleeding requiring blood & products      Hospital course:    2/23: AVR  2/24: To Step Down    Physical Therapy Rec:   Home  [  ]   Home w/ PT  [x  ]  Rehab  [  ] rolling walker 63M, appears older than stated age--poor historian, admitted 24 PMHx HTN, GERD, HPL, chronic venous stasis dermatitis with bilateral leg swelling, and AS presented to [facility] [facility] on 2024 for bilateral leg pain and swelling found to have new acute HFrEF (EF 35-40% 2024) in setting of severe AS.     Hospital course:  24: Admitted to [place] new onset HFrEF EF 35% and anemia 2/ AS, medically optimized and transferred to [facility] medicine service on      s/p fall with headastrike,  :  PATs 5.2 secs, , pt has a hx of PATs, asymptomatic, VSS. BMP, Mag and Phos ordered    s/p Liver biopsy, soft pressure post procedure--responded to fluid bolus    : AVR/JOANNA Clip c/b intraop bleeding requirinu pRBCs on CPB 1u pRBCs post-CPB 1 PLT 1000 FEIBA    : To Step Down, raegan, CT, +PW    Physical Therapy Rec:   Home  [  ]   Home w/ PT  [x  ]  Rehab  [  ] rolling walker 63M, appears older than stated age, admitted to [facility] 24 PMHx HTN, GERD, HPL, chronic venous stasis dermatitis with bilateral leg swelling, and AS presented to [facility] ER on 2024 for bilateral leg pain and swelling found to have new acute HFrEF (EF 35-40% 2024) in setting of severe AS.     Hospital course:  24: Admitted to [place] new onset HFrEF EF 35% and anemia 2/2 AS,   24: medically stable for transferred to [facility] medicine service for continued care. during hospitalization hx of WCT/ PAF  Consults for Nephrology, HF, ID, Podiatry for medical optimization  2/15: 1 U PRBC, keep Hgb > 8.    s/p fall with headastrike   : s/p Liver biopsy, soft pressure post procedure--responded to fluid bolus  : AVR/JOANNA Clip c/b intraop bleeding requirinu pRBCs on CPB 1u pRBCs post-CPB 1 PLT 1000 FEIBA  Prolene suture placed around IJ and V wire  : To Step Down, raegan, CT, +marlena GROSS    Physical Therapy Rec:   Home  [  ]   Home w/ PT  [x  ]  Rehab  [  ] rolling walker 63M, appears older than stated age, admitted to [facility] 24 PMHx HTN, GERD, HPL, chronic venous stasis dermatitis with bilateral leg swelling, and AS presented to [facility] [facility] on 2024 for bilateral leg pain and swelling found to have new acute HFrEF (EF 35-40% 2024) in setting of severe AS.     Hospital course:  24: Admitted to [place] new onset HFrEF EF 35% and anemia 2/2 AS,   24: medically stable for transferred to [facility] medicine service for continued care. during hospitalization hx of WCT/ PAF  Consults for Nephrology, HF, ID, Podiatry for medical optimization  2/15: 1 U PRBC, keep Hgb > 8.    s/p fall with headastrike   : s/p Liver biopsy, soft pressure post procedure--responded to fluid bolus  :  RLE doppler: No pseudoaneurysm in the right groin/ bilobed avascular fluid collection measuring 2.8 x 1.7 x 2.0 cm     : AVR/JOANNA Clip c/b intraop bleeding requirinu pRBCs on CPB 1u pRBCs post-CPB 1 PLT 1000 FEIBA  Prolene suture placed around IJ and V wire  : To Step Down, raegan, CT, +marlena GROSS    Physical Therapy Rec:   Home  [  ]   Home w/ PT  [x  ]  Rehab  [  ] rolling walker

## 2024-02-24 NOTE — PHYSICAL THERAPY INITIAL EVALUATION ADULT - GAIT DEVIATIONS NOTED, PT EVAL
decreased heather/decreased velocity of limb motion/decreased step length
decreased heather/decreased step length/decreased weight-shifting ability

## 2024-02-24 NOTE — PHYSICAL THERAPY INITIAL EVALUATION ADULT - BALANCE DISTURBANCE, IDENTIFIED IMPAIRMENT CONTRIBUTE, REHAB EVAL
decr endurance/impaired postural control/decreased strength
pain/impaired postural control/decreased strength

## 2024-02-24 NOTE — PHYSICAL THERAPY INITIAL EVALUATION ADULT - NSPTDISCHREC_GEN_A_CORE
Pt will need RW, supervision and assist for all ADLs./Home PT
DC home with home PT services, recommend rolling walker at this time, Pt will require a rolling walker at home due to their diagnosis of s/p AVR to help complete MRADLs (mobility related aides of daily living), assist from family for mobility/ADLs, SW to be notified./Home PT

## 2024-02-24 NOTE — PHYSICAL THERAPY INITIAL EVALUATION ADULT - PLANNED THERAPY INTERVENTIONS, PT EVAL
balance training/gait training/strengthening
stair neg: GOAL: pt will neg 4 steps 1 HR ind in 4wks./bed mobility training/gait training/strengthening/transfer training

## 2024-02-24 NOTE — PROGRESS NOTE ADULT - PROBLEM SELECTOR PLAN 1
-Montior for dysrhythmias  -Wean O2 as needed/tolerated  -Trend daily CTS labs  -replete K>4.0 Mag >2.0  -Daily weight +/- diuresis  -ERAP protocol  -Pain management  -Progressive ambulation  -Bowel Regimen

## 2024-02-24 NOTE — PHYSICAL THERAPY INITIAL EVALUATION ADULT - FOLLOWS COMMANDS/ANSWERS QUESTIONS, REHAB EVAL
100% of the time/able to follow multistep instructions
50% of the time/able to follow multistep instructions

## 2024-02-24 NOTE — PHYSICAL THERAPY INITIAL EVALUATION ADULT - GAIT DISTANCE, PT EVAL
60'x2 60'x1 then 25'x1, rolling walker, cg/minAx1, initially decr heather and decr step length, with incr time pt progressing step length and gait pattern; however +tachycardic at times, (?artifact), per RN Judy upon return back to room, pt required chair assist 2* tachy (per RN Request, pt stable no complaints) and brought back to room via recliner./50 feet

## 2024-02-24 NOTE — PHYSICAL THERAPY INITIAL EVALUATION ADULT - IMPAIRED TRANSFERS: SIT/STAND, REHAB EVAL
impaired balance/impaired postural control/decreased strength
decreased endurance/impaired balance/pain/impaired postural control/decreased strength

## 2024-02-24 NOTE — PHYSICAL THERAPY INITIAL EVALUATION ADULT - STRENGTHENING, PT EVAL
GOAL: pt will improve BLE strength to 4/5 to improve tx and amb in 4wks.
Pt will improve strength in all extremities by one grade in 4 weeks.

## 2024-02-24 NOTE — PHYSICAL THERAPY INITIAL EVALUATION ADULT - TRANSFER SKILLS, REHAB EVAL
independent/needs device
needs device
SSE-+bloody show with +pooling; equivocal nitrazine; +ferning    VE-1/80/-2    GBS pos    NST cat I with accels and mod variability; irritabiliy on toco

## 2024-02-24 NOTE — PHYSICAL THERAPY INITIAL EVALUATION ADULT - GENERAL OBSERVATIONS, REHAB EVAL
Pt is s/p Bio AVR, LAAC on 2/23/24 for aortic stenosis. Pt received sitting in chair at bedside, +ICU monitoring (on portable monitor for ambulation), +RIJ, +R radial marlena, +CTx2 (medsx2), +garcia, +O2 via NC 4L, +external pacer. Cleared by FAB Terrell for PT eval, pt agreeable
Pt tolerated 40 min PT initial evaluation fairly well, a/w new onset HFrEF from [facility] on 2/5/24, recieved seated at EOB, A&Ox3-4, willing to participate in session. Pt c/o LBP prior to session, FAB dejesus and pt medicated before beginning session

## 2024-02-24 NOTE — PHYSICAL THERAPY INITIAL EVALUATION ADULT - GAIT TRAINING, PT EVAL
GOAL: pt will amb 300'+ ind in 4wks.
Pt will be able to ambulate 100' with RW and independence in 4 weeks.

## 2024-02-24 NOTE — PROGRESS NOTE ADULT - SUBJECTIVE AND OBJECTIVE BOX
VITAL SIGNS  Telemetry:    Vital Signs Last 24 Hrs  T(C): 36.4 (24 @ 21:07), Max: 37.1 (24 @ 00:00)  T(F): 97.5 (24 @ 21:07), Max: 98.8 (24 @ 00:00)  HR: 76 (24:07) (70 - 84)  BP: 94/58 (24:) (94/58 - 94/58)  RR: 20 (24:) (8 - 42)  SpO2: 97% (24 @ :07) (84% - 100%)            :01  -   07:00  --------------------------------------------------------  IN: 3275.3 mL / OUT: 2150 mL / NET: 1125.3 mL     07:01  -   21:30  --------------------------------------------------------  IN: 506.8 mL / OUT: 775 mL / NET: -268.2 mL       Daily Weight in k (2024 00:00)      LABS                8.7<L>                137  | 23   | 21           9.20  >-----------< 139<L>   ------------------------< 134<H>                 26.3<L>                4.2  | 103  | 1.04                                                                      Ca 8.5   Mg 2.5   Ph 3.2      Hemoglobin: 8.7 g/dL ( 00:29)  Hemoglobin: 8.8 g/dL ( @ 17:54)  Hemoglobin: 7.3 g/dL (02-23 @ 12:50)  Hemoglobin: 7.9 g/dL ( @ 06:32)  Hemoglobin: 8.2 g/dL ( @ 06:41)    Creatinine Trend: 1.04<--, 0.93<--, 1.23<--, 1.23<--, 1.29<--, 1.33<--    Drains:     MS [x  ] Drainage:           L Pleural  [  x]  Drainage:                R Pleural  [  ]  Drainage:    Pacing Wires:     Oxygen:         Review of Systems:  General:  Denies fevers, chills, night sweats   CV:  No pain, palpitations   Resp:  Denies shortness of breath, cough, sputum production  GI:  No pain, No nausea, No vomiting, No diarrhea, No constipatiion, No rectal bleeding, No tarry stools, No dysphagia,  :  No pain, bleeding, incontinence, nocturia  Muscle:  No pain, weakness  Neuro:  No weakness, tingling, memory problems  Psych:  No fatigue, insomnia, mood problems, depression  Skin:  No rash, tattoos, scars, edema      PHYSICAL EXAM    Neurology: alert and oriented x 3, nonfocal, no gross deficits  CV : s1 s2 RRR  Sternal Wound :  CDI , Stable  Lungs: cta  Abdomen: soft, nontender, nondistended, positive bowel sounds, last bowel movement                       chest tubes  :    voiding / garcia - sbd         Extremities:      edema   /  -   calve tenderness ,    L leg  /  R leg  incisions cdi          acetaminophen     Tablet .. 650 milliGRAM(s) Oral every 6 hours  aMIOdarone    Tablet 400 milliGRAM(s) Oral two times a day  ascorbic acid 500 milliGRAM(s) Oral two times a day  aspirin  chewable 81 milliGRAM(s) Oral daily  cefuroxime  IVPB 1500 milliGRAM(s) IV Intermittent every 8 hours  cefuroxime  IVPB      chlorhexidine 2% Cloths 1 Application(s) Topical daily  dextrose 5%. 1000 milliLiter(s) IV Continuous <Continuous>  dextrose 5%. 1000 milliLiter(s) IV Continuous <Continuous>  dextrose 50% Injectable 50 milliLiter(s) IV Push every 15 minutes  dextrose Oral Gel 15 Gram(s) Oral once PRN  enoxaparin Injectable 40 milliGRAM(s) SubCutaneous every 24 hours  gabapentin 100 milliGRAM(s) Oral every 8 hours  glucagon  Injectable 1 milliGRAM(s) IntraMuscular once  HYDROmorphone  Injectable 0.5 milliGRAM(s) IV Push every 6 hours PRN  insulin lispro (ADMELOG) corrective regimen sliding scale   SubCutaneous at bedtime  insulin regular Infusion 3 Unit(s)/Hr IV Continuous <Continuous>  magnesium sulfate  IVPB 2 Gram(s) IV Intermittent once  milrinone Infusion 0.1 MICROgram(s)/kG/Min IV Continuous <Continuous>  oxyCODONE    IR 5 milliGRAM(s) Oral every 4 hours PRN  oxyCODONE    IR 10 milliGRAM(s) Oral every 4 hours PRN  pantoprazole  Injectable 40 milliGRAM(s) IV Push daily  polyethylene glycol 3350 17 Gram(s) Oral daily  senna 2 Tablet(s) Oral at bedtime  sodium chloride 0.9%. 1000 milliLiter(s) IV Continuous <Continuous>  vancomycin  IVPB 1000 milliGRAM(s) IV Intermittent every 12 hours                   Step down accept note    VITAL SIGNS  Telemetry:  SR PVC  Vital Signs Last 24 Hrs  T(C): 36.4 (24 @ 21:07), Max: 37.1 (24 @ 00:00)  T(F): 97.5 (24 @ 21:07), Max: 98.8 (24 @ 00:00)  HR: 76 (24:07) (70 - 84)  BP: 94/58 (24 21:07) (94/58 - 94/58)  RR: 20 (24 @ :07) (8 - 42)  SpO2: 97% (24 @ 21:07) (84% - 100%)             07:01  -   07:00  --------------------------------------------------------  IN: 3275.3 mL / OUT: 2150 mL / NET: 1125.3 mL     07:01  -   21:30  --------------------------------------------------------  IN: 506.8 mL / OUT: 775 mL / NET: -268.2 mL       Daily Weight in k (2024 00:00)      LABS                8.7<L>                137  | 23   | 21           9.20  >-----------< 139<L>   ------------------------< 134<H>                 26.3<L>                4.2  | 103  | 1.04                                                                      Ca 8.5   Mg 2.5   Ph 3.2      Hemoglobin: 8.7 g/dL ( 00:29)  Hemoglobin: 8.8 g/dL ( @ 17:54)  Hemoglobin: 7.3 g/dL (02-23 @ 12:50)  Hemoglobin: 7.9 g/dL ( @ 06:32)  Hemoglobin: 8.2 g/dL ( @ 06:41)    Creatinine Trend: 1.04<--, 0.93<--, 1.23<--, 1.23<--, 1.29<--, 1.33<--    Drains:     MS x2 Drainage: serosang    Pacing Wires: 1A 1V    Oxygen: 2L NC        Review of Systems:  General:  Denies fevers, chills  CV:  No pain, palpitations   Resp:  Denies shortness of breath, cough, sputum production  GI:  No pain, No nausea, No vomiting, No diarrhea,, No rectal bleeding, No tarry stools, No dysphagia,  :  No pain, bleeding, incontinence, nocturia  Muscle:  No pain, weakness  Neuro:  No weakness, tingling, memory problems  Psych:  No fatigue, insomnia, mood problems, depression  Skin: No rash +decreasing swelling    PHYSICAL EXAM  Neurology: alert and oriented x 3, nonfocal, no gross deficits  CV : s1 s2 RRR  Sternal Wound :  CDI , Stable  Lungs: decreased bilateral bases  Abdomen: soft, nontender, nondistended, positive bowel sounds, last bowel movement: pre op  :    voiding / garcia - sbd         Extremities:    trace  edema   /  -   calve tenderness  MSK: kyphotic  Psych: pleasant, appropriate      MEDS  acetaminophen     Tablet .. 650 milliGRAM(s) Oral every 6 hours  aMIOdarone    Tablet 400 milliGRAM(s) Oral two times a day  ascorbic acid 500 milliGRAM(s) Oral two times a day  aspirin  chewable 81 milliGRAM(s) Oral daily  cefuroxime  IVPB 1500 milliGRAM(s) IV Intermittent every 8 hours  cefuroxime  IVPB      enoxaparin Injectable 40 milliGRAM(s) SubCutaneous every 24 hours  gabapentin 100 milliGRAM(s) Oral every 8 hours  HYDROmorphone  Injectable 0.5 milliGRAM(s) IV Push every 6 hours PRN  insulin lispro (ADMELOG) corrective regimen sliding scale   SubCutaneous at bedtime  milrinone Infusion 0.1 MICROgram(s)/kG/Min IV Continuous <Continuous>  oxyCODONE    IR 5 milliGRAM(s) Oral every 4 hours PRN  oxyCODONE    IR 10 milliGRAM(s) Oral every 4 hours PRN  pantoprazole  Injectable 40 milliGRAM(s) IV Push daily  polyethylene glycol 3350 17 Gram(s) Oral daily  senna 2 Tablet(s) Oral at bedtime  vancomycin  IVPB 1000 milliGRAM(s) IV Intermittent every 12 hours

## 2024-02-24 NOTE — PHYSICAL THERAPY INITIAL EVALUATION ADULT - IMPAIRMENTS CONTRIBUTING TO GAIT DEVIATIONS, PT EVAL
decr endurance/impaired balance/cognition/impaired postural control/decreased strength
impaired balance/impaired postural control/decreased strength

## 2024-02-24 NOTE — PHYSICAL THERAPY INITIAL EVALUATION ADULT - PERTINENT HX OF CURRENT PROBLEM, REHAB EVAL
Pt is 63M admitted 2/5/24 PMHx HTN, GERD, dyslipidemia, chronic intermittent leg pain, aortic stenosis, chronic venous stasis dermatitis with bilateral leg swelling, poor historian presented to [place] ER on 1/25/2024 for bilateral leg pain and swelling. He was admitted to medicine and was found to have new onset acute HFrEF (EF 35-40% 1/2024) in setting of severe AS. Echo results below from 1/26/2024.  He was treated with IV lasix, beta blocker. Not a candidate for ACE/ARB/ARNI due to renal function. Also was treated with 5 days keflex for possible lower extremity cellulitis. While admitted patient found to be anemic. FOBT negative. Heme was consulted, advised multifactoral etiology for anemia, mechanical hemolysis due to severe AS also possible. Patient was given IV iron, as well as 1 unit PRBCs on 1/31 with improvement in Hbg. GI was also consulted. Patient will require ischemic workup with coronary angiogram and full structural heart team evaluation for TAVR - accepted for transfer at [facility] by hospitalist Dr Joiner in consult with Dr Catalan (interventional cardiology).      CT ANGIO CORONARY: Severely calcified (Agatston calcium score 5251) trileaflet aortic valve.Aortic and peripheral access vessel measurements as reported above.Markedly enlarged heart. Correlate with echocardiogram. Evidence of third spacing with chest/abdominal wall soft tissue edema and small ascites. Findings suggestive of mid/distal esophagitis. Correlate clinically. Indeterminate right adrenal nodule measures 14 mm. Correlate with abdominal MRI for characterization.  Redemonstrated 14 mm upper anterior subcutaneous dermal lesion, image 103 series 15, incompletely evaluated. Again, epidermal inclusion cyst is in the differential. Recommend dermatology evaluation.
Pt is 63M admitted 2/5/24 PMHx HTN, GERD, dyslipidemia, chronic intermittent leg pain, aortic stenosis, chronic venous stasis dermatitis with bilateral leg swelling, poor historian presented to [place] ER on 1/25/2024 for bilateral leg pain and swelling. He was admitted to medicine and was found to have new onset acute HFrEF (EF 35-40% 1/2024) in setting of severe AS. Echo results below from 1/26/2024.  He was treated with IV lasix, beta blocker. Not a candidate for ACE/ARB/ARNI due to renal function. Also was treated with 5 days keflex for possible lower extremity cellulitis. While admitted patient found to be anemic. FOBT negative. Heme was consulted, advised multifactoral etiology for anemia, mechanical hemolysis due to severe AS also possible. Patient was given IV iron, as well as 1 unit PRBCs on 1/31 with improvement in Hbg. GI was also consulted. Patient will require ischemic workup with coronary angiogram and full structural heart team evaluation for TAVR - accepted for transfer at [facility] by hospitalist Dr Joiner in consult with Dr Catalan (interventional cardiology). Hospital course: CT ANGIO CORONARY: Severely calcified (Agatston calcium score 5251) trileaflet aortic valve.Aortic and peripheral access vessel measurements as reported above.Markedly enlarged heart. Correlate with echocardiogram. Evidence of third spacing with chest/abdominal wall soft tissue edema and small ascites. Findings suggestive of mid/distal esophagitis. Correlate clinically. Indeterminate right adrenal nodule measures 14 mm. Correlate with abdominal MRI for characterization.  Redemonstrated 14 mm upper anterior subcutaneous dermal lesion, image 103 series 15, incompletely evaluated. Again, epidermal inclusion cyst is in the differential. Recommend dermatology evaluation. 2/16 s/p fall with headastrike, 2/17:  PATs 5.2 secs, , pt has a hx of PATs, asymptomatic, VSS. BMP, Mag and Phos ordered 2/20 s/p Liver biopsy. Plan for Cardiac Surgery OR date Tentative pending biopsy results. BP soft received 500ml bolus x 2 in IR  2/21 Awaiting path report, 2/22 Tentative plan for AVR with CTSx tomorrow. NPO, labs, Abx ordered; s/p bio AVR, LAAC 2/23/24; Post op labile BP related to hypovolemia & cardiac dysfunction, intra op bleeding requiring blood & products

## 2024-02-24 NOTE — PHYSICAL THERAPY INITIAL EVALUATION ADULT - TRANSFER SAFETY CONCERNS NOTED: SIT/STAND, REHAB EVAL
decreased safety awareness/decreased sequencing ability/decreased step length/decreased weight-shifting ability
decreased balance during turns/decreased step length/decreased weight-shifting ability

## 2024-02-24 NOTE — PROGRESS NOTE ADULT - SUBJECTIVE AND OBJECTIVE BOX
Patient seen and examined at the bedside.    Remains critically ill on continuous ICU monitoring, at risk for life threatening decompensation.  All Labs, data reviewed. Plan of care discussed in length during multi-disciplinary ICU rounds.       Brief Summary:  64 yo M with Aortic Stenosis S/P Bio AVR , LAAC on 2/23/24      24 Hour events:      Objective:  Vital Signs Last 24 Hrs  T(C): 36.9 (24 Feb 2024 04:00), Max: 37.1 (24 Feb 2024 00:00)  T(F): 98.4 (24 Feb 2024 04:00), Max: 98.8 (24 Feb 2024 00:00)  HR: 74 (24 Feb 2024 07:00) (61 - 84)  BP: --  BP(mean): --  RR: 14 (24 Feb 2024 07:00) (7 - 42)  SpO2: 96% (24 Feb 2024 07:00) (90% - 100%)    Parameters below as of 24 Feb 2024 04:00  Patient On (Oxygen Delivery Method): nasal cannula  O2 Flow (L/min): 2      Mode: CPAP with PS  FiO2: 40  PEEP: 5  PS: 10              Physical Exam:   General: in bed with multiple lines, gtt & tubes   Neurology: nonfocal  Eyes: bilateral pupils equal and reactive   ENT/Neck: Neck supple, trachea midline, No JVD   Respiratory: Rales noted bilaterally   CV: Sinus  Abdominal: Soft, NT, ND +BS,   Extremities: 1-2+ pedal edema noted, + peripheral pulses   Skin: No Rashes, Hematoma, Ecchymosis   -------------------------------------------------------------------------------------------------------------------------------    Labs:                        8.7    9.20  )-----------( 139      ( 24 Feb 2024 00:29 )             26.3     02-24    137  |  103  |  21  ----------------------------<  134<H>  4.2   |  23  |  1.04    Ca    8.5      24 Feb 2024 00:29  Phos  3.2     02-24  Mg     2.5     02-24    TPro  5.3<L>  /  Alb  3.3  /  TBili  3.2<H>  /  DBili  x   /  AST  34  /  ALT  21  /  AlkPhos  59  02-24    LIVER FUNCTIONS - ( 24 Feb 2024 00:29 )  Alb: 3.3 g/dL / Pro: 5.3 g/dL / ALK PHOS: 59 U/L / ALT: 21 U/L / AST: 34 U/L / GGT: x           PT/INR - ( 24 Feb 2024 00:29 )   PT: 12.1 sec;   INR: 1.10 ratio         PTT - ( 24 Feb 2024 00:29 )  PTT:28.8 sec  ABG - ( 24 Feb 2024 04:55 )  pH, Arterial: 7.44  pH, Blood: x     /  pCO2: 38    /  pO2: 94    / HCO3: 26    / Base Excess: 1.6   /  SaO2: 99.2                  ALL MEDICATIONS   MEDICATIONS  (STANDING):  acetaminophen     Tablet .. 650 milliGRAM(s) Oral every 6 hours  aMIOdarone    Tablet 400 milliGRAM(s) Oral two times a day  ascorbic acid 500 milliGRAM(s) Oral two times a day  cefuroxime  IVPB 1500 milliGRAM(s) IV Intermittent every 8 hours  cefuroxime  IVPB      chlorhexidine 2% Cloths 1 Application(s) Topical daily  dextrose 50% Injectable 50 milliLiter(s) IV Push every 15 minutes  gabapentin 100 milliGRAM(s) Oral every 8 hours  insulin regular Infusion 3 Unit(s)/Hr (3 mL/Hr) IV Continuous <Continuous>  milrinone Infusion 0.2 MICROgram(s)/kG/Min (5.39 mL/Hr) IV Continuous <Continuous>  pantoprazole  Injectable 40 milliGRAM(s) IV Push daily  polyethylene glycol 3350 17 Gram(s) Oral daily  senna 2 Tablet(s) Oral at bedtime  sodium chloride 0.9%. 1000 milliLiter(s) (10 mL/Hr) IV Continuous <Continuous>  vancomycin  IVPB 1000 milliGRAM(s) IV Intermittent every 12 hours    MEDICATIONS  (PRN):  HYDROmorphone  Injectable 0.5 milliGRAM(s) IV Push every 6 hours PRN Breakthrough Pain  oxyCODONE    IR 10 milliGRAM(s) Oral every 4 hours PRN Severe Pain (7 - 10)  oxyCODONE    IR 5 milliGRAM(s) Oral every 4 hours PRN Moderate Pain (4 - 6)    ------------------------------------------------------------------------------------------------------------------------------  Assessment:  63 year old male with Hx of HTN, GERD, dyslipidemia, chronic intermittent leg pain, aortic stenosis, chronic venous stasis dermatitis with bilateral leg swelling, poor historian presented to [facility] on 1/25/2024 for bilateral leg pain and swelling. He was admitted to medicine and was found to have new onset acute HFrEF (EF 35-40% 1/2024) in setting of severe AS.    Aortic Stenosis S/P Bio AVR , LAAC on 2/23/24  Post op labile BP related to hypovolemia & cardiac dysfunction   intra op bleeding requiring blood & products   At risk for delirium   At risk for hemodynamic decompensation  At high risk for cardiac arrythmias   At high risk for respiratory complications  Acute blood loss anemia  Thrombocytopenia  ^ CT outputs    Hyperglycemia     Plan:   ***Neuro***  Maintain day/night cycle to prevent ICU delirium   Postoperative acute pain control with Tylenol, Gabapentin, and prns.  Meds for sleep at bedtime     ***Cardiovascular***  At high risk for hemodynamic instability and cardiac arrhythmias.  Monitor for sign of hypoperfusion, trend lactate  Titrate Primacor Keep CVP<10.  Amiodarone for AFib prophylaxis  Monitor chest tube output.    ***Pulmonary***  Currently on Nasal Cannula 2L  Deep breathing and coughing exercises, IS, Mobilization, nebs, Chest PT  Encourage incentive spirometry, continue pulse ox monitoring, follow ABGs     ***GI***  Tolerating Diet  Protonix for stress ulcer prophylaxis   Bowel regimen.   Trend LFTs    ***Renal***  Trend Creatinine/BUN  Olivares catheter for strict I/O measurements.  Replete electrolytes as indicated.      ***ID***  Cefuroxime, IV Vanco  Secondary prophylaxis    ***Endocrine***  Insulin per protocol for Hyperglycemia     ***Hematology***  Acute blood loss anemia and thrombocytopenia   no current transfusion indication        IAranza MD, personally performed the services described in this documentation. all medical record entries made by the scribe were at my direction and in my presence. I have reviewed the chart and agree that the record reflects my personal performance and is accurate and complete  Electronically signed:   Aranza Clifton MD  CT ICU attending     ICU time: ** mins     By signing my name below, I, Rachna Mims, attest that this documentation has been prepared under the direction and in the presence of Aranza Clifton MD  Electronically signed: Rachna Mims, 02-24-24 @ 07:17             Patient seen and examined at the bedside.    Remains critically ill on continuous ICU monitoring, at risk for life threatening decompensation.  All Labs, data reviewed. Plan of care discussed in length during multi-disciplinary ICU rounds.       Brief Summary:  62 yo M with Aortic Stenosis S/P Bio AVR , AAC on 2/23/24  24 Hour events:  Pt extubated, oob to a chair  Walked with PT  on NC supplemental O2  wean as able  Monitor CT output keep on suction    Objective:  Vital Signs Last 24 Hrs  T(C): 36.9 (24 Feb 2024 04:00), Max: 37.1 (24 Feb 2024 00:00)  T(F): 98.4 (24 Feb 2024 04:00), Max: 98.8 (24 Feb 2024 00:00)  HR: 74 (24 Feb 2024 07:00) (61 - 84)  BP: --  BP(mean): --  RR: 14 (24 Feb 2024 07:00) (7 - 42)  SpO2: 96% (24 Feb 2024 07:00) (90% - 100%)    Parameters below as of 24 Feb 2024 04:00  Patient On (Oxygen Delivery Method): nasal cannula  O2 Flow (L/min): 2  Mode: CPAP with PS  FiO2: 40  PEEP: 5  PS: 10              Physical Exam:   Neurology: nonfocal, moving  Respiratory B/L breath sounds  CV: Sinus R  Abdominal: Soft, NT, ND +BS,   Extremities: 1-2+ pedal edema noted, + peripheral pulses   Skin: No Rashes, Hematoma, Ecchymosis   -------------------------------------------------------------------------------------------------------------------------------    Labs:                        8.7    9.20  )-----------( 139      ( 24 Feb 2024 00:29 )             26.3     02-24    137  |  103  |  21  ----------------------------<  134<H>  4.2   |  23  |  1.04    Ca    8.5      24 Feb 2024 00:29  Phos  3.2     02-24  Mg     2.5     02-24    TPro  5.3<L>  /  Alb  3.3  /  TBili  3.2<H>  /  DBili  x   /  AST  34  /  ALT  21  /  AlkPhos  59  02-24    LIVER FUNCTIONS - ( 24 Feb 2024 00:29 )  Alb: 3.3 g/dL / Pro: 5.3 g/dL / ALK PHOS: 59 U/L / ALT: 21 U/L / AST: 34 U/L / GGT: x           PT/INR - ( 24 Feb 2024 00:29 )   PT: 12.1 sec;   INR: 1.10 ratio    PTT - ( 24 Feb 2024 00:29 )  PTT:28.8 sec  ABG - ( 24 Feb 2024 04:55 )  pH, Arterial: 7.44  pH, Blood: x     /  pCO2: 38    /  pO2: 94    / HCO3: 26    / Base Excess: 1.6   /  SaO2: 99.2      ALL MEDICATIONS   MEDICATIONS  (STANDING):  acetaminophen     Tablet .. 650 milliGRAM(s) Oral every 6 hours  aMIOdarone    Tablet 400 milliGRAM(s) Oral two times a day  ascorbic acid 500 milliGRAM(s) Oral two times a day  cefuroxime  IVPB 1500 milliGRAM(s) IV Intermittent every 8 hours  cefuroxime  IVPB      chlorhexidine 2% Cloths 1 Application(s) Topical daily  dextrose 50% Injectable 50 milliLiter(s) IV Push every 15 minutes  gabapentin 100 milliGRAM(s) Oral every 8 hours  insulin regular Infusion 3 Unit(s)/Hr (3 mL/Hr) IV Continuous <Continuous>  milrinone Infusion 0.2 MICROgram(s)/kG/Min (5.39 mL/Hr) IV Continuous <Continuous>  pantoprazole  Injectable 40 milliGRAM(s) IV Push daily  polyethylene glycol 3350 17 Gram(s) Oral daily  senna 2 Tablet(s) Oral at bedtime  sodium chloride 0.9%. 1000 milliLiter(s) (10 mL/Hr) IV Continuous <Continuous>  vancomycin  IVPB 1000 milliGRAM(s) IV Intermittent every 12 hours    MEDICATIONS  (PRN):  HYDROmorphone  Injectable 0.5 milliGRAM(s) IV Push every 6 hours PRN Breakthrough Pain  oxyCODONE    IR 10 milliGRAM(s) Oral every 4 hours PRN Severe Pain (7 - 10)  oxyCODONE    IR 5 milliGRAM(s) Oral every 4 hours PRN Moderate Pain (4 - 6)    ------------------------------------------------------------------------------------------------------------------------------  Assessment:  63 year old male with Hx of HTN, GERD, dyslipidemia, chronic intermittent leg pain, aortic stenosis, chronic venous stasis dermatitis with bilateral leg swelling, poor historian presented to [facility] on 1/25/2024 for bilateral leg pain and swelling. He was admitted to medicine and was found to have new onset acute HFrEF (EF 35-40% 1/2024) in setting of severe AS.    Aortic Stenosis S/P Bio AVR , LAAC on 2/23/24  Post op labile BP related to hypovolemia & cardiac dysfunction   intra op bleeding requiring blood & products   At risk for delirium   At risk for hemodynamic decompensation  At high risk for cardiac arrythmias   At high risk for respiratory complications  Acute blood loss anemia  Thrombocytopenia  ^ CT outputs    Hyperglycemia     Plan:   ***Neuro***  Maintain day/night cycle to prevent ICU delirium   Postoperative acute pain control with Tylenol, Gabapentin    ***Cardiovascular***  At high risk for hemodynamic instability and cardiac arrhythmias.  Monitor for sign of hypoperfusion, trend lactate   On Primacor, wean as able, Keep CVP<10.  Amiodarone for AFib prophylaxis  ASA, Vit C  Monitor chest tube output.    ***Pulmonary***  at risk for respiratory complications  Currently on Nasal Cannula 2L  Deep breathing and coughing exercises, IS, Mobilization    ***GI***  Tolerating Diet  Protonix for stress ulcer prophylaxis   Bowel regimen.   Trend LFTs    ***Renal***  Trend Creatinine/BUN  Olivares catheter for strict I/O measurements.  Replete electrolytes as indicated.    ***ID***  perioperative coverage per protocol  Cefuroxime, IV Vanco      ***Endocrine***  Insulin per protocol for Hyperglycemia     ***Hematology***  Acute blood loss anemia and thrombocytopenia   no current transfusion indication, CBC coags  Lovenox for DVT ppx        I, Aranza Clifton MD, personally performed the services described in this documentation. all medical record entries made by the scribe were at my direction and in my presence. I have reviewed the chart and agree that the record reflects my personal performance and is accurate and complete  Electronically signed:   Aranza Clifton MD  CT ICU attending     ICU time: 40 mins     By signing my name below, I, Rachna Mims, attest that this documentation has been prepared under the direction and in the presence of Aranza Clifton MD  Electronically signed: Rachna Mims, 02-24-24 @ 07:17

## 2024-02-24 NOTE — PHYSICAL THERAPY INITIAL EVALUATION ADULT - DIAGNOSIS, PT EVAL
Impaired strength, balance and endurance to perform ADLs.
Impaired strength, balance and endurance to perform ADLs.

## 2024-02-25 LAB
ALBUMIN SERPL ELPH-MCNC: 3.2 G/DL — LOW (ref 3.3–5)
ALBUMIN SERPL ELPH-MCNC: 3.3 G/DL — SIGNIFICANT CHANGE UP (ref 3.3–5)
ALP SERPL-CCNC: 63 U/L — SIGNIFICANT CHANGE UP (ref 40–120)
ALP SERPL-CCNC: 66 U/L — SIGNIFICANT CHANGE UP (ref 40–120)
ALT FLD-CCNC: 20 U/L — SIGNIFICANT CHANGE UP (ref 10–45)
ALT FLD-CCNC: 21 U/L — SIGNIFICANT CHANGE UP (ref 10–45)
ANION GAP SERPL CALC-SCNC: 11 MMOL/L — SIGNIFICANT CHANGE UP (ref 5–17)
ANION GAP SERPL CALC-SCNC: 12 MMOL/L — SIGNIFICANT CHANGE UP (ref 5–17)
AST SERPL-CCNC: 22 U/L — SIGNIFICANT CHANGE UP (ref 10–40)
AST SERPL-CCNC: 28 U/L — SIGNIFICANT CHANGE UP (ref 10–40)
BASE EXCESS BLDV CALC-SCNC: -0.3 MMOL/L — SIGNIFICANT CHANGE UP (ref -2–3)
BASE EXCESS BLDV CALC-SCNC: 0.6 MMOL/L — SIGNIFICANT CHANGE UP (ref -2–3)
BASE EXCESS BLDV CALC-SCNC: 0.6 MMOL/L — SIGNIFICANT CHANGE UP (ref -2–3)
BASOPHILS # BLD AUTO: 0.01 K/UL — SIGNIFICANT CHANGE UP (ref 0–0.2)
BASOPHILS NFR BLD AUTO: 0.1 % — SIGNIFICANT CHANGE UP (ref 0–2)
BILIRUB SERPL-MCNC: 1.8 MG/DL — HIGH (ref 0.2–1.2)
BILIRUB SERPL-MCNC: 1.9 MG/DL — HIGH (ref 0.2–1.2)
BUN SERPL-MCNC: 25 MG/DL — HIGH (ref 7–23)
BUN SERPL-MCNC: 28 MG/DL — HIGH (ref 7–23)
CA-I SERPL-SCNC: 1.17 MMOL/L — SIGNIFICANT CHANGE UP (ref 1.15–1.33)
CA-I SERPL-SCNC: 1.19 MMOL/L — SIGNIFICANT CHANGE UP (ref 1.15–1.33)
CA-I SERPL-SCNC: 1.23 MMOL/L — SIGNIFICANT CHANGE UP (ref 1.15–1.33)
CALCIUM SERPL-MCNC: 8.2 MG/DL — LOW (ref 8.4–10.5)
CALCIUM SERPL-MCNC: 8.4 MG/DL — SIGNIFICANT CHANGE UP (ref 8.4–10.5)
CHLORIDE BLDV-SCNC: 99 MMOL/L — SIGNIFICANT CHANGE UP (ref 96–108)
CHLORIDE SERPL-SCNC: 100 MMOL/L — SIGNIFICANT CHANGE UP (ref 96–108)
CHLORIDE SERPL-SCNC: 97 MMOL/L — SIGNIFICANT CHANGE UP (ref 96–108)
CO2 BLDV-SCNC: 27 MMOL/L — HIGH (ref 22–26)
CO2 BLDV-SCNC: 28 MMOL/L — HIGH (ref 22–26)
CO2 BLDV-SCNC: 28 MMOL/L — HIGH (ref 22–26)
CO2 SERPL-SCNC: 23 MMOL/L — SIGNIFICANT CHANGE UP (ref 22–31)
CO2 SERPL-SCNC: 24 MMOL/L — SIGNIFICANT CHANGE UP (ref 22–31)
CREAT SERPL-MCNC: 1.35 MG/DL — HIGH (ref 0.5–1.3)
CREAT SERPL-MCNC: 1.47 MG/DL — HIGH (ref 0.5–1.3)
EGFR: 53 ML/MIN/1.73M2 — LOW
EGFR: 59 ML/MIN/1.73M2 — LOW
EOSINOPHIL # BLD AUTO: 0.03 K/UL — SIGNIFICANT CHANGE UP (ref 0–0.5)
EOSINOPHIL NFR BLD AUTO: 0.3 % — SIGNIFICANT CHANGE UP (ref 0–6)
GAS PNL BLDV: 128 MMOL/L — LOW (ref 136–145)
GAS PNL BLDV: 129 MMOL/L — LOW (ref 136–145)
GAS PNL BLDV: 130 MMOL/L — LOW (ref 136–145)
GAS PNL BLDV: SIGNIFICANT CHANGE UP
GLUCOSE BLDC GLUCOMTR-MCNC: 113 MG/DL — HIGH (ref 70–99)
GLUCOSE BLDC GLUCOMTR-MCNC: 113 MG/DL — HIGH (ref 70–99)
GLUCOSE BLDC GLUCOMTR-MCNC: 142 MG/DL — HIGH (ref 70–99)
GLUCOSE BLDC GLUCOMTR-MCNC: 148 MG/DL — HIGH (ref 70–99)
GLUCOSE BLDV-MCNC: 111 MG/DL — HIGH (ref 70–99)
GLUCOSE BLDV-MCNC: 132 MG/DL — HIGH (ref 70–99)
GLUCOSE BLDV-MCNC: 160 MG/DL — HIGH (ref 70–99)
GLUCOSE SERPL-MCNC: 109 MG/DL — HIGH (ref 70–99)
GLUCOSE SERPL-MCNC: 167 MG/DL — HIGH (ref 70–99)
HCO3 BLDV-SCNC: 25 MMOL/L — SIGNIFICANT CHANGE UP (ref 22–29)
HCO3 BLDV-SCNC: 26 MMOL/L — SIGNIFICANT CHANGE UP (ref 22–29)
HCO3 BLDV-SCNC: 27 MMOL/L — SIGNIFICANT CHANGE UP (ref 22–29)
HCT VFR BLD CALC: 25.9 % — LOW (ref 39–50)
HCT VFR BLD CALC: 27 % — LOW (ref 39–50)
HCT VFR BLDA CALC: 25 % — LOW (ref 39–51)
HCT VFR BLDA CALC: 26 % — LOW (ref 39–51)
HCT VFR BLDA CALC: 27 % — LOW (ref 39–51)
HGB BLD CALC-MCNC: 8.4 G/DL — LOW (ref 12.6–17.4)
HGB BLD CALC-MCNC: 8.5 G/DL — LOW (ref 12.6–17.4)
HGB BLD CALC-MCNC: 9 G/DL — LOW (ref 12.6–17.4)
HGB BLD-MCNC: 8.3 G/DL — LOW (ref 13–17)
HGB BLD-MCNC: 8.5 G/DL — LOW (ref 13–17)
HOROWITZ INDEX BLDV+IHG-RTO: 28 — SIGNIFICANT CHANGE UP
IMM GRANULOCYTES NFR BLD AUTO: 0.5 % — SIGNIFICANT CHANGE UP (ref 0–0.9)
INR BLD: 1.23 RATIO — HIGH (ref 0.85–1.18)
LACTATE BLDV-MCNC: 1.3 MMOL/L — SIGNIFICANT CHANGE UP (ref 0.5–2)
LACTATE BLDV-MCNC: 1.4 MMOL/L — SIGNIFICANT CHANGE UP (ref 0.5–2)
LACTATE BLDV-MCNC: 1.4 MMOL/L — SIGNIFICANT CHANGE UP (ref 0.5–2)
LACTATE BLDV-MCNC: 1.5 MMOL/L — SIGNIFICANT CHANGE UP (ref 0.5–2)
LACTATE BLDV-MCNC: 1.9 MMOL/L — SIGNIFICANT CHANGE UP (ref 0.5–2)
LYMPHOCYTES # BLD AUTO: 0.76 K/UL — LOW (ref 1–3.3)
LYMPHOCYTES # BLD AUTO: 7.3 % — LOW (ref 13–44)
MAGNESIUM SERPL-MCNC: 2.8 MG/DL — HIGH (ref 1.6–2.6)
MCHC RBC-ENTMCNC: 22.7 PG — LOW (ref 27–34)
MCHC RBC-ENTMCNC: 23 PG — LOW (ref 27–34)
MCHC RBC-ENTMCNC: 31.5 GM/DL — LOW (ref 32–36)
MCHC RBC-ENTMCNC: 32 GM/DL — SIGNIFICANT CHANGE UP (ref 32–36)
MCV RBC AUTO: 71.7 FL — LOW (ref 80–100)
MCV RBC AUTO: 72.2 FL — LOW (ref 80–100)
MONOCYTES # BLD AUTO: 1.26 K/UL — HIGH (ref 0–0.9)
MONOCYTES NFR BLD AUTO: 12.2 % — SIGNIFICANT CHANGE UP (ref 2–14)
NEUTROPHILS # BLD AUTO: 8.25 K/UL — HIGH (ref 1.8–7.4)
NEUTROPHILS NFR BLD AUTO: 79.6 % — HIGH (ref 43–77)
NRBC # BLD: 0 /100 WBCS — SIGNIFICANT CHANGE UP (ref 0–0)
NRBC # BLD: 0 /100 WBCS — SIGNIFICANT CHANGE UP (ref 0–0)
PCO2 BLDV: 46 MMHG — SIGNIFICANT CHANGE UP (ref 42–55)
PCO2 BLDV: 48 MMHG — SIGNIFICANT CHANGE UP (ref 42–55)
PCO2 BLDV: 51 MMHG — SIGNIFICANT CHANGE UP (ref 42–55)
PH BLDV: 7.33 — SIGNIFICANT CHANGE UP (ref 7.32–7.43)
PH BLDV: 7.35 — SIGNIFICANT CHANGE UP (ref 7.32–7.43)
PH BLDV: 7.35 — SIGNIFICANT CHANGE UP (ref 7.32–7.43)
PHOSPHATE SERPL-MCNC: 3.7 MG/DL — SIGNIFICANT CHANGE UP (ref 2.5–4.5)
PLATELET # BLD AUTO: 152 K/UL — SIGNIFICANT CHANGE UP (ref 150–400)
PLATELET # BLD AUTO: 155 K/UL — SIGNIFICANT CHANGE UP (ref 150–400)
PO2 BLDV: 26 MMHG — SIGNIFICANT CHANGE UP (ref 25–45)
PO2 BLDV: 30 MMHG — SIGNIFICANT CHANGE UP (ref 25–45)
PO2 BLDV: 38 MMHG — SIGNIFICANT CHANGE UP (ref 25–45)
POTASSIUM BLDV-SCNC: 3.9 MMOL/L — SIGNIFICANT CHANGE UP (ref 3.5–5.1)
POTASSIUM BLDV-SCNC: 4 MMOL/L — SIGNIFICANT CHANGE UP (ref 3.5–5.1)
POTASSIUM BLDV-SCNC: 4.1 MMOL/L — SIGNIFICANT CHANGE UP (ref 3.5–5.1)
POTASSIUM SERPL-MCNC: 4.2 MMOL/L — SIGNIFICANT CHANGE UP (ref 3.5–5.3)
POTASSIUM SERPL-MCNC: 4.2 MMOL/L — SIGNIFICANT CHANGE UP (ref 3.5–5.3)
POTASSIUM SERPL-SCNC: 4.2 MMOL/L — SIGNIFICANT CHANGE UP (ref 3.5–5.3)
POTASSIUM SERPL-SCNC: 4.2 MMOL/L — SIGNIFICANT CHANGE UP (ref 3.5–5.3)
PROT SERPL-MCNC: 5.6 G/DL — LOW (ref 6–8.3)
PROT SERPL-MCNC: 5.7 G/DL — LOW (ref 6–8.3)
PROTHROM AB SERPL-ACNC: 13.5 SEC — HIGH (ref 9.5–13)
RBC # BLD: 3.61 M/UL — LOW (ref 4.2–5.8)
RBC # BLD: 3.74 M/UL — LOW (ref 4.2–5.8)
RBC # FLD: 28 % — HIGH (ref 10.3–14.5)
RBC # FLD: 28.9 % — HIGH (ref 10.3–14.5)
SAO2 % BLDV: 39.4 % — LOW (ref 67–88)
SAO2 % BLDV: 54.1 % — LOW (ref 67–88)
SAO2 % BLDV: 65 % — LOW (ref 67–88)
SODIUM SERPL-SCNC: 133 MMOL/L — LOW (ref 135–145)
SODIUM SERPL-SCNC: 134 MMOL/L — LOW (ref 135–145)
VANCOMYCIN TROUGH SERPL-MCNC: 20.3 UG/ML — HIGH (ref 10–20)
WBC # BLD: 10.36 K/UL — SIGNIFICANT CHANGE UP (ref 3.8–10.5)
WBC # BLD: 8.94 K/UL — SIGNIFICANT CHANGE UP (ref 3.8–10.5)
WBC # FLD AUTO: 10.36 K/UL — SIGNIFICANT CHANGE UP (ref 3.8–10.5)
WBC # FLD AUTO: 8.94 K/UL — SIGNIFICANT CHANGE UP (ref 3.8–10.5)

## 2024-02-25 PROCEDURE — 93010 ELECTROCARDIOGRAM REPORT: CPT

## 2024-02-25 PROCEDURE — 71045 X-RAY EXAM CHEST 1 VIEW: CPT | Mod: 26

## 2024-02-25 RX ORDER — DOBUTAMINE HCL 250MG/20ML
0.5 VIAL (ML) INTRAVENOUS
Qty: 500 | Refills: 0 | Status: DISCONTINUED | OUTPATIENT
Start: 2024-02-25 | End: 2024-02-25

## 2024-02-25 RX ORDER — MAGNESIUM SULFATE 500 MG/ML
1 VIAL (ML) INJECTION ONCE
Refills: 0 | Status: COMPLETED | OUTPATIENT
Start: 2024-02-25 | End: 2024-02-29

## 2024-02-25 RX ORDER — FUROSEMIDE 40 MG
40 TABLET ORAL
Refills: 0 | Status: DISCONTINUED | OUTPATIENT
Start: 2024-02-25 | End: 2024-02-26

## 2024-02-25 RX ORDER — METOPROLOL TARTRATE 50 MG
2.5 TABLET ORAL ONCE
Refills: 0 | Status: DISCONTINUED | OUTPATIENT
Start: 2024-02-25 | End: 2024-02-25

## 2024-02-25 RX ORDER — AMIODARONE HYDROCHLORIDE 400 MG/1
0.5 TABLET ORAL
Qty: 450 | Refills: 0 | Status: DISCONTINUED | OUTPATIENT
Start: 2024-02-25 | End: 2024-03-06

## 2024-02-25 RX ORDER — METOPROLOL TARTRATE 50 MG
2.5 TABLET ORAL
Refills: 0 | Status: COMPLETED | OUTPATIENT
Start: 2024-02-25 | End: 2024-02-25

## 2024-02-25 RX ORDER — VANCOMYCIN HCL 1 G
750 VIAL (EA) INTRAVENOUS EVERY 12 HOURS
Refills: 0 | Status: DISCONTINUED | OUTPATIENT
Start: 2024-02-25 | End: 2024-02-29

## 2024-02-25 RX ORDER — ALBUMIN HUMAN 25 %
100 VIAL (ML) INTRAVENOUS ONCE
Refills: 0 | Status: COMPLETED | OUTPATIENT
Start: 2024-02-25 | End: 2024-02-25

## 2024-02-25 RX ORDER — AMIODARONE HYDROCHLORIDE 400 MG/1
TABLET ORAL
Refills: 0 | Status: DISCONTINUED | OUTPATIENT
Start: 2024-02-25 | End: 2024-02-26

## 2024-02-25 RX ORDER — ALBUMIN HUMAN 25 %
200 VIAL (ML) INTRAVENOUS ONCE
Refills: 0 | Status: COMPLETED | OUTPATIENT
Start: 2024-02-25 | End: 2024-02-25

## 2024-02-25 RX ORDER — AMIODARONE HYDROCHLORIDE 400 MG/1
400 TABLET ORAL EVERY 8 HOURS
Refills: 0 | Status: DISCONTINUED | OUTPATIENT
Start: 2024-02-25 | End: 2024-02-26

## 2024-02-25 RX ADMIN — Medication 250 MILLIGRAM(S): at 13:50

## 2024-02-25 RX ADMIN — GABAPENTIN 100 MILLIGRAM(S): 400 CAPSULE ORAL at 06:08

## 2024-02-25 RX ADMIN — CHLORHEXIDINE GLUCONATE 1 APPLICATION(S): 213 SOLUTION TOPICAL at 11:53

## 2024-02-25 RX ADMIN — OXYCODONE HYDROCHLORIDE 5 MILLIGRAM(S): 5 TABLET ORAL at 16:33

## 2024-02-25 RX ADMIN — Medication 100 MILLIGRAM(S): at 06:09

## 2024-02-25 RX ADMIN — Medication 81 MILLIGRAM(S): at 11:58

## 2024-02-25 RX ADMIN — Medication 650 MILLIGRAM(S): at 23:43

## 2024-02-25 RX ADMIN — Medication 100 MILLIGRAM(S): at 00:00

## 2024-02-25 RX ADMIN — Medication 2.5 MILLIGRAM(S): at 17:44

## 2024-02-25 RX ADMIN — Medication 40 MILLIGRAM(S): at 13:52

## 2024-02-25 RX ADMIN — Medication 50 MILLILITER(S): at 18:49

## 2024-02-25 RX ADMIN — Medication 650 MILLIGRAM(S): at 23:13

## 2024-02-25 RX ADMIN — POLYETHYLENE GLYCOL 3350 17 GRAM(S): 17 POWDER, FOR SOLUTION ORAL at 12:00

## 2024-02-25 RX ADMIN — GABAPENTIN 100 MILLIGRAM(S): 400 CAPSULE ORAL at 23:12

## 2024-02-25 RX ADMIN — OXYCODONE HYDROCHLORIDE 5 MILLIGRAM(S): 5 TABLET ORAL at 17:33

## 2024-02-25 RX ADMIN — Medication 2.5 MILLIGRAM(S): at 17:35

## 2024-02-25 RX ADMIN — PANTOPRAZOLE SODIUM 40 MILLIGRAM(S): 20 TABLET, DELAYED RELEASE ORAL at 12:02

## 2024-02-25 RX ADMIN — Medication 650 MILLIGRAM(S): at 06:08

## 2024-02-25 RX ADMIN — Medication 500 MILLIGRAM(S): at 17:20

## 2024-02-25 RX ADMIN — Medication 100 MILLILITER(S): at 19:37

## 2024-02-25 RX ADMIN — Medication 500 MILLIGRAM(S): at 06:08

## 2024-02-25 RX ADMIN — SENNA PLUS 2 TABLET(S): 8.6 TABLET ORAL at 23:13

## 2024-02-25 RX ADMIN — GABAPENTIN 100 MILLIGRAM(S): 400 CAPSULE ORAL at 13:52

## 2024-02-25 RX ADMIN — Medication 650 MILLIGRAM(S): at 11:58

## 2024-02-25 RX ADMIN — AMIODARONE HYDROCHLORIDE 400 MILLIGRAM(S): 400 TABLET ORAL at 17:18

## 2024-02-25 RX ADMIN — Medication 650 MILLIGRAM(S): at 17:19

## 2024-02-25 RX ADMIN — AMIODARONE HYDROCHLORIDE 400 MILLIGRAM(S): 400 TABLET ORAL at 06:08

## 2024-02-25 RX ADMIN — ENOXAPARIN SODIUM 40 MILLIGRAM(S): 100 INJECTION SUBCUTANEOUS at 13:52

## 2024-02-25 NOTE — PROGRESS NOTE ADULT - ASSESSMENT
63M, appears older than stated age, admitted to [facility] 24 PMHx HTN, GERD, HPL, chronic venous stasis dermatitis with bilateral leg swelling, and AS presented to [facility] on 2024 for bilateral leg pain and swelling found to have new acute HFrEF (EF 35-40% 2024) in setting of severe AS.     Hospital course:  24: Admitted to [place] new onset HFrEF EF 35% and anemia 2/2 AS,   24: medically stable for transferred to [facility] medicine service for continued care. during hospitalization hx of WCT/ PAF  Consults for Nephrology, HF, ID, Podiatry for medical optimization  2/15: 1 U PRBC, keep Hgb > 8.    s/p fall with headastrike   : s/p Liver biopsy, soft pressure post procedure--responded to fluid bolus  :  RLE doppler: No pseudoaneurysm in the right groin/ bilobed avascular fluid collection measuring 2.8 x 1.7 x 2.0 cm     : AVR/JOANNA Clip c/b intraop bleeding requirinu pRBCs on CPB 1u pRBCs post-CPB 1 PLT 1000 FEIBA  Prolene suture placed around IJ and V wire  : To Step Down, raegan, CT, +PW, marlena RIJ   Maintain mediastinal chest tubes Primicor discontinued. Lasix 40 BID initiated , Wound care to chronic venous stasis b/l LE. D/c RIJ    Physical Therapy Rec:   Home  [  ]   Home w/ PT  [x  ]  Rehab  [  ] rolling walker

## 2024-02-25 NOTE — PROVIDER CONTACT NOTE (OTHER) - RECOMMENDATIONS
Provider notified.
Notify provider
Provider notified.
Provider notified.
Tele stated patient converted into afib at 4:44pm - I was notified of afib at 5:40pm.
ZEE Coleman notified
ZEE Parra notified
Notify ACP Alejandra Schmitt
Provider notified.
Notify NP Shakila Ring
Provider notified.
continue to monitor on tele
Jacklyn Mendoza ACP made aware
Jacklyn Mendoza ACP notified
MARLENE Vasquez
Notify ACP Karuna Vasquez
Notify ZEE Fermin and gave tylenol for pain - see EMAR.

## 2024-02-25 NOTE — PROGRESS NOTE ADULT - SUBJECTIVE AND OBJECTIVE BOX
VITAL SIGNS    Telemetry:  SR  70-80  Vital Signs Last 24 Hrs  T(C): 36.8 (24 @ 10:56), Max: 36.8 (24 @ 10:56)  T(F): 98.3 (24 @ 10:56), Max: 98.3 (24 @ 10:56)  HR: 84 (24 @ 16:09) (70 - 84)  BP: 93/57 (24 @ 16:09) (90/51 - 118/69)  RR: 18 (24 @ 10:56) (7 - 29)  SpO2: 98% (24 @ 16:09) (92% - 100%)             @ 07:01  -   @ 07:00  --------------------------------------------------------  IN: 914.2 mL / OUT: 1245 mL / NET: -330.8 mL     07:01  -   @ 16:30  --------------------------------------------------------  IN: 488.5 mL / OUT: 205 mL / NET: 283.5 mL       Daily     Daily Weight in k.7 (2024 07:19)  Admit Wt: Drug Dosing Weight  Height (cm): 180.3 (2024 06:36)  Weight (kg): 89.8 (2024 06:36)  BMI (kg/m2): 27.6 (2024 06:36)  BSA (m2): 2.1 (2024 06:36)    Bilirubin Total: 1.9 mg/dL ( @ 06:38)    CAPILLARY BLOOD GLUCOSE      POCT Blood Glucose.: 113 mg/dL (2024 12:11)  POCT Blood Glucose.: 113 mg/dL (2024 07:43)  POCT Blood Glucose.: 122 mg/dL (2024 21:36)  POCT Blood Glucose.: 146 mg/dL (2024 17:34)          MEDICATIONS  acetaminophen     Tablet .. 650 milliGRAM(s) Oral every 6 hours  aMIOdarone    Tablet 400 milliGRAM(s) Oral two times a day  ascorbic acid 500 milliGRAM(s) Oral two times a day  aspirin  chewable 81 milliGRAM(s) Oral daily  chlorhexidine 2% Cloths 1 Application(s) Topical daily  enoxaparin Injectable 40 milliGRAM(s) SubCutaneous every 24 hours  furosemide    Tablet 40 milliGRAM(s) Oral two times a day  gabapentin 100 milliGRAM(s) Oral every 8 hours  oxyCODONE    IR 10 milliGRAM(s) Oral every 4 hours PRN  oxyCODONE    IR 5 milliGRAM(s) Oral every 4 hours PRN  pantoprazole  Injectable 40 milliGRAM(s) IV Push daily  polyethylene glycol 3350 17 Gram(s) Oral daily  senna 2 Tablet(s) Oral at bedtime  vancomycin  IVPB 750 milliGRAM(s) IV Intermittent every 12 hours      >>> <<<  PHYSICAL EXAM  Subjective: NAD  Neurology: alert and oriented x 3, nonfocal, no gross deficits  CV :s1s2 right IJ  Sternal Wound :  CDI , Stable  Lungs: CTA mediastinal chest tubes to lws  Abdomen: soft, NT,ND, (- )BM +flatus  :  voiding  Extremities: b/l posterior chronic leg lesions + 1-2 edema      LABS      134<L>  |  100  |  25<H>  ----------------------------<  109<H>  4.2   |  23  |  1.35<H>    Ca    8.4      2024 06:38  Phos  3.7       Mg     2.8         TPro  5.7<L>  /  Alb  3.2<L>  /  TBili  1.9<H>  /  DBili  x   /  AST  28  /  ALT  21  /  AlkPhos  66  02-25                                 8.5    10.36 )-----------( 155      ( 2024 06:38 )             27.0          PT/INR - ( 2024 00:29 )   PT: 12.1 sec;   INR: 1.10 ratio         PTT - ( 2024 00:29 )  PTT:28.8 sec       PAST MEDICAL & SURGICAL HISTORY:  Dyslipidemia      Hypertension      Chronic GERD      History of aortic stenosis      Cellulitis      No significant past surgical history

## 2024-02-26 ENCOUNTER — RESULT REVIEW (OUTPATIENT)
Age: 64
End: 2024-02-26

## 2024-02-26 DIAGNOSIS — I48.91 UNSPECIFIED ATRIAL FIBRILLATION: ICD-10-CM

## 2024-02-26 LAB
ALBUMIN SERPL ELPH-MCNC: 3.6 G/DL — SIGNIFICANT CHANGE UP (ref 3.3–5)
ALP SERPL-CCNC: 63 U/L — SIGNIFICANT CHANGE UP (ref 40–120)
ALT FLD-CCNC: 20 U/L — SIGNIFICANT CHANGE UP (ref 10–45)
ANION GAP SERPL CALC-SCNC: 10 MMOL/L — SIGNIFICANT CHANGE UP (ref 5–17)
ANION GAP SERPL CALC-SCNC: 12 MMOL/L — SIGNIFICANT CHANGE UP (ref 5–17)
AST SERPL-CCNC: 19 U/L — SIGNIFICANT CHANGE UP (ref 10–40)
BASE EXCESS BLDV CALC-SCNC: -0.4 MMOL/L — SIGNIFICANT CHANGE UP (ref -2–3)
BILIRUB SERPL-MCNC: 4.1 MG/DL — HIGH (ref 0.2–1.2)
BUN SERPL-MCNC: 32 MG/DL — HIGH (ref 7–23)
BUN SERPL-MCNC: 34 MG/DL — HIGH (ref 7–23)
CALCIUM SERPL-MCNC: 8.4 MG/DL — SIGNIFICANT CHANGE UP (ref 8.4–10.5)
CALCIUM SERPL-MCNC: 8.8 MG/DL — SIGNIFICANT CHANGE UP (ref 8.4–10.5)
CHLORIDE SERPL-SCNC: 96 MMOL/L — SIGNIFICANT CHANGE UP (ref 96–108)
CHLORIDE SERPL-SCNC: 98 MMOL/L — SIGNIFICANT CHANGE UP (ref 96–108)
CO2 BLDV-SCNC: 27 MMOL/L — HIGH (ref 22–26)
CO2 SERPL-SCNC: 22 MMOL/L — SIGNIFICANT CHANGE UP (ref 22–31)
CO2 SERPL-SCNC: 24 MMOL/L — SIGNIFICANT CHANGE UP (ref 22–31)
CREAT SERPL-MCNC: 1.55 MG/DL — HIGH (ref 0.5–1.3)
CREAT SERPL-MCNC: 1.65 MG/DL — HIGH (ref 0.5–1.3)
EGFR: 46 ML/MIN/1.73M2 — LOW
EGFR: 50 ML/MIN/1.73M2 — LOW
GAS PNL BLDA: SIGNIFICANT CHANGE UP
GAS PNL BLDV: SIGNIFICANT CHANGE UP
GLUCOSE SERPL-MCNC: 103 MG/DL — HIGH (ref 70–99)
GLUCOSE SERPL-MCNC: 104 MG/DL — HIGH (ref 70–99)
HCO3 BLDV-SCNC: 25 MMOL/L — SIGNIFICANT CHANGE UP (ref 22–29)
HCT VFR BLD CALC: 26.4 % — LOW (ref 39–50)
HGB BLD-MCNC: 8.6 G/DL — LOW (ref 13–17)
MAGNESIUM SERPL-MCNC: 2.5 MG/DL — SIGNIFICANT CHANGE UP (ref 1.6–2.6)
MAGNESIUM SERPL-MCNC: 2.6 MG/DL — SIGNIFICANT CHANGE UP (ref 1.6–2.6)
MCHC RBC-ENTMCNC: 24.2 PG — LOW (ref 27–34)
MCHC RBC-ENTMCNC: 32.6 GM/DL — SIGNIFICANT CHANGE UP (ref 32–36)
MCV RBC AUTO: 74.2 FL — LOW (ref 80–100)
NRBC # BLD: 0 /100 WBCS — SIGNIFICANT CHANGE UP (ref 0–0)
PCO2 BLDV: 45 MMHG — SIGNIFICANT CHANGE UP (ref 42–55)
PH BLDV: 7.36 — SIGNIFICANT CHANGE UP (ref 7.32–7.43)
PHOSPHATE SERPL-MCNC: 3.8 MG/DL — SIGNIFICANT CHANGE UP (ref 2.5–4.5)
PLATELET # BLD AUTO: 120 K/UL — LOW (ref 150–400)
PO2 BLDV: 39 MMHG — SIGNIFICANT CHANGE UP (ref 25–45)
POTASSIUM SERPL-MCNC: 3.9 MMOL/L — SIGNIFICANT CHANGE UP (ref 3.5–5.3)
POTASSIUM SERPL-MCNC: 4.5 MMOL/L — SIGNIFICANT CHANGE UP (ref 3.5–5.3)
POTASSIUM SERPL-SCNC: 3.9 MMOL/L — SIGNIFICANT CHANGE UP (ref 3.5–5.3)
POTASSIUM SERPL-SCNC: 4.5 MMOL/L — SIGNIFICANT CHANGE UP (ref 3.5–5.3)
PROT SERPL-MCNC: 5.7 G/DL — LOW (ref 6–8.3)
RBC # BLD: 3.56 M/UL — LOW (ref 4.2–5.8)
RBC # FLD: 28.7 % — HIGH (ref 10.3–14.5)
SAO2 % BLDV: 68.7 % — SIGNIFICANT CHANGE UP (ref 67–88)
SODIUM SERPL-SCNC: 130 MMOL/L — LOW (ref 135–145)
SODIUM SERPL-SCNC: 132 MMOL/L — LOW (ref 135–145)
VANCOMYCIN TROUGH SERPL-MCNC: 12.5 UG/ML — SIGNIFICANT CHANGE UP (ref 10–20)
WBC # BLD: 7.8 K/UL — SIGNIFICANT CHANGE UP (ref 3.8–10.5)
WBC # FLD AUTO: 7.8 K/UL — SIGNIFICANT CHANGE UP (ref 3.8–10.5)

## 2024-02-26 PROCEDURE — 99233 SBSQ HOSP IP/OBS HIGH 50: CPT

## 2024-02-26 PROCEDURE — 36620 INSERTION CATHETER ARTERY: CPT

## 2024-02-26 PROCEDURE — 93306 TTE W/DOPPLER COMPLETE: CPT | Mod: 26

## 2024-02-26 PROCEDURE — 71045 X-RAY EXAM CHEST 1 VIEW: CPT | Mod: 26

## 2024-02-26 RX ORDER — AMIODARONE HYDROCHLORIDE 400 MG/1
400 TABLET ORAL EVERY 8 HOURS
Refills: 0 | Status: COMPLETED | OUTPATIENT
Start: 2024-02-26 | End: 2024-03-01

## 2024-02-26 RX ORDER — FUROSEMIDE 40 MG
80 TABLET ORAL ONCE
Refills: 0 | Status: COMPLETED | OUTPATIENT
Start: 2024-02-26 | End: 2024-02-26

## 2024-02-26 RX ORDER — DOBUTAMINE HCL 250MG/20ML
1.25 VIAL (ML) INTRAVENOUS
Qty: 500 | Refills: 0 | Status: DISCONTINUED | OUTPATIENT
Start: 2024-02-26 | End: 2024-02-27

## 2024-02-26 RX ORDER — FUROSEMIDE 40 MG
40 TABLET ORAL ONCE
Refills: 0 | Status: COMPLETED | OUTPATIENT
Start: 2024-02-26 | End: 2024-02-26

## 2024-02-26 RX ORDER — POTASSIUM BICARBONATE 978 MG/1
25 TABLET, EFFERVESCENT ORAL
Refills: 0 | Status: COMPLETED | OUTPATIENT
Start: 2024-02-26 | End: 2024-02-26

## 2024-02-26 RX ORDER — AMIODARONE HYDROCHLORIDE 400 MG/1
TABLET ORAL
Refills: 0 | Status: DISCONTINUED | OUTPATIENT
Start: 2024-02-26 | End: 2024-03-12

## 2024-02-26 RX ORDER — FUROSEMIDE 40 MG
20 TABLET ORAL
Qty: 500 | Refills: 0 | Status: DISCONTINUED | OUTPATIENT
Start: 2024-02-26 | End: 2024-03-01

## 2024-02-26 RX ORDER — AMIODARONE HYDROCHLORIDE 400 MG/1
200 TABLET ORAL DAILY
Refills: 0 | Status: DISCONTINUED | OUTPATIENT
Start: 2024-03-01 | End: 2024-03-12

## 2024-02-26 RX ADMIN — Medication 3.37 MICROGRAM(S)/KG/MIN: at 21:50

## 2024-02-26 RX ADMIN — GABAPENTIN 100 MILLIGRAM(S): 400 CAPSULE ORAL at 14:03

## 2024-02-26 RX ADMIN — Medication 650 MILLIGRAM(S): at 06:18

## 2024-02-26 RX ADMIN — POLYETHYLENE GLYCOL 3350 17 GRAM(S): 17 POWDER, FOR SOLUTION ORAL at 12:23

## 2024-02-26 RX ADMIN — GABAPENTIN 100 MILLIGRAM(S): 400 CAPSULE ORAL at 06:23

## 2024-02-26 RX ADMIN — AMIODARONE HYDROCHLORIDE 400 MILLIGRAM(S): 400 TABLET ORAL at 21:49

## 2024-02-26 RX ADMIN — Medication 650 MILLIGRAM(S): at 12:22

## 2024-02-26 RX ADMIN — Medication 5 MG/HR: at 14:03

## 2024-02-26 RX ADMIN — PANTOPRAZOLE SODIUM 40 MILLIGRAM(S): 20 TABLET, DELAYED RELEASE ORAL at 12:22

## 2024-02-26 RX ADMIN — ENOXAPARIN SODIUM 40 MILLIGRAM(S): 100 INJECTION SUBCUTANEOUS at 14:04

## 2024-02-26 RX ADMIN — CHLORHEXIDINE GLUCONATE 1 APPLICATION(S): 213 SOLUTION TOPICAL at 10:00

## 2024-02-26 RX ADMIN — POTASSIUM BICARBONATE 25 MILLIEQUIVALENT(S): 978 TABLET, EFFERVESCENT ORAL at 12:23

## 2024-02-26 RX ADMIN — Medication 81 MILLIGRAM(S): at 12:23

## 2024-02-26 RX ADMIN — Medication 250 MILLIGRAM(S): at 17:19

## 2024-02-26 RX ADMIN — OXYCODONE HYDROCHLORIDE 5 MILLIGRAM(S): 5 TABLET ORAL at 01:23

## 2024-02-26 RX ADMIN — Medication 5 MG/HR: at 21:50

## 2024-02-26 RX ADMIN — Medication 40 MILLIGRAM(S): at 06:17

## 2024-02-26 RX ADMIN — Medication 500 MILLIGRAM(S): at 17:19

## 2024-02-26 RX ADMIN — Medication 250 MILLIGRAM(S): at 06:18

## 2024-02-26 RX ADMIN — GABAPENTIN 100 MILLIGRAM(S): 400 CAPSULE ORAL at 21:49

## 2024-02-26 RX ADMIN — Medication 650 MILLIGRAM(S): at 06:48

## 2024-02-26 RX ADMIN — Medication 3.37 MICROGRAM(S)/KG/MIN: at 00:29

## 2024-02-26 RX ADMIN — Medication 500 MILLIGRAM(S): at 06:17

## 2024-02-26 RX ADMIN — OXYCODONE HYDROCHLORIDE 5 MILLIGRAM(S): 5 TABLET ORAL at 00:53

## 2024-02-26 RX ADMIN — POTASSIUM BICARBONATE 25 MILLIEQUIVALENT(S): 978 TABLET, EFFERVESCENT ORAL at 11:41

## 2024-02-26 RX ADMIN — Medication 80 MILLIGRAM(S): at 12:22

## 2024-02-26 RX ADMIN — AMIODARONE HYDROCHLORIDE 16.7 MG/MIN: 400 TABLET ORAL at 00:29

## 2024-02-26 RX ADMIN — Medication 40 MILLIGRAM(S): at 08:59

## 2024-02-26 NOTE — PROGRESS NOTE ADULT - PROBLEM SELECTOR PLAN 1
remains overloaded  lasix gtt; give 80 mg IV x1 then 10 mg/hr; goal net negative  c/w  1.25 mcg/kg/min for now  check lytes twice/day; keep K >4, Mg> 2

## 2024-02-26 NOTE — OCCUPATIONAL THERAPY INITIAL EVALUATION ADULT - PERTINENT HX OF CURRENT PROBLEM, REHAB EVAL
63M admitted to [facility] 24 PMHx HTN, GERD, HPL, chronic venous stasis dermatitis with bilateral leg swelling, and AS presented to [facility] ER on 2024 for bilateral leg pain and swelling found to have new acute HFrEF (EF 35-40% 2024) in setting of severe AS. Hospital course: 24: Admitted to [place] new onset HFrEF EF 35% and anemia 2/2 AS, 24: medically stable for transferred to [facility] medicine service for continued care. during hospitalization hx of WCT/ PAF. Consults for Nephrology, HF, ID, Podiatry for medical optimization. 2/15: 1 U PRBC, keep Hgb > 8.   s/p fall with headastrike. : s/p Liver biopsy, soft pressure post procedure--responded to fluid bolus. :  RLE doppler: No pseudoaneurysm in the right groin/ bilobed avascular fluid collection measuring 2.8 x 1.7 x 2.0 cm. : AVR/JOANNA Clip c/b intraop bleeding requirinu pRBCs on CPB 1u pRBCs post-CPB 1 PLT 1000 FEIBA. Prolene suture placed around IJ and V wire. : To Step Down, raegan, CT, +PW, marlena RIJ.  Maintain mediastinal chest tubes Primicor discontinued. Lasix 40 BID initiated , Wound care to chronic venous stasis b/l LE. D/c RIJ.  VSS - 9 hrs afib last evening- SR since 1:30am- on AMio gtt,  gtt - s/p right axillary marlena placed by Intensivist. 63M admitted to [facility] 24 PMHx HTN, GERD, HPL, chronic venous stasis dermatitis with bilateral leg swelling, and AS presented to [facility] ER on 2024 for bilateral leg pain and swelling found to have new acute HFrEF (EF 35-40% 2024) in setting of severe AS. Hospital course: 24: Admitted to [place] new onset HFrEF EF 35% and anemia 2/2 AS, 24: medically stable for transferred to [facility] medicine service for continued care. during hospitalization hx of WCT/ PAF. Consults for Nephrology, HF, ID, Podiatry for medical optimization. 2/15: 1 U PRBC, keep Hgb > 8.   s/p fall with headstrike. : s/p Liver biopsy, soft pressure post procedure--responded to fluid bolus. :  RLE doppler: No pseudoaneurysm in the right groin/ bilobed avascular fluid collection measuring 2.8 x 1.7 x 2.0 cm. : AVR/JOANNA Clip c/b intraop bleeding requirinu pRBCs on CPB 1u pRBCs post-CPB 1 PLT 1000 FEIBA. Prolene suture placed around IJ and V wire. : To Step Down, raegan, CT, +PW, marlena RIJ.  Maintain mediastinal chest tubes Primicor discontinued. Lasix 40 BID initiated , Wound care to chronic venous stasis b/l LE. D/c RIJ.  VSS - 9 hrs afib last evening- SR since 1:30am- on AMio gtt,  gtt - s/p right axillary marlena placed by Intensivist.

## 2024-02-26 NOTE — CHART NOTE - NSCHARTNOTEFT_GEN_A_CORE
1830:  Hypotension   Atrial Fibrillation 101-120  Unable to void, ultrasound 150cc  CTU Notified      Volume Albumin 25% 100cc x 3,    1 U PRBC    R AX JOHNNY placed   initiated   Amio gtt initiated     Olivares placed 200cc urine for 8 hours

## 2024-02-26 NOTE — PROGRESS NOTE ADULT - PROBLEM SELECTOR PLAN 2
rapid afib - x 9hrs - SR since ;30am   amio/bb    will consider a/c if pt converts back into afib  rapid afib - x 9hrs - SR since 1;30am   amio/bb    will consider a/c if pt converts back into afib  echo  tr jon eff

## 2024-02-26 NOTE — PROGRESS NOTE ADULT - SUBJECTIVE AND OBJECTIVE BOX
Interval History:  hypotensive overnight; noted to be in fib; given amio and converted to sinus  started on   feels ok    Medications:  aMIOdarone    Tablet   Oral   aMIOdarone    Tablet 400 milliGRAM(s) Oral every 8 hours  aMIOdarone Infusion 0.5 mG/Min IV Continuous <Continuous>  ascorbic acid 500 milliGRAM(s) Oral two times a day  aspirin  chewable 81 milliGRAM(s) Oral daily  chlorhexidine 2% Cloths 1 Application(s) Topical daily  DOBUTamine Infusion 1.25 MICROgram(s)/kG/Min IV Continuous <Continuous>  enoxaparin Injectable 40 milliGRAM(s) SubCutaneous every 24 hours  furosemide Infusion 10 mG/Hr IV Continuous <Continuous>  gabapentin 100 milliGRAM(s) Oral every 8 hours  magnesium sulfate  IVPB 1 Gram(s) IV Intermittent once  oxyCODONE    IR 5 milliGRAM(s) Oral every 4 hours PRN  oxyCODONE    IR 10 milliGRAM(s) Oral every 4 hours PRN  pantoprazole  Injectable 40 milliGRAM(s) IV Push daily  polyethylene glycol 3350 17 Gram(s) Oral daily  senna 2 Tablet(s) Oral at bedtime  vancomycin  IVPB 750 milliGRAM(s) IV Intermittent every 12 hours      Vitals:  T(C): 37.2 (24 @ 18:45), Max: 37.2 (24 @ 18:45)  HR: 77 (24 @ 18:45) (68 - 97)  BP: 127/71 (24 @ 18:45) (82/57 - 138/96)  BP(mean): 87 (24 @ 18:45) (60 - 111)  RR: 18 (24 @ 18:45) (18 - 19)  SpO2: 96% (24 @ 18:45) (92% - 100%)    Daily     Daily Weight in k.5 (2024 06:20)        I&O's Summary    2024 07:01  -  2024 07:00  --------------------------------------------------------  IN: 1388.5 mL / OUT: 870 mL / NET: 518.5 mL    2024 07:01  -  2024 21:21  --------------------------------------------------------  IN: 724.3 mL / OUT: 1550 mL / NET: -825.7 mL      Physical Exam:  Appearance: No Acute Distress  HEENT: PERRL  Neck: JVD elevated to ear  Cardiovascular: Normal S1 S2, No murmurs/rubs/gallops  Respiratory: dec BS b/l  Gastrointestinal: Soft, Non-tender	  Skin: No cyanosis	  Neurologic: Non-focal  Extremities: b/l LE edema to sacrum  Psychiatry: A & O x 3, Mood & affect appropriate    Labs:                        8.6    7.80  )-----------( 120      ( 2024 06:12 )             26.4         132<L>  |  98  |  34<H>  ----------------------------<  103<H>  4.5   |  24  |  1.65<H>    Ca    8.8      2024 15:29  Phos  3.8       Mg     2.5         TPro  5.7<L>  /  Alb  3.6  /  TBili  4.1<H>  /  DBili  x   /  AST  19  /  ALT  20  /  AlkPhos  63      PT/INR - ( 2024 19:31 )   PT: 13.5 sec;   INR: 1.23 ratio

## 2024-02-26 NOTE — PROGRESS NOTE ADULT - SUBJECTIVE AND OBJECTIVE BOX
S/p AVR, JOANNA clip 2/23, denies CP, SOB    Vital Signs Last 24 Hrs  T(C): 37.1 (02-26-24 @ 15:37), Max: 37.1 (02-26-24 @ 06:56)  T(F): 98.8 (02-26-24 @ 15:37), Max: 98.8 (02-26-24 @ 15:37)  HR: 72 (02-26-24 @ 15:37) (68 - 107)  BP: 138/96 (02-26-24 @ 15:37) (72/44 - 138/96)  BP(mean): 111 (02-26-24 @ 15:37) (54 - 111)  RR: 18 (02-26-24 @ 15:37) (18 - 19)  SpO2: 100% (02-26-24 @ 15:37) (89% - 100%)    I&O's Detail    25 Feb 2024 07:01  -  26 Feb 2024 07:00  --------------------------------------------------------  IN:    Albumin 5%  - 250 mL: 200 mL    Amiodarone: 133.6 mL    DOBUTamine: 26.4 mL    Milrinone: 13.5 mL    Oral Fluid: 715 mL    PRBCs (Packed Red Blood Cells): 300 mL  Total IN: 1388.5 mL    OUT:    Chest Tube (mL): 30 mL    Chest Tube (mL): 50 mL    Indwelling Catheter - Urethral (mL): 790 mL  Total OUT: 870 mL    26 Feb 2024 07:01  -  26 Feb 2024 16:03  --------------------------------------------------------  IN:    Amiodarone: 16.7 mL    DOBUTamine: 20.4 mL    Oral Fluid: 420 mL  Total IN: 457.1 mL    OUT:    Indwelling Catheter - Urethral (mL): 800 mL  Total OUT: 800 mL    s1s2  b/l air entry  soft, ND  b/l LE edema                                                                  8.6    7.80  )-----------( 120      ( 26 Feb 2024 06:12 )             26.4     26 Feb 2024 06:08    130    |  96     |  32     ----------------------------<  104    3.9     |  22     |  1.55     Ca    8.4        26 Feb 2024 06:08  Phos  3.8       26 Feb 2024 06:08  Mg     2.6       26 Feb 2024 06:08    TPro  5.7    /  Alb  3.6    /  TBili  4.1    /  DBili  x      /  AST  19     /  ALT  20     /  AlkPhos  63     26 Feb 2024 06:08    LIVER FUNCTIONS - ( 26 Feb 2024 06:08 )  Alb: 3.6 g/dL / Pro: 5.7 g/dL / ALK PHOS: 63 U/L / ALT: 20 U/L / AST: 19 U/L / GGT: x           PT/INR - ( 25 Feb 2024 19:31 )   PT: 13.5 sec;   INR: 1.23 ratio      aMIOdarone Infusion 0.5 mG/Min IV Continuous <Continuous>  ascorbic acid 500 milliGRAM(s) Oral two times a day  aspirin  chewable 81 milliGRAM(s) Oral daily  chlorhexidine 2% Cloths 1 Application(s) Topical daily  DOBUTamine Infusion 1.25 MICROgram(s)/kG/Min IV Continuous <Continuous>  enoxaparin Injectable 40 milliGRAM(s) SubCutaneous every 24 hours  furosemide Infusion 10 mG/Hr IV Continuous <Continuous>  gabapentin 100 milliGRAM(s) Oral every 8 hours  magnesium sulfate  IVPB 1 Gram(s) IV Intermittent once  oxyCODONE    IR 5 milliGRAM(s) Oral every 4 hours PRN  oxyCODONE    IR 10 milliGRAM(s) Oral every 4 hours PRN  pantoprazole  Injectable 40 milliGRAM(s) IV Push daily  polyethylene glycol 3350 17 Gram(s) Oral daily  senna 2 Tablet(s) Oral at bedtime  vancomycin  IVPB 750 milliGRAM(s) IV Intermittent every 12 hours    A/P:    CM, EF 35-40%, severe AS  Tx from [facility] to [facility] 2/5, s/p [facility] 2/5  Cardio-renal/hemodynamic LINDA has improved (peak Cr 2.5 - 2/3/24)   UA negative   Imaging w/o hydro   S/p CT w/IV dye 2/8/24  Stable renal fx   S/p liver biopsy 2/20  S/p AVR, JOANNA clip 2/23  Episodes of RAfib  Hemodynamic/hypotensive mild LINDA post-op  No objection to diuresis  Avoid hypotension   F/u BMP, UO, Vanco level     [phone]

## 2024-02-26 NOTE — PROGRESS NOTE ADULT - ASSESSMENT
63M, appears older than stated age, admitted to [facility] 24 PMHx HTN, GERD, HPL, chronic venous stasis dermatitis with bilateral leg swelling, and AS presented to [facility] [facility] on 2024 for bilateral leg pain and swelling found to have new acute HFrEF (EF 35-40% 2024) in setting of severe AS.     Hospital course:  24: Admitted to [place] new onset HFrEF EF 35% and anemia 2/2 AS,   24: medically stable for transferred to [facility] medicine service for continued care. during hospitalization hx of WCT/ PAF  Consults for Nephrology, HF, ID, Podiatry for medical optimization  2/15: 1 U PRBC, keep Hgb > 8.    s/p fall with headastrike   : s/p Liver biopsy, soft pressure post procedure--responded to fluid bolus  :  RLE doppler: No pseudoaneurysm in the right groin/ bilobed avascular fluid collection measuring 2.8 x 1.7 x 2.0 cm     : AVR/JOANNA Clip c/b intraop bleeding requirinu pRBCs on CPB 1u pRBCs post-CPB 1 PLT 1000 FEIBA  Prolene suture placed around IJ and V wire  : To Step Down, raegan, CT, +PW, marlena RIJ   Maintain mediastinal chest tubes Primicor discontinued. Lasix 40 BID initiated , Wound care to chronic venous stasis b/l LE. D/c RIJ   VSS - 9 hrs afib last evening- SR since 1:30am- on AMio gtt,  gtt - s/p right axillary marlena placed by Intensivist.  will monitor.    Physical Therapy Rec:   Home  [  ]   Home w/ PT  [x  ]  Rehab  [  ] rolling walker 63M, appears older than stated age, admitted to [facility] 24 PMHx HTN, GERD, HPL, chronic venous stasis dermatitis with bilateral leg swelling, and AS presented to [facility] on 2024 for bilateral leg pain and swelling found to have new acute HFrEF (EF 35-40% 2024) in setting of severe AS.     Hospital course:  24: Admitted to [place] new onset HFrEF EF 35% and anemia 2/2 AS,   24: medically stable for transferred to [facility] medicine service for continued care. during hospitalization hx of WCT/ PAF  Consults for Nephrology, HF, ID, Podiatry for medical optimization  2/15: 1 U PRBC, keep Hgb > 8.    s/p fall with headastrike   : s/p Liver biopsy, soft pressure post procedure--responded to fluid bolus  :  RLE doppler: No pseudoaneurysm in the right groin/ bilobed avascular fluid collection measuring 2.8 x 1.7 x 2.0 cm     : AVR/JOANNA Clip c/b intraop bleeding requirinu pRBCs on CPB 1u pRBCs post-CPB 1 PLT 1000 FEIBA  Prolene suture placed around IJ and V wire  : To Step Down, raegan, CT, +PW, marlena RIJ   Maintain mediastinal chest tubes Primicor discontinued. Lasix 40 BID initiated , Wound care to chronic venous stasis b/l LE. D/c RIJ   VSS - 9 hrs afib last evening- SR since 1:30am- on AMio gtt,  gtt - s/p right axillary marlena placed by Intensivist. garcia placed last evening lasix 40 iv x 1 given this am- echo tr jon eff.    Physical Therapy Rec:   Home  [  ]   Home w/ PT  [x  ]  Rehab  [  ] rolling walker

## 2024-02-26 NOTE — PROGRESS NOTE ADULT - ASSESSMENT
Briefly, 64 y/o M w/ h/o HTN, severe AS, chronic venous stasis, HFpEF who p/w worsening LE swelling to GC 1/25 and found to be in decompensated heart failure with EF 35-40% and severe AS so transferred for further management on 2/5. Was diuresed and underwent LHC which was non-obstructive. Underwent bioAVR  Briefly, 62 y/o M w/ h/o HTN, severe AS, chronic venous stasis, HFpEF who p/w worsening LE swelling to GC  and found to be in decompensated heart failure with EF 35-40% and severe AS so transferred for further management on . Was diuresed and underwent LHC which was non-obstructive. Underwent bioAVR  w/ JOANNA clip complicated by bleeding. Had been on primacor which was discontinued. Had afib  with hypotension requiring albumin and amio (converted to sinus), placed on  with improvement. Remains overloaded but otherwise perfusing well. Repeat TTE with EF 35%, severe biatrial enlargmeent, dilated IVC, trace pericardial effusion.

## 2024-02-26 NOTE — PROGRESS NOTE ADULT - SUBJECTIVE AND OBJECTIVE BOX
VITAL SIGNS-Telemetry:  SR 60-80 - afib  x 9 hrs - 16:45pm - 1:30am   Vital Signs Last 24 Hrs  T(C): 36.7 (24 @ 03:06), Max: 36.8 (24 @ 10:56)  T(F): 98 (24 @ 03:06), Max: 98.3 (24 @ 10:56)  HR: 73 (24 @ 03:06) (73 - 107)  BP: 122/66 (24 @ 03:06) (72/44 - 124/59)  RR: 18 (24 @ 03:06) (18 - 19)  SpO2: 93% (24 @ 03:06) (89% - 100%)          @ 07:01  -   @ 07:00  --------------------------------------------------------  IN: 1348.5 mL / OUT: 740 mL / NET: 608.5 mL    Daily     Daily Weight in k.7 (2024 07:19)    Drains:     MS 1        [x  ] Drainage:  30/30               ms 2[ x  ]  Drainage:   20/30cc             Pacing Wires        [  ]   Settings:                                  Isolated  [  ]      PHYSICAL EXAM:  Neurology: alert and oriented x 3, nonfocal, no gross deficits  CV : S1S2  Sternal Wound :  CDI , Stable  Lungs: cta  Abdomen: soft, nontender, nondistended, positive bowel sounds, last bowel movement  preop       Extremities:     no edema no calf tenderness    acetaminophen     Tablet .. 650 milliGRAM(s) Oral every 6 hours  aMIOdarone Infusion 0.5 mG/Min IV Continuous <Continuous>  ascorbic acid 500 milliGRAM(s) Oral two times a day  aspirin  chewable 81 milliGRAM(s) Oral daily  chlorhexidine 2% Cloths 1 Application(s) Topical daily  DOBUTamine Infusion 1.25 MICROgram(s)/kG/Min IV Continuous <Continuous>  enoxaparin Injectable 40 milliGRAM(s) SubCutaneous every 24 hours  furosemide    Tablet 40 milliGRAM(s) Oral two times a day  gabapentin 100 milliGRAM(s) Oral every 8 hours  magnesium sulfate  IVPB 1 Gram(s) IV Intermittent once  oxyCODONE    IR 5 milliGRAM(s) Oral every 4 hours PRN  oxyCODONE    IR 10 milliGRAM(s) Oral every 4 hours PRN  pantoprazole  Injectable 40 milliGRAM(s) IV Push daily  polyethylene glycol 3350 17 Gram(s) Oral daily  senna 2 Tablet(s) Oral at bedtime  vancomycin  IVPB 750 milliGRAM(s) IV Intermittent every 12 hours    Physical Therapy Rec:   Home  [  ]   Home w/ PT  [ x ]  Rehab  [  ]  Discussed with Cardiothoracic Team at AM rounds. VITAL SIGNS-Telemetry:  SR 60-80 - afib  x 9 hrs - 16:45pm - 1:30am   Vital Signs Last 24 Hrs  T(C): 36.7 (24 @ 03:06), Max: 36.8 (24 @ 10:56)  T(F): 98 (24 @ 03:06), Max: 98.3 (24 @ 10:56)  HR: 73 (24 @ 03:06) (73 - 107)  BP: 122/66 (24 @ 03:06) (72/44 - 124/59)  RR: 18 (24 @ 03:06) (18 - 19)  SpO2: 93% (24 @ 03:06) (89% - 100%)          @ 07:01  -   @ 07:00  --------------------------------------------------------  IN: 1348.5 mL / OUT: 740 mL / NET: 608.5 mL    Daily     Daily Weight in k.7 (2024 07:19)           Pacing Wires        [x  ]   Settings:                                  Isolated  [  ]      PHYSICAL EXAM:  Neurology: alert and oriented x 3, nonfocal, no gross deficits  CV : S1S2  Sternal Wound :  CDI , Stable  Lungs: diminished bs bases  Abdomen: soft, nontender, nondistended, positive bowel sounds, last bowel movement  preop       Extremities:     no edema no calf tenderness    acetaminophen     Tablet .. 650 milliGRAM(s) Oral every 6 hours  aMIOdarone Infusion 0.5 mG/Min IV Continuous <Continuous>  ascorbic acid 500 milliGRAM(s) Oral two times a day  aspirin  chewable 81 milliGRAM(s) Oral daily  chlorhexidine 2% Cloths 1 Application(s) Topical daily  DOBUTamine Infusion 1.25 MICROgram(s)/kG/Min IV Continuous <Continuous>  enoxaparin Injectable 40 milliGRAM(s) SubCutaneous every 24 hours  furosemide    Tablet 40 milliGRAM(s) Oral two times a day  gabapentin 100 milliGRAM(s) Oral every 8 hours  magnesium sulfate  IVPB 1 Gram(s) IV Intermittent once  oxyCODONE    IR 5 milliGRAM(s) Oral every 4 hours PRN  oxyCODONE    IR 10 milliGRAM(s) Oral every 4 hours PRN  pantoprazole  Injectable 40 milliGRAM(s) IV Push daily  polyethylene glycol 3350 17 Gram(s) Oral daily  senna 2 Tablet(s) Oral at bedtime  vancomycin  IVPB 750 milliGRAM(s) IV Intermittent every 12 hours    Physical Therapy Rec:   Home  [  ]   Home w/ PT  [ x ]  Rehab  [  ]  Discussed with Cardiothoracic Team at AM rounds.

## 2024-02-26 NOTE — OCCUPATIONAL THERAPY INITIAL EVALUATION ADULT - LIVES WITH, PROFILE
Pt lives in PH +spouse and children, +4 ROMAN and first floor setup. Pt reports independence with all aspects of self care and functional mobility with cane.

## 2024-02-27 LAB
ADD ON TEST-SPECIMEN IN LAB: SIGNIFICANT CHANGE UP
ANION GAP SERPL CALC-SCNC: 12 MMOL/L — SIGNIFICANT CHANGE UP (ref 5–17)
ANION GAP SERPL CALC-SCNC: 13 MMOL/L — SIGNIFICANT CHANGE UP (ref 5–17)
ANION GAP SERPL CALC-SCNC: 15 MMOL/L — SIGNIFICANT CHANGE UP (ref 5–17)
APTT BLD: 31.1 SEC — SIGNIFICANT CHANGE UP (ref 24.5–35.6)
BASE EXCESS BLDV CALC-SCNC: 2.4 MMOL/L — SIGNIFICANT CHANGE UP (ref -2–3)
BUN SERPL-MCNC: 33 MG/DL — HIGH (ref 7–23)
BUN SERPL-MCNC: 34 MG/DL — HIGH (ref 7–23)
BUN SERPL-MCNC: 34 MG/DL — HIGH (ref 7–23)
CA-I SERPL-SCNC: 1.12 MMOL/L — LOW (ref 1.15–1.33)
CALCIUM SERPL-MCNC: 8 MG/DL — LOW (ref 8.4–10.5)
CALCIUM SERPL-MCNC: 8.2 MG/DL — LOW (ref 8.4–10.5)
CALCIUM SERPL-MCNC: 8.7 MG/DL — SIGNIFICANT CHANGE UP (ref 8.4–10.5)
CHLORIDE BLDV-SCNC: 99 MMOL/L — SIGNIFICANT CHANGE UP (ref 96–108)
CHLORIDE SERPL-SCNC: 100 MMOL/L — SIGNIFICANT CHANGE UP (ref 96–108)
CHLORIDE SERPL-SCNC: 97 MMOL/L — SIGNIFICANT CHANGE UP (ref 96–108)
CHLORIDE SERPL-SCNC: 99 MMOL/L — SIGNIFICANT CHANGE UP (ref 96–108)
CO2 BLDV-SCNC: 29 MMOL/L — HIGH (ref 22–26)
CO2 SERPL-SCNC: 22 MMOL/L — SIGNIFICANT CHANGE UP (ref 22–31)
CO2 SERPL-SCNC: 23 MMOL/L — SIGNIFICANT CHANGE UP (ref 22–31)
CO2 SERPL-SCNC: 24 MMOL/L — SIGNIFICANT CHANGE UP (ref 22–31)
CREAT SERPL-MCNC: 1.75 MG/DL — HIGH (ref 0.5–1.3)
CREAT SERPL-MCNC: 1.88 MG/DL — HIGH (ref 0.5–1.3)
CREAT SERPL-MCNC: 1.92 MG/DL — HIGH (ref 0.5–1.3)
EGFR: 39 ML/MIN/1.73M2 — LOW
EGFR: 40 ML/MIN/1.73M2 — LOW
EGFR: 43 ML/MIN/1.73M2 — LOW
GAS PNL BLDV: 130 MMOL/L — LOW (ref 136–145)
GAS PNL BLDV: SIGNIFICANT CHANGE UP
GAS PNL BLDV: SIGNIFICANT CHANGE UP
GLUCOSE BLDV-MCNC: 132 MG/DL — HIGH (ref 70–99)
GLUCOSE SERPL-MCNC: 178 MG/DL — HIGH (ref 70–99)
GLUCOSE SERPL-MCNC: 192 MG/DL — HIGH (ref 70–99)
GLUCOSE SERPL-MCNC: 89 MG/DL — SIGNIFICANT CHANGE UP (ref 70–99)
HCO3 BLDV-SCNC: 28 MMOL/L — SIGNIFICANT CHANGE UP (ref 22–29)
HCT VFR BLD CALC: 27.4 % — LOW (ref 39–50)
HCT VFR BLDA CALC: 27 % — LOW (ref 39–51)
HGB BLD CALC-MCNC: 9.1 G/DL — LOW (ref 12.6–17.4)
HGB BLD-MCNC: 9.2 G/DL — LOW (ref 13–17)
LACTATE BLDV-MCNC: 1.9 MMOL/L — SIGNIFICANT CHANGE UP (ref 0.5–2)
MAGNESIUM SERPL-MCNC: 2.2 MG/DL — SIGNIFICANT CHANGE UP (ref 1.6–2.6)
MCHC RBC-ENTMCNC: 24.6 PG — LOW (ref 27–34)
MCHC RBC-ENTMCNC: 33.6 GM/DL — SIGNIFICANT CHANGE UP (ref 32–36)
MCV RBC AUTO: 73.3 FL — LOW (ref 80–100)
NRBC # BLD: 0 /100 WBCS — SIGNIFICANT CHANGE UP (ref 0–0)
PCO2 BLDV: 46 MMHG — SIGNIFICANT CHANGE UP (ref 42–55)
PH BLDV: 7.39 — SIGNIFICANT CHANGE UP (ref 7.32–7.43)
PLATELET # BLD AUTO: 147 K/UL — LOW (ref 150–400)
PO2 BLDV: 29 MMHG — SIGNIFICANT CHANGE UP (ref 25–45)
POTASSIUM BLDV-SCNC: 4.2 MMOL/L — SIGNIFICANT CHANGE UP (ref 3.5–5.1)
POTASSIUM SERPL-MCNC: 3.6 MMOL/L — SIGNIFICANT CHANGE UP (ref 3.5–5.3)
POTASSIUM SERPL-MCNC: 3.7 MMOL/L — SIGNIFICANT CHANGE UP (ref 3.5–5.3)
POTASSIUM SERPL-MCNC: 4.1 MMOL/L — SIGNIFICANT CHANGE UP (ref 3.5–5.3)
POTASSIUM SERPL-SCNC: 3.6 MMOL/L — SIGNIFICANT CHANGE UP (ref 3.5–5.3)
POTASSIUM SERPL-SCNC: 3.7 MMOL/L — SIGNIFICANT CHANGE UP (ref 3.5–5.3)
POTASSIUM SERPL-SCNC: 4.1 MMOL/L — SIGNIFICANT CHANGE UP (ref 3.5–5.3)
RBC # BLD: 3.74 M/UL — LOW (ref 4.2–5.8)
RBC # FLD: 29.5 % — HIGH (ref 10.3–14.5)
SAO2 % BLDV: 52.6 % — LOW (ref 67–88)
SODIUM SERPL-SCNC: 134 MMOL/L — LOW (ref 135–145)
SODIUM SERPL-SCNC: 135 MMOL/L — SIGNIFICANT CHANGE UP (ref 135–145)
SODIUM SERPL-SCNC: 136 MMOL/L — SIGNIFICANT CHANGE UP (ref 135–145)
WBC # BLD: 8.99 K/UL — SIGNIFICANT CHANGE UP (ref 3.8–10.5)
WBC # FLD AUTO: 8.99 K/UL — SIGNIFICANT CHANGE UP (ref 3.8–10.5)

## 2024-02-27 PROCEDURE — 99233 SBSQ HOSP IP/OBS HIGH 50: CPT

## 2024-02-27 RX ORDER — AMIODARONE HYDROCHLORIDE 400 MG/1
150 TABLET ORAL ONCE
Refills: 0 | Status: COMPLETED | OUTPATIENT
Start: 2024-02-27 | End: 2024-02-27

## 2024-02-27 RX ORDER — POTASSIUM BICARBONATE 978 MG/1
25 TABLET, EFFERVESCENT ORAL
Refills: 0 | Status: COMPLETED | OUTPATIENT
Start: 2024-02-27 | End: 2024-02-27

## 2024-02-27 RX ORDER — HEPARIN SODIUM 5000 [USP'U]/ML
800 INJECTION INTRAVENOUS; SUBCUTANEOUS
Qty: 25000 | Refills: 0 | Status: DISCONTINUED | OUTPATIENT
Start: 2024-02-27 | End: 2024-03-05

## 2024-02-27 RX ORDER — DOBUTAMINE HCL 250MG/20ML
1.25 VIAL (ML) INTRAVENOUS
Qty: 500 | Refills: 0 | Status: DISCONTINUED | OUTPATIENT
Start: 2024-02-27 | End: 2024-03-02

## 2024-02-27 RX ORDER — POTASSIUM CHLORIDE 20 MEQ
20 PACKET (EA) ORAL
Refills: 0 | Status: COMPLETED | OUTPATIENT
Start: 2024-02-27 | End: 2024-02-28

## 2024-02-27 RX ORDER — ACETAMINOPHEN 500 MG
1000 TABLET ORAL ONCE
Refills: 0 | Status: COMPLETED | OUTPATIENT
Start: 2024-02-27 | End: 2024-02-27

## 2024-02-27 RX ORDER — POTASSIUM BICARBONATE 978 MG/1
25 TABLET, EFFERVESCENT ORAL ONCE
Refills: 0 | Status: COMPLETED | OUTPATIENT
Start: 2024-02-27 | End: 2024-02-27

## 2024-02-27 RX ORDER — SODIUM CHLORIDE 5 G/100ML
150 INJECTION, SOLUTION INTRAVENOUS ONCE
Refills: 0 | Status: COMPLETED | OUTPATIENT
Start: 2024-02-27 | End: 2024-02-27

## 2024-02-27 RX ADMIN — POTASSIUM BICARBONATE 25 MILLIEQUIVALENT(S): 978 TABLET, EFFERVESCENT ORAL at 11:30

## 2024-02-27 RX ADMIN — Medication 1000 MILLIGRAM(S): at 11:45

## 2024-02-27 RX ADMIN — AMIODARONE HYDROCHLORIDE 400 MILLIGRAM(S): 400 TABLET ORAL at 05:30

## 2024-02-27 RX ADMIN — POTASSIUM BICARBONATE 25 MILLIEQUIVALENT(S): 978 TABLET, EFFERVESCENT ORAL at 10:08

## 2024-02-27 RX ADMIN — AMIODARONE HYDROCHLORIDE 400 MILLIGRAM(S): 400 TABLET ORAL at 13:33

## 2024-02-27 RX ADMIN — PANTOPRAZOLE SODIUM 40 MILLIGRAM(S): 20 TABLET, DELAYED RELEASE ORAL at 11:30

## 2024-02-27 RX ADMIN — GABAPENTIN 100 MILLIGRAM(S): 400 CAPSULE ORAL at 21:29

## 2024-02-27 RX ADMIN — Medication 400 MILLIGRAM(S): at 11:29

## 2024-02-27 RX ADMIN — Medication 3.37 MICROGRAM(S)/KG/MIN: at 21:30

## 2024-02-27 RX ADMIN — Medication 500 MILLIGRAM(S): at 05:30

## 2024-02-27 RX ADMIN — Medication 5 MG/HR: at 21:30

## 2024-02-27 RX ADMIN — Medication 250 MILLIGRAM(S): at 18:11

## 2024-02-27 RX ADMIN — GABAPENTIN 100 MILLIGRAM(S): 400 CAPSULE ORAL at 13:33

## 2024-02-27 RX ADMIN — Medication 250 MILLIGRAM(S): at 05:32

## 2024-02-27 RX ADMIN — Medication 20 MILLIEQUIVALENT(S): at 23:02

## 2024-02-27 RX ADMIN — ENOXAPARIN SODIUM 40 MILLIGRAM(S): 100 INJECTION SUBCUTANEOUS at 13:34

## 2024-02-27 RX ADMIN — POTASSIUM BICARBONATE 25 MILLIEQUIVALENT(S): 978 TABLET, EFFERVESCENT ORAL at 13:33

## 2024-02-27 RX ADMIN — HEPARIN SODIUM 8 UNIT(S)/HR: 5000 INJECTION INTRAVENOUS; SUBCUTANEOUS at 21:30

## 2024-02-27 RX ADMIN — SODIUM CHLORIDE 300 MILLILITER(S): 5 INJECTION, SOLUTION INTRAVENOUS at 14:16

## 2024-02-27 RX ADMIN — Medication 500 MILLIGRAM(S): at 18:11

## 2024-02-27 RX ADMIN — POLYETHYLENE GLYCOL 3350 17 GRAM(S): 17 POWDER, FOR SOLUTION ORAL at 11:30

## 2024-02-27 RX ADMIN — AMIODARONE HYDROCHLORIDE 400 MILLIGRAM(S): 400 TABLET ORAL at 21:29

## 2024-02-27 RX ADMIN — HEPARIN SODIUM 8 UNIT(S)/HR: 5000 INJECTION INTRAVENOUS; SUBCUTANEOUS at 16:04

## 2024-02-27 RX ADMIN — CHLORHEXIDINE GLUCONATE 1 APPLICATION(S): 213 SOLUTION TOPICAL at 10:00

## 2024-02-27 RX ADMIN — AMIODARONE HYDROCHLORIDE 600 MILLIGRAM(S): 400 TABLET ORAL at 10:02

## 2024-02-27 RX ADMIN — Medication 81 MILLIGRAM(S): at 11:30

## 2024-02-27 RX ADMIN — GABAPENTIN 100 MILLIGRAM(S): 400 CAPSULE ORAL at 05:30

## 2024-02-27 NOTE — CONSULT NOTE ADULT - ASSESSMENT
64 yo man with PMHx of HTN, severe AS, chronic venous stasis, HFpEF who p/w worsening LE swelling to GC  and found to be in decompensated heart failure with EF 35-40% and severe AS so transferred for further management on . Was diuresed and underwent LHC which was non-obstructive. Underwent bioAVR  w/ JOANNA clip complicated by bleeding. Had been on primacor which was discontinued. Had afib  with hypotension requiring albumin and amio (converted to sinus), placed on  with improvement. Remains overloaded but otherwise perfusing well. Repeat TTE with EF 35%, severe biatrial enlargmeent, dilated IVC, trace pericardial effusion. He remains on dobutamine gtt and lasix gtt. With dobutamine gtt stopped this afternoon his pO2 on VBG dropped from 68 to 52 and so it was put back on.   Pt has been in and out of AF since his AVR. He was in NSR on the evening of  until ~1700 on  at which time he went into AF. He converted to NSR on  at ~0130. He converted back into AF on  at ~0520. He has been 100-110s. Pt was previously on amio gtt and is not on PO amio 400mg q8h. He is being started on a heparin gtt.     1) S/p bio AVR, AtriClip 24  2) Atrial fibrillation with RVR, paroxysmal post operatively  3) HFrEF with LVEF 30%    Recommendations: 64 yo man with PMHx of HTN, severe AS, chronic venous stasis, HFpEF who p/w worsening LE swelling to GC  and found to be in decompensated heart failure with EF 35-40% and severe AS so transferred to [facility] for further management on . Was diuresed and underwent LHC which revealed no significant CAD. Underwent bioAVR  w/ JOANNA clip complicated by bleeding. Had been on primacor which was discontinued. Had afib  with hypotension requiring albumin and amio (converted to sinus), placed on  with improvement. Remains overloaded but otherwise perfusing well. Repeat TTE with EF 35%, severe biatrial enlargement dilated IVC, trace pericardial effusion. He remains on dobutamine gtt and lasix gtt. With dobutamine gtt stopped this afternoon his pO2 on VBG dropped from 68 to 52 and so it was put back on.   Pt has been in and out of AF since his AVR. He was in NSR on the evening of  until ~1700 on  at which time he went into AF. He converted to NSR on  at ~0130. He converted back into AF on  at ~0520. He has been 100-110s. Pt was previously on amio gtt and is now on PO amio 400mg q8h. He is being started on a heparin gtt.     1) S/p bio AVR, AtriClip 24  2) Atrial fibrillation with RVR, paroxysmal post operatively  3) HFrEF with LVEF 30%    Recommendations:  -As pt thus far remains paroxysmal in AF, would recommend continue amiodarone load. Has thus far received 3.28g  -Agree with anticoagulation, heparin gtt started by CTSx  -If pt persists in AF, can consider DCCV after further amiodarone load  -Pt remains on lasix and dobutamine gtts  -Maintain K+>4 and Mag>2  -Continue telemetry monitoring

## 2024-02-27 NOTE — PROGRESS NOTE ADULT - PROBLEM SELECTOR PLAN 1
remains overloaded  c/w lasix gtt  ACE wrap legs  c/w  1.25 mcg/kg/min for now  check lytes twice/day; keep K >4, Mg> 2

## 2024-02-27 NOTE — CHART NOTE - NSCHARTNOTEFT_GEN_A_CORE
Nutrition Follow Up Note  Patient seen for: first follow-up for acute severe malnutrition     Chart reviewed, events noted.    Source: [] Patient       [x] EMR        [] RN        [] Family at bedside       [] Other:    -If unable to interview patient: [] Trach/Vent/BiPAP  [] Disoriented/confused/inappropriate to interview    Diet Order:   Diet, Regular:   Consistent Carbohydrate {No Snacks} (CSTCHO) (24)    - Is current order appropriate/adequate? [] Yes  [x]  No:     - PO intake :   [x] >75%  Adequate    [x] 50-75%  Fair       [x] <50%  Poor    - Nutrition-related concerns:  -- s/p AVR on 24  -- On Amiodarone, Dobutamine, Lasix  -- Ordered for Klyte today   -- Micronutrient/Other supplementation: Vitamin C   -- Pt with abnormal renal indices, nephrology following for LINDA   -- GI:  Last BM on  per flowsheet, on Pepcid, Miralax and Senna.     Weights:   Drug Dosing Weight  Height (cm): 180.3 (2024 06:36)  Weight (kg): 89.8 (2024 06:36)  BMI (kg/m2): 27.6 (2024 06:36)  Daily Weight in k.8 (), 85.5 (), 82.7 (), 87 (), 79.1 (), 79.2 ()  -- Weight fluctuations might due to fluid shifts and inadequate PO intake in hospital. Also ? accuracy of hospital scales. RD will continue to monitor wt trends.     Nutritionally Pertinent MEDICATIONS  (STANDING):  aMIOdarone    Tablet  aMIOdarone    Tablet  aMIOdarone Infusion  ascorbic acid  DOBUTamine Infusion  furosemide Infusion  magnesium sulfate  IVPB  pantoprazole  Injectable  polyethylene glycol 3350  potassium bicarbonate/potassium citrate  potassium bicarbonate/potassium citrate  senna  vancomycin  IVPB    Pertinent Labs:  @ 07:28: Na 134<L>, BUN 33<H>, Cr 1.75<H>, BG 89, K+ 3.6, Phos --, Mg 2.2, Alk Phos --, ALT/SGPT --, AST/SGOT --, HbA1c --   @ 15:29: Na 132<L>, BUN 34<H>, Cr 1.65<H>, <H>, K+ 4.5, Phos --, Mg 2.5, Alk Phos --, ALT/SGPT --, AST/SGOT --, HbA1c --    A1C with Estimated Average Glucose Result: 6.2 % (24 @ 09:00), no history of DM, on CSTCHO diet might due to s/p operation     Skin per nursing documentation: bilateral buttocks/sacrum deep tissue injury noted in flowsheets    Edema: Generalized +2 edema per flowsheet     Estimated Needs based on IBW of 74.8kg:  [x] no change since previous assessment  Calories: 2244-2618kcal (30-35 kcal/kg BW)   Proteins: 97-112g (1.3-1.5 g/kg BW)   Fluids: defer to team     Previous Nutrition Diagnosis: acute severe malnutrition, Increased nutrient needs   Nutrition Diagnosis is: [x] ongoing      New Nutrition Diagnosis: [x] Not applicable    Nutrition Care Plan:  [x] In Progress    Nutrition Interventions:     Education Provided:       [] Yes:  [] No:      Recommendations:          Monitoring and Evaluation:   Continue to monitor nutritional intake, tolerance to diet prescription, weights, labs, skin integrity    RD remains available upon request and will follow up per protocol  Farnaz Rausch, MS, RDN, CDN (Teams) Nutrition Follow Up Note  Patient seen for: first follow-up for acute severe malnutrition     Chart reviewed, events noted.    Source: [x] Patient       [x] electronic medical record        [x] Wife at bedside         Diet Order:   Diet, Regular:   Consistent Carbohydrate {No Snacks} (CSTCHO) (24)    - Is current order appropriate/adequate? [] Yes  [x]  No:     - PO intake :   [x] >75%  Adequate    [x] 50-75%  Fair       [x] <50%  Poor  -- Pt and wife reports fluctuating PO intake from poor to good due to picky eater, garlic allergy (some foods in house seasoned with garlic) and dislike some institutional foods provided. Amendable to receive oral nutrition supplements when offered.     - Nutrition-related concerns:  -- s/p AVR on 24  -- On Amiodarone, Dobutamine, Lasix  -- Ordered for Klyte today   -- Micronutrient/Other supplementation: Vitamin C   -- Pt with abnormal renal indices and hyponatremia, nephrology following for LINDA   -- GI:  Last BM on  per flowsheet, on Pepcid, Miralax and Senna.     Weights:   Drug Dosing Weight  Height (cm): 180.3 (2024 06:36)  Weight (kg): 89.8 (2024 06:36)  BMI (kg/m2): 27.6 (2024 06:36)  Daily Weight in k.8 (), 85.5 (), 82.7 (), 87 (), 79.1 (), 79.2 ()  -- Weight fluctuations might due to fluid shifts and inadequate PO intake in hospital. Also ? accuracy of hospital scales. RD will continue to monitor wt trends.     Nutritionally Pertinent MEDICATIONS  (STANDING):  aMIOdarone    Tablet  aMIOdarone    Tablet  aMIOdarone Infusion  ascorbic acid  DOBUTamine Infusion  furosemide Infusion  magnesium sulfate  IVPB  pantoprazole  Injectable  polyethylene glycol 3350  potassium bicarbonate/potassium citrate  potassium bicarbonate/potassium citrate  senna  vancomycin  IVPB    Pertinent Labs:  @ 07:28: Na 134<L>, BUN 33<H>, Cr 1.75<H>, BG 89, K+ 3.6, Phos --, Mg 2.2, Alk Phos --, ALT/SGPT --, AST/SGOT --, HbA1c --   @ 15:29: Na 132<L>, BUN 34<H>, Cr 1.65<H>, <H>, K+ 4.5, Phos --, Mg 2.5, Alk Phos --, ALT/SGPT --, AST/SGOT --, HbA1c --    A1C with Estimated Average Glucose Result: 6.2 % (24 @ 09:00), no history of DM, on CSTCHO diet might due to s/p operation     Skin per nursing documentation: bilateral buttocks/sacrum deep tissue injury noted in flowsheets    Edema: Generalized +2 edema per flowsheet     Estimated Needs based on IBW of 74.8kg:  [x] no change since previous assessment  Calories: 2244-2618kcal (30-35 kcal/kg BW)   Proteins: 97-112g (1.3-1.5 g/kg BW)   Fluids: defer to team     Previous Nutrition Diagnosis: acute severe malnutrition, Increased nutrient needs   Nutrition Diagnosis is: [x] ongoing      New Nutrition Diagnosis: [x] Not applicable    Nutrition Care Plan:  [x] In Progress    Nutrition Interventions:     Education Provided to pt and wife:       [x] Yes:  Emphasized the importance of adequate kcal and protein intake; recommend to optimize nutritional intake in case of decreased appetite; recommended small frequent meals by ordering nutrient-dense snacks and leaving non-perishable food away from tray for later consumption during the day or between meals; to start with protein, and sips of supplement throughout the day; reviewed foods with protein and menu order procedures in hospital.      Recommendations:      -- Continue current diet as tolerated. RD remains available to adjust diet as needed.   -- Recommend Nepro 1x daily (425 kcal, 19 g protein) to optimize calories and nutrients intake.   -- RD will add diet Mighty Shake 1x daily (200kcal, 7g proteins) to optimize calories and nutrient intake.   -- Recommend MVI, pending no medical contraindications, for micronutrient support.   -- Monitor PO intake, GI tolerance, skin integrity, labs, weight, and bowel movement regularity.   -- Will continue to honor food and beverage preferences within diet restriction of patient in an effort to maximize level of nutrient intake.  -- Assist with meals PRN and encourage PO intake.    Monitoring and Evaluation:   Continue to monitor nutritional intake, tolerance to diet prescription, weights, labs, skin integrity    RD remains available upon request and will follow up per protocol  Farnaz Rausch MS, RDN, CDN (Teams)

## 2024-02-27 NOTE — PROGRESS NOTE ADULT - SUBJECTIVE AND OBJECTIVE BOX
VITAL SIGNS-Telemetry:  SR - converted to afib @ 5:15am 100-120  Vital Signs Last 24 Hrs  T(C): 37.3 (24 @ 03:07), Max: 37.3 (24 @ 03:07)  T(F): 99.1 (24 @ 03:07), Max: 99.1 (24 @ 03:07)  HR: 118 (24 05:17) (68 - 118)  BP: 134/85 (24 @ 05:17) (116/72 - 138/96)  RR: 18 (24 @ 03:07) (18 - 18)  SpO2: 100% (24 @ 03:07) (93% - 100%)          @ 07:01  -   @ 07:00  --------------------------------------------------------  IN: 1388.5 mL / OUT: 870 mL / NET: 518.5 mL     @ 07:01  -   @ 06:30  --------------------------------------------------------  IN: 1208.3 mL / OUT: 3350 mL / NET: -2141.7 mL    Daily     Daily Weight in k.8 (2024 05:16)    Pacing Wires        [ x ]   Settings:                                  Isolated  [  ]      PHYSICAL EXAM:  Neurology: alert and oriented x 3, nonfocal, no gross deficits  CV : Irr Irr  Sternal Wound :  CDI , Stable  Lungs: cta  Abdomen: soft, nontender, nondistended, positive bowel sounds, last bowel movement         Extremities:    ++ edema b/l - scrotal edema no calf tenderness  garcia - sbd     aMIOdarone    Tablet 400 milliGRAM(s) Oral every 8 hours  aMIOdarone    Tablet   Oral   aMIOdarone Infusion 0.5 mG/Min IV Continuous <Continuous>  ascorbic acid 500 milliGRAM(s) Oral two times a day  aspirin  chewable 81 milliGRAM(s) Oral daily  chlorhexidine 2% Cloths 1 Application(s) Topical daily  DOBUTamine Infusion 1.25 MICROgram(s)/kG/Min IV Continuous <Continuous>  enoxaparin Injectable 40 milliGRAM(s) SubCutaneous every 24 hours  furosemide Infusion 10 mG/Hr IV Continuous <Continuous>  gabapentin 100 milliGRAM(s) Oral every 8 hours  magnesium sulfate  IVPB 1 Gram(s) IV Intermittent once  oxyCODONE    IR 5 milliGRAM(s) Oral every 4 hours PRN  oxyCODONE    IR 10 milliGRAM(s) Oral every 4 hours PRN  pantoprazole  Injectable 40 milliGRAM(s) IV Push daily  polyethylene glycol 3350 17 Gram(s) Oral daily  senna 2 Tablet(s) Oral at bedtime  vancomycin  IVPB 750 milliGRAM(s) IV Intermittent every 12 hours    Physical Therapy Rec:   Home  [  ]   Home w/ PT  [  ]  Rehab  [  ]  Discussed with Cardiothoracic Team at AM rounds.

## 2024-02-27 NOTE — PROGRESS NOTE ADULT - SUBJECTIVE AND OBJECTIVE BOX
S/p AVR, OJANNA clip 2/23, no CP, + klein    Vital Signs Last 24 Hrs  T(C): 36.8 (02-27-24 @ 11:01), Max: 37.3 (02-27-24 @ 03:07)  T(F): 98.2 (02-27-24 @ 11:01), Max: 99.1 (02-27-24 @ 03:07)  HR: 113 (02-27-24 @ 11:01) (72 - 118)  BP: 106/71 (02-27-24 @ 11:01) (106/71 - 138/96)  BP(mean): 105 (02-27-24 @ 05:17) (87 - 111)  RR: 18 (02-27-24 @ 11:01) (18 - 18)  SpO2: 97% (02-27-24 @ 11:01) (93% - 100%)    I&O's Detail    26 Feb 2024 07:01  -  27 Feb 2024 07:00  --------------------------------------------------------  IN:    Amiodarone: 16.7 mL    DOBUTamine: 81.6 mL    Furosemide: 90 mL    Oral Fluid: 1020 mL  Total IN: 1208.3 mL    OUT:    Indwelling Catheter - Urethral (mL): 3350 mL  Total OUT: 3350 mL    27 Feb 2024 07:01  -  27 Feb 2024 12:16  --------------------------------------------------------  IN:    DOBUTamine: 13.6 mL    Furosemide: 20 mL    Oral Fluid: 360 mL  Total IN: 393.6 mL    OUT:    Indwelling Catheter - Urethral (mL): 600 mL  Total OUT: 600 mL    s1s2  b/l air entry  soft, ND  b/l LE edema                                                                           9.2    8.99  )-----------( 147      ( 27 Feb 2024 07:30 )             27.4     27 Feb 2024 07:28    134    |  97     |  33     ----------------------------<  89     3.6     |  22     |  1.75     Ca    8.7        27 Feb 2024 07:28  Phos  3.8       26 Feb 2024 06:08  Mg     2.2       27 Feb 2024 07:28    TPro  5.7    /  Alb  3.6    /  TBili  4.1    /  DBili  x      /  AST  19     /  ALT  20     /  AlkPhos  63     26 Feb 2024 06:08    LIVER FUNCTIONS - ( 26 Feb 2024 06:08 )  Alb: 3.6 g/dL / Pro: 5.7 g/dL / ALK PHOS: 63 U/L / ALT: 20 U/L / AST: 19 U/L / GGT: x           PT/INR - ( 25 Feb 2024 19:31 )   PT: 13.5 sec;   INR: 1.23 ratio      aMIOdarone    Tablet   Oral   aMIOdarone    Tablet 400 milliGRAM(s) Oral every 8 hours  aMIOdarone Infusion 0.5 mG/Min IV Continuous <Continuous>  ascorbic acid 500 milliGRAM(s) Oral two times a day  aspirin  chewable 81 milliGRAM(s) Oral daily  chlorhexidine 2% Cloths 1 Application(s) Topical daily  enoxaparin Injectable 40 milliGRAM(s) SubCutaneous every 24 hours  furosemide Infusion 10 mG/Hr IV Continuous <Continuous>  gabapentin 100 milliGRAM(s) Oral every 8 hours  magnesium sulfate  IVPB 1 Gram(s) IV Intermittent once  oxyCODONE    IR 5 milliGRAM(s) Oral every 4 hours PRN  oxyCODONE    IR 10 milliGRAM(s) Oral every 4 hours PRN  pantoprazole  Injectable 40 milliGRAM(s) IV Push daily  polyethylene glycol 3350 17 Gram(s) Oral daily  potassium bicarbonate/potassium citrate 25 milliEquivalent(s) Oral once  senna 2 Tablet(s) Oral at bedtime  sodium chloride 3% Bolus 150 milliLiter(s) IV Bolus once  vancomycin  IVPB 750 milliGRAM(s) IV Intermittent every 12 hours    A/P:    CM, EF 30%, severe AS, CHF  Tx from [facility] to [facility] 2/5, s/p [facility] 2/5  S/p cardio-renal/hemodynamic LINDA (peak Cr 2.5 - 2/3, lorna Cr 1.23 - 2/23)   UA negative   Imaging w/o hydro   S/p CT w/IV dye 2/8/24  Stable renal fx post CT  S/p liver biopsy 2/20  S/p AVR, JOANNA clip 2/23  Episodes of RAfib  Hemodynamic/hypotensive LINDA post-op  Volume overload   Agree w/Lasix qtt  May have to accept higher Cr   Avoid hypotension   Avoid nephrotoxins as possible   F/u BMP, UO, Vanco level     [phone]

## 2024-02-27 NOTE — CONSULT NOTE ADULT - SUBJECTIVE AND OBJECTIVE BOX
CHIEF COMPLAINT:    HISTORY OF PRESENT ILLNESS: 62 yo man with PMHx of HTN, severe AS, chronic venous stasis, HFpEF who p/w worsening LE swelling to GC  and found to be in decompensated heart failure with EF 35-40% and severe AS so transferred for further management on . Was diuresed and underwent LHC which was non-obstructive. Underwent bioAVR  w/ JOANNA clip complicated by bleeding. Had been on primacor which was discontinued. Had afib  with hypotension requiring albumin and amio (converted to sinus), placed on  with improvement. Remains overloaded but otherwise perfusing well. Repeat TTE with EF 35%, severe biatrial enlargmeent, dilated IVC, trace pericardial effusion.         Allergies    garlic (Angioedema; Swelling; Anaphylaxis)  No Known Drug Allergies    Intolerances    	    MEDICATIONS:  aMIOdarone    Tablet 400 milliGRAM(s) Oral every 8 hours  aMIOdarone    Tablet   Oral   aMIOdarone Infusion 0.5 mG/Min IV Continuous <Continuous>  aspirin  chewable 81 milliGRAM(s) Oral daily  DOBUTamine Infusion 1.251 MICROgram(s)/kG/Min IV Continuous <Continuous>  furosemide Infusion 10 mG/Hr IV Continuous <Continuous>  heparin  Infusion 800 Unit(s)/Hr IV Continuous <Continuous>    vancomycin  IVPB 750 milliGRAM(s) IV Intermittent every 12 hours      gabapentin 100 milliGRAM(s) Oral every 8 hours  oxyCODONE    IR 10 milliGRAM(s) Oral every 4 hours PRN  oxyCODONE    IR 5 milliGRAM(s) Oral every 4 hours PRN    pantoprazole  Injectable 40 milliGRAM(s) IV Push daily  polyethylene glycol 3350 17 Gram(s) Oral daily  senna 2 Tablet(s) Oral at bedtime      ascorbic acid 500 milliGRAM(s) Oral two times a day  chlorhexidine 2% Cloths 1 Application(s) Topical daily  magnesium sulfate  IVPB 1 Gram(s) IV Intermittent once      PAST MEDICAL & SURGICAL HISTORY:  Dyslipidemia      Hypertension      Chronic GERD      History of aortic stenosis      Cellulitis      No significant past surgical history          FAMILY HISTORY:  FH: congestive heart failure (Father, Sibling)    FH: coronary artery disease (Sibling)    FH: brain cancer (Mother)        SOCIAL HISTORY:    [ ]Non-smoker  [ ] Smoker  [ ] Alcohol      REVIEW OF SYSTEMS:  See HPI. All ROS negative unless otherwise noted    PHYSICAL EXAM:  T(C): 36.8 (24 @ 11:01), Max: 37.3 (24 @ 03:07)  HR: 113 (24 @ 11:01) (72 - 118)  BP: 106/71 (24 @ 11:01) (106/71 - 138/96)  RR: 18 (24 @ 11:01) (18 - 18)  SpO2: 97% (24 @ 11:01) (93% - 100%)  Wt(kg): --  I&O's Summary    2024 07:01  -  2024 07:00  --------------------------------------------------------  IN: 1208.3 mL / OUT: 3350 mL / NET: -2141.7 mL    2024 07:01  -  2024 14:46  --------------------------------------------------------  IN: 763.6 mL / OUT: 630 mL / NET: 133.6 mL        Appearance: Alert. NAD	  HEENT:   NC/AT	  Cardiovascular: +S1S2 RRR no m/g/r  Respiratory: CTA B/L	  Psychiatry: A & O x 3, Mood & affect appropriate  Gastrointestinal:  Soft, NT.ND., + BS	  Skin: No rashes	  Neurologic: Non-focal  Extremities: No edema BLE  Vascular: Peripheral pulses palpable 2+ bilaterally      LABS:	 	    CBC Full  -  ( 2024 07:30 )  WBC Count : 8.99 K/uL  Hemoglobin : 9.2 g/dL  Hematocrit : 27.4 %  Platelet Count - Automated : 147 K/uL  Mean Cell Volume : 73.3 fl  Mean Cell Hemoglobin : 24.6 pg  Mean Cell Hemoglobin Concentration : 33.6 gm/dL  Auto Neutrophil # : x  Auto Lymphocyte # : x  Auto Monocyte # : x  Auto Eosinophil # : x  Auto Basophil # : x  Auto Neutrophil % : x  Auto Lymphocyte % : x  Auto Monocyte % : x  Auto Eosinophil % : x  Auto Basophil % : x        134<L>  |  97  |  33<H>  ----------------------------<  89  3.6   |  22  |  1.75<H>      132<L>  |  98  |  34<H>  ----------------------------<  103<H>  4.5   |  24  |  1.65<H>    Ca    8.7      2024 07:28  Ca    8.8      2024 15:29  Phos  3.8       Mg     2.2       Mg     2.5         TPro  5.7<L>  /  Alb  3.6  /  TBili  4.1<H>  /  DBili  x   /  AST  19  /  ALT  20  /  AlkPhos  63    TPro  5.6<L>  /  Alb  3.3  /  TBili  1.8<H>  /  DBili  x   /  AST  22  /  ALT  20  /  AlkPhos  63        proBNP: Pro-Brain Natriuretic Peptide (24 @ 11:07)    Pro-Brain Natriuretic Peptide: 8375 pg/mL    TSH: Thyroid Stimulating Hormone, Serum (24 @ 11:07)    Thyroid Stimulating Hormone, Serum: 10.80 uIU/mL      CARDIAC MARKERS:                TELEMETRY: 	    ECG:  	  RADIOLOGY:  OTHER: 	    PREVIOUS DIAGNOSTIC TESTING:    Echocardiogram:    < from: TTE W or WO Ultrasound Enhancing Agent (24 @ 08:44) >  CONCLUSIONS:      1. Left ventricular cavity is moderately dilated. Left ventricular wall thickness is normal. Left ventricular systolic function is severely decreased.   2. Moderately enlarged right ventricular cavity size and moderately reduced systolic function.    ________________________________________________________________________________________  FINDINGS:     Left Ventricle:  After obtaining consent, Definity ultrasound enhancing agent was given for enhanced left ventricular opacification and improved delineation of the left ventricular endocardial borders. The left ventricular cavity is moderately dilated. Left ventricular wall thickness is normal. Left ventricular systolic function is severely decreased with an ejection fraction visually estimated at 30%. There is global left ventricular hypokinesis.     Right Ventricle:  Theright ventricular cavity is moderately enlarged in size and moderately reduced systolic function. Tricuspid annular plane systolic excursion (TAPSE) is 0.9 cm (normal >=1.7 cm).     Left Atrium:  The left atrium is severely dilated with an indexed volume of 51.28 ml/m².     Right Atrium:  The right atrium is severely dilated with an indexed volume of 60.95 ml/m².     Interatrial Septum:  The interatrial septum appears intact.     Aortic Valve:  Normal aortic valve.     Mitral Valve:  Structurallynormal mitral valve with normal leaflet excursion. There is no mitral valve stenosis.     Tricuspid Valve:  Structurally normal tricuspid valve with normal leaflet excursion. There is mild tricuspid regurgitation. Estimated pulmonary artery systolic pressure is 25 mmHg.     Pulmonic Valve:  Structurally normal pulmonic valve with normal leaflet excursion. There is trace pulmonic regurgitation.     Aorta:  The aortic root appears normal in size.     Pericardium:  There is a trace pericardial effusion.     Pleura:  Bilateral pleural effusion noted.     Systemic Veins:  The inferior vena cava is normal in size (normal <2.1cm) with normal inspiratory collapse (normal >50%) consistent with normal right atrial pressure (~3, range 0-5mmHg).  ____________________________________________________________________  QUANTITATIVE DATA:  Left Ventricle Measurements: (Indexed to BSA)     IVSd (2D):   1.5 cm  LVPWd (2D):  1.2 cm  LVIDd (2D):  6.0 cm  LVIDs (2D):  4.8 cm  LV Mass:     369 g  178.4 g/m²  Visualized LV EF%: 30%     MV E Vmax:    0.79 m/s  MV A Vmax:    1.11 m/s  MV E/A:       0.71  e' lateral:   10.60 cm/s  e' medial:    4.90 cm/s  E/e' lateral: 7.48  E/e' medial:  16.18  E/e' Average: 10.23  MV DT:        231 msec    Aorta Measurements:(Normal range) (Indexed to BSA)     Sinuses of Valsalva: 2.70 cm (3.1 - 3.7 cm)       Left Atrium Measurements: (Indexed to BSA)  LA Diam 2D:        4.70 cm  LA Vol s, MOD A4C: 106.00 ml.  LA Vol s, MOD A2C: 101.00 ml.  LA Vol s, MOD BP:  106.00 ml  51.28 ml/m²    Right Ventricle Measurements: Right Atrial Measurements:     TAPSE: 0.9 cm                 RA Vol:       126.00 ml                                RA Vol Index: 60.95 ml/m²       LVOT / RVOT/ Qp/Qs Data: (Indexed to BSA)  LVOT Diameter: 1.90 cm  LVOT Vmax:     0.84 m/s  LVOT VTI:      13.60 cm  LVOT SV:       38.6 ml  18.65 ml/m²    Mitral Valve Measurements:     MV E Vmax: 0.8 m/s  MV A Vmax: 1.1 m/s  MV E/A:    0.7       Tricuspid Valve Measurements:     TR Vmax:          2.3 m/s  TR Peak Gradient: 21.5 mmHg  RA Pressure:      3 mmHg  PASP:             25 mmHg      < end of copied text >    Catheterization:    < from: Cardiac Catheterization (24 @ 17:39) >  Procedures Performed   Procedures:               1.    Diagnostic Coronary Angiography     2.    Ultrasound Guided Access   3.    Arterial Access - Right Femoral     Indications:               Pre-operative clearance   PCI Status:               elective     Diagnostic Conclusions:     No significant angiographic evidence of CAD    Recommendations:     TAVR workup per Structural Heart Team       < end of copied text >      	  ASSESSMENT/PLAN: 	     CHIEF COMPLAINT: Severe AS, s/p bioAVR 24    HISTORY OF PRESENT ILLNESS: 64 yo man with PMHx of HTN, severe AS, chronic venous stasis, HFpEF who p/w worsening LE swelling to GC  and found to be in decompensated heart failure with EF 35-40% and severe AS so transferred for further management on . Was diuresed and underwent LHC which was non-obstructive. Underwent bioAVR  w/ JOANNA clip complicated by bleeding. Had been on primacor which was discontinued. Had afib  with hypotension requiring albumin and amio (converted to sinus), placed on  with improvement. Remains overloaded but otherwise perfusing well. Repeat TTE with EF 35%, severe biatrial enlargmeent, dilated IVC, trace pericardial effusion. He remains on dobutamine gtt and lasix gtt. With dobutamine gtt stopped this afternoon his pO2 on VBG dropped from 68 to 52 and so it was put back on.   Pt has been in and out of AF since his AVR. He was in NSR on the evening of  until ~1700 on  at which time he went into AF. He converted to NSR on  at ~0130. He converted back into AF on  at ~0520. He has been 100-110s. Pt was previously on amio gtt and is not on PO amio 400mg q8h. He is being started on a heparin gtt.         Allergies    garlic (Angioedema; Swelling; Anaphylaxis)  No Known Drug Allergies    Intolerances    	    MEDICATIONS:  aMIOdarone    Tablet 400 milliGRAM(s) Oral every 8 hours  aMIOdarone    Tablet   Oral   aMIOdarone Infusion 0.5 mG/Min IV Continuous <Continuous>  aspirin  chewable 81 milliGRAM(s) Oral daily  DOBUTamine Infusion 1.251 MICROgram(s)/kG/Min IV Continuous <Continuous>  furosemide Infusion 10 mG/Hr IV Continuous <Continuous>  heparin  Infusion 800 Unit(s)/Hr IV Continuous <Continuous>    vancomycin  IVPB 750 milliGRAM(s) IV Intermittent every 12 hours      gabapentin 100 milliGRAM(s) Oral every 8 hours  oxyCODONE    IR 10 milliGRAM(s) Oral every 4 hours PRN  oxyCODONE    IR 5 milliGRAM(s) Oral every 4 hours PRN    pantoprazole  Injectable 40 milliGRAM(s) IV Push daily  polyethylene glycol 3350 17 Gram(s) Oral daily  senna 2 Tablet(s) Oral at bedtime      ascorbic acid 500 milliGRAM(s) Oral two times a day  chlorhexidine 2% Cloths 1 Application(s) Topical daily  magnesium sulfate  IVPB 1 Gram(s) IV Intermittent once      PAST MEDICAL & SURGICAL HISTORY:  Dyslipidemia      Hypertension      Chronic GERD      History of aortic stenosis      Cellulitis      No significant past surgical history          FAMILY HISTORY:  FH: congestive heart failure (Father, Sibling)    FH: coronary artery disease (Sibling)    FH: brain cancer (Mother)        SOCIAL HISTORY:    [ ]Non-smoker  [ ] Smoker  [ ] Alcohol      REVIEW OF SYSTEMS:  See HPI. All ROS negative unless otherwise noted    PHYSICAL EXAM:  T(C): 36.8 (24 @ 11:01), Max: 37.3 (24 @ 03:07)  HR: 113 (24 @ 11:01) (72 - 118)  BP: 106/71 (24 @ 11:01) (106/71 - 138/96)  RR: 18 (24 @ 11:01) (18 - 18)  SpO2: 97% (24 @ 11:01) (93% - 100%)  Wt(kg): --  I&O's Summary    2024 07:  -  2024 07:00  --------------------------------------------------------  IN: 1208.3 mL / OUT: 3350 mL / NET: -2141.7 mL    2024 07:  -  2024 14:46  --------------------------------------------------------  IN: 763.6 mL / OUT: 630 mL / NET: 133.6 mL        Appearance: Alert. NAD	  HEENT:   NC/AT	  Cardiovascular: +S1S2 irregular  Respiratory: coarse breath sounds b/l	  Psychiatry: A & O x 3, Mood & affect appropriate  Gastrointestinal:  Soft  Skin: No rashes	  Neurologic: Non-focal  Extremities: +b/l JORDON      LABS:	 	    CBC Full  -  ( 2024 07:30 )  WBC Count : 8.99 K/uL  Hemoglobin : 9.2 g/dL  Hematocrit : 27.4 %  Platelet Count - Automated : 147 K/uL  Mean Cell Volume : 73.3 fl  Mean Cell Hemoglobin : 24.6 pg  Mean Cell Hemoglobin Concentration : 33.6 gm/dL  Auto Neutrophil # : x  Auto Lymphocyte # : x  Auto Monocyte # : x  Auto Eosinophil # : x  Auto Basophil # : x  Auto Neutrophil % : x  Auto Lymphocyte % : x  Auto Monocyte % : x  Auto Eosinophil % : x  Auto Basophil % : x        134<L>  |  97  |  33<H>  ----------------------------<  89  3.6   |  22  |  1.75<H>      132<L>  |  98  |  34<H>  ----------------------------<  103<H>  4.5   |  24  |  1.65<H>    Ca    8.7      2024 07:28  Ca    8.8      2024 15:29  Phos  3.8       Mg     2.2       Mg     2.5         TPro  5.7<L>  /  Alb  3.6  /  TBili  4.1<H>  /  DBili  x   /  AST  19  /  ALT  20  /  AlkPhos  63    TPro  5.6<L>  /  Alb  3.3  /  TBili  1.8<H>  /  DBili  x   /  AST  22  /  ALT  20  /  AlkPhos  63        proBNP: Pro-Brain Natriuretic Peptide (24 @ 11:07)    Pro-Brain Natriuretic Peptide: 8375 pg/mL    TSH: Thyroid Stimulating Hormone, Serum (24 @ 11:07)    Thyroid Stimulating Hormone, Serum: 10.80 uIU/mL          TELEMETRY: 	  AF rates 100-110s since ~0520 on   ECG:  	  RADIOLOGY:    < from: Xray Chest 1 View- PORTABLE-Routine (24 @ 07:45) >  FINDINGS:  2024 5:56 AM:  Right IJ catheter with tip in SVC.  Mediastinal drains.  Opacification of the left lower lung field, likely moderate pleural   effusion and atelectasis.  No pneumothorax.  The heart is normal in size  The visualized osseous structures demonstrate no acute pathology.    7:15 PM:  No significant interval change.    24:  No significant interval change.    IMPRESSION:  Stable opacification of the left lower lung field, likely moderate   pleural effusion and atelectasis.    < end of copied text >    OTHER: 	    PREVIOUS DIAGNOSTIC TESTING:    Echocardiogram:    < from: TTE W or WO Ultrasound Enhancing Agent (24 @ 08:44) >  CONCLUSIONS:      1. Left ventricular cavity is moderately dilated. Left ventricular wall thickness is normal. Left ventricular systolic function is severely decreased.   2. Moderately enlarged right ventricular cavity size and moderately reduced systolic function.    ________________________________________________________________________________________  FINDINGS:     Left Ventricle:  After obtaining consent, Definity ultrasound enhancing agent was given for enhanced left ventricular opacification and improved delineation of the left ventricular endocardial borders. The left ventricular cavity is moderately dilated. Left ventricular wall thickness is normal. Left ventricular systolic function is severely decreased with an ejection fraction visually estimated at 30%. There is global left ventricular hypokinesis.     Right Ventricle:  Theright ventricular cavity is moderately enlarged in size and moderately reduced systolic function. Tricuspid annular plane systolic excursion (TAPSE) is 0.9 cm (normal >=1.7 cm).     Left Atrium:  The left atrium is severely dilated with an indexed volume of 51.28 ml/m².     Right Atrium:  The right atrium is severely dilated with an indexed volume of 60.95 ml/m².     Interatrial Septum:  The interatrial septum appears intact.     Aortic Valve:  Normal aortic valve.     Mitral Valve:  Structurallynormal mitral valve with normal leaflet excursion. There is no mitral valve stenosis.     Tricuspid Valve:  Structurally normal tricuspid valve with normal leaflet excursion. There is mild tricuspid regurgitation. Estimated pulmonary artery systolic pressure is 25 mmHg.     Pulmonic Valve:  Structurally normal pulmonic valve with normal leaflet excursion. There is trace pulmonic regurgitation.     Aorta:  The aortic root appears normal in size.     Pericardium:  There is a trace pericardial effusion.     Pleura:  Bilateral pleural effusion noted.     Systemic Veins:  The inferior vena cava is normal in size (normal <2.1cm) with normal inspiratory collapse (normal >50%) consistent with normal right atrial pressure (~3, range 0-5mmHg).  ____________________________________________________________________  QUANTITATIVE DATA:  Left Ventricle Measurements: (Indexed to BSA)     IVSd (2D):   1.5 cm  LVPWd (2D):  1.2 cm  LVIDd (2D):  6.0 cm  LVIDs (2D):  4.8 cm  LV Mass:     369 g  178.4 g/m²  Visualized LV EF%: 30%     MV E Vmax:    0.79 m/s  MV A Vmax:    1.11 m/s  MV E/A:       0.71  e' lateral:   10.60 cm/s  e' medial:    4.90 cm/s  E/e' lateral: 7.48  E/e' medial:  16.18  E/e' Average: 10.23  MV DT:        231 msec    Aorta Measurements:(Normal range) (Indexed to BSA)     Sinuses of Valsalva: 2.70 cm (3.1 - 3.7 cm)       Left Atrium Measurements: (Indexed to BSA)  LA Diam 2D:        4.70 cm  LA Vol s, MOD A4C: 106.00 ml.  LA Vol s, MOD A2C: 101.00 ml.  LA Vol s, MOD BP:  106.00 ml  51.28 ml/m²    Right Ventricle Measurements: Right Atrial Measurements:     TAPSE: 0.9 cm                 RA Vol:       126.00 ml                                RA Vol Index: 60.95 ml/m²       LVOT / RVOT/ Qp/Qs Data: (Indexed to BSA)  LVOT Diameter: 1.90 cm  LVOT Vmax:     0.84 m/s  LVOT VTI:      13.60 cm  LVOT SV:       38.6 ml  18.65 ml/m²    Mitral Valve Measurements:     MV E Vmax: 0.8 m/s  MV A Vmax: 1.1 m/s  MV E/A:    0.7       Tricuspid Valve Measurements:     TR Vmax:          2.3 m/s  TR Peak Gradient: 21.5 mmHg  RA Pressure:      3 mmHg  PASP:             25 mmHg      < end of copied text >    Catheterization:    < from: Cardiac Catheterization (24 @ 17:39) >  Procedures Performed   Procedures:               1.    Diagnostic Coronary Angiography     2.    Ultrasound Guided Access   3.    Arterial Access - Right Femoral     Indications:               Pre-operative clearance   PCI Status:               elective     Diagnostic Conclusions:     No significant angiographic evidence of CAD    Recommendations:     TAVR workup per Structural Heart Team       < end of copied text >      	  ASSESSMENT/PLAN:

## 2024-02-27 NOTE — PROGRESS NOTE ADULT - ASSESSMENT
63M, appears older than stated age, admitted to [facility] 24 PMHx HTN, GERD, HPL, chronic venous stasis dermatitis with bilateral leg swelling, and AS presented to [place] [facility] on 2024 for bilateral leg pain and swelling found to have new acute HFrEF (EF 35-40% 2024) in setting of severe AS.     Hospital course:  24: Admitted to [place] new onset HFrEF EF 35% and anemia 2/2 AS,   24: medically stable for transferred to [facility] medicine service for continued care. during hospitalization hx of WCT/ PAF  Consults for Nephrology, HF, ID, Podiatry for medical optimization  2/15: 1 U PRBC, keep Hgb > 8.    s/p fall with headastrike   : s/p Liver biopsy, soft pressure post procedure--responded to fluid bolus  :  RLE doppler: No pseudoaneurysm in the right groin/ bilobed avascular fluid collection measuring 2.8 x 1.7 x 2.0 cm     : AVR/JOANNA Clip c/b intraop bleeding requirinu pRBCs on CPB 1u pRBCs post-CPB 1 PLT 1000 FEIBA  Prolene suture placed around IJ and V wire  : To Step Down, raegan, CT, +PW, marlena RIJ   Maintain mediastinal chest tubes Primicor discontinued. Lasix 40 BID initiated , Wound care to chronic venous stasis b/l LE. D/c RIJ   VSS - 9 hrs afib last evening- SR since 1:30am- on AMio gtt,  gtt - s/p right axillary marlena placed by Intensivist. garcia placed last evening lasix 40 iv x 1 given this am- echo tr jon eff.   VSS - currently on lasix gtt - negative 2L in last 24 hrs. bid basic metabolic pt converted back into afib this am 100-120- currently on amio load - dobutrex gtt. d/c planning    Physical Therapy Rec:   Home  [  ]   Home w/ PT  [x  ]  Rehab  [  ] rolling walker

## 2024-02-27 NOTE — PROGRESS NOTE ADULT - PROBLEM SELECTOR PLAN 2
rapid afib - x 9hrs - SR since 1;30am   amio/bb    will consider a/c if pt converts back into afib  echo  tr jon eff

## 2024-02-27 NOTE — PROGRESS NOTE ADULT - ASSESSMENT
Briefly, 64 y/o M w/ h/o HTN, severe AS, chronic venous stasis, HFpEF who p/w worsening LE swelling to GC  and found to be in decompensated heart failure with EF 35-40% and severe AS so transferred for further management on . Was diuresed and underwent LHC which was non-obstructive. Underwent bioAVR  w/ JOANNA clip complicated by bleeding. Had been on primacor which was discontinued. Had afib  with hypotension requiring albumin and amio (converted to sinus), placed on  with improvement. Remains overloaded but otherwise perfusing well. Repeat TTE  with EF 25-30%, severe biatrial enlargement, mild RV dysfunction, LVOT VTI 13 cm, dilated IVC, trace pericardial effusion. Had recurrent afib that's been persistent.

## 2024-02-27 NOTE — PROGRESS NOTE ADULT - SUBJECTIVE AND OBJECTIVE BOX
Interval History:  feels well  denies complaints  urinating well  went back into afib; not on AC     Medications:  aMIOdarone    Tablet 400 milliGRAM(s) Oral every 8 hours  aMIOdarone    Tablet   Oral   aMIOdarone Infusion 0.5 mG/Min IV Continuous <Continuous>  ascorbic acid 500 milliGRAM(s) Oral two times a day  aspirin  chewable 81 milliGRAM(s) Oral daily  chlorhexidine 2% Cloths 1 Application(s) Topical daily  DOBUTamine Infusion 1.251 MICROgram(s)/kG/Min IV Continuous <Continuous>  furosemide Infusion 10 mG/Hr IV Continuous <Continuous>  gabapentin 100 milliGRAM(s) Oral every 8 hours  heparin  Infusion 800 Unit(s)/Hr IV Continuous <Continuous>  magnesium sulfate  IVPB 1 Gram(s) IV Intermittent once  oxyCODONE    IR 10 milliGRAM(s) Oral every 4 hours PRN  oxyCODONE    IR 5 milliGRAM(s) Oral every 4 hours PRN  pantoprazole  Injectable 40 milliGRAM(s) IV Push daily  polyethylene glycol 3350 17 Gram(s) Oral daily  senna 2 Tablet(s) Oral at bedtime  vancomycin  IVPB 750 milliGRAM(s) IV Intermittent every 12 hours      Vitals:  T(C): 36.7 (24 @ 14:36), Max: 37.3 (24 @ 03:07)  HR: 100 (24 @ 14:36) (74 - 118)  BP: 91/55 (24 @ 14:36) (91/55 - 134/85)  BP(mean): 65 (24 @ 14:36) (65 - 105)  RR: 18 (24 @ 14:36) (18 - 18)  SpO2: 97% (24 @ 14:36) (93% - 100%)    Daily     Daily Weight in k.8 (2024 05:16)        I&O's Summary    2024 07:01  -  2024 07:00  --------------------------------------------------------  IN: 1208.3 mL / OUT: 3350 mL / NET: -2141.7 mL    2024 07:01  -  2024 15:57  --------------------------------------------------------  IN: 783.8 mL / OUT: 705 mL / NET: 78.8 mL    Physical Exam:  Appearance: No Acute Distress  HEENT: PERRL  Neck: JVP elevated  Cardiovascular: Normal S1 S2, No murmurs/rubs/gallops  Respiratory: dec BS b/l   Gastrointestinal: Soft, Non-tender	  Skin: No cyanosis	  Neurologic: Non-focal  Extremities: b/l LE edema to sacrum  Psychiatry: A & O x 3, Mood & affect appropriate    Labs:                        9.2    8.99  )-----------( 147      ( 2024 07:30 )             27.4         134<L>  |  97  |  33<H>  ----------------------------<  89  3.6   |  22  |  1.75<H>    Ca    8.7      2024 07:28  Phos  3.8       Mg     2.2         TPro  5.7<L>  /  Alb  3.6  /  TBili  4.1<H>  /  DBili  x   /  AST  19  /  ALT  20  /  AlkPhos  63      PT/INR - ( 2024 19:31 )   PT: 13.5 sec;   INR: 1.23 ratio

## 2024-02-28 LAB
ANION GAP SERPL CALC-SCNC: 11 MMOL/L — SIGNIFICANT CHANGE UP (ref 5–17)
ANION GAP SERPL CALC-SCNC: 12 MMOL/L — SIGNIFICANT CHANGE UP (ref 5–17)
ANION GAP SERPL CALC-SCNC: 12 MMOL/L — SIGNIFICANT CHANGE UP (ref 5–17)
ANISOCYTOSIS BLD QL: SLIGHT — SIGNIFICANT CHANGE UP
APTT BLD: 31.4 SEC — SIGNIFICANT CHANGE UP (ref 24.5–35.6)
APTT BLD: 31.8 SEC — SIGNIFICANT CHANGE UP (ref 24.5–35.6)
APTT BLD: 34.6 SEC — SIGNIFICANT CHANGE UP (ref 24.5–35.6)
BASOPHILS # BLD AUTO: 0.07 K/UL — SIGNIFICANT CHANGE UP (ref 0–0.2)
BASOPHILS NFR BLD AUTO: 0.9 % — SIGNIFICANT CHANGE UP (ref 0–2)
BUN SERPL-MCNC: 32 MG/DL — HIGH (ref 7–23)
BUN SERPL-MCNC: 32 MG/DL — HIGH (ref 7–23)
BUN SERPL-MCNC: 34 MG/DL — HIGH (ref 7–23)
BURR CELLS BLD QL SMEAR: PRESENT — SIGNIFICANT CHANGE UP
CALCIUM SERPL-MCNC: 8.4 MG/DL — SIGNIFICANT CHANGE UP (ref 8.4–10.5)
CALCIUM SERPL-MCNC: 8.5 MG/DL — SIGNIFICANT CHANGE UP (ref 8.4–10.5)
CALCIUM SERPL-MCNC: 8.6 MG/DL — SIGNIFICANT CHANGE UP (ref 8.4–10.5)
CHLORIDE SERPL-SCNC: 100 MMOL/L — SIGNIFICANT CHANGE UP (ref 96–108)
CO2 SERPL-SCNC: 25 MMOL/L — SIGNIFICANT CHANGE UP (ref 22–31)
CO2 SERPL-SCNC: 26 MMOL/L — SIGNIFICANT CHANGE UP (ref 22–31)
CO2 SERPL-SCNC: 26 MMOL/L — SIGNIFICANT CHANGE UP (ref 22–31)
CREAT SERPL-MCNC: 1.74 MG/DL — HIGH (ref 0.5–1.3)
CREAT SERPL-MCNC: 1.79 MG/DL — HIGH (ref 0.5–1.3)
CREAT SERPL-MCNC: 1.88 MG/DL — HIGH (ref 0.5–1.3)
DACRYOCYTES BLD QL SMEAR: SLIGHT — SIGNIFICANT CHANGE UP
EGFR: 40 ML/MIN/1.73M2 — LOW
EGFR: 42 ML/MIN/1.73M2 — LOW
EGFR: 44 ML/MIN/1.73M2 — LOW
ELLIPTOCYTES BLD QL SMEAR: SIGNIFICANT CHANGE UP
EOSINOPHIL # BLD AUTO: 0.06 K/UL — SIGNIFICANT CHANGE UP (ref 0–0.5)
EOSINOPHIL NFR BLD AUTO: 0.8 % — SIGNIFICANT CHANGE UP (ref 0–6)
GAS PNL BLDV: SIGNIFICANT CHANGE UP
GLUCOSE SERPL-MCNC: 116 MG/DL — HIGH (ref 70–99)
GLUCOSE SERPL-MCNC: 122 MG/DL — HIGH (ref 70–99)
GLUCOSE SERPL-MCNC: 98 MG/DL — SIGNIFICANT CHANGE UP (ref 70–99)
HCT VFR BLD CALC: 26.2 % — LOW (ref 39–50)
HGB BLD-MCNC: 8.4 G/DL — LOW (ref 13–17)
HYPOCHROMIA BLD QL: SLIGHT — SIGNIFICANT CHANGE UP
INR BLD: 1.13 RATIO — SIGNIFICANT CHANGE UP (ref 0.85–1.18)
LYMPHOCYTES # BLD AUTO: 0.63 K/UL — LOW (ref 1–3.3)
LYMPHOCYTES # BLD AUTO: 7.8 % — LOW (ref 13–44)
MACROCYTES BLD QL: SLIGHT — SIGNIFICANT CHANGE UP
MAGNESIUM SERPL-MCNC: 1.8 MG/DL — SIGNIFICANT CHANGE UP (ref 1.6–2.6)
MAGNESIUM SERPL-MCNC: 2 MG/DL — SIGNIFICANT CHANGE UP (ref 1.6–2.6)
MANUAL SMEAR VERIFICATION: SIGNIFICANT CHANGE UP
MCHC RBC-ENTMCNC: 22.9 PG — LOW (ref 27–34)
MCHC RBC-ENTMCNC: 32.1 GM/DL — SIGNIFICANT CHANGE UP (ref 32–36)
MCV RBC AUTO: 71.4 FL — LOW (ref 80–100)
MONOCYTES # BLD AUTO: 0.49 K/UL — SIGNIFICANT CHANGE UP (ref 0–0.9)
MONOCYTES NFR BLD AUTO: 6.1 % — SIGNIFICANT CHANGE UP (ref 2–14)
NEUTROPHILS # BLD AUTO: 6.76 K/UL — SIGNIFICANT CHANGE UP (ref 1.8–7.4)
NEUTROPHILS NFR BLD AUTO: 83.5 % — HIGH (ref 43–77)
OVALOCYTES BLD QL SMEAR: SLIGHT — SIGNIFICANT CHANGE UP
PLAT MORPH BLD: NORMAL — SIGNIFICANT CHANGE UP
PLATELET # BLD AUTO: 148 K/UL — LOW (ref 150–400)
POIKILOCYTOSIS BLD QL AUTO: SIGNIFICANT CHANGE UP
POLYCHROMASIA BLD QL SMEAR: SLIGHT — SIGNIFICANT CHANGE UP
POTASSIUM SERPL-MCNC: 3.8 MMOL/L — SIGNIFICANT CHANGE UP (ref 3.5–5.3)
POTASSIUM SERPL-MCNC: 4 MMOL/L — SIGNIFICANT CHANGE UP (ref 3.5–5.3)
POTASSIUM SERPL-MCNC: 4.9 MMOL/L — SIGNIFICANT CHANGE UP (ref 3.5–5.3)
POTASSIUM SERPL-SCNC: 3.8 MMOL/L — SIGNIFICANT CHANGE UP (ref 3.5–5.3)
POTASSIUM SERPL-SCNC: 4 MMOL/L — SIGNIFICANT CHANGE UP (ref 3.5–5.3)
POTASSIUM SERPL-SCNC: 4.9 MMOL/L — SIGNIFICANT CHANGE UP (ref 3.5–5.3)
PROTHROM AB SERPL-ACNC: 12.4 SEC — SIGNIFICANT CHANGE UP (ref 9.5–13)
RBC # BLD: 3.67 M/UL — LOW (ref 4.2–5.8)
RBC # FLD: 28.4 % — HIGH (ref 10.3–14.5)
RBC BLD AUTO: ABNORMAL
SCHISTOCYTES BLD QL AUTO: SLIGHT — SIGNIFICANT CHANGE UP
SODIUM SERPL-SCNC: 136 MMOL/L — SIGNIFICANT CHANGE UP (ref 135–145)
SODIUM SERPL-SCNC: 138 MMOL/L — SIGNIFICANT CHANGE UP (ref 135–145)
SODIUM SERPL-SCNC: 138 MMOL/L — SIGNIFICANT CHANGE UP (ref 135–145)
SURGICAL PATHOLOGY STUDY: SIGNIFICANT CHANGE UP
TARGETS BLD QL SMEAR: SIGNIFICANT CHANGE UP
VARIANT LYMPHS # BLD: 0.9 % — SIGNIFICANT CHANGE UP (ref 0–6)
WBC # BLD: 8.1 K/UL — SIGNIFICANT CHANGE UP (ref 3.8–10.5)
WBC # FLD AUTO: 8.1 K/UL — SIGNIFICANT CHANGE UP (ref 3.8–10.5)

## 2024-02-28 PROCEDURE — 99233 SBSQ HOSP IP/OBS HIGH 50: CPT

## 2024-02-28 PROCEDURE — 99232 SBSQ HOSP IP/OBS MODERATE 35: CPT

## 2024-02-28 RX ORDER — POTASSIUM BICARBONATE 978 MG/1
25 TABLET, EFFERVESCENT ORAL
Refills: 0 | Status: COMPLETED | OUTPATIENT
Start: 2024-02-28 | End: 2024-02-28

## 2024-02-28 RX ORDER — HYDRALAZINE HCL 50 MG
10 TABLET ORAL EVERY 8 HOURS
Refills: 0 | Status: DISCONTINUED | OUTPATIENT
Start: 2024-02-28 | End: 2024-02-29

## 2024-02-28 RX ORDER — SPIRONOLACTONE 25 MG/1
25 TABLET, FILM COATED ORAL DAILY
Refills: 0 | Status: DISCONTINUED | OUTPATIENT
Start: 2024-02-28 | End: 2024-03-04

## 2024-02-28 RX ORDER — SODIUM CHLORIDE 5 G/100ML
150 INJECTION, SOLUTION INTRAVENOUS ONCE
Refills: 0 | Status: COMPLETED | OUTPATIENT
Start: 2024-02-28 | End: 2024-02-28

## 2024-02-28 RX ORDER — FUROSEMIDE 40 MG
80 TABLET ORAL ONCE
Refills: 0 | Status: COMPLETED | OUTPATIENT
Start: 2024-02-28 | End: 2024-02-28

## 2024-02-28 RX ADMIN — HEPARIN SODIUM 8 UNIT(S)/HR: 5000 INJECTION INTRAVENOUS; SUBCUTANEOUS at 06:26

## 2024-02-28 RX ADMIN — Medication 5 MG/HR: at 06:26

## 2024-02-28 RX ADMIN — Medication 81 MILLIGRAM(S): at 12:12

## 2024-02-28 RX ADMIN — AMIODARONE HYDROCHLORIDE 400 MILLIGRAM(S): 400 TABLET ORAL at 06:25

## 2024-02-28 RX ADMIN — GABAPENTIN 100 MILLIGRAM(S): 400 CAPSULE ORAL at 06:25

## 2024-02-28 RX ADMIN — Medication 7.5 MG/HR: at 21:38

## 2024-02-28 RX ADMIN — SPIRONOLACTONE 25 MILLIGRAM(S): 25 TABLET, FILM COATED ORAL at 12:10

## 2024-02-28 RX ADMIN — POTASSIUM BICARBONATE 25 MILLIEQUIVALENT(S): 978 TABLET, EFFERVESCENT ORAL at 18:45

## 2024-02-28 RX ADMIN — AMIODARONE HYDROCHLORIDE 400 MILLIGRAM(S): 400 TABLET ORAL at 13:16

## 2024-02-28 RX ADMIN — Medication 20 MILLIEQUIVALENT(S): at 01:32

## 2024-02-28 RX ADMIN — POTASSIUM BICARBONATE 25 MILLIEQUIVALENT(S): 978 TABLET, EFFERVESCENT ORAL at 21:35

## 2024-02-28 RX ADMIN — POLYETHYLENE GLYCOL 3350 17 GRAM(S): 17 POWDER, FOR SOLUTION ORAL at 12:11

## 2024-02-28 RX ADMIN — Medication 250 MILLIGRAM(S): at 17:41

## 2024-02-28 RX ADMIN — Medication 10 MILLIGRAM(S): at 13:16

## 2024-02-28 RX ADMIN — POTASSIUM BICARBONATE 25 MILLIEQUIVALENT(S): 978 TABLET, EFFERVESCENT ORAL at 20:39

## 2024-02-28 RX ADMIN — Medication 3.37 MICROGRAM(S)/KG/MIN: at 06:26

## 2024-02-28 RX ADMIN — SODIUM CHLORIDE 300 MILLILITER(S): 5 INJECTION, SOLUTION INTRAVENOUS at 13:17

## 2024-02-28 RX ADMIN — AMIODARONE HYDROCHLORIDE 400 MILLIGRAM(S): 400 TABLET ORAL at 21:35

## 2024-02-28 RX ADMIN — Medication 250 MILLIGRAM(S): at 06:26

## 2024-02-28 RX ADMIN — Medication 80 MILLIGRAM(S): at 12:12

## 2024-02-28 RX ADMIN — CHLORHEXIDINE GLUCONATE 1 APPLICATION(S): 213 SOLUTION TOPICAL at 10:00

## 2024-02-28 RX ADMIN — Medication 500 MILLIGRAM(S): at 06:25

## 2024-02-28 RX ADMIN — Medication 3.37 MICROGRAM(S)/KG/MIN: at 21:38

## 2024-02-28 RX ADMIN — PANTOPRAZOLE SODIUM 40 MILLIGRAM(S): 20 TABLET, DELAYED RELEASE ORAL at 12:11

## 2024-02-28 RX ADMIN — Medication 10 MILLIGRAM(S): at 21:37

## 2024-02-28 NOTE — PROGRESS NOTE ADULT - SUBJECTIVE AND OBJECTIVE BOX
Subjective:  - Feeling generally well but with ongoing LE swelling.     Medications:  aMIOdarone    Tablet 400 milliGRAM(s) Oral every 8 hours  aMIOdarone    Tablet   Oral   aMIOdarone Infusion 0.5 mG/Min IV Continuous <Continuous>  aspirin  chewable 81 milliGRAM(s) Oral daily  bisacodyl Suppository 10 milliGRAM(s) Rectal daily PRN  chlorhexidine 2% Cloths 1 Application(s) Topical daily  DOBUTamine Infusion 1.251 MICROgram(s)/kG/Min IV Continuous <Continuous>  furosemide Infusion 15 mG/Hr IV Continuous <Continuous>  heparin  Infusion 800 Unit(s)/Hr IV Continuous <Continuous>  hydrALAZINE 10 milliGRAM(s) Oral every 8 hours  magnesium sulfate  IVPB 1 Gram(s) IV Intermittent once  oxyCODONE    IR 10 milliGRAM(s) Oral every 4 hours PRN  oxyCODONE    IR 5 milliGRAM(s) Oral every 4 hours PRN  pantoprazole  Injectable 40 milliGRAM(s) IV Push daily  polyethylene glycol 3350 17 Gram(s) Oral daily  senna 2 Tablet(s) Oral at bedtime  spironolactone 25 milliGRAM(s) Oral daily  vancomycin  IVPB 750 milliGRAM(s) IV Intermittent every 12 hours    Physical Exam:    Vitals:  Vital Signs Last 24 Hours  T(C): 30.6 (24 @ 14:50), Max: 37.2 (24 @ 02:46)  HR: 109 (24 @ 14:50) (92 - 115)  BP: 121/74 (24 @ 14:50) (110/75 - 150/96)  RR: 18 (24 @ 14:50) (18 - 19)  SpO2: 92% (24 @ 14:50) (84% - 100%)    Weight in k.1 ( @ 11:28)    I&O's Summary    2024 07:01  -  2024 07:00  --------------------------------------------------------  IN: 1617 mL / OUT: 2605 mL / NET: -988 mL    2024 07:01  -  2024 14:57  --------------------------------------------------------  IN: 387.2 mL / OUT: 875 mL / NET: -487.8 mL    Tele: AF 90-100s    General: No distress. Comfortable.  HEENT: EOM intact.  Neck: JVP 8 cm H2O with HJR  Chest: Clear to auscultation bilaterally  CV: Irregularly irregular. Normal S1 and S2.   Abdomen: Soft, non-distended, non-tender  Extremities: warm peripherally. 2+ BLE edema in ACE wrap  Neurology: Alert and oriented times three. Sensation intact  Psych: Affect normal    Labs:                        8.4    8.10  )-----------( 148      ( 2024 04:23 )             26.2         136  |  100  |  32<H>  ----------------------------<  98  4.0   |  25  |  1.74<H>    Ca    8.4      2024 04:23  Mg     2.0           PT/INR - ( 2024 04:23 )   PT: 12.4 sec;   INR: 1.13 ratio         PTT - ( 2024 13:37 )  PTT:31.4 sec

## 2024-02-28 NOTE — PROGRESS NOTE ADULT - PROBLEM SELECTOR PLAN 1
- continue  at 1.25 mcg/kg/min  - Please draw VBG from central line for estimation of Danette CO/CI  - Bolus with Lasix 80 mg IVP x1, then increase Lasix gtt to 15 mg/hr  - 3% Hypertonic saline 150 cc over 30 mins, once today  - agree with starting Spironolactone 25 mg QD  - BMP and Mg q12hrs. Replete to target K 4-4.5 and Mg 2-2.5  - Continue with BLE ACE wrap to mobilize fluid

## 2024-02-28 NOTE — PROGRESS NOTE ADULT - PROBLEM SELECTOR PLAN 2
- possibly cardiorenal  - Cr on admission 1.7 and peaked to 2.5  - Had normalized, but now elevated   - diuretics as above

## 2024-02-28 NOTE — PROGRESS NOTE ADULT - ASSESSMENT
63M, appears older than stated age, admitted to [facility] 24 PMHx HTN, GERD, HPL, chronic venous stasis dermatitis with bilateral leg swelling, and AS presented to [facility] on 2024 for bilateral leg pain and swelling found to have new acute HFrEF (EF 35-40% 2024) in setting of severe AS.     Hospital course:  24: Admitted to [facility] new onset HFrEF EF 35% and anemia 2/2 AS,   24: medically stable for transferred to [facility] medicine service for continued care. during hospitalization hx of WCT/ PAF  Consults for Nephrology, HF, ID, Podiatry for medical optimization  2/15: 1 U PRBC, keep Hgb > 8.    s/p fall with headastrike   : s/p Liver biopsy, soft pressure post procedure--responded to fluid bolus  :  RLE doppler: No pseudoaneurysm in the right groin/ bilobed avascular fluid collection measuring 2.8 x 1.7 x 2.0 cm     : AVR/JOANNA Clip c/b intraop bleeding requirinu pRBCs on CPB 1u pRBCs post-CPB 1 PLT 1000 FEIBA  Prolene suture placed around IJ and V wire  : To Step Down, raegan, CT, +PW, marlena RIJ   Maintain mediastinal chest tubes Primicor discontinued. Lasix 40 BID initiated , Wound care to chronic venous stasis b/l LE. D/c RIJ   VSS - 9 hrs afib last evening- SR since 1:30am- on AMio gtt,  gtt - s/p right axillary marlena placed by Intensivist. garcia placed last evening lasix 40 iv x 1 given this am- echo tr jon eff.   VSS - currently on lasix gtt - negative 2L in last 24 hrs. bid basic metabolic pt converted back into afib this am 100-120- currently on amio load - dobutrex gtt. d/c planning    d/c yesterday then resumed for decreased UO.  Remains a fib since yesterday, cont amio/heparin, eps following         Weight increased, lasix 80 iv x 1, then infusion increased , aldactone added .  Remains on heparin infusion.          Hydral added for afterload

## 2024-02-28 NOTE — PROGRESS NOTE ADULT - ASSESSMENT
Briefly, 62 y/o M w/ h/o HTN, severe AS, chronic venous stasis, HFpEF who p/w worsening LE swelling to GC  and found to be in decompensated heart failure with EF 35-40% and severe AS so transferred for further management on . Was diuresed and underwent LHC which was non-obstructive. Underwent bioAVR  w/ JOANNA clip complicated by bleeding. Had been on primacor which was discontinued. Had afib  with hypotension requiring albumin and amio (converted to sinus), placed on  with improvement. Remains overloaded but otherwise perfusing well. Repeat TTE  with EF 25-30%, severe biatrial enlargement, mild RV dysfunction, LVOT VTI 13 cm, dilated IVC, trace pericardial effusion. Had recurrent afib that's been persistent.     Remains volume expanded, no meaningful improvement in the past day with current regimen. Will increase Lasix infusion and provide additional dose of hypertonic saline today. Will also start oral afterload reducing agents.

## 2024-02-28 NOTE — PROGRESS NOTE ADULT - ASSESSMENT
Pt informed   She will have her labs drawn again in 2 weeks   She also needs her xray done at that time , around July 10  [facility] is closed , will need lab orders and xray faxed , pt needs help setting up appointment   She said she will forget to schedule it on her own      Clinic [phone]   62 yo man with PMHx of HTN, severe AS, chronic venous stasis, HFpEF who p/w worsening LE swelling to GC  and found to be in decompensated heart failure with EF 35-40% and severe AS so transferred to [facility] for further management on . Was diuresed and underwent LHC which revealed no significant CAD. Underwent bioAVR  w/ JOANNA clip complicated by bleeding. Had been on primacor which was discontinued. Had afib  with hypotension requiring albumin and amio (converted to sinus), placed on  with improvement. Remains overloaded but otherwise perfusing well. Repeat TTE with EF 35%, severe biatrial enlargement dilated IVC, trace pericardial effusion. He remains on dobutamine gtt and lasix gtt. With dobutamine gtt stopped , his O2 sat on VBG dropped from 68 to 52 and so it was put back on.   Pt has been in and out of AF since his AVR. He was in NSR on the evening of  until ~1700 on  at which time he went into AF. He converted to NSR on  at ~0130. He converted back into AF on  at ~0520. He has been 100-110s. Pt was previously on amio gtt and is now on PO amio 400mg q8h. Started on a heparin gtt .     1) S/p bio AVR, AtriClip 24  2) Atrial fibrillation with RVR, paroxysmal post operatively  3) HFrEF with LVEF 30%    Recommendations:  -Recommended continuation of amiodarone load. Has thus far received 4.48g as of 1500   -Agree with anticoagulation, heparin gtt started by CTSx  -If pt persists in AF, can consider DCCV after further amiodarone load. Will need to be on uninterrupted AC s/p DCCV. Pt did have AtriClip at time of AVR, will need to be reviewed for need for LESLEY if DCCV is pursued  -Pt remains on lasix and dobutamine gtts  -Maintain K+>4 and Mag>2  -Continue telemetry monitoring 62 yo man with PMHx of HTN, severe AS, chronic venous stasis, HFpEF who p/w worsening LE swelling to GC  and found to be in decompensated heart failure with EF 35-40% and severe AS so transferred to [facility] for further management on . Was diuresed and underwent LHC which revealed no significant CAD. Underwent bioAVR  w/ JOANNA clip complicated by bleeding. Had been on primacor which was discontinued. Had afib  with hypotension requiring albumin and amio (converted to sinus), placed on  with improvement. Remains overloaded but otherwise perfusing well. Repeat TTE with EF 35%, severe biatrial enlargement dilated IVC, trace pericardial effusion. He remains on dobutamine gtt and lasix gtt. With dobutamine gtt stopped , his O2 sat on VBG dropped from 68 to 52 and so it was put back on.   Pt has been in and out of AF since his AVR. He was in NSR on the evening of  until ~1700 on  at which time he went into AF. He converted to NSR on  at ~0130. He converted back into AF on  at ~0520. He has been 100-110s. Pt was previously on amio gtt and is now on PO amio 400mg q8h. Started on a heparin gtt .     1) S/p bio AVR, AtriClip 24  2) Atrial fibrillation with RVR, paroxysmal post operatively  3) HFrEF with LVEF 30%    Recommendations:  -Recommended continuation of amiodarone load. Has thus far received 4.48g as of 1500   -Agree with anticoagulation, heparin gtt started by CTSx  -If pt persists in AF, can consider DCCV after further amiodarone load. Will need to be on uninterrupted AC s/p DCCV. Pt did have AtriClip at time of AVR, will need to be reviewed for ?need for LESLEY if DCCV is pursued. Will hold off on DCCV for now as patient remains on lasix and  gtts  -Maintain K+>4 and Mag>2  -Continue telemetry monitoring

## 2024-02-28 NOTE — PROGRESS NOTE ADULT - SUBJECTIVE AND OBJECTIVE BOX
24H hour events: Pt without acute complaints this morning. Remains on dobutamine and lasix gtts. Currently on heparin gtt    MEDICATIONS:  aMIOdarone    Tablet   Oral   aMIOdarone    Tablet 400 milliGRAM(s) Oral every 8 hours  aMIOdarone Infusion 0.5 mG/Min IV Continuous <Continuous>  aspirin  chewable 81 milliGRAM(s) Oral daily  DOBUTamine Infusion 1.251 MICROgram(s)/kG/Min IV Continuous <Continuous>  furosemide Infusion 15 mG/Hr IV Continuous <Continuous>  heparin  Infusion 800 Unit(s)/Hr IV Continuous <Continuous>  hydrALAZINE 10 milliGRAM(s) Oral every 8 hours  spironolactone 25 milliGRAM(s) Oral daily    vancomycin  IVPB 750 milliGRAM(s) IV Intermittent every 12 hours      oxyCODONE    IR 10 milliGRAM(s) Oral every 4 hours PRN  oxyCODONE    IR 5 milliGRAM(s) Oral every 4 hours PRN    bisacodyl Suppository 10 milliGRAM(s) Rectal daily PRN  pantoprazole  Injectable 40 milliGRAM(s) IV Push daily  polyethylene glycol 3350 17 Gram(s) Oral daily  senna 2 Tablet(s) Oral at bedtime      chlorhexidine 2% Cloths 1 Application(s) Topical daily  magnesium sulfate  IVPB 1 Gram(s) IV Intermittent once      REVIEW OF SYSTEMS:  See HPI, otherwise ROS negative.    PHYSICAL EXAM:  T(C): 36.9 (02-28-24 @ 11:24), Max: 37.2 (02-28-24 @ 02:46)  HR: 102 (02-28-24 @ 11:24) (92 - 115)  BP: 150/96 (02-28-24 @ 11:24) (91/55 - 150/96)  RR: 18 (02-28-24 @ 11:24) (18 - 19)  SpO2: 92% (02-28-24 @ 11:24) (84% - 100%)  Wt(kg): --  I&O's Summary    27 Feb 2024 07:01  -  28 Feb 2024 07:00  --------------------------------------------------------  IN: 1617 mL / OUT: 2605 mL / NET: -988 mL    28 Feb 2024 07:01  -  28 Feb 2024 14:16  --------------------------------------------------------  IN: 240 mL / OUT: 575 mL / NET: -335 mL        Appearance: Alert. NAD	  HEENT:   NC/AT	  Cardiovascular: +S1S2 irregular  Respiratory: CTA B/L	  Psychiatry: A & O x 3, Mood & affect appropriate  Gastrointestinal:  Soft, NT.ND., + BS	  Skin: No rashes	  Neurologic: Non-focal  Extremities: No edema BLE      LABS:	 	    CBC Full  -  ( 28 Feb 2024 04:23 )  WBC Count : 8.10 K/uL  Hemoglobin : 8.4 g/dL  Hematocrit : 26.2 %  Platelet Count - Automated : 148 K/uL  Mean Cell Volume : 71.4 fl  Mean Cell Hemoglobin : 22.9 pg  Mean Cell Hemoglobin Concentration : 32.1 gm/dL  Auto Neutrophil # : 6.76 K/uL  Auto Lymphocyte # : 0.63 K/uL  Auto Monocyte # : 0.49 K/uL  Auto Eosinophil # : 0.06 K/uL  Auto Basophil # : 0.07 K/uL  Auto Neutrophil % : 83.5 %  Auto Lymphocyte % : 7.8 %  Auto Monocyte % : 6.1 %  Auto Eosinophil % : 0.8 %  Auto Basophil % : 0.9 %    02-28    136  |  100  |  32<H>  ----------------------------<  98  4.0   |  25  |  1.74<H>  02-27    136  |  100  |  34<H>  ----------------------------<  178<H>  3.7   |  23  |  1.88<H>    Ca    8.4      28 Feb 2024 04:23  Ca    8.0<L>      27 Feb 2024 22:03  Mg     2.0     02-28  Mg     2.2     02-27        proBNP: Pro-Brain Natriuretic Peptide (02.22.24 @ 11:07)    Pro-Brain Natriuretic Peptide: 8375 pg/mL    TSH: Thyroid Stimulating Hormone, Serum (02.22.24 @ 11:07)    Thyroid Stimulating Hormone, Serum: 10.80 uIU/mL          TELEMETRY  AF rates 80-110s, in AF since ~0520 on 2/27   	  RADIOLOGY:  < from: Xray Chest 1 View- PORTABLE-Routine (02.26.24 @ 07:45) >  FINDINGS:  2/25/2024 5:56 AM:  Right IJ catheter with tip in SVC.  Mediastinal drains.  Opacification of the left lower lung field, likely moderate pleural   effusion and atelectasis.  No pneumothorax.  The heart is normal in size  The visualized osseous structures demonstrate no acute pathology.    7:15 PM:  No significant interval change.    2/26/24:  No significant interval change.    IMPRESSION:  Stable opacification of the left lower lung field, likely moderate   pleural effusion and atelectasis.    < end of copied text >    OTHER: 	    PREVIOUS DIAGNOSTIC TESTING:    Echocardiogram:    < from: TTE W or WO Ultrasound Enhancing Agent (02.26.24 @ 08:44) >  CONCLUSIONS:      1. Left ventricular cavity is moderately dilated. Left ventricular wall thickness is normal. Left ventricular systolic function is severely decreased.   2. Moderately enlarged right ventricular cavity size and moderately reduced systolic function.    ________________________________________________________________________________________  FINDINGS:     Left Ventricle:  After obtaining consent, Definity ultrasound enhancing agent was given for enhanced left ventricular opacification and improved delineation of the left ventricular endocardial borders. The left ventricular cavity is moderately dilated. Left ventricular wall thickness is normal. Left ventricular systolic function is severely decreased with an ejection fraction visually estimated at 30%. There is global left ventricular hypokinesis.     Right Ventricle:  Theright ventricular cavity is moderately enlarged in size and moderately reduced systolic function. Tricuspid annular plane systolic excursion (TAPSE) is 0.9 cm (normal >=1.7 cm).     Left Atrium:  The left atrium is severely dilated with an indexed volume of 51.28 ml/m².     Right Atrium:  The right atrium is severely dilated with an indexed volume of 60.95 ml/m².     Interatrial Septum:  The interatrial septum appears intact.     Aortic Valve:  Normal aortic valve.     Mitral Valve:  Structurallynormal mitral valve with normal leaflet excursion. There is no mitral valve stenosis.     Tricuspid Valve:  Structurally normal tricuspid valve with normal leaflet excursion. There is mild tricuspid regurgitation. Estimated pulmonary artery systolic pressure is 25 mmHg.     Pulmonic Valve:  Structurally normal pulmonic valve with normal leaflet excursion. There is trace pulmonic regurgitation.     Aorta:  The aortic root appears normal in size.     Pericardium:  There is a trace pericardial effusion.     Pleura:  Bilateral pleural effusion noted.     Systemic Veins:  The inferior vena cava is normal in size (normal <2.1cm) with normal inspiratory collapse (normal >50%) consistent with normal right atrial pressure (~3, range 0-5mmHg).  ____________________________________________________________________  QUANTITATIVE DATA:  Left Ventricle Measurements: (Indexed to BSA)     IVSd (2D):   1.5 cm  LVPWd (2D):  1.2 cm  LVIDd (2D):  6.0 cm  LVIDs (2D):  4.8 cm  LV Mass:     369 g  178.4 g/m²  Visualized LV EF%: 30%     MV E Vmax:    0.79 m/s  MV A Vmax:    1.11 m/s  MV E/A:       0.71  e' lateral:   10.60 cm/s  e' medial:    4.90 cm/s  E/e' lateral: 7.48  E/e' medial:  16.18  E/e' Average: 10.23  MV DT:        231 msec    Aorta Measurements:(Normal range) (Indexed to BSA)     Sinuses of Valsalva: 2.70 cm (3.1 - 3.7 cm)       Left Atrium Measurements: (Indexed to BSA)  LA Diam 2D:        4.70 cm  LA Vol s, MOD A4C: 106.00 ml.  LA Vol s, MOD A2C: 101.00 ml.  LA Vol s, MOD BP:  106.00 ml  51.28 ml/m²    Right Ventricle Measurements: Right Atrial Measurements:     TAPSE: 0.9 cm                 RA Vol:       126.00 ml                                RA Vol Index: 60.95 ml/m²       LVOT / RVOT/ Qp/Qs Data: (Indexed to BSA)  LVOT Diameter: 1.90 cm  LVOT Vmax:     0.84 m/s  LVOT VTI:      13.60 cm  LVOT SV:       38.6 ml  18.65 ml/m²    Mitral Valve Measurements:     MV E Vmax: 0.8 m/s  MV A Vmax: 1.1 m/s  MV E/A:    0.7       Tricuspid Valve Measurements:     TR Vmax:          2.3 m/s  TR Peak Gradient: 21.5 mmHg  RA Pressure:      3 mmHg  PASP:             25 mmHg      < end of copied text >    Catheterization:    < from: Cardiac Catheterization (02.05.24 @ 17:39) >  Procedures Performed   Procedures:               1.    Diagnostic Coronary Angiography     2.    Ultrasound Guided Access   3.    Arterial Access - Right Femoral     Indications:               Pre-operative clearance   PCI Status:               elective     Diagnostic Conclusions:     No significant angiographic evidence of CAD    Recommendations:     TAVR workup per Structural Heart Team       < end of copied text >  	  ASSESSMENT/PLAN:

## 2024-02-28 NOTE — PROGRESS NOTE ADULT - SUBJECTIVE AND OBJECTIVE BOX
VITAL SIGNS    Telemetry:  afib 100    Vital Signs Last 24 Hrs  T(C): 36.9 (24 @ 11:24), Max: 37.2 (24 @ 02:46)  T(F): 98.5 (24 @ 11:24), Max: 98.9 (24 @ 02:46)  HR: 102 (24 @ 11:24) (92 - 115)  BP: 150/96 (24 @ 11:24) (91/55 - 150/96)  RR: 18 (24 @ 11:24) (18 - 19)  SpO2: 92% (24 @ 11:24) (84% - 100%)                    @ 07:01  -   @ 07:00  --------------------------------------------------------  IN: 1617 mL / OUT: 2605 mL / NET: -988 mL     @ 07:01  -   @ 12:04  --------------------------------------------------------  IN: 240 mL / OUT: 0 mL / NET: 240 mL          Daily     Daily Weight in k.1 (2024 11:28)            CAPILLARY BLOOD GLUCOSE                Drains:         Pacing Wires        [ x]   Settings:    vvi                              Isolated  [  ]    Coumadin    [ ] YES          [  ]      NO                                   PHYSICAL EXAM        Neurology: alert and oriented x 3, nonfocal, no gross deficits  CV : s1 s2 RRR  Sternal Wound :  CDI , Stable  Lungs: cta  Abdomen: soft, nontender, nondistended, positive bowel sounds, last bowel movement                       chest tubes  :    voiding / garcia - sbd         Extremities:      edema   /  -   calve tenderness ,    L leg  /  R leg  incisions cdi          aMIOdarone    Tablet   Oral   aMIOdarone    Tablet 400 milliGRAM(s) Oral every 8 hours  aMIOdarone Infusion 0.5 mG/Min IV Continuous <Continuous>  aspirin  chewable 81 milliGRAM(s) Oral daily  chlorhexidine 2% Cloths 1 Application(s) Topical daily  DOBUTamine Infusion 1.251 MICROgram(s)/kG/Min IV Continuous <Continuous>  furosemide   Injectable 80 milliGRAM(s) IV Push once  furosemide Infusion 15 mG/Hr IV Continuous <Continuous>  heparin  Infusion 800 Unit(s)/Hr IV Continuous <Continuous>  hydrALAZINE 10 milliGRAM(s) Oral every 8 hours  magnesium sulfate  IVPB 1 Gram(s) IV Intermittent once  oxyCODONE    IR 5 milliGRAM(s) Oral every 4 hours PRN  oxyCODONE    IR 10 milliGRAM(s) Oral every 4 hours PRN  pantoprazole  Injectable 40 milliGRAM(s) IV Push daily  polyethylene glycol 3350 17 Gram(s) Oral daily  senna 2 Tablet(s) Oral at bedtime  sodium chloride 3% Bolus 150 milliLiter(s) IV Bolus once  spironolactone 25 milliGRAM(s) Oral daily  vancomycin  IVPB 750 milliGRAM(s) IV Intermittent every 12 hours                    Physical Therapy Rec:   Home  [  ]   Home w/ PT  [  ]  Rehab  [  ]  Discussed with Cardiothoracic Team at AM rounds.     VITAL SIGNS    Telemetry:  afib 100    Vital Signs Last 24 Hrs  T(C): 36.9 (24 @ 11:24), Max: 37.2 (24 @ 02:46)  T(F): 98.5 (24 @ 11:24), Max: 98.9 (24 @ 02:46)  HR: 102 (24 @ 11:24) (92 - 115)  BP: 150/96 (24 @ 11:24) (91/55 - 150/96)  RR: 18 (24 @ 11:24) (18 - 19)  SpO2: 92% (24 @ 11:24) (84% - 100%)                    @ 07:01  -   @ 07:00  --------------------------------------------------------  IN: 1617 mL / OUT: 2605 mL / NET: -988 mL     @ 07:01  -   @ 12:04  --------------------------------------------------------  IN: 240 mL / OUT: 0 mL / NET: 240 mL          Daily     Daily Weight in k.1 (2024 11:28)            CAPILLARY BLOOD GLUCOSE                Drains:         Pacing Wires        [ x]   Settings:    vvi                              Isolated  [  ]    Coumadin    [ ] YES          [x  ]      NO                                   PHYSICAL EXAM        Neurology: alert and oriented x 3, nonfocal, no gross deficits  CV : s1 s2 RRR  R ij triple lumen cdi  Sternal Wound :  CDI , Stable  Lungs: cta  Abdomen: soft, nontender, nondistended, positive bowel sounds, last bowel movement +                     R axillary a line  :    garcia - sbd         Extremities:   +   edema  ace wrap  /  -   calve tenderness ,              aMIOdarone    Tablet   Oral   aMIOdarone    Tablet 400 milliGRAM(s) Oral every 8 hours  aMIOdarone Infusion 0.5 mG/Min IV Continuous <Continuous>  aspirin  chewable 81 milliGRAM(s) Oral daily  chlorhexidine 2% Cloths 1 Application(s) Topical daily  DOBUTamine Infusion 1.251 MICROgram(s)/kG/Min IV Continuous <Continuous>  furosemide   Injectable 80 milliGRAM(s) IV Push once  furosemide Infusion 15 mG/Hr IV Continuous <Continuous>  heparin  Infusion 800 Unit(s)/Hr IV Continuous <Continuous>  hydrALAZINE 10 milliGRAM(s) Oral every 8 hours  magnesium sulfate  IVPB 1 Gram(s) IV Intermittent once  oxyCODONE    IR 5 milliGRAM(s) Oral every 4 hours PRN  oxyCODONE    IR 10 milliGRAM(s) Oral every 4 hours PRN  pantoprazole  Injectable 40 milliGRAM(s) IV Push daily  polyethylene glycol 3350 17 Gram(s) Oral daily  senna 2 Tablet(s) Oral at bedtime  sodium chloride 3% Bolus 150 milliLiter(s) IV Bolus once  spironolactone 25 milliGRAM(s) Oral daily  vancomycin  IVPB 750 milliGRAM(s) IV Intermittent every 12 hours                    Physical Therapy Rec:   Home  [  ]   Home w/ PT  [  ]  Rehab  [  ]  Discussed with Cardiothoracic Team at AM rounds.

## 2024-02-28 NOTE — PROGRESS NOTE ADULT - NS ATTEND AMEND GEN_ALL_CORE FT
Remains in afib. Required reinitiation of dobutamine. Seen by EP with plan to continue amio load and reassess need for DCCV. Remains overload with suboptimal response to lasix gtt. Labs reviewed - relatively stable.   - increase lasix as above  - HT saline x1  - c/w    - start hydral 10 mg q8h  - start keanu 25 mg daily   - trend labs twice/day

## 2024-02-28 NOTE — PROGRESS NOTE ADULT - PROBLEM SELECTOR PLAN 4
- paroxysmal  - s/p JOANNA clip  - on amio  - On AC with Heparin infusion   - EP following, considering DCCV after further Amiodarone load

## 2024-02-28 NOTE — PROGRESS NOTE ADULT - TIME BILLING
- Review of chart and consultation notes  - Exam and discussion with patient  - Review of laboratory and imaging   - Establishing a care plan for patient  - Communication with other providers

## 2024-02-29 LAB
ANION GAP SERPL CALC-SCNC: 13 MMOL/L — SIGNIFICANT CHANGE UP (ref 5–17)
APTT BLD: 38.4 SEC — HIGH (ref 24.5–35.6)
APTT BLD: 38.4 SEC — HIGH (ref 24.5–35.6)
BUN SERPL-MCNC: 35 MG/DL — HIGH (ref 7–23)
CALCIUM SERPL-MCNC: 8.7 MG/DL — SIGNIFICANT CHANGE UP (ref 8.4–10.5)
CHLORIDE SERPL-SCNC: 98 MMOL/L — SIGNIFICANT CHANGE UP (ref 96–108)
CO2 SERPL-SCNC: 25 MMOL/L — SIGNIFICANT CHANGE UP (ref 22–31)
CREAT SERPL-MCNC: 1.86 MG/DL — HIGH (ref 0.5–1.3)
EGFR: 40 ML/MIN/1.73M2 — LOW
GAS PNL BLDV: SIGNIFICANT CHANGE UP
GLUCOSE SERPL-MCNC: 105 MG/DL — HIGH (ref 70–99)
HCT VFR BLD CALC: 25.3 % — LOW (ref 39–50)
HGB BLD-MCNC: 8.3 G/DL — LOW (ref 13–17)
MAGNESIUM SERPL-MCNC: 1.8 MG/DL — SIGNIFICANT CHANGE UP (ref 1.6–2.6)
MCHC RBC-ENTMCNC: 24.9 PG — LOW (ref 27–34)
MCHC RBC-ENTMCNC: 32.8 GM/DL — SIGNIFICANT CHANGE UP (ref 32–36)
MCV RBC AUTO: 75.7 FL — LOW (ref 80–100)
NRBC # BLD: 0 /100 WBCS — SIGNIFICANT CHANGE UP (ref 0–0)
PLATELET # BLD AUTO: 159 K/UL — SIGNIFICANT CHANGE UP (ref 150–400)
POTASSIUM SERPL-MCNC: 4.1 MMOL/L — SIGNIFICANT CHANGE UP (ref 3.5–5.3)
POTASSIUM SERPL-SCNC: 4.1 MMOL/L — SIGNIFICANT CHANGE UP (ref 3.5–5.3)
RBC # BLD: 3.34 M/UL — LOW (ref 4.2–5.8)
RBC # FLD: 30.8 % — HIGH (ref 10.3–14.5)
SODIUM SERPL-SCNC: 136 MMOL/L — SIGNIFICANT CHANGE UP (ref 135–145)
T3 SERPL-MCNC: 45 NG/DL — LOW (ref 80–200)
T4 AB SER-ACNC: 4.5 UG/DL — LOW (ref 4.6–12)
T4/T3 UPTAKE INDEX SERPL: 1 TBI — SIGNIFICANT CHANGE UP (ref 0.8–1.3)
TSH SERPL-MCNC: 8.61 UIU/ML — HIGH (ref 0.27–4.2)
WBC # BLD: 7.73 K/UL — SIGNIFICANT CHANGE UP (ref 3.8–10.5)
WBC # FLD AUTO: 7.73 K/UL — SIGNIFICANT CHANGE UP (ref 3.8–10.5)

## 2024-02-29 PROCEDURE — 93010 ELECTROCARDIOGRAM REPORT: CPT

## 2024-02-29 PROCEDURE — 99233 SBSQ HOSP IP/OBS HIGH 50: CPT

## 2024-02-29 PROCEDURE — 99232 SBSQ HOSP IP/OBS MODERATE 35: CPT

## 2024-02-29 RX ORDER — POTASSIUM BICARBONATE 978 MG/1
25 TABLET, EFFERVESCENT ORAL
Refills: 0 | Status: COMPLETED | OUTPATIENT
Start: 2024-02-29 | End: 2024-02-29

## 2024-02-29 RX ORDER — FUROSEMIDE 40 MG
80 TABLET ORAL ONCE
Refills: 0 | Status: COMPLETED | OUTPATIENT
Start: 2024-02-29 | End: 2024-02-29

## 2024-02-29 RX ORDER — ACETAMINOPHEN 500 MG
650 TABLET ORAL EVERY 6 HOURS
Refills: 0 | Status: DISCONTINUED | OUTPATIENT
Start: 2024-02-29 | End: 2024-03-12

## 2024-02-29 RX ORDER — LEVOTHYROXINE SODIUM 125 MCG
50 TABLET ORAL DAILY
Refills: 0 | Status: DISCONTINUED | OUTPATIENT
Start: 2024-02-29 | End: 2024-02-29

## 2024-02-29 RX ORDER — MAGNESIUM SULFATE 500 MG/ML
2 VIAL (ML) INJECTION ONCE
Refills: 0 | Status: COMPLETED | OUTPATIENT
Start: 2024-02-29 | End: 2024-02-29

## 2024-02-29 RX ORDER — SODIUM CHLORIDE 5 G/100ML
150 INJECTION, SOLUTION INTRAVENOUS
Refills: 0 | Status: COMPLETED | OUTPATIENT
Start: 2024-02-29 | End: 2024-02-29

## 2024-02-29 RX ORDER — HYDRALAZINE HCL 50 MG
25 TABLET ORAL EVERY 8 HOURS
Refills: 0 | Status: DISCONTINUED | OUTPATIENT
Start: 2024-02-29 | End: 2024-03-02

## 2024-02-29 RX ORDER — LEVOTHYROXINE SODIUM 125 MCG
50 TABLET ORAL
Refills: 0 | Status: DISCONTINUED | OUTPATIENT
Start: 2024-03-01 | End: 2024-03-12

## 2024-02-29 RX ORDER — LEVOTHYROXINE SODIUM 125 MCG
50 TABLET ORAL ONCE
Refills: 0 | Status: COMPLETED | OUTPATIENT
Start: 2024-02-29 | End: 2024-02-29

## 2024-02-29 RX ADMIN — Medication 250 MILLIGRAM(S): at 05:13

## 2024-02-29 RX ADMIN — SENNA PLUS 2 TABLET(S): 8.6 TABLET ORAL at 21:11

## 2024-02-29 RX ADMIN — Medication 25 MILLIGRAM(S): at 13:07

## 2024-02-29 RX ADMIN — OXYCODONE HYDROCHLORIDE 5 MILLIGRAM(S): 5 TABLET ORAL at 09:08

## 2024-02-29 RX ADMIN — SODIUM CHLORIDE 300 MILLILITER(S): 5 INJECTION, SOLUTION INTRAVENOUS at 18:12

## 2024-02-29 RX ADMIN — Medication 25 MILLIGRAM(S): at 21:10

## 2024-02-29 RX ADMIN — POTASSIUM BICARBONATE 25 MILLIEQUIVALENT(S): 978 TABLET, EFFERVESCENT ORAL at 12:41

## 2024-02-29 RX ADMIN — SPIRONOLACTONE 25 MILLIGRAM(S): 25 TABLET, FILM COATED ORAL at 05:11

## 2024-02-29 RX ADMIN — AMIODARONE HYDROCHLORIDE 400 MILLIGRAM(S): 400 TABLET ORAL at 05:11

## 2024-02-29 RX ADMIN — CHLORHEXIDINE GLUCONATE 1 APPLICATION(S): 213 SOLUTION TOPICAL at 12:36

## 2024-02-29 RX ADMIN — Medication 10 MG/HR: at 11:57

## 2024-02-29 RX ADMIN — PANTOPRAZOLE SODIUM 40 MILLIGRAM(S): 20 TABLET, DELAYED RELEASE ORAL at 12:41

## 2024-02-29 RX ADMIN — Medication 81 MILLIGRAM(S): at 12:42

## 2024-02-29 RX ADMIN — Medication 80 MILLIGRAM(S): at 12:20

## 2024-02-29 RX ADMIN — POLYETHYLENE GLYCOL 3350 17 GRAM(S): 17 POWDER, FOR SOLUTION ORAL at 12:41

## 2024-02-29 RX ADMIN — HEPARIN SODIUM 15 UNIT(S)/HR: 5000 INJECTION INTRAVENOUS; SUBCUTANEOUS at 12:42

## 2024-02-29 RX ADMIN — SODIUM CHLORIDE 300 MILLILITER(S): 5 INJECTION, SOLUTION INTRAVENOUS at 12:42

## 2024-02-29 RX ADMIN — POTASSIUM BICARBONATE 25 MILLIEQUIVALENT(S): 978 TABLET, EFFERVESCENT ORAL at 13:00

## 2024-02-29 RX ADMIN — AMIODARONE HYDROCHLORIDE 400 MILLIGRAM(S): 400 TABLET ORAL at 13:07

## 2024-02-29 RX ADMIN — OXYCODONE HYDROCHLORIDE 5 MILLIGRAM(S): 5 TABLET ORAL at 08:38

## 2024-02-29 RX ADMIN — Medication 50 MICROGRAM(S): at 18:12

## 2024-02-29 RX ADMIN — HEPARIN SODIUM 17 UNIT(S)/HR: 5000 INJECTION INTRAVENOUS; SUBCUTANEOUS at 21:11

## 2024-02-29 RX ADMIN — AMIODARONE HYDROCHLORIDE 400 MILLIGRAM(S): 400 TABLET ORAL at 21:11

## 2024-02-29 RX ADMIN — Medication 100 GRAM(S): at 10:30

## 2024-02-29 RX ADMIN — Medication 10 MILLIGRAM(S): at 05:13

## 2024-02-29 NOTE — PROGRESS NOTE ADULT - ASSESSMENT
64 yo man with PMHx of HTN, severe AS, chronic venous stasis, HFpEF who p/w worsening LE swelling to GC  and found to be in decompensated heart failure with EF 35-40% and severe AS so transferred to [facility] for further management on . Was diuresed and underwent LHC which revealed no significant CAD. Underwent bioAVR  w/ JOANNA clip complicated by bleeding. Had been on primacor which was discontinued. Had afib  with hypotension requiring albumin and amio (converted to sinus), placed on  with improvement. Remains overloaded but otherwise perfusing well. Repeat TTE with EF 35%, severe biatrial enlargement dilated IVC, trace pericardial effusion. He remains on dobutamine gtt and lasix gtt. With dobutamine gtt stopped , his O2 sat on VBG dropped from 68 to 52 and so it was put back on.  Pt has been in and out of AF since his AVR. He was in NSR on the evening of  until ~1700 on  at which time he went into AF. He converted to NSR on  at ~0130. He converted back into AF on  at ~0520. He has been 100-110s. Pt was previously on amio gtt and is now on PO amio 400mg q8h. Started on a heparin gtt .     1) S/p bio AVR, AtriClip 24  2) Atrial fibrillation with RVR, paroxysmal post operatively  3) HFrEF with LVEF 30%    - Tele review with conversion to NSR yesterday evening, maintaining NSR today   - Recommended continuation of amiodarone load. Monitor TSH, LFT's while in patient.  Will need out patient monitoring of TSH, LFT's, Opthalmology evaluation and pulmonary function tests. AST 19, ALT 20 on .  TSH 10.80.   consider Endocrine eval  - continue on uninterrupted anticoagulation, heparin gtt started by CTSx.  Convert to oral A/C when able   - Maintain K+>4 and Mag>2  - Continue telemetry monitoring    ELENITA Fox [facility] [place]  [phone]  62 yo man with PMHx of HTN, severe AS, chronic venous stasis, HFpEF who p/w worsening LE swelling to GC  and found to be in decompensated heart failure with EF 35-40% and severe AS so transferred to [facility] for further management on . Was diuresed and underwent LHC which revealed no significant CAD. Underwent bioAVR  w/ JOANNA clip complicated by bleeding. Had been on primacor which was discontinued. Had afib  with hypotension requiring albumin and amio (converted to sinus), placed on  with improvement. Remains overloaded but otherwise perfusing well. Repeat TTE with EF 35%, severe biatrial enlargement dilated IVC, trace pericardial effusion. He remains on dobutamine gtt and lasix gtt. With dobutamine gtt stopped , his O2 sat on VBG dropped from 68 to 52 and so it was put back on.  Pt has been in and out of AF since his AVR. He was in NSR on the evening of  until ~1700 on  at which time he went into AF. He converted to NSR on  at ~0130. He converted back into AF on  at ~0520. He has been 100-110s. Pt was previously on amio gtt and is now on PO amio 400mg q8h. Started on a heparin gtt .     1) S/p bio AVR, AtriClip 24  2) Atrial fibrillation with RVR, paroxysmal post operatively  3) HFrEF with LVEF 30%    - Tele review with conversion to NSR yesterday evening, maintaining NSR today   - Recommended continuation of amiodarone load, 400mg PO Q 8 hours for 12 doses, then 200mg daily there after. Monitor TSH, LFT's while in patient.  Will need out patient monitoring of TSH, LFT's, Opthalmology evaluation and pulmonary function tests. AST 19, ALT 20 on .  TSH was 10.80 on .    consider Endocrine eval for treatment of hypothyroidism.   - continue on uninterrupted anticoagulation, heparin gtt started by CTSx.  Convert to oral A/C when able   - Maintain K+>4 and Mag>2  - Patient has chemically reverted to NSR on Amiodarone EP will sign off. Reconsult as needed.     ELENITA [facility]  [phone]

## 2024-02-29 NOTE — PROGRESS NOTE ADULT - SUBJECTIVE AND OBJECTIVE BOX
Subjective:  - Urinating well but with ongoing LE swelling. No SOB at rest.     Medications:  aMIOdarone    Tablet   Oral   aMIOdarone    Tablet 400 milliGRAM(s) Oral every 8 hours  aMIOdarone Infusion 0.5 mG/Min IV Continuous <Continuous>  aspirin  chewable 81 milliGRAM(s) Oral daily  bisacodyl Suppository 10 milliGRAM(s) Rectal daily PRN  chlorhexidine 2% Cloths 1 Application(s) Topical daily  DOBUTamine Infusion 1.251 MICROgram(s)/kG/Min IV Continuous <Continuous>  furosemide Infusion 20 mG/Hr IV Continuous <Continuous>  heparin  Infusion 800 Unit(s)/Hr IV Continuous <Continuous>  hydrALAZINE 25 milliGRAM(s) Oral every 8 hours  levothyroxine 50 MICROGram(s) Oral daily  oxyCODONE    IR 5 milliGRAM(s) Oral every 4 hours PRN  oxyCODONE    IR 10 milliGRAM(s) Oral every 4 hours PRN  pantoprazole  Injectable 40 milliGRAM(s) IV Push daily  polyethylene glycol 3350 17 Gram(s) Oral daily  potassium bicarbonate/potassium citrate 25 milliEquivalent(s) Oral every 1 hour  senna 2 Tablet(s) Oral at bedtime  sodium chloride 3% Bolus 150 milliLiter(s) IV Bolus <User Schedule>  spironolactone 25 milliGRAM(s) Oral daily    Physical Exam:    Vitals:  Vital Signs Last 24 Hours  T(C): 37.1 (02-29-24 @ 10:58), Max: 37.3 (02-28-24 @ 23:10)  HR: 83 (02-29-24 @ 10:58) (71 - 109)  BP: 124/69 (02-29-24 @ 10:58) (120/80 - 138/74)  RR: 18 (02-29-24 @ 10:58) (18 - 18)  SpO2: 96% (02-29-24 @ 10:58) (92% - 98%)    I&O's Summary    28 Feb 2024 07:01  -  29 Feb 2024 07:00  --------------------------------------------------------  IN: 1214.6 mL / OUT: 3201 mL / NET: -1986.4 mL    29 Feb 2024 07:01  -  29 Feb 2024 12:53  --------------------------------------------------------  IN: 385.9 mL / OUT: 650 mL / NET: -264.1 mL    Tele: SR 70-80s    General: No distress. Comfortable.  HEENT: EOM intact.  Neck: JVP 14-16 cm H2O with HJR  Chest: Clear to auscultation bilaterally  CV: Normal S1 and S2. No murmurs, rub, or gallops. Radial pulses normal.  Abdomen: Soft, non-distended, non-tender  Extremities: Warm peripherally. 1-2+ BLE edema  Neurology: Alert and oriented times three. Sensation intact  Psych: Affect normal    Labs:                        8.3    7.73  )-----------( 159      ( 29 Feb 2024 06:59 )             25.3     02-29    136  |  98  |  35<H>  ----------------------------<  105<H>  4.1   |  25  |  1.86<H>    Ca    8.7      29 Feb 2024 06:56  Mg     1.8     02-29      PT/INR - ( 28 Feb 2024 04:23 )   PT: 12.4 sec;   INR: 1.13 ratio         PTT - ( 29 Feb 2024 06:59 )  PTT:38.4 sec

## 2024-02-29 NOTE — PROGRESS NOTE ADULT - PROBLEM SELECTOR PLAN 2
- possibly cardiorenal  - Cr on admission 1.7 and peaked to 2.5  - Had normalized, but now elevated between 1.7-1.8  - diuretics as above

## 2024-02-29 NOTE — PROGRESS NOTE ADULT - SUBJECTIVE AND OBJECTIVE BOX
24H hour events: Patient resting comfortably in chair.     MEDICATIONS:  aMIOdarone    Tablet   Oral   aMIOdarone    Tablet 400 milliGRAM(s) Oral every 8 hours  aMIOdarone Infusion 0.5 mG/Min IV Continuous <Continuous>  aspirin  chewable 81 milliGRAM(s) Oral daily  DOBUTamine Infusion 1.251 MICROgram(s)/kG/Min IV Continuous <Continuous>  furosemide Infusion 20 mG/Hr IV Continuous <Continuous>  heparin  Infusion 800 Unit(s)/Hr IV Continuous <Continuous>  spironolactone 25 milliGRAM(s) Oral daily    vancomycin  IVPB 750 milliGRAM(s) IV Intermittent every 12 hours    oxyCODONE    IR 5 milliGRAM(s) Oral every 4 hours PRN  oxyCODONE    IR 10 milliGRAM(s) Oral every 4 hours PRN    bisacodyl Suppository 10 milliGRAM(s) Rectal daily PRN  pantoprazole  Injectable 40 milliGRAM(s) IV Push daily  polyethylene glycol 3350 17 Gram(s) Oral daily  senna 2 Tablet(s) Oral at bedtime      chlorhexidine 2% Cloths 1 Application(s) Topical daily      REVIEW OF SYSTEMS:  Complete 12point ROS negative.    PHYSICAL EXAM:  T(C): 37.2 (02-29-24 @ 06:53), Max: 37.3 (02-28-24 @ 23:10)  HR: 77 (02-29-24 @ 06:53) (71 - 109)  BP: 131/100 (02-29-24 @ 06:53) (120/80 - 138/74)  RR: 18 (02-29-24 @ 06:53) (18 - 18)  SpO2: 95% (02-29-24 @ 06:53) (92% - 98%)    28 Feb 2024 07:01  -  29 Feb 2024 07:00  --------------------------------------------------------  IN: 1214.6 mL / OUT: 3201 mL / NET: -1986.4 mL    29 Feb 2024 07:01  -  29 Feb 2024 11:42  --------------------------------------------------------  IN: 333.9 mL / OUT: 500 mL / NET: -166.1 mL    Appearance: Normal	  HEENT:   Normal oral mucosa, PERRL, EOMI	  Cardiovascular: Normal S1 S2, regular. No JVD, No murmurs, No edema  Respiratory: Lungs clear to auscultation	  Psychiatry: A & O x 3, Mood & affect appropriate  Gastrointestinal:  Soft, Non-tender, + BS	  Skin: RIJ central line, mid chest incision   Extremities: Normal range of motion, No clubbing, cyanosis or edema  Vascular: Peripheral pulses palpable 2+ bilaterally      LABS:	 	    CBC Full  -  ( 29 Feb 2024 06:59 )  WBC Count : 7.73 K/uL  Hemoglobin : 8.3 g/dL  Hematocrit : 25.3 %  Platelet Count - Automated : 159 K/uL  Mean Cell Volume : 75.7 fl  Mean Cell Hemoglobin : 24.9 pg  Mean Cell Hemoglobin Concentration : 32.8 gm/dL  Auto Neutrophil # : x  Auto Lymphocyte # : x  Auto Monocyte # : x  Auto Eosinophil # : x  Auto Basophil # : x  Auto Neutrophil % : x  Auto Lymphocyte % : x  Auto Monocyte % : x  Auto Eosinophil % : x  Auto Basophil % : x    02-29    136  |  98  |  35<H>  ----------------------------<  105<H>  4.1   |  25  |  1.86<H>  02-28    138  |  100  |  34<H>  ----------------------------<  116<H>  4.9   |  26  |  1.88<H>    Ca    8.7      29 Feb 2024 06:56  Ca    8.6      28 Feb 2024 23:14  Mg     1.8     02-29  Mg     1.8     02-28      TELEMETRY: 	NSR 70-80's, converted to NSR approximately 1906 yesterday evening   TTE:       TRANSTHORACIC ECHOCARDIOGRAM REPORT  Pt. Name:       GRACE FERNANDEZ Study Date:    2/26/2024  MRN:            [id number]     YOB: 1960  Accession #:    [id number]      Age:           63 years  Account#:       [id number]   Gender:        M  Heart Rate:     70 bpm         Height:        69.69 in (177.00 cm)  Rhythm:         sinus rhythm   Weight: 197.00 lb (89.36 kg)  Blood Pressure: 122/66 mmHg    BSA/BMI:       2.07 m² / 28.52 kg/m²  ________________________________________________________________________________________  Referring Physician:    [id number] Ronni Martinez  Interpreting Physician: Josse Alfredo MD  Primary Sonographer:    Kristina Baldwin RDCS    CPT:                ECHO TTE WITH CON COMP W DOPP - .m;DEFINITY ECHO                      CONTRAST PER ML - .m;DEFINITY ECHO CONTRAST PER ML                      WASTED - .m  Indication(s):      Cardiogenic shock - R57.0  Procedure:          Transthoracic echocardiogram with 2-D, M-mode and complete                      spectral and color flow Doppler.  Ordering Location:  [facility]  Admission Status:   Inpatient  Contrast Injection: Verbal consent was obtained for injection of Ultrasonic                      Enhancing Agent following a discussion of risks and                      benefits.                      Endocardial visualization enhanced with 2 ml of Definity                      Ultrasound enhancing agent (Lot#:[id number] Exp.Date:@5 jan                      Discarded Dose:8ml).  UEA Reaction:       Patient had no adverse reaction after injection of                      Ultrasound Enhancing Agent.  Study Information:  Image quality for this study is less than ideal.     CONCLUSIONS:      1. Left ventricular cavity is moderately dilated. Left ventricular wall thickness is normal. Left ventricular systolic function is severely decreased.   2. Moderately enlarged right ventricular cavity size and moderately reduced systolic function.    ________________________________________________________________________________________  FINDINGS:     Left Ventricle:  After obtaining consent, Definity ultrasound enhancing agent was given for enhanced left ventricular opacification and improved delineation of the left ventricular endocardial borders. The left ventricular cavity is moderately dilated. Left ventricular wall thickness is normal. Left ventricular systolic function is severely decreased with an ejection fraction visually estimated at 30%. There is global left ventricular hypokinesis.     Right Ventricle:  Theright ventricular cavity is moderately enlarged in size and moderately reduced systolic function. Tricuspid annular plane systolic excursion (TAPSE) is 0.9 cm (normal >=1.7 cm).     Left Atrium:  The left atrium is severely dilated with an indexed volume of 51.28 ml/m².     Right Atrium:  The right atrium is severely dilated with an indexed volume of 60.95 ml/m².     Interatrial Septum:  The interatrial septum appears intact.     Aortic Valve:  Normal aortic valve.     Mitral Valve:  Structurallynormal mitral valve with normal leaflet excursion. There is no mitral valve stenosis.     Tricuspid Valve:  Structurally normal tricuspid valve with normal leaflet excursion. There is mild tricuspid regurgitation. Estimated pulmonary artery systolic pressure is 25 mmHg.     Pulmonic Valve:  Structurally normal pulmonic valve with normal leaflet excursion. There is trace pulmonic regurgitation.     Aorta:  The aortic root appears normal in size.     Pericardium:  There is a trace pericardial effusion.     Pleura:  Bilateral pleural effusion noted.     Systemic Veins:  The inferior vena cava is normal in size (normal <2.1cm) with normal inspiratory collapse (normal >50%) consistent with normal right atrial pressure (~3, range 0-5mmHg).  ____________________________________________________________________  QUANTITATIVE DATA:  Left Ventricle Measurements: (Indexed to BSA)     IVSd (2D):   1.5 cm  LVPWd (2D):  1.2 cm  LVIDd (2D):  6.0 cm  LVIDs (2D):  4.8 cm  LV Mass:     369 g  178.4 g/m²  Visualized LV EF%: 30%     MV E Vmax:    0.79 m/s  MV A Vmax:    1.11 m/s  MV E/A:       0.71  e' lateral:   10.60 cm/s  e' medial:    4.90 cm/s  E/e' lateral: 7.48  E/e' medial:  16.18  E/e' Average: 10.23  MV DT:        231 msec    Aorta Measurements:(Normal range) (Indexed to BSA)     Sinuses of Valsalva: 2.70 cm (3.1 - 3.7 cm)       Left Atrium Measurements: (Indexed to BSA)  LA Diam 2D:        4.70 cm  LA Vol s, MOD A4C: 106.00 ml.  LA Vol s, MOD A2C: 101.00 ml.  LA Vol s, MOD BP:  106.00 ml  51.28 ml/m²    Right Ventricle Measurements: Right Atrial Measurements:     TAPSE: 0.9 cm                 RA Vol:       126.00 ml                                RA Vol Index: 60.95 ml/m²       LVOT / RVOT/ Qp/Qs Data: (Indexed to BSA)  LVOT Diameter: 1.90 cm  LVOT Vmax:     0.84 m/s  LVOT VTI:      13.60 cm  LVOT SV:       38.6 ml  18.65 ml/m²    Mitral Valve Measurements:     MV E Vmax: 0.8 m/s  MV A Vmax: 1.1 m/s  MV E/A:    0.7       Tricuspid Valve Measurements:     TR Vmax:          2.3 m/s  TR Peak Gradient: 21.5 mmHg  RA Pressure:      3 mmHg  PASP:             25 mmHg    ________________________________________________________________________________________  Electronically signed on 2/26/2024 at 5:55:23 PM by Josse Alfredo MD         *** Final ***       24H hour events: Telemetry review, reverted to NSR yesterday evening     MEDICATIONS:  aMIOdarone    Tablet   Oral   aMIOdarone    Tablet 400 milliGRAM(s) Oral every 8 hours  aMIOdarone Infusion 0.5 mG/Min IV Continuous <Continuous>  aspirin  chewable 81 milliGRAM(s) Oral daily  DOBUTamine Infusion 1.251 MICROgram(s)/kG/Min IV Continuous <Continuous>  furosemide Infusion 20 mG/Hr IV Continuous <Continuous>  heparin  Infusion 800 Unit(s)/Hr IV Continuous <Continuous>  spironolactone 25 milliGRAM(s) Oral daily    vancomycin  IVPB 750 milliGRAM(s) IV Intermittent every 12 hours    oxyCODONE    IR 5 milliGRAM(s) Oral every 4 hours PRN  oxyCODONE    IR 10 milliGRAM(s) Oral every 4 hours PRN    bisacodyl Suppository 10 milliGRAM(s) Rectal daily PRN  pantoprazole  Injectable 40 milliGRAM(s) IV Push daily  polyethylene glycol 3350 17 Gram(s) Oral daily  senna 2 Tablet(s) Oral at bedtime      chlorhexidine 2% Cloths 1 Application(s) Topical daily    PHYSICAL EXAM:  T(C): 37.2 (02-29-24 @ 06:53), Max: 37.3 (02-28-24 @ 23:10)  HR: 77 (02-29-24 @ 06:53) (71 - 109)  BP: 131/100 (02-29-24 @ 06:53) (120/80 - 138/74)  RR: 18 (02-29-24 @ 06:53) (18 - 18)  SpO2: 95% (02-29-24 @ 06:53) (92% - 98%)    28 Feb 2024 07:01  -  29 Feb 2024 07:00  --------------------------------------------------------  IN: 1214.6 mL / OUT: 3201 mL / NET: -1986.4 mL    29 Feb 2024 07:01  -  29 Feb 2024 11:42  --------------------------------------------------------  IN: 333.9 mL / OUT: 500 mL / NET: -166.1 mL      LABS:	 	    CBC Full  -  ( 29 Feb 2024 06:59 )  WBC Count : 7.73 K/uL  Hemoglobin : 8.3 g/dL  Hematocrit : 25.3 %  Platelet Count - Automated : 159 K/uL  Mean Cell Volume : 75.7 fl  Mean Cell Hemoglobin : 24.9 pg  Mean Cell Hemoglobin Concentration : 32.8 gm/dL  Auto Neutrophil # : x  Auto Lymphocyte # : x  Auto Monocyte # : x  Auto Eosinophil # : x  Auto Basophil # : x  Auto Neutrophil % : x  Auto Lymphocyte % : x  Auto Monocyte % : x  Auto Eosinophil % : x  Auto Basophil % : x    02-29    136  |  98  |  35<H>  ----------------------------<  105<H>  4.1   |  25  |  1.86<H>  02-28    138  |  100  |  34<H>  ----------------------------<  116<H>  4.9   |  26  |  1.88<H>    Ca    8.7      29 Feb 2024 06:56  Ca    8.6      28 Feb 2024 23:14  Mg     1.8     02-29  Mg     1.8     02-28      TELEMETRY: 	NSR 70-80's, converted to NSR approximately 1906 yesterday evening   TTE:       TRANSTHORACIC ECHOCARDIOGRAM REPORT  Pt. Name:       GRACE FERNANDEZ Study Date:    2/26/2024  MRN:            [id number]     YOB: 1960  Accession #:    [id number]      Age:           63 years  Account#:       [id number]   Gender:        M  Heart Rate:     70 bpm         Height:        69.69 in (177.00 cm)  Rhythm:         sinus rhythm   Weight: 197.00 lb (89.36 kg)  Blood Pressure: 122/66 mmHg    BSA/BMI:       2.07 m² / 28.52 kg/m²  ________________________________________________________________________________________  Referring Physician:    [id number] Ronni Martinez  Interpreting Physician: Josse Alfredo MD  Primary Sonographer:    Kristina Baldwin RDCS    CPT:                ECHO TTE WITH CON COMP W DOPP - .m;DEFINITY ECHO                      CONTRAST PER ML - .m;DEFINITY ECHO CONTRAST PER ML                      WASTED - .m  Indication(s):      Cardiogenic shock - R57.0  Procedure:          Transthoracic echocardiogram with 2-D, M-mode and complete                      spectral and color flow Doppler.  Ordering Location:  [facility]  Admission Status:   Inpatient  Contrast Injection: Verbal consent was obtained for injection of Ultrasonic                      Enhancing Agent following a discussion of risks and                      benefits.                      Endocardial visualization enhanced with 2 ml of Definity                      Ultrasound enhancing agent (Lot#:[id number] Exp.Date:@5 jan                      Discarded Dose:8ml).  UEA Reaction:       Patient had no adverse reaction after injection of                      Ultrasound Enhancing Agent.  Study Information:  Image quality for this study is less than ideal.     CONCLUSIONS:      1. Left ventricular cavity is moderately dilated. Left ventricular wall thickness is normal. Left ventricular systolic function is severely decreased.   2. Moderately enlarged right ventricular cavity size and moderately reduced systolic function.    ________________________________________________________________________________________  FINDINGS:     Left Ventricle:  After obtaining consent, Definity ultrasound enhancing agent was given for enhanced left ventricular opacification and improved delineation of the left ventricular endocardial borders. The left ventricular cavity is moderately dilated. Left ventricular wall thickness is normal. Left ventricular systolic function is severely decreased with an ejection fraction visually estimated at 30%. There is global left ventricular hypokinesis.     Right Ventricle:  Theright ventricular cavity is moderately enlarged in size and moderately reduced systolic function. Tricuspid annular plane systolic excursion (TAPSE) is 0.9 cm (normal >=1.7 cm).     Left Atrium:  The left atrium is severely dilated with an indexed volume of 51.28 ml/m².     Right Atrium:  The right atrium is severely dilated with an indexed volume of 60.95 ml/m².     Interatrial Septum:  The interatrial septum appears intact.     Aortic Valve:  Normal aortic valve.     Mitral Valve:  Structurallynormal mitral valve with normal leaflet excursion. There is no mitral valve stenosis.     Tricuspid Valve:  Structurally normal tricuspid valve with normal leaflet excursion. There is mild tricuspid regurgitation. Estimated pulmonary artery systolic pressure is 25 mmHg.     Pulmonic Valve:  Structurally normal pulmonic valve with normal leaflet excursion. There is trace pulmonic regurgitation.     Aorta:  The aortic root appears normal in size.     Pericardium:  There is a trace pericardial effusion.     Pleura:  Bilateral pleural effusion noted.     Systemic Veins:  The inferior vena cava is normal in size (normal <2.1cm) with normal inspiratory collapse (normal >50%) consistent with normal right atrial pressure (~3, range 0-5mmHg).  ____________________________________________________________________  QUANTITATIVE DATA:  Left Ventricle Measurements: (Indexed to BSA)     IVSd (2D):   1.5 cm  LVPWd (2D):  1.2 cm  LVIDd (2D):  6.0 cm  LVIDs (2D):  4.8 cm  LV Mass:     369 g  178.4 g/m²  Visualized LV EF%: 30%     MV E Vmax:    0.79 m/s  MV A Vmax:    1.11 m/s  MV E/A:       0.71  e' lateral:   10.60 cm/s  e' medial:    4.90 cm/s  E/e' lateral: 7.48  E/e' medial:  16.18  E/e' Average: 10.23  MV DT:        231 msec    Aorta Measurements:(Normal range) (Indexed to BSA)     Sinuses of Valsalva: 2.70 cm (3.1 - 3.7 cm)       Left Atrium Measurements: (Indexed to BSA)  LA Diam 2D:        4.70 cm  LA Vol s, MOD A4C: 106.00 ml.  LA Vol s, MOD A2C: 101.00 ml.  LA Vol s, MOD BP:  106.00 ml  51.28 ml/m²    Right Ventricle Measurements: Right Atrial Measurements:     TAPSE: 0.9 cm                 RA Vol:       126.00 ml                                RA Vol Index: 60.95 ml/m²       LVOT / RVOT/ Qp/Qs Data: (Indexed to BSA)  LVOT Diameter: 1.90 cm  LVOT Vmax:     0.84 m/s  LVOT VTI:      13.60 cm  LVOT SV:       38.6 ml  18.65 ml/m²    Mitral Valve Measurements:     MV E Vmax: 0.8 m/s  MV A Vmax: 1.1 m/s  MV E/A:    0.7       Tricuspid Valve Measurements:     TR Vmax:          2.3 m/s  TR Peak Gradient: 21.5 mmHg  RA Pressure:      3 mmHg  PASP:             25 mmHg    ________________________________________________________________________________________  Electronically signed on 2/26/2024 at 5:55:23 PM by Josse Alfredo MD    *** Final ***

## 2024-02-29 NOTE — PROGRESS NOTE ADULT - NS ATTEND AMEND GEN_ALL_CORE FT
Converted to sinus. BP improved. Remains overload with suboptimal response to lasix gtt. Labs reviewed - relatively stable.   - increase lasix as above  - HT saline x1  - increase hydral to 25 mg q8h  - c/w keanu 25 mg daily   - d/c ; if VBG ok, would d/c central line   - trend labs twice/day

## 2024-02-29 NOTE — PROGRESS NOTE ADULT - PROBLEM SELECTOR PLAN 1
- Mirela draw VBG from central line this afternoon after HDZN increased and if CO adequate, will consider stopping  1.25 mcg/kg/min   - Bolus with Lasix 80 mg IVP x1, then increase Lasix gtt to 20 mg/hr  - 3% Hypertonic saline 150 cc over 30 mins twice a day  - Increase HDZN to 25 mg TID, hold for SBP <90  - continue Spironolactone 25 mg QD  - BMP and Mg q12hrs. Replete to target K 4-4.5 and Mg 2-2.5  - Continue with BLE ACE wrap to mobilize fluid

## 2024-02-29 NOTE — PROGRESS NOTE ADULT - SUBJECTIVE AND OBJECTIVE BOX
VITAL SIGNS    Telemetry:  nsr 80    Vital Signs Last 24 Hrs  T(C): 37.1 (02-29-24 @ 10:58), Max: 37.3 (02-28-24 @ 23:10)  T(F): 98.8 (02-29-24 @ 10:58), Max: 99.1 (02-28-24 @ 23:10)  HR: 83 (02-29-24 @ 10:58) (71 - 109)  BP: 124/69 (02-29-24 @ 10:58) (120/80 - 138/74)  RR: 18 (02-29-24 @ 10:58) (18 - 18)  SpO2: 96% (02-29-24 @ 10:58) (92% - 98%)                   02-28 @ 07:01  -  02-29 @ 07:00  --------------------------------------------------------  IN: 1214.6 mL / OUT: 3201 mL / NET: -1986.4 mL    02-29 @ 07:01  -  02-29 @ 12:26  --------------------------------------------------------  IN: 333.9 mL / OUT: 500 mL / NET: -166.1 mL          Daily     Daily             CAPILLARY BLOOD GLUCOSE                Drains:         Pacing Wires          Coumadin    [ ] YES          [x  ]      NO                                   PHYSICAL EXAM  Neurology: alert and oriented x 3, nonfocal, no gross deficits  CV : s1 s2 RRR  R ij triple lumen cdi  Sternal Wound :  CDI , Stable  Lungs: cta  Abdomen: soft, nontender, nondistended, positive bowel sounds, last bowel movement +                     R axillary a line  :    garcia - sbd         Extremities:   +   edema  ace wrap  /  -   calve tenderness ,                  aMIOdarone    Tablet   Oral   aMIOdarone    Tablet 400 milliGRAM(s) Oral every 8 hours  aMIOdarone Infusion 0.5 mG/Min IV Continuous <Continuous>  aspirin  chewable 81 milliGRAM(s) Oral daily  bisacodyl Suppository 10 milliGRAM(s) Rectal daily PRN  chlorhexidine 2% Cloths 1 Application(s) Topical daily  DOBUTamine Infusion 1.251 MICROgram(s)/kG/Min IV Continuous <Continuous>  furosemide   Injectable 80 milliGRAM(s) IV Push once  furosemide Infusion 20 mG/Hr IV Continuous <Continuous>  heparin  Infusion 800 Unit(s)/Hr IV Continuous <Continuous>  hydrALAZINE 25 milliGRAM(s) Oral every 8 hours  levothyroxine 50 MICROGram(s) Oral daily  oxyCODONE    IR 5 milliGRAM(s) Oral every 4 hours PRN  oxyCODONE    IR 10 milliGRAM(s) Oral every 4 hours PRN  pantoprazole  Injectable 40 milliGRAM(s) IV Push daily  polyethylene glycol 3350 17 Gram(s) Oral daily  potassium bicarbonate/potassium citrate 25 milliEquivalent(s) Oral every 1 hour  senna 2 Tablet(s) Oral at bedtime  sodium chloride 3% Bolus 150 milliLiter(s) IV Bolus <User Schedule>  spironolactone 25 milliGRAM(s) Oral daily                    Physical Therapy Rec:   Home  [  ]   Home w/ PT  [  ]  Rehab  [  ]  Discussed with Cardiothoracic Team at AM rounds.

## 2024-02-29 NOTE — PROGRESS NOTE ADULT - PROBLEM SELECTOR PLAN 4
- paroxysmal  - s/p JOANNA clip  - on amio  - On AC with Heparin infusion   - EP following, considering DCCV after further Amiodarone load if AF persists

## 2024-02-29 NOTE — PROGRESS NOTE ADULT - SUBJECTIVE AND OBJECTIVE BOX
S/p AVR, JOANNA clip , no distress, on RA    Vital Signs Last 24 Hrs  T(C): 37.1 (24 @ 10:58), Max: 37.3 (24 @ 23:10)  T(F): 98.8 (24 @ 10:58), Max: 99.1 (24 @ 23:10)  HR: 83 (24 @ 12:53) (71 - 109)  BP: 114/69 (24 @ 12:53) (114/69 - 138/74)  BP(mean): 86 (24 @ 12:53) (82 - 113)  RR: 18 (24 @ 10:58) (18 - 18)  SpO2: 96% (24 @ 10:58) (92% - 98%)    I&O's Detail    2024 07:01  -  2024 07:00  --------------------------------------------------------  IN:    DOBUTamine: 81.6 mL    Furosemide: 20 mL    Furosemide: 150 mL    Heparin: 273 mL    Oral Fluid: 690 mL  Total IN: 1214.6 mL    OUT:    Indwelling Catheter - Urethral (mL): 3200 mL    Stool (mL): 1 mL  Total OUT: 3201 mL    2024 07:01  -  2024 13:45  --------------------------------------------------------  IN:    DOBUTamine: 20.4 mL    Furosemide: 37.5 mL    Furosemide: 20 mL    Heparin: 88 mL    Oral Fluid: 460 mL  Total IN: 625.9 mL    OUT:    Indwelling Catheter - Urethral (mL): 650 mL  Total OUT: 650 mL    s1s2  b/l air entry  soft, ND  b/l LE edema                                                                                  8.3    7.73  )-----------( 159      ( 2024 06:59 )             25.3     2024 06:56    136    |  98     |  35     ----------------------------<  105    4.1     |  25     |  1.86     Ca    8.7        2024 06:56  Mg     1.8       2024 06:56    PT/INR - ( 2024 04:23 )   PT: 12.4 sec;   INR: 1.13 ratio      aMIOdarone    Tablet 400 milliGRAM(s) Oral every 8 hours  aMIOdarone    Tablet   Oral   aMIOdarone Infusion 0.5 mG/Min IV Continuous <Continuous>  aspirin  chewable 81 milliGRAM(s) Oral daily  bisacodyl Suppository 10 milliGRAM(s) Rectal daily PRN  chlorhexidine 2% Cloths 1 Application(s) Topical daily  DOBUTamine Infusion 1.251 MICROgram(s)/kG/Min IV Continuous <Continuous>  furosemide Infusion 20 mG/Hr IV Continuous <Continuous>  heparin  Infusion 800 Unit(s)/Hr IV Continuous <Continuous>  hydrALAZINE 25 milliGRAM(s) Oral every 8 hours  levothyroxine 50 MICROGram(s) Oral once  oxyCODONE    IR 10 milliGRAM(s) Oral every 4 hours PRN  oxyCODONE    IR 5 milliGRAM(s) Oral every 4 hours PRN  pantoprazole  Injectable 40 milliGRAM(s) IV Push daily  polyethylene glycol 3350 17 Gram(s) Oral daily  potassium bicarbonate/potassium citrate 25 milliEquivalent(s) Oral every 1 hour  senna 2 Tablet(s) Oral at bedtime  sodium chloride 3% Bolus 150 milliLiter(s) IV Bolus <User Schedule>  spironolactone 25 milliGRAM(s) Oral daily    A/P:    CM, EF 30%, severe AS, Afib, CHF  Tx from [facility] to [facility] , s/p [facility]   S/p cardio-renal/hemodynamic LINDA (peak Cr 2.5 - 2/3, lorna Cr 1.23 - )   UA negative   Imaging w/o hydro   S/p CT w/IV dye 24  Stable renal fx post CT  S/p liver biopsy   S/p AVR, JOANNA clip   Hemodynamic/cardio-renal LINDA/CKD   Volume overload   On , Lasix qtt, Aldactone  Overall stable renal fx  Avoid nephrotoxins as possible   F/u YADIRA CHILDERS    [phone]

## 2024-02-29 NOTE — PROGRESS NOTE ADULT - ASSESSMENT
Briefly, 64 y/o M w/ h/o HTN, severe AS, chronic venous stasis, HFpEF who p/w worsening LE swelling to GC  and found to be in decompensated heart failure with EF 35-40% and severe AS so transferred for further management on . Was diuresed and underwent LHC which was non-obstructive. Underwent bioAVR  w/ JOANNA clip complicated by bleeding. Had been on primacor which was discontinued. Had afib  with hypotension requiring albumin and amio (converted to sinus), placed on  with improvement. Remains overloaded but otherwise perfusing well. Repeat TTE  with EF 25-30%, severe biatrial enlargement, mild RV dysfunction, LVOT VTI 13 cm, dilated IVC, trace pericardial effusion. Had recurrent afib, started on Amiodarone load.     Remains volume expanded, will further augment diuretic regimen and escalate afterload reducing agents. To obtain VBG from central line this afternoon and if CO/CI (F) adequate, trial wean off low dose .     Cardiac Studies  ·	TTE 24: LVIDd 6 cm, LVEF 30% (global), mod RVE with mod reduced function (TAPSE 0.9), severe ARAVIND, mild TR, est PASP 25 mmHg, trace pericardial effusion, IVC normal in size

## 2024-02-29 NOTE — PROGRESS NOTE ADULT - ASSESSMENT
63M, appears older than stated age, admitted to [facility] 24 PMHx HTN, GERD, HPL, chronic venous stasis dermatitis with bilateral leg swelling, and AS presented to [facility] on 2024 for bilateral leg pain and swelling found to have new acute HFrEF (EF 35-40% 2024) in setting of severe AS.     Hospital course:  24: Admitted to [facility] new onset HFrEF EF 35% and anemia 2/2 AS,   24: medically stable for transferred to [facility] medicine service for continued care. during hospitalization hx of WCT/ PAF  Consults for Nephrology, HF, ID, Podiatry for medical optimization  2/15: 1 U PRBC, keep Hgb > 8.    s/p fall with headastrike   : s/p Liver biopsy, soft pressure post procedure--responded to fluid bolus  :  RLE doppler: No pseudoaneurysm in the right groin/ bilobed avascular fluid collection measuring 2.8 x 1.7 x 2.0 cm     : AVR/JOANNA Clip c/b intraop bleeding requirinu pRBCs on CPB 1u pRBCs post-CPB 1 PLT 1000 FEIBA  Prolene suture placed around IJ and V wire  : To Step Down, raegan, CT, +PW, marlena RIJ   Maintain mediastinal chest tubes Primicor discontinued. Lasix 40 BID initiated , Wound care to chronic venous stasis b/l LE. D/c RIJ   VSS - 9 hrs afib last evening- SR since 1:30am- on AMio gtt,  gtt - s/p right axillary marlena placed by Intensivist. garcia placed last evening lasix 40 iv x 1 given this am- echo tr jon eff.   VSS - currently on lasix gtt - negative 2L in last 24 hrs. bid basic metabolic pt converted back into afib this am 100-120- currently on amio load - dobutrex gtt. d/c planning    d/c yesterday then resumed for decreased UO.  Remains a fib since yesterday, cont amio/heparin, eps following         Weight increased, lasix 80 iv x 1, then infusion increased , aldactone added .  Remains on heparin infusion.          Hydral added for afterload    NSR, on amio, EPS following, noted elevated tsh.  Repeat sent synthroid started.  Fluid overload, diuretics increased, 3% saline x 2, lasix 80iv x 1, lasix infusion 20mg/hr

## 2024-03-01 DIAGNOSIS — R50.9 FEVER, UNSPECIFIED: ICD-10-CM

## 2024-03-01 LAB
ANION GAP SERPL CALC-SCNC: 11 MMOL/L — SIGNIFICANT CHANGE UP (ref 5–17)
ANION GAP SERPL CALC-SCNC: 12 MMOL/L — SIGNIFICANT CHANGE UP (ref 5–17)
ANION GAP SERPL CALC-SCNC: 14 MMOL/L — SIGNIFICANT CHANGE UP (ref 5–17)
APPEARANCE UR: CLEAR — SIGNIFICANT CHANGE UP
APTT BLD: 27.4 SEC — SIGNIFICANT CHANGE UP (ref 24.5–35.6)
APTT BLD: 54.1 SEC — HIGH (ref 24.5–35.6)
APTT BLD: >200 SEC — CRITICAL HIGH (ref 24.5–35.6)
BILIRUB UR-MCNC: NEGATIVE — SIGNIFICANT CHANGE UP
BLD GP AB SCN SERPL QL: NEGATIVE — SIGNIFICANT CHANGE UP
BUN SERPL-MCNC: 35 MG/DL — HIGH (ref 7–23)
BUN SERPL-MCNC: 38 MG/DL — HIGH (ref 7–23)
BUN SERPL-MCNC: 39 MG/DL — HIGH (ref 7–23)
CALCIUM SERPL-MCNC: 8.4 MG/DL — SIGNIFICANT CHANGE UP (ref 8.4–10.5)
CALCIUM SERPL-MCNC: 8.5 MG/DL — SIGNIFICANT CHANGE UP (ref 8.4–10.5)
CALCIUM SERPL-MCNC: 8.5 MG/DL — SIGNIFICANT CHANGE UP (ref 8.4–10.5)
CHLORIDE SERPL-SCNC: 94 MMOL/L — LOW (ref 96–108)
CO2 SERPL-SCNC: 27 MMOL/L — SIGNIFICANT CHANGE UP (ref 22–31)
CO2 SERPL-SCNC: 29 MMOL/L — SIGNIFICANT CHANGE UP (ref 22–31)
CO2 SERPL-SCNC: 29 MMOL/L — SIGNIFICANT CHANGE UP (ref 22–31)
COLOR SPEC: YELLOW — SIGNIFICANT CHANGE UP
CREAT SERPL-MCNC: 1.79 MG/DL — HIGH (ref 0.5–1.3)
CREAT SERPL-MCNC: 2.06 MG/DL — HIGH (ref 0.5–1.3)
CREAT SERPL-MCNC: 2.07 MG/DL — HIGH (ref 0.5–1.3)
DIFF PNL FLD: ABNORMAL
EGFR: 35 ML/MIN/1.73M2 — LOW
EGFR: 36 ML/MIN/1.73M2 — LOW
EGFR: 42 ML/MIN/1.73M2 — LOW
FLUAV AG NPH QL: SIGNIFICANT CHANGE UP
FLUBV AG NPH QL: SIGNIFICANT CHANGE UP
GAS PNL BLDV: SIGNIFICANT CHANGE UP
GLUCOSE SERPL-MCNC: 120 MG/DL — HIGH (ref 70–99)
GLUCOSE SERPL-MCNC: 130 MG/DL — HIGH (ref 70–99)
GLUCOSE SERPL-MCNC: 161 MG/DL — HIGH (ref 70–99)
GLUCOSE UR QL: NEGATIVE MG/DL — SIGNIFICANT CHANGE UP
GRAM STN FLD: ABNORMAL
HCT VFR BLD CALC: 23.1 % — LOW (ref 39–50)
HGB BLD-MCNC: 7.6 G/DL — LOW (ref 13–17)
KETONES UR-MCNC: NEGATIVE MG/DL — SIGNIFICANT CHANGE UP
LEUKOCYTE ESTERASE UR-ACNC: ABNORMAL
MAGNESIUM SERPL-MCNC: 1.9 MG/DL — SIGNIFICANT CHANGE UP (ref 1.6–2.6)
MCHC RBC-ENTMCNC: 24.9 PG — LOW (ref 27–34)
MCHC RBC-ENTMCNC: 32.9 GM/DL — SIGNIFICANT CHANGE UP (ref 32–36)
MCV RBC AUTO: 75.7 FL — LOW (ref 80–100)
METHOD TYPE: SIGNIFICANT CHANGE UP
NITRITE UR-MCNC: NEGATIVE — SIGNIFICANT CHANGE UP
NRBC # BLD: 0 /100 WBCS — SIGNIFICANT CHANGE UP (ref 0–0)
PH UR: 6.5 — SIGNIFICANT CHANGE UP (ref 5–8)
PHOSPHATE SERPL-MCNC: 3.4 MG/DL — SIGNIFICANT CHANGE UP (ref 2.5–4.5)
PLATELET # BLD AUTO: 130 K/UL — LOW (ref 150–400)
POTASSIUM SERPL-MCNC: 3.8 MMOL/L — SIGNIFICANT CHANGE UP (ref 3.5–5.3)
POTASSIUM SERPL-MCNC: 3.9 MMOL/L — SIGNIFICANT CHANGE UP (ref 3.5–5.3)
POTASSIUM SERPL-MCNC: 3.9 MMOL/L — SIGNIFICANT CHANGE UP (ref 3.5–5.3)
POTASSIUM SERPL-SCNC: 3.8 MMOL/L — SIGNIFICANT CHANGE UP (ref 3.5–5.3)
POTASSIUM SERPL-SCNC: 3.9 MMOL/L — SIGNIFICANT CHANGE UP (ref 3.5–5.3)
POTASSIUM SERPL-SCNC: 3.9 MMOL/L — SIGNIFICANT CHANGE UP (ref 3.5–5.3)
PROT UR-MCNC: NEGATIVE MG/DL — SIGNIFICANT CHANGE UP
RBC # BLD: 3.05 M/UL — LOW (ref 4.2–5.8)
RBC # FLD: 31.6 % — HIGH (ref 10.3–14.5)
RH IG SCN BLD-IMP: POSITIVE — SIGNIFICANT CHANGE UP
RSV RNA NPH QL NAA+NON-PROBE: SIGNIFICANT CHANGE UP
S MARCESCENS DNA BLD POS NAA+NON-PROBE: SIGNIFICANT CHANGE UP
SARS-COV-2 RNA SPEC QL NAA+PROBE: SIGNIFICANT CHANGE UP
SODIUM SERPL-SCNC: 134 MMOL/L — LOW (ref 135–145)
SODIUM SERPL-SCNC: 135 MMOL/L — SIGNIFICANT CHANGE UP (ref 135–145)
SODIUM SERPL-SCNC: 135 MMOL/L — SIGNIFICANT CHANGE UP (ref 135–145)
SP GR SPEC: 1.01 — SIGNIFICANT CHANGE UP (ref 1–1.03)
SPECIMEN SOURCE: SIGNIFICANT CHANGE UP
UROBILINOGEN FLD QL: 2 MG/DL (ref 0.2–1)
VANCOMYCIN TROUGH SERPL-MCNC: 10 UG/ML — SIGNIFICANT CHANGE UP (ref 10–20)
WBC # BLD: 10.2 K/UL — SIGNIFICANT CHANGE UP (ref 3.8–10.5)
WBC # FLD AUTO: 10.2 K/UL — SIGNIFICANT CHANGE UP (ref 3.8–10.5)

## 2024-03-01 PROCEDURE — 99233 SBSQ HOSP IP/OBS HIGH 50: CPT

## 2024-03-01 RX ORDER — POTASSIUM BICARBONATE 978 MG/1
25 TABLET, EFFERVESCENT ORAL ONCE
Refills: 0 | Status: COMPLETED | OUTPATIENT
Start: 2024-03-01 | End: 2024-03-01

## 2024-03-01 RX ORDER — SODIUM CHLORIDE 5 G/100ML
150 INJECTION, SOLUTION INTRAVENOUS
Refills: 0 | Status: DISCONTINUED | OUTPATIENT
Start: 2024-03-01 | End: 2024-03-04

## 2024-03-01 RX ORDER — VANCOMYCIN HCL 1 G
750 VIAL (EA) INTRAVENOUS ONCE
Refills: 0 | Status: COMPLETED | OUTPATIENT
Start: 2024-03-01 | End: 2024-03-01

## 2024-03-01 RX ORDER — MAGNESIUM SULFATE 500 MG/ML
2 VIAL (ML) INJECTION ONCE
Refills: 0 | Status: COMPLETED | OUTPATIENT
Start: 2024-03-01 | End: 2024-03-01

## 2024-03-01 RX ORDER — LANOLIN ALCOHOL/MO/W.PET/CERES
3 CREAM (GRAM) TOPICAL ONCE
Refills: 0 | Status: COMPLETED | OUTPATIENT
Start: 2024-03-01 | End: 2024-03-01

## 2024-03-01 RX ORDER — CHLOROTHIAZIDE 500 MG
500 TABLET ORAL
Refills: 0 | Status: DISCONTINUED | OUTPATIENT
Start: 2024-03-01 | End: 2024-03-04

## 2024-03-01 RX ORDER — SODIUM CHLORIDE 5 G/100ML
150 INJECTION, SOLUTION INTRAVENOUS ONCE
Refills: 0 | Status: COMPLETED | OUTPATIENT
Start: 2024-03-01 | End: 2024-03-01

## 2024-03-01 RX ORDER — CEFEPIME 1 G/1
2000 INJECTION, POWDER, FOR SOLUTION INTRAMUSCULAR; INTRAVENOUS ONCE
Refills: 0 | Status: COMPLETED | OUTPATIENT
Start: 2024-03-01 | End: 2024-03-01

## 2024-03-01 RX ORDER — BUMETANIDE 0.25 MG/ML
4 INJECTION INTRAMUSCULAR; INTRAVENOUS ONCE
Refills: 0 | Status: COMPLETED | OUTPATIENT
Start: 2024-03-01 | End: 2024-03-01

## 2024-03-01 RX ORDER — BUMETANIDE 0.25 MG/ML
2 INJECTION INTRAMUSCULAR; INTRAVENOUS
Qty: 20 | Refills: 0 | Status: DISCONTINUED | OUTPATIENT
Start: 2024-03-01 | End: 2024-03-03

## 2024-03-01 RX ORDER — CHLOROTHIAZIDE 500 MG
500 TABLET ORAL ONCE
Refills: 0 | Status: COMPLETED | OUTPATIENT
Start: 2024-03-01 | End: 2024-03-01

## 2024-03-01 RX ORDER — CEFEPIME 1 G/1
1000 INJECTION, POWDER, FOR SOLUTION INTRAMUSCULAR; INTRAVENOUS EVERY 12 HOURS
Refills: 0 | Status: DISCONTINUED | OUTPATIENT
Start: 2024-03-01 | End: 2024-03-07

## 2024-03-01 RX ORDER — BUMETANIDE 0.25 MG/ML
4 INJECTION INTRAMUSCULAR; INTRAVENOUS ONCE
Refills: 0 | Status: DISCONTINUED | OUTPATIENT
Start: 2024-03-01 | End: 2024-03-01

## 2024-03-01 RX ADMIN — BUMETANIDE 10 MG/HR: 0.25 INJECTION INTRAMUSCULAR; INTRAVENOUS at 16:43

## 2024-03-01 RX ADMIN — CHLORHEXIDINE GLUCONATE 1 APPLICATION(S): 213 SOLUTION TOPICAL at 12:23

## 2024-03-01 RX ADMIN — Medication 25 MILLIGRAM(S): at 13:46

## 2024-03-01 RX ADMIN — PANTOPRAZOLE SODIUM 40 MILLIGRAM(S): 20 TABLET, DELAYED RELEASE ORAL at 12:05

## 2024-03-01 RX ADMIN — Medication 650 MILLIGRAM(S): at 12:20

## 2024-03-01 RX ADMIN — POTASSIUM BICARBONATE 25 MILLIEQUIVALENT(S): 978 TABLET, EFFERVESCENT ORAL at 17:54

## 2024-03-01 RX ADMIN — BUMETANIDE 132 MILLIGRAM(S): 0.25 INJECTION INTRAMUSCULAR; INTRAVENOUS at 16:08

## 2024-03-01 RX ADMIN — CEFEPIME 100 MILLIGRAM(S): 1 INJECTION, POWDER, FOR SOLUTION INTRAMUSCULAR; INTRAVENOUS at 21:01

## 2024-03-01 RX ADMIN — Medication 25 MILLIGRAM(S): at 05:21

## 2024-03-01 RX ADMIN — Medication 25 MILLIGRAM(S): at 21:01

## 2024-03-01 RX ADMIN — Medication 650 MILLIGRAM(S): at 05:20

## 2024-03-01 RX ADMIN — Medication 650 MILLIGRAM(S): at 04:48

## 2024-03-01 RX ADMIN — Medication 25 GRAM(S): at 13:46

## 2024-03-01 RX ADMIN — SODIUM CHLORIDE 300 MILLILITER(S): 5 INJECTION, SOLUTION INTRAVENOUS at 12:05

## 2024-03-01 RX ADMIN — Medication 100 MILLIGRAM(S): at 11:49

## 2024-03-01 RX ADMIN — Medication 100 MILLIGRAM(S): at 17:53

## 2024-03-01 RX ADMIN — SPIRONOLACTONE 25 MILLIGRAM(S): 25 TABLET, FILM COATED ORAL at 05:21

## 2024-03-01 RX ADMIN — CEFEPIME 100 MILLIGRAM(S): 1 INJECTION, POWDER, FOR SOLUTION INTRAMUSCULAR; INTRAVENOUS at 12:05

## 2024-03-01 RX ADMIN — Medication 3 MILLIGRAM(S): at 23:42

## 2024-03-01 RX ADMIN — AMIODARONE HYDROCHLORIDE 400 MILLIGRAM(S): 400 TABLET ORAL at 13:46

## 2024-03-01 RX ADMIN — Medication 50 MICROGRAM(S): at 09:40

## 2024-03-01 RX ADMIN — Medication 250 MILLIGRAM(S): at 11:57

## 2024-03-01 RX ADMIN — Medication 81 MILLIGRAM(S): at 12:05

## 2024-03-01 RX ADMIN — Medication 650 MILLIGRAM(S): at 11:20

## 2024-03-01 RX ADMIN — AMIODARONE HYDROCHLORIDE 400 MILLIGRAM(S): 400 TABLET ORAL at 05:20

## 2024-03-01 RX ADMIN — POTASSIUM BICARBONATE 25 MILLIEQUIVALENT(S): 978 TABLET, EFFERVESCENT ORAL at 09:40

## 2024-03-01 NOTE — PROVIDER CONTACT NOTE (OTHER) - NAME OF MD/NP/PA/DO NOTIFIED:
Dana Saucedo, NP
MARLENE aVsquez
Jacklyn Mendoza ACP
Lexi Mason NP
Sylvia Sultana, NP
Sylvia Sultana, NP
ZEE Hull
ZEE Hull
ZEE Parra
Dana Saucedo NP made aware
ZEE Hull
Dana Saucedo, NP
JOHANN Ring
Jacklyn Mendoza ACP
MARLENE Vasquez
JOHANN Pryor
MARLENE- Angela Llanos
Sera Ramirez, NP
7 beats WCT
MARLENE Schmitt
ZEE Coleman
ZEE Fermin

## 2024-03-01 NOTE — PROGRESS NOTE ADULT - SUBJECTIVE AND OBJECTIVE BOX
VITAL SIGNS    Telemetry:  NSR 60-90    Vital Signs Last 24 Hrs  T(C): 37.7 (03-01-24 @ 03:29), Max: 38.5 (02-29-24 @ 19:13)  T(F): 99.8 (03-01-24 @ 03:29), Max: 101.3 (02-29-24 @ 19:13)  HR: 87 (03-01-24 @ 04:37) (74 - 87)  BP: 125/69 (03-01-24 @ 04:37) (111/74 - 138/82)  RR: 18 (03-01-24 @ 04:37) (18 - 18)  SpO2: 95% (03-01-24 @ 04:37) (90% - 98%)                   02-29 @ 07:01  -  03-01 @ 07:00  --------------------------------------------------------  IN: 2128.1 mL / OUT: 2175 mL / NET: -46.9 mL          Daily     Daily             CAPILLARY BLOOD GLUCOSE                    Pacing Wires        [X  ]   Settings:         VVI                         Isolated  [  ]    Coumadin    [ ] YES          [X  ]      NO         HEPARIN                          PHYSICAL EXAM        Neurology: alert and oriented x 2, nonfocal, no gross deficits, MILD CONFUSION, WORSE AT NIGHT  CV : s1 s2 RRR  Sternal Wound :  CDI , Stable, STAPLES  Lungs: cta  Abdomen: soft, nontender, nondistended, positive bowel sounds, last bowel movement  + yesterday                       :    garcia - sbd         Extremities:    + bilat edema edema   /  -   calve tenderness ,            acetaminophen     Tablet .. 650 milliGRAM(s) Oral every 6 hours PRN  aMIOdarone    Tablet   Oral   aMIOdarone    Tablet 400 milliGRAM(s) Oral every 8 hours  aMIOdarone    Tablet 200 milliGRAM(s) Oral daily  aMIOdarone Infusion 0.5 mG/Min IV Continuous <Continuous>  aspirin  chewable 81 milliGRAM(s) Oral daily  bisacodyl Suppository 10 milliGRAM(s) Rectal daily PRN  chlorhexidine 2% Cloths 1 Application(s) Topical daily  DOBUTamine Infusion 1.251 MICROgram(s)/kG/Min IV Continuous <Continuous>  furosemide Infusion 20 mG/Hr IV Continuous <Continuous>  heparin  Infusion 800 Unit(s)/Hr IV Continuous <Continuous>  hydrALAZINE 25 milliGRAM(s) Oral every 8 hours  levothyroxine 50 MICROGram(s) Oral <User Schedule>  oxyCODONE    IR 10 milliGRAM(s) Oral every 4 hours PRN  oxyCODONE    IR 5 milliGRAM(s) Oral every 4 hours PRN  pantoprazole  Injectable 40 milliGRAM(s) IV Push daily  polyethylene glycol 3350 17 Gram(s) Oral daily  senna 2 Tablet(s) Oral at bedtime  spironolactone 25 milliGRAM(s) Oral daily                    Physical Therapy Rec:   Home  [  ]   Home w/ PT  [  ]  Rehab  [  ]  Discussed with Cardiothoracic Team at AM rounds.

## 2024-03-01 NOTE — PROVIDER CONTACT NOTE (OTHER) - ACTION/TREATMENT ORDERED:
ACP Karuna Vasquez notified. Continue to monitor patient.
As per Jacklyn Mendoza ACP, will continue to monitor
NP Shakila Ring notified. No interventions ordered. Continue to monitor on tele
Sera Ramirez, NP made aware. Amio given, EKG done, Lopressor x2 given, albumin hung.
Provider aware. Will continue to monitor.
Provider aware. Will continue to monitor.
As per Brady Pryor, NP - R IJ removed. Pan Cx. Tylenol 650 mg PO given. Brachial Alineto be removed pending INR/ptt. Vanco 750 mg IVPB, Cefepine 2G IVPB, Sodium Chloride 3% Bolus, Chlorothiazide IVPB.
Provider aware. Pt educated on fall prevention and encouraged to adhere to fall prevention recommendations. Will continue to monitor.
ACP Karuna Vasquez notified. Potassium PO ordered, see EMAR for details.
Daan Saucedo NP made aware. Re check BP in 15 minutes.
Dana Saucedo NP made aware. Albumin 25% running over a half hour. Patient put back in bed. Continuing to monitor.
Magnesium and phosporus to be assessed in AM
Notified ZEE Fermin and ordered US Duplex RLE.
Provider notified
ACP Alejandra Schmitt notified. Pt educated on importance of wound care, tele monitor, and bed/chair alarms for safety.
As per Jacklyn Mendoza ACP, Mg ordreded with AM labs along with BMP
Dana Saucedo NP made aware. CBC, CMP, PTT, PT/INR, VBG ordered. Albumin ordered. X-ray ordered.
ACP- Angela La mdae aware with no new order at the moment. Plan of care ongoing.
Provider aware. Educated about importance of adhering to fall risk prevention measures. Encouraged to notify staff when attempting to ambulate. Monitoring ongoing.
Provider aware. Stat BMP and Mag ordered and completed. Will continue to monitor.
no intervention needed, monitor tele events.
Monitoring in progress.

## 2024-03-01 NOTE — PROGRESS NOTE ADULT - SUBJECTIVE AND OBJECTIVE BOX
Interval History:  lethargic  febrile overnight  suboptimal UOP    Medications:  acetaminophen     Tablet .. 650 milliGRAM(s) Oral every 6 hours PRN  aMIOdarone    Tablet 200 milliGRAM(s) Oral daily  aMIOdarone    Tablet   Oral   aMIOdarone Infusion 0.5 mG/Min IV Continuous <Continuous>  aspirin  chewable 81 milliGRAM(s) Oral daily  bisacodyl Suppository 10 milliGRAM(s) Rectal daily PRN  buMETAnide Infusion 2 mG/Hr IV Continuous <Continuous>  buMETAnide IVPB 4 milliGRAM(s) IV Intermittent once  chlorhexidine 2% Cloths 1 Application(s) Topical daily  DOBUTamine Infusion 1.251 MICROgram(s)/kG/Min IV Continuous <Continuous>  heparin  Infusion 800 Unit(s)/Hr IV Continuous <Continuous>  hydrALAZINE 25 milliGRAM(s) Oral every 8 hours  levothyroxine 50 MICROGram(s) Oral <User Schedule>  oxyCODONE    IR 5 milliGRAM(s) Oral every 4 hours PRN  oxyCODONE    IR 10 milliGRAM(s) Oral every 4 hours PRN  pantoprazole  Injectable 40 milliGRAM(s) IV Push daily  polyethylene glycol 3350 17 Gram(s) Oral daily  senna 2 Tablet(s) Oral at bedtime  spironolactone 25 milliGRAM(s) Oral daily      Vitals:  T(C): 38 (24 @ 15:10), Max: 38.9 (24 @ 11:20)  HR: 83 (24 @ 15:10) (74 - 87)  BP: 100/55 (24 @ 15:10) (100/55 - 138/82)  BP(mean): 74 (24 @ 15:10) (74 - 104)  RR: 18 (24 @ 15:10) (18 - 18)  SpO2: 96% (24 @ 15:10) (90% - 97%)    Daily     Daily Weight in k.9 (01 Mar 2024 06:54)    Weight (kg): 85.5 ( @ 06:00)    I&O's Summary    2024 07:01  -  01 Mar 2024 07:00  --------------------------------------------------------  IN: 2128.1 mL / OUT: 2175 mL / NET: -46.9 mL    01 Mar 2024 07:01  -  01 Mar 2024 15:25  --------------------------------------------------------  IN: 180.4 mL / OUT: 1000 mL / NET: -819.6 mL    Physical Exam:  Appearance: No Acute Distress  HEENT: PERRL  Neck: JVP elevated; LIJ TLC  Cardiovascular: Normal S1 S2, No murmurs/rubs/gallops  Respiratory: Clear to auscultation bilaterally  Gastrointestinal: Soft, Non-tender	  Skin: No cyanosis	  Neurologic: Non-focal  Extremities: b/l LE edema to sacrum  Psychiatry: A & O x 3, Mood & affect appropriate    Labs:                        7.6    10.20 )-----------( 130      ( 01 Mar 2024 06:49 )             23.1     03-01    134<L>  |  94<L>  |  35<H>  ----------------------------<  120<H>  3.9   |  29  |  1.79<H>    Ca    8.5      01 Mar 2024 06:49  Phos  3.4     03-01  Mg     1.9     03-01      PTT - ( 01 Mar 2024 13:02 )  PTT:27.4 sec        TELEMETRY:    Echocardiogram:

## 2024-03-01 NOTE — PROGRESS NOTE ADULT - PROBLEM SELECTOR PLAN 4
- paroxysmal; now in sinus  - s/p JOANNA clip  - on amio  - On AC with Heparin infusion   - EP following

## 2024-03-01 NOTE — PROGRESS NOTE ADULT - SUBJECTIVE AND OBJECTIVE BOX
Interval History:  lethargic  fever last night    Medications:  acetaminophen     Tablet .. 650 milliGRAM(s) Oral every 6 hours PRN  aMIOdarone    Tablet 200 milliGRAM(s) Oral daily  aMIOdarone    Tablet   Oral   aMIOdarone Infusion 0.5 mG/Min IV Continuous <Continuous>  aspirin  chewable 81 milliGRAM(s) Oral daily  bisacodyl Suppository 10 milliGRAM(s) Rectal daily PRN  chlorhexidine 2% Cloths 1 Application(s) Topical daily  DOBUTamine Infusion 1.251 MICROgram(s)/kG/Min IV Continuous <Continuous>  furosemide Infusion 20 mG/Hr IV Continuous <Continuous>  heparin  Infusion 800 Unit(s)/Hr IV Continuous <Continuous>  hydrALAZINE 25 milliGRAM(s) Oral every 8 hours  levothyroxine 50 MICROGram(s) Oral <User Schedule>  oxyCODONE    IR 10 milliGRAM(s) Oral every 4 hours PRN  oxyCODONE    IR 5 milliGRAM(s) Oral every 4 hours PRN  pantoprazole  Injectable 40 milliGRAM(s) IV Push daily  polyethylene glycol 3350 17 Gram(s) Oral daily  senna 2 Tablet(s) Oral at bedtime  spironolactone 25 milliGRAM(s) Oral daily      Vitals:  T(C): 37.6 (24 @ 12:20), Max: 38.9 (24 @ 11:20)  HR: 84 (24 @ 13:44) (74 - 87)  BP: 107/60 (24 @ 13:44) (100/66 - 138/82)  BP(mean): 76 (24 @ 13:44) (76 - 104)  RR: 18 (24 @ 11:20) (18 - 18)  SpO2: 95% (24 @ 13:44) (90% - 97%)    Daily     Daily Weight in k.9 (01 Mar 2024 06:54)    Weight (kg): 85.5 ( @ 06:00)    I&O's Summary    2024 07:01  -  01 Mar 2024 07:00  --------------------------------------------------------  IN: 2128.1 mL / OUT: 2175 mL / NET: -46.9 mL    01 Mar 2024 07:01  -  01 Mar 2024 15:07  --------------------------------------------------------  IN: 180.4 mL / OUT: 650 mL / NET: -469.6 mL    Physical Exam:  Appearance: lethargic  HEENT: PERRL  Neck: JVD elevated   Cardiovascular: Normal S1 S2, No murmurs/rubs/gallops  Respiratory: Clear to auscultation bilaterally  Gastrointestinal: Soft, Non-tender	  Skin: No cyanosis	  Neurologic: Non-focal  Extremities: b/l LE edema to sacrum   Psychiatry: A & O x 3, Mood & affect appropriate    Labs:                        7.6    10.20 )-----------( 130      ( 01 Mar 2024 06:49 )             23.1     03-01    134<L>  |  94<L>  |  35<H>  ----------------------------<  120<H>  3.9   |  29  |  1.79<H>    Ca    8.5      01 Mar 2024 06:49  Phos  3.4     03-01  Mg     1.9     03-01      PTT - ( 01 Mar 2024 13:02 )  PTT:27.4 sec

## 2024-03-01 NOTE — PROVIDER CONTACT NOTE (OTHER) - REASON
EVENT: PAT 2.6 SEC -----EVENT: PAT 2.0 
Pt had 6 bts WCT on tele (hx of 9 and 4 beats)
BP 77/51
Pt. had an episode of 5 beats AIVR, asymptomatic.
PT BP 74/52
Patient Afib 100's-120's
Pt had 9 beats WCT; first time event
temp 102
BP 89/60
Pt refused wound care, bed alarm, chair alarm, and refusing tele
Pt had PAT 6.30 seconds,  on tele
Tele event PAT x 3. 2 seconds HR up to 154.
Patient complaining of R groin pain at the site of LHC
Patient had PAT x1.5 seconds, 
Pt had PAT for 12 seconds; 
Pt non-compliant with fall prevention recommendations.
Pt refusing tele, wound care, and bed alarms
Migue Stern ACP
Patient had a PAT x5.5 seconds,  and 12 beats WCT
Pt exhibits non-compliance with fall prevention recommendations.
Tele event of PAF x 16 sec .
Telemetry event PAT x 2.5 sec, HR up to 170.
No

## 2024-03-01 NOTE — PROVIDER CONTACT NOTE (OTHER) - BACKGROUND
63 year old male presented to ED for for bilateral leg pain and swelling.
Dx: Alcoholic cardiomyopathy  PMH: Cellulits, GERD. HTN
2/23 - AVR / JOANNA clip
2/23 - avr/sam clip
63 year old male admitted for alcoholic cardiomyopathy. PMH cellulitis, aortic stenosis, GERD, HTN, dyslipidemia.
Pt admitted for CHF, acute on chronic kidney failure, severe AS. PMH of HTN, anemia, GERD,
63 year old male admitted for alcoholic cardiomyopathy. PMH cellulitis, AS, GERD, HTN, dyslipidemia.
63 year old male admitted for alcoholic cardiomyopathy. PMH cellulitis, AS, GERD, HTN, dyslipidemia. Has hx of PAT episodes.
63 year old male admitted for Alcoholic cardiomyopathy.
63 year old male with Hx of HTN, chronic intermittent leg pain, aortic stenosis, chronic venous stasis dermatitis with bilateral leg swelling, presented to ED for bilateral leg pain and swelling.
Patient admitted for alcoholic cardiomyopathy, HTN, aortic stenosis, CHF.
63 year old male admitted for alcoholic cardiomyopathy. PMH cellulitis, aortic stenosis, GERD, HTN, dyslipidemia.
63 year old male with Hx of HTN, aortic stenosis, chronic venous stasis dermatitis with bilateral leg swelling, presented to ED for , presented to ED for bilateral leg pain and swelling.
63 year old male with Hx of HTN, chronic intermittent leg pain, aortic stenosis, chronic venous stasis dermatitis with bilateral leg swelling, presented to ED for for bilateral leg pain and swelling.
Patient admitted for alcoholic cardiomyopathy
Patient admitted for alcoholic cardiomyopathy, HTN, aortic stenosis, CHF.
63 year old male with Hx of HTN, chronic intermittent leg pain, aortic stenosis, chronic venous stasis dermatitis with bilateral leg swelling, presented to ED for for bilateral leg pain and swelling.
Pt admitted for CHF, acute on chronic kidney failure, severe AS. PMH of HTN, anemia, GERD,
presented to ED for for bilateral leg pain and swelling. He was admitted to medicine and was found to have new onset acute HFrEF (EF 35-40% 1/2024) in setting of severe AS

## 2024-03-01 NOTE — PROVIDER CONTACT NOTE (OTHER) - SITUATION
Pt had PAT for 12 seconds; 
BP 89/60
Patient complaining of R groin pain at the site of LHC
Patient had a PAT x5.5 seconds,  and 12 beats WCT
Pt refused wound care, bed alarm, chair alarm, and refusing tele
Pt refusing tele and wound care
Pt. had an episode of 5 beats AIVR, asymptomatic.
BP 77/51
Pt had 6 bts WCT on tele (hx of 9 and 4 beats)
Tele event PAT x 3. 2 seconds HR up to 154.
Tele event of PAF x 16 sec .
Telemetry event PAT x 2.5 sec, HR up to 170.
Telemetry tech notified that patient had 7 beats WTC
EVENT: PAT 2.6 SEC -----EVENT: PAT 2.0 
Patient Afib 's - 120's
Pt exhibits non-compliance with fall prevention recommendations.
Pt had 9 beats WCT; first time event
Tmax 1002.
Pt BP 74/52
Patient had PAT x1.5 seconds, 
Pt had PAT 6.30 seconds,  on tele
Pt non-compliant with fall prevention recommendations.

## 2024-03-01 NOTE — PROGRESS NOTE ADULT - ASSESSMENT
Briefly, 62 y/o M w/ h/o HTN, severe AS, chronic venous stasis, HFpEF who p/w worsening LE swelling to GC  and found to be in decompensated heart failure with EF 35-40% and severe AS so transferred for further management on . Was diuresed and underwent LHC which was non-obstructive. Underwent bioAVR  w/ JOANNA clip complicated by bleeding. Had been on primacor which was discontinued. Had afib  with hypotension requiring albumin and amio (converted to sinus), placed on  with improvement. Remains overloaded but otherwise perfusing well. Repeat TTE  with EF 25-30%, severe biatrial enlargement, mild RV dysfunction, LVOT VTI 13 cm, dilated IVC, trace pericardial effusion. Had recurrent afib, started on Amiodarone load; converted to sinus.     Remains volume expanded, will further augment diuretic regimen and escalate afterload reducing agents. To obtain VBG from central line this afternoon and if CO/CI (F) adequate, trial wean off low dose  and d/c TLC given fever.     Cardiac Studies  ·	TTE 24: LVIDd 6 cm, LVEF 30% (global), mod RVE with mod reduced function (TAPSE 0.9), severe ARAVIND, mild TR, est PASP 25 mmHg, trace pericardial effusion, IVC normal in size

## 2024-03-01 NOTE — PROGRESS NOTE ADULT - SUBJECTIVE AND OBJECTIVE BOX
Follow Up:  fever    Interval History/ROS: pt now febrile, denied chest pain or SOB, WBC: 10          Allergies  garlic (Angioedema; Swelling; Anaphylaxis)  No Known Drug Allergies        ANTIMICROBIALS:  vancomycin  IVPB 750 once      OTHER MEDS:  acetaminophen     Tablet .. 650 milliGRAM(s) Oral every 6 hours PRN  aMIOdarone    Tablet   Oral   aMIOdarone    Tablet 400 milliGRAM(s) Oral every 8 hours  aMIOdarone    Tablet 200 milliGRAM(s) Oral daily  aMIOdarone Infusion 0.5 mG/Min IV Continuous <Continuous>  aspirin  chewable 81 milliGRAM(s) Oral daily  bisacodyl Suppository 10 milliGRAM(s) Rectal daily PRN  chlorhexidine 2% Cloths 1 Application(s) Topical daily  chlorothiazide IVPB 500 milliGRAM(s) IV Intermittent once  DOBUTamine Infusion 1.251 MICROgram(s)/kG/Min IV Continuous <Continuous>  furosemide Infusion 20 mG/Hr IV Continuous <Continuous>  heparin  Infusion 800 Unit(s)/Hr IV Continuous <Continuous>  hydrALAZINE 25 milliGRAM(s) Oral every 8 hours  levothyroxine 50 MICROGram(s) Oral <User Schedule>  oxyCODONE    IR 10 milliGRAM(s) Oral every 4 hours PRN  oxyCODONE    IR 5 milliGRAM(s) Oral every 4 hours PRN  pantoprazole  Injectable 40 milliGRAM(s) IV Push daily  polyethylene glycol 3350 17 Gram(s) Oral daily  senna 2 Tablet(s) Oral at bedtime  sodium chloride 3% Bolus 150 milliLiter(s) IV Bolus once  spironolactone 25 milliGRAM(s) Oral daily      Vital Signs Last 24 Hrs  T(C): 38.1 (01 Mar 2024 06:54), Max: 38.5 (29 Feb 2024 19:13)  T(F): 100.5 (01 Mar 2024 06:54), Max: 101.3 (29 Feb 2024 19:13)  HR: 83 (01 Mar 2024 06:54) (74 - 87)  BP: 100/66 (01 Mar 2024 06:54) (100/66 - 138/82)  BP(mean): 77 (01 Mar 2024 06:54) (77 - 104)  RR: 18 (01 Mar 2024 06:54) (18 - 18)  SpO2: 95% (01 Mar 2024 06:54) (90% - 98%)    Parameters below as of 01 Mar 2024 06:54  Patient On (Oxygen Delivery Method): room air        Physical Exam:  General:    NAD,  sitting on a chair  Cardio:    sternal incision clean  Respiratory:    clear   abd:     soft,    no tenderness  :   + garcia  Musculoskeletal:   no joint swelling  vascular: R radial marlena and RIJ CVC  Skin:    no rash, b/l LE ulcers with dressing                          7.6    10.20 )-----------( 130      ( 01 Mar 2024 06:49 )             23.1       03-01    134<L>  |  94<L>  |  35<H>  ----------------------------<  120<H>  3.9   |  29  |  1.79<H>    Ca    8.5      01 Mar 2024 06:49  Phos  3.4     03-01  Mg     1.9     03-01        Urinalysis Basic - ( 01 Mar 2024 06:49 )    Color: x / Appearance: x / SG: x / pH: x  Gluc: 120 mg/dL / Ketone: x  / Bili: x / Urobili: x   Blood: x / Protein: x / Nitrite: x   Leuk Esterase: x / RBC: x / WBC x   Sq Epi: x / Non Sq Epi: x / Bacteria: x        MICROBIOLOGY:  v            RADIOLOGY:  Images independently visualized and reviewed personally, findings as below  < from: Xray Chest 1 View- PORTABLE-Routine (02.26.24 @ 07:45) >  IMPRESSION:  Stable opacification of the left lower lung field, likely moderate   pleural effusion and atelectasis.      < end of copied text >  < from: US Duplex Arterial Lower Ext Ltd, Right (02.21.24 @ 18:55) >  IMPRESSION:    No pseudoaneurysm in the right groin.    There is a bilobed avascular fluid collection  measuring 2.8 x 1.7 x 2.0   cm which is medial to and separate from the right femoral vessels. This   may represent a seroma or hematoma. Sonographic follow-up to confirm   resolution is recommended.    Small amount of complex appearing ascites fluid incidentally noted    within the right lower quadrant, incompletely assessed on this exam. The   possibility of hemoperitoneum cannot be excluded. Suggest  clinical and   laboratory correlation. Abdominal pelvic CT could be performed for   further assessment as clinically warranted.      < end of copied text >  < from: TTE W or WO Ultrasound Enhancing Agent (02.26.24 @ 08:44) >  CONCLUSIONS:      1. Left ventricular cavity is moderately dilated. Left ventricular wall thickness is normal. Left ventricular systolic function is severely decreased.   2. Moderately enlarged right ventricular cavity size and moderately reduced systolic function.      < end of copied text >

## 2024-03-01 NOTE — PROGRESS NOTE ADULT - SUBJECTIVE AND OBJECTIVE BOX
S/p AVR, JOANNA clip , events noted, GNR bacteremia, c/o fevers    Vital Signs Last 24 Hrs  T(C): 38 (24 @ 15:10), Max: 38.9 (24 @ 11:20)  T(F): 100.4 (24 @ 15:10), Max: 102 (24 @ 11:20)  HR: 83 (24 @ 15:10) (74 - 87)  BP: 100/55 (24 @ 15:10) (100/55 - 138/82)  BP(mean): 74 (24 @ 15:10) (74 - 104)  RR: 18 (24 @ 15:10) (18 - 18)  SpO2: 96% (24 @ 15:10) (90% - 97%)    I&O's Detail    2024 07:  -  01 Mar 2024 07:00  --------------------------------------------------------  IN:    DOBUTamine: 81.6 mL    Furosemide: 37.5 mL    Furosemide: 200 mL    Heparin: 389 mL    IV PiggyBack: 400 mL    Oral Fluid: 1020 mL  Total IN: 2128.1 mL    OUT:    Indwelling Catheter - Urethral (mL): 2175 mL  Total OUT: 2175 mL    01 Mar 2024 07:01  -  01 Mar 2024 16:38  --------------------------------------------------------  IN:    DOBUTamine: 27.2 mL    Furosemide: 80 mL    Oral Fluid: 100 mL  Total IN: 207.2 mL    OUT:    Heparin: 0 mL    Indwelling Catheter - Urethral (mL): 1150 mL  Total OUT: 1150 mL    s1s2  b/l air entry  soft, ND  b/l LE edema                                                                                           7.6    10.20 )-----------( 130      ( 01 Mar 2024 06:49 )             23.1     01 Mar 2024 16:02    135    |  94     |  39     ----------------------------<  161    3.8     |  27     |  2.07     Ca    8.4        01 Mar 2024 16:02  Phos  3.4       01 Mar 2024 06:49  Mg     1.9       01 Mar 2024 06:49    Culture - Blood (collected 2024 21:44)  Source: .Blood Blood  Gram Stain (01 Mar 2024 14:22):    Growth in aerobic bottle: Gram Negative Rods  Preliminary Report (01 Mar 2024 14:22):    Growth in aerobic bottle: Gram Negative Rods    Culture - Blood (collected 2024 21:20)  Source: .Blood Blood  Gram Stain (01 Mar 2024 14:20):    Growth in aerobic bottle: Gram Negative Rods    Growth in anaerobic bottle: Gram Negative Rods  Preliminary Report (01 Mar 2024 14:20):    Growth in aerobic bottle: Gram Negative Rods    Growth in anaerobic bottle: Gram Negative Rods    Direct identification is available within approximately 3-5    hours either by Blood Panel Multiplexed PCR or Direct    MALDI-TOF. Details: [email or web address]  Organism: Blood Culture PCR (01 Mar 2024 14:36)  Organism: Blood Culture PCR (01 Mar 2024 14:36)    acetaminophen     Tablet .. 650 milliGRAM(s) Oral every 6 hours PRN  aMIOdarone    Tablet   Oral   aMIOdarone    Tablet 200 milliGRAM(s) Oral daily  aMIOdarone Infusion 0.5 mG/Min IV Continuous <Continuous>  aspirin  chewable 81 milliGRAM(s) Oral daily  bisacodyl Suppository 10 milliGRAM(s) Rectal daily PRN  buMETAnide Infusion 2 mG/Hr IV Continuous <Continuous>  chlorhexidine 2% Cloths 1 Application(s) Topical daily  chlorothiazide IVPB 500 milliGRAM(s) IV Intermittent two times a day  DOBUTamine Infusion 1.251 MICROgram(s)/kG/Min IV Continuous <Continuous>  heparin  Infusion 800 Unit(s)/Hr IV Continuous <Continuous>  hydrALAZINE 25 milliGRAM(s) Oral every 8 hours  levothyroxine 50 MICROGram(s) Oral <User Schedule>  oxyCODONE    IR 5 milliGRAM(s) Oral every 4 hours PRN  oxyCODONE    IR 10 milliGRAM(s) Oral every 4 hours PRN  pantoprazole  Injectable 40 milliGRAM(s) IV Push daily  polyethylene glycol 3350 17 Gram(s) Oral daily  senna 2 Tablet(s) Oral at bedtime  sodium chloride 3% Bolus 150 milliLiter(s) IV Bolus <User Schedule>  spironolactone 25 milliGRAM(s) Oral daily    A/P:    CM, EF 30%, severe AS, Afib, CHF  Tx from [facility] to [facility] , s/p [facility]   S/p cardio-renal/hemodynamic LINDA (peak Cr 2.5 - 2/3, lorna Cr 1.23 - )   UA w/RBCs, otherwise negative   Imaging w/o hydro 24  S/p CT w/IV dye 24  Stable renal fx post CT  S/p AVR, JOANNA clip   GNR bacteremia, Abx, w/up per ID   Hemodynamic/cardio-renal now septic LINDA/CKD   On , Bumex qtt, Chlorothiazide and Aldactone   If Cr continues to rise, would moderate diuresis   Avoid nephrotoxins as possible   Avoid hypotension   F/u BMP, UO  D/w CTU team    [phone]

## 2024-03-01 NOTE — PROVIDER CONTACT NOTE (OTHER) - ASSESSMENT
Patient AO4. asymptomatic, resting comfortably, VSS.
Pt A&O x 4, VSS. Pt presents as restless.
Pt A&O x 4. VSS. Pt asymptomatic. No c/o of distress or pain.
Pt A&Ox4, able to make needs known. Pt asymptomatic. VSS. No s/s of bleeding. Pt denies cp, denies SOB. Pt c/o of lower leg pain due to vascular wounds.
Pt having an IV placed at the time; asymptomatic, denying chest pain or palpitations. VSS: 135-79, HR 74, temp 98.5, RR 19, SpO2 99% on room air.
PT BP 74/52, HR 97. Pt denies CP, SOB, dizziness or any symptoms. Pt states they feel fine.
Patient A&Ox4, denies chest pain, SOB, palpitations. /70, HR 58, spO2 99% on room air.
Patient A&Ox4, denies chest pain, SOB, palpitations. VSS, BP: 120/75, HR 61, spO2 95% on room air.
Pt A&Ox4, able to make needs known. Pt asymptomatic. VSS. No s/s of bleeding. Pt denies cp, denies SOB, denies pain.
Pt had 6 bts WCT on tele (hx of 9 and 4 beats). Pt asymptomatic and VSS
Pt A&Ox4, able to make needs known. Pt asymptomatic. VSS. No s/s of bleeding. Pt denies cp, denies SOB.
Pt ax0x4, denies chest pain or discomfort. No s/s of distress. VS within patient  normal range.
Pt refusing tele and wound care
Pt sitting in bed. BP 77/51. . Pt denies CP, SOB, dizziness, or any symptoms.
Pt. AOX4. had an episode of 5 beats AIVR, asymptomatic.
Patient sitting in bed. HR 98. New pressure 89/60.
Pt A&Ox4, /82 HR 73 O2 98%. Pt asymptomatic. No c/o pain or distress.
Pt A&O x 4. VSS. /74 HR 72 O2 96. Pt asymptomatic. No c/o distress or pain.
Pt A&O x 4. VSS. Pt asymptomatic. No c/o of distress or pain.
Pt standing during event; asymptomatic. VSS: /74, HR76, temp 98.1, RR 18, SPO2 99%.
Pt. Aox4. Tmax 102. Pt. denies rigors, diaphoretic. Bardy Pryor NP at bedside.
Patient sitting in chair eating. bp 94/67. Pt denies CP or SOB. Pt denies dizziness.

## 2024-03-01 NOTE — PROGRESS NOTE ADULT - ASSESSMENT
63M, appears older than stated age, admitted to [facility] 24 PMHx HTN, GERD, HPL, chronic venous stasis dermatitis with bilateral leg swelling, and AS presented to [facility] on 2024 for bilateral leg pain and swelling found to have new acute HFrEF (EF 35-40% 2024) in setting of severe AS.     Hospital course:  24: Admitted to [facility] new onset HFrEF EF 35% and anemia 2/2 AS,   24: medically stable for transferred to [facility] medicine service for continued care. during hospitalization hx of WCT/ PAF  Consults for Nephrology, HF, ID, Podiatry for medical optimization  2/15: 1 U PRBC, keep Hgb > 8.    s/p fall with headastrike   : s/p Liver biopsy, soft pressure post procedure--responded to fluid bolus  :  RLE doppler: No pseudoaneurysm in the right groin/ bilobed avascular fluid collection measuring 2.8 x 1.7 x 2.0 cm     : AVR/JOANNA Clip c/b intraop bleeding requirinu pRBCs on CPB 1u pRBCs post-CPB 1 PLT 1000 FEIBA  Prolene suture placed around IJ and V wire  : To Step Down, raegan, CT, +PW, marlena RIJ   Maintain mediastinal chest tubes Primicor discontinued. Lasix 40 BID initiated , Wound care to chronic venous stasis b/l LE. D/c RIJ   VSS - 9 hrs afib last evening- SR since 1:30am- on AMio gtt,  gtt - s/p right axillary marlena placed by Intensivist. garcia placed last evening lasix 40 iv x 1 given this am- echo tr jon eff.   VSS - currently on lasix gtt - negative 2L in last 24 hrs. bid basic metabolic pt converted back into afib this am 100-120- currently on amio load - dobutrex gtt. d/c planning    d/c yesterday then resumed for decreased UO.  Remains a fib since yesterday, cont amio/heparin, eps following         Weight increased, lasix 80 iv x 1, then infusion increased , aldactone added .  Remains on heparin infusion.          Hydral added for afterload    NSR, on amio, EPS following, noted elevated tsh.  Repeat sent synthroid started.  Fluid overload, diuretics increased, 3% saline x 2, lasix 80iv x 1, lasix infusion 20mg/hr  3/1 febrile  overnight  and this am pan cx.  Plan to remove marlena, intro , garcia.  A line is from , intro   UA neg  Cont , lasix, f/u bun creat

## 2024-03-01 NOTE — PROGRESS NOTE ADULT - ASSESSMENT
63 m with HTN, GERD, AS, chronic venous stasis dermatitis with bilateral leg swelling, initially admitted to [facility] with JOCELIN and b/l leg pain and swelling, was seen by ID and was considered not infected just stasis dermatitis, received 5 days of keflex, wound cx showed serratia and strep dysgalactiae,  blood cx negative  S/P Bio AVR , 2/23Post op labile BP related to hypovolemia & cardiac dysfunction, intra op bleeding requiring blood & products   pt has been on vanco until 2/29 for post op course  now febrile      AS and CHF s/p AVR 2/23 now with new fever, unclear etiology, no WBC and has some drop in Hgb as well  b/l LE stasis dermatitis and chronic wounds, no tenderness and appears stasis dermatitis not infection    * blood and urine cx  * RVP  * remove all lines  * start vanco 750 and zosyn for now  * chest/abd CT  * wound care and leg elevation      The above assessment and plan was discussed with the primary team    Pamela Bellamy MD  contact on teams  After 5pm and on weekends call [phone]. 63 m with HTN, GERD, AS, chronic venous stasis dermatitis with bilateral leg swelling, initially admitted to [facility] with JOCELIN and b/l leg pain and swelling, was seen by ID and was considered not infected just stasis dermatitis, received 5 days of keflex, wound cx showed serratia and strep dysgalactiae,  blood cx negative  S/P Bio AVR , 2/23Post op labile BP related to hypovolemia & cardiac dysfunction, intra op bleeding requiring blood & products   pt has been on vanco until 2/29 for post op course  now febrile      AS and CHF s/p AVR 2/23 now with new fever and GNR bacteemia  b/l LE stasis dermatitis and chronic wounds, no tenderness and appears stasis dermatitis not infection    * repeat blood and urine cx  * start cefepime and will adjust  * chest/abd CT  * wound care and leg elevation      The above assessment and plan was discussed with the primary team    Pamela Bellamy MD  contact on teams  After 5pm and on weekends call [phone].

## 2024-03-01 NOTE — PROGRESS NOTE ADULT - PROBLEM SELECTOR PLAN 1
- can stop  as venous sat 69%  - change lasix gtt to bumex gtt 2mg/hr  - 3% Hypertonic saline 150 cc over 30 mins twice a day  - start diuril twice/day  - c/w hydral 25 mg TID, hold for SBP <90  - continue Spironolactone 25 mg QD  - BMP and Mg q12hrs. Replete to target K 4-4.5 and Mg 2-2.5  - Continue with BLE ACE wrap to mobilize fluid

## 2024-03-02 LAB
ANION GAP SERPL CALC-SCNC: 13 MMOL/L — SIGNIFICANT CHANGE UP (ref 5–17)
ANION GAP SERPL CALC-SCNC: 14 MMOL/L — SIGNIFICANT CHANGE UP (ref 5–17)
APTT BLD: 44.7 SEC — HIGH (ref 24.5–35.6)
APTT BLD: 48.1 SEC — HIGH (ref 24.5–35.6)
APTT BLD: 52 SEC — HIGH (ref 24.5–35.6)
BUN SERPL-MCNC: 43 MG/DL — HIGH (ref 7–23)
BUN SERPL-MCNC: 45 MG/DL — HIGH (ref 7–23)
CALCIUM SERPL-MCNC: 8.7 MG/DL — SIGNIFICANT CHANGE UP (ref 8.4–10.5)
CALCIUM SERPL-MCNC: 8.7 MG/DL — SIGNIFICANT CHANGE UP (ref 8.4–10.5)
CHLORIDE SERPL-SCNC: 92 MMOL/L — LOW (ref 96–108)
CHLORIDE SERPL-SCNC: 92 MMOL/L — LOW (ref 96–108)
CO2 SERPL-SCNC: 28 MMOL/L — SIGNIFICANT CHANGE UP (ref 22–31)
CO2 SERPL-SCNC: 30 MMOL/L — SIGNIFICANT CHANGE UP (ref 22–31)
CREAT SERPL-MCNC: 2.06 MG/DL — HIGH (ref 0.5–1.3)
CREAT SERPL-MCNC: 2.08 MG/DL — HIGH (ref 0.5–1.3)
EGFR: 35 ML/MIN/1.73M2 — LOW
EGFR: 36 ML/MIN/1.73M2 — LOW
GLUCOSE SERPL-MCNC: 109 MG/DL — HIGH (ref 70–99)
GLUCOSE SERPL-MCNC: 119 MG/DL — HIGH (ref 70–99)
GRAM STN FLD: ABNORMAL
GRAM STN FLD: ABNORMAL
HCT VFR BLD CALC: 27.8 % — LOW (ref 39–50)
HGB BLD-MCNC: 9.2 G/DL — LOW (ref 13–17)
MAGNESIUM SERPL-MCNC: 2.2 MG/DL — SIGNIFICANT CHANGE UP (ref 1.6–2.6)
MAGNESIUM SERPL-MCNC: 2.3 MG/DL — SIGNIFICANT CHANGE UP (ref 1.6–2.6)
MCHC RBC-ENTMCNC: 24.5 PG — LOW (ref 27–34)
MCHC RBC-ENTMCNC: 33.1 GM/DL — SIGNIFICANT CHANGE UP (ref 32–36)
MCV RBC AUTO: 74.1 FL — LOW (ref 80–100)
NRBC # BLD: 0 /100 WBCS — SIGNIFICANT CHANGE UP (ref 0–0)
PHOSPHATE SERPL-MCNC: 4.4 MG/DL — SIGNIFICANT CHANGE UP (ref 2.5–4.5)
PLATELET # BLD AUTO: 148 K/UL — LOW (ref 150–400)
POTASSIUM SERPL-MCNC: 3.4 MMOL/L — LOW (ref 3.5–5.3)
POTASSIUM SERPL-MCNC: 3.8 MMOL/L — SIGNIFICANT CHANGE UP (ref 3.5–5.3)
POTASSIUM SERPL-SCNC: 3.4 MMOL/L — LOW (ref 3.5–5.3)
POTASSIUM SERPL-SCNC: 3.8 MMOL/L — SIGNIFICANT CHANGE UP (ref 3.5–5.3)
RBC # BLD: 3.75 M/UL — LOW (ref 4.2–5.8)
RBC # FLD: 30.2 % — HIGH (ref 10.3–14.5)
SODIUM SERPL-SCNC: 134 MMOL/L — LOW (ref 135–145)
SODIUM SERPL-SCNC: 135 MMOL/L — SIGNIFICANT CHANGE UP (ref 135–145)
SPECIMEN SOURCE: SIGNIFICANT CHANGE UP
WBC # BLD: 10.51 K/UL — HIGH (ref 3.8–10.5)
WBC # FLD AUTO: 10.51 K/UL — HIGH (ref 3.8–10.5)

## 2024-03-02 PROCEDURE — 99233 SBSQ HOSP IP/OBS HIGH 50: CPT | Mod: GC

## 2024-03-02 PROCEDURE — 99232 SBSQ HOSP IP/OBS MODERATE 35: CPT

## 2024-03-02 RX ORDER — HYDRALAZINE HCL 50 MG
50 TABLET ORAL EVERY 8 HOURS
Refills: 0 | Status: DISCONTINUED | OUTPATIENT
Start: 2024-03-02 | End: 2024-03-04

## 2024-03-02 RX ORDER — BENZOCAINE AND MENTHOL 5; 1 G/100ML; G/100ML
1 LIQUID ORAL
Refills: 0 | Status: DISCONTINUED | OUTPATIENT
Start: 2024-03-02 | End: 2024-03-12

## 2024-03-02 RX ADMIN — POLYETHYLENE GLYCOL 3350 17 GRAM(S): 17 POWDER, FOR SOLUTION ORAL at 13:30

## 2024-03-02 RX ADMIN — CEFEPIME 100 MILLIGRAM(S): 1 INJECTION, POWDER, FOR SOLUTION INTRAMUSCULAR; INTRAVENOUS at 05:03

## 2024-03-02 RX ADMIN — Medication 650 MILLIGRAM(S): at 19:54

## 2024-03-02 RX ADMIN — CHLORHEXIDINE GLUCONATE 1 APPLICATION(S): 213 SOLUTION TOPICAL at 13:30

## 2024-03-02 RX ADMIN — Medication 25 MILLIGRAM(S): at 13:30

## 2024-03-02 RX ADMIN — Medication 50 MICROGRAM(S): at 06:00

## 2024-03-02 RX ADMIN — SODIUM CHLORIDE 300 MILLILITER(S): 5 INJECTION, SOLUTION INTRAVENOUS at 16:32

## 2024-03-02 RX ADMIN — CEFEPIME 100 MILLIGRAM(S): 1 INJECTION, POWDER, FOR SOLUTION INTRAMUSCULAR; INTRAVENOUS at 16:58

## 2024-03-02 RX ADMIN — Medication 650 MILLIGRAM(S): at 02:21

## 2024-03-02 RX ADMIN — PANTOPRAZOLE SODIUM 40 MILLIGRAM(S): 20 TABLET, DELAYED RELEASE ORAL at 13:30

## 2024-03-02 RX ADMIN — Medication 81 MILLIGRAM(S): at 13:30

## 2024-03-02 RX ADMIN — HEPARIN SODIUM 17.5 UNIT(S)/HR: 5000 INJECTION INTRAVENOUS; SUBCUTANEOUS at 01:04

## 2024-03-02 RX ADMIN — Medication 100 MILLIGRAM(S): at 04:59

## 2024-03-02 RX ADMIN — AMIODARONE HYDROCHLORIDE 200 MILLIGRAM(S): 400 TABLET ORAL at 04:59

## 2024-03-02 RX ADMIN — Medication 650 MILLIGRAM(S): at 19:24

## 2024-03-02 RX ADMIN — SPIRONOLACTONE 25 MILLIGRAM(S): 25 TABLET, FILM COATED ORAL at 05:04

## 2024-03-02 RX ADMIN — Medication 100 MILLIGRAM(S): at 16:58

## 2024-03-02 RX ADMIN — Medication 25 MILLIGRAM(S): at 04:59

## 2024-03-02 RX ADMIN — Medication 50 MILLIGRAM(S): at 21:06

## 2024-03-02 RX ADMIN — Medication 650 MILLIGRAM(S): at 02:51

## 2024-03-02 RX ADMIN — SODIUM CHLORIDE 300 MILLILITER(S): 5 INJECTION, SOLUTION INTRAVENOUS at 05:03

## 2024-03-02 RX ADMIN — BENZOCAINE AND MENTHOL 1 LOZENGE: 5; 1 LIQUID ORAL at 22:49

## 2024-03-02 RX ADMIN — SENNA PLUS 2 TABLET(S): 8.6 TABLET ORAL at 21:06

## 2024-03-02 NOTE — PROGRESS NOTE ADULT - PROBLEM SELECTOR PLAN 1
- can stop  as venous sat 69%  - change lasix gtt to bumex gtt 2mg/hr  - 3% Hypertonic saline 150 cc over 30 mins twice a day  - start diuril twice/day  - c/w hydral 25 mg TID, hold for SBP <90  - continue Spironolactone 25 mg QD  - BMP and Mg q12hrs. Replete to target K 4-4.5 and Mg 2-2.5  - Continue with BLE ACE wrap to mobilize fluid -  stopped 3/1 and tolerated well  - c/w bumex gtt 2mg/hr  - 3% Hypertonic saline 150 cc over 30 mins twice a day  - c/w diuril twice/day  - on hydral 25 mg TID, hold for SBP <90; increase to 50 mg q8h   - continue Spironolactone 25 mg QD  - BMP and Mg q12hrs. Replete to target K 4-4.5 and Mg 2-2.5  - Continue with BLE ACE wrap to mobilize fluid

## 2024-03-02 NOTE — PROGRESS NOTE ADULT - SUBJECTIVE AND OBJECTIVE BOX
Follow Up:  fever    Interval History/ROS: pt was febrile yesterday not today, denied chest pain or SOB, blood cx showed serratia            Allergies  garlic (Angioedema; Swelling; Anaphylaxis)  No Known Drug Allergies        ANTIMICROBIALS:  cefepime   IVPB 1000 every 12 hours      OTHER MEDS:  acetaminophen     Tablet .. 650 milliGRAM(s) Oral every 6 hours PRN  aMIOdarone    Tablet   Oral   aMIOdarone    Tablet 200 milliGRAM(s) Oral daily  aMIOdarone Infusion 0.5 mG/Min IV Continuous <Continuous>  aspirin  chewable 81 milliGRAM(s) Oral daily  bisacodyl Suppository 10 milliGRAM(s) Rectal daily PRN  buMETAnide Infusion 2 mG/Hr IV Continuous <Continuous>  chlorhexidine 2% Cloths 1 Application(s) Topical daily  chlorothiazide IVPB 500 milliGRAM(s) IV Intermittent two times a day  DOBUTamine Infusion 1.251 MICROgram(s)/kG/Min IV Continuous <Continuous>  heparin  Infusion 800 Unit(s)/Hr IV Continuous <Continuous>  hydrALAZINE 25 milliGRAM(s) Oral every 8 hours  levothyroxine 50 MICROGram(s) Oral <User Schedule>  pantoprazole  Injectable 40 milliGRAM(s) IV Push daily  polyethylene glycol 3350 17 Gram(s) Oral daily  senna 2 Tablet(s) Oral at bedtime  sodium chloride 3% Bolus 150 milliLiter(s) IV Bolus <User Schedule>  spironolactone 25 milliGRAM(s) Oral daily      Vital Signs Last 24 Hrs  T(C): 37.2 (02 Mar 2024 02:24), Max: 38.9 (01 Mar 2024 11:20)  T(F): 98.9 (02 Mar 2024 02:24), Max: 102 (01 Mar 2024 11:20)  HR: 64 (02 Mar 2024 07:34) (63 - 84)  BP: 122/70 (02 Mar 2024 07:34) (93/55 - 122/70)  BP(mean): 90 (02 Mar 2024 07:34) (68 - 90)  RR: 18 (02 Mar 2024 07:34) (18 - 18)  SpO2: 98% (02 Mar 2024 07:34) (95% - 99%)    Parameters below as of 02 Mar 2024 07:34  Patient On (Oxygen Delivery Method): room air        Physical Exam:  General:    NAD  Cardio:    sternal incision clean  Respiratory:    clear   abd:     soft,    no tenderness  :   + garcia  Musculoskeletal:   no joint swelling  vascular: R radial marlena and RIJ CVC  Skin:    no rash, b/l LE ulcers with dressing                          9.2    10.51 )-----------( 148      ( 02 Mar 2024 07:59 )             27.8       03-02    134<L>  |  92<L>  |  43<H>  ----------------------------<  109<H>  3.8   |  28  |  2.08<H>    Ca    8.7      02 Mar 2024 07:59  Phos  4.4     03-02  Mg     2.3     03-02        Urinalysis Basic - ( 02 Mar 2024 07:59 )    Color: x / Appearance: x / SG: x / pH: x  Gluc: 109 mg/dL / Ketone: x  / Bili: x / Urobili: x   Blood: x / Protein: x / Nitrite: x   Leuk Esterase: x / RBC: x / WBC x   Sq Epi: x / Non Sq Epi: x / Bacteria: x        MICROBIOLOGY:  Vancomycin Level, Trough: 10.0 ug/mL (03-01-24 @ 11:49)  v  .Blood Blood-Peripheral  03-01-24   Growth in aerobic bottle: Gram Negative Rods  Growth in anaerobic bottle: Gram Negative Rods  --    Growth in aerobic bottle: Gram Negative Rods  Growth in anaerobic bottle: Gram Negative Rods      .Blood Blood  02-29-24   Growth in aerobic bottle: Gram Negative Rods  Growth in anaerobic bottle: Gram Negative Rods  --    Growth in aerobic bottle: Gram Negative Rods  Growth in anaerobic bottle: Gram Negative Rods      .Blood Blood  02-29-24   Growth in aerobic bottle: Gram Negative Rods  Growth in anaerobic bottle: Gram Negative Rods  Direct identification is available within approximately 3-5  hours either by Blood Panel Multiplexed PCR or Direct  MALDI-TOF. Details: [email or web address]  --  Blood Culture PCR                RADIOLOGY:  Images independently visualized and reviewed personally, findings as below  < from: Xray Chest 1 View- PORTABLE-Routine (02.26.24 @ 07:45) >  IMPRESSION:  Stable opacification of the left lower lung field, likely moderate   pleural effusion and atelectasis.    < end of copied text >  < from: IR Procedure (02.20.24 @ 14:36) >  IMPRESSION:  Technically successful transjugular liver biopsy with pressure   measurements.  Hepatic venous pressure gradient of 1mmHg with a   portosystemic gradient of 2 mmHg.    < end of copied text >  < from: TTE W or WO Ultrasound Enhancing Agent (02.26.24 @ 08:44) >  CONCLUSIONS:      1. Left ventricular cavity is moderately dilated. Left ventricular wall thickness is normal. Left ventricular systolic function is severely decreased.   2. Moderately enlarged right ventricular cavity size and moderately reduced systolic function.      < end of copied text >

## 2024-03-02 NOTE — PROGRESS NOTE ADULT - ASSESSMENT
63M, appears older than stated age, admitted to [facility] 24 PMHx HTN, GERD, HPL, chronic venous stasis dermatitis with bilateral leg swelling, and AS presented to [facility] on 2024 for bilateral leg pain and swelling found to have new acute HFrEF (EF 35-40% 2024) in setting of severe AS.     Hospital course:  24: Admitted to [place] new onset HFrEF EF 35% and anemia 2/2 AS,   24: medically stable for transferred to [facility] medicine service for continued care. during hospitalization hx of WCT/ PAF  Consults for Nephrology, HF, ID, Podiatry for medical optimization  2/15: 1 U PRBC, keep Hgb > 8.    s/p fall with headastrike   : s/p Liver biopsy, soft pressure post procedure--responded to fluid bolus  :  RLE doppler: No pseudoaneurysm in the right groin/ bilobed avascular fluid collection measuring 2.8 x 1.7 x 2.0 cm     : AVR/JOANNA Clip c/b intraop bleeding requirinu pRBCs on CPB 1u pRBCs post-CPB 1 PLT 1000 FEIBA  Prolene suture placed around IJ and V wire  : To Step Down, raegan, CT, +PW, marlena RIJ   Maintain mediastinal chest tubes Primicor discontinued. Lasix 40 BID initiated , Wound care to chronic venous stasis b/l LE. D/c RIJ   VSS - 9 hrs afib last evening- SR since 1:30am- on AMio gtt,  gtt - s/p right axillary marlena placed by Intensivist. garcia placed last evening lasix 40 iv x 1 given this am- echo tr ojn eff.   VSS - currently on lasix gtt - negative 2L in last 24 hrs. bid basic metabolic pt converted back into afib this am 100-120- currently on amio load - dobutrex gtt. d/c planning    d/c yesterday then resumed for decreased UO.  Remains a fib since yesterday, cont amio/heparin, eps following         Weight increased, lasix 80 iv x 1, then infusion increased , aldactone added .  Remains on heparin infusion.          Hydral added for afterload    NSR, on amio, EPS following, noted elevated tsh.  Repeat sent synthroid started.  Fluid overload, diuretics increased, 3% saline x 2, lasix 80iv x 1, lasix infusion 20mg/hr  3/1 febrile  overnight  and this am pan cx.  Plan to remove marlena, intro , garcia.  A line is from , intro   UA neg  Cont , lasix, f/u bun creat  3/2 VSS afebrile overnight.  97 r/s.  on lasix gtt @ 20mg/jr  negative 2366cc.  will monitor closely in sdu

## 2024-03-02 NOTE — PROGRESS NOTE ADULT - ASSESSMENT
63 m with HTN, GERD, AS, chronic venous stasis dermatitis with bilateral leg swelling, initially admitted to [facility] with JOCELIN and b/l leg pain and swelling, was seen by ID and was considered not infected just stasis dermatitis, received 5 days of keflex, wound cx showed serratia and strep dysgalactiae,  blood cx negative  S/P Bio AVR , 2/23Post op labile BP related to hypovolemia & cardiac dysfunction, intra op bleeding requiring blood & products   pt has been on vanco until 2/29 for post op course  now febrile      AS and CHF s/p AVR 2/23 now with new fever and serratia bacteremia 2/29 and 3/1, source unclear but previous wound cx had strep and serratia, u/a just hematuria  b/l LE stasis dermatitis and chronic wounds, no tenderness and appears stasis dermatitis not infection    * repeat blood cx  * c/w cefepime   * chest/abd CT  * follow  wound care  * monitor CBC/diff and CMP      The above assessment and plan was discussed with the primary team    Pamela Bellamy MD  contact on teams  After 5pm and on weekends call [phone].

## 2024-03-02 NOTE — PROGRESS NOTE ADULT - SUBJECTIVE AND OBJECTIVE BOX
[facility] NEPHROLOGY SERVICES, [facility]  NEPHROLOGY AND HYPERTENSION  [address]  [address]  [place] [address]  [phone]    MD JEAN PARSONS, MD GEOFFREY FREEMAN MD CHRISTOPHER CAPUTO, MD EDWARD BOVER, MD          Patient events noted    MEDICATIONS  (STANDING):  aMIOdarone    Tablet 200 milliGRAM(s) Oral daily  aMIOdarone    Tablet   Oral   aMIOdarone Infusion 0.5 mG/Min (16.7 mL/Hr) IV Continuous <Continuous>  aspirin  chewable 81 milliGRAM(s) Oral daily  buMETAnide Infusion 2 mG/Hr (10 mL/Hr) IV Continuous <Continuous>  cefepime   IVPB 1000 milliGRAM(s) IV Intermittent every 12 hours  chlorhexidine 2% Cloths 1 Application(s) Topical daily  chlorothiazide IVPB 500 milliGRAM(s) IV Intermittent two times a day  heparin  Infusion 800 Unit(s)/Hr (19.5 mL/Hr) IV Continuous <Continuous>  hydrALAZINE 50 milliGRAM(s) Oral every 8 hours  levothyroxine 50 MICROGram(s) Oral <User Schedule>  pantoprazole  Injectable 40 milliGRAM(s) IV Push daily  polyethylene glycol 3350 17 Gram(s) Oral daily  senna 2 Tablet(s) Oral at bedtime  sodium chloride 3% Bolus 150 milliLiter(s) IV Bolus <User Schedule>  spironolactone 25 milliGRAM(s) Oral daily    MEDICATIONS  (PRN):  acetaminophen     Tablet .. 650 milliGRAM(s) Oral every 6 hours PRN Temp greater or equal to 38C (100.4F), Mild Pain (1 - 3)  bisacodyl Suppository 10 milliGRAM(s) Rectal daily PRN Constipation      24 @ 07:  -  24 @ 07:00  --------------------------------------------------------  IN: 1229.7 mL / OUT: 3901 mL / NET: -2671.3 mL    24 @ 07:01  -  24 @ 20:41  --------------------------------------------------------  IN: 705.5 mL / OUT: 3000 mL / NET: -2294.5 mL      PHYSICAL EXAM:      T(C): 36.8 (24 @ 19:31), Max: 37.2 (24 @ 22:19)  HR: 70 (24 @ 19:31) (63 - 71)  BP: 117/72 (24 @ 19:31) (104/66 - 123/68)  RR: 18 (24 @ 19:31) (18 - 18)  SpO2: 97% (24 @ 19:31) (96% - 98%)  Wt(kg): --  Lungs decreased BS Left base  Heart S1S2  Abd soft NT ND  Extremities:   1-2 edema                                    9.2    10.51 )-----------( 148      ( 02 Mar 2024 07:59 )             27.8     03-02    134<L>  |  92<L>  |  43<H>  ----------------------------<  109<H>  3.8   |  28  |  2.08<H>    Ca    8.7      02 Mar 2024 07:59  Phos  4.4     03-02  Mg     2.3     03-02          Creatinine Trend: 2.08<--, 2.06<--, 2.07<--, 1.79<--, 1.86<--, 1.88<--    A/P:    CM, EF 30%, severe AS, Afib, CHF  Tx from [facility] to [facility] , s/p [facility]   S/p cardio-renal/hemodynamic LINDA (peak Cr 2.5 - 2/3, lorna Cr 1.23 - )   UA w/RBCs, otherwise negative   Imaging w/o hydro 24  S/p CT w/IV dye 24  Stable renal fx post CT  S/p AVR, JOANNA clip   GNR bacteremia, Abx, w/up per ID   Hemodynamic/cardio-renal now septic LINDA/CKD   On , Bumex qtt, Chlorothiazide and Aldactone   If Cr continues to rise, would moderate diuresis   Avoid nephrotoxins as possible   Avoid hypotension   F/u BMP, UO    Pedrito Irving MD

## 2024-03-02 NOTE — PROGRESS NOTE ADULT - SUBJECTIVE AND OBJECTIVE BOX
Patient seen and examined at bedside.    Overnight Events: **INCOMPLETE NOTE**    Telemetry:         Review of Systems: Negative except as per HPI     Current Meds:  acetaminophen     Tablet .. 650 milliGRAM(s) Oral every 6 hours PRN  aMIOdarone    Tablet   Oral   aMIOdarone    Tablet 200 milliGRAM(s) Oral daily  aMIOdarone Infusion 0.5 mG/Min IV Continuous <Continuous>  aspirin  chewable 81 milliGRAM(s) Oral daily  bisacodyl Suppository 10 milliGRAM(s) Rectal daily PRN  buMETAnide Infusion 2 mG/Hr IV Continuous <Continuous>  cefepime   IVPB 1000 milliGRAM(s) IV Intermittent every 12 hours  chlorhexidine 2% Cloths 1 Application(s) Topical daily  chlorothiazide IVPB 500 milliGRAM(s) IV Intermittent two times a day  DOBUTamine Infusion 1.251 MICROgram(s)/kG/Min IV Continuous <Continuous>  heparin  Infusion 800 Unit(s)/Hr IV Continuous <Continuous>  hydrALAZINE 25 milliGRAM(s) Oral every 8 hours  levothyroxine 50 MICROGram(s) Oral <User Schedule>  pantoprazole  Injectable 40 milliGRAM(s) IV Push daily  polyethylene glycol 3350 17 Gram(s) Oral daily  senna 2 Tablet(s) Oral at bedtime  sodium chloride 3% Bolus 150 milliLiter(s) IV Bolus <User Schedule>  spironolactone 25 milliGRAM(s) Oral daily      Vitals:  T(F): 98.9 (03-02), Max: 102 (03-01)  HR: 63 (03-02) (63 - 84)  BP: 115/68 (03-02) (93/55 - 119/72)  RR: 18 (03-02)  SpO2: 97% (03-02)  I&O's Summary    01 Mar 2024 07:01  -  02 Mar 2024 07:00  --------------------------------------------------------  IN: 1184.7 mL / OUT: 3551 mL / NET: -2366.3 mL        Physical Exam:  Appearance: No acute distress; well appearing  Eyes: PERRL, EOMI, pink conjunctiva  HEENT: Normal oral mucosa  Cardiovascular: RRR, S1, S2, no murmurs, rubs, or gallops; no edema; no JVD  Respiratory: Clear to auscultation bilaterally  Gastrointestinal: soft, non-tender, non-distended with normal bowel sounds  Musculoskeletal: No clubbing; no joint deformity   Neurologic: Non-focal  Lymphatic: No lymphadenopathy  Psychiatry: AAOx3, mood & affect appropriate  Skin: No rashes, ecchymoses, or cyanosis                          7.6    10.20 )-----------( 130      ( 01 Mar 2024 06:49 )             23.1     03-01    135  |  94<L>  |  38<H>  ----------------------------<  130<H>  3.9   |  29  |  2.06<H>    Ca    8.5      01 Mar 2024 20:56  Phos  3.4     03-01  Mg     1.9     03-01      PTT - ( 01 Mar 2024 13:02 )  PTT:27.4 sec     Patient seen and examined at bedside.    Overnight Events: NAEON, feeling much better today, still GNR pos in the blood, c/o throat pain     Review of Systems: Negative except as per HPI     Current Meds:  acetaminophen     Tablet .. 650 milliGRAM(s) Oral every 6 hours PRN  aMIOdarone    Tablet   Oral   aMIOdarone    Tablet 200 milliGRAM(s) Oral daily  aMIOdarone Infusion 0.5 mG/Min IV Continuous <Continuous>  aspirin  chewable 81 milliGRAM(s) Oral daily  bisacodyl Suppository 10 milliGRAM(s) Rectal daily PRN  buMETAnide Infusion 2 mG/Hr IV Continuous <Continuous>  cefepime   IVPB 1000 milliGRAM(s) IV Intermittent every 12 hours  chlorhexidine 2% Cloths 1 Application(s) Topical daily  chlorothiazide IVPB 500 milliGRAM(s) IV Intermittent two times a day  DOBUTamine Infusion 1.251 MICROgram(s)/kG/Min IV Continuous <Continuous>  heparin  Infusion 800 Unit(s)/Hr IV Continuous <Continuous>  hydrALAZINE 25 milliGRAM(s) Oral every 8 hours  levothyroxine 50 MICROGram(s) Oral <User Schedule>  pantoprazole  Injectable 40 milliGRAM(s) IV Push daily  polyethylene glycol 3350 17 Gram(s) Oral daily  senna 2 Tablet(s) Oral at bedtime  sodium chloride 3% Bolus 150 milliLiter(s) IV Bolus <User Schedule>  spironolactone 25 milliGRAM(s) Oral daily      Vitals:  T(F): 98.9 (03-02), Max: 102 (03-01)  HR: 63 (03-02) (63 - 84)  BP: 115/68 (03-02) (93/55 - 119/72)  RR: 18 (03-02)  SpO2: 97% (03-02)  I&O's Summary    01 Mar 2024 07:01  -  02 Mar 2024 07:00  --------------------------------------------------------  IN: 1184.7 mL / OUT: 3551 mL / NET: -2366.3 mL        Physical Exam:  Appearance: No acute distress; well appearing  Eyes: PERRL, EOMI, pink conjunctiva  HEENT: Normal oral mucosa  Cardiovascular: RRR, S1, S2, no murmurs, rubs, or gallops; no edema; no JVD  Respiratory: Clear to auscultation bilaterally  Gastrointestinal: soft, non-tender, non-distended with normal bowel sounds  Musculoskeletal: No clubbing; no joint deformity   Neurologic: Non-focal  Lymphatic: No lymphadenopathy  Psychiatry: AAOx3, mood & affect appropriate  Skin: No rashes, ecchymoses, or cyanosis                          7.6    10.20 )-----------( 130      ( 01 Mar 2024 06:49 )             23.1     03-01    135  |  94<L>  |  38<H>  ----------------------------<  130<H>  3.9   |  29  |  2.06<H>    Ca    8.5      01 Mar 2024 20:56  Phos  3.4     03-01  Mg     1.9     03-01      PTT - ( 01 Mar 2024 13:02 )  PTT:27.4 sec

## 2024-03-02 NOTE — PROGRESS NOTE ADULT - ASSESSMENT
Briefly, 64 y/o M w/ h/o HTN, severe AS, chronic venous stasis, HFpEF who p/w worsening LE swelling to GC  and found to be in decompensated heart failure with EF 35-40% and severe AS so transferred for further management on . Was diuresed and underwent LHC which was non-obstructive. Underwent bioAVR  w/ JOANNA clip complicated by bleeding. Had been on primacor which was discontinued. Had afib  with hypotension requiring albumin and amio (converted to sinus), placed on  with improvement. Remains overloaded but otherwise perfusing well. Repeat TTE  with EF 25-30%, severe biatrial enlargement, mild RV dysfunction, LVOT VTI 13 cm, dilated IVC, trace pericardial effusion. Had recurrent afib, started on Amiodarone load; converted to sinus.     Remains volume expanded, will further augment diuretic regimen and escalate afterload reducing agents. To obtain VBG from central line this afternoon and if CO/CI (F) adequate, trial wean off low dose  and d/c TLC given fever.     Cardiac Studies  ·	TTE 24: LVIDd 6 cm, LVEF 30% (global), mod RVE with mod reduced function (TAPSE 0.9), severe ARAVIND, mild TR, est PASP 25 mmHg, trace pericardial effusion, IVC normal in size

## 2024-03-02 NOTE — PROGRESS NOTE ADULT - SUBJECTIVE AND OBJECTIVE BOX
VITAL SIGNS-Telemetry:  SR 1st deg. 60-90  Vital Signs Last 24 Hrs  T(C): 37.2 (03-02-24 @ 02:24), Max: 38.9 (03-01-24 @ 11:20)  T(F): 98.9 (03-02-24 @ 02:24), Max: 102 (03-01-24 @ 11:20)  HR: 63 (03-02-24 @ 04:57) (63 - 84)  BP: 115/68 (03-02-24 @ 04:57) (93/55 - 119/72)  RR: 18 (03-02-24 @ 04:57) (18 - 18)  SpO2: 97% (03-02-24 @ 04:57) (95% - 99%)         03-01 @ 07:01  -  03-02 @ 07:00  --------------------------------------------------------  IN: 1184.7 mL / OUT: 3551 mL / NET: -2366.3 mL    Daily     Daily     Pacing Wires        [  ]   Settings:                                  Isolated  [ x ]    PHYSICAL EXAM:  Neurology: alert and oriented x 3, nonfocal, no gross deficits  CV : S1S2  Sternal Wound :  CDI , Stable  Lungs: cta  Abdomen: soft, nontender, nondistended, positive bowel sounds, last bowel movement         Extremities: +edema  no calf tenderness        acetaminophen     Tablet .. 650 milliGRAM(s) Oral every 6 hours PRN  aMIOdarone    Tablet   Oral   aMIOdarone    Tablet 200 milliGRAM(s) Oral daily  aMIOdarone Infusion 0.5 mG/Min IV Continuous <Continuous>  aspirin  chewable 81 milliGRAM(s) Oral daily  bisacodyl Suppository 10 milliGRAM(s) Rectal daily PRN  buMETAnide Infusion 2 mG/Hr IV Continuous <Continuous>  cefepime   IVPB 1000 milliGRAM(s) IV Intermittent every 12 hours  chlorhexidine 2% Cloths 1 Application(s) Topical daily  chlorothiazide IVPB 500 milliGRAM(s) IV Intermittent two times a day  DOBUTamine Infusion 1.251 MICROgram(s)/kG/Min IV Continuous <Continuous>  heparin  Infusion 800 Unit(s)/Hr IV Continuous <Continuous>  hydrALAZINE 25 milliGRAM(s) Oral every 8 hours  levothyroxine 50 MICROGram(s) Oral <User Schedule>  pantoprazole  Injectable 40 milliGRAM(s) IV Push daily  polyethylene glycol 3350 17 Gram(s) Oral daily  senna 2 Tablet(s) Oral at bedtime  sodium chloride 3% Bolus 150 milliLiter(s) IV Bolus <User Schedule>  spironolactone 25 milliGRAM(s) Oral daily    Physical Therapy Rec:   Home  [  ]   Home w/ PT  [ x ]  Rehab  [  ]  Discussed with Cardiothoracic Team at AM rounds.

## 2024-03-03 DIAGNOSIS — R13.10 DYSPHAGIA, UNSPECIFIED: ICD-10-CM

## 2024-03-03 LAB
-  AMPICILLIN/SULBACTAM: SIGNIFICANT CHANGE UP
-  AMPICILLIN: SIGNIFICANT CHANGE UP
-  AZTREONAM: SIGNIFICANT CHANGE UP
-  CEFAZOLIN: SIGNIFICANT CHANGE UP
-  CEFEPIME: SIGNIFICANT CHANGE UP
-  CEFOXITIN: SIGNIFICANT CHANGE UP
-  CEFTRIAXONE: SIGNIFICANT CHANGE UP
-  CIPROFLOXACIN: SIGNIFICANT CHANGE UP
-  ERTAPENEM: SIGNIFICANT CHANGE UP
-  GENTAMICIN: SIGNIFICANT CHANGE UP
-  LEVOFLOXACIN: SIGNIFICANT CHANGE UP
-  MEROPENEM: SIGNIFICANT CHANGE UP
-  PIPERACILLIN/TAZOBACTAM: SIGNIFICANT CHANGE UP
-  TOBRAMYCIN: SIGNIFICANT CHANGE UP
-  TRIMETHOPRIM/SULFAMETHOXAZOLE: SIGNIFICANT CHANGE UP
ALBUMIN SERPL ELPH-MCNC: 3.2 G/DL — LOW (ref 3.3–5)
ALP SERPL-CCNC: 107 U/L — SIGNIFICANT CHANGE UP (ref 40–120)
ALT FLD-CCNC: 20 U/L — SIGNIFICANT CHANGE UP (ref 10–45)
ANION GAP SERPL CALC-SCNC: 12 MMOL/L — SIGNIFICANT CHANGE UP (ref 5–17)
ANION GAP SERPL CALC-SCNC: 14 MMOL/L — SIGNIFICANT CHANGE UP (ref 5–17)
APTT BLD: 60.8 SEC — HIGH (ref 24.5–35.6)
APTT BLD: 62.7 SEC — HIGH (ref 24.5–35.6)
AST SERPL-CCNC: 16 U/L — SIGNIFICANT CHANGE UP (ref 10–40)
BILIRUB SERPL-MCNC: 1.7 MG/DL — HIGH (ref 0.2–1.2)
BUN SERPL-MCNC: 45 MG/DL — HIGH (ref 7–23)
BUN SERPL-MCNC: 48 MG/DL — HIGH (ref 7–23)
CALCIUM SERPL-MCNC: 8.9 MG/DL — SIGNIFICANT CHANGE UP (ref 8.4–10.5)
CALCIUM SERPL-MCNC: 9.1 MG/DL — SIGNIFICANT CHANGE UP (ref 8.4–10.5)
CHLORIDE SERPL-SCNC: 89 MMOL/L — LOW (ref 96–108)
CHLORIDE SERPL-SCNC: 91 MMOL/L — LOW (ref 96–108)
CO2 SERPL-SCNC: 31 MMOL/L — SIGNIFICANT CHANGE UP (ref 22–31)
CO2 SERPL-SCNC: 32 MMOL/L — HIGH (ref 22–31)
CREAT SERPL-MCNC: 1.98 MG/DL — HIGH (ref 0.5–1.3)
CREAT SERPL-MCNC: 2.2 MG/DL — HIGH (ref 0.5–1.3)
CULTURE RESULTS: ABNORMAL
EGFR: 33 ML/MIN/1.73M2 — LOW
EGFR: 37 ML/MIN/1.73M2 — LOW
GLUCOSE SERPL-MCNC: 122 MG/DL — HIGH (ref 70–99)
GLUCOSE SERPL-MCNC: 183 MG/DL — HIGH (ref 70–99)
HCT VFR BLD CALC: 29.8 % — LOW (ref 39–50)
HCT VFR BLD CALC: 30.5 % — LOW (ref 39–50)
HGB BLD-MCNC: 10.4 G/DL — LOW (ref 13–17)
HGB BLD-MCNC: 10.6 G/DL — LOW (ref 13–17)
INR BLD: 1.23 RATIO — HIGH (ref 0.85–1.18)
LACTATE SERPL-SCNC: 1.7 MMOL/L — SIGNIFICANT CHANGE UP (ref 0.5–2)
LACTATE SERPL-SCNC: 2.3 MMOL/L — HIGH (ref 0.5–2)
MAGNESIUM SERPL-MCNC: 2.2 MG/DL — SIGNIFICANT CHANGE UP (ref 1.6–2.6)
MCHC RBC-ENTMCNC: 24.7 PG — LOW (ref 27–34)
MCHC RBC-ENTMCNC: 25.2 PG — LOW (ref 27–34)
MCHC RBC-ENTMCNC: 34.8 GM/DL — SIGNIFICANT CHANGE UP (ref 32–36)
MCHC RBC-ENTMCNC: 34.9 GM/DL — SIGNIFICANT CHANGE UP (ref 32–36)
MCV RBC AUTO: 71.1 FL — LOW (ref 80–100)
MCV RBC AUTO: 72.3 FL — LOW (ref 80–100)
METHOD TYPE: SIGNIFICANT CHANGE UP
NRBC # BLD: 0 /100 WBCS — SIGNIFICANT CHANGE UP (ref 0–0)
NRBC # BLD: 0 /100 WBCS — SIGNIFICANT CHANGE UP (ref 0–0)
ORGANISM # SPEC MICROSCOPIC CNT: ABNORMAL
PHOSPHATE SERPL-MCNC: 5 MG/DL — HIGH (ref 2.5–4.5)
PLATELET # BLD AUTO: 159 K/UL — SIGNIFICANT CHANGE UP (ref 150–400)
PLATELET # BLD AUTO: 164 K/UL — SIGNIFICANT CHANGE UP (ref 150–400)
POTASSIUM SERPL-MCNC: 3.5 MMOL/L — SIGNIFICANT CHANGE UP (ref 3.5–5.3)
POTASSIUM SERPL-MCNC: 3.7 MMOL/L — SIGNIFICANT CHANGE UP (ref 3.5–5.3)
POTASSIUM SERPL-SCNC: 3.5 MMOL/L — SIGNIFICANT CHANGE UP (ref 3.5–5.3)
POTASSIUM SERPL-SCNC: 3.7 MMOL/L — SIGNIFICANT CHANGE UP (ref 3.5–5.3)
PROT SERPL-MCNC: 6.5 G/DL — SIGNIFICANT CHANGE UP (ref 6–8.3)
PROTHROM AB SERPL-ACNC: 12.8 SEC — SIGNIFICANT CHANGE UP (ref 9.5–13)
RBC # BLD: 4.12 M/UL — LOW (ref 4.2–5.8)
RBC # BLD: 4.29 M/UL — SIGNIFICANT CHANGE UP (ref 4.2–5.8)
RBC # FLD: 30.9 % — HIGH (ref 10.3–14.5)
RBC # FLD: 31.4 % — HIGH (ref 10.3–14.5)
SODIUM SERPL-SCNC: 133 MMOL/L — LOW (ref 135–145)
SODIUM SERPL-SCNC: 136 MMOL/L — SIGNIFICANT CHANGE UP (ref 135–145)
SPECIMEN SOURCE: SIGNIFICANT CHANGE UP
WBC # BLD: 12.04 K/UL — HIGH (ref 3.8–10.5)
WBC # BLD: 13.19 K/UL — HIGH (ref 3.8–10.5)
WBC # FLD AUTO: 12.04 K/UL — HIGH (ref 3.8–10.5)
WBC # FLD AUTO: 13.19 K/UL — HIGH (ref 3.8–10.5)

## 2024-03-03 PROCEDURE — 31505 DIAGNOSTIC LARYNGOSCOPY: CPT

## 2024-03-03 PROCEDURE — 71045 X-RAY EXAM CHEST 1 VIEW: CPT | Mod: 26

## 2024-03-03 PROCEDURE — 99233 SBSQ HOSP IP/OBS HIGH 50: CPT | Mod: GC

## 2024-03-03 PROCEDURE — 93010 ELECTROCARDIOGRAM REPORT: CPT

## 2024-03-03 RX ORDER — DIGOXIN 250 MCG
125 TABLET ORAL ONCE
Refills: 0 | Status: COMPLETED | OUTPATIENT
Start: 2024-03-03 | End: 2024-03-03

## 2024-03-03 RX ORDER — POTASSIUM BICARBONATE 978 MG/1
25 TABLET, EFFERVESCENT ORAL
Refills: 0 | Status: COMPLETED | OUTPATIENT
Start: 2024-03-03 | End: 2024-03-03

## 2024-03-03 RX ORDER — ALBUMIN HUMAN 25 %
250 VIAL (ML) INTRAVENOUS ONCE
Refills: 0 | Status: COMPLETED | OUTPATIENT
Start: 2024-03-03 | End: 2024-03-03

## 2024-03-03 RX ORDER — DIPHENHYDRAMINE HYDROCHLORIDE AND LIDOCAINE HYDROCHLORIDE AND ALUMINUM HYDROXIDE AND MAGNESIUM HYDRO
10 KIT EVERY 6 HOURS
Refills: 0 | Status: DISCONTINUED | OUTPATIENT
Start: 2024-03-03 | End: 2024-03-12

## 2024-03-03 RX ORDER — LIDOCAINE 4 G/100G
1 CREAM TOPICAL DAILY
Refills: 0 | Status: DISCONTINUED | OUTPATIENT
Start: 2024-03-03 | End: 2024-03-12

## 2024-03-03 RX ORDER — DIGOXIN 250 MCG
125 TABLET ORAL DAILY
Refills: 0 | Status: DISCONTINUED | OUTPATIENT
Start: 2024-03-04 | End: 2024-03-06

## 2024-03-03 RX ORDER — DIGOXIN 250 MCG
250 TABLET ORAL ONCE
Refills: 0 | Status: COMPLETED | OUTPATIENT
Start: 2024-03-03 | End: 2024-03-03

## 2024-03-03 RX ORDER — BUMETANIDE 0.25 MG/ML
1 INJECTION INTRAMUSCULAR; INTRAVENOUS
Qty: 20 | Refills: 0 | Status: DISCONTINUED | OUTPATIENT
Start: 2024-03-03 | End: 2024-03-04

## 2024-03-03 RX ORDER — AMIODARONE HYDROCHLORIDE 400 MG/1
150 TABLET ORAL ONCE
Refills: 0 | Status: COMPLETED | OUTPATIENT
Start: 2024-03-03 | End: 2024-03-03

## 2024-03-03 RX ADMIN — Medication 81 MILLIGRAM(S): at 13:01

## 2024-03-03 RX ADMIN — DIPHENHYDRAMINE HYDROCHLORIDE AND LIDOCAINE HYDROCHLORIDE AND ALUMINUM HYDROXIDE AND MAGNESIUM HYDRO 10 MILLILITER(S): KIT at 21:08

## 2024-03-03 RX ADMIN — POTASSIUM BICARBONATE 25 MILLIEQUIVALENT(S): 978 TABLET, EFFERVESCENT ORAL at 13:01

## 2024-03-03 RX ADMIN — CEFEPIME 100 MILLIGRAM(S): 1 INJECTION, POWDER, FOR SOLUTION INTRAMUSCULAR; INTRAVENOUS at 05:19

## 2024-03-03 RX ADMIN — SODIUM CHLORIDE 300 MILLILITER(S): 5 INJECTION, SOLUTION INTRAVENOUS at 17:13

## 2024-03-03 RX ADMIN — SPIRONOLACTONE 25 MILLIGRAM(S): 25 TABLET, FILM COATED ORAL at 06:49

## 2024-03-03 RX ADMIN — Medication 650 MILLIGRAM(S): at 07:19

## 2024-03-03 RX ADMIN — Medication 50 MICROGRAM(S): at 06:49

## 2024-03-03 RX ADMIN — Medication 250 MICROGRAM(S): at 14:26

## 2024-03-03 RX ADMIN — Medication 650 MILLIGRAM(S): at 06:49

## 2024-03-03 RX ADMIN — POLYETHYLENE GLYCOL 3350 17 GRAM(S): 17 POWDER, FOR SOLUTION ORAL at 13:02

## 2024-03-03 RX ADMIN — AMIODARONE HYDROCHLORIDE 200 MILLIGRAM(S): 400 TABLET ORAL at 06:49

## 2024-03-03 RX ADMIN — Medication 100 MILLIGRAM(S): at 05:17

## 2024-03-03 RX ADMIN — Medication 50 MILLIGRAM(S): at 13:01

## 2024-03-03 RX ADMIN — Medication 50 MILLIGRAM(S): at 06:49

## 2024-03-03 RX ADMIN — Medication 125 MICROGRAM(S): at 21:16

## 2024-03-03 RX ADMIN — CHLORHEXIDINE GLUCONATE 1 APPLICATION(S): 213 SOLUTION TOPICAL at 08:26

## 2024-03-03 RX ADMIN — POTASSIUM BICARBONATE 25 MILLIEQUIVALENT(S): 978 TABLET, EFFERVESCENT ORAL at 11:55

## 2024-03-03 RX ADMIN — POTASSIUM BICARBONATE 25 MILLIEQUIVALENT(S): 978 TABLET, EFFERVESCENT ORAL at 06:58

## 2024-03-03 RX ADMIN — Medication 125 MICROGRAM(S): at 08:58

## 2024-03-03 RX ADMIN — SODIUM CHLORIDE 300 MILLILITER(S): 5 INJECTION, SOLUTION INTRAVENOUS at 06:49

## 2024-03-03 RX ADMIN — PANTOPRAZOLE SODIUM 40 MILLIGRAM(S): 20 TABLET, DELAYED RELEASE ORAL at 13:01

## 2024-03-03 RX ADMIN — DIPHENHYDRAMINE HYDROCHLORIDE AND LIDOCAINE HYDROCHLORIDE AND ALUMINUM HYDROXIDE AND MAGNESIUM HYDRO 10 MILLILITER(S): KIT at 14:16

## 2024-03-03 RX ADMIN — HEPARIN SODIUM 20 UNIT(S)/HR: 5000 INJECTION INTRAVENOUS; SUBCUTANEOUS at 02:59

## 2024-03-03 RX ADMIN — POTASSIUM BICARBONATE 25 MILLIEQUIVALENT(S): 978 TABLET, EFFERVESCENT ORAL at 14:15

## 2024-03-03 RX ADMIN — Medication 100 MILLIGRAM(S): at 17:45

## 2024-03-03 RX ADMIN — Medication 50 MILLIGRAM(S): at 21:17

## 2024-03-03 RX ADMIN — CEFEPIME 100 MILLIGRAM(S): 1 INJECTION, POWDER, FOR SOLUTION INTRAMUSCULAR; INTRAVENOUS at 17:45

## 2024-03-03 RX ADMIN — AMIODARONE HYDROCHLORIDE 600 MILLIGRAM(S): 400 TABLET ORAL at 08:58

## 2024-03-03 RX ADMIN — LIDOCAINE 1 PATCH: 4 CREAM TOPICAL at 21:19

## 2024-03-03 RX ADMIN — POTASSIUM BICARBONATE 25 MILLIEQUIVALENT(S): 978 TABLET, EFFERVESCENT ORAL at 02:50

## 2024-03-03 NOTE — PROGRESS NOTE ADULT - SUBJECTIVE AND OBJECTIVE BOX
[facility] NEPHROLOGY SERVICES, [facility]  NEPHROLOGY AND HYPERTENSION  [address]  [address]  [place] [address]  [phone]    MD JEAN PARSONS MD YELENA ROSENBERG, MD BINNY KOSHY, MD CHRISTOPHER CAPUTO, MD EDWARD BOVER, MD          Patient events noted  No distress    MEDICATIONS  (STANDING):  aMIOdarone    Tablet   Oral   aMIOdarone    Tablet 200 milliGRAM(s) Oral daily  aMIOdarone Infusion 0.5 mG/Min (16.7 mL/Hr) IV Continuous <Continuous>  aspirin  chewable 81 milliGRAM(s) Oral daily  buMETAnide Infusion 1 mG/Hr (5 mL/Hr) IV Continuous <Continuous>  cefepime   IVPB 1000 milliGRAM(s) IV Intermittent every 12 hours  chlorhexidine 2% Cloths 1 Application(s) Topical daily  chlorothiazide IVPB 500 milliGRAM(s) IV Intermittent two times a day  digoxin  Injectable 125 MICROGram(s) IV Push once  FIRST- Mouthwash  BLM 10 milliLiter(s) Swish and Swallow every 6 hours  heparin  Infusion 800 Unit(s)/Hr (20 mL/Hr) IV Continuous <Continuous>  hydrALAZINE 50 milliGRAM(s) Oral every 8 hours  levothyroxine 50 MICROGram(s) Oral <User Schedule>  lidocaine   4% Patch 1 Patch Transdermal daily  pantoprazole  Injectable 40 milliGRAM(s) IV Push daily  polyethylene glycol 3350 17 Gram(s) Oral daily  senna 2 Tablet(s) Oral at bedtime  sodium chloride 3% Bolus 150 milliLiter(s) IV Bolus <User Schedule>  spironolactone 25 milliGRAM(s) Oral daily    MEDICATIONS  (PRN):  acetaminophen     Tablet .. 650 milliGRAM(s) Oral every 6 hours PRN Temp greater or equal to 38C (100.4F), Mild Pain (1 - 3)  benzocaine/menthol Lozenge 1 Lozenge Oral four times a day PRN Sore Throat  bisacodyl Suppository 10 milliGRAM(s) Rectal daily PRN Constipation      03-02-24 @ 07:01  -  03-03-24 @ 07:00  --------------------------------------------------------  IN: 1314.5 mL / OUT: 5575 mL / NET: -4260.5 mL    03-03-24 @ 07:01  -  03-03-24 @ 20:45  --------------------------------------------------------  IN: 625 mL / OUT: 1305 mL / NET: -680 mL      PHYSICAL EXAM:      T(C): 36.8 (03-03-24 @ 18:58), Max: 36.8 (03-03-24 @ 18:58)  HR: 69 (03-03-24 @ 18:58) (68 - 131)  BP: 113/64 (03-03-24 @ 18:58) (70/51 - 124/72)  RR: 18 (03-03-24 @ 18:58) (18 - 18)  SpO2: 96% (03-03-24 @ 18:58) (94% - 99%)  Wt(kg): --  Lungs decreased BS L>R  Heart S1S2  Abd soft NT ND  Extremities:   tr edema                                    10.4   13.19 )-----------( 159      ( 03 Mar 2024 11:02 )             29.8     03-03    136  |  91<L>  |  48<H>  ----------------------------<  183<H>  3.5   |  31  |  2.20<H>    Ca    8.9      03 Mar 2024 11:02  Phos  5.0     03-03  Mg     2.2     03-03    TPro  6.5  /  Alb  3.2<L>  /  TBili  1.7<H>  /  DBili  x   /  AST  16  /  ALT  20  /  AlkPhos  107  03-03      LIVER FUNCTIONS - ( 03 Mar 2024 11:02 )  Alb: 3.2 g/dL / Pro: 6.5 g/dL / ALK PHOS: 107 U/L / ALT: 20 U/L / AST: 16 U/L / GGT: x           Creatinine Trend: 2.20<--, 1.98<--, 2.06<--, 2.08<--, 2.06<--, 2.07<--      A/P:    CM, EF 30%, severe AS, Afib, CHF  Tx from [facility] to [facility] 2/5, s/p [facility] 2/5  S/p cardio-renal/hemodynamic LINDA (peak Cr 2.5 - 2/3, lorna Cr 1.23 - 2/23)   UA w/RBCs, otherwise negative   Imaging w/o hydro 1/28/24  S/p CT w/IV dye 2/8/24  Stable renal fx post CT  S/p AVR, JOANNA clip 2/23  GNR bacteremia, Abx, w/up per ID   Hemodynamic/cardio-renal now septic LINDA/CKD   On Bumex qtt, Chlorothiazide and Aldactone   If Cr continues to rise, would moderate diuresis   Avoid nephrotoxins as possible   Avoid hypotension   F/u BMP, UO      Pedrito Irving MD

## 2024-03-03 NOTE — CONSULT NOTE ADULT - ASSESSMENT
64 y/o M w/ h/o HTN, severe AS, chronic venous stasis, HFpEF who p/w worsening LE swelling to GC  and found to be in decompensated heart failure with EF 35-40% and severe AS so transferred for further management on . Was diuresed and underwent LHC which was non-obstructive. Underwent bioAVR  w/ JOANNA clip complicated by bleeding. Had been on primacor which was discontinued. Had afib  with hypotension requiring albumin and amio (converted to sinus), placed on  with improvement. Remains overloaded but otherwise perfusing well. Repeat TTE  with EF 25-30%, severe biatrial enlargement, mild RV dysfunction, LVOT VTI 13 cm, dilated IVC, trace pericardial effusion. Had recurrent afib, started on Amiodarone load; converted to sinus. ENT called for dysphagia,  without obstruction or thrush noted on indirect laryngoscopy.

## 2024-03-03 NOTE — CONSULT NOTE ADULT - PROBLEM SELECTOR RECOMMENDATION 9
- Pt discussed in detail with Dr. Thrasher, plan to   - lidocaine swish and spit   - follow with speech for diet   - Pt is to follow up at [facility] ENT clinic upon discharge. Call [phone] to make appointment.

## 2024-03-03 NOTE — PROGRESS NOTE ADULT - PROBLEM SELECTOR PLAN 2
rapid afib - x 9hrs - SR since 1;30am   amio/bb    will consider a/c if pt converts back into afib  echo  tr jon eff 2/25 rapid afib - x 9hrs - SR since 1;30am 2/26  amio loaded  a/c with heparin gtt  echo 2/26 tr jon eff  3/3  afib w/ hypotension- dig load -renal dose  iv amio - pt w/ 2 hrs of afib - converted to SR @ 10:45am

## 2024-03-03 NOTE — PROGRESS NOTE ADULT - PROBLEM SELECTOR PLAN 1
-  stopped 3/1 and tolerated well  - c/w bumex gtt 2mg/hr  - 3% Hypertonic saline 150 cc over 30 mins twice a day  - c/w diuril twice/day  - on hydral 25 mg TID, hold for SBP <90; increase to 50 mg q8h   - continue Spironolactone 25 mg QD  - BMP and Mg q12hrs. Replete to target K 4-4.5 and Mg 2-2.5  - Continue with BLE ACE wrap to mobilize fluid -  stopped 3/1 and tolerated well  - c/w bumex gtt 2mg/hr  - 3% Hypertonic saline 150 cc over 30 mins twice a day  - c/w diuril twice/day  - on hydral \50 mg q8h   - continue Spironolactone 25 mg QD  - BMP and Mg q12hrs. Replete to target K 4-4.5 and Mg 2-2.5  - Continue with BLE ACE wrap to mobilize fluid

## 2024-03-03 NOTE — PROGRESS NOTE ADULT - PROBLEM SELECTOR PLAN 1
-Montior for dysrhythmias  -Wean O2 as needed/tolerated  -Trend daily CTS labs  -replete K>4.0 Mag >2.0  -Daily weight +/- diuresis  -ERAP protocol  -Pain management  -Progressive ambulation  -Bowel Regimen -Montior for dysrhythmias  -Wean O2 as needed/tolerated  -Trend daily CTS labs  -replete K>4.0 Mag >2.0  -Daily weight +/- diuresis  -Pain management  -Progressive ambulation  -Bowel Regimen  3/3 bumex gtt - 1mg/hr

## 2024-03-03 NOTE — PROGRESS NOTE ADULT - SUBJECTIVE AND OBJECTIVE BOX
VITAL SIGNS-Telemetry:    Vital Signs Last 24 Hrs  T(C): 36.6 (24 @ 02:54), Max: 36.8 (24 @ 19:31)  T(F): 97.8 (24 @ 02:54), Max: 98.3 (24 @ 19:31)  HR: 70 (24 @ 02:54) (64 - 70)  BP: 105/64 (24 @ 02:54) (105/64 - 124/72)  RR: 18 (24 @ 02:54) (18 - 18)  SpO2: 96% (24 @ 02:54) (94% - 98%)          @ 07:01  -   @ 07:00  --------------------------------------------------------  IN: 1229.7 mL / OUT: 3901 mL / NET: -2671.3 mL     @ 07:01  -  03 @ 06:15  --------------------------------------------------------  IN: 1264.5 mL / OUT: 4650 mL / NET: -3385.5 mL        Daily     Daily Weight in k.5 (03 Mar 2024 05:20)    CAPILLARY BLOOD GLUCOSE              Drains:     MS         [  ] Drainage:                 L Pleural  [  ]  Drainage:                R Pleural  [  ]  Drainage:  Pacing Wires        [  ]   Settings:                                  Isolated  [  ]  Coumadin    [ ] YES          [  ]      NO         Reason:       PHYSICAL EXAM:  Neurology: alert and oriented x 3, nonfocal, no gross deficits  CV :  Sternal Wound :  CDI , Stable  Lungs:  Abdomen: soft, nontender, nondistended, positive bowel sounds, last bowel movement         Extremities:         acetaminophen     Tablet .. 650 milliGRAM(s) Oral every 6 hours PRN  aMIOdarone    Tablet   Oral   aMIOdarone    Tablet 200 milliGRAM(s) Oral daily  aMIOdarone Infusion 0.5 mG/Min IV Continuous <Continuous>  aspirin  chewable 81 milliGRAM(s) Oral daily  benzocaine/menthol Lozenge 1 Lozenge Oral four times a day PRN  bisacodyl Suppository 10 milliGRAM(s) Rectal daily PRN  buMETAnide Infusion 2 mG/Hr IV Continuous <Continuous>  cefepime   IVPB 1000 milliGRAM(s) IV Intermittent every 12 hours  chlorhexidine 2% Cloths 1 Application(s) Topical daily  chlorothiazide IVPB 500 milliGRAM(s) IV Intermittent two times a day  heparin  Infusion 800 Unit(s)/Hr IV Continuous <Continuous>  hydrALAZINE 50 milliGRAM(s) Oral every 8 hours  levothyroxine 50 MICROGram(s) Oral <User Schedule>  pantoprazole  Injectable 40 milliGRAM(s) IV Push daily  polyethylene glycol 3350 17 Gram(s) Oral daily  senna 2 Tablet(s) Oral at bedtime  sodium chloride 3% Bolus 150 milliLiter(s) IV Bolus <User Schedule>  spironolactone 25 milliGRAM(s) Oral daily      Physical Therapy Rec:   Home  [  ]   Home w/ PT  [  ]  Rehab  [  ]  Discussed with Cardiothoracic Team at AM rounds. VITAL SIGNS-Telemetry:  SR 60-80 - converted to afib 9am  Vital Signs Last 24 Hrs  T(C): 36.6 (24 @ 02:54), Max: 36.8 (24 @ 19:31)  T(F): 97.8 (24 @ 02:54), Max: 98.3 (24 @ 19:31)  HR: 70 (24 @ 02:54) (64 - 70)  BP: 105/64 (24 @ 02:54) (105/64 - 124/72)  RR: 18 (24 @ 02:54) (18 - 18)  SpO2: 96% (24 @ 02:54) (94% - 98%)          @ 07:01  -   @ 07:00  --------------------------------------------------------  IN: 1229.7 mL / OUT: 3901 mL / NET: -2671.3 mL     @ 07:01  -   @ 06:15  --------------------------------------------------------  IN: 1264.5 mL / OUT: 4650 mL / NET: -3385.5 mL    Daily     Daily Weight in k.5 (03 Mar 2024 05:20)  :       PHYSICAL EXAM:  Neurology: alert and oriented x 3, nonfocal, no gross deficits  CV : S1S2  Sternal Wound :  CDI , Stable  Lungs: cta  Abdomen: soft, nontender, nondistended, positive bowel sounds, last bowel movement         Extremities:     + edema improved - b/l ace wraps in place  no calf tenderness    acetaminophen     Tablet .. 650 milliGRAM(s) Oral every 6 hours PRN  aMIOdarone    Tablet   Oral   aMIOdarone    Tablet 200 milliGRAM(s) Oral daily  aMIOdarone Infusion 0.5 mG/Min IV Continuous <Continuous>  aspirin  chewable 81 milliGRAM(s) Oral daily  benzocaine/menthol Lozenge 1 Lozenge Oral four times a day PRN  bisacodyl Suppository 10 milliGRAM(s) Rectal daily PRN  buMETAnide Infusion 2 mG/Hr IV Continuous <Continuous>  cefepime   IVPB 1000 milliGRAM(s) IV Intermittent every 12 hours  chlorhexidine 2% Cloths 1 Application(s) Topical daily  chlorothiazide IVPB 500 milliGRAM(s) IV Intermittent two times a day  heparin  Infusion 800 Unit(s)/Hr IV Continuous <Continuous>  hydrALAZINE 50 milliGRAM(s) Oral every 8 hours  levothyroxine 50 MICROGram(s) Oral <User Schedule>  pantoprazole  Injectable 40 milliGRAM(s) IV Push daily  polyethylene glycol 3350 17 Gram(s) Oral daily  senna 2 Tablet(s) Oral at bedtime  sodium chloride 3% Bolus 150 milliLiter(s) IV Bolus <User Schedule>  spironolactone 25 milliGRAM(s) Oral daily    Physical Therapy Rec:   Home  [  ]   Home w/ PT  [  ]  Rehab  [  ]  Discussed with Cardiothoracic Team at AM rounds. VITAL SIGNS-Telemetry:  SR 60-80 - converted to afib from 8:45a-10:45am 100-130  Vital Signs Last 24 Hrs  T(C): 36.6 (24 @ 02:54), Max: 36.8 (24 @ 19:31)  T(F): 97.8 (24 @ 02:54), Max: 98.3 (24 @ 19:31)  HR: 70 (24 @ 02:54) (64 - 70)  BP: 105/64 (24 @ 02:54) (105/64 - 124/72)  RR: 18 (24 @ 02:54) (18 - 18)  SpO2: 96% (24 @ 02:54) (94% - 98%)          @ 07:01  -   @ 07:00  --------------------------------------------------------  IN: 1229.7 mL / OUT: 3901 mL / NET: -2671.3 mL     @ 07:01  -   @ 06:15  --------------------------------------------------------  IN: 1264.5 mL / OUT: 4650 mL / NET: -3385.5 mL    Daily     Daily Weight in k.5 (03 Mar 2024 05:20)  :       PHYSICAL EXAM:  Neurology: alert and oriented x 3, nonfocal, no gross deficits  CV : S1S2  Sternal Wound :  CDI , Stable  Lungs: cta  Abdomen: soft, nontender, nondistended, positive bowel sounds, last bowel movement         Extremities:     + edema improved - b/l ace wraps in place  no calf tenderness    acetaminophen     Tablet .. 650 milliGRAM(s) Oral every 6 hours PRN  aMIOdarone    Tablet   Oral   aMIOdarone    Tablet 200 milliGRAM(s) Oral daily  aMIOdarone Infusion 0.5 mG/Min IV Continuous <Continuous>  aspirin  chewable 81 milliGRAM(s) Oral daily  benzocaine/menthol Lozenge 1 Lozenge Oral four times a day PRN  bisacodyl Suppository 10 milliGRAM(s) Rectal daily PRN  buMETAnide Infusion 2 mG/Hr IV Continuous <Continuous>  cefepime   IVPB 1000 milliGRAM(s) IV Intermittent every 12 hours  chlorhexidine 2% Cloths 1 Application(s) Topical daily  chlorothiazide IVPB 500 milliGRAM(s) IV Intermittent two times a day  heparin  Infusion 800 Unit(s)/Hr IV Continuous <Continuous>  hydrALAZINE 50 milliGRAM(s) Oral every 8 hours  levothyroxine 50 MICROGram(s) Oral <User Schedule>  pantoprazole  Injectable 40 milliGRAM(s) IV Push daily  polyethylene glycol 3350 17 Gram(s) Oral daily  senna 2 Tablet(s) Oral at bedtime  sodium chloride 3% Bolus 150 milliLiter(s) IV Bolus <User Schedule>  spironolactone 25 milliGRAM(s) Oral daily    Physical Therapy Rec:   Home  [  ]   Home w/ PT  [  ]  Rehab  [  ]  Discussed with Cardiothoracic Team at AM rounds.

## 2024-03-03 NOTE — CONSULT NOTE ADULT - SUBJECTIVE AND OBJECTIVE BOX
CC: dysphagia     HPI:  64 y/o M w/ h/o HTN, severe AS, chronic venous stasis, HFpEF who p/w worsening LE swelling to GC  and found to be in decompensated heart failure with EF 35-40% and severe AS so transferred for further management on . Was diuresed and underwent LHC which was non-obstructive. Underwent bioAVR  w/ JOANNA clip complicated by bleeding. Had been on primacor which was discontinued. Had afib  with hypotension requiring albumin and amio (converted to sinus), placed on  with improvement. Remains overloaded but otherwise perfusing well. Repeat TTE  with EF 25-30%, severe biatrial enlargement, mild RV dysfunction, LVOT VTI 13 cm, dilated IVC, trace pericardial effusion. Had recurrent afib, started on Amiodarone load; converted to sinus. ENT called for dysphagia. Pt was passed for diet by speech, however, pt noticed acute change in ability to swallow. He states its painful and difficult. PT denies any dysphonia, sob, dyspnea, changes in voice or inability to tolerated secretions. Pt feel deconditioned and fatigued.     PAST MEDICAL & SURGICAL HISTORY:  Dyslipidemia      Hypertension      Chronic GERD      History of aortic stenosis      Cellulitis      No significant past surgical history        Allergies    garlic (Angioedema; Swelling; Anaphylaxis)  No Known Drug Allergies    Intolerances      MEDICATIONS  (STANDING):  aMIOdarone    Tablet 200 milliGRAM(s) Oral daily  aMIOdarone    Tablet   Oral   aMIOdarone Infusion 0.5 mG/Min (16.7 mL/Hr) IV Continuous <Continuous>  aspirin  chewable 81 milliGRAM(s) Oral daily  buMETAnide Infusion 1 mG/Hr (5 mL/Hr) IV Continuous <Continuous>  cefepime   IVPB 1000 milliGRAM(s) IV Intermittent every 12 hours  chlorhexidine 2% Cloths 1 Application(s) Topical daily  chlorothiazide IVPB 500 milliGRAM(s) IV Intermittent two times a day  digoxin  Injectable 125 MICROGram(s) IV Push once  FIRST- Mouthwash  BLM 10 milliLiter(s) Swish and Swallow every 6 hours  heparin  Infusion 800 Unit(s)/Hr (20 mL/Hr) IV Continuous <Continuous>  hydrALAZINE 50 milliGRAM(s) Oral every 8 hours  levothyroxine 50 MICROGram(s) Oral <User Schedule>  pantoprazole  Injectable 40 milliGRAM(s) IV Push daily  polyethylene glycol 3350 17 Gram(s) Oral daily  senna 2 Tablet(s) Oral at bedtime  sodium chloride 3% Bolus 150 milliLiter(s) IV Bolus <User Schedule>  spironolactone 25 milliGRAM(s) Oral daily    MEDICATIONS  (PRN):  acetaminophen     Tablet .. 650 milliGRAM(s) Oral every 6 hours PRN Temp greater or equal to 38C (100.4F), Mild Pain (1 - 3)  benzocaine/menthol Lozenge 1 Lozenge Oral four times a day PRN Sore Throat  bisacodyl Suppository 10 milliGRAM(s) Rectal daily PRN Constipation      Social History: no tobacco, no etoh     Family history: Pt denies any sign FHx    ROS:   ENT: all negative except as noted in HPI   CV: denies palpitations  Pulm: denies SOB, cough, hemoptysis  GI: denies change in apetite, indigestion, n/v  : denies pertinent urinary symptoms, urgency  Neuro: denies numbness/tingling, loss of sensation  Psych: denies anxiety  MS: denies muscle weakness, instability  Heme: denies easy bruising or bleeding  Endo: denies heat/cold intolerance, excessive sweating  Vascular: denies LE edema    Vital Signs Last 24 Hrs  T(C): 36.6 (03 Mar 2024 02:54), Max: 36.8 (02 Mar 2024 19:31)  T(F): 97.8 (03 Mar 2024 02:54), Max: 98.3 (02 Mar 2024 19:31)  HR: 74 (03 Mar 2024 11:39) (68 - 131)  BP: 110/69 (03 Mar 2024 11:39) (70/51 - 124/72)  BP(mean): 85 (03 Mar 2024 11:39) (57 - 92)  RR: 18 (03 Mar 2024 11:39) (18 - 18)  SpO2: 99% (03 Mar 2024 11:39) (94% - 99%)    Parameters below as of 03 Mar 2024 11:39  Patient On (Oxygen Delivery Method): room air                              10.4   13.19 )-----------( 159      ( 03 Mar 2024 11:02 )             29.8    03    136  |  91<L>  |  48<H>  ----------------------------<  183<H>  3.5   |  31  |  2.20<H>    Ca    8.9      03 Mar 2024 11:02  Phos  5.0       Mg     2.2         TPro  6.5  /  Alb  3.2<L>  /  TBili  1.7<H>  /  DBili  x   /  AST  16  /  ALT  20  /  AlkPhos  107     PT/INR - ( 03 Mar 2024 05:56 )   PT: 12.8 sec;   INR: 1.23 ratio         PTT - ( 03 Mar 2024 11:02 )  PTT:62.7 sec    PHYSICAL EXAM:  Gen: NAD  Skin: No rashes, bruises, or lesions  Head: Normocephalic, Atraumatic  Face: no edema, erythema, or fluctuance. Parotid glands soft without mass  Eyes: no scleral injection tenderness, erythema, or ear bulging  Nose: Nares bilaterally patent, no discharge  Mouth: No Stridor / Drooling / Trismus.  Mucosa moist, tongue/uvula midline, oropharynx clear, no obvious thrush  Neck: Flat, supple, no lymphadenopathy, trachea midline, no masses  Lymphatic: No lymphadenopathy  Resp: breathing easily, no stridor  CV: no peripheral edema/cyanosis  GI: nondistended   Peripheral vascular: no JVD or edema  Neuro: facial nerve intact, no facial droop        Fiberoptic Indirect laryngoscopy:  (Scope #2 used) Reason for Laryngoscopy:    Patient was unable to cooperate with mirror.  Nasopharynx, oropharynx, and hypopharynx clear, no bleeding. Tongue base, posterior pharyngeal wall, vallecula, epiglottis, and subglottis appear normal. No erythema, edema, pooling of secretions, masses or lesions. Airway patent, no foreign body visualized. No glottic/supraglottic edema. True vocal cords, arytenoids, vestibular folds, ventricles, pyriform sinuses, and aryepiglottic folds appear normal bilaterally. Vocal cords mobile with good contact b/l. no thrush or obstruction

## 2024-03-03 NOTE — PROGRESS NOTE ADULT - SUBJECTIVE AND OBJECTIVE BOX
Patient seen and examined at bedside.    Overnight Events: Intermittent AF RVR with dec UOP and clammy appearing. Notes he is feeling ok, however c/f early CGS. Will get end organ markers, potentially upgrade to CTU for monitoring and possible DCCV    Review of Systems: Negative except as per HPI     Current Meds:  acetaminophen     Tablet .. 650 milliGRAM(s) Oral every 6 hours PRN  albumin human  5% IVPB 250 milliLiter(s) IV Intermittent once  aMIOdarone    Tablet   Oral   aMIOdarone    Tablet 200 milliGRAM(s) Oral daily  aMIOdarone Infusion 0.5 mG/Min IV Continuous <Continuous>  aspirin  chewable 81 milliGRAM(s) Oral daily  benzocaine/menthol Lozenge 1 Lozenge Oral four times a day PRN  bisacodyl Suppository 10 milliGRAM(s) Rectal daily PRN  buMETAnide Infusion 2 mG/Hr IV Continuous <Continuous>  cefepime   IVPB 1000 milliGRAM(s) IV Intermittent every 12 hours  chlorhexidine 2% Cloths 1 Application(s) Topical daily  chlorothiazide IVPB 500 milliGRAM(s) IV Intermittent two times a day  heparin  Infusion 800 Unit(s)/Hr IV Continuous <Continuous>  hydrALAZINE 50 milliGRAM(s) Oral every 8 hours  levothyroxine 50 MICROGram(s) Oral <User Schedule>  pantoprazole  Injectable 40 milliGRAM(s) IV Push daily  polyethylene glycol 3350 17 Gram(s) Oral daily  senna 2 Tablet(s) Oral at bedtime  sodium chloride 3% Bolus 150 milliLiter(s) IV Bolus <User Schedule>  spironolactone 25 milliGRAM(s) Oral daily      Vitals:  T(F): 97.8 (03-03), Max: 98.3 (03-02)  HR: 109 (03-03) (66 - 131)  BP: 97/53 (03-03) (70/51 - 124/72)  RR: 18 (03-03)  SpO2: 97% (03-03)  I&O's Summary    02 Mar 2024 07:01  -  03 Mar 2024 07:00  --------------------------------------------------------  IN: 1314.5 mL / OUT: 5575 mL / NET: -4260.5 mL    03 Mar 2024 07:01  -  03 Mar 2024 10:57  --------------------------------------------------------  IN: 190 mL / OUT: 80 mL / NET: 110 mL        Physical Exam:  Appearance: No acute distress; well appearing  Eyes: PERRL, EOMI, pink conjunctiva  HEENT: Normal oral mucosa  Cardiovascular: IIR, S1, S2, no murmurs, rubs, or gallops; no edema; no JVD  Respiratory: Clear to auscultation bilaterally  Gastrointestinal: soft, non-tender, non-distended with normal bowel sounds  Musculoskeletal: No clubbing; no joint deformity, mild clamminess    Neurologic: Non-focal  Lymphatic: No lymphadenopathy  Psychiatry: AAOx3, mood & affect appropriate  Skin: No rashes, ecchymoses, or cyanosis                          10.6   12.04 )-----------( 164      ( 03 Mar 2024 05:56 )             30.5     03-03    133<L>  |  89<L>  |  45<H>  ----------------------------<  122<H>  3.7   |  32<H>  |  1.98<H>    Ca    9.1      03 Mar 2024 05:56  Phos  5.0     03-03  Mg     2.2     03-03      PT/INR - ( 03 Mar 2024 05:56 )   PT: 12.8 sec;   INR: 1.23 ratio         PTT - ( 03 Mar 2024 05:56 )  PTT:60.8 sec

## 2024-03-03 NOTE — CONSULT NOTE ADULT - PROBLEM SELECTOR PROBLEM 1
Odynophagia
Acute on chronic combined systolic and diastolic congestive heart failure
Aortic stenosis

## 2024-03-03 NOTE — PROGRESS NOTE ADULT - PROBLEM SELECTOR PLAN 4
- paroxysmal; now in sinus  - s/p JOANNA clip  - on amio not responding  - On AC with Heparin infusion   - EP following  - May upgrade to CTU 3/3 for DCCV if e/o end organ hypoperfusion

## 2024-03-03 NOTE — PROGRESS NOTE ADULT - ASSESSMENT
63M, appears older than stated age, admitted to [facility] 24 PMHx HTN, GERD, HPL, chronic venous stasis dermatitis with bilateral leg swelling, and AS presented to [facility] on 2024 for bilateral leg pain and swelling found to have new acute HFrEF (EF 35-40% 2024) in setting of severe AS.     Hospital course:  24: Admitted to [place] new onset HFrEF EF 35% and anemia 2/2 AS,   24: medically stable for transferred to [facility] medicine service for continued care. during hospitalization hx of WCT/ PAF  Consults for Nephrology, HF, ID, Podiatry for medical optimization  2/15: 1 U PRBC, keep Hgb > 8.    s/p fall with headastrike   : s/p Liver biopsy, soft pressure post procedure--responded to fluid bolus  :  RLE doppler: No pseudoaneurysm in the right groin/ bilobed avascular fluid collection measuring 2.8 x 1.7 x 2.0 cm     : AVR/JOANNA Clip c/b intraop bleeding requirinu pRBCs on CPB 1u pRBCs post-CPB 1 PLT 1000 FEIBA  Prolene suture placed around IJ and V wire  : To Step Down, raegan, CT, +PW, marlena RIJ   Maintain mediastinal chest tubes Primicor discontinued. Lasix 40 BID initiated , Wound care to chronic venous stasis b/l LE. D/c RIJ   VSS - 9 hrs afib last evening- SR since 1:30am- on AMio gtt,  gtt - s/p right axillary marlena placed by Intensivist. garcia placed last evening lasix 40 iv x 1 given this am- echo tr jon eff.   VSS - currently on lasix gtt - negative 2L in last 24 hrs. bid basic metabolic pt converted back into afib this am 100-120- currently on amio load - dobutrex gtt. d/c planning    d/c yesterday then resumed for decreased UO.  Remains a fib since yesterday, cont amio/heparin, eps following         Weight increased, lasix 80 iv x 1, then infusion increased , aldactone added .  Remains on heparin infusion.          Hydral added for afterload    NSR, on amio, EPS following, noted elevated tsh.  Repeat sent synthroid started.  Fluid overload, diuretics increased, 3% saline x 2, lasix 80iv x 1, lasix infusion 20mg/hr  3/1 febrile  overnight  and this am pan cx.  Plan to remove marlena, intro , garcia.  A line is from , intro   UA neg  Cont , lasix, f/u bun creat  3/2 VSS afebrile overnight.  97 r/s.  on lasix gtt @ 20mg/jr  negative 2366cc.  will monitor closely in sdu     63M, appears older than stated age, admitted to [facility] 24 PMHx HTN, GERD, HPL, chronic venous stasis dermatitis with bilateral leg swelling, and AS presented to [facility] on 2024 for bilateral leg pain and swelling found to have new acute HFrEF (EF 35-40% 2024) in setting of severe AS.     Hospital course:  24: Admitted to [place] new onset HFrEF EF 35% and anemia 2/2 AS,   24: medically stable for transferred to [facility] medicine service for continued care. during hospitalization hx of WCT/ PAF  Consults for Nephrology, HF, ID, Podiatry for medical optimization  2/15: 1 U PRBC, keep Hgb > 8.    s/p fall with headastrike   : s/p Liver biopsy, soft pressure post procedure--responded to fluid bolus  :  RLE doppler: No pseudoaneurysm in the right groin/ bilobed avascular fluid collection measuring 2.8 x 1.7 x 2.0 cm     : AVR/JOANNA Clip c/b intraop bleeding requirinu pRBCs on CPB 1u pRBCs post-CPB 1 PLT 1000 FEIBA  Prolene suture placed around IJ and V wire  : To Step Down, raegan, CT, +PW, marlena RIJ   Maintain mediastinal chest tubes Primicor discontinued. Lasix 40 BID initiated , Wound care to chronic venous stasis b/l LE. D/c RIJ   VSS - 9 hrs afib last evening- SR since 1:30am- on AMio gtt,  gtt - s/p right axillary marlena placed by Intensivist. garcia placed last evening lasix 40 iv x 1 given this am- echo tr jon eff.   VSS - currently on lasix gtt - negative 2L in last 24 hrs. bid basic metabolic pt converted back into afib this am 100-120- currently on amio load - dobutrex gtt. d/c planning    d/c yesterday then resumed for decreased UO.  Remains a fib since yesterday, cont amio/heparin, eps following         Weight increased, lasix 80 iv x 1, then infusion increased , aldactone added .  Remains on heparin infusion.          Hydral added for afterload    NSR, on amio, EPS following, noted elevated tsh.  Repeat sent synthroid started.  Fluid overload, diuretics increased, 3% saline x 2, lasix 80iv x 1, lasix infusion 20mg/hr  3/1 febrile  overnight  and this am pan cx.  Plan to remove marlena, intro , garcia.  A line is from , intro   UA neg  Cont , lasix, f/u bun creat  3/2 VSS afebrile overnight.  97 r/s.  on lasix gtt @ 20mg/jr  negative 2366cc.  will monitor closely in sdu  3/3 Bumex gtt decreased to 1mg/hr per HF - @8:45am pt converted to afib -100-130s w/ hypotension while on commode attempting to have a bm- pt asymptomatic. brought back to bed.  amio 150 IV x 1 given w/ filter. dig load per HF - Dr. Rosario - .75mg total d/t sin.  pt u/o decreased while in afib- DR. Rosario @ bedside CXR done.  spoke to EP - Dr. Ghotra regarding DCCV - Call to CTU to set up transfer for DCCV - transfer cancelled d/t pt. converted to SR 68- w/ increase in SBP to 102 & U/O increasing.      63M, appears older than stated age, admitted to [facility] 24 PMHx HTN, GERD, HPL, chronic venous stasis dermatitis with bilateral leg swelling, and AS presented to [facility] on 2024 for bilateral leg pain and swelling found to have new acute HFrEF (EF 35-40% 2024) in setting of severe AS.     Hospital course:  24: Admitted to [place] new onset HFrEF EF 35% and anemia 2/2 AS,   24: medically stable for transferred to [facility] medicine service for continued care. during hospitalization hx of WCT/ PAF  Consults for Nephrology, HF, ID, Podiatry for medical optimization  2/15: 1 U PRBC, keep Hgb > 8.    s/p fall with headastrike   : s/p Liver biopsy, soft pressure post procedure--responded to fluid bolus  :  RLE doppler: No pseudoaneurysm in the right groin/ bilobed avascular fluid collection measuring 2.8 x 1.7 x 2.0 cm     : AVR/JOANNA Clip c/b intraop bleeding requirinu pRBCs on CPB 1u pRBCs post-CPB 1 PLT 1000 FEIBA  Prolene suture placed around IJ and V wire  : To Step Down, raegan, CT, +PW, marlena RIJ   Maintain mediastinal chest tubes Primicor discontinued. Lasix 40 BID initiated , Wound care to chronic venous stasis b/l LE. D/c RIJ   VSS - 9 hrs afib last evening- SR since 1:30am- on AMio gtt,  gtt - s/p right axillary marlena placed by Intensivist. garcia placed last evening lasix 40 iv x 1 given this am- echo tr jon eff.   VSS - currently on lasix gtt - negative 2L in last 24 hrs. bid basic metabolic pt converted back into afib this am 100-120- currently on amio load - dobutrex gtt. d/c planning    d/c yesterday then resumed for decreased UO.  Remains a fib since yesterday, cont amio/heparin, eps following         Weight increased, lasix 80 iv x 1, then infusion increased , aldactone added .  Remains on heparin infusion.          Hydral added for afterload    NSR, on amio, EPS following, noted elevated tsh.  Repeat sent synthroid started.  Fluid overload, diuretics increased, 3% saline x 2, lasix 80iv x 1, lasix infusion 20mg/hr  3/1 febrile  overnight  and this am pan cx.  Plan to remove marlena, intro , garcia.  A line is from , intro   UA neg  Cont , lasix, f/u bun creat  3/2 VSS afebrile overnight.  97 r/s.  on lasix gtt @ 20mg/jr  negative 2366cc.  will monitor closely in sdu  3/3 Bumex gtt decreased to 1mg/hr per HF - @8:45am pt converted to afib -100-130s w/ hypotension while on commode attempting to have a bm- pt asymptomatic. brought back to bed.  amio 150 IV x 1 given w/ filter. dig load per HF - Dr. Rosario - .75mg total d/t sin.  pt u/o decreased while in afib- DR. Rosario @ bedside CXR done.  spoke to EP - Dr. Gohtra regarding DCCV - Call to CTU to set up transfer for DCCV - transfer cancelled d/t pt. converted to SR 68- w/ increase in SBP to 102 & U/O increasing.  ENT consulted for c/o "hurts when i swallow"  ent exam rveals no rod.  lido s&s ordered.  pt eating w/o difficulty

## 2024-03-03 NOTE — PROGRESS NOTE ADULT - ASSESSMENT
Briefly, 64 y/o M w/ h/o HTN, severe AS, chronic venous stasis, HFpEF who p/w worsening LE swelling to GC  and found to be in decompensated heart failure with EF 35-40% and severe AS so transferred for further management on . Was diuresed and underwent LHC which was non-obstructive. Underwent bioAVR  w/ JOANNA clip complicated by bleeding. Had been on primacor which was discontinued. Had afib  with hypotension requiring albumin and amio (converted to sinus), placed on  with improvement. Remains overloaded but otherwise perfusing well. Repeat TTE  with EF 25-30%, severe biatrial enlargement, mild RV dysfunction, LVOT VTI 13 cm, dilated IVC, trace pericardial effusion. Had recurrent afib, started on Amiodarone load; converted to sinus.     Remains volume expanded, will further augment diuretic regimen and escalate afterload reducing agents. To obtain VBG from central line this afternoon and if CO/CI (F) adequate, trial wean off low dose  and d/c TLC given fever.     Cardiac Studies  ·	TTE 24: LVIDd 6 cm, LVEF 30% (global), mod RVE with mod reduced function (TAPSE 0.9), severe ARAVIND, mild TR, est PASP 25 mmHg, trace pericardial effusion, IVC normal in size Briefly, 62 y/o M w/ h/o HTN, severe AS, chronic venous stasis, HFpEF who p/w worsening LE swelling to GC  and found to be in decompensated heart failure with EF 35-40% and severe AS so transferred for further management on . Was diuresed and underwent LHC which was non-obstructive. Underwent bioAVR  w/ JOANNA clip complicated by bleeding. Had been on primacor which was discontinued. Had afib  with hypotension requiring albumin and amio (converted to sinus), placed on  with improvement. Remains overloaded but otherwise perfusing well. Repeat TTE  with EF 25-30%, severe biatrial enlargement, mild RV dysfunction, LVOT VTI 13 cm, dilated IVC, trace pericardial effusion. Had recurrent afib, started on Amiodarone load; converted to sinus.     Remains volume expanded, diuresing well to bumex gtt/HT saline/diuril.  was weaned off 3/1. Had fever with Serratia bacteremia started on antibiotics with improvement.     Cardiac Studies  ·	TTE 24: LVIDd 6 cm, LVEF 30% (global), mod RVE with mod reduced function (TAPSE 0.9), severe ARAVIND, mild TR, est PASP 25 mmHg, trace pericardial effusion, IVC normal in size

## 2024-03-04 LAB
ALBUMIN SERPL ELPH-MCNC: 3.6 G/DL — SIGNIFICANT CHANGE UP (ref 3.3–5)
ALBUMIN SERPL ELPH-MCNC: 3.9 G/DL — SIGNIFICANT CHANGE UP (ref 3.3–5)
ALP SERPL-CCNC: 100 U/L — SIGNIFICANT CHANGE UP (ref 40–120)
ALP SERPL-CCNC: 101 U/L — SIGNIFICANT CHANGE UP (ref 40–120)
ALT FLD-CCNC: 19 U/L — SIGNIFICANT CHANGE UP (ref 10–45)
ALT FLD-CCNC: 20 U/L — SIGNIFICANT CHANGE UP (ref 10–45)
ANION GAP SERPL CALC-SCNC: 12 MMOL/L — SIGNIFICANT CHANGE UP (ref 5–17)
ANION GAP SERPL CALC-SCNC: 13 MMOL/L — SIGNIFICANT CHANGE UP (ref 5–17)
ANISOCYTOSIS BLD QL: SIGNIFICANT CHANGE UP
APTT BLD: 67.3 SEC — HIGH (ref 24.5–35.6)
AST SERPL-CCNC: 17 U/L — SIGNIFICANT CHANGE UP (ref 10–40)
AST SERPL-CCNC: 19 U/L — SIGNIFICANT CHANGE UP (ref 10–40)
BASOPHILS # BLD AUTO: 0 K/UL — SIGNIFICANT CHANGE UP (ref 0–0.2)
BASOPHILS NFR BLD AUTO: 0 % — SIGNIFICANT CHANGE UP (ref 0–2)
BILIRUB SERPL-MCNC: 1.7 MG/DL — HIGH (ref 0.2–1.2)
BILIRUB SERPL-MCNC: 1.8 MG/DL — HIGH (ref 0.2–1.2)
BUN SERPL-MCNC: 48 MG/DL — HIGH (ref 7–23)
BUN SERPL-MCNC: 51 MG/DL — HIGH (ref 7–23)
BURR CELLS BLD QL SMEAR: PRESENT — SIGNIFICANT CHANGE UP
CALCIUM SERPL-MCNC: 9 MG/DL — SIGNIFICANT CHANGE UP (ref 8.4–10.5)
CALCIUM SERPL-MCNC: 9.6 MG/DL — SIGNIFICANT CHANGE UP (ref 8.4–10.5)
CHLORIDE SERPL-SCNC: 89 MMOL/L — LOW (ref 96–108)
CHLORIDE SERPL-SCNC: 89 MMOL/L — LOW (ref 96–108)
CO2 SERPL-SCNC: 33 MMOL/L — HIGH (ref 22–31)
CO2 SERPL-SCNC: 33 MMOL/L — HIGH (ref 22–31)
CREAT SERPL-MCNC: 2.33 MG/DL — HIGH (ref 0.5–1.3)
CREAT SERPL-MCNC: 2.55 MG/DL — HIGH (ref 0.5–1.3)
DACRYOCYTES BLD QL SMEAR: SLIGHT — SIGNIFICANT CHANGE UP
EGFR: 28 ML/MIN/1.73M2 — LOW
EGFR: 31 ML/MIN/1.73M2 — LOW
ELLIPTOCYTES BLD QL SMEAR: SLIGHT — SIGNIFICANT CHANGE UP
EOSINOPHIL # BLD AUTO: 0 K/UL — SIGNIFICANT CHANGE UP (ref 0–0.5)
EOSINOPHIL NFR BLD AUTO: 0 % — SIGNIFICANT CHANGE UP (ref 0–6)
GLUCOSE SERPL-MCNC: 135 MG/DL — HIGH (ref 70–99)
GLUCOSE SERPL-MCNC: 143 MG/DL — HIGH (ref 70–99)
HCT VFR BLD CALC: 30.4 % — LOW (ref 39–50)
HGB BLD-MCNC: 10.2 G/DL — LOW (ref 13–17)
HYPOCHROMIA BLD QL: SLIGHT — SIGNIFICANT CHANGE UP
INR BLD: 1.15 RATIO — SIGNIFICANT CHANGE UP (ref 0.85–1.18)
LACTATE SERPL-SCNC: 1.4 MMOL/L — SIGNIFICANT CHANGE UP (ref 0.5–2)
LYMPHOCYTES # BLD AUTO: 0.52 K/UL — LOW (ref 1–3.3)
LYMPHOCYTES # BLD AUTO: 4.4 % — LOW (ref 13–44)
MACROCYTES BLD QL: SLIGHT — SIGNIFICANT CHANGE UP
MAGNESIUM SERPL-MCNC: 2.2 MG/DL — SIGNIFICANT CHANGE UP (ref 1.6–2.6)
MANUAL SMEAR VERIFICATION: SIGNIFICANT CHANGE UP
MCHC RBC-ENTMCNC: 23.6 PG — LOW (ref 27–34)
MCHC RBC-ENTMCNC: 33.6 GM/DL — SIGNIFICANT CHANGE UP (ref 32–36)
MCV RBC AUTO: 70.2 FL — LOW (ref 80–100)
MICROCYTES BLD QL: SIGNIFICANT CHANGE UP
MONOCYTES # BLD AUTO: 0.93 K/UL — HIGH (ref 0–0.9)
MONOCYTES NFR BLD AUTO: 7.8 % — SIGNIFICANT CHANGE UP (ref 2–14)
NEUTROPHILS # BLD AUTO: 10.33 K/UL — HIGH (ref 1.8–7.4)
NEUTROPHILS NFR BLD AUTO: 85.2 % — HIGH (ref 43–77)
NEUTS BAND # BLD: 1.7 % — SIGNIFICANT CHANGE UP (ref 0–8)
OVALOCYTES BLD QL SMEAR: SLIGHT — SIGNIFICANT CHANGE UP
PHOSPHATE SERPL-MCNC: 4.4 MG/DL — SIGNIFICANT CHANGE UP (ref 2.5–4.5)
PLAT MORPH BLD: NORMAL — SIGNIFICANT CHANGE UP
PLATELET # BLD AUTO: 183 K/UL — SIGNIFICANT CHANGE UP (ref 150–400)
POLYCHROMASIA BLD QL SMEAR: SLIGHT — SIGNIFICANT CHANGE UP
POTASSIUM SERPL-MCNC: 4 MMOL/L — SIGNIFICANT CHANGE UP (ref 3.5–5.3)
POTASSIUM SERPL-MCNC: 4.9 MMOL/L — SIGNIFICANT CHANGE UP (ref 3.5–5.3)
POTASSIUM SERPL-SCNC: 4 MMOL/L — SIGNIFICANT CHANGE UP (ref 3.5–5.3)
POTASSIUM SERPL-SCNC: 4.9 MMOL/L — SIGNIFICANT CHANGE UP (ref 3.5–5.3)
PROT SERPL-MCNC: 6.6 G/DL — SIGNIFICANT CHANGE UP (ref 6–8.3)
PROT SERPL-MCNC: 7.1 G/DL — SIGNIFICANT CHANGE UP (ref 6–8.3)
PROTHROM AB SERPL-ACNC: 12.6 SEC — SIGNIFICANT CHANGE UP (ref 9.5–13)
RBC # BLD: 4.33 M/UL — SIGNIFICANT CHANGE UP (ref 4.2–5.8)
RBC # FLD: 30.1 % — HIGH (ref 10.3–14.5)
RBC BLD AUTO: ABNORMAL
SCHISTOCYTES BLD QL AUTO: SLIGHT — SIGNIFICANT CHANGE UP
SODIUM SERPL-SCNC: 134 MMOL/L — LOW (ref 135–145)
SODIUM SERPL-SCNC: 135 MMOL/L — SIGNIFICANT CHANGE UP (ref 135–145)
TARGETS BLD QL SMEAR: SLIGHT — SIGNIFICANT CHANGE UP
VARIANT LYMPHS # BLD: 0.9 % — SIGNIFICANT CHANGE UP (ref 0–6)
WBC # BLD: 11.89 K/UL — HIGH (ref 3.8–10.5)
WBC # FLD AUTO: 11.89 K/UL — HIGH (ref 3.8–10.5)

## 2024-03-04 PROCEDURE — 99233 SBSQ HOSP IP/OBS HIGH 50: CPT

## 2024-03-04 PROCEDURE — 99232 SBSQ HOSP IP/OBS MODERATE 35: CPT

## 2024-03-04 PROCEDURE — 93010 ELECTROCARDIOGRAM REPORT: CPT

## 2024-03-04 RX ORDER — FUROSEMIDE 40 MG
40 TABLET ORAL
Refills: 0 | Status: DISCONTINUED | OUTPATIENT
Start: 2024-03-04 | End: 2024-03-04

## 2024-03-04 RX ORDER — POTASSIUM BICARBONATE 978 MG/1
25 TABLET, EFFERVESCENT ORAL
Refills: 0 | Status: DISCONTINUED | OUTPATIENT
Start: 2024-03-04 | End: 2024-03-04

## 2024-03-04 RX ORDER — LIDOCAINE HCL 20 MG/ML
3 VIAL (ML) INJECTION ONCE
Refills: 0 | Status: COMPLETED | OUTPATIENT
Start: 2024-03-04 | End: 2024-03-05

## 2024-03-04 RX ORDER — FUROSEMIDE 40 MG
40 TABLET ORAL EVERY 24 HOURS
Refills: 0 | Status: DISCONTINUED | OUTPATIENT
Start: 2024-03-04 | End: 2024-03-04

## 2024-03-04 RX ORDER — POTASSIUM BICARBONATE 978 MG/1
25 TABLET, EFFERVESCENT ORAL
Refills: 0 | Status: COMPLETED | OUTPATIENT
Start: 2024-03-04 | End: 2024-03-04

## 2024-03-04 RX ORDER — ALBUMIN HUMAN 25 %
100 VIAL (ML) INTRAVENOUS EVERY 12 HOURS
Refills: 0 | Status: COMPLETED | OUTPATIENT
Start: 2024-03-04 | End: 2024-03-04

## 2024-03-04 RX ORDER — HYDRALAZINE HCL 50 MG
25 TABLET ORAL EVERY 8 HOURS
Refills: 0 | Status: DISCONTINUED | OUTPATIENT
Start: 2024-03-04 | End: 2024-03-06

## 2024-03-04 RX ORDER — AMIODARONE HYDROCHLORIDE 400 MG/1
150 TABLET ORAL ONCE
Refills: 0 | Status: COMPLETED | OUTPATIENT
Start: 2024-03-04 | End: 2024-03-04

## 2024-03-04 RX ORDER — SPIRONOLACTONE 25 MG/1
25 TABLET, FILM COATED ORAL EVERY 24 HOURS
Refills: 0 | Status: DISCONTINUED | OUTPATIENT
Start: 2024-03-04 | End: 2024-03-04

## 2024-03-04 RX ADMIN — Medication 50 MILLILITER(S): at 11:32

## 2024-03-04 RX ADMIN — DIPHENHYDRAMINE HYDROCHLORIDE AND LIDOCAINE HYDROCHLORIDE AND ALUMINUM HYDROXIDE AND MAGNESIUM HYDRO 10 MILLILITER(S): KIT at 11:34

## 2024-03-04 RX ADMIN — DIPHENHYDRAMINE HYDROCHLORIDE AND LIDOCAINE HYDROCHLORIDE AND ALUMINUM HYDROXIDE AND MAGNESIUM HYDRO 10 MILLILITER(S): KIT at 01:09

## 2024-03-04 RX ADMIN — Medication 25 MILLIGRAM(S): at 13:34

## 2024-03-04 RX ADMIN — SENNA PLUS 2 TABLET(S): 8.6 TABLET ORAL at 21:26

## 2024-03-04 RX ADMIN — Medication 50 MICROGRAM(S): at 05:25

## 2024-03-04 RX ADMIN — CEFEPIME 100 MILLIGRAM(S): 1 INJECTION, POWDER, FOR SOLUTION INTRAMUSCULAR; INTRAVENOUS at 17:51

## 2024-03-04 RX ADMIN — AMIODARONE HYDROCHLORIDE 600 MILLIGRAM(S): 400 TABLET ORAL at 03:50

## 2024-03-04 RX ADMIN — DIPHENHYDRAMINE HYDROCHLORIDE AND LIDOCAINE HYDROCHLORIDE AND ALUMINUM HYDROXIDE AND MAGNESIUM HYDRO 10 MILLILITER(S): KIT at 05:25

## 2024-03-04 RX ADMIN — Medication 40 MILLIGRAM(S): at 11:56

## 2024-03-04 RX ADMIN — Medication 125 MICROGRAM(S): at 02:23

## 2024-03-04 RX ADMIN — LIDOCAINE 1 PATCH: 4 CREAM TOPICAL at 03:33

## 2024-03-04 RX ADMIN — DIPHENHYDRAMINE HYDROCHLORIDE AND LIDOCAINE HYDROCHLORIDE AND ALUMINUM HYDROXIDE AND MAGNESIUM HYDRO 10 MILLILITER(S): KIT at 22:46

## 2024-03-04 RX ADMIN — POTASSIUM BICARBONATE 25 MILLIEQUIVALENT(S): 978 TABLET, EFFERVESCENT ORAL at 03:38

## 2024-03-04 RX ADMIN — Medication 81 MILLIGRAM(S): at 11:33

## 2024-03-04 RX ADMIN — Medication 50 MILLIGRAM(S): at 05:25

## 2024-03-04 RX ADMIN — SPIRONOLACTONE 25 MILLIGRAM(S): 25 TABLET, FILM COATED ORAL at 05:26

## 2024-03-04 RX ADMIN — Medication 100 MILLIGRAM(S): at 05:16

## 2024-03-04 RX ADMIN — Medication 25 MILLIGRAM(S): at 21:26

## 2024-03-04 RX ADMIN — CHLORHEXIDINE GLUCONATE 1 APPLICATION(S): 213 SOLUTION TOPICAL at 11:51

## 2024-03-04 RX ADMIN — DIPHENHYDRAMINE HYDROCHLORIDE AND LIDOCAINE HYDROCHLORIDE AND ALUMINUM HYDROXIDE AND MAGNESIUM HYDRO 10 MILLILITER(S): KIT at 17:46

## 2024-03-04 RX ADMIN — Medication 50 MILLILITER(S): at 17:46

## 2024-03-04 RX ADMIN — PANTOPRAZOLE SODIUM 40 MILLIGRAM(S): 20 TABLET, DELAYED RELEASE ORAL at 11:33

## 2024-03-04 RX ADMIN — AMIODARONE HYDROCHLORIDE 200 MILLIGRAM(S): 400 TABLET ORAL at 02:23

## 2024-03-04 RX ADMIN — CEFEPIME 100 MILLIGRAM(S): 1 INJECTION, POWDER, FOR SOLUTION INTRAMUSCULAR; INTRAVENOUS at 04:50

## 2024-03-04 RX ADMIN — SODIUM CHLORIDE 300 MILLILITER(S): 5 INJECTION, SOLUTION INTRAVENOUS at 05:26

## 2024-03-04 NOTE — PROGRESS NOTE ADULT - NS ATTEND AMEND GEN_ALL_CORE FT
Patient was seen and examined at bedside with the advanced care provider.  I agree with the above with the following exceptions:    He was weaned off dobutamine on 3/1.  He tolerated this well.  However, had been on aggressive diuresis through the weekend with Bumex drip 2 Mg/hr along with twice daily hypertonic saline infusion and IV Diuril.  Taken off of IV diuretics this a.m. by surgical team and started on lasix 40mg PO daily due to rising creatinine from 1.9–2.2–2.3.  STOP spironolactone.  On examination JVP around 12 cm sitting upright with 1-2+ pitting edema.  Legs are cool on examination.  I do feel that he may be slipping into a low output state again. TB elevated to 1.7 with lactate yesterday 2.3.  He did go into afib with RVR which he has proven he does not tolerate this well.  Please start on albumin infusion twice daily along with Lasix 40 mg IV BID.  Would aim for net -2 L today.  Check CMP and lactate this afternoon.  May need RHC tomorrow if ongoing concerns for low output.  Please have EP follow up with regards to rhythm control.

## 2024-03-04 NOTE — PROGRESS NOTE ADULT - SUBJECTIVE AND OBJECTIVE BOX
No distress    Vital Signs Last 24 Hrs  T(C): 36.7 (03-04-24 @ 15:23), Max: 36.8 (03-03-24 @ 18:58)  T(F): 98 (03-04-24 @ 15:23), Max: 98.3 (03-03-24 @ 18:58)  HR: 101 (03-04-24 @ 15:23) (69 - 115)  BP: 104/68 (03-04-24 @ 15:23) (93/65 - 113/64)  BP(mean): 81 (03-04-24 @ 15:23) (75 - 87)  RR: 18 (03-04-24 @ 15:23) (18 - 18)  SpO2: 94% (03-04-24 @ 15:23) (94% - 98%)    I&O's Detail    03 Mar 2024 07:01  -  04 Mar 2024 07:00  --------------------------------------------------------  IN:    Amiodarone: 100 mL    Bumetanide: 35 mL    Bumetanide: 60 mL    Heparin: 460 mL    IV PiggyBack: 450 mL    Oral Fluid: 50 mL  Total IN: 1155 mL    OUT:    Indwelling Catheter - Urethral (mL): 3655 mL  Total OUT: 3655 mL    04 Mar 2024 07:01  -  04 Mar 2024 16:41  --------------------------------------------------------  IN:    Bumetanide: 5 mL    Heparin: 60 mL    Oral Fluid: 420 mL  Total IN: 485 mL    OUT:    Indwelling Catheter - Urethral (mL): 700 mL  Total OUT: 700 mL    s1s2  b/l air entry  soft, ND  b/l LE edema                                                                                                   10.2   11.89 )-----------( 183      ( 04 Mar 2024 02:58 )             30.4     04 Mar 2024 15:24    135    |  89     |  51     ----------------------------<  135    4.9     |  33     |  2.55     Ca    9.6        04 Mar 2024 15:24  Phos  4.4       04 Mar 2024 02:58  Mg     2.2       04 Mar 2024 02:58    TPro  7.1    /  Alb  3.9    /  TBili  1.8    /  DBili  x      /  AST  19     /  ALT  19     /  AlkPhos  100    04 Mar 2024 15:24    LIVER FUNCTIONS - ( 04 Mar 2024 15:24 )  Alb: 3.9 g/dL / Pro: 7.1 g/dL / ALK PHOS: 100 U/L / ALT: 19 U/L / AST: 19 U/L / GGT: x           PT/INR - ( 04 Mar 2024 02:58 )   PT: 12.6 sec;   INR: 1.15 ratio      acetaminophen     Tablet .. 650 milliGRAM(s) Oral every 6 hours PRN  albumin human 25% IVPB 100 milliLiter(s) IV Intermittent every 12 hours  aMIOdarone    Tablet 200 milliGRAM(s) Oral daily  aMIOdarone    Tablet   Oral   aMIOdarone Infusion 0.5 mG/Min IV Continuous <Continuous>  aspirin  chewable 81 milliGRAM(s) Oral daily  benzocaine/menthol Lozenge 1 Lozenge Oral four times a day PRN  bisacodyl Suppository 10 milliGRAM(s) Rectal daily PRN  cefepime   IVPB 1000 milliGRAM(s) IV Intermittent every 12 hours  chlorhexidine 2% Cloths 1 Application(s) Topical daily  digoxin     Tablet 125 MICROGram(s) Oral daily  FIRST- Mouthwash  BLM 10 milliLiter(s) Swish and Swallow every 6 hours  furosemide   Injectable 40 milliGRAM(s) IV Push two times a day  heparin  Infusion 800 Unit(s)/Hr IV Continuous <Continuous>  hydrALAZINE 25 milliGRAM(s) Oral every 8 hours  levothyroxine 50 MICROGram(s) Oral <User Schedule>  lidocaine   4% Patch 1 Patch Transdermal daily  pantoprazole  Injectable 40 milliGRAM(s) IV Push daily  polyethylene glycol 3350 17 Gram(s) Oral daily  senna 2 Tablet(s) Oral at bedtime    A/P:    CM, EF 30%, severe AS, Afib, CHF  [facility] from [facility] to [facility] 2/5, s/p LHC 2/5  S/p cardio-renal/hemodynamic LINDA (peak Cr 2.5 - 2/3, lorna Cr 1.23 - 2/23)   UA w/RBCs, otherwise negative   Imaging w/o hydro 1/28/24  S/p CT w/IV dye 2/8/24  Stable renal fx post CT  S/p AVR, JOANNA clip 2/23  Serratia bacteremia, Abx, w/up per ID   Hemodynamic/cardio-renal now septic LINDA/CKD w/rising Cr  Agree w/holding Chlorothiazide and Aldactone   Continue Lasix   Avoid nephrotoxins as possible   Avoid hypotension   F/u BMP, UO    [phone]

## 2024-03-04 NOTE — PROGRESS NOTE ADULT - SUBJECTIVE AND OBJECTIVE BOX
VITAL SIGNS    Telemetry:  sb / nsr  60    Vital Signs Last 24 Hrs  T(C): 36.6 (24 @ 11:08), Max: 36.8 (24 @ 18:58)  T(F): 97.8 (24 @ 11:08), Max: 98.3 (24 @ 18:58)  HR: 104 (24 @ 11:29) (69 - 115)  BP: 95/65 (24 @ 11:29) (93/65 - 113/64)  RR: 18 (24 @ 11:08) (18 - 18)  SpO2: 97% (24 @ 11:29) (95% - 98%)                    @ 07:01  -   @ 07:00  --------------------------------------------------------  IN: 1155 mL / OUT: 3655 mL / NET: -2500 mL     @ 07:01  -   @ 11:56  --------------------------------------------------------  IN: 265 mL / OUT: 250 mL / NET: 15 mL          Daily     Daily Weight in k.5 (04 Mar 2024 06:49)            CAPILLARY BLOOD GLUCOSE                Drains:    KAYLI SS    Pacing Wires     N    Coumadin    [ ] YES          [ X ]      NO                                   PHYSICAL EXAM        Neurology: alert and oriented x 2, FORGRETFUL nonfocal, no gross deficits  CV : irreg  Sternal Wound :  CDI , Stable  Lungs: cta  Abdomen: soft, nontender, nondistended, positive bowel sounds, last bowel movement                       chest tubes  :    garcia - sbd         Extremities:      edema   /  -   calve tenderness ,    L leg  /  R leg  incisions cdi          acetaminophen     Tablet .. 650 milliGRAM(s) Oral every 6 hours PRN  albumin human 25% IVPB 100 milliLiter(s) IV Intermittent every 12 hours  aMIOdarone    Tablet   Oral   aMIOdarone    Tablet 200 milliGRAM(s) Oral daily  aMIOdarone Infusion 0.5 mG/Min IV Continuous <Continuous>  aspirin  chewable 81 milliGRAM(s) Oral daily  benzocaine/menthol Lozenge 1 Lozenge Oral four times a day PRN  bisacodyl Suppository 10 milliGRAM(s) Rectal daily PRN  cefepime   IVPB 1000 milliGRAM(s) IV Intermittent every 12 hours  chlorhexidine 2% Cloths 1 Application(s) Topical daily  chlorothiazide IVPB 500 milliGRAM(s) IV Intermittent two times a day  digoxin     Tablet 125 MICROGram(s) Oral daily  FIRST- Mouthwash  BLM 10 milliLiter(s) Swish and Swallow every 6 hours  furosemide   Injectable 40 milliGRAM(s) IV Push two times a day  heparin  Infusion 800 Unit(s)/Hr IV Continuous <Continuous>  levothyroxine 50 MICROGram(s) Oral <User Schedule>  lidocaine   4% Patch 1 Patch Transdermal daily  pantoprazole  Injectable 40 milliGRAM(s) IV Push daily  polyethylene glycol 3350 17 Gram(s) Oral daily  senna 2 Tablet(s) Oral at bedtime                    Physical Therapy Rec:   Home  [  ]   Home w/ PT  [  ]  Rehab  [  ]  Discussed with Cardiothoracic Team at AM rounds.     VITAL SIGNS    Telemetry:  sb / nsr  60    Vital Signs Last 24 Hrs  T(C): 36.6 (24 @ 11:08), Max: 36.8 (24 @ 18:58)  T(F): 97.8 (24 @ 11:08), Max: 98.3 (24 @ 18:58)  HR: 104 (24 @ 11:29) (69 - 115)  BP: 95/65 (24 @ 11:29) (93/65 - 113/64)  RR: 18 (24 @ 11:08) (18 - 18)  SpO2: 97% (24 @ 11:29) (95% - 98%)                    @ 07:01  -   @ 07:00  --------------------------------------------------------  IN: 1155 mL / OUT: 3655 mL / NET: -2500 mL     @ 07:01  -   @ 11:56  --------------------------------------------------------  IN: 265 mL / OUT: 250 mL / NET: 15 mL          Daily     Daily Weight in k.5 (04 Mar 2024 06:49)            CAPILLARY BLOOD GLUCOSE                Drains:    KAYLI SS    Pacing Wires     N    Coumadin    [ ] YES          [ X ]      NO                                   PHYSICAL EXAM          Neurology: alert and oriented x 2, nonfocal, no gross deficits, MILD CONFUSION, WORSE AT NIGHT  CV : irreg  Sternal Wound :  CDI , Stable, STAPLES  Lungs:bibasilar crackles  Abdomen: soft, nontender, nondistended, positive bowel sounds, last bowel movement  + yesterday                       :    garcia - sbd         Extremities:    + bilat edema edema   /  -   calve tenderness ,            acetaminophen     Tablet .. 650 milliGRAM(s) Oral every 6 hours PRN  albumin human 25% IVPB 100 milliLiter(s) IV Intermittent every 12 hours  aMIOdarone    Tablet   Oral   aMIOdarone    Tablet 200 milliGRAM(s) Oral daily  aMIOdarone Infusion 0.5 mG/Min IV Continuous <Continuous>  aspirin  chewable 81 milliGRAM(s) Oral daily  benzocaine/menthol Lozenge 1 Lozenge Oral four times a day PRN  bisacodyl Suppository 10 milliGRAM(s) Rectal daily PRN  cefepime   IVPB 1000 milliGRAM(s) IV Intermittent every 12 hours  chlorhexidine 2% Cloths 1 Application(s) Topical daily  chlorothiazide IVPB 500 milliGRAM(s) IV Intermittent two times a day  digoxin     Tablet 125 MICROGram(s) Oral daily  FIRST- Mouthwash  BLM 10 milliLiter(s) Swish and Swallow every 6 hours  furosemide   Injectable 40 milliGRAM(s) IV Push two times a day  heparin  Infusion 800 Unit(s)/Hr IV Continuous <Continuous>  levothyroxine 50 MICROGram(s) Oral <User Schedule>  lidocaine   4% Patch 1 Patch Transdermal daily  pantoprazole  Injectable 40 milliGRAM(s) IV Push daily  polyethylene glycol 3350 17 Gram(s) Oral daily  senna 2 Tablet(s) Oral at bedtime                    Physical Therapy Rec:   Home  [  ]   Home w/ PT  [  ]  Rehab  [  ]  Discussed with Cardiothoracic Team at AM rounds.

## 2024-03-04 NOTE — PROGRESS NOTE ADULT - PROBLEM SELECTOR PLAN 1
-  stopped 3/1   - Recommend resuming IV diuretics, suggest IV Lasix BID to target net negative 2L balance  - Can stop Diuril and 3% Hypertonic BID  - Suggest resuming HDZN at 25 mg TID for afterload reduction, hold for SBP <90  - Defer MRA/ARB/ARNI given degree of renal dysfunction   - Replete to target K 4-4.5 and Mg 2-2.5  - CMP and lactate later today. Low threshold for RHC if concerning

## 2024-03-04 NOTE — CHART NOTE - NSCHARTNOTEFT_GEN_A_CORE
cc: Called to see patient, rhythm conversion from SR to DONNIE with frequent aberrant beats, initally with BP tolerating and with episode of hypotension. Pt. asymptomatic  s/p amio load, now on 200mg qd  Digoxin load 0.125 at 0900. 0.25 at 1500, 0.125 at 2100 adjusted for GFR  Tentative plan on 3/3 was possible transfer back to CTU for cardioversion, however, after dig initiation converted to SR.  Now in AFIB 105-135 with aberrant beats.     VITAL SIGNS  Telemetry:    Vital Signs Last 24 Hrs  T(C): 36.8 (24 @ 03:18), Max: 36.8 (24 @ 18:58)  T(F): 98.2 (24 @ 03:18), Max: 98.3 (24 @ 18:58)  HR: 114 (24 @ 03:18) (69 - 131)  BP: 93/65 (24 @ 03:18) (70/51 - 115/69)  RR: 18 (24 @ 03:18) (18 - 18)  SpO2: 95% (24 @ 03:18) (95% - 99%)             @ 07:01  -   @ 07:00  --------------------------------------------------------  IN: 1314.5 mL / OUT: 5575 mL / NET: -4260.5 mL     @ 07:01  -  04 @ 03:46  --------------------------------------------------------  IN: 755 mL / OUT: 2205 mL / NET: -1450 mL    Daily     Daily Weight in k.5 (03 Mar 2024 05:20)                          10.2   11.89 )-----------( 183      ( 04 Mar 2024 02:58 )             30.4       Hemoglobin: 10.2 g/dL ( @ 02:58)  Hemoglobin: 10.4 g/dL ( @ 11:02)  Hemoglobin: 10.6 g/dL ( @ 05:56)  Hemoglobin: 9.2 g/dL ( @ 07:59)  Hemoglobin: 7.6 g/dL ( @ 06:49)      03-    134<L>  |  89<L>  |  48<H>  ----------------------------<  143<H>  4.0   |  33<H>  |  2.33<H>    Ca    9.0      04 Mar 2024 02:58  Phos  4.4     -  Mg     2.2         TPro  6.6  /  Alb  3.6  /  TBili  1.7<H>  /  DBili  x   /  AST  17  /  ALT  20  /  AlkPhos  101  -    Creatinine Trend: 2.33<--, 2.20<--, 1.98<--, 2.06<--, 2.08<--, 2.06<--      Pacing Wires: Isolated      Oxygen: Room air    PHYSICAL EXAM    Neurology: alert and oriented x 3, nonfocal, no gross deficits  Psych: pleasant conversive appropriate  HEENT: endorses globus sensation, no overt oral lesions appreciated  CV : s1 s2 irregular  Sternal Wound :  stable open to air  Lungs: slight decrease in bilateral bases  Abdomen: soft, nontender, nondistended, positive bowel sounds, last bowel movement   : garcia clear yellow    MSK: kyphotic  Extremities:  +3 bilateral lower extremity edema, legs ace wrapped      Medications  aMIOdarone    Tablet 200 milliGRAM(s) Oral daily  aMIOdarone IVPB 150 milliGRAM(s) IV Intermittent once  aspirin  chewable 81 milliGRAM(s) Oral daily  buMETAnide Infusion 1 mG/Hr IV Continuous <Continuous>  cefepime   IVPB 1000 milliGRAM(s) IV Intermittent every 12 hours  chlorhexidine 2% Cloths 1 Application(s) Topical daily  chlorothiazide IVPB 500 milliGRAM(s) IV Intermittent two times a day  digoxin     Tablet 125 MICROGram(s) Oral daily  heparin  Infusion 800 Unit(s)/Hr IV Continuous <Continuous>  hydrALAZINE 50 milliGRAM(s) Oral every 8 hours  levothyroxine 50 MICROGram(s) Oral <User Schedule>  sodium chloride 3% Bolus 150 milliLiter(s) IV Bolus <User Schedule>  spironolactone 25 milliGRAM(s) Oral daily        Plan:  1) KLyte x 1 for K=4.0, Mag=2.2 no supplementation required  2) Received po Digoxin/amio   3) Amio 150 IVSS over 60 min  4) Continue to monitor BP currently 93/65 MAP 75, patient remains asymptomatic  Discussed with CTU

## 2024-03-04 NOTE — PROGRESS NOTE ADULT - PROBLEM SELECTOR PLAN 4
- paroxysmal  - s/p JOANNA clip  - On Amiodarone 200 mg QD  - On Digoxin 125 mcg QD. Please send Digoxin level   - On AC with Heparin infusion   - Please re-engage EP

## 2024-03-04 NOTE — PROGRESS NOTE ADULT - ASSESSMENT
Briefly, 62 y/o M w/ h/o HTN, severe AS, chronic venous stasis, HFpEF who p/w worsening LE swelling to GC 1/25 and found to be in decompensated heart failure with EF 35-40% and severe AS so transferred for further management on 2/5. Was diuresed and underwent LHC which was non-obstructive. Underwent bioAVR 2/23 w/ JOANNA clip complicated by bleeding. Post operative course complicated by volume expansion and pAF for which he was started on Amiodarone and Digoxin. Also became bacteremic, BCx 2/29 growing Serratia, repeat BCx from 3/1 still growing Serratia, being treated with IV Cefepime.  weaned to off on 3/1.     Despite aggressive diuretic regimen with Bumex gtt, Diuril and hypertonic saline BID remains overloaded. There was mild rise in Cr today so CTSX transitioned to an oral loop diuretic. Recommend escalating diuretics to intermittent IV along with albumin to target neg negative 2L balance. Recurrent AF overnight, rates improved with IV Amio bolus. Will trend serologies of end organ perfusion later today and if concerning, consider RHC.     Cardiac Studies  ·	TTE 2/26/24: LVIDd 6 cm, LVEF 30% (global), mod RVE with mod reduced function (TAPSE 0.9), severe ARAVIND, mild TR, est PASP 25 mmHg, trace pericardial effusion, IVC normal in size

## 2024-03-04 NOTE — PROGRESS NOTE ADULT - ASSESSMENT
63 m with HTN, GERD, AS, chronic venous stasis dermatitis with bilateral leg swelling, initially admitted to [facility] with JOCELIN and b/l leg pain and swelling, was seen by ID and was considered not infected just stasis dermatitis, received 5 days of keflex, wound cx showed serratia and strep dysgalactiae,  blood cx negative  S/P Bio AVR , 2/23Post op labile BP related to hypovolemia & cardiac dysfunction, intra op bleeding requiring blood & products   pt has been on vanco until 2/29 for post op course  now febrile      AS and CHF s/p AVR 2/23 now with new fever and serratia bacteremia 2/29 and 3/1, source unclear but previous wound cx had strep and serratia, u/a just hematuria  b/l LE stasis dermatitis and chronic wounds, no tenderness and appears stasis dermatitis not infection    * repeat blood cx  * c/w cefepime   * chest/abd CT  * follow  wound care  * monitor CBC/diff and CMP      The above assessment and plan was discussed with the primary team    Pamela Bellamy MD  contact on teams  After 5pm and on weekends call [phone].

## 2024-03-04 NOTE — PROGRESS NOTE ADULT - PROBLEM SELECTOR PLAN 2
2/25 rapid afib - x 9hrs - SR since 1;30am 2/26  amio loaded  a/c with heparin gtt  echo 2/26 tr jon eff  3/3  afib w/ hypotension- dig load -renal dose  iv amio - pt w/ 2 hrs of afib - converted to SR @ 10:45am

## 2024-03-04 NOTE — PROGRESS NOTE ADULT - PROBLEM SELECTOR PLAN 1
-Montior for dysrhythmias  -Wean O2 as needed/tolerated  -Trend daily CTS labs  -replete K>4.0 Mag >2.0  -Daily weight +/- diuresis  -Pain management  -Progressive ambulation  -Bowel Regimen  3/3 bumex gtt - 1mg/hr

## 2024-03-04 NOTE — PROGRESS NOTE ADULT - ASSESSMENT
63M, appears older than stated age, admitted to [facility] 24 PMHx HTN, GERD, HPL, chronic venous stasis dermatitis with bilateral leg swelling, and AS presented to [facility] on 2024 for bilateral leg pain and swelling found to have new acute HFrEF (EF 35-40% 2024) in setting of severe AS.     Hospital course:  24: Admitted to [place] [facility] new onset HFrEF EF 35% and anemia 2/2 AS,   24: medically stable for transferred to [facility] medicine service for continued care. during hospitalization hx of WCT/ PAF  Consults for Nephrology, HF, ID, Podiatry for medical optimization  2/15: 1 U PRBC, keep Hgb > 8.    s/p fall with headastrike   : s/p Liver biopsy, soft pressure post procedure--responded to fluid bolus  :  RLE doppler: No pseudoaneurysm in the right groin/ bilobed avascular fluid collection measuring 2.8 x 1.7 x 2.0 cm     : AVR/JOANNA Clip c/b intraop bleeding requirinu pRBCs on CPB 1u pRBCs post-CPB 1 PLT 1000 FEIBA  Prolene suture placed around IJ and V wire  : To Step Down, raegan, CT, +PW, marlena RIJ   Maintain mediastinal chest tubes Primicor discontinued. Lasix 40 BID initiated , Wound care to chronic venous stasis b/l LE. D/c RIJ   VSS - 9 hrs afib last evening- SR since 1:30am- on AMio gtt,  gtt - s/p right axillary marlena placed by Intensivist. garcia placed last evening lasix 40 iv x 1 given this am- echo tr jon eff.   VSS - currently on lasix gtt - negative 2L in last 24 hrs. bid basic metabolic pt converted back into afib this am 100-120- currently on amio load - dobutrex gtt. d/c planning    d/c yesterday then resumed for decreased UO.  Remains a fib since yesterday, cont amio/heparin, eps following         Weight increased, lasix 80 iv x 1, then infusion increased , aldactone added .  Remains on heparin infusion.          Hydral added for afterload    NSR, on amio, EPS following, noted elevated tsh.  Repeat sent synthroid started.  Fluid overload, diuretics increased, 3% saline x 2, lasix 80iv x 1, lasix infusion 20mg/hr  3/1 febrile  overnight  and this am pan cx.  Plan to remove marlena, intro , garcia.  A line is from , intro   UA neg  Cont , lasix, f/u bun creat  3/2 VSS afebrile overnight.  97 r/s.  on lasix gtt @ 20mg/jr  negative 2366cc.  will monitor closely in sdu  3/3 Bumex gtt decreased to 1mg/hr per HF - @8:45am pt converted to afib -100-130s w/ hypotension while on commode attempting to have a bm- pt asymptomatic. brought back to bed.  amio 150 IV x 1 given w/ filter. dig load per HF - Dr. Rosario - .75mg total d/t sin.  pt u/o decreased while in afib- DR. Rosario @ bedside CXR done.  spoke to EP - Dr. Ghotra regarding DCCV - Call to CTU to set up transfer for DCCV - transfer cancelled d/t pt. converted to SR 68- w/ increase in SBP to 102 & U/O increasing.  ENT consulted for c/o "hurts when i swallow"  ent exam rveals no rod.  lido s&s ordered.  pt eating w/o difficulty  3/ Pt with edema not improved, elevated creat.  Bumex d/c   Placed on iv lasix, aldactone d/c.  Albuminx 2  Hydralazine decreasewd  REpeat labs today  L lung sono, minimal fluid

## 2024-03-04 NOTE — PROGRESS NOTE ADULT - SUBJECTIVE AND OBJECTIVE BOX
Follow Up:  fever    Interval History/ROS: pt feels better, no fever denied chest pain or SOB      Allergies  garlic (Angioedema; Swelling; Anaphylaxis)  No Known Drug Allergies        ANTIMICROBIALS:  cefepime   IVPB 1000 every 12 hours      OTHER MEDS:  acetaminophen     Tablet .. 650 milliGRAM(s) Oral every 6 hours PRN  aMIOdarone    Tablet 200 milliGRAM(s) Oral daily  aMIOdarone    Tablet   Oral   aMIOdarone Infusion 0.5 mG/Min IV Continuous <Continuous>  aspirin  chewable 81 milliGRAM(s) Oral daily  benzocaine/menthol Lozenge 1 Lozenge Oral four times a day PRN  bisacodyl Suppository 10 milliGRAM(s) Rectal daily PRN  chlorhexidine 2% Cloths 1 Application(s) Topical daily  digoxin     Tablet 125 MICROGram(s) Oral daily  FIRST- Mouthwash  BLM 10 milliLiter(s) Swish and Swallow every 6 hours  heparin  Infusion 800 Unit(s)/Hr IV Continuous <Continuous>  hydrALAZINE 25 milliGRAM(s) Oral every 8 hours  levothyroxine 50 MICROGram(s) Oral <User Schedule>  lidocaine   4% Patch 1 Patch Transdermal daily  pantoprazole  Injectable 40 milliGRAM(s) IV Push daily  polyethylene glycol 3350 17 Gram(s) Oral daily  senna 2 Tablet(s) Oral at bedtime      Vital Signs Last 24 Hrs  T(C): 36.7 (04 Mar 2024 15:23), Max: 36.8 (03 Mar 2024 18:58)  T(F): 98 (04 Mar 2024 15:23), Max: 98.3 (03 Mar 2024 18:58)  HR: 101 (04 Mar 2024 15:23) (69 - 115)  BP: 104/68 (04 Mar 2024 15:23) (93/65 - 113/64)  BP(mean): 81 (04 Mar 2024 15:23) (75 - 87)  RR: 18 (04 Mar 2024 15:23) (18 - 18)  SpO2: 94% (04 Mar 2024 15:23) (94% - 98%)    Parameters below as of 04 Mar 2024 15:23  Patient On (Oxygen Delivery Method): room air        Physical Exam:  General:    NAD, sitting on a chair  Cardio:    sternal incision clean  Respiratory:    clear   abd:     soft,    no tenderness  :   + garcia  Musculoskeletal:   no joint swelling  Skin:    no rash, b/l LE ulcers with dressing               10.2   11.89 )-----------( 183      ( 04 Mar 2024 02:58 )             30.4       03-04    135  |  89<L>  |  51<H>  ----------------------------<  135<H>  4.9   |  33<H>  |  2.55<H>    Ca    9.6      04 Mar 2024 15:24  Phos  4.4     03-04  Mg     2.2     03-04    TPro  7.1  /  Alb  3.9  /  TBili  1.8<H>  /  DBili  x   /  AST  19  /  ALT  19  /  AlkPhos  100  03-04      Urinalysis Basic - ( 04 Mar 2024 15:24 )    Color: x / Appearance: x / SG: x / pH: x  Gluc: 135 mg/dL / Ketone: x  / Bili: x / Urobili: x   Blood: x / Protein: x / Nitrite: x   Leuk Esterase: x / RBC: x / WBC x   Sq Epi: x / Non Sq Epi: x / Bacteria: x        MICROBIOLOGY:  v  .Blood Blood-Peripheral  03-01-24   Growth in aerobic and anaerobic bottles: Serratia marcescens  See previous culture [id number]  --    Growth in aerobic bottle: Gram Negative Rods  Growth in anaerobic bottle: Gram Negative Rods      .Blood Blood  02-29-24   Growth in aerobic and anaerobic bottles: Serratia marcescens  See previous culture [id number]  --    Growth in aerobic bottle: Gram Negative Rods  Growth in anaerobic bottle: Gram Negative Rods      .Blood Blood  02-29-24   Growth in aerobic and anaerobic bottles: Serratia marcescens  Direct identification is available within approximately 3-5  hours either by Blood Panel Multiplexed PCR or Direct  MALDI-TOF. Details: [email or web address]  --  Blood Culture PCR  Serratia marcescens                RADIOLOGY:  Images independently visualized and reviewed personally, findings as below  < from: Xray Chest 1 View- PORTABLE-Urgent (Xray Chest 1 View- PORTABLE-Urgent .) (03.03.24 @ 10:37) >  IMPRESSION:  Stable opacification of the left retrocardiac area, likely effusion and   atelectasis.    < end of copied text >  < from: TTE W or WO Ultrasound Enhancing Agent (02.26.24 @ 08:44) >     CONCLUSIONS:      1. Left ventricular cavity is moderately dilated. Left ventricular wall thickness is normal. Left ventricular systolic function is severely decreased.   2. Moderately enlarged right ventricular cavity size and moderately reduced systolic function.    < end of copied text >

## 2024-03-04 NOTE — PROGRESS NOTE ADULT - PROBLEM SELECTOR PLAN 2
- possibly cardiorenal  - Cr on admission 1.7 and peaked to 2.5  - Had normalized, but now elevated between 1.9-2.3  - diuretics as above

## 2024-03-04 NOTE — PROGRESS NOTE ADULT - SUBJECTIVE AND OBJECTIVE BOX
Subjective:  - No SOB but with going LE swelling.     Medications:  acetaminophen     Tablet .. 650 milliGRAM(s) Oral every 6 hours PRN  albumin human 25% IVPB 100 milliLiter(s) IV Intermittent every 12 hours  aMIOdarone    Tablet 200 milliGRAM(s) Oral daily  aMIOdarone    Tablet   Oral   aMIOdarone Infusion 0.5 mG/Min IV Continuous <Continuous>  aspirin  chewable 81 milliGRAM(s) Oral daily  benzocaine/menthol Lozenge 1 Lozenge Oral four times a day PRN  bisacodyl Suppository 10 milliGRAM(s) Rectal daily PRN  cefepime   IVPB 1000 milliGRAM(s) IV Intermittent every 12 hours  chlorhexidine 2% Cloths 1 Application(s) Topical daily  chlorothiazide IVPB 500 milliGRAM(s) IV Intermittent two times a day  digoxin     Tablet 125 MICROGram(s) Oral daily  FIRST- Mouthwash  BLM 10 milliLiter(s) Swish and Swallow every 6 hours  furosemide   Injectable 40 milliGRAM(s) IV Push two times a day  heparin  Infusion 800 Unit(s)/Hr IV Continuous <Continuous>  levothyroxine 50 MICROGram(s) Oral <User Schedule>  lidocaine   4% Patch 1 Patch Transdermal daily  pantoprazole  Injectable 40 milliGRAM(s) IV Push daily  polyethylene glycol 3350 17 Gram(s) Oral daily  senna 2 Tablet(s) Oral at bedtime    Physical Exam:    Vitals:  Vital Signs Last 24 Hours  T(C): 36.6 (24 @ 11:08), Max: 36.8 (24 @ 18:58)  HR: 104 (24 @ 11:29) (69 - 115)  BP: 95/65 (24 @ 11:29) (93/65 - 113/64)  RR: 18 (24 @ 11:08) (18 - 18)  SpO2: 97% (24 @ 11:29) (95% - 98%)    Weight in k.5 ( @ 06:49)    I&O's Summary    03 Mar 2024 07:01  -  04 Mar 2024 07:00  --------------------------------------------------------  IN: 1155 mL / OUT: 3655 mL / NET: -2500 mL    04 Mar 2024 07:01  -  04 Mar 2024 11:43  --------------------------------------------------------  IN: 265 mL / OUT: 250 mL / NET: 15 mL    Tele: -110s    General: No distress. Comfortable sitting up in chair  HEENT: EOM intact.  Neck: JVP 16 cm H2O with HJR  Chest: Clear to auscultation bilaterally  CV: Irregularly irregular. Tachycardic. Normal S1 and S2  Abdomen: Soft, non-distended, non-tender  Extremities: Warm peripherally. 2-3+ BLE edema   Neurology: Alert and oriented times three. Sensation intact  Psych: Affect normal    Labs:                        10.2   11.89 )-----------( 183      ( 04 Mar 2024 02:58 )             30.4     03-04    134<L>  |  89<L>  |  48<H>  ----------------------------<  143<H>  4.0   |  33<H>  |  2.33<H>    Ca    9.0      04 Mar 2024 02:58  Phos  4.4     03-04  Mg     2.2     03-04    TPro  6.6  /  Alb  3.6  /  TBili  1.7<H>  /  DBili  x   /  AST  17  /  ALT  20  /  AlkPhos  101  03-04    PT/INR - ( 04 Mar 2024 02:58 )   PT: 12.6 sec;   INR: 1.15 ratio         PTT - ( 04 Mar 2024 02:58 )  PTT:67.3 sec            Lactate, Blood: 1.7 mmol/L ( @ 14:19)  Lactate, Blood: 2.3 mmol/L ( @ 11:02)

## 2024-03-05 ENCOUNTER — TRANSCRIPTION ENCOUNTER (OUTPATIENT)
Age: 64
End: 2024-03-05

## 2024-03-05 LAB
ACANTHOCYTES BLD QL SMEAR: SLIGHT — SIGNIFICANT CHANGE UP
ALBUMIN SERPL ELPH-MCNC: 3.8 G/DL — SIGNIFICANT CHANGE UP (ref 3.3–5)
ALBUMIN SERPL ELPH-MCNC: 4.1 G/DL — SIGNIFICANT CHANGE UP (ref 3.3–5)
ALP SERPL-CCNC: 89 U/L — SIGNIFICANT CHANGE UP (ref 40–120)
ALP SERPL-CCNC: 95 U/L — SIGNIFICANT CHANGE UP (ref 40–120)
ALT FLD-CCNC: 17 U/L — SIGNIFICANT CHANGE UP (ref 10–45)
ALT FLD-CCNC: 18 U/L — SIGNIFICANT CHANGE UP (ref 10–45)
ANION GAP SERPL CALC-SCNC: 12 MMOL/L — SIGNIFICANT CHANGE UP (ref 5–17)
ANION GAP SERPL CALC-SCNC: 16 MMOL/L — SIGNIFICANT CHANGE UP (ref 5–17)
ANISOCYTOSIS BLD QL: SIGNIFICANT CHANGE UP
APTT BLD: 30.3 SEC — SIGNIFICANT CHANGE UP (ref 24.5–35.6)
APTT BLD: 35 SEC — SIGNIFICANT CHANGE UP (ref 24.5–35.6)
AST SERPL-CCNC: 13 U/L — SIGNIFICANT CHANGE UP (ref 10–40)
AST SERPL-CCNC: 15 U/L — SIGNIFICANT CHANGE UP (ref 10–40)
BASOPHILS # BLD AUTO: 0.04 K/UL — SIGNIFICANT CHANGE UP (ref 0–0.2)
BASOPHILS # BLD AUTO: 0.08 K/UL — SIGNIFICANT CHANGE UP (ref 0–0.2)
BASOPHILS NFR BLD AUTO: 0.4 % — SIGNIFICANT CHANGE UP (ref 0–2)
BASOPHILS NFR BLD AUTO: 0.9 % — SIGNIFICANT CHANGE UP (ref 0–2)
BILIRUB SERPL-MCNC: 1.8 MG/DL — HIGH (ref 0.2–1.2)
BILIRUB SERPL-MCNC: 1.9 MG/DL — HIGH (ref 0.2–1.2)
BLD GP AB SCN SERPL QL: NEGATIVE — SIGNIFICANT CHANGE UP
BUN SERPL-MCNC: 52 MG/DL — HIGH (ref 7–23)
BUN SERPL-MCNC: 54 MG/DL — HIGH (ref 7–23)
CALCIUM SERPL-MCNC: 9.7 MG/DL — SIGNIFICANT CHANGE UP (ref 8.4–10.5)
CALCIUM SERPL-MCNC: 9.7 MG/DL — SIGNIFICANT CHANGE UP (ref 8.4–10.5)
CHLORIDE SERPL-SCNC: 88 MMOL/L — LOW (ref 96–108)
CHLORIDE SERPL-SCNC: 90 MMOL/L — LOW (ref 96–108)
CO2 SERPL-SCNC: 33 MMOL/L — HIGH (ref 22–31)
CO2 SERPL-SCNC: 34 MMOL/L — HIGH (ref 22–31)
CREAT SERPL-MCNC: 2.75 MG/DL — HIGH (ref 0.5–1.3)
CREAT SERPL-MCNC: 2.86 MG/DL — HIGH (ref 0.5–1.3)
DACRYOCYTES BLD QL SMEAR: SLIGHT — SIGNIFICANT CHANGE UP
EGFR: 24 ML/MIN/1.73M2 — LOW
EGFR: 25 ML/MIN/1.73M2 — LOW
ELLIPTOCYTES BLD QL SMEAR: SLIGHT — SIGNIFICANT CHANGE UP
EOSINOPHIL # BLD AUTO: 0 K/UL — SIGNIFICANT CHANGE UP (ref 0–0.5)
EOSINOPHIL # BLD AUTO: 0.07 K/UL — SIGNIFICANT CHANGE UP (ref 0–0.5)
EOSINOPHIL NFR BLD AUTO: 0 % — SIGNIFICANT CHANGE UP (ref 0–6)
EOSINOPHIL NFR BLD AUTO: 0.7 % — SIGNIFICANT CHANGE UP (ref 0–6)
FIBRINOGEN PPP-MCNC: 420 MG/DL — SIGNIFICANT CHANGE UP (ref 200–445)
GAS PNL BLDA: SIGNIFICANT CHANGE UP
GLUCOSE SERPL-MCNC: 108 MG/DL — HIGH (ref 70–99)
GLUCOSE SERPL-MCNC: 125 MG/DL — HIGH (ref 70–99)
HCT VFR BLD CALC: 24.3 % — LOW (ref 39–50)
HCT VFR BLD CALC: 25.3 % — LOW (ref 39–50)
HGB BLD-MCNC: 8.4 G/DL — LOW (ref 13–17)
HGB BLD-MCNC: 8.8 G/DL — LOW (ref 13–17)
HYPOCHROMIA BLD QL: SIGNIFICANT CHANGE UP
IMM GRANULOCYTES NFR BLD AUTO: 0.9 % — SIGNIFICANT CHANGE UP (ref 0–0.9)
INR BLD: 1.14 RATIO — SIGNIFICANT CHANGE UP (ref 0.85–1.18)
LYMPHOCYTES # BLD AUTO: 0.93 K/UL — LOW (ref 1–3.3)
LYMPHOCYTES # BLD AUTO: 1 K/UL — SIGNIFICANT CHANGE UP (ref 1–3.3)
LYMPHOCYTES # BLD AUTO: 10.6 % — LOW (ref 13–44)
LYMPHOCYTES # BLD AUTO: 9.9 % — LOW (ref 13–44)
MACROCYTES BLD QL: SLIGHT — SIGNIFICANT CHANGE UP
MAGNESIUM SERPL-MCNC: 2.3 MG/DL — SIGNIFICANT CHANGE UP (ref 1.6–2.6)
MANUAL SMEAR VERIFICATION: SIGNIFICANT CHANGE UP
MCHC RBC-ENTMCNC: 23.7 PG — LOW (ref 27–34)
MCHC RBC-ENTMCNC: 25.6 PG — LOW (ref 27–34)
MCHC RBC-ENTMCNC: 34.6 GM/DL — SIGNIFICANT CHANGE UP (ref 32–36)
MCHC RBC-ENTMCNC: 34.8 GM/DL — SIGNIFICANT CHANGE UP (ref 32–36)
MCV RBC AUTO: 68.6 FL — LOW (ref 80–100)
MCV RBC AUTO: 73.5 FL — LOW (ref 80–100)
MICROCYTES BLD QL: SIGNIFICANT CHANGE UP
MONOCYTES # BLD AUTO: 0.62 K/UL — SIGNIFICANT CHANGE UP (ref 0–0.9)
MONOCYTES # BLD AUTO: 1.06 K/UL — HIGH (ref 0–0.9)
MONOCYTES NFR BLD AUTO: 10.4 % — SIGNIFICANT CHANGE UP (ref 2–14)
MONOCYTES NFR BLD AUTO: 7.1 % — SIGNIFICANT CHANGE UP (ref 2–14)
NEUTROPHILS # BLD AUTO: 7.13 K/UL — SIGNIFICANT CHANGE UP (ref 1.8–7.4)
NEUTROPHILS # BLD AUTO: 7.89 K/UL — HIGH (ref 1.8–7.4)
NEUTROPHILS NFR BLD AUTO: 77.7 % — HIGH (ref 43–77)
NEUTROPHILS NFR BLD AUTO: 81.4 % — HIGH (ref 43–77)
NRBC # BLD: 0 /100 WBCS — SIGNIFICANT CHANGE UP (ref 0–0)
NT-PROBNP SERPL-SCNC: HIGH PG/ML (ref 0–300)
OVALOCYTES BLD QL SMEAR: SLIGHT — SIGNIFICANT CHANGE UP
PHOSPHATE SERPL-MCNC: 4.3 MG/DL — SIGNIFICANT CHANGE UP (ref 2.5–4.5)
PLAT MORPH BLD: NORMAL — SIGNIFICANT CHANGE UP
PLATELET # BLD AUTO: 134 K/UL — LOW (ref 150–400)
PLATELET # BLD AUTO: 150 K/UL — SIGNIFICANT CHANGE UP (ref 150–400)
POIKILOCYTOSIS BLD QL AUTO: SLIGHT — SIGNIFICANT CHANGE UP
POLYCHROMASIA BLD QL SMEAR: SIGNIFICANT CHANGE UP
POTASSIUM SERPL-MCNC: 3.9 MMOL/L — SIGNIFICANT CHANGE UP (ref 3.5–5.3)
POTASSIUM SERPL-MCNC: 4.3 MMOL/L — SIGNIFICANT CHANGE UP (ref 3.5–5.3)
POTASSIUM SERPL-SCNC: 3.9 MMOL/L — SIGNIFICANT CHANGE UP (ref 3.5–5.3)
POTASSIUM SERPL-SCNC: 4.3 MMOL/L — SIGNIFICANT CHANGE UP (ref 3.5–5.3)
PROT SERPL-MCNC: 6.5 G/DL — SIGNIFICANT CHANGE UP (ref 6–8.3)
PROT SERPL-MCNC: 6.9 G/DL — SIGNIFICANT CHANGE UP (ref 6–8.3)
PROTHROM AB SERPL-ACNC: 12.5 SEC — SIGNIFICANT CHANGE UP (ref 9.5–13)
RBC # BLD: 3.44 M/UL — LOW (ref 4.2–5.8)
RBC # BLD: 3.54 M/UL — LOW (ref 4.2–5.8)
RBC # FLD: 30.1 % — HIGH (ref 10.3–14.5)
RBC # FLD: 31.7 % — HIGH (ref 10.3–14.5)
RBC BLD AUTO: ABNORMAL
RH IG SCN BLD-IMP: POSITIVE — SIGNIFICANT CHANGE UP
SCHISTOCYTES BLD QL AUTO: SLIGHT — SIGNIFICANT CHANGE UP
SODIUM SERPL-SCNC: 136 MMOL/L — SIGNIFICANT CHANGE UP (ref 135–145)
SODIUM SERPL-SCNC: 137 MMOL/L — SIGNIFICANT CHANGE UP (ref 135–145)
TARGETS BLD QL SMEAR: SLIGHT — SIGNIFICANT CHANGE UP
URATE SERPL-MCNC: 11.4 MG/DL — HIGH (ref 3.4–8.8)
WBC # BLD: 10.15 K/UL — SIGNIFICANT CHANGE UP (ref 3.8–10.5)
WBC # BLD: 8.76 K/UL — SIGNIFICANT CHANGE UP (ref 3.8–10.5)
WBC # FLD AUTO: 10.15 K/UL — SIGNIFICANT CHANGE UP (ref 3.8–10.5)
WBC # FLD AUTO: 8.76 K/UL — SIGNIFICANT CHANGE UP (ref 3.8–10.5)

## 2024-03-05 PROCEDURE — 99291 CRITICAL CARE FIRST HOUR: CPT | Mod: 25

## 2024-03-05 PROCEDURE — 36620 INSERTION CATHETER ARTERY: CPT | Mod: 58

## 2024-03-05 PROCEDURE — 99233 SBSQ HOSP IP/OBS HIGH 50: CPT

## 2024-03-05 PROCEDURE — 93451 RIGHT HEART CATH: CPT | Mod: 26

## 2024-03-05 PROCEDURE — 99232 SBSQ HOSP IP/OBS MODERATE 35: CPT

## 2024-03-05 RX ORDER — HEPARIN SODIUM 5000 [USP'U]/ML
1000 INJECTION INTRAVENOUS; SUBCUTANEOUS
Qty: 25000 | Refills: 0 | Status: DISCONTINUED | OUTPATIENT
Start: 2024-03-05 | End: 2024-03-06

## 2024-03-05 RX ORDER — FUROSEMIDE 40 MG
40 TABLET ORAL EVERY 12 HOURS
Refills: 0 | Status: DISCONTINUED | OUTPATIENT
Start: 2024-03-05 | End: 2024-03-05

## 2024-03-05 RX ORDER — HYDRALAZINE HCL 50 MG
5 TABLET ORAL ONCE
Refills: 0 | Status: COMPLETED | OUTPATIENT
Start: 2024-03-05 | End: 2024-03-05

## 2024-03-05 RX ORDER — ALLOPURINOL 300 MG
100 TABLET ORAL DAILY
Refills: 0 | Status: DISCONTINUED | OUTPATIENT
Start: 2024-03-05 | End: 2024-03-12

## 2024-03-05 RX ORDER — ACETAZOLAMIDE 250 MG/1
500 TABLET ORAL ONCE
Refills: 0 | Status: DISCONTINUED | OUTPATIENT
Start: 2024-03-05 | End: 2024-03-05

## 2024-03-05 RX ORDER — BUMETANIDE 0.25 MG/ML
1 INJECTION INTRAMUSCULAR; INTRAVENOUS
Qty: 20 | Refills: 0 | Status: DISCONTINUED | OUTPATIENT
Start: 2024-03-05 | End: 2024-03-07

## 2024-03-05 RX ORDER — ACETAZOLAMIDE 250 MG/1
250 TABLET ORAL ONCE
Refills: 0 | Status: DISCONTINUED | OUTPATIENT
Start: 2024-03-05 | End: 2024-03-05

## 2024-03-05 RX ORDER — DOBUTAMINE HCL 250MG/20ML
2.5 VIAL (ML) INTRAVENOUS
Qty: 500 | Refills: 0 | Status: DISCONTINUED | OUTPATIENT
Start: 2024-03-05 | End: 2024-03-06

## 2024-03-05 RX ADMIN — PANTOPRAZOLE SODIUM 40 MILLIGRAM(S): 20 TABLET, DELAYED RELEASE ORAL at 11:33

## 2024-03-05 RX ADMIN — CEFEPIME 100 MILLIGRAM(S): 1 INJECTION, POWDER, FOR SOLUTION INTRAMUSCULAR; INTRAVENOUS at 17:30

## 2024-03-05 RX ADMIN — Medication 40 MILLIGRAM(S): at 17:30

## 2024-03-05 RX ADMIN — CEFEPIME 100 MILLIGRAM(S): 1 INJECTION, POWDER, FOR SOLUTION INTRAMUSCULAR; INTRAVENOUS at 04:47

## 2024-03-05 RX ADMIN — CHLORHEXIDINE GLUCONATE 1 APPLICATION(S): 213 SOLUTION TOPICAL at 10:57

## 2024-03-05 RX ADMIN — Medication 25 MILLIGRAM(S): at 21:00

## 2024-03-05 RX ADMIN — Medication 81 MILLIGRAM(S): at 11:34

## 2024-03-05 RX ADMIN — Medication 125 MICROGRAM(S): at 04:47

## 2024-03-05 RX ADMIN — Medication 650 MILLIGRAM(S): at 23:27

## 2024-03-05 RX ADMIN — Medication 50 MICROGRAM(S): at 04:47

## 2024-03-05 RX ADMIN — Medication 5 MILLIGRAM(S): at 19:21

## 2024-03-05 RX ADMIN — Medication 6.41 MICROGRAM(S)/KG/MIN: at 17:25

## 2024-03-05 RX ADMIN — Medication 25 MILLIGRAM(S): at 04:47

## 2024-03-05 RX ADMIN — DIPHENHYDRAMINE HYDROCHLORIDE AND LIDOCAINE HYDROCHLORIDE AND ALUMINUM HYDROXIDE AND MAGNESIUM HYDRO 10 MILLILITER(S): KIT at 11:34

## 2024-03-05 RX ADMIN — Medication 650 MILLIGRAM(S): at 22:57

## 2024-03-05 RX ADMIN — Medication 100 MILLIGRAM(S): at 13:51

## 2024-03-05 RX ADMIN — AMIODARONE HYDROCHLORIDE 200 MILLIGRAM(S): 400 TABLET ORAL at 04:46

## 2024-03-05 RX ADMIN — HEPARIN SODIUM 10 UNIT(S)/HR: 5000 INJECTION INTRAVENOUS; SUBCUTANEOUS at 20:14

## 2024-03-05 RX ADMIN — Medication 6.41 MICROGRAM(S)/KG/MIN: at 20:14

## 2024-03-05 RX ADMIN — DIPHENHYDRAMINE HYDROCHLORIDE AND LIDOCAINE HYDROCHLORIDE AND ALUMINUM HYDROXIDE AND MAGNESIUM HYDRO 10 MILLILITER(S): KIT at 17:33

## 2024-03-05 RX ADMIN — Medication 25 MILLIGRAM(S): at 13:51

## 2024-03-05 NOTE — PROGRESS NOTE ADULT - SUBJECTIVE AND OBJECTIVE BOX
Patient seen and examined at the bedside.    Remains critically ill on continuous ICU monitoring.      Brief Summary:    63M, appears older than stated age, admitted to [facility] 2/5/24 PMHx HTN, GERD, HPL, chronic venous stasis dermatitis with bilateral leg swelling, and AS presented to [facility] on 1/25/2024 for bilateral leg pain and swelling found to have new acute HFrEF (EF 35-40% 1/2024) in setting of severe AS s/p AVR-t on 2/23 with Left atrial appendage clip.        24 Hour events:    Transferred to CTICU in setting of worsening renal function in setting of biventricular failure s/p AVR  RHC with PA 52/23 (mean 32), PCWP 23, CI 2.5, PA sat 45%. Thermodilution not performed.      Objective:  Vital Signs Last 24 Hrs  T(C): 36.4 (05 Mar 2024 20:00), Max: 36.7 (05 Mar 2024 06:59)  T(F): 97.6 (05 Mar 2024 20:00), Max: 98.1 (05 Mar 2024 10:59)  HR: 78 (05 Mar 2024 22:00) (63 - 111)  BP: 124/68 (05 Mar 2024 18:35) (104/66 - 149/81)  BP(mean): 89 (05 Mar 2024 18:35) (79 - 107)  RR: 22 (05 Mar 2024 22:00) (15 - 22)  SpO2: 96% (05 Mar 2024 22:00) (92% - 100%)    Parameters below as of 05 Mar 2024 20:00  Patient On (Oxygen Delivery Method): nasal cannula  O2 Flow (L/min): 2                  Physical Exam:   General: Alert and interactive   Neurology: Oriented, following commands  Respiratory: Clear bilaterally   CV: Sinus  Abdominal: Soft, Nontender, distended  Extremities: Warm, well-perfused, 2+edema  -------------------------------------------------------------------------------------------------------------------------------    Labs:                        8.4    8.76  )-----------( 134      ( 05 Mar 2024 19:12 )             24.3     03-05    136  |  90<L>  |  54<H>  ----------------------------<  125<H>  3.9   |  34<H>  |  2.86<H>    Ca    9.7      05 Mar 2024 19:12  Phos  4.3     03-05  Mg     2.3     03-05    TPro  6.5  /  Alb  3.8  /  TBili  1.8<H>  /  DBili  x   /  AST  15  /  ALT  17  /  AlkPhos  89  03-05    LIVER FUNCTIONS - ( 05 Mar 2024 19:12 )  Alb: 3.8 g/dL / Pro: 6.5 g/dL / ALK PHOS: 89 U/L / ALT: 17 U/L / AST: 15 U/L / GGT: x           PT/INR - ( 05 Mar 2024 19:12 )   PT: 12.5 sec;   INR: 1.14 ratio         PTT - ( 05 Mar 2024 19:12 )  PTT:30.3 sec  ABG - ( 05 Mar 2024 18:55 )  pH, Arterial: 7.57  pH, Blood: x     /  pCO2: 41    /  pO2: 158   / HCO3: 38    / Base Excess: 14.3  /  SaO2: 99.7              ------------------------------------------------------------------------------------------------------------------------------  Assessment:    63M, appears older than stated age, admitted to [facility] 2/5/24 PMHx HTN, GERD, HPL, chronic venous stasis dermatitis with bilateral leg swelling, and AS presented to [facility] on 1/25/2024 for bilateral leg pain and swelling found to have new acute HFrEF (EF 35-40% 1/2024) in setting of severe AS.     Severe aortic stenosis s/p AVR on 2/23/24  HFrEF, biventricular failure, cardiogenic shock requiring ionotropic support  LINDA   Fluid overload  Serratia bacteremia, cellulitis  Paroxysmal afib    Plan:   ***Neuro***  Postoperative acute pain control with Tylenol, Gabapentin, and prns.    ***Cardiovascular***  At high risk for hemodynamic instability and cardiac arrhythmias.  Trend Lactate  Started on dobutamine @2.5mcg/kg/min  Heparin drip for afib, continue amiodarone, digoxin. Check digoxin level with worsening renal function  Hydralazine 25mg PO Q8h for afterload reduction      ***Pulmonary***  Hypoxia requiring 2L nasal cannula to maintain spo2>92%  Deep breathing and coughing exercises.  Wean oxygen as able.      ***GI***  regular diet  Protonix for stress ulcer prophylaxis   Bowel regimen.    ***Renal***    LINDA, elevated right and left filling pressures  Trend Creatinine   Olivares catheter for strict I/O measurements.  Replete electrolytes as indicated.  Lasix 40mg iv given this evening, metabolic alkalosis with pH 7.57, give diamox, bumex drip  Monitor ABG, aim for negative 2L fluid balance        ***ID***  Continue cefepime for serratia bacteremia, last cultures from 3/1 still positive. TTE without vegetations on 2/26  Check repeat blood cultures, monitor for fever and leucocytosis  Source likely from leg wounds  F/u ID recs    ***Endocrine***  No active issues, monitor finger sticks, strict glucose control      ***Hematology***  No signs of bleeding, monitor right groin site for hematoma s/p RHC via RFV today  Heparin drip for afib, monitor PTT, trend Hct.           Patient requires continuous monitoring with bedside rhythm monitoring, pulse oximetry monitoring, and continuous central venous and arterial pressure monitoring; and intermittent blood gas analysis. Care plan discussed with the ICU care team.   Patient remains critical, at risk for life threatening decompensation.    I have spent 55 minutes providing critical care management to this patient.

## 2024-03-05 NOTE — PROGRESS NOTE ADULT - PROBLEM SELECTOR PLAN 4
- paroxysmal  - s/p JOANNA clip  - On Amiodarone 200 mg QD. Last bolused with IV Amio 3/4 for AF 130s  - On Digoxin 125 mcg QD. Please send Digoxin level   - On AC with Heparin infusion   - Please re-engage EP

## 2024-03-05 NOTE — PROGRESS NOTE ADULT - ASSESSMENT
Briefly, 62 y/o M w/ h/o HTN, severe AS, chronic venous stasis, HFpEF who p/w worsening LE swelling to GC 1/25 and found to be in decompensated heart failure with EF 35-40% and severe AS so transferred for further management on 2/5. Was diuresed and underwent LHC which was non-obstructive. Underwent bioAVR 2/23 w/ JOANNA clip complicated by bleeding. Post operative course complicated by volume expansion and pAF for which he was started on Amiodarone and Digoxin. Also became bacteremic, BCx 2/29 growing Serratia, repeat BCx from 3/1 still growing Serratia, being treated with IV Cefepime.  weaned to off on 3/1.     He appears to be volume expanded but diuretics on hold by CTSX for worsening LINDA. Some concern for possible low output as he has been worsening since discontinuation of inotropic support. Pending RHC today. Additionally, continues to have paroxysms of rapid AF, please re-engage EP.       Cardiac Studies  ·	TTE 2/26/24: LVIDd 6 cm, LVEF 30% (global), mod RVE with mod reduced function (TAPSE 0.9), severe ARAVIND, mild TR, est PASP 25 mmHg, trace pericardial effusion, IVC normal in size

## 2024-03-05 NOTE — PROGRESS NOTE ADULT - PROBLEM SELECTOR PLAN 2
- possibly cardiorenal  - Cr in 2022 1-1.3  - Cr on admission 1.7 and peaked to 2.5  - Had normalized, but now rising  - RHC today

## 2024-03-05 NOTE — PROGRESS NOTE ADULT - SUBJECTIVE AND OBJECTIVE BOX
Resting, on RA    Vital Signs Last 24 Hrs  T(C): 36.7 (03-05-24 @ 10:59), Max: 36.7 (03-04-24 @ 15:23)  T(F): 98.1 (03-05-24 @ 10:59), Max: 98.1 (03-04-24 @ 19:14)  HR: 75 (03-05-24 @ 10:59) (75 - 111)  BP: 114/77 (03-05-24 @ 10:59) (103/74 - 149/81)  BP(mean): 91 (03-05-24 @ 10:59) (79 - 107)  RR: 18 (03-05-24 @ 10:59) (18 - 18)  SpO2: 97% (03-05-24 @ 10:59) (94% - 99%)    I&O's Detail    04 Mar 2024 07:01  -  05 Mar 2024 07:00  --------------------------------------------------------  IN:    Bumetanide: 5 mL    Heparin: 260 mL    Oral Fluid: 520 mL  Total IN: 785 mL    OUT:    Indwelling Catheter - Urethral (mL): 2000 mL  Total OUT: 2000 mL    s1s2  b/l air entry  soft, ND  b/l LE edema                                                                                                           8.8    10.15 )-----------( 150      ( 05 Mar 2024 07:43 )             25.3     05 Mar 2024 07:42    137    |  88     |  52     ----------------------------<  108    4.3     |  33     |  2.75     Ca    9.7        05 Mar 2024 07:42  Phos  4.4       04 Mar 2024 02:58  Mg     2.2       04 Mar 2024 02:58    TPro  6.9    /  Alb  4.1    /  TBili  1.9    /  DBili  x      /  AST  13     /  ALT  18     /  AlkPhos  95     05 Mar 2024 07:42    LIVER FUNCTIONS - ( 05 Mar 2024 07:42 )  Alb: 4.1 g/dL / Pro: 6.9 g/dL / ALK PHOS: 95 U/L / ALT: 18 U/L / AST: 13 U/L / GGT: x           PT/INR - ( 04 Mar 2024 02:58 )   PT: 12.6 sec;   INR: 1.15 ratio      acetaminophen     Tablet .. 650 milliGRAM(s) Oral every 6 hours PRN  allopurinol 100 milliGRAM(s) Oral daily  aMIOdarone    Tablet 200 milliGRAM(s) Oral daily  aMIOdarone    Tablet   Oral   aMIOdarone Infusion 0.5 mG/Min IV Continuous <Continuous>  aspirin  chewable 81 milliGRAM(s) Oral daily  benzocaine/menthol Lozenge 1 Lozenge Oral four times a day PRN  bisacodyl Suppository 10 milliGRAM(s) Rectal daily PRN  cefepime   IVPB 1000 milliGRAM(s) IV Intermittent every 12 hours  chlorhexidine 2% Cloths 1 Application(s) Topical daily  digoxin     Tablet 125 MICROGram(s) Oral daily  FIRST- Mouthwash  BLM 10 milliLiter(s) Swish and Swallow every 6 hours  heparin  Infusion 800 Unit(s)/Hr IV Continuous <Continuous>  hydrALAZINE 25 milliGRAM(s) Oral every 8 hours  levothyroxine 50 MICROGram(s) Oral <User Schedule>  lidocaine   4% Patch 1 Patch Transdermal daily  pantoprazole  Injectable 40 milliGRAM(s) IV Push daily  polyethylene glycol 3350 17 Gram(s) Oral daily  senna 2 Tablet(s) Oral at bedtime    A/P:    CM, EF 30%, severe AS, Afib, CHF  Tx from [facility] to [facility] 2/5, s/p C 2/5  S/p cardio-renal/hemodynamic LINDA (peak Cr 2.5 - 2/3, lorna Cr 1.23 - 2/23)   UA w/RBCs, otherwise negative   Imaging w/o hydro 1/28/24  S/p CT w/IV dye 2/8/24  Stable renal fx post CT  S/p AVR, JOANNA clip 2/23  Serratia bacteremia (2/29 and 3/1), Abx per ID   F/u repeat bld cx   Hemodynamic/cardio-renal/septic LINDA/CKD w/rising Cr  Agree w/holding diuretics if possible   Avoid nephrotoxins, hypotension   Plan for RHC  F/u BMP, UO    [phone]

## 2024-03-05 NOTE — PROGRESS NOTE ADULT - NS ATTEND AMEND GEN_ALL_CORE FT
Patient was seen and examined at bedside with the advanced care provider.  I agree with the above with the following exceptions:    Sleeping in chair, confused about the time.  Remains with 2+ pitting edema and JVP elevation to at least 12 cm sitting upright.  RHC done today.    RA 16, RV 54/7, PA 52/21 (32), PCWP 23, PA sat 45%, CO/CI 5.1/2.5.  Arterial sat 94%  Elevated right and left sided filling pressures.  Will need additional diuresis.  BP limited and renal function limited.  Unclear with the discrepancy with PA sat and normal CI via KATERINE calculation.  Thermodilution not performed.  May be secondary to lower Hb.  [place] not in place.  Would not be unreasonable to use dobutamine to assist with additional diuresis.  On review, it appears that his MVO2 sat was 39% on 2/25 with Hb 8 in the setting of afib.  Currently he continues to go in and out of afib.  EP team to reconsult.  Favour diuretics use with the dobutamine to achieve net -2L fluid balance of the day - can start with lasix 40mg IV BID.

## 2024-03-05 NOTE — PROGRESS NOTE ADULT - PROBLEM SELECTOR PLAN 1
-  stopped 3/1   - Will decide on diuretic regimen based on invasive hemodynamics  - Continue HDZN 25 mg TID for afterload reduction, hold for SBP <90  - Defer MRA/ARB/ARNI given degree of renal dysfunction   - Replete to target K 4-4.5 and Mg 2-2.5

## 2024-03-05 NOTE — PROGRESS NOTE ADULT - SUBJECTIVE AND OBJECTIVE BOX
Follow Up:  fever    Interval History/ROS: pt feels better, no fever denied chest pain or SOB, WBC normalized          Allergies  garlic (Angioedema; Swelling; Anaphylaxis)  No Known Drug Allergies        ANTIMICROBIALS:  cefepime   IVPB 1000 every 12 hours      OTHER MEDS:  acetaminophen     Tablet .. 650 milliGRAM(s) Oral every 6 hours PRN  allopurinol 100 milliGRAM(s) Oral daily  aMIOdarone    Tablet   Oral   aMIOdarone    Tablet 200 milliGRAM(s) Oral daily  aMIOdarone Infusion 0.5 mG/Min IV Continuous <Continuous>  aspirin  chewable 81 milliGRAM(s) Oral daily  benzocaine/menthol Lozenge 1 Lozenge Oral four times a day PRN  bisacodyl Suppository 10 milliGRAM(s) Rectal daily PRN  chlorhexidine 2% Cloths 1 Application(s) Topical daily  digoxin     Tablet 125 MICROGram(s) Oral daily  FIRST- Mouthwash  BLM 10 milliLiter(s) Swish and Swallow every 6 hours  heparin  Infusion 800 Unit(s)/Hr IV Continuous <Continuous>  hydrALAZINE 25 milliGRAM(s) Oral every 8 hours  levothyroxine 50 MICROGram(s) Oral <User Schedule>  lidocaine   4% Patch 1 Patch Transdermal daily  pantoprazole  Injectable 40 milliGRAM(s) IV Push daily  polyethylene glycol 3350 17 Gram(s) Oral daily  senna 2 Tablet(s) Oral at bedtime      Vital Signs Last 24 Hrs  T(C): 36.7 (05 Mar 2024 13:55), Max: 36.7 (04 Mar 2024 15:23)  T(F): 98.1 (05 Mar 2024 13:55), Max: 98.1 (04 Mar 2024 19:14)  HR: 72 (05 Mar 2024 13:55) (72 - 111)  BP: 121/76 (05 Mar 2024 13:55) (103/74 - 149/81)  BP(mean): 91 (05 Mar 2024 10:59) (79 - 107)  RR: 18 (05 Mar 2024 13:55) (18 - 18)  SpO2: 95% (05 Mar 2024 13:55) (94% - 99%)    Parameters below as of 05 Mar 2024 13:55  Patient On (Oxygen Delivery Method): room air        Physical Exam:  General:    NAD, sitting on a chair  Cardio:    sternal incision clean  Respiratory:    clear   abd:     soft,    no tenderness  :   + garcia  Musculoskeletal:   no joint swelling  Skin:    no rash, b/l LE ulcers with dressing                          8.8    10.15 )-----------( 150      ( 05 Mar 2024 07:43 )             25.3       03-05    137  |  88<L>  |  52<H>  ----------------------------<  108<H>  4.3   |  33<H>  |  2.75<H>    Ca    9.7      05 Mar 2024 07:42  Phos  4.4     03-04  Mg     2.2     03-04    TPro  6.9  /  Alb  4.1  /  TBili  1.9<H>  /  DBili  x   /  AST  13  /  ALT  18  /  AlkPhos  95  03-05      Urinalysis Basic - ( 05 Mar 2024 07:42 )    Color: x / Appearance: x / SG: x / pH: x  Gluc: 108 mg/dL / Ketone: x  / Bili: x / Urobili: x   Blood: x / Protein: x / Nitrite: x   Leuk Esterase: x / RBC: x / WBC x   Sq Epi: x / Non Sq Epi: x / Bacteria: x        MICROBIOLOGY:  v  .Blood Blood-Peripheral  03-01-24   Growth in aerobic and anaerobic bottles: Serratia marcescens  See previous culture [id number]  --    Growth in aerobic bottle: Gram Negative Rods  Growth in anaerobic bottle: Gram Negative Rods      .Blood Blood  02-29-24   Growth in aerobic and anaerobic bottles: Serratia marcescens  See previous culture [id number]  --    Growth in aerobic bottle: Gram Negative Rods  Growth in anaerobic bottle: Gram Negative Rods      .Blood Blood  02-29-24   Growth in aerobic and anaerobic bottles: Serratia marcescens  Direct identification is available within approximately 3-5  hours either by Blood Panel Multiplexed PCR or Direct  MALDI-TOF. Details: [email or web address]  --  Blood Culture PCR  Serratia marcescens                RADIOLOGY:  Images independently visualized and reviewed personally, findings as below  < from: Xray Chest 1 View- PORTABLE-Urgent (Xray Chest 1 View- PORTABLE-Urgent .) (03.03.24 @ 10:37) >  IMPRESSION:  Stable opacification of the left retrocardiac area, likely effusion and   atelectasis.    < end of copied text >  < from: TTE W or WO Ultrasound Enhancing Agent (02.26.24 @ 08:44) >  CONCLUSIONS:      1. Left ventricular cavity is moderately dilated. Left ventricular wall thickness is normal. Left ventricular systolic function is severely decreased.   2. Moderately enlarged right ventricular cavity size and moderately reduced systolic function.      < end of copied text >

## 2024-03-05 NOTE — PROGRESS NOTE ADULT - SUBJECTIVE AND OBJECTIVE BOX
Subjective:  - Ongoing LE swelling. No SOB at rest.     Medications:  acetaminophen     Tablet .. 650 milliGRAM(s) Oral every 6 hours PRN  allopurinol 100 milliGRAM(s) Oral daily  aMIOdarone    Tablet 200 milliGRAM(s) Oral daily  aMIOdarone    Tablet   Oral   aMIOdarone Infusion 0.5 mG/Min IV Continuous <Continuous>  aspirin  chewable 81 milliGRAM(s) Oral daily  benzocaine/menthol Lozenge 1 Lozenge Oral four times a day PRN  bisacodyl Suppository 10 milliGRAM(s) Rectal daily PRN  cefepime   IVPB 1000 milliGRAM(s) IV Intermittent every 12 hours  chlorhexidine 2% Cloths 1 Application(s) Topical daily  digoxin     Tablet 125 MICROGram(s) Oral daily  FIRST- Mouthwash  BLM 10 milliLiter(s) Swish and Swallow every 6 hours  heparin  Infusion 800 Unit(s)/Hr IV Continuous <Continuous>  hydrALAZINE 25 milliGRAM(s) Oral every 8 hours  levothyroxine 50 MICROGram(s) Oral <User Schedule>  lidocaine   4% Patch 1 Patch Transdermal daily  lidocaine 2% Jelly 3 milliLiter(s) IntraUrethral once  pantoprazole  Injectable 40 milliGRAM(s) IV Push daily  polyethylene glycol 3350 17 Gram(s) Oral daily  senna 2 Tablet(s) Oral at bedtime    Physical Exam:    Vitals:  Vital Signs Last 24 Hours  T(C): 36.7 (24 @ 10:59), Max: 36.7 (24 @ 15:23)  HR: 75 (24 @ 10:59) (75 - 111)  BP: 114/77 (24 @ 10:59) (95/65 - 149/81)  RR: 18 (24 @ 10:59) (18 - 18)  SpO2: 97% (24 @ 10:59) (94% - 99%)    Weight in k ( @ 06:56)    I&O's Summary    04 Mar 2024 07:01  -  05 Mar 2024 07:00  --------------------------------------------------------  IN: 785 mL / OUT: 2000 mL / NET: -1215 mL    05 Mar 2024 07:01  -  05 Mar 2024 11:23  --------------------------------------------------------  IN: 0 mL / OUT: 125 mL / NET: -125 mL    Tele: SR 70s    General: No distress. Somnolent arousable to voice  HEENT: EOM intact.  Neck: JVP 16-18 cm H2O with HJR  Chest: Clear to auscultation bilaterally  CV: RRR, normal S1 and S2  Abdomen: Soft, non-distended, non-tender  Skin: BLE dressings C/D/I. Sternal surgical incision with staples intact, no drainage  Extremities: 2-3+ BLE edema, mildly lukewarm   Neurology: Alert and oriented times three. Sensation intact  Psych: Affect normal    Labs:                        8.8    10.15 )-----------( 150      ( 05 Mar 2024 07:43 )             25.3     03-05    137  |  88<L>  |  52<H>  ----------------------------<  108<H>  4.3   |  33<H>  |  2.75<H>    Ca    9.7      05 Mar 2024 07:42  Phos  4.4     03-04  Mg     2.2     03-04    TPro  6.9  /  Alb  4.1  /  TBili  1.9<H>  /  DBili  x   /  AST  13  /  ALT  18  /  AlkPhos  95  03-05    PT/INR - ( 04 Mar 2024 02:58 )   PT: 12.6 sec;   INR: 1.15 ratio         PTT - ( 05 Mar 2024 07:44 )  PTT:35.0 sec            Lactate, Blood: 1.4 mmol/L ( @ 15:24)  Lactate, Blood: 1.7 mmol/L ( @ 14:19)  Lactate, Blood: 2.3 mmol/L ( @ 11:02)

## 2024-03-05 NOTE — PROGRESS NOTE ADULT - PROBLEM SELECTOR PLAN 5
- BCx from 2/29 and 3/1 with GNR Serratia. No repeat BCx sent since 3/1  - Started on Cefepime 3/1  - Remains afebrile but with ongoing mild leukocytosis  - LE wound culture send 3/5, pending result   - Further guidance by ID

## 2024-03-05 NOTE — PROGRESS NOTE ADULT - SUBJECTIVE AND OBJECTIVE BOX
VITAL SIGNS    Telemetry:  afib 105    Vital Signs Last 24 Hrs  T(C): 36.7 (24 @ 06:59), Max: 36.7 (24 @ 15:23)  T(F): 98 (24 @ 06:59), Max: 98.1 (24 @ 19:14)  HR: 83 (24 @ 06:59) (77 - 111)  BP: 117/75 (24 @ 06:59) (95/65 - 149/81)  RR: 18 (24 @ 06:59) (18 - 18)  SpO2: 94% (24 @ 06:59) (94% - 99%)                    @ 07:01  -   @ 07:00  --------------------------------------------------------  IN: 785 mL / OUT: 2000 mL / NET: -1215 mL          Daily     Daily Weight in k (05 Mar 2024 06:56)            CAPILLARY BLOOD GLUCOSE                Drains:       Pacing Wires            Coumadin    [ ] YES          [x  ]      NO         heparin , held                          PHYSICAL EXAM      Neurology: alert and oriented x 2, nonfocal, no gross deficits, MILD CONFUSION, WORSE AT NIGHT  CV : s1 s2 RRR  Sternal Wound :  CDI , Stable, STAPLES  Lungs: bibasilar crackles  Abdomen: soft, nontender, nondistended, positive bowel sounds, last bowel movement  + yesterday                       :    garcia - sbd         Extremities:    + bilat edema edema   /  -   calve tenderness ,              acetaminophen     Tablet .. 650 milliGRAM(s) Oral every 6 hours PRN  aMIOdarone    Tablet 200 milliGRAM(s) Oral daily  aMIOdarone    Tablet   Oral   aMIOdarone Infusion 0.5 mG/Min IV Continuous <Continuous>  aspirin  chewable 81 milliGRAM(s) Oral daily  benzocaine/menthol Lozenge 1 Lozenge Oral four times a day PRN  bisacodyl Suppository 10 milliGRAM(s) Rectal daily PRN  cefepime   IVPB 1000 milliGRAM(s) IV Intermittent every 12 hours  chlorhexidine 2% Cloths 1 Application(s) Topical daily  digoxin     Tablet 125 MICROGram(s) Oral daily  FIRST- Mouthwash  BLM 10 milliLiter(s) Swish and Swallow every 6 hours  heparin  Infusion 800 Unit(s)/Hr IV Continuous <Continuous>  hydrALAZINE 25 milliGRAM(s) Oral every 8 hours  levothyroxine 50 MICROGram(s) Oral <User Schedule>  lidocaine   4% Patch 1 Patch Transdermal daily  lidocaine 2% Jelly 3 milliLiter(s) IntraUrethral once  pantoprazole  Injectable 40 milliGRAM(s) IV Push daily  polyethylene glycol 3350 17 Gram(s) Oral daily  senna 2 Tablet(s) Oral at bedtime                    Physical Therapy Rec:   Home  [  ]   Home w/ PT  [  ]  Rehab  [  ]  Discussed with Cardiothoracic Team at AM rounds.

## 2024-03-05 NOTE — PROGRESS NOTE ADULT - ASSESSMENT
63 m with HTN, GERD, AS, chronic venous stasis dermatitis with bilateral leg swelling, initially admitted to [facility] with JOCELIN and b/l leg pain and swelling, was seen by ID and was considered not infected just stasis dermatitis, received 5 days of keflex, wound cx showed serratia and strep dysgalactiae,  blood cx negative  S/P Bio AVR , 2/23Post op labile BP related to hypovolemia & cardiac dysfunction, intra op bleeding requiring blood & products   pt has been on vanco until 2/29 for post op course  now febrile      AS and CHF s/p AVR 2/23 now with new fever and serratia bacteremia 2/29 and 3/1, source unclear but previous wound cx had strep and serratia, u/a just hematuria, no pyuria, TTE 2/26 no veg  b/l LE stasis dermatitis and chronic wounds, no tenderness and appears stasis dermatitis not infection    * follow the repeat blood cx  * c/w cefepime   * chest/abd CT  * follow with wound care for the LE wounds  * monitor CBC/diff and CMP      The above assessment and plan was discussed with the primary team    Pamela Bellamy MD  contact on teams  After 5pm and on weekends call [phone].

## 2024-03-05 NOTE — PROGRESS NOTE ADULT - ASSESSMENT
63M, appears older than stated age, admitted to [facility] 24 PMHx HTN, GERD, HPL, chronic venous stasis dermatitis with bilateral leg swelling, and AS presented to [facility] on 2024 for bilateral leg pain and swelling found to have new acute HFrEF (EF 35-40% 2024) in setting of severe AS.     Hospital course:  24: Admitted to [facility] new onset HFrEF EF 35% and anemia 2/2 AS,   24: medically stable for transferred to [facility] medicine service for continued care. during hospitalization hx of WCT/ PAF  Consults for Nephrology, HF, ID, Podiatry for medical optimization  2/15: 1 U PRBC, keep Hgb > 8.    s/p fall with headastrike   : s/p Liver biopsy, soft pressure post procedure--responded to fluid bolus  :  RLE doppler: No pseudoaneurysm in the right groin/ bilobed avascular fluid collection measuring 2.8 x 1.7 x 2.0 cm     : AVR/JOANNA Clip c/b intraop bleeding requirinu pRBCs on CPB 1u pRBCs post-CPB 1 PLT 1000 FEIBA  Prolene suture placed around IJ and V wire  : To Step Down, raegan, CT, +PW, marlena RIJ   Maintain mediastinal chest tubes Primicor discontinued. Lasix 40 BID initiated , Wound care to chronic venous stasis b/l LE. D/c RIJ   VSS - 9 hrs afib last evening- SR since 1:30am- on AMio gtt,  gtt - s/p right axillary marlena placed by Intensivist. garcia placed last evening lasix 40 iv x 1 given this am- echo tr jon eff.   VSS - currently on lasix gtt - negative 2L in last 24 hrs. bid basic metabolic pt converted back into afib this am 100-120- currently on amio load - dobutrex gtt. d/c planning    d/c yesterday then resumed for decreased UO.  Remains a fib since yesterday, cont amio/heparin, eps following         Weight increased, lasix 80 iv x 1, then infusion increased , aldactone added .  Remains on heparin infusion.          Hydral added for afterload    NSR, on amio, EPS following, noted elevated tsh.  Repeat sent synthroid started.  Fluid overload, diuretics increased, 3% saline x 2, lasix 80iv x 1, lasix infusion 20mg/hr  3/1 febrile  overnight  and this am pan cx.  Plan to remove marlena, intro , garcia.  A line is from , intro   UA neg  Cont , lasix, f/u bun creat  3/2 VSS afebrile overnight.  97 r/s.  on lasix gtt @ 20mg/jr  negative 2366cc.  will monitor closely in sdu  3/3 Bumex gtt decreased to 1mg/hr per HF - @8:45am pt converted to afib -100-130s w/ hypotension while on commode attempting to have a bm- pt asymptomatic. brought back to bed.  amio 150 IV x 1 given w/ filter. dig load per HF - Dr. Rosario - .75mg total d/t sin.  pt u/o decreased while in afib- DR. Rosario @ bedside CXR done.  spoke to EP - Dr. Ghotra regarding DCCV - Call to CTU to set up transfer for DCCV - transfer cancelled d/t pt. converted to SR 68- w/ increase in SBP to 102 & U/O increasing.  ENT consulted for c/o "hurts when i swallow"  ent exam rveals no rod.  lido s&s ordered.  pt eating w/o difficulty  3/ Pt with edema not improved, elevated creat.  Bumex d/c   Placed on iv lasix, aldactone d/c.  Albuminx 2  Hydralazine decreasewd  REpeat labs today  L lung sono, minimal fluid  3/  Wt stable , remains off diuretic + edema, creat up yesterday  RHC today

## 2024-03-06 ENCOUNTER — RESULT REVIEW (OUTPATIENT)
Age: 64
End: 2024-03-06

## 2024-03-06 LAB
ALBUMIN SERPL ELPH-MCNC: 3.3 G/DL — SIGNIFICANT CHANGE UP (ref 3.3–5)
ALBUMIN SERPL ELPH-MCNC: 3.6 G/DL — SIGNIFICANT CHANGE UP (ref 3.3–5)
ALP SERPL-CCNC: 85 U/L — SIGNIFICANT CHANGE UP (ref 40–120)
ALP SERPL-CCNC: 96 U/L — SIGNIFICANT CHANGE UP (ref 40–120)
ALT FLD-CCNC: 12 U/L — SIGNIFICANT CHANGE UP (ref 10–45)
ALT FLD-CCNC: 18 U/L — SIGNIFICANT CHANGE UP (ref 10–45)
ANION GAP SERPL CALC-SCNC: 11 MMOL/L — SIGNIFICANT CHANGE UP (ref 5–17)
ANION GAP SERPL CALC-SCNC: 12 MMOL/L — SIGNIFICANT CHANGE UP (ref 5–17)
APTT BLD: 29.4 SEC — SIGNIFICANT CHANGE UP (ref 24.5–35.6)
APTT BLD: 56.4 SEC — HIGH (ref 24.5–35.6)
APTT BLD: 56.6 SEC — HIGH (ref 24.5–35.6)
AST SERPL-CCNC: 12 U/L — SIGNIFICANT CHANGE UP (ref 10–40)
AST SERPL-CCNC: 18 U/L — SIGNIFICANT CHANGE UP (ref 10–40)
BILIRUB SERPL-MCNC: 1.6 MG/DL — HIGH (ref 0.2–1.2)
BILIRUB SERPL-MCNC: 1.8 MG/DL — HIGH (ref 0.2–1.2)
BUN SERPL-MCNC: 51 MG/DL — HIGH (ref 7–23)
BUN SERPL-MCNC: 55 MG/DL — HIGH (ref 7–23)
CALCIUM SERPL-MCNC: 8.8 MG/DL — SIGNIFICANT CHANGE UP (ref 8.4–10.5)
CALCIUM SERPL-MCNC: 9.2 MG/DL — SIGNIFICANT CHANGE UP (ref 8.4–10.5)
CHLORIDE SERPL-SCNC: 89 MMOL/L — LOW (ref 96–108)
CHLORIDE SERPL-SCNC: 92 MMOL/L — LOW (ref 96–108)
CO2 SERPL-SCNC: 33 MMOL/L — HIGH (ref 22–31)
CO2 SERPL-SCNC: 34 MMOL/L — HIGH (ref 22–31)
CREAT SERPL-MCNC: 2.76 MG/DL — HIGH (ref 0.5–1.3)
CREAT SERPL-MCNC: 2.98 MG/DL — HIGH (ref 0.5–1.3)
DIGOXIN SERPL-MCNC: 1.3 NG/ML — SIGNIFICANT CHANGE UP (ref 0.8–2)
EGFR: 23 ML/MIN/1.73M2 — LOW
EGFR: 25 ML/MIN/1.73M2 — LOW
GAS PNL BLDA: SIGNIFICANT CHANGE UP
GLUCOSE BLDC GLUCOMTR-MCNC: 122 MG/DL — HIGH (ref 70–99)
GLUCOSE BLDC GLUCOMTR-MCNC: 129 MG/DL — HIGH (ref 70–99)
GLUCOSE SERPL-MCNC: 110 MG/DL — HIGH (ref 70–99)
GLUCOSE SERPL-MCNC: 181 MG/DL — HIGH (ref 70–99)
HCT VFR BLD CALC: 22.9 % — LOW (ref 39–50)
HCT VFR BLD CALC: 25.6 % — LOW (ref 39–50)
HEPARIN-PF4 AB RESULT: 0.9 U/ML — SIGNIFICANT CHANGE UP (ref 0–0.9)
HGB BLD-MCNC: 8 G/DL — LOW (ref 13–17)
HGB BLD-MCNC: 8.3 G/DL — LOW (ref 13–17)
HGB FLD-MCNC: 8 G/DL — LOW (ref 12.6–17.4)
INR BLD: 1.15 RATIO — SIGNIFICANT CHANGE UP (ref 0.85–1.18)
INR BLD: 1.3 RATIO — HIGH (ref 0.85–1.18)
MAGNESIUM SERPL-MCNC: 2.2 MG/DL — SIGNIFICANT CHANGE UP (ref 1.6–2.6)
MAGNESIUM SERPL-MCNC: 2.3 MG/DL — SIGNIFICANT CHANGE UP (ref 1.6–2.6)
MCHC RBC-ENTMCNC: 23.7 PG — LOW (ref 27–34)
MCHC RBC-ENTMCNC: 25.6 PG — LOW (ref 27–34)
MCHC RBC-ENTMCNC: 32.4 GM/DL — SIGNIFICANT CHANGE UP (ref 32–36)
MCHC RBC-ENTMCNC: 34.9 GM/DL — SIGNIFICANT CHANGE UP (ref 32–36)
MCV RBC AUTO: 73.1 FL — LOW (ref 80–100)
MCV RBC AUTO: 73.4 FL — LOW (ref 80–100)
NRBC # BLD: 0 /100 WBCS — SIGNIFICANT CHANGE UP (ref 0–0)
NRBC # BLD: 0 /100 WBCS — SIGNIFICANT CHANGE UP (ref 0–0)
OXYHGB MFR BLDMV: 44.2 % — LOW (ref 90–95)
PF4 HEPARIN CMPLX AB SER-ACNC: NEGATIVE — SIGNIFICANT CHANGE UP
PHOSPHATE SERPL-MCNC: 4 MG/DL — SIGNIFICANT CHANGE UP (ref 2.5–4.5)
PHOSPHATE SERPL-MCNC: 4.3 MG/DL — SIGNIFICANT CHANGE UP (ref 2.5–4.5)
PLATELET # BLD AUTO: 126 K/UL — LOW (ref 150–400)
PLATELET # BLD AUTO: 148 K/UL — LOW (ref 150–400)
POTASSIUM SERPL-MCNC: 3.8 MMOL/L — SIGNIFICANT CHANGE UP (ref 3.5–5.3)
POTASSIUM SERPL-MCNC: 3.9 MMOL/L — SIGNIFICANT CHANGE UP (ref 3.5–5.3)
POTASSIUM SERPL-SCNC: 3.8 MMOL/L — SIGNIFICANT CHANGE UP (ref 3.5–5.3)
POTASSIUM SERPL-SCNC: 3.9 MMOL/L — SIGNIFICANT CHANGE UP (ref 3.5–5.3)
PROT SERPL-MCNC: 5.9 G/DL — LOW (ref 6–8.3)
PROT SERPL-MCNC: 6.4 G/DL — SIGNIFICANT CHANGE UP (ref 6–8.3)
PROTHROM AB SERPL-ACNC: 12.6 SEC — SIGNIFICANT CHANGE UP (ref 9.5–13)
PROTHROM AB SERPL-ACNC: 13.5 SEC — HIGH (ref 9.5–13)
RBC # BLD: 3.12 M/UL — LOW (ref 4.2–5.8)
RBC # BLD: 3.5 M/UL — LOW (ref 4.2–5.8)
RBC # FLD: 29.1 % — HIGH (ref 10.3–14.5)
RBC # FLD: 31.8 % — HIGH (ref 10.3–14.5)
SAO2 % BLD: 45 % — LOW (ref 60–90)
SODIUM SERPL-SCNC: 134 MMOL/L — LOW (ref 135–145)
SODIUM SERPL-SCNC: 137 MMOL/L — SIGNIFICANT CHANGE UP (ref 135–145)
WBC # BLD: 10.12 K/UL — SIGNIFICANT CHANGE UP (ref 3.8–10.5)
WBC # BLD: 10.55 K/UL — HIGH (ref 3.8–10.5)
WBC # FLD AUTO: 10.12 K/UL — SIGNIFICANT CHANGE UP (ref 3.8–10.5)
WBC # FLD AUTO: 10.55 K/UL — HIGH (ref 3.8–10.5)

## 2024-03-06 PROCEDURE — 33025 INCISION OF HEART SAC: CPT | Mod: AS,78

## 2024-03-06 PROCEDURE — 93321 DOPPLER ECHO F-UP/LMTD STD: CPT | Mod: 26

## 2024-03-06 PROCEDURE — 99291 CRITICAL CARE FIRST HOUR: CPT

## 2024-03-06 PROCEDURE — 33025 INCISION OF HEART SAC: CPT | Mod: 78

## 2024-03-06 PROCEDURE — 93308 TTE F-UP OR LMTD: CPT | Mod: 26

## 2024-03-06 PROCEDURE — 71045 X-RAY EXAM CHEST 1 VIEW: CPT | Mod: 26,77

## 2024-03-06 PROCEDURE — 93010 ELECTROCARDIOGRAM REPORT: CPT

## 2024-03-06 PROCEDURE — 99232 SBSQ HOSP IP/OBS MODERATE 35: CPT

## 2024-03-06 PROCEDURE — 71045 X-RAY EXAM CHEST 1 VIEW: CPT | Mod: 26

## 2024-03-06 PROCEDURE — 71250 CT THORAX DX C-: CPT | Mod: 26

## 2024-03-06 DEVICE — LIGATING CLIPS WECK HORIZON MEDIUM (BLUE) 24: Type: IMPLANTABLE DEVICE | Status: FUNCTIONAL

## 2024-03-06 RX ORDER — CALCIUM GLUCONATE 100 MG/ML
2 VIAL (ML) INTRAVENOUS ONCE
Refills: 0 | Status: COMPLETED | OUTPATIENT
Start: 2024-03-06 | End: 2024-03-06

## 2024-03-06 RX ORDER — HYDROMORPHONE HYDROCHLORIDE 2 MG/ML
0.25 INJECTION INTRAMUSCULAR; INTRAVENOUS; SUBCUTANEOUS ONCE
Refills: 0 | Status: DISCONTINUED | OUTPATIENT
Start: 2024-03-06 | End: 2024-03-06

## 2024-03-06 RX ORDER — HYDRALAZINE HCL 50 MG
5 TABLET ORAL ONCE
Refills: 0 | Status: COMPLETED | OUTPATIENT
Start: 2024-03-06 | End: 2024-03-06

## 2024-03-06 RX ORDER — HEPARIN SODIUM 5000 [USP'U]/ML
5000 INJECTION INTRAVENOUS; SUBCUTANEOUS EVERY 8 HOURS
Refills: 0 | Status: DISCONTINUED | OUTPATIENT
Start: 2024-03-06 | End: 2024-03-12

## 2024-03-06 RX ORDER — NICARDIPINE HYDROCHLORIDE 30 MG/1
5 CAPSULE, EXTENDED RELEASE ORAL
Qty: 40 | Refills: 0 | Status: DISCONTINUED | OUTPATIENT
Start: 2024-03-06 | End: 2024-03-08

## 2024-03-06 RX ORDER — DIGOXIN 250 MCG
125 TABLET ORAL EVERY OTHER DAY
Refills: 0 | Status: DISCONTINUED | OUTPATIENT
Start: 2024-03-08 | End: 2024-03-12

## 2024-03-06 RX ORDER — DOBUTAMINE HCL 250MG/20ML
2 VIAL (ML) INTRAVENOUS
Qty: 500 | Refills: 0 | Status: DISCONTINUED | OUTPATIENT
Start: 2024-03-06 | End: 2024-03-08

## 2024-03-06 RX ORDER — VANCOMYCIN HCL 1 G
1000 VIAL (EA) INTRAVENOUS EVERY 12 HOURS
Refills: 0 | Status: DISCONTINUED | OUTPATIENT
Start: 2024-03-06 | End: 2024-03-06

## 2024-03-06 RX ORDER — HYDRALAZINE HCL 50 MG
25 TABLET ORAL EVERY 8 HOURS
Refills: 0 | Status: DISCONTINUED | OUTPATIENT
Start: 2024-03-06 | End: 2024-03-08

## 2024-03-06 RX ORDER — POTASSIUM CHLORIDE 20 MEQ
10 PACKET (EA) ORAL ONCE
Refills: 0 | Status: COMPLETED | OUTPATIENT
Start: 2024-03-06 | End: 2024-03-06

## 2024-03-06 RX ORDER — ASPIRIN/CALCIUM CARB/MAGNESIUM 324 MG
81 TABLET ORAL DAILY
Refills: 0 | Status: DISCONTINUED | OUTPATIENT
Start: 2024-03-06 | End: 2024-03-12

## 2024-03-06 RX ORDER — VANCOMYCIN HCL 1 G
1000 VIAL (EA) INTRAVENOUS ONCE
Refills: 0 | Status: COMPLETED | OUTPATIENT
Start: 2024-03-06 | End: 2024-03-06

## 2024-03-06 RX ADMIN — HYDROMORPHONE HYDROCHLORIDE 0.25 MILLIGRAM(S): 2 INJECTION INTRAMUSCULAR; INTRAVENOUS; SUBCUTANEOUS at 09:41

## 2024-03-06 RX ADMIN — Medication 125 MICROGRAM(S): at 07:40

## 2024-03-06 RX ADMIN — Medication 10.3 MICROGRAM(S)/KG/MIN: at 19:34

## 2024-03-06 RX ADMIN — Medication 12.8 MICROGRAM(S)/KG/MIN: at 09:04

## 2024-03-06 RX ADMIN — AMIODARONE HYDROCHLORIDE 200 MILLIGRAM(S): 400 TABLET ORAL at 07:40

## 2024-03-06 RX ADMIN — Medication 200 GRAM(S): at 11:31

## 2024-03-06 RX ADMIN — SENNA PLUS 2 TABLET(S): 8.6 TABLET ORAL at 21:27

## 2024-03-06 RX ADMIN — BUMETANIDE 5 MG/HR: 0.25 INJECTION INTRAMUSCULAR; INTRAVENOUS at 19:35

## 2024-03-06 RX ADMIN — CEFEPIME 100 MILLIGRAM(S): 1 INJECTION, POWDER, FOR SOLUTION INTRAMUSCULAR; INTRAVENOUS at 17:34

## 2024-03-06 RX ADMIN — CEFEPIME 100 MILLIGRAM(S): 1 INJECTION, POWDER, FOR SOLUTION INTRAMUSCULAR; INTRAVENOUS at 06:00

## 2024-03-06 RX ADMIN — HYDROMORPHONE HYDROCHLORIDE 0.25 MILLIGRAM(S): 2 INJECTION INTRAMUSCULAR; INTRAVENOUS; SUBCUTANEOUS at 09:26

## 2024-03-06 RX ADMIN — Medication 100 MILLIEQUIVALENT(S): at 15:44

## 2024-03-06 RX ADMIN — BUMETANIDE 5 MG/HR: 0.25 INJECTION INTRAMUSCULAR; INTRAVENOUS at 09:04

## 2024-03-06 RX ADMIN — HEPARIN SODIUM 5000 UNIT(S): 5000 INJECTION INTRAVENOUS; SUBCUTANEOUS at 21:26

## 2024-03-06 RX ADMIN — Medication 5 MILLIGRAM(S): at 18:37

## 2024-03-06 RX ADMIN — Medication 5 MILLIGRAM(S): at 16:06

## 2024-03-06 RX ADMIN — NICARDIPINE HYDROCHLORIDE 25 MG/HR: 30 CAPSULE, EXTENDED RELEASE ORAL at 19:33

## 2024-03-06 RX ADMIN — CHLORHEXIDINE GLUCONATE 1 APPLICATION(S): 213 SOLUTION TOPICAL at 11:31

## 2024-03-06 RX ADMIN — Medication 50 MICROGRAM(S): at 07:41

## 2024-03-06 RX ADMIN — PANTOPRAZOLE SODIUM 40 MILLIGRAM(S): 20 TABLET, DELAYED RELEASE ORAL at 11:30

## 2024-03-06 RX ADMIN — Medication 250 MILLIGRAM(S): at 16:25

## 2024-03-06 RX ADMIN — Medication 81 MILLIGRAM(S): at 21:27

## 2024-03-06 RX ADMIN — Medication 25 MILLIGRAM(S): at 07:40

## 2024-03-06 RX ADMIN — Medication 25 MILLIGRAM(S): at 21:26

## 2024-03-06 RX ADMIN — DIPHENHYDRAMINE HYDROCHLORIDE AND LIDOCAINE HYDROCHLORIDE AND ALUMINUM HYDROXIDE AND MAGNESIUM HYDRO 10 MILLILITER(S): KIT at 07:40

## 2024-03-06 NOTE — CONSULT NOTE ADULT - PROVIDER SPECIALTY LIST ADULT
Hepatology
Podiatry
CT Surgery
Electrophysiology
Infectious Disease
ENT
Intervent Radiology
Nephrology
Wound Care
Intervent Radiology
Structural Heart
Heart Failure

## 2024-03-06 NOTE — PROGRESS NOTE ADULT - SUBJECTIVE AND OBJECTIVE BOX
Events noted, peric effusion, tx to CTU    Vital Signs Last 24 Hrs  T(C): 35.6 (24 @ 16:00), Max: 36.4 (24 @ 20:00)  T(F): 96.1 (24 @ 16:00), Max: 97.6 (24 @ 20:00)  HR: 68 (24 @ 16:45) (68 - 119)  BP: 121/69 (24 @ 16:30) (117/64 - 140/73)  BP(mean): 89 (24 @ 16:30) (81 - 101)  RR: 11 (24 @ 16:45) (10 - 38)  SpO2: 97% (24 @ 16:45) (89% - 100%)    I&O's Detail    05 Mar 2024 07:01  -  06 Mar 2024 07:00  --------------------------------------------------------  IN:    Bumetanide: 110 mL    DOBUTamine: 57.4 mL    DOBUTamine: 75.6 mL    Heparin: 226 mL    Oral Fluid: 240 mL  Total IN: 709 mL    OUT:    Indwelling Catheter - Urethral (mL): 5 mL  Total OUT:  mL    06 Mar 2024 07:01  -  06 Mar 2024 16:56  --------------------------------------------------------  IN:    Bumetanide: 10 mL    Bumetanide: 35 mL    DOBUTamine: 100.8 mL    IV PiggyBack: 200 mL    NiCARdipine: 40 mL    PRBCs (Packed Red Blood Cells): 300 mL  Total IN: 685.8 mL    OUT:    Chest Tube (mL): 130 mL    Indwelling Catheter - Urethral (mL): 1770 mL  Total OUT: 1900 mL    s1s2  b/l air entry  soft, ND  b/l LE edema                                                                                                                   8.3    10.12 )-----------( 148      ( 06 Mar 2024 15:33 )             25.6     06 Mar 2024 15:32    137    |  92     |  51     ----------------------------<  110    3.8     |  34     |  2.76     Ca    8.8        06 Mar 2024 15:32  Phos  4.3       06 Mar 2024 15:32  Mg     2.2       06 Mar 2024 15:32    TPro  5.9    /  Alb  3.3    /  TBili  1.8    /  DBili  x      /  AST  12     /  ALT  12     /  AlkPhos  85     06 Mar 2024 15:32    LIVER FUNCTIONS - ( 06 Mar 2024 15:32 )  Alb: 3.3 g/dL / Pro: 5.9 g/dL / ALK PHOS: 85 U/L / ALT: 12 U/L / AST: 12 U/L / GGT: x           PT/INR - ( 06 Mar 2024 15:31 )   PT: 13.5 sec;   INR: 1.30 ratio      Culture - Blood (collected 05 Mar 2024 07:46)  Source: .Blood Blood-Venous  Preliminary Report (06 Mar 2024 12:02):    No growth at 24 hours    Culture - Blood (collected 05 Mar 2024 07:46)  Source: .Blood Blood-Peripheral  Preliminary Report (06 Mar 2024 12:02):    No growth at 24 hours    acetaminophen     Tablet .. 650 milliGRAM(s) Oral every 6 hours PRN  allopurinol 100 milliGRAM(s) Oral daily  aMIOdarone    Tablet 200 milliGRAM(s) Oral daily  aMIOdarone    Tablet   Oral   aspirin enteric coated 81 milliGRAM(s) Oral daily  benzocaine/menthol Lozenge 1 Lozenge Oral four times a day PRN  bisacodyl Suppository 10 milliGRAM(s) Rectal daily PRN  buMETAnide Infusion 1 mG/Hr IV Continuous <Continuous>  cefepime   IVPB 1000 milliGRAM(s) IV Intermittent every 12 hours  chlorhexidine 2% Cloths 1 Application(s) Topical daily  DOBUTamine Infusion 4 MICROgram(s)/kG/Min IV Continuous <Continuous>  FIRST- Mouthwash  BLM 10 milliLiter(s) Swish and Swallow every 6 hours  hydrALAZINE 25 milliGRAM(s) Oral every 8 hours  levothyroxine 50 MICROGram(s) Oral <User Schedule>  lidocaine   4% Patch 1 Patch Transdermal daily  niCARdipine Infusion 5 mG/Hr IV Continuous <Continuous>  pantoprazole  Injectable 40 milliGRAM(s) IV Push daily  polyethylene glycol 3350 17 Gram(s) Oral daily  senna 2 Tablet(s) Oral at bedtime    A/P:    CM, EF 30%, severe AS, Afib, CHF  Tx from [facility] to [facility] , s/p [facility]   S/p cardio-renal/hemodynamic LINDA (peak Cr 2.5 - 23, lorna Cr 1.23 - )   Imaging w/o hydro 24  S/p CT w/IV dye 24  Stable renal fx post CT  S/p AVR, JOANNA clip   Serratia bacteremia ( and 3/1), Abx per ID   Bld cx 3/5 - NGTD   Large pericardial effusion, management per CTS   Hemodynamic/cardio-renal LINDA/CKD   On  and Bumex qtt  Avoid nephrotoxins, hypotension   F/u BMP, UO    [phone]

## 2024-03-06 NOTE — BRIEF OPERATIVE NOTE - NSICDXBRIEFPREOP_GEN_ALL_CORE_FT
PRE-OP DIAGNOSIS:  Aortic stenosis 23-Feb-2024 12:45:05  Sadaf Nj  
PRE-OP DIAGNOSIS:  Pericardial effusion after operative procedure 06-Mar-2024 18:53:23 with tamponade physiology Iris Hagan

## 2024-03-06 NOTE — PROGRESS NOTE ADULT - PROBLEM SELECTOR PLAN 4
- paroxysmal  - s/p JOANNA clip  - On Amiodarone 200 mg QD. Last bolused with IV Amio 3/4 for AF 130s  - On Digoxin 125 mcg QD. Please lower to QOD given elevated digoxin level of 1.3  - Heparin infusion stopped 3/6 for concern of pericardial effusion. Pending formal TTE   - Please re-engage EP

## 2024-03-06 NOTE — PRE-ANESTHESIA EVALUATION ADULT - TEMPERATURE IN CELSIUS (DEGREES C)
December 12, 2023       Natalie Calero MD  [address]  [place] [facility] [id number]  Via In Basket      Patient: Conrad Hardy   YOB: 2016   Date of Visit: 12/12/2023       Dear Dr. Calero:    I saw your patient, Conrad Hardy, for an evaluation. Below are my notes for this visit with him.    If you have questions, please do not hesitate to call me.      Sincerely,        Natalie Fernandez MD        CC: No Recipients   
December 12, 2023       Natalie Calero MD  [address]  [facility] [address]  Via In Basket      Patient: Conrad Hardy   YOB: 2016   Date of Visit: 12/12/2023       Dear Dr. Calero:    Thank you for referring Conrad Hardy to me for evaluation. Below are my notes for this visit with him.    If you have questions, please do not hesitate to call me. I look forward to following your patient along with you.      Sincerely,        Natalie Fernandez MD        CC: No Recipients   
36.8
35.9
36.3

## 2024-03-06 NOTE — PROGRESS NOTE ADULT - SUBJECTIVE AND OBJECTIVE BOX
CRITICAL CARE ATTENDING - CTICU    MEDICATIONS  (STANDING):  allopurinol 100 milliGRAM(s) Oral daily  aMIOdarone    Tablet   Oral   aMIOdarone    Tablet 200 milliGRAM(s) Oral daily  buMETAnide Infusion 1 mG/Hr (5 mL/Hr) IV Continuous <Continuous>  cefepime   IVPB 1000 milliGRAM(s) IV Intermittent every 12 hours  chlorhexidine 2% Cloths 1 Application(s) Topical daily  DOBUTamine Infusion 5 MICROgram(s)/kG/Min (12.8 mL/Hr) IV Continuous <Continuous>  FIRST- Mouthwash  BLM 10 milliLiter(s) Swish and Swallow every 6 hours  levothyroxine 50 MICROGram(s) Oral <User Schedule>  lidocaine   4% Patch 1 Patch Transdermal daily  pantoprazole  Injectable 40 milliGRAM(s) IV Push daily  polyethylene glycol 3350 17 Gram(s) Oral daily  senna 2 Tablet(s) Oral at bedtime                                    8.0    10.55 )-----------( 126      ( 06 Mar 2024 00:38 )             22.9       03-    134<L>  |  89<L>  |  55<H>  ----------------------------<  181<H>  3.9   |  33<H>  |  2.98<H>    Ca    9.2      06 Mar 2024 00:38  Phos  4.0     03-  Mg     2.3     -    TPro  6.4  /  Alb  3.6  /  TBili  1.6<H>  /  DBili  x   /  AST  18  /  ALT  18  /  AlkPhos  96  03-06      PT/INR - ( 06 Mar 2024 00:39 )   PT: 12.6 sec;   INR: 1.15 ratio         PTT - ( 06 Mar 2024 06:11 )  PTT:56.4 sec        Daily     Daily Weight in k.6 (06 Mar 2024 00:00)      03-05 @ 07:01  -   @ 07:00  --------------------------------------------------------  IN: 709 mL / OUT: 2035 mL / NET: -1326 mL     @ 07:01   @ 11:57  --------------------------------------------------------  IN: 193 mL / OUT: 1025 mL / NET: -832 mL        Critically Ill patient  : [ ] preoperative ,   [x ] post operative    Requires :  [ x] Arterial Line   [ x] Central Line  [ ] PA catheter  [ ] IABP  [ ] ECMO  [ ] LVAD  [ ] Ventilator  [ ] pacemaker [ ] Impella.                      [ x] ABG's     [x ] Pulse Oxymetry Monitoring  Bedside evaluation , monitoring , treatment of hemodynamics , fluids , IVP/ IVCD meds.        Diagnosis:     POD  - AVR / LAAC     Return to CTICU -  CHF-     CHF- acute [ x]   chronic [ x]    systolic [x ]   diastolic [ ]  Valvular [ ]          - Echo- EF -  30's / Large Pericardial Effusion            [x ] RV dysfunction          - Cxr-cardiomegally, edema          - Clinical-  [ x]inotropes   [ ]pressors   [ x]diuresis   [ ]IABP   [ ]ECMO   [ ]LVAD   [ ]Respiratory Failure    Pericardial Effusion                                        - Drainage today  Cardiac Tamponade    Hemodynamic lability,  instability. Requires IVCD [ ] vasopressors [ x] inotropes  [ ] vasodilator  [ ]IVSS fluid  to maintain MAP, perfusion, C.I.     Renal Failure - Acute Kidney Injury     Chronic Renal Failure     Thrombocytopenia     Hyponatremia     Metabolic Alkalosis    Bacteremia - serratia from R Leg ulcer     Requires bedside physical therapy, mobilization and total [facility] care.                     -                     Discussed with CT surgeon, Physician's Assistant - Nurse Practitioner- Critical care medicine team.   Discussed at  AM / PM rounds.   Chart, labs , films reviewed.    Cumulative Critical Care Time Given Today : 30 min

## 2024-03-06 NOTE — BRIEF OPERATIVE NOTE - OPERATION/FINDINGS
Aortic XClamp: 63  |    Total CPB: 79  Procedure: AVR (27 Inspiris), LAAC  LAAL with AtriClip 50mm  
pericardial effusion with clot

## 2024-03-06 NOTE — CONSULT NOTE ADULT - SUBJECTIVE AND OBJECTIVE BOX
Interventional Radiology    Evaluate for Procedure: Drainage of pericardial effusion    HPI: 63M with a PMHx HTN, GERD, HPL, chronic venous stasis dermatitis with bilateral leg swelling, and AS presented to [facility] on 1/25/2024 for bilateral leg pain and swelling found to have new acute HFrEF (EF 35-40% 1/2024) in setting of severe AS s/p AVR-t on 2/23 with Left atrial appendage clip. Hospital course c/b HFrEF, biventricular failure, cardiogenic shock requiring ionotropic support  LINDA, Fluid overload, Serratia bacteremia, cellulitis and Paroxysmal afib. Transferred back to ICU 3/5 in setting of LINDA c/f volume overload and started on Dobutamine and bumex infusions, overnight bedside echo showing pericardial effusion. Heparin drip stopped at 6am. Pt hemodynamically stable.     IR consulted for drainage of pericardial effusion.    Allergies: No Known Drug Allergies    Medications (Abx/Cardiac/Anticoagulation/Blood Products)  aMIOdarone    Tablet: 200 milliGRAM(s) Oral (03-06 @ 07:40)  aspirin  chewable: 81 milliGRAM(s) Oral (03-05 @ 11:34)  cefepime   IVPB: 100 mL/Hr IV Intermittent (03-06 @ 06:00)  digoxin     Tablet: 125 MICROGram(s) Oral (03-06 @ 07:40)  DOBUTamine Infusion: 6.41 mL/Hr IV Continuous (03-05 @ 17:26)  furosemide   Injectable: 40 milliGRAM(s) IV Push (03-05 @ 17:30)  furosemide   Injectable: 40 milliGRAM(s) IV Push (03-04 @ 11:56)  heparin  Infusion: 10 mL/Hr IV Continuous (03-05 @ 19:13)  hydrALAZINE: 25 milliGRAM(s) Oral (03-06 @ 07:40)  hydrALAZINE Injectable: 5 milliGRAM(s) IV Push (03-05 @ 19:21)    Data:  T(C): 35.9  HR: 107  BP: 117/64  RR: 22  SpO2: 92%    -WBC 10.55 / HgB 8.0 / Hct 22.9 / Plt 126  -Na 134 / Cl 89 / BUN 55 / Glucose 181  -K 3.9 / CO2 33 / Cr 2.98  -ALT 18 / Alk Phos 96 / T.Bili 1.6  -INR -- / PTT 56.4    Radiology: pending official echo and CT    Assessment/Plan:   63M with a PMHx HTN, GERD, HPL, chronic venous stasis dermatitis with bilateral leg swelling, and AS presented to [facility] on 1/25/2024 for bilateral leg pain and swelling found to have new acute HFrEF (EF 35-40% 1/2024) in setting of severe AS s/p AVR-t on 2/23 with Left atrial appendage clip. Hospital course c/b HFrEF, biventricular failure, cardiogenic shock requiring ionotropic support  LINDA, Fluid overload, Serratia bacteremia, cellulitis and Paroxysmal afib. Transferred back to ICU 3/5 in setting of LINDA c/f volume overload and started on Dobutamine and bumex infusions, overnight bedside echo showing pericardial effusion. Heparin drip stopped at 6am. Pt hemodynamically stable.     IR consulted for drainage of pericardial effusion.    ***NOTE NOT FINAL****  Please obtain official echo and consult interventional cardiology to assess for possible pericardialcentesis.  Please obtain Chest non-con CT to further evaluate pericardial effusion.  Continue to hold anticoagulaion.    - d/w primary team Interventional Radiology    Evaluate for Procedure: Drainage of pericardial effusion    HPI: 63M with a PMHx HTN, GERD, HPL, chronic venous stasis dermatitis with bilateral leg swelling, and AS presented to [facility] on 1/25/2024 for bilateral leg pain and swelling found to have new acute HFrEF (EF 35-40% 1/2024) in setting of severe AS s/p AVR-t on 2/23 with Left atrial appendage clip. Hospital course c/b HFrEF, biventricular failure, cardiogenic shock requiring inotropic support  LINDA,Fluid overload, Serratia bacteremia, cellulitis and Paroxysmal afib. Transferred back to ICU 3/5 in setting of LINDA c/f volume overload and started on Dobutamine and bumex infusions, overnight bedside echo showing pericardial effusion. Heparin drip stopped at 6am. Pt not clinically in tamponade.    IR consulted for drainage of pericardial effusion.    Allergies: No Known Drug Allergies    Medications (Abx/Cardiac/Anticoagulation/Blood Products)  aMIOdarone    Tablet: 200 milliGRAM(s) Oral (03-06 @ 07:40)  aspirin  chewable: 81 milliGRAM(s) Oral (03-05 @ 11:34)  cefepime   IVPB: 100 mL/Hr IV Intermittent (03-06 @ 06:00)  digoxin     Tablet: 125 MICROGram(s) Oral (03-06 @ 07:40)  DOBUTamine Infusion: 6.41 mL/Hr IV Continuous (03-05 @ 17:26)  furosemide   Injectable: 40 milliGRAM(s) IV Push (03-05 @ 17:30)  furosemide   Injectable: 40 milliGRAM(s) IV Push (03-04 @ 11:56)  heparin  Infusion: 10 mL/Hr IV Continuous (03-05 @ 19:13)  hydrALAZINE: 25 milliGRAM(s) Oral (03-06 @ 07:40)  hydrALAZINE Injectable: 5 milliGRAM(s) IV Push (03-05 @ 19:21)    Data:  T(C): 35.9  HR: 107  BP: 117/64  RR: 22  SpO2: 92%    -WBC 10.55 / HgB 8.0 / Hct 22.9 / Plt 126  -Na 134 / Cl 89 / BUN 55 / Glucose 181  -K 3.9 / CO2 33 / Cr 2.98  -ALT 18 / Alk Phos 96 / T.Bili 1.6  -INR -- / PTT 56.4    Radiology:   TTE W or WO Ultrasound Enhancing Agent (03.06.24 @ 07:55)   CONCLUSIONS:   1. Left ventricular systolic function is probably normal.   2. A large circumferential pericardial effusion is present. Adjacent to the right heart, the effusion appears organized, with an appearance characteristic of thrombus. The right ventricle is compressed throughout the cardiac cycle. A loculated effusion appears to be adjacent to the right atrium.   3. Compared to the transthoracic echocardiogram performed on 2/26/2024, The effusion is larger. Findingswere discussed with Dr. LOIDA Martinez on 3/6/2024 at 8:31 AM.    CT chest: pending final read.         Assessment/Plan:   63M with a PMHx HTN, GERD, HPL, chronic venous stasis dermatitis with bilateral leg swelling, and AS presented to [facility] on 1/25/2024 for bilateral leg pain and swelling found to have new acute HFrEF (EF 35-40% 1/2024) in setting of severe AS s/p AVR-t on 2/23 with Left atrial appendage clip. Hospital course c/b HFrEF, biventricular failure, cardiogenic shock requiring ionotropic support  LINDA,Fluid overload, Serratia bacteremia, cellulitis and Paroxysmal afib. Transferred back to ICU 3/5 in setting of LINDA c/f volume overload and started on Dobutamine and bumex infusions, overnight bedside echo showing pericardial effusion. Heparin drip stopped at 6am. Pt not clinically in tamponade.    IR consulted for drainage of pericardial effusion.    -Case discussed with MD Meeks and MD Garcia, CT reviewed showing likely anterior hematoma; circumferential effusion however noted to be "thrombus" on echo read. Pt not clinically in tamponade and pericardial drain may offer low yield.   - d/w primary team and planned for OR for washout.  -IR to sign off    Please call IR if any further questions or concerns regarding above.

## 2024-03-06 NOTE — CONSULT NOTE ADULT - CONSULT REASON
Wound Care
AFib
Severe aortic stenosis
Drainage of pericardial effusion
bilateral foot fissures
Cardiac Surgery Evaluation
LINDA/CKD
Elevated liver enzymes.
cellulitis
Transjugular Liver Biopsy with portal pressures
HF optimization
dysphagia

## 2024-03-06 NOTE — CONSULT NOTE ADULT - CONSULT REQUESTED BY NAME
CTSx
Cardiology
Phillip Lockhart
Angela Llanos
Dr. Renu Early
Dr Andino
Dr. Early
Medicine
Primary team
RN
medicine
primary

## 2024-03-06 NOTE — CONSULT NOTE ADULT - CONSULT REQUESTED DATE/TIME
06-Feb-2024 12:09
14-Feb-2024 13:46
06-Feb-2024 13:47
08-Feb-2024 14:23
20-Feb-2024
06-Mar-2024 08:19
14-Feb-2024 15:46
27-Feb-2024 14:46
07-Feb-2024 15:13
12-Feb-2024 12:45
03-Mar-2024 15:07
06-Feb-2024 19:25

## 2024-03-06 NOTE — PROGRESS NOTE ADULT - PROBLEM SELECTOR PLAN 2
- Cr in 2022 1-1.3  - Cr on admission 1.7 and peaked to 2.5  - Had normalized, but now rising despite above support   - RHC from 3/5 not suggestive of low output  - Inotropic support and diuresis as above

## 2024-03-06 NOTE — PROGRESS NOTE ADULT - SUBJECTIVE AND OBJECTIVE BOX
Subjective:  - transferred from [facility]. Started on  and Bumex infusion     Medications:  acetaminophen     Tablet .. 650 milliGRAM(s) Oral every 6 hours PRN  allopurinol 100 milliGRAM(s) Oral daily  aMIOdarone    Tablet 200 milliGRAM(s) Oral daily  aMIOdarone    Tablet   Oral   benzocaine/menthol Lozenge 1 Lozenge Oral four times a day PRN  bisacodyl Suppository 10 milliGRAM(s) Rectal daily PRN  buMETAnide Infusion 1 mG/Hr IV Continuous <Continuous>  cefepime   IVPB 1000 milliGRAM(s) IV Intermittent every 12 hours  chlorhexidine 2% Cloths 1 Application(s) Topical daily  DOBUTamine Infusion 5 MICROgram(s)/kG/Min IV Continuous <Continuous>  FIRST- Mouthwash  BLM 10 milliLiter(s) Swish and Swallow every 6 hours  levothyroxine 50 MICROGram(s) Oral <User Schedule>  lidocaine   4% Patch 1 Patch Transdermal daily  pantoprazole  Injectable 40 milliGRAM(s) IV Push daily  polyethylene glycol 3350 17 Gram(s) Oral daily  senna 2 Tablet(s) Oral at bedtime    Physical Exam:    Vitals:  Vital Signs Last 24 Hours  T(C): 36.3 (24 @ 12:30), Max: 36.7 (24 @ 13:55)  HR: 94 (24 @ 12:30) (63 - 119)  BP: 128/81 (24 @ 11:55) (107/73 - 137/74)  RR: 24 (24 @ 12:30) (13 - 38)  SpO2: 98% (24 @ 12:30) (90% - 100%)    Weight in k.6 ( @ 00:00)    I&O's Summary    05 Mar 2024 07:01  -  06 Mar 2024 07:00  --------------------------------------------------------  IN: 709 mL / OUT: 2035 mL / NET: -1326 mL    06 Mar 2024 07:01  -  06 Mar 2024 13:07  --------------------------------------------------------  IN: 510.6 mL / OUT: 1455 mL / NET: -944.4 mL    Tele: SR 70-80s with PVCs    General: No distress. Comfortable.  HEENT: EOM intact.  Neck: JVP 10 cm H2O with HJR  Chest: Clear to auscultation bilaterally  CV: Normal S1 and S2. No murmurs, rub, or gallops.  Abdomen: Soft, non-distended, non-tender  Extremities: 1+ BLE edema; improving with wrinkling  Skin: sternal incision site with staples in place, no drainage   Neurology: Alert and oriented times three. Sensation intact  Psych: Affect normal    Labs:                        8.0    10.55 )-----------( 126      ( 06 Mar 2024 00:38 )             22.9     03-    134<L>  |  89<L>  |  55<H>  ----------------------------<  181<H>  3.9   |  33<H>  |  2.98<H>    Ca    9.2      06 Mar 2024 00:38  Phos  4.0     03-  Mg     2.3     -    TPro  6.4  /  Alb  3.6  /  TBili  1.6<H>  /  DBili  x   /  AST  18  /  ALT  18  /  AlkPhos  96  03-06    PT/INR - ( 06 Mar 2024 00:39 )   PT: 12.6 sec;   INR: 1.15 ratio         PTT - ( 06 Mar 2024 06:11 )  PTT:56.4 sec            Lactate, Blood: 1.4 mmol/L ( @ 15:24)  Lactate, Blood: 1.7 mmol/L ( @ 14:19)

## 2024-03-06 NOTE — PROGRESS NOTE ADULT - ASSESSMENT
Briefly, 62 y/o M w/ h/o HTN, severe AS, chronic venous stasis, HFpEF who p/w worsening LE swelling to GC 1/25 and found to be in decompensated heart failure with EF 35-40% and severe AS so transferred for further management on 2/5. Was diuresed and underwent LHC which was non-obstructive. Underwent bioAVR 2/23 w/ JOANNA clip complicated by bleeding. Post operative course complicated by volume expansion and pAF for which he was started on Amiodarone and Digoxin. Also became bacteremic, BCx 2/29 growing Serratia, repeat BCx from 3/1 still growing Serratia, being treated with IV Cefepime.  weaned to off on 3/1.     Developed worsening LINDA and ongoing volume expansion prompting RHC 3/5 revealing elevated filling pressures but adequate CI of 2.2 (Mvo2 45%). Resumed on inotrope with  and dose was further escalated to 5 mcg/kg/min overnight. Suboptimal UOP on intermittent IV diuretics, escalated to a Bumex gtt overnight as well. His filling pressures on exam appear improved but continues to have uptrending Cr of unclear etiology, perhaps hemodynamically mediated from episodes of hypotension while he was in pAF.    Cardiac Studies  ·	RHC 3/5/24: RA 15, PA 52/23/32, PCWP 23, AO 94%, PA 45%, Hgb 8.8, CO/CI (F) 4.5/2.2, PVR 1.75 davis  ·	TTE 2/26/24: LVIDd 6 cm, LVEF 30% (global), mod RVE with mod reduced function (TAPSE 0.9), severe ARAVIND, mild TR, est PASP 25 mmHg, trace pericardial effusion, IVC normal in size Briefly, 62 y/o M w/ h/o HTN, severe AS, chronic venous stasis, HFpEF who p/w worsening LE swelling to GC 1/25 and found to be in decompensated heart failure with EF 35-40% and severe AS so transferred for further management on 2/5. Was diuresed and underwent LHC which was non-obstructive. Underwent bioAVR 2/23 w/ JOANNA clip complicated by bleeding. Post operative course complicated by volume expansion and pAF for which he was started on Amiodarone and Digoxin. Also became bacteremic, BCx 2/29 growing Serratia, repeat BCx from 3/1 still growing Serratia, being treated with IV Cefepime.  weaned to off on 3/1.     Developed worsening LINDA and ongoing volume expansion prompting RHC 3/5 revealing elevated filling pressures but adequate CI of 2.2 (Mvo2 45%). Resumed on inotrope with  and dose was further escalated to 5 mcg/kg/min overnight. Suboptimal UOP on intermittent IV diuretics, escalated to a Bumex gtt overnight as well. His filling pressures on exam appear improved but continues to have uptrending Cr of unclear etiology, perhaps hemodynamically mediated from episodes of hypotension while he was in pAF. Bedside TTE this morning also concerning for pericardial effusion, pending formal TTE and Heparin gtt stopped.     Cardiac Studies  ·	RHC 3/5/24: RA 15, PA 52/23/32, PCWP 23, AO 94%, PA 45%, Hgb 8.8, CO/CI (F) 4.5/2.2, PVR 1.75 davis  ·	TTE 2/26/24: LVIDd 6 cm, LVEF 30% (global), mod RVE with mod reduced function (TAPSE 0.9), severe ARAVIND, mild TR, est PASP 25 mmHg, trace pericardial effusion, IVC normal in size

## 2024-03-06 NOTE — PROGRESS NOTE ADULT - ASSESSMENT
63 m with HTN, GERD, AS, chronic venous stasis dermatitis with bilateral leg swelling, initially admitted to [facility] with JOCELIN and b/l leg pain and swelling, was seen by ID and was considered not infected just stasis dermatitis, received 5 days of keflex, wound cx showed serratia and strep dysgalactiae,  blood cx negative  S/P Bio AVR , 2/23Post op labile BP related to hypovolemia & cardiac dysfunction, intra op bleeding requiring blood & products   pt has been on vanco until 2/29 for post op course  now febrile      AS and CHF s/p AVR 2/23 now with new fever and serratia bacteremia 2/29 and 3/1, source unclear but previous wound cx had strep and serratia, u/a just hematuria, no pyuria, TTE 2/26 no veg  b/l LE stasis dermatitis and chronic wounds, no tenderness and appears stasis dermatitis not infection  now with large circumferential pericardial effusion which is loculated and ?hemorrhagic/thrombus, also a 3.2 cm retrosternal hematoma with focus of air  * follow the repeat blood cx (negative for now)  * please send OR cx from the retrosternal hematoma and pericardial effusion  * c/w cefepime   * follow with wound care for the LE wounds  * monitor CBC/diff and CMP      The above assessment and plan was discussed with the primary team    Pamela Bellamy MD  contact on teams  After 5pm and on weekends call [phone].

## 2024-03-06 NOTE — PROGRESS NOTE ADULT - PROBLEM SELECTOR PLAN 5
- BCx from 2/29 and 3/1 with GNR Serratia  - LE wound culture send 3/5, pending result   - Started on Cefepime 3/1  - Repeat BCx sent   - Remains afebrile but with ongoing mild leukocytosis  - Further guidance by ID Patient/Caregiver provided printed discharge information.

## 2024-03-06 NOTE — BRIEF OPERATIVE NOTE - NSICDXBRIEFPOSTOP_GEN_ALL_CORE_FT
POST-OP DIAGNOSIS:  Aortic stenosis 23-Feb-2024 12:45:14  Sadaf Nj  
POST-OP DIAGNOSIS:  Pericardial effusion after operative procedure 06-Mar-2024 18:53:52 with tamponade physiology Iris Hagan

## 2024-03-06 NOTE — BRIEF OPERATIVE NOTE - NSICDXBRIEFPROCEDURE_GEN_ALL_CORE_FT
PROCEDURES:  Creation, pericardial window, subxiphoid approach 06-Mar-2024 18:56:21  Iris Hagan  
PROCEDURES:  Replacement, aortic valve, with LESLEY 23-Feb-2024 12:44:57  Sadaf Nj  Clipping, left atrial appendage 23-Feb-2024 12:49:04  Sadaf Nj

## 2024-03-06 NOTE — PROGRESS NOTE ADULT - PROBLEM SELECTOR PLAN 1
-  stopped 3/1, then resumed post RHC on 3/5 infusing at 5 mcg/kg/min  - Continue Bumex gtt at 2 mg/hr, target net negative 2L balance   - Increase HDZN to 50 mg PO TID for afterload reduction given systolic -140s  - Defer MRA/ARB/ARNI given degree of renal dysfunction   - Replete to target K 4-4.5 and Mg 2-2.5  - Please wrap BLE in ACE bandages to aid in mobilization of fluid

## 2024-03-06 NOTE — PROGRESS NOTE ADULT - SUBJECTIVE AND OBJECTIVE BOX
Patient seen and examined at the bedside.    Remains critically ill on continuous ICU monitoring, at risk for life threatening decompensation.  All Labs, data reviewed. Plan of care discussed in length during multi-disciplinary ICU rounds.       Brief Summary:  62 yo M with Aortic Stenosis S/P Bio AVR , AAC on 2/23/24      24 Hour events:      Objective:  Vital Signs Last 24 Hrs  T(C): 35.9 (06 Mar 2024 08:00), Max: 36.7 (05 Mar 2024 10:59)  T(F): 96.6 (06 Mar 2024 08:00), Max: 98.1 (05 Mar 2024 10:59)  HR: 119 (06 Mar 2024 09:00) (63 - 119)  BP: 117/64 (05 Mar 2024 23:00) (107/73 - 137/74)  BP(mean): 81 (05 Mar 2024 23:00) (81 - 91)  RR: 26 (06 Mar 2024 09:00) (13 - 38)  SpO2: 90% (06 Mar 2024 09:00) (90% - 100%)    Parameters below as of 06 Mar 2024 08:00  Patient On (Oxygen Delivery Method): room air                    Physical Exam:   Neurology: nonfocal, moving  Respiratory B/L breath sounds  CV: AFib - TPM on Stand By  Abdominal: Soft, NT, ND +BS,   Extremities: 1-2+ pedal edema noted, + peripheral pulses   Skin: No Rashes, Hematoma, Ecchymosis   -------------------------------------------------------------------------------------------------------------------------------    Labs:                        8.0    10.55 )-----------( 126      ( 06 Mar 2024 00:38 )             22.9     03-06    134<L>  |  89<L>  |  55<H>  ----------------------------<  181<H>  3.9   |  33<H>  |  2.98<H>    Ca    9.2      06 Mar 2024 00:38  Phos  4.0     03-06  Mg     2.3     03-06    TPro  6.4  /  Alb  3.6  /  TBili  1.6<H>  /  DBili  x   /  AST  18  /  ALT  18  /  AlkPhos  96  03-06    LIVER FUNCTIONS - ( 06 Mar 2024 00:38 )  Alb: 3.6 g/dL / Pro: 6.4 g/dL / ALK PHOS: 96 U/L / ALT: 18 U/L / AST: 18 U/L / GGT: x           PT/INR - ( 06 Mar 2024 00:39 )   PT: 12.6 sec;   INR: 1.15 ratio         PTT - ( 06 Mar 2024 06:11 )  PTT:56.4 sec  ABG - ( 06 Mar 2024 00:24 )  pH, Arterial: 7.57  pH, Blood: x     /  pCO2: 39    /  pO2: 154   / HCO3: 36    / Base Excess: 12.6  /  SaO2: 99.3                  ALL MEDICATIONS   MEDICATIONS  (STANDING):  allopurinol 100 milliGRAM(s) Oral daily  aMIOdarone    Tablet   Oral   aMIOdarone    Tablet 200 milliGRAM(s) Oral daily  buMETAnide Infusion 1 mG/Hr (5 mL/Hr) IV Continuous <Continuous>  cefepime   IVPB 1000 milliGRAM(s) IV Intermittent every 12 hours  chlorhexidine 2% Cloths 1 Application(s) Topical daily  DOBUTamine Infusion 5 MICROgram(s)/kG/Min (12.8 mL/Hr) IV Continuous <Continuous>  FIRST- Mouthwash  BLM 10 milliLiter(s) Swish and Swallow every 6 hours  levothyroxine 50 MICROGram(s) Oral <User Schedule>  lidocaine   4% Patch 1 Patch Transdermal daily  pantoprazole  Injectable 40 milliGRAM(s) IV Push daily  polyethylene glycol 3350 17 Gram(s) Oral daily  senna 2 Tablet(s) Oral at bedtime    MEDICATIONS  (PRN):  acetaminophen     Tablet .. 650 milliGRAM(s) Oral every 6 hours PRN Temp greater or equal to 38C (100.4F), Mild Pain (1 - 3)  benzocaine/menthol Lozenge 1 Lozenge Oral four times a day PRN Sore Throat  bisacodyl Suppository 10 milliGRAM(s) Rectal daily PRN Constipation    ------------------------------------------------------------------------------------------------------------------------------  Assessment:  63 year old male with Hx of HTN, GERD, dyslipidemia, chronic intermittent leg pain, aortic stenosis, chronic venous stasis dermatitis with bilateral leg swelling, poor historian presented to [facility] on 1/25/2024 for bilateral leg pain and swelling. He was admitted to medicine and was found to have new onset acute HFrEF (EF 35-40% 1/2024) in setting of severe AS.    Aortic Stenosis S/P Bio AVR , LAAC on 2/23/24  Post op labile BP related to hypovolemia & cardiac dysfunction   intra op bleeding requiring blood & products   At risk for delirium   At risk for hemodynamic decompensation  At high risk for cardiac arrythmias   At high risk for respiratory complications  Acute blood loss anemia  Thrombocytopenia  ^ CT outputs    Hyperglycemia     Plan:   ***Neuro***  Maintain day/night cycle to prevent ICU delirium   Postoperative acute pain control with Tylenol and PRN Dilaudid    ***Cardiovascular***  At high risk for hemodynamic instability and cardiac arrhythmias.  Monitor for sign of hypoperfusion, trend lactate   On Dobutamine, wean as able, Keep CVP<10.  Amiodarone for AFib prophylaxis    ***Pulmonary***  Tolerating Room Air  Deep breathing and coughing exercises, IS, Mobilization    ***GI***  NPO for now  Protonix for stress ulcer prophylaxis   Bowel regimen.   Trend LFTs    ***Renal***  Trend Creatinine/BUN  Olivares catheter for strict I/O measurements.  Replete electrolytes as indicated.  Diuresis with Bumex gtt     ***ID***  perioperative coverage per protocol  IVPB Cefepime    ***Endocrine***  Insulin per protocol for Hyperglycemia   Allopurinol for gout  Synthroid for Hypothyroidism    ***Hematology***  Acute blood loss anemia and thrombocytopenia   no current transfusion indication, CBC coags        I, Aranza Clifton MD, personally performed the services described in this documentation. all medical record entries made by the scribe were at my direction and in my presence. I have reviewed the chart and agree that the record reflects my personal performance and is accurate and complete  Electronically signed:   Aranza Clifton MD  CT ICU attending     ICU time: ** mins     By signing my name below, I, Rachna Mims, attest that this documentation has been prepared under the direction and in the presence of Aranza Clifton MD  Electronically signed: Rachna Mims, 03-06-24 @ 09:20             Patient seen and examined at the bedside.    Remains critically ill on continuous ICU monitoring, at risk for life threatening decompensation.  All Labs, data reviewed. Plan of care discussed in length during multi-disciplinary ICU rounds.       Brief Summary:  64 yo M with Aortic Stenosis S/P Bio AVR , AAC on 2/23/24      24 Hour events:  Pt extubated, oob to a chair  Walked with PT  Monitor CT output keep on suction    Objective:  Vital Signs Last 24 Hrs  T(C): 35.9 (06 Mar 2024 08:00), Max: 36.7 (05 Mar 2024 10:59)  T(F): 96.6 (06 Mar 2024 08:00), Max: 98.1 (05 Mar 2024 10:59)  HR: 119 (06 Mar 2024 09:00) (63 - 119)  BP: 117/64 (05 Mar 2024 23:00) (107/73 - 137/74)  BP(mean): 81 (05 Mar 2024 23:00) (81 - 91)  RR: 26 (06 Mar 2024 09:00) (13 - 38)  SpO2: 90% (06 Mar 2024 09:00) (90% - 100%)    Parameters below as of 06 Mar 2024 08:00  Patient On (Oxygen Delivery Method): room air                    Physical Exam:   Neurology: nonfocal, moving  Respiratory B/L breath sounds  CV: AFib - TPM on Stand By  Abdominal: Soft, NT, ND +BS,   Extremities: 1-2+ pedal edema noted, + peripheral pulses   Skin: No Rashes, Hematoma, Ecchymosis   -------------------------------------------------------------------------------------------------------------------------------    Labs:                        8.0    10.55 )-----------( 126      ( 06 Mar 2024 00:38 )             22.9     03-06    134<L>  |  89<L>  |  55<H>  ----------------------------<  181<H>  3.9   |  33<H>  |  2.98<H>    Ca    9.2      06 Mar 2024 00:38  Phos  4.0     03-06  Mg     2.3     03-06    TPro  6.4  /  Alb  3.6  /  TBili  1.6<H>  /  DBili  x   /  AST  18  /  ALT  18  /  AlkPhos  96  03-06    LIVER FUNCTIONS - ( 06 Mar 2024 00:38 )  Alb: 3.6 g/dL / Pro: 6.4 g/dL / ALK PHOS: 96 U/L / ALT: 18 U/L / AST: 18 U/L / GGT: x           PT/INR - ( 06 Mar 2024 00:39 )   PT: 12.6 sec;   INR: 1.15 ratio         PTT - ( 06 Mar 2024 06:11 )  PTT:56.4 sec  ABG - ( 06 Mar 2024 00:24 )  pH, Arterial: 7.57  pH, Blood: x     /  pCO2: 39    /  pO2: 154   / HCO3: 36    / Base Excess: 12.6  /  SaO2: 99.3                  ALL MEDICATIONS   MEDICATIONS  (STANDING):  allopurinol 100 milliGRAM(s) Oral daily  aMIOdarone    Tablet   Oral   aMIOdarone    Tablet 200 milliGRAM(s) Oral daily  buMETAnide Infusion 1 mG/Hr (5 mL/Hr) IV Continuous <Continuous>  cefepime   IVPB 1000 milliGRAM(s) IV Intermittent every 12 hours  chlorhexidine 2% Cloths 1 Application(s) Topical daily  DOBUTamine Infusion 5 MICROgram(s)/kG/Min (12.8 mL/Hr) IV Continuous <Continuous>  FIRST- Mouthwash  BLM 10 milliLiter(s) Swish and Swallow every 6 hours  levothyroxine 50 MICROGram(s) Oral <User Schedule>  lidocaine   4% Patch 1 Patch Transdermal daily  pantoprazole  Injectable 40 milliGRAM(s) IV Push daily  polyethylene glycol 3350 17 Gram(s) Oral daily  senna 2 Tablet(s) Oral at bedtime    MEDICATIONS  (PRN):  acetaminophen     Tablet .. 650 milliGRAM(s) Oral every 6 hours PRN Temp greater or equal to 38C (100.4F), Mild Pain (1 - 3)  benzocaine/menthol Lozenge 1 Lozenge Oral four times a day PRN Sore Throat  bisacodyl Suppository 10 milliGRAM(s) Rectal daily PRN Constipation    ------------------------------------------------------------------------------------------------------------------------------  Assessment:  63 year old male with Hx of HTN, GERD, dyslipidemia, chronic intermittent leg pain, aortic stenosis, chronic venous stasis dermatitis with bilateral leg swelling, poor historian presented to [facility] on 1/25/2024 for bilateral leg pain and swelling. He was admitted to medicine and was found to have new onset acute HFrEF (EF 35-40% 1/2024) in setting of severe AS.    Aortic Stenosis S/P Bio AVR , LAAC on 2/23/24  Post op labile BP related to hypovolemia & cardiac dysfunction   intra op bleeding requiring blood & products   At risk for delirium   At risk for hemodynamic decompensation  At high risk for cardiac arrythmias   At high risk for respiratory complications  Acute blood loss anemia  Thrombocytopenia  ^ CT outputs    Hyperglycemia     Plan:   ***Neuro***  Maintain day/night cycle to prevent ICU delirium   Postoperative acute pain control with Tylenol and PRN Dilaudid    ***Cardiovascular***  At high risk for hemodynamic instability and cardiac arrhythmias.  Monitor for sign of hypoperfusion, trend lactate   On Dobutamine, wean as able, Keep CVP<10.  Amiodarone for AFib prophylaxis    ***Pulmonary***  Tolerating Room Air  Deep breathing and coughing exercises, IS, Mobilization    ***GI***  NPO for now  Protonix for stress ulcer prophylaxis   Bowel regimen.   Trend LFTs    ***Renal***  Trend Creatinine/BUN  Olivares catheter for strict I/O measurements.  Replete electrolytes as indicated.  Diuresis with Bumex gtt     ***ID***  perioperative coverage per protocol  IVPB Cefepime    ***Endocrine***  Insulin per protocol for Hyperglycemia   Allopurinol for gout  Synthroid for Hypothyroidism    ***Hematology***  Acute blood loss anemia and thrombocytopenia   no current transfusion indication, CBC coags        IAranza MD, personally performed the services described in this documentation. all medical record entries made by the scribe were at my direction and in my presence. I have reviewed the chart and agree that the record reflects my personal performance and is accurate and complete  Electronically signed:   Aranza Clifton MD  CT ICU attending     ICU time: ** mins     By signing my name below, I, Rachna Mims, attest that this documentation has been prepared under the direction and in the presence of Aranza Clifton MD  Electronically signed: Rachna Mims, 03-06-24 @ 09:20

## 2024-03-06 NOTE — PROGRESS NOTE ADULT - SUBJECTIVE AND OBJECTIVE BOX
Follow Up:  fever    Interval History/ROS: pt found to have pericardial effusion with blood and hematoma plan for OR wash out, was transferred to CTU but no fever or vomiting          Allergies  garlic (Angioedema; Swelling; Anaphylaxis)  No Known Drug Allergies        ANTIMICROBIALS:  cefepime   IVPB 1000 every 12 hours      OTHER MEDS:  acetaminophen     Tablet .. 650 milliGRAM(s) Oral every 6 hours PRN  allopurinol 100 milliGRAM(s) Oral daily  aMIOdarone    Tablet 200 milliGRAM(s) Oral daily  aMIOdarone    Tablet   Oral   benzocaine/menthol Lozenge 1 Lozenge Oral four times a day PRN  bisacodyl Suppository 10 milliGRAM(s) Rectal daily PRN  buMETAnide Infusion 1 mG/Hr IV Continuous <Continuous>  chlorhexidine 2% Cloths 1 Application(s) Topical daily  DOBUTamine Infusion 5 MICROgram(s)/kG/Min IV Continuous <Continuous>  FIRST- Mouthwash  BLM 10 milliLiter(s) Swish and Swallow every 6 hours  levothyroxine 50 MICROGram(s) Oral <User Schedule>  lidocaine   4% Patch 1 Patch Transdermal daily  pantoprazole  Injectable 40 milliGRAM(s) IV Push daily  polyethylene glycol 3350 17 Gram(s) Oral daily  senna 2 Tablet(s) Oral at bedtime      Vital Signs Last 24 Hrs  T(C): 36.3 (06 Mar 2024 12:30), Max: 36.7 (05 Mar 2024 13:55)  T(F): 97.3 (06 Mar 2024 12:30), Max: 98.1 (05 Mar 2024 13:55)  HR: 94 (06 Mar 2024 12:30) (63 - 119)  BP: 128/81 (06 Mar 2024 11:55) (107/73 - 137/74)  BP(mean): 101 (06 Mar 2024 11:55) (81 - 101)  RR: 24 (06 Mar 2024 12:30) (13 - 38)  SpO2: 98% (06 Mar 2024 12:30) (90% - 100%)    Parameters below as of 06 Mar 2024 12:00  Patient On (Oxygen Delivery Method): nasal cannula  O2 Flow (L/min): 2      Physical Exam:  General:    NAD  Cardio:    sternal incision clean  Respiratory:    clear, on RA   abd:     soft,    no tenderness  :   + garcia  Musculoskeletal:   no joint swelling  Skin:    no rash, b/l superficial LE ulcers, no purulence                          8.0    10.55 )-----------( 126      ( 06 Mar 2024 00:38 )             22.9       03-06    134<L>  |  89<L>  |  55<H>  ----------------------------<  181<H>  3.9   |  33<H>  |  2.98<H>    Ca    9.2      06 Mar 2024 00:38  Phos  4.0     03-06  Mg     2.3     03-06    TPro  6.4  /  Alb  3.6  /  TBili  1.6<H>  /  DBili  x   /  AST  18  /  ALT  18  /  AlkPhos  96  03-06      Urinalysis Basic - ( 06 Mar 2024 00:38 )    Color: x / Appearance: x / SG: x / pH: x  Gluc: 181 mg/dL / Ketone: x  / Bili: x / Urobili: x   Blood: x / Protein: x / Nitrite: x   Leuk Esterase: x / RBC: x / WBC x   Sq Epi: x / Non Sq Epi: x / Bacteria: x        MICROBIOLOGY:  v  .Blood Blood-Peripheral  03-05-24   No growth at 24 hours  --  --      .Blood Blood-Peripheral  03-01-24   Growth in aerobic and anaerobic bottles: Serratia marcescens  See previous culture [id number]  --    Growth in aerobic bottle: Gram Negative Rods  Growth in anaerobic bottle: Gram Negative Rods      .Blood Blood  02-29-24   Growth in aerobic and anaerobic bottles: Serratia marcescens  See previous culture [id number]  --    Growth in aerobic bottle: Gram Negative Rods  Growth in anaerobic bottle: Gram Negative Rods      .Blood Blood  02-29-24   Growth in aerobic and anaerobic bottles: Serratia marcescens  Direct identification is available within approximately 3-5  hours either by Blood Panel Multiplexed PCR or Direct  MALDI-TOF. Details: [email or web address]  --  Blood Culture PCR  Serratia marcescens                RADIOLOGY:  Images independently visualized and reviewed personally, findings as below  < from: CT Chest No Cont (03.06.24 @ 10:14) >    IMPRESSION:    There is a 3.2 cm retrosternal hematoma with a focus of air.    Large circumferential pericardial effusion measuring slightly greater   than simple fluid, likely due to blood products.    < end of copied text >  < from: TTE W or WO Ultrasound Enhancing Agent (03.06.24 @ 07:55) >      1. Left ventricular systolic function is probably normal.   2. A large circumferential pericardial effusion is present. Adjacent to the right heart, the effusion appears organized, with an appearance characteristic of thrombus. The right ventricle is compressed throughout the cardiac cycle. A loculated effusion appears to be adjacent to the right atrium.   3. Compared to the transthoracic echocardiogram performed on 2/26/2024, The effusion is larger. Findingswere discussed with Dr. LOIDA Martinez on 3/6/2024 at 8:31 AM.    < end of copied text >

## 2024-03-07 LAB
ACANTHOCYTES BLD QL SMEAR: SLIGHT — SIGNIFICANT CHANGE UP
ALBUMIN SERPL ELPH-MCNC: 3.4 G/DL — SIGNIFICANT CHANGE UP (ref 3.3–5)
ALP SERPL-CCNC: 89 U/L — SIGNIFICANT CHANGE UP (ref 40–120)
ALT FLD-CCNC: 14 U/L — SIGNIFICANT CHANGE UP (ref 10–45)
ANION GAP SERPL CALC-SCNC: 11 MMOL/L — SIGNIFICANT CHANGE UP (ref 5–17)
ANISOCYTOSIS BLD QL: SIGNIFICANT CHANGE UP
APTT BLD: 28.3 SEC — SIGNIFICANT CHANGE UP (ref 24.5–35.6)
AST SERPL-CCNC: 14 U/L — SIGNIFICANT CHANGE UP (ref 10–40)
BASO STIPL BLD QL SMEAR: PRESENT — SIGNIFICANT CHANGE UP
BASOPHILS # BLD AUTO: 0 K/UL — SIGNIFICANT CHANGE UP (ref 0–0.2)
BASOPHILS NFR BLD AUTO: 0 % — SIGNIFICANT CHANGE UP (ref 0–2)
BILIRUB SERPL-MCNC: 1.6 MG/DL — HIGH (ref 0.2–1.2)
BUN SERPL-MCNC: 49 MG/DL — HIGH (ref 7–23)
CALCIUM SERPL-MCNC: 9.2 MG/DL — SIGNIFICANT CHANGE UP (ref 8.4–10.5)
CHLORIDE SERPL-SCNC: 91 MMOL/L — LOW (ref 96–108)
CO2 SERPL-SCNC: 33 MMOL/L — HIGH (ref 22–31)
CREAT SERPL-MCNC: 2.49 MG/DL — HIGH (ref 0.5–1.3)
EGFR: 28 ML/MIN/1.73M2 — LOW
EOSINOPHIL # BLD AUTO: 0 K/UL — SIGNIFICANT CHANGE UP (ref 0–0.5)
EOSINOPHIL NFR BLD AUTO: 0 % — SIGNIFICANT CHANGE UP (ref 0–6)
FIBRINOGEN PPP-MCNC: 344 MG/DL — SIGNIFICANT CHANGE UP (ref 200–445)
GAS PNL BLDA: SIGNIFICANT CHANGE UP
GLUCOSE SERPL-MCNC: 164 MG/DL — HIGH (ref 70–99)
HCT VFR BLD CALC: 26.6 % — LOW (ref 39–50)
HGB BLD-MCNC: 9.1 G/DL — LOW (ref 13–17)
HYPOCHROMIA BLD QL: SLIGHT — SIGNIFICANT CHANGE UP
INR BLD: 1.17 RATIO — SIGNIFICANT CHANGE UP (ref 0.85–1.18)
LYMPHOCYTES # BLD AUTO: 0.81 K/UL — LOW (ref 1–3.3)
LYMPHOCYTES # BLD AUTO: 7 % — LOW (ref 13–44)
MAGNESIUM SERPL-MCNC: 2.2 MG/DL — SIGNIFICANT CHANGE UP (ref 1.6–2.6)
MANUAL SMEAR VERIFICATION: SIGNIFICANT CHANGE UP
MCHC RBC-ENTMCNC: 25.3 PG — LOW (ref 27–34)
MCHC RBC-ENTMCNC: 34.2 GM/DL — SIGNIFICANT CHANGE UP (ref 32–36)
MCV RBC AUTO: 74.1 FL — LOW (ref 80–100)
MICROCYTES BLD QL: SLIGHT — SIGNIFICANT CHANGE UP
MONOCYTES # BLD AUTO: 0.6 K/UL — SIGNIFICANT CHANGE UP (ref 0–0.9)
MONOCYTES NFR BLD AUTO: 5.2 % — SIGNIFICANT CHANGE UP (ref 2–14)
NEUTROPHILS # BLD AUTO: 10.16 K/UL — HIGH (ref 1.8–7.4)
NEUTROPHILS NFR BLD AUTO: 87.8 % — HIGH (ref 43–77)
OVALOCYTES BLD QL SMEAR: SLIGHT — SIGNIFICANT CHANGE UP
PHOSPHATE SERPL-MCNC: 4.7 MG/DL — HIGH (ref 2.5–4.5)
PLAT MORPH BLD: NORMAL — SIGNIFICANT CHANGE UP
PLATELET # BLD AUTO: 156 K/UL — SIGNIFICANT CHANGE UP (ref 150–400)
POIKILOCYTOSIS BLD QL AUTO: SIGNIFICANT CHANGE UP
POLYCHROMASIA BLD QL SMEAR: SLIGHT — SIGNIFICANT CHANGE UP
POTASSIUM BLDA-SCNC: 4.6 MMOL/L — SIGNIFICANT CHANGE UP (ref 3.5–5.1)
POTASSIUM SERPL-MCNC: 3.8 MMOL/L — SIGNIFICANT CHANGE UP (ref 3.5–5.3)
POTASSIUM SERPL-SCNC: 3.8 MMOL/L — SIGNIFICANT CHANGE UP (ref 3.5–5.3)
PROT SERPL-MCNC: 6.5 G/DL — SIGNIFICANT CHANGE UP (ref 6–8.3)
PROTHROM AB SERPL-ACNC: 12.2 SEC — SIGNIFICANT CHANGE UP (ref 9.5–13)
RBC # BLD: 3.59 M/UL — LOW (ref 4.2–5.8)
RBC # FLD: 30.2 % — HIGH (ref 10.3–14.5)
RBC BLD AUTO: ABNORMAL
SCHISTOCYTES BLD QL AUTO: SLIGHT — SIGNIFICANT CHANGE UP
SODIUM SERPL-SCNC: 135 MMOL/L — SIGNIFICANT CHANGE UP (ref 135–145)
TARGETS BLD QL SMEAR: SIGNIFICANT CHANGE UP
WBC # BLD: 11.57 K/UL — HIGH (ref 3.8–10.5)
WBC # FLD AUTO: 11.57 K/UL — HIGH (ref 3.8–10.5)

## 2024-03-07 PROCEDURE — 71045 X-RAY EXAM CHEST 1 VIEW: CPT | Mod: 26

## 2024-03-07 PROCEDURE — 99291 CRITICAL CARE FIRST HOUR: CPT

## 2024-03-07 PROCEDURE — 93010 ELECTROCARDIOGRAM REPORT: CPT

## 2024-03-07 PROCEDURE — 99232 SBSQ HOSP IP/OBS MODERATE 35: CPT

## 2024-03-07 RX ORDER — OXYCODONE HYDROCHLORIDE 5 MG/1
10 TABLET ORAL EVERY 8 HOURS
Refills: 0 | Status: DISCONTINUED | OUTPATIENT
Start: 2024-03-07 | End: 2024-03-12

## 2024-03-07 RX ORDER — CEFEPIME 1 G/1
2000 INJECTION, POWDER, FOR SOLUTION INTRAMUSCULAR; INTRAVENOUS EVERY 12 HOURS
Refills: 0 | Status: DISCONTINUED | OUTPATIENT
Start: 2024-03-07 | End: 2024-03-08

## 2024-03-07 RX ORDER — MAGNESIUM SULFATE 500 MG/ML
2 VIAL (ML) INJECTION ONCE
Refills: 0 | Status: COMPLETED | OUTPATIENT
Start: 2024-03-07 | End: 2024-03-07

## 2024-03-07 RX ORDER — PANTOPRAZOLE SODIUM 20 MG/1
40 TABLET, DELAYED RELEASE ORAL DAILY
Refills: 0 | Status: DISCONTINUED | OUTPATIENT
Start: 2024-03-07 | End: 2024-03-12

## 2024-03-07 RX ORDER — POTASSIUM CHLORIDE 20 MEQ
20 PACKET (EA) ORAL
Refills: 0 | Status: COMPLETED | OUTPATIENT
Start: 2024-03-07 | End: 2024-03-07

## 2024-03-07 RX ORDER — FENTANYL CITRATE 50 UG/ML
50 INJECTION INTRAVENOUS ONCE
Refills: 0 | Status: DISCONTINUED | OUTPATIENT
Start: 2024-03-07 | End: 2024-03-07

## 2024-03-07 RX ADMIN — OXYCODONE HYDROCHLORIDE 10 MILLIGRAM(S): 5 TABLET ORAL at 08:12

## 2024-03-07 RX ADMIN — Medication 25 GRAM(S): at 08:28

## 2024-03-07 RX ADMIN — Medication 100 MILLIGRAM(S): at 11:19

## 2024-03-07 RX ADMIN — Medication 650 MILLIGRAM(S): at 06:37

## 2024-03-07 RX ADMIN — Medication 25 MILLIGRAM(S): at 14:56

## 2024-03-07 RX ADMIN — AMIODARONE HYDROCHLORIDE 200 MILLIGRAM(S): 400 TABLET ORAL at 05:01

## 2024-03-07 RX ADMIN — CHLORHEXIDINE GLUCONATE 1 APPLICATION(S): 213 SOLUTION TOPICAL at 12:00

## 2024-03-07 RX ADMIN — HEPARIN SODIUM 5000 UNIT(S): 5000 INJECTION INTRAVENOUS; SUBCUTANEOUS at 05:00

## 2024-03-07 RX ADMIN — CEFEPIME 100 MILLIGRAM(S): 1 INJECTION, POWDER, FOR SOLUTION INTRAMUSCULAR; INTRAVENOUS at 18:03

## 2024-03-07 RX ADMIN — DIPHENHYDRAMINE HYDROCHLORIDE AND LIDOCAINE HYDROCHLORIDE AND ALUMINUM HYDROXIDE AND MAGNESIUM HYDRO 10 MILLILITER(S): KIT at 05:02

## 2024-03-07 RX ADMIN — Medication 81 MILLIGRAM(S): at 11:19

## 2024-03-07 RX ADMIN — PANTOPRAZOLE SODIUM 40 MILLIGRAM(S): 20 TABLET, DELAYED RELEASE ORAL at 11:19

## 2024-03-07 RX ADMIN — DIPHENHYDRAMINE HYDROCHLORIDE AND LIDOCAINE HYDROCHLORIDE AND ALUMINUM HYDROXIDE AND MAGNESIUM HYDRO 10 MILLILITER(S): KIT at 18:03

## 2024-03-07 RX ADMIN — DIPHENHYDRAMINE HYDROCHLORIDE AND LIDOCAINE HYDROCHLORIDE AND ALUMINUM HYDROXIDE AND MAGNESIUM HYDRO 10 MILLILITER(S): KIT at 11:19

## 2024-03-07 RX ADMIN — OXYCODONE HYDROCHLORIDE 10 MILLIGRAM(S): 5 TABLET ORAL at 08:45

## 2024-03-07 RX ADMIN — Medication 50 MICROGRAM(S): at 06:01

## 2024-03-07 RX ADMIN — HEPARIN SODIUM 5000 UNIT(S): 5000 INJECTION INTRAVENOUS; SUBCUTANEOUS at 14:56

## 2024-03-07 RX ADMIN — Medication 20 MILLIEQUIVALENT(S): at 03:01

## 2024-03-07 RX ADMIN — Medication 25 MILLIGRAM(S): at 05:01

## 2024-03-07 RX ADMIN — Medication 20 MILLIEQUIVALENT(S): at 05:01

## 2024-03-07 RX ADMIN — CEFEPIME 100 MILLIGRAM(S): 1 INJECTION, POWDER, FOR SOLUTION INTRAMUSCULAR; INTRAVENOUS at 05:01

## 2024-03-07 RX ADMIN — POLYETHYLENE GLYCOL 3350 17 GRAM(S): 17 POWDER, FOR SOLUTION ORAL at 11:18

## 2024-03-07 RX ADMIN — Medication 650 MILLIGRAM(S): at 07:07

## 2024-03-07 NOTE — PROGRESS NOTE ADULT - PROBLEM SELECTOR PLAN 5
- BCx from 2/29 and 3/1 with GNR Serratia  - LE wound culture send 3/5, pending result   - Started on Cefepime 3/1  - Repeat BCx sent   - Remains afebrile but with ongoing mild leukocytosis  - Further guidance by ID

## 2024-03-07 NOTE — PROGRESS NOTE ADULT - ASSESSMENT
Briefly, 62 y/o M w/ h/o HTN, severe AS, chronic venous stasis, HFpEF who p/w worsening LE swelling to GC 1/25 and found to be in decompensated heart failure with EF 35-40% and severe AS so transferred for further management on 2/5. Was diuresed and underwent LHC which was non-obstructive. Underwent bioAVR 2/23 w/ JOANNA clip complicated by bleeding. Post operative course complicated by volume expansion and pAF for which he was started on Amiodarone and Digoxin. Also became bacteremic, BCx 2/29 growing Serratia, repeat BCx from 3/1 still growing Serratia, being treated with IV Cefepime.  weaned to off on 3/1.     Developed worsening LINDA and ongoing volume expansion prompting RHC 3/5 revealing elevated filling pressures but adequate CI of 2.2 (Mvo2 45%). Resumed on inotrope with  and dose was further escalated to 5 mcg/kg/min 3/6. Suboptimal UOP on intermittent IV diuretics, escalated to a Bumex gtt overnight as well. TTE 3/6 showed increasing pericardial effusion adjacent to the right heart with thrombus and RV compression. He's now s/p OR for pericardial window and drain 3/6 progressing well. His mental status has improved. He diuresed well with 5L UOP over the past 24 hours, net negative 3.7L on bumex gtt. His BUN/Cr are improving. He's normotensive with room for further afterload reduction.     Cardiac Studies  ·	RHC 3/5/24: RA 15, PA 52/23/32, PCWP 23, AO 94%, PA 45%, Hgb 8.8, CO/CI (F) 4.5/2.2, PVR 1.75 davis  ·	TTE 2/26/24: LVIDd 6 cm, LVEF 30% (global), mod RVE with mod reduced function (TAPSE 0.9), severe ARAVIND, mild TR, est PASP 25 mmHg, trace pericardial effusion, IVC normal in size

## 2024-03-07 NOTE — PROGRESS NOTE ADULT - SUBJECTIVE AND OBJECTIVE BOX
Follow Up:    Bacteremia    Interval History/ROS:  S/p pericardial window creation. BCx NGTD from 3/5. Leg wound culture sent 3/6. Patient was seen and examined at bedside. +OOB. Denies fever or SOB. No cough or chest pain.    Allergies  garlic (Angioedema; Swelling; Anaphylaxis)  No Known Drug Allergies        ANTIMICROBIALS:  cefepime   IVPB 2000 every 12 hours      OTHER MEDS:  MEDICATIONS  (STANDING):  acetaminophen     Tablet .. 650 every 6 hours PRN  allopurinol 100 daily  aMIOdarone    Tablet    aMIOdarone    Tablet 200 daily  aspirin enteric coated 81 daily  bisacodyl Suppository 10 daily PRN  DOBUTamine Infusion 2 <Continuous>  heparin   Injectable 5000 every 8 hours  hydrALAZINE 25 every 8 hours  levothyroxine 50 <User Schedule>  niCARdipine Infusion 5 <Continuous>  oxyCODONE    IR 10 every 8 hours PRN  pantoprazole    Tablet 40 daily  polyethylene glycol 3350 17 daily  senna 2 at bedtime      Vital Signs Last 24 Hrs  T(C): 36.6 (07 Mar 2024 08:45), Max: 37.1 (07 Mar 2024 04:00)  T(F): 97.8 (07 Mar 2024 08:45), Max: 98.8 (07 Mar 2024 04:00)  HR: 66 (07 Mar 2024 10:45) (62 - 109)  BP: 113/62 (07 Mar 2024 10:45) (102/57 - 140/87)  BP(mean): 82 (07 Mar 2024 10:45) (73 - 104)  RR: 16 (07 Mar 2024 10:45) (8 - 46)  SpO2: 94% (07 Mar 2024 10:45) (89% - 100%)    Parameters below as of 07 Mar 2024 08:00  Patient On (Oxygen Delivery Method): nasal cannula  O2 Flow (L/min): 4      PHYSICAL EXAM:  Constitutional: non-toxic, no distress  ENT:  supple, no thrush  Cardiovascular:   normal S1, S2, no murmur, RRR  Respiratory:  clear BS bilaterally, no wheezes, no rales  GI:  soft, non-tender  Neurologic: awake and oriented x3  Skin:  mid-sternal midline incision w/ no purulence; mid-abdomen drain to tube, sanguineous discharge  MSK: BLE no edema; c/d/i dressing b/l                              9.1    11.57 )-----------( 156      ( 07 Mar 2024 00:31 )             26.6       03-07    135  |  91<L>  |  49<H>  ----------------------------<  164<H>  3.8   |  33<H>  |  2.49<H>    Ca    9.2      07 Mar 2024 00:31  Phos  4.7     03-07  Mg     2.2     03-07    TPro  6.5  /  Alb  3.4  /  TBili  1.6<H>  /  DBili  x   /  AST  14  /  ALT  14  /  AlkPhos  89  03-07      Urinalysis Basic - ( 07 Mar 2024 00:31 )    Color: x / Appearance: x / SG: x / pH: x  Gluc: 164 mg/dL / Ketone: x  / Bili: x / Urobili: x   Blood: x / Protein: x / Nitrite: x   Leuk Esterase: x / RBC: x / WBC x   Sq Epi: x / Non Sq Epi: x / Bacteria: x        MICROBIOLOGY:  v  .Blood Blood-Venous  03-05-24   No growth at 24 hours  --  --      .Blood Blood  03-05-24   No growth at 24 hours  --  --      .Blood Blood-Peripheral  03-05-24   No growth at 24 hours  --  --      .Blood Blood-Peripheral  03-01-24   Growth in aerobic and anaerobic bottles: Serratia marcescens  See previous culture [id number]  --    Growth in aerobic bottle: Gram Negative Rods  Growth in anaerobic bottle: Gram Negative Rods      .Blood Blood  02-29-24   Growth in aerobic and anaerobic bottles: Serratia marcescens  See previous culture [id number]  --    Growth in aerobic bottle: Gram Negative Rods  Growth in anaerobic bottle: Gram Negative Rods      .Blood Blood  02-29-24   Growth in aerobic and anaerobic bottles: Serratia marcescens  Direct identification is available within approximately 3-5  hours either by Blood Panel Multiplexed PCR or Direct  MALDI-TOF. Details: [email or web address]  --  Blood Culture PCR  Serratia marcescens                RADIOLOGY:  Imaging below independently reviewed.  < from: Xray Chest 1 View- PORTABLE-Routine (Xray Chest 1 View- PORTABLE-Routine in AM.) (03.07.24 @ 03:09) >    ACC: [id number] EXAM:  XR CHEST PORTABLE ROUTINE 1V   ORDERED BY: MAURICIO ARENAS     PROCEDURE DATE:  03/07/2024          INTERPRETATION:  TIME OF EXAM: March 7, 2024 at 2:04 AM.    CLINICAL INFORMATION: CTS.    COMPARISON:  March 6, 2024 at 4:05 PM.    TECHNIQUE:   AP Portable chest x-ray.    INTERPRETATION:    The cardiac silhouette is enlarged. Median sternotomy sutures, and aortic   valve replacement, a left atrial appendage clips, and skin staples are   seen.  Pericardial drain, unchanged in position.  There is right mid and lower lung subsegmental atelectasis.  There are new left perihilar opacities.  There is continued left lower/retrocardiac opacity with obscuration of   the left hemidiaphragm.  No right pleural effusion is seen.  No pneumothorax is seen.  There is no acute bony abnormality.        IMPRESSION:  New right mid and lower lung subsegmental atelectasis.    New left perihilar opacities of indeterminate nature. Central pulmonary   edema, atelectasis, or developing pneumonia are possible.    Continued left lower/retrocardiac opacity which could be due to a left   pleural effusion with associated passive atelectasis, atelectasis of   other cause, and/or pneumonia.    --- End of Report ---            JULES HARRISON MD; Attending Radiologist  This document has been electronically signed. Mar  7 2024 10:55AM    < end of copied text >

## 2024-03-07 NOTE — CHART NOTE - NSCHARTNOTEFT_GEN_A_CORE
Nutrition Follow Up Note  Patient seen for: follow up  Chart reviewed, events noted    Per chart, patient is a 64 y/o male with PMH including HTN, GERD, dyslipidemia, chronic intermittent leg pain, aortic stenosis, chronic venous stasis dermatitis w/ b/l leg swelling, reported as poor historian per chart. Patient presents to [facility] for TAVR evaluation. S/p AV replacement . Post-OP c/b pericardial effusion s/p pericardial window 3/6.    Source: [x] Patient       [x] Chart        [] RN        [] Family at bedside       [] Other:  - If unable to interview patient: [] Trach/Vent/BiPAP  [] Disoriented/confused/inappropriate to interview    Diet Order:   Diet, Consistent Carbohydrate/No Snacks:   For patients receiving Renal Replacement - No Protein Restr, No Conc K, No Conc Phos, Low Sodium (RENAL) (24)    Is current order appropriate/adequate? [] Yes  [x]  No: no current indication for renal diet at present, potassium remains WNL during admission, substitution for low sodium & low phosphorus restrictions would be most appropriate in setting of current hyperphosphatemia today    PO intake:   [x] >75%  Adequate    [] 50-75%  Fair       [] <50%  Poor    Nutrition-related concerns:  - patient reports he has been eating well since last assessment, making good attempts with all meals served, tolerating with no reported chewing/swallowing impairment or nausea/vomiting  - mild hyperphosphatemia today, potassium remains WNL  - inotropic support w/ dobutamine  - remains ordered for synthroid  - blood glucose levels trending mostly in lower to occasionally mid 100s, no current insulin regimen in place    GI:  Last BM: 3/5  Bowel Regimen? [x] Yes: senna, miralax    Weights:   Daily Weight in k.9 (), Weight in k.6 (), Weight in k (), Weight in k.5 (), Weight in k.5 (), Weight in k.8 (), Weight in k.9 ()  - overall weight trend noted; down net close to ~8kg x6 days, patient had up to +3 edema during time frame, now down to +2 edema, continues to receive diuretic therapy, will continue to monitor weight trend    Nutritionally Pertinent MEDICATIONS  (STANDING):  allopurinol  aMIOdarone    Tablet  aMIOdarone    Tablet  cefepime   IVPB  DOBUTamine Infusion  hydrALAZINE  levothyroxine  niCARdipine Infusion  pantoprazole    Tablet  polyethylene glycol 3350  senna    Pertinent Labs:  @ 00:31: Na 135, BUN 49<H>, Cr 2.49<H>, <H>, K+ 3.8, Phos 4.7<H>, Mg 2.2, Alk Phos 89, ALT/SGPT 14, AST/SGOT 14, HbA1c --   @ 15:32: Na 137, BUN 51<H>, Cr 2.76<H>, <H>, K+ 3.8, Phos 4.3, Mg 2.2, Alk Phos 85, ALT/SGPT 12, AST/SGOT 12, HbA1c --    A1C with Estimated Average Glucose Result: 6.2 % (24 @ 09:00)    Finger Sticks:  POCT Blood Glucose.: 122 mg/dL ( @ 15:58)    Skin per nursing documentation: DTI to sacrum and DTIs to b/l buttocks per documentation  Edema: +2 generalized edema per documentation    Estimated Needs:   [x] no change since previous assessment  Estimated Caloric Needs: 2244-2618kcal/day (30-35kcal/kg)  Estimated Protein Needs: 97-112g/pro/day (1.3-1.5g/pro/kg)  Estimated Fluid Needs: defer to team  based on IBW 74.8kg    Previous Nutrition Diagnosis: acute severe malnutrition, increased nutrient needs  Nutrition Diagnosis is: [x] ongoing: being addressed w/ PO diet, supplementation    New Nutrition Diagnosis: [x] Not applicable    Nutrition Care Plan:  [x] In Progress  [] Achieved  [] Not applicable    Nutrition Interventions:     Education Provided:       [x] Yes: adequate caloric/protein intake, maintaining adequate PO intake/appetite, all questions were answered    Recommendations:      1. recommend d/c of renal restriction and change to low phosphorus, low sodium, consistent carbohydrate diet (as potassium remains WNL)  2. encourage PO intake, protein source with each meal as tolerated  3. will continue to provide diet Mighty Shake 1x/day for extra caloric/protein intake  4. if medically feasible, consider addition of nephrovite to help aid in wound healing  5. monitor PO intake, weight trend, electrolytes, blood glucose levels, labs, BMs, wound healing    Monitoring and Evaluation:   Continue to monitor nutritional intake, tolerance to diet prescription, weights, labs, skin integrity    RD remains available upon request and will follow up per protocol  Nithin Alaniz RD, CDN - contact on TEAMS.

## 2024-03-07 NOTE — PROGRESS NOTE ADULT - PROBLEM SELECTOR PLAN 1
-  stopped 3/1, then resumed post RHC on 3/5  - recommend increasing hydralazine for MAP > 65  - wean dobutamine and follow perfusion markers  - low threshold for hemodynamic monitoring (TLC for CVP/ScVO2 or swan) if not progressing as expected  - Decrease Bumex gtt to 0.5mg/hr, target net negative 2L balance   - Defer MRA/ARB/ARNI given degree of renal dysfunction   - Replete to target K 4-4.5 and Mg 2-2.5  - Please wrap BLE in ACE bandages to aid in mobilization of fluid  - Repeat TTE post pericardial window and drainage of pericardial effusion

## 2024-03-07 NOTE — PROGRESS NOTE ADULT - CRITICAL CARE ATTENDING COMMENT
Patient was seen and examined at bedside with the advanced care provider.  I agree with the above with the following exceptions:    Was pericardial window.  Was net -3.7 L yesterday.  CTS team has already begun weaning dobutamine this morning.  Would favor obtaining central venous side and repeat CMP plus lactate once dobutamine is off.  Would keep fluid goal close to net -2 L for today.  Recommend lowering Bumex drip at 0.5 mg/h.  Consider echocardiogram once off of dobutamine to reassess pericardial effusion as well as LV function.  Can increase hydralazine and wean nicardipine.
Patient was seen and examined at bedside with the advanced care provider.  I agree with the above with the following exceptions:    Hb drop to 8.8.  Echocardiogram large pericardial effusion.  RV is compressed throughout the cardiac cycle.  Planned for OR wash out today.    Currently on hold.  Continue on DAPT 5 mcg escalated overnight from 2.5.  Hemodynamics to be reassessed after the OR.   May require adjustment in hydralazine dose.  He is currently on a Bumex drip.  Lower extremity edema somewhat improved however still has JVP elevation to approximately 12 cm.  Renal function continues to rise.  Dig level 1.3.  Recommend decreasing digoxin dose to every other day.

## 2024-03-07 NOTE — CHART NOTE - NSCHARTNOTESELECT_GEN_ALL_CORE
Event Note
Nutrition Services
Structural Heart Team/Event Note
DONNIE/Event Note
Event Note
Heart Failure/Off Service Note
Hepatology/Event Note
Hospital Medicine Transfer Accept Note/Transfer Note
Interventional Radiology - Chart Note
Nutrition Services
Off Service Note
Overnight Event Note/Event Note
R groin swelling/Event Note
hepatology/Event Note

## 2024-03-07 NOTE — PROGRESS NOTE ADULT - SUBJECTIVE AND OBJECTIVE BOX
Seen in CTU, comfortable on NC O2    Vital Signs Last 24 Hrs  T(C): 36.5 (03-07-24 @ 12:00), Max: 37.1 (03-07-24 @ 04:00)  T(F): 97.7 (03-07-24 @ 12:00), Max: 98.8 (03-07-24 @ 04:00)  HR: 67 (03-07-24 @ 12:00) (62 - 109)  BP: 116/73 (03-07-24 @ 12:00) (102/57 - 140/87)  BP(mean): 89 (03-07-24 @ 12:00) (73 - 104)  RR: 21 (03-07-24 @ 12:00) (8 - 46)  SpO2: 97% (03-07-24 @ 12:00) (89% - 100%)    I&O's Detail    06 Mar 2024 07:01  -  07 Mar 2024 07:00  --------------------------------------------------------  IN:    Bumetanide: 10 mL    Bumetanide: 110 mL    DOBUTamine: 100.8 mL    DOBUTamine: 154.5 mL    IV PiggyBack: 600 mL    NiCARdipine: 160 mL    Oral Fluid: 150 mL    PRBCs (Packed Red Blood Cells): 300 mL  Total IN: 1585.3 mL    OUT:    Chest Tube (mL): 290 mL    Indwelling Catheter - Urethral (mL): 5060 mL  Total OUT: 5350 mL    07 Mar 2024 07:01  -  07 Mar 2024 12:41  --------------------------------------------------------  IN:    Bumetanide: 5 mL    DOBUTamine: 10.3 mL    DOBUTamine: 10.3 mL    IV PiggyBack: 50 mL    NiCARdipine: 22.5 mL    Oral Fluid: 320 mL  Total IN: 418.1 mL    OUT:    Chest Tube (mL): 15 mL    Indwelling Catheter - Urethral (mL): 685 mL  Total OUT: 700 mL    s1s2  b/l air entry, + CT  soft, ND  b/l LE edema, improving                                                                                                                        9.1    11.57 )-----------( 156      ( 07 Mar 2024 00:31 )             26.6     07 Mar 2024 00:31    135    |  91     |  49     ----------------------------<  164    3.8     |  33     |  2.49     Ca    9.2        07 Mar 2024 00:31  Phos  4.7       07 Mar 2024 00:31  Mg     2.2       07 Mar 2024 00:31    TPro  6.5    /  Alb  3.4    /  TBili  1.6    /  DBili  x      /  AST  14     /  ALT  14     /  AlkPhos  89     07 Mar 2024 00:31    LIVER FUNCTIONS - ( 07 Mar 2024 00:31 )  Alb: 3.4 g/dL / Pro: 6.5 g/dL / ALK PHOS: 89 U/L / ALT: 14 U/L / AST: 14 U/L / GGT: x           PT/INR - ( 07 Mar 2024 00:31 )   PT: 12.2 sec;   INR: 1.17 ratio      Culture - Blood (collected 05 Mar 2024 23:33)  Source: .Blood Blood-Venous  Preliminary Report (07 Mar 2024 04:01):    No growth at 24 hours    Culture - Blood (collected 05 Mar 2024 23:33)  Source: .Blood Blood-Venous  Preliminary Report (07 Mar 2024 04:01):    No growth at 24 hours    Culture - Blood (collected 05 Mar 2024 18:54)  Source: .Blood Blood  Preliminary Report (07 Mar 2024 01:03):    No growth at 24 hours    Culture - Blood (collected 05 Mar 2024 07:46)  Source: .Blood Blood-Venous  Preliminary Report (07 Mar 2024 12:02):    No growth at 48 Hours    Culture - Blood (collected 05 Mar 2024 07:46)  Source: .Blood Blood-Peripheral  Preliminary Report (07 Mar 2024 12:02):    No growth at 48 Hours    acetaminophen     Tablet .. 650 milliGRAM(s) Oral every 6 hours PRN  allopurinol 100 milliGRAM(s) Oral daily  aMIOdarone    Tablet   Oral   aMIOdarone    Tablet 200 milliGRAM(s) Oral daily  aspirin enteric coated 81 milliGRAM(s) Oral daily  benzocaine/menthol Lozenge 1 Lozenge Oral four times a day PRN  bisacodyl Suppository 10 milliGRAM(s) Rectal daily PRN  cefepime   IVPB 2000 milliGRAM(s) IV Intermittent every 12 hours  chlorhexidine 2% Cloths 1 Application(s) Topical daily  DOBUTamine Infusion 2 MICROgram(s)/kG/Min IV Continuous <Continuous>  FIRST- Mouthwash  BLM 10 milliLiter(s) Swish and Swallow every 6 hours  heparin   Injectable 5000 Unit(s) SubCutaneous every 8 hours  hydrALAZINE 25 milliGRAM(s) Oral every 8 hours  levothyroxine 50 MICROGram(s) Oral <User Schedule>  lidocaine   4% Patch 1 Patch Transdermal daily  niCARdipine Infusion 5 mG/Hr IV Continuous <Continuous>  oxyCODONE    IR 10 milliGRAM(s) Oral every 8 hours PRN  pantoprazole    Tablet 40 milliGRAM(s) Oral daily  polyethylene glycol 3350 17 Gram(s) Oral daily  senna 2 Tablet(s) Oral at bedtime    A/P:    CM, EF 30%, severe AS, Afib, CHF  Tx from [facility] to [facility] 2/5, s/p LHC 2/5  S/p cardio-renal/hemodynamic LINDA (peak Cr 2.5 - 2/3, lorna Cr 1.23 - 2/23)   Imaging w/o hydro 1/28/24  S/p CT w/IV dye 2/8/24  Stable renal fx post CT  S/p AVR, JOANNA clip 2/23  Serratia bacteremia (2/29 and 3/1), Abx per ID   Bld cx 3/5 - NGTD   Pericardial effusion  Hemodynamic/cardio-renal LINDA/CKD (peak Cr 2.98 - 3/6 am)  S/p pericardial window yesterday, clot extracted   Renal fx now improving w/good UO   Avoid nephrotoxins, hypotension   Supportive care per CTU team   F/u CBC, BMP, Mg    [phone]

## 2024-03-07 NOTE — PROGRESS NOTE ADULT - SUBJECTIVE AND OBJECTIVE BOX
CRITICAL CARE ATTENDING - CTICU    MEDICATIONS  (STANDING):  allopurinol 100 milliGRAM(s) Oral daily  aMIOdarone    Tablet   Oral   aMIOdarone    Tablet 200 milliGRAM(s) Oral daily  aspirin enteric coated 81 milliGRAM(s) Oral daily  cefepime   IVPB 2000 milliGRAM(s) IV Intermittent every 12 hours  chlorhexidine 2% Cloths 1 Application(s) Topical daily  DOBUTamine Infusion 2 MICROgram(s)/kG/Min (5.13 mL/Hr) IV Continuous <Continuous>  fentaNYL    Injectable 50 MICROGram(s) IV Push once  FIRST- Mouthwash  BLM 10 milliLiter(s) Swish and Swallow every 6 hours  heparin   Injectable 5000 Unit(s) SubCutaneous every 8 hours  hydrALAZINE 25 milliGRAM(s) Oral every 8 hours  levothyroxine 50 MICROGram(s) Oral <User Schedule>  lidocaine   4% Patch 1 Patch Transdermal daily  niCARdipine Infusion 5 mG/Hr (25 mL/Hr) IV Continuous <Continuous>  pantoprazole  Injectable 40 milliGRAM(s) IV Push daily  polyethylene glycol 3350 17 Gram(s) Oral daily  senna 2 Tablet(s) Oral at bedtime                                    9.1    11.57 )-----------( 156      ( 07 Mar 2024 00:31 )             26.6       03-07    135  |  91<L>  |  49<H>  ----------------------------<  164<H>  3.8   |  33<H>  |  2.49<H>    Ca    9.2      07 Mar 2024 00:31  Phos  4.7     03-  Mg     2.2     03-07    TPro  6.5  /  Alb  3.4  /  TBili  1.6<H>  /  DBili  x   /  AST  14  /  ALT  14  /  AlkPhos  89  03-07      PT/INR - ( 07 Mar 2024 00:31 )   PT: 12.2 sec;   INR: 1.17 ratio         PTT - ( 07 Mar 2024 00:31 )  PTT:28.3 sec        Daily Height in cm: 180.34 (06 Mar 2024 12:30)    Daily Weight in k.9 (07 Mar 2024 00:00)      03-06 @ 07:01  -  03- @ 07:00  --------------------------------------------------------  IN: 1585.3 mL / OUT: 5350 mL / NET: -3764.7 mL     @ 07:01   @ 08:53  --------------------------------------------------------  IN: 30.3 mL / OUT: 250 mL / NET: -219.7 mL        Critically Ill patient  : [ ] preoperative ,   [x ] post operative    Requires :  [ x] Arterial Line   [ x] Central Line  [ ] PA catheter  [ ] IABP  [ ] ECMO  [ ] LVAD  [ ] Ventilator  [x ] pacemaker [ ] Impella.                      [ x] ABG's     [x ] Pulse Oxymetry Monitoring  Bedside evaluation , monitoring , treatment of hemodynamics , fluids , IVP/ IVCD meds.        Diagnosis:     POD  - AVR / LAAC     POD 3/ - Return to ICU for inotropic support     Return to CTICU -  CHF-     CHF- acute [ x]   chronic [ x]    systolic [x ]   diastolic [ ]  Valvular [ ]          - Echo- EF -  30's / Large Pericardial Effusion            [x ] RV dysfunction          - Cxr-cardiomegally, edema          - Clinical-  [ x]inotropes   [ ]pressors   [ ]diuresis   [ ]IABP   [ ]ECMO   [ ]LVAD   [ ]Respiratory Failure    Pericardial Effusion                                        - Drainage today  Cardiac Tamponade    Hemodynamic lability,  instability. Requires IVCD [ ] vasopressors [ x] inotropes  [x ] vasodilator  [ ]IVSS fluid  to maintain MAP, perfusion, C.I.     Renal Failure - Acute Kidney Injury     Chronic Renal Failure     Hyponatremia     Metabolic Alkalosis    Bacteremia - serratia from R Leg ulcer     Requires bedside physical therapy, mobilization and total assisted care.     Synthroid for hypothyroidism.                 I, Barber Calderon, personally performed the services described in this documentation. All medical record entries made by the scribe were at my direction and in my presence. I have reviewed the chart and agree that the record reflects my personal performance and is accurate and complete.   Barber Calderon MD.       By signing my name below, I, Shalom Ramesh, attest that this documentation has been prepared under the direction and in the presence of Barber Calderon MD.   Electronically Signed: Gordon Basilio 24 @ 08:53        Discussed with CT surgeon, Physician Assistant - Nurse Practitioner- Critical care medicine team.   Dicussed at  AM / PM rounds.   Chart, labs , films reviewed.    Cumulative Critical Care Time Given Today:  CRITICAL CARE ATTENDING - CTICU    MEDICATIONS  (STANDING):  allopurinol 100 milliGRAM(s) Oral daily  aMIOdarone    Tablet   Oral   aMIOdarone    Tablet 200 milliGRAM(s) Oral daily  aspirin enteric coated 81 milliGRAM(s) Oral daily  cefepime   IVPB 2000 milliGRAM(s) IV Intermittent every 12 hours  chlorhexidine 2% Cloths 1 Application(s) Topical daily  DOBUTamine Infusion 2 MICROgram(s)/kG/Min (5.13 mL/Hr) IV Continuous <Continuous>  fentaNYL    Injectable 50 MICROGram(s) IV Push once  FIRST- Mouthwash  BLM 10 milliLiter(s) Swish and Swallow every 6 hours  heparin   Injectable 5000 Unit(s) SubCutaneous every 8 hours  hydrALAZINE 25 milliGRAM(s) Oral every 8 hours  levothyroxine 50 MICROGram(s) Oral <User Schedule>  lidocaine   4% Patch 1 Patch Transdermal daily  niCARdipine Infusion 5 mG/Hr (25 mL/Hr) IV Continuous <Continuous>  pantoprazole  Injectable 40 milliGRAM(s) IV Push daily  polyethylene glycol 3350 17 Gram(s) Oral daily  senna 2 Tablet(s) Oral at bedtime                                    9.1    11.57 )-----------( 156      ( 07 Mar 2024 00:31 )             26.6       03-07    135  |  91<L>  |  49<H>  ----------------------------<  164<H>  3.8   |  33<H>  |  2.49<H>    Ca    9.2      07 Mar 2024 00:31  Phos  4.7     03-  Mg     2.2     03-07    TPro  6.5  /  Alb  3.4  /  TBili  1.6<H>  /  DBili  x   /  AST  14  /  ALT  14  /  AlkPhos  89  03-07      PT/INR - ( 07 Mar 2024 00:31 )   PT: 12.2 sec;   INR: 1.17 ratio         PTT - ( 07 Mar 2024 00:31 )  PTT:28.3 sec        Daily Height in cm: 180.34 (06 Mar 2024 12:30)    Daily Weight in k.9 (07 Mar 2024 00:00)      03-06 @ 07:01  -  03- @ 07:00  --------------------------------------------------------  IN: 1585.3 mL / OUT: 5350 mL / NET: -3764.7 mL     @ 07:01   @ 08:53  --------------------------------------------------------  IN: 30.3 mL / OUT: 250 mL / NET: -219.7 mL        Critically Ill patient  : [ ] preoperative ,   [x ] post operative    Requires :  [ x] Arterial Line   [ x] Central Line  [ ] PA catheter  [ ] IABP  [ ] ECMO  [ ] LVAD  [ ] Ventilator  [x ] pacemaker [ ] Impella.                      [ x] ABG's     [x ] Pulse Oxymetry Monitoring  Bedside evaluation , monitoring , treatment of hemodynamics , fluids , IVP/ IVCD meds.        Diagnosis:     POD  - AVR / LAAC     POD 3/ - Return to ICU for inotropic support     Return to CTICU -  CHF-     CHF- acute [ x]   chronic [ x]    systolic [x ]   diastolic [ ]  Valvular [ ]          - Echo- EF -  30's / Large Pericardial Effusion            [x ] RV dysfunction          - Cxr-cardiomegally, edema          - Clinical-  [ x]inotropes   [ x]pressors   [ ]diuresis   [ ]IABP   [ ]ECMO   [ ]LVAD   [ ]Respiratory Failure    Pericardial Effusion                                        - Drainage  yesterday  Cardiac Tamponade    Hemodynamic lability,  instability. Requires IVCD [ ] vasopressors [ x] inotropes  [x ] vasodilator  [ ]IVSS fluid  to maintain MAP, perfusion, C.I.     Renal Failure - Acute Kidney Injury     Chronic Renal Failure     Hyponatremia - resolved    Metabolic Alkalosis    Bacteremia - serratia from R Leg ulcer     Requires bedside physical therapy, mobilization and total [facility] care.     Synthroid for hypothyroidism.                 I, Barber Calderon, personally performed the services described in this documentation. All medical record entries made by the scribe were at my direction and in my presence. I have reviewed the chart and agree that the record reflects my personal performance and is accurate and complete.   Barber Calderon MD.       By signing my name below, I, Shalom Ramesh, attest that this documentation has been prepared under the direction and in the presence of Barber Calderon MD.   Electronically Signed: Gordon Basilio 24 @ 08:53        Discussed with CT surgeon, Physician Assistant - Nurse Practitioner- Critical care medicine team.   Dicussed at  AM / PM rounds.   Chart, labs , films reviewed.    Cumulative Critical Care Time Given Today:  30 min

## 2024-03-07 NOTE — PROGRESS NOTE ADULT - ASSESSMENT
63 m with HTN, GERD, AS, chronic venous stasis dermatitis with bilateral leg swelling, initially admitted to [facility] with JOCELIN and b/l leg pain and swelling, was seen by ID and was considered not infected just stasis dermatitis, received 5 days of keflex, wound cx showed serratia and strep dysgalactiae,  blood cx negative  S/P Bio AVR , 2/23Post op labile BP related to hypovolemia & cardiac dysfunction, intra op bleeding requiring blood & products   pt has been on vanco until 2/29 for post op course      AS and CHF s/p AVR 2/23 now with new fever and serratia bacteremia 2/29 and 3/1, source unclear but previous wound cx had strep and serratia, u/a just hematuria, no pyuria, TTE 2/26 no veg. Last fever 3/1.  b/l LE stasis dermatitis and chronic wounds, no tenderness and appears stasis dermatitis not infection  now with large circumferential pericardial effusion which is loculated and ?hemorrhagic/thrombus, also a 3.2 cm retrosternal hematoma with focus of air  S/p pericardial window creation 3/6.    #Serratia bacteremia  #Pericardial effusion  #Leg wound  #CHF  - BCx NGTD from 3/5. Continue with cefepime 2g IV Q12H at this time  - No culture or path sent from OR  - BLE wound culture to follow-up  - Drain management per CTU  - F/u WBC and VS closely.    Plan discussed with primary team ACP.  Thank you for this consult. Inpatient ID team will follow.    Neftali Echeverria MD, PhD  Attending Physician  Division of Infectious Diseases  Department of Medicine    Please contact through MS Teams message.  Office: [phone] (after 5 PM or weekend)   63 m with HTN, GERD, AS, chronic venous stasis dermatitis with bilateral leg swelling, initially admitted to [facility] with JOCELIN and b/l leg pain and swelling, was seen by ID and was considered not infected just stasis dermatitis, received 5 days of keflex, wound cx showed serratia and strep dysgalactiae,  blood cx negative  S/P Bio AVR , 2/23Post op labile BP related to hypovolemia & cardiac dysfunction, intra op bleeding requiring blood & products   pt has been on vanco until 2/29 for post op course      AS and CHF s/p AVR 2/23 now with new fever and serratia bacteremia 2/29 and 3/1, source unclear but previous wound cx had strep and serratia, u/a just hematuria, no pyuria, TTE 2/26 no veg. Last fever 3/1.  b/l LE stasis dermatitis and chronic wounds, no tenderness and appears stasis dermatitis not infection  now with large circumferential pericardial effusion which is loculated and ?hemorrhagic/thrombus, also a 3.2 cm retrosternal hematoma with focus of air  S/p pericardial window creation 3/6.    #Serratia bacteremia  #Pericardial effusion  #Leg wound  #CHF  - BCx NGTD from 3/5. Continue with cefepime 2g IV Q12H at this time  - No culture or path sent from OR  - BLE wound culture to follow-up  - Drain management per CTU  - F/u WBC and VS closely.    Plan discussed with CTU team. MS Teams message left to MARLENE Grace.  Thank you for this consult. Inpatient ID team will follow.    Neftali Echeverria MD, PhD  Attending Physician  Division of Infectious Diseases  Department of Medicine    Please contact through MS Teams message.  Office: [phone] (after 5 PM or weekend)

## 2024-03-07 NOTE — PROGRESS NOTE ADULT - PROBLEM SELECTOR PLAN 2
- Cr in 2022 1-1.3  - Cr on admission 1.7 and peaked to 2.5  - Now gradually improving; continue to monitor  - RHC from 3/5 not suggestive of low output

## 2024-03-07 NOTE — PROGRESS NOTE ADULT - SUBJECTIVE AND OBJECTIVE BOX
ADVANCED HEART FAILURE & TRANSPLANT  - PROGRESS NOTE  *To reach the NS1 Team from 8am to 5pm, please call [phone].    ___________________________________________________________________________  Subjective:  - s/p OR 3/6 for pericardial window for pericardial effusion with clot  - OOB in chair  - Overall reports feeling well, notes chronic back pain, denies SOB, orthopnea, lightheadedness, CP    Medications:  acetaminophen     Tablet .. 650 milliGRAM(s) Oral every 6 hours PRN  allopurinol 100 milliGRAM(s) Oral daily  aMIOdarone    Tablet   Oral   aMIOdarone    Tablet 200 milliGRAM(s) Oral daily  aspirin enteric coated 81 milliGRAM(s) Oral daily  benzocaine/menthol Lozenge 1 Lozenge Oral four times a day PRN  bisacodyl Suppository 10 milliGRAM(s) Rectal daily PRN  cefepime   IVPB 2000 milliGRAM(s) IV Intermittent every 12 hours  chlorhexidine 2% Cloths 1 Application(s) Topical daily  DOBUTamine Infusion 2 MICROgram(s)/kG/Min IV Continuous <Continuous>  FIRST- Mouthwash  BLM 10 milliLiter(s) Swish and Swallow every 6 hours  heparin   Injectable 5000 Unit(s) SubCutaneous every 8 hours  hydrALAZINE 25 milliGRAM(s) Oral every 8 hours  levothyroxine 50 MICROGram(s) Oral <User Schedule>  lidocaine   4% Patch 1 Patch Transdermal daily  niCARdipine Infusion 5 mG/Hr IV Continuous <Continuous>  oxyCODONE    IR 10 milliGRAM(s) Oral every 8 hours PRN  pantoprazole    Tablet 40 milliGRAM(s) Oral daily  polyethylene glycol 3350 17 Gram(s) Oral daily  senna 2 Tablet(s) Oral at bedtime      Physical Exam:    Vitals:  Vital Signs Last 24 Hours  T(C): 36.5 (24 @ 12:00), Max: 37.1 (24 @ 04:00)  HR: 64 (24 @ 14:00) (62 - 109)  BP: 107/67 (24 @ 14:00) (102/57 - 140/87)  RR: 19 (24 @ 14:00) (8 - 46)  SpO2: 99% (24 @ 14:00) (92% - 100%)    Weight in k.9 ( @ 00:00)    I&O's Summary    06 Mar 2024 07:  -  07 Mar 2024 07:00  --------------------------------------------------------  IN: 1585.3 mL / OUT: 5350 mL / NET: -3764.7 mL    07 Mar 2024 07:01  -  07 Mar 2024 15:37  --------------------------------------------------------  IN: 758.1 mL / OUT: 1000 mL / NET: -241.9 mL    Tele: SR    General: No distress. Comfortable.  HEENT: EOM intact.  Neck: Neck supple. JVP mildly elevated. No masses  Chest: Clear to auscultation bilaterally. chest tube in place  CV: Normal S1 and S2. No murmurs, rub, or gallops. Mild tense LE edema, improving  Abdomen: Soft, non-distended, non-tender  Skin: Warm peripherally  Neurology: Alert and oriented times three. Sensation intact  Psych: Affect normal    Labs:                        9.1    11.57 )-----------( 156      ( 07 Mar 2024 00:31 )             26.6     03-07    135  |  91<L>  |  49<H>  ----------------------------<  164<H>  3.8   |  33<H>  |  2.49<H>    Ca    9.2      07 Mar 2024 00:31  Phos  4.7     03-  Mg     2.2     -07    TPro  6.5  /  Alb  3.4  /  TBili  1.6<H>  /  DBili  x   /  AST  14  /  ALT  14  /  AlkPhos  89  03-07    PT/INR - ( 07 Mar 2024 00:31 )   PT: 12.2 sec;   INR: 1.17 ratio      PTT - ( 07 Mar 2024 00:31 )  PTT:28.3 sec

## 2024-03-08 ENCOUNTER — RESULT REVIEW (OUTPATIENT)
Age: 64
End: 2024-03-08

## 2024-03-08 DIAGNOSIS — R78.81 BACTEREMIA: ICD-10-CM

## 2024-03-08 DIAGNOSIS — S81.809A UNSPECIFIED OPEN WOUND, UNSPECIFIED LOWER LEG, INITIAL ENCOUNTER: ICD-10-CM

## 2024-03-08 DIAGNOSIS — R23.4 CHANGES IN SKIN TEXTURE: ICD-10-CM

## 2024-03-08 DIAGNOSIS — A49.8 OTHER BACTERIAL INFECTIONS OF UNSPECIFIED SITE: ICD-10-CM

## 2024-03-08 DIAGNOSIS — M20.11 HALLUX VALGUS (ACQUIRED), RIGHT FOOT: ICD-10-CM

## 2024-03-08 DIAGNOSIS — N17.9 ACUTE KIDNEY FAILURE, UNSPECIFIED: ICD-10-CM

## 2024-03-08 LAB
ADD ON TEST-SPECIMEN IN LAB: SIGNIFICANT CHANGE UP
ALBUMIN SERPL ELPH-MCNC: 3.5 G/DL — SIGNIFICANT CHANGE UP (ref 3.3–5)
ALP SERPL-CCNC: 108 U/L — SIGNIFICANT CHANGE UP (ref 40–120)
ALT FLD-CCNC: 16 U/L — SIGNIFICANT CHANGE UP (ref 10–45)
ANION GAP SERPL CALC-SCNC: 13 MMOL/L — SIGNIFICANT CHANGE UP (ref 5–17)
APTT BLD: 27.1 SEC — SIGNIFICANT CHANGE UP (ref 24.5–35.6)
AST SERPL-CCNC: 18 U/L — SIGNIFICANT CHANGE UP (ref 10–40)
BILIRUB DIRECT SERPL-MCNC: 0.5 MG/DL — HIGH (ref 0–0.3)
BILIRUB INDIRECT FLD-MCNC: 0.5 MG/DL — SIGNIFICANT CHANGE UP (ref 0.2–1)
BILIRUB SERPL-MCNC: 1 MG/DL — SIGNIFICANT CHANGE UP (ref 0.2–1.2)
BUN SERPL-MCNC: 54 MG/DL — HIGH (ref 7–23)
CALCIUM SERPL-MCNC: 9 MG/DL — SIGNIFICANT CHANGE UP (ref 8.4–10.5)
CHLORIDE SERPL-SCNC: 96 MMOL/L — SIGNIFICANT CHANGE UP (ref 96–108)
CO2 SERPL-SCNC: 31 MMOL/L — SIGNIFICANT CHANGE UP (ref 22–31)
CREAT SERPL-MCNC: 2.65 MG/DL — HIGH (ref 0.5–1.3)
CULTURE RESULTS: NO GROWTH — SIGNIFICANT CHANGE UP
EGFR: 26 ML/MIN/1.73M2 — LOW
FIBRINOGEN PPP-MCNC: 398 MG/DL — SIGNIFICANT CHANGE UP (ref 200–445)
GAS PNL BLDA: SIGNIFICANT CHANGE UP
GLUCOSE BLDC GLUCOMTR-MCNC: 131 MG/DL — HIGH (ref 70–99)
GLUCOSE BLDC GLUCOMTR-MCNC: 144 MG/DL — HIGH (ref 70–99)
GLUCOSE SERPL-MCNC: 136 MG/DL — HIGH (ref 70–99)
HCT VFR BLD CALC: 28.1 % — LOW (ref 39–50)
HGB BLD-MCNC: 9.7 G/DL — LOW (ref 13–17)
INR BLD: 1.01 RATIO — SIGNIFICANT CHANGE UP (ref 0.85–1.18)
MAGNESIUM SERPL-MCNC: 2.7 MG/DL — HIGH (ref 1.6–2.6)
MCHC RBC-ENTMCNC: 26.3 PG — LOW (ref 27–34)
MCHC RBC-ENTMCNC: 34.5 GM/DL — SIGNIFICANT CHANGE UP (ref 32–36)
MCV RBC AUTO: 76.2 FL — LOW (ref 80–100)
NRBC # BLD: 0 /100 WBCS — SIGNIFICANT CHANGE UP (ref 0–0)
PHOSPHATE SERPL-MCNC: 3.1 MG/DL — SIGNIFICANT CHANGE UP (ref 2.5–4.5)
PLATELET # BLD AUTO: 211 K/UL — SIGNIFICANT CHANGE UP (ref 150–400)
POTASSIUM SERPL-MCNC: 4.6 MMOL/L — SIGNIFICANT CHANGE UP (ref 3.5–5.3)
POTASSIUM SERPL-SCNC: 4.6 MMOL/L — SIGNIFICANT CHANGE UP (ref 3.5–5.3)
PROT SERPL-MCNC: 6.5 G/DL — SIGNIFICANT CHANGE UP (ref 6–8.3)
PROTHROM AB SERPL-ACNC: 11.1 SEC — SIGNIFICANT CHANGE UP (ref 9.5–13)
RBC # BLD: 3.69 M/UL — LOW (ref 4.2–5.8)
RBC # FLD: SIGNIFICANT CHANGE UP (ref 10.3–14.5)
SODIUM SERPL-SCNC: 140 MMOL/L — SIGNIFICANT CHANGE UP (ref 135–145)
SPECIMEN SOURCE: SIGNIFICANT CHANGE UP
WBC # BLD: 15.59 K/UL — HIGH (ref 3.8–10.5)
WBC # FLD AUTO: 15.59 K/UL — HIGH (ref 3.8–10.5)

## 2024-03-08 PROCEDURE — 93308 TTE F-UP OR LMTD: CPT | Mod: 26

## 2024-03-08 PROCEDURE — 93321 DOPPLER ECHO F-UP/LMTD STD: CPT | Mod: 26

## 2024-03-08 PROCEDURE — 99233 SBSQ HOSP IP/OBS HIGH 50: CPT

## 2024-03-08 PROCEDURE — 71045 X-RAY EXAM CHEST 1 VIEW: CPT | Mod: 26

## 2024-03-08 PROCEDURE — 99232 SBSQ HOSP IP/OBS MODERATE 35: CPT

## 2024-03-08 RX ORDER — SODIUM CHLORIDE 0.65 %
1 AEROSOL, SPRAY (ML) NASAL
Refills: 0 | Status: DISCONTINUED | OUTPATIENT
Start: 2024-03-08 | End: 2024-03-12

## 2024-03-08 RX ORDER — MUPIROCIN 20 MG/G
1 OINTMENT TOPICAL
Refills: 0 | Status: DISCONTINUED | OUTPATIENT
Start: 2024-03-08 | End: 2024-03-12

## 2024-03-08 RX ORDER — HYDRALAZINE HCL 50 MG
37.5 TABLET ORAL EVERY 8 HOURS
Refills: 0 | Status: DISCONTINUED | OUTPATIENT
Start: 2024-03-08 | End: 2024-03-11

## 2024-03-08 RX ORDER — BUMETANIDE 0.25 MG/ML
2 INJECTION INTRAMUSCULAR; INTRAVENOUS
Refills: 0 | Status: DISCONTINUED | OUTPATIENT
Start: 2024-03-08 | End: 2024-03-11

## 2024-03-08 RX ORDER — LIDOCAINE HCL 20 MG/ML
10 VIAL (ML) INJECTION ONCE
Refills: 0 | Status: COMPLETED | OUTPATIENT
Start: 2024-03-08 | End: 2024-03-08

## 2024-03-08 RX ORDER — CEFEPIME 1 G/1
2000 INJECTION, POWDER, FOR SOLUTION INTRAMUSCULAR; INTRAVENOUS EVERY 24 HOURS
Refills: 0 | Status: DISCONTINUED | OUTPATIENT
Start: 2024-03-09 | End: 2024-03-12

## 2024-03-08 RX ADMIN — MUPIROCIN 1 APPLICATION(S): 20 OINTMENT TOPICAL at 15:51

## 2024-03-08 RX ADMIN — Medication 1 SPRAY(S): at 17:04

## 2024-03-08 RX ADMIN — Medication 1 SPRAY(S): at 11:45

## 2024-03-08 RX ADMIN — Medication 50 MICROGRAM(S): at 06:15

## 2024-03-08 RX ADMIN — Medication 25 MILLIGRAM(S): at 06:15

## 2024-03-08 RX ADMIN — CEFEPIME 100 MILLIGRAM(S): 1 INJECTION, POWDER, FOR SOLUTION INTRAMUSCULAR; INTRAVENOUS at 06:15

## 2024-03-08 RX ADMIN — SENNA PLUS 2 TABLET(S): 8.6 TABLET ORAL at 00:54

## 2024-03-08 RX ADMIN — PANTOPRAZOLE SODIUM 40 MILLIGRAM(S): 20 TABLET, DELAYED RELEASE ORAL at 11:44

## 2024-03-08 RX ADMIN — HEPARIN SODIUM 5000 UNIT(S): 5000 INJECTION INTRAVENOUS; SUBCUTANEOUS at 21:51

## 2024-03-08 RX ADMIN — HEPARIN SODIUM 5000 UNIT(S): 5000 INJECTION INTRAVENOUS; SUBCUTANEOUS at 06:15

## 2024-03-08 RX ADMIN — CHLORHEXIDINE GLUCONATE 1 APPLICATION(S): 213 SOLUTION TOPICAL at 11:55

## 2024-03-08 RX ADMIN — Medication 37.5 MILLIGRAM(S): at 21:51

## 2024-03-08 RX ADMIN — SENNA PLUS 2 TABLET(S): 8.6 TABLET ORAL at 21:51

## 2024-03-08 RX ADMIN — AMIODARONE HYDROCHLORIDE 200 MILLIGRAM(S): 400 TABLET ORAL at 06:14

## 2024-03-08 RX ADMIN — DIPHENHYDRAMINE HYDROCHLORIDE AND LIDOCAINE HYDROCHLORIDE AND ALUMINUM HYDROXIDE AND MAGNESIUM HYDRO 10 MILLILITER(S): KIT at 13:24

## 2024-03-08 RX ADMIN — BUMETANIDE 2 MILLIGRAM(S): 0.25 INJECTION INTRAMUSCULAR; INTRAVENOUS at 13:31

## 2024-03-08 RX ADMIN — DIPHENHYDRAMINE HYDROCHLORIDE AND LIDOCAINE HYDROCHLORIDE AND ALUMINUM HYDROXIDE AND MAGNESIUM HYDRO 10 MILLILITER(S): KIT at 23:32

## 2024-03-08 RX ADMIN — HEPARIN SODIUM 5000 UNIT(S): 5000 INJECTION INTRAVENOUS; SUBCUTANEOUS at 00:13

## 2024-03-08 RX ADMIN — Medication 81 MILLIGRAM(S): at 11:45

## 2024-03-08 RX ADMIN — Medication 100 MILLIGRAM(S): at 11:45

## 2024-03-08 RX ADMIN — DIPHENHYDRAMINE HYDROCHLORIDE AND LIDOCAINE HYDROCHLORIDE AND ALUMINUM HYDROXIDE AND MAGNESIUM HYDRO 10 MILLILITER(S): KIT at 01:54

## 2024-03-08 RX ADMIN — DIPHENHYDRAMINE HYDROCHLORIDE AND LIDOCAINE HYDROCHLORIDE AND ALUMINUM HYDROXIDE AND MAGNESIUM HYDRO 10 MILLILITER(S): KIT at 05:51

## 2024-03-08 RX ADMIN — Medication 25 MILLIGRAM(S): at 13:30

## 2024-03-08 RX ADMIN — Medication 10 MILLILITER(S): at 13:47

## 2024-03-08 RX ADMIN — Medication 1 SPRAY(S): at 23:33

## 2024-03-08 RX ADMIN — Medication 125 MICROGRAM(S): at 11:45

## 2024-03-08 RX ADMIN — DIPHENHYDRAMINE HYDROCHLORIDE AND LIDOCAINE HYDROCHLORIDE AND ALUMINUM HYDROXIDE AND MAGNESIUM HYDRO 10 MILLILITER(S): KIT at 17:04

## 2024-03-08 RX ADMIN — HEPARIN SODIUM 5000 UNIT(S): 5000 INJECTION INTRAVENOUS; SUBCUTANEOUS at 13:30

## 2024-03-08 NOTE — PROGRESS NOTE ADULT - PROBLEM SELECTOR PLAN 2
Currently off diuretics  RHC from 3/5 not suggestive of low output.  Replete to target K 4-4.5 and Mg 2-2.5  HF following Currently off diuretics  RHC from 3/5 not suggestive of low output.  Replete to target K 4-4.5 and Mg 2-2.5  HF following  Continue on Hydralazine 25 mg PO TID for afterload reduction RHC from 3/5 not suggestive of low output.  Replete to target K 4-4.5 and Mg 2-2.5  HF following  Continue on Hydralazine 25 mg PO TID for afterload reduction  3/8 Bumex 2mg BID

## 2024-03-08 NOTE — PROGRESS NOTE ADULT - SUBJECTIVE AND OBJECTIVE BOX
VITAL SIGNS    Subjective: "I feel good" Denies CP, palpitation, SOB, VANCE, HA, dizziness, N/V/D, fever or chills.  No acute event noted overnight.    Telemetry:  NSR 70s       Vital Signs Last 24 Hrs  T(C): 36.6 (24 @ 05:14), Max: 36.6 (24 @ 05:14)  T(F): 97.8 (24 @ :14), Max: 97.8 (24 @ 05:14)  HR: 70 (24:14) (56 - 82)  BP: 136/73 (24 05:14) (106/65 - 141/77)  RR: 18 (24 @ 05:14) (9 - 36)  SpO2: 97% (24 05:14) (89% - 100%)            @ 07:01  -   @ 07:00  --------------------------------------------------------  IN: 1438.1 mL / OUT: 1935 mL / NET: -496.9 mL     @ 07:01  -   @ 11:45  --------------------------------------------------------  IN: 240 mL / OUT: 0 mL / NET: 240 mL    LABS      140  |  96  |  54<H>  ----------------------------<  136<H>  4.6   |  31  |  2.65<H>    Ca    9.0      08 Mar 2024 01:37  Phos  3.1     -  Mg     2.7         TPro  6.5  /  Alb  3.5  /  TBili  1.0  /  DBili  0.5<H>  /  AST  18  /  ALT  16  /  AlkPhos  108  03-08                                 9.7    15.59 )-----------( 211      ( 08 Mar 2024 01:38 )             28.1          PT/INR - ( 08 Mar 2024 01:37 )   PT: 11.1 sec;   INR: 1.01 ratio         PTT - ( 08 Mar 2024 01:37 )  PTT:27.1 sec        Daily     Daily Weight in k.4 (08 Mar 2024 08:27)      CAPILLARY BLOOD GLUCOSE  POCT Blood Glucose.: 131 mg/dL (08 Mar 2024 07:42)          Drains:   Pericardial [ x ]  Drainage: 15    Pacing Wires x 2        [ x  ]   Settings: VVI 40/10                         Isolated  [  ]    PHYSICAL EXAM      Neurology: alert and oriented x 3, nonfocal, no gross deficits    CV: +s1, s2, no murmurs present    Sternal Wound: MSI --> opsite in place CDI, sternum stable. Pericardial drainage --> to Pleur-evac --> to wall suction. pericardial pacing Wires x 2. VVI 40/10     Lungs: clear lungs sounds b/l     Abdomen: soft, nontender, nondistended, positive bowel sounds, (+) Flatus; (+) BM     : Olivares catheter           Extremities:  B/L LE (+) 1 edema; negative calf tenderness; (+) 2 DP palpable          acetaminophen  Tablet .. 650 milliGRAM(s) Oral every 6 hours PRN  allopurinol 100 milliGRAM(s) Oral daily  aMIOdarone  Tablet   Oral   aMIOdarone  Tablet 200 milliGRAM(s) Oral daily  aspirin enteric coated 81 milliGRAM(s) Oral daily  benzocaine/menthol Lozenge 1 Lozenge Oral four times a day PRN  bisacodyl Suppository 10 milliGRAM(s) Rectal daily PRN  chlorhexidine 2% Cloths 1 Application(s) Topical daily  digoxin     Tablet 125 MICROGram(s) Oral every other day  FIRST- Mouthwash  BLM 10 milliLiter(s) Swish and Swallow every 6 hours  heparin   Injectable 5000 Unit(s) SubCutaneous every 8 hours  hydrALAZINE 25 milliGRAM(s) Oral every 8 hours  levothyroxine 50 MICROGram(s) Oral <User Schedule>  lidocaine  4% Patch 1 Patch Transdermal daily  oxyCODONE   IR 10 milliGRAM(s) Oral every 8 hours PRN  pantoprazole Tablet 40 milliGRAM(s) Oral daily  polyethylene glycol 3350 17 Gram(s) Oral daily  senna 2 Tablet(s) Oral at bedtime  sodium chloride 0.65% Nasal 1 Spray(s) Both Nostrils four times a day    Physical Therapy Rec:   Home  [  ]   Home w/ PT and OT [ x ]  Rehab  [  ]    Discussed with Cardiothoracic Team at AM rounds.     VITAL SIGNS    Subjective: "I feel good" Denies CP, palpitation, SOB, VANCE, HA, dizziness, N/V/D, fever or chills.  No acute event noted overnight.    Telemetry:  NSR 70s       Vital Signs Last 24 Hrs  T(C): 36.6 (24 @ 05:14), Max: 36.6 (24 @ 05:14)  T(F): 97.8 (24 @ :14), Max: 97.8 (24 @ 05:14)  HR: 70 (24:14) (56 - 82)  BP: 136/73 (24 05:14) (106/65 - 141/77)  RR: 18 (24 @ 05:14) (9 - 36)  SpO2: 97% (24 05:14) (89% - 100%)            @ 07:01  -   @ 07:00  --------------------------------------------------------  IN: 1438.1 mL / OUT: 1935 mL / NET: -496.9 mL     @ 07:01  -   @ 11:45  --------------------------------------------------------  IN: 240 mL / OUT: 0 mL / NET: 240 mL    LABS      140  |  96  |  54<H>  ----------------------------<  136<H>  4.6   |  31  |  2.65<H>    Ca    9.0      08 Mar 2024 01:37  Phos  3.1     -  Mg     2.7         TPro  6.5  /  Alb  3.5  /  TBili  1.0  /  DBili  0.5<H>  /  AST  18  /  ALT  16  /  AlkPhos  108  03-08                                 9.7    15.59 )-----------( 211      ( 08 Mar 2024 01:38 )             28.1          PT/INR - ( 08 Mar 2024 01:37 )   PT: 11.1 sec;   INR: 1.01 ratio         PTT - ( 08 Mar 2024 01:37 )  PTT:27.1 sec        Daily     Daily Weight in k.4 (08 Mar 2024 08:27)      CAPILLARY BLOOD GLUCOSE  POCT Blood Glucose.: 131 mg/dL (08 Mar 2024 07:42)          Drains: Pericardial [ x ]  Drainage: 15cc / 24hr    Pacing Wires x 2 [ x  ]   Settings: VVI 40/10                         Isolated  [  ]    PHYSICAL EXAM    Neurology: alert and oriented x 3, nonfocal, no gross deficits    CV: +s1, s2, no murmurs present    Sternal Wound: MSI --> opsite in place moderate sanguinous drainage noted on the distal dressing site. Sternum stable. Pericardial drainage --> to Pleur-evac --> to wall suction. Pericardial pacing Wires x 2. VVI 40/10     Lungs: clear lungs sounds b/l     Abdomen: soft, nontender, nondistended, positive bowel sounds, (+) Flatus; (+) BM     : Olivares catheter           Extremities:  B/L LE (+) 1 edema; negative calf tenderness; (+) 2 DP palpable        acetaminophen  Tablet .. 650 milliGRAM(s) Oral every 6 hours PRN  allopurinol 100 milliGRAM(s) Oral daily  aMIOdarone  Tablet   Oral   aMIOdarone  Tablet 200 milliGRAM(s) Oral daily  aspirin enteric coated 81 milliGRAM(s) Oral daily  benzocaine/menthol Lozenge 1 Lozenge Oral four times a day PRN  bisacodyl Suppository 10 milliGRAM(s) Rectal daily PRN  chlorhexidine 2% Cloths 1 Application(s) Topical daily  digoxin  Tablet 125 MICROGram(s) Oral every other day  FIRST- Mouthwash  BLM 10 milliLiter(s) Swish and Swallow every 6 hours  heparin Injectable 5000 Unit(s) SubCutaneous every 8 hours  hydrALAZINE 25 milliGRAM(s) Oral every 8 hours  levothyroxine 50 MICROGram(s) Oral <User Schedule>  lidocaine  4% Patch 1 Patch Transdermal daily  oxyCODONE   IR 10 milliGRAM(s) Oral every 8 hours PRN  pantoprazole Tablet 40 milliGRAM(s) Oral daily  polyethylene glycol 3350 17 Gram(s) Oral daily  senna 2 Tablet(s) Oral at bedtime  sodium chloride 0.65% Nasal 1 Spray(s) Both Nostrils four times a day    Physical Therapy Rec:   Home  [  ]   Home w/ PT and OT [ x ]  Rehab  [  ]    Discussed with Cardiothoracic Team at AM rounds.     VITAL SIGNS    Subjective: "I feel good" Denies CP, palpitation, SOB, VANCE, HA, dizziness, N/V/D, fever or chills.  No acute event noted overnight.    Telemetry:  NSR 70s       Vital Signs Last 24 Hrs  T(C): 36.6 (24 @ 05:14), Max: 36.6 (24 @ 05:14)  T(F): 97.8 (24 @ :14), Max: 97.8 (24 @ 05:14)  HR: 70 (24:14) (56 - 82)  BP: 136/73 (24 05:14) (106/65 - 141/77)  RR: 18 (24 @ 05:14) (9 - 36)  SpO2: 97% (24 05:14) (89% - 100%)            @ 07:01  -   @ 07:00  --------------------------------------------------------  IN: 1438.1 mL / OUT: 1935 mL / NET: -496.9 mL     @ 07:01  -   @ 11:45  --------------------------------------------------------  IN: 240 mL / OUT: 0 mL / NET: 240 mL    LABS      140  |  96  |  54<H>  ----------------------------<  136<H>  4.6   |  31  |  2.65<H>    Ca    9.0      08 Mar 2024 01:37  Phos  3.1     -  Mg     2.7         TPro  6.5  /  Alb  3.5  /  TBili  1.0  /  DBili  0.5<H>  /  AST  18  /  ALT  16  /  AlkPhos  108  03-08                                 9.7    15.59 )-----------( 211      ( 08 Mar 2024 01:38 )             28.1          PT/INR - ( 08 Mar 2024 01:37 )   PT: 11.1 sec;   INR: 1.01 ratio         PTT - ( 08 Mar 2024 01:37 )  PTT:27.1 sec        Daily     Daily Weight in k.4 (08 Mar 2024 08:27)      CAPILLARY BLOOD GLUCOSE  POCT Blood Glucose.: 131 mg/dL (08 Mar 2024 07:42)          Drains: Pericardial [ x ]  Drainage: 15cc / 24hr    Pacing Wires x 2 [ x  ]   Settings: VVI 40/10                         Isolated  [  ]    PHYSICAL EXAM    Neurology: alert and oriented x 3, nonfocal, no gross deficits    CV: +s1, s2, no murmurs present    Sternal Wound: MSI --> opsite in place moderate sanguinous drainage noted on the distal dressing site. Sternum stable. Pericardial drainage --> to Pleur-evac --> to wall suction. Pericardial pacing Wires x 2. VVI 40/10     Lungs: clear lungs sounds b/l     Abdomen: soft, nontender, nondistended, positive bowel sounds, (+) Flatus; (+) BM     : Olivares catheter           Extremities:  B/L LE (+) 1 edema; negative calf tenderness; (+) 2 DP palpable        acetaminophen  Tablet .. 650 milliGRAM(s) Oral every 6 hours PRN  allopurinol 100 milliGRAM(s) Oral daily  aMIOdarone  Tablet   Oral   aMIOdarone  Tablet 200 milliGRAM(s) Oral daily  aspirin enteric coated 81 milliGRAM(s) Oral daily  benzocaine/menthol Lozenge 1 Lozenge Oral four times a day PRN  bisacodyl Suppository 10 milliGRAM(s) Rectal daily PRN  chlorhexidine 2% Cloths 1 Application(s) Topical daily  digoxin Tablet 125 MICROGram(s) Oral every other day  FIRST- Mouthwash  BLM 10 milliLiter(s) Swish and Swallow every 6 hours  heparin Injectable 5000 Unit(s) SubCutaneous every 8 hours  hydrALAZINE 25 milliGRAM(s) Oral every 8 hours  levothyroxine 50 MICROGram(s) Oral <User Schedule>  lidocaine  4% Patch 1 Patch Transdermal daily  oxyCODONE   IR 10 milliGRAM(s) Oral every 8 hours PRN  pantoprazole Tablet 40 milliGRAM(s) Oral daily  polyethylene glycol 3350 17 Gram(s) Oral daily  senna 2 Tablet(s) Oral at bedtime  sodium chloride 0.65% Nasal 1 Spray(s) Both Nostrils four times a day    Physical Therapy Rec:   Home  [  ]   Home w/ PT and OT [ x ]  Rehab  [  ]    Discussed with Cardiothoracic Team at AM rounds.     VITAL SIGNS    Subjective: "I feel good" Denies CP, palpitation, SOB, VANCE, HA, dizziness, N/V/D, fever or chills.  No acute event noted overnight.    Telemetry:  NSR 70s       Vital Signs Last 24 Hrs  T(C): 36.6 (24 @ 05:14), Max: 36.6 (24 @ 05:14)  T(F): 97.8 (24 @ :14), Max: 97.8 (24 @ 05:14)  HR: 70 (24:14) (56 - 82)  BP: 136/73 (24 05:14) (106/65 - 141/77)  RR: 18 (24 @ 05:14) (9 - 36)  SpO2: 97% (24 05:14) (89% - 100%)            @ 07:01  -   @ 07:00  --------------------------------------------------------  IN: 1438.1 mL / OUT: 1935 mL / NET: -496.9 mL     @ 07:01  -   @ 11:45  --------------------------------------------------------  IN: 240 mL / OUT: 0 mL / NET: 240 mL    LABS      140  |  96  |  54<H>  ----------------------------<  136<H>  4.6   |  31  |  2.65<H>    Ca    9.0      08 Mar 2024 01:37  Phos  3.1     -  Mg     2.7         TPro  6.5  /  Alb  3.5  /  TBili  1.0  /  DBili  0.5<H>  /  AST  18  /  ALT  16  /  AlkPhos  108  03-08                                 9.7    15.59 )-----------( 211      ( 08 Mar 2024 01:38 )             28.1          PT/INR - ( 08 Mar 2024 01:37 )   PT: 11.1 sec;   INR: 1.01 ratio         PTT - ( 08 Mar 2024 01:37 )  PTT:27.1 sec        Daily     Daily Weight in k.4 (08 Mar 2024 08:27)      CAPILLARY BLOOD GLUCOSE  POCT Blood Glucose.: 131 mg/dL (08 Mar 2024 07:42)          Drains: Pericardial [ x ]  Drainage: 15cc / 24hr    Pacing Wires x 2 [ x  ]   Settings: VVI 40/10                            PHYSICAL EXAM    Neurology: alert and oriented x 3, nonfocal, no gross deficits    CV: +s1, s2, no murmurs present    Sternal Wound: MSI --> opsite in place moderate sanguinous drainage noted on the distal dressing site. Sternum stable. Pericardial drainage --> to Pleur-evac --> to wall suction. Pericardial pacing Wires x 2. VVI 40/10     Lungs: clear lungs sounds b/l     Abdomen: soft, nontender, nondistended, positive bowel sounds, (+) Flatus; (+) BM     : Olivares catheter           Extremities:  B/L LE (+) 1 edema; negative calf tenderness; (+) 2 DP palpable; B/L LE with jeri dressing.         acetaminophen  Tablet .. 650 milliGRAM(s) Oral every 6 hours PRN  allopurinol 100 milliGRAM(s) Oral daily  aMIOdarone  Tablet   Oral   aMIOdarone  Tablet 200 milliGRAM(s) Oral daily  aspirin enteric coated 81 milliGRAM(s) Oral daily  benzocaine/menthol Lozenge 1 Lozenge Oral four times a day PRN  bisacodyl Suppository 10 milliGRAM(s) Rectal daily PRN  chlorhexidine 2% Cloths 1 Application(s) Topical daily  digoxin Tablet 125 MICROGram(s) Oral every other day  FIRST- Mouthwash  BLM 10 milliLiter(s) Swish and Swallow every 6 hours  heparin Injectable 5000 Unit(s) SubCutaneous every 8 hours  hydrALAZINE 25 milliGRAM(s) Oral every 8 hours  levothyroxine 50 MICROGram(s) Oral <User Schedule>  lidocaine  4% Patch 1 Patch Transdermal daily  oxyCODONE   IR 10 milliGRAM(s) Oral every 8 hours PRN  pantoprazole Tablet 40 milliGRAM(s) Oral daily  polyethylene glycol 3350 17 Gram(s) Oral daily  senna 2 Tablet(s) Oral at bedtime  sodium chloride 0.65% Nasal 1 Spray(s) Both Nostrils four times a day    Physical Therapy Rec:   Home  [  ]   Home w/ PT and OT [ x ]  Rehab  [  ]    Discussed with Cardiothoracic Team at AM rounds.

## 2024-03-08 NOTE — PROGRESS NOTE ADULT - ASSESSMENT
Briefly, 64 y/o M w/ h/o HTN, severe AS, chronic venous stasis, HFpEF who p/w worsening LE swelling to GC 1/25 and found to be in decompensated heart failure with EF 35-40% and severe AS so transferred for further management on 2/5. Was diuresed and underwent LHC which was non-obstructive. Underwent bioAVR 2/23 w/ JOANNA clip complicated by bleeding. Post operative course complicated by volume expansion and pAF for which he was started on Amiodarone and Digoxin. Also became bacteremic, BCx 2/29 growing Serratia, repeat BCx from 3/1 still growing Serratia, being treated with IV Cefepime.  weaned to off on 3/1.     Developed worsening LINDA and ongoing volume expansion prompting RHC 3/5 revealing elevated filling pressures but adequate CI of 2.2 (Mvo2 45%). Resumed on  by CTSX and diuretics escalated. TTE 3/6 showed increasing pericardial effusion adjacent to the right heart with thrombus and RV compression. He's now s/p OR for pericardial window and drain 3/6 progressing well. His mental status has improved. He diuresed well with 5L UOP over the past 24 hours, net negative 3.7L on bumex gtt. His BUN/Cr are improving. He's normotensive with room for further afterload reduction.     Cardiac Studies  ·	RHC 3/5/24: RA 15, PA 52/23/32, PCWP 23, AO 94%, PA 45%, Hgb 8.8, CO/CI (F) 4.5/2.2, PVR 1.75 davis  ·	TTE 2/26/24: LVIDd 6 cm, LVEF 30% (global), mod RVE with mod reduced function (TAPSE 0.9), severe ARAVIND, mild TR, est PASP 25 mmHg, trace pericardial effusion, IVC normal in size Briefly, 64 y/o M w/ h/o HTN, severe AS, chronic venous stasis, HFpEF who p/w worsening LE swelling to GC 1/25 and found to be in decompensated heart failure with EF 35-40% and severe AS so transferred for further management on 2/5. Was diuresed and underwent LHC which was non-obstructive. Underwent bioAVR 2/23 w/ JOANNA clip complicated by bleeding. Post operative course complicated by volume expansion and pAF for which he was started on Amiodarone and Digoxin. Also became bacteremic, BCx 2/29 growing Serratia, repeat BCx from 3/1 still growing Serratia, being treated with IV Cefepime.  weaned to off on 3/1.     Developed worsening LINDA and ongoing volume expansion prompting RHC 3/5 revealing elevated filling pressures but adequate CI of 2.2 (Mvo2 45%). Resumed on  by CTSX and diuretics escalated. TTE 3/6 revealed pericardial effusion with signs of tamponade prompting pericardial window with drain placement.     Clinically improved, weaned off  3/7. Appears near euvolemic on exam with normal lactate and preserved LFTs. Has ongoing LINDA but Cr seems to have plateaued. Will start an oral maintenance diuretic regimen and optimize HF medical therapies.    Cardiac Studies  ·	RHC 3/5/24: RA 15, PA 52/23/32, PCWP 23, AO 94%, PA 45%, Hgb 8.8, CO/CI (F) 4.5/2.2, PVR 1.75 davis  ·	TTE 2/26/24: LVIDd 6 cm, LVEF 30% (global), mod RVE with mod reduced function (TAPSE 0.9), severe ARAVIND, mild TR, est PASP 25 mmHg, trace pericardial effusion, IVC normal in size

## 2024-03-08 NOTE — PROGRESS NOTE ADULT - PROBLEM SELECTOR PLAN 6
Left heel and bilateral hallux fissures    Podiatry following   Recommending z flow boots in bed at all times  Continue with Mupirocin to bilateral hallux and left heel fissures, allevyn foam to left heel, change daily  Continue moisturizing feet twice daily

## 2024-03-08 NOTE — PROGRESS NOTE ADULT - SUBJECTIVE AND OBJECTIVE BOX
Follow Up:  fever    Interval History/ROS: s/p pericardial window now transferred do floor, denied SOB or chest pain      Allergies  garlic (Angioedema; Swelling; Anaphylaxis)  No Known Drug Allergies        ANTIMICROBIALS:      OTHER MEDS:  acetaminophen     Tablet .. 650 milliGRAM(s) Oral every 6 hours PRN  allopurinol 100 milliGRAM(s) Oral daily  aMIOdarone    Tablet   Oral   aMIOdarone    Tablet 200 milliGRAM(s) Oral daily  aspirin enteric coated 81 milliGRAM(s) Oral daily  benzocaine/menthol Lozenge 1 Lozenge Oral four times a day PRN  bisacodyl Suppository 10 milliGRAM(s) Rectal daily PRN  buMETAnide 2 milliGRAM(s) Oral two times a day  chlorhexidine 2% Cloths 1 Application(s) Topical daily  digoxin     Tablet 125 MICROGram(s) Oral every other day  FIRST- Mouthwash  BLM 10 milliLiter(s) Swish and Swallow every 6 hours  heparin   Injectable 5000 Unit(s) SubCutaneous every 8 hours  hydrALAZINE 25 milliGRAM(s) Oral every 8 hours  levothyroxine 50 MICROGram(s) Oral <User Schedule>  lidocaine   4% Patch 1 Patch Transdermal daily  mupirocin 2% Ointment 1 Application(s) Topical <User Schedule>  oxyCODONE    IR 10 milliGRAM(s) Oral every 8 hours PRN  pantoprazole    Tablet 40 milliGRAM(s) Oral daily  polyethylene glycol 3350 17 Gram(s) Oral daily  senna 2 Tablet(s) Oral at bedtime  sodium chloride 0.65% Nasal 1 Spray(s) Both Nostrils four times a day      Vital Signs Last 24 Hrs  T(C): 36.6 (08 Mar 2024 05:14), Max: 36.6 (08 Mar 2024 05:14)  T(F): 97.8 (08 Mar 2024 05:14), Max: 97.8 (08 Mar 2024 05:14)  HR: 70 (08 Mar 2024 05:14) (56 - 82)  BP: 136/73 (08 Mar 2024 05:14) (107/60 - 141/77)  BP(mean): 88 (08 Mar 2024 01:30) (78 - 100)  RR: 18 (08 Mar 2024 05:14) (9 - 36)  SpO2: 97% (08 Mar 2024 05:14) (89% - 100%)    Parameters below as of 08 Mar 2024 05:14  Patient On (Oxygen Delivery Method): room air        Physical Exam:  General:    NAD  Cardio:    sternal incision with dressing  Respiratory:    clear, on RA, chest tube  abd:     soft,    no tenderness  :   + garcia  Musculoskeletal:   no joint swelling  Skin:    no rash, b/l superficial LE ulcers, no purulence, no leg tenderness                          9.7    15.59 )-----------( 211      ( 08 Mar 2024 01:38 )             28.1       03-08    140  |  96  |  54<H>  ----------------------------<  136<H>  4.6   |  31  |  2.65<H>    Ca    9.0      08 Mar 2024 01:37  Phos  3.1     03-08  Mg     2.7     03-08    TPro  6.5  /  Alb  3.5  /  TBili  1.0  /  DBili  0.5<H>  /  AST  18  /  ALT  16  /  AlkPhos  108  03-08      Urinalysis Basic - ( 08 Mar 2024 01:37 )    Color: x / Appearance: x / SG: x / pH: x  Gluc: 136 mg/dL / Ketone: x  / Bili: x / Urobili: x   Blood: x / Protein: x / Nitrite: x   Leuk Esterase: x / RBC: x / WBC x   Sq Epi: x / Non Sq Epi: x / Bacteria: x        MICROBIOLOGY:  v  Wound Wound  03-06-24   No growth to date.  --  --      .Blood Blood-Venous  03-05-24   No growth at 48 Hours  --  --      .Blood Blood  03-05-24   No growth at 48 Hours  --  --      .Blood Blood-Peripheral  03-05-24   No growth at 72 Hours  --  --      .Blood Blood-Peripheral  03-01-24   Growth in aerobic and anaerobic bottles: Serratia marcescens  See previous culture [id number]  --    Growth in aerobic bottle: Gram Negative Rods  Growth in anaerobic bottle: Gram Negative Rods      .Blood Blood  02-29-24   Growth in aerobic and anaerobic bottles: Serratia marcescens  See previous culture [id number]  --    Growth in aerobic bottle: Gram Negative Rods  Growth in anaerobic bottle: Gram Negative Rods      .Blood Blood  02-29-24   Growth in aerobic and anaerobic bottles: Serratia marcescens  Direct identification is available within approximately 3-5  hours either by Blood Panel Multiplexed PCR or Direct  MALDI-TOF. Details: [email or web address]  --  Blood Culture PCR  Serratia marcescens                RADIOLOGY:  Images independently visualized and reviewed personally, findings as below  < from: Xray Chest 1 View- PORTABLE-Routine (Xray Chest 1 View- PORTABLE-Routine in AM.) (03.07.24 @ 03:09) >  IMPRESSION:  New right mid and lower lung subsegmental atelectasis.    New left perihilar opacities of indeterminate nature. Central pulmonary   edema, atelectasis, or developing pneumonia are possible.    Continued left lower/retrocardiac opacity which could be due to a left   pleural effusion with associated passive atelectasis, atelectasis of   other cause, and/or pneumonia.    < end of copied text >  < from: CT Chest No Cont (03.06.24 @ 10:14) >  IMPRESSION:    There is a 3.2 cm retrosternal hematoma with a focus of air.    Large circumferential pericardial effusion measuring slightly greater   than simple fluid, likely due to blood products.    < end of copied text >  < from: TTE W or WO Ultrasound Enhancing Agent (03.06.24 @ 07:55) >  CONCLUSIONS:      1. Left ventricular systolic function is probably normal.   2. A large circumferential pericardial effusion is present. Adjacent to the right heart, the effusion appears organized, with an appearance characteristic of thrombus. The right ventricle is compressed throughout the cardiac cycle. A loculated effusion appears to be adjacent to the right atrium.   3. Compared to the transthoracic echocardiogram performed on 2/26/2024, The effusion is larger. Findingswere discussed with Dr. LOIDA Martinez on 3/6/2024 at 8:31 AM.      < end of copied text >

## 2024-03-08 NOTE — PROGRESS NOTE ADULT - PROBLEM SELECTOR PLAN 5
Wound consult following recommending BLE wound Medihoney daily   Continue turning and positioning w/ offloading assistive devices as per protocol  Continue w/ attends under pads and Pericare as per protocol  Waffle Cushion to chair when oob to chair  Continue w/ low air loss pressure redistribution bed surface   Upon discharge f/u as outpatient at [facility]ound [facility] [address] [facility] [phone]

## 2024-03-08 NOTE — PROGRESS NOTE ADULT - ASSESSMENT
63M, appears older than stated age, admitted to [facility] 24 PMHx HTN, GERD, HPL, chronic venous stasis dermatitis with bilateral leg swelling, and AS presented to [facility] on 2024 for bilateral leg pain and swelling found to have new acute HFrEF (EF 35-40% 2024) in setting of severe AS.     Hospital course:  24: Admitted to [place] new onset HFrEF EF 35% and anemia 2/2 AS,   24: medically stable for transferred to [facility] medicine service for continued care. during hospitalization hx of WCT/ PAF  Consults for Nephrology, HF, ID, Podiatry for medical optimization  2/15: 1 U PRBC, keep Hgb > 8.    s/p fall with head strike   : s/p Liver biopsy, soft pressure post procedure--responded to fluid bolus  :  RLE doppler: No pseudoaneurysm in the right groin/ bilobed avascular fluid collection measuring 2.8 x 1.7 x 2.0 cm  : AVR/JOANNA Clip c/b intraop bleeding requirinu pRBCs on CPB 1u pRBCs post-CPB 1 PLT 1000 FEIBA  Prolene suture placed around IJ and V wire    Post-Op Course:  : To Step Down, raegan, CT, +PW, marlena RIJ   Maintain mediastinal chest tubes Primicor discontinued. Lasix 40 BID initiated , Wound care to chronic venous stasis b/l LE. D/c RIJ   VSS - 9 hrs afib last evening- SR since 1:30am- on AMio gtt,  gtt - s/p right axillary marlena placed by Intensivist. garcia placed last evening lasix 40 iv x 1 given this am- echo tr jon eff.   VSS - currently on lasix gtt - negative 2L in last 24 hrs. bid basic metabolic pt converted back into afib this am 100-120- currently on amio load - dobutrex gtt. d/c planning    d/c yesterday then resumed for decreased UO.  Remains a fib since yesterday, cont amio/heparin, eps following         Weight increased, lasix 80 iv x 1, then infusion increased , aldactone added .  Remains on heparin infusion.          Hydral added for afterload    NSR, on amio, EPS following, noted elevated tsh.  Repeat sent synthroid started.  Fluid overload, diuretics increased, 3% saline x 2, lasix 80iv x 1, lasix infusion 20mg/hr  3/1 febrile  overnight  and this am pan cx. Plan to remove marlena, intro , garcia.  A line is from , intro . Cont , lasix, f/u bun creat  3/2 VSS afebrile overnight.  97 r/s.  on lasix gtt @ 20mg/jr  negative 2366cc.  will monitor closely in sdu  3/3 Bumex gtt decreased to 1mg/hr per HF - @8:45am pt converted to afib -100-130s w/ hypotension while on commode attempting to have a bm- pt asymptomatic. brought back to bed.  amio 150 IV x 1 given w/ filter. dig load per HF - Dr. Rosario - .75mg total d/t sin.  pt u/o decreased while in afib- DR. Rosario @ bedside CXR done.  spoke to EP - Dr. Ghotra regarding DCCV - Call to CTU to set up transfer for DCCV - transfer cancelled d/t pt. converted to SR 68- w/ increase in SBP to 102 & U/O increasing.  ENT consulted for c/o "hurts when i swallow"  ent exam rveals no rod.  lido s&s ordered.  pt eating w/o difficulty  3/4 Pt with edema not improved, elevated creat.  Bumex d/c. Placed on iv lasix, aldactone d/c.  Albumin x 2  3/5  Wt stable , remains off diuretic + edema, creat up yesterday. RHC today  Tx To CTU for closer monitoring as CKD worsening  3/6 still not diuresing despite Bumex gtt, TTE revealed pericardial effusion to OR for subxyphoid drainage and pericardial window   3/7 weaned off inotropes, improved diuresis to 5 L  3/8 Tx to Fl VSS, MCT, Garcia 2V EPW to box  40/10  VSS - Pericardial drainage to remain in place, Pacing Wires x 2 to remain in place. Garcia catheter to remain in place, creatinine 2.65. No A/C per Dr. Martinez.       63M, appears older than stated age, admitted to [facility] 24 PMHx HTN, GERD, HPL, chronic venous stasis dermatitis with bilateral leg swelling, and AS presented to [facility] on 2024 for bilateral leg pain and swelling found to have new acute HFrEF (EF 35-40% 2024) in setting of severe AS.     Hospital course:  24: Admitted to [place] new onset HFrEF EF 35% and anemia 2/2 AS,   24: medically stable for transferred to [facility] medicine service for continued care. during hospitalization hx of WCT/ PAF  Consults for Nephrology, HF, ID, Podiatry for medical optimization  2/15: 1 U PRBC, keep Hgb > 8.    s/p fall with head strike   : s/p Liver biopsy, soft pressure post procedure--responded to fluid bolus  :  RLE doppler: No pseudoaneurysm in the right groin/ bilobed avascular fluid collection measuring 2.8 x 1.7 x 2.0 cm  : AVR/JOANNA Clip c/b intraop bleeding requirinu pRBCs on CPB 1u pRBCs post-CPB 1 PLT 1000 FEIBA  Prolene suture placed around IJ and V wire    Post-Op Course:  : To Step Down, raegan, CT, +PW, marlena RIJ   Maintain mediastinal chest tubes Primicor discontinued. Lasix 40 BID initiated , Wound care to chronic venous stasis b/l LE. D/c RIJ   VSS - 9 hrs afib last evening- SR since 1:30am- on AMio gtt,  gtt - s/p right axillary marlena placed by Intensivist. garcia placed last evening lasix 40 iv x 1 given this am- echo tr jon eff.   VSS - currently on lasix gtt - negative 2L in last 24 hrs. bid basic metabolic pt converted back into afib this am 100-120- currently on amio load - Dobutrex gtt. d/c planning    d/c yesterday then resumed for decreased UO.  Remains a fib since yesterday, cont amio/heparin, eps following. Weight increased, lasix 80 iv x 1, then infusion increased , aldactone added .  Remains on heparin infusion. Hydralazine added for afterload    NSR, on amio, EPS following, noted elevated tsh.  Repeat sent synthroid started.  Fluid overload, diuretics increased, 3% saline x 2, lasix 80 iv x 1, lasix infusion 20mg/hr  3/1 febrile  overnight  and this am pan cx. Plan to remove marlena, intro , garcia.  A line is from , intro . Cont , lasix, f/u bun creat  3/2 VSS afebrile overnight.  97 r/s.  on lasix gtt @ 20mg/jr  negative 2366cc.  will monitor closely in sdu  3/3 Bumex gtt decreased to 1mg/hr per HF - @8:45am pt converted to afib -100-130s w/ hypotension while on commode attempting to have a bm- pt asymptomatic. brought back to bed.  amio 150 IV x 1 given w/ filter. dig load per HF - Dr. Rosario - .75mg total d/t sin.  pt u/o decreased while in afib- DR. Rosario @ bedside CXR done.  spoke to EP - Dr. Ghotra regarding DCCV - Call to CTU to set up transfer for DCCV - transfer cancelled d/t pt. converted to SR 68- w/ increase in SBP to 102 & U/O increasing.  ENT consulted for c/o "hurts when i swallow"  ent exam reveals no rod.  lido s&s ordered.  pt eating w/o difficulty  3/4 Pt with edema not improved, elevated creat.  Bumex d/c. Placed on iv Lasix aldactone d/c.  Albumin x 2  3/5 Wt stable, remains off diuretic + edema, creat up yesterday. RHC today  Tx To CTU for closer monitoring as CKD worsening  3/6 still not diuresing despite Bumex gtt, TTE revealed pericardial effusion returned to OR s/p subxiphoid drainage and pericardial window.    3/7 weaned off inotropes, improved diuresis to 5L  3/8 Tx to Fl VSS, MCT, Garcia 2V EPW to box  40/10  VSS - Pericardial drainage to remain in place, Pacing Wires x 2 to remain in place. Garcia catheter to remain in place, creatinine 2.65. No A/C per Dr. Martinez.    Disposition:      63M, appears older than stated age, admitted to [facility] 24 PMHx HTN, GERD, HPL, chronic venous stasis dermatitis with bilateral leg swelling, and AS presented to [facility] on 2024 for bilateral leg pain and swelling found to have new acute HFrEF (EF 35-40% 2024) in setting of severe AS.     Hospital course:  24: Admitted to [place] new onset HFrEF EF 35% and anemia 2/2 AS,   24: medically stable for transferred to [facility] medicine service for continued care. during hospitalization hx of WCT/ PAF  Consults for Nephrology, HF, ID, Podiatry for medical optimization  2/15: 1 U PRBC, keep Hgb > 8.    s/p fall with head strike   : s/p Liver biopsy, soft pressure post procedure--responded to fluid bolus  :  RLE doppler: No pseudoaneurysm in the right groin/ bilobed avascular fluid collection measuring 2.8 x 1.7 x 2.0 cm  : AVR/JOANNA Clip c/b intraop bleeding requirinu pRBCs on CPB 1u pRBCs post-CPB 1 PLT 1000 FEIBA  Prolene suture placed around IJ and V wire    Post-Op Course:  : To Step Down, raegan, CT, +PW, marlena RIJ   Maintain mediastinal chest tubes Primicor discontinued. Lasix 40 BID initiated , Wound care to chronic venous stasis b/l LE. D/c RIJ   VSS - 9 hrs afib last evening- SR since 1:30am- on AMio gtt,  gtt - s/p right axillary marlena placed by Intensivist. garcia placed last evening lasix 40 iv x 1 given this am- echo tr jon eff.   VSS - currently on lasix gtt - negative 2L in last 24 hrs. bid basic metabolic pt converted back into afib this am 100-120- currently on amio load - Dobutrex gtt. d/c planning    d/c yesterday then resumed for decreased UO.  Remains a fib since yesterday, cont amio/heparin, eps following. Weight increased, lasix 80 iv x 1, then infusion increased , aldactone added .  Remains on heparin infusion. Hydralazine added for afterload    NSR, on amio, EPS following, noted elevated tsh.  Repeat sent synthroid started.  Fluid overload, diuretics increased, 3% saline x 2, lasix 80 iv x 1, lasix infusion 20mg/hr  3/1 febrile  overnight  and this am pan cx. Plan to remove marlena, intro , garcia.  A line is from , intro . Cont , lasix, f/u bun creat  3/2 VSS afebrile overnight.  97 r/s.  on lasix gtt @ 20mg/jr  negative 2366cc.  will monitor closely in sdu  3/3 Bumex gtt decreased to 1mg/hr per HF - @8:45am pt converted to afib -100-130s w/ hypotension while on commode attempting to have a bm- pt asymptomatic. brought back to bed.  amio 150 IV x 1 given w/ filter. dig load per HF - Dr. Rosario - .75mg total d/t sin.  pt u/o decreased while in afib- DR. Rosario @ bedside CXR done.  spoke to EP - Dr. Ghotra regarding DCCV - Call to CTU to set up transfer for DCCV - transfer cancelled d/t pt. converted to SR 68- w/ increase in SBP to 102 & U/O increasing.  ENT consulted for c/o "hurts when i swallow"  ent exam reveals no rdo.  lido s&s ordered.  pt eating w/o difficulty  3/4 Pt with edema not improved, elevated creat.  Bumex d/c. Placed on iv Lasix aldactone d/c.  Albumin x 2  3/5 Wt stable, remains off diuretic + edema, creat up yesterday. RHC today  Tx To CTU for closer monitoring as CKD worsening  3/6 still not diuresing despite Bumex gtt, TTE revealed pericardial effusion returned to OR s/p subxiphoid drainage and pericardial window.    3/7 weaned off inotropes, improved diuresis to 5L  3/8 Tx to Fl VSS, MCT, Garcia 2V EPW to box  40/10  VSS - Pericardial drainage to remain in place, Pacing Wires x 2 to remain in place. Garcia catheter to remain in place, creatinine 2.65. No A/C per Dr. Martinez.    Disposition: Home PT with rolling walker once medically cleared.      63M, appears older than stated age, admitted to [facility] 24 PMHx HTN, GERD, HPL, chronic venous stasis dermatitis with bilateral leg swelling, and AS presented to [facility] on 2024 for bilateral leg pain and swelling found to have new acute HFrEF (EF 35-40% 2024) in setting of severe AS.     Hospital course:  24: Admitted to [place] new onset HFrEF EF 35% and anemia 2/2 AS,   24: medically stable for transferred to [facility] medicine service for continued care. during hospitalization hx of WCT/ PAF  Consults for Nephrology, HF, ID, Podiatry for medical optimization  2/15: 1 U PRBC, keep Hgb > 8.    s/p fall with head strike   : s/p Liver biopsy, soft pressure post procedure--responded to fluid bolus  :  RLE doppler: No pseudoaneurysm in the right groin/ bilobed avascular fluid collection measuring 2.8 x 1.7 x 2.0 cm  : AVR/JOANNA Clip c/b intraop bleeding requirinu pRBCs on CPB 1u pRBCs post-CPB 1 PLT 1000 FEIBA  Prolene suture placed around IJ and V wire    Post-Op Course:  : To Step Down, raegan, CT, +PW, marlena RIJ   Maintain mediastinal chest tubes Primicor discontinued. Lasix 40 BID initiated , Wound care to chronic venous stasis b/l LE. D/c RIJ   VSS - 9 hrs afib last evening- SR since 1:30am- on AMio gtt,  gtt - s/p right axillary marlena placed by Intensivist. garcia placed last evening lasix 40 iv x 1 given this am- echo tr jon eff.   VSS - currently on lasix gtt - negative 2L in last 24 hrs. bid basic metabolic pt converted back into afib this am 100-120- currently on amio load - Dobutrex gtt. d/c planning    d/c yesterday then resumed for decreased UO.  Remains a fib since yesterday, cont amio/heparin, eps following. Weight increased, lasix 80 iv x 1, then infusion increased , aldactone added .  Remains on heparin infusion. Hydralazine added for afterload    NSR, on amio, EPS following, noted elevated tsh.  Repeat sent synthroid started.  Fluid overload, diuretics increased, 3% saline x 2, lasix 80 iv x 1, lasix infusion 20mg/hr  3/1 febrile  overnight  and this am pan cx. Plan to remove marlena, intro , garcia.  A line is from , intro . Cont , lasix, f/u bun creat  3/2 VSS afebrile overnight.  97 r/s.  on lasix gtt @ 20mg/jr  negative 2366cc.  will monitor closely in sdu  3/3 Bumex gtt decreased to 1mg/hr per HF - @8:45am pt converted to afib -100-130s w/ hypotension while on commode attempting to have a bm- pt asymptomatic. brought back to bed.  amio 150 IV x 1 given w/ filter. dig load per HF - Dr. Rosario - .75mg total d/t sin.  pt u/o decreased while in afib- DR. Rosario @ bedside CXR done.  spoke to EP - Dr. Ghotra regarding DCCV - Call to CTU to set up transfer for DCCV - transfer cancelled d/t pt. converted to SR 68- w/ increase in SBP to 102 & U/O increasing.  ENT consulted for c/o "hurts when i swallow"  ent exam reveals no rod.  lido s&s ordered.  pt eating w/o difficulty  3/4 Pt with edema not improved, elevated creat.  Bumex d/c. Placed on iv Lasix aldactone d/c.  Albumin x 2  3/5 Wt stable, remains off diuretic + edema, creat up yesterday. RHC today  Tx To CTU for closer monitoring as CKD worsening  3/6 still not diuresing despite Bumex gtt, TTE revealed pericardial effusion returned to OR s/p subxiphoid drainage and pericardial window.    3/7 weaned off inotropes, improved diuresis to 5L  3/8 Tx to Fl VSS, MCT, Garcia 2V EPW to box  40/10  VSS - Pericardial drainage to remain in place, Pacing Wires x 2 to remain in place. Garcia catheter to remain in place, creatinine 2.65. 2mg Bumex BID per HF. No A/C per Dr. Martinez.    Disposition: Home PT with rolling walker once medically cleared.      63M, appears older than stated age, admitted to [facility] 24 PMHx HTN, GERD, HPL, chronic venous stasis dermatitis with bilateral leg swelling, and AS presented to [facility] on 2024 for bilateral leg pain and swelling found to have new acute HFrEF (EF 35-40% 2024) in setting of severe AS.     Hospital course:  24: Admitted to [place] new onset HFrEF EF 35% and anemia 2/2 AS,   24: medically stable for transferred to [facility] medicine service for continued care. during hospitalization hx of WCT/ PAF  Consults for Nephrology, HF, ID, Podiatry for medical optimization  2/15: 1 U PRBC, keep Hgb > 8.    s/p fall with head strike   : s/p Liver biopsy, soft pressure post procedure--responded to fluid bolus  :  RLE doppler: No pseudoaneurysm in the right groin/ bilobed avascular fluid collection measuring 2.8 x 1.7 x 2.0 cm  : AVR/JOANNA Clip c/b intraop bleeding requirinu pRBCs on CPB 1u pRBCs post-CPB 1 PLT 1000 FEIBA  Prolene suture placed around IJ and V wire  Post-Op Course:  : To Step Down, raegan, CT, +PW, marlena RIJ   Maintain mediastinal chest tubes Primicor discontinued. Lasix 40 BID initiated , Wound care to chronic venous stasis b/l LE. D/c RIJ   VSS - 9 hrs afib last evening- SR since 1:30am- on AMio gtt,  gtt - s/p right axillary marlena placed by Intensivist. garcia placed last evening lasix 40 iv x 1 given this am- echo tr jon eff.   VSS - currently on lasix gtt - negative 2L in last 24 hrs. bid basic metabolic pt converted back into afib this am 100-120- currently on amio load - Dobutrex gtt. d/c planning    d/c yesterday then resumed for decreased UO.  Remains a fib since yesterday, cont amio/heparin, eps following. Weight increased, lasix 80 iv x 1, then infusion increased , aldactone added .  Remains on heparin infusion. Hydralazine added for afterload    NSR, on amio, EPS following, noted elevated tsh.  Repeat sent synthroid started.  Fluid overload, diuretics increased, 3% saline x 2, lasix 80 iv x 1, lasix infusion 20mg/hr  3/1 febrile  overnight  and this am pan cx. Plan to remove marlena, intro , garcia.  A line is from , intro . Cont , lasix, f/u bun creat  3/2 VSS afebrile overnight.  97 r/s.  on lasix gtt @ 20mg/jr  negative 2366cc.  will monitor closely in sdu  3/3 Bumex gtt decreased to 1mg/hr per HF - @8:45am pt converted to afib -100-130s w/ hypotension while on commode attempting to have a bm- pt asymptomatic. brought back to bed.  amio 150 IV x 1 given w/ filter. dig load per HF - Dr. Rosario - .75mg total d/t sin.  pt u/o decreased while in afib- DR. Rosario @ bedside CXR done.  spoke to EP - Dr. Ghotra regarding DCCV - Call to CTU to set up transfer for DCCV - transfer cancelled d/t pt. converted to SR 68- w/ increase in SBP to 102 & U/O increasing.  ENT consulted for c/o "hurts when i swallow"  ent exam reveals no rod.  lido s&s ordered.  pt eating w/o difficulty  3/4 Pt with edema not improved, elevated creat.  Bumex d/c. Placed on iv Lasix aldactone d/c.  Albumin x 2  3/5 Wt stable, remains off diuretic + edema, creat up yesterday. RHC today  Tx To CTU for closer monitoring as CKD worsening  3/6 still not diuresing despite Bumex gtt, TTE revealed pericardial effusion returned to OR s/p subxiphoid drainage and pericardial window.    3/7 weaned off inotropes, improved diuresis to 5L  3/8 Tx to Fl VSS, MCT, Garcia 2V EPW to box  40/10  VSS - Pericardial drainage to remain in place, Pacing Wires x 2 to remain in place. Garcia catheter to remain in place, creatinine 2.65. 2mg Bumex BID per HF. No A/C per Dr. Martinez.  Continue on Cefepime 2 Grams IV Q 8 hours per ID for (+)Serratia marcescens bacteremia.  Repeat TTE to r/o bio-prosthetic endocarditis.   Disposition: Home PT with rolling walker once medically cleared.

## 2024-03-08 NOTE — PROGRESS NOTE ADULT - PROBLEM SELECTOR PLAN 2
- Cr in 2022 1-1.3  - Cr on admission 1.7 and peaked to 2.5  - Now gradually improving; continue to monitor  - RHC from 3/5 not suggestive of low output - Cr in 2022 1-1.3  - Cr on admission 1.7 and peaked to 2.9  - RHC 3/5 not suggestive of low output  - Improving but not back to baseline

## 2024-03-08 NOTE — PROGRESS NOTE ADULT - NS ATTEND OPT1 GEN_ALL_CORE

## 2024-03-08 NOTE — PROGRESS NOTE ADULT - SUBJECTIVE AND OBJECTIVE BOX
Subjective: Pt states "i HOPE I GET TO GO HOME SOON " denies any CP, SOB, N/V and palpitations. No acute events overnight.     VITAL SIGNS    Telemetry:  SR 69  Vital Signs Last 24 Hrs  T(C): 36.4 (03-07-24 @ 16:00), Max: 36.6 (03-07-24 @ 08:45)  T(F): 97.5 (03-07-24 @ 16:00), Max: 97.8 (03-07-24 @ 08:45)  HR: 72 (03-08-24 @ 01:30) (56 - 109)  BP: 123/66 (03-08-24 @ 01:30) (104/55 - 141/77)  RR: 12 (03-08-24 @ 01:30) (9 - 46)  SpO2: 94% (03-08-24 @ 01:30) (89% - 100%)            03-06 @ 07:01  -  03-07 @ 07:00  --------------------------------------------------------  IN: 1585.3 mL / OUT: 5350 mL / NET: -3764.7 mL    03-07 @ 07:01  -  03-08 @ 05:07  --------------------------------------------------------  IN: 1388.1 mL / OUT: 1635 mL / NET: -246.9 mL                     MEDICATIONS  acetaminophen     Tablet .. 650 milliGRAM(s) Oral every 6 hours PRN  allopurinol 100 milliGRAM(s) Oral daily  aMIOdarone    Tablet 200 milliGRAM(s) Oral daily  aspirin enteric coated 81 milliGRAM(s) Oral daily  benzocaine/menthol Lozenge 1 Lozenge Oral four times a day PRN  bisacodyl Suppository 10 milliGRAM(s) Rectal daily PRN  cefepime   IVPB 2000 milliGRAM(s) IV Intermittent every 12 hours  chlorhexidine 2% Cloths 1 Application(s) Topical daily  digoxin     Tablet 125 MICROGram(s) Oral every other day  FIRST- Mouthwash  BLM 10 milliLiter(s) Swish and Swallow every 6 hours  heparin   Injectable 5000 Unit(s) SubCutaneous every 8 hours  hydrALAZINE 25 milliGRAM(s) Oral every 8 hours  levothyroxine 50 MICROGram(s) Oral <User Schedule>  lidocaine   4% Patch 1 Patch Transdermal daily  oxyCODONE    IR 10 milliGRAM(s) Oral every 8 hours PRN  pantoprazole    Tablet 40 milliGRAM(s) Oral daily  polyethylene glycol 3350 17 Gram(s) Oral daily  senna 2 Tablet(s) Oral at bedtime        PHYSICAL EXAM    Neurology: A&Ox3, nonfocal, no gross deficits  CV : RRR+S1S2  Sternal Wound :  MSI CDI , Stable, MCT TO WALL SX   Lungs: Respirations non-labored, B/L BS  Abdomen: Soft, NT/ND, +BSx4Q,   (-)N/V/D  :  Olivares --> SBGD         [  ]  Extremities:TRACE --> 1 +  B/L LE edema, negative calf tenderness, +PP       LABS  03-08    140  |  96  |  54<H>  ----------------------------<  136<H>  4.6   |  31  |  2.65<H>    Ca    9.0      08 Mar 2024 01:37  Phos  3.1     03-08  Mg     2.7     03-08    TPro  6.5  /  Alb  3.4  /  TBili  1.6<H>  /  DBili  x   /  AST  14  /  ALT  14  /  AlkPhos  89  03-07                                 9.7    15.59 )-----------( 211      ( 08 Mar 2024 01:38 )             28.1          PT/INR - ( 08 Mar 2024 01:37 )   PT: 11.1 sec;   INR: 1.01 ratio         PTT - ( 08 Mar 2024 01:37 )  PTT:27.1 sec       PAST MEDICAL & SURGICAL HISTORY:  Dyslipidemia      Hypertension      Chronic GERD      History of aortic stenosis      Cellulitis      No significant past surgical history           Physical Therapy Rec:   Home  [  ]   Home w/ PT  [ x ]  Rehab  [  ]    Discussed with CT Surgery attending.

## 2024-03-08 NOTE — PROGRESS NOTE ADULT - PROBLEM SELECTOR PLAN 4
- paroxysmal  - s/p JOANNA clip  - On Amiodarone 200 mg QD. Last bolused with IV Amio 3/4 for AF 130s  - On Digoxin 125 mcg QD. Please lower to QOD given elevated digoxin level of 1.3  - Heparin infusion stopped 3/6 for concern of pericardial effusion. Pending formal TTE   - Please re-engage EP - paroxysmal  - s/p JOANNA clip  - On Amiodarone 200 mg QD. Last bolused with IV Amio 3/4 for AF 130s  - On Digoxin 125 mcg QOD. Lowered on 3/6 for level of 1.3. Please repeat Digoxin level  - Heparin infusion stopped 3/6 for pericardial effusion

## 2024-03-08 NOTE — PROGRESS NOTE ADULT - SUBJECTIVE AND OBJECTIVE BOX
Comfortable on RA    Vital Signs Last 24 Hrs  T(C): 36.6 (03-08-24 @ 05:14), Max: 36.6 (03-08-24 @ 05:14)  T(F): 97.8 (03-08-24 @ 05:14), Max: 97.8 (03-08-24 @ 05:14)  HR: 70 (03-08-24 @ 05:14) (56 - 82)  BP: 136/73 (03-08-24 @ 05:14) (107/64 - 141/77)  BP(mean): 88 (03-08-24 @ 01:30) (81 - 100)  RR: 18 (03-08-24 @ 05:14) (9 - 36)  SpO2: 97% (03-08-24 @ 05:14) (89% - 100%)    I&O's Detail    07 Mar 2024 07:01  -  08 Mar 2024 07:00  --------------------------------------------------------  IN:    Bumetanide: 5 mL    DOBUTamine: 10.3 mL    DOBUTamine: 10.3 mL    IV PiggyBack: 50 mL    NiCARdipine: 22.5 mL    Oral Fluid: 1340 mL  Total IN: 1438.1 mL    OUT:    Chest Tube (mL): 15 mL    Indwelling Catheter - Urethral (mL): 1920 mL  Total OUT: 1935 mL    s1s2  b/l air entry, + CT  soft, ND  b/l LE edema, improving                                                                                                                                 9.7    15.59 )-----------( 211      ( 08 Mar 2024 01:38 )             28.1     08 Mar 2024 01:37    140    |  96     |  54     ----------------------------<  136    4.6     |  31     |  2.65     Ca    9.0        08 Mar 2024 01:37  Phos  3.1       08 Mar 2024 01:37  Mg     2.7       08 Mar 2024 01:37    TPro  6.5    /  Alb  3.5    /  TBili  1.0    /  DBili  0.5    /  AST  18     /  ALT  16     /  AlkPhos  108    08 Mar 2024 01:37    LIVER FUNCTIONS - ( 08 Mar 2024 01:37 )  Alb: 3.5 g/dL / Pro: 6.5 g/dL / ALK PHOS: 108 U/L / ALT: 16 U/L / AST: 18 U/L / GGT: x           PT/INR - ( 08 Mar 2024 01:37 )   PT: 11.1 sec;   INR: 1.01 ratio      Culture - Other (collected 06 Mar 2024 16:17)  Source: Wound Wound  Preliminary Report (07 Mar 2024 16:41):    No growth to date.    Culture - Blood (collected 05 Mar 2024 23:33)  Source: .Blood Blood-Venous  Preliminary Report (08 Mar 2024 04:02):    No growth at 48 Hours    Culture - Blood (collected 05 Mar 2024 23:33)  Source: .Blood Blood-Venous  Preliminary Report (08 Mar 2024 04:02):    No growth at 48 Hours    Culture - Blood (collected 05 Mar 2024 18:54)  Source: .Blood Blood  Preliminary Report (08 Mar 2024 01:03):    No growth at 48 Hours    acetaminophen     Tablet .. 650 milliGRAM(s) Oral every 6 hours PRN  allopurinol 100 milliGRAM(s) Oral daily  aMIOdarone    Tablet   Oral   aMIOdarone    Tablet 200 milliGRAM(s) Oral daily  aspirin enteric coated 81 milliGRAM(s) Oral daily  benzocaine/menthol Lozenge 1 Lozenge Oral four times a day PRN  bisacodyl Suppository 10 milliGRAM(s) Rectal daily PRN  buMETAnide 2 milliGRAM(s) Oral two times a day  chlorhexidine 2% Cloths 1 Application(s) Topical daily  digoxin     Tablet 125 MICROGram(s) Oral every other day  FIRST- Mouthwash  BLM 10 milliLiter(s) Swish and Swallow every 6 hours  heparin   Injectable 5000 Unit(s) SubCutaneous every 8 hours  hydrALAZINE 25 milliGRAM(s) Oral every 8 hours  levothyroxine 50 MICROGram(s) Oral <User Schedule>  lidocaine   4% Patch 1 Patch Transdermal daily  mupirocin 2% Ointment 1 Application(s) Topical <User Schedule>  oxyCODONE    IR 10 milliGRAM(s) Oral every 8 hours PRN  pantoprazole    Tablet 40 milliGRAM(s) Oral daily  polyethylene glycol 3350 17 Gram(s) Oral daily  senna 2 Tablet(s) Oral at bedtime  sodium chloride 0.65% Nasal 1 Spray(s) Both Nostrils four times a day    A/P:    CM, EF 30%, severe AS, Afib, CHF  Tx from [facility] to [facility] 2/5, s/p LHC 2/5  S/p cardio-renal/hemodynamic LINDA (peak Cr 2.5 - 2/3, lorna Cr 1.23 - 2/23)   Imaging w/o hydro 1/28/24  S/p CT w/IV dye 2/8/24  Stable renal fx post CT  S/p AVR, JOANNA clip 2/23  Serratia bacteremia (2/29 and 3/1), Abx per ID   Bld cx 3/5 - NGTD   Pericardial effusion  Hemodynamic/cardio-renal LINDA/CKD (peak Cr 2.98 - 3/6 am)  S/p pericardial window 3/6, clot extracted   Diuresis per HF team   Good UO   Avoid nephrotoxins, hypotension   F/u CBC, BMP, Mg    [phone]

## 2024-03-08 NOTE — PROGRESS NOTE ADULT - NS ATTEND AMEND GEN_ALL_CORE FT
Patient was seen and examined at bedside with the advanced care provider.  I agree with the above with the following exceptions:    Transferred out of the ICU.  Dobutamine weaned off on 3 8 at 8:30 AM.  Please obtain lactate and full CMP to ensure stable endorgan function off of lactate.  BP remains elevated, can increase hydralazine to 37.5 3 times daily.  If still elevated can further increase to 50 mg 3 times daily.  Will need maintenance diuretics now that he is off of dobutamine.  Start on Bumex 2 mg p.o. twice daily.  Continue to monitor I's and O's and renal function closely.  Consider garcia removal - patient endorses pain at garcia site.

## 2024-03-08 NOTE — PROGRESS NOTE ADULT - PROBLEM SELECTOR PLAN 7
ID following  3/1 Blood Culture (+)Serratia marcescens  3/5 BC Negative x 2   3/6 Wound Culture NTD  Continue on Cefepime 2 Grams IV Q 8 hours, per ID will likely complete a 10-14 days course.  Abx end date TBD.   Repeat TTE to assess bioprosthetic AV r/o endocarditis

## 2024-03-08 NOTE — PROGRESS NOTE ADULT - PROBLEM SELECTOR PLAN 1
2/23 s/p AVR  3/6 S/P pericardial window  Pericardial drain to pleur- evac to suction   OOB ,ambulate  IS  MCT to Sx  PW x 2, VVI 40/10 2/23 s/p AVR  3/6 S/P pericardial window  Maintain Pericardial drain to pleur- evac to suction   Continue with ASA 81 mg PO Daily.   Maintain MCT to Sx  Maintain PW x 2, VVI 40/10  Increase activity as tolerated.   Encourage Chest PT / Pulmonary toileting and Incentive spirometry every 1 hour x 10 while awake.   Continue with Protonix 40 mg PO daily and Heparin 5000 units SQ Q 8 daily for PUD and DVT prophylaxis.   Sternal precautions and wound care  Plan of care discussed with attending   D/C plan home with PT and rolling walker once medically cleared

## 2024-03-08 NOTE — PROGRESS NOTE ADULT - PROBLEM SELECTOR PLAN 5
- BCx from 2/29 and 3/1 with GNR Serratia  - LE wound culture send 3/5, pending result   - Started on Cefepime 3/1  - Repeat BCx sent   - Remains afebrile but with ongoing mild leukocytosis  - Further guidance by ID - BCx from 2/29 and 3/1 with GNR Serratia  - Repeat BCx from 3/5 NGTD  - LE wound culture send 3/5, NGTD  - Started on Cefepime 3/1  - Remains afebrile but with ongoing mild leukocytosis  - Further guidance by ID

## 2024-03-08 NOTE — PROGRESS NOTE ADULT - PROBLEM SELECTOR PLAN 1
-  stopped 3/1, then resumed post RHC on 3/5  - recommend increasing hydralazine for MAP > 65  - wean dobutamine and follow perfusion markers  - low threshold for hemodynamic monitoring (TLC for CVP/ScVO2 or swan) if not progressing as expected  - Decrease Bumex gtt to 0.5mg/hr, target net negative 2L balance   - Defer MRA/ARB/ARNI given degree of renal dysfunction   - Replete to target K 4-4.5 and Mg 2-2.5  - Please wrap BLE in ACE bandages to aid in mobilization of fluid  - Repeat TTE post pericardial window and drainage of pericardial effusion -  weaned off 3/7  - start Bumex 2 mg PO BID  - Increase HDZN to 37.5 mg TID, hold for SBP <90  - Defer MRA/ARB/ARNI/SGLT2-i given degree of renal dysfunction   - Replete to target K 4-4.5 and Mg 2-2.5

## 2024-03-08 NOTE — PROGRESS NOTE ADULT - PROBLEM SELECTOR PLAN 4
Avoid nephrotoxins and hypotension   Olivares catheter   Follow BUN/Creatinine  Nephro Following Avoid nephrotoxins and hypotension   Maintain Olivares catheter   Follow BUN/Creatinine  Nephro Following  Continue with Bumex 2 mg PO BID

## 2024-03-08 NOTE — PROGRESS NOTE ADULT - ASSESSMENT
63M, appears older than stated age, admitted to [facility] 24 PMHx HTN, GERD, HPL, chronic venous stasis dermatitis with bilateral leg swelling, and AS presented to [place] [facility] on 2024 for bilateral leg pain and swelling found to have new acute HFrEF (EF 35-40% 2024) in setting of severe AS.     Hospital course:  24: Admitted to [place] new onset HFrEF EF 35% and anemia 2/2 AS,   24: medically stable for transferred to [facility] medicine service for continued care. during hospitalization hx of WCT/ PAF  Consults for Nephrology, HF, ID, Podiatry for medical optimization  2/15: 1 U PRBC, keep Hgb > 8.    s/p fall with headastrike   : s/p Liver biopsy, soft pressure post procedure--responded to fluid bolus  :  RLE doppler: No pseudoaneurysm in the right groin/ bilobed avascular fluid collection measuring 2.8 x 1.7 x 2.0 cm     : AVR/JOANNA Clip c/b intraop bleeding requirinu pRBCs on CPB 1u pRBCs post-CPB 1 PLT 1000 FEIBA  Prolene suture placed around IJ and V wire  : To Step Down, raegan, CT, +PW, marlena RIJ   Maintain mediastinal chest tubes Primicor discontinued. Lasix 40 BID initiated , Wound care to chronic venous stasis b/l LE. D/c RIJ   VSS - 9 hrs afib last evening- SR since 1:30am- on AMio gtt,  gtt - s/p right axillary marlena placed by Intensivist. garcia placed last evening lasix 40 iv x 1 given this am- echo tr jon eff.   VSS - currently on lasix gtt - negative 2L in last 24 hrs. bid basic metabolic pt converted back into afib this am 100-120- currently on amio load - dobutrex gtt. d/c planning    d/c yesterday then resumed for decreased UO.  Remains a fib since yesterday, cont amio/heparin, eps following         Weight increased, lasix 80 iv x 1, then infusion increased , aldactone added .  Remains on heparin infusion.          Hydral added for afterload    NSR, on amio, EPS following, noted elevated tsh.  Repeat sent synthroid started.  Fluid overload, diuretics increased, 3% saline x 2, lasix 80iv x 1, lasix infusion 20mg/hr  3/1 febrile  overnight  and this am pan cx.  Plan to remove marlena, intro , garcia.  A line is from , intro   UA neg  Cont , lasix, f/u bun creat  3/2 VSS afebrile overnight.  97 r/s.  on lasix gtt @ 20mg/jr  negative 2366cc.  will monitor closely in sdu  3/3 Bumex gtt decreased to 1mg/hr per HF - @8:45am pt converted to afib -100-130s w/ hypotension while on commode attempting to have a bm- pt asymptomatic. brought back to bed.  amio 150 IV x 1 given w/ filter. dig load per HF - Dr. Rosario - .75mg total d/t sin.  pt u/o decreased while in afib- DR. Rosario @ bedside CXR done.  spoke to EP - Dr. Ghotra regarding DCCV - Call to CTU to set up transfer for DCCV - transfer cancelled d/t pt. converted to SR 68- w/ increase in SBP to 102 & U/O increasing.  ENT consulted for c/o "hurts when i swallow"  ent exam rveals no rod.  lido s&s ordered.  pt eating w/o difficulty  3/ Pt with edema not improved, elevated creat.  Bumex d/c   Placed on iv lasix, aldactone d/c.  Albuminx 2  Hydralazine decreasewd  REpeat labs today  L lung sono, minimal fluid  3/5  Wt stable , remains off diuretic + edema, creat up yesterday  RHC today  Tx To CTU for closer monitoring as CKD worsening  3/6 still not diuresing despite Bumex gtt, TTE revealed pericardial effusion to OR for subxyphoid drainage and pericardial window   3/7 weaned off inotropes, improved diuresis to 5 L  3/8 Tx to Fl VSS, MCT, Garcia 2V EPW to box  40/10

## 2024-03-08 NOTE — PROGRESS NOTE ADULT - ASSESSMENT
63 m with HTN, GERD, AS, chronic venous stasis dermatitis with bilateral leg swelling, initially admitted to [facility] with JOCELIN and b/l leg pain and swelling, was seen by ID and was considered not infected just stasis dermatitis, received 5 days of keflex, wound cx showed serratia and strep dysgalactiae,  blood cx negative  S/P Bio AVR , 2/23 Post op labile BP related to hypovolemia & cardiac dysfunction, intra op bleeding requiring blood & products   pt has been on vanco until 2/29 for post op course  then febrile 2/29 with serratia bacteremia also positive 3/1 (the next day), repeat negative 3/5  large circumferential pericardial effusion which is loculated and ?hemorrhagic/thrombus, also a 3.2 cm retrosternal hematoma with focus of air s/p pericardial window 3/6      AS and CHF s/p AVR 2/23 with new fever and serratia bacteremia 2/29 and 3/1, source unclear but previous wound cx had strep and serratia, u/a just hematuria, no pyuria, TTE 2/26 no veg  b/l LE stasis dermatitis and chronic wounds, no tenderness and appears stasis dermatitis not infection, wound cx 3/6 negative  now with large circumferential pericardial effusion which is loculated and ?hemorrhagic/thrombus, also a 3.2 cm retrosternal hematoma with focus of air s/p pericardial window 3/6    * c/w cefepime   * rpeat echo if no evidence of vegetation, would complete a 10 day course through 3/14, if pt ready for discharge will switch to PO levo 250 qd  * follow with wound care for the LE wounds  * monitor CBC/diff and CMP      The above assessment and plan was discussed with the primary team    Pamela Bellamy MD  contact on teams  After 5pm and on weekends call [phone].

## 2024-03-08 NOTE — PROGRESS NOTE ADULT - PROBLEM SELECTOR PLAN 3
Continue amio 200mg qd  Digoxin 125mg  QOD Continue amio 200mg qd  Digoxin 125mg  QOD  No AC therapy as per Dr. Martinez Continue Amiodarone 200 mg PO daily   Continue on Digoxin 125mg PO QOD  No AC therapy as per Dr. Martinez

## 2024-03-08 NOTE — PROGRESS NOTE ADULT - SUBJECTIVE AND OBJECTIVE BOX
Subjective:  - feeling well. Some discomfort at site of pericardial drain exit site    Medications:  acetaminophen     Tablet .. 650 milliGRAM(s) Oral every 6 hours PRN  allopurinol 100 milliGRAM(s) Oral daily  aMIOdarone    Tablet   Oral   aMIOdarone    Tablet 200 milliGRAM(s) Oral daily  aspirin enteric coated 81 milliGRAM(s) Oral daily  benzocaine/menthol Lozenge 1 Lozenge Oral four times a day PRN  bisacodyl Suppository 10 milliGRAM(s) Rectal daily PRN  buMETAnide 2 milliGRAM(s) Oral two times a day  chlorhexidine 2% Cloths 1 Application(s) Topical daily  digoxin     Tablet 125 MICROGram(s) Oral every other day  FIRST- Mouthwash  BLM 10 milliLiter(s) Swish and Swallow every 6 hours  heparin   Injectable 5000 Unit(s) SubCutaneous every 8 hours  hydrALAZINE 25 milliGRAM(s) Oral every 8 hours  levothyroxine 50 MICROGram(s) Oral <User Schedule>  lidocaine   4% Patch 1 Patch Transdermal daily  lidocaine 2% Jelly 10 milliLiter(s) IntraUrethral once  mupirocin 2% Ointment 1 Application(s) Topical <User Schedule>  oxyCODONE    IR 10 milliGRAM(s) Oral every 8 hours PRN  pantoprazole    Tablet 40 milliGRAM(s) Oral daily  polyethylene glycol 3350 17 Gram(s) Oral daily  senna 2 Tablet(s) Oral at bedtime  sodium chloride 0.65% Nasal 1 Spray(s) Both Nostrils four times a day    Physical Exam:    Vitals:  Vital Signs Last 24 Hours  T(C): 36.6 (24 @ 05:14), Max: 36.6 (24 @ 05:14)  HR: 70 (24 @ 05:14) (56 - 82)  BP: 136/73 (24 @ 05:14) (106/65 - 141/77)  RR: 18 (24 @ 05:14) (9 - 36)  SpO2: 97% (24 @ 05:14) (89% - 100%)    Weight in k.4 ( @ 08:27)    I&O's Summary    07 Mar 2024 07:01  -  08 Mar 2024 07:00  --------------------------------------------------------  IN: 1438.1 mL / OUT: 1935 mL / NET: -496.9 mL    08 Mar 2024 07:01  -  08 Mar 2024 13:44  --------------------------------------------------------  IN: 480 mL / OUT: 0 mL / NET: 480 mL    Tele: SR 60-70s    General: No distress. Comfortable sitting up in chair  HEENT: EOM intact.  Neck: JVP 8-10 cm H2O  Chest: Clear to auscultation bilaterally  CV: RRR. Normal S1 and S2. No murmurs, rub, or gallops. Radial pulses normal.  Abdomen: Soft, non-distended, non-tender  Extremities: Warm peripherally, 1+ BLE edema  Skin: Pericardia drain in place  Neurology: Alert and oriented times three. Sensation intact  Psych: Affect normal    Labs:                        9.7    15.59 )-----------( 211      ( 08 Mar 2024 01:38 )             28.1     03-08    140  |  96  |  54<H>  ----------------------------<  136<H>  4.6   |  31  |  2.65<H>    Ca    9.0      08 Mar 2024 01:37  Phos  3.1     03-08  Mg     2.7     03-08    TPro  6.5  /  Alb  3.5  /  TBili  1.0  /  DBili  0.5<H>  /  AST  18  /  ALT  16  /  AlkPhos  108  03-08    PT/INR - ( 08 Mar 2024 01:37 )   PT: 11.1 sec;   INR: 1.01 ratio         PTT - ( 08 Mar 2024 01:37 )  PTT:27.1 sec

## 2024-03-09 LAB
ANION GAP SERPL CALC-SCNC: 9 MMOL/L — SIGNIFICANT CHANGE UP (ref 5–17)
BUN SERPL-MCNC: 59 MG/DL — HIGH (ref 7–23)
CALCIUM SERPL-MCNC: 8.7 MG/DL — SIGNIFICANT CHANGE UP (ref 8.4–10.5)
CHLORIDE SERPL-SCNC: 97 MMOL/L — SIGNIFICANT CHANGE UP (ref 96–108)
CO2 SERPL-SCNC: 32 MMOL/L — HIGH (ref 22–31)
CREAT SERPL-MCNC: 2.22 MG/DL — HIGH (ref 0.5–1.3)
EGFR: 32 ML/MIN/1.73M2 — LOW
GLUCOSE SERPL-MCNC: 89 MG/DL — SIGNIFICANT CHANGE UP (ref 70–99)
HCT VFR BLD CALC: 26 % — LOW (ref 39–50)
HGB BLD-MCNC: 8.7 G/DL — LOW (ref 13–17)
MCHC RBC-ENTMCNC: 25.1 PG — LOW (ref 27–34)
MCHC RBC-ENTMCNC: 33.5 GM/DL — SIGNIFICANT CHANGE UP (ref 32–36)
MCV RBC AUTO: 75.1 FL — LOW (ref 80–100)
NRBC # BLD: 0 /100 WBCS — SIGNIFICANT CHANGE UP (ref 0–0)
PLATELET # BLD AUTO: 253 K/UL — SIGNIFICANT CHANGE UP (ref 150–400)
POTASSIUM SERPL-MCNC: 4 MMOL/L — SIGNIFICANT CHANGE UP (ref 3.5–5.3)
POTASSIUM SERPL-SCNC: 4 MMOL/L — SIGNIFICANT CHANGE UP (ref 3.5–5.3)
RBC # BLD: 3.46 M/UL — LOW (ref 4.2–5.8)
RBC # FLD: SIGNIFICANT CHANGE UP (ref 10.3–14.5)
SODIUM SERPL-SCNC: 138 MMOL/L — SIGNIFICANT CHANGE UP (ref 135–145)
WBC # BLD: 11.09 K/UL — HIGH (ref 3.8–10.5)
WBC # FLD AUTO: 11.09 K/UL — HIGH (ref 3.8–10.5)

## 2024-03-09 RX ADMIN — DIPHENHYDRAMINE HYDROCHLORIDE AND LIDOCAINE HYDROCHLORIDE AND ALUMINUM HYDROXIDE AND MAGNESIUM HYDRO 10 MILLILITER(S): KIT at 05:43

## 2024-03-09 RX ADMIN — OXYCODONE HYDROCHLORIDE 10 MILLIGRAM(S): 5 TABLET ORAL at 04:01

## 2024-03-09 RX ADMIN — Medication 1 SPRAY(S): at 17:23

## 2024-03-09 RX ADMIN — OXYCODONE HYDROCHLORIDE 10 MILLIGRAM(S): 5 TABLET ORAL at 06:06

## 2024-03-09 RX ADMIN — DIPHENHYDRAMINE HYDROCHLORIDE AND LIDOCAINE HYDROCHLORIDE AND ALUMINUM HYDROXIDE AND MAGNESIUM HYDRO 10 MILLILITER(S): KIT at 17:23

## 2024-03-09 RX ADMIN — DIPHENHYDRAMINE HYDROCHLORIDE AND LIDOCAINE HYDROCHLORIDE AND ALUMINUM HYDROXIDE AND MAGNESIUM HYDRO 10 MILLILITER(S): KIT at 11:07

## 2024-03-09 RX ADMIN — AMIODARONE HYDROCHLORIDE 200 MILLIGRAM(S): 400 TABLET ORAL at 06:05

## 2024-03-09 RX ADMIN — PANTOPRAZOLE SODIUM 40 MILLIGRAM(S): 20 TABLET, DELAYED RELEASE ORAL at 11:08

## 2024-03-09 RX ADMIN — Medication 37.5 MILLIGRAM(S): at 13:20

## 2024-03-09 RX ADMIN — HEPARIN SODIUM 5000 UNIT(S): 5000 INJECTION INTRAVENOUS; SUBCUTANEOUS at 06:05

## 2024-03-09 RX ADMIN — BUMETANIDE 2 MILLIGRAM(S): 0.25 INJECTION INTRAMUSCULAR; INTRAVENOUS at 06:05

## 2024-03-09 RX ADMIN — Medication 37.5 MILLIGRAM(S): at 21:17

## 2024-03-09 RX ADMIN — Medication 37.5 MILLIGRAM(S): at 06:05

## 2024-03-09 RX ADMIN — OXYCODONE HYDROCHLORIDE 10 MILLIGRAM(S): 5 TABLET ORAL at 19:32

## 2024-03-09 RX ADMIN — CEFEPIME 100 MILLIGRAM(S): 1 INJECTION, POWDER, FOR SOLUTION INTRAMUSCULAR; INTRAVENOUS at 04:34

## 2024-03-09 RX ADMIN — HEPARIN SODIUM 5000 UNIT(S): 5000 INJECTION INTRAVENOUS; SUBCUTANEOUS at 21:15

## 2024-03-09 RX ADMIN — OXYCODONE HYDROCHLORIDE 10 MILLIGRAM(S): 5 TABLET ORAL at 20:32

## 2024-03-09 RX ADMIN — CHLORHEXIDINE GLUCONATE 1 APPLICATION(S): 213 SOLUTION TOPICAL at 13:17

## 2024-03-09 RX ADMIN — Medication 100 MILLIGRAM(S): at 11:08

## 2024-03-09 RX ADMIN — Medication 1 SPRAY(S): at 06:06

## 2024-03-09 RX ADMIN — Medication 50 MICROGRAM(S): at 06:05

## 2024-03-09 RX ADMIN — BUMETANIDE 2 MILLIGRAM(S): 0.25 INJECTION INTRAMUSCULAR; INTRAVENOUS at 13:24

## 2024-03-09 RX ADMIN — HEPARIN SODIUM 5000 UNIT(S): 5000 INJECTION INTRAVENOUS; SUBCUTANEOUS at 13:19

## 2024-03-09 RX ADMIN — Medication 81 MILLIGRAM(S): at 11:08

## 2024-03-09 NOTE — PROGRESS NOTE ADULT - ASSESSMENT
63M, appears older than stated age, admitted to [facility] 24 PMHx HTN, GERD, HPL, chronic venous stasis dermatitis with bilateral leg swelling, and AS presented to [facility] on 2024 for bilateral leg pain and swelling found to have new acute HFrEF (EF 35-40% 2024) in setting of severe AS.     Hospital course:  24: Admitted to [place] new onset HFrEF EF 35% and anemia 2/2 AS,   24: medically stable for transferred to [facility] medicine service for continued care. during hospitalization hx of WCT/ PAF  Consults for Nephrology, HF, ID, Podiatry for medical optimization  2/15: 1 U PRBC, keep Hgb > 8.    s/p fall with head strike   : s/p Liver biopsy, soft pressure post procedure--responded to fluid bolus  :  RLE doppler: No pseudoaneurysm in the right groin/ bilobed avascular fluid collection measuring 2.8 x 1.7 x 2.0 cm  : AVR/JOANNA Clip c/b intraop bleeding requirinu pRBCs on CPB 1u pRBCs post-CPB 1 PLT 1000 FEIBA  Prolene suture placed around IJ and V wire  Post-Op Course:  : To Step Down, raegan, CT, +PW, marlena RIJ   Maintain mediastinal chest tubes Primicor discontinued. Lasix 40 BID initiated , Wound care to chronic venous stasis b/l LE. D/c RIJ   VSS - 9 hrs afib last evening- SR since 1:30am- on AMio gtt,  gtt - s/p right axillary marlena placed by Intensivist. garcia placed last evening lasix 40 iv x 1 given this am- echo tr jon eff.   VSS - currently on lasix gtt - negative 2L in last 24 hrs. bid basic metabolic pt converted back into afib this am 100-120- currently on amio load - Dobutrex gtt. d/c planning    d/c yesterday then resumed for decreased UO.  Remains a fib since yesterday, cont amio/heparin, eps following. Weight increased, lasix 80 iv x 1, then infusion increased , aldactone added .  Remains on heparin infusion. Hydralazine added for afterload    NSR, on amio, EPS following, noted elevated tsh.  Repeat sent synthroid started.  Fluid overload, diuretics increased, 3% saline x 2, lasix 80 iv x 1, lasix infusion 20mg/hr  3/1 febrile  overnight  and this am pan cx. Plan to remove marlena, intro , garcia.  A line is from , intro . Cont , lasix, f/u bun creat  3/2 VSS afebrile overnight.  97 r/s.  on lasix gtt @ 20mg/jr  negative 2366cc.  will monitor closely in sdu  3/3 Bumex gtt decreased to 1mg/hr per HF - @8:45am pt converted to afib -100-130s w/ hypotension while on commode attempting to have a bm- pt asymptomatic. brought back to bed.  amio 150 IV x 1 given w/ filter. dig load per HF - Dr. Rosario - .75mg total d/t sin.  pt u/o decreased while in afib- DR. Rosario @ bedside CXR done.  spoke to EP - Dr. Ghotra regarding DCCV - Call to CTU to set up transfer for DCCV - transfer cancelled d/t pt. converted to SR 68- w/ increase in SBP to 102 & U/O increasing.  ENT consulted for c/o "hurts when i swallow"  ent exam reveals no rod.  lido s&s ordered.  pt eating w/o difficulty  3/4 Pt with edema not improved, elevated creat.  Bumex d/c. Placed on iv Lasix aldactone d/c.  Albumin x 2  3/5 Wt stable, remains off diuretic + edema, creat up yesterday. RHC today  Tx To CTU for closer monitoring as CKD worsening  3/6 still not diuresing despite Bumex gtt, TTE revealed pericardial effusion returned to OR s/p 3/6 subxiphoid drainage and pericardial window.    3/7 weaned off inotropes, improved diuresis to 5L  3/8 Tx to Fl VSS, MCT, Garcia 2V EPW to box  40/10  VSS - Pericardial drainage to remain in place, Pacing Wires x 2 to remain in place. Garcia catheter to remain in place, creatinine 2.65. 2mg Bumex BID per HF. No A/C per Dr. Martinez.  Continue on Cefepime 2 Grams IV Q 8 hours per ID for (+)Serratia marcescens bacteremia.  Repeat TTE to r/o bio-prosthetic endocarditis. TTE findings: small pericardial effusion noted to the anterior RV.  No echodensity noted on the valves. Unable to exclude valvular vegetation in the setting of thickened or calcified valves and/or technically limited/difficult study.   3/9 VSS; Continue with current medication regimen.   D/C Garcia for a TOV.  Leukocytosis improving --> WBC 11.  Maintain pericardial drain in place, total output 130cc/24 hrs.    Disposition: Home PT with rolling walker once medically cleared.

## 2024-03-09 NOTE — PROGRESS NOTE ADULT - PROBLEM SELECTOR PLAN 5
Wound consult following recommending BLE wound Medihoney daily   Continue turning and positioning w/ offloading assistive devices as per protocol  Continue w/ attends under pads and Pericare as per protocol  Waffle Cushion to chair when oob to chair  Continue w/ low air loss pressure redistribution bed surface     -Upon discharge f/u as outpatient at [facility] [address] [facility] [phone]

## 2024-03-09 NOTE — PROGRESS NOTE ADULT - PROBLEM SELECTOR PLAN 7
ID following  3/1 Blood Culture (+)Serratia marcescens  3/5 BC Negative x 2   3/6 Wound Culture NTD  Continue on Cefepime 2 Grams IV Q 8 hours, per ID will likely complete a 10-day course.  (until 3/14) Can transition to Levaquin 250 mg PO daily if planning to discharge home.    3/8 TTE no evidence of endocarditis

## 2024-03-09 NOTE — PROGRESS NOTE ADULT - SUBJECTIVE AND OBJECTIVE BOX
Comfortable on RA    Vital Signs Last 24 Hrs  T(C): 36.9 (03-09-24 @ 12:00), Max: 36.9 (03-09-24 @ 12:00)  T(F): 98.4 (03-09-24 @ 12:00), Max: 98.4 (03-09-24 @ 12:00)  HR: 77 (03-09-24 @ 12:00) (65 - 77)  BP: 118/68 (03-09-24 @ 12:00) (118/68 - 126/72)  BP(mean): 85 (03-09-24 @ 04:44) (85 - 85)  RR: 18 (03-09-24 @ 12:00) (18 - 18)  SpO2: 97% (03-09-24 @ 12:00) (94% - 97%)    I&O's Detail    08 Mar 2024 07:01  -  09 Mar 2024 07:00  --------------------------------------------------------  IN:    Oral Fluid: 580 mL  Total IN: 580 mL    OUT:    Chest Tube (mL): 130 mL    Indwelling Catheter - Urethral (mL): 750 mL  Total OUT: 880 mL    09 Mar 2024 06:01  -  09 Mar 2024 19:26  --------------------------------------------------------  IN:    Oral Fluid: 480 mL  Total IN: 480 mL    OUT:    Chest Tube (mL): 80 mL    Indwelling Catheter - Urethral (mL): 300 mL    Voided (mL): 500 mL  Total OUT: 880 mL    s1s2  b/l air entry, + CT  soft, ND  b/l LE edema, improving                                                                                                                                        8.7    11.09 )-----------( 253      ( 09 Mar 2024 06:40 )             26.0     09 Mar 2024 06:40    138    |  97     |  59     ----------------------------<  89     4.0     |  32     |  2.22     Ca    8.7        09 Mar 2024 06:40  Phos  3.1       08 Mar 2024 01:37  Mg     2.7       08 Mar 2024 01:37    TPro  6.5    /  Alb  3.5    /  TBili  1.0    /  DBili  0.5    /  AST  18     /  ALT  16     /  AlkPhos  108    08 Mar 2024 01:37    LIVER FUNCTIONS - ( 08 Mar 2024 01:37 )  Alb: 3.5 g/dL / Pro: 6.5 g/dL / ALK PHOS: 108 U/L / ALT: 16 U/L / AST: 18 U/L / GGT: x           PT/INR - ( 08 Mar 2024 01:37 )   PT: 11.1 sec;   INR: 1.01 ratio      acetaminophen     Tablet .. 650 milliGRAM(s) Oral every 6 hours PRN  allopurinol 100 milliGRAM(s) Oral daily  aMIOdarone    Tablet   Oral   aMIOdarone    Tablet 200 milliGRAM(s) Oral daily  aspirin enteric coated 81 milliGRAM(s) Oral daily  benzocaine/menthol Lozenge 1 Lozenge Oral four times a day PRN  bisacodyl Suppository 10 milliGRAM(s) Rectal daily PRN  buMETAnide 2 milliGRAM(s) Oral two times a day  cefepime   IVPB 2000 milliGRAM(s) IV Intermittent every 24 hours  chlorhexidine 2% Cloths 1 Application(s) Topical daily  digoxin     Tablet 125 MICROGram(s) Oral every other day  FIRST- Mouthwash  BLM 10 milliLiter(s) Swish and Swallow every 6 hours  heparin   Injectable 5000 Unit(s) SubCutaneous every 8 hours  hydrALAZINE 37.5 milliGRAM(s) Oral every 8 hours  levothyroxine 50 MICROGram(s) Oral <User Schedule>  lidocaine   4% Patch 1 Patch Transdermal daily  mupirocin 2% Ointment 1 Application(s) Topical <User Schedule>  oxyCODONE    IR 10 milliGRAM(s) Oral every 8 hours PRN  pantoprazole    Tablet 40 milliGRAM(s) Oral daily  polyethylene glycol 3350 17 Gram(s) Oral daily  senna 2 Tablet(s) Oral at bedtime  sodium chloride 0.65% Nasal 1 Spray(s) Both Nostrils four times a day    A/P:    CM, EF 30%, severe AS, Afib, CHF  Tx from [facility] to [facility] 2/5, s/p LHC 2/5  S/p cardio-renal/hemodynamic LINDA (peak Cr 2.5 - 2/3, lorna Cr 1.23 - 2/23)   Imaging w/o hydro 1/28/24  S/p CT w/IV dye 2/8/24  Stable renal fx post CT  S/p AVR, JOANNA clip 2/23  Serratia bacteremia (2/29 and 3/1), Abx per ID   Bld cx 3/5 - NGTD   Pericardial effusion  Hemodynamic/cardio-renal LINAD/CKD (peak Cr 2.98 - 3/6)  S/p pericardial window 3/6, clot extracted   Diuresis per HF team   Cr is improving  Good UO   Olivares d/c-d, f/u PVR  Avoid nephrotoxins, hypotension   F/u CBC, BMP, Mg    [phone]

## 2024-03-09 NOTE — PROGRESS NOTE ADULT - PROBLEM SELECTOR PLAN 4
Avoid nephrotoxins and hypotension   D/C Olivares for TOV   Follow BUN/Creatinine  Nephro Following  Continue with Bumex 2 mg PO BID

## 2024-03-09 NOTE — PROGRESS NOTE ADULT - SUBJECTIVE AND OBJECTIVE BOX
VITAL SIGNS    Subjective: "I'm feeling ok" Denies CP, palpitation, SOB, VANCE, HA, dizziness, N/V/D, fever or chills.  No acute event noted overnight.     Telemetry: NSR      Vital Signs Last 24 Hrs  T(C): 36.7 (24 @ 04:44), Max: 36.7 (24 @ 04:44)  T(F): 98.1 (24 @ 04:44), Max: 98.1 (24 @ 04:44)  HR: 65 (24 @ 07:18) (65 - 75)  BP: 120/68 (24 @ 04:44) (120/68 - 126/72)  RR: 18 (24 @ 04:44) (18 - 18)  SpO2: 94% (24 @ 04:44) (94% - 96%)            @ 07:01  -   @ 07:00  --------------------------------------------------------  IN: 580 mL / OUT: 880 mL / NET: -300 mL    LABS      138  |  97  |  59<H>  ----------------------------<  89  4.0   |  32<H>  |  2.22<H>    Ca    8.7      09 Mar 2024 06:40  Phos  3.1       Mg     2.7         TPro  6.5  /  Alb  3.5  /  TBili  1.0  /  DBili  0.5<H>  /  AST  18  /  ALT  16  /  AlkPhos  108  08                                 8.7    11.09 )-----------( 253      ( 09 Mar 2024 06:40 )             26.0          PT/INR - ( 08 Mar 2024 01:37 )   PT: 11.1 sec;   INR: 1.01 ratio         PTT - ( 08 Mar 2024 01:37 )  PTT:27.1 sec        Daily     Daily Weight in k.5 (09 Mar 2024 04:44)      CAPILLARY BLOOD GLUCOSE    POCT Blood Glucose.: 144 mg/dL (08 Mar 2024 11:51)          Drains:  Pericardial drain [ X ]  Drainage: 20 cc / 130 cc/24 hr     Pacing Wires: [ X ]   Settings: VVI 40 / Vma10                                     PHYSICAL EXAM    Neurology: alert and oriented x 3, nonfocal, no gross deficits    CV: (+) S1 and S2, No murmurs, rubs, gallops or clicks     Sternal Wound: MSI -->with staples --> YAQUELIN; CDI, sternum stable. Pericardial drain with DSD C/D/I.  Pericardial drain to pleural vac --> LWS. Negative Air Leak.  PW x 2 --> EPM VVI 40/10     Lungs: Left lung with (+) Expiratory wheeze.  Right Clear throughout.      Abdomen: soft, nontender, nondistended, positive bowel sounds, (+) Flatus; (+) BM     : Indwelling garcia cath --> SD                Extremities:  B/L LE (+) 1 edema; negative calf tenderness; (+) 2 DP palpable; LLE with posterior wound with brown and yellow slough stable and dry, scant serosanguinous drainage. RLE with anterior and posterior wounds soft yellow slough mild/moderate serosanguinous drainage noted.  B/L LE YAQUELIN.          acetaminophen Tablet .. 650 milliGRAM(s) Oral every 6 hours PRN  allopurinol 100 milliGRAM(s) Oral daily  aMIOdarone Tablet   Oral   aMIOdarone Tablet 200 milliGRAM(s) Oral daily  aspirin enteric coated 81 milliGRAM(s) Oral daily  benzocaine/menthol Lozenge 1 Lozenge Oral four times a day PRN  bisacodyl Suppository 10 milliGRAM(s) Rectal daily PRN  buMETAnide 2 milliGRAM(s) Oral two times a day  cefepime IVPB 2000 milliGRAM(s) IV Intermittent every 24 hours  chlorhexidine 2% Cloths 1 Application(s) Topical daily  digoxin Tablet 125 MICROGram(s) Oral every other day  FIRST- Mouthwash  BLM 10 milliLiter(s) Swish and Swallow every 6 hours  heparin Injectable 5000 Unit(s) SubCutaneous every 8 hours  hydrALAZINE 37.5 milliGRAM(s) Oral every 8 hours  levothyroxine 50 MICROGram(s) Oral <User Schedule>  lidocaine 4% Patch 1 Patch Transdermal daily  mupirocin 2% Ointment 1 Application(s) Topical <User Schedule>  oxyCODONE IR 10 milliGRAM(s) Oral every 8 hours PRN  pantoprazole Tablet 40 milliGRAM(s) Oral daily  polyethylene glycol 3350 17 Gram(s) Oral daily  senna 2 Tablet(s) Oral at bedtime  sodium chloride 0.65% Nasal 1 Spray(s) Both Nostrils four times a day    Physical Therapy Rec:   Home  [  ]   Home w/ PT  [ X ]  Rehab  [  ]    Discussed with Cardiothoracic Team at AM rounds.

## 2024-03-09 NOTE — PROGRESS NOTE ADULT - PROBLEM SELECTOR PLAN 1
2/23 s/p AVR  3/6 S/P pericardial window  Maintain Pericardial drain to pleur- evac to suction   Continue with ASA 81 mg PO Daily.   Maintain PW x 2, VVI 40/10  Increase activity as tolerated.   Encourage Chest PT / Pulmonary toileting and Incentive spirometry every 1 hour x 10 while awake.   Continue with Protonix 40 mg PO daily and Heparin 5000 units SQ Q 8 daily for PUD and DVT prophylaxis.   Sternal precautions and wound care  Plan of care discussed with attending   D/C plan home with PT and rolling walker once medically cleared

## 2024-03-09 NOTE — PROGRESS NOTE ADULT - PROBLEM SELECTOR PLAN 2
RHC from 3/5 not suggestive of low output.  Replete to target K 4-4.5 and Mg 2-2.5  HF following  Continue on Hydralazine 37.5 mg PO TID for afterload reduction  Continue on Bumex 2 mg PO BID  Strict MANISH's   Daily standing weights

## 2024-03-09 NOTE — PROGRESS NOTE ADULT - PROBLEM SELECTOR PLAN 3
Continue Amiodarone 200 mg PO daily   Continue on Digoxin 125mg PO QOD  No AC therapy as per Dr. Martinez

## 2024-03-10 ENCOUNTER — TRANSCRIPTION ENCOUNTER (OUTPATIENT)
Age: 64
End: 2024-03-10

## 2024-03-10 LAB
ALBUMIN SERPL ELPH-MCNC: 3.4 G/DL — SIGNIFICANT CHANGE UP (ref 3.3–5)
ALP SERPL-CCNC: 105 U/L — SIGNIFICANT CHANGE UP (ref 40–120)
ALT FLD-CCNC: 14 U/L — SIGNIFICANT CHANGE UP (ref 10–45)
ANION GAP SERPL CALC-SCNC: 9 MMOL/L — SIGNIFICANT CHANGE UP (ref 5–17)
AST SERPL-CCNC: 13 U/L — SIGNIFICANT CHANGE UP (ref 10–40)
BILIRUB SERPL-MCNC: 1 MG/DL — SIGNIFICANT CHANGE UP (ref 0.2–1.2)
BUN SERPL-MCNC: 59 MG/DL — HIGH (ref 7–23)
CALCIUM SERPL-MCNC: 8.7 MG/DL — SIGNIFICANT CHANGE UP (ref 8.4–10.5)
CHLORIDE SERPL-SCNC: 98 MMOL/L — SIGNIFICANT CHANGE UP (ref 96–108)
CO2 SERPL-SCNC: 31 MMOL/L — SIGNIFICANT CHANGE UP (ref 22–31)
CREAT SERPL-MCNC: 2 MG/DL — HIGH (ref 0.5–1.3)
CULTURE RESULTS: SIGNIFICANT CHANGE UP
CULTURE RESULTS: SIGNIFICANT CHANGE UP
EGFR: 37 ML/MIN/1.73M2 — LOW
GLUCOSE SERPL-MCNC: 215 MG/DL — HIGH (ref 70–99)
HCT VFR BLD CALC: 26.5 % — LOW (ref 39–50)
HGB BLD-MCNC: 8.3 G/DL — LOW (ref 13–17)
MAGNESIUM SERPL-MCNC: 2.2 MG/DL — SIGNIFICANT CHANGE UP (ref 1.6–2.6)
MCHC RBC-ENTMCNC: 23.9 PG — LOW (ref 27–34)
MCHC RBC-ENTMCNC: 31.3 GM/DL — LOW (ref 32–36)
MCV RBC AUTO: 76.4 FL — LOW (ref 80–100)
NRBC # BLD: 0 /100 WBCS — SIGNIFICANT CHANGE UP (ref 0–0)
PLATELET # BLD AUTO: 219 K/UL — SIGNIFICANT CHANGE UP (ref 150–400)
POTASSIUM SERPL-MCNC: 3.9 MMOL/L — SIGNIFICANT CHANGE UP (ref 3.5–5.3)
POTASSIUM SERPL-SCNC: 3.9 MMOL/L — SIGNIFICANT CHANGE UP (ref 3.5–5.3)
PROT SERPL-MCNC: 6.3 G/DL — SIGNIFICANT CHANGE UP (ref 6–8.3)
RBC # BLD: 3.47 M/UL — LOW (ref 4.2–5.8)
RBC # FLD: SIGNIFICANT CHANGE UP (ref 10.3–14.5)
SODIUM SERPL-SCNC: 138 MMOL/L — SIGNIFICANT CHANGE UP (ref 135–145)
SPECIMEN SOURCE: SIGNIFICANT CHANGE UP
SPECIMEN SOURCE: SIGNIFICANT CHANGE UP
WBC # BLD: 11.19 K/UL — HIGH (ref 3.8–10.5)
WBC # FLD AUTO: 11.19 K/UL — HIGH (ref 3.8–10.5)

## 2024-03-10 RX ADMIN — BUMETANIDE 2 MILLIGRAM(S): 0.25 INJECTION INTRAMUSCULAR; INTRAVENOUS at 05:29

## 2024-03-10 RX ADMIN — Medication 100 MILLIGRAM(S): at 11:31

## 2024-03-10 RX ADMIN — Medication 1 SPRAY(S): at 17:36

## 2024-03-10 RX ADMIN — HEPARIN SODIUM 5000 UNIT(S): 5000 INJECTION INTRAVENOUS; SUBCUTANEOUS at 05:28

## 2024-03-10 RX ADMIN — Medication 37.5 MILLIGRAM(S): at 14:14

## 2024-03-10 RX ADMIN — Medication 50 MICROGRAM(S): at 06:48

## 2024-03-10 RX ADMIN — HEPARIN SODIUM 5000 UNIT(S): 5000 INJECTION INTRAVENOUS; SUBCUTANEOUS at 22:15

## 2024-03-10 RX ADMIN — DIPHENHYDRAMINE HYDROCHLORIDE AND LIDOCAINE HYDROCHLORIDE AND ALUMINUM HYDROXIDE AND MAGNESIUM HYDRO 10 MILLILITER(S): KIT at 11:32

## 2024-03-10 RX ADMIN — DIPHENHYDRAMINE HYDROCHLORIDE AND LIDOCAINE HYDROCHLORIDE AND ALUMINUM HYDROXIDE AND MAGNESIUM HYDRO 10 MILLILITER(S): KIT at 05:27

## 2024-03-10 RX ADMIN — OXYCODONE HYDROCHLORIDE 10 MILLIGRAM(S): 5 TABLET ORAL at 16:35

## 2024-03-10 RX ADMIN — AMIODARONE HYDROCHLORIDE 200 MILLIGRAM(S): 400 TABLET ORAL at 05:30

## 2024-03-10 RX ADMIN — MUPIROCIN 1 APPLICATION(S): 20 OINTMENT TOPICAL at 08:59

## 2024-03-10 RX ADMIN — CEFEPIME 100 MILLIGRAM(S): 1 INJECTION, POWDER, FOR SOLUTION INTRAMUSCULAR; INTRAVENOUS at 04:21

## 2024-03-10 RX ADMIN — Medication 37.5 MILLIGRAM(S): at 05:30

## 2024-03-10 RX ADMIN — Medication 125 MICROGRAM(S): at 11:31

## 2024-03-10 RX ADMIN — Medication 1 SPRAY(S): at 05:31

## 2024-03-10 RX ADMIN — HEPARIN SODIUM 5000 UNIT(S): 5000 INJECTION INTRAVENOUS; SUBCUTANEOUS at 14:16

## 2024-03-10 RX ADMIN — BUMETANIDE 2 MILLIGRAM(S): 0.25 INJECTION INTRAMUSCULAR; INTRAVENOUS at 14:14

## 2024-03-10 RX ADMIN — PANTOPRAZOLE SODIUM 40 MILLIGRAM(S): 20 TABLET, DELAYED RELEASE ORAL at 11:31

## 2024-03-10 RX ADMIN — OXYCODONE HYDROCHLORIDE 10 MILLIGRAM(S): 5 TABLET ORAL at 16:05

## 2024-03-10 RX ADMIN — DIPHENHYDRAMINE HYDROCHLORIDE AND LIDOCAINE HYDROCHLORIDE AND ALUMINUM HYDROXIDE AND MAGNESIUM HYDRO 10 MILLILITER(S): KIT at 17:36

## 2024-03-10 RX ADMIN — CHLORHEXIDINE GLUCONATE 1 APPLICATION(S): 213 SOLUTION TOPICAL at 16:06

## 2024-03-10 RX ADMIN — Medication 81 MILLIGRAM(S): at 11:31

## 2024-03-10 RX ADMIN — Medication 1 SPRAY(S): at 11:35

## 2024-03-10 RX ADMIN — Medication 37.5 MILLIGRAM(S): at 22:16

## 2024-03-10 NOTE — PROGRESS NOTE ADULT - PROBLEM SELECTOR PLAN 1
2/23 s/p AVR  3/6 S/P pericardial window  Maintain Pericardial drain to pleur- evac to suction   Continue with ASA 81 mg PO Daily.   3/10 isolate pw  Increase activity as tolerated.   Encourage Chest PT / Pulmonary toileting and Incentive spirometry every 1 hour x 10 while awake.   Continue with Protonix 40 mg PO daily and Heparin 5000 units SQ Q 8 daily for PUD and DVT prophylaxis.   Sternal precautions and wound care  Plan of care discussed with attending   D/C plan home with PT and rolling walker once medically cleared

## 2024-03-10 NOTE — PROGRESS NOTE ADULT - SUBJECTIVE AND OBJECTIVE BOX
VITAL SIGNS-Telemetry:  SR 70-90  Vital Signs Last 24 Hrs  T(C): 36.9 (03-10-24 @ 04:00), Max: 36.9 (24 @ 12:00)  T(F): 98.4 (03-10-24 @ 04:00), Max: 98.4 (24 @ 12:00)  HR: 75 (03-10-24 @ 04:00) (65 - 80)  BP: 127/72 (03-10-24 @ 04:00) (118/61 - 127/72)  RR: 18 (03-10-24 @ 04:00) (18 - 18)  SpO2: 98% (03-10-24 @ 04:00) (95% - 98%)          @ 07:01  -   @ 07:00  --------------------------------------------------------  IN: 580 mL / OUT: 880 mL / NET: -300 mL     @ 06:01  -  10 @ 06:33  --------------------------------------------------------  IN: 770 mL / OUT: 1450 mL / NET: -680 mL    Daily     Daily Weight in k (10 Mar 2024 06:15)    Pacing Wires        [  ]   Settings:                                  Isolated  [ x ]      PHYSICAL EXAM:  Neurology: alert and oriented x 3, nonfocal, no gross deficits  CV : S1S2  Sternal Wound :  CDI , Stable  Lungs: cta  Abdomen: soft, nontender, nondistended, positive bowel sounds, last bowel movement     3/9    Extremities:   +  edema  no calf tenderness    acetaminophen     Tablet .. 650 milliGRAM(s) Oral every 6 hours PRN  allopurinol 100 milliGRAM(s) Oral daily  aMIOdarone    Tablet   Oral   aMIOdarone    Tablet 200 milliGRAM(s) Oral daily  aspirin enteric coated 81 milliGRAM(s) Oral daily  benzocaine/menthol Lozenge 1 Lozenge Oral four times a day PRN  bisacodyl Suppository 10 milliGRAM(s) Rectal daily PRN  buMETAnide 2 milliGRAM(s) Oral two times a day  cefepime   IVPB 2000 milliGRAM(s) IV Intermittent every 24 hours  chlorhexidine 2% Cloths 1 Application(s) Topical daily  digoxin     Tablet 125 MICROGram(s) Oral every other day  FIRST- Mouthwash  BLM 10 milliLiter(s) Swish and Swallow every 6 hours  heparin   Injectable 5000 Unit(s) SubCutaneous every 8 hours  hydrALAZINE 37.5 milliGRAM(s) Oral every 8 hours  levothyroxine 50 MICROGram(s) Oral <User Schedule>  lidocaine   4% Patch 1 Patch Transdermal daily  mupirocin 2% Ointment 1 Application(s) Topical <User Schedule>  oxyCODONE    IR 10 milliGRAM(s) Oral every 8 hours PRN  pantoprazole    Tablet 40 milliGRAM(s) Oral daily  polyethylene glycol 3350 17 Gram(s) Oral daily  senna 2 Tablet(s) Oral at bedtime  sodium chloride 0.65% Nasal 1 Spray(s) Both Nostrils four times a day    Physical Therapy Rec:   Home  [  ]   Home w/ PT  [ x ]  Rehab  [  ]  Discussed with Cardiothoracic Team at AM rounds. VITAL SIGNS-Telemetry:  SR 70-90  Vital Signs Last 24 Hrs  T(C): 36.9 (03-10-24 @ 04:00), Max: 36.9 (24 @ 12:00)  T(F): 98.4 (03-10-24 @ 04:00), Max: 98.4 (24 @ 12:00)  HR: 75 (03-10-24 @ 04:00) (65 - 80)  BP: 127/72 (03-10-24 @ 04:00) (118/61 - 127/72)  RR: 18 (03-10-24 @ 04:00) (18 - 18)  SpO2: 98% (03-10-24 @ 04:00) (95% - 98%)          @ 07:01  -   @ 07:00  --------------------------------------------------------  IN: 580 mL / OUT: 880 mL / NET: -300 mL     @ 06:01  -  10 @ 06:33  --------------------------------------------------------  IN: 770 mL / OUT: 1450 mL / NET: -680 mL    Daily     Daily Weight in k (10 Mar 2024 06:15)    Pacing Wires        [  ]   Settings:                                  Isolated  [ x ]      PHYSICAL EXAM:  Neurology: alert and oriented x 3, nonfocal, no gross deficits  CV : S1S2  Sternal Wound :  CDI , Stable- staples @ distal incision- pericardial drain in place - serosanguinous   Lungs: cta  Abdomen: soft, nontender, nondistended, positive bowel sounds, last bowel movement     3/9    Extremities:   ++ edema- improved - b/l ace wrap in place  no calf tenderness    acetaminophen     Tablet .. 650 milliGRAM(s) Oral every 6 hours PRN  allopurinol 100 milliGRAM(s) Oral daily  aMIOdarone    Tablet   Oral   aMIOdarone    Tablet 200 milliGRAM(s) Oral daily  aspirin enteric coated 81 milliGRAM(s) Oral daily  benzocaine/menthol Lozenge 1 Lozenge Oral four times a day PRN  bisacodyl Suppository 10 milliGRAM(s) Rectal daily PRN  buMETAnide 2 milliGRAM(s) Oral two times a day  cefepime   IVPB 2000 milliGRAM(s) IV Intermittent every 24 hours  chlorhexidine 2% Cloths 1 Application(s) Topical daily  digoxin     Tablet 125 MICROGram(s) Oral every other day  FIRST- Mouthwash  BLM 10 milliLiter(s) Swish and Swallow every 6 hours  heparin   Injectable 5000 Unit(s) SubCutaneous every 8 hours  hydrALAZINE 37.5 milliGRAM(s) Oral every 8 hours  levothyroxine 50 MICROGram(s) Oral <User Schedule>  lidocaine   4% Patch 1 Patch Transdermal daily  mupirocin 2% Ointment 1 Application(s) Topical <User Schedule>  oxyCODONE    IR 10 milliGRAM(s) Oral every 8 hours PRN  pantoprazole    Tablet 40 milliGRAM(s) Oral daily  polyethylene glycol 3350 17 Gram(s) Oral daily  senna 2 Tablet(s) Oral at bedtime  sodium chloride 0.65% Nasal 1 Spray(s) Both Nostrils four times a day    Physical Therapy Rec:   Home  [  ]   Home w/ PT  [ x ]  Rehab  [  ]  Discussed with Cardiothoracic Team at AM rounds.

## 2024-03-10 NOTE — PROGRESS NOTE ADULT - PROBLEM SELECTOR PLAN 5
Wound consult following recommending BLE wound Medihoney daily   Continue turning and positioning w/ offloading assistive devices as per protocol  Continue w/ attends under pads and Pericare as per protocol  Waffle Cushion to chair when oob to chair  Continue w/ low air loss pressure redistribution bed surface     -Upon discharge f/u as outpatient at [facility] [address] [facility] [phone]

## 2024-03-10 NOTE — PROGRESS NOTE ADULT - REASON FOR ADMISSION
AS
aortic stenosis
AVR-t for aortic stenosis, LINDA
aortic stenosis
s/p AVR and JOANNA clip
LE swelling
sob
Aortic Valvular Disease
SOB
aortic stenosis
shortness of breath and leg edema
swelling
SOB
ADHF
SOB
s/p AVR
sob
TAVR eval
CHF exacerbation
HF

## 2024-03-10 NOTE — PROGRESS NOTE ADULT - ASSESSMENT
63M, appears older than stated age, admitted to [facility] 24 PMHx HTN, GERD, HPL, chronic venous stasis dermatitis with bilateral leg swelling, and AS presented to [place] [facility] on 2024 for bilateral leg pain and swelling found to have new acute HFrEF (EF 35-40% 2024) in setting of severe AS.     Hospital course:  24: Admitted to [place] new onset HFrEF EF 35% and anemia 2/2 AS,   24: medically stable for transferred to [facility] medicine service for continued care. during hospitalization hx of WCT/ PAF  Consults for Nephrology, HF, ID, Podiatry for medical optimization  2/15: 1 U PRBC, keep Hgb > 8.    s/p fall with head strike   : s/p Liver biopsy, soft pressure post procedure--responded to fluid bolus  :  RLE doppler: No pseudoaneurysm in the right groin/ bilobed avascular fluid collection measuring 2.8 x 1.7 x 2.0 cm  : AVR/JOANNA Clip c/b intraop bleeding requirinu pRBCs on CPB 1u pRBCs post-CPB 1 PLT 1000 FEIBA  Prolene suture placed around IJ and V wire  Post-Op Course:  : To Step Down, raegan, CT, +PW, marlena RIJ   Maintain mediastinal chest tubes Primicor discontinued. Lasix 40 BID initiated , Wound care to chronic venous stasis b/l LE. D/c RIJ   VSS - 9 hrs afib last evening- SR since 1:30am- on AMio gtt,  gtt - s/p right axillary marlena placed by Intensivist. garcia placed last evening lasix 40 iv x 1 given this am- echo tr jon eff.   VSS - currently on lasix gtt - negative 2L in last 24 hrs. bid basic metabolic pt converted back into afib this am 100-120- currently on amio load - Dobutrex gtt. d/c planning    d/c yesterday then resumed for decreased UO.  Remains a fib since yesterday, cont amio/heparin, eps following. Weight increased, lasix 80 iv x 1, then infusion increased , aldactone added .  Remains on heparin infusion. Hydralazine added for afterload    NSR, on amio, EPS following, noted elevated tsh.  Repeat sent synthroid started.  Fluid overload, diuretics increased, 3% saline x 2, lasix 80 iv x 1, lasix infusion 20mg/hr  3/1 febrile  overnight  and this am pan cx. Plan to remove marlena, intro , garcia.  A line is from , intro . Cont , lasix, f/u bun creat  3/2 VSS afebrile overnight.  97 r/s.  on lasix gtt @ 20mg/jr  negative 2366cc.  will monitor closely in sdu  3/3 Bumex gtt decreased to 1mg/hr per HF - @8:45am pt converted to afib -100-130s w/ hypotension while on commode attempting to have a bm- pt asymptomatic. brought back to bed.  amio 150 IV x 1 given w/ filter. dig load per HF - Dr. Rosario - .75mg total d/t sin.  pt u/o decreased while in afib- DR. Rosario @ bedside CXR done.  spoke to EP - Dr. Ghotra regarding DCCV - Call to CTU to set up transfer for DCCV - transfer cancelled d/t pt. converted to SR 68- w/ increase in SBP to 102 & U/O increasing.  ENT consulted for c/o "hurts when i swallow"  ent exam reveals no rod.  lido s&s ordered.  pt eating w/o difficulty  3/4 Pt with edema not improved, elevated creat.  Bumex d/c. Placed on iv Lasix aldactone d/c.  Albumin x 2  3/5 Wt stable, remains off diuretic + edema, creat up yesterday. RHC today  Tx To CTU for closer monitoring as CKD worsening  3/6 still not diuresing despite Bumex gtt, TTE revealed pericardial effusion returned to OR s/p 3/6 subxiphoid drainage and pericardial window.    3/7 weaned off inotropes, improved diuresis to 5L  3/8 Tx to Fl VSS, MCT, Garcia 2V EPW to box  40/10  VSS - Pericardial drainage to remain in place, Pacing Wires x 2 to remain in place. Garcia catheter to remain in place, creatinine 2.65. 2mg Bumex BID per HF. No A/C per Dr. Martinez.  Continue on Cefepime 2 Grams IV Q 8 hours per ID for (+)Serratia marcescens bacteremia.  Repeat TTE to r/o bio-prosthetic endocarditis. TTE findings: small pericardial effusion noted to the anterior RV.  No echodensity noted on the valves. Unable to exclude valvular vegetation in the setting of thickened or calcified valves and/or technically limited/difficult study.   3/9 VSS; Continue with current medication regimen.   D/C Garcia for a TOV.  Leukocytosis improving --> WBC 11.  Maintain pericardial drain in place, total output 130cc/24 hrs.  3/10 VSS pericardial drain 100 cc in last 24 hrs.  Cefepime for PSA in BAL    Disposition: Home PT with rolling walker once medically cleared.      63M, appears older than stated age, admitted to [facility] 24 PMHx HTN, GERD, HPL, chronic venous stasis dermatitis with bilateral leg swelling, and AS presented to [place] ER on 2024 for bilateral leg pain and swelling found to have new acute HFrEF (EF 35-40% 2024) in setting of severe AS.     Hospital course:  24: Admitted to [place] new onset HFrEF EF 35% and anemia 2/2 AS,   24: medically stable for transferred to [facility] medicine service for continued care. during hospitalization hx of WCT/ PAF  Consults for Nephrology, HF, ID, Podiatry for medical optimization  2/15: 1 U PRBC, keep Hgb > 8.    s/p fall with head strike   : s/p Liver biopsy, soft pressure post procedure--responded to fluid bolus  :  RLE doppler: No pseudoaneurysm in the right groin/ bilobed avascular fluid collection measuring 2.8 x 1.7 x 2.0 cm  : AVR/JOANNA Clip c/b intraop bleeding requirinu pRBCs on CPB 1u pRBCs post-CPB 1 PLT 1000 FEIBA  Prolene suture placed around IJ and V wire  Post-Op Course:  : To Step Down, raegan, CT, +PW, marlena RIJ   Maintain mediastinal chest tubes Primicor discontinued. Lasix 40 BID initiated , Wound care to chronic venous stasis b/l LE. D/c RIJ   VSS - 9 hrs afib last evening- SR since 1:30am- on AMio gtt,  gtt - s/p right axillary marlena placed by Intensivist. garcia placed last evening lasix 40 iv x 1 given this am- echo tr jon eff.   VSS - currently on lasix gtt - negative 2L in last 24 hrs. bid basic metabolic pt converted back into afib this am 100-120- currently on amio load - Dobutrex gtt. d/c planning    d/c yesterday then resumed for decreased UO.  Remains a fib since yesterday, cont amio/heparin, eps following. Weight increased, lasix 80 iv x 1, then infusion increased , aldactone added .  Remains on heparin infusion. Hydralazine added for afterload    NSR, on amio, EPS following, noted elevated tsh.  Repeat sent synthroid started.  Fluid overload, diuretics increased, 3% saline x 2, lasix 80 iv x 1, lasix infusion 20mg/hr  3/1 febrile  overnight  and this am pan cx. Plan to remove marlena, intro , garcia.  A line is from , intro . Cont , lasix, f/u bun creat  3/2 VSS afebrile overnight.  97 r/s.  on lasix gtt @ 20mg/jr  negative 2366cc.  will monitor closely in sdu  3/3 Bumex gtt decreased to 1mg/hr per HF - @8:45am pt converted to afib -100-130s w/ hypotension while on commode attempting to have a bm- pt asymptomatic. brought back to bed.  amio 150 IV x 1 given w/ filter. dig load per HF - Dr. Rosario - .75mg total d/t sin.  pt u/o decreased while in afib- DR. Rosario @ bedside CXR done.  spoke to EP - Dr. Ghotra regarding DCCV - Call to CTU to set up transfer for DCCV - transfer cancelled d/t pt. converted to SR 68- w/ increase in SBP to 102 & U/O increasing.  ENT consulted for c/o "hurts when i swallow"  ent exam reveals no rod.  lido s&s ordered.  pt eating w/o difficulty  3/4 Pt with edema not improved, elevated creat.  Bumex d/c. Placed on iv Lasix aldactone d/c.  Albumin x 2  3/5 Wt stable, remains off diuretic + edema, creat up yesterday. RHC today  Tx To CTU for closer monitoring as CKD worsening  3/6 still not diuresing despite Bumex gtt, TTE revealed pericardial effusion returned to OR s/p 3/6 subxiphoid drainage and pericardial window.    3/7 weaned off inotropes, improved diuresis to 5L  3/8 Tx to Fl VSS, MCT, Garcia 2V EPW to box  40/10  VSS - Pericardial drainage to remain in place, Pacing Wires x 2 to remain in place. Garcia catheter to remain in place, creatinine 2.65. 2mg Bumex BID per HF. No A/C per Dr. Martinez.  Continue on Cefepime 2 Grams IV Q 8 hours per ID for (+)Serratia marcescens bacteremia.  Repeat TTE to r/o bio-prosthetic endocarditis. TTE findings: small pericardial effusion noted to the anterior RV.  No echodensity noted on the valves. Unable to exclude valvular vegetation in the setting of thickened or calcified valves and/or technically limited/difficult study.   3/9 VSS; Continue with current medication regimen.   D/C Garcia for a TOV.  Leukocytosis improving --> WBC 11.  Maintain pericardial drain in place, total output 130cc/24 hrs.  3/10 VSS pericardial drain 100 cc in last 24 hrs.  Cefepime for PSA in BAL pws d/c'd this am   Disposition: Home PT with rolling walker once medically cleared.

## 2024-03-10 NOTE — PROGRESS NOTE ADULT - PROBLEM SELECTOR PLAN 4
Avoid nephrotoxins and hypotension   D/C Olivares for TOV   Follow BUN/Creatinine  Nephro Following  Continue with Bumex 2 mg PO BID 19:55

## 2024-03-11 LAB
ANION GAP SERPL CALC-SCNC: 10 MMOL/L — SIGNIFICANT CHANGE UP (ref 5–17)
BUN SERPL-MCNC: 58 MG/DL — HIGH (ref 7–23)
CALCIUM SERPL-MCNC: 8.8 MG/DL — SIGNIFICANT CHANGE UP (ref 8.4–10.5)
CHLORIDE SERPL-SCNC: 101 MMOL/L — SIGNIFICANT CHANGE UP (ref 96–108)
CO2 SERPL-SCNC: 30 MMOL/L — SIGNIFICANT CHANGE UP (ref 22–31)
CREAT SERPL-MCNC: 1.94 MG/DL — HIGH (ref 0.5–1.3)
CULTURE RESULTS: SIGNIFICANT CHANGE UP
EGFR: 38 ML/MIN/1.73M2 — LOW
GLUCOSE SERPL-MCNC: 107 MG/DL — HIGH (ref 70–99)
HCT VFR BLD CALC: 26.9 % — LOW (ref 39–50)
HGB BLD-MCNC: 8.5 G/DL — LOW (ref 13–17)
MCHC RBC-ENTMCNC: 24.4 PG — LOW (ref 27–34)
MCHC RBC-ENTMCNC: 31.6 GM/DL — LOW (ref 32–36)
MCV RBC AUTO: 77.1 FL — LOW (ref 80–100)
NRBC # BLD: 0 /100 WBCS — SIGNIFICANT CHANGE UP (ref 0–0)
PLATELET # BLD AUTO: 245 K/UL — SIGNIFICANT CHANGE UP (ref 150–400)
POTASSIUM SERPL-MCNC: 4.2 MMOL/L — SIGNIFICANT CHANGE UP (ref 3.5–5.3)
POTASSIUM SERPL-SCNC: 4.2 MMOL/L — SIGNIFICANT CHANGE UP (ref 3.5–5.3)
RBC # BLD: 3.49 M/UL — LOW (ref 4.2–5.8)
RBC # FLD: SIGNIFICANT CHANGE UP (ref 10.3–14.5)
SODIUM SERPL-SCNC: 141 MMOL/L — SIGNIFICANT CHANGE UP (ref 135–145)
SPECIMEN SOURCE: SIGNIFICANT CHANGE UP
WBC # BLD: 12.73 K/UL — HIGH (ref 3.8–10.5)
WBC # FLD AUTO: 12.73 K/UL — HIGH (ref 3.8–10.5)

## 2024-03-11 PROCEDURE — 99232 SBSQ HOSP IP/OBS MODERATE 35: CPT

## 2024-03-11 RX ORDER — BUMETANIDE 0.25 MG/ML
3 INJECTION INTRAMUSCULAR; INTRAVENOUS
Refills: 0 | Status: DISCONTINUED | OUTPATIENT
Start: 2024-03-11 | End: 2024-03-12

## 2024-03-11 RX ORDER — HYDRALAZINE HCL 50 MG
50 TABLET ORAL EVERY 8 HOURS
Refills: 0 | Status: DISCONTINUED | OUTPATIENT
Start: 2024-03-11 | End: 2024-03-12

## 2024-03-11 RX ADMIN — AMIODARONE HYDROCHLORIDE 200 MILLIGRAM(S): 400 TABLET ORAL at 06:02

## 2024-03-11 RX ADMIN — Medication 1 SPRAY(S): at 06:02

## 2024-03-11 RX ADMIN — OXYCODONE HYDROCHLORIDE 10 MILLIGRAM(S): 5 TABLET ORAL at 09:50

## 2024-03-11 RX ADMIN — OXYCODONE HYDROCHLORIDE 10 MILLIGRAM(S): 5 TABLET ORAL at 10:30

## 2024-03-11 RX ADMIN — Medication 1 SPRAY(S): at 18:01

## 2024-03-11 RX ADMIN — Medication 37.5 MILLIGRAM(S): at 15:15

## 2024-03-11 RX ADMIN — HEPARIN SODIUM 5000 UNIT(S): 5000 INJECTION INTRAVENOUS; SUBCUTANEOUS at 06:01

## 2024-03-11 RX ADMIN — Medication 81 MILLIGRAM(S): at 09:50

## 2024-03-11 RX ADMIN — Medication 650 MILLIGRAM(S): at 12:51

## 2024-03-11 RX ADMIN — DIPHENHYDRAMINE HYDROCHLORIDE AND LIDOCAINE HYDROCHLORIDE AND ALUMINUM HYDROXIDE AND MAGNESIUM HYDRO 10 MILLILITER(S): KIT at 18:01

## 2024-03-11 RX ADMIN — HEPARIN SODIUM 5000 UNIT(S): 5000 INJECTION INTRAVENOUS; SUBCUTANEOUS at 15:15

## 2024-03-11 RX ADMIN — DIPHENHYDRAMINE HYDROCHLORIDE AND LIDOCAINE HYDROCHLORIDE AND ALUMINUM HYDROXIDE AND MAGNESIUM HYDRO 10 MILLILITER(S): KIT at 12:54

## 2024-03-11 RX ADMIN — BUMETANIDE 2 MILLIGRAM(S): 0.25 INJECTION INTRAMUSCULAR; INTRAVENOUS at 15:15

## 2024-03-11 RX ADMIN — MUPIROCIN 1 APPLICATION(S): 20 OINTMENT TOPICAL at 12:52

## 2024-03-11 RX ADMIN — OXYCODONE HYDROCHLORIDE 10 MILLIGRAM(S): 5 TABLET ORAL at 22:11

## 2024-03-11 RX ADMIN — CHLORHEXIDINE GLUCONATE 1 APPLICATION(S): 213 SOLUTION TOPICAL at 12:44

## 2024-03-11 RX ADMIN — DIPHENHYDRAMINE HYDROCHLORIDE AND LIDOCAINE HYDROCHLORIDE AND ALUMINUM HYDROXIDE AND MAGNESIUM HYDRO 10 MILLILITER(S): KIT at 06:03

## 2024-03-11 RX ADMIN — PANTOPRAZOLE SODIUM 40 MILLIGRAM(S): 20 TABLET, DELAYED RELEASE ORAL at 09:50

## 2024-03-11 RX ADMIN — CEFEPIME 100 MILLIGRAM(S): 1 INJECTION, POWDER, FOR SOLUTION INTRAMUSCULAR; INTRAVENOUS at 04:29

## 2024-03-11 RX ADMIN — Medication 650 MILLIGRAM(S): at 13:45

## 2024-03-11 RX ADMIN — Medication 50 MICROGRAM(S): at 06:01

## 2024-03-11 RX ADMIN — HEPARIN SODIUM 5000 UNIT(S): 5000 INJECTION INTRAVENOUS; SUBCUTANEOUS at 21:03

## 2024-03-11 RX ADMIN — Medication 1 SPRAY(S): at 12:54

## 2024-03-11 RX ADMIN — Medication 37.5 MILLIGRAM(S): at 06:02

## 2024-03-11 RX ADMIN — OXYCODONE HYDROCHLORIDE 10 MILLIGRAM(S): 5 TABLET ORAL at 21:41

## 2024-03-11 RX ADMIN — Medication 100 MILLIGRAM(S): at 12:52

## 2024-03-11 RX ADMIN — Medication 50 MILLIGRAM(S): at 21:04

## 2024-03-11 RX ADMIN — BUMETANIDE 2 MILLIGRAM(S): 0.25 INJECTION INTRAMUSCULAR; INTRAVENOUS at 06:02

## 2024-03-11 RX ADMIN — DIPHENHYDRAMINE HYDROCHLORIDE AND LIDOCAINE HYDROCHLORIDE AND ALUMINUM HYDROXIDE AND MAGNESIUM HYDRO 10 MILLILITER(S): KIT at 00:19

## 2024-03-11 NOTE — PROGRESS NOTE ADULT - SUBJECTIVE AND OBJECTIVE BOX
Patient discussed on morning rounds with Dr. Martinez    Operation / Date: 2/23/24 JOANNA EDDY Clip    SUBJECTIVE ASSESSMENT:  Patient..  Denies CP/SOB/N/V/D/dizziness/cough/fever/chills.        Vital Signs Last 24 Hrs  T(C): 37 (11 Mar 2024 04:34), Max: 37.1 (10 Mar 2024 20:23)  T(F): 98.6 (11 Mar 2024 04:34), Max: 98.8 (10 Mar 2024 20:23)  HR: 92 (11 Mar 2024 06:02) (75 - 92)  BP: 124/58 (11 Mar 2024 06:02) (119/62 - 124/58)  BP(mean): 80 (11 Mar 2024 06:02) (80 - 82)  RR: 18 (10 Mar 2024 20:23) (18 - 18)  SpO2: 96% (10 Mar 2024 20:23) (95% - 96%)    Parameters below as of 10 Mar 2024 20:23  Patient On (Oxygen Delivery Method): room air      I&O's Detail    10 Mar 2024 07:01  -  11 Mar 2024 07:00  --------------------------------------------------------  IN:    Oral Fluid: 680 mL  Total IN: 680 mL    OUT:    Chest Tube (mL): 20 mL    Stool (mL): 1 mL    Voided (mL): 2000 mL  Total OUT: 2021 mL    Total NET: -1341 mL      11 Mar 2024 07:01  -  11 Mar 2024 08:21  --------------------------------------------------------  IN:  Total IN: 0 mL    OUT:    Voided (mL): 100 mL  Total OUT: 100 mL    Total NET: -100 mL      PHYSICAL EXAM:  GEN: NAD, looks comfortable  Psych: Mood appropriate  Neuro: A&Ox3.  No focal deficits.  Moving all extremities.   HEENT: No obvious abnormalities  CV: S1S2, regular, no murmurs appreciated.  No carotid bruits.  No JVD  Lungs: Clear B/L.  No wheezing, rales or rhonchi  ABD: Soft, non-tender, non-distended.  +Bowel sounds  EXT: Warm and well perfused.  No peripheral edema noted  Musculoskeletal: Moving all extremities with normal ROM, no joint swelling  PV: Pedal pulses palpable  Incision Sites: MSI -     LABS:                        8.5    12.73 )-----------( 245      ( 11 Mar 2024 05:19 )             26.9       COUMADIN:  Yes/No. REASON: .        03-11    141  |  101  |  58<H>  ----------------------------<  107<H>  4.2   |  30  |  1.94<H>    Ca    8.8      11 Mar 2024 05:19  Mg     2.2     03-10    TPro  6.3  /  Alb  3.4  /  TBili  1.0  /  DBili  x   /  AST  13  /  ALT  14  /  AlkPhos  105  03-10      Urinalysis Basic - ( 11 Mar 2024 05:19 )    Color: x / Appearance: x / SG: x / pH: x  Gluc: 107 mg/dL / Ketone: x  / Bili: x / Urobili: x   Blood: x / Protein: x / Nitrite: x   Leuk Esterase: x / RBC: x / WBC x   Sq Epi: x / Non Sq Epi: x / Bacteria: x        MEDICATIONS  (STANDING):  allopurinol 100 milliGRAM(s) Oral daily  aMIOdarone    Tablet   Oral   aMIOdarone    Tablet 200 milliGRAM(s) Oral daily  aspirin enteric coated 81 milliGRAM(s) Oral daily  buMETAnide 2 milliGRAM(s) Oral two times a day  cefepime   IVPB 2000 milliGRAM(s) IV Intermittent every 24 hours  chlorhexidine 2% Cloths 1 Application(s) Topical daily  digoxin     Tablet 125 MICROGram(s) Oral every other day  FIRST- Mouthwash  BLM 10 milliLiter(s) Swish and Swallow every 6 hours  heparin   Injectable 5000 Unit(s) SubCutaneous every 8 hours  hydrALAZINE 37.5 milliGRAM(s) Oral every 8 hours  levothyroxine 50 MICROGram(s) Oral <User Schedule>  lidocaine   4% Patch 1 Patch Transdermal daily  mupirocin 2% Ointment 1 Application(s) Topical <User Schedule>  pantoprazole    Tablet 40 milliGRAM(s) Oral daily  polyethylene glycol 3350 17 Gram(s) Oral daily  senna 2 Tablet(s) Oral at bedtime  sodium chloride 0.65% Nasal 1 Spray(s) Both Nostrils four times a day    MEDICATIONS  (PRN):  acetaminophen     Tablet .. 650 milliGRAM(s) Oral every 6 hours PRN Temp greater or equal to 38C (100.4F), Mild Pain (1 - 3)  benzocaine/menthol Lozenge 1 Lozenge Oral four times a day PRN Sore Throat  bisacodyl Suppository 10 milliGRAM(s) Rectal daily PRN Constipation  oxyCODONE    IR 10 milliGRAM(s) Oral every 8 hours PRN Severe Pain (7 - 10)        RADIOLOGY & ADDITIONAL TESTS:     Patient discussed on morning rounds with Dr. Martinez    Operation / Date: 2/23/24 AVR, JOANNA Clip    SUBJECTIVE ASSESSMENT:  Patient feels well, eager to go home soon.  He walked 4x yesterday, and 2x so far today w/ PT.  He does not need a walker to walk.  Good PO appetite, having normal BMs.  Using IS.    Denies CP/SOB/N/V/D/dizziness/cough/fever/chills.      Vital Signs Last 24 Hrs  T(C): 37 (11 Mar 2024 04:34), Max: 37.1 (10 Mar 2024 20:23)  T(F): 98.6 (11 Mar 2024 04:34), Max: 98.8 (10 Mar 2024 20:23)  HR: 92 (11 Mar 2024 06:02) (75 - 92)  BP: 124/58 (11 Mar 2024 06:02) (119/62 - 124/58)  BP(mean): 80 (11 Mar 2024 06:02) (80 - 82)  RR: 18 (10 Mar 2024 20:23) (18 - 18)  SpO2: 96% (10 Mar 2024 20:23) (95% - 96%)    Parameters below as of 10 Mar 2024 20:23  Patient On (Oxygen Delivery Method): room air      I&O's Detail    10 Mar 2024 07:01  -  11 Mar 2024 07:00  --------------------------------------------------------  IN:    Oral Fluid: 680 mL  Total IN: 680 mL    OUT:    Chest Tube (mL): 20 mL    Stool (mL): 1 mL    Voided (mL): 2000 mL  Total OUT: 2021 mL    Total NET: -1341 mL      11 Mar 2024 07:01  -  11 Mar 2024 08:21  --------------------------------------------------------  IN:  Total IN: 0 mL    OUT:    Voided (mL): 100 mL  Total OUT: 100 mL    Total NET: -100 mL      PHYSICAL EXAM:  GEN: NAD, looks comfortable; In chair.  On room air.  Cooperative.   Psych: Mood appropriate  Neuro: A&Ox3.  No focal deficits.  Moving all extremities.   HEENT: No obvious abnormalities  CV: S1S2, regular, no murmurs appreciated.  No carotid bruits.  No JVD  Lungs: Clear B/L.  No wheezing, rales or rhonchi  ABD: Soft, non-tender, non-distended.  +Bowel sounds  EXT: Warm and well perfused.  Generalized edema and chronic stasis hyperpigmentation noted. Lower legs w/ woody appearance, and dark reddish/purplish in color.  1+ edema B/L below knees.    Musculoskeletal: Moving all extremities with normal ROM, no joint swelling  PV: Pedal pulses palpable  Incision Sites: MSI - staples.  Looks clean.    No wires, tubes or tie downs left.  **Pericardial drain removed today, uneventful, patient tolerated well.  Dressing applied**      LABS:                        8.5    12.73 )-----------( 245      ( 11 Mar 2024 05:19 )             26.9       03-11    141  |  101  |  58<H>  ----------------------------<  107<H>  4.2   |  30  |  1.94<H>    Ca    8.8      11 Mar 2024 05:19  Mg     2.2     03-10    TPro  6.3  /  Alb  3.4  /  TBili  1.0  /  DBili  x   /  AST  13  /  ALT  14  /  AlkPhos  105  03-10      Urinalysis Basic - ( 11 Mar 2024 05:19 )    Color: x / Appearance: x / SG: x / pH: x  Gluc: 107 mg/dL / Ketone: x  / Bili: x / Urobili: x   Blood: x / Protein: x / Nitrite: x   Leuk Esterase: x / RBC: x / WBC x   Sq Epi: x / Non Sq Epi: x / Bacteria: x      MEDICATIONS  (STANDING):  allopurinol 100 milliGRAM(s) Oral daily  aMIOdarone    Tablet   Oral   aMIOdarone    Tablet 200 milliGRAM(s) Oral daily  aspirin enteric coated 81 milliGRAM(s) Oral daily  buMETAnide 2 milliGRAM(s) Oral two times a day  cefepime   IVPB 2000 milliGRAM(s) IV Intermittent every 24 hours  chlorhexidine 2% Cloths 1 Application(s) Topical daily  digoxin     Tablet 125 MICROGram(s) Oral every other day  FIRST- Mouthwash  BLM 10 milliLiter(s) Swish and Swallow every 6 hours  heparin   Injectable 5000 Unit(s) SubCutaneous every 8 hours  hydrALAZINE 37.5 milliGRAM(s) Oral every 8 hours  levothyroxine 50 MICROGram(s) Oral <User Schedule>  lidocaine   4% Patch 1 Patch Transdermal daily  mupirocin 2% Ointment 1 Application(s) Topical <User Schedule>  pantoprazole    Tablet 40 milliGRAM(s) Oral daily  polyethylene glycol 3350 17 Gram(s) Oral daily  senna 2 Tablet(s) Oral at bedtime  sodium chloride 0.65% Nasal 1 Spray(s) Both Nostrils four times a day    MEDICATIONS  (PRN):  acetaminophen     Tablet .. 650 milliGRAM(s) Oral every 6 hours PRN Temp greater or equal to 38C (100.4F), Mild Pain (1 - 3)  benzocaine/menthol Lozenge 1 Lozenge Oral four times a day PRN Sore Throat  bisacodyl Suppository 10 milliGRAM(s) Rectal daily PRN Constipation  oxyCODONE    IR 10 milliGRAM(s) Oral every 8 hours PRN Severe Pain (7 - 10)        RADIOLOGY & ADDITIONAL TESTS:

## 2024-03-11 NOTE — PROGRESS NOTE ADULT - SUBJECTIVE AND OBJECTIVE BOX
Comfortable on RA    Vital Signs Last 24 Hrs  T(C): 37 (03-11-24 @ 15:00), Max: 37.1 (03-10-24 @ 20:23)  T(F): 98.6 (03-11-24 @ 15:00), Max: 98.8 (03-10-24 @ 20:23)  HR: 70 (03-11-24 @ 15:00) (70 - 92)  BP: 127/67 (03-11-24 @ 15:00) (119/62 - 127/67)  BP(mean): 80 (03-11-24 @ 06:02) (80 - 82)  RR: 18 (03-11-24 @ 15:00) (18 - 18)  SpO2: 95% (03-11-24 @ 15:00) (95% - 96%)    I&O's Detail    10 Mar 2024 07:01  -  11 Mar 2024 07:00  --------------------------------------------------------  IN:    Oral Fluid: 680 mL  Total IN: 680 mL    OUT:    Chest Tube (mL): 20 mL    Voided (mL): 2000 mL    11 Mar 2024 07:01  -  11 Mar 2024 17:19  --------------------------------------------------------  IN:    Oral Fluid: 480 mL  Total IN: 480 mL    OUT:    Chest Tube (mL): 0 mL    Voided (mL): 700 mL  Total OUT: 700 mL    s1s2  b/l air entry  soft, ND  b/l LE edema                                                                                                                                              8.5    12.73 )-----------( 245      ( 11 Mar 2024 05:19 )             26.9     11 Mar 2024 05:19    141    |  101    |  58     ----------------------------<  107    4.2     |  30     |  1.94     Ca    8.8        11 Mar 2024 05:19  Mg     2.2       10 Mar 2024 06:17    TPro  6.3    /  Alb  3.4    /  TBili  1.0    /  DBili  x      /  AST  13     /  ALT  14     /  AlkPhos  105    10 Mar 2024 06:17    LIVER FUNCTIONS - ( 10 Mar 2024 06:17 )  Alb: 3.4 g/dL / Pro: 6.3 g/dL / ALK PHOS: 105 U/L / ALT: 14 U/L / AST: 13 U/L / GGT: x           acetaminophen     Tablet .. 650 milliGRAM(s) Oral every 6 hours PRN  allopurinol 100 milliGRAM(s) Oral daily  aMIOdarone    Tablet   Oral   aMIOdarone    Tablet 200 milliGRAM(s) Oral daily  aspirin enteric coated 81 milliGRAM(s) Oral daily  benzocaine/menthol Lozenge 1 Lozenge Oral four times a day PRN  bisacodyl Suppository 10 milliGRAM(s) Rectal daily PRN  buMETAnide 3 milliGRAM(s) Oral two times a day  cefepime   IVPB 2000 milliGRAM(s) IV Intermittent every 24 hours  chlorhexidine 2% Cloths 1 Application(s) Topical daily  digoxin     Tablet 125 MICROGram(s) Oral every other day  FIRST- Mouthwash  BLM 10 milliLiter(s) Swish and Swallow every 6 hours  heparin   Injectable 5000 Unit(s) SubCutaneous every 8 hours  hydrALAZINE 50 milliGRAM(s) Oral every 8 hours  levothyroxine 50 MICROGram(s) Oral <User Schedule>  lidocaine   4% Patch 1 Patch Transdermal daily  mupirocin 2% Ointment 1 Application(s) Topical <User Schedule>  oxyCODONE    IR 10 milliGRAM(s) Oral every 8 hours PRN  pantoprazole    Tablet 40 milliGRAM(s) Oral daily  polyethylene glycol 3350 17 Gram(s) Oral daily  senna 2 Tablet(s) Oral at bedtime  sodium chloride 0.65% Nasal 1 Spray(s) Both Nostrils four times a day    A/P:    CM, EF 30%, severe AS, Afib, CHF  Tx from [facility] to [facility] 2/5, s/p LHC 2/5  S/p cardio-renal/hemodynamic LINDA (peak Cr 2.5 - 2/3, lorna Cr 1.23 - 2/23)   S/p CT w/IV dye 2/8/24  Stable renal fx post CT  S/p AVR, JOANNA clip 2/23  Serratia bacteremia (2/29 and 3/1), Abx per ID   Bld cx 3/5 - NGTD   Pericardial effusion  Hemodynamic/cardio-renal LINDA/CKD (peak Cr 2.98 - 3/6)  S/p pericardial window 3/6, clot extracted   Diuresis, HF management per HF team   Cr is improving  Good UO   Avoid nephrotoxins  No NSAID's, d/w pt  Renal f/u as op    [phone]

## 2024-03-11 NOTE — PROGRESS NOTE ADULT - PROBLEM SELECTOR PLAN 1
-  weaned off 3/7  - Increase Bumex to 3 mg PO BID  - Increase HDZN to 50 mg TID, hold for SBP <90  - Defer MRA/ARB/ARNI/SGLT2-i given degree of renal dysfunction   - Replete to target K 4-4.5 and Mg 2-2.5 - EF appears to have recovered   -  weaned off 3/7  - Increase Bumex to 3 mg PO BID  - Increase HDZN to 50 mg TID, hold for SBP <90  - Defer MRA/ARB/ARNI/SGLT2-i given degree of renal dysfunction, improving   - Replete to target K 4-4.5 and Mg 2-2.5

## 2024-03-11 NOTE — PROGRESS NOTE ADULT - TIME BILLING
- Generation of cardiovascular treatment plan.  - Coordination of care with primary team. - Generation of cardiovascular treatment plan.  - Coordination of care with primary team..

## 2024-03-11 NOTE — PROGRESS NOTE ADULT - ASSESSMENT
Briefly, 62 y/o M w/ h/o HTN, severe AS, chronic venous stasis, HFpEF who p/w worsening LE swelling to GC 1/25 and found to be in decompensated heart failure with EF 35-40% and severe AS so transferred for further management on 2/5. Was diuresed and underwent LHC which was non-obstructive. Underwent bioAVR 2/23 w/ JOANNA clip complicated by bleeding. Post operative course complicated by volume expansion and pAF for which he was started on Amiodarone and Digoxin. Also became bacteremic, BCx 2/29 growing Serratia, repeat BCx from 3/1 still growing Serratia, being treated with IV Cefepime.  weaned to off on 3/1.     Developed worsening LINDA and ongoing volume expansion prompting RHC 3/5 revealing elevated filling pressures but adequate CI of 2.2 (Mvo2 45%). Resumed on  by CTSX and diuretics escalated. TTE 3/6 revealed pericardial effusion with signs of tamponade prompting pericardial window with drain placement.     Clinically improved, weaned off  3/7.  Will start optimize HF medical therapies.    Cardiac Studies  ·	RHC 3/5/24: RA 15, PA 52/23/32, PCWP 23, AO 94%, PA 45%, Hgb 8.8, CO/CI (F) 4.5/2.2, PVR 1.75 davis  ·	TTE 2/26/24: LVIDd 6 cm, LVEF 30% (global), mod RVE with mod reduced function (TAPSE 0.9), severe ARAVIND, mild TR, est PASP 25 mmHg, trace pericardial effusion, IVC normal in size Briefly, 64 y/o M w/ h/o HTN, severe AS, chronic venous stasis, HFpEF who p/w worsening LE swelling to GC 1/25 and found to be in decompensated heart failure with EF 35-40% and severe AS so transferred for further management on 2/5. Was diuresed and underwent LHC which was non-obstructive. Underwent bioAVR 2/23 w/ JOANNA clip complicated by bleeding. Post operative course complicated by volume expansion and pAF for which he was started on Amiodarone and Digoxin. Also became bacteremic, BCx 2/29 growing Serratia, repeat BCx from 3/1 still growing Serratia, being treated with IV Cefepime.  weaned to off on 3/1.     Developed worsening LINDA and ongoing volume expansion prompting RHC 3/5 revealing elevated filling pressures but adequate CI of 2.2 (Mvo2 45%). Resumed on  by CTSX and diuretics escalated. TTE 3/6 revealed pericardial effusion with signs of tamponade prompting pericardial window with drain placement.     Clinically improved, weaned off  3/7.  Will start optimize HF medical therapies.    Cardiac Studies  ·	TTE 3/8/24: . Reduced systolic function. Unable to rule out endocarditis.  ·	 RHC 3/5/24: RA 15, PA 52/23/32, PCWP 23, AO 94%, PA 45%, Hgb 8.8, CO/CI (F) 4.5/2.2, PVR 1.75 davis  ·	TTE 2/26/24: LVIDd 6 cm, LVEF 30% (global), mod RVE with mod reduced function (TAPSE 0.9), severe ARAVIND, mild TR, est PASP 25 mmHg, trace pericardial effusion, IVC normal in size

## 2024-03-11 NOTE — PROGRESS NOTE ADULT - PROBLEM SELECTOR PLAN 4
Avoid nephrotoxins and hypotension   D/C [facility] for TOV   Follow BUN/Creatinine  Nephro Following  Continue with Bumex 2 mg PO BID Avoid nephrotoxins and hypotension   D/C [facility] for TOV   Creatinine stable   Nephro Following  Continue with Bumex 2 mg PO BID

## 2024-03-11 NOTE — PROGRESS NOTE ADULT - ASSESSMENT
63M, appears older than stated age, admitted to [facility] 24 PMHx HTN, GERD, HPL, chronic venous stasis dermatitis with bilateral leg swelling, and AS presented to [place] ER on 2024 for bilateral leg pain and swelling found to have new acute HFrEF (EF 35-40% 2024) in setting of severe AS.     Hospital course:  24: Admitted to [place] new onset HFrEF EF 35% and anemia 2/2 AS,   24: medically stable for transferred to [facility] medicine service for continued care. during hospitalization hx of WCT/ PAF  Consults for Nephrology, HF, ID, Podiatry for medical optimization  2/15: 1 U PRBC, keep Hgb > 8.    s/p fall with head strike   : s/p Liver biopsy, soft pressure post procedure--responded to fluid bolus  :  RLE doppler: No pseudoaneurysm in the right groin/ bilobed avascular fluid collection measuring 2.8 x 1.7 x 2.0 cm  : AVR/JOANNA Clip c/b intraop bleeding requirinu pRBCs on CPB 1u pRBCs post-CPB 1 PLT 1000 FEIBA  Prolene suture placed around IJ and V wire  Post-Op Course:  : To Step Down, raegan, CT, +PW, marlena RIJ   Maintain mediastinal chest tubes Primicor discontinued. Lasix 40 BID initiated , Wound care to chronic venous stasis b/l LE. D/c RIJ   VSS - 9 hrs afib last evening- SR since 1:30am- on AMio gtt,  gtt - s/p right axillary marlena placed by Intensivist. garcia placed last evening lasix 40 iv x 1 given this am- echo tr jon eff.   VSS - currently on lasix gtt - negative 2L in last 24 hrs. bid basic metabolic pt converted back into afib this am 100-120- currently on amio load - Dobutrex gtt. d/c planning    d/c yesterday then resumed for decreased UO.  Remains a fib since yesterday, cont amio/heparin, eps following. Weight increased, lasix 80 iv x 1, then infusion increased , aldactone added .  Remains on heparin infusion. Hydralazine added for afterload    NSR, on amio, EPS following, noted elevated tsh.  Repeat sent synthroid started.  Fluid overload, diuretics increased, 3% saline x 2, lasix 80 iv x 1, lasix infusion 20mg/hr  3/1 febrile  overnight  and this am pan cx. Plan to remove marlena, intro , garcia.  A line is from , intro . Cont , lasix, f/u bun creat  3/2 VSS afebrile overnight.  97 r/s.  on lasix gtt @ 20mg/jr  negative 2366cc.  will monitor closely in sdu  3/3 Bumex gtt decreased to 1mg/hr per HF - @8:45am pt converted to afib -100-130s w/ hypotension while on commode attempting to have a bm- pt asymptomatic. brought back to bed.  amio 150 IV x 1 given w/ filter. dig load per HF - Dr. Rosario - .75mg total d/t sin.  pt u/o decreased while in afib- DR. Rosario @ bedside CXR done.  spoke to EP - Dr. Ghotra regarding DCCV - Call to CTU to set up transfer for DCCV - transfer cancelled d/t pt. converted to SR 68- w/ increase in SBP to 102 & U/O increasing.  ENT consulted for c/o "hurts when i swallow"  ent exam reveals no rod.  lido s&s ordered.  pt eating w/o difficulty  3/4 Pt with edema not improved, elevated creat.  Bumex d/c. Placed on iv Lasix aldactone d/c.  Albumin x 2  3/5 Wt stable, remains off diuretic + edema, creat up yesterday. RHC today  Tx To CTU for closer monitoring as CKD worsening  3/6 still not diuresing despite Bumex gtt, TTE revealed pericardial effusion returned to OR s/p 3/6 subxiphoid drainage and pericardial window.    3/7 weaned off inotropes, improved diuresis to 5L  3/8 Tx to Fl VSS, MCT, Garcia 2V EPW to box  40/10  VSS - Pericardial drainage to remain in place, Pacing Wires x 2 to remain in place. Garcia catheter to remain in place, creatinine 2.65. 2mg Bumex BID per HF. No A/C per Dr. Martinez.  Continue on Cefepime 2 Grams IV Q 8 hours per ID for (+)Serratia marcescens bacteremia.  Repeat TTE to r/o bio-prosthetic endocarditis. TTE findings: small pericardial effusion noted to the anterior RV.  No echodensity noted on the valves. Unable to exclude valvular vegetation in the setting of thickened or calcified valves and/or technically limited/difficult study.   3/9 VSS; Continue with current medication regimen.   D/C Garcia for a TOV.  Leukocytosis improving --> WBC 11.  Maintain pericardial drain in place, total output 130cc/24 hrs.  3/10 VSS pericardial drain 100 cc in last 24 hrs.  Cefepime for PSA in BAL pws d/c'd this am   Disposition: Home PT with rolling walker once medically cleared.

## 2024-03-11 NOTE — PROGRESS NOTE ADULT - NUTRITIONAL ASSESSMENT
This patient has been assessed with a concern for Malnutrition and has been determined to have a diagnosis/diagnoses of Severe protein-calorie malnutrition.    This patient is being managed with:   Diet NPO-  Entered: Mar  6 2024  7:52AM  
This patient has been assessed with a concern for Malnutrition and has been determined to have a diagnosis/diagnoses of Severe protein-calorie malnutrition.    This patient is being managed with:   Diet Regular-  Consistent Carbohydrate {No Snacks} (CSTCHO)  DASH/TLC {Sodium & Cholesterol Restricted} (DASH)  Entered: Feb 29 2024 11:21AM  
This patient has been assessed with a concern for Malnutrition and has been determined to have a diagnosis/diagnoses of Severe protein-calorie malnutrition.    This patient is being managed with:   Diet Consistent Carbohydrate/No Snacks-  For patients receiving Renal Replacement - No Protein Restr No Conc K No Conc Phos Low Sodium (RENAL)  Entered: Mar  7 2024  4:26AM  
This patient has been assessed with a concern for Malnutrition and has been determined to have a diagnosis/diagnoses of Severe protein-calorie malnutrition.    This patient is being managed with:   Diet Regular-  Consistent Carbohydrate {No Snacks} (CSTCHO)  DASH/TLC {Sodium & Cholesterol Restricted} (DASH)  Entered: Feb 29 2024 11:21AM  
This patient has been assessed with a concern for Malnutrition and has been determined to have a diagnosis/diagnoses of Severe protein-calorie malnutrition.    This patient is being managed with:   Diet Regular-  Consistent Carbohydrate {No Snacks} (CSTCHO)  Entered: Feb 23 2024 10:17PM  
This patient has been assessed with a concern for Malnutrition and has been determined to have a diagnosis/diagnoses of Severe protein-calorie malnutrition.    This patient is being managed with:   Diet Consistent Carbohydrate/No Snacks-  For patients receiving Renal Replacement - No Protein Restr No Conc K No Conc Phos Low Sodium (RENAL)  Entered: Mar  7 2024  4:26AM  
This patient has been assessed with a concern for Malnutrition and has been determined to have a diagnosis/diagnoses of Severe protein-calorie malnutrition.    This patient is being managed with:   Diet Regular-  Consistent Carbohydrate {No Snacks} (CSTCHO)  Entered: Feb 23 2024 10:17PM  
This patient has been assessed with a concern for Malnutrition and has been determined to have a diagnosis/diagnoses of Severe protein-calorie malnutrition.    This patient is being managed with:   Diet NPO after Midnight-     NPO Start Date: 04-Mar-2024   NPO Start Time: 23:59  Entered: Mar  4 2024  4:55PM    Diet Regular-  Consistent Carbohydrate {No Snacks} (CSTCHO)  DASH/TLC {Sodium & Cholesterol Restricted} (DASH)  Entered: Feb 29 2024 11:21AM  
This patient has been assessed with a concern for Malnutrition and has been determined to have a diagnosis/diagnoses of Severe protein-calorie malnutrition.    This patient is being managed with:   Diet Regular-  Consistent Carbohydrate {No Snacks} (CSTCHO)  DASH/TLC {Sodium & Cholesterol Restricted} (DASH)  Entered: Feb 29 2024 11:21AM  
This patient has been assessed with a concern for Malnutrition and has been determined to have a diagnosis/diagnoses of Severe protein-calorie malnutrition.    This patient is being managed with:   Diet Regular-  Consistent Carbohydrate {No Snacks} (CSTCHO)  Entered: Feb 23 2024 10:17PM  
This patient has been assessed with a concern for Malnutrition and has been determined to have a diagnosis/diagnoses of Severe protein-calorie malnutrition.    This patient is being managed with:   Diet Regular-  Consistent Carbohydrate {No Snacks} (CSTCHO)  Entered: Feb 23 2024 10:17PM  
This patient has been assessed with a concern for Malnutrition and has been determined to have a diagnosis/diagnoses of Severe protein-calorie malnutrition.    This patient is being managed with:   Diet Regular-  Consistent Carbohydrate {No Snacks} (CSTCHO)  DASH/TLC {Sodium & Cholesterol Restricted} (DASH)  Entered: Feb 29 2024 11:21AM  
This patient has been assessed with a concern for Malnutrition and has been determined to have a diagnosis/diagnoses of Severe protein-calorie malnutrition.    This patient is being managed with:   Diet NPO after Midnight-     NPO Start Date: 04-Mar-2024   NPO Start Time: 23:59  Entered: Mar  4 2024  4:55PM    Diet Regular-  Consistent Carbohydrate {No Snacks} (CSTCHO)  DASH/TLC {Sodium & Cholesterol Restricted} (DASH)  Entered: Feb 29 2024 11:21AM  
This patient has been assessed with a concern for Malnutrition and has been determined to have a diagnosis/diagnoses of Severe protein-calorie malnutrition.    This patient is being managed with:   Diet Regular-  Consistent Carbohydrate {No Snacks} (CSTCHO)  Entered: Feb 23 2024 10:17PM  
This patient has been assessed with a concern for Malnutrition and has been determined to have a diagnosis/diagnoses of Severe protein-calorie malnutrition.    This patient is being managed with:   Diet Consistent Carbohydrate/No Snacks-  For patients receiving Renal Replacement - No Protein Restr No Conc K No Conc Phos Low Sodium (RENAL)  Entered: Mar  7 2024  4:26AM  

## 2024-03-11 NOTE — PROGRESS NOTE ADULT - PROBLEM SELECTOR PLAN 4
- paroxysmal  - s/p JOANNA clip  - On Amiodarone 200 mg QD. Last bolused with IV Amio 3/4 for AF 130s  - On Digoxin 125 mcg QOD. Lowered on 3/6 for level of 1.3. Please repeat Digoxin level  - Heparin infusion stopped 3/6 for pericardial effusion

## 2024-03-11 NOTE — PROGRESS NOTE ADULT - SUBJECTIVE AND OBJECTIVE BOX
Overnight Events: no acute events     Review Of Systems: No chest pain, shortness of breath, or palpitations            Current Meds:  acetaminophen     Tablet .. 650 milliGRAM(s) Oral every 6 hours PRN  allopurinol 100 milliGRAM(s) Oral daily  aMIOdarone    Tablet   Oral   aMIOdarone    Tablet 200 milliGRAM(s) Oral daily  aspirin enteric coated 81 milliGRAM(s) Oral daily  benzocaine/menthol Lozenge 1 Lozenge Oral four times a day PRN  bisacodyl Suppository 10 milliGRAM(s) Rectal daily PRN  buMETAnide 2 milliGRAM(s) Oral two times a day  cefepime   IVPB 2000 milliGRAM(s) IV Intermittent every 24 hours  chlorhexidine 2% Cloths 1 Application(s) Topical daily  digoxin     Tablet 125 MICROGram(s) Oral every other day  FIRST- Mouthwash  BLM 10 milliLiter(s) Swish and Swallow every 6 hours  heparin   Injectable 5000 Unit(s) SubCutaneous every 8 hours  hydrALAZINE 37.5 milliGRAM(s) Oral every 8 hours  levothyroxine 50 MICROGram(s) Oral <User Schedule>  lidocaine   4% Patch 1 Patch Transdermal daily  mupirocin 2% Ointment 1 Application(s) Topical <User Schedule>  oxyCODONE    IR 10 milliGRAM(s) Oral every 8 hours PRN  pantoprazole    Tablet 40 milliGRAM(s) Oral daily  polyethylene glycol 3350 17 Gram(s) Oral daily  senna 2 Tablet(s) Oral at bedtime  sodium chloride 0.65% Nasal 1 Spray(s) Both Nostrils four times a day      Vitals:  T(F): 98.4 (03-11), Max: 98.8 (03-10)  HR: 76 (03-11) (75 - 92)  BP: 121/75 (03-11) (119/62 - 124/58)  RR: 18 (03-11)  SpO2: 95% (03-11)  I&O's Summary    10 Mar 2024 07:01  -  11 Mar 2024 07:00  --------------------------------------------------------  IN: 680 mL / OUT: 2021 mL / NET: -1341 mL    11 Mar 2024 07:01  -  11 Mar 2024 14:52  --------------------------------------------------------  IN: 480 mL / OUT: 700 mL / NET: -220 mL        Physical Exam:    General: NAD Comfortable sitting up in chair  HEENT: AT/NC   Neck: + JVP   Chest: No accessory resp muscle use   CV: RRR. Normal S1 and S2. No murmurs, rub, or gallops  Abdomen: Soft, non-distended, non-tender  Extremities: Warm peripherally, 2+ BLE edema, B/L erythema   Skin: Pericardia drain in place  Neurology: Alert and oriented times three. Sensation intact  Psych: Affect normal                            8.5    12.73 )-----------( 245      ( 11 Mar 2024 05:19 )             26.9     03-11    141  |  101  |  58<H>  ----------------------------<  107<H>  4.2   |  30  |  1.94<H>    Ca    8.8      11 Mar 2024 05:19  Mg     2.2     03-10    TPro  6.3  /  Alb  3.4  /  TBili  1.0  /  DBili  x   /  AST  13  /  ALT  14  /  AlkPhos  105  03-10

## 2024-03-11 NOTE — PROGRESS NOTE ADULT - PROBLEM SELECTOR PLAN 2
- Cr in 2022 1-1.3  - Cr on admission 1.7 and peaked to 2.9  - RHC 3/5 not suggestive of low output  - Improving but not back to baseline

## 2024-03-11 NOTE — PROGRESS NOTE ADULT - PROBLEM SELECTOR PLAN 5
Wound consult following recommending BLE wound Medihoney daily   Continue turning and positioning w/ offloading assistive devices as per protocol  Continue w/ attends under pads and Pericare as per protocol  Waffle Cushion to chair when oob to chair  Continue w/ low air loss pressure redistribution bed surface     -Upon discharge f/u as outpatient at [facility] [address] [facility] [phone]

## 2024-03-11 NOTE — PROGRESS NOTE ADULT - PROBLEM SELECTOR PLAN 2
RHC from 3/5 not suggestive of low output.  Replete to target K 4-4.5 and Mg 2-2.5  HF following  Continue on Hydralazine 37.5 mg PO TID for afterload reduction  Continue on Bumex 2 mg PO BID  Strict MANISH's   Daily standing weights RHC from 3/5 not suggestive of low output.  Replete to target K 4-4.5 and Mg 2-2.5  HF following  Continue on Hydralazine 37.5 mg PO TID for afterload reduction  Continue on Bumex 2 mg PO BID  Strict MANISH's   Daily standing weights    3/11 Volume status stable at this time. Net neg 1300mL coming into today.

## 2024-03-11 NOTE — PROGRESS NOTE ADULT - PROBLEM SELECTOR PLAN 5
- BCx from 2/29 and 3/1 with GNR Serratia  - Repeat BCx from 3/5 NGTD  - LE wound culture send 3/5, NGTD  - Started on Cefepime 3/1  - Remains afebrile but with ongoing mild leukocytosis  - Further guidance by ID - BCx from 2/29 and 3/1 with GNR Serratia  - Repeat BCx from 3/5 NGTD  - LE wound culture send 3/5, NGTD  - Started on Cefepime 3/1  - Remains afebrile but with ongoing mild leukocytosis  - Further guidance by ID  - TTE 3/8 unable to rule out vegetation, however if suspicion is high per ID, can pursue a LESLEY

## 2024-03-12 ENCOUNTER — TRANSCRIPTION ENCOUNTER (OUTPATIENT)
Age: 64
End: 2024-03-12

## 2024-03-12 VITALS — DIASTOLIC BLOOD PRESSURE: 70 MMHG | HEART RATE: 80 BPM | SYSTOLIC BLOOD PRESSURE: 127 MMHG

## 2024-03-12 LAB
ACANTHOCYTES BLD QL SMEAR: SLIGHT — SIGNIFICANT CHANGE UP
ANION GAP SERPL CALC-SCNC: 11 MMOL/L — SIGNIFICANT CHANGE UP (ref 5–17)
ANISOCYTOSIS BLD QL: SLIGHT — SIGNIFICANT CHANGE UP
APTT BLD: 29.6 SEC — SIGNIFICANT CHANGE UP (ref 24.5–35.6)
BASOPHILS # BLD AUTO: 0.09 K/UL — SIGNIFICANT CHANGE UP (ref 0–0.2)
BASOPHILS NFR BLD AUTO: 0.9 % — SIGNIFICANT CHANGE UP (ref 0–2)
BUN SERPL-MCNC: 56 MG/DL — HIGH (ref 7–23)
CALCIUM SERPL-MCNC: 8.7 MG/DL — SIGNIFICANT CHANGE UP (ref 8.4–10.5)
CHLORIDE SERPL-SCNC: 99 MMOL/L — SIGNIFICANT CHANGE UP (ref 96–108)
CO2 SERPL-SCNC: 29 MMOL/L — SIGNIFICANT CHANGE UP (ref 22–31)
CREAT SERPL-MCNC: 1.64 MG/DL — HIGH (ref 0.5–1.3)
EGFR: 47 ML/MIN/1.73M2 — LOW
ELLIPTOCYTES BLD QL SMEAR: SLIGHT — SIGNIFICANT CHANGE UP
EOSINOPHIL # BLD AUTO: 0.17 K/UL — SIGNIFICANT CHANGE UP (ref 0–0.5)
EOSINOPHIL NFR BLD AUTO: 1.7 % — SIGNIFICANT CHANGE UP (ref 0–6)
GIANT PLATELETS BLD QL SMEAR: PRESENT — SIGNIFICANT CHANGE UP
GLUCOSE SERPL-MCNC: 184 MG/DL — HIGH (ref 70–99)
HCT VFR BLD CALC: 26.6 % — LOW (ref 39–50)
HGB BLD-MCNC: 8.8 G/DL — LOW (ref 13–17)
HYPOCHROMIA BLD QL: SLIGHT — SIGNIFICANT CHANGE UP
LYMPHOCYTES # BLD AUTO: 0.51 K/UL — LOW (ref 1–3.3)
LYMPHOCYTES # BLD AUTO: 5.2 % — LOW (ref 13–44)
MAGNESIUM SERPL-MCNC: 2 MG/DL — SIGNIFICANT CHANGE UP (ref 1.6–2.6)
MANUAL SMEAR VERIFICATION: SIGNIFICANT CHANGE UP
MCHC RBC-ENTMCNC: 25.3 PG — LOW (ref 27–34)
MCHC RBC-ENTMCNC: 33.1 GM/DL — SIGNIFICANT CHANGE UP (ref 32–36)
MCV RBC AUTO: 76.4 FL — LOW (ref 80–100)
MICROCYTES BLD QL: SLIGHT — SIGNIFICANT CHANGE UP
MONOCYTES # BLD AUTO: 0.43 K/UL — SIGNIFICANT CHANGE UP (ref 0–0.9)
MONOCYTES NFR BLD AUTO: 4.4 % — SIGNIFICANT CHANGE UP (ref 2–14)
NEUTROPHILS # BLD AUTO: 8.61 K/UL — HIGH (ref 1.8–7.4)
NEUTROPHILS NFR BLD AUTO: 87.8 % — HIGH (ref 43–77)
NRBC # BLD: 1 /100 WBCS — HIGH (ref 0–0)
PLAT MORPH BLD: NORMAL — SIGNIFICANT CHANGE UP
PLATELET # BLD AUTO: 204 K/UL — SIGNIFICANT CHANGE UP (ref 150–400)
POIKILOCYTOSIS BLD QL AUTO: SLIGHT — SIGNIFICANT CHANGE UP
POLYCHROMASIA BLD QL SMEAR: SLIGHT — SIGNIFICANT CHANGE UP
POTASSIUM SERPL-MCNC: 4 MMOL/L — SIGNIFICANT CHANGE UP (ref 3.5–5.3)
POTASSIUM SERPL-SCNC: 4 MMOL/L — SIGNIFICANT CHANGE UP (ref 3.5–5.3)
RBC # BLD: 3.48 M/UL — LOW (ref 4.2–5.8)
RBC # FLD: 30.5 % — HIGH (ref 10.3–14.5)
RBC BLD AUTO: ABNORMAL
SCHISTOCYTES BLD QL AUTO: SLIGHT — SIGNIFICANT CHANGE UP
SODIUM SERPL-SCNC: 139 MMOL/L — SIGNIFICANT CHANGE UP (ref 135–145)
SPHEROCYTES BLD QL SMEAR: SLIGHT — SIGNIFICANT CHANGE UP
WBC # BLD: 9.81 K/UL — SIGNIFICANT CHANGE UP (ref 3.8–10.5)
WBC # FLD AUTO: 9.81 K/UL — SIGNIFICANT CHANGE UP (ref 3.8–10.5)

## 2024-03-12 PROCEDURE — 86850 RBC ANTIBODY SCREEN: CPT

## 2024-03-12 PROCEDURE — 86901 BLOOD TYPING SEROLOGIC RH(D): CPT

## 2024-03-12 PROCEDURE — 94003 VENT MGMT INPAT SUBQ DAY: CPT

## 2024-03-12 PROCEDURE — 82390 ASSAY OF CERULOPLASMIN: CPT

## 2024-03-12 PROCEDURE — 84479 ASSAY OF THYROID (T3 OR T4): CPT

## 2024-03-12 PROCEDURE — 84436 ASSAY OF TOTAL THYROXINE: CPT

## 2024-03-12 PROCEDURE — 80053 COMPREHEN METABOLIC PANEL: CPT

## 2024-03-12 PROCEDURE — 71045 X-RAY EXAM CHEST 1 VIEW: CPT

## 2024-03-12 PROCEDURE — 87641 MR-STAPH DNA AMP PROBE: CPT

## 2024-03-12 PROCEDURE — 83880 ASSAY OF NATRIURETIC PEPTIDE: CPT

## 2024-03-12 PROCEDURE — P9045: CPT

## 2024-03-12 PROCEDURE — 82435 ASSAY OF BLOOD CHLORIDE: CPT

## 2024-03-12 PROCEDURE — 36011 PLACE CATHETER IN VEIN: CPT

## 2024-03-12 PROCEDURE — 82553 CREATINE MB FRACTION: CPT

## 2024-03-12 PROCEDURE — 76981 USE PARENCHYMA: CPT

## 2024-03-12 PROCEDURE — 37200 TRANSCATHETER BIOPSY: CPT

## 2024-03-12 PROCEDURE — 87637 SARSCOV2&INF A&B&RSV AMP PRB: CPT

## 2024-03-12 PROCEDURE — 75889 VEIN X-RAY LIVER W/HEMODYNAM: CPT | Mod: 59

## 2024-03-12 PROCEDURE — 80202 ASSAY OF VANCOMYCIN: CPT

## 2024-03-12 PROCEDURE — 85027 COMPLETE CBC AUTOMATED: CPT

## 2024-03-12 PROCEDURE — P9047: CPT

## 2024-03-12 PROCEDURE — 99232 SBSQ HOSP IP/OBS MODERATE 35: CPT

## 2024-03-12 PROCEDURE — 87077 CULTURE AEROBIC IDENTIFY: CPT

## 2024-03-12 PROCEDURE — P9011: CPT

## 2024-03-12 PROCEDURE — 86376 MICROSOMAL ANTIBODY EACH: CPT

## 2024-03-12 PROCEDURE — 93454 CORONARY ARTERY ANGIO S&I: CPT

## 2024-03-12 PROCEDURE — 82104 ALPHA-1-ANTITRYPSIN PHENO: CPT

## 2024-03-12 PROCEDURE — 84480 ASSAY TRIIODOTHYRONINE (T3): CPT

## 2024-03-12 PROCEDURE — 74019 RADEX ABDOMEN 2 VIEWS: CPT

## 2024-03-12 PROCEDURE — 82962 GLUCOSE BLOOD TEST: CPT

## 2024-03-12 PROCEDURE — 84443 ASSAY THYROID STIM HORMONE: CPT

## 2024-03-12 PROCEDURE — 84484 ASSAY OF TROPONIN QUANT: CPT

## 2024-03-12 PROCEDURE — 80076 HEPATIC FUNCTION PANEL: CPT

## 2024-03-12 PROCEDURE — P9040: CPT

## 2024-03-12 PROCEDURE — 85384 FIBRINOGEN ACTIVITY: CPT

## 2024-03-12 PROCEDURE — 76937 US GUIDE VASCULAR ACCESS: CPT

## 2024-03-12 PROCEDURE — 97530 THERAPEUTIC ACTIVITIES: CPT

## 2024-03-12 PROCEDURE — 75970 VASCULAR BIOPSY: CPT

## 2024-03-12 PROCEDURE — 97535 SELF CARE MNGMENT TRAINING: CPT

## 2024-03-12 PROCEDURE — 87040 BLOOD CULTURE FOR BACTERIA: CPT

## 2024-03-12 PROCEDURE — 84132 ASSAY OF SERUM POTASSIUM: CPT

## 2024-03-12 PROCEDURE — 87186 SC STD MICRODIL/AGAR DIL: CPT

## 2024-03-12 PROCEDURE — 86891 AUTOLOGOUS BLOOD OP SALVAGE: CPT

## 2024-03-12 PROCEDURE — 97116 GAIT TRAINING THERAPY: CPT

## 2024-03-12 PROCEDURE — 82330 ASSAY OF CALCIUM: CPT

## 2024-03-12 PROCEDURE — 87640 STAPH A DNA AMP PROBE: CPT

## 2024-03-12 PROCEDURE — 93976 VASCULAR STUDY: CPT

## 2024-03-12 PROCEDURE — 85730 THROMBOPLASTIN TIME PARTIAL: CPT

## 2024-03-12 PROCEDURE — P9016: CPT

## 2024-03-12 PROCEDURE — 97161 PT EVAL LOW COMPLEX 20 MIN: CPT

## 2024-03-12 PROCEDURE — 85018 HEMOGLOBIN: CPT

## 2024-03-12 PROCEDURE — C9399: CPT

## 2024-03-12 PROCEDURE — 93308 TTE F-UP OR LMTD: CPT

## 2024-03-12 PROCEDURE — C1769: CPT

## 2024-03-12 PROCEDURE — 82784 ASSAY IGA/IGD/IGG/IGM EACH: CPT

## 2024-03-12 PROCEDURE — 81003 URINALYSIS AUTO W/O SCOPE: CPT

## 2024-03-12 PROCEDURE — 82107 ALPHA-FETOPROTEIN L3: CPT

## 2024-03-12 PROCEDURE — 74174 CTA ABD&PLVS W/CONTRAST: CPT

## 2024-03-12 PROCEDURE — 93880 EXTRACRANIAL BILAT STUDY: CPT

## 2024-03-12 PROCEDURE — 82803 BLOOD GASES ANY COMBINATION: CPT

## 2024-03-12 PROCEDURE — C1894: CPT

## 2024-03-12 PROCEDURE — 85610 PROTHROMBIN TIME: CPT

## 2024-03-12 PROCEDURE — 82103 ALPHA-1-ANTITRYPSIN TOTAL: CPT

## 2024-03-12 PROCEDURE — 93926 LOWER EXTREMITY STUDY: CPT

## 2024-03-12 PROCEDURE — 84100 ASSAY OF PHOSPHORUS: CPT

## 2024-03-12 PROCEDURE — 75574 CT ANGIO HRT W/3D IMAGE: CPT

## 2024-03-12 PROCEDURE — 80162 ASSAY OF DIGOXIN TOTAL: CPT

## 2024-03-12 PROCEDURE — 83605 ASSAY OF LACTIC ACID: CPT

## 2024-03-12 PROCEDURE — 94010 BREATHING CAPACITY TEST: CPT

## 2024-03-12 PROCEDURE — C1751: CPT

## 2024-03-12 PROCEDURE — 86038 ANTINUCLEAR ANTIBODIES: CPT

## 2024-03-12 PROCEDURE — 93321 DOPPLER ECHO F-UP/LMTD STD: CPT

## 2024-03-12 PROCEDURE — 86381 MITOCHONDRIAL ANTIBODY EACH: CPT

## 2024-03-12 PROCEDURE — 97166 OT EVAL MOD COMPLEX 45 MIN: CPT

## 2024-03-12 PROCEDURE — 36430 TRANSFUSION BLD/BLD COMPNT: CPT

## 2024-03-12 PROCEDURE — 71250 CT THORAX DX C-: CPT | Mod: MC

## 2024-03-12 PROCEDURE — 82947 ASSAY GLUCOSE BLOOD QUANT: CPT

## 2024-03-12 PROCEDURE — 93010 ELECTROCARDIOGRAM REPORT: CPT

## 2024-03-12 PROCEDURE — 85576 BLOOD PLATELET AGGREGATION: CPT

## 2024-03-12 PROCEDURE — 88305 TISSUE EXAM BY PATHOLOGIST: CPT

## 2024-03-12 PROCEDURE — 93005 ELECTROCARDIOGRAM TRACING: CPT

## 2024-03-12 PROCEDURE — 84295 ASSAY OF SERUM SODIUM: CPT

## 2024-03-12 PROCEDURE — 85014 HEMATOCRIT: CPT

## 2024-03-12 PROCEDURE — 86255 FLUORESCENT ANTIBODY SCREEN: CPT

## 2024-03-12 PROCEDURE — 86923 COMPATIBILITY TEST ELECTRIC: CPT

## 2024-03-12 PROCEDURE — 81001 URINALYSIS AUTO W/SCOPE: CPT

## 2024-03-12 PROCEDURE — 88307 TISSUE EXAM BY PATHOLOGIST: CPT

## 2024-03-12 PROCEDURE — 71275 CT ANGIOGRAPHY CHEST: CPT

## 2024-03-12 PROCEDURE — C8929: CPT

## 2024-03-12 PROCEDURE — 87150 DNA/RNA AMPLIFIED PROBE: CPT

## 2024-03-12 PROCEDURE — 80048 BASIC METABOLIC PNL TOTAL CA: CPT

## 2024-03-12 PROCEDURE — 83735 ASSAY OF MAGNESIUM: CPT

## 2024-03-12 PROCEDURE — 36415 COLL VENOUS BLD VENIPUNCTURE: CPT

## 2024-03-12 PROCEDURE — 93451 RIGHT HEART CATH: CPT

## 2024-03-12 PROCEDURE — 85025 COMPLETE CBC W/AUTO DIFF WBC: CPT

## 2024-03-12 PROCEDURE — P9100: CPT

## 2024-03-12 PROCEDURE — 84550 ASSAY OF BLOOD/URIC ACID: CPT

## 2024-03-12 PROCEDURE — 82272 OCCULT BLD FECES 1-3 TESTS: CPT

## 2024-03-12 PROCEDURE — 85396 CLOTTING ASSAY WHOLE BLOOD: CPT

## 2024-03-12 PROCEDURE — 85520 HEPARIN ASSAY: CPT

## 2024-03-12 PROCEDURE — 86985 SPLIT BLOOD OR PRODUCTS: CPT

## 2024-03-12 PROCEDURE — 82565 ASSAY OF CREATININE: CPT

## 2024-03-12 PROCEDURE — 80061 LIPID PANEL: CPT

## 2024-03-12 PROCEDURE — 86325 OTHER IMMUNOELECTROPHORESIS: CPT

## 2024-03-12 PROCEDURE — 87070 CULTURE OTHR SPECIMN AEROBIC: CPT

## 2024-03-12 PROCEDURE — P9037: CPT

## 2024-03-12 PROCEDURE — 86900 BLOOD TYPING SEROLOGIC ABO: CPT

## 2024-03-12 PROCEDURE — C1889: CPT

## 2024-03-12 PROCEDURE — 97110 THERAPEUTIC EXERCISES: CPT

## 2024-03-12 PROCEDURE — 88313 SPECIAL STAINS GROUP 2: CPT

## 2024-03-12 PROCEDURE — 97164 PT RE-EVAL EST PLAN CARE: CPT

## 2024-03-12 PROCEDURE — 86022 PLATELET ANTIBODIES: CPT

## 2024-03-12 PROCEDURE — 84156 ASSAY OF PROTEIN URINE: CPT

## 2024-03-12 PROCEDURE — 82550 ASSAY OF CK (CPK): CPT

## 2024-03-12 PROCEDURE — C1887: CPT

## 2024-03-12 RX ORDER — LEVOTHYROXINE SODIUM 125 MCG
1 TABLET ORAL
Qty: 30 | Refills: 0
Start: 2024-03-12 | End: 2024-04-10

## 2024-03-12 RX ORDER — BUMETANIDE 0.25 MG/ML
2 INJECTION INTRAMUSCULAR; INTRAVENOUS
Qty: 120 | Refills: 0
Start: 2024-03-12 | End: 2024-04-10

## 2024-03-12 RX ORDER — BUMETANIDE 0.25 MG/ML
4 INJECTION INTRAMUSCULAR; INTRAVENOUS
Refills: 0 | Status: DISCONTINUED | OUTPATIENT
Start: 2024-03-12 | End: 2024-03-12

## 2024-03-12 RX ORDER — ALLOPURINOL 300 MG
1 TABLET ORAL
Qty: 30 | Refills: 0
Start: 2024-03-12 | End: 2024-04-10

## 2024-03-12 RX ORDER — ASPIRIN/CALCIUM CARB/MAGNESIUM 324 MG
1 TABLET ORAL
Qty: 30 | Refills: 0
Start: 2024-03-12 | End: 2024-04-10

## 2024-03-12 RX ORDER — HYDRALAZINE HCL 50 MG
1 TABLET ORAL
Qty: 90 | Refills: 0
Start: 2024-03-12 | End: 2024-04-10

## 2024-03-12 RX ORDER — DIGOXIN 250 MCG
1 TABLET ORAL
Qty: 15 | Refills: 0
Start: 2024-03-12 | End: 2024-04-10

## 2024-03-12 RX ORDER — BUMETANIDE 0.25 MG/ML
1 INJECTION INTRAMUSCULAR; INTRAVENOUS ONCE
Refills: 0 | Status: COMPLETED | OUTPATIENT
Start: 2024-03-12 | End: 2024-03-12

## 2024-03-12 RX ORDER — AMIODARONE HYDROCHLORIDE 400 MG/1
1 TABLET ORAL
Qty: 30 | Refills: 0
Start: 2024-03-12 | End: 2024-04-10

## 2024-03-12 RX ORDER — SENNA PLUS 8.6 MG/1
2 TABLET ORAL
Qty: 28 | Refills: 0
Start: 2024-03-12 | End: 2024-03-25

## 2024-03-12 RX ORDER — PANTOPRAZOLE SODIUM 20 MG/1
1 TABLET, DELAYED RELEASE ORAL
Qty: 30 | Refills: 0
Start: 2024-03-12 | End: 2024-04-10

## 2024-03-12 RX ORDER — ACETAMINOPHEN 500 MG
2 TABLET ORAL
Qty: 0 | Refills: 0 | DISCHARGE
Start: 2024-03-12

## 2024-03-12 RX ADMIN — PANTOPRAZOLE SODIUM 40 MILLIGRAM(S): 20 TABLET, DELAYED RELEASE ORAL at 11:25

## 2024-03-12 RX ADMIN — Medication 50 MILLIGRAM(S): at 14:22

## 2024-03-12 RX ADMIN — CEFEPIME 100 MILLIGRAM(S): 1 INJECTION, POWDER, FOR SOLUTION INTRAMUSCULAR; INTRAVENOUS at 04:40

## 2024-03-12 RX ADMIN — DIPHENHYDRAMINE HYDROCHLORIDE AND LIDOCAINE HYDROCHLORIDE AND ALUMINUM HYDROXIDE AND MAGNESIUM HYDRO 10 MILLILITER(S): KIT at 00:37

## 2024-03-12 RX ADMIN — BUMETANIDE 3 MILLIGRAM(S): 0.25 INJECTION INTRAMUSCULAR; INTRAVENOUS at 05:47

## 2024-03-12 RX ADMIN — HEPARIN SODIUM 5000 UNIT(S): 5000 INJECTION INTRAVENOUS; SUBCUTANEOUS at 14:23

## 2024-03-12 RX ADMIN — DIPHENHYDRAMINE HYDROCHLORIDE AND LIDOCAINE HYDROCHLORIDE AND ALUMINUM HYDROXIDE AND MAGNESIUM HYDRO 10 MILLILITER(S): KIT at 05:46

## 2024-03-12 RX ADMIN — Medication 100 MILLIGRAM(S): at 11:25

## 2024-03-12 RX ADMIN — Medication 125 MICROGRAM(S): at 11:25

## 2024-03-12 RX ADMIN — BUMETANIDE 1 MILLIGRAM(S): 0.25 INJECTION INTRAMUSCULAR; INTRAVENOUS at 14:22

## 2024-03-12 RX ADMIN — Medication 50 MICROGRAM(S): at 06:05

## 2024-03-12 RX ADMIN — Medication 1 SPRAY(S): at 05:47

## 2024-03-12 RX ADMIN — CHLORHEXIDINE GLUCONATE 1 APPLICATION(S): 213 SOLUTION TOPICAL at 11:37

## 2024-03-12 RX ADMIN — Medication 1 SPRAY(S): at 00:37

## 2024-03-12 RX ADMIN — Medication 81 MILLIGRAM(S): at 11:25

## 2024-03-12 RX ADMIN — AMIODARONE HYDROCHLORIDE 200 MILLIGRAM(S): 400 TABLET ORAL at 05:46

## 2024-03-12 RX ADMIN — Medication 50 MILLIGRAM(S): at 05:46

## 2024-03-12 RX ADMIN — HEPARIN SODIUM 5000 UNIT(S): 5000 INJECTION INTRAVENOUS; SUBCUTANEOUS at 05:46

## 2024-03-12 RX ADMIN — MUPIROCIN 1 APPLICATION(S): 20 OINTMENT TOPICAL at 11:37

## 2024-03-12 NOTE — DISCHARGE NOTE PROVIDER - HOSPITAL COURSE
63M, appears older than stated age, admitted to [facility] 24 PMHx HTN, GERD, HPL, chronic venous stasis dermatitis with bilateral leg swelling, and AS presented to [place] [facility] on 2024 for bilateral leg pain and swelling found to have new acute HFrEF (EF 35-40% 2024) in setting of severe AS.     Hospital course:  24: Admitted to [place] new onset HFrEF EF 35% and anemia 2/2 AS,   24: medically stable for transferred to [facility] medicine service for continued care. during hospitalization hx of WCT/ PAF  Consults for Nephrology, HF, ID, Podiatry for medical optimization  2/15: 1 U PRBC, keep Hgb > 8.    s/p fall with head strike   : s/p Liver biopsy, soft pressure post procedure--responded to fluid bolus  :  RLE doppler: No pseudoaneurysm in the right groin/ bilobed avascular fluid collection measuring 2.8 x 1.7 x 2.0 cm  : AVR/JOANNA Clip c/b intraop bleeding requirinu pRBCs on CPB 1u pRBCs post-CPB 1 PLT 1000 FEIBA  Prolene suture placed around IJ and V wire  Post-Op Course:  : To Step Down, raegan, CT, +PW, marlena RIJ   Maintain mediastinal chest tubes Primicor discontinued. Lasix 40 BID initiated , Wound care to chronic venous stasis b/l LE. D/c RIJ   VSS - 9 hrs afib last evening- SR since 1:30am- on AMio gtt,  gtt - s/p right axillary marlena placed by Intensivist. garcia placed last evening lasix 40 iv x 1 given this am- echo tr jon eff.   VSS - currently on lasix gtt - negative 2L in last 24 hrs. bid basic metabolic pt converted back into afib this am 100-120- currently on amio load - Dobutrex gtt. d/c planning    d/c yesterday then resumed for decreased UO.  Remains a fib since yesterday, cont amio/heparin, eps following. Weight increased, lasix 80 iv x 1, then infusion increased , aldactone added .  Remains on heparin infusion. Hydralazine added for afterload    NSR, on amio, EPS following, noted elevated tsh.  Repeat sent synthroid started.  Fluid overload, diuretics increased, 3% saline x 2, lasix 80 iv x 1, lasix infusion 20mg/hr  3/1 febrile  overnight  and this am pan cx. Plan to remove marlena, intro , garcia.  A line is from , intro . Cont , lasix, f/u bun creat  3/2 VSS afebrile overnight.  97 r/s.  on lasix gtt @ 20mg/jr  negative 2366cc.  will monitor closely in sdu  3/3 Bumex gtt decreased to 1mg/hr per HF - @8:45am pt converted to afib -100-130s w/ hypotension while on commode attempting to have a bm- pt asymptomatic. brought back to bed.  amio 150 IV x 1 given w/ filter. dig load per HF - Dr. Rosario - .75mg total d/t sin.  pt u/o decreased while in afib- DR. Rosario @ bedside CXR done.  spoke to EP - Dr. Ghotra regarding DCCV - Call to CTU to set up transfer for DCCV - transfer cancelled d/t pt. converted to SR 68- w/ increase in SBP to 102 & U/O increasing.  ENT consulted for c/o "hurts when i swallow"  ent exam reveals no rod.  lido s&s ordered.  pt eating w/o difficulty  3/4 Pt with edema not improved, elevated creat.  Bumex d/c. Placed on iv Lasix aldactone d/c.  Albumin x 2  3/5 Wt stable, remains off diuretic + edema, creat up yesterday. RHC today  Tx To CTU for closer monitoring as CKD worsening  3/6 still not diuresing despite Bumex gtt, TTE revealed pericardial effusion returned to OR s/p 3/6 subxiphoid drainage and pericardial window.    3/7 weaned off inotropes, improved diuresis to 5L  3/8 Tx to Fl VSS, MCT, Garcia 2V EPW to box  40/10  VSS - Pericardial drainage to remain in place, Pacing Wires x 2 to remain in place. Garcia catheter to remain in place, creatinine 2.65. 2mg Bumex BID per HF. No A/C per Dr. Martinez.  Continue on Cefepime 2 Grams IV Q 8 hours per ID for (+)Serratia marcescens bacteremia.  Repeat TTE to r/o bio-prosthetic endocarditis. TTE findings: small pericardial effusion noted to the anterior RV.  No echodensity noted on the valves. Unable to exclude valvular vegetation in the setting of thickened or calcified valves and/or technically limited/difficult study.   3/9 VSS; Continue with current medication regimen.   D/C Garcia for a TOV.  Leukocytosis improving --> WBC 11.  Maintain pericardial drain in place, total output 130cc/24 hrs.  3/10 VSS pericardial drain 100 cc in last 24 hrs.  Cefepime for PSA in BAL pws d/c'd this am  3/11 VSS - d/c plan Home PT with rolling walker 63M, appears older than stated age, admitted to [facility] 24 PMHx HTN, GERD, HPL, chronic venous stasis dermatitis with bilateral leg swelling, and AS presented to [facility] on 2024 for bilateral leg pain and swelling found to have new acute HFrEF (EF 35-40% 2024) in setting of severe AS.     Hospital course:  24: Admitted to [place] new onset HFrEF EF 35% and anemia 2/2 AS,   24: medically stable for transferred to [facility] medicine service for continued care. during hospitalization hx of WCT/ PAF  Consults for Nephrology, HF, ID, Podiatry for medical optimization  2/15: 1 U PRBC, keep Hgb > 8.    s/p fall with head strike   : s/p Liver biopsy, soft pressure post procedure--responded to fluid bolus  :  RLE doppler: No pseudoaneurysm in the right groin/ bilobed avascular fluid collection measuring 2.8 x 1.7 x 2.0 cm  : AVR/JOANNA Clip c/b intraop bleeding requirinu pRBCs on CPB 1u pRBCs post-CPB 1 PLT 1000 FEIBA  Prolene suture placed around IJ and V wire  Post-Op Course:  : To Step Down, raegan, CT, +PW, marlena RIJ   Maintain mediastinal chest tubes Primicor discontinued. Lasix 40 BID initiated , Wound care to chronic venous stasis b/l LE. D/c RIJ   VSS - 9 hrs afib last evening- SR since 1:30am- on AMio gtt,  gtt - s/p right axillary marlena placed by Intensivist. garcia placed last evening lasix 40 iv x 1 given this am- echo tr jon eff.   VSS - currently on lasix gtt - negative 2L in last 24 hrs. bid basic metabolic pt converted back into afib this am 100-120- currently on amio load - Dobutrex gtt. d/c planning    d/c yesterday then resumed for decreased UO.  Remains a fib since yesterday, cont amio/heparin, eps following. Weight increased, lasix 80 iv x 1, then infusion increased , aldactone added .  Remains on heparin infusion. Hydralazine added for afterload    NSR, on amio, EPS following, noted elevated tsh.  Repeat sent synthroid started.  Fluid overload, diuretics increased, 3% saline x 2, lasix 80 iv x 1, lasix infusion 20mg/hr  3/1 febrile  overnight  and this am pan cx. Plan to remove marlena, intro , garcia.  A line is from , intro . Cont , lasix, f/u bun creat  3/2 VSS afebrile overnight.  97 r/s.  on lasix gtt @ 20mg/jr  negative 2366cc.  will monitor closely in sdu  3/3 Bumex gtt decreased to 1mg/hr per HF - @8:45am pt converted to afib -100-130s w/ hypotension while on commode attempting to have a bm- pt asymptomatic. brought back to bed.  amio 150 IV x 1 given w/ filter. dig load per HF - Dr. Rosario - .75mg total d/t sin.  pt u/o decreased while in afib- DR. Rosario @ bedside CXR done.  spoke to EP - Dr. Ghotra regarding DCCV - Call to CTU to set up transfer for DCCV - transfer cancelled d/t pt. converted to SR 68- w/ increase in SBP to 102 & U/O increasing.  ENT consulted for c/o "hurts when i swallow"  ent exam reveals no rod.  lido s&s ordered.  pt eating w/o difficulty  3/4 Pt with edema not improved, elevated creat.  Bumex d/c. Placed on iv Lasix aldactone d/c.  Albumin x 2  3/5 Wt stable, remains off diuretic + edema, creat up yesterday. RHC today  Tx To CTU for closer monitoring as CKD worsening  3/6 still not diuresing despite Bumex gtt, TTE revealed pericardial effusion returned to OR s/p 3/6 subxiphoid drainage and pericardial window.    3/7 weaned off inotropes, improved diuresis to 5L  3/8 Tx to Fl VSS, MCT, Garcia 2V EPW to box  40/10  VSS - Pericardial drainage to remain in place, Pacing Wires x 2 to remain in place. Garcia catheter to remain in place, creatinine 2.65. 2mg Bumex BID per HF. No A/C per Dr. Martinez.  Continue on Cefepime 2 Grams IV Q 8 hours per ID for (+)Serratia marcescens bacteremia.  Repeat TTE to r/o bio-prosthetic endocarditis. TTE findings: small pericardial effusion noted to the anterior RV.  No echodensity noted on the valves. Unable to exclude valvular vegetation in the setting of thickened or calcified valves and/or technically limited/difficult study.   3/9 VSS; Continue with current medication regimen.   D/C Garcia for a TOV.  Leukocytosis improving --> WBC 11.  Maintain pericardial drain in place, total output 130cc/24 hrs.  3/10 VSS pericardial drain 100 cc in last 24 hrs.  Cefepime for PSA in BAL pws d/c'd this am  3/11 VSS - d/c plan Home PT with rolling walker- d/c home on 2 more days of levoquin per ID-dig .125 stefan 200 qd per HF -0 qtc 446- hf team aware - will ck ekg on follow up visit with HF-

## 2024-03-12 NOTE — PROGRESS NOTE ADULT - PROBLEM SELECTOR PROBLEM 2
Acute on chronic combined systolic and diastolic congestive heart failure
Acute on chronic kidney failure
Afib
Acute on chronic kidney failure
Aortic stenosis
Acute on chronic kidney failure
Afib
Acute on chronic kidney failure
Acute on chronic kidney failure
Afib
Afib
Acute on chronic kidney failure
Afib
Acute on chronic kidney failure
Afib
Acute on chronic kidney failure
Afib
Afib
Acute on chronic combined systolic and diastolic congestive heart failure
Afib
Acute on chronic kidney failure
Aortic stenosis
Acute on chronic kidney failure
Acute on chronic kidney failure
Acute on chronic combined systolic and diastolic congestive heart failure
Aortic stenosis
Acute on chronic kidney failure
Acute on chronic combined systolic and diastolic congestive heart failure
Acute on chronic kidney failure
Acute on chronic combined systolic and diastolic congestive heart failure

## 2024-03-12 NOTE — PROGRESS NOTE ADULT - ASSESSMENT
The patient is a 62 y/o M w/ h/o HTN, severe AS, chronic venous stasis, HFpEF who p/w worsening LE swelling to GC 1/25 and found to be in decompensated heart failure with EF 35-40% and severe AS so transferred for further management on 2/5. Was diuresed and underwent LHC which was non-obstructive. Underwent bioAVR 2/23 w/ JOANNA clip complicated by bleeding. Post operative course complicated by volume expansion and pAF for which he was started on Amiodarone and Digoxin. Also became bacteremic, BCx 2/29 growing Serratia.  weaned to off on 3/1.     Developed worsening LINDA and ongoing volume expansion prompting RHC 3/5 revealing elevated filling pressures but adequate CI of 2.2 (Mvo2 45%). Resumed on  by CTSX and diuretics escalated. TTE 3/6 revealed pericardial effusion with signs of tamponade prompting pericardial window with drain placement, now removed.     Clinically improved, weaned off  3/7.  Optimizing HF medical therapies.    Cardiac Studies  ·	TTE 3/8/24: . Reduced systolic function. Unable to rule out endocarditis.  ·	 RHC 3/5/24: RA 15, PA 52/23/32, PCWP 23, AO 94%, PA 45%, Hgb 8.8, CO/CI (F) 4.5/2.2, PVR 1.75 davis  ·	TTE 2/26/24: LVIDd 6 cm, LVEF 30% (global), mod RVE with mod reduced function (TAPSE 0.9), severe ARAVIND, mild TR, est PASP 25 mmHg, trace pericardial effusion, IVC normal in size

## 2024-03-12 NOTE — DISCHARGE NOTE NURSING/CASE MANAGEMENT/SOCIAL WORK - NSDCVIVACCINE_GEN_ALL_CORE_FT
Tdap; 27-Jul-2022 20:19; Danielle Mcleod (RN); [employer]; [id number] (Exp. Date: 18-Apr-2024); IntraMuscular; Deltoid Left.; 0.5 milliLiter(s); VIS (VIS Published: 09-May-2013, VIS Presented: 27-Jul-2022);

## 2024-03-12 NOTE — PROGRESS NOTE ADULT - NSPROGADDITIONALINFOA_GEN_ALL_CORE
d/w MARLENE Beckman
Case d/w ACP on unit
Case d/w ACP via MS teams
Heart failure will sign off at this time, please recall with any questions or concerns
Case d/w ACP via MS teams

## 2024-03-12 NOTE — PROGRESS NOTE ADULT - PROVIDER SPECIALTY LIST ADULT
CT Surgery
Critical Care
Heart Failure
Hepatology
Infectious Disease
Internal Medicine
Internal Medicine
Nephrology
Structural Heart
CT Surgery
Critical Care
Critical Care
Heart Failure
Heart Failure
Infectious Disease
Infectious Disease
Internal Medicine
Nephrology
Wound Care
Wound Care
CT Surgery
Critical Care
Electrophysiology
Heart Failure
Heart Failure
Infectious Disease
CT Surgery
Electrophysiology
CT Surgery
Heart Failure
CT Surgery
Hospitalist
Hospitalist
Heart Failure
CT Surgery
Heart Failure
Internal Medicine
CT Surgery
Heart Failure
Hospitalist
Internal Medicine
CT Surgery
Heart Failure
Hospitalist
Heart Failure
CT Surgery
Hospitalist
Heart Failure
Heart Failure
Internal Medicine
Hospitalist
Internal Medicine

## 2024-03-12 NOTE — PROGRESS NOTE ADULT - PROBLEM SELECTOR PLAN 4
- paroxysmal  - s/p JOANNA clip  - On Amiodarone 200 mg QD. Last bolused with IV Amio 3/4 for AF 130s  - On Digoxin 125 mcg QOD. Lowered on 3/6 for level of 1.3.   - Heparin infusion stopped 3/6 for pericardial effusion

## 2024-03-12 NOTE — PROGRESS NOTE ADULT - SUBJECTIVE AND OBJECTIVE BOX
Overnight Events:  Chest tube removed     Review Of Systems: No chest pain, shortness of breath, or palpitations            Current Meds:  acetaminophen     Tablet .. 650 milliGRAM(s) Oral every 6 hours PRN  allopurinol 100 milliGRAM(s) Oral daily  aMIOdarone    Tablet 200 milliGRAM(s) Oral daily  aMIOdarone    Tablet   Oral   aspirin enteric coated 81 milliGRAM(s) Oral daily  benzocaine/menthol Lozenge 1 Lozenge Oral four times a day PRN  bisacodyl Suppository 10 milliGRAM(s) Rectal daily PRN  buMETAnide 3 milliGRAM(s) Oral two times a day  cefepime   IVPB 2000 milliGRAM(s) IV Intermittent every 24 hours  chlorhexidine 2% Cloths 1 Application(s) Topical daily  digoxin     Tablet 125 MICROGram(s) Oral every other day  FIRST- Mouthwash  BLM 10 milliLiter(s) Swish and Swallow every 6 hours  heparin   Injectable 5000 Unit(s) SubCutaneous every 8 hours  hydrALAZINE 50 milliGRAM(s) Oral every 8 hours  levothyroxine 50 MICROGram(s) Oral <User Schedule>  lidocaine   4% Patch 1 Patch Transdermal daily  mupirocin 2% Ointment 1 Application(s) Topical <User Schedule>  oxyCODONE    IR 10 milliGRAM(s) Oral every 8 hours PRN  pantoprazole    Tablet 40 milliGRAM(s) Oral daily  polyethylene glycol 3350 17 Gram(s) Oral daily  senna 2 Tablet(s) Oral at bedtime  sodium chloride 0.65% Nasal 1 Spray(s) Both Nostrils four times a day      Vitals:  T(F): 98.4 (03-12), Max: 98.6 (03-11)  HR: 71 (03-12) (70 - 76)  BP: 133/71 (03-12) (127/67 - 142/72)  RR: 18 (03-12)  SpO2: 98% (03-12)  I&O's Summary    11 Mar 2024 07:01  -  12 Mar 2024 07:00  --------------------------------------------------------  IN: 830 mL / OUT: 1300 mL / NET: -470 mL    12 Mar 2024 07:01  -  12 Mar 2024 12:04  --------------------------------------------------------  IN: 240 mL / OUT: 0 mL / NET: 240 mL        Physical Exam:    General: NAD Comfortable sitting up in chair  HEENT: AT/NC   Neck: + JVP   Chest: No accessory resp muscle use   CV: RRR.   Abdomen:  non-distended  Extremities: Warm peripherally, 2+ BLE edema, B/L erythema   Neurology: Alert and oriented times three. Sensation intact  Psych: Affect normal                          8.8    9.81  )-----------( 204      ( 12 Mar 2024 06:33 )             26.6     03-12    139  |  99  |  56<H>  ----------------------------<  184<H>  4.0   |  29  |  1.64<H>    Ca    8.7      12 Mar 2024 06:33  Mg     2.0     03-12      PTT - ( 12 Mar 2024 06:33 )  PTT:29.6 sec

## 2024-03-12 NOTE — PROGRESS NOTE ADULT - PROBLEM SELECTOR PROBLEM 1
Aortic stenosis
Acute on chronic combined systolic and diastolic congestive heart failure
S/P AVR (aortic valve replacement)
Acute on chronic combined systolic and diastolic congestive heart failure
S/P AVR (aortic valve replacement)
Acute on chronic combined systolic and diastolic congestive heart failure
Aortic stenosis
Aortic stenosis
S/P AVR (aortic valve replacement)
Acute on chronic combined systolic and diastolic congestive heart failure
Aortic stenosis
S/P AVR (aortic valve replacement)
Acute on chronic combined systolic and diastolic congestive heart failure
Acute on chronic combined systolic and diastolic congestive heart failure
S/P AVR (aortic valve replacement)
Acute on chronic combined systolic and diastolic congestive heart failure
Acute on chronic combined systolic and diastolic congestive heart failure
S/P AVR (aortic valve replacement)
S/P AVR (aortic valve replacement)
Acute on chronic combined systolic and diastolic congestive heart failure
Acute on chronic combined systolic and diastolic congestive heart failure
Aortic stenosis
Acute on chronic combined systolic and diastolic congestive heart failure
Acute on chronic combined systolic and diastolic congestive heart failure
Aortic stenosis
Acute on chronic combined systolic and diastolic congestive heart failure
Acute on chronic combined systolic and diastolic congestive heart failure
S/P AVR (aortic valve replacement)
Acute on chronic combined systolic and diastolic congestive heart failure
S/P AVR (aortic valve replacement)
Acute on chronic combined systolic and diastolic congestive heart failure
S/P AVR (aortic valve replacement)
Acute on chronic combined systolic and diastolic congestive heart failure
Acute on chronic combined systolic and diastolic congestive heart failure

## 2024-03-12 NOTE — DISCHARGE NOTE NURSING/CASE MANAGEMENT/SOCIAL WORK - PATIENT PORTAL LINK FT
You can access the FollowMyHealth Patient Portal offered by [facility] by registering at the following website: [email or web address]. By joining [facility]’s FollowMyHealth portal, you will also be able to view your health information using other applications (apps) compatible with our system.

## 2024-03-12 NOTE — DISCHARGE NOTE PROVIDER - NSRESEARCHGRANT_HIDDEN_GEN_A_CORE
Infliximab Counseling:  I discussed with the patient the risks of infliximab including but not limited to myelosuppression, immunosuppression, autoimmune hepatitis, demyelinating diseases, lymphoma, and serious infections.  The patient understands that monitoring is required including a PPD at baseline and must alert us or the primary physician if symptoms of infection or other concerning signs are noted.
Yes
41 year old female, past medical history Hodgkins Lymphoma (not on active treatment), who presents to the ED for shortness of breath for 1-2 weeks. Concern for new onset congestive heart failure, vs pneuonia, vs effusion, vs copd/asthma, will obtain labwork. xray, and symptomatic treatment.

## 2024-03-12 NOTE — DISCHARGE NOTE PROVIDER - NSDCPNSUBOBJ_GEN_ALL_CORE
PHYSICAL EXAM:  Neuro: A&Ox3.    CV: S1S2  Lungs: Clear B/L  ABD: Soft, non-tender, non-distended.  +Bowel sounds 3/10  EXT: Warm and well perfused.  Generalized edema and chronic stasis hyperpigmentation noted. Lower legs w/ woody appearance, and dark reddish/purplish in color.  1+ edema B/L below knees.    PV: Pedal pulses palpable  Incision Sites: MSI - staples.

## 2024-03-12 NOTE — DISCHARGE NOTE PROVIDER - CARE PROVIDER_API CALL
Ronni Martinez  Thoracic and Cardiac Surgery  [address] [place] [id number]  Phone: [phone]  Fax: [phone]  Follow Up Time:    Ronni Martinez  Thoracic and Cardiac Surgery  [address] [place] [address]  Phone: [phone]  Fax: [phone]  Established Patient  Scheduled Appointment: 03/26/2024 12:30 PM

## 2024-03-12 NOTE — PROGRESS NOTE ADULT - SUBJECTIVE AND OBJECTIVE BOX
No distress    Vital Signs Last 24 Hrs  T(C): 36.9 (03-12-24 @ 05:33), Max: 37 (03-11-24 @ 15:00)  T(F): 98.4 (03-12-24 @ 05:33), Max: 98.6 (03-11-24 @ 15:00)  HR: 71 (03-12-24 @ 05:33) (70 - 76)  BP: 133/71 (03-12-24 @ 05:33) (121/75 - 142/72)  BP(mean): 92 (03-12-24 @ 05:33) (92 - 92)  RR: 18 (03-12-24 @ 05:33) (18 - 18)  SpO2: 98% (03-12-24 @ 05:33) (95% - 98%)    I&O's Detail    11 Mar 2024 07:01  -  12 Mar 2024 07:00  --------------------------------------------------------  IN:    Oral Fluid: 830 mL  Total IN: 830 mL    OUT:    Voided (mL): 1300 mL  Total OUT: 1300 mL    s1s2  b/l air entry  soft, ND  b/l LE edema                                                                                                                                                       8.8    9.81  )-----------( 204      ( 12 Mar 2024 06:33 )             26.6     12 Mar 2024 06:33    139    |  99     |  56     ----------------------------<  184    4.0     |  29     |  1.64     Ca    8.7        12 Mar 2024 06:33  Mg     2.0       12 Mar 2024 06:33    acetaminophen     Tablet .. 650 milliGRAM(s) Oral every 6 hours PRN  allopurinol 100 milliGRAM(s) Oral daily  aMIOdarone    Tablet 200 milliGRAM(s) Oral daily  aMIOdarone    Tablet   Oral   aspirin enteric coated 81 milliGRAM(s) Oral daily  benzocaine/menthol Lozenge 1 Lozenge Oral four times a day PRN  bisacodyl Suppository 10 milliGRAM(s) Rectal daily PRN  buMETAnide 3 milliGRAM(s) Oral two times a day  cefepime   IVPB 2000 milliGRAM(s) IV Intermittent every 24 hours  chlorhexidine 2% Cloths 1 Application(s) Topical daily  digoxin     Tablet 125 MICROGram(s) Oral every other day  FIRST- Mouthwash  BLM 10 milliLiter(s) Swish and Swallow every 6 hours  heparin   Injectable 5000 Unit(s) SubCutaneous every 8 hours  hydrALAZINE 50 milliGRAM(s) Oral every 8 hours  levothyroxine 50 MICROGram(s) Oral <User Schedule>  lidocaine   4% Patch 1 Patch Transdermal daily  mupirocin 2% Ointment 1 Application(s) Topical <User Schedule>  oxyCODONE    IR 10 milliGRAM(s) Oral every 8 hours PRN  pantoprazole    Tablet 40 milliGRAM(s) Oral daily  polyethylene glycol 3350 17 Gram(s) Oral daily  senna 2 Tablet(s) Oral at bedtime  sodium chloride 0.65% Nasal 1 Spray(s) Both Nostrils four times a day    A/P:    CM, EF 30%, severe AS, Afib, CHF  Tx from [facility] to [facility] 2/5, s/p LHC 2/5  S/p cardio-renal/hemodynamic LINDA (peak Cr 2.5 - 2/3, lorna Cr 1.23 - 2/23)   S/p CT w/IV dye 2/8/24  Stable renal fx post CT  S/p AVR, JOANNA clip 2/23  Serratia bacteremia (2/29 and 3/1), Abx per ID   Bld cx 3/5 - NGTD   Pericardial effusion  Hemodynamic/cardio-renal LINDA/CKD (peak Cr 2.98 - 3/6)  S/p pericardial window 3/6, clot extracted   Diuresis, HF management per HF team   Cr is improving  Avoid nephrotoxins as possible   No NSAID's  Renal f/u as op    [phone]

## 2024-03-12 NOTE — DISCHARGE NOTE PROVIDER - NSDCCPCAREPLAN_GEN_ALL_CORE_FT
PRINCIPAL DISCHARGE DIAGNOSIS  Diagnosis: S/P AVR (aortic valve replacement)  Assessment and Plan of Treatment:

## 2024-03-12 NOTE — PROGRESS NOTE ADULT - PROBLEM SELECTOR PLAN 1
- EF appears to have recovered   -  weaned off 3/7  - Plan for discharge today per primary team, please discharge with increased dose of Bumex 4mg BID   - Continue HDZN to 50 mg TID, hold for SBP <90  - Defer MRA/ARB/ARNI/SGLT2-i given degree of renal dysfunction, improving   - Replete to target K 4-4.5 and Mg 2-2.5

## 2024-03-12 NOTE — DISCHARGE NOTE PROVIDER - NSDCFUADDAPPT_GEN_ALL_CORE_FT
follow up appt with DR. Ronni Martinez, cardiac surgeon in 2 weeks  follow up appt with your Cardiologist and /or Primary care MD in 2-3 weeks  follow up with the Heart Failure Clinic in 2 weeks    Your Care Navigator Nurse Practitioner will be in touch to see you in your home within a few days from discharge. The Follow Your Heart program can help ensure you understand your medications, discharge instructions and answer any questions you may have at that time. They are also a great source to address concerns during the day and may be reached at [phone].   follow up appt with DR. Ronni Martinez, cardiac surgeon on tues 3/26 at 12:30p  follow up appt with your Cardiologist and /or Primary care MD in 2-3 weeks  follow up with the Heart Failure Clinic in 2 weeks    Your Care Navigator Nurse Practitioner will be in touch to see you in your home within a few days from discharge. The Follow Your Heart program can help ensure you understand your medications, discharge instructions and answer any questions you may have at that time. They are also a great source to address concerns during the day and may be reached at [phone].   follow up appt with DR. Ronni Martinez, cardiac surgeon on tues 3/26 at 12:30p  follow up appt with your Cardiologist and /or Primary care MD in 2-3 weeks  follow up with the Heart Failure Clinic.  THe office will call you with your follow up  appt.     Your Care Navigator Nurse Practitioner will be in touch to see you in your home within a few days from discharge. The Follow Your Heart program can help ensure you understand your medications, discharge instructions and answer any questions you may have at that time. They are also a great source to address concerns during the day and may be reached at [phone].

## 2024-03-12 NOTE — DISCHARGE NOTE PROVIDER - PROVIDER TOKENS
PROVIDER:[TOKEN:[[id number]]] PROVIDER:[TOKEN:[[id number]],SCHEDULEDAPPT:[03/26/2024],SCHEDULEDAPPTTIME:[12:30 PM],ESTABLISHEDPATIENT:[T]]

## 2024-03-12 NOTE — DISCHARGE NOTE PROVIDER - DETAILS OF MALNUTRITION DIAGNOSIS/DIAGNOSES
This patient has been assessed with a concern for Malnutrition and was treated during this hospitalization for the following Nutrition diagnosis/diagnoses:     -  02/22/2024: Severe protein-calorie malnutrition

## 2024-03-12 NOTE — PROGRESS NOTE ADULT - PROBLEM SELECTOR PLAN 5
- BCx from 2/29 and 3/1 with GNR Serratia  - Repeat BCx from 3/5 NGTD  - LE wound culture send 3/5, NGTD  - s/p Cefepime   - Remains afebrile  - Further guidance by ID  - TTE 3/8 unable to rule out vegetation, however if suspicion is high per ID, can pursue a LESLEY however WBC count has normalized and patient is afebrile

## 2024-03-12 NOTE — DISCHARGE NOTE NURSING/CASE MANAGEMENT/SOCIAL WORK - NSDCFUADDAPPT_GEN_ALL_CORE_FT
follow up appt with DR. Ronni Martinez, cardiac surgeon in 2 weeks  follow up appt with your Cardiologist and /or Primary care MD in 2-3 weeks  follow up with the Heart Failure Clinic in 2 weeks    Your Care Navigator Nurse Practitioner will be in touch to see you in your home within a few days from discharge. The Follow Your Heart program can help ensure you understand your medications, discharge instructions and answer any questions you may have at that time. They are also a great source to address concerns during the day and may be reached at [phone].

## 2024-03-12 NOTE — PROGRESS NOTE ADULT - ATTENDING COMMENTS
Congestive hepatopathy, liver biopsy result discussed with pathology, Dr. Adkins.  Liver enzymes have normalized, LINDA has resolved.   No liver-related contraindication to cardiac surgery.
Patient slowly making progress  Increase bumex to 3mg BID  Increase afterload reduction to 50mg TID  If creatinine improves further will add MRA/SGLT2i
Had afib this morning with hypotension and decreased UOP. Mentating. Considered cardioversion but reverted to sinus. Remains overloaded on exam but much improved. Labs reviewed - WBC 10, BUN/Cr 46/1.98 (prior 1.35), K 3.5.   - c/w current meds  - replete K>4, Mg>2  - c/w diuretics; goal net negative  - dig load as above  - c/w amio; intolerant of afib  - c/w abx for Serratia bacteremia
Patient still with mild edema, Will increase bumex to 4mg BID  EF has recovered to normal  Continue with hydralazine 50mg TID and will change to another agent as an outpatient as his creatinine continues to improve
63-year-old male past medical history moderate aortic stenosis, hypertension, hyperlipidemia, probable vascular disease who presents with lower extremity swelling.  He had a prior history of moderate aortic stenosis was diagnosed a couple years ago.  It was presumably a tricuspid aortic valve leaflet.  However he did not follow-up with a cardiologist.  He presented after he noted increased bilateral leg swelling and pain.  A TTE was performed that demonstrated a moderately reduced LV systolic function of 35 to 40% and progression of his aortic valve stenosis.  He was also noted to have LINDA on probable CKD as well as lower extremity wounds which he received a course of antibiotics.  He was transferred to [place] for consideration of aortic valve intervention. Coronary angiogram demonstrated  nonobstructive coronary disease.  Etiology of his cardiomyopathy is most likely secondary to his aortic stenosis.    He is anxious to go home but is willing to stay to continue his care. On exam he is warm, well-perfused, and severely hypervolemic with +3 tense lower extremity edema and JVP>15cm.  He has a loud crescendo decrescendo systolic murmur. He has not received any diuresis as he was awaiting a AS CT workup due to concerns for his CKD. However, he is severly hypervolemic and needs to undergo diuresis. Please start IV lasix 40 mg BID for net negative goal 2-3L. Please record accurate I's and O's and standing weights.  His HR is reasonably controlled on metoprolol XL 50 mg daily. I do not think we need to be aggressive about his afterload reduction given his kidney dysfunction as well as his severe aortic stenosis.    Plan  -IV Lasix 40 mg twice a day for net negative goal 2 to 3 L  -Metoprolol XL 50 mg daily  -Start spironolactone 25 mg daily  -Structural heart following for consideration of TAVR vs SAVR    Fco Silva MD  Interventional Cardiology/Advanced Heart Failure Transplant
Feels well. Tolerated  discontinuation. Urinating well to bumex gtt. Remains overloaded on exam but much improved. Labs reviewed - WBC 10, BUN/Cr 43/2.08 (prior 1.35), K 3.5.   - c/w current meds; increase hydral to 50 mg q8h  - replete K>4, Mg>2  - c/w diuretics; goal net negative  - c/w abx for Serratia bacteremia

## 2024-03-12 NOTE — DISCHARGE NOTE PROVIDER - NSDCMRMEDTOKEN_GEN_ALL_CORE_FT
acetaminophen 325 mg oral tablet: 2 tab(s) orally every 6 hours As needed Temp greater or equal to 38C (100.4F), Mild Pain (1 - 3)  alfuzosin 10 mg oral tablet, extended release: 1 tab(s) orally once a day (at bedtime) HOme  amiodarone 200 mg oral tablet: 1 tab(s) orally once a day  Flomax 0.4 mg oral capsule: 1 cap(s) orally once a day hospital med  traMADol 50 mg oral tablet: 1 tab(s) orally once a day (at bedtime) as needed for pain home and hospital MDD: 1 tab   acetaminophen 325 mg oral tablet: 2 tab(s) orally every 6 hours As needed Temp greater or equal to 38C (100.4F), Mild Pain (1 - 3)  alfuzosin 10 mg oral tablet, extended release: 1 tab(s) orally once a day (at bedtime) HOme  allopurinol 100 mg oral tablet: 1 tab(s) orally once a day  amiodarone 200 mg oral tablet: 1 tab(s) orally once a day  aspirin 81 mg oral delayed release tablet: 1 tab(s) orally once a day  bumetanide 2 mg oral tablet: 2 tab(s) orally 2 times a day  digoxin 125 mcg (0.125 mg) oral tablet: 1 tab(s) orally every other day  Flomax 0.4 mg oral capsule: 1 cap(s) orally once a day [facility] med  hydrALAZINE 50 mg oral tablet: 1 tab(s) orally every 8 hours  levoFLOXacin 250 mg oral tablet: 1 tab(s) orally once a day please fill this today!  levothyroxine 50 mcg (0.05 mg) oral tablet: 1 tab(s) orally once a day  pantoprazole 40 mg oral delayed release tablet: 1 tab(s) orally once a day  senna leaf extract oral tablet: 2 tab(s) orally once a day (at bedtime)

## 2024-03-12 NOTE — PROGRESS NOTE ADULT - PROBLEM SELECTOR PROBLEM 3
Transaminitis
S/P AVR (aortic valve replacement)
Afib
Hypertension
S/P AVR (aortic valve replacement)
Transaminitis
Afib
S/P AVR (aortic valve replacement)
S/P AVR (aortic valve replacement)
Transaminitis
S/P AVR (aortic valve replacement)
Transaminitis
Afib
Hypertension
S/P AVR (aortic valve replacement)
Afib
S/P AVR (aortic valve replacement)
Chronic venous stasis dermatitis
S/P AVR (aortic valve replacement)
Afib
Hypertension
S/P AVR (aortic valve replacement)
Transaminitis
Transaminitis
S/P AVR (aortic valve replacement)
Transaminitis
S/P AVR (aortic valve replacement)
Transaminitis
Transaminitis
Chronic venous stasis dermatitis
Chronic venous stasis dermatitis
Transaminitis

## 2024-03-12 NOTE — DISCHARGE NOTE NURSING/CASE MANAGEMENT/SOCIAL WORK - NSDCPEFALRISK_GEN_ALL_CORE
For information on Fall & Injury Prevention, visit: [email or web address] OR  [email or web address] OR  https://www.cdc.gov/steadi/patient.html

## 2024-03-12 NOTE — DISCHARGE NOTE PROVIDER - NSDCFUSCHEDAPPT_GEN_ALL_CORE_FT
Ronald Vega  [facility]  [address] [facility]  Scheduled Appointment: 04/09/2024

## 2024-03-13 ENCOUNTER — APPOINTMENT (OUTPATIENT)
Dept: CARE COORDINATION | Facility: HOME HEALTH | Age: 64
End: 2024-03-13
Payer: COMMERCIAL

## 2024-03-13 VITALS
OXYGEN SATURATION: 94 % | RESPIRATION RATE: 12 BRPM | HEART RATE: 62 BPM | DIASTOLIC BLOOD PRESSURE: 58 MMHG | SYSTOLIC BLOOD PRESSURE: 134 MMHG

## 2024-03-13 LAB — ANA TITR SER: NEGATIVE — SIGNIFICANT CHANGE UP

## 2024-03-13 PROCEDURE — 99024 POSTOP FOLLOW-UP VISIT: CPT

## 2024-03-13 RX ORDER — MUPIROCIN 2 G/100G
2 CREAM TOPICAL TWICE DAILY
Qty: 1 | Refills: 1 | Status: DISCONTINUED | COMMUNITY
Start: 2024-01-24 | End: 2024-03-13

## 2024-03-13 RX ORDER — FUROSEMIDE 20 MG/1
20 TABLET ORAL
Qty: 15 | Refills: 0 | Status: DISCONTINUED | COMMUNITY
Start: 2024-01-24 | End: 2024-03-13

## 2024-03-13 RX ORDER — VALACYCLOVIR 1 G/1
1 TABLET, FILM COATED ORAL 3 TIMES DAILY
Qty: 21 | Refills: 0 | Status: DISCONTINUED | COMMUNITY
Start: 2022-11-29 | End: 2024-03-13

## 2024-03-13 RX ORDER — LEVOFLOXACIN 5 MG/ML
1 INJECTION, SOLUTION INTRAVENOUS
Qty: 2 | Refills: 0
Start: 2024-03-13 | End: 2024-03-14

## 2024-03-13 RX ORDER — LOSARTAN POTASSIUM AND HYDROCHLOROTHIAZIDE 12.5; 1 MG/1; MG/1
100-12.5 TABLET ORAL
Qty: 90 | Refills: 1 | Status: DISCONTINUED | COMMUNITY
Start: 2018-11-08 | End: 2024-03-13

## 2024-03-13 RX ORDER — DOXYCYCLINE HYCLATE 100 MG/1
100 TABLET ORAL
Qty: 14 | Refills: 0 | Status: DISCONTINUED | COMMUNITY
Start: 2023-12-11 | End: 2024-03-13

## 2024-03-13 RX ORDER — GABAPENTIN 100 MG/1
100 CAPSULE ORAL
Qty: 30 | Refills: 0 | Status: DISCONTINUED | COMMUNITY
Start: 2022-11-29 | End: 2024-03-13

## 2024-03-13 RX ORDER — OMEPRAZOLE 20 MG/1
20 TABLET, DELAYED RELEASE ORAL DAILY
Qty: 30 | Refills: 6 | Status: DISCONTINUED | COMMUNITY
Start: 2023-10-16 | End: 2024-03-13

## 2024-03-13 RX ORDER — LIDOCAINE 5% 700 MG/1
5 PATCH TOPICAL
Qty: 1 | Refills: 1 | Status: DISCONTINUED | COMMUNITY
Start: 2022-12-19 | End: 2024-03-13

## 2024-03-13 RX ORDER — TRAMADOL HYDROCHLORIDE 50 MG/1
50 TABLET, COATED ORAL
Qty: 20 | Refills: 0 | Status: DISCONTINUED | COMMUNITY
Start: 2024-01-24 | End: 2024-03-13

## 2024-03-13 RX ORDER — PROMETHAZINE HYDROCHLORIDE AND DEXTROMETHORPHAN HYDROBROMIDE ORAL SOLUTION 15; 6.25 MG/5ML; MG/5ML
6.25-15 SOLUTION ORAL
Qty: 100 | Refills: 0 | Status: DISCONTINUED | COMMUNITY
Start: 2023-12-04 | End: 2024-03-13

## 2024-03-13 RX ORDER — FLUTICASONE PROPIONATE 50 UG/1
50 SPRAY, METERED NASAL TWICE DAILY
Qty: 1 | Refills: 0 | Status: DISCONTINUED | COMMUNITY
Start: 2021-07-08 | End: 2024-03-13

## 2024-03-13 RX ORDER — BENZONATATE 200 MG/1
200 CAPSULE ORAL 3 TIMES DAILY
Qty: 20 | Refills: 0 | Status: DISCONTINUED | COMMUNITY
Start: 2023-12-11 | End: 2024-03-13

## 2024-03-13 RX ORDER — MELOXICAM 7.5 MG/1
7.5 TABLET ORAL TWICE DAILY
Qty: 28 | Refills: 1 | Status: DISCONTINUED | COMMUNITY
Start: 2023-06-26 | End: 2024-03-13

## 2024-03-13 NOTE — REASON FOR VISIT
[Post Hospitalization] : a post hospitalization visit [FreeTextEntry1] : FOLLOW YOUR HEART - Transitional Care Management Program - [facility]

## 2024-03-13 NOTE — PLAN
[TextEntry] : 1) Weigh yourself in the AM prior to eating & drinking. Contact FY team if you note a wt gain of 1-2 lbs overnight or 5 lbs within 1 week. Continue current meds. Keep your legs elevated & ambulate as tolerated. Continue incentive spirometry 10x/hr while awake. Shower using mild soap daily. Your diet should be low salt, low fat, high protein. 2) Call FY team 24/7 w/ any questions, issues, or concerns. My number [phone] was provided to the pt. Explained to pt I personally work from Mon to Fri from 8a to 4p but before or after those hours this number still functions 24/7 and pt will get in touch w/ a team member of CT Surgeon at all times. 3) Report to your FY NP any signs of infection such as redness, swelling, warmth, drainage, or pain at an incision site. Additionally, report to your [facility] NP if you develop a fever. 4) Do not lift anything more than 5 lbs. 5) Do not drive until you are cleared to do so by your surgeon. 6) Please walk 3x/day.  7) Change B/L LE dressings daily - Apply Medihoney to wounds then telfa dsg w/ paper tape. 8) Use rolling walker to ambulate.  9) Remove MSI staples upon post-op visit w/ Dr Martinez  FOLLOW UP APPOINTMENTS: CTSx: Dr. Martinez on 3/26 HEART FAILURE: 3/26 CARDIOLOGIST: Dr. Nguyễn -Pt advised to call to schedule appt within 2 weeks PCP: Dr. Nguyen- Pt advised to call to schedule appt within 1 month of discharge. VASC: I recommended for this pt to f/u w/ vascular MD outpt for chronic venous stasis. Pt does not have a vascular MD. I provided the Patient Access Referral phone number to the pt for them to call to find a Vascular MD nearby home and accepting of insurance.

## 2024-03-13 NOTE — ASSESSMENT
[FreeTextEntry1] : Pt recovering well at home s/p cardiac surgery. Pt's wife was at work during my visit but spoke to her via phone call.  Pt has all medications in home and is taking as prescribed. Pain controlled with current medication regimen. Pt is aware of F/U appts scheduled and that need to be scheduled as listed below. B/L LE edema noted. Advised pt to keep LE elevated w/ 2 pillows under legs majority of the time & to walk around as tolerated. Pt aware to notify FY team of any developing SOB or wt gain. He will get a scale.

## 2024-03-13 NOTE — HISTORY OF PRESENT ILLNESS
[FreeTextEntry1] : 63M, appears older than stated age, admitted to [facility] 24 PMHx HTN, GERD, HPL, chronic venous stasis dermatitis with bilateral leg swelling, and AS presented to [place] [facility] on 2024 for bilateral leg pain and swelling found to have new acute HFrEF (EF 35-40% 2024) in setting of severe AS.   Hospital course: 24: Admitted to [place] new onset HFrEF EF 35% and anemia 2/2 AS, 24: medically stable for transferred to [facility] medicine service for continued care. during hospitalization hx of WCT/ PAF Consults for Nephrology, HF, ID, Podiatry for medical optimization 2/15: 1 U PRBC, keep Hgb > 8.   s/p fall with head strike : s/p Liver biopsy, soft pressure post procedure--responded to fluid bolus :  RLE doppler: No pseudoaneurysm in the right groin/ bilobed avascular fluid collection measuring 2.8 x 1.7 x 2.0 cm : AVR/JOANNA Clip c/b intraop bleeding requirinu pRBCs on CPB 1u pRBCs post-CPB 1 PLT 1000 FEIBA Prolene suture placed around IJ and V wire Post-Op Course: : To Step Down, raegan, CT, +PW, marlena RIJ  Maintain mediastinal chest tubes Primicor discontinued. Lasix 40 BID initiated , Wound care to chronic venous stasis b/l LE. D/c RIJ  VSS - 9 hrs afib last evening- SR since 1:30am- on AMio gtt,  gtt - s/p right axillary marlena placed by Intensivist. garcia placed last evening lasix 40 iv x 1 given this am- echo tr jon eff.  VSS - currently on lasix gtt - negative 2L in last 24 hrs. bid basic metabolic pt converted back into afib this am 100-120- currently on amio load - Dobutrex gtt. d/c planning   d/c yesterday then resumed for decreased UO.  Remains a fib since yesterday, cont amio/heparin, eps following. Weight increased, lasix 80 iv x 1, then infusion increased , aldactone added .  Remains on heparin infusion. Hydralazine added for afterload   NSR, on amio, EPS following, noted elevated tsh.  Repeat sent synthroid started. Fluid overload, diuretics increased, 3% saline x 2, lasix 80 iv x 1, lasix infusion 20mg/hr 3/1 febrile  overnight  and this am pan cx. Plan to remove marlena, intro , garcia.  A line is from , intro . Cont , lasix, f/u bun creat 3/2 VSS afebrile overnight.  97 r/s.  on lasix gtt @ 20mg/jr  negative 2366cc. will monitor closely in sdu 3/3 Bumex gtt decreased to 1mg/hr per HF - @8:45am pt converted to afib -100-130s w/ hypotension while on commode attempting to have a bm- pt asymptomatic. brought back to bed.  amio 150 IV x 1 given w/ filter. dig load per HF - Dr. Rosario - .75mg total d/t sin.  pt u/o decreased while in afib- DR. Rosario @ bedside CXR done.  spoke to EP - Dr. Ghotra regarding DCCV - Call to CTU to set up transfer for DCCV - transfer cancelled d/t pt. converted to SR 68- w/ increase in SBP to 102 & U/O increasing.  ENT consulted for c/o "hurts when i swallow" ent exam reveals no rod.  lido s&s ordered.  pt eating w/o difficulty 3/4 Pt with edema not improved, elevated creat.  Bumex d/c. Placed on iv Lasix aldactone d/c.  Albumin x 2 3/ Wt stable, remains off diuretic + edema, creat up yesterday. RHC today Tx To CTU for closer monitoring as CKD worsening 3/6 still not diuresing despite Bumex gtt, TTE revealed pericardial effusion returned to OR s/p 3/ subxiphoid drainage and pericardial window. 3/7 weaned off inotropes, improved diuresis to 5L 3/8 Tx to Fl VSS, MCT, Garcia 2V EPW to box  40/10 VSS - Pericardial drainage to remain in place, Pacing Wires x 2 to remain in place. Garcia catheter to remain in place, creatinine 2.65. 2mg Bumex BID per HF. No A/C per Dr. Martinez.  Continue on Cefepime 2 Grams IV Q 8 hours per ID for (+)Serratia marcescens bacteremia.  Repeat TTE to r/o bio-prosthetic endocarditis. TTE findings: small pericardial effusion noted to the anterior RV.  No echodensity noted on the valves. Unable to exclude valvular vegetation in the setting of thickened or calcified valves and/or technically limited/difficult study. 3/9 VSS; Continue with current medication regimen.   D/C Garcia for a TOV. Leukocytosis improving --> WBC 11.  Maintain pericardial drain in place, total output 130cc/24 hrs. 3/10 VSS pericardial drain 100 cc in last 24 hrs.  Cefepime for PSA in BAL pws d/c'd this am 3/11 VSS - d/c plan Home PT with rolling walker- d/c home on 2 more days of levoquin per ID-dig .125 stefan 200 qd per HF -0 qtc 446- hf team aware - will ck ekg on follow up visit with HF-  3/12- Seen by [facility] NP for a f/u home visit. Emotional support and education provided. All questions answered. Pt overall is recovering well w/o complaints.

## 2024-03-13 NOTE — PHYSICAL EXAM
[Sclera] : the sclera and conjunctiva were normal [Neck Appearance] : the appearance of the neck was normal [] : no respiratory distress [Exaggerated Use Of Accessory Muscles For Inspiration] : no accessory muscle use [Respiration, Rhythm And Depth] : normal respiratory rhythm and effort [Auscultation Breath Sounds / Voice Sounds] : lungs were clear to auscultation bilaterally [Apical Impulse] : the apical impulse was normal [Heart Rate And Rhythm] : heart rate was normal and rhythm regular [Heart Sounds] : normal S1 and S2 [Heart Sounds Gallop] : no gallops [Murmurs] : no murmurs [Heart Sounds Pericardial Friction Rub] : no pericardial rub [Examination Of The Chest] : the chest was normal in appearance [Diminished Respiratory Excursion] : normal chest expansion [Chest Visual Inspection Thoracic Asymmetry] : no chest asymmetry [Bowel Sounds] : normal bowel sounds [Abdomen Soft] : soft [Abnormal Walk] : normal gait [Abdomen Tenderness] : non-tender [Oriented To Time, Place, And Person] : oriented to person, place, and time [Impaired Insight] : insight and judgment were intact [Affect] : the affect was normal [Mood] : the mood was normal [FreeTextEntry1] : Hearing aids B/L

## 2024-03-26 ENCOUNTER — NON-APPOINTMENT (OUTPATIENT)
Age: 64
End: 2024-03-26

## 2024-03-26 ENCOUNTER — APPOINTMENT (OUTPATIENT)
Dept: HEART FAILURE | Facility: CLINIC | Age: 64
End: 2024-03-26

## 2024-03-26 ENCOUNTER — APPOINTMENT (OUTPATIENT)
Dept: CARDIOTHORACIC SURGERY | Facility: CLINIC | Age: 64
End: 2024-03-26
Payer: COMMERCIAL

## 2024-03-26 VITALS
SYSTOLIC BLOOD PRESSURE: 131 MMHG | BODY MASS INDEX: 26.88 KG/M2 | TEMPERATURE: 98 F | HEART RATE: 82 BPM | OXYGEN SATURATION: 96 % | WEIGHT: 182 LBS | DIASTOLIC BLOOD PRESSURE: 61 MMHG

## 2024-03-26 VITALS
OXYGEN SATURATION: 97 % | HEIGHT: 69 IN | BODY MASS INDEX: 27.11 KG/M2 | RESPIRATION RATE: 12 BRPM | WEIGHT: 183 LBS | TEMPERATURE: 98.1 F | SYSTOLIC BLOOD PRESSURE: 139 MMHG | DIASTOLIC BLOOD PRESSURE: 72 MMHG | HEART RATE: 65 BPM

## 2024-03-26 PROCEDURE — 99024 POSTOP FOLLOW-UP VISIT: CPT

## 2024-03-26 PROCEDURE — 93005 ELECTROCARDIOGRAM TRACING: CPT

## 2024-03-26 PROCEDURE — ZZZZZ: CPT | Mod: 1L

## 2024-03-26 RX ORDER — LEVOFLOXACIN 250 MG/1
250 TABLET, FILM COATED ORAL DAILY
Refills: 0 | Status: COMPLETED | COMMUNITY
Start: 2024-03-13 | End: 2024-03-26

## 2024-03-26 NOTE — CARDIOLOGY SUMMARY
[de-identified] : TTE 3/8/24: . Reduced systolic function. Unable to rule out endocarditis.  TTE 2/26/24: LVIDd 6 cm, LVEF 30% (global), mod RVE with mod reduced function (TAPSE 0.9), severe ARAVIND, mild TR, est PASP 25 mmHg, trace pericardial effusion, IVC normal in size  [de-identified] :  [facility] 3/5/24: RA 15, PA 52/23/32, PCWP 23, AO 94%, PA 45%, Hgb 8.8, CO/CI (F) 4.5/2.2, PVR 1.75 davis

## 2024-03-26 NOTE — ASSESSMENT
[FreeTextEntry1] : 63M, appears older than stated age, admitted to [facility] 24 PMHx HTN, GERD, HPL, chronic venous stasis dermatitis with bilateral leg swelling, and AS presented to [place] [facility] on 2024 for bilateral leg pain and swelling found to have new acute HFrEF (EF 35-40% 2024) in setting of severe AS.  Hospital course: 24: Admitted to [place] new onset HFrEF EF 35% and anemia 2/2 AS, 24: medically stable for transferred to [facility] medicine service for continued care. during hospitalization hx of WCT/ PAF Consults for Nephrology, HF, ID, Podiatry for medical optimization 2/15: 1 U PRBC, keep Hgb > 8.  s/p fall with head strike : s/p Liver biopsy, soft pressure post procedure--responded to fluid bolus :  RLE doppler: No pseudoaneurysm in the right groin/ bilobed avascular fluid collection measuring 2.8 x 1.7 x 2.0 cm   : AVR/JOANNA Clip c/b intraop bleeding requirinu pRBCs on CPB 1u pRBCs post-CPB 1 PLT 1000 FEIBA Prolene suture placed around IJ and V wire   Post-Op Course: : To Step Down, raegan, CT, +PW, marlena RIJ,  Maintain mediastinal chest tubes Primicor discontinued. Lasix 40 BID, initiated , Wound care to chronic venous stasis b/l LE. D/c RIJ  VSS - 9 hrs afib last evening- SR since 1:30am- on AMio gtt,  gtt - s/p right axillary marlena placed by Intensivist. garcia placed last evening lasix 40 iv x 1 given this am- echo tr jon eff.  VSS - currently on lasix gtt - negative 2L in last 24 hrs. bid basic metabolic pt converted back into afib this am 100-120- currently on amio load - Dobutrex gtt. d/c planning   d/c yesterday then resumed for decreased UO. Remains a fib since yesterday, cont amio/heparin, eps following. Weight increased, lasix 80 iv x 1, then infusion increased , aldactone added . Remains on heparin infusion. Hydralazine added for afterload  NSR, on amio, EPS following, noted elevated tsh. Repeat sent synthroid started. Fluid overload, diuretics increased, 3% saline x 2, lasix 80 iv x 1, lasix infusion 20mg/hr 3/1 febrile overnight and this am pan cx. Plan to remove marlena, intro garcia. A line is from , intro . Cont , lasix, f/u bun creat 3/2 VSS afebrile overnight. 97 r/s. on lasix gtt @ 20mg/jr negative 2366cc.will monitor closely in sdu 3/3 Bumex gtt decreased to 1mg/hr per HF - @8:45am pt converted to afib -100-130s w/ hypotension while on commode attempting to have a bm- pt asymptomatic. brought back to bed. amio 150 IV x 1 given w/ filter. dig load per HF - Dr. Rosario - .75mg total d/t sin. pt u/o decreased while in afib- DR. Rosario @ bedside CXR done. spoke to EP - Dr. Ghotra regarding DCCV - Call to CTU to set up transfer for DCCV - transfer cancelled d/t pt. converted to SR 68- w/ increase in SBP to 102 & U/O increasing. ENT consulted for c/o "hurts when i swallow" ent exam reveals no rod. lido s&s ordered. pt eating w/o difficulty 3/4 Pt with edema not improved, elevated creat. Bumex d/c. Placed on iv Lasix aldactone d/c. Albumin x 2 3 Wt stable, remains off diuretic + edema, creat up yesterday. RHC today Tx To CTU for closer monitoring as CKD worsening 3/6 still not diuresing despite Bumex gtt, TTE revealed pericardial effusion returned to OR s/p 3/ subxiphoid drainage and pericardial window.3/7 weaned off inotropes, improved diuresis to 5L 3/8 Tx to Fl VSS, MCT, Garcia 2V EPW to box 40/10 VSS - Pericardial drainage to remain in place, Pacing Wires x 2 to remain in place. Garcia catheter to remain in place, creatinine 2.65. 2mg Bumex BID per HF. No A/C per Dr. Martinez. Continue on Cefepime 2 Grams IV Q 8 hours per ID for (+)Serratia marcescens bacteremia. Repeat TTE to r/o bio-prosthetic endocarditis. TTE findings: small pericardial effusion noted to the anterior RV. No echodensity noted on the valves. Unable to exclude valvular vegetation in the setting of thickened or calcified valves and/or technically limited/difficult study. 3/9 VSS; Continue with current medication regimen. D/C Garcia for a TOV. Leukocytosis improving --> WBC 11. Maintain pericardial drain in place, total output 130cc/24 hrs. 3/10 VSS pericardial drain 100 cc in last 24 hrs. Cefepime for PSA in BAL pws d/c'd this am 3/11 VSS - d/c plan Home PT with rolling walker- d/c home on 2 more days of levoquin per ID-dig .125 stefan 200 qd per HF -0 qtc 446- hf team aware - will ck ekg on follow up visit with HF-  Presents today with his wife and report doing well and coming along.  He completed his abx and saw HF earlier today who increased Bumex from 2mg BID to 3mg BID for LE edema and increased his Hydralazine from 50mg TID to 75mg TID.  He is getting wound care three times week for LE wounds at home that he had prior to surgery as well as PT twice a week. Has not showered since he has been home.  Saw Dr. Pretty of vascular last week who is following LE wounds. Overall he reports gradual improvement.  Walking more daily, using walker for support. Eating and drinking with +BM. Denies chest pain, SOB, swelling, weight gain, palpitations, cough, fever or chills.  Today on exam patient's lungs clear bilaterally, normal sinus rhythm, sternum stable, incision clean, dry and intact. 2-3+ peripheral edema noted. Instructed patient on importance of optimal glycemic control, daily showering, daily weights, incentive spirometer use, and increase ambulation as tolerated. Instructed to call office with any signs or symptoms of infection or weight gain of 2 or more pounds in 1 day or 3 or more pounds in 1 week.   Plan:  - Continue current medication regimen per HF today (increased Bumex for swelling) - EKG with NSR today, no AC for now - Follow up with cardiologist Dr. Nguyễn and HF as scheduled - Follow up with PCP, vascular and wound care - Continue to walk and increase as tolerated - Low salt diet and fluids discussed in detail - Tight glucose control discussed in detail for wound healing - SBE antibiotic prophylaxis discussed at length  - Return to office PRN - Call with any questions or concerns

## 2024-03-26 NOTE — HISTORY OF PRESENT ILLNESS
[FreeTextEntry1] :  64 y/o M w/ h/o HTN, severe AS, chronic venous stasis, HFpEF who p/w worsening LE swelling to GC 1/25 and found to be in decompensated heart failure with EF 35-40% and severe AS so transferred for further management on 2/5. Was diuresed and underwent LHC which was non-obstructive. Underwent bioAVR 2/23 w/ JOANNA clip complicated by bleeding. Post operative course complicated by volume expansion and pAF for which he was started on Amiodarone and Digoxin. Also became bacteremic, BCx 2/29 growing Serratia.  weaned to off on 3/1.   Developed worsening LINDA and ongoing volume expansion prompting RHC 3/5 revealing elevated filling pressures but adequate CI of 2.2 (Mvo2 45%). Resumed on  by CTSX and diuretics escalated. TTE 3/6 revealed pericardial effusion with signs of tamponade prompting pericardial window with drain placement, now removed.   Clinically improved, weaned off  3/7.  Optimizing HF medical therapies.

## 2024-03-26 NOTE — END OF VISIT
[FreeTextEntry3] : Written by Caleb Brown NP, acting as a scribe for Dr. Martinez The documentation recorded by the scribe accurately reflects the service I personally performed and the decisions made by me. Signature Ronni Martinez MD.

## 2024-03-26 NOTE — PHYSICAL EXAM
[] : no respiratory distress [Respiration, Rhythm And Depth] : normal respiratory rhythm and effort [Exaggerated Use Of Accessory Muscles For Inspiration] : no accessory muscle use [Auscultation Breath Sounds / Voice Sounds] : lungs were clear to auscultation bilaterally [Apical Impulse] : the apical impulse was normal [Heart Rate And Rhythm] : heart rate was normal and rhythm regular [Heart Sounds] : normal S1 and S2 [Heart Sounds Gallop] : no gallops [Clean] : clean [Dry] : dry [Healing Well] : healing well [Pitting Edema] : pitting edema present [___ +] : bilateral ankle [unfilled]U+ pitting edema [Foul Odor] : no foul smell [Bleeding] : no active bleeding [Serosanguinous Drainage] : no serosanguinous drainage [Erythema] : not erythematous [Purulent Drainage] : no purulent drainage [Warm] : not warm [Tender] : not tender

## 2024-03-27 ENCOUNTER — APPOINTMENT (OUTPATIENT)
Dept: CARDIOLOGY | Facility: CLINIC | Age: 64
End: 2024-03-27
Payer: COMMERCIAL

## 2024-03-27 ENCOUNTER — NON-APPOINTMENT (OUTPATIENT)
Age: 64
End: 2024-03-27

## 2024-03-27 VITALS
DIASTOLIC BLOOD PRESSURE: 57 MMHG | WEIGHT: 182 LBS | SYSTOLIC BLOOD PRESSURE: 124 MMHG | OXYGEN SATURATION: 95 % | HEIGHT: 69 IN | BODY MASS INDEX: 26.96 KG/M2

## 2024-03-27 PROCEDURE — 99214 OFFICE O/P EST MOD 30 MIN: CPT

## 2024-03-27 PROCEDURE — G2211 COMPLEX E/M VISIT ADD ON: CPT

## 2024-03-27 PROCEDURE — 93000 ELECTROCARDIOGRAM COMPLETE: CPT | Mod: 59

## 2024-03-27 PROCEDURE — 93246 EXT ECG>7D<15D RECORDING: CPT

## 2024-03-27 PROCEDURE — 99204 OFFICE O/P NEW MOD 45 MIN: CPT

## 2024-03-31 ENCOUNTER — NON-APPOINTMENT (OUTPATIENT)
Age: 64
End: 2024-03-31

## 2024-04-05 NOTE — REASON FOR VISIT
[CV Risk Factors and Non-Cardiac Disease] : CV risk factors and non-cardiac disease [Arrhythmia/ECG Abnorrmalities] : arrhythmia/ECG abnormalities [Structural Heart and Valve Disease] : structural heart and valve disease [FreeTextEntry3] : dr palafox [FreeTextEntry1] : GRACE FERNANDEZ comes today for evaluation. he was advised to undergo a complete cardiac evaluation. He denies chest pains shortness of breath or loss of consciousness.  I recently referred to [facility] for cardiovascular surgical evaluation.Umair had a complicated course at [facility] status post aortic valve replacement and left atrial appendage clipping he went into atrial fibrillation and was noted to have an elevation in TSH. He was on amiodarone and required a pericardial window for drainage procedure. 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 He is undergoing progressive ambulation and wound care.

## 2024-04-05 NOTE — PHYSICAL EXAM
[Well Developed] : well developed [Well Nourished] : well nourished [No Acute Distress] : no acute distress [Normal Conjunctiva] : normal conjunctiva [Normal Venous Pressure] : normal venous pressure [No Carotid Bruit] : no carotid bruit [Normal S1, S2] : normal S1, S2 [No Murmur] : no murmur [No Rub] : no rub [No Gallop] : no gallop [Clear Lung Fields] : clear lung fields [Good Air Entry] : good air entry [No Respiratory Distress] : no respiratory distress  [Soft] : abdomen soft [Non Tender] : non-tender [Normal Bowel Sounds] : normal bowel sounds [No Masses/organomegaly] : no masses/organomegaly [Normal Gait] : normal gait [No Edema] : no edema [No Cyanosis] : no cyanosis [No Clubbing] : no clubbing [No Varicosities] : no varicosities [No Rash] : no rash [No Skin Lesions] : no skin lesions [Moves all extremities] : moves all extremities [No Focal Deficits] : no focal deficits [Normal Speech] : normal speech [Alert and Oriented] : alert and oriented [Normal memory] : normal memory [de-identified] : Sternal wound healing nicely

## 2024-04-05 NOTE — CARDIOLOGY SUMMARY
[de-identified] : 3/27/2024 sinus rhythm old anterior infarct diffuse nonspecific T wave abnormality.. [de-identified] : CPT:               ECHO TTE W/O CON F/U LTD - 25262.m;LIMITED SPECTRAL - 72476.m Indication(s):     Endocarditis, valve unspecified - I38 Procedure:         Limited transthoracic echocardiogram. Ordering Location: [facility] Admission Status:  InpatientStudy Information: Image quality for this study is fair. _______________________________________________________________________________________  CONCLUSIONS:  1. Reduced systolic function.  2. Structurally normal mitral valve with normal leaflet excursion.  3. Structurally normal tricuspid valve with normal leaflet excursion.  4. Unable to rule out endocarditis.  5. Compared to the transthoracic echocardiogram performed on 3/6/2024, pericardial effusion is smaller.  6. Left ventricular endocardium is not well visualized; however, the left ventricular systolic function appears grossly normal. ____________________________________________________________________ Recommendations: Unable to exclude valvular vegetation in the setting of thickened or calcified valves and/or technically limited/difficult study. Transesophageal echocardiogram (LESLEY) could be obtained for further evaluation. ________________________________________________________________________________________ FINDINGS: Left Ventricle: Left ventricular endocardium is not well visualized; however, the left ventricular systolic function appears grossly normal. Right Ventricle: Reduced systolic function. Aortic Valve: A bioprosthetic valve replacement is present in the aortic position. There is no intravalvular regurgitation. There is no paravalvular regurgitation. The peak transaortic velocity is 2.53 m/s, peak transaortic gradient is 25.6 mmHg and mean transaortic gradient is 15.0 mmHg with an LVOT/aortic valve VTI ratio of 0.43. The aortic valve acceleration time is 82 msec. The aortic valve area is estimated at 1.48 cm by the continuity equation. Mitral Valve: Structurally normal mitral valve with normal leaflet excursion. Tricuspid Valve: Structurally normal tricuspid valve with normal leaflet excursion. Pericardium: There is a small pericardial effusion noted adjacent to the anterior right ventricle.____________________________________________________________________ QUANTITATIVE DATA: Left Ventricle Measurements: (Indexed to BSA) MV E Vmax: 1.10 m/s MV A Vmax: 0.50 m/s MV E/A:    2.21 MV DT:     348 msec Aorta Measurements: (Normal range) (Indexed to BSA) Sinuses of Valsalva: 3.30 cm (3.1 - 3.7 cm) LVOT / RVOT/ Qp/Qs Data: (Indexed to BSA) LVOT Diameter: 2.10 cm LVOT Vmax:     1.14 m/s LVOT VTI:      19.60 cm LVOT SV:       67.9 ml  27.85 ml/m Aortic Valve Measurements: AV Vmax:                2.5 m/s AV Peak Gradient:       25.6 mmHg AV Mean Gradient:       15.0 mmHg AV VTI:                 45.9 cm AV VTI Ratio:           0.43 AoV EOA, Contin:        1.48 cm AoV EOA, Contin i:      0.61 cm/m AoV Dimensionless Index 0.43 Mitral Valve Measurements: MV E Vmax: 1.1 m/s MV A Vmax: 0.5 m/s MV E/A:    2.2 Tricuspid Valve Measurements: TR Vmax:          3.0 m/s TR Peak Gradient: 36.5 mmHg ________________________________________________________________________________________ Electronically signed on 3/8/2024 at 4:49:29 PM by Christos Georges M.D.    *** Final ***  Report #2 - [id number] 07:55AM - TTE W or WO Ultrasound Enhancing Agent TRANSTHORACIC ECHOCARDIOGRAM REPORT ________________________________________________________________________________                                     _______   Pt. Name:       GRACE FERNANDEZ      Study Date:    3/6/2024 MRN:            [id number]          YOB: 1960 Accession #:    [id number]           Age:           63 years Account#:       [id number]        Gender:        M Heart Rate:     53 bpm              Height:        70.00 in (177.80 cm) Rhythm:         atrial fibrillation Weight:        188.00 lb (85.28 kg) Blood Pressure: 139/54 mmHg         BSA/BMI:       2.03 m / 26.98 kg/m ________________________________________________________________________________________ Referring Physician:    [id number] Ronni Martinez Interpreting Physician: Josse Alfredo MD Primary Sonographer:    Marcus Palacio RDCS  CPT:               ECHO TTE W/O CON F/U LTD - 15605.m;LIMITED SPECTRAL - 41428.m Indication(s):     Other pericardial effusion (noninflammatory) - I31.39 Procedure:         Limited transthoracic echocardiogram. Ordering Location: [facility] Admission Status:  Inpatient Study Information: Image quality for this study is good.  _______________________________________________________________________________________  CONCLUSIONS:   1. Left ventricular systolic function is probably normal.  2. A large circumferential pericardial effusion is present. Adjacent to the right heart, the effusion appears organized, with an appearance characteristic of thrombus. The right ventricle is compressed throughout the cardiac cycle. A loculated effusion appears to be adjacent to the right atrium.  3. Compared to the transthoracic echocardiogram performed on 2/26/2024, The effusion is larger. Findings were discussed with Dr. LOIDA Martinez on 3/6/2024 at 8:31 AM.________________________________________________________________________________________ FINDINGS: Left Ventricle: Left ventricular systolic function is probably normal. Pericardium: A large circumferential pericardial effusion is present. Adjacent to the right heart, the effusion appears organized, with an appearance characteristic of thrombus. The right ventricle is compressed throughout the cardiac cycle. A loculated effusion appears to be adjacent to the right atrium. Pleura: Left pleural effusion noted. Systemic Veins: The inferior vena cava is dilated measuring 3.02 cm in diameter, (dilated >2.1cm) with abnormal inspiratory collapse (abnormal <50%) consistent with elevated right atrial pressure ( R 15, range 10-20mmHg). ____________________________________________________________________ QUANTITATIVE DATA: Left Ventricle Measurements: (Indexed to BSA)  IVSd (2D):   1.4 cm LVPWd (2D):  1.3 cm LVIDd (2D):  4.7 cm LVIDs (2D):  2.7 cm LV Mass:     245 g  120.3 g/m   Tricuspid Valve Measurements:  RA Pressure: 15 mmHg  ________________________________________________________________________________________ Electronically signed on 3/6/2024 at 8:39:21 AM by Josse Alfredo MD    *** Final ***  Report #3 - [id number] 08:44AM - TTE W or WO Ultrasound Enhancing Agent TRANSTHORACIC ECHOCARDIOGRAM REPORT ________________________________________________________________________________                                     _______   Pt. Name:       GRACE FERNANDEZ Study Date:    2/26/2024 MRN:            [id number]     YOB: 1960 Accession #:    [id number]      Age:           63 years Account#:       [id number]   Gender:        M Heart Rate:     70 bpm         Height:        69.69 in (177.00 cm) Rhythm:         sinus rhythm   Weight:        197.00 lb (89.36 kg) Blood Pressure: 122/66 mmHg    BSA/BMI:       2.07 m / 28.52 kg/m ________________________________________________________________________________________ Referring Physician:    [id number] Ronni Martinez Interpreting Physician: Josse Alfredo MD Primary Sonographer:    Kristina Baldwin RDCS  CPT:                ECHO TTE WITH CON COMP W DOPP - .m;DEFINITY ECHO                     CONTRAST PER ML - .m;DEFINITY ECHO CONTRAST PER ML                     WASTED - .m Indication(s):      Cardiogenic shock - R57.0 Procedure:          Transthoracic echocardiogram with 2-D, M-mode and complete                     spectral and color flow Doppler. Ordering Location:  [facility] Admission Status:   Inpatient Contrast Injection: Verbal consent was obtained for injection of Ultrasonic                     Enhancing Agent following a discussion of risks and                     benefits.                     Endocardial visualization enhanced with 2 ml of Definity                     Ultrasound enhancing agent (Lot#:[id number] Exp.Date:@5 jan                     Discarded Dose:8ml). UEA Reaction:       Patient had no adverse reaction after injection of                     Ultrasound Enhancing Agent. Study Information:  Image quality for this study is less than ideal.  _______________________________________________________________________________________  CONCLUSIONS:   1. Left ventricular cavity is moderately dilated. Left ventricular wall thickness is normal. Left ventricular systolic function is severely decreased.  2. Moderately enlarged right ventricular cavity size and moderately reduced systolic function.  ________________________________________________________________________________________ FINDINGS:  Left Ventricle: After obtaining consent, Definity ultrasound enhancing agent was given for enhanced left ventricular opacification and improved delineation of the left ventricular endocardial borders. The left ventricular cavity is moderately dilated. Left ventricular wall thickness is normal. Left ventricular systolic function is severely decreased with an ejection fraction visually estimated at 30%. There is global left ventricular hypokinesis.  Right Ventricle: The right ventricular cavity is moderately enlarged in size and moderately reduced systolic function. Tricuspid annular plane systolic excursion (TAPSE) is 0.9 cm (normal >=1.7 cm).  Left Atrium: The left atrium is severely dilated with an indexed volume of 51.28 ml/m.  Right Atrium: The right atrium is severely dilated with an indexed volume of 60.95 ml/m.  Interatrial Septum: The interatrial septum appears intact.  Aortic Valve: Normal aortic valve.  Mitral Valve: Structurally normal mitral valve with normal leaflet excursion. There is no mitral valve stenosis.  Tricuspid Valve: Structurally normal tricuspid valve with normal leaflet excursion. There is mild tricuspid regurgitation. Estimated pulmonary artery systolic pressure is 25 mmHg.  Pulmonic Valve: Structurally normal pulmonic valve with normal leaflet excursion. There is trace pulmonic regurgitation.  Aorta: The aortic root appears normal in size.  Pericardium: There is a trace pericardial effusion.  Pleura: Bilateral pleural effusion noted.  Systemic Veins: The inferior vena cava is normal in size (normal <2.1cm) with normal inspiratory collapse (normal >50%) consistent with normal right atrial pressure ( R 3, range 0-5mmHg). ____________________________________________________________________ QUANTITATIVE DATA: Left Ventricle Measurements: (Indexed to BSA)  IVSd (2D):   1.5 cm LVPWd (2D):  1.2 cm LVIDd (2D):  6.0 cm LVIDs (2D):  4.8 cm LV Mass:     369 g  178.4 g/m Visualized LV EF%: 30%  MV E Vmax:    0.79 m/s MV A Vmax:    1.11 m/s MV E/A:       0.71 e' lateral:   10.60 cm/s e' medial:    4.90 cm/s E/e' lateral: 7.48 E/e' medial:  16.18 E/e' Average: 10.23 MV DT:        231 msec  Aorta Measurements: (Normal range) (Indexed to BSA)  Sinuses of Valsalva: 2.70 cm (3.1 - 3.7 cm)   Left Atrium Measurements: (Indexed to BSA) LA Diam 2D:        4.70 cm LA Vol s, MOD A4C: 106.00 ml. LA Vol s, MOD A2C: 101.00 ml. LA Vol s, MOD BP:  106.00 ml  51.28 ml/m  Right Ventricle Measurements: Right Atrial Measurements:  TAPSE: 0.9 cm                 RA Vol:       126.00 ml                               RA Vol Index: 60.95 ml/m   LVOT / RVOT/ Qp/Qs Data: (Indexed to BSA) LVOT Diameter: 1.90 cm LVOT Vmax:     0.84 m/s LVOT VTI:      13.60 cm LVOT SV:       38.6 ml  18.65 ml/m  Mitral Valve Measurements:  MV E Vmax: 0.8 m/s MV A Vmax: 1.1 m/s MV E/A:    0.7   Tricuspid Valve Measurements:  TR Vmax:          2.3 m/s TR Peak Gradient: 21.5 mmHg RA Pressure:      3 mmHg PASP:             25 mmHg  ____________________________________________________________________________

## 2024-04-05 NOTE — DISCUSSION/SUMMARY
[FreeTextEntry1] : The quality of the pain is localized to the anterior chest. The severity is mild to moderate. The  duration and  the timing  is short lived in  the context of progressive weight gain. Modifying factors include position. Associated signs and symptoms include progressive weight gain of 40 pounds.   I have asked GRACE                      to undergo detailed cardiac testing in order to evaluate  his  overall cardiovascular risk.  An assessment of both structural and functional heart disease was recommended to the patient. In this regard, an echocardiogram and a stress test were advised to the patient.  I await the upcoming noninvasive cardiac testing in order to assess her overall cardiovascular risk.  We discussed the pros and cons of plain treadmill stress testing nuclear stress testing and angiography including a sensitivity analysis.  We discussed current ACC guidelines and the calculated 10 year risk is approximately       This represents a moderate amount of medical decision making based upon two or more more chronic illnesses  and the recommendation for a high risk medication  a   which require ongoing monitoring and evaluation for tolerance efficacy and compliance and the recommendation for a physiological test under stress, a stress tst   Without evidence or recent infarction overt heart failure or unstable angina and based upon a satisfactory stress test  may undergo planned which constitutes low risk surgery in a patient with intermediate cardiac risk at an ASA class II or III anesthesia risk  This represents a high amount of medical decision making based upon two or more more chronic illnesses  exacerbation of dyspnea which is of unclear etiology and the recommendation for a high risk medication  a  statin  which requires ongoing monitoring and evaluation for tolerance efficacy and compliance and the recommendation for an invasive imaging technique with dye a cardiac catheterization  We have reviewed the current ACC guidelines and using the pooled cohort equation his cardiac risk over the near term 10 years is     More than half of the face to face encounter of    minutes was spent in counseling the patient with respect to    Quality measures  Tobacco intervention  Statin for prevention of cardiovascular disease  Hypertension  Aspirin for ischemic vascular disease Tobacco screening cessation and intervention  number and complexity of problems addressed amount and/or complexity of data to be reviewed and analyzed risk   Medical necessity This is a high encounter based upon two or more chronic illnesses with mild exacerbation requiring further management and evaluation.   .  EKG is indicated for evaluation of  this patient has a high risk for major adverese cardiac events.  risks benefits alternatives were discussed with the patient. all questions were answered to His satisfaction. Negative

## 2024-04-05 NOTE — ASSESSMENT
[FreeTextEntry1] : I have coordinated care with Dr. Alamo from [facility] and a monitor is applied today in order to screen for recurrent atrial fibrillation. We would hope to avoid anticoagulation in the setting of a pericardial effusion which required drainage and a debilitated state although this option was left open to the patient and his wife. We advised screening blood work especially looking for signs and symptoms of infection and toxicity of amiodarone. We would advise antibiotic prophylaxis prior to dental work, and this was emphasized. We have reviewed medications.  The staples are out. He is seeing heart failure specialist Dr. Rosario and vascular surgery.  I discussed with wife Chanelle at bedside was supportive  Medical necessity  there is a high-risk encounter based upon drug evaluation and management and coordination of care with subspecialists.

## 2024-04-09 ENCOUNTER — APPOINTMENT (OUTPATIENT)
Dept: GASTROENTEROLOGY | Facility: CLINIC | Age: 64
End: 2024-04-09

## 2024-04-10 ENCOUNTER — RX RENEWAL (OUTPATIENT)
Age: 64
End: 2024-04-10

## 2024-04-10 ENCOUNTER — LABORATORY RESULT (OUTPATIENT)
Age: 64
End: 2024-04-10

## 2024-04-10 ENCOUNTER — APPOINTMENT (OUTPATIENT)
Dept: FAMILY MEDICINE | Facility: CLINIC | Age: 64
End: 2024-04-10
Payer: COMMERCIAL

## 2024-04-10 ENCOUNTER — APPOINTMENT (OUTPATIENT)
Dept: HEART FAILURE | Facility: CLINIC | Age: 64
End: 2024-04-10

## 2024-04-10 ENCOUNTER — TRANSCRIPTION ENCOUNTER (OUTPATIENT)
Age: 64
End: 2024-04-10

## 2024-04-10 VITALS
HEIGHT: 69 IN | SYSTOLIC BLOOD PRESSURE: 120 MMHG | RESPIRATION RATE: 18 BRPM | BODY MASS INDEX: 26.81 KG/M2 | DIASTOLIC BLOOD PRESSURE: 70 MMHG | OXYGEN SATURATION: 95 % | HEART RATE: 74 BPM | TEMPERATURE: 97.1 F | WEIGHT: 181 LBS

## 2024-04-10 DIAGNOSIS — R73.03 PREDIABETES.: ICD-10-CM

## 2024-04-10 DIAGNOSIS — M10.9 GOUT, UNSPECIFIED: ICD-10-CM

## 2024-04-10 DIAGNOSIS — E03.9 HYPOTHYROIDISM, UNSPECIFIED: ICD-10-CM

## 2024-04-10 LAB
ALBUMIN SERPL ELPH-MCNC: 3.7 G/DL
ALP BLD-CCNC: 161 U/L
ALT SERPL-CCNC: 37 U/L
ANION GAP SERPL CALC-SCNC: 10 MMOL/L
AST SERPL-CCNC: 24 U/L
BILIRUB SERPL-MCNC: 0.8 MG/DL
BUN SERPL-MCNC: 33 MG/DL
CALCIUM SERPL-MCNC: 8.4 MG/DL
CHLORIDE SERPL-SCNC: 98 MMOL/L
CO2 SERPL-SCNC: 31 MMOL/L
CREAT SERPL-MCNC: 1.64 MG/DL
DIGOXIN SERPL-MCNC: 0.8 NG/ML
EGFR: 47 ML/MIN/1.73M2
GLUCOSE SERPL-MCNC: 65 MG/DL
MAGNESIUM SERPL-MCNC: 1.7 MG/DL
NT-PROBNP SERPL-MCNC: 6687 PG/ML
POTASSIUM SERPL-SCNC: 3.4 MMOL/L
PROT SERPL-MCNC: 6.5 G/DL
SODIUM SERPL-SCNC: 139 MMOL/L

## 2024-04-10 PROCEDURE — G2211 COMPLEX E/M VISIT ADD ON: CPT

## 2024-04-10 PROCEDURE — 99215 OFFICE O/P EST HI 40 MIN: CPT

## 2024-04-10 PROCEDURE — 36415 COLL VENOUS BLD VENIPUNCTURE: CPT

## 2024-04-10 RX ORDER — ALLOPURINOL 100 MG/1
100 TABLET ORAL DAILY
Qty: 30 | Refills: 3 | Status: ACTIVE | COMMUNITY
Start: 2024-03-13 | End: 1900-01-01

## 2024-04-10 RX ORDER — AMIODARONE HYDROCHLORIDE 200 MG/1
200 TABLET ORAL DAILY
Qty: 90 | Refills: 1 | Status: ACTIVE | COMMUNITY
Start: 2024-03-13 | End: 1900-01-01

## 2024-04-10 RX ORDER — TAMSULOSIN HYDROCHLORIDE 0.4 MG/1
0.4 CAPSULE ORAL
Qty: 30 | Refills: 0 | Status: DISCONTINUED | COMMUNITY
Start: 2024-03-13 | End: 2024-04-10

## 2024-04-10 RX ORDER — PANTOPRAZOLE 40 MG/1
40 TABLET, DELAYED RELEASE ORAL
Qty: 30 | Refills: 3 | Status: ACTIVE | COMMUNITY
Start: 2024-03-13 | End: 1900-01-01

## 2024-04-10 RX ORDER — ALFUZOSIN HYDROCHLORIDE 10 MG/1
10 TABLET, EXTENDED RELEASE ORAL
Qty: 30 | Refills: 0 | Status: ACTIVE | COMMUNITY
Start: 2022-02-11

## 2024-04-10 RX ORDER — SENNA 8.6 MG/1
8.6 TABLET, FILM COATED ORAL
Qty: 40 | Refills: 0 | Status: DISCONTINUED | COMMUNITY
Start: 2024-03-13 | End: 2024-04-10

## 2024-04-10 NOTE — ASSESSMENT
[FreeTextEntry1] : Already followed up with cardiologist, heart failure specialist, and vascular surgery. Other than slight intermittent lightheadedness, patient asymptomatic for heart failure.  Continues with current diuretics, sodium restriction, and daily weights. Diuretics has been increased by heart failure specialist.  Will repeat CMP, TFTs, A1c, CBC, and iron studies. Continue to follow-up with specialist 3-month follow-up or sooner if any acute symptoms  45 minutes was spent with patient and wife going over hospital course, pathophysiology of congestive heart failure, and treatment Given above, the Pt is currently at low acute suicide risk but remains at chronically elevated risk of self-harm.  At this time,  there are no identifiable acute increase in risk(s) that would be mitigated by an involuntary psychiatric admission.  Pt was offered voluntary admission to in-patient unit but refused.  The Pt remains appropriate for current level of care. Modifiable risk factors will be addressed in the current out-Pt treatment.

## 2024-04-10 NOTE — HISTORY OF PRESENT ILLNESS
[de-identified] : Recent hospitalization  Reviewed discharge note: Discharge date	12-Mar-2024 Admission Date	2024 15:48 Reason for Admission	sob Hospital Course	 63M, appears older than stated age, admitted to [facility] 24 PMHx HTN, GERD, HPL, chronic venous stasis dermatitis with bilateral leg swelling, and AS presented to [facility] ER on 2024 for bilateral leg pain and swelling found to have new acute HFrEF (EF 35-40% 2024) in setting of severe AS.   Hospital course: 24: Admitted to [facility] new onset HFrEF EF 35% and anemia 2/2 AS, 24: medically stable for transferred to [facility] medicine service for continued care. during hospitalization hx of WCT/ PAF Consults for Nephrology, HF, ID, Podiatry for medical optimization 2/15: 1 U PRBC, keep Hgb > 8.   s/p fall with head strike : s/p Liver biopsy, soft pressure post procedure--responded to fluid bolus :  RLE doppler: No pseudoaneurysm in the right groin/ bilobed avascular fluid collection measuring 2.8 x 1.7 x 2.0 cm : AVR/JOANNA Clip c/b intraop bleeding requirinu pRBCs on CPB 1u pRBCs post-CPB 1 PLT 1000 FEIBA Prolene suture placed around IJ and V wire Post-Op Course: : To Step Down, raegan, CT, +PW, marlena RIJ  Maintain mediastinal chest tubes Primicor discontinued. Lasix 40 BID initiated , Wound care to chronic venous stasis b/l LE. D/c RIJ  VSS - 9 hrs afib last evening- SR since 1:30am- on AMio gtt,  gtt - s/p right axillary marlena placed by Intensivist. garcia placed last evening lasix 40 iv x 1 given this am- echo tr jon eff.  VSS - currently on lasix gtt - negative 2L in last 24 hrs. bid basic metabolic pt converted back into afib this am 100-120- currently on amio load - Dobutrex gtt. d/c planning   d/c yesterday then resumed for decreased UO.  Remains a fib since yesterday, cont amio/heparin, eps following. Weight increased, lasix 80 iv x 1, then infusion increased , aldactone added .  Remains on heparin infusion. Hydralazine added for afterload   NSR, on amio, EPS following, noted elevated tsh.  Repeat sent synthroid started. Fluid overload, diuretics increased, 3% saline x 2, lasix 80 iv x 1, lasix infusion 20mg/hr 3/1 febrile  overnight  and this am pan cx. Plan to remove marlena, intro , garcia.  A line is from , intro . Cont , lasix, f/u bun creat 3/2 VSS afebrile overnight.  97 r/s.  on lasix gtt @ 20mg/jr  negative 2366cc. will monitor closely in sdu 3/3 Bumex gtt decreased to 1mg/hr per HF - @8:45am pt converted to afib -100-130s w/ hypotension while on commode attempting to have a bm- pt asymptomatic. brought back to bed.  amio 150 IV x 1 given w/ filter. dig load per HF - Dr. Rosario - .75mg total d/t sin.  pt u/o decreased while in afib- DR. Rosario @ bedside CXR done.  spoke to EP - Dr. Ghotra regarding DCCV - Call to CTU to set up transfer for DCCV - transfer cancelled d/t pt. converted to SR 68- w/ increase in SBP to 102 & U/O increasing.  ENT consulted for c/o "hurts when i swallow" ent exam reveals no rod.  lido s&s ordered.  pt eating w/o difficulty 3/4 Pt with edema not improved, elevated creat.  Bumex d/c. Placed on iv Lasix aldactone d/c.  Albumin x 2 3/5 Wt stable, remains off diuretic + edema, creat up yesterday. RHC today Tx To CTU for closer monitoring as CKD worsening 3/6 still not diuresing despite Bumex gtt, TTE revealed pericardial effusion returned to OR s/p 3/6 subxiphoid drainage and pericardial window. 3/7 weaned off inotropes, improved diuresis to 5L 3/8 Tx to Fl VSS, MCT, Garcia 2V EPW to box  40/10 VSS - Pericardial drainage to remain in place, Pacing Wires x 2 to remain in place. Garcia catheter to remain in place, creatinine 2.65. 2mg Bumex BID per HF. No A/C per Dr. Martinez.  Continue on Cefepime 2 Grams IV Q 8 hours per ID for (+)Serratia marcescens bacteremia.  Repeat TTE to r/o bio-prosthetic endocarditis. TTE findings: small pericardial effusion noted to the anterior RV.  No echodensity noted on the valves. Unable to exclude valvular vegetation in the setting of thickened or calcified valves and/or technically limited/difficult study. 3/9 VSS; Continue with current medication regimen.   D/C Garcia for a TOV. Leukocytosis improving --> WBC 11.  Maintain pericardial drain in place, total output 130cc/24 hrs. 3/10 VSS pericardial drain 100 cc in last 24 hrs.  Cefepime for PSA in BAL pws d/c'd this am 3/11 VSS - d/c plan Home PT with rolling walker- d/c home on 2 more days of levoquin per ID-dig .125 stefan 200 qd per HF -0 qtc 446- hf team aware - will ck ekg on follow up visit with HF-   Med Reconciliation: Override IMPROVE-DD recommendations due to:	This is a surgical and/or non-medical patient. Recommended Post-Discharge VTE Prophylaxis	This is a surgical and/or non-medical patient.  Since discharge patient has already followed up with cardiologist and heart failure specialist. Diuretics has been increased, and wife reports that her cardiologist has stopped digoxin. Unfortunately patient is running out of Bumex and is asking for renewal.  Reviewed recommendation by heart failure specialist who recommended to increase. Patient continues to weigh self daily which has been relatively stable. Denies any shortness of breath, chest pain, or VANCE.  At times has noted lightheadedness which has becoming a lot less frequent. Currently has event monitor to assess for persistent A-fib.  Currently off of AC and continue baby aspirin. He is accompanied by wife today who helps assist with medical decision.  Has also followed up with vascular.  Vascular ulcerations have been improving.  Still notes open wound noted on posterior aspect of right calf but healing.  Visiting nurse has stopped wound care.  Patient able to care to for wound.  Denies fever or chills

## 2024-04-10 NOTE — PHYSICAL EXAM
[No Acute Distress] : no acute distress [Well Nourished] : well nourished [Well Developed] : well developed [Well-Appearing] : well-appearing [Normal Sclera/Conjunctiva] : normal sclera/conjunctiva [PERRL] : pupils equal round and reactive to light [EOMI] : extraocular movements intact [Normal Outer Ear/Nose] : the outer ears and nose were normal in appearance [Normal Oropharynx] : the oropharynx was normal [No JVD] : no jugular venous distention [No Lymphadenopathy] : no lymphadenopathy [Supple] : supple [Thyroid Normal, No Nodules] : the thyroid was normal and there were no nodules present [No Respiratory Distress] : no respiratory distress  [No Accessory Muscle Use] : no accessory muscle use [Clear to Auscultation] : lungs were clear to auscultation bilaterally [Normal Rate] : normal rate  [Regular Rhythm] : with a regular rhythm [Normal S1, S2] : normal S1 and S2 [No Murmur] : no murmur heard [No Carotid Bruits] : no carotid bruits [No Abdominal Bruit] : a ~M bruit was not heard ~T in the abdomen [No Varicosities] : no varicosities [Pedal Pulses Present] : the pedal pulses are present [No Edema] : there was no peripheral edema [No Palpable Aorta] : no palpable aorta [No Extremity Clubbing/Cyanosis] : no extremity clubbing/cyanosis [Soft] : abdomen soft [Non Tender] : non-tender [Non-distended] : non-distended [No Masses] : no abdominal mass palpated [No HSM] : no HSM [Normal Bowel Sounds] : normal bowel sounds [Normal Posterior Cervical Nodes] : no posterior cervical lymphadenopathy [Normal Anterior Cervical Nodes] : no anterior cervical lymphadenopathy [No CVA Tenderness] : no CVA  tenderness [No Spinal Tenderness] : no spinal tenderness [No Joint Swelling] : no joint swelling [Grossly Normal Strength/Tone] : grossly normal strength/tone [No Rash] : no rash [Coordination Grossly Intact] : coordination grossly intact [No Focal Deficits] : no focal deficits [Normal Gait] : normal gait [Deep Tendon Reflexes (DTR)] : deep tendon reflexes were 2+ and symmetric [Normal Affect] : the affect was normal [Normal Insight/Judgement] : insight and judgment were intact [de-identified] : Noted murmur [de-identified] : +1-2 pitting edema noted lower extremities [de-identified] : Distal calf ulceration with healthy granulation tissue noted-no erythema, discharge, or odor noted

## 2024-04-11 ENCOUNTER — EMERGENCY (EMERGENCY)
Facility: HOSPITAL | Age: 64
LOS: 1 days | Discharge: HOME | End: 2024-04-11
Attending: EMERGENCY MEDICINE | Admitting: EMERGENCY MEDICINE
Payer: COMMERCIAL

## 2024-04-11 VITALS
HEIGHT: 71 IN | OXYGEN SATURATION: 94 % | SYSTOLIC BLOOD PRESSURE: 123 MMHG | DIASTOLIC BLOOD PRESSURE: 63 MMHG | HEART RATE: 81 BPM | RESPIRATION RATE: 17 BRPM | WEIGHT: 188.94 LBS | TEMPERATURE: 98 F

## 2024-04-11 LAB
25(OH)D3 SERPL-MCNC: 13.7 NG/ML
ALBUMIN SERPL ELPH-MCNC: 2.6 G/DL — LOW (ref 3.3–5)
ALBUMIN SERPL ELPH-MCNC: 3.6 G/DL
ALP BLD-CCNC: 207 U/L
ALP SERPL-CCNC: 186 U/L — HIGH (ref 40–120)
ALT FLD-CCNC: 26 U/L — SIGNIFICANT CHANGE UP (ref 10–45)
ALT SERPL-CCNC: 25 U/L
ANION GAP SERPL CALC-SCNC: 10 MMOL/L — SIGNIFICANT CHANGE UP (ref 5–17)
ANION GAP SERPL CALC-SCNC: 12 MMOL/L
AST SERPL-CCNC: 18 U/L
AST SERPL-CCNC: 27 U/L — SIGNIFICANT CHANGE UP (ref 10–40)
BASOPHILS # BLD AUTO: 0.07 K/UL — SIGNIFICANT CHANGE UP (ref 0–0.2)
BASOPHILS # BLD AUTO: 0.1 K/UL
BASOPHILS NFR BLD AUTO: 0.7 % — SIGNIFICANT CHANGE UP (ref 0–2)
BASOPHILS NFR BLD AUTO: 1.3 %
BILIRUB SERPL-MCNC: 1.1 MG/DL
BILIRUB SERPL-MCNC: 1.5 MG/DL — HIGH (ref 0.2–1.2)
BLD GP AB SCN SERPL QL: SIGNIFICANT CHANGE UP
BUN SERPL-MCNC: 33 MG/DL
BUN SERPL-MCNC: 37 MG/DL — HIGH (ref 7–23)
CALCIUM SERPL-MCNC: 8.6 MG/DL
CALCIUM SERPL-MCNC: 8.7 MG/DL — SIGNIFICANT CHANGE UP (ref 8.4–10.5)
CHLORIDE SERPL-SCNC: 95 MMOL/L
CHLORIDE SERPL-SCNC: 95 MMOL/L — LOW (ref 96–108)
CHOLEST SERPL-MCNC: 149 MG/DL
CO2 SERPL-SCNC: 28 MMOL/L
CO2 SERPL-SCNC: 30 MMOL/L — SIGNIFICANT CHANGE UP (ref 22–31)
CREAT SERPL-MCNC: 1.53 MG/DL
CREAT SERPL-MCNC: 1.53 MG/DL — HIGH (ref 0.5–1.3)
DIGOXIN SERPL-MCNC: 0.8 NG/ML — SIGNIFICANT CHANGE UP (ref 0.8–2)
EGFR: 51 ML/MIN/1.73M2
EGFR: 51 ML/MIN/1.73M2 — LOW
EOSINOPHIL # BLD AUTO: 0.07 K/UL — SIGNIFICANT CHANGE UP (ref 0–0.5)
EOSINOPHIL # BLD AUTO: 0.21 K/UL
EOSINOPHIL NFR BLD AUTO: 0.7 % — SIGNIFICANT CHANGE UP (ref 0–6)
EOSINOPHIL NFR BLD AUTO: 2.7 %
ESTIMATED AVERAGE GLUCOSE: 120 MG/DL
FERRITIN SERPL-MCNC: 354 NG/ML
GLUCOSE SERPL-MCNC: 108 MG/DL — HIGH (ref 70–99)
GLUCOSE SERPL-MCNC: 121 MG/DL
HBA1C MFR BLD HPLC: 5.8 %
HCT VFR BLD CALC: 19.4 %
HCT VFR BLD CALC: 21.3 % — LOW (ref 39–50)
HDLC SERPL-MCNC: 61 MG/DL
HGB BLD-MCNC: 6.6 G/DL
HGB BLD-MCNC: 6.9 G/DL — CRITICAL LOW (ref 13–17)
IMM GRANULOCYTES NFR BLD AUTO: 0.5 %
IMM GRANULOCYTES NFR BLD AUTO: 0.6 % — SIGNIFICANT CHANGE UP (ref 0–0.9)
IRON SATN MFR SERPL: 7 %
IRON SERPL-MCNC: 22 UG/DL
LDLC SERPL CALC-MCNC: 77 MG/DL
LYMPHOCYTES # BLD AUTO: 0.54 K/UL — LOW (ref 1–3.3)
LYMPHOCYTES # BLD AUTO: 0.67 K/UL
LYMPHOCYTES # BLD AUTO: 5.2 % — LOW (ref 13–44)
LYMPHOCYTES NFR BLD AUTO: 8.6 %
MAGNESIUM SERPL-MCNC: 2.1 MG/DL
MAN DIFF?: NORMAL
MCHC RBC-ENTMCNC: 21.2 PG — LOW (ref 27–34)
MCHC RBC-ENTMCNC: 25.5 PG
MCHC RBC-ENTMCNC: 32.4 GM/DL — SIGNIFICANT CHANGE UP (ref 32–36)
MCHC RBC-ENTMCNC: 34 GM/DL
MCV RBC AUTO: 65.3 FL — LOW (ref 80–100)
MCV RBC AUTO: 74.9 FL
MONOCYTES # BLD AUTO: 0.98 K/UL
MONOCYTES # BLD AUTO: 1.09 K/UL — HIGH (ref 0–0.9)
MONOCYTES NFR BLD AUTO: 10.5 % — SIGNIFICANT CHANGE UP (ref 2–14)
MONOCYTES NFR BLD AUTO: 12.5 %
NEUTROPHILS # BLD AUTO: 5.82 K/UL
NEUTROPHILS # BLD AUTO: 8.58 K/UL — HIGH (ref 1.8–7.4)
NEUTROPHILS NFR BLD AUTO: 74.4 %
NEUTROPHILS NFR BLD AUTO: 82.3 % — HIGH (ref 43–77)
NONHDLC SERPL-MCNC: 88 MG/DL
NRBC # BLD: 0 /100 WBCS — SIGNIFICANT CHANGE UP (ref 0–0)
OB PNL STL: NEGATIVE — SIGNIFICANT CHANGE UP
PLATELET # BLD AUTO: 168 K/UL
PLATELET # BLD AUTO: 221 K/UL — SIGNIFICANT CHANGE UP (ref 150–400)
POTASSIUM SERPL-MCNC: 3.8 MMOL/L — SIGNIFICANT CHANGE UP (ref 3.5–5.3)
POTASSIUM SERPL-SCNC: 3.6 MMOL/L
POTASSIUM SERPL-SCNC: 3.8 MMOL/L — SIGNIFICANT CHANGE UP (ref 3.5–5.3)
PROT SERPL-MCNC: 6.7 G/DL
PROT SERPL-MCNC: 7.2 G/DL — SIGNIFICANT CHANGE UP (ref 6–8.3)
RBC # BLD: 2.59 M/UL
RBC # BLD: 3.26 M/UL — LOW (ref 4.2–5.8)
RBC # FLD: 22.4 % — HIGH (ref 10.3–14.5)
RBC # FLD: 25.5 %
SODIUM SERPL-SCNC: 135 MMOL/L
SODIUM SERPL-SCNC: 135 MMOL/L — SIGNIFICANT CHANGE UP (ref 135–145)
TIBC SERPL-MCNC: 298 UG/DL
TRIGL SERPL-MCNC: 52 MG/DL
TSH SERPL-ACNC: 4.07 UIU/ML
UIBC SERPL-MCNC: 276 UG/DL
WBC # BLD: 10.41 K/UL — SIGNIFICANT CHANGE UP (ref 3.8–10.5)
WBC # FLD AUTO: 10.41 K/UL — SIGNIFICANT CHANGE UP (ref 3.8–10.5)
WBC # FLD AUTO: 7.82 K/UL

## 2024-04-11 PROCEDURE — 99285 EMERGENCY DEPT VISIT HI MDM: CPT

## 2024-04-11 PROCEDURE — 36415 COLL VENOUS BLD VENIPUNCTURE: CPT

## 2024-04-11 PROCEDURE — 85025 COMPLETE CBC W/AUTO DIFF WBC: CPT

## 2024-04-11 PROCEDURE — 99285 EMERGENCY DEPT VISIT HI MDM: CPT | Mod: 25

## 2024-04-11 PROCEDURE — 86850 RBC ANTIBODY SCREEN: CPT

## 2024-04-11 PROCEDURE — 86901 BLOOD TYPING SEROLOGIC RH(D): CPT

## 2024-04-11 PROCEDURE — 80162 ASSAY OF DIGOXIN TOTAL: CPT

## 2024-04-11 PROCEDURE — 86923 COMPATIBILITY TEST ELECTRIC: CPT

## 2024-04-11 PROCEDURE — 86900 BLOOD TYPING SEROLOGIC ABO: CPT

## 2024-04-11 PROCEDURE — 80053 COMPREHEN METABOLIC PANEL: CPT

## 2024-04-11 PROCEDURE — 82272 OCCULT BLD FECES 1-3 TESTS: CPT

## 2024-04-11 PROCEDURE — 36430 TRANSFUSION BLD/BLD COMPNT: CPT

## 2024-04-11 PROCEDURE — P9016: CPT

## 2024-04-11 RX ORDER — ACETAMINOPHEN 500 MG
650 TABLET ORAL ONCE
Refills: 0 | Status: COMPLETED | OUTPATIENT
Start: 2024-04-11 | End: 2024-04-11

## 2024-04-11 RX ORDER — SODIUM CHLORIDE 9 MG/ML
1000 INJECTION INTRAMUSCULAR; INTRAVENOUS; SUBCUTANEOUS
Refills: 0 | Status: DISCONTINUED | OUTPATIENT
Start: 2024-04-11 | End: 2024-04-15

## 2024-04-11 RX ADMIN — SODIUM CHLORIDE 75 MILLILITER(S): 9 INJECTION INTRAMUSCULAR; INTRAVENOUS; SUBCUTANEOUS at 18:42

## 2024-04-11 RX ADMIN — Medication 650 MILLIGRAM(S): at 22:40

## 2024-04-11 NOTE — ED ADULT NURSE NOTE - OBJECTIVE STATEMENT
Assumed pt care for a63 yr old male complaining of dizziness this morning. Pt reports he was with his wife and felt very unwell. Pt denied any further complaints. IV established, labs collected and sent. Awaiting further disposition.

## 2024-04-11 NOTE — ED ADULT TRIAGE NOTE - CHIEF COMPLAINT QUOTE
Pt BIBA from home with HGB 5.7, c/o feeling weak/tired Pt BIBA from home with HGB 5.7, c/o feeling weak/tired, s/p valve replacement 3/24

## 2024-04-12 ENCOUNTER — NON-APPOINTMENT (OUTPATIENT)
Age: 64
End: 2024-04-12

## 2024-04-12 VITALS
TEMPERATURE: 99 F | DIASTOLIC BLOOD PRESSURE: 79 MMHG | SYSTOLIC BLOOD PRESSURE: 157 MMHG | HEART RATE: 72 BPM | OXYGEN SATURATION: 95 % | RESPIRATION RATE: 18 BRPM

## 2024-04-12 NOTE — ED ADULT NURSE REASSESSMENT NOTE - NS ED NURSE REASSESS COMMENT FT1
Patient refused second unit of PRBC. Requested to be discharged. MD Rosario recommends pt to follow up outpatient with PCP.

## 2024-04-12 NOTE — ED PROVIDER NOTE - PROGRESS NOTE DETAILS
pt refusing second unit of PRBCs stating he will get it later. Offered admission  for anemia workup but patient deferred

## 2024-04-12 NOTE — ED PROVIDER NOTE - OBJECTIVE STATEMENT
63 year old male with Hx of HTN, GERD, dyslipidemia, chronic intermittent leg pain, aortic stenosis, chronic venous stasis dermatitis with bilateral leg swelling presents with generalized fatigue and tiredness for the past 2 days.  Patient had blood work done 3 years ago showed hemoglobin 5.7.  Denies any chest pain, short of breath, abdominal pain, nausea, vomiting, or diarrhea, lightheadedness, or dizziness.  No other complaints.

## 2024-04-12 NOTE — ED PROVIDER NOTE - NSFOLLOWUPINSTRUCTIONS_ED_ALL_ED_FT
-- You should update your primary care physician on your Emergency Department visit and follow up with them.  If you do not have a physician or have difficulty following up, please call: [phone] to obtain a [facility] doctor or specialist who can provide follow up.    -- You declined the second unit of blood.  Follow up with your doctor as soon as possible.    -- Return to the ER for worsening or persistent symptoms, and/or ANY NEW OR CONCERNING SYMPTOMS.

## 2024-04-12 NOTE — ED PROVIDER NOTE - PATIENT PORTAL LINK FT
You can access the FollowMyHealth Patient Portal offered by [facility] by registering at the following website: [email or web address]. By joining [facility]’s FollowMyHealth portal, you will also be able to view your health information using other applications (apps) compatible with our system.

## 2024-04-14 ENCOUNTER — INPATIENT (INPATIENT)
Facility: HOSPITAL | Age: 64
LOS: 4 days | Discharge: HOME HEALTH | DRG: 812 | End: 2024-04-19
Attending: INTERNAL MEDICINE | Admitting: INTERNAL MEDICINE
Payer: COMMERCIAL

## 2024-04-14 VITALS
DIASTOLIC BLOOD PRESSURE: 64 MMHG | SYSTOLIC BLOOD PRESSURE: 134 MMHG | HEIGHT: 71 IN | RESPIRATION RATE: 72 BRPM | OXYGEN SATURATION: 93 % | HEART RATE: 72 BPM | WEIGHT: 210.1 LBS | TEMPERATURE: 98 F

## 2024-04-14 DIAGNOSIS — R50.9 FEVER, UNSPECIFIED: ICD-10-CM

## 2024-04-14 DIAGNOSIS — D64.9 ANEMIA, UNSPECIFIED: ICD-10-CM

## 2024-04-14 DIAGNOSIS — E78.5 HYPERLIPIDEMIA, UNSPECIFIED: ICD-10-CM

## 2024-04-14 DIAGNOSIS — N18.30 CHRONIC KIDNEY DISEASE, STAGE 3 UNSPECIFIED: ICD-10-CM

## 2024-04-14 DIAGNOSIS — Z95.2 PRESENCE OF PROSTHETIC HEART VALVE: ICD-10-CM

## 2024-04-14 DIAGNOSIS — I50.33 ACUTE ON CHRONIC DIASTOLIC (CONGESTIVE) HEART FAILURE: ICD-10-CM

## 2024-04-14 DIAGNOSIS — K21.9 GASTRO-ESOPHAGEAL REFLUX DISEASE WITHOUT ESOPHAGITIS: ICD-10-CM

## 2024-04-14 DIAGNOSIS — I10 ESSENTIAL (PRIMARY) HYPERTENSION: ICD-10-CM

## 2024-04-14 LAB
ALBUMIN SERPL ELPH-MCNC: 3.2 G/DL — LOW (ref 3.3–5)
ALP SERPL-CCNC: 180 U/L — HIGH (ref 40–120)
ALT FLD-CCNC: 13 U/L — SIGNIFICANT CHANGE UP (ref 10–45)
ANION GAP SERPL CALC-SCNC: 13 MMOL/L — SIGNIFICANT CHANGE UP (ref 5–17)
APPEARANCE UR: CLEAR — SIGNIFICANT CHANGE UP
APTT BLD: 26.3 SEC — SIGNIFICANT CHANGE UP (ref 24.5–35.6)
AST SERPL-CCNC: 16 U/L — SIGNIFICANT CHANGE UP (ref 10–40)
BACTERIA # UR AUTO: NEGATIVE /HPF — SIGNIFICANT CHANGE UP
BASE EXCESS BLDV CALC-SCNC: 4.4 MMOL/L — HIGH (ref -2–3)
BASOPHILS # BLD AUTO: 0.07 K/UL — SIGNIFICANT CHANGE UP (ref 0–0.2)
BASOPHILS NFR BLD AUTO: 0.9 % — SIGNIFICANT CHANGE UP (ref 0–2)
BILIRUB SERPL-MCNC: 1.2 MG/DL — SIGNIFICANT CHANGE UP (ref 0.2–1.2)
BILIRUB UR-MCNC: NEGATIVE — SIGNIFICANT CHANGE UP
BLD GP AB SCN SERPL QL: NEGATIVE — SIGNIFICANT CHANGE UP
BUN SERPL-MCNC: 32 MG/DL — HIGH (ref 7–23)
CA-I SERPL-SCNC: 1.1 MMOL/L — LOW (ref 1.15–1.33)
CALCIUM SERPL-MCNC: 8.4 MG/DL — SIGNIFICANT CHANGE UP (ref 8.4–10.5)
CAST: 3 /LPF — SIGNIFICANT CHANGE UP (ref 0–4)
CHLORIDE BLDV-SCNC: 101 MMOL/L — SIGNIFICANT CHANGE UP (ref 96–108)
CHLORIDE SERPL-SCNC: 99 MMOL/L — SIGNIFICANT CHANGE UP (ref 96–108)
CO2 BLDV-SCNC: 30 MMOL/L — HIGH (ref 22–26)
CO2 SERPL-SCNC: 26 MMOL/L — SIGNIFICANT CHANGE UP (ref 22–31)
COLOR SPEC: SIGNIFICANT CHANGE UP
CREAT SERPL-MCNC: 1.52 MG/DL — HIGH (ref 0.5–1.3)
DIFF PNL FLD: NEGATIVE — SIGNIFICANT CHANGE UP
EGFR: 51 ML/MIN/1.73M2 — LOW
EOSINOPHIL # BLD AUTO: 0.13 K/UL — SIGNIFICANT CHANGE UP (ref 0–0.5)
EOSINOPHIL NFR BLD AUTO: 1.7 % — SIGNIFICANT CHANGE UP (ref 0–6)
FLUAV AG NPH QL: SIGNIFICANT CHANGE UP
FLUBV AG NPH QL: SIGNIFICANT CHANGE UP
GAS PNL BLDV: 137 MMOL/L — SIGNIFICANT CHANGE UP (ref 136–145)
GAS PNL BLDV: SIGNIFICANT CHANGE UP
GLUCOSE BLDV-MCNC: 108 MG/DL — HIGH (ref 70–99)
GLUCOSE SERPL-MCNC: 112 MG/DL — HIGH (ref 70–99)
GLUCOSE UR QL: NEGATIVE MG/DL — SIGNIFICANT CHANGE UP
HCO3 BLDV-SCNC: 28 MMOL/L — SIGNIFICANT CHANGE UP (ref 22–29)
HCT VFR BLD CALC: 20.3 % — CRITICAL LOW (ref 39–50)
HCT VFR BLD CALC: 24 % — LOW (ref 39–50)
HCT VFR BLDA CALC: 23 % — LOW (ref 39–51)
HGB BLD CALC-MCNC: 7.5 G/DL — LOW (ref 12.6–17.4)
HGB BLD-MCNC: 6.6 G/DL — CRITICAL LOW (ref 13–17)
HGB BLD-MCNC: 7.7 G/DL — LOW (ref 13–17)
HYPOCHROMIA BLD QL: SLIGHT — SIGNIFICANT CHANGE UP
INR BLD: 1.27 RATIO — HIGH (ref 0.85–1.18)
KETONES UR-MCNC: NEGATIVE MG/DL — SIGNIFICANT CHANGE UP
LACTATE BLDV-MCNC: 1.1 MMOL/L — SIGNIFICANT CHANGE UP (ref 0.5–2)
LEUKOCYTE ESTERASE UR-ACNC: NEGATIVE — SIGNIFICANT CHANGE UP
LYMPHOCYTES # BLD AUTO: 0.13 K/UL — LOW (ref 1–3.3)
LYMPHOCYTES # BLD AUTO: 1.7 % — LOW (ref 13–44)
MANUAL SMEAR VERIFICATION: SIGNIFICANT CHANGE UP
MCHC RBC-ENTMCNC: 22.5 PG — LOW (ref 27–34)
MCHC RBC-ENTMCNC: 23.3 PG — LOW (ref 27–34)
MCHC RBC-ENTMCNC: 32.1 GM/DL — SIGNIFICANT CHANGE UP (ref 32–36)
MCHC RBC-ENTMCNC: 32.5 GM/DL — SIGNIFICANT CHANGE UP (ref 32–36)
MCV RBC AUTO: 70.2 FL — LOW (ref 80–100)
MCV RBC AUTO: 71.7 FL — LOW (ref 80–100)
MONOCYTES # BLD AUTO: 0.87 K/UL — SIGNIFICANT CHANGE UP (ref 0–0.9)
MONOCYTES NFR BLD AUTO: 11.3 % — SIGNIFICANT CHANGE UP (ref 2–14)
NEUTROPHILS # BLD AUTO: 6.49 K/UL — SIGNIFICANT CHANGE UP (ref 1.8–7.4)
NEUTROPHILS NFR BLD AUTO: 84.4 % — HIGH (ref 43–77)
NITRITE UR-MCNC: NEGATIVE — SIGNIFICANT CHANGE UP
NRBC # BLD: 0 /100 WBCS — SIGNIFICANT CHANGE UP (ref 0–0)
NRBC # BLD: 1 /100 WBCS — HIGH (ref 0–0)
NT-PROBNP SERPL-SCNC: 7149 PG/ML — HIGH (ref 0–300)
OVALOCYTES BLD QL SMEAR: SLIGHT — SIGNIFICANT CHANGE UP
PCO2 BLDV: 39 MMHG — LOW (ref 42–55)
PH BLDV: 7.47 — HIGH (ref 7.32–7.43)
PH UR: 7.5 — SIGNIFICANT CHANGE UP (ref 5–8)
PLAT MORPH BLD: NORMAL — SIGNIFICANT CHANGE UP
PLATELET # BLD AUTO: 202 K/UL — SIGNIFICANT CHANGE UP (ref 150–400)
PLATELET # BLD AUTO: 212 K/UL — SIGNIFICANT CHANGE UP (ref 150–400)
PO2 BLDV: 49 MMHG — HIGH (ref 25–45)
POIKILOCYTOSIS BLD QL AUTO: SIGNIFICANT CHANGE UP
POLYCHROMASIA BLD QL SMEAR: SLIGHT — SIGNIFICANT CHANGE UP
POTASSIUM BLDV-SCNC: 3.1 MMOL/L — LOW (ref 3.5–5.1)
POTASSIUM SERPL-MCNC: 3.1 MMOL/L — LOW (ref 3.5–5.3)
POTASSIUM SERPL-SCNC: 3.1 MMOL/L — LOW (ref 3.5–5.3)
PROT SERPL-MCNC: 6.9 G/DL — SIGNIFICANT CHANGE UP (ref 6–8.3)
PROT UR-MCNC: 30 MG/DL
PROTHROM AB SERPL-ACNC: 13.2 SEC — HIGH (ref 9.5–13)
RBC # BLD: 2.83 M/UL — LOW (ref 4.2–5.8)
RBC # BLD: 3.42 M/UL — LOW (ref 4.2–5.8)
RBC # FLD: 23.9 % — HIGH (ref 10.3–14.5)
RBC # FLD: 24.8 % — HIGH (ref 10.3–14.5)
RBC BLD AUTO: ABNORMAL
RBC CASTS # UR COMP ASSIST: 1 /HPF — SIGNIFICANT CHANGE UP (ref 0–4)
RH IG SCN BLD-IMP: POSITIVE — SIGNIFICANT CHANGE UP
ROULEAUX BLD QL SMEAR: PRESENT
RSV RNA NPH QL NAA+NON-PROBE: SIGNIFICANT CHANGE UP
SAO2 % BLDV: 81.8 % — SIGNIFICANT CHANGE UP (ref 67–88)
SARS-COV-2 RNA SPEC QL NAA+PROBE: SIGNIFICANT CHANGE UP
SCHISTOCYTES BLD QL AUTO: SIGNIFICANT CHANGE UP
SODIUM SERPL-SCNC: 138 MMOL/L — SIGNIFICANT CHANGE UP (ref 135–145)
SP GR SPEC: 1.02 — SIGNIFICANT CHANGE UP (ref 1–1.03)
SQUAMOUS # UR AUTO: 0 /HPF — SIGNIFICANT CHANGE UP (ref 0–5)
TARGETS BLD QL SMEAR: SIGNIFICANT CHANGE UP
TROPONIN T, HIGH SENSITIVITY RESULT: 65 NG/L — HIGH (ref 0–51)
TROPONIN T, HIGH SENSITIVITY RESULT: 66 NG/L — HIGH (ref 0–51)
UROBILINOGEN FLD QL: 4 MG/DL (ref 0.2–1)
WBC # BLD: 7.68 K/UL — SIGNIFICANT CHANGE UP (ref 3.8–10.5)
WBC # BLD: 7.69 K/UL — SIGNIFICANT CHANGE UP (ref 3.8–10.5)
WBC # FLD AUTO: 7.68 K/UL — SIGNIFICANT CHANGE UP (ref 3.8–10.5)
WBC # FLD AUTO: 7.69 K/UL — SIGNIFICANT CHANGE UP (ref 3.8–10.5)
WBC UR QL: 0 /HPF — SIGNIFICANT CHANGE UP (ref 0–5)

## 2024-04-14 PROCEDURE — 71045 X-RAY EXAM CHEST 1 VIEW: CPT | Mod: 26

## 2024-04-14 PROCEDURE — 71250 CT THORAX DX C-: CPT | Mod: 26,MC

## 2024-04-14 PROCEDURE — 99291 CRITICAL CARE FIRST HOUR: CPT

## 2024-04-14 PROCEDURE — 74176 CT ABD & PELVIS W/O CONTRAST: CPT | Mod: 26,MC

## 2024-04-14 RX ORDER — CEFEPIME 1 G/1
1000 INJECTION, POWDER, FOR SOLUTION INTRAMUSCULAR; INTRAVENOUS ONCE
Refills: 0 | Status: COMPLETED | OUTPATIENT
Start: 2024-04-14 | End: 2024-04-14

## 2024-04-14 RX ORDER — CEFEPIME 1 G/1
1000 INJECTION, POWDER, FOR SOLUTION INTRAMUSCULAR; INTRAVENOUS EVERY 8 HOURS
Refills: 0 | Status: DISCONTINUED | OUTPATIENT
Start: 2024-04-14 | End: 2024-04-15

## 2024-04-14 RX ORDER — BUMETANIDE 0.25 MG/ML
2 INJECTION INTRAMUSCULAR; INTRAVENOUS DAILY
Refills: 0 | Status: DISCONTINUED | OUTPATIENT
Start: 2024-04-14 | End: 2024-04-16

## 2024-04-14 RX ORDER — TAMSULOSIN HYDROCHLORIDE 0.4 MG/1
1 CAPSULE ORAL
Refills: 0 | DISCHARGE

## 2024-04-14 RX ORDER — ALFUZOSIN HYDROCHLORIDE 10 MG/1
1 TABLET, EXTENDED RELEASE ORAL
Refills: 0 | DISCHARGE

## 2024-04-14 RX ORDER — SODIUM CHLORIDE 9 MG/ML
250 INJECTION INTRAMUSCULAR; INTRAVENOUS; SUBCUTANEOUS ONCE
Refills: 0 | Status: COMPLETED | OUTPATIENT
Start: 2024-04-14 | End: 2024-04-14

## 2024-04-14 RX ADMIN — SODIUM CHLORIDE 250 MILLILITER(S): 9 INJECTION INTRAMUSCULAR; INTRAVENOUS; SUBCUTANEOUS at 12:27

## 2024-04-14 RX ADMIN — CEFEPIME 100 MILLIGRAM(S): 1 INJECTION, POWDER, FOR SOLUTION INTRAMUSCULAR; INTRAVENOUS at 22:57

## 2024-04-14 RX ADMIN — CEFEPIME 100 MILLIGRAM(S): 1 INJECTION, POWDER, FOR SOLUTION INTRAMUSCULAR; INTRAVENOUS at 12:27

## 2024-04-14 RX ADMIN — BUMETANIDE 2 MILLIGRAM(S): 0.25 INJECTION INTRAMUSCULAR; INTRAVENOUS at 18:48

## 2024-04-14 NOTE — ED ADULT NURSE NOTE - OBJECTIVE STATEMENT
63y M A&O x2 BIBEMS from home presenting with generalized weakness as per EMS. Pt stated he's had chills since yesterday which promoted his wife to call EMS for ER visit. Pt not endorsing any pain or discomfort and denying blood in stool.

## 2024-04-14 NOTE — ED PROVIDER NOTE - ATTENDING APP SHARED VISIT CONTRIBUTION OF CARE
Patient is a 63-year-old male presenting with chills since yesterday did not check his temperature he has no thermometer at home did not take any Tylenol anything for fever today stating he has no other complaints at this time but has a very complicated history and course were recently discharged due to GI bleed anemia also had a recent aortic valve replacement with complications and clinical several months ago he requiring oxygen on 2 L satting low 90s but denies shortness of breath besides dyspnea with exertion and a bit of a cough no new leg swelling EKG was sinus rhythm at this time hemodynamically stable sepsis workup rectal temp in light of his recent history CTA of chest abdomen pelvis with runoff was ordered, Noted to be bacteremic with Serratia on cefepime and reviewed blood culture ordered sacral ankle several months ago unclear if it is related but was worked up at that time for endocarditis monitor blood culture was sent with that in mind.

## 2024-04-14 NOTE — ED ADULT NURSE NOTE - NSFALLRISKINTERV_ED_ALL_ED

## 2024-04-14 NOTE — ED PROVIDER NOTE - CONSTITUTIONAL, MLM
Well appearing, awake, alert, oriented to person, place, time/situation and in no apparent distress. normal... [place]

## 2024-04-14 NOTE — ED PROVIDER NOTE - PROGRESS NOTE DETAILS
Lamonte Lucas PA: spoke with CT surgery, recommend TTE for patient. aware patient in ER. patient consented for transfusion. patient AOx3. spoke with patient wife Chanelel. updated on plan. will admit patient to medicine for further eval and management. discussed with ED attending

## 2024-04-14 NOTE — H&P ADULT - ASSESSMENT
63 year old male with Hx of HTN, GERD, dyslipidemia, chronic intermittent leg pain, aortic stenosis, chronic venous stasis dermatitis, recent aortic valve replacement, presents to the ER for chills

## 2024-04-14 NOTE — ED PROVIDER NOTE - CRITICAL CARE ATTENDING CONTRIBUTION TO CARE
I, Juan Ramon Rosales, was available to discuss  the management with the advanced care provider. I reviewed the  ACP's note and agree with the documented findings and plan of care.  I performed a non-face to face encounter with the patient and available for all supervision of the ACP.    Patient is a 63-year-old male presenting with chills since yesterday did not check his temperature he has no thermometer at home did not take any Tylenol anything for fever today stating he has no other complaints at this time but has a very complicated history and course were recently discharged due to GI bleed anemia also had a recent aortic valve replacement with complications and clinical several months ago he requiring oxygen on 2 L satting low 90s but denies shortness of breath besides dyspnea with exertion and a bit of a cough no new leg swelling EKG was sinus rhythm at this time hemodynamically stable sepsis workup rectal temp in light of his recent history CTA of chest abdomen pelvis with runoff was ordered, Noted to be bacteremic with Serratia on cefepime and reviewed blood culture ordered sacral ankle several months ago unclear if it is related but was worked up at that time for endocarditis monitor blood culture was sent with that in mind.

## 2024-04-14 NOTE — H&P ADULT - NSHPREVIEWOFSYSTEMS_GEN_ALL_CORE
Gen: no loss of wt no loss of appetite  ENT: no dizziness no hearing loss  Ophth: no blurring of vision no loss of vision  Resp: No cough no sputum production  CVS: No chest pain no palpitations no orthopnea  GI: no nausea, vomiting or diarrhea no melena or BRBPR   : no dysuria, hematuria  Endo: no polyuria no excessive sweating  Neuro: no weakness no paresthesias  Heme: No petechiae no easy bruising  Msk: No joint pain no swelling  Skin: No rash no itching

## 2024-04-14 NOTE — H&P ADULT - HISTORY OF PRESENT ILLNESS
63 year old male with Hx of HTN, GERD, dyslipidemia, chronic intermittent leg pain, aortic stenosis, chronic venous stasis dermatitis, recent aortic valve replacement, presents to the ER for chills. patient with recent hospital admission for aortic valve replacement, was hypoxic given 2L at the time. patient was also noted to be bacteriemic, started on cefepime. patient here today for chills. states started a day ago. states chills have been intermittent. states has dyspnea on exertion for the past few months. denies rectal bleeding. denies cough or fevers. denies abdominal pain. denies urinary symptoms

## 2024-04-14 NOTE — ED PROVIDER NOTE - OBJECTIVE STATEMENT
63 year old male with Hx of HTN, GERD, dyslipidemia, chronic intermittent leg pain, aortic stenosis, chronic venous stasis dermatitis, recent aortic valve replacement, presents to the ER for chills. patient with recent hospital admission for aortic valve replacement, was hypoxic given 2L at the time. patient was also noted to be bacteriemic, started on cefepime. patient here today for chills. states started a day ago. states chills have been intermittent. states has dyspnea on exertion for the past few months. denies rectal bleeding. denies cough or fevers. denies abdominal pain. denies urinary symptoms.

## 2024-04-14 NOTE — ED PROVIDER NOTE - NS ED ATTENDING STATEMENT MOD
This was a shared visit with the SABA. I reviewed and verified the documentation. I have personally provided the amount of critical care time documented below excluding time spent on separate procedures.

## 2024-04-14 NOTE — H&P ADULT - PROBLEM SELECTOR PLAN 2
unclear etiology  recent bacteremia  continue IV cefepime  blood cultures testing   ID evaluation in AM

## 2024-04-14 NOTE — H&P ADULT - NSHPPHYSICALEXAM_GEN_ALL_CORE
PHYSICAL EXAM: vital signs noted on [place]rise  in no apparent distress  HEENT: KUSHAL EOMI pallor +   Neck: Supple, no JVD, no thyromegaly  Lungs: no wheeze, no crackles  CVS: S1 S2 ejection systolic murmur +   Abdomen: no tenderness, no organomegaly, BS present  Neuro: AO x 3 no focal weakness, no sensory abnormalities  Skin: warm, dry  Ext: no cyanosis or clubbing, 2 + edema  Msk: no joint swelling or deformities  Back: no CVA tenderness, no kyphosis/scoliosis

## 2024-04-14 NOTE — ED ADULT NURSE NOTE - CHPI ED NUR SYMPTOMS NEG
no decreased eating/drinking/no dizziness/no fever/no nausea/no tingling/no vomiting no decreased eating/drinking/no dizziness/no fever/no nausea/no pain/no tingling/no vomiting

## 2024-04-15 ENCOUNTER — RESULT REVIEW (OUTPATIENT)
Age: 64
End: 2024-04-15

## 2024-04-15 LAB
ALBUMIN SERPL ELPH-MCNC: 3.2 G/DL — LOW (ref 3.3–5)
ALP SERPL-CCNC: 172 U/L — HIGH (ref 40–120)
ALT FLD-CCNC: 14 U/L — SIGNIFICANT CHANGE UP (ref 10–45)
ANION GAP SERPL CALC-SCNC: 13 MMOL/L — SIGNIFICANT CHANGE UP (ref 5–17)
AST SERPL-CCNC: 16 U/L — SIGNIFICANT CHANGE UP (ref 10–40)
BILIRUB SERPL-MCNC: 1.2 MG/DL — SIGNIFICANT CHANGE UP (ref 0.2–1.2)
BUN SERPL-MCNC: 28 MG/DL — HIGH (ref 7–23)
CALCIUM SERPL-MCNC: 8.8 MG/DL — SIGNIFICANT CHANGE UP (ref 8.4–10.5)
CHLORIDE SERPL-SCNC: 101 MMOL/L — SIGNIFICANT CHANGE UP (ref 96–108)
CO2 SERPL-SCNC: 24 MMOL/L — SIGNIFICANT CHANGE UP (ref 22–31)
CREAT SERPL-MCNC: 1.49 MG/DL — HIGH (ref 0.5–1.3)
CULTURE RESULTS: NO GROWTH — SIGNIFICANT CHANGE UP
EGFR: 52 ML/MIN/1.73M2 — LOW
GLUCOSE SERPL-MCNC: 98 MG/DL — SIGNIFICANT CHANGE UP (ref 70–99)
HCT VFR BLD CALC: 23.2 % — LOW (ref 39–50)
HGB BLD-MCNC: 7.7 G/DL — LOW (ref 13–17)
MCHC RBC-ENTMCNC: 23.9 PG — LOW (ref 27–34)
MCHC RBC-ENTMCNC: 33.2 GM/DL — SIGNIFICANT CHANGE UP (ref 32–36)
MCV RBC AUTO: 72 FL — LOW (ref 80–100)
NRBC # BLD: 0 /100 WBCS — SIGNIFICANT CHANGE UP (ref 0–0)
NT-PROBNP SERPL-SCNC: 7829 PG/ML — HIGH (ref 0–300)
PLATELET # BLD AUTO: 226 K/UL — SIGNIFICANT CHANGE UP (ref 150–400)
POTASSIUM SERPL-MCNC: 3.3 MMOL/L — LOW (ref 3.5–5.3)
POTASSIUM SERPL-SCNC: 3.3 MMOL/L — LOW (ref 3.5–5.3)
PROT SERPL-MCNC: 7.1 G/DL — SIGNIFICANT CHANGE UP (ref 6–8.3)
RBC # BLD: 3.22 M/UL — LOW (ref 4.2–5.8)
RBC # FLD: 25 % — HIGH (ref 10.3–14.5)
SODIUM SERPL-SCNC: 138 MMOL/L — SIGNIFICANT CHANGE UP (ref 135–145)
SPECIMEN SOURCE: SIGNIFICANT CHANGE UP
TSH SERPL-MCNC: 2.11 UIU/ML — SIGNIFICANT CHANGE UP (ref 0.27–4.2)
WBC # BLD: 7.33 K/UL — SIGNIFICANT CHANGE UP (ref 3.8–10.5)
WBC # FLD AUTO: 7.33 K/UL — SIGNIFICANT CHANGE UP (ref 3.8–10.5)

## 2024-04-15 PROCEDURE — 93308 TTE F-UP OR LMTD: CPT | Mod: 26

## 2024-04-15 PROCEDURE — 93321 DOPPLER ECHO F-UP/LMTD STD: CPT | Mod: 26

## 2024-04-15 RX ORDER — HYDRALAZINE HCL 50 MG
75 TABLET ORAL THREE TIMES A DAY
Refills: 0 | Status: DISCONTINUED | OUTPATIENT
Start: 2024-04-15 | End: 2024-04-19

## 2024-04-15 RX ORDER — AMIODARONE HYDROCHLORIDE 400 MG/1
200 TABLET ORAL DAILY
Refills: 0 | Status: DISCONTINUED | OUTPATIENT
Start: 2024-04-15 | End: 2024-04-19

## 2024-04-15 RX ORDER — POTASSIUM CHLORIDE 20 MEQ
40 PACKET (EA) ORAL EVERY 4 HOURS
Refills: 0 | Status: COMPLETED | OUTPATIENT
Start: 2024-04-15 | End: 2024-04-15

## 2024-04-15 RX ORDER — TAMSULOSIN HYDROCHLORIDE 0.4 MG/1
0.4 CAPSULE ORAL AT BEDTIME
Refills: 0 | Status: DISCONTINUED | OUTPATIENT
Start: 2024-04-15 | End: 2024-04-19

## 2024-04-15 RX ORDER — ALLOPURINOL 300 MG
100 TABLET ORAL DAILY
Refills: 0 | Status: DISCONTINUED | OUTPATIENT
Start: 2024-04-15 | End: 2024-04-19

## 2024-04-15 RX ORDER — LEVOTHYROXINE SODIUM 125 MCG
50 TABLET ORAL DAILY
Refills: 0 | Status: DISCONTINUED | OUTPATIENT
Start: 2024-04-15 | End: 2024-04-19

## 2024-04-15 RX ORDER — PANTOPRAZOLE SODIUM 20 MG/1
40 TABLET, DELAYED RELEASE ORAL
Refills: 0 | Status: DISCONTINUED | OUTPATIENT
Start: 2024-04-15 | End: 2024-04-19

## 2024-04-15 RX ORDER — ACETAMINOPHEN 500 MG
650 TABLET ORAL EVERY 6 HOURS
Refills: 0 | Status: DISCONTINUED | OUTPATIENT
Start: 2024-04-15 | End: 2024-04-19

## 2024-04-15 RX ADMIN — CEFEPIME 100 MILLIGRAM(S): 1 INJECTION, POWDER, FOR SOLUTION INTRAMUSCULAR; INTRAVENOUS at 13:25

## 2024-04-15 RX ADMIN — Medication 40 MILLIEQUIVALENT(S): at 17:35

## 2024-04-15 RX ADMIN — TAMSULOSIN HYDROCHLORIDE 0.4 MILLIGRAM(S): 0.4 CAPSULE ORAL at 20:57

## 2024-04-15 RX ADMIN — Medication 40 MILLIEQUIVALENT(S): at 20:57

## 2024-04-15 RX ADMIN — BUMETANIDE 2 MILLIGRAM(S): 0.25 INJECTION INTRAMUSCULAR; INTRAVENOUS at 05:59

## 2024-04-15 RX ADMIN — CEFEPIME 100 MILLIGRAM(S): 1 INJECTION, POWDER, FOR SOLUTION INTRAMUSCULAR; INTRAVENOUS at 05:05

## 2024-04-15 RX ADMIN — PANTOPRAZOLE SODIUM 40 MILLIGRAM(S): 20 TABLET, DELAYED RELEASE ORAL at 17:35

## 2024-04-15 RX ADMIN — Medication 75 MILLIGRAM(S): at 20:58

## 2024-04-15 NOTE — CONSULT NOTE ADULT - SUBJECTIVE AND OBJECTIVE BOX
[facility]  Ulisses Rothman PA-C  [address] [place], [place] [id number] [phone]      Chief Complaint:  Patient is a 63y old  Male who presents with a chief complaint of chills and low grade fever (2024 16:31)      HPI: 63 year old male with Hx of HTN, GERD, dyslipidemia, chronic intermittent leg pain, aortic stenosis, chronic venous stasis dermatitis, recent aortic valve replacement, presents to the ER for chills. patient with recent hospital admission for aortic valve replacement, was hypoxic given 2L at the time. patient was also noted to be bacteriemic, started on cefepime. patient here today for chills. states started a day ago. states chills have been intermittent. states has dyspnea on exertion for the past few months. denies rectal bleeding. denies cough or fevers. denies abdominal pain. denies urinary symptoms    Allergies:  garlic (Angioedema; Swelling; Anaphylaxis)  No Known Drug Allergies      Medications:  buMETAnide Injectable 2 milliGRAM(s) IV Push daily  cefepime   IVPB 1000 milliGRAM(s) IV Intermittent every 8 hours      PMHX/PSHX:  Dyslipidemia    Hypertension    Chronic GERD    History of aortic stenosis    Cellulitis    No Past Medical History    Brain cancer    No significant past surgical history        Family history:  FH: congestive heart failure (Father, Sibling)    FH: coronary artery disease (Sibling)    FH: brain cancer (Mother)        Social History:     ROS:     General:  no fevers, chills, night sweats, fatigue,   Eyes:  Good vision, no reported pain  ENT:  No sore throat, pain, runny nose, dysphagia  CV:  No pain, palpitations, hypo/hypertension  Resp:  No dyspnea, cough, tachypnea, wheezing  GI:  No pain, No nausea, No vomiting, No diarrhea, No constipation, No weight loss, No fever, No pruritis, No rectal bleeding, No tarry stools, No dysphagia,  :  No pain, bleeding, incontinence, nocturia  Muscle:  No pain, weakness  Neuro:  No weakness, tingling, memory problems  Psych:  No fatigue, insomnia, mood problems, depression  Endocrine:  No polyuria, polydipsia, cold/heat intolerance  Heme:  No petechiae, ecchymosis, easy bruisability  Skin:  No rash, tattoos, scars, edema      PHYSICAL EXAM:   Vital Signs:  Vital Signs Last 24 Hrs  T(C): 37.2 (15 Apr 2024 13:39), Max: 37.2 (15 Apr 2024 13:39)  T(F): 98.9 (15 Apr 2024 13:39), Max: 98.9 (15 Apr 2024 13:39)  HR: 62 (15 Apr 2024 13:39) (51 - 62)  BP: 146/77 (15 Apr 2024 13:39) (126/76 - 150/66)  BP(mean): --  RR: 18 (15 Apr 2024 13:39) (18 - 18)  SpO2: 95% (15 Apr 2024 13:39) (94% - 98%)    Parameters below as of 15 Apr 2024 13:39  Patient On (Oxygen Delivery Method): room air      Daily     Daily Weight in k.6 (15 Apr 2024 10:12)    GENERAL:  Appears stated age,   HEENT:  NC/AT,    CHEST:  Full & symmetric excursion,   HEART:  Regular rhythm  ABDOMEN:  Soft, non-tender, non-distended,   EXTEREMITIES:  no cyanosis,clubbing or edema  SKIN:  No rash  NEURO:  Alert,    LABS:                        7.7    7.33  )-----------( 226      ( 15 Apr 2024 07:12 )             23.2     04-15    138  |  101  |  28<H>  ----------------------------<  98  3.3<L>   |  24  |  1.49<H>    Ca    8.8      15 Apr 2024 07:12    TPro  7.1  /  Alb  3.2<L>  /  TBili  1.2  /  DBili  x   /  AST  16  /  ALT  14  /  AlkPhos  172<H>  04-15    LIVER FUNCTIONS - ( 15 Apr 2024 07:12 )  Alb: 3.2 g/dL / Pro: 7.1 g/dL / ALK PHOS: 172 U/L / ALT: 14 U/L / AST: 16 U/L / GGT: x           PT/INR - ( 2024 12:14 )   PT: 13.2 sec;   INR: 1.27 ratio         PTT - ( 2024 12:14 )  PTT:26.3 sec  Urinalysis Basic - ( 15 Apr 2024 07:12 )    Color: x / Appearance: x / SG: x / pH: x  Gluc: 98 mg/dL / Ketone: x  / Bili: x / Urobili: x   Blood: x / Protein: x / Nitrite: x   Leuk Esterase: x / RBC: x / WBC x   Sq Epi: x / Non Sq Epi: x / Bacteria: x          Imaging:

## 2024-04-15 NOTE — CONSULT NOTE ADULT - ASSESSMENT
anemia  fever    no overt gi bleed  occult negative  reg diet  proton pump inhibitor daily  cont iv antibiotics  f/u cultures  ct noted  will follow

## 2024-04-15 NOTE — PATIENT PROFILE ADULT - NSPROGENPREVTRANSF_GEN_A_NUR
How Severe Is Your Skin Lesion?: moderate Have Your Skin Lesions Been Treated?: not been treated Is This A New Presentation, Or A Follow-Up?: Skin Lesions Additional History: History books f skin cancer no

## 2024-04-15 NOTE — CONSULT NOTE ADULT - SUBJECTIVE AND OBJECTIVE BOX
NEPHROLOGY CONSULTATION    CHIEF COMPLAINT: chills    HPI:  Pt is 64 yo m with hx of HTN, GERD, CKD 3, dyslipidemia, chronic intermittent leg pain, aortic stenosis, chronic venous stasis dermatitis, recent aortic valve replacement, presented to the ER 4/14 for chills. Patient with recent hospital admission for aortic valve replacement, hx os sepsis. Pt noted dyspnea on exertion, chronic. No overt rectal bleeding, CP, N/V, cough or fevers. Noted to have severe anemia. Seen today, getting PRBCs.     ROS:  as above    Allergies:  garlic (Angioedema; Swelling; Anaphylaxis)  No Known Drug Allergies    PAST MEDICAL & SURGICAL HISTORY: as above  Dyslipidemia  Hypertension  Chronic GERD  History of aortic stenosis  Cellulitis  CKD 3    SOCIAL HISTORY:  negative    FAMILY HISTORY:  congestive heart failure (Father, Sibling)  coronary artery disease (Sibling)  brain cancer (Mother)    MEDICATIONS  (STANDING):  allopurinol 100 milliGRAM(s) Oral daily  aMIOdarone    Tablet 200 milliGRAM(s) Oral daily  buMETAnide Injectable 2 milliGRAM(s) IV Push daily  hydrALAZINE 75 milliGRAM(s) Oral three times a day  levothyroxine 50 MICROGram(s) Oral daily  pantoprazole    Tablet 40 milliGRAM(s) Oral two times a day  potassium chloride    Tablet ER 40 milliEquivalent(s) Oral every 4 hours  tamsulosin 0.4 milliGRAM(s) Oral at bedtime    Home Medications:  acetaminophen 325 mg oral tablet: 2 tab(s) orally every 6 hours As needed Temp greater or equal to 38C (100.4F), Mild Pain (1 - 3) (14 Apr 2024 17:08)  alfuzosin 10 mg oral tablet, extended release: 1 tab(s) orally once a day (at bedtime) HOme (14 Apr 2024 17:08)  bumetanide 2 mg oral tablet: 2 tab(s) orally 3 times a day (14 Apr 2024 17:08)  hydrALAZINE 50 mg oral tablet: 1.5 tab(s) orally 3 times a day (14 Apr 2024 17:08)    Vital Signs Last 24 Hrs  T(C): 37.2 (04-15-24 @ 13:39), Max: 37.2 (04-15-24 @ 13:39)  T(F): 98.9 (04-15-24 @ 13:39), Max: 98.9 (04-15-24 @ 13:39)  HR: 62 (04-15-24 @ 13:39) (51 - 62)  BP: 146/77 (04-15-24 @ 13:39) (126/76 - 146/77)  RR: 18 (04-15-24 @ 13:39) (18 - 18)  SpO2: 95% (04-15-24 @ 13:39) (94% - 98%)    s1s2  b/l air entry  soft, ND  no edema     LABS:                        7.7    7.33  )-----------( 226      ( 15 Apr 2024 07:12 )             23.2     04-15    138  |  101  |  28<H>  ----------------------------<  98  3.3<L>   |  24  |  1.49<H>    Ca    8.8      15 Apr 2024 07:12    TPro  7.1  /  Alb  3.2<L>  /  TBili  1.2  /  DBili  x   /  AST  16  /  ALT  14  /  AlkPhos  172<H>  04-15    LIVER FUNCTIONS - ( 15 Apr 2024 07:12 )  Alb: 3.2 g/dL / Pro: 7.1 g/dL / ALK PHOS: 172 U/L / ALT: 14 U/L / AST: 16 U/L / GGT: x           Culture - Urine (collected 14 Apr 2024 14:26)  Source: Clean Catch Clean Catch (Midstream)  Final Report (15 Apr 2024 13:22):    No growth    Culture - Blood (collected 14 Apr 2024 12:00)  Source: .Blood Blood  Preliminary Report (15 Apr 2024 16:01):    No growth at 24 hours    Culture - Blood (collected 14 Apr 2024 11:53)  Source: .Blood Blood  Preliminary Report (15 Apr 2024 16:01):    No growth at 24 hours    PT/INR - ( 14 Apr 2024 12:14 )   PT: 13.2 sec;   INR: 1.27 ratio      A/P:    Hx CM, reduced RV fx, Afib, AS, CHF  S/p AVR, JOANNA clip 2/23/24  Serratia bacteremia (2/29 and 3/1), s/p Abx per ID   Known CKD 3, presently near baseline  Adm w/chills, anemia  Bld cx - NGTD, Urine cx - no growth  Hx pericardial effusion  S/p pericardial window 3/6/24, clot extracted   Echo, CXR noted   No objection to diuresis  F/u BMP, CBC, Mg  Suppl K  Avoid nephrotoxins as possible   No NSAID's  Anemia w/up, Epoetin    [phone]

## 2024-04-15 NOTE — PATIENT PROFILE ADULT - FALL HARM RISK - HARM RISK INTERVENTIONS

## 2024-04-16 LAB
ALBUMIN SERPL ELPH-MCNC: 3 G/DL — LOW (ref 3.3–5)
ALP SERPL-CCNC: 162 U/L — HIGH (ref 40–120)
ALT FLD-CCNC: 12 U/L — SIGNIFICANT CHANGE UP (ref 10–45)
ANION GAP SERPL CALC-SCNC: 11 MMOL/L — SIGNIFICANT CHANGE UP (ref 5–17)
AST SERPL-CCNC: 14 U/L — SIGNIFICANT CHANGE UP (ref 10–40)
BASOPHILS # BLD AUTO: 0.05 K/UL — SIGNIFICANT CHANGE UP (ref 0–0.2)
BASOPHILS NFR BLD AUTO: 0.5 % — SIGNIFICANT CHANGE UP (ref 0–2)
BILIRUB SERPL-MCNC: 1.1 MG/DL — SIGNIFICANT CHANGE UP (ref 0.2–1.2)
BUN SERPL-MCNC: 24 MG/DL — HIGH (ref 7–23)
CALCIUM SERPL-MCNC: 8.9 MG/DL — SIGNIFICANT CHANGE UP (ref 8.4–10.5)
CHLORIDE SERPL-SCNC: 101 MMOL/L — SIGNIFICANT CHANGE UP (ref 96–108)
CO2 SERPL-SCNC: 25 MMOL/L — SIGNIFICANT CHANGE UP (ref 22–31)
CREAT SERPL-MCNC: 1.43 MG/DL — HIGH (ref 0.5–1.3)
EGFR: 55 ML/MIN/1.73M2 — LOW
EOSINOPHIL # BLD AUTO: 1.2 K/UL — HIGH (ref 0–0.5)
EOSINOPHIL NFR BLD AUTO: 13 % — HIGH (ref 0–6)
FERRITIN SERPL-MCNC: 522 NG/ML — HIGH (ref 30–400)
FOLATE SERPL-MCNC: 3.5 NG/ML — LOW
GLUCOSE SERPL-MCNC: 87 MG/DL — SIGNIFICANT CHANGE UP (ref 70–99)
HAPTOGLOB SERPL-MCNC: 153 MG/DL — SIGNIFICANT CHANGE UP (ref 34–200)
HCT VFR BLD CALC: 25.1 % — LOW (ref 39–50)
HGB BLD-MCNC: 8.2 G/DL — LOW (ref 13–17)
IMM GRANULOCYTES NFR BLD AUTO: 0.9 % — SIGNIFICANT CHANGE UP (ref 0–0.9)
IRON SATN MFR SERPL: 11 % — LOW (ref 16–55)
IRON SATN MFR SERPL: 29 UG/DL — LOW (ref 45–165)
LYMPHOCYTES # BLD AUTO: 0.38 K/UL — LOW (ref 1–3.3)
LYMPHOCYTES # BLD AUTO: 4.1 % — LOW (ref 13–44)
MAGNESIUM SERPL-MCNC: 1.9 MG/DL — SIGNIFICANT CHANGE UP (ref 1.6–2.6)
MCHC RBC-ENTMCNC: 23.6 PG — LOW (ref 27–34)
MCHC RBC-ENTMCNC: 32.7 GM/DL — SIGNIFICANT CHANGE UP (ref 32–36)
MCV RBC AUTO: 72.1 FL — LOW (ref 80–100)
MONOCYTES # BLD AUTO: 0.68 K/UL — SIGNIFICANT CHANGE UP (ref 0–0.9)
MONOCYTES NFR BLD AUTO: 7.4 % — SIGNIFICANT CHANGE UP (ref 2–14)
NEUTROPHILS # BLD AUTO: 6.81 K/UL — SIGNIFICANT CHANGE UP (ref 1.8–7.4)
NEUTROPHILS NFR BLD AUTO: 74.1 % — SIGNIFICANT CHANGE UP (ref 43–77)
NRBC # BLD: 0 /100 WBCS — SIGNIFICANT CHANGE UP (ref 0–0)
PLATELET # BLD AUTO: 227 K/UL — SIGNIFICANT CHANGE UP (ref 150–400)
POTASSIUM SERPL-MCNC: 3.8 MMOL/L — SIGNIFICANT CHANGE UP (ref 3.5–5.3)
POTASSIUM SERPL-SCNC: 3.8 MMOL/L — SIGNIFICANT CHANGE UP (ref 3.5–5.3)
PROT SERPL-MCNC: 6 G/DL — SIGNIFICANT CHANGE UP (ref 6–8.3)
PROT SERPL-MCNC: 6.6 G/DL — SIGNIFICANT CHANGE UP (ref 6–8.3)
RBC # BLD: 3.48 M/UL — LOW (ref 4.2–5.8)
RBC # FLD: 25.7 % — HIGH (ref 10.3–14.5)
SODIUM SERPL-SCNC: 137 MMOL/L — SIGNIFICANT CHANGE UP (ref 135–145)
TIBC SERPL-MCNC: 250 UG/DL — SIGNIFICANT CHANGE UP (ref 220–430)
UIBC SERPL-MCNC: 221 UG/DL — SIGNIFICANT CHANGE UP (ref 110–370)
URATE SERPL-MCNC: 7 MG/DL — SIGNIFICANT CHANGE UP (ref 3.4–8.8)
VIT B12 SERPL-MCNC: 451 PG/ML — SIGNIFICANT CHANGE UP (ref 232–1245)
WBC # BLD: 9.2 K/UL — SIGNIFICANT CHANGE UP (ref 3.8–10.5)
WBC # FLD AUTO: 9.2 K/UL — SIGNIFICANT CHANGE UP (ref 3.8–10.5)

## 2024-04-16 PROCEDURE — 99253 IP/OBS CNSLTJ NEW/EST LOW 45: CPT

## 2024-04-16 RX ORDER — ERYTHROPOIETIN 10000 [IU]/ML
10000 INJECTION, SOLUTION INTRAVENOUS; SUBCUTANEOUS
Refills: 0 | Status: DISCONTINUED | OUTPATIENT
Start: 2024-04-16 | End: 2024-04-19

## 2024-04-16 RX ORDER — BUMETANIDE 0.25 MG/ML
2 INJECTION INTRAMUSCULAR; INTRAVENOUS
Refills: 0 | Status: DISCONTINUED | OUTPATIENT
Start: 2024-04-17 | End: 2024-04-19

## 2024-04-16 RX ORDER — FOLIC ACID 0.8 MG
1 TABLET ORAL DAILY
Refills: 0 | Status: DISCONTINUED | OUTPATIENT
Start: 2024-04-16 | End: 2024-04-19

## 2024-04-16 RX ADMIN — TAMSULOSIN HYDROCHLORIDE 0.4 MILLIGRAM(S): 0.4 CAPSULE ORAL at 21:30

## 2024-04-16 RX ADMIN — PANTOPRAZOLE SODIUM 40 MILLIGRAM(S): 20 TABLET, DELAYED RELEASE ORAL at 17:29

## 2024-04-16 RX ADMIN — ERYTHROPOIETIN 10000 UNIT(S): 10000 INJECTION, SOLUTION INTRAVENOUS; SUBCUTANEOUS at 11:45

## 2024-04-16 RX ADMIN — AMIODARONE HYDROCHLORIDE 200 MILLIGRAM(S): 400 TABLET ORAL at 05:48

## 2024-04-16 RX ADMIN — Medication 75 MILLIGRAM(S): at 21:29

## 2024-04-16 RX ADMIN — Medication 50 MICROGRAM(S): at 04:51

## 2024-04-16 RX ADMIN — Medication 75 MILLIGRAM(S): at 13:01

## 2024-04-16 RX ADMIN — PANTOPRAZOLE SODIUM 40 MILLIGRAM(S): 20 TABLET, DELAYED RELEASE ORAL at 05:48

## 2024-04-16 RX ADMIN — BUMETANIDE 2 MILLIGRAM(S): 0.25 INJECTION INTRAMUSCULAR; INTRAVENOUS at 05:48

## 2024-04-16 RX ADMIN — Medication 75 MILLIGRAM(S): at 05:48

## 2024-04-16 RX ADMIN — Medication 100 MILLIGRAM(S): at 11:38

## 2024-04-16 NOTE — CONSULT NOTE ADULT - SUBJECTIVE AND OBJECTIVE BOX
Patient is a 63y old  Male who presents with a chief complaint of chills and low grade fever (16 Apr 2024 15:36)      HPI:  63 year old male with Hx of HTN, GERD, dyslipidemia, chronic intermittent leg pain, aortic stenosis, chronic venous stasis dermatitis, recent aortic valve replacement, presents to the ER for chills. patient with recent hospital admission for aortic valve replacement, was hypoxic given 2L at the time. patient was also noted to be bacteriemic, started on cefepime. patient here today for chills. states started a day ago. states chills have been intermittent. states has dyspnea on exertion for the past few months. denies rectal bleeding. denies cough or fevers. denies abdominal pain. denies urinary symptoms (14 Apr 2024 16:31)  Above reviewed:   Pt wants to go home, says chills going on intermittently and improve spontaneously. No fever, + VANCE, some cough, occasional, + nasal discharge, No other symptoms. + leg swelling.         PAST MEDICAL & SURGICAL HISTORY:  Dyslipidemia      Hypertension      Chronic GERD      History of aortic stenosis      Cellulitis      No significant past surgical history          REVIEW OF SYSTEMS    General: No Fevers,    Skin: No rash  	  Ophthalmologic: Denies any discharge, redness.  	  ENMT: No throat pain.     Respiratory and Thorax: per HPI   	  Cardiovascular: No chest pain,     Gastrointestinal: No nausea, abdominal pain or diarrhea.    Genitourinary: No dysuria,     Musculoskeletal: No joint swelling     Neurological: No new extremity weakness.    Psychiatric: No hallucinations	    Extremities: No swelling     Endocrine: No polyuria or polydipsia    Allergic/Immunologic:	No hives        Social history:  from home, no smoking.         FAMILY HISTORY:  FH: congestive heart failure (Father, Sibling)    FH: coronary artery disease (Sibling)    FH: brain cancer (Mother)        Allergies  garlic (Angioedema; Swelling; Anaphylaxis)  No Known Drug Allergies          Antimicrobials:          Vital Signs Last 24 Hrs  T(C): 36.8 (16 Apr 2024 11:09), Max: 37.1 (15 Apr 2024 20:28)  T(F): 98.2 (16 Apr 2024 11:09), Max: 98.7 (15 Apr 2024 20:28)  HR: 66 (16 Apr 2024 11:09) (60 - 66)  BP: 136/72 (16 Apr 2024 11:09) (136/72 - 151/80)  BP(mean): --  RR: 18 (16 Apr 2024 11:09) (18 - 18)  SpO2: 95% (16 Apr 2024 11:09) (95% - 96%)    Parameters below as of 16 Apr 2024 11:09  Patient On (Oxygen Delivery Method): room air          PHYSICAL EXAM: Patient in no acute distress. sitting in chair     Constitutional: Comfortable. Awake and alert    Eyes: No discharge or conjunctival injection    ENMT: No thrush. No pharyngeal erythema.    Neck: Supple,     Respiratory:  + air entry bilaterally.    Cardiovascular: S1 S2 wnl, well healed midline surgical incision.     Gastrointestinal: Soft, no tenderness, non distended.    Genitourinary: No garcia     Extremities: +  edema.    Vascular: peripheral pulses felt    Neurological: No new gross focal deficits.    Skin: venous stasis dermatitis with multiple ulcers now scabbed.     Musculoskeletal: No joint swelling.    Psychiatric: Affect normal.                              8.2    9.20  )-----------( 227      ( 16 Apr 2024 07:09 )             25.1       04-16    137  |  101  |  24<H>  ----------------------------<  87  3.8   |  25  |  1.43<H>    Ca    8.9      16 Apr 2024 07:13  Mg     1.9     04-16    TPro  6.6  /  Alb  3.0<L>  /  TBili  1.1  /  DBili  x   /  AST  14  /  ALT  12  /  AlkPhos  162<H>  04-16        Urinalysis (04.14.24 @ 14:26)   Blood, Urine: Negative  Glucose Qualitative, Urine: Negative mg/dL  pH Urine: 7.5  Color: Dark Yellow  Urine Appearance: Clear  Bilirubin: Negative  Ketone - Urine: Negative mg/dL  Specific Gravity: 1.017  Protein, Urine: 30 mg/dL  Urobilinogen: 4.0 mg/dL  Nitrite: Negative  Leukocyte Esterase Concentration: Negative  Urine Microscopic-Add On (NC) (04.14.24 @ 14:26)   Red Blood Cell - Urine: 1 /HPF  White Blood Cell - Urine: 0 /HPF  Bacteria: Negative /HPF  Epithelial Cells: 0 /HPF  Cast: 3 /LPF      Culture - Urine (collected 14 Apr 2024 14:26)  Source: Clean Catch Clean Catch (Midstream)  Final Report (15 Apr 2024 13:22):    No growth    Culture - Blood (collected 14 Apr 2024 12:00)  Source: .Blood Blood  Preliminary Report (16 Apr 2024 16:02):    No growth at 48 Hours    Culture - Blood (collected 14 Apr 2024 11:53)  Source: .Blood Blood  Preliminary Report (16 Apr 2024 16:02):    No growth at 48 Hours          Radiology: Imaging reviewed and visualized personally [ x]    < from: Xray Chest 1 View AP/PA (04.14.24 @ 12:19) >  IMPRESSION:  Mild pulmonary vascular congestion.  No focal consolidation.        < from: CT Chest No Cont (04.14.24 @ 12:49) >    IMPRESSION:  1.  No acute thoracoabdominal pathology.  2.  Etiology of abdominal pain/fever not determined.  3.  Moderate pericardial, minimally diminished  4.  Small LEFT pleural effusion and trace abdominal ascites, unchanged.  5.  Prominent inguinal lymph nodes, unchanged  6.  RIGHT inguinal hernia now contains ascitic fluid  7.  Hepatosplenomegaly, minimally increased

## 2024-04-16 NOTE — CONSULT NOTE ADULT - ASSESSMENT
63 year old male with Hx of HTN, GERD, dyslipidemia, chronic intermittent leg pain, aortic stenosis, chronic venous stasis dermatitis, recent aortic valve replacement, presents to the ER for chills.       Overall recent AVR with chills, SOB.       PLAN:   Monitor off abx.   blood cx NTD   CT with no source of infection.  No leucocytosis, no fever.       Plan discussed with Medicine ACP.       Will sign off, please call with questions.     Margareth Hare  Please contact through MS Teams   If no response or past 5 pm/weekend call [phone].

## 2024-04-17 LAB
ANION GAP SERPL CALC-SCNC: 8 MMOL/L — SIGNIFICANT CHANGE UP (ref 5–17)
BUN SERPL-MCNC: 22 MG/DL — SIGNIFICANT CHANGE UP (ref 7–23)
CALCIUM SERPL-MCNC: 9 MG/DL — SIGNIFICANT CHANGE UP (ref 8.4–10.5)
CHLORIDE SERPL-SCNC: 99 MMOL/L — SIGNIFICANT CHANGE UP (ref 96–108)
CO2 SERPL-SCNC: 26 MMOL/L — SIGNIFICANT CHANGE UP (ref 22–31)
CREAT SERPL-MCNC: 1.15 MG/DL — SIGNIFICANT CHANGE UP (ref 0.5–1.3)
EGFR: 72 ML/MIN/1.73M2 — SIGNIFICANT CHANGE UP
GLUCOSE SERPL-MCNC: 95 MG/DL — SIGNIFICANT CHANGE UP (ref 70–99)
HCT VFR BLD CALC: 26.3 % — LOW (ref 39–50)
HGB BLD-MCNC: 8.4 G/DL — LOW (ref 13–17)
MCHC RBC-ENTMCNC: 22.9 PG — LOW (ref 27–34)
MCHC RBC-ENTMCNC: 31.9 GM/DL — LOW (ref 32–36)
MCV RBC AUTO: 71.7 FL — LOW (ref 80–100)
NRBC # BLD: 0 /100 WBCS — SIGNIFICANT CHANGE UP (ref 0–0)
PLATELET # BLD AUTO: 245 K/UL — SIGNIFICANT CHANGE UP (ref 150–400)
POTASSIUM SERPL-MCNC: 3.9 MMOL/L — SIGNIFICANT CHANGE UP (ref 3.5–5.3)
POTASSIUM SERPL-SCNC: 3.9 MMOL/L — SIGNIFICANT CHANGE UP (ref 3.5–5.3)
RBC # BLD: 3.67 M/UL — LOW (ref 4.2–5.8)
RBC # FLD: 25.2 % — HIGH (ref 10.3–14.5)
SODIUM SERPL-SCNC: 133 MMOL/L — LOW (ref 135–145)
WBC # BLD: 9.35 K/UL — SIGNIFICANT CHANGE UP (ref 3.8–10.5)
WBC # FLD AUTO: 9.35 K/UL — SIGNIFICANT CHANGE UP (ref 3.8–10.5)

## 2024-04-17 PROCEDURE — 99254 IP/OBS CNSLTJ NEW/EST MOD 60: CPT | Mod: GC

## 2024-04-17 RX ORDER — LACTULOSE 10 G/15ML
20 SOLUTION ORAL EVERY 4 HOURS
Refills: 0 | Status: COMPLETED | OUTPATIENT
Start: 2024-04-17 | End: 2024-04-17

## 2024-04-17 RX ORDER — SOD SULF/SODIUM/NAHCO3/KCL/PEG
1 SOLUTION, RECONSTITUTED, ORAL ORAL EVERY 4 HOURS
Refills: 0 | Status: COMPLETED | OUTPATIENT
Start: 2024-04-17 | End: 2024-04-17

## 2024-04-17 RX ADMIN — Medication 75 MILLIGRAM(S): at 21:38

## 2024-04-17 RX ADMIN — Medication 75 MILLIGRAM(S): at 14:50

## 2024-04-17 RX ADMIN — Medication 75 MILLIGRAM(S): at 05:22

## 2024-04-17 RX ADMIN — AMIODARONE HYDROCHLORIDE 200 MILLIGRAM(S): 400 TABLET ORAL at 05:25

## 2024-04-17 RX ADMIN — TAMSULOSIN HYDROCHLORIDE 0.4 MILLIGRAM(S): 0.4 CAPSULE ORAL at 21:38

## 2024-04-17 RX ADMIN — LACTULOSE 20 GRAM(S): 10 SOLUTION ORAL at 14:53

## 2024-04-17 RX ADMIN — Medication 1 LITER(S): at 17:45

## 2024-04-17 RX ADMIN — Medication 1 MILLIGRAM(S): at 14:50

## 2024-04-17 RX ADMIN — Medication 50 MICROGRAM(S): at 05:23

## 2024-04-17 RX ADMIN — BUMETANIDE 2 MILLIGRAM(S): 0.25 INJECTION INTRAMUSCULAR; INTRAVENOUS at 14:50

## 2024-04-17 RX ADMIN — PANTOPRAZOLE SODIUM 40 MILLIGRAM(S): 20 TABLET, DELAYED RELEASE ORAL at 17:42

## 2024-04-17 RX ADMIN — LACTULOSE 20 GRAM(S): 10 SOLUTION ORAL at 17:44

## 2024-04-17 RX ADMIN — BUMETANIDE 2 MILLIGRAM(S): 0.25 INJECTION INTRAMUSCULAR; INTRAVENOUS at 05:22

## 2024-04-17 RX ADMIN — PANTOPRAZOLE SODIUM 40 MILLIGRAM(S): 20 TABLET, DELAYED RELEASE ORAL at 05:22

## 2024-04-17 RX ADMIN — Medication 100 MILLIGRAM(S): at 14:50

## 2024-04-17 RX ADMIN — Medication 1 LITER(S): at 21:38

## 2024-04-17 NOTE — CONSULT NOTE ADULT - SUBJECTIVE AND OBJECTIVE BOX
04-17-24 @ 16:21    Patient is a 63y old  Male who presents with a chief complaint of chills and low grade fever (17 Apr 2024 16:03)      HPI:  63 year old male with Hx of HTN, GERD, dyslipidemia, chronic intermittent leg pain, aortic stenosis, chronic venous stasis dermatitis, recent aortic valve replacement, presents to the ER for chills. patient with recent hospital admission for aortic valve replacement, was hypoxic given 2L at the time. patient was also noted to be bacteriemic, started on cefepime. patient here today for chills. states started a day ago. states chills have been intermittent. states has dyspnea on exertion for the past few months. denies rectal bleeding. denies cough or fevers. denies abdominal pain. denies urinary symptoms (14 Apr 2024 16:31)   to me pt see aboveys he h as been OK:  resp wise:  to me he says his breathing is fine and has no underlying lung disease:  he has no asthma or copd:  he never smoked either :  currently on room air     ?FOLLOWING PRESENT  [ x] Hx of PE/DVT, [ x] Hx COPD, [x Hx of Asthma, [ x] Hx of Hospitalization, [x]  Hx of BiPAP/CPAP use, [ ] Hxx of SAMMY    Allergies    garlic (Angioedema; Swelling; Anaphylaxis)  No Known Drug Allergies    Intolerances        PAST MEDICAL & SURGICAL HISTORY:  Dyslipidemia      Hypertension      Chronic GERD      History of aortic stenosis      Cellulitis      No significant past surgical history          FAMILY HISTORY:  FH: congestive heart failure (Father, Sibling)    FH: coronary artery disease (Sibling)    FH: brain cancer (Mother)        Social History: [ x ] TOBACCO                  [  ] ETOH                                 [  x] IVDA/DRUGS    REVIEW OF SYSTEMS      General:	x    Skin/Breast:x  	x  Ophthalmologic:  	  ENMT:	x    Respiratory and Thorax:  chr dyspnea   	  Cardiovascular:	x    Gastrointestinal:	x    Genitourinary:	x    Musculoskeletal:	x    Neurological:	x    Psychiatric:	x    Hematology/Lymphatics:	x    Endocrine:	x    Allergic/Immunologic:	x    MEDICATIONS  (STANDING):  allopurinol 100 milliGRAM(s) Oral daily  aMIOdarone    Tablet 200 milliGRAM(s) Oral daily  buMETAnide 2 milliGRAM(s) Oral two times a day  epoetin loulou (EPOGEN) Injectable 84138 Unit(s) SubCutaneous every 7 days  folic acid 1 milliGRAM(s) Oral daily  hydrALAZINE 75 milliGRAM(s) Oral three times a day  lactulose Syrup 20 Gram(s) Oral every 4 hours  levothyroxine 50 MICROGram(s) Oral daily  pantoprazole    Tablet 40 milliGRAM(s) Oral two times a day  polyethylene glycol/electrolyte Solution 1 Liter(s) Oral every 4 hours  tamsulosin 0.4 milliGRAM(s) Oral at bedtime    MEDICATIONS  (PRN):  acetaminophen     Tablet .. 650 milliGRAM(s) Oral every 6 hours PRN Temp greater or equal to 38C (100.4F), Mild Pain (1 - 3)       Vital Signs Last 24 Hrs  T(C): 36.6 (17 Apr 2024 12:33), Max: 37.1 (17 Apr 2024 04:49)  T(F): 97.8 (17 Apr 2024 12:33), Max: 98.8 (17 Apr 2024 04:49)  HR: 53 (17 Apr 2024 14:41) (53 - 59)  BP: 176/78 (17 Apr 2024 14:41) (141/75 - 176/78)  BP(mean): --  RR: 18 (17 Apr 2024 12:33) (18 - 19)  SpO2: 97% (17 Apr 2024 12:33) (96% - 98%)    Parameters below as of 17 Apr 2024 12:33  Patient On (Oxygen Delivery Method): room air    Orthostatic VS          I&O's Summary    16 Apr 2024 07:01  -  17 Apr 2024 07:00  --------------------------------------------------------  IN: 840 mL / OUT: 0 mL / NET: 840 mL        Physical Exam:   GENERAL: NAD, well-groomed, well-developed  HEENT: KUSHAL/   Atraumatic, Normocephalic  ENMT: No tonsillar erythema, exudates, or enlargement; Moist mucous membranes, Good dentition, No lesions  NECK: Supple, No JVD, Normal thyroid  CHEST/LUNG: Clear to auscultation bilaterally  CVS: Regular rate and rhythm; No murmurs, rubs, or gallops  GI: : Soft, Nontender, Nondistended; Bowel sounds present  NERVOUS SYSTEM:  Alert & Oriented X3  EXTREMITIES: - edema  LYMPH: No lymphadenopathy noted  SKIN: No rashes or lesions  ENDOCRINOLOGY: No Thyromegaly  PSYCH: Appropriate    Labs:  4.4<39<4>>49<<7.475>>4.4<<3><<4><<5<<499>>SARS-CoV-2: NotDetec (25 Jan 2024 21:19)                              8.4    9.35  )-----------( 245      ( 17 Apr 2024 06:26 )             26.3                         8.2    9.20  )-----------( 227      ( 16 Apr 2024 07:09 )             25.1                         7.7    7.33  )-----------( 226      ( 15 Apr 2024 07:12 )             23.2                         7.7    7.68  )-----------( 212      ( 14 Apr 2024 22:00 )             24.0                         6.6    7.69  )-----------( 202      ( 14 Apr 2024 12:15 )             20.3     04-17    133<L>  |  99  |  22  ----------------------------<  95  3.9   |  26  |  1.15  04-16    137  |  101  |  24<H>  ----------------------------<  87  3.8   |  25  |  1.43<H>  04-15    138  |  101  |  28<H>  ----------------------------<  98  3.3<L>   |  24  |  1.49<H>  04-14    138  |  99  |  32<H>  ----------------------------<  112<H>  3.1<L>   |  26  |  1.52<H>    Ca    9.0      17 Apr 2024 06:25  Ca    8.9      16 Apr 2024 07:13  Mg     1.9     04-16    TPro  6.6  /  Alb  3.0<L>  /  TBili  1.1  /  DBili  x   /  AST  14  /  ALT  12  /  AlkPhos  162<H>  04-16  TPro  6.0  /  Alb  x   /  TBili  x   /  DBili  x   /  AST  x   /  ALT  x   /  AlkPhos  x   04-16  TPro  7.1  /  Alb  3.2<L>  /  TBili  1.2  /  DBili  x   /  AST  16  /  ALT  14  /  AlkPhos  172<H>  04-15  TPro  6.9  /  Alb  3.2<L>  /  TBili  1.2  /  DBili  x   /  AST  16  /  ALT  13  /  AlkPhos  180<H>  04-14    CAPILLARY BLOOD GLUCOSE        LIVER FUNCTIONS - ( 16 Apr 2024 07:13 )  Alb: 3.0 g/dL / Pro: 6.6 g/dL / ALK PHOS: 162 U/L / ALT: 12 U/L / AST: 14 U/L / GGT: x             Urinalysis Basic - ( 17 Apr 2024 06:25 )    Color: x / Appearance: x / SG: x / pH: x  Gluc: 95 mg/dL / Ketone: x  / Bili: x / Urobili: x   Blood: x / Protein: x / Nitrite: x   Leuk Esterase: x / RBC: x / WBC x   Sq Epi: x / Non Sq Epi: x / Bacteria: x      D DImer      Studies  Chest X-RAY  CT SCAN Chest   CT Abdomen  Venous Dopplers: LE:   Others      rad< from: CT Chest No Cont (04.14.24 @ 12:49) >  fracture or postsurgical change.    IMPRESSION:  1.  No acute thoracoabdominal pathology.  2.  Etiology of abdominal pain/fever not determined.  3.  Moderate pericardial, minimally diminished  4.  Small LEFT pleural effusion and trace abdominal ascites, unchanged.  5.  Prominent inguinal lymph nodes, unchanged  6.  RIGHT inguinal hernia now contains ascitic fluid  7.  Hepatosplenomegaly, minimally increased    < end of copied text >    < from: TTE W or WO Ultrasound Enhancing Agent (04.15.24 @ 08:12) >     CONCLUSIONS:      1. This pericardial effusion is localized posterior and lateral to the left ventricle and around the right atrium.   2. Small to moderate pericardial effusion seen on echo.   3. Left ventricular systolic function is normal with an ejection fraction of 56 % by Marc's method of disks.   4. Severely enlarged right ventricular cavity size and reduced systolic function.   5. Left atrium was not well visualized and LA volume could not be calculated.   6. The right atrium is severely dilated.   7. The inferior vena cava is dilated measuring 3.10 cm in diameter, (dilated >2.1cm) with abnormal inspiratory collapse (abnormal <50%) consistent with elevated right atrial pressure (~15, range 10-20mmHg).   8. Compared to the transthoracic echocardiogram performed on 3/8/2024, the pericardial effusion has increased in size.    < end of copied text >  < from: TTE W or WO Ultrasound Enhancing Agent (04.15.24 @ 08:12) >     CONCLUSIONS:      1. This pericardial effusion is localized posterior and lateral to the left ventricle and around the right atrium.   2. Small to moderate pericardial effusion seen on echo.   3. Left ventricular systolic function is normal with an ejection fraction of 56 % by Marc's method of disks.   4. Severely enlarged right ventricular cavity size and reduced systolic function.   5. Left atrium was not well visualized and LA volume could not be calculated.   6. The right atrium is severely dilated.   7. The inferior vena cava is dilated measuring 3.10 cm in diameter, (dilated >2.1cm) with abnormal inspiratory collapse (abnormal <50%) consistent with elevated right atrial pressure (~15, range 10-20mmHg).   8. Compared to the transthoracic echocardiogram performed on 3/8/2024, the pericardial effusion has increased in size.    < end of copied text >

## 2024-04-17 NOTE — CONSULT NOTE ADULT - ASSESSMENT
63 year old male with Hx of HTN, GERD, dyslipidemia, chronic intermittent leg pain, aortic stenosis, chronic venous stasis dermatitis, recent aortic valve replacement, presents to the ER for chills. patient with recent hospital admission for aortic valve replacement, was hypoxic given 2L at the time. patient was also noted to be bacteriemic, started on cefepime. patient here today for chills. states started a day ago. states chills have been intermittent. states has dyspnea on exertion for the past few months. denies rectal bleeding. denies cough or fevers. denies abdominal pain. denies urinary symptoms (14 Apr 2024 16:31)   to me pt says he h as been OK:  resp wise:  to me he says his breathing is fine and has no underlying lung disease:  he has no asthma or copd:  he never smoked either :  currently on room air     Preop evaluation :   HTN:  GERD  HLD  AORTIC STENOSIS    4/17"  Preop evaluation :   -Pulm wise he seems OK:  no sob at this time:  to me he says he hasnot been sob:   -he never smoked:   -HIs ct chest showed: 1.  No acute thoracoabdominal pathology, 2.  Etiology of abdominal pain/fever not determined , 3.  Moderate pericardial, minimally diminished, 4.  Small LEFT pleural effusion and trace abdominal ascites, unchanged. 5.  Prominent inguinal lymph nodes, unchanged,6.  RIGHT inguinal hernia now contains ascitic fluid,7.  Hepatosplenomegaly, minimally increased  -HIS Abg ON ADM WAS OK : NO SIGN ACIDOSIS:   -Currently with the given situation  he seems to be optimized for the procedure scheduled for tomorrow  HTN:  -Controlled  GERD  -PPI per gi  : on pantpprazole  AORTIC STENOSIS  -recent echo showed 1. This pericardial effusion is localized posterior and lateral to the left ventricle and around the right atrium.   2. Small to moderate pericardial effusion seen on echo.   3. Left ventricular systolic function is normal with an ejection fraction of 56 % by Marc's method of disks.   4. Severely enlarged right ventricular cavity size and reduced systolic function.   5. Left atrium was not well visualized and LA volume could not be calculated.   6. The right atrium is severely dilated.   7. The inferior vena cava is dilated measuring 3.10 cm in diameter, (dilated >2.1cm) with abnormal inspiratory collapse (abnormal <50%) consistent with elevated right atrial pressure (~15, range 10-20mmHg).   8. Compared to the transthoracic echocardiogram performed on 3/8/2024, the pericardial effusion has increased in size.  -he hs enlarged rv cavity and reduced fucntion:  PA pressures ahve not been mentioned  mantain euvolemia - defer to cards    dw team

## 2024-04-17 NOTE — CONSULT NOTE ADULT - REASON FOR ADMISSION
chills and low grade fever
anemia
chills and low grade fever

## 2024-04-17 NOTE — CONSULT NOTE ADULT - SUBJECTIVE AND OBJECTIVE BOX
Patient seen and evaluated at bedside    Reason for consult:    HPI:  63 year old male with Hx of HTN, GERD, dyslipidemia, chronic intermittent leg pain, aortic stenosis, chronic venous stasis dermatitis, recent aortic valve replacement, presents to the ER for chills. patient with recent hospital admission for aortic valve replacement, was hypoxic given 2L at the time. patient was also noted to be bacteriemic, started on cefepime. patient here today for chills. states started a day ago. states chills have been intermittent. states has dyspnea on exertion for the past few months. denies rectal bleeding. denies cough or fevers. denies abdominal pain. denies urinary symptoms (14 Apr 2024 16:31)         Cardiology consulted for preprocedureal risk stratification and/or optimization as well as findings of increasing pericardial effusion.   PMHx:   Dyslipidemia    Hypertension    Chronic GERD    History of aortic stenosis    Cellulitis        PSHx:   No Past Medical History    Brain cancer    No significant past surgical history        Allergies:  garlic (Angioedema; Swelling; Anaphylaxis)  No Known Drug Allergies      Home Meds:    Current Medications:   acetaminophen     Tablet .. 650 milliGRAM(s) Oral every 6 hours PRN  allopurinol 100 milliGRAM(s) Oral daily  aMIOdarone    Tablet 200 milliGRAM(s) Oral daily  buMETAnide 2 milliGRAM(s) Oral two times a day  epoetin loulou (EPOGEN) Injectable 61037 Unit(s) SubCutaneous every 7 days  folic acid 1 milliGRAM(s) Oral daily  hydrALAZINE 75 milliGRAM(s) Oral three times a day  levothyroxine 50 MICROGram(s) Oral daily  pantoprazole    Tablet 40 milliGRAM(s) Oral two times a day  tamsulosin 0.4 milliGRAM(s) Oral at bedtime      FAMILY HISTORY:  FH: congestive heart failure (Father, Sibling)    FH: coronary artery disease (Sibling)    FH: brain cancer (Mother)        Social History:  Smoking History:  Alcohol Use:  Drug Use:    Review of Systems:  REVIEW OF SYSTEMS:  CONSTITUTIONAL: No weakness, fevers or chills  EYES/ENT: No visual changes;  No dysphagia  NECK: No pain or stiffness  RESPIRATORY: No cough, wheezing, hemoptysis; No shortness of breath  CARDIOVASCULAR: No chest pain or palpitations; No lower extremity edema  GASTROINTESTINAL: No abdominal or epigastric pain. No nausea, vomiting, or hematemesis; No diarrhea or constipation. No melena or hematochezia.  BACK: No back pain  GENITOURINARY: No dysuria, frequency or hematuria  NEUROLOGICAL: No numbness or weakness  SKIN: No itching, burning, rashes, or lesions   All other review of systems is negative unless indicated above.    [ ] All other systems negative  [ ] Unable to assess ROS due to    Physical Exam:  T(F): 98.8 (04-17), Max: 98.8 (04-17)  HR: 55 (04-17) (55 - 66)  BP: 157/67 (04-17) (136/72 - 166/82)  RR: 18 (04-17)  SpO2: 98% (04-17)  GENERAL: No acute distress, well-developed  HEAD:  Atraumatic, Normocephalic  ENT: EOMI, PERRLA, conjunctiva and sclera clear, Neck supple, No JVD, moist mucosa  CHEST/LUNG: Clear to auscultation bilaterally; No wheeze, equal breath sounds bilaterally   BACK: No spinal tenderness  HEART: Regular rate and rhythm; No murmurs, rubs, or gallops  ABDOMEN: Soft, Nontender, Nondistended; Bowel sounds present  EXTREMITIES:  No clubbing, cyanosis, or edema  PSYCH: Nl behavior, nl affect  NEUROLOGY: AAOx3, non-focal, cranial nerves intact  SKIN: Normal color, No rashes or lesions  LINES:    Cardiovascular Diagnostic Testing:    CXR: Personally reviewed    Labs: Personally reviewed                        8.2    9.20  )-----------( 227      ( 16 Apr 2024 07:09 )             25.1     04-16    137  |  101  |  24<H>  ----------------------------<  87  3.8   |  25  |  1.43<H>    Ca    8.9      16 Apr 2024 07:13  Mg     1.9     04-16    TPro  6.6  /  Alb  3.0<L>  /  TBili  1.1  /  DBili  x   /  AST  14  /  ALT  12  /  AlkPhos  162<H>  04-16    Thyroid Stimulating Hormone, Serum: 2.11 uIU/mL (04-15 @ 07:12)      Cardiac Studies  TTE 4/15/24:    1. This pericardial effusion is localized posterior and lateral to the left ventricle and around the right atrium.   2. Small to moderate pericardial effusion seen on echo.   3. Left ventricular systolic function is normal with an ejection fraction of 56 % by Marc's method of disks.   4. Severely enlarged right ventricular cavity size and reduced systolic function.   5. Left atrium was not well visualized and LA volume could not be calculated.   6. The right atrium is severely dilated.   7. The inferior vena cava is dilated measuring 3.10 cm in diameter, (dilated >2.1cm) with abnormal inspiratory collapse (abnormal <50%) consistent with elevated right atrial pressure (~15, range 10-20mmHg).   8. Compared to the transthoracic echocardiogram performed on 3/8/2024, the pericardial effusion has increased in size.    ·TTE 3/8/24: . Reduced systolic function. Unable to rule out endocarditis.  · RHC 3/5/24: RA 15, PA 52/23/32, PCWP 23, AO 94%, PA 45%, Hgb 8.8, CO/CI (F) 4.5/2.2, PVR 1.75 davis  ·TTE 2/26/24: LVIDd 6 cm, LVEF 30% (global), mod RVE with mod reduced function (TAPSE 0.9), severe ARAVIND, mild TR, est PASP 25 mmHg, trace pericardial effusion, IVC normal in size   Patient seen and evaluated at bedside    Reason for consult: preprocedure risk stratification  pericardial effusion    HPI:  63 year old male with Hx of HTN, GERD, dyslipidemia, chronic intermittent leg pain, aortic stenosis, chronic venous stasis dermatitis, recent aortic valve replacement, presents to the ER for chills. patient with recent hospital admission for aortic valve replacement, was hypoxic given 2L at the time. patient was also noted to be bacteriemic, started on cefepime. patient here today for chills. states started a day ago. states chills have been intermittent. states has dyspnea on exertion for the past few months. denies rectal bleeding. denies cough or fevers. denies abdominal pain. denies urinary symptoms (14 Apr 2024 16:31)    Cardiology consulted for preprocedureal risk stratification and/or optimization as well as findings of increasing pericardial effusion.     PMHx:   Dyslipidemia    Hypertension    Chronic GERD    History of aortic stenosis    Cellulitis        PSHx:   No Past Medical History    Brain cancer    No significant past surgical history        Allergies:  garlic (Angioedema; Swelling; Anaphylaxis)  No Known Drug Allergies      Home Meds:    Current Medications:   acetaminophen     Tablet .. 650 milliGRAM(s) Oral every 6 hours PRN  allopurinol 100 milliGRAM(s) Oral daily  aMIOdarone    Tablet 200 milliGRAM(s) Oral daily  buMETAnide 2 milliGRAM(s) Oral two times a day  epoetin loulou (EPOGEN) Injectable 71679 Unit(s) SubCutaneous every 7 days  folic acid 1 milliGRAM(s) Oral daily  hydrALAZINE 75 milliGRAM(s) Oral three times a day  levothyroxine 50 MICROGram(s) Oral daily  pantoprazole    Tablet 40 milliGRAM(s) Oral two times a day  tamsulosin 0.4 milliGRAM(s) Oral at bedtime      FAMILY HISTORY:  FH: congestive heart failure (Father, Sibling)    FH: coronary artery disease (Sibling)    FH: brain cancer (Mother)        Social History:  Smoking History:  Alcohol Use:  Drug Use:    Review of Systems:  REVIEW OF SYSTEMS:  CONSTITUTIONAL: No weakness, fevers or chills  EYES/ENT: No visual changes;  No dysphagia  NECK: No pain or stiffness  RESPIRATORY: No cough, wheezing, hemoptysis; No shortness of breath  CARDIOVASCULAR: No chest pain or palpitations; No lower extremity edema  GASTROINTESTINAL: No abdominal or epigastric pain. No nausea, vomiting, or hematemesis; No diarrhea or constipation. No melena or hematochezia.  BACK: No back pain  GENITOURINARY: No dysuria, frequency or hematuria  NEUROLOGICAL: No numbness or weakness  SKIN: No itching, burning, rashes, or lesions   All other review of systems is negative unless indicated above.    [ ] All other systems negative  [ ] Unable to assess ROS due to    Physical Exam:  T(F): 98.8 (04-17), Max: 98.8 (04-17)  HR: 55 (04-17) (55 - 66)  BP: 157/67 (04-17) (136/72 - 166/82)  RR: 18 (04-17)  SpO2: 98% (04-17)  GENERAL: No acute distress, well-developed  HEAD:  Atraumatic, Normocephalic  ENT: EOMI, PERRLA, conjunctiva and sclera clear, Neck supple, No JVD, moist mucosa  CHEST/LUNG: Clear to auscultation bilaterally; No wheeze, equal breath sounds bilaterally   BACK: No spinal tenderness  HEART: Regular rate and rhythm; No murmurs, rubs, or gallops  ABDOMEN: Soft, Nontender, Nondistended; Bowel sounds present  EXTREMITIES:  No clubbing, cyanosis, or edema  PSYCH: Nl behavior, nl affect  NEUROLOGY: AAOx3, non-focal, cranial nerves intact  SKIN: Normal color, No rashes or lesions  LINES:    Cardiovascular Diagnostic Testing:    CXR: Personally reviewed    Labs: Personally reviewed                        8.2    9.20  )-----------( 227      ( 16 Apr 2024 07:09 )             25.1     04-16    137  |  101  |  24<H>  ----------------------------<  87  3.8   |  25  |  1.43<H>    Ca    8.9      16 Apr 2024 07:13  Mg     1.9     04-16    TPro  6.6  /  Alb  3.0<L>  /  TBili  1.1  /  DBili  x   /  AST  14  /  ALT  12  /  AlkPhos  162<H>  04-16    Thyroid Stimulating Hormone, Serum: 2.11 uIU/mL (04-15 @ 07:12)      Cardiac Studies  TTE 4/15/24:    1. This pericardial effusion is localized posterior and lateral to the left ventricle and around the right atrium.   2. Small to moderate pericardial effusion seen on echo.   3. Left ventricular systolic function is normal with an ejection fraction of 56 % by Marc's method of disks.   4. Severely enlarged right ventricular cavity size and reduced systolic function.   5. Left atrium was not well visualized and LA volume could not be calculated.   6. The right atrium is severely dilated.   7. The inferior vena cava is dilated measuring 3.10 cm in diameter, (dilated >2.1cm) with abnormal inspiratory collapse (abnormal <50%) consistent with elevated right atrial pressure (~15, range 10-20mmHg).   8. Compared to the transthoracic echocardiogram performed on 3/8/2024, the pericardial effusion has increased in size.    ·TTE 3/8/24: . Reduced systolic function. Unable to rule out endocarditis.  · RHC 3/5/24: RA 15, PA 52/23/32, PCWP 23, AO 94%, PA 45%, Hgb 8.8, CO/CI (F) 4.5/2.2, PVR 1.75 davis  ·TTE 2/26/24: LVIDd 6 cm, LVEF 30% (global), mod RVE with mod reduced function (TAPSE 0.9), severe ARAVIND, mild TR, est PASP 25 mmHg, trace pericardial effusion, IVC normal in size   Patient seen and evaluated at bedside    Reason for consult: preprocedure risk stratification  pericardial effusion; hx. of AVR    HPI:  63 year old male with Hx of HTN, dyslipidemia, aorrtic stenosis s/p recent surgical aortic valve replacement 2/2024 and chronic venous stasis dermatitis. Now presents to the ER for chills.      Patient with recent hospital admission for aortic valve replacement, was hypoxic and given 2L at the time.  Patient was also noted to be bacteriemic (S. marsescens) and treated with cefepime.  Patient now presents with chills which he states started 1-2 days ago.  Chills have been intermittent.  He also states has dyspnea on exertion for the past few months. He denies rectal bleeding, cough or fevers, abdominal pain & denies urinary symptoms.    Cardiology now consulted for pre-procedureal risk stratification prior to endoscopy as part on evaluation of anemia for optimization, as well as for finding of increasing pericardial effusion on TTE.     PMHx:   Dyslipidemia  Hypertension  Chronic GERD  Aortic stenosis  Cellulitis    PSHx:   SAVR      Allergies:  garlic (Angioedema; Swelling; Anaphylaxis)  No Known Drug Allergies      Current Medications:   acetaminophen     Tablet .. 650 milliGRAM(s) Oral every 6 hours PRN  allopurinol 100 milliGRAM(s) Oral daily  aMIOdarone    Tablet 200 milliGRAM(s) Oral daily  buMETAnide 2 milliGRAM(s) Oral two times a day  epoetin loulou (EPOGEN) Injectable 77031 Unit(s) SubCutaneous every 7 days  folic acid 1 milliGRAM(s) Oral daily  hydrALAZINE 75 milliGRAM(s) Oral three times a day  levothyroxine 50 MICROGram(s) Oral daily  pantoprazole    Tablet 40 milliGRAM(s) Oral two times a day  tamsulosin 0.4 milliGRAM(s) Oral at bedtime      FAMILY HISTORY:  FH: congestive heart failure (Father, Sibling)  FH: coronary artery disease (Sibling)  FH: brain cancer (Mother)      Social History:  Smoking History:  no  Alcohol Use:  no  Drug Use:  no      REVIEW OF SYSTEMS:  CONSTITUTIONAL: No weakness, fevers; +chills  EYES/ENT: No visual changes;  No dysphagia  NECK: No pain or stiffness  RESPIRATORY: No cough, wheezing, hemoptysis; +VANCE  CARDIOVASCULAR: No chest pain or palpitations; No lower extremity edema  GASTROINTESTINAL: No abdominal or epigastric pain. No nausea, vomiting, or hematemesis; No diarrhea or constipation. No melena or hematochezia.  BACK: No back pain  GENITOURINARY: No dysuria, frequency or hematuria  NEUROLOGICAL: No numbness or weakness  SKIN: No itching, burning, rashes, or lesions   All other review of systems is negative unless indicated above.      Physical Exam:  T(F): 98.8 (04-17), Max: 98.8 (04-17)  HR: 55 (04-17) (55 - 66)  BP: 157/67 (04-17) (136/72 - 166/82)  RR: 18 (04-17)  SpO2: 98% (04-17)    GENERAL: No acute distress, well-developed  HEAD:  Atraumatic, Normocephalic  ENT: EOMI, PERRLA, conjunctiva and sclera clear, Neck supple, No JVD, moist mucosa  CHEST/LUNG: Clear to auscultation bilaterally; No wheeze, equal breath sounds bilaterally   BACK: No spinal tenderness  HEART: Regular rate and rhythm; No murmurs, rubs, or gallops  ABDOMEN: Soft, Nontender, Nondistended; Bowel sounds present  EXTREMITIES:  No clubbing, cyanosis, or edema  PSYCH: Nl behavior, nl affect  NEUROLOGY: AAOx3, non-focal, cranial nerves intact  SKIN: Normal color, No rashes or lesions      Labs:                       8.2    9.20  )-----------( 227      ( 16 Apr 2024 07:09 )             25.1     04-16  137  |  101  |  24<H>  ----------------------------<  87  3.8   |  25  |  1.43<H>    Ca    8.9      16 Apr 2024 07:13  Mg     1.9     04-16    TPro  6.6  /  Alb  3.0<L>  /  TBili  1.1  /  DBili  x   /  AST  14  /  ALT  12  /  AlkPhos  162<H>  04-16    Thyroid Stimulating Hormone, Serum: 2.11 uIU/mL (04-15 @ 07:12)      Cardiac Studies      EKG:  SR at 70 bpm; isolated VPCs.  RBBB with associated ST/T abn.      TTE 4/15/24:    1. This pericardial effusion is localized posterior and lateral to the left ventricle and around the right atrium.   2. Small to moderate pericardial effusion seen on echo.   3. Left ventricular systolic function is normal with an ejection fraction of 56 % by Marc's method of disks.   4. Severely enlarged right ventricular cavity size and reduced systolic function.   5. Left atrium was not well visualized and LA volume could not be calculated.   6. The right atrium is severely dilated.   7. The inferior vena cava is dilated measuring 3.10 cm in diameter, (dilated >2.1cm) with abnormal inspiratory collapse (abnormal <50%) consistent with elevated right atrial pressure (~15, range 10-20mmHg).   8. Compared to the transthoracic echocardiogram performed on 3/8/2024, the pericardial effusion has increased in size.    ·TTE 3/8/24: . Reduced systolic function. Unable to rule out endocarditis.  · RHC 3/5/24: RA 15, PA 52/23/32, PCWP 23, AO 94%, PA 45%, Hgb 8.8, CO/CI (F) 4.5/2.2, PVR 1.75 davis  ·TTE 2/26/24: LVIDd 6 cm, LVEF 30% (global), mod RVE with mod reduced function (TAPSE 0.9), severe ARAVIND, mild TR, est PASP 25 mmHg, trace pericardial effusion, IVC normal in size

## 2024-04-17 NOTE — CONSULT NOTE ADULT - ASSESSMENT
63 year old male with Hx of HTN, GERD, dyslipidemia, chronic intermittent leg pain, aortic stenosis s/p AVR 2/2024, chronic venous stasis dermatitis, presents to the ER for chills.     Cardiology consulted for preprocedureal risk stratification and/or optimization as well as findings of increasing pericardial effusion.       #Pericardial Effusion  - continue to monitor with surveillance TTE as outpatient  - pt hemodynamically stable at this time and asymptomatic      #Preoperative Risk Stratification for EGD:    RCRI Score - 15% 30-day risk  Miles score - 0  Patient does achieve >= 4 METS.    EKG this admission showed NSR w PVCs  Telemetry with sinus bradycardia     There are no absolute contraindications to the planned procedure. There is no evidence of ongoing myocardial ischemia, acute decompensated heart failure, or unstable cardiac arrhythmia.    The patient's Revised Cardiac Risk Index estimates a 6.0% 30-day risk of death, MI or cardiac arrest. The patient's Miles Perioperative Risk is 0.2% for 30-day risk of myocardial infarction or cardiac arrest. However, these risk estimates are only validated for surgeries of higher risk and not for low-risk procedures such as EGD.    These risk estimates should be incorporated into a shared decision making conversation between the patient or their surrogate and the performing practitioner regarding the risks, benefits and alternatives to the procedure and whether to proceed. Additional cardiac testing is unlikely to change this risk assessment or procedural outcomes from a cardiac perspective.    Tim Cates MD  Cardiology Fellow PGY-5 63 year old male with Hx of HTN, GERD, dyslipidemia, chronic intermittent leg pain, aortic stenosis s/p AVR 2/2024, chronic venous stasis dermatitis, presents to the ER for chills.     Cardiology consulted for preprocedural risk stratification and/or optimization as well as findings of increasing pericardial effusion.     #Pericardial Effusion: moderate effusion posterior/lateral to LV and near RA, likely postoperative etiology but pt without clinical or echo signs of tamponade  - continue to monitor with surveillance TTE as outpatient; no role for NSAIDs/colchicine given absence of s/s of pericarditis  - pt hemodynamically stable at this time and asymptomatic      #Preoperative Risk Stratification for EGD:    RCRI Score - 15% 30-day risk  Miles score - 0  Patient does achieve >= 4 METS.    EKG this admission showed NSR w PVCs  Telemetry with sinus bradycardia     There are no absolute contraindications to the planned procedure. There is no evidence of ongoing myocardial ischemia, acute decompensated heart failure, or unstable cardiac arrhythmia.    The patient's Revised Cardiac Risk Index estimates a 6.0% 30-day risk of death, MI or cardiac arrest. The patient's Miles Perioperative Risk is 0.2% for 30-day risk of myocardial infarction or cardiac arrest. However, these risk estimates are only validated for surgeries of higher risk and not for low-risk procedures such as EGD.    These risk estimates should be incorporated into a shared decision making conversation between the patient or their surrogate and the performing practitioner regarding the risks, benefits and alternatives to the procedure and whether to proceed. Additional cardiac testing is unlikely to change this risk assessment or procedural outcomes from a cardiac perspective.    Tim Cates MD  Cardiology Fellow PGY-5 63 year old male with Hx of HTN, dyslipidemia, aortic stenosis s/p SAVR 2/2024 and chronic venous stasis dermatitis, presents to the ER for chills.     Cardiology consulted for pre-procedural risk stratification and/or optimization as well as findings of increasing pericardial effusion.       REC:  #1.  Pericardial Effusion: moderate effusion posterior/lateral to LV and near RA, likely postoperative etiology but pt without clinical or echo signs of tamponade  - continue to monitor with surveillance TTE as outpatient; no role for NSAIDs/colchicine given absence of s/s of pericarditis and unexplained anemia in patient with hx. of GERD.  - pt hemodynamically stable at this time and asymptomatic    #2.  Preoperative Risk Stratification for EGD:  RCRI Score - 15% 30-day risk  Miles score - 0  Patient does achieve >= 4 METS.    EKG this admission showed NSR w PVCs  Telemetry with sinus bradycardia     There are no absolute contraindications to the planned procedure. There is no evidence of ongoing myocardial ischemia, acute decompensated heart failure, or unstable cardiac arrhythmia.    The patient's Revised Cardiac Risk Index estimates a 6.0% 30-day risk of death, MI or cardiac arrest. The patient's Miles Perioperative Risk is 0.2% for 30-day risk of myocardial infarction or cardiac arrest. However, these risk estimates are only validated for surgeries of higher risk and not for low-risk procedures such as EGD.    These risk estimates should be incorporated into a shared decision making conversation between the patient or their surrogate and the performing practitioner regarding the risks, benefits and alternatives to the procedure and whether to proceed. Additional cardiac testing is unlikely to change this risk assessment or procedural outcomes from a cardiac perspective.      Tim Cates MD  Cardiology Fellow PGY-5    Plan discussed with cardiology fellow.  Patient seen and examined.  Hx., exam and labs as above.  I agree with the assessment and recommendations, which I have reviewed and edited where appropriate.  Robert Luevano M.D.  Cardiology Attending, Consult Service    For Cardiology consults and questions, all Cardiology service information can be found 24/7 on [email or web address] - use password: [email or web address] to log in.

## 2024-04-17 NOTE — CONSULT NOTE ADULT - CONSULT REASON
preprocedure risk stratification  pericardial effusion preprocedure risk stratification  pericardial effusion; hx. of AVR

## 2024-04-18 ENCOUNTER — RESULT REVIEW (OUTPATIENT)
Age: 64
End: 2024-04-18

## 2024-04-18 LAB
% ALBUMIN: 45.4 % — SIGNIFICANT CHANGE UP
% ALPHA 1: 8.3 % — SIGNIFICANT CHANGE UP
% ALPHA 2: 10.4 % — SIGNIFICANT CHANGE UP
% BETA: 12.9 % — SIGNIFICANT CHANGE UP
% GAMMA: 23 % — SIGNIFICANT CHANGE UP
% M SPIKE: 9.9 % — SIGNIFICANT CHANGE UP
ALBUMIN SERPL ELPH-MCNC: 2.7 G/DL — LOW (ref 3.6–5.5)
ALBUMIN/GLOB SERPL ELPH: 0.8 RATIO — SIGNIFICANT CHANGE UP
ALPHA1 GLOB SERPL ELPH-MCNC: 0.5 G/DL — HIGH (ref 0.1–0.4)
ALPHA2 GLOB SERPL ELPH-MCNC: 0.6 G/DL — SIGNIFICANT CHANGE UP (ref 0.5–1)
ANION GAP SERPL CALC-SCNC: 13 MMOL/L — SIGNIFICANT CHANGE UP (ref 5–17)
B-GLOBULIN SERPL ELPH-MCNC: 0.8 G/DL — SIGNIFICANT CHANGE UP (ref 0.5–1)
BUN SERPL-MCNC: 16 MG/DL — SIGNIFICANT CHANGE UP (ref 7–23)
CALCIUM SERPL-MCNC: 9.2 MG/DL — SIGNIFICANT CHANGE UP (ref 8.4–10.5)
CHLORIDE SERPL-SCNC: 104 MMOL/L — SIGNIFICANT CHANGE UP (ref 96–108)
CO2 SERPL-SCNC: 24 MMOL/L — SIGNIFICANT CHANGE UP (ref 22–31)
CREAT SERPL-MCNC: 1.11 MG/DL — SIGNIFICANT CHANGE UP (ref 0.5–1.3)
EGFR: 75 ML/MIN/1.73M2 — SIGNIFICANT CHANGE UP
GAMMA GLOBULIN: 1.4 G/DL — SIGNIFICANT CHANGE UP (ref 0.6–1.6)
GLUCOSE BLDC GLUCOMTR-MCNC: 96 MG/DL — SIGNIFICANT CHANGE UP (ref 70–99)
GLUCOSE SERPL-MCNC: 80 MG/DL — SIGNIFICANT CHANGE UP (ref 70–99)
HCT VFR BLD CALC: 26.6 % — LOW (ref 39–50)
HGB BLD-MCNC: 8.6 G/DL — LOW (ref 13–17)
INTERPRETATION SERPL IFE-IMP: SIGNIFICANT CHANGE UP
M-SPIKE: 0.6 G/DL — HIGH (ref 0–0)
MCHC RBC-ENTMCNC: 22.9 PG — LOW (ref 27–34)
MCHC RBC-ENTMCNC: 32.3 GM/DL — SIGNIFICANT CHANGE UP (ref 32–36)
MCV RBC AUTO: 70.9 FL — LOW (ref 80–100)
NRBC # BLD: 0 /100 WBCS — SIGNIFICANT CHANGE UP (ref 0–0)
PLATELET # BLD AUTO: 230 K/UL — SIGNIFICANT CHANGE UP (ref 150–400)
POTASSIUM SERPL-MCNC: 3.6 MMOL/L — SIGNIFICANT CHANGE UP (ref 3.5–5.3)
POTASSIUM SERPL-SCNC: 3.6 MMOL/L — SIGNIFICANT CHANGE UP (ref 3.5–5.3)
PROT PATTERN SERPL ELPH-IMP: SIGNIFICANT CHANGE UP
PROT SERPL-MCNC: 6 G/DL — SIGNIFICANT CHANGE UP (ref 6–8.3)
RBC # BLD: 3.75 M/UL — LOW (ref 4.2–5.8)
RBC # FLD: 25.4 % — HIGH (ref 10.3–14.5)
SODIUM SERPL-SCNC: 141 MMOL/L — SIGNIFICANT CHANGE UP (ref 135–145)
WBC # BLD: 7.53 K/UL — SIGNIFICANT CHANGE UP (ref 3.8–10.5)
WBC # FLD AUTO: 7.53 K/UL — SIGNIFICANT CHANGE UP (ref 3.8–10.5)

## 2024-04-18 PROCEDURE — 93321 DOPPLER ECHO F-UP/LMTD STD: CPT | Mod: 26

## 2024-04-18 PROCEDURE — 93308 TTE F-UP OR LMTD: CPT | Mod: 26

## 2024-04-18 PROCEDURE — 88305 TISSUE EXAM BY PATHOLOGIST: CPT | Mod: 26

## 2024-04-18 RX ORDER — POTASSIUM CHLORIDE 20 MEQ
40 PACKET (EA) ORAL ONCE
Refills: 0 | Status: COMPLETED | OUTPATIENT
Start: 2024-04-18 | End: 2024-04-18

## 2024-04-18 RX ADMIN — AMIODARONE HYDROCHLORIDE 200 MILLIGRAM(S): 400 TABLET ORAL at 05:52

## 2024-04-18 RX ADMIN — BUMETANIDE 2 MILLIGRAM(S): 0.25 INJECTION INTRAMUSCULAR; INTRAVENOUS at 14:59

## 2024-04-18 RX ADMIN — Medication 1 MILLIGRAM(S): at 14:58

## 2024-04-18 RX ADMIN — Medication 75 MILLIGRAM(S): at 20:54

## 2024-04-18 RX ADMIN — Medication 75 MILLIGRAM(S): at 14:59

## 2024-04-18 RX ADMIN — BUMETANIDE 2 MILLIGRAM(S): 0.25 INJECTION INTRAMUSCULAR; INTRAVENOUS at 05:56

## 2024-04-18 RX ADMIN — Medication 50 MICROGRAM(S): at 05:52

## 2024-04-18 RX ADMIN — TAMSULOSIN HYDROCHLORIDE 0.4 MILLIGRAM(S): 0.4 CAPSULE ORAL at 20:54

## 2024-04-18 RX ADMIN — PANTOPRAZOLE SODIUM 40 MILLIGRAM(S): 20 TABLET, DELAYED RELEASE ORAL at 17:51

## 2024-04-18 RX ADMIN — PANTOPRAZOLE SODIUM 40 MILLIGRAM(S): 20 TABLET, DELAYED RELEASE ORAL at 05:58

## 2024-04-18 RX ADMIN — Medication 75 MILLIGRAM(S): at 05:53

## 2024-04-18 RX ADMIN — Medication 40 MILLIEQUIVALENT(S): at 14:58

## 2024-04-18 RX ADMIN — Medication 100 MILLIGRAM(S): at 14:59

## 2024-04-18 NOTE — PRE PROCEDURE NOTE - PRE PROCEDURE EVALUATION
Attending Physician:                            Procedure: egd/colon    Indication for Procedure: anemia  ________________________________________________________  PAST MEDICAL & SURGICAL HISTORY:  Dyslipidemia      Hypertension      Chronic GERD      History of aortic stenosis      Cellulitis      No significant past surgical history        ALLERGIES:  garlic (Angioedema; Swelling; Anaphylaxis)  No Known Drug Allergies    HOME MEDICATIONS:  acetaminophen 325 mg oral tablet: 2 tab(s) orally every 6 hours As needed Temp greater or equal to 38C (100.4F), Mild Pain (1 - 3)  alfuzosin 10 mg oral tablet, extended release: 1 tab(s) orally once a day (at bedtime) HOme  bumetanide 2 mg oral tablet: 2 tab(s) orally 3 times a day  hydrALAZINE 50 mg oral tablet: 1.5 tab(s) orally 3 times a day    AICD/PPM: [ ] yes   [ x] no    PERTINENT LAB DATA:                        8.6    7.53  )-----------( 230      ( 18 Apr 2024 05:37 )             26.6     04-18    141  |  104  |  16  ----------------------------<  80  3.6   |  24  |  1.11    Ca    9.2      18 Apr 2024 05:37                  PHYSICAL EXAMINATION:    T(C): 36.4  HR: 51  BP: 170/83  RR: 15  SpO2: 100%    Constitutional: NAD  HEENT: PERRLA, EOMI,    Neck:  No JVD  Respiratory: CTAB/L  Cardiovascular: S1 and S2  Gastrointestinal: BS+, soft, NT/ND  Extremities: No peripheral edema  Neurological: A/O x 3, no focal deficits  Psychiatric: Normal mood, normal affect  Skin: No rashes    ASA Class: I [ ]  II [ ]  III [ ]  IV [ x]    COMMENTS:    The patient is a suitable candidate for the planned procedure unless box checked [ ]  No, explain:

## 2024-04-18 NOTE — CHART NOTE - NSCHARTNOTEFT_GEN_A_CORE
Echo resulted : CONCLUSIONS:      1. There is a large pericardial effusion noted adjacent to the right atrium, a trace pericardial effusion noted adjacent to the anterior right ventricle and a trace pericardial effusion noted adjacent to the posterior left ventricle with no evidence of hemodynamic compromise (or echocardiographic evidence of cardiac tamponade) with a blood pressure of 149 mmHg/70 mmHg and a heart rate of 55 bpm.   2. Compared to the transthoracic echocardiogram performed on 4/15/2024, the pericardial effusion is larger but localized around the right atrium.    Discussed with NP in CTS who called and discussed results with Dr. PENA - Who requested Orthostatics and if negative patient is cleared for discharge home   Discussed results with Armand Zacarias / Dr. Davis . patient to have outpatient follow up , patient denies sob/bp stable / lightheadedness

## 2024-04-18 NOTE — PRE-OP CHECKLIST - HEART RATE (BEATS/MIN)
"EP Pre-procedure History and Physical    Date of service: 6/29/2023    Reason for visit/Chief complaint: Pacemaker failure    HPI:   Pt is a 73 yo. History of Ebstien's and AV block. S/p CRT-D with atrial lead noise and excessive LV thresholds. Here for lead revision and generator change.    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    Physical Exam:  Vitals:    06/29/23 1110 06/29/23 1120   BP:  (!) 162/100   Pulse:  60   Resp:  16   Temp:  36.1 °C (97 °F)   TempSrc:  Temporal   SpO2:  97%   Weight: 77 kg (169 lb 12.1 oz)    Height: 1.867 m (6' 1.5\")      Gen: NAD, conversant  HEENT: PERRL, EOMI  LUNGS: CTA B, no w/r/r  CV: RRR, no m/r/g, no JVD  Abd: Soft, NT/ND, +BS  Ext: no edema, warm and well perfused    Labs reviewed    EKG interpreted by me:  AV paced    Impression/Recs:  1. Failure of pacemaker lead, initial encounter  CL-ICD,PM,BIV LEAD EXTRACTION W/ ANESTHESIA    CL-ICD,PM,BIV LEAD EXTRACTION W/ ANESTHESIA    EC-LARS W/ CONT    EC-LARS W/ CONT        -Risks/benefits/alternatives discussed  -All questions answered  -Proceed with attempt at extracting RA/CS lead and replacement of leads and generator  Xavier Turner MD    "
57

## 2024-04-19 ENCOUNTER — NON-APPOINTMENT (OUTPATIENT)
Age: 64
End: 2024-04-19

## 2024-04-19 ENCOUNTER — TRANSCRIPTION ENCOUNTER (OUTPATIENT)
Age: 64
End: 2024-04-19

## 2024-04-19 VITALS — SYSTOLIC BLOOD PRESSURE: 146 MMHG | DIASTOLIC BLOOD PRESSURE: 72 MMHG

## 2024-04-19 LAB
ANION GAP SERPL CALC-SCNC: 12 MMOL/L — SIGNIFICANT CHANGE UP (ref 5–17)
BUN SERPL-MCNC: 18 MG/DL — SIGNIFICANT CHANGE UP (ref 7–23)
CALCIUM SERPL-MCNC: 9.3 MG/DL — SIGNIFICANT CHANGE UP (ref 8.4–10.5)
CHLORIDE SERPL-SCNC: 100 MMOL/L — SIGNIFICANT CHANGE UP (ref 96–108)
CO2 SERPL-SCNC: 25 MMOL/L — SIGNIFICANT CHANGE UP (ref 22–31)
CREAT SERPL-MCNC: 1.24 MG/DL — SIGNIFICANT CHANGE UP (ref 0.5–1.3)
CULTURE RESULTS: SIGNIFICANT CHANGE UP
CULTURE RESULTS: SIGNIFICANT CHANGE UP
EGFR: 65 ML/MIN/1.73M2 — SIGNIFICANT CHANGE UP
GLUCOSE SERPL-MCNC: 182 MG/DL — HIGH (ref 70–99)
HCT VFR BLD CALC: 27.4 % — LOW (ref 39–50)
HGB BLD-MCNC: 9 G/DL — LOW (ref 13–17)
MAGNESIUM SERPL-MCNC: 1.9 MG/DL — SIGNIFICANT CHANGE UP (ref 1.6–2.6)
MCHC RBC-ENTMCNC: 24.7 PG — LOW (ref 27–34)
MCHC RBC-ENTMCNC: 32.8 GM/DL — SIGNIFICANT CHANGE UP (ref 32–36)
MCV RBC AUTO: 75.1 FL — LOW (ref 80–100)
NRBC # BLD: 0 /100 WBCS — SIGNIFICANT CHANGE UP (ref 0–0)
PHOSPHATE SERPL-MCNC: 3.2 MG/DL — SIGNIFICANT CHANGE UP (ref 2.5–4.5)
PLATELET # BLD AUTO: 261 K/UL — SIGNIFICANT CHANGE UP (ref 150–400)
POTASSIUM SERPL-MCNC: 4.1 MMOL/L — SIGNIFICANT CHANGE UP (ref 3.5–5.3)
POTASSIUM SERPL-SCNC: 4.1 MMOL/L — SIGNIFICANT CHANGE UP (ref 3.5–5.3)
PROT ?TM UR-MCNC: 5 MG/DL — SIGNIFICANT CHANGE UP (ref 0–12)
RBC # BLD: 3.65 M/UL — LOW (ref 4.2–5.8)
RBC # FLD: 27.2 % — HIGH (ref 10.3–14.5)
SODIUM SERPL-SCNC: 137 MMOL/L — SIGNIFICANT CHANGE UP (ref 135–145)
SPECIMEN SOURCE: SIGNIFICANT CHANGE UP
SPECIMEN SOURCE: SIGNIFICANT CHANGE UP
WBC # BLD: 9.1 K/UL — SIGNIFICANT CHANGE UP (ref 3.8–10.5)
WBC # FLD AUTO: 9.1 K/UL — SIGNIFICANT CHANGE UP (ref 3.8–10.5)

## 2024-04-19 PROCEDURE — 86923 COMPATIBILITY TEST ELECTRIC: CPT

## 2024-04-19 PROCEDURE — P9016: CPT

## 2024-04-19 PROCEDURE — 80053 COMPREHEN METABOLIC PANEL: CPT

## 2024-04-19 PROCEDURE — 85014 HEMATOCRIT: CPT

## 2024-04-19 PROCEDURE — 85610 PROTHROMBIN TIME: CPT

## 2024-04-19 PROCEDURE — 83540 ASSAY OF IRON: CPT

## 2024-04-19 PROCEDURE — 82607 VITAMIN B-12: CPT

## 2024-04-19 PROCEDURE — 82330 ASSAY OF CALCIUM: CPT

## 2024-04-19 PROCEDURE — 82962 GLUCOSE BLOOD TEST: CPT

## 2024-04-19 PROCEDURE — 83605 ASSAY OF LACTIC ACID: CPT

## 2024-04-19 PROCEDURE — 87040 BLOOD CULTURE FOR BACTERIA: CPT

## 2024-04-19 PROCEDURE — 85018 HEMOGLOBIN: CPT

## 2024-04-19 PROCEDURE — 36415 COLL VENOUS BLD VENIPUNCTURE: CPT

## 2024-04-19 PROCEDURE — 80048 BASIC METABOLIC PNL TOTAL CA: CPT

## 2024-04-19 PROCEDURE — 84484 ASSAY OF TROPONIN QUANT: CPT

## 2024-04-19 PROCEDURE — 83010 ASSAY OF HAPTOGLOBIN QUANT: CPT

## 2024-04-19 PROCEDURE — 84165 PROTEIN E-PHORESIS SERUM: CPT

## 2024-04-19 PROCEDURE — 93308 TTE F-UP OR LMTD: CPT

## 2024-04-19 PROCEDURE — 84156 ASSAY OF PROTEIN URINE: CPT

## 2024-04-19 PROCEDURE — 83880 ASSAY OF NATRIURETIC PEPTIDE: CPT

## 2024-04-19 PROCEDURE — 82803 BLOOD GASES ANY COMBINATION: CPT

## 2024-04-19 PROCEDURE — 36430 TRANSFUSION BLD/BLD COMPNT: CPT

## 2024-04-19 PROCEDURE — 82746 ASSAY OF FOLIC ACID SERUM: CPT

## 2024-04-19 PROCEDURE — 86850 RBC ANTIBODY SCREEN: CPT

## 2024-04-19 PROCEDURE — 84155 ASSAY OF PROTEIN SERUM: CPT

## 2024-04-19 PROCEDURE — 85025 COMPLETE CBC W/AUTO DIFF WBC: CPT

## 2024-04-19 PROCEDURE — 85730 THROMBOPLASTIN TIME PARTIAL: CPT

## 2024-04-19 PROCEDURE — 84100 ASSAY OF PHOSPHORUS: CPT

## 2024-04-19 PROCEDURE — 86325 OTHER IMMUNOELECTROPHORESIS: CPT

## 2024-04-19 PROCEDURE — 87637 SARSCOV2&INF A&B&RSV AMP PRB: CPT

## 2024-04-19 PROCEDURE — 86900 BLOOD TYPING SEROLOGIC ABO: CPT

## 2024-04-19 PROCEDURE — 71045 X-RAY EXAM CHEST 1 VIEW: CPT

## 2024-04-19 PROCEDURE — 82435 ASSAY OF BLOOD CHLORIDE: CPT

## 2024-04-19 PROCEDURE — 96374 THER/PROPH/DIAG INJ IV PUSH: CPT

## 2024-04-19 PROCEDURE — 88305 TISSUE EXAM BY PATHOLOGIST: CPT

## 2024-04-19 PROCEDURE — 82947 ASSAY GLUCOSE BLOOD QUANT: CPT

## 2024-04-19 PROCEDURE — 99291 CRITICAL CARE FIRST HOUR: CPT | Mod: 25

## 2024-04-19 PROCEDURE — 71250 CT THORAX DX C-: CPT | Mod: MC

## 2024-04-19 PROCEDURE — 84132 ASSAY OF SERUM POTASSIUM: CPT

## 2024-04-19 PROCEDURE — 85027 COMPLETE CBC AUTOMATED: CPT

## 2024-04-19 PROCEDURE — 87086 URINE CULTURE/COLONY COUNT: CPT

## 2024-04-19 PROCEDURE — 74176 CT ABD & PELVIS W/O CONTRAST: CPT | Mod: MC

## 2024-04-19 PROCEDURE — 86901 BLOOD TYPING SEROLOGIC RH(D): CPT

## 2024-04-19 PROCEDURE — 81001 URINALYSIS AUTO W/SCOPE: CPT

## 2024-04-19 PROCEDURE — 84550 ASSAY OF BLOOD/URIC ACID: CPT

## 2024-04-19 PROCEDURE — 86334 IMMUNOFIX E-PHORESIS SERUM: CPT

## 2024-04-19 PROCEDURE — 82728 ASSAY OF FERRITIN: CPT

## 2024-04-19 PROCEDURE — 84443 ASSAY THYROID STIM HORMONE: CPT

## 2024-04-19 PROCEDURE — 93321 DOPPLER ECHO F-UP/LMTD STD: CPT

## 2024-04-19 PROCEDURE — 84295 ASSAY OF SERUM SODIUM: CPT

## 2024-04-19 PROCEDURE — 83550 IRON BINDING TEST: CPT

## 2024-04-19 PROCEDURE — 83735 ASSAY OF MAGNESIUM: CPT

## 2024-04-19 RX ORDER — BUMETANIDE 0.25 MG/ML
1 INJECTION INTRAMUSCULAR; INTRAVENOUS
Qty: 60 | Refills: 0
Start: 2024-04-19 | End: 2024-05-18

## 2024-04-19 RX ORDER — HYDRALAZINE HCL 50 MG
1.5 TABLET ORAL
Refills: 0 | DISCHARGE

## 2024-04-19 RX ORDER — FOLIC ACID 0.8 MG
1 TABLET ORAL
Qty: 30 | Refills: 0
Start: 2024-04-19 | End: 2024-05-18

## 2024-04-19 RX ORDER — HYDRALAZINE HCL 50 MG
3 TABLET ORAL
Qty: 270 | Refills: 0
Start: 2024-04-19 | End: 2024-05-18

## 2024-04-19 RX ORDER — BUMETANIDE 0.25 MG/ML
2 INJECTION INTRAMUSCULAR; INTRAVENOUS
Refills: 0 | DISCHARGE

## 2024-04-19 RX ADMIN — Medication 75 MILLIGRAM(S): at 13:16

## 2024-04-19 RX ADMIN — BUMETANIDE 2 MILLIGRAM(S): 0.25 INJECTION INTRAMUSCULAR; INTRAVENOUS at 05:16

## 2024-04-19 RX ADMIN — Medication 75 MILLIGRAM(S): at 05:16

## 2024-04-19 RX ADMIN — Medication 1 MILLIGRAM(S): at 11:32

## 2024-04-19 RX ADMIN — Medication 100 MILLIGRAM(S): at 11:32

## 2024-04-19 RX ADMIN — AMIODARONE HYDROCHLORIDE 200 MILLIGRAM(S): 400 TABLET ORAL at 05:16

## 2024-04-19 RX ADMIN — BUMETANIDE 2 MILLIGRAM(S): 0.25 INJECTION INTRAMUSCULAR; INTRAVENOUS at 13:16

## 2024-04-19 RX ADMIN — Medication 50 MICROGRAM(S): at 05:16

## 2024-04-19 RX ADMIN — PANTOPRAZOLE SODIUM 40 MILLIGRAM(S): 20 TABLET, DELAYED RELEASE ORAL at 05:16

## 2024-04-19 NOTE — DISCHARGE NOTE NURSING/CASE MANAGEMENT/SOCIAL WORK - NSDCVIVACCINE_GEN_ALL_CORE_FT
Tdap; 27-Jul-2022 20:19; Danielle Mcleod (RN); [employer]; [id number] (Exp. Date: 18-Apr-2024); IntraMuscular; Deltoid Left.; 0.5 milliLiter(s); VIS (VIS Published: 09-May-2013, VIS Presented: 27-Jul-2022);

## 2024-04-19 NOTE — DISCHARGE NOTE PROVIDER - HOSPITAL COURSE
HPI:  63 year old male with Hx of HTN, GERD, dyslipidemia, chronic intermittent leg pain, aortic stenosis, chronic venous stasis dermatitis, recent aortic valve replacement, presents to the ER for chills. patient with recent hospital admission for aortic valve replacement, was hypoxic given 2L at the time. patient was also noted to be bacteriemic, started on cefepime. patient here today for chills. states started a day ago. states chills have been intermittent. states has dyspnea on exertion for the past few months. denies rectal bleeding. denies cough or fevers. denies abdominal pain. denies urinary symptoms (14 Apr 2024 16:31)    Hospital Course: 63 year old male with Hx of HTN, GERD, dyslipidemia, chronic intermittent leg pain, aortic stenosis, chronic venous stasis dermatitis, recent aortic valve replacement, presents to the ER for chills       Problem/Plan - 1:  ·  Problem: Severe anemia.   ·  Plan: EGD/colonoscopy today  continue pantoprazole   hemoglobin stable   patient is medically optimized for EGD/colonoscopy.     Problem/Plan - 2:  ·  Problem: Fever.   ·  Plan: all cultures negative  maintain off antibiotics.     Problem/Plan - 3:  ·  Problem: HTN (hypertension).   ·  Plan: continue home meds with hold parameters  acceptable.     Problem/Plan - 4:  ·  Problem: Stage 3 chronic kidney disease.   ·  Plan: continue to monitor  creatinine at baseline  stable overnight   monitor on diuresis.     Problem/Plan - 5:  ·  Problem: Acute on chronic heart failure with preserved ejection fraction (HFpEF).   ·  Plan: appears to be volume overloaded  continue bumetanide BID  cardiology recommendations appreciated   follow up echocardiogram prior to discharge to look at effusion.     Problem/Plan - 6:  ·  Problem: HLD (hyperlipidemia).   ·  Plan: continue statin.     Problem/Plan - 7:  ·  Problem: S/P AVR.   ·  Plan: echocardiogram noted  repeat limited echocardiogram prior to discharge to ensure stability of effusion  no intervention at this time per cardiology.     Problem/Plan - 8:  ·  Problem: GERD (gastroesophageal reflux disease).   ·  Plan: pantoprazole.        Important Medication Changes and Reason:    Active or Pending Issues Requiring Follow-up:    Advanced Directives:   [ ] Full code  [ ] DNR  [ ] Hospice    Discharge Diagnoses:         63 year old male with Hx of HTN, GERD, dyslipidemia, chronic intermittent leg pain, aortic stenosis, chronic venous stasis dermatitis, recent aortic valve replacement, presents to the ER for chills       Problem/Plan - 1:  ·  Problem: Severe anemia.   ·  Plan: EGD/colonoscopy noted  no evidence of bleeding  s/p polyp removal  continue pantoprazole  on discharge   hemoglobin stable.     Problem/Plan - 2:  ·  Problem: Fever.   ·  Plan: all cultures negative  maintain off antibiotics.     Problem/Plan - 3:  ·  Problem: HTN (hypertension).   ·  Plan: continue home meds with hold parameters  acceptable.     Problem/Plan - 4:  ·  Problem: Stage 3 chronic kidney disease.   ·  Plan: continue to monitor  creatinine at baseline  stable overnight   monitor on diuresis.     Problem/Plan - 5:  ·  Problem: Acute on chronic heart failure with preserved ejection fraction (HFpEF).   ·  Plan: appears to be volume overloaded  continue bumetanide BID  follow up echocardiogram noted.     Problem/Plan - 6:  ·  Problem: HLD (hyperlipidemia).   ·  Plan: continue statin.     Problem/Plan - 7:  ·  Problem: S/P AVR.   ·  Plan: echocardiogram noted  cleared by CTS Dr Martinez for discharge   effusion appears larger but localized  patient asymptomatic and non-orthostatic.     Problem/Plan - 8:  ·  Problem: GERD (gastroesophageal reflux disease).   ·  Plan: pantoprazol

## 2024-04-19 NOTE — DISCHARGE NOTE PROVIDER - CARE PROVIDER_API CALL
[facility]  Internal Medicine  [address] [place] [address]  Phone: [phone]  Fax: [phone]  Follow Up Time: 2 weeks    Pedrito Irving  Nephrology  [address], [address]  [place] [address]  Phone: [phone]  Fax: [phone]  Follow Up Time: 1 week    Jamari Damon  Gastroenterology  [address] [place] [address]  Phone: [phone]  Fax: [phone]  Follow Up Time: 2 weeks    [facility]  [facility]acic [facility]iac [facility]  [address] [place] [address]  Phone: [phone]  Fax: [phone]  Follow Up Time: 1 week    Robert Luevano  Cardiovascular Disease  [address], [address]  [place] [address]  Phone: [phone]  Fax: [phone]  Follow Up Time: Routine

## 2024-04-19 NOTE — PROGRESS NOTE ADULT - PROBLEM SELECTOR PLAN 2
all cultures negative  maintain off antibiotics
unclear etiology  now resolved   ID help appreciated   all cultures negative
all cultures negative  maintain off antibiotics
unclear etiology  now resolved   ID help appreciated   all cultures negative  maintain off antibiotics
unclear etiology  now resolved   discontinue antibiotics   all cultures negative

## 2024-04-19 NOTE — PROGRESS NOTE ADULT - PROBLEM SELECTOR PLAN 1
EGD/colonoscopy today  continue pantoprazole   hemoglobin stable   patient is medically optimized for EGD/colonoscopy
EGD/colonoscopy tomorrow   continue pantoprazole   hemoglobin stable   GI help appreciated  cardiology clearance noted  pulmonary to clear  patient is medically optimized for EGD/colonoscopy
unclear etiology  likely gastritis  continue pantoprazole   s/p 2 units total   GI consultation appreciated   guaiac negative however no cause apparent for recurrent anemia
EGD/colonoscopy noted  no evidence of bleeding  s/p polyp removal  continue pantoprazole  on discharge   hemoglobin stable
unclear etiology  likely gastritis  continue pantoprazole   will transfuse one more unit  GI consultation   ? EGD

## 2024-04-19 NOTE — PROGRESS NOTE ADULT - NSPROGADDITIONALINFOA_GEN_ALL_CORE
I WILL BE AWAY TILL MAY 5TH DR RAMIREZ WILL COVER
discussed with patient in detail, expresses understanding of treatment plans.  discussed with covering ACP
discussed with covering ACP  discussed with GI attending   discussed with patient in detail, expresses understanding of treatment plans.
discussed with patient in detail, expresses understanding of treatment plans.  discussed with covering ACP   close outpatient follow up Dr Martinez

## 2024-04-19 NOTE — PROGRESS NOTE ADULT - PROBLEM SELECTOR PLAN 5
appears to be volume overloaded  continue bumetanide qd  cardiology evaluation appreciated  continue to follow recommendations
appears to be volume overloaded  continue bumetanide qd  cardiology evaluation for worsening pericardial effusion
appears to be volume overloaded  continue bumetanide BID  follow up echocardiogram noted
appears to be volume overloaded  continue bumetanide qd  HFS consultation
appears to be volume overloaded  continue bumetanide BID  cardiology recommendations appreciated   follow up echocardiogram prior to discharge to look at effusion

## 2024-04-19 NOTE — DISCHARGE NOTE PROVIDER - NSFTFHOMEHTHYNRD_GEN_ALL_CORE
Order faxed with confirmation of receipt.
Patient would like for his stress test to be sent to [facility]
Yes

## 2024-04-19 NOTE — PROGRESS NOTE ADULT - PROBLEM SELECTOR PLAN 3
This patient does not have evidence of infective focus  My overall impression is sepsis.  Source: Unknown  Antibiotics given-   Antibiotics (72h ago, onward)    Start     Stop Route Frequency Ordered    07/26/23 0900  ciprofloxacin (CIPRO)400mg/200ml D5W IVPB 400 mg         -- IV Every 12 hours (non-standard times) 07/26/23 0745    07/24/23 2100  mupirocin 2 % ointment         07/29 2059 Nasl 2 times daily 07/24/23 2015        Latest lactate reviewed-  Recent Labs   Lab 07/26/23  0136 07/26/23  0659   LACTATE 2.7* 4.3*     Organ dysfunction indicated by Acute kidney injury and Encephalopathy    Bolus 1 L normal saline.  Start ciprofloxacin  Trend lactic acid  
continue home meds with hold parameters  acceptable

## 2024-04-19 NOTE — PROGRESS NOTE ADULT - ASSESSMENT
pulm htn  pericardia effusion  recurrent anemia      clears today  prep ordered  will plan for egd/colon on thursday  will need clearance  d/w patient  d/w primary    I reviewed the overnight course of events on the unit, re-confirming the patient history. I discussed the care with the patient and their family  The plan of care was discussed with the physician assistant and modifications were made to the notation where appropriate.   Differential diagnosis and plan of care discussed with patient after the evaluation  35 minutes spent on total encounter of which more than fifty percent of the encounter was spent counseling and/or coordinating care by the attending physician.  Advanced care planning was discussed with patient and family.  Advanced care planning forms were reviewed and discussed.  Risks, benefits and alternatives of gastroenterologic procedures were discussed in detail and all questions were answered.  
pulm htn  pericardia effusion  recurrent anemia    reg diet  clears tommorow  will plan for egd/colon on thursday  will need pulm and cardaic clearance  d/w patient  d/w primary    I reviewed the overnight course of events on the unit, re-confirming the patient history. I discussed the care with the patient and their family  The plan of care was discussed with the physician assistant and modifications were made to the notation where appropriate.   Differential diagnosis and plan of care discussed with patient after the evaluation  35 minutes spent on total encounter of which more than fifty percent of the encounter was spent counseling and/or coordinating care by the attending physician.  Advanced care planning was discussed with patient and family.  Advanced care planning forms were reviewed and discussed.  Risks, benefits and alternatives of gastroenterologic procedures were discussed in detail and all questions were answered.  
pulm htn  pericardia effusion  recurrent anemia    sp colonoscopy  no source of anemia identified  3  polyps removed  f/u path  outpatient follow up  repeat colonoscopy in 3 yrs  d/w patient    I reviewed the overnight course of events on the unit, re-confirming the patient history. I discussed the care with the patient and their family  The plan of care was discussed with the physician assistant and modifications were made to the notation where appropriate.   Differential diagnosis and plan of care discussed with patient after the evaluation  35 minutes spent on total encounter of which more than fifty percent of the encounter was spent counseling and/or coordinating care by the attending physician.  Advanced care planning was discussed with patient and family.  Advanced care planning forms were reviewed and discussed.  Risks, benefits and alternatives of gastroenterologic procedures were discussed in detail and all questions were answered.  
62 y/o M with PMH of HTN, GERD, dyslipidemia, chronic intermittent leg pain, aortic stenosis s/p recent AVR, chronic venous stasis, dermatitis. Presents to the ER for chills. Recent admission for AVR, course c/b mild hypoxia and bacteremia. Found to be anemic, Pulmonary called to consult for pre-op eval prior to endoscopy.     Preop evaluation   HTN  GERD  HLD  AS    Preop evaluation - prior to endoscopy   -CT chest with small L effusion, trace ascites.   -ABG acceptable  -Normoxic, no c/o dyspnea  -Appears optimized from a pulm perspective for planned procedure. Now s/p endoscopy 4/18   -D/c planning per primary team.     HTN  -Controlled    GERD  -PPI per GI     AS  -Per cardiology     Pericardial effusion  -Per cards     
63 year old male with Hx of HTN, GERD, dyslipidemia, chronic intermittent leg pain, aortic stenosis, chronic venous stasis dermatitis, recent aortic valve replacement, presents to the ER for chills

## 2024-04-19 NOTE — DISCHARGE NOTE NURSING/CASE MANAGEMENT/SOCIAL WORK - PATIENT PORTAL LINK FT
You can access the FollowMyHealth Patient Portal offered by [facility] by registering at the following website: [email or web address]. By joining [facility]’s FollowMyHealth portal, you will also be able to view your health information using other applications (apps) compatible with our system.

## 2024-04-19 NOTE — PROGRESS NOTE ADULT - NS ATTEND AMEND GEN_ALL_CORE FT
I had seen and examined the pt [prior to dc:  he seemed pretty good: on room air:  no sob:  agree with above:

## 2024-04-19 NOTE — PROGRESS NOTE ADULT - PROBLEM SELECTOR PLAN 4
continue to monitor  creatinine at baseline  stable overnight   monitor on diuresis

## 2024-04-19 NOTE — DISCHARGE NOTE PROVIDER - NSDCFUADDAPPT_GEN_ALL_CORE_FT
APPTS ARE READY TO BE MADE: [x ] YES    Best Family or Patient Contact (if needed):    Additional Information about above appointments (if needed):    1: JEAN  2: gi   3:     Other comments or requests:    APPTS ARE READY TO BE MADE: [x ] YES    Best Family or Patient Contact (if needed):    Additional Information about above appointments (if needed):    1: JEAN  2: gi   3:     Other comments or requests:     Patient advises they do not want to hear from us again; we did not provide any information to the patient. Patient's wife stated she has scheduled all necessary appointments.

## 2024-04-19 NOTE — DISCHARGE NOTE PROVIDER - NSDCCPCAREPLAN_GEN_ALL_CORE_FT
PRINCIPAL DISCHARGE DIAGNOSIS  Diagnosis: Anemia  Assessment and Plan of Treatment: Continue iron supplements  Eat iron and protein rich foods including: meat, dark leafy green vegetables, and beans.  Limit caffeine.  Notify your doctor if you experience heartburn, constipation, diarrhea, nausea/vomiting, dizziness or are feeling extremely tired.  Seek immediate care or call 911 if you experience:  shortness of breath even at rest, you have blood in your bowel movement or vomit, if you are too dizzy to stand up      SECONDARY DISCHARGE DIAGNOSES  Diagnosis: Acute on chronic heart failure with preserved ejection fraction (HFpEF)  Assessment and Plan of Treatment: Weigh yourself daily.  If you gain 3lbs in 3 days, or 5lbs in a week call your Health Care Provider.  Do not eat or drink foods containing more than 2000mg of salt (sodium) in your diet every day.  Call your Health Care Provider if you have any swelling or increased swelling in your feet, ankles, and/or stomach.  Take all of your medication as directed.  If you become dizzy call your Health Care Provider.    Diagnosis: Stage 3 chronic kidney disease  Assessment and Plan of Treatment: Follow up with Renal in 1-2 weeks    Diagnosis: HTN (hypertension)  Assessment and Plan of Treatment: Low salt diet  Activity as tolerated.  Take all medication as prescribed.  Follow up with your medical doctor for routine blood pressure monitoring at your next visit.  Notify your doctor if you have any of the following symptoms:   Dizziness, Lightheadedness, Blurry vision, Headache, Chest pain, Shortness of breath

## 2024-04-19 NOTE — PROGRESS NOTE ADULT - SUBJECTIVE AND OBJECTIVE BOX
[facility]  Ulisses Rothman PA-C  [address] [place] [address]  [phone]      INTERVAL HPI/OVERNIGHT EVENTS:    no new events    MEDICATIONS  (STANDING):  allopurinol 100 milliGRAM(s) Oral daily  aMIOdarone    Tablet 200 milliGRAM(s) Oral daily  epoetin loulou (EPOGEN) Injectable 06748 Unit(s) SubCutaneous every 7 days  folic acid 1 milliGRAM(s) Oral daily  hydrALAZINE 75 milliGRAM(s) Oral three times a day  levothyroxine 50 MICROGram(s) Oral daily  pantoprazole    Tablet 40 milliGRAM(s) Oral two times a day  tamsulosin 0.4 milliGRAM(s) Oral at bedtime    MEDICATIONS  (PRN):  acetaminophen     Tablet .. 650 milliGRAM(s) Oral every 6 hours PRN Temp greater or equal to 38C (100.4F), Mild Pain (1 - 3)      Allergies    garlic (Angioedema; Swelling; Anaphylaxis)  No Known Drug Allergies    Intolerances        ROS:   General:  No  fevers, chills, night sweats, fatigue,   Eyes:  Good vision, no reported pain  ENT:  No sore throat, pain, runny nose, dysphagia  CV:  No pain, palpitations, hypo/hypertension  Resp:  No dyspnea, cough, tachypnea, wheezing  GI:  No pain, No nausea, No vomiting, No diarrhea, No constipation, No weight loss, No fever, No pruritis, No rectal bleeding, No tarry stools, No dysphagia,  :  No pain, bleeding, incontinence, nocturia  Muscle:  No pain, weakness  Neuro:  No weakness, tingling, memory problems  Psych:  No fatigue, insomnia, mood problems, depression  Endocrine:  No polyuria, polydipsia, cold/heat intolerance  Heme:  No petechiae, ecchymosis, easy bruisability  Skin:  No rash, tattoos, scars, edema      PHYSICAL EXAM:   Vital Signs:  Vital Signs Last 24 Hrs  T(C): 36.8 (2024 11:09), Max: 37.1 (15 Apr 2024 20:28)  T(F): 98.2 (2024 11:09), Max: 98.7 (15 Apr 2024 20:28)  HR: 66 (2024 11:09) (60 - 66)  BP: 136/72 (2024 11:09) (136/72 - 151/80)  BP(mean): --  RR: 18 (2024 11:09) (18 - 18)  SpO2: 95% (2024 11:09) (95% - 96%)    Parameters below as of 2024 11:09  Patient On (Oxygen Delivery Method): room air      Daily     Daily Weight in k.8 (2024 08:27)    GENERAL:  Appears stated age,   HEENT:  NC/AT,    CHEST:  Full & symmetric excursion,   HEART:  Regular rhythm,  ABDOMEN:  Soft, non-tender, non-distended,  EXTEREMITIES:  no cyanosis  SKIN:  No rash  NEURO:  Alert,       LABS:                        8.2    9.20  )-----------( 227      ( 2024 07:09 )             25.1     04-16    137  |  101  |  24<H>  ----------------------------<  87  3.8   |  25  |  1.43<H>    Ca    8.9      2024 07:13  Mg     1.9     04-16    TPro  6.6  /  Alb  3.0<L>  /  TBili  1.1  /  DBili  x   /  AST  14  /  ALT  12  /  AlkPhos  162<H>  04-16      Urinalysis Basic - ( 2024 07:13 )    Color: x / Appearance: x / SG: x / pH: x  Gluc: 87 mg/dL / Ketone: x  / Bili: x / Urobili: x   Blood: x / Protein: x / Nitrite: x   Leuk Esterase: x / RBC: x / WBC x   Sq Epi: x / Non Sq Epi: x / Bacteria: x        RADIOLOGY & ADDITIONAL TESTS:  
[facility]  Ulisses Rothman PA-C  [address] [place] [address]  [phone]      INTERVAL HPI/OVERNIGHT EVENTS:    no new events    MEDICATIONS  (STANDING):  allopurinol 100 milliGRAM(s) Oral daily  aMIOdarone    Tablet 200 milliGRAM(s) Oral daily  epoetin loulou (EPOGEN) Injectable 38349 Unit(s) SubCutaneous every 7 days  folic acid 1 milliGRAM(s) Oral daily  hydrALAZINE 75 milliGRAM(s) Oral three times a day  levothyroxine 50 MICROGram(s) Oral daily  pantoprazole    Tablet 40 milliGRAM(s) Oral two times a day  tamsulosin 0.4 milliGRAM(s) Oral at bedtime    MEDICATIONS  (PRN):  acetaminophen     Tablet .. 650 milliGRAM(s) Oral every 6 hours PRN Temp greater or equal to 38C (100.4F), Mild Pain (1 - 3)      Allergies    garlic (Angioedema; Swelling; Anaphylaxis)  No Known Drug Allergies    Intolerances        ROS:   General:  No  fevers, chills, night sweats, fatigue,   Eyes:  Good vision, no reported pain  ENT:  No sore throat, pain, runny nose, dysphagia  CV:  No pain, palpitations, hypo/hypertension  Resp:  No dyspnea, cough, tachypnea, wheezing  GI:  No pain, No nausea, No vomiting, No diarrhea, No constipation, No weight loss, No fever, No pruritis, No rectal bleeding, No tarry stools, No dysphagia,  :  No pain, bleeding, incontinence, nocturia  Muscle:  No pain, weakness  Neuro:  No weakness, tingling, memory problems  Psych:  No fatigue, insomnia, mood problems, depression  Endocrine:  No polyuria, polydipsia, cold/heat intolerance  Heme:  No petechiae, ecchymosis, easy bruisability  Skin:  No rash, tattoos, scars, edema      PHYSICAL EXAM:   Vital Signs:  Vital Signs Last 24 Hrs  T(C): 36.8 (2024 11:09), Max: 37.1 (15 Apr 2024 20:28)  T(F): 98.2 (2024 11:09), Max: 98.7 (15 Apr 2024 20:28)  HR: 66 (2024 11:09) (60 - 66)  BP: 136/72 (2024 11:09) (136/72 - 151/80)  BP(mean): --  RR: 18 (2024 11:09) (18 - 18)  SpO2: 95% (2024 11:09) (95% - 96%)    Parameters below as of 2024 11:09  Patient On (Oxygen Delivery Method): room air      Daily     Daily Weight in k.8 (2024 08:27)    GENERAL:  Appears stated age,   HEENT:  NC/AT,    CHEST:  Full & symmetric excursion,   HEART:  Regular rhythm,  ABDOMEN:  Soft, non-tender, non-distended,  EXTEREMITIES:  no cyanosis  SKIN:  No rash  NEURO:  Alert,       LABS:                        8.2    9.20  )-----------( 227      ( 2024 07:09 )             25.1     04-16    137  |  101  |  24<H>  ----------------------------<  87  3.8   |  25  |  1.43<H>    Ca    8.9      2024 07:13  Mg     1.9     04-16    TPro  6.6  /  Alb  3.0<L>  /  TBili  1.1  /  DBili  x   /  AST  14  /  ALT  12  /  AlkPhos  162<H>  04-16      Urinalysis Basic - ( 2024 07:13 )    Color: x / Appearance: x / SG: x / pH: x  Gluc: 87 mg/dL / Ketone: x  / Bili: x / Urobili: x   Blood: x / Protein: x / Nitrite: x   Leuk Esterase: x / RBC: x / WBC x   Sq Epi: x / Non Sq Epi: x / Bacteria: x        RADIOLOGY & ADDITIONAL TESTS:  
Date of Service: 04-19-24 @ 15:18    Patient is a 63y old  Male who presents with a chief complaint of chills and low grade fever (19 Apr 2024 14:51)    Any change in ROS:   No new respiratory events overnight. Denies SOB/CP.    MEDICATIONS  (STANDING):  allopurinol 100 milliGRAM(s) Oral daily  aMIOdarone    Tablet 200 milliGRAM(s) Oral daily  buMETAnide 2 milliGRAM(s) Oral two times a day  epoetin loulou (EPOGEN) Injectable 19623 Unit(s) SubCutaneous every 7 days  folic acid 1 milliGRAM(s) Oral daily  hydrALAZINE 75 milliGRAM(s) Oral three times a day  levothyroxine 50 MICROGram(s) Oral daily  pantoprazole    Tablet 40 milliGRAM(s) Oral two times a day  tamsulosin 0.4 milliGRAM(s) Oral at bedtime    MEDICATIONS  (PRN):  acetaminophen     Tablet .. 650 milliGRAM(s) Oral every 6 hours PRN Temp greater or equal to 38C (100.4F), Mild Pain (1 - 3)    Vital Signs Last 24 Hrs  T(C): 36.8 (19 Apr 2024 11:01), Max: 36.8 (19 Apr 2024 11:01)  T(F): 98.2 (19 Apr 2024 11:01), Max: 98.2 (19 Apr 2024 11:01)  HR: 60 (19 Apr 2024 11:01) (46 - 77)  BP: 146/72 (19 Apr 2024 13:05) (142/70 - 168/74)  BP(mean): --  RR: 18 (19 Apr 2024 11:01) (18 - 18)  SpO2: 97% (19 Apr 2024 11:01) (96% - 98%)    Parameters below as of 19 Apr 2024 05:00  Patient On (Oxygen Delivery Method): room air        I&O's Summary    18 Apr 2024 07:01  -  19 Apr 2024 07:00  --------------------------------------------------------  IN: 840 mL / OUT: 0 mL / NET: 840 mL    19 Apr 2024 07:01  -  19 Apr 2024 15:18  --------------------------------------------------------  IN: 720 mL / OUT: 0 mL / NET: 720 mL          Physical Exam:   GENERAL: NAD  HEENT: KUSHAL  ENMT: No tonsillar erythema, exudates, or enlargement  NECK: Supple, No JVD  CHEST/LUNG: CTA b/l   CVS: Regular rate and rhythm  GI: : Soft, Nontender, Nondistended  NERVOUS SYSTEM:  Alert & Oriented X3  EXTREMITIES:  2+ Peripheral Pulses, No clubbing, cyanosis, or edema  SKIN: No rashes or lesions  PSYCH: Appropriate    Labs:  28                            9.0    9.10  )-----------( 261      ( 19 Apr 2024 06:40 )             27.4                         8.6    7.53  )-----------( 230      ( 18 Apr 2024 05:37 )             26.6                         8.4    9.35  )-----------( 245      ( 17 Apr 2024 06:26 )             26.3                         8.2    9.20  )-----------( 227      ( 16 Apr 2024 07:09 )             25.1     04-19    137  |  100  |  18  ----------------------------<  182<H>  4.1   |  25  |  1.24  04-18    141  |  104  |  16  ----------------------------<  80  3.6   |  24  |  1.11  04-17    133<L>  |  99  |  22  ----------------------------<  95  3.9   |  26  |  1.15  04-16    137  |  101  |  24<H>  ----------------------------<  87  3.8   |  25  |  1.43<H>    Ca    9.3      19 Apr 2024 06:40  Ca    9.2      18 Apr 2024 05:37  Phos  3.2     04-19  Mg     1.9     04-19    TPro  6.6  /  Alb  3.0<L>  /  TBili  1.1  /  DBili  x   /  AST  14  /  ALT  12  /  AlkPhos  162<H>  04-16  TPro  6.0  /  Alb  2.7<L>  /  TBili  x   /  DBili  x   /  AST  x   /  ALT  x   /  AlkPhos  x   04-16    CAPILLARY BLOOD GLUCOSE              Urinalysis Basic - ( 19 Apr 2024 06:40 )    Color: x / Appearance: x / SG: x / pH: x  Gluc: 182 mg/dL / Ketone: x  / Bili: x / Urobili: x   Blood: x / Protein: x / Nitrite: x   Leuk Esterase: x / RBC: x / WBC x   Sq Epi: x / Non Sq Epi: x / Bacteria: x            RECENT CULTURES:  04-14 @ 14:26 Clean Catch Clean Catch (Midstream)                No growth    04-14 @ 12:00 .Blood Blood                No growth at 4 days    04-14 @ 11:53 .Blood Blood                No growth at 4 days        Studies  cx< from: Xray Chest 1 View AP/PA (04.14.24 @ 12:19) >    ACC: [id number] EXAM:  XR CHEST AP OR PA 1V   ORDERED BY: MICHAEL REYNA     PROCEDURE DATE:  04/14/2024          INTERPRETATION:  EXAMINATION: XR CHEST    CLINICAL INDICATION: Shortness of breath    TECHNIQUE: Frontal portable view of the chest was obtained.    COMPARISON: Chest x-ray 3/8/2024    FINDINGS:  Status post median sternotomy, aortic valve replacement, and left atrial   appendage clip.  The heart is not accurately assessed in this AP projection.  No focal consilidation. Mild pulmonary vascular congestion.  There is no pneumothorax or pleural effusion.  No acute bony abnormality.    IMPRESSION:  Mild pulmonary vascular congestion.  No focal consolidation.    --- End of Report ---    < end of copied text >        < from: CT Chest No Cont (04.14.24 @ 12:49) >  PROCEDURE:  CT of the Chest, Abdomen and Pelvis was performed.  Sagittal and coronal reformats were performed.    LIMITATIONS: Lack of intravenous contrast material limits diagnostic   sensitivity in evaluating solid organs /vasculature.    FINDINGS:  CHEST:  LUNGS AND LARGE AIRWAYS: Patent central airways. No segmental airspace   opacities or suspicious pulmonary lesions. Millimeter-sized RUL/RML   calcified granulomas.  PLEURA: Small LEFT pleural effusion and minimal adjacent reactive change,   unchanged.  VESSELS: Aortic valve replacement  HEART: Cardiomegaly, unchanged. Moderate pericardial effusion, minimally   diminished. LEFT atrial appendage clip. Visualization of intraventricular   septum c/w anemia. Epicardial electrodes  MEDIASTINUM AND KATIA: No lymphadenopathy. Essentially resolved   retrosternal hematoma. Subcentimeter sized RIGHT anterior diaphragmatic   lymph node, unchanged  CHEST WALL AND LOWER NECK: Median sternotomy wires. 5 x 2 cm central   RIGHT upper arm lipoma, unchanged (301/9)    ABDOMEN AND PELVIS:  LIVER: Hepatomegaly (23 cm max sagittal), minimally increased (from 20 cm)  BILE DUCTS: Normal caliber.  GALLBLADDER: Within normal limits.  SPLEEN: Borderline splenomegaly(13.6 cm max AP), enlarged from prior   exams.  PANCREAS: Within normal limits.  ADRENALS: Centimeter sized LEFT adrenal nodule, unchanged  KIDNEYS/URETERS: No renal stones/ obstructive uropathy. 5 cm RIGHT upper   pole renal cyst/cystic lesion and subcentimeter sized LEFT cortical   hyperechogenicities,  unchanged- incompletely characterized on   noncontrast CT and of doubtful significance,    BLADDER: Within normal limits.  REPRODUCTIVE ORGANS: Enlarged prostate    BOWEL: Small hiatus hernia. Underdistended / nonevaluable stomach.  No   bowel obstruction. . Nonvisualized appendix. No appendicitis.  No   diverticulitis  PERITONEUM: Trace perihepatic and pelvic ascites, unchanged  VESSELS: Atherosclerotic changes.  RETROPERITONEUM/LYMPH NODES: Nonspecific prominent retroperitoneal,   bilateral iliac and inguinal lymph nodes are not significantly changed:   Largest LNs : LEFT inguinal (301-256) 2.7 x 1.8; left para-aortic   (301/147) 1.5 x 1.3 cm  ABDOMINAL WALL: Mild subcutaneous edema, diminished. Ventral abdominal   wall skin thickening, unchanged. Bilateral inguinal hernias (R>L) with   ascitic fluid in RIGHT inguinal hernia. Small fat-containing umbilical   hernia. Previous centimeter sized LEFT paramedian thoracoabdominal wall   dermal lesion, diminished (301/18)   BONES: Thoracolumbar spondylosis. Posterior lateral LEFT 7th rib chronic   fracture or postsurgical change.    IMPRESSION:  1.  No acute thoracoabdominal pathology.  2.  Etiology of abdominal pain/fever not determined.  3.  Moderate pericardial, minimally diminished  4.  Small LEFT pleural effusion and trace abdominal ascites, unchanged.  5.  Prominent inguinal lymph nodes, unchanged  6.  RIGHT inguinal hernia now contains ascitic fluid  7.  Hepatosplenomegaly, minimally increased      < end of copied text >    < from: TTE W or WO Ultrasound Enhancing Agent (04.18.24 @ 13:58) >  _______________________________________________________________________________________     CONCLUSIONS:      1. There is a large pericardial effusion noted adjacent to the right atrium, a trace pericardial effusion noted adjacent to the anterior right ventricle and a trace pericardial effusion noted adjacent to the posterior left ventricle with no evidence of hemodynamic compromise (or echocardiographic evidence of cardiac tamponade) with a blood pressure of 149 mmHg/70 mmHg and a heart rate of 55 bpm.   2. Compared to the transthoracic echocardiogram performed on 4/15/2024, the pericardial effusion is larger but localized around the right atrium.    ________________________________________________________________________________________  FINDINGS:     Pericardium:  There is a large pericardial effusion noted adjacent to the right atrium, a trace pericardial effusion noted adjacent to the anterior right ventricle and a trace pericardial effusion noted adjacent to the posterior left ventricle with no evidence of hemodynamic compromise (or echocardiographic evidence of cardiac tamponade) with a blood pressure of 149 mmHg/70 mmHg and a heart rate of 55 bpm. This was supported by evidence of, respiratory variation across the mitral valve E wave is not greater than 30% and no early diastolic inversion of the right ventricle.     Systemic Veins:  The inferior vena cava is dilated measuring 2.70 cm in diameter, (dilated >2.1cm) with abnormal inspiratory collapse (abnormal <50%) consistent with elevated right atrial pressure (~15, range 10-20mmHg).  ____________________________________________________________________  QUANTITATIVE DATA:     Tricuspid Valve Measurements:     RA Pressure: 15 mmHg    ________________________________________________________________________________________  Electronically signed on 4/18/2024 at 4:03:12 PM by Ximena Flaherty         *** Final ***    < end of copied text >      
No distress    Vital Signs Last 24 Hrs  T(C): 36.8 (04-16-24 @ 11:09), Max: 37.1 (04-15-24 @ 20:28)  T(F): 98.2 (04-16-24 @ 11:09), Max: 98.7 (04-15-24 @ 20:28)  HR: 66 (04-16-24 @ 11:09) (60 - 66)  BP: 136/72 (04-16-24 @ 11:09) (136/72 - 151/80)  RR: 18 (04-16-24 @ 11:09) (18 - 18)  SpO2: 95% (04-16-24 @ 11:09) (95% - 96%)    s1s2  b/l air entry  soft, ND  sm edema                         8.2    9.20  )-----------( 227      ( 16 Apr 2024 07:09 )             25.1     16 Apr 2024 07:13    137    |  101    |  24     ----------------------------<  87     3.8     |  25     |  1.43     Ca    8.9        16 Apr 2024 07:13  Mg     1.9       16 Apr 2024 07:13    TPro  6.6    /  Alb  3.0    /  TBili  1.1    /  DBili  x      /  AST  14     /  ALT  12     /  AlkPhos  162    16 Apr 2024 07:13    LIVER FUNCTIONS - ( 16 Apr 2024 07:13 )  Alb: 3.0 g/dL / Pro: 6.6 g/dL / ALK PHOS: 162 U/L / ALT: 12 U/L / AST: 14 U/L / GGT: x           Culture - Urine (collected 14 Apr 2024 14:26)  Source: Clean Catch Clean Catch (Midstream)  Final Report (15 Apr 2024 13:22):    No growth    Culture - Blood (collected 14 Apr 2024 12:00)  Source: .Blood Blood  Preliminary Report (15 Apr 2024 16:01):    No growth at 24 hours    Culture - Blood (collected 14 Apr 2024 11:53)  Source: .Blood Blood  Preliminary Report (15 Apr 2024 16:01):    No growth at 24 hours    acetaminophen     Tablet .. 650 milliGRAM(s) Oral every 6 hours PRN  allopurinol 100 milliGRAM(s) Oral daily  aMIOdarone    Tablet 200 milliGRAM(s) Oral daily  buMETAnide Injectable 2 milliGRAM(s) IV Push daily  epoetin loulou (EPOGEN) Injectable 59420 Unit(s) SubCutaneous every 7 days  hydrALAZINE 75 milliGRAM(s) Oral three times a day  levothyroxine 50 MICROGram(s) Oral daily  pantoprazole    Tablet 40 milliGRAM(s) Oral two times a day  tamsulosin 0.4 milliGRAM(s) Oral at bedtime    A/P:    Hx CM, reduced RV fx, Afib, AS, CHF  S/p AVR, JOANNA clip 2/23/24  S/p Serratia bacteremia (2/29 and 3/1/24), completed Abx  Known CKD 3, presently at baseline  Adm w/chills, anemia  Bld cx - NGTD, Urine cx - no growth  Hx pericardial effusion  S/p pericardial window 3/6/24, clot extracted   Echo, CXR noted   Continue diuresis  F/u BMP, CBC, Mg  Avoid nephrotoxins as possible   No NSAID's  Anemia w/up, Epoetin  GI following     [phone]    
[facility] NEPHROLOGY SERVICES, [facility]  NEPHROLOGY AND HYPERTENSION  [address]  [address]  [place] [address]  [phone]    MD JEAN PARSONS, MD GEOFFREY FREEMAN MD CHRISTOPHER CAPUTO, MD SUMMER SIMPSON MD          Patient events noted  No distress    MEDICATIONS  (STANDING):  allopurinol 100 milliGRAM(s) Oral daily  aMIOdarone    Tablet 200 milliGRAM(s) Oral daily  buMETAnide 2 milliGRAM(s) Oral two times a day  epoetin loulou (EPOGEN) Injectable 25040 Unit(s) SubCutaneous every 7 days  folic acid 1 milliGRAM(s) Oral daily  hydrALAZINE 75 milliGRAM(s) Oral three times a day  levothyroxine 50 MICROGram(s) Oral daily  pantoprazole    Tablet 40 milliGRAM(s) Oral two times a day  polyethylene glycol/electrolyte Solution 1 Liter(s) Oral every 4 hours  tamsulosin 0.4 milliGRAM(s) Oral at bedtime    MEDICATIONS  (PRN):  acetaminophen     Tablet .. 650 milliGRAM(s) Oral every 6 hours PRN Temp greater or equal to 38C (100.4F), Mild Pain (1 - 3)      04-16-24 @ 07:01  -  04-17-24 @ 07:00  --------------------------------------------------------  IN: 840 mL / OUT: 0 mL / NET: 840 mL    04-17-24 @ 07:01  -  04-17-24 @ 21:11  --------------------------------------------------------  IN: 760 mL / OUT: 0 mL / NET: 760 mL      PHYSICAL EXAM:      T(C): 36.7 (04-17-24 @ 17:25), Max: 37.1 (04-17-24 @ 04:49)  HR: 53 (04-17-24 @ 17:25) (53 - 59)  BP: 167/75 (04-17-24 @ 17:25) (141/75 - 176/78)  RR: 18 (04-17-24 @ 17:25) (18 - 19)  SpO2: 98% (04-17-24 @ 17:25) (96% - 98%)  Wt(kg): --  Lungs clear  Heart S1S2  Abd soft NT ND  Extremities:   tr edema                                    8.4    9.35  )-----------( 245      ( 17 Apr 2024 06:26 )             26.3     04-17    133<L>  |  99  |  22  ----------------------------<  95  3.9   |  26  |  1.15    Ca    9.0      17 Apr 2024 06:25  Mg     1.9     04-16    TPro  6.6  /  Alb  3.0<L>  /  TBili  1.1  /  DBili  x   /  AST  14  /  ALT  12  /  AlkPhos  162<H>  04-16      LIVER FUNCTIONS - ( 16 Apr 2024 07:13 )  Alb: 3.0 g/dL / Pro: 6.6 g/dL / ALK PHOS: 162 U/L / ALT: 12 U/L / AST: 14 U/L / GGT: x           Creatinine Trend: 1.15<--, 1.43<--, 1.49<--, 1.52<--, 1.53<--        A/P:    Hx CM, reduced RV fx, Afib, AS, CHF  S/p AVR, JOANNA clip 2/23/24  S/p Serratia bacteremia (2/29 and 3/1/24), completed Abx  Known CKD 3, presently at baseline  Adm w/chills, anemia  Bld cx - NGTD, Urine cx - no growth  Hx pericardial effusion  S/p pericardial window 3/6/24, clot extracted   Echo, CXR noted   Continue diuresis  F/u BMP, CBC, Mg  Avoid nephrotoxins as possible   No NSAID's  Anemia w/up, Epoetin  GI following   Pedrito Irving MD
[facility] NEPHROLOGY SERVICES, [facility]  NEPHROLOGY AND HYPERTENSION  [address]  [address]  [place] [address]  [phone]    MD JEAN PARSONS, MD GEOFFREY FREEMAN MD CHRISTOPHER CAPUTO, MD SUMMER SIMPSON MD          Patient events noted  No distress    MEDICATIONS  (STANDING):  allopurinol 100 milliGRAM(s) Oral daily  aMIOdarone    Tablet 200 milliGRAM(s) Oral daily  buMETAnide 2 milliGRAM(s) Oral two times a day  epoetin loulou (EPOGEN) Injectable 18603 Unit(s) SubCutaneous every 7 days  folic acid 1 milliGRAM(s) Oral daily  hydrALAZINE 75 milliGRAM(s) Oral three times a day  levothyroxine 50 MICROGram(s) Oral daily  pantoprazole    Tablet 40 milliGRAM(s) Oral two times a day  tamsulosin 0.4 milliGRAM(s) Oral at bedtime    MEDICATIONS  (PRN):  acetaminophen     Tablet .. 650 milliGRAM(s) Oral every 6 hours PRN Temp greater or equal to 38C (100.4F), Mild Pain (1 - 3)      04-18-24 @ 07:01  -  04-19-24 @ 07:00  --------------------------------------------------------  IN: 840 mL / OUT: 0 mL / NET: 840 mL    04-19-24 @ 07:01  -  04-19-24 @ 11:44  --------------------------------------------------------  IN: 360 mL / OUT: 0 mL / NET: 360 mL      PHYSICAL EXAM:      T(C): 36.8 (04-19-24 @ 11:01), Max: 36.8 (04-19-24 @ 11:01)  HR: 60 (04-19-24 @ 11:01) (46 - 77)  BP: 148/68 (04-19-24 @ 11:01) (142/70 - 170/83)  RR: 18 (04-19-24 @ 11:01) (15 - 22)  SpO2: 97% (04-19-24 @ 11:01) (96% - 100%)  Wt(kg): --  Lungs clear  Heart S1S2 +KAYLA  Abd soft NT ND  Extremities:   tr edema                                    9.0    9.10  )-----------( 261      ( 19 Apr 2024 06:40 )             27.4     04-19    137  |  100  |  18  ----------------------------<  182<H>  4.1   |  25  |  1.24    Ca    9.3      19 Apr 2024 06:40  Phos  3.2     04-19  Mg     1.9     04-19          Creatinine Trend: 1.24<--, 1.11<--, 1.15<--, 1.43<--, 1.49<--, 1.52<--      A/P:    Hx CM, reduced RV fx, Afib, AS, CHF  S/p AVR, JOANNA clip 2/23/24  S/p Serratia bacteremia (2/29 and 3/1/24), completed Abx  Known CKD 3, presently at baseline  Adm w/chills, anemia  Bld cx - NGTD, Urine cx - no growth  Hx pericardial effusion  S/p pericardial window 3/6/24, clot extracted   Echo, CXR noted   Continue diuresis  F/u BMP, CBC, Mg  Avoid nephrotoxins as possible   No NSAID's  Anemia w/up, Epoetin  GI following   Stable from renal view.   Pedrito Irving MD
[facility]  Ulisses Rothman PA-C  [address] [place] [address]  [phone]      INTERVAL HPI/OVERNIGHT EVENTS:    no new events    MEDICATIONS  (STANDING):  allopurinol 100 milliGRAM(s) Oral daily  aMIOdarone    Tablet 200 milliGRAM(s) Oral daily  epoetin loulou (EPOGEN) Injectable 15791 Unit(s) SubCutaneous every 7 days  folic acid 1 milliGRAM(s) Oral daily  hydrALAZINE 75 milliGRAM(s) Oral three times a day  levothyroxine 50 MICROGram(s) Oral daily  pantoprazole    Tablet 40 milliGRAM(s) Oral two times a day  tamsulosin 0.4 milliGRAM(s) Oral at bedtime    MEDICATIONS  (PRN):  acetaminophen     Tablet .. 650 milliGRAM(s) Oral every 6 hours PRN Temp greater or equal to 38C (100.4F), Mild Pain (1 - 3)      Allergies    garlic (Angioedema; Swelling; Anaphylaxis)  No Known Drug Allergies    Intolerances        ROS:   General:  No  fevers, chills, night sweats, fatigue,   Eyes:  Good vision, no reported pain  ENT:  No sore throat, pain, runny nose, dysphagia  CV:  No pain, palpitations, hypo/hypertension  Resp:  No dyspnea, cough, tachypnea, wheezing  GI:  No pain, No nausea, No vomiting, No diarrhea, No constipation, No weight loss, No fever, No pruritis, No rectal bleeding, No tarry stools, No dysphagia,  :  No pain, bleeding, incontinence, nocturia  Muscle:  No pain, weakness  Neuro:  No weakness, tingling, memory problems  Psych:  No fatigue, insomnia, mood problems, depression  Endocrine:  No polyuria, polydipsia, cold/heat intolerance  Heme:  No petechiae, ecchymosis, easy bruisability  Skin:  No rash, tattoos, scars, edema      PHYSICAL EXAM:   Vital Signs:  Vital Signs Last 24 Hrs  T(C): 36.8 (2024 11:09), Max: 37.1 (15 Apr 2024 20:28)  T(F): 98.2 (2024 11:09), Max: 98.7 (15 Apr 2024 20:28)  HR: 66 (2024 11:09) (60 - 66)  BP: 136/72 (2024 11:09) (136/72 - 151/80)  BP(mean): --  RR: 18 (2024 11:09) (18 - 18)  SpO2: 95% (2024 11:09) (95% - 96%)    Parameters below as of 2024 11:09  Patient On (Oxygen Delivery Method): room air      Daily     Daily Weight in k.8 (2024 08:27)    GENERAL:  Appears stated age,   HEENT:  NC/AT,    CHEST:  Full & symmetric excursion,   HEART:  Regular rhythm,  ABDOMEN:  Soft, non-tender, non-distended,  EXTEREMITIES:  no cyanosis  SKIN:  No rash  NEURO:  Alert,       LABS:                        8.2    9.20  )-----------( 227      ( 2024 07:09 )             25.1     04-16    137  |  101  |  24<H>  ----------------------------<  87  3.8   |  25  |  1.43<H>    Ca    8.9      2024 07:13  Mg     1.9     04-16    TPro  6.6  /  Alb  3.0<L>  /  TBili  1.1  /  DBili  x   /  AST  14  /  ALT  12  /  AlkPhos  162<H>  04-16      Urinalysis Basic - ( 2024 07:13 )    Color: x / Appearance: x / SG: x / pH: x  Gluc: 87 mg/dL / Ketone: x  / Bili: x / Urobili: x   Blood: x / Protein: x / Nitrite: x   Leuk Esterase: x / RBC: x / WBC x   Sq Epi: x / Non Sq Epi: x / Bacteria: x        RADIOLOGY & ADDITIONAL TESTS:  
Patient is a 63y old  Male who presents with a chief complaint of chills and low grade fever (17 Apr 2024 14:29)      DATE OF SERVICE: 04-17-24 @ 16:03    SUBJECTIVE / OVERNIGHT EVENTS: overnight events noted    ROS:  Resp: No cough no sputum production  CVS: No chest pain no palpitations no orthopnea  GI: no N/V/D  'I feel fine'        MEDICATIONS  (STANDING):  allopurinol 100 milliGRAM(s) Oral daily  aMIOdarone    Tablet 200 milliGRAM(s) Oral daily  buMETAnide 2 milliGRAM(s) Oral two times a day  epoetin loulou (EPOGEN) Injectable 21160 Unit(s) SubCutaneous every 7 days  folic acid 1 milliGRAM(s) Oral daily  hydrALAZINE 75 milliGRAM(s) Oral three times a day  lactulose Syrup 20 Gram(s) Oral every 4 hours  levothyroxine 50 MICROGram(s) Oral daily  pantoprazole    Tablet 40 milliGRAM(s) Oral two times a day  polyethylene glycol/electrolyte Solution 1 Liter(s) Oral every 4 hours  tamsulosin 0.4 milliGRAM(s) Oral at bedtime    MEDICATIONS  (PRN):  acetaminophen     Tablet .. 650 milliGRAM(s) Oral every 6 hours PRN Temp greater or equal to 38C (100.4F), Mild Pain (1 - 3)        CAPILLARY BLOOD GLUCOSE        I&O's Summary    16 Apr 2024 07:01  -  17 Apr 2024 07:00  --------------------------------------------------------  IN: 840 mL / OUT: 0 mL / NET: 840 mL        Vital Signs Last 24 Hrs  T(C): 36.6 (17 Apr 2024 12:33), Max: 37.1 (17 Apr 2024 04:49)  T(F): 97.8 (17 Apr 2024 12:33), Max: 98.8 (17 Apr 2024 04:49)  HR: 53 (17 Apr 2024 14:41) (53 - 59)  BP: 176/78 (17 Apr 2024 14:41) (141/75 - 176/78)  BP(mean): --  RR: 18 (17 Apr 2024 12:33) (18 - 19)  SpO2: 97% (17 Apr 2024 12:33) (96% - 98%)    PHYSICAL EXAM:  GENERAL: in no distress  EYES: EOMI, PERRLA  CHEST/LUNG: clear   HEART: S1 S2; systolic murmur +   ABDOMEN: Soft, Nontender,  EXTREMITIES:  no edema  NEUROLOGY: AO x 3 non-focal  SKIN: No rashes or lesions    LABS:                        8.4    9.35  )-----------( 245      ( 17 Apr 2024 06:26 )             26.3     04-17    133<L>  |  99  |  22  ----------------------------<  95  3.9   |  26  |  1.15    Ca    9.0      17 Apr 2024 06:25  Mg     1.9     04-16    TPro  6.6  /  Alb  3.0<L>  /  TBili  1.1  /  DBili  x   /  AST  14  /  ALT  12  /  AlkPhos  162<H>  04-16          Urinalysis Basic - ( 17 Apr 2024 06:25 )    Color: x / Appearance: x / SG: x / pH: x  Gluc: 95 mg/dL / Ketone: x  / Bili: x / Urobili: x   Blood: x / Protein: x / Nitrite: x   Leuk Esterase: x / RBC: x / WBC x   Sq Epi: x / Non Sq Epi: x / Bacteria: x          All consultant(s) notes reviewed and care discussed with other providers        Contact Number, Dr Rosario [phone]
Patient is a 63y old  Male who presents with a chief complaint of chills and low grade fever (19 Apr 2024 14:51)      DATE OF SERVICE: 04-19-24 @ 15:20    SUBJECTIVE / OVERNIGHT EVENTS: overnight events noted    ROS:  Resp: No cough no sputum production  CVS: No chest pain no palpitations no orthopnea  GI: no N/V/D  'I feel fine'  "I want to go home"         MEDICATIONS  (STANDING):  allopurinol 100 milliGRAM(s) Oral daily  aMIOdarone    Tablet 200 milliGRAM(s) Oral daily  buMETAnide 2 milliGRAM(s) Oral two times a day  epoetin loulou (EPOGEN) Injectable 38151 Unit(s) SubCutaneous every 7 days  folic acid 1 milliGRAM(s) Oral daily  hydrALAZINE 75 milliGRAM(s) Oral three times a day  levothyroxine 50 MICROGram(s) Oral daily  pantoprazole    Tablet 40 milliGRAM(s) Oral two times a day  tamsulosin 0.4 milliGRAM(s) Oral at bedtime    MEDICATIONS  (PRN):  acetaminophen     Tablet .. 650 milliGRAM(s) Oral every 6 hours PRN Temp greater or equal to 38C (100.4F), Mild Pain (1 - 3)        CAPILLARY BLOOD GLUCOSE        I&O's Summary    18 Apr 2024 07:01  -  19 Apr 2024 07:00  --------------------------------------------------------  IN: 840 mL / OUT: 0 mL / NET: 840 mL    19 Apr 2024 07:01  -  19 Apr 2024 15:20  --------------------------------------------------------  IN: 720 mL / OUT: 0 mL / NET: 720 mL        Vital Signs Last 24 Hrs  T(C): 36.8 (19 Apr 2024 11:01), Max: 36.8 (19 Apr 2024 11:01)  T(F): 98.2 (19 Apr 2024 11:01), Max: 98.2 (19 Apr 2024 11:01)  HR: 60 (19 Apr 2024 11:01) (46 - 77)  BP: 146/72 (19 Apr 2024 13:05) (142/70 - 168/74)  BP(mean): --  RR: 18 (19 Apr 2024 11:01) (18 - 18)  SpO2: 97% (19 Apr 2024 11:01) (96% - 98%)    PHYSICAL EXAM:  CHEST/LUNG: clear   HEART: S1 S2; systolic murmur +   ABDOMEN: Soft, Nontender,  EXTREMITIES:  no edema  NEUROLOGY: AO x 3 non-focal  SKIN: No rashes or lesions    LABS:                        9.0    9.10  )-----------( 261      ( 19 Apr 2024 06:40 )             27.4     04-19    137  |  100  |  18  ----------------------------<  182<H>  4.1   |  25  |  1.24    Ca    9.3      19 Apr 2024 06:40  Phos  3.2     04-19  Mg     1.9     04-19            Urinalysis Basic - ( 19 Apr 2024 06:40 )    Color: x / Appearance: x / SG: x / pH: x  Gluc: 182 mg/dL / Ketone: x  / Bili: x / Urobili: x   Blood: x / Protein: x / Nitrite: x   Leuk Esterase: x / RBC: x / WBC x   Sq Epi: x / Non Sq Epi: x / Bacteria: x          All consultant(s) notes reviewed and care discussed with other providers        Contact Number, Dr Rosario [phone]
Patient is a 63y old  Male who presents with a chief complaint of chills and low grade fever (17 Apr 2024 21:11)      DATE OF SERVICE: 04-18-24 @ 12:39    SUBJECTIVE / OVERNIGHT EVENTS: overnight events noted    ROS:  Resp: No cough no sputum production  CVS: No chest pain no palpitations no orthopnea  GI: no N/V/D      MEDICATIONS  (STANDING):  allopurinol 100 milliGRAM(s) Oral daily  aMIOdarone    Tablet 200 milliGRAM(s) Oral daily  buMETAnide 2 milliGRAM(s) Oral two times a day  epoetin loulou (EPOGEN) Injectable 24620 Unit(s) SubCutaneous every 7 days  folic acid 1 milliGRAM(s) Oral daily  hydrALAZINE 75 milliGRAM(s) Oral three times a day  levothyroxine 50 MICROGram(s) Oral daily  pantoprazole    Tablet 40 milliGRAM(s) Oral two times a day  potassium chloride    Tablet ER 40 milliEquivalent(s) Oral once  tamsulosin 0.4 milliGRAM(s) Oral at bedtime    MEDICATIONS  (PRN):  acetaminophen     Tablet .. 650 milliGRAM(s) Oral every 6 hours PRN Temp greater or equal to 38C (100.4F), Mild Pain (1 - 3)        CAPILLARY BLOOD GLUCOSE      POCT Blood Glucose.: 96 mg/dL (18 Apr 2024 06:29)    I&O's Summary    17 Apr 2024 07:01  -  18 Apr 2024 07:00  --------------------------------------------------------  IN: 760 mL / OUT: 0 mL / NET: 760 mL        Vital Signs Last 24 Hrs  T(C): 36.4 (18 Apr 2024 12:19), Max: 36.7 (17 Apr 2024 17:25)  T(F): 97.6 (18 Apr 2024 11:45), Max: 98.1 (17 Apr 2024 17:25)  HR: 51 (18 Apr 2024 12:19) (51 - 71)  BP: 170/83 (18 Apr 2024 12:19) (149/70 - 176/78)  BP(mean): --  RR: 15 (18 Apr 2024 12:19) (15 - 18)  SpO2: 100% (18 Apr 2024 12:19) (95% - 100%)      PHYSICAL EXAM:  CHEST/LUNG: clear   HEART: S1 S2; systolic murmur +   ABDOMEN: Soft, Nontender,  EXTREMITIES:  no edema  NEUROLOGY: AO x 3 non-focal  SKIN: No rashes or lesions    LABS:                        8.6    7.53  )-----------( 230      ( 18 Apr 2024 05:37 )             26.6     04-18    141  |  104  |  16  ----------------------------<  80  3.6   |  24  |  1.11    Ca    9.2      18 Apr 2024 05:37            Urinalysis Basic - ( 18 Apr 2024 05:37 )    Color: x / Appearance: x / SG: x / pH: x  Gluc: 80 mg/dL / Ketone: x  / Bili: x / Urobili: x   Blood: x / Protein: x / Nitrite: x   Leuk Esterase: x / RBC: x / WBC x   Sq Epi: x / Non Sq Epi: x / Bacteria: x          All consultant(s) notes reviewed and care discussed with other providers        Contact Number, Dr Rosario [phone]
Patient is a 63y old  Male who presents with a chief complaint of anemia (15 Apr 2024 17:24)      DATE OF SERVICE: 04-16-24 @ 13:42    SUBJECTIVE / OVERNIGHT EVENTS: overnight events noted    ROS:  Resp: No cough no sputum production  CVS: No chest pain no palpitations no orthopnea  GI: no N/V/D        MEDICATIONS  (STANDING):  allopurinol 100 milliGRAM(s) Oral daily  aMIOdarone    Tablet 200 milliGRAM(s) Oral daily  buMETAnide Injectable 2 milliGRAM(s) IV Push daily  epoetin loulou (EPOGEN) Injectable 89216 Unit(s) SubCutaneous every 7 days  hydrALAZINE 75 milliGRAM(s) Oral three times a day  levothyroxine 50 MICROGram(s) Oral daily  pantoprazole    Tablet 40 milliGRAM(s) Oral two times a day  tamsulosin 0.4 milliGRAM(s) Oral at bedtime    MEDICATIONS  (PRN):  acetaminophen     Tablet .. 650 milliGRAM(s) Oral every 6 hours PRN Temp greater or equal to 38C (100.4F), Mild Pain (1 - 3)        CAPILLARY BLOOD GLUCOSE        I&O's Summary    15 Apr 2024 07:01  -  16 Apr 2024 07:00  --------------------------------------------------------  IN: 600 mL / OUT: 200 mL / NET: 400 mL    16 Apr 2024 07:01  -  16 Apr 2024 13:42  --------------------------------------------------------  IN: 240 mL / OUT: 0 mL / NET: 240 mL        Vital Signs Last 24 Hrs  T(C): 36.8 (16 Apr 2024 11:09), Max: 37.1 (15 Apr 2024 20:28)  T(F): 98.2 (16 Apr 2024 11:09), Max: 98.7 (15 Apr 2024 20:28)  HR: 66 (16 Apr 2024 11:09) (60 - 66)  BP: 136/72 (16 Apr 2024 11:09) (136/72 - 151/80)  BP(mean): --  RR: 18 (16 Apr 2024 11:09) (18 - 18)  SpO2: 95% (16 Apr 2024 11:09) (95% - 96%)    PHYSICAL EXAM:  GENERAL: in no distress  EYES: EOMI, PERRLA  CHEST/LUNG: clear   HEART: S1 S2; systolic murmur +   ABDOMEN: Soft, Nontender,  EXTREMITIES:  no edema  NEUROLOGY: AO x 3 non-focal  SKIN: No rashes or lesions    LABS:                        8.2    9.20  )-----------( 227      ( 16 Apr 2024 07:09 )             25.1     04-16    137  |  101  |  24<H>  ----------------------------<  87  3.8   |  25  |  1.43<H>    Ca    8.9      16 Apr 2024 07:13  Mg     1.9     04-16    TPro  6.6  /  Alb  3.0<L>  /  TBili  1.1  /  DBili  x   /  AST  14  /  ALT  12  /  AlkPhos  162<H>  04-16          Urinalysis Basic - ( 16 Apr 2024 07:13 )    Color: x / Appearance: x / SG: x / pH: x  Gluc: 87 mg/dL / Ketone: x  / Bili: x / Urobili: x   Blood: x / Protein: x / Nitrite: x   Leuk Esterase: x / RBC: x / WBC x   Sq Epi: x / Non Sq Epi: x / Bacteria: x          All consultant(s) notes reviewed and care discussed with other providers        Contact Number, Dr Rosario [phone]
Patient is a 63y old  Male who presents with a chief complaint of chills and low grade fever (15 Apr 2024 15:52)      DATE OF SERVICE: 04-15-24 @ 16:36    SUBJECTIVE / OVERNIGHT EVENTS: overnight events noted    ROS:  Resp: No cough no sputum production  CVS: No chest pain no palpitations no orthopnea  GI: no N/V/D  "I feel much better"         MEDICATIONS  (STANDING):  buMETAnide Injectable 2 milliGRAM(s) IV Push daily  cefepime   IVPB 1000 milliGRAM(s) IV Intermittent every 8 hours    MEDICATIONS  (PRN):        CAPILLARY BLOOD GLUCOSE        I&O's Summary      Vital Signs Last 24 Hrs  T(C): 37.2 (15 Apr 2024 13:39), Max: 37.2 (15 Apr 2024 13:39)  T(F): 98.9 (15 Apr 2024 13:39), Max: 98.9 (15 Apr 2024 13:39)  HR: 62 (15 Apr 2024 13:39) (51 - 62)  BP: 146/77 (15 Apr 2024 13:39) (126/76 - 150/66)  BP(mean): --  RR: 18 (15 Apr 2024 13:39) (18 - 18)  SpO2: 95% (15 Apr 2024 13:39) (94% - 98%)    PHYSICAL EXAM:  GENERAL: in no distress  EYES: EOMI, PERRLA  CHEST/LUNG: clear   HEART: S1 S2; systolic murmur +   ABDOMEN: Soft, Nontender,  EXTREMITIES:  no edema  NEUROLOGY: AO x 3 non-focal  SKIN: No rashes or lesions    LABS:                        7.7    7.33  )-----------( 226      ( 15 Apr 2024 07:12 )             23.2     04-15    138  |  101  |  28<H>  ----------------------------<  98  3.3<L>   |  24  |  1.49<H>    Ca    8.8      15 Apr 2024 07:12    TPro  7.1  /  Alb  3.2<L>  /  TBili  1.2  /  DBili  x   /  AST  16  /  ALT  14  /  AlkPhos  172<H>  04-15    PT/INR - ( 14 Apr 2024 12:14 )   PT: 13.2 sec;   INR: 1.27 ratio         PTT - ( 14 Apr 2024 12:14 )  PTT:26.3 sec      Urinalysis Basic - ( 15 Apr 2024 07:12 )    Color: x / Appearance: x / SG: x / pH: x  Gluc: 98 mg/dL / Ketone: x  / Bili: x / Urobili: x   Blood: x / Protein: x / Nitrite: x   Leuk Esterase: x / RBC: x / WBC x   Sq Epi: x / Non Sq Epi: x / Bacteria: x          All consultant(s) notes reviewed and care discussed with other providers        Contact Number, Dr Rosario [phone]

## 2024-04-19 NOTE — PROGRESS NOTE ADULT - PROVIDER SPECIALTY LIST ADULT
Gastroenterology
Nephrology
Nephrology
Gastroenterology
Nephrology
Pulmonology
Gastroenterology
Internal Medicine

## 2024-04-19 NOTE — DISCHARGE NOTE PROVIDER - NSDCFUADDINST_GEN_ALL_CORE_FT
Follow up with Cardiothoracic surgery regarding Echo results in 1 week  Follow up with Cardiothoracic surgery regarding Echo results in 1 week   Follow up with Renal outpatient regarding continuing Epogen

## 2024-04-19 NOTE — DISCHARGE NOTE PROVIDER - PROVIDER TOKENS
PROVIDER:[TOKEN:[[id number]],FOLLOWUP:[2 weeks]],PROVIDER:[TOKEN:[[id number]],FOLLOWUP:[1 week]],PROVIDER:[TOKEN:[[id number]],FOLLOWUP:[2 weeks]],PROVIDER:[TOKEN:[[id number]],FOLLOWUP:[1 week]],PROVIDER:[TOKEN:[[id number]],FOLLOWUP:[Routine]]

## 2024-04-19 NOTE — DISCHARGE NOTE PROVIDER - NSDCFUSCHEDAPPT_GEN_ALL_CORE_FT
Micah Nguyễn  [place] [facility]  CARDIOLOGY [address]  Scheduled Appointment: 06/25/2024    Randolph Finley  [place] [facility]  [facility] [address]  Scheduled Appointment: 07/10/2024     Micah Nguyễn  [facility]  CARDIOLOGY [address]  Scheduled Appointment: 05/07/2024    Micah Nguyễn  [facility]  CARDIOLOGY [address]  Scheduled Appointment: 06/25/2024    Randolph Finley  [address]  Scheduled Appointment: 07/10/2024

## 2024-04-19 NOTE — DISCHARGE NOTE PROVIDER - NSDCMRMEDTOKEN_GEN_ALL_CORE_FT
acetaminophen 325 mg oral tablet: 2 tab(s) orally every 6 hours As needed Temp greater or equal to 38C (100.4F), Mild Pain (1 - 3)  alfuzosin 10 mg oral tablet, extended release: 1 tab(s) orally once a day (at bedtime) HOme  allopurinol 100 mg oral tablet: 1 tab(s) orally once a day  amiodarone 200 mg oral tablet: 1 tab(s) orally once a day  aspirin 81 mg oral delayed release tablet: 1 tab(s) orally once a day  bumetanide 2 mg oral tablet: 2 tab(s) orally 3 times a day  hydrALAZINE 50 mg oral tablet: 1.5 tab(s) orally 3 times a day  levothyroxine 50 mcg (0.05 mg) oral tablet: 1 tab(s) orally once a day  pantoprazole 40 mg oral delayed release tablet: 1 tab(s) orally once a day   acetaminophen 325 mg oral tablet: 2 tab(s) orally every 6 hours As needed Temp greater or equal to 38C (100.4F), Mild Pain (1 - 3)  alfuzosin 10 mg oral tablet, extended release: 1 tab(s) orally once a day (at bedtime) HOme  allopurinol 100 mg oral tablet: 1 tab(s) orally once a day  amiodarone 200 mg oral tablet: 1 tab(s) orally once a day  bumetanide 2 mg oral tablet: 1 tab(s) orally 2 times a day  folic acid 1 mg oral tablet: 1 tab(s) orally once a day  hydrALAZINE 25 mg oral tablet: 3 tab(s) orally 3 times a day  levothyroxine 50 mcg (0.05 mg) oral tablet: 1 tab(s) orally once a day  pantoprazole 40 mg oral delayed release tablet: 1 tab(s) orally once a day

## 2024-04-19 NOTE — PROGRESS NOTE ADULT - PROBLEM SELECTOR PLAN 7
echocardiogram noted  repeat limited echocardiogram prior to discharge to ensure stability of effusion  no intervention at this time per cardiology
echocardiogram noted  increased size of effusion though no tamponade
echocardiogram noted  cleared by CTS Dr Martinez for discharge   effusion appears larger but localized  patient asymptomatic and non-orthostatic
echocardiogram noted  increased size of effusion though no tamponade  no intervention at this time per cardiology
echocardiogram noted  increased size of effusion though no tamponade  CTS to see

## 2024-04-19 NOTE — PROGRESS NOTE ADULT - PROBLEM SELECTOR PROBLEM 5
Acute on chronic heart failure with preserved ejection fraction (HFpEF)

## 2024-04-19 NOTE — DISCHARGE NOTE PROVIDER - CARE PROVIDERS DIRECT ADDRESSES
,DirectAddress_Unknown,[email or web address],DirectAddress_Unknown,DirectAddress_Unknown,[email or web address]

## 2024-04-20 LAB — M PROTEIN 24H UR ELPH-MRATE: SIGNIFICANT CHANGE UP

## 2024-04-22 ENCOUNTER — NON-APPOINTMENT (OUTPATIENT)
Age: 64
End: 2024-04-22

## 2024-04-23 ENCOUNTER — APPOINTMENT (OUTPATIENT)
Dept: FAMILY MEDICINE | Facility: CLINIC | Age: 64
End: 2024-04-23
Payer: COMMERCIAL

## 2024-04-23 ENCOUNTER — LABORATORY RESULT (OUTPATIENT)
Age: 64
End: 2024-04-23

## 2024-04-23 VITALS
BODY MASS INDEX: 25.62 KG/M2 | HEART RATE: 68 BPM | TEMPERATURE: 98 F | WEIGHT: 173 LBS | HEIGHT: 69 IN | DIASTOLIC BLOOD PRESSURE: 82 MMHG | OXYGEN SATURATION: 98 % | RESPIRATION RATE: 16 BRPM | SYSTOLIC BLOOD PRESSURE: 150 MMHG

## 2024-04-23 PROCEDURE — 99496 TRANSJ CARE MGMT HIGH F2F 7D: CPT

## 2024-04-23 RX ORDER — BUMETANIDE 2 MG/1
2 TABLET ORAL
Qty: 180 | Refills: 3 | Status: ACTIVE | COMMUNITY
Start: 2024-03-13

## 2024-04-24 LAB
ALBUMIN SERPL ELPH-MCNC: 3.9 G/DL
ALP BLD-CCNC: 176 U/L
ALT SERPL-CCNC: 16 U/L
ANION GAP SERPL CALC-SCNC: 19 MMOL/L
AST SERPL-CCNC: 20 U/L
BASOPHILS # BLD AUTO: 0 K/UL
BASOPHILS NFR BLD AUTO: 0 %
BILIRUB SERPL-MCNC: 0.8 MG/DL
BUN SERPL-MCNC: 36 MG/DL
CALCIUM SERPL-MCNC: 8.7 MG/DL
CHLORIDE SERPL-SCNC: 101 MMOL/L
CO2 SERPL-SCNC: 22 MMOL/L
CREAT SERPL-MCNC: 1.32 MG/DL
EGFR: 61 ML/MIN/1.73M2
EOSINOPHIL # BLD AUTO: 0.36 K/UL
EOSINOPHIL NFR BLD AUTO: 3.6 %
FERRITIN SERPL-MCNC: 331 NG/ML
FOLATE SERPL-MCNC: 18.4 NG/ML
GLUCOSE SERPL-MCNC: 90 MG/DL
HAPTOGLOB SERPL-MCNC: 86 MG/DL
HCT VFR BLD CALC: 27.8 %
HGB BLD-MCNC: 9.3 G/DL
IRON SATN MFR SERPL: 12 %
IRON SERPL-MCNC: 38 UG/DL
LDH SERPL-CCNC: 308 U/L
LYMPHOCYTES # BLD AUTO: 0.27 K/UL
LYMPHOCYTES NFR BLD AUTO: 2.7 %
MAN DIFF?: NORMAL
MCHC RBC-ENTMCNC: 25.3 PG
MCHC RBC-ENTMCNC: 33.5 GM/DL
MCV RBC AUTO: 75.7 FL
MONOCYTES # BLD AUTO: 0.36 K/UL
MONOCYTES NFR BLD AUTO: 3.6 %
NEUTROPHILS # BLD AUTO: 8.85 K/UL
NEUTROPHILS NFR BLD AUTO: 88.3 %
PLATELET # BLD AUTO: 226 K/UL
POTASSIUM SERPL-SCNC: 3.6 MMOL/L
PROT SERPL-MCNC: 7 G/DL
RBC # BLD: 3.67 M/UL
RBC # BLD: 3.67 M/UL
RBC # FLD: 27.3 %
RETICS # AUTO: 0.7 %
RETICS AGGREG/RBC NFR: 24.6 K/UL
SODIUM SERPL-SCNC: 142 MMOL/L
SURGICAL PATHOLOGY STUDY: SIGNIFICANT CHANGE UP
TIBC SERPL-MCNC: 312 UG/DL
UIBC SERPL-MCNC: 274 UG/DL
VIT B12 SERPL-MCNC: 399 PG/ML
WBC # FLD AUTO: 10.02 K/UL

## 2024-04-26 ENCOUNTER — NON-APPOINTMENT (OUTPATIENT)
Age: 64
End: 2024-04-26

## 2024-04-29 NOTE — ASSESSMENT
[FreeTextEntry1] : Will monitor patient very closely due to recurrent acute anemia.  Reviewed documentation of endoscopy and colonoscopy.  No source of bleeding was noted Will repeat CBC and CMP at visit Will continue current regimen for heart failure Close follow-up in 2 weeks to repeat CBC Any symptoms of lightheadedness, dizziness, shortness of breath, palpitations, abdominal pains, or dark stool-patient informed to report straight to office Consider hematology referral-will add reticulocyte count, LDH, and haptoglobin-will send accordingly

## 2024-04-29 NOTE — PHYSICAL EXAM
[No Acute Distress] : no acute distress [Well Nourished] : well nourished [Well Developed] : well developed [Well-Appearing] : well-appearing [Normal Sclera/Conjunctiva] : normal sclera/conjunctiva [PERRL] : pupils equal round and reactive to light [EOMI] : extraocular movements intact [Normal Outer Ear/Nose] : the outer ears and nose were normal in appearance [Normal Oropharynx] : the oropharynx was normal [No JVD] : no jugular venous distention [No Lymphadenopathy] : no lymphadenopathy [Supple] : supple [Thyroid Normal, No Nodules] : the thyroid was normal and there were no nodules present [No Respiratory Distress] : no respiratory distress  [No Accessory Muscle Use] : no accessory muscle use [Clear to Auscultation] : lungs were clear to auscultation bilaterally [Normal Rate] : normal rate  [Regular Rhythm] : with a regular rhythm [Normal S1, S2] : normal S1 and S2 [No Murmur] : no murmur heard [No Carotid Bruits] : no carotid bruits [No Abdominal Bruit] : a ~M bruit was not heard ~T in the abdomen [No Varicosities] : no varicosities [Pedal Pulses Present] : the pedal pulses are present [No Edema] : there was no peripheral edema [No Palpable Aorta] : no palpable aorta [No Extremity Clubbing/Cyanosis] : no extremity clubbing/cyanosis [Soft] : abdomen soft [Non Tender] : non-tender [Non-distended] : non-distended [No Masses] : no abdominal mass palpated [No HSM] : no HSM [Normal Bowel Sounds] : normal bowel sounds [Normal Posterior Cervical Nodes] : no posterior cervical lymphadenopathy [Normal Anterior Cervical Nodes] : no anterior cervical lymphadenopathy [No CVA Tenderness] : no CVA  tenderness [No Spinal Tenderness] : no spinal tenderness [No Joint Swelling] : no joint swelling [Grossly Normal Strength/Tone] : grossly normal strength/tone [No Rash] : no rash [Coordination Grossly Intact] : coordination grossly intact [No Focal Deficits] : no focal deficits [Normal Gait] : normal gait [Deep Tendon Reflexes (DTR)] : deep tendon reflexes were 2+ and symmetric [Normal Affect] : the affect was normal [Normal Insight/Judgement] : insight and judgment were intact [de-identified] : Significant cardiac murmur noted [de-identified] : Healed venous stasis ulcers [de-identified] : +1 edema bilaterally [31451 - High Complexity requires an extensive number of possible diagnoses and/or the management options, extensive complexity of the medical data (tests, etc.) to be reviewed, and a high risk of significant complications, morbidity, and/or mortality as w] : High Complexity

## 2024-04-29 NOTE — HISTORY OF PRESENT ILLNESS
[Admitted on: ___] : The patient was admitted on [unfilled] [Discharged on ___] : discharged on [unfilled] [Discharge Summary] : discharge summary [Pertinent Labs] : pertinent labs [Radiology Findings] : radiology findings [Discharge Med List] : discharge medication list [Med Reconciliation] : medication reconciliation has been completed [FreeTextEntry2] : Hospital Course: Discharge Date	19-Apr-2024 Admission Date	14-Apr-2024 13:55 Reason for Admission	chills and low grade fever Hospital Course	 63 year old male with Hx of HTN, GERD, dyslipidemia, chronic intermittent leg pain, aortic stenosis, chronic venous stasis dermatitis, recent aortic valve replacement, presents to the ER for chills      Problem/Plan - 1: -  Problem: Severe anemia. -  Plan: EGD/colonoscopy noted no evidence of bleeding s/p polyp removal continue pantoprazole  on discharge hemoglobin stable.    Problem/Plan - 2: -  Problem: Fever. -  Plan: all cultures negative maintain off antibiotics.    Problem/Plan - 3: -  Problem: HTN (hypertension). -  Plan: continue home meds with hold parameters acceptable.    Problem/Plan - 4: -  Problem: Stage 3 chronic kidney disease. -  Plan: continue to monitor creatinine at baseline stable overnight monitor on diuresis.    Problem/Plan - 5: -  Problem: Acute on chronic heart failure with preserved ejection fraction (HFpEF). -  Plan: appears to be volume overloaded continue bumetanide BID follow up echocardiogram noted.    Problem/Plan - 6: -  Problem: HLD (hyperlipidemia). -  Plan: continue statin.    Problem/Plan - 7: -  Problem: S/P AVR. -  Plan: echocardiogram noted cleared by CTS Dr Martinez for discharge effusion appears larger but localized patient asymptomatic and non-orthostatic.    Problem/Plan - 8: -  Problem: GERD (gastroesophageal reflux disease). -  Plan: pantoprazol   Med Reconciliation: Override IMPROVE-DD recommendations due to:	IMPROVE-DD Application Not Available Recommended Post-Discharge VTE Prophylaxis	IMPROVE-DD Application Not Available Medication Reconciliation Status	Admission Reconciliation is Completed Discharge Reconciliation is Completed Discharge Medications	acetaminophen 325 mg oral tablet: 2 tab(s) orally every 6 hours As needed Temp greater or equal to 38C (100.4F), Mild Pain (1 - 3) alfuzosin 10 mg oral tablet, extended release: 1 tab(s) orally once a day (at bedtime) HOme allopurinol 100 mg oral tablet: 1 tab(s) orally once a day amiodarone 200 mg oral tablet: 1 tab(s) orally once a day bumetanide 2 mg oral tablet: 1 tab(s) orally 2 times a day folic acid 1 mg oral tablet: 1 tab(s) orally once a day hydrALAZINE 25 mg oral tablet: 3 tab(s) orally 3 times a day levothyroxine 50 mcg (0.05 mg) oral tablet: 1 tab(s) orally once a day pantoprazole 40 mg oral delayed release tablet: 1 tab(s) orally once a day , , Care Plan/Procedures: Discharge Diagnoses, Assessment and Plan of Treatment	PRINCIPAL DISCHARGE DIAGNOSIS Diagnosis: Anemia Assessment and Plan of Treatment: Continue iron supplements Eat iron and protein rich foods including: meat, dark leafy green vegetables, and beans.  Limit caffeine. Notify your doctor if you experience heartburn, constipation, diarrhea, nausea/vomiting, dizziness or are feeling extremely tired. Seek immediate care or call 911 if you experience: shortness of breath even at rest, you have blood in your bowel movement or vomit, if you are too dizzy to stand up     SECONDARY DISCHARGE DIAGNOSES Diagnosis: Acute on chronic heart failure with preserved ejection fraction (HFpEF) Assessment and Plan of Treatment: Weigh yourself daily. If you gain 3lbs in 3 days, or 5lbs in a week call your Health Care Provider. Do not eat or drink foods containing more than 2000mg of salt (sodium) in your diet every day. Call your Health Care Provider if you have any swelling or increased swelling in your feet, ankles, and/or stomach. Take all of your medication as directed.  If you become dizzy call your Health Care Provider.   Diagnosis: Stage 3 chronic kidney disease Assessment and Plan of Treatment: Follow up with Renal in 1-2 weeks   Diagnosis: HTN (hypertension) Assessment and Plan of Treatment: Low salt diet Activity as tolerated. Take all medication as prescribed. Follow up with your medical doctor for routine blood pressure monitoring at your next visit. Notify your doctor if you have any of the following symptoms: Dizziness, Lightheadedness, Blurry vision, Headache, Chest pain, Shortness of breath Goal(s)	To get better and follow your care plan as instructed. Follow Up: Care Providers for Follow up (PCP/Outpatient Provider)	[facility] [address] [place] [address] Phone: [phone] Fax: [phone] Follow Up Time: 2 weeks   [facility] [address] [facility], [address] [place] [address] Phone: [phone] Fax: [phone] Follow Up Time: 1 week   [facility] [address] [place] [address] Phone: [phone] Fax: [phone] Follow Up Time: 2 weeks   [facility] [address] [place] [address] Phone: [phone] Fax: [phone] Follow Up Time: 1 week   [facility] [address] [address] Phone: [phone] Fax: [phone] Follow Up Time: Routine Additional Provider Info (For SysAdmin Use Only)	 PROVIDER:[TOKEN:[[id number]],FOLLOWUP:[2 weeks]],PROVIDER:[TOKEN:[[id number]],FOLLOWUP:[1 week]],PROVIDER:[TOKEN:[[id number]],FOLLOWUP:[2 weeks]],PROVIDER:[TOKEN:[[id number]],FOLLOWUP:[1 week]],PROVIDER:[TOKEN:[[id number]],FOLLOWUP:[Routine]] Care Providers Direct Addresses (For SYSAdmin Use Only)	 ,DirectAddress_Unknown,[email or web address],DirectAddress_Unknown, DirectAddress_Unknown,[email or web address] NPI number (For SysAdmin Use Only) :	 [[id number]],[[id number]],[[id number]] Patient's Scheduled Appointments	Micah Nguyễn Physician [facility] CARDIOLOG[facility] [address] Scheduled Appointment: 06/25/2024   Randolph Finley Physician [facility] [address] Scheduled Appointment: 07/10/2024 Additional Scheduled Appointments	APPTS ARE READY TO BE MADE: [x ] YES   Best Family or Patient Contact (if needed):   Additional Information about above appointments (if needed):   1: JEAN 2: gi 3:   Other comments or requests: Discharge Diet	Regular Diet - No restrictions, Low Sodium Diet Activity	Activity as tolerated Additional Instructions	Follow up with Cardiothoracic surgery regarding Echo results in 1 week Follow up with Renal outpatient regarding continuing Epogen Quality Measures: Does the patient have difficulty running errands alone like visiting a doctors office or shopping?	No Does the patient have difficulty climbing stairs?	No Cognition: The patient has	No difficulties Conditions at Discharge	cleared for dc by Dr. [facility] Patient	No Core Measure Site	No Does the patient have a principal diagnosis of ischemic stroke, hemorrhagic stroke, or TIA?	No Does the patient have a principal diagnosis of Acute Myocardial Infarction?	No Has the patient had a Percutaneous Coronary Intervention?	No Since discharge patient has been feeling well.  Denies any lightheadedness or dizziness.  Continues to have normal bowel movements with no dark stools.  Is following up with heart failure specialist

## 2024-05-07 ENCOUNTER — NON-APPOINTMENT (OUTPATIENT)
Age: 64
End: 2024-05-07

## 2024-05-07 ENCOUNTER — OUTPATIENT (OUTPATIENT)
Dept: OUTPATIENT SERVICES | Facility: HOSPITAL | Age: 64
LOS: 1 days | End: 2024-05-07
Payer: COMMERCIAL

## 2024-05-07 ENCOUNTER — APPOINTMENT (OUTPATIENT)
Dept: CARDIOLOGY | Facility: CLINIC | Age: 64
End: 2024-05-07
Payer: COMMERCIAL

## 2024-05-07 ENCOUNTER — APPOINTMENT (OUTPATIENT)
Dept: RADIOLOGY | Facility: HOSPITAL | Age: 64
End: 2024-05-07
Payer: COMMERCIAL

## 2024-05-07 VITALS
BODY MASS INDEX: 25.92 KG/M2 | HEIGHT: 69 IN | DIASTOLIC BLOOD PRESSURE: 74 MMHG | SYSTOLIC BLOOD PRESSURE: 145 MMHG | HEART RATE: 89 BPM | WEIGHT: 175 LBS | OXYGEN SATURATION: 94 %

## 2024-05-07 DIAGNOSIS — Z79.899 OTHER LONG TERM (CURRENT) DRUG THERAPY: ICD-10-CM

## 2024-05-07 PROCEDURE — 71046 X-RAY EXAM CHEST 2 VIEWS: CPT

## 2024-05-07 PROCEDURE — G2211 COMPLEX E/M VISIT ADD ON: CPT

## 2024-05-07 PROCEDURE — 99214 OFFICE O/P EST MOD 30 MIN: CPT

## 2024-05-07 PROCEDURE — 71046 X-RAY EXAM CHEST 2 VIEWS: CPT | Mod: 26

## 2024-05-07 PROCEDURE — 93000 ELECTROCARDIOGRAM COMPLETE: CPT

## 2024-05-07 NOTE — DISCUSSION/SUMMARY
[EKG obtained to assist in diagnosis and management of assessed problem(s)] : EKG obtained to assist in diagnosis and management of assessed problem(s) [FreeTextEntry1] : The quality of the pain is localized to the anterior chest. The severity is mild to moderate. The  duration and  the timing  is short lived in  the context of progressive weight gain. Modifying factors include position. Associated signs and symptoms include progressive weight gain of 40 pounds.   I have asked GRACE                      to undergo detailed cardiac testing in order to evaluate  his  overall cardiovascular risk.  An assessment of both structural and functional heart disease was recommended to the patient. In this regard, an echocardiogram and a stress test were advised to the patient.  I await the upcoming noninvasive cardiac testing in order to assess her overall cardiovascular risk.  We discussed the pros and cons of plain treadmill stress testing nuclear stress testing and angiography including a sensitivity analysis.  We discussed current ACC guidelines and the calculated 10 year risk is approximately       This represents a moderate amount of medical decision making based upon two or more more chronic illnesses  and the recommendation for a high risk medication  a   which require ongoing monitoring and evaluation for tolerance efficacy and compliance and the recommendation for a physiological test under stress, a stress tst   Without evidence or recent infarction overt heart failure or unstable angina and based upon a satisfactory stress test  may undergo planned which constitutes low risk surgery in a patient with intermediate cardiac risk at an ASA class II or III anesthesia risk  This represents a high amount of medical decision making based upon two or more more chronic illnesses  exacerbation of dyspnea which is of unclear etiology and the recommendation for a high risk medication  a  statin  which requires ongoing monitoring and evaluation for tolerance efficacy and compliance and the recommendation for an invasive imaging technique with dye a cardiac catheterization  We have reviewed the current ACC guidelines and using the pooled cohort equation his cardiac risk over the near term 10 years is     More than half of the face to face encounter of    minutes was spent in counseling the patient with respect to    Quality measures  Tobacco intervention  Statin for prevention of cardiovascular disease  Hypertension  Aspirin for ischemic vascular disease Tobacco screening cessation and intervention  number and complexity of problems addressed amount and/or complexity of data to be reviewed and analyzed risk   Medical necessity This is a high encounter based upon two or more chronic illnesses with mild exacerbation requiring further management and evaluation.   .  EKG is indicated for evaluation of  this patient has a high risk for major adverese cardiac events.  risks benefits alternatives were discussed with the patient. all questions were answered to His satisfaction.

## 2024-05-09 NOTE — PROGRESS NOTE ADULT - PROBLEM SELECTOR PLAN 2
Inpatient consult to Cardiology  Consult performed by: Lanny Marcial FNP  Consult ordered by: Azalea Earl AGACNP-BC  Reason for consult: New Onsef CHF        [facility]    Cardiology  Consult Note    Patient Name: Luz Maria Alfaro  MRN: [id number]  Admission Date: 5/8/2024  Hospital Length of Stay: 0 days  Code Status: Full Code   Attending Provider: Mckayla Cary MD   Consulting Provider: ALEKS Claudio  Primary Care Physician: Robyn Matthews NP  Principal Problem:<principal problem not specified>    Patient information was obtained from patient, past medical records, ER records, and primary team.     Subjective:     Chief Complaint/Reason for Consult: New Onset CHF    HPI: Ms. Alfaro is a 67 y/o female with a history of HTN, HLD, VHD, COPD, DM II, who was known to Dr. Huynh (Last seen in 2022). She presented to the ER on 5.8.24 with complaints of shortness of breath. EMS reports initial oxygen saturation was 70% on RA. She was given supplemental oxygen and Solumedrol with improvement of oxygenation. Significant labs include H&H 11.0/35.0, , K 3.4, Glucose 133. Mag 1.8, , Albumin 3.1, BNP 1,045.1. Troponin negative x2. Flu, RSV, and COVID negative. CXR pending. EKG shows sinus rhythm with septal infarct (old). [facility] has been consulted for suspected new onset heart failure.          PMH: HTN, HLD, VHD, COPD, DM II  PSH: Breast Augmentation, Appendectomy, Back Surgery, Hip Surgery, IM Femur Rudy  Family History: Sister - MI, CAD   Social History: Current Smoker. Denies illicit drug use and alcohol use.     Previous Cardiac Diagnostics:   Venous US of Left Lower Leg (5.8.24):  There was no evidence of deep or superficial vein thrombosis in the left lower extremity     ECHO (2.14.22):  The study quality is average. The left ventricle is normal in size. Global left ventricular systolic function is hyperdynamic. The left ventricular ejection fraction is  70%. The left ventricle diastolic function is impaired (Grade II) with an elevated left atrial pressure. Volume index (42ml/m2) is consistent with moderate LA enlargement. Mild mitral stenosis is noted with a mean trans mitral gradient of 4.7 mmHg. The area by pressure half time is 2.37 cm². Moderate (2+) aortic regurgitation. Mild aortic valve stenosis is present. An aortic valve area by continuity equation of 1.6 cm² is obtained with a trans-aortic mean gradient of 9.1 mmHg. Moderate calcification and diffuse thickening of the aortic valve cusps is noted with reduced cuspal excursion. Breast implants are noted.      Review of patient's allergies indicates:   Allergen Reactions    Iodinated contrast media Shortness Of Breath    Opioids - morphine analogues     Meperidine Rash and Hives     No current facility-administered medications on file prior to encounter.     Current Outpatient Medications on File Prior to Encounter   Medication Sig    albuterol (PROVENTIL/VENTOLIN HFA) 90 mcg/actuation inhaler Inhale 1 puff into the lungs every 4 (four) hours as needed for Shortness of Breath.    amLODIPine (NORVASC) 5 MG tablet Take 1 tablet (5 mg total) by mouth once daily.    busPIRone (BUSPAR) 5 MG Tab Take 1 tablet (5 mg total) by mouth 2 (two) times daily.    carvediloL (COREG) 6.25 MG tablet Take 1 tablet (6.25 mg total) by mouth 2 (two) times daily.    EScitalopram oxalate (LEXAPRO) 5 MG Tab Take 1 tablet (5 mg total) by mouth once daily.    gabapentin (NEURONTIN) 300 MG capsule Take 300 mg by mouth 3 (three) times daily.    hyoscyamine (LEVSIN) 0.125 mg Subl Place 0.125 mg under the tongue 3 (three) times daily as needed (for abdominal cramping).    pantoprazole (PROTONIX) 40 MG tablet Take 40 mg by mouth 2 (two) times daily.    sodium bicarbonate 650 MG tablet Take 1 tablet (650 mg total) by mouth once daily.    ARIPiprazole (ABILIFY) 2 MG Tab Take 1 tablet (2 mg total) by mouth once daily.    atorvastatin  (LIPITOR) 10 MG tablet Take 10 mg by mouth once daily.    folic acid (FOLVITE) 1 MG tablet Take 1 mg by mouth once daily.    HYDROcodone-acetaminophen (NORCO) 5-325 mg per tablet Take 1 tablet by mouth 2 (two) times daily as needed.    ondansetron (ZOFRAN-ODT) 4 MG TbDL DISSOLVE ONE TABLET under the TONGUE THREE TIMES DAILY AS NEEDED FOR NAUSEA    rifAXIMin (XIFAXAN) 550 mg Tab Take 1 tablet (550 mg total) by mouth 2 (two) times daily.    thiamine 100 MG tablet Take 1 tablet (100 mg total) by mouth once daily.     Review of Systems   Respiratory:  Positive for shortness of breath. Negative for chest tightness.    Cardiovascular:  Negative for chest pain, palpitations and leg swelling.   All other systems reviewed and are negative.      Objective:     Vital Signs (Most Recent):  Temp: 98.3 °F (36.8 °C) (05/09/24 0445)  Pulse: 85 (05/09/24 0445)  Resp: 18 (05/09/24 0445)  BP: (!) 167/83 (05/09/24 0445)  SpO2: 95 % (05/09/24 0445) Vital Signs (24h Range):  Temp:  [97.5 °F (36.4 °C)-98.3 °F (36.8 °C)] 98.3 °F (36.8 °C)  Pulse:  [72-88] 85  Resp:  [14-24] 18  SpO2:  [79 %-99 %] 95 %  BP: (135-173)/(68-91) 167/83   Weight: 56.3 kg (124 lb 1.6 oz)  Body mass index is 21.98 kg/m².  SpO2: 95 %       Intake/Output Summary (Last 24 hours) at 5/9/2024 0804  Last data filed at 5/9/2024 0700  Gross per 24 hour   Intake --   Output 1900 ml   Net -1900 ml     Lines/Drains/Airways       Peripheral Intravenous Line  Duration                  Peripheral IV - Single Lumen 05/08/24 2300 22 G Left;Posterior Hand <1 day         Peripheral IV - Single Lumen 05/08/24 2357 20 G Anterior;Right Forearm <1 day                  Significant Labs:  Recent Results (from the past 72 hour(s))   Comprehensive metabolic panel    Collection Time: 05/08/24 11:48 PM   Result Value Ref Range    Sodium 133 (L) 136 - 145 mmol/L    Potassium 3.4 (L) 3.5 - 5.1 mmol/L    Chloride 98 98 - 107 mmol/L    CO2 26 23 - 31 mmol/L    Glucose 96 82 - 115 mg/dL    Blood  Urea Nitrogen 7.4 (L) 9.8 - 20.1 mg/dL    Creatinine 0.64 0.55 - 1.02 mg/dL    Calcium 9.0 8.4 - 10.2 mg/dL    Protein Total 6.4 5.8 - 7.6 gm/dL    Albumin 3.2 (L) 3.4 - 4.8 g/dL    Globulin 3.2 2.4 - 3.5 gm/dL    Albumin/Globulin Ratio 1.0 (L) 1.1 - 2.0 ratio    Bilirubin Total 0.3 <=1.5 mg/dL     (H) 40 - 150 unit/L    ALT 5 0 - 55 unit/L    AST 13 5 - 34 unit/L    eGFR >60 mL/min/1.73/m2   Troponin I    Collection Time: 05/08/24 11:48 PM   Result Value Ref Range    Troponin-I <0.010 0.000 - 0.045 ng/mL   Brain natriuretic peptide    Collection Time: 05/08/24 11:48 PM   Result Value Ref Range    Natriuretic Peptide 1,045.1 (H) <=100.0 pg/mL   CBC with Differential    Collection Time: 05/08/24 11:48 PM   Result Value Ref Range    WBC 8.24 4.50 - 11.50 x10(3)/mcL    RBC 3.06 (L) 4.20 - 5.40 x10(6)/mcL    Hgb 11.0 (L) 12.0 - 16.0 g/dL    Hct 35.0 (L) 37.0 - 47.0 %    .4 (H) 80.0 - 94.0 fL    MCH 35.9 (H) 27.0 - 31.0 pg    MCHC 31.4 (L) 33.0 - 36.0 g/dL    RDW 18.3 (H) 11.5 - 17.0 %    Platelet 383 130 - 400 x10(3)/mcL    MPV 8.9 7.4 - 10.4 fL    Neut % 62.8 %    Lymph % 26.8 %    Mono % 9.0 %    Eos % 0.7 %    Basophil % 0.2 %    Lymph # 2.21 0.6 - 4.6 x10(3)/mcL    Neut # 5.17 2.1 - 9.2 x10(3)/mcL    Mono # 0.74 0.1 - 1.3 x10(3)/mcL    Eos # 0.06 0 - 0.9 x10(3)/mcL    Baso # 0.02 <=0.2 x10(3)/mcL    IG# 0.04 0 - 0.04 x10(3)/mcL    IG% 0.5 %    NRBC% 0.0 %   Magnesium    Collection Time: 05/08/24 11:48 PM   Result Value Ref Range    Magnesium Level 1.80 1.60 - 2.60 mg/dL   COVID/RSV/FLU A&B PCR    Collection Time: 05/09/24  1:58 AM   Result Value Ref Range    Influenza A PCR Not Detected Not Detected    Influenza B PCR Not Detected Not Detected    Respiratory Syncytial Virus PCR Not Detected Not Detected    SARS-CoV-2 PCR Not Detected Not Detected, Negative   CBC with Differential    Collection Time: 05/09/24  5:16 AM   Result Value Ref Range    WBC 6.29 4.50 - 11.50 x10(3)/mcL    RBC 3.29 (L) 4.20 -  5.40 x10(6)/mcL    Hgb 11.8 (L) 12.0 - 16.0 g/dL    Hct 37.0 37.0 - 47.0 %    .5 (H) 80.0 - 94.0 fL    MCH 35.9 (H) 27.0 - 31.0 pg    MCHC 31.9 (L) 33.0 - 36.0 g/dL    RDW 18.4 (H) 11.5 - 17.0 %    Platelet 397 130 - 400 x10(3)/mcL    MPV 9.0 7.4 - 10.4 fL    Neut % 90.8 %    Lymph % 7.8 %    Mono % 0.6 %    Eos % 0.0 %    Basophil % 0.2 %    Lymph # 0.49 (L) 0.6 - 4.6 x10(3)/mcL    Neut # 5.71 2.1 - 9.2 x10(3)/mcL    Mono # 0.04 (L) 0.1 - 1.3 x10(3)/mcL    Eos # 0.00 0 - 0.9 x10(3)/mcL    Baso # 0.01 <=0.2 x10(3)/mcL    IG# 0.04 0 - 0.04 x10(3)/mcL    IG% 0.6 %    NRBC% 0.0 %   Magnesium    Collection Time: 05/09/24  5:30 AM   Result Value Ref Range    Magnesium Level 1.80 1.60 - 2.60 mg/dL   Phosphorus    Collection Time: 05/09/24  5:30 AM   Result Value Ref Range    Phosphorus Level 3.5 2.3 - 4.7 mg/dL   Troponin I    Collection Time: 05/09/24  5:30 AM   Result Value Ref Range    Troponin-I <0.010 0.000 - 0.045 ng/mL   Comprehensive Metabolic Panel (CMP)    Collection Time: 05/09/24  7:07 AM   Result Value Ref Range    Sodium 137 136 - 145 mmol/L    Potassium 4.3 3.5 - 5.1 mmol/L    Chloride 97 (L) 98 - 107 mmol/L    CO2 30 23 - 31 mmol/L    Glucose 133 (H) 82 - 115 mg/dL    Blood Urea Nitrogen 6.3 (L) 9.8 - 20.1 mg/dL    Creatinine 0.64 0.55 - 1.02 mg/dL    Calcium 9.0 8.4 - 10.2 mg/dL    Protein Total 6.4 5.8 - 7.6 gm/dL    Albumin 3.1 (L) 3.4 - 4.8 g/dL    Globulin 3.3 2.4 - 3.5 gm/dL    Albumin/Globulin Ratio 0.9 (L) 1.1 - 2.0 ratio    Bilirubin Total 0.3 <=1.5 mg/dL     (H) 40 - 150 unit/L    ALT 7 0 - 55 unit/L    AST 22 5 - 34 unit/L    eGFR >60 mL/min/1.73/m2     Significant Imaging:  Imaging Results              X-Ray Chest AP (In process)                   EKG:       Telemetry:  SR    Physical Exam  Vitals and nursing note reviewed.   HENT:      Head: Normocephalic.      Nose: Nose normal.      Mouth/Throat:      Mouth: Mucous membranes are moist.   Eyes:      Pupils: Pupils are equal,  round, and reactive to light.   Cardiovascular:      Rate and Rhythm: Normal rate and regular rhythm.      Pulses: Normal pulses.      Heart sounds: Murmur heard.   Pulmonary:      Effort: Pulmonary effort is normal.      Comments: Diminished lung sounds   Abdominal:      General: Bowel sounds are normal.      Palpations: Abdomen is soft.   Musculoskeletal:      Cervical back: Normal range of motion.   Skin:     General: Skin is warm.   Neurological:      Mental Status: She is alert and oriented to person, place, and time.   Psychiatric:         Mood and Affect: Mood normal.         Behavior: Behavior normal.       Home Medications:   No current facility-administered medications on file prior to encounter.     Current Outpatient Medications on File Prior to Encounter   Medication Sig Dispense Refill    albuterol (PROVENTIL/VENTOLIN HFA) 90 mcg/actuation inhaler Inhale 1 puff into the lungs every 4 (four) hours as needed for Shortness of Breath.      amLODIPine (NORVASC) 5 MG tablet Take 1 tablet (5 mg total) by mouth once daily.      busPIRone (BUSPAR) 5 MG Tab Take 1 tablet (5 mg total) by mouth 2 (two) times daily. 60 tablet 2    carvediloL (COREG) 6.25 MG tablet Take 1 tablet (6.25 mg total) by mouth 2 (two) times daily. 180 tablet 0    EScitalopram oxalate (LEXAPRO) 5 MG Tab Take 1 tablet (5 mg total) by mouth once daily. 90 tablet 3    gabapentin (NEURONTIN) 300 MG capsule Take 300 mg by mouth 3 (three) times daily.      hyoscyamine (LEVSIN) 0.125 mg Subl Place 0.125 mg under the tongue 3 (three) times daily as needed (for abdominal cramping).      pantoprazole (PROTONIX) 40 MG tablet Take 40 mg by mouth 2 (two) times daily.      sodium bicarbonate 650 MG tablet Take 1 tablet (650 mg total) by mouth once daily. 30 tablet 1    ARIPiprazole (ABILIFY) 2 MG Tab Take 1 tablet (2 mg total) by mouth once daily. 30 tablet 1    atorvastatin (LIPITOR) 10 MG tablet Take 10 mg by mouth once daily.      folic acid (FOLVITE)  1 MG tablet Take 1 mg by mouth once daily.      HYDROcodone-acetaminophen (NORCO) 5-325 mg per tablet Take 1 tablet by mouth 2 (two) times daily as needed.      ondansetron (ZOFRAN-ODT) 4 MG TbDL DISSOLVE ONE TABLET under the TONGUE THREE TIMES DAILY AS NEEDED FOR NAUSEA      rifAXIMin (XIFAXAN) 550 mg Tab Take 1 tablet (550 mg total) by mouth 2 (two) times daily. 60 tablet 2    thiamine 100 MG tablet Take 1 tablet (100 mg total) by mouth once daily.       Current Inpatient Medications:  Current Facility-Administered Medications:     acetaminophen tablet 650 mg, 650 mg, Oral, Q6H PRN, Azalea Earl AGACNP-BC    aluminum-magnesium hydroxide-simethicone 200-200-20 mg/5 mL suspension 30 mL, 30 mL, Oral, QID PRN, Azalea Earl AGACNP-BC    enoxaparin injection 40 mg, 40 mg, Subcutaneous, Daily, Azalea Earl AGACNP-BC    furosemide injection 40 mg, 40 mg, Intravenous, Q12H, Demetrice Burk PA-C    hydrALAZINE injection 10 mg, 10 mg, Intravenous, Q4H PRN, Demetrice Burk PA-C    melatonin tablet 6 mg, 6 mg, Oral, Nightly PRN, Azalea Earl AGACNP-BC    naloxone 0.4 mg/mL injection 0.02 mg, 0.02 mg, Intravenous, PRN, Azalea Earl AGACNP-BC    ondansetron injection 4 mg, 4 mg, Intravenous, Q4H PRN, Azalea Earl AGACNP-BC    polyethylene glycol packet 17 g, 17 g, Oral, BID PRN, Azalea Earl AGACNP-BC    prochlorperazine injection Soln 5 mg, 5 mg, Intravenous, Q6H PRN, Azalea Earl AGACNP-BC    simethicone chewable tablet 80 mg, 1 tablet, Oral, QID PRN, Azalea Earl AGACNP-BC  VTE Risk Mitigation (From admission, onward)           Ordered     enoxaparin injection 40 mg  Daily         05/09/24 0401     IP VTE HIGH RISK PATIENT  Once         05/09/24 0401     Place sequential compression device  Until discontinued         05/09/24 0401                  Assessment:   Suspected Heart Failure, unspecified     - BNP 1,045.1  Alcoholic Liver Cirrhosis   Hypokalemia -  Resolved  Hyponatremia - Resolved   HTN  HLD  COPD  VHD    - Moderate MR    - Mild MS/AS   DM II with Hyperglycemia   No known history of GI Bleed     *Iodine Allergy*    Plan:   ECHO Today  Resume Coreg and Statin   IV Diurese as clinically indicated  Accurate I&O/Daily Weight  Keep Mag > 2 and Potassium > 4   More Recommendations to Follow     Thank you for your consult.     Lanny Marcial, ALEKS  Cardiology  [facility] Tele[facility]  05/09/2024    Physician addendum:  I have seen and examined this patient as a split-shared visit with the DIDI d/t complicated medical management of above problems written in assessment and high acuity requiring physician expertise in medical decision-making. I performed the substantive portion of the history and exam. Above medical decision-making is also formulated by me.  Difficult to talk to patient,  she is almost completely deaf  Denies any CP    Cardiovascular exam:  S1, S2, RRR 1/6 NICHOLE  Lungs:  Poor effort, otherwise clear  Extremities:  Trace edema    Plan:  Echo  Conitnanastasiia lasix  Add betablocker and entresto today  Consider SGLT2 inhibitor prior to DC  Labs in     Shay Walker MD  Cardiologist   Cr 1.55 (increased); possibly cardiorenal   diuretics as above

## 2024-05-11 NOTE — PHYSICAL EXAM

## 2024-05-11 NOTE — REASON FOR VISIT
[CV Risk Factors and Non-Cardiac Disease] : CV risk factors and non-cardiac disease [Arrhythmia/ECG Abnorrmalities] : arrhythmia/ECG abnormalities [Structural Heart and Valve Disease] : structural heart and valve disease [FreeTextEntry3] : dr palafox [FreeTextEntry1] : GRACE FERNANDEZ comes today for evaluation. he was advised to undergo a complete cardiac evaluation. He denies chest pains shortness of breath or loss of consciousness.  I recently referred to [facility] for cardiovascular surgical evaluation.Umair had a complicated course at [facility] status post aortic valve replacement and left atrial appendage clipping he went into atrial fibrillation and was noted to have an elevation in TSH. He was on amiodarone and required a pericardial window for drainage procedure. 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 He is undergoing progressive ambulation and wound care.   5/7/2024 Umair fell last week. we note diminished RV function on echo as well as a pericardial effusion which requires follow-up. he is currently seeing Dr. Sheets at [facility] for hematology to be seen 5/20/2024 and seeing Dr. Phoenix Davis at [facility] in [place] for nephrology on 5/20/2024.

## 2024-05-11 NOTE — ASSESSMENT
[FreeTextEntry1] : I have coordinated care with Dr. Aalmo from [facility] and a monitor is applied today in order to screen for recurrent atrial fibrillation. We would hope to avoid anticoagulation in the setting of a pericardial effusion which required drainage and a debilitated state although this option was left open to the patient and his wife. We advised screening blood work especially looking for signs and symptoms of infection and toxicity of amiodarone. We would advise antibiotic prophylaxis prior to dental work, and this was emphasized. We have reviewed medications.  The staples are out. He is seeing heart failure specialist Dr. Rosario and vascular surgery.  5/7/2024  we will have Umair see Dr. Barker for EP follow-up given complex cardiac surgery and perioperative atrial fibrillation. A recent monitor failed to reveal evidence for recurrent A-fib. We would consider patient for discontinuation of amiodarone. A chest x-ray PA and lateral is requested with respect to Amio side effects. transthoracic echoes evaluate pericardial effusion. recent thyroid function testing negative as hepatic profile.  I discussed with wife Chanelle at bedside was supportive  Medical necessity  there is a high-risk encounter based upon drug evaluation and management and coordination of care with subspecialists.

## 2024-05-11 NOTE — CARDIOLOGY SUMMARY
[de-identified] : Atrial fibrillation with RVR, paroxysmal post operatively 24 rsr pac rbbb inferior mi ai    Recommendations: -Recommended continuation of amiodarone load. Has thus far received 4.48g as of 1500  -Agree with anticoagulation, heparin gtt started by CTSx -If pt persists in AF, can consider DCCV after further amiodarone load. Will need to be on uninterrupted AC s/p DCCV. Pt did have AtriClip at time of AVR, will need to be reviewed for ?need for LESLEY if DCCV is pursued. Will hold off on DCCV for now as patient remains on lasix and  gtts -Maintain K+>4 and Mag>2 -Continue telemetry monitoring     Attestation Statements: Attestation Statements: Attending and PA/NP shared services statement (NON-critical care):   Attending to bill.  3/27/2024 sinus rhythm old anterior infarct diffuse nonspecific T wave abnormality.. [de-identified] : 2/6/24 HFrEF with LVEF 30% pericardial effusion 3/6/24 larger pericardial effusion 4/15/24 normal lv function ____________________________________________________________________ [de-identified] :  2/23/24 S/p bio AVR, AtriClip

## 2024-05-15 ENCOUNTER — RX RENEWAL (OUTPATIENT)
Age: 64
End: 2024-05-15

## 2024-05-15 RX ORDER — GABAPENTIN 100 MG/1
100 CAPSULE ORAL
Qty: 30 | Refills: 5 | Status: ACTIVE | COMMUNITY
Start: 2022-12-19 | End: 1900-01-01

## 2024-05-16 ENCOUNTER — NON-APPOINTMENT (OUTPATIENT)
Age: 64
End: 2024-05-16

## 2024-05-16 ENCOUNTER — LABORATORY RESULT (OUTPATIENT)
Age: 64
End: 2024-05-16

## 2024-05-16 ENCOUNTER — APPOINTMENT (OUTPATIENT)
Dept: FAMILY MEDICINE | Facility: CLINIC | Age: 64
End: 2024-05-16
Payer: COMMERCIAL

## 2024-05-16 VITALS
TEMPERATURE: 98.3 F | HEIGHT: 69 IN | DIASTOLIC BLOOD PRESSURE: 72 MMHG | BODY MASS INDEX: 25.33 KG/M2 | SYSTOLIC BLOOD PRESSURE: 138 MMHG | HEART RATE: 88 BPM | RESPIRATION RATE: 15 BRPM | WEIGHT: 171 LBS | OXYGEN SATURATION: 94 %

## 2024-05-16 PROCEDURE — 99214 OFFICE O/P EST MOD 30 MIN: CPT

## 2024-05-16 NOTE — HISTORY OF PRESENT ILLNESS
[de-identified] : Patient feels very well at visit.  Denies palpitations, shortness of breath, or lower extremity edema.  Has been weighing self regularly and has been consistently in the low 170s.  Is following up with congestive heart failure specialist, hematologist, and electrophysiologist. He reports that at the beginning of each stool is a little dark but overall stool is majority brown. Will repeat CBC at visit

## 2024-05-16 NOTE — PLAN
[FreeTextEntry1] : Continue current regimen including 25 mg of hydralazine 3 times a day, 2 mg twice daily, and amiodarone. Hopefully amiodarone could be stopped in the near future Will reassess patient's persistent anemia To follow-up with hematologist Currently patient asymptomatic and feeling well Continue current recommendations for heart failure including daily weights, diet, reducing sodium  et follow-up in 3 months c.

## 2024-05-17 ENCOUNTER — APPOINTMENT (OUTPATIENT)
Dept: CARDIOLOGY | Facility: CLINIC | Age: 64
End: 2024-05-17
Payer: COMMERCIAL

## 2024-05-17 ENCOUNTER — RESULT REVIEW (OUTPATIENT)
Age: 64
End: 2024-05-17

## 2024-05-17 ENCOUNTER — INPATIENT (INPATIENT)
Facility: HOSPITAL | Age: 64
LOS: 10 days | Discharge: HOME | DRG: 270 | End: 2024-05-28
Attending: STUDENT IN AN ORGANIZED HEALTH CARE EDUCATION/TRAINING PROGRAM | Admitting: STUDENT IN AN ORGANIZED HEALTH CARE EDUCATION/TRAINING PROGRAM
Payer: COMMERCIAL

## 2024-05-17 ENCOUNTER — NON-APPOINTMENT (OUTPATIENT)
Age: 64
End: 2024-05-17

## 2024-05-17 VITALS — DIASTOLIC BLOOD PRESSURE: 70 MMHG | SYSTOLIC BLOOD PRESSURE: 120 MMHG

## 2024-05-17 VITALS
SYSTOLIC BLOOD PRESSURE: 127 MMHG | WEIGHT: 170.42 LBS | OXYGEN SATURATION: 95 % | HEART RATE: 70 BPM | TEMPERATURE: 98 F | DIASTOLIC BLOOD PRESSURE: 90 MMHG | HEIGHT: 66 IN | RESPIRATION RATE: 18 BRPM

## 2024-05-17 VITALS
HEIGHT: 69 IN | WEIGHT: 173 LBS | OXYGEN SATURATION: 94 % | BODY MASS INDEX: 25.62 KG/M2 | HEART RATE: 83 BPM | SYSTOLIC BLOOD PRESSURE: 110 MMHG | DIASTOLIC BLOOD PRESSURE: 66 MMHG

## 2024-05-17 DIAGNOSIS — Z95.2 PRESENCE OF PROSTHETIC HEART VALVE: ICD-10-CM

## 2024-05-17 DIAGNOSIS — Z98.890 OTHER SPECIFIED POSTPROCEDURAL STATES: Chronic | ICD-10-CM

## 2024-05-17 DIAGNOSIS — I31.39 OTHER PERICARDIAL EFFUSION (NONINFLAMMATORY): ICD-10-CM

## 2024-05-17 DIAGNOSIS — Z95.2 PRESENCE OF PROSTHETIC HEART VALVE: Chronic | ICD-10-CM

## 2024-05-17 LAB
ALBUMIN SERPL ELPH-MCNC: 3.6 G/DL — SIGNIFICANT CHANGE UP (ref 3.3–5)
ALBUMIN SERPL ELPH-MCNC: 4 G/DL
ALP BLD-CCNC: 158 U/L
ALP SERPL-CCNC: 154 U/L — HIGH (ref 40–120)
ALT FLD-CCNC: 17 U/L — SIGNIFICANT CHANGE UP (ref 10–45)
ALT SERPL-CCNC: 18 U/L
ANION GAP SERPL CALC-SCNC: 11 MMOL/L — SIGNIFICANT CHANGE UP (ref 5–17)
ANION GAP SERPL CALC-SCNC: 15 MMOL/L
APTT BLD: 28.6 SEC — SIGNIFICANT CHANGE UP (ref 24.5–35.6)
AST SERPL-CCNC: 15 U/L — SIGNIFICANT CHANGE UP (ref 10–40)
AST SERPL-CCNC: 18 U/L
BILIRUB SERPL-MCNC: 0.6 MG/DL
BILIRUB SERPL-MCNC: 0.7 MG/DL — SIGNIFICANT CHANGE UP (ref 0.2–1.2)
BLD GP AB SCN SERPL QL: NEGATIVE — SIGNIFICANT CHANGE UP
BUN SERPL-MCNC: 32 MG/DL — HIGH (ref 7–23)
BUN SERPL-MCNC: 37 MG/DL
CALCIUM SERPL-MCNC: 8.7 MG/DL
CALCIUM SERPL-MCNC: 9 MG/DL — SIGNIFICANT CHANGE UP (ref 8.4–10.5)
CHLORIDE SERPL-SCNC: 103 MMOL/L — SIGNIFICANT CHANGE UP (ref 96–108)
CHLORIDE SERPL-SCNC: 99 MMOL/L
CO2 SERPL-SCNC: 24 MMOL/L
CO2 SERPL-SCNC: 27 MMOL/L — SIGNIFICANT CHANGE UP (ref 22–31)
CREAT SERPL-MCNC: 1.31 MG/DL — HIGH (ref 0.5–1.3)
CREAT SERPL-MCNC: 1.41 MG/DL
EGFR: 56 ML/MIN/1.73M2
EGFR: 61 ML/MIN/1.73M2 — SIGNIFICANT CHANGE UP
GLUCOSE SERPL-MCNC: 83 MG/DL — SIGNIFICANT CHANGE UP (ref 70–99)
GLUCOSE SERPL-MCNC: 90 MG/DL
HCT VFR BLD CALC: 25.3 % — LOW (ref 39–50)
HGB BLD-MCNC: 8.2 G/DL — LOW (ref 13–17)
INR BLD: 1.2 RATIO — HIGH (ref 0.85–1.18)
MCHC RBC-ENTMCNC: 20.9 PG — LOW (ref 27–34)
MCHC RBC-ENTMCNC: 32.4 GM/DL — SIGNIFICANT CHANGE UP (ref 32–36)
MCV RBC AUTO: 64.4 FL — LOW (ref 80–100)
NRBC # BLD: 0 /100 WBCS — SIGNIFICANT CHANGE UP (ref 0–0)
NT-PROBNP SERPL-SCNC: 3099 PG/ML — HIGH (ref 0–300)
PLATELET # BLD AUTO: 266 K/UL — SIGNIFICANT CHANGE UP (ref 150–400)
POTASSIUM SERPL-MCNC: 2.7 MMOL/L — CRITICAL LOW (ref 3.5–5.3)
POTASSIUM SERPL-MCNC: 3.5 MMOL/L — SIGNIFICANT CHANGE UP (ref 3.5–5.3)
POTASSIUM SERPL-SCNC: 2.7 MMOL/L — CRITICAL LOW (ref 3.5–5.3)
POTASSIUM SERPL-SCNC: 3.3 MMOL/L
POTASSIUM SERPL-SCNC: 3.5 MMOL/L — SIGNIFICANT CHANGE UP (ref 3.5–5.3)
PROT SERPL-MCNC: 7.1 G/DL
PROT SERPL-MCNC: 7.2 G/DL — SIGNIFICANT CHANGE UP (ref 6–8.3)
PROTHROM AB SERPL-ACNC: 13.1 SEC — HIGH (ref 9.5–13)
RBC # BLD: 3.42 M/UL
RBC # BLD: 3.93 M/UL — LOW (ref 4.2–5.8)
RBC # FLD: 22.7 % — HIGH (ref 10.3–14.5)
RETICS # AUTO: 0.6 %
RETICS AGGREG/RBC NFR: 19.4 K/UL
RH IG SCN BLD-IMP: POSITIVE — SIGNIFICANT CHANGE UP
SODIUM SERPL-SCNC: 138 MMOL/L
SODIUM SERPL-SCNC: 141 MMOL/L — SIGNIFICANT CHANGE UP (ref 135–145)
T3 SERPL-MCNC: 65 NG/DL — LOW (ref 80–200)
T4 AB SER-ACNC: 10.8 UG/DL — SIGNIFICANT CHANGE UP (ref 4.6–12)
T4 FREE SERPL-MCNC: 2 NG/DL
T4 FREE SERPL-MCNC: 2.1 NG/DL — HIGH (ref 0.9–1.8)
TSH SERPL-ACNC: 0.66 UIU/ML
TSH SERPL-MCNC: 0.67 UIU/ML — SIGNIFICANT CHANGE UP (ref 0.27–4.2)
WBC # BLD: 9.87 K/UL — SIGNIFICANT CHANGE UP (ref 3.8–10.5)
WBC # FLD AUTO: 9.87 K/UL — SIGNIFICANT CHANGE UP (ref 3.8–10.5)

## 2024-05-17 PROCEDURE — 71250 CT THORAX DX C-: CPT | Mod: 26

## 2024-05-17 PROCEDURE — 93010 ELECTROCARDIOGRAM REPORT: CPT

## 2024-05-17 PROCEDURE — 93308 TTE F-UP OR LMTD: CPT | Mod: 26

## 2024-05-17 PROCEDURE — 93306 TTE W/DOPPLER COMPLETE: CPT

## 2024-05-17 PROCEDURE — 93000 ELECTROCARDIOGRAM COMPLETE: CPT

## 2024-05-17 PROCEDURE — 99215 OFFICE O/P EST HI 40 MIN: CPT | Mod: 25

## 2024-05-17 RX ORDER — ACETAMINOPHEN 500 MG
650 TABLET ORAL EVERY 6 HOURS
Refills: 0 | Status: DISCONTINUED | OUTPATIENT
Start: 2024-05-17 | End: 2024-05-20

## 2024-05-17 RX ORDER — BUMETANIDE 0.25 MG/ML
2 INJECTION INTRAMUSCULAR; INTRAVENOUS
Refills: 0 | Status: DISCONTINUED | OUTPATIENT
Start: 2024-05-17 | End: 2024-05-20

## 2024-05-17 RX ORDER — FOLIC ACID 0.8 MG
1 TABLET ORAL DAILY
Refills: 0 | Status: DISCONTINUED | OUTPATIENT
Start: 2024-05-17 | End: 2024-05-20

## 2024-05-17 RX ORDER — HYDRALAZINE HCL 50 MG
25 TABLET ORAL THREE TIMES A DAY
Refills: 0 | Status: DISCONTINUED | OUTPATIENT
Start: 2024-05-17 | End: 2024-05-20

## 2024-05-17 RX ORDER — LANOLIN ALCOHOL/MO/W.PET/CERES
3 CREAM (GRAM) TOPICAL AT BEDTIME
Refills: 0 | Status: DISCONTINUED | OUTPATIENT
Start: 2024-05-17 | End: 2024-05-20

## 2024-05-17 RX ORDER — AMIODARONE HYDROCHLORIDE 400 MG/1
200 TABLET ORAL DAILY
Refills: 0 | Status: DISCONTINUED | OUTPATIENT
Start: 2024-05-17 | End: 2024-05-20

## 2024-05-17 RX ORDER — POTASSIUM CHLORIDE 20 MEQ
20 PACKET (EA) ORAL
Refills: 0 | Status: COMPLETED | OUTPATIENT
Start: 2024-05-17 | End: 2024-05-17

## 2024-05-17 RX ORDER — POTASSIUM BICARBONATE 978 MG/1
25 TABLET, EFFERVESCENT ORAL
Refills: 0 | Status: COMPLETED | OUTPATIENT
Start: 2024-05-17 | End: 2024-05-17

## 2024-05-17 RX ORDER — PANTOPRAZOLE SODIUM 20 MG/1
40 TABLET, DELAYED RELEASE ORAL
Refills: 0 | Status: DISCONTINUED | OUTPATIENT
Start: 2024-05-17 | End: 2024-05-20

## 2024-05-17 RX ORDER — LEVOTHYROXINE SODIUM 125 MCG
50 TABLET ORAL DAILY
Refills: 0 | Status: DISCONTINUED | OUTPATIENT
Start: 2024-05-17 | End: 2024-05-20

## 2024-05-17 RX ORDER — ALLOPURINOL 300 MG
100 TABLET ORAL DAILY
Refills: 0 | Status: DISCONTINUED | OUTPATIENT
Start: 2024-05-17 | End: 2024-05-20

## 2024-05-17 RX ADMIN — POTASSIUM BICARBONATE 25 MILLIEQUIVALENT(S): 978 TABLET, EFFERVESCENT ORAL at 18:11

## 2024-05-17 RX ADMIN — POTASSIUM BICARBONATE 25 MILLIEQUIVALENT(S): 978 TABLET, EFFERVESCENT ORAL at 19:22

## 2024-05-17 RX ADMIN — BUMETANIDE 2 MILLIGRAM(S): 0.25 INJECTION INTRAMUSCULAR; INTRAVENOUS at 17:44

## 2024-05-17 RX ADMIN — Medication 25 MILLIGRAM(S): at 22:56

## 2024-05-17 RX ADMIN — POTASSIUM BICARBONATE 25 MILLIEQUIVALENT(S): 978 TABLET, EFFERVESCENT ORAL at 20:37

## 2024-05-17 NOTE — H&P ADULT - NSICDXPASTSURGICALHX_GEN_ALL_CORE_FT
PAST SURGICAL HISTORY:  No significant past surgical history PAST SURGICAL HISTORY:  S/P aortic valve replacement     S/P pericardial window creation

## 2024-05-17 NOTE — H&P ADULT - ASSESSMENT
63M, appears older than stated age, admitted to [facility] 2/5/24 PMHx HTN, GERD, HPL, chronic venous stasis dermatitis with bilateral leg swelling, and AS presented to [facility] on 1/25/2024 for bilateral leg pain and swelling found to have new acute HFrEF (EF 35-40% 1/2024) in setting of severe AS. 2/23: AVR/JOANNA Clip c/b intraop bleeding requiring multiple blood products; postop prolonged inotropic support/ CHF following and afib. TTE revealed pericardial effusion returned to OR s/p 3/6 subxiphoid drainage and pericardial window.  Cefepime 2 Grams IV Q 8 hours per ID for (+)Serratia marcescens bacteremia. pt discharged home 3/12. pt was seen at his cardiologist's office today- Dr. Mesa where a repeat echo was done showing a large pericardial effusion; pt admitted for futher evaluation and treatment. VSS; RSR 60-80; pt stable at this time.

## 2024-05-17 NOTE — H&P ADULT - PROBLEM SELECTOR PLAN 1
tele  hold ac for pericardial effusion  continue amio 200 qd  continue diuretics with bumex 2 mg po bid  continue hydral 25 mg po tid   continue synthroid  IR consult for possible pericardial effusion; ct chest done; npo after midnight  discharge planning- home when stable

## 2024-05-17 NOTE — REASON FOR VISIT
[CV Risk Factors and Non-Cardiac Disease] : CV risk factors and non-cardiac disease [Arrhythmia/ECG Abnorrmalities] : arrhythmia/ECG abnormalities [Structural Heart and Valve Disease] : structural heart and valve disease [FreeTextEntry3] : dr palafox [FreeTextEntry1] : GRACE FERNANDEZ comes today for evaluation. he was advised to undergo a complete cardiac evaluation. He denies chest pains shortness of breath or loss of consciousness.  I recently referred to [facility] for cardiovascular surgical evaluation.Umair had a complicated course at [facility] status post aortic valve replacement and left atrial appendage clipping he went into atrial fibrillation and was noted to have an elevation in TSH. He was on amiodarone and required a pericardial window for drainage procedure. 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 He is undergoing progressive ambulation and wound care.   5/7/2024 Umair fell last week. we note diminished RV function on echo as well as a pericardial effusion which requires follow-up. he is currently seeing Dr. Sheets at [facility] for hematology to be seen 5/20/2024 and seeing Dr. Phoenix Davis at [facility] in [place] for nephrology on 5/20/2024.   today present for echo with persistent large pericardial effusion, admits to dyspnea, no syncope or dizziness

## 2024-05-17 NOTE — CARDIOLOGY SUMMARY
[de-identified] : today- rsr rbbb inferior mi pvcs    Recommendations: -Recommended continuation of amiodarone load. Has thus far received 4.48g as of 1500  -Agree with anticoagulation, heparin gtt started by CTSx -If pt persists in AF, can consider DCCV after further amiodarone load. Will need to be on uninterrupted AC s/p DCCV. Pt did have AtriClip at time of AVR, will need to be reviewed for ?need for LESLEY if DCCV is pursued. Will hold off on DCCV for now as patient remains on lasix and  gtts -Maintain K+>4 and Mag>2 -Continue telemetry monitoring     Attestation Statements: Attestation Statements: Attending and PA/NP shared services statement (NON-critical care):   Attending to bill.  3/27/2024 sinus rhythm old anterior infarct diffuse nonspecific T wave abnormality.. [de-identified] : 2/6/24 HFrEF with LVEF 30% pericardial effusion 3/6/24 larger pericardial effusion 4/15/24 normal lv function ____________________________________________________________________ [de-identified] :  2/23/24 S/p bio AVR, AtriClip

## 2024-05-17 NOTE — H&P ADULT - HISTORY OF PRESENT ILLNESS
63 year old male with Hx of HTN, GERD, dyslipidemia, chronic intermittent leg pain, aortic stenosis, chronic venous stasis dermatitis, recent aortic valve replacement, presents to the ER for chills. patient with recent hospital admission for aortic valve replacement, was hypoxic given 2L at the time. patient was also noted to be bacteriemic, started on cefepime. patient here today for chills. states started a day ago. states chills have been intermittent. states has dyspnea on exertion for the past few months. denies rectal bleeding. denies cough or fevers. denies abdominal pain. denies urinary symptoms 63M, appears older than stated age, admitted to [facility] 2/5/24 PMHx HTN, GERD, HPL, chronic venous stasis dermatitis with bilateral leg swelling, and AS presented to [facility] on 1/25/2024 for bilateral leg pain and swelling found to have new acute HFrEF (EF 35-40% 1/2024) in setting of severe AS. 2/23: AVR/JOANNA Clip c/b intraop bleeding requiring multiple blood products; postop prolonged inotropic support/ CHF following and afib. TTE revealed pericardial effusion returned to OR s/p 3/6 subxiphoid drainage and pericardial window.  Cefepime 2 Grams IV Q 8 hours per ID for (+)Serratia marcescens bacteremia. pt discharged home 3/12. pt was seen at his cardiologist's office today- Dr. Mesa where a repeat echo was done showing a large pericardial effusion; pt admitted for futher evaluation and treatment. VSS; RSR 60-80; pt stable at this time.      63M, appears older than stated age, admitted to [facility] 2/5/24 PMHx HTN, GERD, HPL, chronic venous stasis dermatitis with bilateral leg swelling, and AS presented to [facility] on 1/25/2024 for bilateral leg pain and swelling found to have new acute HFrEF (EF 35-40% 1/2024) in setting of severe AS. 2/23: AVR/JOANNA Clip c/b intraop bleeding requiring multiple blood products; postop prolonged inotropic support/ CHF following and afib. TTE revealed pericardial effusion returned to OR s/p 3/6 subxiphoid drainage and pericardial window.  Cefepime 2 Grams IV Q 8 hours per ID for (+)Serratia marcescens bacteremia. pt discharged home 3/12. pt was seen at his cardiologist's office today- Dr. Mesa where a repeat echo was done showing a large pericardial effusion; pt admitted for futher evaluation and treatment. VSS; RSR 60-80; pt stable at this time. Pt denies cp/sob or palpitations.

## 2024-05-17 NOTE — PHYSICAL EXAM

## 2024-05-17 NOTE — DISCUSSION/SUMMARY
[Not Responding to Treatment] : not responding to treatment [Inpatient Evaluation] : the evaluation will be done as an inpatient [Minutes Spent: ___] : for [unfilled] ~Uminutes [de-identified] : large pericaridal effusion-10-20 mm variation in resp BP noted [de-identified] : sent to CTS/[facility] Dr Martinez team/wayne anders will admit pt for CT and possible effusion drainage [FreeTextEntry2] : discussed case with pt, wife and CTS team and intervential cardiology

## 2024-05-17 NOTE — PATIENT PROFILE ADULT - FALL HARM RISK - HARM RISK INTERVENTIONS

## 2024-05-17 NOTE — H&P ADULT - NSHPPHYSICALEXAM_GEN_ALL_CORE
PHYSICAL EXAM: vital signs noted on [place]  in no apparent distress  HEENT: KUSHAL EOMI pallor +   Neck: Supple, no JVD, no thyromegaly  Lungs: no wheeze, no crackles  CVS: S1 S2 ejection systolic murmur +   Abdomen: no tenderness, no organomegaly, BS present  Neuro: AO x 3 no focal weakness, no sensory abnormalities  Skin: warm, dry  Ext: no cyanosis or clubbing, 2 + edema  Msk: no joint swelling or deformities  Back: no CVA tenderness, no kyphosis/scoliosis General: NAD  HEENT:  NC/AT  Neuro: A&Ox4, gait steady, speech clear, no focal deficits noted  Respiratory: B/L BS CTA, no wheeze, no rhonchi, no crackles noted  Cardiovascular: RRR, normal S1S2, no murmur noted  GI: Abd soft, NT/ND, +BSx4Q +BM  Peripheral Vascular:  B/L LE neg edema, 2+ peripheral pulses, no clubbing, cyanosis, varicosities/PVD noted  Musculoskeletal: B/L UE and LE 5/5 strength   Psychiatric: Normal mood, normal affect observed  Skin: Normal exam to inspection and palpation. no bleeding, no hematoma. General: NAD  HEENT:  NC/AT  Neuro: A&Ox4, gait steady, speech clear, no focal deficits noted  Respiratory: B/L BS CTA, no wheeze, no rhonchi, no crackles noted  Cardiovascular: RSR 60-80; normal S1S2, no murmur noted  GI: Abd soft, NT/ND, +BSx4Q +BM  Peripheral Vascular:  B/L +1 LE edema,w/ chronic venous stasis discoloration;  2+ peripheral pulses, no clubbing, cyanosis, varicosities/PVD noted  Musculoskeletal: B/L UE and LE 5/5 strength   Psychiatric: Normal mood, normal affect observed  Skin: Normal exam to inspection and palpation. no bleeding, no hematoma.

## 2024-05-17 NOTE — H&P ADULT - NSHPREVIEWOFSYSTEMS_GEN_ALL_CORE
Gen: no loss of wt no loss of appetite  ENT: no dizziness no hearing loss  Ophth: no blurring of vision no loss of vision  Resp: No cough no sputum production  CVS: No chest pain no palpitations no orthopnea  GI: no nausea, vomiting or diarrhea no melena or BRBPR   : no dysuria, hematuria  Endo: no polyuria no excessive sweating  Neuro: no weakness no paresthesias  Heme: No petechiae no easy bruising  Msk: No joint pain no swelling  Skin: No rash no itching REVIEW OF SYSTEMS:  CONSTITUTIONAL: No fever, weight loss, or fatigue  EYES: No eye pain, visual disturbances, or discharge  ENMT:  No difficulty hearing, tinnitus, vertigo; No sinus or throat pain  NECK: No pain or stiffness  RESPIRATORY: vicky  cough,No wheezing, chills or hemoptysis; No shortness of breath  CARDIOVASCULAR: No chest pain,No palpitations, dizziness; + LE swelling   GASTROINTESTINAL: No abdominal or epigastric pain. No nausea, vomiting, or hematemesis; No diarrhea No melena  GENITOURINARY: No dysuria, frequency, hematuria, or incontinence  NEUROLOGICAL: No headaches, memory loss, loss of strength, numbness, or tremors  SKIN: No itching, burning, rashes, or lesions   LYMPH NODES: No enlarged glands  ENDOCRINE: No heat or cold intolerance; No hair loss  MUSCULOSKELETAL: No joint pain or swelling; No muscle, back, or extremity pain  PSYCHIATRIC: No depression, anxiety, mood swings, or difficulty sleeping  HEME/LYMPH: No easy bruising, or bleeding gums  ALLERGY: No hives or eczema

## 2024-05-17 NOTE — H&P ADULT - PROBLEM SELECTOR PLAN 2
CT chest  done 1715  IR consulted  keep npo after midnight  ck coags  ck t & c  hold all anticoagulation CT chest  done 1715  IR consulted  keep npo after midnight  ck coags  ck t & c  hold all anticoagulation- le compression device while not on dvt prophylaxis

## 2024-05-17 NOTE — H&P ADULT - NSICDXPASTMEDICALHX_GEN_ALL_CORE_FT
PAST MEDICAL HISTORY:  Cellulitis     Chronic GERD     Dyslipidemia     History of aortic stenosis     Hypertension      PAST MEDICAL HISTORY:  Chronic GERD     Chronic venous stasis dermatitis     Dyslipidemia     History of aortic stenosis     Hypertension

## 2024-05-18 LAB
ALBUMIN SERPL ELPH-MCNC: 3.4 G/DL — SIGNIFICANT CHANGE UP (ref 3.3–5)
ALP SERPL-CCNC: 148 U/L — HIGH (ref 40–120)
ALT FLD-CCNC: 15 U/L — SIGNIFICANT CHANGE UP (ref 10–45)
ANION GAP SERPL CALC-SCNC: 12 MMOL/L — SIGNIFICANT CHANGE UP (ref 5–17)
AST SERPL-CCNC: 14 U/L — SIGNIFICANT CHANGE UP (ref 10–40)
BILIRUB SERPL-MCNC: 0.7 MG/DL — SIGNIFICANT CHANGE UP (ref 0.2–1.2)
BUN SERPL-MCNC: 29 MG/DL — HIGH (ref 7–23)
CALCIUM SERPL-MCNC: 8.9 MG/DL — SIGNIFICANT CHANGE UP (ref 8.4–10.5)
CHLORIDE SERPL-SCNC: 101 MMOL/L — SIGNIFICANT CHANGE UP (ref 96–108)
CO2 SERPL-SCNC: 27 MMOL/L — SIGNIFICANT CHANGE UP (ref 22–31)
CREAT SERPL-MCNC: 1.25 MG/DL — SIGNIFICANT CHANGE UP (ref 0.5–1.3)
EGFR: 65 ML/MIN/1.73M2 — SIGNIFICANT CHANGE UP
GLUCOSE SERPL-MCNC: 84 MG/DL — SIGNIFICANT CHANGE UP (ref 70–99)
HCT VFR BLD CALC: 24.7 % — LOW (ref 39–50)
HGB BLD-MCNC: 8.1 G/DL — LOW (ref 13–17)
MCHC RBC-ENTMCNC: 22.4 PG — LOW (ref 27–34)
MCHC RBC-ENTMCNC: 32.8 GM/DL — SIGNIFICANT CHANGE UP (ref 32–36)
MCV RBC AUTO: 68.4 FL — LOW (ref 80–100)
NRBC # BLD: 0 /100 WBCS — SIGNIFICANT CHANGE UP (ref 0–0)
PLATELET # BLD AUTO: 250 K/UL — SIGNIFICANT CHANGE UP (ref 150–400)
POTASSIUM SERPL-MCNC: 3.4 MMOL/L — LOW (ref 3.5–5.3)
POTASSIUM SERPL-SCNC: 3.4 MMOL/L — LOW (ref 3.5–5.3)
PROT SERPL-MCNC: 7.2 G/DL — SIGNIFICANT CHANGE UP (ref 6–8.3)
RBC # BLD: 3.61 M/UL — LOW (ref 4.2–5.8)
RBC # FLD: 24.4 % — HIGH (ref 10.3–14.5)
SODIUM SERPL-SCNC: 140 MMOL/L — SIGNIFICANT CHANGE UP (ref 135–145)
WBC # BLD: 9.19 K/UL — SIGNIFICANT CHANGE UP (ref 3.8–10.5)
WBC # FLD AUTO: 9.19 K/UL — SIGNIFICANT CHANGE UP (ref 3.8–10.5)

## 2024-05-18 PROCEDURE — 99232 SBSQ HOSP IP/OBS MODERATE 35: CPT | Mod: 24

## 2024-05-18 RX ADMIN — Medication 25 MILLIGRAM(S): at 13:42

## 2024-05-18 RX ADMIN — PANTOPRAZOLE SODIUM 40 MILLIGRAM(S): 20 TABLET, DELAYED RELEASE ORAL at 06:36

## 2024-05-18 RX ADMIN — Medication 20 MILLIEQUIVALENT(S): at 00:03

## 2024-05-18 RX ADMIN — Medication 20 MILLIEQUIVALENT(S): at 02:02

## 2024-05-18 RX ADMIN — AMIODARONE HYDROCHLORIDE 200 MILLIGRAM(S): 400 TABLET ORAL at 06:37

## 2024-05-18 RX ADMIN — BUMETANIDE 2 MILLIGRAM(S): 0.25 INJECTION INTRAMUSCULAR; INTRAVENOUS at 13:42

## 2024-05-18 RX ADMIN — Medication 25 MILLIGRAM(S): at 21:51

## 2024-05-18 RX ADMIN — Medication 1 MILLIGRAM(S): at 11:16

## 2024-05-18 RX ADMIN — Medication 100 MILLIGRAM(S): at 11:17

## 2024-05-18 RX ADMIN — Medication 25 MILLIGRAM(S): at 06:36

## 2024-05-18 RX ADMIN — BUMETANIDE 2 MILLIGRAM(S): 0.25 INJECTION INTRAMUSCULAR; INTRAVENOUS at 06:37

## 2024-05-18 RX ADMIN — Medication 50 MICROGRAM(S): at 06:36

## 2024-05-18 NOTE — CHART NOTE - NSCHARTNOTEFT_GEN_A_CORE
Chest CT reviewed with IR attending Dr. Irby.     No percutaneous window is available for drainage. Please reconsult PRN.

## 2024-05-18 NOTE — PROGRESS NOTE ADULT - ASSESSMENT
63M admitted to [facility] 2/5/24 PMHx HTN, GERD, HPL, chronic venous stasis dermatitis with bilateral leg swelling, and AS presented to [facility] on 1/25/2024 for bilateral leg pain and swelling found to have new acute HFrEF (EF 35-40% 1/2024) in setting of severe AS. 2/23: AVR/JOANNA Clip c/b intraop bleeding requiring multiple blood products; postop prolonged inotropic support/ CHF following and afib. TTE revealed pericardial effusion returned to OR s/p 3/6 subxiphoid drainage and pericardial window.  Cefepime 2 Grams IV Q 8 hours per ID for (+)Serratia marcescens bacteremia. pt discharged home 3/12. pt was seen at his cardiologist's office today- Dr. Mesa where a repeat echo was done showing a large pericardial effusion; pt admitted for futher evaluation and treatment. VSS; RSR 60-80; pt stable at this time. Pt denies cp/sob or palpitations.   5/18 IR consulted for pericardial effusion drainage determined No percutaneous window is available for drainage  Thoracic consulted for possible pericardial window vs. pericardectomy

## 2024-05-18 NOTE — PROGRESS NOTE ADULT - PROBLEM SELECTOR PLAN 1
IR consulted for pericardial effusion drainage determined No percutaneous window is available for drainage  Thoracic consulted for possible pericardial window vs. pericardectomy

## 2024-05-19 ENCOUNTER — TRANSCRIPTION ENCOUNTER (OUTPATIENT)
Age: 64
End: 2024-05-19

## 2024-05-19 DIAGNOSIS — I10 ESSENTIAL (PRIMARY) HYPERTENSION: ICD-10-CM

## 2024-05-19 DIAGNOSIS — K21.9 GASTRO-ESOPHAGEAL REFLUX DISEASE WITHOUT ESOPHAGITIS: ICD-10-CM

## 2024-05-19 DIAGNOSIS — E78.5 HYPERLIPIDEMIA, UNSPECIFIED: ICD-10-CM

## 2024-05-19 LAB
ALBUMIN SERPL ELPH-MCNC: 3.3 G/DL — SIGNIFICANT CHANGE UP (ref 3.3–5)
ALP SERPL-CCNC: 137 U/L — HIGH (ref 40–120)
ALT FLD-CCNC: 15 U/L — SIGNIFICANT CHANGE UP (ref 10–45)
ANION GAP SERPL CALC-SCNC: 11 MMOL/L — SIGNIFICANT CHANGE UP (ref 5–17)
AST SERPL-CCNC: 12 U/L — SIGNIFICANT CHANGE UP (ref 10–40)
BILIRUB SERPL-MCNC: 0.6 MG/DL — SIGNIFICANT CHANGE UP (ref 0.2–1.2)
BLD GP AB SCN SERPL QL: NEGATIVE — SIGNIFICANT CHANGE UP
BUN SERPL-MCNC: 30 MG/DL — HIGH (ref 7–23)
CALCIUM SERPL-MCNC: 9.2 MG/DL — SIGNIFICANT CHANGE UP (ref 8.4–10.5)
CHLORIDE SERPL-SCNC: 101 MMOL/L — SIGNIFICANT CHANGE UP (ref 96–108)
CO2 SERPL-SCNC: 27 MMOL/L — SIGNIFICANT CHANGE UP (ref 22–31)
CREAT SERPL-MCNC: 1.44 MG/DL — HIGH (ref 0.5–1.3)
EGFR: 55 ML/MIN/1.73M2 — LOW
GLUCOSE SERPL-MCNC: 80 MG/DL — SIGNIFICANT CHANGE UP (ref 70–99)
HCT VFR BLD CALC: 25.2 % — LOW (ref 39–50)
HGB BLD-MCNC: 8.3 G/DL — LOW (ref 13–17)
MCHC RBC-ENTMCNC: 21.8 PG — LOW (ref 27–34)
MCHC RBC-ENTMCNC: 32.9 GM/DL — SIGNIFICANT CHANGE UP (ref 32–36)
MCV RBC AUTO: 66.1 FL — LOW (ref 80–100)
NRBC # BLD: 0 /100 WBCS — SIGNIFICANT CHANGE UP (ref 0–0)
PLATELET # BLD AUTO: 249 K/UL — SIGNIFICANT CHANGE UP (ref 150–400)
POTASSIUM SERPL-MCNC: 4 MMOL/L — SIGNIFICANT CHANGE UP (ref 3.5–5.3)
POTASSIUM SERPL-SCNC: 4 MMOL/L — SIGNIFICANT CHANGE UP (ref 3.5–5.3)
PROT SERPL-MCNC: 7 G/DL — SIGNIFICANT CHANGE UP (ref 6–8.3)
RBC # BLD: 3.81 M/UL — LOW (ref 4.2–5.8)
RBC # FLD: 23.9 % — HIGH (ref 10.3–14.5)
RH IG SCN BLD-IMP: POSITIVE — SIGNIFICANT CHANGE UP
SODIUM SERPL-SCNC: 139 MMOL/L — SIGNIFICANT CHANGE UP (ref 135–145)
WBC # BLD: 8.54 K/UL — SIGNIFICANT CHANGE UP (ref 3.8–10.5)
WBC # FLD AUTO: 8.54 K/UL — SIGNIFICANT CHANGE UP (ref 3.8–10.5)

## 2024-05-19 PROCEDURE — 99254 IP/OBS CNSLTJ NEW/EST MOD 60: CPT | Mod: 57

## 2024-05-19 RX ORDER — CHLORHEXIDINE GLUCONATE 213 G/1000ML
15 SOLUTION TOPICAL
Refills: 0 | Status: COMPLETED | OUTPATIENT
Start: 2024-05-19 | End: 2024-05-20

## 2024-05-19 RX ORDER — CHLORHEXIDINE GLUCONATE 213 G/1000ML
1 SOLUTION TOPICAL
Refills: 0 | Status: COMPLETED | OUTPATIENT
Start: 2024-05-19 | End: 2024-05-20

## 2024-05-19 RX ORDER — CEFUROXIME AXETIL 250 MG
1500 TABLET ORAL ONCE
Refills: 0 | Status: DISCONTINUED | OUTPATIENT
Start: 2024-05-19 | End: 2024-05-20

## 2024-05-19 RX ADMIN — PANTOPRAZOLE SODIUM 40 MILLIGRAM(S): 20 TABLET, DELAYED RELEASE ORAL at 05:52

## 2024-05-19 RX ADMIN — Medication 25 MILLIGRAM(S): at 13:38

## 2024-05-19 RX ADMIN — AMIODARONE HYDROCHLORIDE 200 MILLIGRAM(S): 400 TABLET ORAL at 05:52

## 2024-05-19 RX ADMIN — CHLORHEXIDINE GLUCONATE 15 MILLILITER(S): 213 SOLUTION TOPICAL at 21:34

## 2024-05-19 RX ADMIN — BUMETANIDE 2 MILLIGRAM(S): 0.25 INJECTION INTRAMUSCULAR; INTRAVENOUS at 13:38

## 2024-05-19 RX ADMIN — CHLORHEXIDINE GLUCONATE 1 APPLICATION(S): 213 SOLUTION TOPICAL at 21:34

## 2024-05-19 RX ADMIN — Medication 25 MILLIGRAM(S): at 05:52

## 2024-05-19 RX ADMIN — BUMETANIDE 2 MILLIGRAM(S): 0.25 INJECTION INTRAMUSCULAR; INTRAVENOUS at 05:52

## 2024-05-19 RX ADMIN — Medication 50 MICROGRAM(S): at 05:52

## 2024-05-19 RX ADMIN — Medication 100 MILLIGRAM(S): at 11:10

## 2024-05-19 RX ADMIN — Medication 1 MILLIGRAM(S): at 11:10

## 2024-05-19 RX ADMIN — Medication 25 MILLIGRAM(S): at 21:40

## 2024-05-19 NOTE — PRE PROCEDURE NOTE - PRE PROCEDURE EVALUATION
Cardiac Surgery Pre-op Note:    CC: Patient is a 63y old  Male who presents with a chief complaint of PERICARDIAL EFFUSION (18 May 2024 11:33)      Referring Physician:                                                                                                           Surgeon:    Procedure: (Date) (Procedure)    Allergies    garlic (Angioedema; Swelling; Anaphylaxis)  No Known Drug Allergies    Intolerances        HPI:  63M, appears older than stated age, admitted to [facility] 2/5/24 PMHx HTN, GERD, HPL, chronic venous stasis dermatitis with bilateral leg swelling, and AS presented to [facility] on 1/25/2024 for bilateral leg pain and swelling found to have new acute HFrEF (EF 35-40% 1/2024) in setting of severe AS. 2/23: AVR/JOANNA Clip c/b intraop bleeding requiring multiple blood products; postop prolonged inotropic support/ CHF following and afib. TTE revealed pericardial effusion returned to OR s/p 3/6 subxiphoid drainage and pericardial window.  Cefepime 2 Grams IV Q 8 hours per ID for (+)Serratia marcescens bacteremia. pt discharged home 3/12. pt was seen at his cardiologist's office today- Dr. Mesa where a repeat echo was done showing a large pericardial effusion; pt admitted for futher evaluation and treatment. VSS; RSR 60-80; pt stable at this time. Pt denies cp/sob or palpitations.      (17 May 2024 16:36)      PAST MEDICAL & SURGICAL HISTORY:  Dyslipidemia      Hypertension      Chronic GERD      History of aortic stenosis      Chronic venous stasis dermatitis      S/P aortic valve replacement      S/P pericardial window creation          MEDICATIONS  (STANDING):  allopurinol 100 milliGRAM(s) Oral daily  aMIOdarone    Tablet 200 milliGRAM(s) Oral daily  buMETAnide 2 milliGRAM(s) Oral two times a day  folic acid 1 milliGRAM(s) Oral daily  hydrALAZINE 25 milliGRAM(s) Oral three times a day  levothyroxine 50 MICROGram(s) Oral daily  pantoprazole    Tablet 40 milliGRAM(s) Oral before breakfast    MEDICATIONS  (PRN):  acetaminophen     Tablet .. 650 milliGRAM(s) Oral every 6 hours PRN Mild Pain (1 - 3)  melatonin 3 milliGRAM(s) Oral at bedtime PRN Insomnia        Labs:                        8.3    8.54  )-----------( 249      ( 19 May 2024 06:31 )             25.2     05-19    139  |  101  |  30<H>  ----------------------------<  80  4.0   |  27  |  1.44<H>    Ca    9.2      19 May 2024 06:31    TPro  7.0  /  Alb  3.3  /  TBili  0.6  /  DBili  x   /  AST  12  /  ALT  15  /  AlkPhos  137<H>  05-19    PT/INR - ( 17 May 2024 17:02 )   PT: 13.1 sec;   INR: 1.20 ratio         PTT - ( 17 May 2024 17:02 )  PTT:28.6 sec    Blood Type: ABO Interpretation: B (05-17 @ 17:08)    HGB A1C:   Prealbumin:   Pro-BNP:   Thyroid Panel: 05-17 @ 17:02/0.67  2.1/10.8/65    MRSA:  / MSSA:   Urinalysis Basic - ( 19 May 2024 06:31 )    Color: x / Appearance: x / SG: x / pH: x  Gluc: 80 mg/dL / Ketone: x  / Bili: x / Urobili: x   Blood: x / Protein: x / Nitrite: x   Leuk Esterase: x / RBC: x / WBC x   Sq Epi: x / Non Sq Epi: x / Bacteria: x        CXR: < from: Xray Chest 2 Views PA/Lat (05.07.24 @ 16:04) >  FINDINGS:  Prior study dated 4/14/2024 was available for review.    The lungs are clear of infiltrate or effusion.    The heart is enlarged. The patient is status post median sternotomy and   valve replacement. Left atrial appendage clip is noted.      IMPRESSION:  No focal consolidation is seen. Cardiomegaly. Status post   valve replacement.    < end of copied text >      EKG:< from: 12 Lead ECG (05.17.24 @ 17:34) >    Ventricular Rate 63 BPM    Atrial Rate 63 BPM    P-R Interval 188 ms    QRS Duration 118 ms    Q-T Interval 310 ms    QTC Calculation(Bazett) 317 ms    P Axis 42 degrees    R Axis -36 degrees    T Axis 131 degrees    < end of copied text >      Carotid Duplex:      PFT's:    Echocardiogram:< from: TTE W or WO Ultrasound Enhancing Agent (05.17.24 @ 17:55) >     1. Compared to the transthoracic echocardiogram performed on 4/18/2024, the pericardial effusion located posterior to the left ventricle is larger.   2. Large pericardial effusion noted adjacent to the right atrium, moderate pericardial effusion noted adjacent to the posterior left ventricle and small pericardial effusion noted adjacent to the right ventricle with no evidence of hemodynamic compromise (or echocardiographic evidence of cardiac tamponade).    ________________________________________________________________________________________  FINDINGS:     Pericardium:  There is a large pericardial effusion noted adjacent to the right atrium, a moderate pericardial effusion noted adjacent to the posterior left ventricle and a small pericardial effusion noted adjacent to the right ventricle with no evidence of hemodynamic compromise (or echocardiographic evidence of cardiac tamponade).     Systemic Veins:  The inferior vena cava is dilated measuring 2.75 cm in diameter, (dilated >2.1cm) with abnormal inspiratory collapse (abnormal <50%) consistent with elevated right atrial pressure (~15, range 10-20mmHg).    < end of copied text >      Cardiac catheterization:    Vein Mapping:    Gen: WN/WD NAD  Neuro: AAOx3, nonfocal  Pulm: CTA B/L  CV: RRR, S1S2  Abd: Soft, NT, ND +BS  Ext: No edema, + peripheral pulses      Pt has AICD/PPM [ ] Yes  [ x] No             Brand Name:  Pre-op Beta Blocker ordered within 24 hrs of surgery (CABG ONLY)?  [ ] Yes  [ ] No  If not, Why?  Type & Cross  [ x] Yes  [ ] No  NPO after Midnight [x ] Yes  [ ] No  Pre-op ABX ordered, to be taped on chart:  [x ] Yes  [ ] No     Hibiclens/Peridex ordered [ ]x Yes  [ ] No  Intraop on Hold: PRBCs, CXR, LESLEY [ ]   Consent obtained  [x ] Yes  [ ] No

## 2024-05-20 ENCOUNTER — RESULT REVIEW (OUTPATIENT)
Age: 64
End: 2024-05-20

## 2024-05-20 LAB
ALBUMIN SERPL ELPH-MCNC: 3.4 G/DL — SIGNIFICANT CHANGE UP (ref 3.3–5)
ALP SERPL-CCNC: 140 U/L — HIGH (ref 40–120)
ALT FLD-CCNC: 14 U/L — SIGNIFICANT CHANGE UP (ref 10–45)
ANION GAP SERPL CALC-SCNC: 12 MMOL/L — SIGNIFICANT CHANGE UP (ref 5–17)
AST SERPL-CCNC: 11 U/L — SIGNIFICANT CHANGE UP (ref 10–40)
BILIRUB SERPL-MCNC: 0.5 MG/DL — SIGNIFICANT CHANGE UP (ref 0.2–1.2)
BUN SERPL-MCNC: 33 MG/DL — HIGH (ref 7–23)
CALCIUM SERPL-MCNC: 9.3 MG/DL — SIGNIFICANT CHANGE UP (ref 8.4–10.5)
CHLORIDE SERPL-SCNC: 101 MMOL/L — SIGNIFICANT CHANGE UP (ref 96–108)
CO2 SERPL-SCNC: 29 MMOL/L — SIGNIFICANT CHANGE UP (ref 22–31)
CREAT SERPL-MCNC: 1.47 MG/DL — HIGH (ref 0.5–1.3)
EGFR: 53 ML/MIN/1.73M2 — LOW
GLUCOSE SERPL-MCNC: 98 MG/DL — SIGNIFICANT CHANGE UP (ref 70–99)
HCT VFR BLD CALC: 26.2 % — LOW (ref 39–50)
HGB BLD-MCNC: 8.5 G/DL — LOW (ref 13–17)
MCHC RBC-ENTMCNC: 22.1 PG — LOW (ref 27–34)
MCHC RBC-ENTMCNC: 32.4 GM/DL — SIGNIFICANT CHANGE UP (ref 32–36)
MCV RBC AUTO: 68.2 FL — LOW (ref 80–100)
NRBC # BLD: 0 /100 WBCS — SIGNIFICANT CHANGE UP (ref 0–0)
PLATELET # BLD AUTO: 252 K/UL — SIGNIFICANT CHANGE UP (ref 150–400)
POTASSIUM SERPL-MCNC: 3.6 MMOL/L — SIGNIFICANT CHANGE UP (ref 3.5–5.3)
POTASSIUM SERPL-SCNC: 3.6 MMOL/L — SIGNIFICANT CHANGE UP (ref 3.5–5.3)
PROT SERPL-MCNC: 6.9 G/DL — SIGNIFICANT CHANGE UP (ref 6–8.3)
RBC # BLD: 3.84 M/UL — LOW (ref 4.2–5.8)
RBC # FLD: 24.9 % — HIGH (ref 10.3–14.5)
SODIUM SERPL-SCNC: 142 MMOL/L — SIGNIFICANT CHANGE UP (ref 135–145)
WBC # BLD: 9.72 K/UL — SIGNIFICANT CHANGE UP (ref 3.8–10.5)
WBC # FLD AUTO: 9.72 K/UL — SIGNIFICANT CHANGE UP (ref 3.8–10.5)

## 2024-05-20 PROCEDURE — 32659 THORACOSCOPY W/SAC DRAINAGE: CPT | Mod: 22

## 2024-05-20 PROCEDURE — 71045 X-RAY EXAM CHEST 1 VIEW: CPT | Mod: 26

## 2024-05-20 PROCEDURE — 88112 CYTOPATH CELL ENHANCE TECH: CPT | Mod: 26

## 2024-05-20 PROCEDURE — 99253 IP/OBS CNSLTJ NEW/EST LOW 45: CPT | Mod: 57

## 2024-05-20 PROCEDURE — 99254 IP/OBS CNSLTJ NEW/EST MOD 60: CPT | Mod: 24

## 2024-05-20 PROCEDURE — 88305 TISSUE EXAM BY PATHOLOGIST: CPT | Mod: 26

## 2024-05-20 PROCEDURE — 99024 POSTOP FOLLOW-UP VISIT: CPT

## 2024-05-20 PROCEDURE — 32652 THORACOSCOPY REM TOTL CORTEX: CPT | Mod: 22,RT

## 2024-05-20 DEVICE — KIT A-LINE 1LUM 20G X 12CM SAFE KIT: Type: IMPLANTABLE DEVICE | Status: FUNCTIONAL

## 2024-05-20 DEVICE — ARISTA 1GR: Type: IMPLANTABLE DEVICE | Status: FUNCTIONAL

## 2024-05-20 RX ORDER — HYDROMORPHONE HYDROCHLORIDE 2 MG/ML
30 INJECTION INTRAMUSCULAR; INTRAVENOUS; SUBCUTANEOUS
Refills: 0 | Status: DISCONTINUED | OUTPATIENT
Start: 2024-05-20 | End: 2024-05-25

## 2024-05-20 RX ORDER — NALOXONE HYDROCHLORIDE 4 MG/.1ML
0.1 SPRAY NASAL
Refills: 0 | Status: DISCONTINUED | OUTPATIENT
Start: 2024-05-20 | End: 2024-05-25

## 2024-05-20 RX ORDER — ONDANSETRON 8 MG/1
4 TABLET, FILM COATED ORAL EVERY 6 HOURS
Refills: 0 | Status: DISCONTINUED | OUTPATIENT
Start: 2024-05-20 | End: 2024-05-25

## 2024-05-20 RX ORDER — ONDANSETRON 8 MG/1
4 TABLET, FILM COATED ORAL ONCE
Refills: 0 | Status: DISCONTINUED | OUTPATIENT
Start: 2024-05-20 | End: 2024-05-21

## 2024-05-20 RX ORDER — SODIUM CHLORIDE 9 MG/ML
1000 INJECTION, SOLUTION INTRAVENOUS
Refills: 0 | Status: DISCONTINUED | OUTPATIENT
Start: 2024-05-20 | End: 2024-05-21

## 2024-05-20 RX ORDER — HYDROMORPHONE HYDROCHLORIDE 2 MG/ML
0.5 INJECTION INTRAMUSCULAR; INTRAVENOUS; SUBCUTANEOUS
Refills: 0 | Status: DISCONTINUED | OUTPATIENT
Start: 2024-05-20 | End: 2024-05-21

## 2024-05-20 RX ORDER — HYDROMORPHONE HYDROCHLORIDE 2 MG/ML
0.5 INJECTION INTRAMUSCULAR; INTRAVENOUS; SUBCUTANEOUS
Refills: 0 | Status: DISCONTINUED | OUTPATIENT
Start: 2024-05-20 | End: 2024-05-25

## 2024-05-20 RX ADMIN — CHLORHEXIDINE GLUCONATE 15 MILLILITER(S): 213 SOLUTION TOPICAL at 18:09

## 2024-05-20 RX ADMIN — Medication 1 MILLIGRAM(S): at 11:11

## 2024-05-20 RX ADMIN — CHLORHEXIDINE GLUCONATE 1 APPLICATION(S): 213 SOLUTION TOPICAL at 18:09

## 2024-05-20 RX ADMIN — Medication 100 MILLIGRAM(S): at 11:10

## 2024-05-20 RX ADMIN — SODIUM CHLORIDE 75 MILLILITER(S): 9 INJECTION, SOLUTION INTRAVENOUS at 22:51

## 2024-05-20 RX ADMIN — HYDROMORPHONE HYDROCHLORIDE 0.5 MILLIGRAM(S): 2 INJECTION INTRAMUSCULAR; INTRAVENOUS; SUBCUTANEOUS at 23:11

## 2024-05-20 RX ADMIN — PANTOPRAZOLE SODIUM 40 MILLIGRAM(S): 20 TABLET, DELAYED RELEASE ORAL at 06:17

## 2024-05-20 RX ADMIN — HYDROMORPHONE HYDROCHLORIDE 0.5 MILLIGRAM(S): 2 INJECTION INTRAMUSCULAR; INTRAVENOUS; SUBCUTANEOUS at 23:25

## 2024-05-20 RX ADMIN — Medication 25 MILLIGRAM(S): at 06:18

## 2024-05-20 RX ADMIN — AMIODARONE HYDROCHLORIDE 200 MILLIGRAM(S): 400 TABLET ORAL at 06:17

## 2024-05-20 RX ADMIN — Medication 50 MICROGRAM(S): at 06:18

## 2024-05-20 RX ADMIN — BUMETANIDE 2 MILLIGRAM(S): 0.25 INJECTION INTRAMUSCULAR; INTRAVENOUS at 13:15

## 2024-05-20 RX ADMIN — HYDROMORPHONE HYDROCHLORIDE 30 MILLILITER(S): 2 INJECTION INTRAMUSCULAR; INTRAVENOUS; SUBCUTANEOUS at 23:15

## 2024-05-20 RX ADMIN — BUMETANIDE 2 MILLIGRAM(S): 0.25 INJECTION INTRAMUSCULAR; INTRAVENOUS at 06:18

## 2024-05-20 RX ADMIN — HYDROMORPHONE HYDROCHLORIDE 0.5 MILLIGRAM(S): 2 INJECTION INTRAMUSCULAR; INTRAVENOUS; SUBCUTANEOUS at 22:51

## 2024-05-20 RX ADMIN — HYDROMORPHONE HYDROCHLORIDE 0.5 MILLIGRAM(S): 2 INJECTION INTRAMUSCULAR; INTRAVENOUS; SUBCUTANEOUS at 23:14

## 2024-05-20 RX ADMIN — Medication 25 MILLIGRAM(S): at 13:15

## 2024-05-20 NOTE — PROGRESS NOTE ADULT - PROBLEM SELECTOR PLAN 1
IR consulted for pericardial effusion drainage determined No percutaneous window is available for drainage  Thoracic consulted for  pericardial window   NPO for procedure today

## 2024-05-20 NOTE — PROGRESS NOTE ADULT - ASSESSMENT
63M admitted to [facility] 2/5/24 PMHx HTN, GERD, HPL, chronic venous stasis dermatitis with bilateral leg swelling, and AS presented to [facility] on 1/25/2024 for bilateral leg pain and swelling found to have new acute HFrEF (EF 35-40% 1/2024) in setting of severe AS. 2/23: AVR/JOANNA Clip c/b intraop bleeding requiring multiple blood products; postop prolonged inotropic support/ CHF following and afib. TTE revealed pericardial effusion returned to OR s/p 3/6 subxiphoid drainage and pericardial window

## 2024-05-20 NOTE — PRE-ANESTHESIA EVALUATION ADULT - NSANTHPMHFT_GEN_ALL_CORE
63M PMH HTN, HLD s/p AVR c/b pericardial effusion s/p pericardial window, now presents with another effusion.  Low-normal LVSF, dec RV function, no evidence of tamponade.

## 2024-05-20 NOTE — CONSULT NOTE ADULT - ASSESSMENT
63y Male found to have recurrent pericardial effusion sent in from [facility]   with history of   2/23   AVR/JOANNA complicated by pericardial effusion underwent post op Subxiphoid window   3/6/24. Dr High,  CTS  asked to see pt for recurrent pericardial effusion management.
63M admitted to [facility] 2/5/24 PMHx HTN, GERD, HPL, chronic venous stasis dermatitis with bilateral leg swelling, and AS presented to [facility] on 1/25/2024 for bilateral leg pain and swelling found to have new acute HFrEF (EF 35-40% 1/2024) in setting of severe AS. 2/23: AVR/JOANNA Clip c/b intraop bleeding requiring multiple blood products; postop prolonged inotropic support/ CHF following and afib. TTE revealed pericardial effusion returned to OR s/p 3/6 subxiphoid drainage and pericardial window

## 2024-05-20 NOTE — PROGRESS NOTE ADULT - ASSESSMENT
63M admitted to [facility] 2/5/24 PMHx HTN, GERD, HPL, chronic venous stasis dermatitis with bilateral leg swelling, and AS presented to [facility] on 1/25/2024 for bilateral leg pain and swelling found to have new acute HFrEF (EF 35-40% 1/2024) in setting of severe AS. 2/23: AVR/JOANNA Clip c/b intraop bleeding requiring multiple blood products; postop prolonged inotropic support/ CHF following and afib. TTE revealed pericardial effusion returned to OR s/p 3/6 subxiphoid drainage and pericardial window.  Cefepime 2 Grams IV Q 8 hours per ID for (+)Serratia marcescens bacteremia. pt discharged home 3/12. pt was seen at his cardiologist's office today- Dr. Mesa where a repeat echo was done showing a large pericardial effusion; pt admitted for futher evaluation and treatment. VSS; RSR 60-80; pt stable at this time. Pt denies cp/sob or palpitations.   5/18 IR consulted for pericardial effusion drainage determined No percutaneous window is available for drainage  Thoracic consulted for possible pericardial window vs. pericardectomy  5/20 NPO for pericardial window today. VSS.

## 2024-05-20 NOTE — CONSULT NOTE ADULT - SUBJECTIVE AND OBJECTIVE BOX
Interventional Radiology    HPI: 63 year old male with Hx of HTN, GERD, dyslipidemia, chronic intermittent leg pain, aortic stenosis, chronic venous stasis dermatitis, recent aortic valve replacement, presents to the ER for chills. patient with recent hospital admission for aortic valve replacement, was hypoxic given 2L at the time. patient was also noted to be bacteriemic, started on cefepime. patient here today for chills. states started a day ago. states chills have been intermittent. states has dyspnea on exertion for the past few months. denies rectal bleeding. denies cough or fevers. denies abdominal pain. denies urinary symptoms    Allergies: No Known Drug Allergies    Medications (Abx/Cardiac/Anticoagulation/Blood Products)      Data:  167.6  77.3  T(C): 36.6  HR: 70  BP: 127/90  RR: 18  SpO2: 95%      Assessment/Plan: 62 yo M with outpatient echo demonstrating large pericardial effusion, stable on the floor    -- Please obtain CT chest, IR to follow  -- NPO after midnight  -- Continue to hold AC  --D/w Dr. Martinez. Patient stable at this time.     Shan Gaspar MD   Interventional Radiology PGY 5  Available on [employer] TEAMS    For EMERGENT inquiries/questions:  IR Pager ([facility]): [phone]  IR Pager ([facility]): [phone] ; [phone]    For non-emergent consults/questions:   Please place a sunrise order "Consult- Interventional Radiology" with an appropriate callback number    For questions about scheduling during appropriate work hours, call IR :  [facility]: [phone]  [facility]: [phone]    For outpatient IR booking:  [facility]: [phone]  [facility]: [phone]
History of Present Illness:  63y Male found to have recurrent pericardial effusion sent in from Private [facility]   with history of   2/23   AVR/JOANNA complicated by pericardial effusion underwent post op Subxiphoid window   3/6/24.  Denies fever,  chills or chest pain  .  Dr High,  CTS  asked to see pt for recurrent pericardial effusion management.    Past Medical History  Dyslipidemia    Hypertension    Chronic GERD    History of aortic stenosis    Cellulitis    Chronic venous stasis dermatitis        Past Surgical History  No Past Medical History    Brain cancer    No significant past surgical history    S/P aortic valve replacement    S/P pericardial window creation        MEDICATIONS  (STANDING):  allopurinol 100 milliGRAM(s) Oral daily  aMIOdarone    Tablet 200 milliGRAM(s) Oral daily  buMETAnide 2 milliGRAM(s) Oral two times a day  cefuroxime  IVPB 1500 milliGRAM(s) IV Intermittent once  chlorhexidine 0.12% Liquid 15 milliLiter(s) Swish and Spit two times a day  chlorhexidine 4% Liquid 1 Application(s) Topical two times a day  folic acid 1 milliGRAM(s) Oral daily  hydrALAZINE 25 milliGRAM(s) Oral three times a day  levothyroxine 50 MICROGram(s) Oral daily  pantoprazole    Tablet 40 milliGRAM(s) Oral before breakfast    MEDICATIONS  (PRN):  acetaminophen     Tablet .. 650 milliGRAM(s) Oral every 6 hours PRN Mild Pain (1 - 3)  melatonin 3 milliGRAM(s) Oral at bedtime PRN Insomni    Allergies: garlic (Angioedema; Swelling; Anaphylaxis)  No Known Drug Allergies      SOCIAL HISTORY:  Smoker:  [ ] No        ETOH use: [ ] No           Ilicit Drug use:  [ ] No  Assist device use:  no    Relevant Family History  FAMILY HISTORY:  FH: congestive heart failure (Father, Sibling)    FH: coronary artery disease (Sibling)    FH: brain cancer (Mother)        Review of Systems  GENERAL:  denies     Fevers/chills[] sweats/ fatigue/ weight loss[] weight gain []                                        NEURO:   no  parathesias] seizures  syncope confusion []                                                                                  EYES:   no glasses/  blurry vision[]  discharge[] pain[] glaucoma []                                                                            ENMT:   no  difficulty hearing []  vertigo[]  dysphagia[] epistaxis[] recent dental work []                                      CV:    denies chest pain npalpitations no  ] VANCE [] diaphoresis [] edema[]                                                                                             RESPIRATORY:  wheezing[] SOB[] cough [] sputum[] hemoptysis[]                                                                    GI:  nausea[]  vomiting []  diarrhea[] constipation [] melena []                                                                        :    no hematuria/ ]  dysuria[ ] urgency[] incontinence[]                                                                                              MUSKULOSKELETAL:    no  arthritis/  joint swelling [ ] muscle weakness [ ]                                                                  SKIN/BREAST:  no   rash/   itching [ ]  hair loss[ ] masses[ ]                                                                                                PSYCH:   no  d  ementia/depresion [ ] anxiety[ ]                                                                                                                  HEME/LYMPH:     no bruises easil/ ] enlarged lymph nodes[ ] tender lymph nodes[ ]                                                 ENDOCRINE:  no   cold intolerance/  heat intolerance[ ] polydipsia[ ]                                                                              PHYSICAL EXAM  Vital Signs Last 24 Hrs  T(C): 36.7 (20 May 2024 04:17), Max: 37 (19 May 2024 20:53)  T(F): 98.1 (20 May 2024 04:17), Max: 98.6 (19 May 2024 20:53)  HR: 59 (20 May 2024 04:17) (59 - 86)  BP: 135/73 (20 May 2024 04:17) (135/73 - 153/52)  BP(mean): 86 (19 May 2024 20:53) (86 - 86)  RR: 18 (20 May 2024 04:17) (18 - 18)  SpO2: 94% (20 May 2024 04:17) (94% - 97%)    Parameters below as of 20 May 2024 04:17  Patient On (Oxygen Delivery Method): room air        General: Well nourished, well developed, no acute distress.                                                         Neuro: Normal exam oriented to person/place & time with no focal motor or sensory  deficits.                    Eyes: Normal exam of conjunctiva & lids, pupils equally reactive.   ENT: Normal exam of nasal/oral mucosa with absence of cyanosis.   Neck: Normal exam of jugular veins, trachea & thyroid.   Chest: Normal lung exam with good air movement absence of wheezes, rales, or rhonchi:                                                                          CV:  Auscultation: normal [x ] S3[ ] S4[ ] Irregular [ ] Rub[ ] Clicks[ ]  Murmurs none:[x ]systolic [ ]  diastolic [ ] holosystolic [ ]  Carotids: No Bruits[ x] Other____________ Abdominal Aorta: normal [ ] nonpalpable[ ]                                                                         GI: Normal exam of abdomen, liver & spleen with no noted masses or tenderness.                                                                                              Extremities: Normal no evidence of cyanosis or deformity Edema: none[ ]trace[ ]1+[ ]2+[ ]3+[ ]4+[ ]  Lower Extremity Pulses: Right[ ] Left[ ]Varicosities[ ]  SKIN : Normal exam to inspection & palation.                                                           LABS:                        8.5    9.72  )-----------( 252      ( 20 May 2024 05:59 )             26.2     05-20    142  |  101  |  33<H>  ----------------------------<  98  3.6   |  29  |  1.47<H>    Ca    9.3      20 May 2024 05:59    TPro  6.9  /  Alb  3.4  /  TBili  0.5  /  DBili  x   /  AST  11  /  ALT  14  /  AlkPhos  140<H>  05-20      Urinalysis Basic - ( 20 May 2024 05:59 )    Color: x / Appearance: x / SG: x / pH: x  Gluc: 98 mg/dL / Ketone: x  / Bili: x / Urobili: x   Blood: x / Protein: x / Nitrite: x   Leuk Esterase: x / RBC: x / WBC x   Sq Epi: x / Non Sq Epi: x / Bacteria: x              TTE / LESLEY:   CONCLUSIONS:      1. Compared to the transthoracic echocardiogram performed on 4/18/2024, the pericardial effusion located posterior to the left ventricle is larger.   2. Large pericardial effusion noted adjacent to the right atrium, moderate pericardial effusion noted adjacent to the posterior left ventricle and small pericardial effusion noted adjacent to the right ventricle with no evidence of hemodynamic compromise (or echocardiographic evidence of cardiac tamponade).            
DATE OF SERVICE: 05-19-24 @ 13:24    Patient is a 63y old  Male who presents with a chief complaint of PERICARDIAL EFFUSION (18 May 2024 11:33)      HPI:    63M, appears older than stated age, admitted to [facility] 2/5/24 PMHx HTN, GERD, HPL, chronic venous stasis dermatitis with bilateral leg swelling, and AS presented to [facility] on 1/25/2024 for bilateral leg pain and swelling found to have new acute HFrEF (EF 35-40% 1/2024) in setting of severe AS. 2/23: AVR/JOANNA Clip c/b intra-op bleeding requiring multiple blood products; postop prolonged inotropic support/ CHF following and afib. TTE revealed pericardial effusion returned to OR s/p 3/6 subxiphoid drainage and pericardial window.  Cefepime 2 Grams IV Q 8 hours per ID for (+)Serratia marcescens bacteremia. pt discharged home 3/12. pt was seen at his cardiologist's office today- Dr. Mesa where a repeat echo was done showing a large pericardial effusion; pt admitted for further evaluation and treatment.    PAST MEDICAL & SURGICAL HISTORY:  Dyslipidemia      Hypertension      Chronic GERD      History of aortic stenosis      Chronic venous stasis dermatitis      S/P aortic valve replacement      S/P pericardial window creation          Review of Systems:   CONSTITUTIONAL: No fever, weight loss, or fatigue  EYES: No eye pain, visual disturbances, or discharge  ENMT:  No difficulty hearing, tinnitus, vertigo; No sinus or throat pain  NECK: No pain or stiffness  RESPIRATORY: No cough, wheezing, chills or hemoptysis; No shortness of breath  CARDIOVASCULAR: No chest pain, palpitations, dizziness, leg swelling or sob  GASTROINTESTINAL: No abdominal pain. No nausea, vomiting, diarrhea or constipation  GENITOURINARY: No dysuria, frequency, hematuria, or incontinence  NEUROLOGICAL: No headaches, memory loss, loss of strength, numbness, or tremors      Allergies    garlic (Angioedema; Swelling; Anaphylaxis)  No Known Drug Allergies        Social History: non smoker  no IVDA  no ETOH abuse   lives with family     FAMILY HISTORY:  FH: congestive heart failure (Father, Sibling)    FH: coronary artery disease (Sibling)    FH: brain cancer (Mother)        MEDICATIONS  (STANDING):  allopurinol 100 milliGRAM(s) Oral daily  aMIOdarone    Tablet 200 milliGRAM(s) Oral daily  buMETAnide 2 milliGRAM(s) Oral two times a day  cefuroxime  IVPB 1500 milliGRAM(s) IV Intermittent once  chlorhexidine 0.12% Liquid 15 milliLiter(s) Swish and Spit two times a day  chlorhexidine 4% Liquid 1 Application(s) Topical two times a day  folic acid 1 milliGRAM(s) Oral daily  hydrALAZINE 25 milliGRAM(s) Oral three times a day  levothyroxine 50 MICROGram(s) Oral daily  pantoprazole    Tablet 40 milliGRAM(s) Oral before breakfast    MEDICATIONS  (PRN):  acetaminophen     Tablet .. 650 milliGRAM(s) Oral every 6 hours PRN Mild Pain (1 - 3)  melatonin 3 milliGRAM(s) Oral at bedtime PRN Insomnia      CAPILLARY BLOOD GLUCOSE        I&O's Summary    18 May 2024 07:01  -  19 May 2024 07:00  --------------------------------------------------------  IN: 100 mL / OUT: 0 mL / NET: 100 mL        24hrs Vital:  T(C): 36.6 (05-19-24 @ 12:30), Max: 37 (05-18-24 @ 20:54)  HR: 71 (05-19-24 @ 12:30) (67 - 71)  BP: 145/88 (05-19-24 @ 12:30) (138/83 - 153/88)  RR: 18 (05-19-24 @ 12:30) (18 - 18)  SpO2: 95% (05-19-24 @ 12:30) (94% - 95%)    PHYSICAL EXAM:  GENERAL: NAD, well-developed  HEAD:  Atraumatic, Normocephalic  EYES: EOMI, PERRLA, conjunctiva and sclera clear  NECK: Supple, No JVD  CHEST/LUNG: Clear  HEART: S1S2; ejection systolic murmur +   ABDOMEN: Soft, Nontender  EXTREMITIES:   no edema   NEUROLOGY: Alert, no focal motor or sensory deficits      LABS:                        8.3    8.54  )-----------( 249      ( 19 May 2024 06:31 )             25.2     05-19    139  |  101  |  30<H>  ----------------------------<  80  4.0   |  27  |  1.44<H>    Ca    9.2      19 May 2024 06:31    TPro  7.0  /  Alb  3.3  /  TBili  0.6  /  DBili  x   /  AST  12  /  ALT  15  /  AlkPhos  137<H>  05-19    PT/INR - ( 17 May 2024 17:02 )   PT: 13.1 sec;   INR: 1.20 ratio         PTT - ( 17 May 2024 17:02 )  PTT:28.6 sec      Urinalysis Basic - ( 19 May 2024 06:31 )    Color: x / Appearance: x / SG: x / pH: x  Gluc: 80 mg/dL / Ketone: x  / Bili: x / Urobili: x   Blood: x / Protein: x / Nitrite: x   Leuk Esterase: x / RBC: x / WBC x   Sq Epi: x / Non Sq Epi: x / Bacteria: x        RADIOLOGY & ADDITIONAL TESTS:    Consultant(s) Notes Reviewed:      Care Discussed with Consultants/Other Providers:

## 2024-05-20 NOTE — PRE-OP CHECKLIST - SPO2 (%)
DISPLAY PLAN FREE TEXT
97

## 2024-05-20 NOTE — BRIEF OPERATIVE NOTE - OPERATION/FINDINGS
Right VATS, 2 ports, lysis of adhesions, pericardial window with drainage of 50cc serous fluid from effusion over right atrium, raegan drain left in pericardium.

## 2024-05-21 DIAGNOSIS — R33.9 RETENTION OF URINE, UNSPECIFIED: ICD-10-CM

## 2024-05-21 LAB
ALBUMIN SERPL ELPH-MCNC: 3.7 G/DL — SIGNIFICANT CHANGE UP (ref 3.3–5)
ALP SERPL-CCNC: 153 U/L — HIGH (ref 40–120)
ALT FLD-CCNC: 13 U/L — SIGNIFICANT CHANGE UP (ref 10–45)
ANION GAP SERPL CALC-SCNC: 13 MMOL/L — SIGNIFICANT CHANGE UP (ref 5–17)
AST SERPL-CCNC: 15 U/L — SIGNIFICANT CHANGE UP (ref 10–40)
BILIRUB SERPL-MCNC: 0.5 MG/DL — SIGNIFICANT CHANGE UP (ref 0.2–1.2)
BUN SERPL-MCNC: 30 MG/DL — HIGH (ref 7–23)
CALCIUM SERPL-MCNC: 9.3 MG/DL — SIGNIFICANT CHANGE UP (ref 8.4–10.5)
CHLORIDE SERPL-SCNC: 99 MMOL/L — SIGNIFICANT CHANGE UP (ref 96–108)
CO2 SERPL-SCNC: 26 MMOL/L — SIGNIFICANT CHANGE UP (ref 22–31)
CREAT SERPL-MCNC: 1.39 MG/DL — HIGH (ref 0.5–1.3)
EGFR: 57 ML/MIN/1.73M2 — LOW
GLUCOSE SERPL-MCNC: 178 MG/DL — HIGH (ref 70–99)
GRAM STN FLD: SIGNIFICANT CHANGE UP
HCT VFR BLD CALC: 30.5 % — LOW (ref 39–50)
HGB BLD-MCNC: 10.3 G/DL — LOW (ref 13–17)
MCHC RBC-ENTMCNC: 23.5 PG — LOW (ref 27–34)
MCHC RBC-ENTMCNC: 33.8 GM/DL — SIGNIFICANT CHANGE UP (ref 32–36)
MCV RBC AUTO: 69.6 FL — LOW (ref 80–100)
NIGHT BLUE STAIN TISS: SIGNIFICANT CHANGE UP
NRBC # BLD: 0 /100 WBCS — SIGNIFICANT CHANGE UP (ref 0–0)
PLATELET # BLD AUTO: 265 K/UL — SIGNIFICANT CHANGE UP (ref 150–400)
POTASSIUM SERPL-MCNC: 4.5 MMOL/L — SIGNIFICANT CHANGE UP (ref 3.5–5.3)
POTASSIUM SERPL-SCNC: 4.5 MMOL/L — SIGNIFICANT CHANGE UP (ref 3.5–5.3)
PROT SERPL-MCNC: 7.7 G/DL — SIGNIFICANT CHANGE UP (ref 6–8.3)
RBC # BLD: 4.38 M/UL — SIGNIFICANT CHANGE UP (ref 4.2–5.8)
RBC # FLD: 25 % — HIGH (ref 10.3–14.5)
SODIUM SERPL-SCNC: 138 MMOL/L — SIGNIFICANT CHANGE UP (ref 135–145)
SPECIMEN SOURCE: SIGNIFICANT CHANGE UP
SPECIMEN SOURCE: SIGNIFICANT CHANGE UP
WBC # BLD: 15.45 K/UL — HIGH (ref 3.8–10.5)
WBC # FLD AUTO: 15.45 K/UL — HIGH (ref 3.8–10.5)

## 2024-05-21 PROCEDURE — 71045 X-RAY EXAM CHEST 1 VIEW: CPT | Mod: 26

## 2024-05-21 RX ORDER — FOLIC ACID 0.8 MG
1 TABLET ORAL DAILY
Refills: 0 | Status: DISCONTINUED | OUTPATIENT
Start: 2024-05-21 | End: 2024-05-28

## 2024-05-21 RX ORDER — PANTOPRAZOLE SODIUM 20 MG/1
40 TABLET, DELAYED RELEASE ORAL
Refills: 0 | Status: DISCONTINUED | OUTPATIENT
Start: 2024-05-21 | End: 2024-05-28

## 2024-05-21 RX ORDER — CEFUROXIME AXETIL 250 MG
1500 TABLET ORAL ONCE
Refills: 0 | Status: DISCONTINUED | OUTPATIENT
Start: 2024-05-21 | End: 2024-05-21

## 2024-05-21 RX ORDER — LANOLIN ALCOHOL/MO/W.PET/CERES
3 CREAM (GRAM) TOPICAL AT BEDTIME
Refills: 0 | Status: DISCONTINUED | OUTPATIENT
Start: 2024-05-21 | End: 2024-05-28

## 2024-05-21 RX ORDER — TAMSULOSIN HYDROCHLORIDE 0.4 MG/1
0.4 CAPSULE ORAL AT BEDTIME
Refills: 0 | Status: DISCONTINUED | OUTPATIENT
Start: 2024-05-21 | End: 2024-05-22

## 2024-05-21 RX ORDER — BUMETANIDE 0.25 MG/ML
2 INJECTION INTRAMUSCULAR; INTRAVENOUS
Refills: 0 | Status: DISCONTINUED | OUTPATIENT
Start: 2024-05-21 | End: 2024-05-28

## 2024-05-21 RX ORDER — ACETAMINOPHEN 500 MG
650 TABLET ORAL EVERY 6 HOURS
Refills: 0 | Status: DISCONTINUED | OUTPATIENT
Start: 2024-05-21 | End: 2024-05-28

## 2024-05-21 RX ORDER — LEVOTHYROXINE SODIUM 125 MCG
50 TABLET ORAL DAILY
Refills: 0 | Status: DISCONTINUED | OUTPATIENT
Start: 2024-05-21 | End: 2024-05-28

## 2024-05-21 RX ORDER — HYDRALAZINE HCL 50 MG
25 TABLET ORAL THREE TIMES A DAY
Refills: 0 | Status: DISCONTINUED | OUTPATIENT
Start: 2024-05-21 | End: 2024-05-28

## 2024-05-21 RX ORDER — ALLOPURINOL 300 MG
100 TABLET ORAL DAILY
Refills: 0 | Status: DISCONTINUED | OUTPATIENT
Start: 2024-05-21 | End: 2024-05-28

## 2024-05-21 RX ORDER — AMIODARONE HYDROCHLORIDE 400 MG/1
200 TABLET ORAL DAILY
Refills: 0 | Status: DISCONTINUED | OUTPATIENT
Start: 2024-05-21 | End: 2024-05-28

## 2024-05-21 RX ADMIN — Medication 100 MILLIGRAM(S): at 14:09

## 2024-05-21 RX ADMIN — BUMETANIDE 2 MILLIGRAM(S): 0.25 INJECTION INTRAMUSCULAR; INTRAVENOUS at 05:35

## 2024-05-21 RX ADMIN — Medication 25 MILLIGRAM(S): at 05:36

## 2024-05-21 RX ADMIN — HYDROMORPHONE HYDROCHLORIDE 30 MILLILITER(S): 2 INJECTION INTRAMUSCULAR; INTRAVENOUS; SUBCUTANEOUS at 00:40

## 2024-05-21 RX ADMIN — Medication 1 MILLIGRAM(S): at 14:09

## 2024-05-21 RX ADMIN — Medication 25 MILLIGRAM(S): at 21:28

## 2024-05-21 RX ADMIN — HYDROMORPHONE HYDROCHLORIDE 30 MILLILITER(S): 2 INJECTION INTRAMUSCULAR; INTRAVENOUS; SUBCUTANEOUS at 07:32

## 2024-05-21 RX ADMIN — Medication 25 MILLIGRAM(S): at 14:09

## 2024-05-21 RX ADMIN — TAMSULOSIN HYDROCHLORIDE 0.4 MILLIGRAM(S): 0.4 CAPSULE ORAL at 21:28

## 2024-05-21 RX ADMIN — BUMETANIDE 2 MILLIGRAM(S): 0.25 INJECTION INTRAMUSCULAR; INTRAVENOUS at 14:10

## 2024-05-21 RX ADMIN — PANTOPRAZOLE SODIUM 40 MILLIGRAM(S): 20 TABLET, DELAYED RELEASE ORAL at 05:35

## 2024-05-21 RX ADMIN — HYDROMORPHONE HYDROCHLORIDE 30 MILLILITER(S): 2 INJECTION INTRAMUSCULAR; INTRAVENOUS; SUBCUTANEOUS at 19:08

## 2024-05-21 RX ADMIN — Medication 50 MICROGRAM(S): at 05:36

## 2024-05-21 NOTE — PROGRESS NOTE ADULT - PROBLEM SELECTOR PLAN 1
s/p 5/20 Right VATS pericardial window  Maintain pericardial drain to water seal  Monitor drainage, strict I & Os  Acute pain following, pt on Dilaudid PCA for pain control  OOB to chair, ambulate  NO anticoagulation  Disposition: Home when medically cleared

## 2024-05-21 NOTE — PROGRESS NOTE ADULT - ASSESSMENT
63M admitted to [facility] 2/5/24 PMHx HTN, GERD, HPL, chronic venous stasis dermatitis with bilateral leg swelling, and AS presented to [facility] on 1/25/2024 for bilateral leg pain and swelling found to have new acute HFrEF (EF 35-40% 1/2024) in setting of severe AS. 2/23: AVR/JOANNA Clip c/b intraop bleeding requiring multiple blood products; postop prolonged inotropic support/ CHF following and afib. TTE revealed pericardial effusion returned to OR s/p 3/6 subxiphoid drainage and pericardial window

## 2024-05-21 NOTE — PROGRESS NOTE ADULT - ASSESSMENT
63M admitted to [facility] 2/5/24 PMHx HTN, GERD, HPL, chronic venous stasis dermatitis with bilateral leg swelling, and AS presented to [facility] on 1/25/2024 for bilateral leg pain and swelling found to have new acute HFrEF (EF 35-40% 1/2024) in setting of severe AS. 2/23: AVR/JOANNA Clip c/b intraop bleeding requiring multiple blood products; postop prolonged inotropic support/ CHF following and afib. TTE revealed pericardial effusion returned to OR s/p 3/6 subxiphoid drainage and pericardial window.  Cefepime 2 Grams IV Q 8 hours per ID for (+)Serratia marcescens bacteremia. pt discharged home 3/12. pt was seen at his cardiologist's office today- Dr. Mesa where a repeat echo was done showing a large pericardial effusion; pt admitted for futher evaluation and treatment. VSS; RSR 60-80; pt stable at this time. Pt denies cp/sob or palpitations.   5/18 IR consulted for pericardial effusion drainage determined No percutaneous window is available for drainage  Thoracic consulted for possible pericardial window vs. pericardectomy  5/20 s/p Right VATS lysis of adhesions, pericardial window with drainage of 50cc serous fluid   5/21 VSS, right chest pericardial drain -180cc/24h, maintain to water seal today. Check CXR today. Olivares reinserted overnight for retention, started on Flomax.

## 2024-05-22 LAB
ALBUMIN SERPL ELPH-MCNC: 3.8 G/DL — SIGNIFICANT CHANGE UP (ref 3.3–5)
ALP SERPL-CCNC: 151 U/L — HIGH (ref 40–120)
ALT FLD-CCNC: 13 U/L — SIGNIFICANT CHANGE UP (ref 10–45)
ANION GAP SERPL CALC-SCNC: 14 MMOL/L — SIGNIFICANT CHANGE UP (ref 5–17)
AST SERPL-CCNC: 15 U/L — SIGNIFICANT CHANGE UP (ref 10–40)
BILIRUB SERPL-MCNC: 0.6 MG/DL — SIGNIFICANT CHANGE UP (ref 0.2–1.2)
BUN SERPL-MCNC: 38 MG/DL — HIGH (ref 7–23)
CALCIUM SERPL-MCNC: 9.3 MG/DL — SIGNIFICANT CHANGE UP (ref 8.4–10.5)
CHLORIDE SERPL-SCNC: 97 MMOL/L — SIGNIFICANT CHANGE UP (ref 96–108)
CO2 SERPL-SCNC: 27 MMOL/L — SIGNIFICANT CHANGE UP (ref 22–31)
CREAT SERPL-MCNC: 1.68 MG/DL — HIGH (ref 0.5–1.3)
EGFR: 45 ML/MIN/1.73M2 — LOW
GLUCOSE SERPL-MCNC: 82 MG/DL — SIGNIFICANT CHANGE UP (ref 70–99)
HCT VFR BLD CALC: 31.9 % — LOW (ref 39–50)
HGB BLD-MCNC: 10.1 G/DL — LOW (ref 13–17)
MCHC RBC-ENTMCNC: 21.4 PG — LOW (ref 27–34)
MCHC RBC-ENTMCNC: 31.7 GM/DL — LOW (ref 32–36)
MCV RBC AUTO: 67.4 FL — LOW (ref 80–100)
NRBC # BLD: 0 /100 WBCS — SIGNIFICANT CHANGE UP (ref 0–0)
PLATELET # BLD AUTO: 293 K/UL — SIGNIFICANT CHANGE UP (ref 150–400)
POTASSIUM SERPL-MCNC: 4.6 MMOL/L — SIGNIFICANT CHANGE UP (ref 3.5–5.3)
POTASSIUM SERPL-SCNC: 4.6 MMOL/L — SIGNIFICANT CHANGE UP (ref 3.5–5.3)
PROT SERPL-MCNC: 7.6 G/DL — SIGNIFICANT CHANGE UP (ref 6–8.3)
RBC # BLD: 4.73 M/UL — SIGNIFICANT CHANGE UP (ref 4.2–5.8)
RBC # FLD: 24.4 % — HIGH (ref 10.3–14.5)
SODIUM SERPL-SCNC: 138 MMOL/L — SIGNIFICANT CHANGE UP (ref 135–145)
WBC # BLD: 17.14 K/UL — HIGH (ref 3.8–10.5)
WBC # FLD AUTO: 17.14 K/UL — HIGH (ref 3.8–10.5)

## 2024-05-22 PROCEDURE — 99024 POSTOP FOLLOW-UP VISIT: CPT

## 2024-05-22 RX ORDER — TAMSULOSIN HYDROCHLORIDE 0.4 MG/1
0.8 CAPSULE ORAL AT BEDTIME
Refills: 0 | Status: DISCONTINUED | OUTPATIENT
Start: 2024-05-22 | End: 2024-05-28

## 2024-05-22 RX ADMIN — AMIODARONE HYDROCHLORIDE 200 MILLIGRAM(S): 400 TABLET ORAL at 06:08

## 2024-05-22 RX ADMIN — PANTOPRAZOLE SODIUM 40 MILLIGRAM(S): 20 TABLET, DELAYED RELEASE ORAL at 06:08

## 2024-05-22 RX ADMIN — BUMETANIDE 2 MILLIGRAM(S): 0.25 INJECTION INTRAMUSCULAR; INTRAVENOUS at 06:07

## 2024-05-22 RX ADMIN — Medication 25 MILLIGRAM(S): at 06:07

## 2024-05-22 RX ADMIN — Medication 25 MILLIGRAM(S): at 21:19

## 2024-05-22 RX ADMIN — Medication 50 MICROGRAM(S): at 06:08

## 2024-05-22 RX ADMIN — Medication 1 MILLIGRAM(S): at 11:22

## 2024-05-22 RX ADMIN — HYDROMORPHONE HYDROCHLORIDE 30 MILLILITER(S): 2 INJECTION INTRAMUSCULAR; INTRAVENOUS; SUBCUTANEOUS at 07:12

## 2024-05-22 RX ADMIN — Medication 100 MILLIGRAM(S): at 11:21

## 2024-05-22 RX ADMIN — BUMETANIDE 2 MILLIGRAM(S): 0.25 INJECTION INTRAMUSCULAR; INTRAVENOUS at 13:06

## 2024-05-22 RX ADMIN — HYDROMORPHONE HYDROCHLORIDE 30 MILLILITER(S): 2 INJECTION INTRAMUSCULAR; INTRAVENOUS; SUBCUTANEOUS at 19:16

## 2024-05-22 RX ADMIN — Medication 25 MILLIGRAM(S): at 13:06

## 2024-05-22 RX ADMIN — TAMSULOSIN HYDROCHLORIDE 0.8 MILLIGRAM(S): 0.4 CAPSULE ORAL at 21:19

## 2024-05-22 NOTE — PROGRESS NOTE ADULT - ASSESSMENT
63M admitted to [facility] 2/5/24 PMHx HTN, GERD, HPL, chronic venous stasis dermatitis with bilateral leg swelling, and AS presented to [facility] on 1/25/2024 for bilateral leg pain and swelling found to have new acute HFrEF (EF 35-40% 1/2024) in setting of severe AS. 2/23: AVR/JOANNA Clip c/b intraop bleeding requiring multiple blood products; postop prolonged inotropic support/ CHF following and afib. TTE revealed pericardial effusion returned to OR s/p 3/6 subxiphoid drainage and pericardial window.  Cefepime 2 Grams IV Q 8 hours per ID for (+)Serratia marcescens bacteremia. pt discharged home 3/12. pt was seen at his cardiologist's office today- Dr. Mesa where a repeat echo was done showing a large pericardial effusion; pt admitted for futher evaluation and treatment. VSS; RSR 60-80; pt stable at this time. Pt denies cp/sob or palpitations.   5/18 IR consulted for pericardial effusion drainage determined No percutaneous window is available for drainage  Thoracic consulted for possible pericardial window vs. pericardectomy  5/20 s/p Right VATS lysis of adhesions, pericardial window with drainage of 50cc serous fluid   5/21 VSS, right chest pericardial drain -180cc/24h, maintain to water seal today. Check CXR today. Garcia reinserted overnight for retention, started on Flomax.   5/22 VSS right chest pericardial drain 160cc in 24hrs, c/w water seal today and keep drain in place per Dr. High. garcia in place.

## 2024-05-22 NOTE — PROGRESS NOTE ADULT - ASSESSMENT
63M admitted to [facility] 2/5/24 PMHx HTN, GERD, HPL, chronic venous stasis dermatitis with bilateral leg swelling, and AS presented to [facility] on 1/25/2024 for bilateral leg pain and swelling found to have new acute HFrEF (EF 35-40% 1/2024) in setting of severe AS. 2/23: AVR/JOANNA Clip c/b intraop bleeding requiring multiple blood products; postop prolonged inotropic support/ CHF following and afib. TTE revealed pericardial effusion returned to OR s/p 3/6 subxiphoid drainage and pericardial window

## 2024-05-22 NOTE — PROGRESS NOTE ADULT - ASSESSMENT
63M admitted to [facility] 2/5/24 PMHx HTN, GERD, HPL, chronic venous stasis dermatitis with bilateral leg swelling, and AS presented to [facility] on 1/25/2024 for bilateral leg pain and swelling found to have new acute HFrEF (EF 35-40% 1/2024) in setting of severe AS. 2/23: AVR/JOANNA Clip c/b intraop bleeding requiring multiple blood products; postop prolonged inotropic support/ CHF following and afib. TTE revealed pericardial effusion returned to OR s/p 3/6 subxiphoid drainage and pericardial window.  Cefepime 2 Grams IV Q 8 hours per ID for (+)Serratia marcescens bacteremia. pt discharged home 3/12. pt was seen at his cardiologist's office today- Dr. Mesa where a repeat echo was done showing a large pericardial effusion; pt admitted for futher evaluation and treatment. VSS; RSR 60-80; pt stable at this time. Pt denies cp/sob or palpitations.   5/18 IR consulted for pericardial effusion drainage determined No percutaneous window is available for drainage  Thoracic consulted for possible pericardial window vs. pericardectomy  5/20 s/p Right VATS lysis of adhesions, pericardial window with drainage of 50cc serous fluid   5/21 VSS, right chest pericardial drain -180cc/24h, maintain to water seal today. Check CXR today. Garcia reinserted overnight for retention, started on Flomax.   5/22 VSS  R jon dr 140cc/24.  H2O seal.  garcia in place.  inc flomax to .8

## 2024-05-23 ENCOUNTER — RESULT REVIEW (OUTPATIENT)
Age: 64
End: 2024-05-23

## 2024-05-23 LAB
ANION GAP SERPL CALC-SCNC: 13 MMOL/L — SIGNIFICANT CHANGE UP (ref 5–17)
BUN SERPL-MCNC: 34 MG/DL — HIGH (ref 7–23)
CALCIUM SERPL-MCNC: 9.5 MG/DL — SIGNIFICANT CHANGE UP (ref 8.4–10.5)
CHLORIDE SERPL-SCNC: 96 MMOL/L — SIGNIFICANT CHANGE UP (ref 96–108)
CO2 SERPL-SCNC: 27 MMOL/L — SIGNIFICANT CHANGE UP (ref 22–31)
CREAT SERPL-MCNC: 1.22 MG/DL — SIGNIFICANT CHANGE UP (ref 0.5–1.3)
EGFR: 67 ML/MIN/1.73M2 — SIGNIFICANT CHANGE UP
GLUCOSE SERPL-MCNC: 87 MG/DL — SIGNIFICANT CHANGE UP (ref 70–99)
HCT VFR BLD CALC: 32.3 % — LOW (ref 39–50)
HGB BLD-MCNC: 10.1 G/DL — LOW (ref 13–17)
MAGNESIUM SERPL-MCNC: 2 MG/DL — SIGNIFICANT CHANGE UP (ref 1.6–2.6)
MCHC RBC-ENTMCNC: 20.8 PG — LOW (ref 27–34)
MCHC RBC-ENTMCNC: 31.3 GM/DL — LOW (ref 32–36)
MCV RBC AUTO: 66.5 FL — LOW (ref 80–100)
NRBC # BLD: 0 /100 WBCS — SIGNIFICANT CHANGE UP (ref 0–0)
PHOSPHATE SERPL-MCNC: 3.7 MG/DL — SIGNIFICANT CHANGE UP (ref 2.5–4.5)
PLATELET # BLD AUTO: 250 K/UL — SIGNIFICANT CHANGE UP (ref 150–400)
POTASSIUM SERPL-MCNC: 3.7 MMOL/L — SIGNIFICANT CHANGE UP (ref 3.5–5.3)
POTASSIUM SERPL-SCNC: 3.7 MMOL/L — SIGNIFICANT CHANGE UP (ref 3.5–5.3)
RBC # BLD: 4.86 M/UL — SIGNIFICANT CHANGE UP (ref 4.2–5.8)
RBC # FLD: 24.3 % — HIGH (ref 10.3–14.5)
SODIUM SERPL-SCNC: 136 MMOL/L — SIGNIFICANT CHANGE UP (ref 135–145)
SURGICAL PATHOLOGY STUDY: SIGNIFICANT CHANGE UP
WBC # BLD: 12.1 K/UL — HIGH (ref 3.8–10.5)
WBC # FLD AUTO: 12.1 K/UL — HIGH (ref 3.8–10.5)

## 2024-05-23 PROCEDURE — 99024 POSTOP FOLLOW-UP VISIT: CPT

## 2024-05-23 PROCEDURE — 93308 TTE F-UP OR LMTD: CPT | Mod: 26

## 2024-05-23 PROCEDURE — 99232 SBSQ HOSP IP/OBS MODERATE 35: CPT | Mod: 24

## 2024-05-23 RX ORDER — POTASSIUM BICARBONATE 978 MG/1
25 TABLET, EFFERVESCENT ORAL ONCE
Refills: 0 | Status: COMPLETED | OUTPATIENT
Start: 2024-05-23 | End: 2024-05-23

## 2024-05-23 RX ADMIN — POTASSIUM BICARBONATE 25 MILLIEQUIVALENT(S): 978 TABLET, EFFERVESCENT ORAL at 09:25

## 2024-05-23 RX ADMIN — HYDROMORPHONE HYDROCHLORIDE 30 MILLILITER(S): 2 INJECTION INTRAMUSCULAR; INTRAVENOUS; SUBCUTANEOUS at 07:13

## 2024-05-23 RX ADMIN — Medication 50 MICROGRAM(S): at 05:12

## 2024-05-23 RX ADMIN — Medication 1 MILLIGRAM(S): at 08:40

## 2024-05-23 RX ADMIN — Medication 25 MILLIGRAM(S): at 22:11

## 2024-05-23 RX ADMIN — Medication 100 MILLIGRAM(S): at 08:40

## 2024-05-23 RX ADMIN — PANTOPRAZOLE SODIUM 40 MILLIGRAM(S): 20 TABLET, DELAYED RELEASE ORAL at 05:12

## 2024-05-23 RX ADMIN — Medication 25 MILLIGRAM(S): at 05:12

## 2024-05-23 RX ADMIN — Medication 25 MILLIGRAM(S): at 15:29

## 2024-05-23 RX ADMIN — HYDROMORPHONE HYDROCHLORIDE 0.5 MILLIGRAM(S): 2 INJECTION INTRAMUSCULAR; INTRAVENOUS; SUBCUTANEOUS at 20:00

## 2024-05-23 RX ADMIN — BUMETANIDE 2 MILLIGRAM(S): 0.25 INJECTION INTRAMUSCULAR; INTRAVENOUS at 05:12

## 2024-05-23 RX ADMIN — TAMSULOSIN HYDROCHLORIDE 0.8 MILLIGRAM(S): 0.4 CAPSULE ORAL at 22:06

## 2024-05-23 RX ADMIN — AMIODARONE HYDROCHLORIDE 200 MILLIGRAM(S): 400 TABLET ORAL at 05:12

## 2024-05-23 RX ADMIN — BUMETANIDE 2 MILLIGRAM(S): 0.25 INJECTION INTRAMUSCULAR; INTRAVENOUS at 15:29

## 2024-05-23 RX ADMIN — HYDROMORPHONE HYDROCHLORIDE 30 MILLILITER(S): 2 INJECTION INTRAMUSCULAR; INTRAVENOUS; SUBCUTANEOUS at 22:12

## 2024-05-23 NOTE — DIETITIAN INITIAL EVALUATION ADULT - NSICDXPASTMEDICALHX_GEN_ALL_CORE_FT
PAST MEDICAL HISTORY:  Chronic GERD     Chronic venous stasis dermatitis     Dyslipidemia     History of aortic stenosis     Hypertension

## 2024-05-23 NOTE — DIETITIAN INITIAL EVALUATION ADULT - PERTINENT MEDS FT
MEDICATIONS  (STANDING):  allopurinol 100 milliGRAM(s) Oral daily  aMIOdarone    Tablet 200 milliGRAM(s) Oral daily  buMETAnide 2 milliGRAM(s) Oral two times a day  folic acid 1 milliGRAM(s) Oral daily  hydrALAZINE 25 milliGRAM(s) Oral three times a day  HYDROmorphone PCA (1 mG/mL) 30 milliLiter(s) PCA Continuous PCA Continuous  levothyroxine 50 MICROGram(s) Oral daily  pantoprazole    Tablet 40 milliGRAM(s) Oral before breakfast  tamsulosin 0.8 milliGRAM(s) Oral at bedtime    MEDICATIONS  (PRN):  acetaminophen     Tablet .. 650 milliGRAM(s) Oral every 6 hours PRN Mild Pain (1 - 3)  HYDROmorphone PCA (1 mG/mL) Rescue Clinician Bolus 0.5 milliGRAM(s) IV Push every 15 minutes PRN for Pain Scale GREATER THAN 6  melatonin 3 milliGRAM(s) Oral at bedtime PRN Insomnia  naloxone Injectable 0.1 milliGRAM(s) IV Push every 3 minutes PRN For ANY of the following changes in patient status:  A. RR LESS THAN 10 breaths per minute, B. Oxygen saturation LESS THAN 90%, C. Sedation score of 6  ondansetron Injectable 4 milliGRAM(s) IV Push every 6 hours PRN Nausea

## 2024-05-23 NOTE — PROGRESS NOTE ADULT - ASSESSMENT
63M admitted to [facility] 2/5/24 PMHx HTN, GERD, HPL, chronic venous stasis dermatitis with bilateral leg swelling, and AS presented to [facility] on 1/25/2024 for bilateral leg pain and swelling found to have new acute HFrEF (EF 35-40% 1/2024) in setting of severe AS. 2/23: AVR/JOANNA Clip c/b intraop bleeding requiring multiple blood products; postop prolonged inotropic support/ CHF following and afib. TTE revealed pericardial effusion returned to OR s/p 3/6 subxiphoid drainage and pericardial window.  Cefepime 2 Grams IV Q 8 hours per ID for (+)Serratia marcescens bacteremia. pt discharged home 3/12. pt was seen at his cardiologist's office today- Dr. Mesa where a repeat echo was done showing a large pericardial effusion; pt admitted for futher evaluation and treatment. VSS; RSR 60-80; pt stable at this time. Pt denies cp/sob or palpitations.   5/18 IR consulted for pericardial effusion drainage determined No percutaneous window is available for drainage  Thoracic consulted for possible pericardial window vs. pericardectomy  5/20 s/p Right VATS lysis of adhesions, pericardial window with drainage of 50cc serous fluid   5/21 VSS, right chest pericardial drain -180cc/24h, maintain to water seal today. Check CXR today. Garcia reinserted overnight for retention, started on Flomax.   5/22 VSS  R jon dr 140cc/24.  H2O seal.  garcia in place.  inc flomax to .8  5/23 VSS R pericardial

## 2024-05-23 NOTE — DIETITIAN INITIAL EVALUATION ADULT - NSFNSGIIOFT_GEN_A_CORE
05-22-24 @ 07:01  -  05-23-24 @ 07:00  --------------------------------------------------------  OUT:    Chest Tube (mL): 140 mL  Total OUT: 140 mL    Total NET: -140 mL  .  pt endorses some mild constipation. reports last BM Monday  .

## 2024-05-23 NOTE — PROGRESS NOTE ADULT - ASSESSMENT
63M admitted to [facility] 2/5/24 PMHx HTN, GERD, HPL, chronic venous stasis dermatitis with bilateral leg swelling, and AS presented to [facility] on 1/25/2024 for bilateral leg pain and swelling found to have new acute HFrEF (EF 35-40% 1/2024) in setting of severe AS. 2/23: AVR/JOANNA Clip c/b intraop bleeding requiring multiple blood products; postop prolonged inotropic support/ CHF following and afib. TTE revealed pericardial effusion returned to OR s/p 3/6 subxiphoid drainage and pericardial window

## 2024-05-23 NOTE — DIETITIAN INITIAL EVALUATION ADULT - PERTINENT LABORATORY DATA
05-23    136  |  96  |  34<H>  ----------------------------<  87  3.7   |  27  |  1.22    Ca    9.5      23 May 2024 06:09  Phos  3.7     05-23  Mg     2.0     05-23    TPro  7.6  /  Alb  3.8  /  TBili  0.6  /  DBili  x   /  AST  15  /  ALT  13  /  AlkPhos  151<H>  05-22  A1C with Estimated Average Glucose Result: 6.2 % (01-27-24 @ 09:00)

## 2024-05-23 NOTE — PROGRESS NOTE ADULT - ASSESSMENT
63M admitted to [facility] 2/5/24 PMHx HTN, GERD, HPL, chronic venous stasis dermatitis with bilateral leg swelling, and AS presented to [facility] on 1/25/2024 for bilateral leg pain and swelling found to have new acute HFrEF (EF 35-40% 1/2024) in setting of severe AS. 2/23: AVR/JOANNA Clip c/b intraop bleeding requiring multiple blood products; postop prolonged inotropic support/ CHF following and afib. TTE revealed pericardial effusion returned to OR s/p 3/6 subxiphoid drainage and pericardial window.  Cefepime 2 Grams IV Q 8 hours per ID for (+)Serratia marcescens bacteremia. pt discharged home 3/12. pt was seen at his cardiologist's office today- Dr. Mesa where a repeat echo was done showing a large pericardial effusion; pt admitted for futher evaluation and treatment. VSS; RSR 60-80; pt stable at this time. Pt denies cp/sob or palpitations.   5/18 IR consulted for pericardial effusion drainage determined No percutaneous window is available for drainage  Thoracic consulted for possible pericardial window vs. pericardectomy  5/20 s/p Right VATS lysis of adhesions, pericardial window with drainage of 50cc serous fluid   5/21 VSS, right chest pericardial drain -180cc/24h, maintain to water seal today. Check CXR today. Garcia reinserted overnight for retention, started on Flomax.   5/22 VSS right chest pericardial drain 160cc in 24hrs, c/w water seal today and keep drain in place per Dr. High. garcia in place.  5/23 VSS, pericardial drain in place 140cc in 24hrs, TTE today per Dr. High

## 2024-05-23 NOTE — PROGRESS NOTE ADULT - PROVIDER SPECIALTY LIST ADULT
Internal Medicine LABS:                        13.2   8.64  )-----------( 272      ( 07 Sep 2017 21:36 )             41.8     09-07    144  |  106  |  16  ----------------------------<  95  3.6   |  25  |  1.01    Ca    9.8      07 Sep 2017 21:36    TPro  7.2  /  Alb  4.4  /  TBili  0.3  /  DBili  x   /  AST  17  /  ALT  17  /  AlkPhos  117  09-07    PT/INR - ( 07 Sep 2017 21:36 )   PT: 10.4 SEC;   INR: 0.93          PTT - ( 07 Sep 2017 21:36 )  PTT:28.7 SEC    CAPILLARY BLOOD GLUCOSE      EKG shows NSR TWI in V1.  ms

## 2024-05-23 NOTE — DIETITIAN INITIAL EVALUATION ADULT - ADD RECOMMEND
1) Will continue to monitor PO intake, weight, labs, skin, GI status and diet 2) RD to add Mighty shake at meals to aid in meeting nutrient needs 3) continue folic acid per team 4) nutrition risk placed in chart 5) bowel regimen deferred to team. RD to add prunes daily Danger to others; mental illness expected to respond to inpatient care

## 2024-05-23 NOTE — DIETITIAN INITIAL EVALUATION ADULT - NS FNS WEIGHT CHANGE REASON
weight per [facility] HIE 5/29/23:  212.7 pounds  Most recent weight 5/22/24:  167.3 pounds  This is 21.3% x ~ 1 year, significant. RD will continue to monitor trends./unintentional

## 2024-05-23 NOTE — DIETITIAN INITIAL EVALUATION ADULT - PROBLEM SELECTOR PLAN 2
CT chest  done 1715  IR consulted  keep npo after midnight  ck coags  ck t & c  hold all anticoagulation- le compression device while not on dvt prophylaxis

## 2024-05-23 NOTE — PROGRESS NOTE ADULT - PROBLEM SELECTOR PLAN 1
s/p 5/20 Right VATS pericardial window  Maintain pericardial drain to water seal  TTE today   Monitor drainage, strict I & Os  Acute pain following, pt on Dilaudid PCA for pain control  OOB to chair, ambulate  NO anticoagulation  Disposition: Home when medically cleared

## 2024-05-23 NOTE — PROGRESS NOTE ADULT - PROBLEM SELECTOR PLAN 1
hemodynamically stable  s/p pericardial window  management per CTS  urinary retention resolved  passed TOV  Recommend continue Flomax on discharge

## 2024-05-24 LAB
ANION GAP SERPL CALC-SCNC: 12 MMOL/L — SIGNIFICANT CHANGE UP (ref 5–17)
BUN SERPL-MCNC: 33 MG/DL — HIGH (ref 7–23)
CALCIUM SERPL-MCNC: 9.3 MG/DL — SIGNIFICANT CHANGE UP (ref 8.4–10.5)
CHLORIDE SERPL-SCNC: 96 MMOL/L — SIGNIFICANT CHANGE UP (ref 96–108)
CO2 SERPL-SCNC: 30 MMOL/L — SIGNIFICANT CHANGE UP (ref 22–31)
CREAT SERPL-MCNC: 1.3 MG/DL — SIGNIFICANT CHANGE UP (ref 0.5–1.3)
EGFR: 62 ML/MIN/1.73M2 — SIGNIFICANT CHANGE UP
GLUCOSE SERPL-MCNC: 101 MG/DL — HIGH (ref 70–99)
HCT VFR BLD CALC: 31.6 % — LOW (ref 39–50)
HGB BLD-MCNC: 9.8 G/DL — LOW (ref 13–17)
MAGNESIUM SERPL-MCNC: 1.9 MG/DL — SIGNIFICANT CHANGE UP (ref 1.6–2.6)
MCHC RBC-ENTMCNC: 20 PG — LOW (ref 27–34)
MCHC RBC-ENTMCNC: 31 GM/DL — LOW (ref 32–36)
MCV RBC AUTO: 64.5 FL — LOW (ref 80–100)
NON-GYNECOLOGICAL CYTOLOGY STUDY: SIGNIFICANT CHANGE UP
NRBC # BLD: 0 /100 WBCS — SIGNIFICANT CHANGE UP (ref 0–0)
PHOSPHATE SERPL-MCNC: 4 MG/DL — SIGNIFICANT CHANGE UP (ref 2.5–4.5)
PLATELET # BLD AUTO: 242 K/UL — SIGNIFICANT CHANGE UP (ref 150–400)
POTASSIUM SERPL-MCNC: 3.9 MMOL/L — SIGNIFICANT CHANGE UP (ref 3.5–5.3)
POTASSIUM SERPL-SCNC: 3.9 MMOL/L — SIGNIFICANT CHANGE UP (ref 3.5–5.3)
RBC # BLD: 4.9 M/UL — SIGNIFICANT CHANGE UP (ref 4.2–5.8)
RBC # FLD: 23.7 % — HIGH (ref 10.3–14.5)
SODIUM SERPL-SCNC: 138 MMOL/L — SIGNIFICANT CHANGE UP (ref 135–145)
WBC # BLD: 9.37 K/UL — SIGNIFICANT CHANGE UP (ref 3.8–10.5)
WBC # FLD AUTO: 9.37 K/UL — SIGNIFICANT CHANGE UP (ref 3.8–10.5)

## 2024-05-24 PROCEDURE — ZZZZZ: CPT

## 2024-05-24 PROCEDURE — 99232 SBSQ HOSP IP/OBS MODERATE 35: CPT | Mod: 24

## 2024-05-24 RX ADMIN — HYDROMORPHONE HYDROCHLORIDE 30 MILLILITER(S): 2 INJECTION INTRAMUSCULAR; INTRAVENOUS; SUBCUTANEOUS at 21:32

## 2024-05-24 RX ADMIN — HYDROMORPHONE HYDROCHLORIDE 30 MILLILITER(S): 2 INJECTION INTRAMUSCULAR; INTRAVENOUS; SUBCUTANEOUS at 07:22

## 2024-05-24 RX ADMIN — Medication 100 MILLIGRAM(S): at 12:32

## 2024-05-24 RX ADMIN — Medication 1 MILLIGRAM(S): at 12:31

## 2024-05-24 RX ADMIN — Medication 25 MILLIGRAM(S): at 06:35

## 2024-05-24 RX ADMIN — BUMETANIDE 2 MILLIGRAM(S): 0.25 INJECTION INTRAMUSCULAR; INTRAVENOUS at 14:14

## 2024-05-24 RX ADMIN — BUMETANIDE 2 MILLIGRAM(S): 0.25 INJECTION INTRAMUSCULAR; INTRAVENOUS at 06:35

## 2024-05-24 RX ADMIN — Medication 25 MILLIGRAM(S): at 14:15

## 2024-05-24 RX ADMIN — AMIODARONE HYDROCHLORIDE 200 MILLIGRAM(S): 400 TABLET ORAL at 06:35

## 2024-05-24 RX ADMIN — Medication 25 MILLIGRAM(S): at 23:00

## 2024-05-24 RX ADMIN — Medication 50 MICROGRAM(S): at 06:35

## 2024-05-24 RX ADMIN — PANTOPRAZOLE SODIUM 40 MILLIGRAM(S): 20 TABLET, DELAYED RELEASE ORAL at 06:35

## 2024-05-24 RX ADMIN — TAMSULOSIN HYDROCHLORIDE 0.8 MILLIGRAM(S): 0.4 CAPSULE ORAL at 23:00

## 2024-05-24 NOTE — PROGRESS NOTE ADULT - PROBLEM SELECTOR PLAN 1
s/p 5/20 Right VATS pericardial window  Maintain pericardial drain to water seal  Monitor drainage, strict I & Os  Acute pain following, pt on Dilaudid PCA for pain control  OOB to chair, ambulate  NO anticoagulation  PCA for pain  Disposition: Home when medically cleared

## 2024-05-24 NOTE — PROGRESS NOTE ADULT - PROBLEM SELECTOR PLAN 1
s/p 5/20 Right VATS pericardial window  Maintain pericardial drain to water seal  Monitor drainage, strict I & Os  Acute pain following, pt on Dilaudid PCA for pain control  OOB to chair, ambulate  NO anticoagulation  Disposition: Home when medically cleared s/p 5/20 Right VATS pericardial window  Thoracic Surgery following   Maintain pericardial drain to water seal  Monitor drainage, strict I & Os  Acute pain following, pt on Dilaudid PCA for pain control  OOB to chair, ambulate  NO anticoagulation  Disposition: Home when medically cleared

## 2024-05-24 NOTE — PROGRESS NOTE ADULT - ASSESSMENT
63M admitted to [facility] 2/5/24 PMHx HTN, GERD, HPL, chronic venous stasis dermatitis with bilateral leg swelling, and AS presented to [facility] on 1/25/2024 for bilateral leg pain and swelling found to have new acute HFrEF (EF 35-40% 1/2024) in setting of severe AS. 2/23: AVR/JOANNA Clip c/b intraop bleeding requiring multiple blood products; postop prolonged inotropic support/ CHF following and afib. TTE revealed pericardial effusion returned to OR s/p 3/6 subxiphoid drainage and pericardial window.  Cefepime 2 Grams IV Q 8 hours per ID for (+)Serratia marcescens bacteremia. pt discharged home 3/12. pt was seen at his cardiologist's office today- Dr. Mesa where a repeat echo was done showing a large pericardial effusion; pt admitted for futher evaluation and treatment. VSS; RSR 60-80; pt stable at this time. Pt denies cp/sob or palpitations.   5/18 IR consulted for pericardial effusion drainage determined No percutaneous window is available for drainage  Thoracic consulted for possible pericardial window vs. pericardectomy  5/20 s/p Right VATS lysis of adhesions, pericardial window with drainage of 50cc serous fluid   5/21 VSS, right chest pericardial drain -180cc/24h, maintain to water seal today. Check CXR today. Garcia reinserted overnight for retention, started on Flomax.   5/22 VSS  R jon dr 140cc/24.  H2O seal.  garcia in place.  inc flomax to .8  5/23 VSS R pericardial   5/24 VSS up in c=roomPCApump  Rt pericardial drain 30/140 echo completed resulted small - mod pericardial effusion 63M admitted to [facility] 2/5/24 PMHx HTN, GERD, HPL, chronic venous stasis dermatitis with bilateral leg swelling, and AS presented to [facility] on 1/25/2024 for bilateral leg pain and swelling found to have new acute HFrEF (EF 35-40% 1/2024) in setting of severe AS. 2/23: AVR/JOANNA Clip c/b intraop bleeding requiring multiple blood products; postop prolonged inotropic support/ CHF following and afib. TTE revealed pericardial effusion returned to OR s/p 3/6 subxiphoid drainage and pericardial window.  Cefepime 2 Grams IV Q 8 hours per ID for (+)Serratia marcescens bacteremia. pt discharged home 3/12. pt was seen at his cardiologist's office today- Dr. Mesa where a repeat echo was done showing a large pericardial effusion; pt admitted for futher evaluation and treatment. VSS; RSR 60-80; pt stable at this time. Pt denies cp/sob or palpitations.   5/18 IR consulted for pericardial effusion drainage determined No percutaneous window is available for drainage  Thoracic consulted for possible pericardial window vs. pericardectomy  5/20 s/p Right VATS lysis of adhesions, pericardial window with drainage of 50cc serous fluid   5/21 VSS, right chest pericardial drain -180cc/24h, maintain to water seal today. Check CXR today. Garcia reinserted overnight for retention, started on Flomax.   5/22 VSS  R jon dr 140cc/24.  H2O seal.  garcia in place.  inc flomax to .8  5/23 VSS R pericardial   5/24 VSS up in room PCA pump  Rt pericardial drain 30/140 echo completed resulted small - mod pericardial effusion

## 2024-05-24 NOTE — PROGRESS NOTE ADULT - ASSESSMENT
63M admitted to [facility] 2/5/24 PMHx HTN, GERD, HPL, chronic venous stasis dermatitis with bilateral leg swelling, and AS presented [facility] on 1/25/2024 for bilateral leg pain and swelling found to have new acute HFrEF (EF 35-40% 1/2024) in setting of severe AS. 2/23: AVR/JOANNA Clip c/b intraop bleeding requiring multiple blood products; postop prolonged inotropic support/ CHF following and afib. TTE revealed pericardial effusion returned to OR s/p 3/6 subxiphoid drainage and pericardial window.  Cefepime 2 Grams IV Q 8 hours per ID for (+)Serratia marcescens bacteremia. pt discharged home 3/12. pt was seen at his cardiologist's office today- Dr. Mesa where a repeat echo was done showing a large pericardial effusion; pt admitted for further evaluation and treatment. VSS; RSR 60-80; pt stable at this time. Pt denies cp/sob or palpitations.   5/18 IR consulted for pericardial effusion drainage determined No percutaneous window is available for drainage  Thoracic consulted for possible pericardial window vs. pericardectomy    5/20 s/p Right VATS lysis of adhesions, pericardial window with drainage of 50cc serous fluid   Post op Course:   5/21 VSS, right chest pericardial drain -180cc/24h, maintain to water seal today. Check CXR today. Garcia reinserted overnight for retention, started on Flomax.   5/22 VSS  R jon dr 140cc/24.  H2O seal.  garcia in place.  increase Flomax to 0.8 mg PO daily.   5/23 VSS R pericardial.   5/24 VSS; Continue with current medication regimen. Repeat TTE: (5.23.24 @ 09:13) Small to moderate pericardial effusion posterior to the left ventricle, moderate pericardial effusion noted adjacent to the posterior left ventricle, small pericardial effusion noted adjacent to the lateral left ventricle and trace pericardial effusion noted adjacent to the right atrium with no evidence of hemodynamic compromise (or echocardiographic evidence of cardiac tamponade     63M admitted to [facility] 2/5/24 PMHx HTN, GERD, HPL, chronic venous stasis dermatitis with bilateral leg swelling, and AS presented [facility] on 1/25/2024 for bilateral leg pain and swelling found to have new acute HFrEF (EF 35-40% 1/2024) in setting of severe AS. 2/23: AVR/JOANNA Clip c/b intraop bleeding requiring multiple blood products; postop prolonged inotropic support/ CHF following and afib. TTE revealed pericardial effusion returned to OR s/p 3/6 subxiphoid drainage and pericardial window.  Cefepime 2 Grams IV Q 8 hours per ID for (+)Serratia marcescens bacteremia. pt discharged home 3/12. pt was seen at his cardiologist's office today- Dr. Mesa where a repeat echo was done showing a large pericardial effusion; pt admitted for further evaluation and treatment. VSS; RSR 60-80; pt stable at this time. Pt denies cp/sob or palpitations.   5/18 IR consulted for pericardial effusion drainage determined No percutaneous window is available for drainage  Thoracic consulted for possible pericardial window vs. pericardectomy    5/20 s/p Right VATS lysis of adhesions, pericardial window with drainage of 50cc serous fluid   Post op Course:   5/21 VSS, right chest pericardial drain -180cc/24h, maintain to water seal today. Check CXR today. Garcia reinserted overnight for retention, started on Flomax.   5/22 VSS  R jon dr 140cc/24.  H2O seal.  garcia in place.  increase Flomax to 0.8 mg PO daily.   5/23 VSS R pericardial.   5/24 VSS; Continue with current medication regimen. Repeat TTE: (5.23.24 @ 09:13) Small to moderate pericardial effusion posterior to the left ventricle, moderate pericardial effusion noted adjacent to the posterior left ventricle, small pericardial effusion noted adjacent to the lateral left ventricle and trace pericardial effusion noted adjacent to the right atrium with no evidence of hemodynamic compromise (or echocardiographic evidence of cardiac tamponade.  Right pericardial drain with a total of 140 cc drainage for the last 24 hours, per Thoracic surgery will maintain drain in and continue to monitor.   Disposition: Home once medically cleared.

## 2024-05-24 NOTE — PROGRESS NOTE ADULT - PROBLEM SELECTOR PLAN 1
hemodynamically stable  s/p pericardial window  management per CTS  still with drainage  continue Flomax on discharge for urinary retention c/w anastrazole

## 2024-05-24 NOTE — PROGRESS NOTE ADULT - ASSESSMENT
63M admitted to [facility] 2/5/24 PMHx HTN, GERD, HPL, chronic venous stasis dermatitis with bilateral leg swelling, and AS presented to [facility] on 1/25/2024 for bilateral leg pain and swelling found to have new acute HFrEF (EF 35-40% 1/2024) in setting of severe AS. 2/23: AVR/JOANNA Clip c/b intraop bleeding requiring multiple blood products; postop prolonged inotropic support/ CHF following and afib. TTE revealed pericardial effusion returned to OR s/p 3/6 subxiphoid drainage and pericardial window

## 2024-05-25 LAB
ALBUMIN SERPL ELPH-MCNC: 3.5 G/DL — SIGNIFICANT CHANGE UP (ref 3.3–5)
ALP SERPL-CCNC: 136 U/L — HIGH (ref 40–120)
ALT FLD-CCNC: 10 U/L — SIGNIFICANT CHANGE UP (ref 10–45)
ANION GAP SERPL CALC-SCNC: 10 MMOL/L — SIGNIFICANT CHANGE UP (ref 5–17)
AST SERPL-CCNC: 14 U/L — SIGNIFICANT CHANGE UP (ref 10–40)
BILIRUB SERPL-MCNC: 0.7 MG/DL — SIGNIFICANT CHANGE UP (ref 0.2–1.2)
BUN SERPL-MCNC: 34 MG/DL — HIGH (ref 7–23)
CALCIUM SERPL-MCNC: 9.4 MG/DL — SIGNIFICANT CHANGE UP (ref 8.4–10.5)
CHLORIDE SERPL-SCNC: 96 MMOL/L — SIGNIFICANT CHANGE UP (ref 96–108)
CO2 SERPL-SCNC: 33 MMOL/L — HIGH (ref 22–31)
CREAT SERPL-MCNC: 1.42 MG/DL — HIGH (ref 0.5–1.3)
EGFR: 56 ML/MIN/1.73M2 — LOW
GLUCOSE SERPL-MCNC: 94 MG/DL — SIGNIFICANT CHANGE UP (ref 70–99)
HCT VFR BLD CALC: 29.8 % — LOW (ref 39–50)
HGB BLD-MCNC: 9.4 G/DL — LOW (ref 13–17)
MCHC RBC-ENTMCNC: 20.8 PG — LOW (ref 27–34)
MCHC RBC-ENTMCNC: 31.5 GM/DL — LOW (ref 32–36)
MCV RBC AUTO: 65.9 FL — LOW (ref 80–100)
NRBC # BLD: 0 /100 WBCS — SIGNIFICANT CHANGE UP (ref 0–0)
PLATELET # BLD AUTO: 247 K/UL — SIGNIFICANT CHANGE UP (ref 150–400)
POTASSIUM SERPL-MCNC: 3.9 MMOL/L — SIGNIFICANT CHANGE UP (ref 3.5–5.3)
POTASSIUM SERPL-SCNC: 3.9 MMOL/L — SIGNIFICANT CHANGE UP (ref 3.5–5.3)
PROT SERPL-MCNC: 6.8 G/DL — SIGNIFICANT CHANGE UP (ref 6–8.3)
RBC # BLD: 4.52 M/UL — SIGNIFICANT CHANGE UP (ref 4.2–5.8)
RBC # FLD: 23.9 % — HIGH (ref 10.3–14.5)
SODIUM SERPL-SCNC: 139 MMOL/L — SIGNIFICANT CHANGE UP (ref 135–145)
WBC # BLD: 9.82 K/UL — SIGNIFICANT CHANGE UP (ref 3.8–10.5)
WBC # FLD AUTO: 9.82 K/UL — SIGNIFICANT CHANGE UP (ref 3.8–10.5)

## 2024-05-25 PROCEDURE — 99232 SBSQ HOSP IP/OBS MODERATE 35: CPT | Mod: 24

## 2024-05-25 RX ORDER — SORBITOL SOLUTION 70 %
30 SOLUTION, ORAL MISCELLANEOUS ONCE
Refills: 0 | Status: COMPLETED | OUTPATIENT
Start: 2024-05-25 | End: 2024-05-25

## 2024-05-25 RX ORDER — SENNA PLUS 8.6 MG/1
2 TABLET ORAL AT BEDTIME
Refills: 0 | Status: DISCONTINUED | OUTPATIENT
Start: 2024-05-25 | End: 2024-05-28

## 2024-05-25 RX ORDER — OXYCODONE AND ACETAMINOPHEN 5; 325 MG/1; MG/1
2 TABLET ORAL EVERY 4 HOURS
Refills: 0 | Status: DISCONTINUED | OUTPATIENT
Start: 2024-05-25 | End: 2024-05-28

## 2024-05-25 RX ORDER — SORBITOL SOLUTION 70 %
30 SOLUTION, ORAL MISCELLANEOUS ONCE
Refills: 0 | Status: DISCONTINUED | OUTPATIENT
Start: 2024-05-25 | End: 2024-05-28

## 2024-05-25 RX ORDER — LACTULOSE 10 G/15ML
20 SOLUTION ORAL ONCE
Refills: 0 | Status: COMPLETED | OUTPATIENT
Start: 2024-05-25 | End: 2024-05-25

## 2024-05-25 RX ORDER — POLYETHYLENE GLYCOL 3350 17 G/17G
17 POWDER, FOR SOLUTION ORAL ONCE
Refills: 0 | Status: COMPLETED | OUTPATIENT
Start: 2024-05-25 | End: 2024-05-26

## 2024-05-25 RX ORDER — OXYCODONE AND ACETAMINOPHEN 5; 325 MG/1; MG/1
1 TABLET ORAL EVERY 4 HOURS
Refills: 0 | Status: DISCONTINUED | OUTPATIENT
Start: 2024-05-25 | End: 2024-05-28

## 2024-05-25 RX ADMIN — LACTULOSE 20 GRAM(S): 10 SOLUTION ORAL at 22:48

## 2024-05-25 RX ADMIN — Medication 25 MILLIGRAM(S): at 05:21

## 2024-05-25 RX ADMIN — BUMETANIDE 2 MILLIGRAM(S): 0.25 INJECTION INTRAMUSCULAR; INTRAVENOUS at 05:21

## 2024-05-25 RX ADMIN — Medication 1 MILLIGRAM(S): at 10:17

## 2024-05-25 RX ADMIN — Medication 25 MILLIGRAM(S): at 21:15

## 2024-05-25 RX ADMIN — HYDROMORPHONE HYDROCHLORIDE 30 MILLILITER(S): 2 INJECTION INTRAMUSCULAR; INTRAVENOUS; SUBCUTANEOUS at 07:18

## 2024-05-25 RX ADMIN — Medication 100 MILLIGRAM(S): at 10:17

## 2024-05-25 RX ADMIN — Medication 30 MILLILITER(S): at 10:16

## 2024-05-25 RX ADMIN — Medication 50 MICROGRAM(S): at 05:21

## 2024-05-25 RX ADMIN — PANTOPRAZOLE SODIUM 40 MILLIGRAM(S): 20 TABLET, DELAYED RELEASE ORAL at 05:21

## 2024-05-25 RX ADMIN — AMIODARONE HYDROCHLORIDE 200 MILLIGRAM(S): 400 TABLET ORAL at 05:21

## 2024-05-25 RX ADMIN — Medication 25 MILLIGRAM(S): at 13:23

## 2024-05-25 RX ADMIN — TAMSULOSIN HYDROCHLORIDE 0.8 MILLIGRAM(S): 0.4 CAPSULE ORAL at 21:15

## 2024-05-25 RX ADMIN — BUMETANIDE 2 MILLIGRAM(S): 0.25 INJECTION INTRAMUSCULAR; INTRAVENOUS at 13:23

## 2024-05-25 NOTE — PROGRESS NOTE ADULT - PROBLEM SELECTOR PLAN 1
hemodynamically stable  s/p pericardial window  management per CTS  still with drainage  continue Flomax on discharge for urinary retention  patient has stage 2 - 3 CKD  creatinine varies from 1.2 to 1.5

## 2024-05-25 NOTE — PROGRESS NOTE ADULT - ASSESSMENT
63M admitted to [facility] 2/5/24 PMHx HTN, GERD, HPL, chronic venous stasis dermatitis with bilateral leg swelling, and AS presented [facility] 1/[facility] for bilateral leg pain and swelling found to have new acute HFrEF (EF 35-40% 1/2024) in setting of severe AS. 2/23: AVR/JOANNA Clip c/b intraop bleeding requiring multiple blood products; postop prolonged inotropic support/ CHF following and afib. TTE revealed pericardial effusion returned to OR s/p 3/6 subxiphoid drainage and pericardial window.  Cefepime 2 Grams IV Q 8 hours per ID for (+)Serratia marcescens bacteremia. pt discharged home 3/12. pt was seen at his cardiologist's office today- Dr. Mesa where a repeat echo was done showing a large pericardial effusion; pt admitted for further evaluation and treatment. VSS; RSR 60-80; pt stable at this time. Pt denies cp/sob or palpitations.   5/18 IR consulted for pericardial effusion drainage determined No percutaneous window is available for drainage  Thoracic consulted for possible pericardial window vs. pericardectomy    5/20 s/p Right VATS lysis of adhesions, pericardial window with drainage of 50cc serous fluid   Post op Course:   5/21 VSS, right chest pericardial drain -180cc/24h, maintain to water seal today. Check CXR today. Garcia reinserted overnight for retention, started on Flomax.   5/22 VSS  R jon dr 140cc/24.  H2O seal.  garcia in place.  increase Flomax to 0.8 mg PO daily.   5/23 VSS R pericardial.   5/24 VSS; Continue with current medication regimen. Repeat TTE: (5.23.24 @ 09:13) Small to moderate pericardial effusion posterior to the left ventricle, moderate pericardial effusion noted adjacent to the posterior left ventricle, small pericardial effusion noted adjacent to the lateral left ventricle and trace pericardial effusion noted adjacent to the right atrium with no evidence of hemodynamic compromise (or echocardiographic evidence of cardiac tamponade.  Right pericardial drain with a total of 140 cc drainage for the last 24 hours, per Thoracic surgery will maintain drain in and continue to monitor.   5/25 VSS; Pericardial drain with a total 110 cc for 24hrs.  Maintain CT in place to WS.  Thoracic Surgery following. Continue with current medication regimen.  Sorbitol 30 ml PO x 1.    Disposition: Home once medically cleared.      63M admitted to [facility] 2/5/24 PMHx HTN, GERD, HPL, chronic venous stasis dermatitis with bilateral leg swelling, and AS presented [facility] on 1/[facility]/2024 for bilateral leg pain and swelling found to have new acute HFrEF (EF 35-40% 1/2024) in setting of severe AS. 2/23: AVR/JOANNA Clip c/b intraop bleeding requiring multiple blood products; postop prolonged inotropic support/ CHF following and afib. TTE revealed pericardial effusion returned to OR s/p 3/6 subxiphoid drainage and pericardial window.  Cefepime 2 Grams IV Q 8 hours per ID for (+)Serratia marcescens bacteremia. pt discharged home 3/12. pt was seen at his cardiologist's office today- Dr. Mesa where a repeat echo was done showing a large pericardial effusion; pt admitted for further evaluation and treatment. VSS; RSR 60-80; pt stable at this time. Pt denies cp/sob or palpitations.   5/18 IR consulted for pericardial effusion drainage determined No percutaneous window is available for drainage  Thoracic consulted for possible pericardial window vs. pericardectomy    5/20 s/p Right VATS lysis of adhesions, pericardial window with drainage of 50cc serous fluid   Post op Course:   5/21 VSS, right chest pericardial drain -180cc/24h, maintain to water seal today. Check CXR today. Garcia reinserted overnight for retention, started on Flomax.   5/22 VSS  R jon dr 140cc/24.  H2O seal.  garcia in place.  increase Flomax to 0.8 mg PO daily.   5/23 VSS R pericardial.   5/24 VSS; Continue with current medication regimen. Repeat TTE: (5.23.24 @ 09:13) Small to moderate pericardial effusion posterior to the left ventricle, moderate pericardial effusion noted adjacent to the posterior left ventricle, small pericardial effusion noted adjacent to the lateral left ventricle and trace pericardial effusion noted adjacent to the right atrium with no evidence of hemodynamic compromise (or echocardiographic evidence of cardiac tamponade.  Right pericardial drain with a total of 140 cc drainage for the last 24 hours, per Thoracic surgery will maintain drain in and continue to monitor.   5/25 VSS; Pericardial drain with a total 110 cc for 24hrs.  Maintain CT in place to WS.  Thoracic Surgery following. Continue with current medication regimen.  Sorbitol 30 ml PO x 1.  Dilaudid PCA D/C'd.  Transitioned to Percocet PO PRN.   Disposition: Home once medically cleared.

## 2024-05-25 NOTE — PROGRESS NOTE ADULT - PROBLEM SELECTOR PLAN 1
s/p 5/20 Right VATS pericardial window  Thoracic Surgery following   Maintain pericardial drain to water seal  Monitor drainage, strict I & Os  Acute pain following, pt on Dilaudid PCA for pain control  OOB to chair, ambulate  NO anticoagulation  Disposition: Home when medically cleared s/p 5/20 Right VATS pericardial window  Thoracic Surgery following   Maintain pericardial drain to water seal  Monitor drainage, strict I & Os  OOB to chair, ambulate  NO anticoagulation  Disposition: Home when medically cleared

## 2024-05-25 NOTE — PROGRESS NOTE ADULT - ASSESSMENT
63M admitted to [facility] 2/5/24 PMHx HTN, GERD, HPL, chronic venous stasis dermatitis with bilateral leg swelling, and AS presented to [facility] on 1/25/2024 for bilateral leg pain and swelling found to have new acute HFrEF (EF 35-40% 1/2024) in setting of severe AS. 2/23: AVR/JOANNA Clip c/b intraop bleeding requiring multiple blood products; postop prolonged inotropic support/ CHF following and afib. TTE revealed pericardial effusion returned to OR s/p 3/6 subxiphoid drainage and pericardial window room air

## 2024-05-25 NOTE — PROGRESS NOTE ADULT - ASSESSMENT
63M admitted to [facility] 2/5/24 PMHx HTN, GERD, HPL, chronic venous stasis dermatitis with bilateral leg swelling, and AS [facility] 1/[facility]24 for bilateral leg pain and swelling found to have new acute HFrEF (EF 35-40% 1/2024) in setting of severe AS. 2/23: AVR/JOANNA Clip c/b intraop bleeding requiring multiple blood products; postop prolonged inotropic support/ CHF following and afib. TTE revealed pericardial effusion returned to OR s/p 3/6 subxiphoid drainage and pericardial window.  Cefepime 2 Grams IV Q 8 hours per ID for (+)Serratia marcescens bacteremia. pt discharged home 3/12. pt was seen at his cardiologist's office today- Dr. Mesa where a repeat echo was done showing a large pericardial effusion; pt admitted for further evaluation and treatment. VSS; RSR 60-80; pt stable at this time. Pt denies cp/sob or palpitations.   5/18 IR consulted for pericardial effusion drainage determined No percutaneous window is available for drainage  Thoracic consulted for possible pericardial window vs. pericardectomy    5/20 s/p Right VATS lysis of adhesions, pericardial window with drainage of 50cc serous fluid   Post op Course:   5/21 VSS, right chest pericardial drain -180cc/24h, maintain to water seal today. Check CXR today. Garcia reinserted overnight for retention, started on Flomax.   5/22 VSS  R jon dr 140cc/24.  H2O seal.  garcia in place.  increase Flomax to 0.8 mg PO daily.   5/23 VSS R pericardial.   5/24 VSS; Continue with current medication regimen. Repeat TTE: (5.23.24 @ 09:13) Small to moderate pericardial effusion posterior to the left ventricle, moderate pericardial effusion noted adjacent to the posterior left ventricle, small pericardial effusion noted adjacent to the lateral left ventricle and trace pericardial effusion noted adjacent to the right atrium with no evidence of hemodynamic compromise (or echocardiographic evidence of cardiac tamponade.  Right pericardial drain with a total of 140 cc drainage for the last 24 hours, per Thoracic surgery will maintain drain in and continue to monitor.   Disposition: Home once medically cleared.

## 2024-05-25 NOTE — PROGRESS NOTE ADULT - PROBLEM SELECTOR PLAN 1
s/p 5/20 Right VATS pericardial window  Thoracic Surgery following   Maintain pericardial drain to water seal  Monitor drainage, strict I & Os  OOB to chair, ambulate  NO anticoagulation  Disposition: Home when medically cleared

## 2024-05-26 LAB
ANION GAP SERPL CALC-SCNC: 12 MMOL/L — SIGNIFICANT CHANGE UP (ref 5–17)
BUN SERPL-MCNC: 29 MG/DL — HIGH (ref 7–23)
CALCIUM SERPL-MCNC: 9.8 MG/DL — SIGNIFICANT CHANGE UP (ref 8.4–10.5)
CHLORIDE SERPL-SCNC: 97 MMOL/L — SIGNIFICANT CHANGE UP (ref 96–108)
CO2 SERPL-SCNC: 32 MMOL/L — HIGH (ref 22–31)
CREAT SERPL-MCNC: 1.35 MG/DL — HIGH (ref 0.5–1.3)
CULTURE RESULTS: SIGNIFICANT CHANGE UP
EGFR: 59 ML/MIN/1.73M2 — LOW
GLUCOSE SERPL-MCNC: 87 MG/DL — SIGNIFICANT CHANGE UP (ref 70–99)
HCT VFR BLD CALC: 29 % — LOW (ref 39–50)
HGB BLD-MCNC: 9.4 G/DL — LOW (ref 13–17)
MCHC RBC-ENTMCNC: 20.8 PG — LOW (ref 27–34)
MCHC RBC-ENTMCNC: 32.4 GM/DL — SIGNIFICANT CHANGE UP (ref 32–36)
MCV RBC AUTO: 64.2 FL — LOW (ref 80–100)
NRBC # BLD: 0 /100 WBCS — SIGNIFICANT CHANGE UP (ref 0–0)
PLATELET # BLD AUTO: 231 K/UL — SIGNIFICANT CHANGE UP (ref 150–400)
POTASSIUM SERPL-MCNC: 3.1 MMOL/L — LOW (ref 3.5–5.3)
POTASSIUM SERPL-SCNC: 3.1 MMOL/L — LOW (ref 3.5–5.3)
RBC # BLD: 4.52 M/UL — SIGNIFICANT CHANGE UP (ref 4.2–5.8)
RBC # FLD: 23.8 % — HIGH (ref 10.3–14.5)
SODIUM SERPL-SCNC: 141 MMOL/L — SIGNIFICANT CHANGE UP (ref 135–145)
SPECIMEN SOURCE: SIGNIFICANT CHANGE UP
WBC # BLD: 12.08 K/UL — HIGH (ref 3.8–10.5)
WBC # FLD AUTO: 12.08 K/UL — HIGH (ref 3.8–10.5)

## 2024-05-26 PROCEDURE — 71045 X-RAY EXAM CHEST 1 VIEW: CPT | Mod: 26

## 2024-05-26 PROCEDURE — 99231 SBSQ HOSP IP/OBS SF/LOW 25: CPT | Mod: 24

## 2024-05-26 RX ORDER — POTASSIUM CHLORIDE 20 MEQ
20 PACKET (EA) ORAL
Refills: 0 | Status: COMPLETED | OUTPATIENT
Start: 2024-05-26 | End: 2024-05-26

## 2024-05-26 RX ORDER — POTASSIUM CHLORIDE 20 MEQ
40 PACKET (EA) ORAL ONCE
Refills: 0 | Status: COMPLETED | OUTPATIENT
Start: 2024-05-26 | End: 2024-05-26

## 2024-05-26 RX ADMIN — PANTOPRAZOLE SODIUM 40 MILLIGRAM(S): 20 TABLET, DELAYED RELEASE ORAL at 06:02

## 2024-05-26 RX ADMIN — Medication 40 MILLIEQUIVALENT(S): at 07:51

## 2024-05-26 RX ADMIN — Medication 25 MILLIGRAM(S): at 21:35

## 2024-05-26 RX ADMIN — BUMETANIDE 2 MILLIGRAM(S): 0.25 INJECTION INTRAMUSCULAR; INTRAVENOUS at 13:19

## 2024-05-26 RX ADMIN — POLYETHYLENE GLYCOL 3350 17 GRAM(S): 17 POWDER, FOR SOLUTION ORAL at 00:56

## 2024-05-26 RX ADMIN — BUMETANIDE 2 MILLIGRAM(S): 0.25 INJECTION INTRAMUSCULAR; INTRAVENOUS at 06:00

## 2024-05-26 RX ADMIN — TAMSULOSIN HYDROCHLORIDE 0.8 MILLIGRAM(S): 0.4 CAPSULE ORAL at 21:36

## 2024-05-26 RX ADMIN — Medication 50 MICROGRAM(S): at 06:00

## 2024-05-26 RX ADMIN — Medication 25 MILLIGRAM(S): at 13:20

## 2024-05-26 RX ADMIN — AMIODARONE HYDROCHLORIDE 200 MILLIGRAM(S): 400 TABLET ORAL at 06:00

## 2024-05-26 RX ADMIN — Medication 100 MILLIGRAM(S): at 13:20

## 2024-05-26 RX ADMIN — Medication 1 MILLIGRAM(S): at 13:20

## 2024-05-26 RX ADMIN — Medication 25 MILLIGRAM(S): at 06:00

## 2024-05-26 RX ADMIN — Medication 10 MILLIGRAM(S): at 00:56

## 2024-05-26 NOTE — PROGRESS NOTE ADULT - ASSESSMENT
63M admitted to [facility] 2/5/24 PMHx HTN, GERD, HPL, chronic venous stasis dermatitis with bilateral leg swelling, and AS presented [facility] on 1/25/2024 for bilateral leg pain and swelling found to have new acute HFrEF (EF 35-40% 1/2024) in setting of severe AS. 2/23: AVR/JOANNA Clip c/b intraop bleeding requiring multiple blood products; postop prolonged inotropic support/ CHF following and afib. TTE revealed pericardial effusion returned to OR s/p 3/6 subxiphoid drainage and pericardial window.  Cefepime 2 Grams IV Q 8 hours per ID for (+)Serratia marcescens bacteremia. pt discharged home 3/12. pt was seen at his cardiologist's office today- Dr. Mesa where a repeat echo was done showing a large pericardial effusion; pt admitted for further evaluation and treatment. VSS; RSR 60-80; pt stable at this time. Pt denies cp/sob or palpitations.   5/18 IR consulted for pericardial effusion drainage determined No percutaneous window is available for drainage  Thoracic consulted for possible pericardial window vs. pericardectomy    5/20 s/p Right VATS lysis of adhesions, pericardial window with drainage of 50cc serous fluid   Post op Course:   5/21 VSS, right chest pericardial drain -180cc/24h, maintain to water seal today. Check CXR today. Garcia reinserted overnight for retention, started on Flomax.   5/22 VSS  R jon dr 140cc/24.  H2O seal.  garcia in place.  increase Flomax to 0.8 mg PO daily.   5/23 VSS R pericardial.   5/24 VSS; Continue with current medication regimen. Repeat TTE: (5.23.24 @ 09:13) Small to moderate pericardial effusion posterior to the left ventricle, moderate pericardial effusion noted adjacent to the posterior left ventricle, small pericardial effusion noted adjacent to the lateral left ventricle and trace pericardial effusion noted adjacent to the right atrium with no evidence of hemodynamic compromise (or echocardiographic evidence of cardiac tamponade.  Right pericardial drain with a total of 140 cc drainage for the last 24 hours, per Thoracic surgery will maintain drain in and continue to monitor.   Disposition: Home once medically cleared.   5/26     vss   jon drain 85 cc  ambulating

## 2024-05-26 NOTE — PROGRESS NOTE ADULT - PROBLEM SELECTOR PLAN 1
hemodynamically stable  s/p pericardial window  management per CTS  still with drainage  continue Flomax on discharge for urinary retention

## 2024-05-26 NOTE — PROGRESS NOTE ADULT - ASSESSMENT
63M admitted to [facility] 2/5/24 PMHx HTN, GERD, HPL, chronic venous stasis dermatitis with bilateral leg swelling, and AS presented to [facility] on 1/25/2024 for bilateral leg pain and swelling found to have new acute HFrEF (EF 35-40% 1/2024) in setting of severe AS. 2/23: AVR/JOANNA Clip c/b intraop bleeding requiring multiple blood products; postop prolonged inotropic support/ CHF following and afib. TTE revealed pericardial effusion returned to OR s/p 3/6 subxiphoid drainage and pericardial window

## 2024-05-26 NOTE — PROGRESS NOTE ADULT - ASSESSMENT
63M admitted to [facility] 2/5/24 PMHx HTN, GERD, HPL, chronic venous stasis dermatitis with bilateral leg swelling, and AS presented [facility] on 1/25/2024 for bilateral leg pain and swelling found to have new acute HFrEF (EF 35-40% 1/2024) in setting of severe AS. 2/23: AVR/JOANNA Clip c/b intraop bleeding requiring multiple blood products; postop prolonged inotropic support/ CHF following and afib. TTE revealed pericardial effusion returned to OR s/p 3/6 subxiphoid drainage and pericardial window.  Cefepime 2 Grams IV Q 8 hours per ID for (+)Serratia marcescens bacteremia. pt discharged home 3/12. pt was seen at his cardiologist's office today- Dr. Mesa where a repeat echo was done showing a large pericardial effusion; pt admitted for further evaluation and treatment. VSS; RSR 60-80; pt stable at this time. Pt denies cp/sob or palpitations.   5/18 IR consulted for pericardial effusion drainage determined No percutaneous window is available for drainage  Thoracic consulted for possible pericardial window vs. pericardectomy    5/20 s/p Right VATS lysis of adhesions, pericardial window with drainage of 50cc serous fluid   Post op Course:   5/21 VSS, right chest pericardial drain -180cc/24h, maintain to water seal today. Check CXR today. Garcia reinserted overnight for retention, started on Flomax.   5/22 VSS  R jon dr 140cc/24.  H2O seal.  garcia in place.  increase Flomax to 0.8 mg PO daily.   5/23 VSS R pericardial.   5/24 VSS; Continue with current medication regimen. Repeat TTE: (5.23.24 @ 09:13) Small to moderate pericardial effusion posterior to the left ventricle, moderate pericardial effusion noted adjacent to the posterior left ventricle, small pericardial effusion noted adjacent to the lateral left ventricle and trace pericardial effusion noted adjacent to the right atrium with no evidence of hemodynamic compromise (or echocardiographic evidence of cardiac tamponade.  Right pericardial drain with a total of 140 cc drainage for the last 24 hours, per Thoracic surgery will maintain drain in and continue to monitor.   5/25 VSS; Pericardial drain with a total 110 cc for 24hrs.  Maintain CT in place to WS.  Thoracic Surgery following. Continue with current medication regimen.  Sorbitol 30 ml PO x 1.  Dilaudid PCA D/C'd.  Transitioned to Percocet PO PRN.   5/26 VSS pericardial drain in place.  will d/c when output decreased   Disposition: Home once medically cleared.

## 2024-05-27 LAB
ANION GAP SERPL CALC-SCNC: 11 MMOL/L — SIGNIFICANT CHANGE UP (ref 5–17)
BUN SERPL-MCNC: 36 MG/DL — HIGH (ref 7–23)
CALCIUM SERPL-MCNC: 9.4 MG/DL — SIGNIFICANT CHANGE UP (ref 8.4–10.5)
CHLORIDE SERPL-SCNC: 100 MMOL/L — SIGNIFICANT CHANGE UP (ref 96–108)
CO2 SERPL-SCNC: 30 MMOL/L — SIGNIFICANT CHANGE UP (ref 22–31)
CREAT SERPL-MCNC: 1.5 MG/DL — HIGH (ref 0.5–1.3)
EGFR: 52 ML/MIN/1.73M2 — LOW
GLUCOSE SERPL-MCNC: 97 MG/DL — SIGNIFICANT CHANGE UP (ref 70–99)
HCT VFR BLD CALC: 27.8 % — LOW (ref 39–50)
HGB BLD-MCNC: 8.7 G/DL — LOW (ref 13–17)
MCHC RBC-ENTMCNC: 19.8 PG — LOW (ref 27–34)
MCHC RBC-ENTMCNC: 31.3 GM/DL — LOW (ref 32–36)
MCV RBC AUTO: 63.3 FL — LOW (ref 80–100)
NRBC # BLD: 0 /100 WBCS — SIGNIFICANT CHANGE UP (ref 0–0)
PLATELET # BLD AUTO: 206 K/UL — SIGNIFICANT CHANGE UP (ref 150–400)
POTASSIUM SERPL-MCNC: 3.5 MMOL/L — SIGNIFICANT CHANGE UP (ref 3.5–5.3)
POTASSIUM SERPL-SCNC: 3.5 MMOL/L — SIGNIFICANT CHANGE UP (ref 3.5–5.3)
RBC # BLD: 4.39 M/UL — SIGNIFICANT CHANGE UP (ref 4.2–5.8)
RBC # FLD: 22.8 % — HIGH (ref 10.3–14.5)
SODIUM SERPL-SCNC: 141 MMOL/L — SIGNIFICANT CHANGE UP (ref 135–145)
WBC # BLD: 11.27 K/UL — HIGH (ref 3.8–10.5)
WBC # FLD AUTO: 11.27 K/UL — HIGH (ref 3.8–10.5)

## 2024-05-27 PROCEDURE — 71045 X-RAY EXAM CHEST 1 VIEW: CPT | Mod: 26

## 2024-05-27 PROCEDURE — 99232 SBSQ HOSP IP/OBS MODERATE 35: CPT | Mod: 24

## 2024-05-27 RX ORDER — POTASSIUM BICARBONATE 978 MG/1
25 TABLET, EFFERVESCENT ORAL
Refills: 0 | Status: COMPLETED | OUTPATIENT
Start: 2024-05-27 | End: 2024-05-27

## 2024-05-27 RX ADMIN — BUMETANIDE 2 MILLIGRAM(S): 0.25 INJECTION INTRAMUSCULAR; INTRAVENOUS at 05:18

## 2024-05-27 RX ADMIN — TAMSULOSIN HYDROCHLORIDE 0.8 MILLIGRAM(S): 0.4 CAPSULE ORAL at 21:34

## 2024-05-27 RX ADMIN — PANTOPRAZOLE SODIUM 40 MILLIGRAM(S): 20 TABLET, DELAYED RELEASE ORAL at 05:20

## 2024-05-27 RX ADMIN — BUMETANIDE 2 MILLIGRAM(S): 0.25 INJECTION INTRAMUSCULAR; INTRAVENOUS at 13:20

## 2024-05-27 RX ADMIN — Medication 25 MILLIGRAM(S): at 13:20

## 2024-05-27 RX ADMIN — Medication 50 MICROGRAM(S): at 05:20

## 2024-05-27 RX ADMIN — AMIODARONE HYDROCHLORIDE 200 MILLIGRAM(S): 400 TABLET ORAL at 05:18

## 2024-05-27 RX ADMIN — Medication 100 MILLIGRAM(S): at 08:16

## 2024-05-27 RX ADMIN — POTASSIUM BICARBONATE 25 MILLIEQUIVALENT(S): 978 TABLET, EFFERVESCENT ORAL at 08:12

## 2024-05-27 RX ADMIN — Medication 25 MILLIGRAM(S): at 05:18

## 2024-05-27 RX ADMIN — POTASSIUM BICARBONATE 25 MILLIEQUIVALENT(S): 978 TABLET, EFFERVESCENT ORAL at 06:52

## 2024-05-27 RX ADMIN — Medication 1 MILLIGRAM(S): at 08:13

## 2024-05-27 RX ADMIN — Medication 25 MILLIGRAM(S): at 21:36

## 2024-05-27 NOTE — PROGRESS NOTE ADULT - ASSESSMENT
63M admitted to [facility] 2/5/24 PMHx HTN, GERD, HPL, chronic venous stasis dermatitis with bilateral leg swelling, and AS presented [facility] on 1/25/2024 for bilateral leg pain and swelling found to have new acute HFrEF (EF 35-40% 1/2024) in setting of severe AS. 2/23: AVR/JOANNA Clip c/b intraop bleeding requiring multiple blood products; postop prolonged inotropic support/ CHF following and afib. TTE revealed pericardial effusion returned to OR s/p 3/6 subxiphoid drainage and pericardial window.  Cefepime 2 Grams IV Q 8 hours per ID for (+)Serratia marcescens bacteremia. pt discharged home 3/12. pt was seen at his cardiologist's office today- Dr. Mesa where a repeat echo was done showing a large pericardial effusion; pt admitted for further evaluation and treatment. VSS; RSR 60-80; pt stable at this time. Pt denies cp/sob or palpitations.   5/18 IR consulted for pericardial effusion drainage determined No percutaneous window is available for drainage  Thoracic consulted for possible pericardial window vs. pericardectomy    5/20 s/p Right VATS lysis of adhesions, pericardial window with drainage of 50cc serous fluid   Post op Course:   5/21 VSS, right chest pericardial drain -180cc/24h, maintain to water seal today. Check CXR today. Garcia reinserted overnight for retention, started on Flomax.   5/22 VSS  R jon dr 140cc/24.  H2O seal.  garcia in place.  increase Flomax to 0.8 mg PO daily.   5/23 VSS R pericardial.   5/24 VSS; Continue with current medication regimen. Repeat TTE: (5.23.24 @ 09:13) Small to moderate pericardial effusion posterior to the left ventricle, moderate pericardial effusion noted adjacent to the posterior left ventricle, small pericardial effusion noted adjacent to the lateral left ventricle and trace pericardial effusion noted adjacent to the right atrium with no evidence of hemodynamic compromise (or echocardiographic evidence of cardiac tamponade.  Right pericardial drain with a total of 140 cc drainage for the last 24 hours, per Thoracic surgery will maintain drain in and continue to monitor.   5/25 VSS; Pericardial drain with a total 110 cc for 24hrs.  Maintain CT in place to WS.  Thoracic Surgery following. Continue with current medication regimen.  Sorbitol 30 ml PO x 1.  Dilaudid PCA D/C'd.  Transitioned to Percocet PO PRN.   PCA D/C'd.  Transitioned to Percocet PO PRN.   5/26 VSS pericardial drain in place.  will d/c when output decreased   Disposition: Home once medically cleared.   5/27 VSS ambulating in bobo  pericardial  drainage  67/97 maintain tube

## 2024-05-27 NOTE — PROGRESS NOTE ADULT - ASSESSMENT
63M admitted to [facility] 2/5/24 PMHx HTN, GERD, HPL, chronic venous stasis dermatitis with bilateral leg swelling, and AS presented to [facility] on 1/25/2024 for bilateral leg pain and swelling found to have new acute HFrEF (EF 35-40% 1/2024) in setting of severe AS. 2/23: AVR/JOANNA Clip c/b intraop bleeding requiring multiple blood products; postop prolonged inotropic support/ CHF following and afib. TTE revealed pericardial effusion returned to OR s/p 3/6 subxiphoid drainage and pericardial window

## 2024-05-27 NOTE — PROGRESS NOTE ADULT - ASSESSMENT
63M admitted to [facility] 2/5/24 PMHx HTN, GERD, HPL, chronic venous stasis dermatitis with bilateral leg swelling, and AS presented [facility] on 1/25/2024 for bilateral leg pain and swelling found to have new acute HFrEF (EF 35-40% 1/2024) in setting of severe AS. 2/23: AVR/JOANNA Clip c/b intraop bleeding requiring multiple blood products; postop prolonged inotropic support/ CHF following and afib. TTE revealed pericardial effusion returned to OR s/p 3/6 subxiphoid drainage and pericardial window.  Cefepime 2 Grams IV Q 8 hours per ID for (+)Serratia marcescens bacteremia. pt discharged home 3/12. pt was seen at his cardiologist's office today- Dr. Mesa where a repeat echo was done showing a large pericardial effusion; pt admitted for further evaluation and treatment. VSS; RSR 60-80; pt stable at this time. Pt denies cp/sob or palpitations.   5/18 IR consulted for pericardial effusion drainage determined No percutaneous window is available for drainage  Thoracic consulted for possible pericardial window vs. pericardectomy    5/20 s/p Right VATS lysis of adhesions, pericardial window with drainage of 50cc serous fluid   Post op Course:   5/21 VSS, right chest pericardial drain -180cc/24h, maintain to water seal today. Check CXR today. Garcia reinserted overnight for retention, started on Flomax.   5/22 VSS  R jon dr 140cc/24.  H2O seal.  garcia in place.  increase Flomax to 0.8 mg PO daily.   5/23 VSS R pericardial.   5/24 VSS; Continue with current medication regimen. Repeat TTE: (5.23.24 @ 09:13) Small to moderate pericardial effusion posterior to the left ventricle, moderate pericardial effusion noted adjacent to the posterior left ventricle, small pericardial effusion noted adjacent to the lateral left ventricle and trace pericardial effusion noted adjacent to the right atrium with no evidence of hemodynamic compromise (or echocardiographic evidence of cardiac tamponade.  Right pericardial drain with a total of 140 cc drainage for the last 24 hours, per Thoracic surgery will maintain drain in and continue to monitor.   Disposition: Home once medically cleared.   5/26     vss   jon drain 85 cc  ambulating  5/27    vss  ambulating   chest tube 90 cc

## 2024-05-27 NOTE — PROGRESS NOTE ADULT - NUTRITIONAL ASSESSMENT
This patient has been assessed with a concern for Malnutrition and has been determined to have a diagnosis/diagnoses of Severe protein-calorie malnutrition.    This patient is being managed with:   Diet Regular-  DASH/TLC {Sodium & Cholesterol Restricted} (DASH)  Entered: May 21 2024  4:03AM  

## 2024-05-28 ENCOUNTER — TRANSCRIPTION ENCOUNTER (OUTPATIENT)
Age: 64
End: 2024-05-28

## 2024-05-28 VITALS
OXYGEN SATURATION: 95 % | TEMPERATURE: 98 F | RESPIRATION RATE: 18 BRPM | HEART RATE: 69 BPM | DIASTOLIC BLOOD PRESSURE: 78 MMHG | SYSTOLIC BLOOD PRESSURE: 137 MMHG

## 2024-05-28 PROBLEM — I87.2 VENOUS INSUFFICIENCY (CHRONIC) (PERIPHERAL): Chronic | Status: ACTIVE | Noted: 2024-05-17

## 2024-05-28 LAB
ALBUMIN SERPL ELPH-MCNC: 3.7 G/DL — SIGNIFICANT CHANGE UP (ref 3.3–5)
ALP SERPL-CCNC: 125 U/L — HIGH (ref 40–120)
ALT FLD-CCNC: 12 U/L — SIGNIFICANT CHANGE UP (ref 10–45)
ANION GAP SERPL CALC-SCNC: 13 MMOL/L — SIGNIFICANT CHANGE UP (ref 5–17)
AST SERPL-CCNC: 14 U/L — SIGNIFICANT CHANGE UP (ref 10–40)
BILIRUB SERPL-MCNC: 0.8 MG/DL — SIGNIFICANT CHANGE UP (ref 0.2–1.2)
BUN SERPL-MCNC: 36 MG/DL — HIGH (ref 7–23)
CALCIUM SERPL-MCNC: 9.6 MG/DL — SIGNIFICANT CHANGE UP (ref 8.4–10.5)
CHLORIDE SERPL-SCNC: 98 MMOL/L — SIGNIFICANT CHANGE UP (ref 96–108)
CO2 SERPL-SCNC: 28 MMOL/L — SIGNIFICANT CHANGE UP (ref 22–31)
CREAT SERPL-MCNC: 1.49 MG/DL — HIGH (ref 0.5–1.3)
EGFR: 52 ML/MIN/1.73M2 — LOW
GLUCOSE SERPL-MCNC: 125 MG/DL — HIGH (ref 70–99)
HCT VFR BLD CALC: 30 % — LOW (ref 39–50)
HGB BLD-MCNC: 9.5 G/DL — LOW (ref 13–17)
MCHC RBC-ENTMCNC: 20.5 PG — LOW (ref 27–34)
MCHC RBC-ENTMCNC: 31.7 GM/DL — LOW (ref 32–36)
MCV RBC AUTO: 64.7 FL — LOW (ref 80–100)
NRBC # BLD: 0 /100 WBCS — SIGNIFICANT CHANGE UP (ref 0–0)
PLATELET # BLD AUTO: 209 K/UL — SIGNIFICANT CHANGE UP (ref 150–400)
POTASSIUM SERPL-MCNC: 3.7 MMOL/L — SIGNIFICANT CHANGE UP (ref 3.5–5.3)
POTASSIUM SERPL-SCNC: 3.7 MMOL/L — SIGNIFICANT CHANGE UP (ref 3.5–5.3)
PROT SERPL-MCNC: 7.3 G/DL — SIGNIFICANT CHANGE UP (ref 6–8.3)
RBC # BLD: 4.64 M/UL — SIGNIFICANT CHANGE UP (ref 4.2–5.8)
RBC # FLD: 23.9 % — HIGH (ref 10.3–14.5)
SODIUM SERPL-SCNC: 139 MMOL/L — SIGNIFICANT CHANGE UP (ref 135–145)
WBC # BLD: 11.49 K/UL — HIGH (ref 3.8–10.5)
WBC # FLD AUTO: 11.49 K/UL — HIGH (ref 3.8–10.5)

## 2024-05-28 PROCEDURE — S2900: CPT

## 2024-05-28 PROCEDURE — 85027 COMPLETE CBC AUTOMATED: CPT

## 2024-05-28 PROCEDURE — 87102 FUNGUS ISOLATION CULTURE: CPT

## 2024-05-28 PROCEDURE — 71045 X-RAY EXAM CHEST 1 VIEW: CPT

## 2024-05-28 PROCEDURE — 93306 TTE W/DOPPLER COMPLETE: CPT

## 2024-05-28 PROCEDURE — 84132 ASSAY OF SERUM POTASSIUM: CPT

## 2024-05-28 PROCEDURE — 93325 DOPPLER ECHO COLOR FLOW MAPG: CPT

## 2024-05-28 PROCEDURE — 86923 COMPATIBILITY TEST ELECTRIC: CPT

## 2024-05-28 PROCEDURE — 93308 TTE F-UP OR LMTD: CPT

## 2024-05-28 PROCEDURE — C1769: CPT

## 2024-05-28 PROCEDURE — 85610 PROTHROMBIN TIME: CPT

## 2024-05-28 PROCEDURE — 88112 CYTOPATH CELL ENHANCE TECH: CPT

## 2024-05-28 PROCEDURE — 93320 DOPPLER ECHO COMPLETE: CPT

## 2024-05-28 PROCEDURE — 84436 ASSAY OF TOTAL THYROXINE: CPT

## 2024-05-28 PROCEDURE — 84439 ASSAY OF FREE THYROXINE: CPT

## 2024-05-28 PROCEDURE — 87075 CULTR BACTERIA EXCEPT BLOOD: CPT

## 2024-05-28 PROCEDURE — 83880 ASSAY OF NATRIURETIC PEPTIDE: CPT

## 2024-05-28 PROCEDURE — C9399: CPT

## 2024-05-28 PROCEDURE — 84100 ASSAY OF PHOSPHORUS: CPT

## 2024-05-28 PROCEDURE — 86901 BLOOD TYPING SEROLOGIC RH(D): CPT

## 2024-05-28 PROCEDURE — 86850 RBC ANTIBODY SCREEN: CPT

## 2024-05-28 PROCEDURE — 80048 BASIC METABOLIC PNL TOTAL CA: CPT

## 2024-05-28 PROCEDURE — 99238 HOSP IP/OBS DSCHRG MGMT 30/<: CPT | Mod: 24

## 2024-05-28 PROCEDURE — 36415 COLL VENOUS BLD VENIPUNCTURE: CPT

## 2024-05-28 PROCEDURE — 87205 SMEAR GRAM STAIN: CPT

## 2024-05-28 PROCEDURE — 84480 ASSAY TRIIODOTHYRONINE (T3): CPT

## 2024-05-28 PROCEDURE — 83735 ASSAY OF MAGNESIUM: CPT

## 2024-05-28 PROCEDURE — 93005 ELECTROCARDIOGRAM TRACING: CPT

## 2024-05-28 PROCEDURE — 85730 THROMBOPLASTIN TIME PARTIAL: CPT

## 2024-05-28 PROCEDURE — 87070 CULTURE OTHR SPECIMN AEROBIC: CPT

## 2024-05-28 PROCEDURE — 80053 COMPREHEN METABOLIC PANEL: CPT

## 2024-05-28 PROCEDURE — 71250 CT THORAX DX C-: CPT | Mod: MC

## 2024-05-28 PROCEDURE — 71045 X-RAY EXAM CHEST 1 VIEW: CPT | Mod: 26

## 2024-05-28 PROCEDURE — 87116 MYCOBACTERIA CULTURE: CPT

## 2024-05-28 PROCEDURE — 87206 SMEAR FLUORESCENT/ACID STAI: CPT

## 2024-05-28 PROCEDURE — C1889: CPT

## 2024-05-28 PROCEDURE — 84443 ASSAY THYROID STIM HORMONE: CPT

## 2024-05-28 PROCEDURE — 88305 TISSUE EXAM BY PATHOLOGIST: CPT

## 2024-05-28 PROCEDURE — 86900 BLOOD TYPING SEROLOGIC ABO: CPT

## 2024-05-28 PROCEDURE — 87015 SPECIMEN INFECT AGNT CONCNTJ: CPT

## 2024-05-28 RX ORDER — FOLIC ACID 0.8 MG
1 TABLET ORAL
Qty: 0 | Refills: 0 | DISCHARGE
Start: 2024-05-28

## 2024-05-28 RX ORDER — AMIODARONE HYDROCHLORIDE 400 MG/1
1 TABLET ORAL
Qty: 0 | Refills: 0 | DISCHARGE
Start: 2024-05-28

## 2024-05-28 RX ORDER — SENNA PLUS 8.6 MG/1
2 TABLET ORAL
Qty: 0 | Refills: 0 | DISCHARGE
Start: 2024-05-28

## 2024-05-28 RX ORDER — OXYCODONE AND ACETAMINOPHEN 5; 325 MG/1; MG/1
1 TABLET ORAL
Qty: 12 | Refills: 0
Start: 2024-05-28 | End: 2024-05-30

## 2024-05-28 RX ORDER — BUMETANIDE 0.25 MG/ML
1 INJECTION INTRAMUSCULAR; INTRAVENOUS
Qty: 60 | Refills: 0
Start: 2024-05-28 | End: 2024-06-26

## 2024-05-28 RX ORDER — LEVOTHYROXINE SODIUM 125 MCG
1 TABLET ORAL
Qty: 0 | Refills: 0 | DISCHARGE
Start: 2024-05-28

## 2024-05-28 RX ORDER — PANTOPRAZOLE SODIUM 20 MG/1
1 TABLET, DELAYED RELEASE ORAL
Qty: 7 | Refills: 0
Start: 2024-05-28 | End: 2024-06-03

## 2024-05-28 RX ORDER — ACETAMINOPHEN 500 MG
2 TABLET ORAL
Qty: 0 | Refills: 0 | DISCHARGE
Start: 2024-05-28

## 2024-05-28 RX ORDER — ALLOPURINOL 300 MG
1 TABLET ORAL
Qty: 0 | Refills: 0 | DISCHARGE
Start: 2024-05-28

## 2024-05-28 RX ORDER — HYDRALAZINE HCL 50 MG
1 TABLET ORAL
Qty: 0 | Refills: 0 | DISCHARGE
Start: 2024-05-28

## 2024-05-28 RX ORDER — POTASSIUM CHLORIDE 20 MEQ
1 PACKET (EA) ORAL
Qty: 30 | Refills: 0
Start: 2024-05-28 | End: 2024-06-26

## 2024-05-28 RX ADMIN — AMIODARONE HYDROCHLORIDE 200 MILLIGRAM(S): 400 TABLET ORAL at 05:08

## 2024-05-28 RX ADMIN — Medication 25 MILLIGRAM(S): at 05:08

## 2024-05-28 RX ADMIN — Medication 1 MILLIGRAM(S): at 08:21

## 2024-05-28 RX ADMIN — PANTOPRAZOLE SODIUM 40 MILLIGRAM(S): 20 TABLET, DELAYED RELEASE ORAL at 05:09

## 2024-05-28 RX ADMIN — Medication 50 MICROGRAM(S): at 05:08

## 2024-05-28 RX ADMIN — Medication 100 MILLIGRAM(S): at 08:21

## 2024-05-28 RX ADMIN — BUMETANIDE 2 MILLIGRAM(S): 0.25 INJECTION INTRAMUSCULAR; INTRAVENOUS at 13:37

## 2024-05-28 RX ADMIN — Medication 25 MILLIGRAM(S): at 13:37

## 2024-05-28 RX ADMIN — BUMETANIDE 2 MILLIGRAM(S): 0.25 INJECTION INTRAMUSCULAR; INTRAVENOUS at 05:08

## 2024-05-28 NOTE — DISCHARGE NOTE NURSING/CASE MANAGEMENT/SOCIAL WORK - PATIENT PORTAL LINK FT
You can access the FollowMyHealth Patient Portal offered by [facility] by registering at the following website: [email or web address]. By joining [employer]’s FollowMyHealth portal, you will also be able to view your health information using other applications (apps) compatible with our system.

## 2024-05-28 NOTE — DISCHARGE NOTE NURSING/CASE MANAGEMENT/SOCIAL WORK - NSDCVIVACCINE_GEN_ALL_CORE_FT
Tdap; 27-Jul-2022 20:19; Danielle Mcleod (RN); [employer]; [id number] (Exp. Date: 18-Apr-2024); IntraMuscular; Deltoid Left.; 0.5 milliLiter(s); VIS (VIS Published: 09-May-2013, VIS Presented: 27-Jul-2022);

## 2024-05-28 NOTE — DISCHARGE NOTE PROVIDER - NSDCFUSCHEDAPPT_GEN_ALL_CORE_FT
Crescencio Barker  [facility] include 234 beds at the [facility]  ELECTROPH [address]  Scheduled Appointment: 06/12/2024    Micah Nguyễn  [facility]  CARDIOLOGY [address]  Scheduled Appointment: 06/25/2024    Randolph Finley  [facility]  [address]  Scheduled Appointment: 07/10/2024    Amado Rosario  [facility]  HEARTFAIL [address]  Scheduled Appointment: 08/05/2024    Micah Nguyễn  [facility]  CARDIOLOGY [address]  Scheduled Appointment: 08/08/2024     Ronni Martinez  [facility]  CTSURG [address]  Scheduled Appointment: 06/11/2024    Crescencio Barker  [facility]  ELECTROPH [address]  Scheduled Appointment: 06/12/2024    Micah Nguyễn  [facility]  CARDIOLOGY [address]  Scheduled Appointment: 06/25/2024    Randolph Finley  [facility]  [address]  Scheduled Appointment: 07/10/2024    Amado Rosario  [facility]  HEARTFAIL [address]  Scheduled Appointment: 08/05/2024    Micah Nguyễn  [facility]  CARDIOLOGY [address]  Scheduled Appointment: 08/08/2024

## 2024-05-28 NOTE — DISCHARGE NOTE PROVIDER - NSDCCPCAREPLAN_GEN_ALL_CORE_FT
PRINCIPAL DISCHARGE DIAGNOSIS  Diagnosis: Pericardial effusion  Assessment and Plan of Treatment: shower daily  weigh yourself daily  continue current prescriptions as ordered  increase activity as tolerated   no added salt; low fat; low cholesterol, low salt diet.   follow up with Cardiologist in 1-2 weeks. Call to schedule appointment.  follow up with cardiac surgeon       PRINCIPAL DISCHARGE DIAGNOSIS  Diagnosis: Pericardial effusion  Assessment and Plan of Treatment: shower daily  weigh yourself daily  continue current prescriptions as ordered  increase activity as tolerated   no added salt; low fat; low cholesterol, low salt diet.   follow up with Cardiologist, Dr. Nguyễn, in 1-2 weeks. Call to schedule appointment.  follow up with cardiac surgeon, Dr. Graham on June 11th at 11:45 am; Call to confirm appointment. [phone]  follow up with Dr. High in 2 weeks for suture removal; call to schedule appointment. [phone]

## 2024-05-28 NOTE — DISCHARGE NOTE PROVIDER - NSDCPNSUBOBJ_GEN_ALL_CORE
General: NAD  HEENT:  NC/AT  Neuro: A&Ox4, gait steady, speech clear, no focal deficits noted  Respiratory: B/L BS CTA, no wheeze, no rhonchi, no crackles noted  Cardiovascular: RRR, normal S1S2, no murmur noted  GI: Abd soft, NT/ND, +BSx4Q +BM  Peripheral Vascular:  B/L LE neg edema, 2+ peripheral pulses, no clubbing, cyanosis, varicosities/PVD noted  Musculoskeletal: B/L UE and LE 5/5 strength   Psychiatric: Normal mood, normal affect observed  Skin: Normal exam to inspection and palpation. no bleeding, no hematoma. General: NAD  HEENT:  NC/AT  Neuro: A&Ox4, gait steady, speech clear, no focal deficits noted  Respiratory: B/L BS CTA, no wheeze, no rhonchi, no crackles noted; rt vats pericardial window site cdi keith w/ ss and suture   Cardiovascular: RSR 50-95; normal S1S2, no murmur noted  GI: Abd soft, NT/ND, +BSx4Q +BM  Peripheral Vascular:  B/L LE neg edema, 2+ peripheral pulses, no clubbing, cyanosis, varicosities/PVD noted  Musculoskeletal: B/L UE and LE 5/5 strength   Psychiatric: Normal mood, normal affect observed  Skin: Normal exam to inspection and palpation. no bleeding, no hematoma; midsternal incision cdi keith- healing well    Discharge pt home today 5/28 as per Dr sigala

## 2024-05-28 NOTE — DISCHARGE NOTE NURSING/CASE MANAGEMENT/SOCIAL WORK - NSDCPEFALRISK_GEN_ALL_CORE
For information on Fall & Injury Prevention, visit: [email or web address] OR  [email or web address] OR  https://www.cdc.gov/steadi/patient.html

## 2024-05-28 NOTE — PROGRESS NOTE ADULT - PROBLEM SELECTOR PLAN 5
continue statin

## 2024-05-28 NOTE — DISCHARGE NOTE PROVIDER - PROVIDER TOKENS
PROVIDER:[TOKEN:[[id number]]],PROVIDER:[TOKEN:[[id number]]],PROVIDER:[TOKEN:[[id number]]]

## 2024-05-28 NOTE — PROGRESS NOTE ADULT - PROBLEM SELECTOR PROBLEM 4
S/P AVR (aortic valve replacement)

## 2024-05-28 NOTE — DISCHARGE NOTE PROVIDER - CARE PROVIDERS DIRECT ADDRESSES
,DirectAddress_Unknown,[email or web address]

## 2024-05-28 NOTE — PROGRESS NOTE ADULT - PROBLEM SELECTOR PROBLEM 3
HTN (hypertension)

## 2024-05-28 NOTE — PROGRESS NOTE ADULT - SUBJECTIVE AND OBJECTIVE BOX
VITAL SIGNS    Telemetry:    sr    Vital Signs Last 24 Hrs  T(C): 36.8 (24 @ 04:40), Max: 36.8 (24 @ 04:40)  T(F): 98.3 (24 @ 04:40), Max: 98.3 (24 @ 04:40)  HR: 66 (24 @ 04:40) (66 - 80)  BP: 138/65 (24 @ 04:40) (134/73 - 147/82)  RR: 18 (24 @ 04:40) (18 - 20)  SpO2: 95% (24 @ 04:40) (94% - 96%)                   Daily     Daily Weight in k.3 (27 May 2024 07:53)        CAPILLARY BLOOD GLUCOSE              Drains:                  R Pleural  [  ]  Drainage:  90 cc                       PHYSICAL EXAM    Neurology: alert and oriented x 3, moves all extremities with no defecits  CV :  RRR    rt vats   Lungs:   CTA B/L  Abdomen: soft, nontender, nondistended, positive bowel sounds, last bowel movement   Extremities:     no edema                                   
Pain Management Attending Addendum    SUBJECTIVE: Patient doing well with IV PCA    Therapy:    [X] IV PCA         [ ] PRN Analgesics    OBJECTIVE:   [X] Pain appropriately controlled    [ ] Other:    Side Effects:  [X] None	             [ ] Nausea              [ ] Pruritis                	[ ] Other:    ASSESSMENT/PLAN: Continue current therapy    Comments:
Subjective " hello I am feeling ok"     Vital Signs Last 24 Hrs  T(C): 36.9 (24 @ 07:30), Max: 37 (24 @ 20:55)  T(F): 98.4 (24 @ 07:30), Max: 98.6 (24 @ 20:55)  HR: 80 (24 07:30) (66 - 80)  BP: 115/66 (24 07:30) (115/66 - 150/61)  RR: 18 (24 07:30) (18 - 18)  SpO2: 94% (24 07:30) (93% - 95%)            @ 07:01  -   @ 07:00  --------------------------------------------------------  IN: 920 mL / OUT: 3515 mL / NET: -2595 mL     @ 07:01  -   @ 09:15  --------------------------------------------------------  IN: 0 mL / OUT: 400 mL / NET: -400 mL      Daily Weight in k.7 (24 May 2024 07:54)                            9.8    9.37  )-----------( 242      ( 24 May 2024 06:21 )             31.6         138  |  96  |  33<H>  ----------------------------<  101<H>  3.9   |  30  |  1.30    Ca    9.3      24 May 2024 06:21  Phos  4.0       Mg     1.9         < from: TTE W or WO Ultrasound Enhancing Agent (24 @ 09:13) >   1. Small to moderate pericardial effusion posterior to the left ventricle, moderate pericardial effusion noted adjacent to the posterior left ventricle, small pericardial effusion noted adjacent to the lateral left ventricle and trace pericardial effusion noted adjacent to the right atrium with no evidence of hemodynamic compromise (or echocardiographic evidence of cardiac tamponade).   2. Compared to the transthoracic echocardiogram performed on 2024, the effusion is smaller at the Right atrium.    ________________________________________________________________________________________  FINDINGS:     Pericardium:  There is small to moderate pericardial effusion posterior to the left ventricle, a moderatepericardial effusion noted adjacent to the posterior left ventricle, a small pericardial effusion noted adjacent to the lateral left ventricle and a trace pericardial effusion noted adjacent to the right atrium measuring 1.56 cm with no evidence of hemodynamic compromise (or echocardiographic evidence of cardiac tamponade).     Systemic Veins:  The inferior vena cava is normal in size measuring 2.00 cm in diameter, (normal <2.1cm) with normal inspiratory collapse (normal >50%) consistent with normal right atrial pressure (~3, range 0-5mmHg).  ____________________________________________________________________    < end of copied text >        MEDICATIONS  (STANDING):  allopurinol 100 milliGRAM(s) Oral daily  aMIOdarone    Tablet 200 milliGRAM(s) Oral daily  buMETAnide 2 milliGRAM(s) Oral two times a day  folic acid 1 milliGRAM(s) Oral daily  hydrALAZINE 25 milliGRAM(s) Oral three times a day  HYDROmorphone PCA (1 mG/mL) 30 milliLiter(s) PCA Continuous PCA Continuous  levothyroxine 50 MICROGram(s) Oral daily  pantoprazole    Tablet 40 milliGRAM(s) Oral before breakfast  tamsulosin 0.8 milliGRAM(s) Oral at bedtime    MEDICATIONS  (PRN):  acetaminophen     Tablet .. 650 milliGRAM(s) Oral every 6 hours PRN Mild Pain (1 - 3)  HYDROmorphone PCA (1 mG/mL) Rescue Clinician Bolus 0.5 milliGRAM(s) IV Push every 15 minutes PRN for Pain Scale GREATER THAN 6  melatonin 3 milliGRAM(s) Oral at bedtime PRN Insomnia  naloxone Injectable 0.1 milliGRAM(s) IV Push every 3 minutes PRN For ANY of the following changes in patient status:  A. RR LESS THAN 10 breaths per minute, B. Oxygen saturation LESS THAN 90%, C. Sedation score of 6  ondansetron Injectable 4 milliGRAM(s) IV Push every 6 hours PRN Nausea      CAPILLARY BLOOD GLUCOSE              Drains:     MS         [ x ] Drainage:  30/140  :                               PHYSICAL EXAM        Neurology: alert and oriented x 3, nonfocal, no gross deficits    CV :s1s2     wound YAQUELIN   RT anterior chest wall mediastinal tube  water seal  30/140    Lungs: B/l cta on roomair    Abdomen: soft, nontender, nondistended, positive bowel sounds, + bm    :    voids           Extremities:  warm well perfused equal strength throughout    B/le + dp no calf tenderness                                         Discussed with Cardiothoracic Team at AM rounds.
Day 1 of Anesthesia Pain Management Service    SUBJECTIVE: I'm having some pain    Pain Scale Score:	[X] Refer to charted pain scores    THERAPY:    [ ] IV PCA Morphine		[ ] 5 mg/mL	[ ] 1 mg/mL  [X] IV PCA Hydromorphone	[ ] 5 mg/mL	[X] 1 mg/mL  [ ] IV PCA Fentanyl		[ ] 50 micrograms/mL    Demand dose: 0.2 mg     Lockout: 6 minutes   Continuous Rate: 0 mg/hr  4 Hour Limit: 4 mg    MEDICATIONS  (STANDING):  allopurinol 100 milliGRAM(s) Oral daily  aMIOdarone    Tablet 200 milliGRAM(s) Oral daily  buMETAnide 2 milliGRAM(s) Oral two times a day  folic acid 1 milliGRAM(s) Oral daily  hydrALAZINE 25 milliGRAM(s) Oral three times a day  HYDROmorphone PCA (1 mG/mL) 30 milliLiter(s) PCA Continuous PCA Continuous  levothyroxine 50 MICROGram(s) Oral daily  pantoprazole    Tablet 40 milliGRAM(s) Oral before breakfast    MEDICATIONS  (PRN):  acetaminophen     Tablet .. 650 milliGRAM(s) Oral every 6 hours PRN Mild Pain (1 - 3)  HYDROmorphone PCA (1 mG/mL) Rescue Clinician Bolus 0.5 milliGRAM(s) IV Push every 15 minutes PRN for Pain Scale GREATER THAN 6  melatonin 3 milliGRAM(s) Oral at bedtime PRN Insomnia  naloxone Injectable 0.1 milliGRAM(s) IV Push every 3 minutes PRN For ANY of the following changes in patient status:  A. RR LESS THAN 10 breaths per minute, B. Oxygen saturation LESS THAN 90%, C. Sedation score of 6  ondansetron Injectable 4 milliGRAM(s) IV Push every 6 hours PRN Nausea      OBJECTIVE:    Sedation Score:	[ X] Alert 	[ ] Drowsy 	[ ] Arousable	[ ] Asleep	[ ] Unresponsive    Side Effects:	[X ] None	[ ] Nausea	[ ] Vomiting	[ ] Pruritus  		[ ] Other:    Vital Signs Last 24 Hrs  T(C): 36.2 (21 May 2024 08:00), Max: 36.7 (21 May 2024 00:38)  T(F): 97.2 (21 May 2024 08:00), Max: 98 (21 May 2024 00:38)  HR: 58 (21 May 2024 08:00) (53 - 137)  BP: 147/83 (21 May 2024 08:00) (91/58 - 174/85)  BP(mean): 91 (21 May 2024 05:38) (70 - 99)  RR: 18 (21 May 2024 08:00) (16 - 18)  SpO2: 93% (21 May 2024 08:00) (91% - 100%)    Parameters below as of 21 May 2024 08:00  Patient On (Oxygen Delivery Method): room air        ASSESSMENT/ PLAN    Therapy to  be:               [X] Continued   [ ] Discontinued   [ ] Changed to PRN Analgesics    Documentation and Verification of current medications:   [X] Done	[ ] Not done, not eligible    Comments: OOB in chair. Endorsing some incisional pain. PCA use minimal, 0.3mg / 10.5 hours. Encouraged increased use.
Day 4 of Anesthesia Pain Management Service    SUBJECTIVE: I'm doing ok    Pain Scale Score:	[X] Refer to charted pain scores    THERAPY:    [ ] IV PCA Morphine		[ ] 5 mg/mL	[ ] 1 mg/mL  [X] IV PCA Hydromorphone	[ ] 5 mg/mL	[X] 1 mg/mL  [ ] IV PCA Fentanyl		[ ] 50 micrograms/mL    Demand dose: 0.2 mg     Lockout: 6 minutes   Continuous Rate: 0 mg/hr  4 Hour Limit: 4 mg    MEDICATIONS  (STANDING):  allopurinol 100 milliGRAM(s) Oral daily  aMIOdarone    Tablet 200 milliGRAM(s) Oral daily  buMETAnide 2 milliGRAM(s) Oral two times a day  folic acid 1 milliGRAM(s) Oral daily  hydrALAZINE 25 milliGRAM(s) Oral three times a day  HYDROmorphone PCA (1 mG/mL) 30 milliLiter(s) PCA Continuous PCA Continuous  levothyroxine 50 MICROGram(s) Oral daily  pantoprazole    Tablet 40 milliGRAM(s) Oral before breakfast  tamsulosin 0.8 milliGRAM(s) Oral at bedtime    MEDICATIONS  (PRN):  acetaminophen     Tablet .. 650 milliGRAM(s) Oral every 6 hours PRN Mild Pain (1 - 3)  HYDROmorphone PCA (1 mG/mL) Rescue Clinician Bolus 0.5 milliGRAM(s) IV Push every 15 minutes PRN for Pain Scale GREATER THAN 6  melatonin 3 milliGRAM(s) Oral at bedtime PRN Insomnia  naloxone Injectable 0.1 milliGRAM(s) IV Push every 3 minutes PRN For ANY of the following changes in patient status:  A. RR LESS THAN 10 breaths per minute, B. Oxygen saturation LESS THAN 90%, C. Sedation score of 6  ondansetron Injectable 4 milliGRAM(s) IV Push every 6 hours PRN Nausea      OBJECTIVE:    Sedation Score:	[ X] Alert 	[ ] Drowsy 	[ ] Arousable	[ ] Asleep	[ ] Unresponsive    Side Effects:	[X ] None	[ ] Nausea	[ ] Vomiting	[ ] Pruritus  		[ ] Other:    Vital Signs Last 24 Hrs  T(C): 36.9 (24 May 2024 07:30), Max: 37 (23 May 2024 20:55)  T(F): 98.4 (24 May 2024 07:30), Max: 98.6 (23 May 2024 20:55)  HR: 80 (24 May 2024 07:30) (66 - 80)  BP: 115/66 (24 May 2024 07:30) (115/66 - 150/61)  BP(mean): 92 (23 May 2024 20:55) (92 - 92)  RR: 18 (24 May 2024 07:30) (18 - 18)  SpO2: 94% (24 May 2024 07:30) (93% - 95%)    Parameters below as of 24 May 2024 07:30  Patient On (Oxygen Delivery Method): room air        ASSESSMENT/ PLAN    Therapy to  be:               [X] Continued   [ ] Discontinued   [ ] Changed to PRN Analgesics    Documentation and Verification of current medications:   [X] Done	[ ] Not done, not eligible    Comments: Total PCA use 7.4mg / 24 hours. OOB in chair. +CT.  Consider transition to po analgesics prn 
Subjective " hello when is this  tube coming out - I feel fine"       Telemetry: NSR     Vital Signs Last 24 Hrs  T(C): 36.8 (05-27-24 @ 04:40), Max: 36.8 (05-27-24 @ 04:40)  T(F): 98.3 (05-27-24 @ 04:40), Max: 98.3 (05-27-24 @ 04:40)  HR: 66 (05-27-24 @ 04:40) (66 - 80)  BP: 138/65 (05-27-24 @ 04:40) (134/73 - 147/82)  RR: 18 (05-27-24 @ 04:40) (18 - 20)  SpO2: 95% (05-27-24 @ 04:40) (94% - 96%)           05-26 @ 07:01  -  05-27 @ 07:00  --------------------------------------------------------  IN: 400 mL / OUT: 297 mL / NET: 103 mL                          8.7    11.27 )-----------( 206      ( 27 May 2024 05:40 )             27.8       05-27    141  |  100  |  36<H>  ----------------------------<  97  3.5   |  30  |  1.50<H>    Ca    9.4      27 May 2024 05:40      MEDICATIONS  (STANDING):  allopurinol 100 milliGRAM(s) Oral daily  aMIOdarone    Tablet 200 milliGRAM(s) Oral daily  buMETAnide 2 milliGRAM(s) Oral two times a day  folic acid 1 milliGRAM(s) Oral daily  hydrALAZINE 25 milliGRAM(s) Oral three times a day  levothyroxine 50 MICROGram(s) Oral daily  pantoprazole    Tablet 40 milliGRAM(s) Oral before breakfast  potassium bicarbonate/potassium citrate 25 milliEquivalent(s) Oral every 1 hour  tamsulosin 0.8 milliGRAM(s) Oral at bedtime    MEDICATIONS  (PRN):  acetaminophen     Tablet .. 650 milliGRAM(s) Oral every 6 hours PRN Mild Pain (1 - 3)  bisacodyl Suppository 10 milliGRAM(s) Rectal at bedtime PRN Constipation  melatonin 3 milliGRAM(s) Oral at bedtime PRN Insomnia  oxycodone    5 mG/acetaminophen 325 mG 2 Tablet(s) Oral every 4 hours PRN Severe Pain (7 - 10)  oxycodone    5 mG/acetaminophen 325 mG 1 Tablet(s) Oral every 4 hours PRN Moderate Pain (4 - 6)  senna 2 Tablet(s) Oral at bedtime PRN Constipation  sorbitol 70% Solution 30 milliLiter(s) Oral once PRN constipation            Daily     Daily         CAPILLARY BLOOD GLUCOSE              Drains:     MS         [ x ] Drainage:    67/97                               PHYSICAL EXAM        Neurology: alert and oriented x 3, nonfocal, no gross deficits    CV :[id number]    Sternal Wound :  YAQUELIN healing  Stable  RT Anterior pericardial drain  water seal     Lungs: B/l cta on room air    Abdomen: soft, nontender, nondistended, positive bowel sounds, last bowel movement   + Bm    :  voids             Extremities:   warm well perfused equal strength throughout   B/lle + Dp + trace edema no calf tenderness                                        Physical Therapy Rec:   Home  [ x ]   Home w/ PT  [  ]  Rehab  [  ]    Discussed with Cardiothoracic Team at AM rounds.
Anesthesia Pain Management Service    SUBJECTIVE: Patient is doing well with IV PCA and no significant problems reported.    Pain Scale Score	At rest: ___ 	With Activity: ___ 	[X ] Refer to charted pain scores    THERAPY:    [ ] IV PCA Morphine		[ ] 5 mg/mL	[ ] 1 mg/mL  [X ] IV PCA Hydromorphone	[ ] 5 mg/mL	[X ] 1 mg/mL  [ ] IV PCA Fentanyl		[ ] 50 micrograms/mL    Demand dose __0.2_ lockout __6_ (minutes) Continuous Rate _0__ Total: ___  mg used (in past 24 hours)      MEDICATIONS  (STANDING):  allopurinol 100 milliGRAM(s) Oral daily  aMIOdarone    Tablet 200 milliGRAM(s) Oral daily  buMETAnide 2 milliGRAM(s) Oral two times a day  folic acid 1 milliGRAM(s) Oral daily  hydrALAZINE 25 milliGRAM(s) Oral three times a day  HYDROmorphone PCA (1 mG/mL) 30 milliLiter(s) PCA Continuous PCA Continuous  levothyroxine 50 MICROGram(s) Oral daily  pantoprazole    Tablet 40 milliGRAM(s) Oral before breakfast  tamsulosin 0.8 milliGRAM(s) Oral at bedtime    MEDICATIONS  (PRN):  acetaminophen     Tablet .. 650 milliGRAM(s) Oral every 6 hours PRN Mild Pain (1 - 3)  HYDROmorphone PCA (1 mG/mL) Rescue Clinician Bolus 0.5 milliGRAM(s) IV Push every 15 minutes PRN for Pain Scale GREATER THAN 6  melatonin 3 milliGRAM(s) Oral at bedtime PRN Insomnia  naloxone Injectable 0.1 milliGRAM(s) IV Push every 3 minutes PRN For ANY of the following changes in patient status:  A. RR LESS THAN 10 breaths per minute, B. Oxygen saturation LESS THAN 90%, C. Sedation score of 6  ondansetron Injectable 4 milliGRAM(s) IV Push every 6 hours PRN Nausea      OBJECTIVE:    Sedation Score:	[ X] Alert	[ ] Drowsy 	[ ] Arousable	[ ] Asleep	[ ] Unresponsive    Side Effects:	[X ] None	[ ] Nausea	[ ] Vomiting	[ ] Pruritus  		[ ] Other:    Vital Signs Last 24 Hrs  T(C): 36.9 (24 May 2024 07:30), Max: 37 (23 May 2024 20:55)  T(F): 98.4 (24 May 2024 07:30), Max: 98.6 (23 May 2024 20:55)  HR: 80 (24 May 2024 07:30) (66 - 80)  BP: 115/66 (24 May 2024 07:30) (115/66 - 150/61)  BP(mean): 92 (23 May 2024 20:55) (92 - 92)  RR: 18 (24 May 2024 07:30) (18 - 18)  SpO2: 94% (24 May 2024 07:30) (93% - 95%)    Parameters below as of 24 May 2024 07:30  Patient On (Oxygen Delivery Method): room air        ASSESSMENT/ PLAN    Therapy to  be:	[ X] Continue   [ ] Discontinued   [ ] Change to prn Analgesics    Documentation and Verification of current medications:   [X] Done	[ ] Not done, not elligible      Progress Note written now but Patient was seen earlier.
Day 2 of Anesthesia Pain Management Service    SUBJECTIVE: I'm doing ok    Pain Scale Score:	[X] Refer to charted pain scores    THERAPY:    [ ] IV PCA Morphine		[ ] 5 mg/mL	[ ] 1 mg/mL  [X] IV PCA Hydromorphone	[ ] 5 mg/mL	[X] 1 mg/mL  [ ] IV PCA Fentanyl		[ ] 50 micrograms/mL    Demand dose: 0.2 mg     Lockout: 6 minutes   Continuous Rate: 0 mg/hr  4 Hour Limit: 4 mg    MEDICATIONS  (STANDING):  allopurinol 100 milliGRAM(s) Oral daily  aMIOdarone    Tablet 200 milliGRAM(s) Oral daily  buMETAnide 2 milliGRAM(s) Oral two times a day  folic acid 1 milliGRAM(s) Oral daily  hydrALAZINE 25 milliGRAM(s) Oral three times a day  HYDROmorphone PCA (1 mG/mL) 30 milliLiter(s) PCA Continuous PCA Continuous  levothyroxine 50 MICROGram(s) Oral daily  pantoprazole    Tablet 40 milliGRAM(s) Oral before breakfast  tamsulosin 0.4 milliGRAM(s) Oral at bedtime    MEDICATIONS  (PRN):  acetaminophen     Tablet .. 650 milliGRAM(s) Oral every 6 hours PRN Mild Pain (1 - 3)  HYDROmorphone PCA (1 mG/mL) Rescue Clinician Bolus 0.5 milliGRAM(s) IV Push every 15 minutes PRN for Pain Scale GREATER THAN 6  melatonin 3 milliGRAM(s) Oral at bedtime PRN Insomnia  naloxone Injectable 0.1 milliGRAM(s) IV Push every 3 minutes PRN For ANY of the following changes in patient status:  A. RR LESS THAN 10 breaths per minute, B. Oxygen saturation LESS THAN 90%, C. Sedation score of 6  ondansetron Injectable 4 milliGRAM(s) IV Push every 6 hours PRN Nausea      OBJECTIVE:    Sedation Score:	[ X] Alert 	[ ] Drowsy 	[ ] Arousable	[ ] Asleep	[ ] Unresponsive    Side Effects:	[X ] None	[ ] Nausea	[ ] Vomiting	[ ] Pruritus  		[ ] Other:    Vital Signs Last 24 Hrs  T(C): 36.7 (22 May 2024 04:59), Max: 36.7 (22 May 2024 04:59)  T(F): 98 (22 May 2024 04:59), Max: 98 (22 May 2024 04:59)  HR: 68 (22 May 2024 04:59) (62 - 69)  BP: 140/84 (22 May 2024 04:59) (140/84 - 162/95)  BP(mean): 103 (22 May 2024 04:59) (103 - 103)  RR: 18 (22 May 2024 04:59) (18 - 18)  SpO2: 91% (22 May 2024 04:59) (91% - 93%)    Parameters below as of 22 May 2024 04:59  Patient On (Oxygen Delivery Method): room air        ASSESSMENT/ PLAN    Therapy to  be:               [X] Continued   [ ] Discontinued   [ ] Changed to PRN Analgesics    Documentation and Verification of current medications:   [X] Done	[ ] Not done, not eligible    Comments: OOB in chair. Total 5.2mg / 24 hours. Plan to transition to prn once CT d\c'd
Day 3 of Anesthesia Pain Management Service    SUBJECTIVE: I'm doing ok    Pain Scale Score:	[X] Refer to charted pain scores    THERAPY:    [ ] IV PCA Morphine		[ ] 5 mg/mL	[ ] 1 mg/mL  [X] IV PCA Hydromorphone	[ ] 5 mg/mL	[X] 1 mg/mL  [ ] IV PCA Fentanyl		[ ] 50 micrograms/mL    Demand dose: 0.2 mg     Lockout: 6 minutes   Continuous Rate: 0 mg/hr  4 Hour Limit: 4 mg    MEDICATIONS  (STANDING):  allopurinol 100 milliGRAM(s) Oral daily  aMIOdarone    Tablet 200 milliGRAM(s) Oral daily  buMETAnide 2 milliGRAM(s) Oral two times a day  folic acid 1 milliGRAM(s) Oral daily  hydrALAZINE 25 milliGRAM(s) Oral three times a day  HYDROmorphone PCA (1 mG/mL) 30 milliLiter(s) PCA Continuous PCA Continuous  levothyroxine 50 MICROGram(s) Oral daily  pantoprazole    Tablet 40 milliGRAM(s) Oral before breakfast  tamsulosin 0.8 milliGRAM(s) Oral at bedtime    MEDICATIONS  (PRN):  acetaminophen     Tablet .. 650 milliGRAM(s) Oral every 6 hours PRN Mild Pain (1 - 3)  HYDROmorphone PCA (1 mG/mL) Rescue Clinician Bolus 0.5 milliGRAM(s) IV Push every 15 minutes PRN for Pain Scale GREATER THAN 6  melatonin 3 milliGRAM(s) Oral at bedtime PRN Insomnia  naloxone Injectable 0.1 milliGRAM(s) IV Push every 3 minutes PRN For ANY of the following changes in patient status:  A. RR LESS THAN 10 breaths per minute, B. Oxygen saturation LESS THAN 90%, C. Sedation score of 6  ondansetron Injectable 4 milliGRAM(s) IV Push every 6 hours PRN Nausea      OBJECTIVE:    Sedation Score:	[ X] Alert 	[ ] Drowsy 	[ ] Arousable	[ ] Asleep	[ ] Unresponsive    Side Effects:	[X ] None	[ ] Nausea	[ ] Vomiting	[ ] Pruritus  		[ ] Other:    Vital Signs Last 24 Hrs  T(C): 36.8 (23 May 2024 07:30), Max: 36.8 (23 May 2024 07:30)  T(F): 98.2 (23 May 2024 07:30), Max: 98.2 (23 May 2024 07:30)  HR: 65 (23 May 2024 07:30) (55 - 65)  BP: 148/82 (23 May 2024 07:30) (135/79 - 168/93)  BP(mean): --  RR: 18 (23 May 2024 07:30) (18 - 18)  SpO2: 93% (23 May 2024 07:30) (93% - 95%)    Parameters below as of 23 May 2024 07:30  Patient On (Oxygen Delivery Method): room air        ASSESSMENT/ PLAN    Therapy to  be:               [X] Continued   [ ] Discontinued   [ ] Changed to PRN Analgesics    Documentation and Verification of current medications:   [X] Done	[ ] Not done, not eligible    Comments: OOB in chair +CT Total PCA use 7.8mg / 24 hours. Reeducated to use. 
Day 5 of Anesthesia Pain Management Service    SUBJECTIVE: Patient is doing well with IV PCA    Pain Scale Score:	[X] Refer to charted pain scores    THERAPY:    [ ] IV PCA Morphine		[ ] 5 mg/mL	[ ] 1 mg/mL  [X] IV PCA Hydromorphone	[ ] 5 mg/mL	[X] 1 mg/mL  [ ] IV PCA Fentanyl		[ ] 50 micrograms/mL    Demand dose: 0.2 mg     Lockout: 6 minutes   Continuous Rate: 0 mg/hr  4 Hour Limit: 4 mg    MEDICATIONS  (STANDING):  allopurinol 100 milliGRAM(s) Oral daily  aMIOdarone    Tablet 200 milliGRAM(s) Oral daily  buMETAnide 2 milliGRAM(s) Oral two times a day  folic acid 1 milliGRAM(s) Oral daily  hydrALAZINE 25 milliGRAM(s) Oral three times a day  HYDROmorphone PCA (1 mG/mL) 30 milliLiter(s) PCA Continuous PCA Continuous  levothyroxine 50 MICROGram(s) Oral daily  pantoprazole    Tablet 40 milliGRAM(s) Oral before breakfast  sorbitol 70% Solution 30 milliLiter(s) Oral once  tamsulosin 0.8 milliGRAM(s) Oral at bedtime    MEDICATIONS  (PRN):  acetaminophen     Tablet .. 650 milliGRAM(s) Oral every 6 hours PRN Mild Pain (1 - 3)  HYDROmorphone PCA (1 mG/mL) Rescue Clinician Bolus 0.5 milliGRAM(s) IV Push every 15 minutes PRN for Pain Scale GREATER THAN 6  melatonin 3 milliGRAM(s) Oral at bedtime PRN Insomnia  naloxone Injectable 0.1 milliGRAM(s) IV Push every 3 minutes PRN For ANY of the following changes in patient status:  A. RR LESS THAN 10 breaths per minute, B. Oxygen saturation LESS THAN 90%, C. Sedation score of 6  ondansetron Injectable 4 milliGRAM(s) IV Push every 6 hours PRN Nausea      OBJECTIVE:    Sedation Score:	[ X] Alert	[ ] Drowsy 	[ ] Arousable	[ ] Asleep	[ ] Unresponsive    Side Effects:	[X ] None	[ ] Nausea	[ ] Vomiting	[ ] Pruritus  		[ ] Other:    Vital Signs Last 24 Hrs  T(C): 36.8 (25 May 2024 04:15), Max: 37 (24 May 2024 15:20)  T(F): 98.2 (25 May 2024 04:15), Max: 98.6 (24 May 2024 15:20)  HR: 69 (25 May 2024 04:15) (61 - 74)  BP: 154/74 (25 May 2024 04:15) (119/81 - 154/74)  BP(mean): 100 (25 May 2024 04:15) (100 - 100)  RR: 18 (25 May 2024 04:15) (18 - 18)  SpO2: 94% (25 May 2024 04:15) (94% - 96%)    Parameters below as of 25 May 2024 04:15  Patient On (Oxygen Delivery Method): room air        ASSESSMENT/ PLAN    Therapy to  be:               [X] Continued   [ ] Discontinued   [ ] Changed to PRN Analgesics    Documentation and Verification of current medications:   [X] Done	[ ] Not done, not eligible    Comments:
Pain Management Attending Addendum    SUBJECTIVE: Patient doing well with IV PCA    Therapy:    [X] IV PCA         [ ] PRN Analgesics    OBJECTIVE:   [X] Pain appropriately controlled    [ ] Other:    Side Effects:  [X] None	             [ ] Nausea              [ ] Pruritis                	[ ] Other:    ASSESSMENT/PLAN: Continue current therapy    Comments:
Patient is a 63y old  Male who presents with a chief complaint of PERICARDIAL EFFUSION (23 May 2024 13:11)      DATE OF SERVICE: 05-23-24 @ 14:16    SUBJECTIVE / OVERNIGHT EVENTS: overnight events noted    ROS:  Resp: No cough no sputum production  CVS: No chest pain no palpitations no orthopnea  GI: no N/V/D          MEDICATIONS  (STANDING):  allopurinol 100 milliGRAM(s) Oral daily  aMIOdarone    Tablet 200 milliGRAM(s) Oral daily  buMETAnide 2 milliGRAM(s) Oral two times a day  folic acid 1 milliGRAM(s) Oral daily  hydrALAZINE 25 milliGRAM(s) Oral three times a day  HYDROmorphone PCA (1 mG/mL) 30 milliLiter(s) PCA Continuous PCA Continuous  levothyroxine 50 MICROGram(s) Oral daily  pantoprazole    Tablet 40 milliGRAM(s) Oral before breakfast  tamsulosin 0.8 milliGRAM(s) Oral at bedtime    MEDICATIONS  (PRN):  acetaminophen     Tablet .. 650 milliGRAM(s) Oral every 6 hours PRN Mild Pain (1 - 3)  HYDROmorphone PCA (1 mG/mL) Rescue Clinician Bolus 0.5 milliGRAM(s) IV Push every 15 minutes PRN for Pain Scale GREATER THAN 6  melatonin 3 milliGRAM(s) Oral at bedtime PRN Insomnia  naloxone Injectable 0.1 milliGRAM(s) IV Push every 3 minutes PRN For ANY of the following changes in patient status:  A. RR LESS THAN 10 breaths per minute, B. Oxygen saturation LESS THAN 90%, C. Sedation score of 6  ondansetron Injectable 4 milliGRAM(s) IV Push every 6 hours PRN Nausea        CAPILLARY BLOOD GLUCOSE        I&O's Summary    22 May 2024 07:01  -  23 May 2024 07:00  --------------------------------------------------------  IN: 800 mL / OUT: 2390 mL / NET: -1590 mL    23 May 2024 07:01  -  23 May 2024 14:16  --------------------------------------------------------  IN: 680 mL / OUT: 900 mL / NET: -220 mL        Vital Signs Last 24 Hrs  T(C): 36.6 (23 May 2024 11:46), Max: 36.8 (23 May 2024 07:30)  T(F): 97.8 (23 May 2024 11:46), Max: 98.2 (23 May 2024 07:30)  HR: 67 (23 May 2024 11:46) (55 - 67)  BP: 149/92 (23 May 2024 11:46) (148/82 - 168/93)  BP(mean): --  RR: 18 (23 May 2024 11:46) (18 - 18)  SpO2: 93% (23 May 2024 11:46) (93% - 95%)      PHYSICAL EXAM:  CHEST/LUNG: clear   HEART: S1 S2; systolic murmur +   ABDOMEN: Soft, Nontender,  EXTREMITIES:  no edema  NEUROLOGY: AO x 3 non-focal  SKIN: No rashes or lesions    LABS:                        10.1   12.10 )-----------( 250      ( 23 May 2024 06:09 )             32.3     05-23    136  |  96  |  34<H>  ----------------------------<  87  3.7   |  27  |  1.22    Ca    9.5      23 May 2024 06:09  Phos  3.7     05-23  Mg     2.0     05-23    TPro  7.6  /  Alb  3.8  /  TBili  0.6  /  DBili  x   /  AST  15  /  ALT  13  /  AlkPhos  151<H>  05-22          Urinalysis Basic - ( 23 May 2024 06:09 )    Color: x / Appearance: x / SG: x / pH: x  Gluc: 87 mg/dL / Ketone: x  / Bili: x / Urobili: x   Blood: x / Protein: x / Nitrite: x   Leuk Esterase: x / RBC: x / WBC x   Sq Epi: x / Non Sq Epi: x / Bacteria: x          All consultant(s) notes reviewed and care discussed with other providers        Contact Number, Dr Rosario [phone]
Patient is a 63y old  Male who presents with a chief complaint of PERICARDIAL EFFUSION (24 May 2024 11:28)      DATE OF SERVICE: 05-24-24 @ 13:44    SUBJECTIVE / OVERNIGHT EVENTS: overnight events noted    ROS:  Resp: No cough no sputum production  CVS: No chest pain no palpitations no orthopnea  GI: no N/V/D    MEDICATIONS  (STANDING):  allopurinol 100 milliGRAM(s) Oral daily  aMIOdarone    Tablet 200 milliGRAM(s) Oral daily  buMETAnide 2 milliGRAM(s) Oral two times a day  folic acid 1 milliGRAM(s) Oral daily  hydrALAZINE 25 milliGRAM(s) Oral three times a day  HYDROmorphone PCA (1 mG/mL) 30 milliLiter(s) PCA Continuous PCA Continuous  levothyroxine 50 MICROGram(s) Oral daily  pantoprazole    Tablet 40 milliGRAM(s) Oral before breakfast  tamsulosin 0.8 milliGRAM(s) Oral at bedtime    MEDICATIONS  (PRN):  acetaminophen     Tablet .. 650 milliGRAM(s) Oral every 6 hours PRN Mild Pain (1 - 3)  HYDROmorphone PCA (1 mG/mL) Rescue Clinician Bolus 0.5 milliGRAM(s) IV Push every 15 minutes PRN for Pain Scale GREATER THAN 6  melatonin 3 milliGRAM(s) Oral at bedtime PRN Insomnia  naloxone Injectable 0.1 milliGRAM(s) IV Push every 3 minutes PRN For ANY of the following changes in patient status:  A. RR LESS THAN 10 breaths per minute, B. Oxygen saturation LESS THAN 90%, C. Sedation score of 6  ondansetron Injectable 4 milliGRAM(s) IV Push every 6 hours PRN Nausea        CAPILLARY BLOOD GLUCOSE        I&O's Summary    23 May 2024 07:01  -  24 May 2024 07:00  --------------------------------------------------------  IN: 920 mL / OUT: 3515 mL / NET: -2595 mL    24 May 2024 07:01  -  24 May 2024 13:44  --------------------------------------------------------  IN: 360 mL / OUT: 1200 mL / NET: -840 mL        Vital Signs Last 24 Hrs  T(C): 36.9 (24 May 2024 11:32), Max: 37 (23 May 2024 20:55)  T(F): 98.4 (24 May 2024 11:32), Max: 98.6 (23 May 2024 20:55)  HR: 70 (24 May 2024 11:32) (66 - 80)  BP: 119/81 (24 May 2024 11:32) (115/66 - 150/61)  BP(mean): 92 (23 May 2024 20:55) (92 - 92)  RR: 18 (24 May 2024 11:32) (18 - 18)  SpO2: 94% (24 May 2024 11:32) (94% - 95%)      PHYSICAL EXAM:  CHEST/LUNG: clear   HEART: S1 S2; systolic murmur +   ABDOMEN: Soft, Nontender,  EXTREMITIES:  no edema  NEUROLOGY: AO x 3 non-focal  SKIN: No rashes or lesions    LABS:                        9.8    9.37  )-----------( 242      ( 24 May 2024 06:21 )             31.6     05-24    138  |  96  |  33<H>  ----------------------------<  101<H>  3.9   |  30  |  1.30    Ca    9.3      24 May 2024 06:21  Phos  4.0     05-24  Mg     1.9     05-24            Urinalysis Basic - ( 24 May 2024 06:21 )    Color: x / Appearance: x / SG: x / pH: x  Gluc: 101 mg/dL / Ketone: x  / Bili: x / Urobili: x   Blood: x / Protein: x / Nitrite: x   Leuk Esterase: x / RBC: x / WBC x   Sq Epi: x / Non Sq Epi: x / Bacteria: x          All consultant(s) notes reviewed and care discussed with other providers        Contact Number, Dr Rosario [phone]
Patient is a 63y old  Male who presents with a chief complaint of PERICARDIAL EFFUSION (27 May 2024 09:34)      DATE OF SERVICE: 05-27-24 @ 13:54    SUBJECTIVE / OVERNIGHT EVENTS: overnight events noted    ROS:  Resp: No cough no sputum production  CVS: No chest pain no palpitations no orthopnea  GI: no N/V/D  'I feel fine'       MEDICATIONS  (STANDING):  allopurinol 100 milliGRAM(s) Oral daily  aMIOdarone    Tablet 200 milliGRAM(s) Oral daily  buMETAnide 2 milliGRAM(s) Oral two times a day  folic acid 1 milliGRAM(s) Oral daily  hydrALAZINE 25 milliGRAM(s) Oral three times a day  levothyroxine 50 MICROGram(s) Oral daily  pantoprazole    Tablet 40 milliGRAM(s) Oral before breakfast  tamsulosin 0.8 milliGRAM(s) Oral at bedtime    MEDICATIONS  (PRN):  acetaminophen     Tablet .. 650 milliGRAM(s) Oral every 6 hours PRN Mild Pain (1 - 3)  bisacodyl Suppository 10 milliGRAM(s) Rectal at bedtime PRN Constipation  melatonin 3 milliGRAM(s) Oral at bedtime PRN Insomnia  oxycodone    5 mG/acetaminophen 325 mG 1 Tablet(s) Oral every 4 hours PRN Moderate Pain (4 - 6)  oxycodone    5 mG/acetaminophen 325 mG 2 Tablet(s) Oral every 4 hours PRN Severe Pain (7 - 10)  senna 2 Tablet(s) Oral at bedtime PRN Constipation  sorbitol 70% Solution 30 milliLiter(s) Oral once PRN constipation        CAPILLARY BLOOD GLUCOSE        I&O's Summary    26 May 2024 07:01  -  27 May 2024 07:00  --------------------------------------------------------  IN: 400 mL / OUT: 297 mL / NET: 103 mL    27 May 2024 07:01  -  27 May 2024 13:54  --------------------------------------------------------  IN: 480 mL / OUT: 800 mL / NET: -320 mL        Vital Signs Last 24 Hrs  T(C): 36.9 (27 May 2024 13:20), Max: 36.9 (27 May 2024 13:20)  T(F): 98.4 (27 May 2024 13:20), Max: 98.4 (27 May 2024 13:20)  HR: 70 (27 May 2024 13:20) (66 - 80)  BP: 144/83 (27 May 2024 13:20) (134/73 - 147/82)  BP(mean): --  RR: 18 (27 May 2024 13:20) (18 - 20)  SpO2: 95% (27 May 2024 13:20) (94% - 96%)      PHYSICAL EXAM:  CHEST/LUNG: clear   HEART: S1 S2; systolic murmur +   ABDOMEN: Soft, Nontender,  EXTREMITIES:  no edema  NEUROLOGY: AO x 3 non-focal  SKIN: No rashes or lesions    LABS:                        8.7    11.27 )-----------( 206      ( 27 May 2024 05:40 )             27.8     05-27    141  |  100  |  36<H>  ----------------------------<  97  3.5   |  30  |  1.50<H>    Ca    9.4      27 May 2024 05:40            Urinalysis Basic - ( 27 May 2024 05:40 )    Color: x / Appearance: x / SG: x / pH: x  Gluc: 97 mg/dL / Ketone: x  / Bili: x / Urobili: x   Blood: x / Protein: x / Nitrite: x   Leuk Esterase: x / RBC: x / WBC x   Sq Epi: x / Non Sq Epi: x / Bacteria: x          All consultant(s) notes reviewed and care discussed with other providers        Contact Number, Dr Rosario [phone]
VITAL SIGNS-Telemetry:  SR   Vital Signs Last 24 Hrs  T(C): 36.8 (24 @ 04:45), Max: 36.8 (24 @ 04:45)  T(F): 98.2 (24 @ 04:45), Max: 98.2 (24 @ 04:45)  HR: 75 (24 @ 04:45) (72 - 83)  BP: 140/77 (24 @ 04:45) (108/65 - 164/78)  RR: 18 (24 @ 04:45) (18 - 20)  SpO2: 94% (24 @ 04:45) (94% - 96%)          @ 07:01  -   @ 07:00  --------------------------------------------------------  IN: 600 mL / OUT: 2085 mL / NET: -1485 mL    Daily     Daily Weight in k.4 (25 May 2024 07:50)    pericardial Drain: [  x] Drainage:                       PHYSICAL EXAM:  Neurology: alert and oriented x 3, nonfocal, no gross deficits  CV : S1S2  Sternal Wound :  CDI , Stable  Lungs: cta  Abdomen: soft, nontender, nondistended, positive bowel sounds, last bowel movement         Extremities:     no edema no calf tenderness    acetaminophen     Tablet .. 650 milliGRAM(s) Oral every 6 hours PRN  allopurinol 100 milliGRAM(s) Oral daily  aMIOdarone    Tablet 200 milliGRAM(s) Oral daily  bisacodyl Suppository 10 milliGRAM(s) Rectal at bedtime PRN  buMETAnide 2 milliGRAM(s) Oral two times a day  folic acid 1 milliGRAM(s) Oral daily  hydrALAZINE 25 milliGRAM(s) Oral three times a day  levothyroxine 50 MICROGram(s) Oral daily  melatonin 3 milliGRAM(s) Oral at bedtime PRN  oxycodone    5 mG/acetaminophen 325 mG 1 Tablet(s) Oral every 4 hours PRN  oxycodone    5 mG/acetaminophen 325 mG 2 Tablet(s) Oral every 4 hours PRN  pantoprazole    Tablet 40 milliGRAM(s) Oral before breakfast  potassium chloride    Tablet ER 20 milliEquivalent(s) Oral every 2 hours  potassium chloride    Tablet ER 40 milliEquivalent(s) Oral once  potassium chloride    Tablet ER 20 milliEquivalent(s) Oral every 2 hours  senna 2 Tablet(s) Oral at bedtime PRN  sorbitol 70% Solution 30 milliLiter(s) Oral once PRN  tamsulosin 0.8 milliGRAM(s) Oral at bedtime    Physical Therapy Rec:   Home  [ x ]   Home w/ PT  [  ]  Rehab  [  ]  Discussed with Cardiothoracic Team at AM rounds.
    VITAL SIGNS    Subjective: "I'm feeling ok, I really want to go home" Denies CP, palpitation, SOB, VANCE, HA, dizziness, N/V/D, fever or chills.  No acute event noted overnight.     Telemetry: SB / NSR 55-80       Vital Signs Last 24 Hrs  T(C): 36.8 (24 @ 04:15), Max: 37 (24 @ 15:20)  T(F): 98.2 (24 @ 04:15), Max: 98.6 (24 @ 15:20)  HR: 69 (24 @ 04:15) (61 - 74)  BP: 154/74 (24 @ 04:15) (119/81 - 154/74)  RR: 18 (24 @ 04:15) (18 - 18)  SpO2: 94% (24 @ 04:15) (94% - 96%)            @ 07:01  -   @ 07:00  --------------------------------------------------------  IN: 990 mL / OUT: 3460 mL / NET: -2470 mL     @ 07:01  -   @ 09:49  --------------------------------------------------------  IN: 0 mL / OUT: 800 mL / NET: -800 mL    LABS      139  |  96  |  34<H>  ----------------------------<  94  3.9   |  33<H>  |  1.42<H>    Ca    9.4      25 May 2024 05:33  Phos  4.0       Mg     1.9         TPro  6.8  /  Alb  3.5  /  TBili  0.7  /  DBili  x   /  AST  14  /  ALT  10  /  AlkPhos  136<H>                                   9.4    9.82  )-----------( 247      ( 25 May 2024 05:33 )             29.8         Daily     Daily Weight in k.4 (25 May 2024 07:50)    CAPILLARY BLOOD GLUCOSE    PHYSICAL EXAM    Neurology: alert and oriented x 3, nonfocal, no gross deficits    CV: (+) S1 and S2, No murmurs, rubs, gallops or clicks     Sternal Wound: MSI -->CDI, sternum stable    Chest: Right VATS incision with opsite dressing C/D/I. Right anterior pericardial CT --> WS.  Negative airleak.  Drained: 110cc / 24hrs     Lungs: CTA B/L     Abdomen: soft, nontender, nondistended, positive bowel sounds, (+) Flatus; (-) BM     :  Voiding               Extremities:  B/L LE (+) 1 edema; negative calf tenderness; (+) 2 DP palpable        acetaminophen Tablet .. 650 milliGRAM(s) Oral every 6 hours PRN  allopurinol 100 milliGRAM(s) Oral daily  aMIOdarone  Tablet 200 milliGRAM(s) Oral daily  buMETAnide 2 milliGRAM(s) Oral two times a day  folic acid 1 milliGRAM(s) Oral daily  hydrALAZINE 25 milliGRAM(s) Oral three times a day  HYDROmorphone PCA (1 mG/mL) 30 milliLiter(s) PCA Continuous PCA Continuous  HYDROmorphone PCA (1 mG/mL) Rescue Clinician Bolus 0.5 milliGRAM(s) IV Push every 15 minutes PRN  levothyroxine 50 MICROGram(s) Oral daily  melatonin 3 milliGRAM(s) Oral at bedtime PRN  naloxone Injectable 0.1 milliGRAM(s) IV Push every 3 minutes PRN  ondansetron Injectable 4 milliGRAM(s) IV Push every 6 hours PRN  pantoprazole Tablet 40 milliGRAM(s) Oral before breakfast  tamsulosin 0.8 milliGRAM(s) Oral at bedtime    Physical Therapy Rec:   Home  [ X ]   Home w/ PT  [  ]  Rehab  [  ]    Discussed with Cardiothoracic Team at AM rounds.
Patient is a 63y old  Male who presents with a chief complaint of PERICARDIAL EFFUSION (22 May 2024 09:48)    Vital Signs Last 24 Hrs  T(C): 36.7 (24 @ 04:59), Max: 36.7 (24 @ 04:59)  T(F): 98 (24 @ 04:59), Max: 98 (24 @ 04:59)  HR: 68 (24 @ 04:59) (62 - 69)  BP: 140/84 (24 @ 04:59) (140/84 - 162/95)  RR: 18 (24 @ 04:59) (18 - 18)  SpO2: 91% (24 @ 04:59) (91% - 93%)            24 @ 07:01  -  24 @ 07:00  --------------------------------------------------------  IN: 220 mL / OUT: 1440 mL / NET: -1220 mL    24 @ 07:01  -  24 @ 09:57  --------------------------------------------------------  IN: 0 mL / OUT: 170 mL / NET: -170 mL    Daily     Daily Weight in k.9 (22 May 2024 08:27)                          10.1   17.14 )-----------( 293      ( 22 May 2024 07:22 )             31.9         138  |  97  |  38<H>  ----------------------------<  82  4.6   |  27  |  1.68<H>    Ca    9.3      22 May 2024 07:11    TPro  7.6  /  Alb  3.8  /  TBili  0.6  /  DBili  x   /  AST  15  /  ALT  13  /  AlkPhos  151<H>  05-22        PHYSICAL EXAM  Neurology: A&Ox3, NAD  CV : RRR+S1S2  + Right chest tube (pericardial drain) CDI, serosanguinous drainage, -AL  Lungs: Respirations non-labored, B/L BS CTA  Abdomen: Soft, NT/ND, +BSx4Q  Extremities: B/L LE warm, no edema           MEDICATIONS  acetaminophen     Tablet .. 650 milliGRAM(s) Oral every 6 hours PRN  allopurinol 100 milliGRAM(s) Oral daily  aMIOdarone    Tablet 200 milliGRAM(s) Oral daily  buMETAnide 2 milliGRAM(s) Oral two times a day  folic acid 1 milliGRAM(s) Oral daily  hydrALAZINE 25 milliGRAM(s) Oral three times a day  HYDROmorphone PCA (1 mG/mL) 30 milliLiter(s) PCA Continuous PCA Continuous  HYDROmorphone PCA (1 mG/mL) Rescue Clinician Bolus 0.5 milliGRAM(s) IV Push every 15 minutes PRN  levothyroxine 50 MICROGram(s) Oral daily  melatonin 3 milliGRAM(s) Oral at bedtime PRN  naloxone Injectable 0.1 milliGRAM(s) IV Push every 3 minutes PRN  ondansetron Injectable 4 milliGRAM(s) IV Push every 6 hours PRN  pantoprazole    Tablet 40 milliGRAM(s) Oral before breakfast  tamsulosin 0.4 milliGRAM(s) Oral at bedtime    
Patient is a 63y old  Male who presents with a chief complaint of PERICARDIAL EFFUSION (26 May 2024 12:57)      DATE OF SERVICE: 05-26-24 @ 14:50    SUBJECTIVE / OVERNIGHT EVENTS: overnight events noted    ROS:  Resp: No cough no sputum production  CVS: No chest pain no palpitations no orthopnea  GI: no N/V/D  "I want to go home"       MEDICATIONS  (STANDING):  allopurinol 100 milliGRAM(s) Oral daily  aMIOdarone    Tablet 200 milliGRAM(s) Oral daily  buMETAnide 2 milliGRAM(s) Oral two times a day  folic acid 1 milliGRAM(s) Oral daily  hydrALAZINE 25 milliGRAM(s) Oral three times a day  levothyroxine 50 MICROGram(s) Oral daily  pantoprazole    Tablet 40 milliGRAM(s) Oral before breakfast  tamsulosin 0.8 milliGRAM(s) Oral at bedtime    MEDICATIONS  (PRN):  acetaminophen     Tablet .. 650 milliGRAM(s) Oral every 6 hours PRN Mild Pain (1 - 3)  bisacodyl Suppository 10 milliGRAM(s) Rectal at bedtime PRN Constipation  melatonin 3 milliGRAM(s) Oral at bedtime PRN Insomnia  oxycodone    5 mG/acetaminophen 325 mG 1 Tablet(s) Oral every 4 hours PRN Moderate Pain (4 - 6)  oxycodone    5 mG/acetaminophen 325 mG 2 Tablet(s) Oral every 4 hours PRN Severe Pain (7 - 10)  senna 2 Tablet(s) Oral at bedtime PRN Constipation  sorbitol 70% Solution 30 milliLiter(s) Oral once PRN constipation        CAPILLARY BLOOD GLUCOSE        I&O's Summary    25 May 2024 07:01  -  26 May 2024 07:00  --------------------------------------------------------  IN: 600 mL / OUT: 2085 mL / NET: -1485 mL    26 May 2024 07:01  -  26 May 2024 14:50  --------------------------------------------------------  IN: 0 mL / OUT: 30 mL / NET: -30 mL        Vital Signs Last 24 Hrs  T(C): 36.4 (26 May 2024 11:46), Max: 36.8 (26 May 2024 04:45)  T(F): 97.5 (26 May 2024 11:46), Max: 98.2 (26 May 2024 04:45)  HR: 67 (26 May 2024 11:46) (67 - 75)  BP: 145/73 (26 May 2024 11:46) (136/72 - 164/78)  BP(mean): --  RR: 18 (26 May 2024 11:46) (18 - 20)  SpO2: 96% (26 May 2024 11:46) (94% - 96%)    PHYSICAL EXAM:  CHEST/LUNG: clear   HEART: S1 S2; systolic murmur +   ABDOMEN: Soft, Nontender,  EXTREMITIES:  no edema  NEUROLOGY: AO x 3 non-focal  SKIN: No rashes or lesions    LABS:                        9.4    12.08 )-----------( 231      ( 26 May 2024 06:03 )             29.0     05-26    141  |  97  |  29<H>  ----------------------------<  87  3.1<L>   |  32<H>  |  1.35<H>    Ca    9.8      26 May 2024 06:03    TPro  6.8  /  Alb  3.5  /  TBili  0.7  /  DBili  x   /  AST  14  /  ALT  10  /  AlkPhos  136<H>  05-25          Urinalysis Basic - ( 26 May 2024 06:03 )    Color: x / Appearance: x / SG: x / pH: x  Gluc: 87 mg/dL / Ketone: x  / Bili: x / Urobili: x   Blood: x / Protein: x / Nitrite: x   Leuk Esterase: x / RBC: x / WBC x   Sq Epi: x / Non Sq Epi: x / Bacteria: x          All consultant(s) notes reviewed and care discussed with other providers        Contact Number, Dr Rosario [phone]
VITAL SIGNS    Telemetry:  SB  50-70  Vital Signs Last 24 Hrs  T(C): 36.5 (24 @ 11:39), Max: 37 (24 @ 20:53)  T(F): 97.7 (24 @ 11:39), Max: 98.6 (24 @ 20:53)  HR: 57 (24 @ 11:39) (57 - 86)  BP: 137/72 (24 @ 11:39) (135/73 - 153/52)  RR: 18 (24 @ 11:39) (18 - 18)  SpO2: 98% (24 @ 11:39) (94% - 98%)             @ 07:01  -   @ 07:00  --------------------------------------------------------  IN: 840 mL / OUT: 0 mL / NET: 840 mL       Daily     Daily Weight in k.1 (20 May 2024 08:05)  Admit Wt: Drug Dosing Weight  Height (cm): 167.6 (17 May 2024 16:04)  Weight (kg): 77.3 (17 May 2024 16:04)  BMI (kg/m2): 27.5 (17 May 2024 16:04)  BSA (m2): 1.87 (17 May 2024 16:04)    Bilirubin Total: 0.5 mg/dL ( @ 05:59)    CAPILLARY BLOOD GLUCOSE              MEDICATIONS  acetaminophen     Tablet .. 650 milliGRAM(s) Oral every 6 hours PRN  allopurinol 100 milliGRAM(s) Oral daily  aMIOdarone    Tablet 200 milliGRAM(s) Oral daily  buMETAnide 2 milliGRAM(s) Oral two times a day  cefuroxime  IVPB 1500 milliGRAM(s) IV Intermittent once  chlorhexidine 0.12% Liquid 15 milliLiter(s) Swish and Spit two times a day  chlorhexidine 4% Liquid 1 Application(s) Topical two times a day  folic acid 1 milliGRAM(s) Oral daily  hydrALAZINE 25 milliGRAM(s) Oral three times a day  levothyroxine 50 MICROGram(s) Oral daily  melatonin 3 milliGRAM(s) Oral at bedtime PRN  pantoprazole    Tablet 40 milliGRAM(s) Oral before breakfast      >>> <<<  PHYSICAL EXAM  Subjective: NAD  Neurology: alert and oriented x 3, nonfocal, no gross deficits  CV :s1s2  Sternal Wound :  CDI , Stable  Lungs:CTA  Abdomen: soft, NT,ND, ( +)BM  :  voiding  Extremities:  -c/c/e     LABS      142  |  101  |  33<H>  ----------------------------<  98  3.6   |  29  |  1.47<H>    Ca    9.3      20 May 2024 05:59    TPro  6.9  /  Alb  3.4  /  TBili  0.5  /  DBili  x   /  AST  11  /  ALT  14  /  AlkPhos  140<H>                                   8.5    9.72  )-----------( 252      ( 20 May 2024 05:59 )             26.2                 PAST MEDICAL & SURGICAL HISTORY:  Dyslipidemia      Hypertension      Chronic GERD      History of aortic stenosis      Chronic venous stasis dermatitis      S/P aortic valve replacement      S/P pericardial window creation           
VITAL SIGNS  T(C): 36.6  HR: 70  BP: 127/90  RR: 18  SpO2: 95%    Telemetry:  SR 60         PAST MEDICAL & SURGICAL HISTORY:  Dyslipidemia      Hypertension      Chronic GERD      History of aortic stenosis      Chronic venous stasis dermatitis      S/P aortic valve replacement      S/P pericardial window creation             
Patient is a 63y old  Male who presents with a chief complaint of PERICARDIAL EFFUSION (22 May 2024 09:48)      DATE OF SERVICE: 05-22-24 @ 13:34    SUBJECTIVE / OVERNIGHT EVENTS: overnight events noted    ROS:  Resp: No cough no sputum production  CVS: No chest pain no palpitations no orthopnea  GI: no N/V/D          MEDICATIONS  (STANDING):  allopurinol 100 milliGRAM(s) Oral daily  aMIOdarone    Tablet 200 milliGRAM(s) Oral daily  buMETAnide 2 milliGRAM(s) Oral two times a day  folic acid 1 milliGRAM(s) Oral daily  hydrALAZINE 25 milliGRAM(s) Oral three times a day  HYDROmorphone PCA (1 mG/mL) 30 milliLiter(s) PCA Continuous PCA Continuous  levothyroxine 50 MICROGram(s) Oral daily  pantoprazole    Tablet 40 milliGRAM(s) Oral before breakfast  tamsulosin 0.8 milliGRAM(s) Oral at bedtime    MEDICATIONS  (PRN):  acetaminophen     Tablet .. 650 milliGRAM(s) Oral every 6 hours PRN Mild Pain (1 - 3)  HYDROmorphone PCA (1 mG/mL) Rescue Clinician Bolus 0.5 milliGRAM(s) IV Push every 15 minutes PRN for Pain Scale GREATER THAN 6  melatonin 3 milliGRAM(s) Oral at bedtime PRN Insomnia  naloxone Injectable 0.1 milliGRAM(s) IV Push every 3 minutes PRN For ANY of the following changes in patient status:  A. RR LESS THAN 10 breaths per minute, B. Oxygen saturation LESS THAN 90%, C. Sedation score of 6  ondansetron Injectable 4 milliGRAM(s) IV Push every 6 hours PRN Nausea        CAPILLARY BLOOD GLUCOSE        I&O's Summary    21 May 2024 07:01  -  22 May 2024 07:00  --------------------------------------------------------  IN: 220 mL / OUT: 1440 mL / NET: -1220 mL    22 May 2024 07:01  -  22 May 2024 13:34  --------------------------------------------------------  IN: 560 mL / OUT: 750 mL / NET: -190 mL        Vital Signs Last 24 Hrs  T(C): 36.4 (22 May 2024 12:19), Max: 36.7 (22 May 2024 04:59)  T(F): 97.5 (22 May 2024 12:19), Max: 98 (22 May 2024 04:59)  HR: 65 (22 May 2024 12:19) (62 - 69)  BP: 135/79 (22 May 2024 12:19) (135/79 - 162/95)  BP(mean): 103 (22 May 2024 04:59) (103 - 103)  RR: 18 (22 May 2024 12:19) (18 - 18)  SpO2: 94% (22 May 2024 12:19) (91% - 94%)    PHYSICAL EXAM:  CHEST/LUNG: clear   HEART: S1 S2; systolic murmur +   ABDOMEN: Soft, Nontender,  EXTREMITIES:  no edema  NEUROLOGY: AO x 3 non-focal  SKIN: No rashes or lesions    LABS:                        10.1   17.14 )-----------( 293      ( 22 May 2024 07:22 )             31.9     05-22    138  |  97  |  38<H>  ----------------------------<  82  4.6   |  27  |  1.68<H>    Ca    9.3      22 May 2024 07:11    TPro  7.6  /  Alb  3.8  /  TBili  0.6  /  DBili  x   /  AST  15  /  ALT  13  /  AlkPhos  151<H>  05-22          Urinalysis Basic - ( 22 May 2024 07:11 )    Color: x / Appearance: x / SG: x / pH: x  Gluc: 82 mg/dL / Ketone: x  / Bili: x / Urobili: x   Blood: x / Protein: x / Nitrite: x   Leuk Esterase: x / RBC: x / WBC x   Sq Epi: x / Non Sq Epi: x / Bacteria: x          All consultant(s) notes reviewed and care discussed with other providers        Contact Number, Dr Rosario [phone]
Patient is a 63y old  Male who presents with a chief complaint of PERICARDIAL EFFUSION (25 May 2024 10:13)      DATE OF SERVICE: 05-25-24 @ 13:01    SUBJECTIVE / OVERNIGHT EVENTS: overnight events noted    ROS:  Resp: No cough no sputum production  CVS: No chest pain no palpitations no orthopnea  GI: no N/V/D        MEDICATIONS  (STANDING):  allopurinol 100 milliGRAM(s) Oral daily  aMIOdarone    Tablet 200 milliGRAM(s) Oral daily  buMETAnide 2 milliGRAM(s) Oral two times a day  folic acid 1 milliGRAM(s) Oral daily  hydrALAZINE 25 milliGRAM(s) Oral three times a day  levothyroxine 50 MICROGram(s) Oral daily  pantoprazole    Tablet 40 milliGRAM(s) Oral before breakfast  tamsulosin 0.8 milliGRAM(s) Oral at bedtime    MEDICATIONS  (PRN):  acetaminophen     Tablet .. 650 milliGRAM(s) Oral every 6 hours PRN Mild Pain (1 - 3)  melatonin 3 milliGRAM(s) Oral at bedtime PRN Insomnia  oxycodone    5 mG/acetaminophen 325 mG 2 Tablet(s) Oral every 4 hours PRN Severe Pain (7 - 10)  oxycodone    5 mG/acetaminophen 325 mG 1 Tablet(s) Oral every 4 hours PRN Moderate Pain (4 - 6)        CAPILLARY BLOOD GLUCOSE        I&O's Summary    24 May 2024 07:01  -  25 May 2024 07:00  --------------------------------------------------------  IN: 990 mL / OUT: 3460 mL / NET: -2470 mL    25 May 2024 07:01  -  25 May 2024 13:01  --------------------------------------------------------  IN: 360 mL / OUT: 1450 mL / NET: -1090 mL        Vital Signs Last 24 Hrs  T(C): 36.8 (25 May 2024 04:15), Max: 37 (24 May 2024 15:20)  T(F): 98.2 (25 May 2024 04:15), Max: 98.6 (24 May 2024 15:20)  HR: 69 (25 May 2024 04:15) (61 - 74)  BP: 154/74 (25 May 2024 04:15) (130/70 - 154/74)  BP(mean): 100 (25 May 2024 04:15) (100 - 100)  RR: 18 (25 May 2024 04:15) (18 - 18)  SpO2: 94% (25 May 2024 04:15) (94% - 96%)    PHYSICAL EXAM:  CHEST/LUNG: clear   HEART: S1 S2; systolic murmur +   ABDOMEN: Soft, Nontender,  EXTREMITIES:  no edema  NEUROLOGY: AO x 3 non-focal  SKIN: No rashes or lesions    LABS:                        9.4    9.82  )-----------( 247      ( 25 May 2024 05:33 )             29.8     05-25    139  |  96  |  34<H>  ----------------------------<  94  3.9   |  33<H>  |  1.42<H>    Ca    9.4      25 May 2024 05:33  Phos  4.0     05-24  Mg     1.9     05-24    TPro  6.8  /  Alb  3.5  /  TBili  0.7  /  DBili  x   /  AST  14  /  ALT  10  /  AlkPhos  136<H>  05-25          Urinalysis Basic - ( 25 May 2024 05:33 )    Color: x / Appearance: x / SG: x / pH: x  Gluc: 94 mg/dL / Ketone: x  / Bili: x / Urobili: x   Blood: x / Protein: x / Nitrite: x   Leuk Esterase: x / RBC: x / WBC x   Sq Epi: x / Non Sq Epi: x / Bacteria: x          All consultant(s) notes reviewed and care discussed with other providers        Contact Number, Dr Rosario [phone]
Patient is a 63y old  Male who presents with a chief complaint of PERICARDIAL EFFUSION (21 May 2024 12:07)      DATE OF SERVICE: 05-21-24 @ 15:21    SUBJECTIVE / OVERNIGHT EVENTS: overnight events noted    ROS:  Resp: No cough no sputum production  CVS: No chest pain no palpitations no orthopnea  GI: no N/V/D        MEDICATIONS  (STANDING):  allopurinol 100 milliGRAM(s) Oral daily  aMIOdarone    Tablet 200 milliGRAM(s) Oral daily  buMETAnide 2 milliGRAM(s) Oral two times a day  folic acid 1 milliGRAM(s) Oral daily  hydrALAZINE 25 milliGRAM(s) Oral three times a day  HYDROmorphone PCA (1 mG/mL) 30 milliLiter(s) PCA Continuous PCA Continuous  levothyroxine 50 MICROGram(s) Oral daily  pantoprazole    Tablet 40 milliGRAM(s) Oral before breakfast  tamsulosin 0.4 milliGRAM(s) Oral at bedtime    MEDICATIONS  (PRN):  acetaminophen     Tablet .. 650 milliGRAM(s) Oral every 6 hours PRN Mild Pain (1 - 3)  HYDROmorphone PCA (1 mG/mL) Rescue Clinician Bolus 0.5 milliGRAM(s) IV Push every 15 minutes PRN for Pain Scale GREATER THAN 6  melatonin 3 milliGRAM(s) Oral at bedtime PRN Insomnia  naloxone Injectable 0.1 milliGRAM(s) IV Push every 3 minutes PRN For ANY of the following changes in patient status:  A. RR LESS THAN 10 breaths per minute, B. Oxygen saturation LESS THAN 90%, C. Sedation score of 6  ondansetron Injectable 4 milliGRAM(s) IV Push every 6 hours PRN Nausea        CAPILLARY BLOOD GLUCOSE        I&O's Summary    20 May 2024 07:01  -  21 May 2024 07:00  --------------------------------------------------------  IN: 250 mL / OUT: 1120 mL / NET: -870 mL    21 May 2024 07:01  -  21 May 2024 15:21  --------------------------------------------------------  IN: 0 mL / OUT: 750 mL / NET: -750 mL        Vital Signs Last 24 Hrs  T(C): 36.3 (21 May 2024 14:06), Max: 36.7 (21 May 2024 00:38)  T(F): 97.3 (21 May 2024 14:06), Max: 98 (21 May 2024 00:38)  HR: 69 (21 May 2024 14:06) (53 - 137)  BP: 151/89 (21 May 2024 14:06) (91/58 - 174/85)  BP(mean): 91 (21 May 2024 05:38) (70 - 99)  RR: 18 (21 May 2024 14:06) (16 - 18)  SpO2: 92% (21 May 2024 14:06) (91% - 100%)      PHYSICAL EXAM:  CHEST/LUNG: clear   HEART: S1 S2; systolic murmur +   ABDOMEN: Soft, Nontender,  EXTREMITIES:  no edema  NEUROLOGY: AO x 3 non-focal  SKIN: No rashes or lesions    LABS:                        10.3   15.45 )-----------( 265      ( 21 May 2024 06:23 )             30.5     05-21    138  |  99  |  30<H>  ----------------------------<  178<H>  4.5   |  26  |  1.39<H>    Ca    9.3      21 May 2024 06:23    TPro  7.7  /  Alb  3.7  /  TBili  0.5  /  DBili  x   /  AST  15  /  ALT  13  /  AlkPhos  153<H>  05-21          Urinalysis Basic - ( 21 May 2024 06:23 )    Color: x / Appearance: x / SG: x / pH: x  Gluc: 178 mg/dL / Ketone: x  / Bili: x / Urobili: x   Blood: x / Protein: x / Nitrite: x   Leuk Esterase: x / RBC: x / WBC x   Sq Epi: x / Non Sq Epi: x / Bacteria: x          All consultant(s) notes reviewed and care discussed with other providers        Contact Number, Dr Rosario [phone]
S:  feels well. no sob    Telemetry:  SB 58.      Vital Signs Last 24 Hrs  T(C): 36.4 (24 @ 12:19), Max: 36.7 (24 @ 04:59)  T(F): 97.5 (24 @ 12:19), Max: 98 (24 @ 04:59)  HR: 65 (24 @ 12:19) (62 - 68)  BP: 135/79 (24 @ 12:19) (135/79 - 162/95)  RR: 18 (24 @ 12:19) (18 - 18)  SpO2: 94% (24 @ 12:19) (91% - 94%)                    @ 07:01  -   @ 07:00  --------------------------------------------------------  IN: 220 mL / OUT: 1440 mL / NET: -1220 mL     @ 07:01  -   @ 15:51  --------------------------------------------------------  IN: 560 mL / OUT: 750 mL / NET: -190 mL          Daily     Daily Weight in k.9 (22 May 2024 08:27)        CAPILLARY BLOOD GLUCOSE              Drains:     pericardial dr 140/24hrs                                 10.1   17.14 )-----------( 293      ( 22 May 2024 07:22 )             31.9           138  |  97  |  38<H>  ----------------------------<  82  4.6   |  27  |  1.68<H>    Ca    9.3      22 May 2024 07:11    TPro  7.6  /  Alb  3.8  /  TBili  0.6  /  DBili  x   /  AST  15  /  ALT  13  /  AlkPhos  151<H>  05-22          PHYSICAL EXAM:    Neurology: alert and oriented x 3, nonfocal, no gross deficits    CV : S1S2  clear to ausc   pericardial dr in place    Lungs:  clear to ausc.  no w/r/r    Abdomen: soft, nontender, nondistended, positive bowel sounds,         Extremities:  no edema             acetaminophen     Tablet .. 650 milliGRAM(s) Oral every 6 hours PRN  allopurinol 100 milliGRAM(s) Oral daily  aMIOdarone    Tablet 200 milliGRAM(s) Oral daily  buMETAnide 2 milliGRAM(s) Oral two times a day  folic acid 1 milliGRAM(s) Oral daily  hydrALAZINE 25 milliGRAM(s) Oral three times a day  HYDROmorphone PCA (1 mG/mL) 30 milliLiter(s) PCA Continuous PCA Continuous  HYDROmorphone PCA (1 mG/mL) Rescue Clinician Bolus 0.5 milliGRAM(s) IV Push every 15 minutes PRN  levothyroxine 50 MICROGram(s) Oral daily  melatonin 3 milliGRAM(s) Oral at bedtime PRN  naloxone Injectable 0.1 milliGRAM(s) IV Push every 3 minutes PRN  ondansetron Injectable 4 milliGRAM(s) IV Push every 6 hours PRN  pantoprazole    Tablet 40 milliGRAM(s) Oral before breakfast  tamsulosin 0.8 milliGRAM(s) Oral at bedtime                              Physical Therapy Rec:   Home  [ x ]   Home w/ PT  [  ]  Rehab  [  ]    Discussed with Cardiothoracic Team at AM rounds.
Subjective: Pt states "Hello" denies any CP or SOB. No acute events overnight.     Telemetry: SB - SR 50 - 70   Vital Signs Last 24 Hrs  T(C): 36.2 (05-21-24 @ 08:00), Max: 36.7 (05-21-24 @ 00:38)  T(F): 97.2 (05-21-24 @ 08:00), Max: 98 (05-21-24 @ 00:38)  HR: 58 (05-21-24 @ 08:00) (53 - 137)  BP: 147/83 (05-21-24 @ 08:00) (91/58 - 174/85)  RR: 18 (05-21-24 @ 08:00) (16 - 18)  SpO2: 93% (05-21-24 @ 08:00) (91% - 100%)             05-20 @ 07:01  -  05-21 @ 07:00  --------------------------------------------------------  IN: 250 mL / OUT: 1120 mL / NET: -870 mL                            10.3   15.45 )-----------( 265      ( 21 May 2024 06:23 )             30.5     138  |  99  |  30<H>  ----------------------------<  178<H>  4.5   |  26  |  1.39<H>    AST  15  /  ALT  13  /  AlkPhos  153<H>  05-21          PHYSICAL EXAM  Neurology: A&Ox3, NAD  CV : RRR+S1S2  Sternal Wound: MSI healed, Stable  +pericardial drain to water seal with serosanguinous drainage -180cc/24h  Lungs: Respirations non-labored, B/L BS CTA  Abdomen: Soft, NT/ND, +BSx4Q, last BM 5/19 (-)N/V/D  : +Olivares with clear yellow urine  Extremities: B/L LE no edema, negative calf tenderness, +PP             MEDICATIONS  acetaminophen     Tablet .. 650 milliGRAM(s) Oral every 6 hours PRN  allopurinol 100 milliGRAM(s) Oral daily  aMIOdarone    Tablet 200 milliGRAM(s) Oral daily  buMETAnide 2 milliGRAM(s) Oral two times a day  folic acid 1 milliGRAM(s) Oral daily  hydrALAZINE 25 milliGRAM(s) Oral three times a day  HYDROmorphone PCA (1 mG/mL) 30 milliLiter(s) PCA Continuous PCA Continuous  HYDROmorphone PCA (1 mG/mL) Rescue Clinician Bolus 0.5 milliGRAM(s) IV Push every 15 minutes PRN  levothyroxine 50 MICROGram(s) Oral daily  melatonin 3 milliGRAM(s) Oral at bedtime PRN  naloxone Injectable 0.1 milliGRAM(s) IV Push every 3 minutes PRN  ondansetron Injectable 4 milliGRAM(s) IV Push every 6 hours PRN  pantoprazole    Tablet 40 milliGRAM(s) Oral before breakfast  tamsulosin 0.4 milliGRAM(s) Oral at bedtime        Discussed with Cardiothoracic Team at AM rounds.
    VITAL SIGNS      Vital Signs Last 24 Hrs  T(C): 36.4 (05-26-24 @ 11:46), Max: 36.8 (05-26-24 @ 04:45)  T(F): 97.5 (05-26-24 @ 11:46), Max: 98.2 (05-26-24 @ 04:45)  HR: 67 (05-26-24 @ 11:46) (67 - 83)  BP: 145/73 (05-26-24 @ 11:46) (108/65 - 164/78)  RR: 18 (05-26-24 @ 11:46) (18 - 20)  SpO2: 96% (05-26-24 @ 11:46) (94% - 96%)                   Daily     Daily         CAPILLARY BLOOD GLUCOSE              Drains:    pericardial drain                      PHYSICAL EXAM  Neurology: alert and oriented x 3, moves all extremities with no defecits  CV :  RRR  Sternal Wound :  CDI , Stable  Lungs:   CTA B/L  Abdomen: soft, nontender, nondistended, positive bowel sounds,   Extremities:       no edema                                
    VITAL SIGNS    Subjective: "I feel okay" Denies CP, palpitation, SOB, VANCE, HA, dizziness, N/V/D, fever or chills.  No acute event noted overnight.    Telemetry: Sinus Sanju - NSR 60s       Vital Signs Last 24 Hrs  T(C): 36.6 (24 @ 11:46), Max: 36.8 (24 @ 07:30)  T(F): 97.8 (24 @ 11:46), Max: 98.2 (24 @ 07:30)  HR: 67 (24 @ 11:46) (55 - 67)  BP: 149/92 (24 @ 11:46) (148/82 - 168/93)  RR: 18 (24 @ 11:46) (18 - 18)  SpO2: 93% (24 @ 11:46) (93% - 95%)                @ 07:01  -   @ 07:00  --------------------------------------------------------  IN: 800 mL / OUT: 2390 mL / NET: -1590 mL     @ 07:01  -   @ 13:14  --------------------------------------------------------  IN: 680 mL / OUT: 650 mL / NET: 30 mL        LABS      136  |  96  |  34<H>  ----------------------------<  87  3.7   |  27  |  1.22    Ca    9.5      23 May 2024 06:09  Phos  3.7       Mg     2.0         TPro  7.6  /  Alb  3.8  /  TBili  0.6  /  DBili  x   /  AST  15  /  ALT  13  /  AlkPhos  151<H>  05-22                                 10.1   12.10 )-----------( 250      ( 23 May 2024 06:09 )             32.3                  Daily     Daily Weight in k.9 (23 May 2024 07:00)    PHYSICAL EXAM      Neurology: alert and oriented x 3    CV: +S1, S2. no heart murmurs.      Sternal Wound: MSI -->CDI, sternum stable    Lungs: b/l diminished lung sounds     Abdomen: soft, nontender, nondistended, positive bowel sounds, (+) Flatus; ( - ) BM     :  Voiding               Extremities:  B/L LE (+) trace edema;  (+) 2 DP palpable      Right CT 50/140 to [place]       acetaminophen     Tablet .. 650 milliGRAM(s) Oral every 6 hours PRN  allopurinol 100 milliGRAM(s) Oral daily  aMIOdarone    Tablet 200 milliGRAM(s) Oral daily  buMETAnide 2 milliGRAM(s) Oral two times a day  folic acid 1 milliGRAM(s) Oral daily  hydrALAZINE 25 milliGRAM(s) Oral three times a day  HYDROmorphone PCA (1 mG/mL) 30 milliLiter(s) PCA Continuous PCA Continuous  HYDROmorphone PCA (1 mG/mL) Rescue Clinician Bolus 0.5 milliGRAM(s) IV Push every 15 minutes PRN  levothyroxine 50 MICROGram(s) Oral daily  melatonin 3 milliGRAM(s) Oral at bedtime PRN  naloxone Injectable 0.1 milliGRAM(s) IV Push every 3 minutes PRN  ondansetron Injectable 4 milliGRAM(s) IV Push every 6 hours PRN  pantoprazole    Tablet 40 milliGRAM(s) Oral before breakfast  tamsulosin 0.8 milliGRAM(s) Oral at bedtime             Physical Therapy Rec:   Home  [ x ]   Home w/ PT  [  ]  Rehab  [  ]    Discussed with Cardiothoracic Team at AM rounds.
    VITAL SIGNS    Subjective: "I'm feeling ok" Denies CP, palpitation, SOB, VANCE, HA, dizziness, N/V/D, fever or chills.  No acute event noted overnight.     Telemetry: NSR 60-90      Vital Signs Last 24 Hrs  T(C): 36.9 (24 @ 11:32), Max: 37 (24 @ 20:55)  T(F): 98.4 (24 @ 11:32), Max: 98.6 (24 @ 20:55)  HR: 70 (24 @ :32) (66 - 80)  BP: 119/81 (24 @ 11:32) (115/66 - 150/61)  RR: 18 (24 @ :32) (18 - 18)  SpO2: 94% (24 @ 11:32) (94% - 95%)           @ 07:01  -   @ 07:00  --------------------------------------------------------  IN: 920 mL / OUT: 3515 mL / NET: -2595 mL     @ 07:01  -   @ 16:53  --------------------------------------------------------  IN: 360 mL / OUT: 1230 mL / NET: -870 mL    LABS      138  |  96  |  33<H>  ----------------------------<  101<H>  3.9   |  30  |  1.30    Ca    9.3      24 May 2024 06:21  Phos  4.0       Mg     1.9                               9.8    9.37  )-----------( 242      ( 24 May 2024 06:21 )             31.6          Daily     Daily Weight in k.7 (24 May 2024 07:54)    Right Pericardial Drain: [ X ] Drainage: 30cc /140 cc / 24 hrs                PHYSICAL EXAM    Neurology: alert and oriented x 3, nonfocal, no gross deficits    CV: (+) S1 and S2, No murmurs, rubs, gallops or clicks     Sternal Wound: MSI -->healed -->CDI, sternum stable. Right anterior pericardial drain to pleural VAC  to WS.  No air leak.      Lungs: CTA B/L; Diminished at base      Abdomen: soft, nontender, nondistended, positive bowel sounds, (+) Flatus; (+) BM     :  Voiding               Extremities:  B/L LE (+) 1 edema; negative calf tenderness; (+) 2 DP palpable        acetaminophen Tablet .. 650 milliGRAM(s) Oral every 6 hours PRN  allopurinol 100 milliGRAM(s) Oral daily  aMIOdarone  Tablet 200 milliGRAM(s) Oral daily  buMETAnide 2 milliGRAM(s) Oral two times a day  folic acid 1 milliGRAM(s) Oral daily  hydrALAZINE 25 milliGRAM(s) Oral three times a day  HYDROmorphone PCA (1 mG/mL) 30 milliLiter(s) PCA Continuous PCA Continuous  HYDROmorphone PCA (1 mG/mL) Rescue Clinician Bolus 0.5 milliGRAM(s) IV Push every 15 minutes PRN  levothyroxine 50 MICROGram(s) Oral daily  melatonin 3 milliGRAM(s) Oral at bedtime PRN  naloxone Injectable 0.1 milliGRAM(s) IV Push every 3 minutes PRN  ondansetron Injectable 4 milliGRAM(s) IV Push every 6 hours PRN  pantoprazole Tablet 40 milliGRAM(s) Oral before breakfast  tamsulosin 0.8 milliGRAM(s) Oral at bedtime    Physical Therapy Rec:   Home  [ X ]   Home w/ PT  [  ]  Rehab  [  ]    Discussed with Cardiothoracic Team at AM rounds.
Patient is a 63y old  Male who presents with a chief complaint of PERICARDIAL EFFUSION (22 May 2024 15:50)      Vital Signs Last 24 Hrs  T(C): 36.8 (05-23-24 @ 07:30), Max: 36.8 (05-23-24 @ 07:30)  T(F): 98.2 (05-23-24 @ 07:30), Max: 98.2 (05-23-24 @ 07:30)  HR: 65 (05-23-24 @ 07:30) (55 - 65)  BP: 148/82 (05-23-24 @ 07:30) (135/79 - 168/93)  RR: 18 (05-23-24 @ 07:30) (18 - 18)  SpO2: 93% (05-23-24 @ 07:30) (93% - 95%)            05-22-24 @ 07:01  -  05-23-24 @ 07:00  --------------------------------------------------------  IN: 800 mL / OUT: 2390 mL / NET: -1590 mL      Daily     Daily                       10.1   12.10 )-----------( 250      ( 23 May 2024 06:09 )             32.3     05-23    136  |  96  |  34<H>  ----------------------------<  87  3.7   |  27  |  1.22    Ca    9.5      23 May 2024 06:09  Phos  3.7     05-23  Mg     2.0     05-23    TPro  7.6  /  Alb  3.8  /  TBili  0.6  /  DBili  x   /  AST  15  /  ALT  13  /  AlkPhos  151<H>  05-22        PHYSICAL EXAM  Neurology: A&Ox3, NAD  CV : RRR+S1S2  +R CT (pericardial drain), serosanguinous drainage - AL  Lungs: Respirations non-labored, B/L BS CTA  Abdomen: Soft, NT/ND, +BSx4Q  Extremities: B/L LE warm, no edema, +PP B/L        MEDICATIONS  acetaminophen     Tablet .. 650 milliGRAM(s) Oral every 6 hours PRN  allopurinol 100 milliGRAM(s) Oral daily  aMIOdarone    Tablet 200 milliGRAM(s) Oral daily  buMETAnide 2 milliGRAM(s) Oral two times a day  folic acid 1 milliGRAM(s) Oral daily  hydrALAZINE 25 milliGRAM(s) Oral three times a day  HYDROmorphone PCA (1 mG/mL) 30 milliLiter(s) PCA Continuous PCA Continuous  HYDROmorphone PCA (1 mG/mL) Rescue Clinician Bolus 0.5 milliGRAM(s) IV Push every 15 minutes PRN  levothyroxine 50 MICROGram(s) Oral daily  melatonin 3 milliGRAM(s) Oral at bedtime PRN  naloxone Injectable 0.1 milliGRAM(s) IV Push every 3 minutes PRN  ondansetron Injectable 4 milliGRAM(s) IV Push every 6 hours PRN  pantoprazole    Tablet 40 milliGRAM(s) Oral before breakfast  potassium bicarbonate/potassium citrate 25 milliEquivalent(s) Oral once  tamsulosin 0.8 milliGRAM(s) Oral at bedtime    
Patient is a 63y old  Male who presents with a chief complaint of PERICARDIAL EFFUSION (28 May 2024 09:52)      DATE OF SERVICE: 05-28-24 @ 12:55    SUBJECTIVE / OVERNIGHT EVENTS: overnight events noted    ROS:  Resp: No cough no sputum production  CVS: No chest pain no palpitations no orthopnea  GI: no N/V/D  "I want to go home"   tube out      MEDICATIONS  (STANDING):  allopurinol 100 milliGRAM(s) Oral daily  aMIOdarone    Tablet 200 milliGRAM(s) Oral daily  buMETAnide 2 milliGRAM(s) Oral two times a day  folic acid 1 milliGRAM(s) Oral daily  hydrALAZINE 25 milliGRAM(s) Oral three times a day  levothyroxine 50 MICROGram(s) Oral daily  pantoprazole    Tablet 40 milliGRAM(s) Oral before breakfast  tamsulosin 0.8 milliGRAM(s) Oral at bedtime    MEDICATIONS  (PRN):  acetaminophen     Tablet .. 650 milliGRAM(s) Oral every 6 hours PRN Mild Pain (1 - 3)  bisacodyl Suppository 10 milliGRAM(s) Rectal at bedtime PRN Constipation  melatonin 3 milliGRAM(s) Oral at bedtime PRN Insomnia  oxycodone    5 mG/acetaminophen 325 mG 1 Tablet(s) Oral every 4 hours PRN Moderate Pain (4 - 6)  oxycodone    5 mG/acetaminophen 325 mG 2 Tablet(s) Oral every 4 hours PRN Severe Pain (7 - 10)  senna 2 Tablet(s) Oral at bedtime PRN Constipation  sorbitol 70% Solution 30 milliLiter(s) Oral once PRN constipation        CAPILLARY BLOOD GLUCOSE        I&O's Summary    27 May 2024 07:01  -  28 May 2024 07:00  --------------------------------------------------------  IN: 880 mL / OUT: 1934 mL / NET: -1054 mL    28 May 2024 07:01  -  28 May 2024 12:55  --------------------------------------------------------  IN: 480 mL / OUT: 300 mL / NET: 180 mL        Vital Signs Last 24 Hrs  T(C): 36.7 (28 May 2024 11:54), Max: 36.9 (27 May 2024 13:20)  T(F): 98.1 (28 May 2024 11:54), Max: 98.4 (27 May 2024 13:20)  HR: 62 (28 May 2024 11:54) (62 - 76)  BP: 138/77 (28 May 2024 11:54) (138/77 - 144/83)  BP(mean): --  RR: 18 (28 May 2024 11:54) (17 - 18)  SpO2: 95% (28 May 2024 11:54) (95% - 96%)      PHYSICAL EXAM:  CHEST/LUNG: clear   HEART: S1 S2; systolic murmur +   ABDOMEN: Soft, Nontender,  EXTREMITIES:  no edema  NEUROLOGY: AO x 3 non-focal  SKIN: No rashes or lesions    LABS:                        9.5    11.49 )-----------( 209      ( 28 May 2024 06:05 )             30.0     05-28    139  |  98  |  36<H>  ----------------------------<  125<H>  3.7   |  28  |  1.49<H>    Ca    9.6      28 May 2024 06:05    TPro  7.3  /  Alb  3.7  /  TBili  0.8  /  DBili  x   /  AST  14  /  ALT  12  /  AlkPhos  125<H>  05-28          Urinalysis Basic - ( 28 May 2024 06:05 )    Color: x / Appearance: x / SG: x / pH: x  Gluc: 125 mg/dL / Ketone: x  / Bili: x / Urobili: x   Blood: x / Protein: x / Nitrite: x   Leuk Esterase: x / RBC: x / WBC x   Sq Epi: x / Non Sq Epi: x / Bacteria: x          All consultant(s) notes reviewed and care discussed with other providers        Contact Number, Dr Rosario [phone]
    VITAL SIGNS      Vital Signs Last 24 Hrs  T(C): 36.5 (24 @ 13:26), Max: 37 (24 @ 15:20)  T(F): 97.7 (24 @ 13:26), Max: 98.6 (24 @ 15:20)  HR: 83 (24 @ 13:26) (61 - 83)  BP: 108/65 (24 @ 13:26) (108/65 - 154/74)  RR: 18 (24 @ 13:26) (18 - 18)  SpO2: 95% (24 @ 13:26) (94% - 96%)                   Daily     Daily Weight in k.4 (25 May 2024 07:50)      Bilirubin Total: 0.7 mg/dL ( @ 05:33)    CAPILLARY BLOOD GLUCOSE              Drains:  pericardial   drain                    PHYSICAL EXAM  s    No SOB  No cP"  Neurology: alert and oriented x 3, moves all extremities with no defecits  CV :  RRR  Sternal Wound :  CDI , Stable  Lungs:   CTA B/L  Abdomen: soft, nontender, nondistended, positive bowel sounds  Extremities:     no edema                                   
Patient is a 63y old  Male who presents with a chief complaint of PERICARDIAL EFFUSION (20 May 2024 13:02)      DATE OF SERVICE: 05-20-24 @ 15:01    SUBJECTIVE / OVERNIGHT EVENTS: overnight events noted    ROS:  Resp: No cough no sputum production  CVS: No chest pain no palpitations no orthopnea  GI: no N/V/D  'I feel fine'         MEDICATIONS  (STANDING):  allopurinol 100 milliGRAM(s) Oral daily  aMIOdarone    Tablet 200 milliGRAM(s) Oral daily  buMETAnide 2 milliGRAM(s) Oral two times a day  cefuroxime  IVPB 1500 milliGRAM(s) IV Intermittent once  chlorhexidine 0.12% Liquid 15 milliLiter(s) Swish and Spit two times a day  chlorhexidine 4% Liquid 1 Application(s) Topical two times a day  folic acid 1 milliGRAM(s) Oral daily  hydrALAZINE 25 milliGRAM(s) Oral three times a day  levothyroxine 50 MICROGram(s) Oral daily  pantoprazole    Tablet 40 milliGRAM(s) Oral before breakfast    MEDICATIONS  (PRN):  acetaminophen     Tablet .. 650 milliGRAM(s) Oral every 6 hours PRN Mild Pain (1 - 3)  melatonin 3 milliGRAM(s) Oral at bedtime PRN Insomnia        CAPILLARY BLOOD GLUCOSE        I&O's Summary    19 May 2024 07:01  -  20 May 2024 07:00  --------------------------------------------------------  IN: 840 mL / OUT: 0 mL / NET: 840 mL        Vital Signs Last 24 Hrs  T(C): 36.5 (20 May 2024 11:39), Max: 37 (19 May 2024 20:53)  T(F): 97.7 (20 May 2024 11:39), Max: 98.6 (19 May 2024 20:53)  HR: 55 (20 May 2024 13:07) (55 - 86)  BP: 154/83 (20 May 2024 13:07) (135/73 - 154/83)  BP(mean): 86 (19 May 2024 20:53) (86 - 86)  RR: 18 (20 May 2024 11:39) (18 - 18)  SpO2: 98% (20 May 2024 11:39) (94% - 98%)      PHYSICAL EXAM:  CHEST/LUNG: clear   HEART: S1 S2; systolic murmur +   ABDOMEN: Soft, Nontender,  EXTREMITIES:  no edema  NEUROLOGY: AO x 3 non-focal  SKIN: No rashes or lesions    LABS:                        8.5    9.72  )-----------( 252      ( 20 May 2024 05:59 )             26.2     05-20    142  |  101  |  33<H>  ----------------------------<  98  3.6   |  29  |  1.47<H>    Ca    9.3      20 May 2024 05:59    TPro  6.9  /  Alb  3.4  /  TBili  0.5  /  DBili  x   /  AST  11  /  ALT  14  /  AlkPhos  140<H>  05-20          Urinalysis Basic - ( 20 May 2024 05:59 )    Color: x / Appearance: x / SG: x / pH: x  Gluc: 98 mg/dL / Ketone: x  / Bili: x / Urobili: x   Blood: x / Protein: x / Nitrite: x   Leuk Esterase: x / RBC: x / WBC x   Sq Epi: x / Non Sq Epi: x / Bacteria: x          All consultant(s) notes reviewed and care discussed with other providers        Contact Number, Dr Rosario [phone]

## 2024-05-28 NOTE — PROGRESS NOTE ADULT - PROBLEM SELECTOR PLAN 3
acceptable  continue home meds with hold parameters
Continue hydralazine 25 TID
acceptable  continue home meds with hold parameters
Continue hydralazine 25 TID
acceptable  continue home meds with hold parameters
Continue hydralazine 25 TID
acceptable  continue home meds with hold parameters
acceptable  continue home meds with hold parameters
Continue hydralazine 25 TID
Continue hydralazine 25 TID
acceptable  continue home meds with hold parameters
Continue hydralazine 25 TID
acceptable  continue home meds with hold parameters

## 2024-05-28 NOTE — DISCHARGE NOTE PROVIDER - NSDCMRMEDTOKEN_GEN_ALL_CORE_FT
acetaminophen 325 mg oral tablet: 2 tab(s) orally every 6 hours As needed Temp greater or equal to 38C (100.4F), Mild Pain (1 - 3)  alfuzosin 10 mg oral tablet, extended release: 1 tab(s) orally once a day (at bedtime) HOme  allopurinol 100 mg oral tablet: 1 tab(s) orally once a day  amiodarone 200 mg oral tablet: 1 tab(s) orally once a day  bumetanide 2 mg oral tablet: 1 tab(s) orally 2 times a day  folic acid 1 mg oral tablet: 1 tab(s) orally once a day  hydrALAZINE 25 mg oral tablet: 3 tab(s) orally 3 times a day  levothyroxine 50 mcg (0.05 mg) oral tablet: 1 tab(s) orally once a day  pantoprazole 40 mg oral delayed release tablet: 1 tab(s) orally once a day   acetaminophen 325 mg oral tablet: 2 tab(s) orally every 6 hours As needed Mild Pain (1 - 3)  alfuzosin 10 mg oral tablet, extended release: 1 tab(s) orally once a day (at bedtime) HOme  allopurinol 100 mg oral tablet: 1 tab(s) orally once a day  amiodarone 200 mg oral tablet: 1 tab(s) orally once a day  bumetanide 2 mg oral tablet: 1 tab(s) orally 2 times a day  folic acid 1 mg oral tablet: 1 tab(s) orally once a day  hydrALAZINE 25 mg oral tablet: 1 tab(s) orally 3 times a day  levothyroxine 50 mcg (0.05 mg) oral tablet: 1 tab(s) orally once a day  oxycodone-acetaminophen 5 mg-325 mg oral tablet: 1 tab(s) orally every 6 hours as needed for Moderate Pain (4 - 6) MDD: 4  pantoprazole 40 mg oral delayed release tablet: 1 tab(s) orally once a day (before a meal)  Potassium Chloride (Eqv-K-Tab) 20 mEq oral tablet, extended release: 1 tab(s) orally once a day  senna leaf extract oral tablet: 2 tab(s) orally once a day (at bedtime) As needed Constipation

## 2024-05-28 NOTE — DISCHARGE NOTE PROVIDER - HOSPITAL COURSE
63M admitted to [facility] 2/5/24 PMHx HTN, GERD, HPL, chronic venous stasis dermatitis with bilateral leg swelling, and AS presented to [facility] on 1/25/2024 for bilateral leg pain and swelling found to have new acute HFrEF (EF 35-40% 1/2024) in setting of severe AS. 2/23: AVR/JOANNA Clip c/b intraop bleeding requiring multiple blood products; postop prolonged inotropic support/ CHF following and afib. TTE revealed pericardial effusion returned to OR s/p 3/6 subxiphoid drainage and pericardial window.  Cefepime 2 Grams IV Q 8 hours per ID for (+)Serratia marcescens bacteremia. pt discharged home 3/12. pt was seen at his cardiologist's office today- Dr. Mesa where a repeat echo was done showing a large pericardial effusion; pt admitted for further evaluation and treatment. VSS; RSR 60-80; pt stable at this time. Pt denies cp/sob or palpitations.   5/18 IR consulted for pericardial effusion drainage determined No percutaneous window is available for drainage  Thoracic consulted for possible pericardial window vs. pericardectomy    5/20 s/p Right VATS lysis of adhesions, pericardial window with drainage of 50cc serous fluid   Post op Course:   5/21 VSS, right chest pericardial drain -180cc/24h, maintain to water seal today. Check CXR today. Garcia reinserted overnight for retention, started on Flomax.   5/22 VSS  R jon dr 140cc/24.  H2O seal.  garcia in place.  increase Flomax to 0.8 mg PO daily.   5/23 VSS R pericardial.   5/24 VSS; Continue with current medication regimen. Repeat TTE: (5.23.24 @ 09:13) Small to moderate pericardial effusion posterior to the left ventricle, moderate pericardial effusion noted adjacent to the posterior left ventricle, small pericardial effusion noted adjacent to the lateral left ventricle and trace pericardial effusion noted adjacent to the right atrium with no evidence of hemodynamic compromise (or echocardiographic evidence of cardiac tamponade.  Right pericardial drain with a total of 140 cc drainage for the last 24 hours, per Thoracic surgery will maintain drain in and continue to monitor.   5/25 VSS; Pericardial drain with a total 110 cc for 24hrs.  Maintain CT in place to WS.  Thoracic Surgery following. Continue with current medication regimen.  Sorbitol 30 ml PO x 1.  Dilaudid PCA D/C'd.  Transitioned to Percocet PO PRN.   PCA D/C'd.  Transitioned to Percocet PO PRN.   5/26 VSS pericardial drain in place.  will d/c when output decreased   Disposition: Home once medically cleared.   5/27 VSS ambulating in bobo  pericardial  drainage  67/97 maintain tube         63M admitted to [facility] 2/5/24 PMHx HTN, GERD, HPL, chronic venous stasis dermatitis with bilateral leg swelling, and AS presented to [facility] on 1/25/2024 for bilateral leg pain and swelling found to have new acute HFrEF (EF 35-40% 1/2024) in setting of severe AS. 2/23: AVR/JOANNA Clip c/b intraop bleeding requiring multiple blood products; postop prolonged inotropic support/ CHF following and afib. TTE revealed pericardial effusion returned to OR s/p 3/6 subxiphoid drainage and pericardial window.  Cefepime 2 Grams IV Q 8 hours per ID for (+)Serratia marcescens bacteremia. pt discharged home 3/12. pt was seen at his cardiologist's office today- Dr. Mesa where a repeat echo was done showing a large pericardial effusion; pt admitted for further evaluation and treatment. VSS; RSR 60-80; pt stable at this time. Pt denies cp/sob or palpitations.   5/18 IR consulted for pericardial effusion drainage determined No percutaneous window is available for drainage  Thoracic consulted for possible pericardial window vs. pericardectomy    5/20 s/p Right VATS lysis of adhesions, pericardial window with drainage of 50cc serous fluid   Post op Course:   5/21 VSS, right chest pericardial drain -180cc/24h, maintain to water seal today. Check CXR today. Garcia reinserted overnight for retention, started on Flomax.   5/22 VSS  R jon dr 140cc/24.  H2O seal.  garcia in place.  increase Flomax to 0.8 mg PO daily.   5/23 VSS R pericardial.   5/24 VSS; Continue with current medication regimen. Repeat TTE: (5.23.24 @ 09:13) Small to moderate pericardial effusion posterior to the left ventricle, moderate pericardial effusion noted adjacent to the posterior left ventricle, small pericardial effusion noted adjacent to the lateral left ventricle and trace pericardial effusion noted adjacent to the right atrium with no evidence of hemodynamic compromise (or echocardiographic evidence of cardiac tamponade.  Right pericardial drain with a total of 140 cc drainage for the last 24 hours, per Thoracic surgery will maintain drain in and continue to monitor.   5/25 VSS; Pericardial drain with a total 110 cc for 24hrs.  Maintain CT in place to WS.  Thoracic Surgery following. Continue with current medication regimen.  Sorbitol 30 ml PO x 1.  Dilaudid PCA D/C'd.  Transitioned to Percocet PO PRN.   PCA D/C'd.  Transitioned to Percocet PO PRN.   5/26 VSS pericardial drain in place.  will d/c when output decreased   Disposition: Home once medically cleared.   5/27 VSS ambulating in bobo  pericardial  drainage  67/97 maintain tube  5/28 vss; rsr 50-95; pericardial drain d/c; discharge pt home today as per Dr. Martinez

## 2024-05-28 NOTE — PROGRESS NOTE ADULT - PROBLEM/PLAN-5
DISPLAY PLAN FREE TEXT
8 (severe pain)

## 2024-05-28 NOTE — DISCHARGE NOTE PROVIDER - CARE PROVIDER_API CALL
Ronni Martinez  Thoracic and Cardiac Surgery  [address] [place] [address]  Phone: [phone]  Fax: [phone]  Follow Up Time:     Micah Nguyễn  Cardiology  [address] [facility], [address]  [place] [address]  Phone: [phone]  Fax: [phone]  Follow Up Time:     Za High  Thoracic Surgery  [address] [place] [address], [address] [place] [address]  Phone: [phone]  Fax: [phone]  Follow Up Time:

## 2024-05-28 NOTE — PROGRESS NOTE ADULT - REASON FOR ADMISSION
PERICARDIAL EFFUSION

## 2024-05-28 NOTE — PROGRESS NOTE ADULT - PROBLEM SELECTOR PLAN 4
doing well  euvolemic

## 2024-05-28 NOTE — PROGRESS NOTE ADULT - PROBLEM SELECTOR PROBLEM 1
Pericardial effusion

## 2024-05-28 NOTE — PROGRESS NOTE ADULT - PROBLEM SELECTOR PROBLEM 2
Chronic GERD
Urinary retention
Chronic GERD
Urinary retention
Urinary retention
Chronic GERD
Chronic GERD
Urinary retention
Chronic GERD
Urinary retention
Chronic GERD
Urinary retention
Urinary retention
Chronic GERD
Chronic GERD
Urinary retention
Chronic GERD
Urinary retention

## 2024-05-28 NOTE — PROGRESS NOTE ADULT - ASSESSMENT
63M admitted to [facility] 2/5/24 PMHx HTN, GERD, HPL, chronic venous stasis dermatitis with bilateral leg swelling, and AS presented to [facility] on 1/25/2024 for bilateral leg pain and swelling found to have new acute HFrEF (EF 35-40% 1/2024) in setting of severe AS. 2/23: AVR/JOANNA Clip c/b intraop bleeding requiring multiple blood products; postop prolonged inotropic support/ CHF following and afib. TTE revealed pericardial effusion returned to OR s/p 3/6 subxiphoid drainage and pericardial window

## 2024-05-29 DIAGNOSIS — G89.18 OTHER ACUTE POSTPROCEDURAL PAIN: ICD-10-CM

## 2024-05-29 DIAGNOSIS — R79.9 ABNORMAL FINDING OF BLOOD CHEMISTRY, UNSPECIFIED: ICD-10-CM

## 2024-05-29 LAB
BASOPHILS # BLD AUTO: 0.09 K/UL
BASOPHILS NFR BLD AUTO: 0.8 %
EOSINOPHIL # BLD AUTO: 0.1 K/UL
EOSINOPHIL NFR BLD AUTO: 0.9 %
HCT VFR BLD CALC: 24 %
HGB BLD-MCNC: 8.7 G/DL
IMM GRANULOCYTES NFR BLD AUTO: 1.1 %
LYMPHOCYTES # BLD AUTO: 0.86 K/UL
LYMPHOCYTES NFR BLD AUTO: 7.5 %
MAN DIFF?: NORMAL
MCHC RBC-ENTMCNC: 25.4 PG
MCHC RBC-ENTMCNC: 36.3 GM/DL
MCV RBC AUTO: 70.2 FL
MONOCYTES # BLD AUTO: 1.36 K/UL
MONOCYTES NFR BLD AUTO: 11.9 %
NEUTROPHILS # BLD AUTO: 8.93 K/UL
NEUTROPHILS NFR BLD AUTO: 77.8 %
PLATELET # BLD AUTO: 280 K/UL
RBC # BLD: 3.42 M/UL
RBC # FLD: 25.6 %
WBC # FLD AUTO: 11.47 K/UL

## 2024-05-29 RX ORDER — POTASSIUM CHLORIDE 1500 MG/1
20 TABLET, FILM COATED, EXTENDED RELEASE ORAL DAILY
Refills: 0 | Status: ACTIVE | COMMUNITY
Start: 2024-05-29

## 2024-06-10 PROBLEM — Z95.2 S/P AORTIC VALVE REPLACEMENT: Status: ACTIVE | Noted: 2024-03-26

## 2024-06-11 ENCOUNTER — APPOINTMENT (OUTPATIENT)
Dept: CARDIOTHORACIC SURGERY | Facility: CLINIC | Age: 64
End: 2024-06-11
Payer: COMMERCIAL

## 2024-06-11 VITALS
SYSTOLIC BLOOD PRESSURE: 134 MMHG | BODY MASS INDEX: 24.73 KG/M2 | HEART RATE: 63 BPM | HEIGHT: 69 IN | WEIGHT: 167 LBS | TEMPERATURE: 98 F | OXYGEN SATURATION: 97 % | RESPIRATION RATE: 15 BRPM | DIASTOLIC BLOOD PRESSURE: 64 MMHG

## 2024-06-11 DIAGNOSIS — Z95.2 PRESENCE OF PROSTHETIC HEART VALVE: ICD-10-CM

## 2024-06-11 PROCEDURE — 99024 POSTOP FOLLOW-UP VISIT: CPT

## 2024-06-11 RX ORDER — ACETAMINOPHEN 325 MG/1
325 TABLET ORAL EVERY 6 HOURS
Qty: 56 | Refills: 0 | Status: COMPLETED | COMMUNITY
Start: 2024-03-13 | End: 2024-06-11

## 2024-06-11 RX ORDER — LEVOTHYROXINE SODIUM 0.05 MG/1
50 TABLET ORAL DAILY
Qty: 90 | Refills: 0 | Status: ACTIVE | COMMUNITY
Start: 2024-03-13

## 2024-06-11 NOTE — ASSESSMENT
[FreeTextEntry1] : 63M admitted to [facility] 2/5/24 PMHx HTN, GERD, HPL, chronic venous stasis dermatitis with bilateral leg swelling, and AS presented to [facility] on 1/25/2024 for bilateral leg pain and swelling found to have new acute HFrEF (EF 35-40% 1/2024) in setting of severe AS. 2/23: AVR/JOANNA Clip c/b intraop bleeding requiring multiple blood products; postop prolonged inotropic support/ CHF following and afib. TTE revealed pericardial effusion returned to OR s/p 3/6 subxiphoid drainage and pericardial window.  Cefepime 2 Grams IV Q 8 hours perID for (+)Serratia marcescens bacteremia. pt discharged home 3/12. pt was seen at his cardiologist's office Dr. Mesa on 5/17 where a repeat echo was done showing a large pericardial effusion; pt admitted for further evaluation and  treatment. IR consulted for pericardial effusion drainage determined No percutaneous window is available for drainage, Thoracic consulted for possible pericardial window vs. pericardectomy. On  5/20 s/p Right VATS lysis of adhesions, pericardial window with drainage of 50cc serous fluid with Dr. High. Post op Course: VSSS, right chest pericardial drain -180cc/24h, maintain to water seal  Check CXR today. Olivares reinserted overnight for retention, started on Flomax. Repeat TTE: 5/23/24:  Small to moderate pericardial effusion posterior to the left ventricle, moderate pericardial effusion noted adjacent to the posterior left ventricle, small pericardial effusion noted adjacent to the lateral left ventricle and trace pericardial effusion noted adjacent to the right atrium with no evidence of hemodynamic compromise (or echocardiographic evidence of cardiac tamponade. Right pericardial drain with a total of 140 cc drainage for the last 24 hours, Pericardial drain with a total 110 cc for 24hrs. Dilaudid PCA D/C'd.  Transitioned to Percocet PO PRN. DC home 5/28  Presents today with his wife and reports doing and feeling well.  Better since DC.  Walking more, weight down to 167 lbs. Denies SOB, can lay fkat to his back. Eating and drinking with +BM. Denies chest pain, SOB, swelling, weight gain, palpitations, cough, fever or chills. Has apt with Dr. Barker tomorrow morning, Dr. High 6/17/2024 and Renal 6/29 and Dr. Nguyễn at end of the month.  Deon has FU with Heme 7/7 and PCP.   Today on exam patient's lungs clear bilaterally, normal sinus rhythm, sternum stable, incision clean, dry and intact.  No peripheral edema noted. Instructed patient on importance of optimal glycemic control, daily showering, daily weights, incentive spirometer use, and increase ambulation as tolerated. Instructed to call office with any signs or symptoms of infection or weight gain of 2 or more pounds in 1 day or 3 or more pounds in 1 week.   Plan:  - Continue current medication regimen - Follow up with cardiologist Dr. Nguyễn, Lennox Daniel Dr. Wilner, renal and PCP as scheduled - Continue to walk and increase as tolerated - Low salt diet and fluids discussed in detail - Tight glucose control discussed in detail for wound healing - SBE antibiotic prophylaxis discussed at length  - Return to office PRN - Call with any questions or concerns

## 2024-06-12 ENCOUNTER — APPOINTMENT (OUTPATIENT)
Dept: ELECTROPHYSIOLOGY | Facility: CLINIC | Age: 64
End: 2024-06-12
Payer: COMMERCIAL

## 2024-06-12 VITALS
DIASTOLIC BLOOD PRESSURE: 80 MMHG | SYSTOLIC BLOOD PRESSURE: 156 MMHG | HEIGHT: 69 IN | WEIGHT: 169.2 LBS | HEART RATE: 48 BPM | BODY MASS INDEX: 25.06 KG/M2 | OXYGEN SATURATION: 94 %

## 2024-06-12 PROCEDURE — 93000 ELECTROCARDIOGRAM COMPLETE: CPT

## 2024-06-12 PROCEDURE — 99215 OFFICE O/P EST HI 40 MIN: CPT | Mod: 25

## 2024-06-12 RX ORDER — ASPIRIN 81 MG/1
81 TABLET, COATED ORAL
Qty: 90 | Refills: 0 | Status: DISCONTINUED | COMMUNITY
Start: 2024-05-15 | End: 2024-06-12

## 2024-06-13 ENCOUNTER — LABORATORY RESULT (OUTPATIENT)
Age: 64
End: 2024-06-13

## 2024-06-13 ENCOUNTER — APPOINTMENT (OUTPATIENT)
Dept: FAMILY MEDICINE | Facility: CLINIC | Age: 64
End: 2024-06-13
Payer: COMMERCIAL

## 2024-06-13 VITALS
DIASTOLIC BLOOD PRESSURE: 88 MMHG | RESPIRATION RATE: 16 BRPM | OXYGEN SATURATION: 99 % | HEART RATE: 64 BPM | SYSTOLIC BLOOD PRESSURE: 132 MMHG | TEMPERATURE: 97.8 F | HEIGHT: 69 IN

## 2024-06-13 VITALS — BODY MASS INDEX: 24.81 KG/M2 | WEIGHT: 168 LBS

## 2024-06-13 DIAGNOSIS — I48.0 PAROXYSMAL ATRIAL FIBRILLATION: ICD-10-CM

## 2024-06-13 DIAGNOSIS — I42.8 OTHER CARDIOMYOPATHIES: ICD-10-CM

## 2024-06-13 DIAGNOSIS — Z79.899 OTHER LONG TERM (CURRENT) DRUG THERAPY: ICD-10-CM

## 2024-06-13 DIAGNOSIS — E66.9 OBESITY, UNSPECIFIED: ICD-10-CM

## 2024-06-13 DIAGNOSIS — I50.22 CHRONIC SYSTOLIC (CONGESTIVE) HEART FAILURE: ICD-10-CM

## 2024-06-13 PROCEDURE — 36415 COLL VENOUS BLD VENIPUNCTURE: CPT

## 2024-06-13 PROCEDURE — 99214 OFFICE O/P EST MOD 30 MIN: CPT

## 2024-06-14 DIAGNOSIS — R79.89 OTHER SPECIFIED ABNORMAL FINDINGS OF BLOOD CHEMISTRY: ICD-10-CM

## 2024-06-14 LAB
ALBUMIN SERPL ELPH-MCNC: 4 G/DL
ALP BLD-CCNC: 123 U/L
ALT SERPL-CCNC: 12 U/L
ANION GAP SERPL CALC-SCNC: 18 MMOL/L
AST SERPL-CCNC: 16 U/L
BASOPHILS # BLD AUTO: 0.3 K/UL
BASOPHILS NFR BLD AUTO: 5 %
BILIRUB SERPL-MCNC: 0.6 MG/DL
BUN SERPL-MCNC: 38 MG/DL
CALCIUM SERPL-MCNC: 9.1 MG/DL
CHLORIDE SERPL-SCNC: 105 MMOL/L
CO2 SERPL-SCNC: 21 MMOL/L
CREAT SERPL-MCNC: 1.3 MG/DL
EGFR: 62 ML/MIN/1.73M2
EOSINOPHIL # BLD AUTO: 0.87 K/UL
EOSINOPHIL NFR BLD AUTO: 14.3 %
FERRITIN SERPL-MCNC: 608 NG/ML
GLUCOSE SERPL-MCNC: 77 MG/DL
HCT VFR BLD CALC: 24.7 %
HGB BLD-MCNC: 8.2 G/DL
IRON SATN MFR SERPL: 20 %
IRON SERPL-MCNC: 61 UG/DL
LYMPHOCYTES # BLD AUTO: 0.97 K/UL
LYMPHOCYTES NFR BLD AUTO: 16 %
MAN DIFF?: NORMAL
MCHC RBC-ENTMCNC: 21.5 PG
MCHC RBC-ENTMCNC: 33.2 GM/DL
MCV RBC AUTO: 64.7 FL
MONOCYTES # BLD AUTO: 0.66 K/UL
MONOCYTES NFR BLD AUTO: 10.9 %
NEUTROPHILS # BLD AUTO: 3.07 K/UL
NEUTROPHILS NFR BLD AUTO: 47 %
PLATELET # BLD AUTO: 281 K/UL
POTASSIUM SERPL-SCNC: 3.4 MMOL/L
PROT SERPL-MCNC: 7.1 G/DL
RBC # BLD: 3.82 M/UL
RBC # BLD: 3.82 M/UL
RBC # FLD: 23.5 %
RETICS # AUTO: 2 %
RETICS AGGREG/RBC NFR: 76.4 K/UL
SODIUM SERPL-SCNC: 144 MMOL/L
TIBC SERPL-MCNC: 310 UG/DL
TSH SERPL-ACNC: 1.05 UIU/ML
UIBC SERPL-MCNC: 249 UG/DL
WBC # FLD AUTO: 6.09 K/UL

## 2024-06-14 NOTE — REASON FOR VISIT
[Symptom and Test Evaluation] : symptom and test evaluation [FreeTextEntry1] : Robert Butcher is a 63 year old man PMHx HTN, GERD, chronic venous stasis dermatitis with bilateral leg swelling, and severe AS presented to [facility] on 1/25/2024 for bilateral leg pain and swelling found to have new acute HFrEF (EF 35-40% 1/2024) in setting of severe AS. He ultimately underwent AVR/JOANNA Clip on 2/23 c/b intraop bleeding requiring multiple blood products; postop prolonged inotropic support/ CHF following and several episodes of afib for which he was treated with amiodarone. He required return to the OR 3/6 for subxiphoid drainage and pericardial window, discharged home 3/12 but on 5/17 a repeat echo showed a large pericardial effusion. On 5/20 s/p Right VATS lysis of adhesions, pericardial window with drainage of 50cc serous fluid with Dr. High. Since discharge he has been feeling well without recurrent  symptoms of palpitations on maintenance amiodarone without signs of toxicity. His ejection fraction has since recovered. His left atrial appendage was noted to be closed on a subsequent LESLEY. He is doing well from a heart failure standpoint with only mild dyspnea with exertion. A two week event monitor done following discharge did not show recurrence of atrial fibrillation.   Today his EKG shows sinus bradycardia, HR 48, with a pre existing RBB. His blood pressure is 156 / 80 mm Hg.   TTE 5/17   1. Left ventricular cavity is normal in size. Left ventricular systolic function is normal with an ejection fraction of 69 % by Marc's method of disks. 2. Moderate left ventricular hypertrophy. 3. There is increased LV mass and concentric hypertrophy. 4. The left ventricular diastolic function is indeterminate. 5. Normal right ventricular cavity size and normal wall thickness,. 6. The interatrial septum appears intact. 7. The left atrium is mildly dilated. 8. The right atrium is mildly dilated. 9. There is mild calcification of the mitral valve annulus. 10. Trace mitral regurgitation. 11. A bioprosthetic valve replacement valve replacement is present in the aortic position The prosthetic valve is well seated with normal function. Trace intravalvular regurgitation. 12. Aortic root at the sinuses of Valsalva is normal in size, measuring 3.48 cm (indexed 1.80 cm/m). Ascending aorta diameter is normal in size, measuring 2.80 cm (indexed 1.45 cm/m). Aortic arch diameter is dilated, measuring 3.6 cm (indexed 1.88 cm/m). 13. Moderate tricuspid regurgitation. 14. Mild pulmonic regurgitation. 15. The inferior vena cava is dilated (dilated >2.1cm) with abnormal inspiratory collapse (abnormal <50%) consistent with elevated right atrial pressure ( R 15, range 10-20mmHg). 16. Estimated pulmonary artery systolic pressure is 63 mmHg, consistent with moderate pulmonary hypertension. 17. Large pericardial effusion.

## 2024-06-14 NOTE — DISCUSSION/SUMMARY
[FreeTextEntry1] : Robert Butcher is a 63 year old man PMHx HTN, GERD, chronic venous stasis dermatitis with bilateral leg swelling, and severe AS c/b HFrEF (EF 35-40%, now recovered) s/p AVR/JOANNA Clip on 2/23 with reop x 2 in setting of pericardial effusion, most recently 5/20. His course was complicated by perioperative atrial fibrillation which has been effectively treated with amiodarone without recurrence by symptoms and monitor. The pathophysiology and treatment options for this condition were discussed in detail. As this patient has had a recent complicated surgical course, most recently instrumented 5/20, and is doing well on amiodarone, we will plan to continue his current regimen for several weeks as his post surgical inflammatory response diminishes. He can discontinue amiodarone the first week of July. Reasonable to hold off on anticoagulation given his left atrial appendage clip and recent effusions. Once off amiodarone a monitor should be repeated in the coming months to ensure that atrial fibrillation does not recur. The plan was discussed with the patient who verbalized his understanding. All questions were answered to his satisfaction.  [EKG obtained to assist in diagnosis and management of assessed problem(s)] : EKG obtained to assist in diagnosis and management of assessed problem(s)

## 2024-06-17 ENCOUNTER — APPOINTMENT (OUTPATIENT)
Dept: RADIOLOGY | Facility: HOSPITAL | Age: 64
End: 2024-06-17

## 2024-06-17 ENCOUNTER — APPOINTMENT (OUTPATIENT)
Dept: FAMILY MEDICINE | Facility: CLINIC | Age: 64
End: 2024-06-17
Payer: COMMERCIAL

## 2024-06-17 ENCOUNTER — APPOINTMENT (OUTPATIENT)
Dept: THORACIC SURGERY | Facility: CLINIC | Age: 64
End: 2024-06-17
Payer: COMMERCIAL

## 2024-06-17 ENCOUNTER — LABORATORY RESULT (OUTPATIENT)
Age: 64
End: 2024-06-17

## 2024-06-17 ENCOUNTER — OUTPATIENT (OUTPATIENT)
Dept: OUTPATIENT SERVICES | Facility: HOSPITAL | Age: 64
LOS: 1 days | End: 2024-06-17
Payer: COMMERCIAL

## 2024-06-17 VITALS
BODY MASS INDEX: 27 KG/M2 | RESPIRATION RATE: 16 BRPM | TEMPERATURE: 98.4 F | SYSTOLIC BLOOD PRESSURE: 128 MMHG | HEIGHT: 66 IN | DIASTOLIC BLOOD PRESSURE: 67 MMHG | HEART RATE: 69 BPM | WEIGHT: 168 LBS | OXYGEN SATURATION: 97 %

## 2024-06-17 VITALS
DIASTOLIC BLOOD PRESSURE: 73 MMHG | BODY MASS INDEX: 26.52 KG/M2 | HEART RATE: 56 BPM | HEIGHT: 66 IN | WEIGHT: 165 LBS | OXYGEN SATURATION: 98 % | SYSTOLIC BLOOD PRESSURE: 150 MMHG | RESPIRATION RATE: 16 BRPM

## 2024-06-17 DIAGNOSIS — K21.9 GASTRO-ESOPHAGEAL REFLUX DISEASE W/OUT ESOPHAGITIS: ICD-10-CM

## 2024-06-17 DIAGNOSIS — N40.1 BENIGN PROSTATIC HYPERPLASIA WITH LOWER URINARY TRACT SYMPMS: ICD-10-CM

## 2024-06-17 DIAGNOSIS — I31.39 OTHER PERICARDIAL EFFUSION (NONINFLAMMATORY): ICD-10-CM

## 2024-06-17 DIAGNOSIS — I48.91 UNSPECIFIED ATRIAL FIBRILLATION: ICD-10-CM

## 2024-06-17 DIAGNOSIS — I10 ESSENTIAL (PRIMARY) HYPERTENSION: ICD-10-CM

## 2024-06-17 DIAGNOSIS — E78.5 HYPERLIPIDEMIA, UNSPECIFIED: ICD-10-CM

## 2024-06-17 DIAGNOSIS — D64.9 ANEMIA, UNSPECIFIED: ICD-10-CM

## 2024-06-17 DIAGNOSIS — Z98.890 OTHER SPECIFIED POSTPROCEDURAL STATES: Chronic | ICD-10-CM

## 2024-06-17 DIAGNOSIS — I31.9 DISEASE OF PERICARDIUM, UNSPECIFIED: ICD-10-CM

## 2024-06-17 DIAGNOSIS — Z95.2 PRESENCE OF PROSTHETIC HEART VALVE: Chronic | ICD-10-CM

## 2024-06-17 LAB
HAPTOGLOB SERPL-MCNC: 141 MG/DL
LDH SERPL-CCNC: 226 U/L

## 2024-06-17 PROCEDURE — 99213 OFFICE O/P EST LOW 20 MIN: CPT

## 2024-06-17 PROCEDURE — 36415 COLL VENOUS BLD VENIPUNCTURE: CPT

## 2024-06-17 PROCEDURE — 71046 X-RAY EXAM CHEST 2 VIEWS: CPT | Mod: 26

## 2024-06-17 PROCEDURE — 99024 POSTOP FOLLOW-UP VISIT: CPT

## 2024-06-17 RX ORDER — HYDRALAZINE HYDROCHLORIDE 25 MG/1
25 TABLET ORAL EVERY 8 HOURS
Qty: 90 | Refills: 0 | Status: ACTIVE | COMMUNITY
Start: 2024-03-13 | End: 1900-01-01

## 2024-06-17 RX ORDER — FOLIC ACID 1 MG/1
1 TABLET ORAL
Qty: 90 | Refills: 3 | Status: COMPLETED | COMMUNITY
Start: 2024-04-22 | End: 2024-06-17

## 2024-06-17 NOTE — ASSESSMENT
[FreeTextEntry1] : Approximately 30 min of face to face time spent, reviewed chart, prior lab work, addressed various health concerns, ordered necessary repeat CBC. Answered all pt questions, coordinated care for patient.

## 2024-06-17 NOTE — PHYSICAL EXAM
[No Acute Distress] : no acute distress [Well Nourished] : well nourished [EOMI] : extraocular movements intact [Supple] : supple [Clear to Auscultation] : lungs were clear to auscultation bilaterally [Normal S1, S2] : normal S1 and S2 [No Edema] : there was no peripheral edema [Non Tender] : non-tender [Normal Affect] : the affect was normal [de-identified] : antalgic gait

## 2024-06-17 NOTE — HISTORY OF PRESENT ILLNESS
[FreeTextEntry1] : follow up for repeat blood work [de-identified] : Mr Robert Butcher is a 62 yo male presents today for routine follow up for blood work repeat. Pt was evaluated by my colleague Randolph Finley N.P. had recent labwork showed worsening anemia. Pt denies any bleeding, likely anemia of chronic disease. Pt was advised to follow up with hematology and cardiologist as per recent instructions. Pt advised today will repeat labwork, check CBC again. Recommended any bleeding, in stool or urine, any symptoms of dizziness, loc, fatigue go to ER.

## 2024-06-17 NOTE — DISCUSSION/SUMMARY
[Doing Well] : is doing well [Excellent Pain Control] : has excellent pain control [No Sign of Infection] : is showing no signs of infection [1] : 1 [Remove Sutures/Staples] : removed sutures/staples [Clinical Recheck] : clinical recheck

## 2024-06-18 LAB
BASOPHILS # BLD AUTO: 0.12 K/UL
BASOPHILS NFR BLD AUTO: 1.1 %
EOSINOPHIL # BLD AUTO: 0.7 K/UL
EOSINOPHIL NFR BLD AUTO: 6.2 %
HCT VFR BLD CALC: 26.8 %
HGB BLD-MCNC: 8.8 G/DL
IMM GRANULOCYTES NFR BLD AUTO: 1 %
LYMPHOCYTES # BLD AUTO: 1.22 K/UL
LYMPHOCYTES NFR BLD AUTO: 10.7 %
MAN DIFF?: NORMAL
MCHC RBC-ENTMCNC: 21.7 PG
MCHC RBC-ENTMCNC: 32.8 GM/DL
MCV RBC AUTO: 66.2 FL
MONOCYTES # BLD AUTO: 1.01 K/UL
MONOCYTES NFR BLD AUTO: 8.9 %
NEUTROPHILS # BLD AUTO: 8.2 K/UL
NEUTROPHILS NFR BLD AUTO: 72.1 %
PLATELET # BLD AUTO: 219 K/UL
RBC # BLD: 4.05 M/UL
RBC # FLD: 23.9 %
WBC # FLD AUTO: 11.36 K/UL

## 2024-06-19 LAB
CULTURE RESULTS: SIGNIFICANT CHANGE UP
SPECIMEN SOURCE: SIGNIFICANT CHANGE UP

## 2024-06-19 NOTE — ASSESSMENT
[FreeTextEntry1] : Mr. GRACE FERNANDEZ, 63 year old male, who status post AVR with Dr. Ronni Martinez in February. He subsequently developed a pericardial effusion that was drained by Dr. Martinez in March. He then was noted to have a persistent effusion on an echo done through his cardiologist and he was sent to the hospital where Thoracic was consulted for VATS, possible pericardial window given the loculated appearance of it.    Now s/p Rt VATS, pneumolysis, pericardial window and drain placement, FB on 5/20/24. Path of pericardium revealed fibroadipose tissue with minimal focal chronic inflammation. Pericardial fluid is negative for malignant cells.   CXR today---- reviewed. Pt reports well, denies SOB, cough or CP.   I have reviewed the patient's medical records and diagnostic images at time of this office consultation and have made the following recommendation: 1. CXR reviewed, pt is recovering well from surgery. Follow up with cardiology and cardiac surgery. RTC PRN    I, Dr. CLARK, CORBY FAITH, personally performed the evaluation and management (E/M) services for this established patient who presents today with (a) new problem(s)/exacerbation of (an) existing condition(s).  That E/M includes conducting the examination, assessing all new/exacerbated conditions, and establishing a new plan of care.  Today, my ACP, ISSA Messina was here to observe my evaluation and management services for this new problem/exacerbated condition to be followed going forward.

## 2024-06-19 NOTE — REASON FOR VISIT
[de-identified] : Rt VATS, pneumolysis, pericardial window and drain placement, FB [de-identified] : 5/20/24

## 2024-06-20 RX ORDER — OXYCODONE AND ACETAMINOPHEN 5; 325 MG/1; MG/1
5-325 TABLET ORAL
Qty: 120 | Refills: 0 | Status: COMPLETED | COMMUNITY
Start: 2024-05-29 | End: 2024-06-13

## 2024-06-20 NOTE — HISTORY OF PRESENT ILLNESS
[de-identified] : Hospital Course 63M admitted to [facility] 2/5/24 PMHx HTN, GERD, HPL, chronic venous stasis dermatitis with bilateral leg swelling, and AS presented to [facility] on 1/25/2024 for bilateral leg pain and swelling found to have new acute HFrEF (EF 35-40% 1/2024) in setting of severe AS. 2/23: AVR/JOANNA Clip c/b intraop bleeding requiring multiple blood products; postop prolonged inotropic support/ CHF following and afib. TTE revealed pericardial effusion returned to OR s/p 3/6 subxiphoid drainage and pericardial window. Cefepime 2 Grams IV Q 8 hours per ID for (+)Serratia marcescens bacteremia. pt discharged home 3/12. pt was seen at his cardiologist's office today- Dr. Mesa where a repeat echo was done showing a large pericardial effusion; pt admitted for further evaluation and treatment. VSS; RSR 60-80; pt stable at this time. Pt denies cp/sob or palpitations. 5/18 IR consulted for pericardial effusion drainage determined No percutaneous window is available for drainage Thoracic consulted for possible pericardial window vs. pericardectomy  5/20 s/p Right VATS lysis of adhesions, pericardial window with drainage of 50cc serous fluid Post op Course: 5/21 VSS, right chest pericardial drain -180cc/24h, maintain to water seal today. Check CXR today. Garcia reinserted overnight for retention, started on Flomax. 5/22 VSS R jon dr 140cc/24. H2O seal. garcia in place. increase Flomax to 0.8 mg PO daily. 5/23 VSS R pericardial. 5/24 VSS; Continue with current medication regimen. Repeat TTE: (5.23.24 @ 09:13) Small to moderate pericardial effusion posterior to the left ventricle, moderate pericardial effusion noted adjacent to the posterior left ventricle, small pericardial effusion noted adjacent to the lateral left ventricle and trace pericardial effusion noted adjacent to the right atrium with no evidence of hemodynamic compromise (or echocardiographic evidence of cardiac tamponade. Right pericardial drain with a total of 140 cc drainage for the last 24 hours, per Thoracic surgery will maintain drain in and continue to monitor. 5/25 VSS; Pericardial drain with a total 110 cc for 24hrs. Maintain CT in place to WS. Thoracic Surgery following. Continue with current medication regimen. Sorbitol 30 ml PO x 1. Dilaudid PCA D/C'd. Transitioned to Percocet PO PRN. PCA D/C'd. Transitioned to Percocet PO PRN. 5/26 VSS pericardial drain in place. will d/c when output decreased Disposition: Home once medically cleared. 5/27 VSS ambulating in bobo pericardial drainage 67/97 maintain tube 5/28 vss; rsr 50-95; pericardial drain d/c; discharge pt home today as per Dr. Martinez     Med Reconciliation: Override IMPROVE-DD recommendations due to: This is a surgical and/or non-medical patient. Recommended Post-Discharge VTE Prophylaxis This is a surgical and/or non-medical patient. Medication Reconciliation Status Admission Reconciliation is Completed Discharge Reconciliation is Completed  Discharge Medications acetaminophen 325 mg oral tablet: 2 tab(s) orally every 6 hours As needed Mild Pain (1 - 3) alfuzosin 10 mg oral tablet, extended release: 1 tab(s) orally once a day (at bedtime) HOme allopurinol 100 mg oral tablet: 1 tab(s) orally once a day amiodarone 200 mg oral tablet: 1 tab(s) orally once a day bumetanide 2 mg oral tablet: 1 tab(s) orally 2 times a day folic acid 1 mg oral tablet: 1 tab(s) orally once a day hydrALAZINE 25 mg oral tablet: 1 tab(s) orally 3 times a day levothyroxine 50 mcg (0.05 mg) oral tablet: 1 tab(s) orally once a day oxycodone-acetaminophen 5 mg-325 mg oral tablet: 1 tab(s) orally every 6 hours as needed for Moderate Pain (4 - 6) MDD: 4 pantoprazole 40 mg oral delayed release tablet: 1 tab(s) orally once a day (before a meal) Potassium Chloride (Eqv-K-Tab) 20 mEq oral tablet, extended release: 1 tab(s) orally once a day senna leaf extract oral tablet: 2 tab(s) orally once a day (at bedtime) A

## 2024-06-20 NOTE — HISTORY OF PRESENT ILLNESS
[de-identified] : Hospital Course 63M admitted to [facility] 2/5/24 PMHx HTN, GERD, HPL, chronic venous stasis dermatitis with bilateral leg swelling, and AS presented to [facility] on 1/25/2024 for bilateral leg pain and swelling found to have new acute HFrEF (EF 35-40% 1/2024) in setting of severe AS. 2/23: AVR/JOANNA Clip c/b intraop bleeding requiring multiple blood products; postop prolonged inotropic support/ CHF following and afib. TTE revealed pericardial effusion returned to OR s/p 3/6 subxiphoid drainage and pericardial window. Cefepime 2 Grams IV Q 8 hours per ID for (+)Serratia marcescens bacteremia. pt discharged home 3/12. pt was seen at his cardiologist's office today- Dr. Mesa where a repeat echo was done showing a large pericardial effusion; pt admitted for further evaluation and treatment. VSS; RSR 60-80; pt stable at this time. Pt denies cp/sob or palpitations. 5/18 IR consulted for pericardial effusion drainage determined No percutaneous window is available for drainage Thoracic consulted for possible pericardial window vs. pericardectomy  5/20 s/p Right VATS lysis of adhesions, pericardial window with drainage of 50cc serous fluid Post op Course: 5/21 VSS, right chest pericardial drain -180cc/24h, maintain to water seal today. Check CXR today. Garcia reinserted overnight for retention, started on Flomax. 5/22 VSS R jon dr 140cc/24. H2O seal. garcia in place. increase Flomax to 0.8 mg PO daily. 5/23 VSS R pericardial. 5/24 VSS; Continue with current medication regimen. Repeat TTE: (5.23.24 @ 09:13) Small to moderate pericardial effusion posterior to the left ventricle, moderate pericardial effusion noted adjacent to the posterior left ventricle, small pericardial effusion noted adjacent to the lateral left ventricle and trace pericardial effusion noted adjacent to the right atrium with no evidence of hemodynamic compromise (or echocardiographic evidence of cardiac tamponade. Right pericardial drain with a total of 140 cc drainage for the last 24 hours, per Thoracic surgery will maintain drain in and continue to monitor. 5/25 VSS; Pericardial drain with a total 110 cc for 24hrs. Maintain CT in place to WS. Thoracic Surgery following. Continue with current medication regimen. Sorbitol 30 ml PO x 1. Dilaudid PCA D/C'd. Transitioned to Percocet PO PRN. PCA D/C'd. Transitioned to Percocet PO PRN. 5/26 VSS pericardial drain in place. will d/c when output decreased Disposition: Home once medically cleared. 5/27 VSS ambulating in bobo pericardial drainage 67/97 maintain tube 5/28 vss; rsr 50-95; pericardial drain d/c; discharge pt home today as per Dr. Martinez     Med Reconciliation: Override IMPROVE-DD recommendations due to: This is a surgical and/or non-medical patient. Recommended Post-Discharge VTE Prophylaxis This is a surgical and/or non-medical patient. Medication Reconciliation Status Admission Reconciliation is Completed Discharge Reconciliation is Completed  Discharge Medications acetaminophen 325 mg oral tablet: 2 tab(s) orally every 6 hours As needed Mild Pain (1 - 3) alfuzosin 10 mg oral tablet, extended release: 1 tab(s) orally once a day (at bedtime) HOme allopurinol 100 mg oral tablet: 1 tab(s) orally once a day amiodarone 200 mg oral tablet: 1 tab(s) orally once a day bumetanide 2 mg oral tablet: 1 tab(s) orally 2 times a day folic acid 1 mg oral tablet: 1 tab(s) orally once a day hydrALAZINE 25 mg oral tablet: 1 tab(s) orally 3 times a day levothyroxine 50 mcg (0.05 mg) oral tablet: 1 tab(s) orally once a day oxycodone-acetaminophen 5 mg-325 mg oral tablet: 1 tab(s) orally every 6 hours as needed for Moderate Pain (4 - 6) MDD: 4 pantoprazole 40 mg oral delayed release tablet: 1 tab(s) orally once a day (before a meal) Potassium Chloride (Eqv-K-Tab) 20 mEq oral tablet, extended release: 1 tab(s) orally once a day senna leaf extract oral tablet: 2 tab(s) orally once a day (at bedtime) A

## 2024-06-20 NOTE — PHYSICAL EXAM
[No Acute Distress] : no acute distress [Well Nourished] : well nourished [Well Developed] : well developed [Well-Appearing] : well-appearing [Normal Sclera/Conjunctiva] : normal sclera/conjunctiva [PERRL] : pupils equal round and reactive to light [EOMI] : extraocular movements intact [Normal Outer Ear/Nose] : the outer ears and nose were normal in appearance [Normal Oropharynx] : the oropharynx was normal [No JVD] : no jugular venous distention [No Lymphadenopathy] : no lymphadenopathy [Supple] : supple [Thyroid Normal, No Nodules] : the thyroid was normal and there were no nodules present [No Respiratory Distress] : no respiratory distress  [No Accessory Muscle Use] : no accessory muscle use [Clear to Auscultation] : lungs were clear to auscultation bilaterally [Normal Rate] : normal rate  [Regular Rhythm] : with a regular rhythm [Normal S1, S2] : normal S1 and S2 [No Murmur] : no murmur heard [No Carotid Bruits] : no carotid bruits [No Abdominal Bruit] : a ~M bruit was not heard ~T in the abdomen [No Varicosities] : no varicosities [Pedal Pulses Present] : the pedal pulses are present [No Edema] : there was no peripheral edema [No Palpable Aorta] : no palpable aorta [No Extremity Clubbing/Cyanosis] : no extremity clubbing/cyanosis [Soft] : abdomen soft [Non Tender] : non-tender [Non-distended] : non-distended [No Masses] : no abdominal mass palpated [No HSM] : no HSM [Normal Bowel Sounds] : normal bowel sounds [Normal Posterior Cervical Nodes] : no posterior cervical lymphadenopathy [Normal Anterior Cervical Nodes] : no anterior cervical lymphadenopathy [No CVA Tenderness] : no CVA  tenderness [No Spinal Tenderness] : no spinal tenderness [No Joint Swelling] : no joint swelling [Grossly Normal Strength/Tone] : grossly normal strength/tone [No Rash] : no rash [Coordination Grossly Intact] : coordination grossly intact [No Focal Deficits] : no focal deficits [Normal Gait] : normal gait [Deep Tendon Reflexes (DTR)] : deep tendon reflexes were 2+ and symmetric [Normal Affect] : the affect was normal [Normal Insight/Judgement] : insight and judgment were intact [de-identified] : Murmur noted

## 2024-06-20 NOTE — PHYSICAL EXAM
[No Acute Distress] : no acute distress [Well Nourished] : well nourished [Well Developed] : well developed [Well-Appearing] : well-appearing [Normal Sclera/Conjunctiva] : normal sclera/conjunctiva [PERRL] : pupils equal round and reactive to light [EOMI] : extraocular movements intact [Normal Outer Ear/Nose] : the outer ears and nose were normal in appearance [Normal Oropharynx] : the oropharynx was normal [No JVD] : no jugular venous distention [No Lymphadenopathy] : no lymphadenopathy [Supple] : supple [Thyroid Normal, No Nodules] : the thyroid was normal and there were no nodules present [No Respiratory Distress] : no respiratory distress  [No Accessory Muscle Use] : no accessory muscle use [Clear to Auscultation] : lungs were clear to auscultation bilaterally [Normal Rate] : normal rate  [Regular Rhythm] : with a regular rhythm [Normal S1, S2] : normal S1 and S2 [No Murmur] : no murmur heard [No Carotid Bruits] : no carotid bruits [No Abdominal Bruit] : a ~M bruit was not heard ~T in the abdomen [No Varicosities] : no varicosities [Pedal Pulses Present] : the pedal pulses are present [No Edema] : there was no peripheral edema [No Palpable Aorta] : no palpable aorta [No Extremity Clubbing/Cyanosis] : no extremity clubbing/cyanosis [Soft] : abdomen soft [Non Tender] : non-tender [Non-distended] : non-distended [No Masses] : no abdominal mass palpated [No HSM] : no HSM [Normal Bowel Sounds] : normal bowel sounds [Normal Posterior Cervical Nodes] : no posterior cervical lymphadenopathy [Normal Anterior Cervical Nodes] : no anterior cervical lymphadenopathy [No CVA Tenderness] : no CVA  tenderness [No Spinal Tenderness] : no spinal tenderness [No Joint Swelling] : no joint swelling [Grossly Normal Strength/Tone] : grossly normal strength/tone [No Rash] : no rash [Coordination Grossly Intact] : coordination grossly intact [No Focal Deficits] : no focal deficits [Normal Gait] : normal gait [Deep Tendon Reflexes (DTR)] : deep tendon reflexes were 2+ and symmetric [Normal Affect] : the affect was normal [Normal Insight/Judgement] : insight and judgment were intact [de-identified] : Murmur noted

## 2024-06-20 NOTE — ASSESSMENT
[FreeTextEntry1] : Will continue to follow-up with cardiologist, cardiothoracic surgeon, and EP.  Anticoagulation, and antirhythm max are being managed by electrophysiology.  He continues to feel well and denies any acute symptoms of decompensated heart failure. Will repeat CBC and CMP

## 2024-06-25 ENCOUNTER — APPOINTMENT (OUTPATIENT)
Dept: CARDIOLOGY | Facility: CLINIC | Age: 64
End: 2024-06-25
Payer: COMMERCIAL

## 2024-06-25 ENCOUNTER — NON-APPOINTMENT (OUTPATIENT)
Age: 64
End: 2024-06-25

## 2024-06-25 VITALS
BODY MASS INDEX: 27.48 KG/M2 | HEART RATE: 56 BPM | SYSTOLIC BLOOD PRESSURE: 137 MMHG | WEIGHT: 171 LBS | DIASTOLIC BLOOD PRESSURE: 76 MMHG | HEIGHT: 66 IN | OXYGEN SATURATION: 96 %

## 2024-06-25 PROCEDURE — 93000 ELECTROCARDIOGRAM COMPLETE: CPT

## 2024-06-25 PROCEDURE — 99213 OFFICE O/P EST LOW 20 MIN: CPT | Mod: 25

## 2024-07-08 ENCOUNTER — APPOINTMENT (OUTPATIENT)
Dept: ELECTROPHYSIOLOGY | Facility: CLINIC | Age: 64
End: 2024-07-08

## 2024-07-10 ENCOUNTER — APPOINTMENT (OUTPATIENT)
Dept: FAMILY MEDICINE | Facility: CLINIC | Age: 64
End: 2024-07-10

## 2024-08-13 ENCOUNTER — APPOINTMENT (OUTPATIENT)
Dept: CARDIOLOGY | Facility: CLINIC | Age: 64
End: 2024-08-13

## 2024-08-13 ENCOUNTER — APPOINTMENT (OUTPATIENT)
Dept: CARDIOLOGY | Facility: CLINIC | Age: 64
End: 2024-08-13
Payer: COMMERCIAL

## 2024-08-13 ENCOUNTER — NON-APPOINTMENT (OUTPATIENT)
Age: 64
End: 2024-08-13

## 2024-08-13 VITALS
WEIGHT: 184 LBS | HEART RATE: 59 BPM | DIASTOLIC BLOOD PRESSURE: 71 MMHG | SYSTOLIC BLOOD PRESSURE: 145 MMHG | HEIGHT: 66 IN | BODY MASS INDEX: 29.57 KG/M2 | OXYGEN SATURATION: 97 %

## 2024-08-13 PROCEDURE — 93242 EXT ECG>48HR<7D RECORDING: CPT

## 2024-08-13 PROCEDURE — 93000 ELECTROCARDIOGRAM COMPLETE: CPT

## 2024-08-13 PROCEDURE — 99214 OFFICE O/P EST MOD 30 MIN: CPT | Mod: 25

## 2024-08-13 NOTE — CARDIOLOGY SUMMARY
[de-identified] : Atrial fibrillation with RVR, paroxysmal post operatively 24 rsr pac rbbb inferior mi ai    Recommendations: -Recommended continuation of amiodarone load. Has thus far received 4.48g as of 1500  -Agree with anticoagulation, heparin gtt started by CTSx -If pt persists in AF, can consider DCCV after further amiodarone load. Will need to be on uninterrupted AC s/p DCCV. Pt did have AtriClip at time of AVR, will need to be reviewed for ?need for LESLEY if DCCV is pursued. Will hold off on DCCV for now as patient remains on lasix and  gtts -Maintain K+>4 and Mag>2 -Continue telemetry monitoring     Attestation Statements: Attestation Statements: Attending and PA/NP shared services statement (NON-critical care):   Attending to bill.  3/27/2024 sinus rhythm old anterior infarct diffuse nonspecific T wave abnormality.. [de-identified] : 2/6/24 HFrEF with LVEF 30% pericardial effusion 3/6/24 larger pericardial effusion 4/15/24 normal lv function ____________________________________________________________________ [de-identified] :  2/23/24 S/p bio AVR, AtriClip

## 2024-08-13 NOTE — PHYSICAL EXAM

## 2024-08-13 NOTE — REASON FOR VISIT
[CV Risk Factors and Non-Cardiac Disease] : CV risk factors and non-cardiac disease [Arrhythmia/ECG Abnorrmalities] : arrhythmia/ECG abnormalities [Structural Heart and Valve Disease] : structural heart and valve disease [FreeTextEntry3] : dr palafox [FreeTextEntry1] : GRACE FERNANDEZ comes today for evaluation. he was advised to undergo a complete cardiac evaluation. He denies chest pains shortness of breath or loss of consciousness.  I recently referred to [facility] for cardiovascular surgical evaluation.Umair had a complicated course at [facility] status post aortic valve replacement and left atrial appendage clipping he went into atrial fibrillation and was noted to have an elevation in TSH. He was on amiodarone and required a pericardial window for drainage procedure. 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 He is undergoing progressive ambulation and wound care.   5/7/2024 Umair fell last week. we note diminished RV function on echo as well as a pericardial effusion which requires follow-up. he is currently seeing Dr. Sheets at [facility] for hematology to be seen 5/20/2024 and seeing Dr. Phoenix Davis at [facility] in [place] for nephrology on 5/20/2024.  6/25/2024 Umair feels well labs reviewed renal profile improved status post recent drainage procedure at [facility] for pericardial effusion feels fine we discussed current treatment with amiodarone and possible toxicities and most likely would like to discontinue recent monitor satisfactory  8/13/2024 Umair feels well will be seeing Dr. Rosario 11/14/2024 for heart failure evaluation now on hydralazine 25 3 times daily Bumex 2 twice daily amiodarone has been discontinued as has anticoagulation

## 2024-08-13 NOTE — ASSESSMENT
[FreeTextEntry1] : I have coordinated care with Dr. Alamo from [facility] and a monitor is applied today in order to screen for recurrent atrial fibrillation. We would hope to avoid anticoagulation in the setting of a pericardial effusion which required drainage and a debilitated state although this option was left open to the patient and his wife. We advised screening blood work especially looking for signs and symptoms of infection and toxicity of amiodarone. We would advise antibiotic prophylaxis prior to dental work, and this was emphasized. We have reviewed medications.  The staples are out. He is seeing heart failure specialist Dr. Rosario and vascular surgery.  5/7/2024  we will have Umair see Dr. Barker for EP follow-up given complex cardiac surgery and perioperative atrial fibrillation. A recent monitor failed to reveal evidence for recurrent A-fib. We would consider patient for discontinuation of amiodarone. A chest x-ray PA and lateral is requested with respect to Amio side effects. transthoracic echoes evaluate pericardial effusion. recent thyroid function testing negative as hepatic profile.  I discussed with wife Chanelle at bedside was supportive  6/2524 EKG is stable today will request EP evaluation with Dr. Barker most likely will discontinue amiodarone at this juncture now 4 months after surgery concern over possible toxicities  8/13/24 repeat 2-week ZIO requested to rule out recurrent atrial fibrillation also seeing hematology consultant return visit 3 months clinically stable. discussed with wife chanelle at bedside  monitor is hooked up today    Medical necessity  there is a high-risk encounter based upon drug evaluation and management and coordination of care with subspecialists.

## 2024-08-14 ENCOUNTER — RX RENEWAL (OUTPATIENT)
Age: 64
End: 2024-08-14

## 2024-08-22 ENCOUNTER — APPOINTMENT (OUTPATIENT)
Dept: FAMILY MEDICINE | Facility: CLINIC | Age: 64
End: 2024-08-22
Payer: COMMERCIAL

## 2024-08-22 ENCOUNTER — LABORATORY RESULT (OUTPATIENT)
Age: 64
End: 2024-08-22

## 2024-08-22 ENCOUNTER — RX RENEWAL (OUTPATIENT)
Age: 64
End: 2024-08-22

## 2024-08-22 VITALS
HEART RATE: 74 BPM | HEIGHT: 66 IN | DIASTOLIC BLOOD PRESSURE: 70 MMHG | WEIGHT: 187 LBS | SYSTOLIC BLOOD PRESSURE: 130 MMHG | OXYGEN SATURATION: 94 % | BODY MASS INDEX: 30.05 KG/M2 | TEMPERATURE: 97.8 F | RESPIRATION RATE: 17 BRPM

## 2024-08-22 DIAGNOSIS — I45.10 UNSPECIFIED RIGHT BUNDLE-BRANCH BLOCK: ICD-10-CM

## 2024-08-22 DIAGNOSIS — I48.91 UNSPECIFIED ATRIAL FIBRILLATION: ICD-10-CM

## 2024-08-22 DIAGNOSIS — I48.0 PAROXYSMAL ATRIAL FIBRILLATION: ICD-10-CM

## 2024-08-22 DIAGNOSIS — Z95.2 PRESENCE OF PROSTHETIC HEART VALVE: ICD-10-CM

## 2024-08-22 DIAGNOSIS — I31.39 OTHER PERICARDIAL EFFUSION (NONINFLAMMATORY): ICD-10-CM

## 2024-08-22 PROCEDURE — 99215 OFFICE O/P EST HI 40 MIN: CPT

## 2024-08-22 PROCEDURE — 36415 COLL VENOUS BLD VENIPUNCTURE: CPT

## 2024-08-22 NOTE — HISTORY OF PRESENT ILLNESS
[de-identified] : 64-year-old male with past medical history of severe aortic stenosis status post AVR this past February which was complicated with a large pericardial effusion requiring pericardial window and drain, congestive heart failure, hypothyroidism, BPH, arthritis of knees, and hypertension.  Has followed up with cardiology recently.  Noted weight gain, but patient denies any swelling in legs, shortness of breath, chest pain, or palpitations.  He has been off of thyroid replacement for about 1 month now and has been feeling well.  Synthroid was placed when he was acutely ill and hospitalized when his valve was being replaced.  Will see how patient has been doing without thyroid replacement  Is following up with hematology for persistent anemia  Continues daily weights  Currently not taking baby aspirin.  Patient reports when he was hospitalized to stop baby aspirin by cardiologist

## 2024-08-22 NOTE — PLAN
[FreeTextEntry1] : Will repeat comprehensive blood work to recheck renal function, follow-up on anemia, and repeat TFTs Will consider keeping patient off of thyroid replacement if thyroid function is acceptable -Patient informed to continue daily weights and adhere to low-sodium diet.  If he continues to increase weight patient and wife was encouraged to call office for management of diuretics. Will have him follow-up in 2 to 4 weeks Is following up with cardiology, hematologist, and cardiothoracic surgeon, and heart failure specialist

## 2024-08-22 NOTE — PHYSICAL EXAM
[No Acute Distress] : no acute distress [Well Nourished] : well nourished [Well Developed] : well developed [Well-Appearing] : well-appearing [Normal Sclera/Conjunctiva] : normal sclera/conjunctiva [PERRL] : pupils equal round and reactive to light [EOMI] : extraocular movements intact [Normal Outer Ear/Nose] : the outer ears and nose were normal in appearance [Normal Oropharynx] : the oropharynx was normal [No JVD] : no jugular venous distention [No Lymphadenopathy] : no lymphadenopathy [Supple] : supple [Thyroid Normal, No Nodules] : the thyroid was normal and there were no nodules present [No Respiratory Distress] : no respiratory distress  [No Accessory Muscle Use] : no accessory muscle use [Clear to Auscultation] : lungs were clear to auscultation bilaterally [Normal Rate] : normal rate  [Regular Rhythm] : with a regular rhythm [Normal S1, S2] : normal S1 and S2 [No Murmur] : no murmur heard [No Carotid Bruits] : no carotid bruits [No Abdominal Bruit] : a ~M bruit was not heard ~T in the abdomen [No Varicosities] : no varicosities [Pedal Pulses Present] : the pedal pulses are present [No Edema] : there was no peripheral edema [No Palpable Aorta] : no palpable aorta [No Extremity Clubbing/Cyanosis] : no extremity clubbing/cyanosis [Soft] : abdomen soft [Non Tender] : non-tender [Non-distended] : non-distended [No Masses] : no abdominal mass palpated [No HSM] : no HSM [Normal Bowel Sounds] : normal bowel sounds [Normal Posterior Cervical Nodes] : no posterior cervical lymphadenopathy [Normal Anterior Cervical Nodes] : no anterior cervical lymphadenopathy [No CVA Tenderness] : no CVA  tenderness [No Spinal Tenderness] : no spinal tenderness [No Joint Swelling] : no joint swelling [Grossly Normal Strength/Tone] : grossly normal strength/tone [No Rash] : no rash [Coordination Grossly Intact] : coordination grossly intact [No Focal Deficits] : no focal deficits [Normal Gait] : normal gait [Deep Tendon Reflexes (DTR)] : deep tendon reflexes were 2+ and symmetric [Normal Affect] : the affect was normal [Normal Insight/Judgement] : insight and judgment were intact [de-identified] : +1 edema to bilateral lower extremities

## 2024-08-23 ENCOUNTER — TRANSCRIPTION ENCOUNTER (OUTPATIENT)
Age: 64
End: 2024-08-23

## 2024-08-23 LAB
ALBUMIN SERPL ELPH-MCNC: 4.3 G/DL
ALP BLD-CCNC: 95 U/L
ALT SERPL-CCNC: 17 U/L
ANION GAP SERPL CALC-SCNC: 14 MMOL/L
AST SERPL-CCNC: 18 U/L
BASOPHILS # BLD AUTO: 0.28 K/UL
BASOPHILS NFR BLD AUTO: 2.8 %
BILIRUB SERPL-MCNC: 0.5 MG/DL
BUN SERPL-MCNC: 31 MG/DL
CALCIUM SERPL-MCNC: 9.3 MG/DL
CHLORIDE SERPL-SCNC: 104 MMOL/L
CHOLEST SERPL-MCNC: 173 MG/DL
CO2 SERPL-SCNC: 23 MMOL/L
CREAT SERPL-MCNC: 1.51 MG/DL
EGFR: 51 ML/MIN/1.73M2
EOSINOPHIL # BLD AUTO: 0.55 K/UL
EOSINOPHIL NFR BLD AUTO: 5.6 %
FERRITIN SERPL-MCNC: 332 NG/ML
GLUCOSE SERPL-MCNC: 124 MG/DL
HCT VFR BLD CALC: 31.9 %
HDLC SERPL-MCNC: 52 MG/DL
HGB BLD-MCNC: 10.3 G/DL
IRON SATN MFR SERPL: 18 %
IRON SERPL-MCNC: 57 UG/DL
LDLC SERPL CALC-MCNC: 82 MG/DL
LYMPHOCYTES # BLD AUTO: 1.74 K/UL
LYMPHOCYTES NFR BLD AUTO: 17.6 %
MAN DIFF?: NORMAL
MCHC RBC-ENTMCNC: 20.9 PG
MCHC RBC-ENTMCNC: 32.3 GM/DL
MCV RBC AUTO: 64.8 FL
MONOCYTES # BLD AUTO: 0.36 K/UL
MONOCYTES NFR BLD AUTO: 3.7 %
NEUTROPHILS # BLD AUTO: 6.84 K/UL
NEUTROPHILS NFR BLD AUTO: 69.4 %
NONHDLC SERPL-MCNC: 121 MG/DL
PLATELET # BLD AUTO: 211 K/UL
POTASSIUM SERPL-SCNC: 2.8 MMOL/L
PROT SERPL-MCNC: 7 G/DL
RBC # BLD: 4.92 M/UL
RBC # BLD: 4.92 M/UL
RBC # FLD: 22.1 %
RETICS # AUTO: 2 %
RETICS AGGREG/RBC NFR: 97.4 K/UL
SODIUM SERPL-SCNC: 142 MMOL/L
T3FREE SERPL-MCNC: 2.82 PG/ML
T4 FREE SERPL-MCNC: 1.3 NG/DL
TIBC SERPL-MCNC: 314 UG/DL
TRIGL SERPL-MCNC: 238 MG/DL
TSH SERPL-ACNC: 1.39 UIU/ML
UIBC SERPL-MCNC: 257 UG/DL
WBC # FLD AUTO: 9.86 K/UL

## 2024-08-27 ENCOUNTER — NON-APPOINTMENT (OUTPATIENT)
Age: 64
End: 2024-08-27

## 2024-09-06 ENCOUNTER — APPOINTMENT (OUTPATIENT)
Dept: FAMILY MEDICINE | Facility: CLINIC | Age: 64
End: 2024-09-06
Payer: COMMERCIAL

## 2024-09-06 ENCOUNTER — LABORATORY RESULT (OUTPATIENT)
Age: 64
End: 2024-09-06

## 2024-09-06 VITALS
WEIGHT: 188 LBS | SYSTOLIC BLOOD PRESSURE: 132 MMHG | OXYGEN SATURATION: 99 % | HEIGHT: 66 IN | HEART RATE: 78 BPM | DIASTOLIC BLOOD PRESSURE: 63 MMHG | TEMPERATURE: 98.1 F | BODY MASS INDEX: 30.22 KG/M2 | RESPIRATION RATE: 16 BRPM

## 2024-09-06 DIAGNOSIS — I50.22 CHRONIC SYSTOLIC (CONGESTIVE) HEART FAILURE: ICD-10-CM

## 2024-09-06 DIAGNOSIS — D64.9 ANEMIA, UNSPECIFIED: ICD-10-CM

## 2024-09-06 DIAGNOSIS — I48.91 UNSPECIFIED ATRIAL FIBRILLATION: ICD-10-CM

## 2024-09-06 PROCEDURE — 36415 COLL VENOUS BLD VENIPUNCTURE: CPT

## 2024-09-06 PROCEDURE — G2211 COMPLEX E/M VISIT ADD ON: CPT | Mod: NC

## 2024-09-06 PROCEDURE — 99214 OFFICE O/P EST MOD 30 MIN: CPT

## 2024-09-06 NOTE — PHYSICAL EXAM
[Normal] : normal rate, regular rhythm, normal S1 and S2 and no murmur heard [de-identified] : Murmur noted [de-identified] : No lower extremity edema noted

## 2024-09-06 NOTE — ASSESSMENT
[FreeTextEntry1] : Patient asymptomatic currently with no edema for heart failure. Continue with daily weights, fluid restriction, low-sodium diet Continue with current dose of diuretic Will check CMP, BNP, and CBC Will consider starting baby aspirin if CBC is stable  Has appointments to follow-up with cardiology and heart failure specialist If labs are acceptable follow-up in 3 months

## 2024-09-06 NOTE — HISTORY OF PRESENT ILLNESS
[de-identified] : Overall feels well.  Continues to gain weight.  About 4 pounds over 1 month. No lower extremity edema noted Patient is watching diet, sodium, and limiting water intake. Restarted taking potassium supplements and will repeat at visit.  Patient was acutely hypokalemic at last visit.  Has appointments coming up with cardiology and heart failure specialist.  If CBC is stable will consider restarting baby aspirin.  Discussed with cardiologist over task Dr. Nguyễn.

## 2024-09-10 LAB
ALBUMIN SERPL ELPH-MCNC: 4.4 G/DL
ALP BLD-CCNC: 85 U/L
ALT SERPL-CCNC: 15 U/L
ANION GAP SERPL CALC-SCNC: 15 MMOL/L
AST SERPL-CCNC: 16 U/L
BASOPHILS # BLD AUTO: 0.38 K/UL
BASOPHILS NFR BLD AUTO: 3.6 %
BILIRUB SERPL-MCNC: 0.5 MG/DL
BUN SERPL-MCNC: 38 MG/DL
CALCIUM SERPL-MCNC: 9.7 MG/DL
CHLORIDE SERPL-SCNC: 104 MMOL/L
CO2 SERPL-SCNC: 22 MMOL/L
CREAT SERPL-MCNC: 1.55 MG/DL
EGFR: 50 ML/MIN/1.73M2
EOSINOPHIL # BLD AUTO: 0.56 K/UL
EOSINOPHIL NFR BLD AUTO: 5.3 %
FERRITIN SERPL-MCNC: 327 NG/ML
GLUCOSE SERPL-MCNC: 104 MG/DL
HCT VFR BLD CALC: 34.2 %
HGB BLD-MCNC: 10.8 G/DL
IRON SATN MFR SERPL: 18 %
IRON SERPL-MCNC: 63 UG/DL
LYMPHOCYTES # BLD AUTO: 1.78 K/UL
LYMPHOCYTES NFR BLD AUTO: 16.8 %
MAGNESIUM SERPL-MCNC: 1.9 MG/DL
MAN DIFF?: NORMAL
MCHC RBC-ENTMCNC: 19.9 PG
MCHC RBC-ENTMCNC: 31.6 GM/DL
MCV RBC AUTO: 63.1 FL
MONOCYTES # BLD AUTO: 0.47 K/UL
MONOCYTES NFR BLD AUTO: 4.4 %
NEUTROPHILS # BLD AUTO: 7.3 K/UL
NEUTROPHILS NFR BLD AUTO: 69 %
NT-PROBNP SERPL-MCNC: 506 PG/ML
PLATELET # BLD AUTO: 197 K/UL
POTASSIUM SERPL-SCNC: 3.6 MMOL/L
PROT SERPL-MCNC: 7.3 G/DL
RBC # BLD: 5.42 M/UL
RBC # FLD: 21.7 %
SODIUM SERPL-SCNC: 141 MMOL/L
TIBC SERPL-MCNC: 345 UG/DL
UIBC SERPL-MCNC: 282 UG/DL
WBC # FLD AUTO: 10.58 K/UL

## 2024-09-10 RX ORDER — POTASSIUM CHLORIDE 1.5 G/1.58G
20 POWDER, FOR SOLUTION ORAL DAILY
Qty: 7 | Refills: 0 | Status: ACTIVE | COMMUNITY
Start: 2024-09-10 | End: 1900-01-01

## 2024-09-11 NOTE — ED PROVIDER NOTE - NSTIMEPROVIDERCAREINITIATE_GEN_ER
Pt given all discharge instructions and has all belongings. Iv removed. Waiting on meds to beds. Pt. And family member being sent to discharge lounge to wait on meds to beds.   14-Apr-2024 11:21

## 2024-09-17 ENCOUNTER — APPOINTMENT (OUTPATIENT)
Dept: CARDIOLOGY | Facility: CLINIC | Age: 64
End: 2024-09-17

## 2024-09-23 ENCOUNTER — APPOINTMENT (OUTPATIENT)
Dept: ORTHOPEDIC SURGERY | Facility: CLINIC | Age: 64
End: 2024-09-23
Payer: COMMERCIAL

## 2024-09-23 VITALS — BODY MASS INDEX: 30.22 KG/M2 | WEIGHT: 188 LBS | HEIGHT: 66 IN

## 2024-09-23 DIAGNOSIS — M17.11 UNILATERAL PRIMARY OSTEOARTHRITIS, RIGHT KNEE: ICD-10-CM

## 2024-09-23 DIAGNOSIS — M17.12 UNILATERAL PRIMARY OSTEOARTHRITIS, LEFT KNEE: ICD-10-CM

## 2024-09-23 PROCEDURE — 20610 DRAIN/INJ JOINT/BURSA W/O US: CPT | Mod: LT

## 2024-09-23 PROCEDURE — 73564 X-RAY EXAM KNEE 4 OR MORE: CPT | Mod: LT

## 2024-09-23 PROCEDURE — 99204 OFFICE O/P NEW MOD 45 MIN: CPT | Mod: 25

## 2024-09-24 ENCOUNTER — RX RENEWAL (OUTPATIENT)
Age: 64
End: 2024-09-24

## 2024-09-24 NOTE — PATIENT PROFILE ADULT - DO YOU FEEL THREATENED BY OTHERS?
Quality 431: Preventive Care And Screening: Unhealthy Alcohol Use - Screening: Patient not identified as an unhealthy alcohol user when screened for unhealthy alcohol use using a systematic screening method Detail Level: Detailed Quality 226: Preventive Care And Screening: Tobacco Use: Screening And Cessation Intervention: Patient screened for tobacco use and is an ex/non-smoker no

## 2024-09-30 NOTE — ED ADULT NURSE NOTE - TEMPLATE LIST FOR HEAD TO TOE ASSESSMENT
Home or Post Acute Services None   Expected Discharge Disposition Home   Does the patient need discharge transport arranged? Yes   RoundTrip coordination needed? Yes   Has discharge transport been arranged? No   Financial Resource Strain   How hard is it for you to pay for the very basics like food, housing, medical care, and heating? Not very   Housing Stability   In the last 12 months, was there a time when you were not able to pay the mortgage or rent on time? N   At any time in the past 12 months, were you homeless or living in a shelter (including now)? N   Transportation Needs   In the past 12 months, has lack of transportation kept you from medical appointments or from getting medications? no   In the past 12 months, has lack of transportation kept you from meetings, work, or from getting things needed for daily living? No   Patient Choice   Provider Choice list and CMS website (https://medicare.gov/care-compare#search) for post-acute Quality and Resource Measure Data were provided and reviewed with: Patient     I met with this patient at her bedside, she is alertx3 at her baseline she is independent with her ADL's lives at home with her BF, she will not need a ride home, she does use a rolator at times, she does not use home 02, but currently is oxygen now, she stated she does do PD at home, ID and Nephro consults placed , on IV Rocephin, I will continue to monitor for discharge planning. ADOD 48-72hrs.   Orthopedic

## 2024-10-07 ENCOUNTER — RX RENEWAL (OUTPATIENT)
Age: 64
End: 2024-10-07

## 2024-10-11 ENCOUNTER — APPOINTMENT (OUTPATIENT)
Dept: FAMILY MEDICINE | Facility: CLINIC | Age: 64
End: 2024-10-11

## 2024-10-11 ENCOUNTER — LABORATORY RESULT (OUTPATIENT)
Age: 64
End: 2024-10-11

## 2024-10-11 VITALS
HEART RATE: 59 BPM | TEMPERATURE: 97.2 F | WEIGHT: 189 LBS | BODY MASS INDEX: 30.37 KG/M2 | RESPIRATION RATE: 17 BRPM | DIASTOLIC BLOOD PRESSURE: 75 MMHG | OXYGEN SATURATION: 97 % | SYSTOLIC BLOOD PRESSURE: 135 MMHG | HEIGHT: 66 IN

## 2024-10-11 DIAGNOSIS — E66.9 OBESITY, UNSPECIFIED: ICD-10-CM

## 2024-10-11 DIAGNOSIS — I48.91 UNSPECIFIED ATRIAL FIBRILLATION: ICD-10-CM

## 2024-10-11 DIAGNOSIS — I10 ESSENTIAL (PRIMARY) HYPERTENSION: ICD-10-CM

## 2024-10-11 DIAGNOSIS — I50.22 CHRONIC SYSTOLIC (CONGESTIVE) HEART FAILURE: ICD-10-CM

## 2024-10-11 DIAGNOSIS — E03.9 HYPOTHYROIDISM, UNSPECIFIED: ICD-10-CM

## 2024-10-11 DIAGNOSIS — I42.8 OTHER CARDIOMYOPATHIES: ICD-10-CM

## 2024-10-11 PROCEDURE — 36415 COLL VENOUS BLD VENIPUNCTURE: CPT

## 2024-10-11 PROCEDURE — 99214 OFFICE O/P EST MOD 30 MIN: CPT | Mod: 25

## 2024-10-11 PROCEDURE — G0008: CPT

## 2024-10-11 PROCEDURE — 90656 IIV3 VACC NO PRSV 0.5 ML IM: CPT

## 2024-10-14 ENCOUNTER — APPOINTMENT (OUTPATIENT)
Dept: FAMILY MEDICINE | Facility: CLINIC | Age: 64
End: 2024-10-14

## 2024-10-15 LAB
ALBUMIN SERPL ELPH-MCNC: 4.4 G/DL
ALP BLD-CCNC: 89 U/L
ALT SERPL-CCNC: 16 U/L
ANION GAP SERPL CALC-SCNC: 22 MMOL/L
AST SERPL-CCNC: 22 U/L
BASOPHILS # BLD AUTO: 0.1 K/UL
BASOPHILS NFR BLD AUTO: 1.2 %
BILIRUB SERPL-MCNC: 0.6 MG/DL
BUN SERPL-MCNC: 25 MG/DL
CALCIUM SERPL-MCNC: 9.2 MG/DL
CHLORIDE SERPL-SCNC: 103 MMOL/L
CO2 SERPL-SCNC: 22 MMOL/L
CREAT SERPL-MCNC: 1.4 MG/DL
EGFR: 56 ML/MIN/1.73M2
EOSINOPHIL # BLD AUTO: 0.5 K/UL
EOSINOPHIL NFR BLD AUTO: 6 %
FERRITIN SERPL-MCNC: 327 NG/ML
GLUCOSE SERPL-MCNC: 105 MG/DL
HCT VFR BLD CALC: 36.1 %
HGB BLD-MCNC: 11.3 G/DL
IMM GRANULOCYTES NFR BLD AUTO: 0.5 %
IRON SATN MFR SERPL: 19 %
IRON SERPL-MCNC: 67 UG/DL
LYMPHOCYTES # BLD AUTO: 1.1 K/UL
LYMPHOCYTES NFR BLD AUTO: 13.3 %
MAGNESIUM SERPL-MCNC: 1.9 MG/DL
MAN DIFF?: NORMAL
MCHC RBC-ENTMCNC: 20.2 PG
MCHC RBC-ENTMCNC: 31.3 GM/DL
MCV RBC AUTO: 64.6 FL
MONOCYTES # BLD AUTO: 0.63 K/UL
MONOCYTES NFR BLD AUTO: 7.6 %
NEUTROPHILS # BLD AUTO: 5.9 K/UL
NEUTROPHILS NFR BLD AUTO: 71.4 %
PLATELET # BLD AUTO: 184 K/UL
POTASSIUM SERPL-SCNC: 4 MMOL/L
PROT SERPL-MCNC: 7.1 G/DL
RBC # BLD: 5.59 M/UL
RBC # BLD: 5.59 M/UL
RBC # FLD: 20.7 %
RETICS # AUTO: 1.6 %
RETICS AGGREG/RBC NFR: 89 K/UL
SODIUM SERPL-SCNC: 147 MMOL/L
T3FREE SERPL-MCNC: 3.33 PG/ML
TIBC SERPL-MCNC: 343 UG/DL
TSH SERPL-ACNC: 1.52 UIU/ML
UIBC SERPL-MCNC: 276 UG/DL
URATE SERPL-MCNC: 7.5 MG/DL
WBC # FLD AUTO: 8.27 K/UL

## 2024-11-05 ENCOUNTER — RX RENEWAL (OUTPATIENT)
Age: 64
End: 2024-11-05

## 2024-11-13 ENCOUNTER — APPOINTMENT (OUTPATIENT)
Dept: HEART FAILURE | Facility: CLINIC | Age: 64
End: 2024-11-13
Payer: COMMERCIAL

## 2024-11-13 ENCOUNTER — NON-APPOINTMENT (OUTPATIENT)
Age: 64
End: 2024-11-13

## 2024-11-13 VITALS
OXYGEN SATURATION: 95 % | HEART RATE: 55 BPM | SYSTOLIC BLOOD PRESSURE: 150 MMHG | DIASTOLIC BLOOD PRESSURE: 68 MMHG | WEIGHT: 191 LBS | HEIGHT: 66 IN | BODY MASS INDEX: 30.7 KG/M2

## 2024-11-13 PROCEDURE — 99214 OFFICE O/P EST MOD 30 MIN: CPT

## 2024-11-13 PROCEDURE — G2211 COMPLEX E/M VISIT ADD ON: CPT | Mod: NC

## 2024-11-13 PROCEDURE — 93000 ELECTROCARDIOGRAM COMPLETE: CPT

## 2024-11-13 RX ORDER — DAPAGLIFLOZIN 10 MG/1
10 TABLET, FILM COATED ORAL
Qty: 30 | Refills: 2 | Status: ACTIVE | COMMUNITY
Start: 2024-11-13 | End: 1900-01-01

## 2024-11-18 ENCOUNTER — APPOINTMENT (OUTPATIENT)
Dept: FAMILY MEDICINE | Facility: CLINIC | Age: 64
End: 2024-11-18
Payer: COMMERCIAL

## 2024-11-18 VITALS
TEMPERATURE: 97.2 F | SYSTOLIC BLOOD PRESSURE: 130 MMHG | BODY MASS INDEX: 30.53 KG/M2 | OXYGEN SATURATION: 97 % | RESPIRATION RATE: 16 BRPM | HEART RATE: 92 BPM | HEIGHT: 66 IN | DIASTOLIC BLOOD PRESSURE: 70 MMHG | WEIGHT: 190 LBS

## 2024-11-18 DIAGNOSIS — K21.9 GASTRO-ESOPHAGEAL REFLUX DISEASE W/OUT ESOPHAGITIS: ICD-10-CM

## 2024-11-18 DIAGNOSIS — I48.91 UNSPECIFIED ATRIAL FIBRILLATION: ICD-10-CM

## 2024-11-18 DIAGNOSIS — E78.5 HYPERLIPIDEMIA, UNSPECIFIED: ICD-10-CM

## 2024-11-18 DIAGNOSIS — E03.9 HYPOTHYROIDISM, UNSPECIFIED: ICD-10-CM

## 2024-11-18 DIAGNOSIS — R05.8 OTHER SPECIFIED COUGH: ICD-10-CM

## 2024-11-18 DIAGNOSIS — E55.9 VITAMIN D DEFICIENCY, UNSPECIFIED: ICD-10-CM

## 2024-11-18 DIAGNOSIS — I10 ESSENTIAL (PRIMARY) HYPERTENSION: ICD-10-CM

## 2024-11-18 PROCEDURE — 99214 OFFICE O/P EST MOD 30 MIN: CPT

## 2024-11-18 RX ORDER — BENZONATATE 100 MG/1
100 CAPSULE ORAL 3 TIMES DAILY
Qty: 30 | Refills: 1 | Status: ACTIVE | COMMUNITY
Start: 2024-11-18 | End: 1900-01-01

## 2024-11-22 ENCOUNTER — APPOINTMENT (OUTPATIENT)
Dept: CARDIOLOGY | Facility: CLINIC | Age: 64
End: 2024-11-22
Payer: COMMERCIAL

## 2024-11-22 VITALS
DIASTOLIC BLOOD PRESSURE: 66 MMHG | HEIGHT: 66 IN | HEART RATE: 79 BPM | WEIGHT: 185 LBS | OXYGEN SATURATION: 96 % | BODY MASS INDEX: 29.73 KG/M2 | SYSTOLIC BLOOD PRESSURE: 118 MMHG

## 2024-11-22 DIAGNOSIS — I35.0 NONRHEUMATIC AORTIC (VALVE) STENOSIS: ICD-10-CM

## 2024-11-22 PROCEDURE — 99214 OFFICE O/P EST MOD 30 MIN: CPT | Mod: 25

## 2024-11-22 PROCEDURE — 93000 ELECTROCARDIOGRAM COMPLETE: CPT

## 2024-11-26 ENCOUNTER — APPOINTMENT (OUTPATIENT)
Dept: CARDIOLOGY | Facility: CLINIC | Age: 64
End: 2024-11-26
Payer: COMMERCIAL

## 2024-11-26 VITALS
WEIGHT: 189 LBS | HEART RATE: 80 BPM | HEIGHT: 66 IN | OXYGEN SATURATION: 98 % | SYSTOLIC BLOOD PRESSURE: 119 MMHG | DIASTOLIC BLOOD PRESSURE: 74 MMHG | BODY MASS INDEX: 30.37 KG/M2

## 2024-11-26 VITALS — DIASTOLIC BLOOD PRESSURE: 72 MMHG | SYSTOLIC BLOOD PRESSURE: 117 MMHG

## 2024-11-26 DIAGNOSIS — I31.39 OTHER PERICARDIAL EFFUSION (NONINFLAMMATORY): ICD-10-CM

## 2024-11-26 DIAGNOSIS — I48.0 PAROXYSMAL ATRIAL FIBRILLATION: ICD-10-CM

## 2024-11-26 DIAGNOSIS — I42.8 OTHER CARDIOMYOPATHIES: ICD-10-CM

## 2024-11-26 PROCEDURE — G2211 COMPLEX E/M VISIT ADD ON: CPT | Mod: NC

## 2024-11-26 PROCEDURE — 99214 OFFICE O/P EST MOD 30 MIN: CPT

## 2024-11-26 PROCEDURE — 93000 ELECTROCARDIOGRAM COMPLETE: CPT

## 2024-11-27 ENCOUNTER — NON-APPOINTMENT (OUTPATIENT)
Age: 64
End: 2024-11-27

## 2024-12-09 ENCOUNTER — APPOINTMENT (OUTPATIENT)
Dept: FAMILY MEDICINE | Facility: CLINIC | Age: 64
End: 2024-12-09
Payer: COMMERCIAL

## 2024-12-09 ENCOUNTER — RX RENEWAL (OUTPATIENT)
Age: 64
End: 2024-12-09

## 2024-12-09 ENCOUNTER — LABORATORY RESULT (OUTPATIENT)
Age: 64
End: 2024-12-09

## 2024-12-09 VITALS
DIASTOLIC BLOOD PRESSURE: 82 MMHG | RESPIRATION RATE: 16 BRPM | TEMPERATURE: 97.4 F | WEIGHT: 187 LBS | BODY MASS INDEX: 30.05 KG/M2 | OXYGEN SATURATION: 95 % | SYSTOLIC BLOOD PRESSURE: 132 MMHG | HEIGHT: 66 IN | HEART RATE: 77 BPM

## 2024-12-09 DIAGNOSIS — I48.91 UNSPECIFIED ATRIAL FIBRILLATION: ICD-10-CM

## 2024-12-09 DIAGNOSIS — Z95.2 PRESENCE OF PROSTHETIC HEART VALVE: ICD-10-CM

## 2024-12-09 DIAGNOSIS — D64.9 ANEMIA, UNSPECIFIED: ICD-10-CM

## 2024-12-09 DIAGNOSIS — N40.1 BENIGN PROSTATIC HYPERPLASIA WITH LOWER URINARY TRACT SYMPMS: ICD-10-CM

## 2024-12-09 DIAGNOSIS — I10 ESSENTIAL (PRIMARY) HYPERTENSION: ICD-10-CM

## 2024-12-09 DIAGNOSIS — I31.39 OTHER PERICARDIAL EFFUSION (NONINFLAMMATORY): ICD-10-CM

## 2024-12-09 DIAGNOSIS — E03.9 HYPOTHYROIDISM, UNSPECIFIED: ICD-10-CM

## 2024-12-09 DIAGNOSIS — R05.9 COUGH, UNSPECIFIED: ICD-10-CM

## 2024-12-09 DIAGNOSIS — I48.0 PAROXYSMAL ATRIAL FIBRILLATION: ICD-10-CM

## 2024-12-09 DIAGNOSIS — I42.8 OTHER CARDIOMYOPATHIES: ICD-10-CM

## 2024-12-09 LAB
25(OH)D3 SERPL-MCNC: 21.4 NG/ML
PSA SERPL-MCNC: 2.35 NG/ML
T3FREE SERPL-MCNC: 3.39 PG/ML
T4 FREE SERPL-MCNC: 1.5 NG/DL
TSH SERPL-ACNC: 0.79 UIU/ML
URATE SERPL-MCNC: 6.1 MG/DL

## 2024-12-09 PROCEDURE — 99215 OFFICE O/P EST HI 40 MIN: CPT

## 2024-12-09 PROCEDURE — G2211 COMPLEX E/M VISIT ADD ON: CPT | Mod: NC

## 2024-12-10 LAB
ALBUMIN SERPL ELPH-MCNC: 4.4 G/DL
ALP BLD-CCNC: 89 U/L
ALT SERPL-CCNC: 16 U/L
ANION GAP SERPL CALC-SCNC: 15 MMOL/L
APPEARANCE: CLEAR
AST SERPL-CCNC: 22 U/L
BACTERIA: NEGATIVE /HPF
BASOPHILS # BLD AUTO: 0.62 K/UL
BASOPHILS NFR BLD AUTO: 6.8 %
BILIRUB SERPL-MCNC: 0.5 MG/DL
BILIRUBIN URINE: NEGATIVE
BLOOD URINE: NEGATIVE
BUN SERPL-MCNC: 23 MG/DL
CALCIUM SERPL-MCNC: 9.1 MG/DL
CHLORIDE SERPL-SCNC: 105 MMOL/L
CHOLEST SERPL-MCNC: 192 MG/DL
CO2 SERPL-SCNC: 23 MMOL/L
COLOR: YELLOW
CREAT SERPL-MCNC: 1.6 MG/DL
EGFR: 48 ML/MIN/1.73M2
EOSINOPHIL # BLD AUTO: 0.55 K/UL
EOSINOPHIL NFR BLD AUTO: 6 %
ESTIMATED AVERAGE GLUCOSE: 114 MG/DL
GLUCOSE QUALITATIVE U: >=1000 MG/DL
GLUCOSE SERPL-MCNC: 101 MG/DL
HBA1C MFR BLD HPLC: 5.6 %
HCT VFR BLD CALC: 35.3 %
HDLC SERPL-MCNC: 52 MG/DL
HGB BLD-MCNC: 11.1 G/DL
KETONES URINE: NEGATIVE MG/DL
LDLC SERPL CALC-MCNC: 117 MG/DL
LEUKOCYTE ESTERASE URINE: NEGATIVE
LYMPHOCYTES # BLD AUTO: 0.86 K/UL
LYMPHOCYTES NFR BLD AUTO: 9.4 %
MAGNESIUM SERPL-MCNC: 2.1 MG/DL
MAN DIFF?: NORMAL
MCHC RBC-ENTMCNC: 20.4 PG
MCHC RBC-ENTMCNC: 31.4 G/DL
MCV RBC AUTO: 64.9 FL
MICROSCOPIC-UA: NORMAL
MONOCYTES # BLD AUTO: 0.47 K/UL
MONOCYTES NFR BLD AUTO: 5.1 %
NEUTROPHILS # BLD AUTO: 6.5 K/UL
NEUTROPHILS NFR BLD AUTO: 71 %
NITRITE URINE: NEGATIVE
NONHDLC SERPL-MCNC: 139 MG/DL
PH URINE: 6.5
PLATELET # BLD AUTO: 220 K/UL
POTASSIUM SERPL-SCNC: 4 MMOL/L
PROT SERPL-MCNC: 7 G/DL
PROTEIN URINE: NEGATIVE MG/DL
RBC # BLD: 5.44 M/UL
RBC # FLD: 19.9 %
RED BLOOD CELLS URINE: 1 /HPF
SODIUM SERPL-SCNC: 143 MMOL/L
SPECIFIC GRAVITY URINE: 1.01
TRIGL SERPL-MCNC: 126 MG/DL
UROBILINOGEN URINE: 0.2 MG/DL
WBC # FLD AUTO: 9.16 K/UL
WHITE BLOOD CELLS URINE: 5 /HPF

## 2024-12-17 ENCOUNTER — RX RENEWAL (OUTPATIENT)
Age: 64
End: 2024-12-17

## 2024-12-18 RX ORDER — ROSUVASTATIN CALCIUM 10 MG/1
10 TABLET, FILM COATED ORAL
Qty: 90 | Refills: 3 | Status: ACTIVE | COMMUNITY
Start: 2024-12-18 | End: 1900-01-01

## 2024-12-28 NOTE — PROGRESS NOTE ADULT - REASON FOR ADMISSION
32w1d presented for elevated blood pressures at home, states at home hr blood pressures have ranged from 117/62- 144-91. Denies HA, visual changes, sates she is nauseous and just overall does not feel well. Denies leaking of fluid, vaginal bleeding. Perceives positive fetal movement.   
chills and low grade fever

## 2025-01-08 ENCOUNTER — RX RENEWAL (OUTPATIENT)
Age: 65
End: 2025-01-08

## 2025-01-13 ENCOUNTER — TRANSCRIPTION ENCOUNTER (OUTPATIENT)
Age: 65
End: 2025-01-13

## 2025-01-14 ENCOUNTER — APPOINTMENT (OUTPATIENT)
Dept: FAMILY MEDICINE | Facility: CLINIC | Age: 65
End: 2025-01-14
Payer: COMMERCIAL

## 2025-01-14 VITALS
BODY MASS INDEX: 30.53 KG/M2 | HEIGHT: 66 IN | WEIGHT: 190 LBS | HEART RATE: 55 BPM | DIASTOLIC BLOOD PRESSURE: 78 MMHG | OXYGEN SATURATION: 93 % | RESPIRATION RATE: 17 BRPM | TEMPERATURE: 98.2 F | SYSTOLIC BLOOD PRESSURE: 120 MMHG

## 2025-01-14 DIAGNOSIS — J20.9 ACUTE BRONCHITIS, UNSPECIFIED: ICD-10-CM

## 2025-01-14 DIAGNOSIS — K21.9 GASTRO-ESOPHAGEAL REFLUX DISEASE W/OUT ESOPHAGITIS: ICD-10-CM

## 2025-01-14 DIAGNOSIS — I48.91 UNSPECIFIED ATRIAL FIBRILLATION: ICD-10-CM

## 2025-01-14 DIAGNOSIS — E66.9 OBESITY, UNSPECIFIED: ICD-10-CM

## 2025-01-14 DIAGNOSIS — R05.3 CHRONIC COUGH: ICD-10-CM

## 2025-01-14 PROCEDURE — 99214 OFFICE O/P EST MOD 30 MIN: CPT

## 2025-01-15 LAB
INFLUENZA A RESULT: NOT DETECTED
INFLUENZA B RESULT: NOT DETECTED
RESP SYN VIRUS RESULT: NOT DETECTED
SARS-COV-2 RESULT: NOT DETECTED

## 2025-01-21 RX ORDER — BENZONATATE 100 MG/1
100 CAPSULE ORAL 3 TIMES DAILY
Qty: 30 | Refills: 1 | Status: ACTIVE | COMMUNITY
Start: 2025-01-21 | End: 1900-01-01

## 2025-01-21 RX ORDER — PROMETHAZINE HYDROCHLORIDE AND DEXTROMETHORPHAN HYDROBROMIDE ORAL SOLUTION 15; 6.25 MG/5ML; MG/5ML
6.25-15 SOLUTION ORAL
Qty: 1 | Refills: 1 | Status: DISCONTINUED | COMMUNITY
Start: 2025-01-14 | End: 2025-01-21

## 2025-01-27 ENCOUNTER — APPOINTMENT (OUTPATIENT)
Dept: HEART FAILURE | Facility: CLINIC | Age: 65
End: 2025-01-27

## 2025-01-27 ENCOUNTER — APPOINTMENT (OUTPATIENT)
Dept: FAMILY MEDICINE | Facility: CLINIC | Age: 65
End: 2025-01-27

## 2025-01-28 ENCOUNTER — APPOINTMENT (OUTPATIENT)
Dept: FAMILY MEDICINE | Facility: CLINIC | Age: 65
End: 2025-01-28
Payer: COMMERCIAL

## 2025-01-28 ENCOUNTER — LABORATORY RESULT (OUTPATIENT)
Age: 65
End: 2025-01-28

## 2025-01-28 VITALS
HEART RATE: 107 BPM | RESPIRATION RATE: 19 BRPM | TEMPERATURE: 97.4 F | WEIGHT: 189 LBS | SYSTOLIC BLOOD PRESSURE: 130 MMHG | BODY MASS INDEX: 30.37 KG/M2 | DIASTOLIC BLOOD PRESSURE: 64 MMHG | HEIGHT: 66 IN | OXYGEN SATURATION: 95 %

## 2025-01-28 VITALS — HEART RATE: 81 BPM

## 2025-01-28 DIAGNOSIS — R05.9 COUGH, UNSPECIFIED: ICD-10-CM

## 2025-01-28 DIAGNOSIS — T16.9XXA FOREIGN BODY IN EAR, UNSPECIFIED EAR, INITIAL ENCOUNTER: ICD-10-CM

## 2025-01-28 DIAGNOSIS — I48.91 UNSPECIFIED ATRIAL FIBRILLATION: ICD-10-CM

## 2025-01-28 PROCEDURE — 99215 OFFICE O/P EST HI 40 MIN: CPT

## 2025-01-28 PROCEDURE — 36415 COLL VENOUS BLD VENIPUNCTURE: CPT

## 2025-01-28 PROCEDURE — G2211 COMPLEX E/M VISIT ADD ON: CPT | Mod: NC

## 2025-01-28 RX ORDER — AMOXICILLIN AND CLAVULANATE POTASSIUM 875; 125 MG/1; MG/1
875-125 TABLET, COATED ORAL
Qty: 14 | Refills: 0 | Status: ACTIVE | COMMUNITY
Start: 2025-01-28 | End: 1900-01-01

## 2025-01-29 LAB
ALBUMIN SERPL ELPH-MCNC: 4.1 G/DL
ALP BLD-CCNC: 93 U/L
ALT SERPL-CCNC: 18 U/L
ANION GAP SERPL CALC-SCNC: 14 MMOL/L
AST SERPL-CCNC: 23 U/L
BASOPHILS # BLD AUTO: 0.1 K/UL
BASOPHILS NFR BLD AUTO: 1 %
BILIRUB SERPL-MCNC: 0.4 MG/DL
BUN SERPL-MCNC: 22 MG/DL
CALCIUM SERPL-MCNC: 9.3 MG/DL
CHLORIDE SERPL-SCNC: 106 MMOL/L
CHOLEST SERPL-MCNC: 116 MG/DL
CO2 SERPL-SCNC: 23 MMOL/L
CREAT SERPL-MCNC: 1.49 MG/DL
EGFR: 52 ML/MIN/1.73M2
EOSINOPHIL # BLD AUTO: 0.5 K/UL
EOSINOPHIL NFR BLD AUTO: 4.8 %
ESTIMATED AVERAGE GLUCOSE: 128 MG/DL
FERRITIN SERPL-MCNC: 287 NG/ML
FOLATE SERPL-MCNC: 13.1 NG/ML
GLUCOSE SERPL-MCNC: 176 MG/DL
HBA1C MFR BLD HPLC: 6.1 %
HCT VFR BLD CALC: 33.4 %
HDLC SERPL-MCNC: 52 MG/DL
HGB BLD-MCNC: 10.7 G/DL
IMM GRANULOCYTES NFR BLD AUTO: 0.5 %
IRON SATN MFR SERPL: 21 %
IRON SERPL-MCNC: 68 UG/DL
LDLC SERPL CALC-MCNC: 51 MG/DL
LYMPHOCYTES # BLD AUTO: 0.99 K/UL
LYMPHOCYTES NFR BLD AUTO: 9.4 %
MAN DIFF?: NORMAL
MCHC RBC-ENTMCNC: 19.4 PG
MCHC RBC-ENTMCNC: 32 G/DL
MCV RBC AUTO: 60.6 FL
MONOCYTES # BLD AUTO: 0.61 K/UL
MONOCYTES NFR BLD AUTO: 5.8 %
NEUTROPHILS # BLD AUTO: 8.25 K/UL
NEUTROPHILS NFR BLD AUTO: 78.5 %
NONHDLC SERPL-MCNC: 64 MG/DL
PLATELET # BLD AUTO: 246 K/UL
POTASSIUM SERPL-SCNC: 3.3 MMOL/L
PROT SERPL-MCNC: 6.9 G/DL
RBC # BLD: 5.51 M/UL
RBC # FLD: 18.5 %
SODIUM SERPL-SCNC: 143 MMOL/L
TIBC SERPL-MCNC: 316 UG/DL
TRIGL SERPL-MCNC: 60 MG/DL
TSH SERPL-ACNC: 0.61 UIU/ML
UIBC SERPL-MCNC: 249 UG/DL
VIT B12 SERPL-MCNC: 476 PG/ML
WBC # FLD AUTO: 10.5 K/UL

## 2025-01-29 NOTE — DIETITIAN INITIAL EVALUATION ADULT - REASON INDICATOR FOR ASSESSMENT
PAST MEDICAL HISTORY:  CVA (cerebrovascular accident)     DM (diabetes mellitus)     HLD (hyperlipidemia)     Mild HTN      RD Screen - CHF

## 2025-01-30 ENCOUNTER — APPOINTMENT (OUTPATIENT)
Dept: CARDIOLOGY | Facility: CLINIC | Age: 65
End: 2025-01-30
Payer: COMMERCIAL

## 2025-01-30 VITALS
BODY MASS INDEX: 30.37 KG/M2 | RESPIRATION RATE: 16 BRPM | WEIGHT: 189 LBS | HEART RATE: 66 BPM | SYSTOLIC BLOOD PRESSURE: 163 MMHG | HEIGHT: 66 IN | DIASTOLIC BLOOD PRESSURE: 71 MMHG | OXYGEN SATURATION: 93 %

## 2025-01-30 PROCEDURE — 93000 ELECTROCARDIOGRAM COMPLETE: CPT

## 2025-01-30 PROCEDURE — G2211 COMPLEX E/M VISIT ADD ON: CPT | Mod: NC

## 2025-01-30 PROCEDURE — 99214 OFFICE O/P EST MOD 30 MIN: CPT

## 2025-01-31 LAB
B PERT IGG SER-ACNC: 1.73 INDEX
B PERT IGM SER-ACNC: <1 INDEX

## 2025-02-03 ENCOUNTER — RX RENEWAL (OUTPATIENT)
Age: 65
End: 2025-02-03

## 2025-02-05 ENCOUNTER — RX RENEWAL (OUTPATIENT)
Age: 65
End: 2025-02-05

## 2025-02-10 NOTE — ED ADULT NURSE NOTE - NSICDXPASTMEDICALHX_GEN_ALL_CORE_FT
Radiology Discharge Summary      Hospital Course: No complications    Admit Date: 2/10/2025  Discharge Date: 02/10/2025     Instructions Given to Patient: Yes  Diet: Resume prior diet  Activity: activity as tolerated    Description of Condition on Discharge: Stable  Vital Signs (Most Recent): Temp: 98.8 °F (37.1 °C) (02/10/25 0953)  Pulse: 76 (02/10/25 0953)  Resp: 18 (02/10/25 0953)  BP: (!) 138/92 (02/10/25 0953)  SpO2: 100 % (02/10/25 0953)    Discharge Disposition: Home    Discharge Diagnosis: Sickle cell s/p port placement     Follow-up: SERGEY Rodriguez MD  Interventional Radiologist  Department of Radiology    PAST MEDICAL HISTORY:  Chronic GERD     Dyslipidemia     Hypertension

## 2025-02-14 ENCOUNTER — APPOINTMENT (OUTPATIENT)
Dept: FAMILY MEDICINE | Facility: CLINIC | Age: 65
End: 2025-02-14

## 2025-02-14 VITALS
BODY MASS INDEX: 31.02 KG/M2 | DIASTOLIC BLOOD PRESSURE: 76 MMHG | OXYGEN SATURATION: 98 % | RESPIRATION RATE: 16 BRPM | HEIGHT: 66 IN | SYSTOLIC BLOOD PRESSURE: 139 MMHG | HEART RATE: 77 BPM | TEMPERATURE: 98 F | WEIGHT: 193 LBS

## 2025-02-14 PROCEDURE — G2211 COMPLEX E/M VISIT ADD ON: CPT | Mod: NC

## 2025-02-14 PROCEDURE — 99213 OFFICE O/P EST LOW 20 MIN: CPT

## 2025-02-14 RX ORDER — AZITHROMYCIN 250 MG/1
250 TABLET, FILM COATED ORAL
Qty: 1 | Refills: 0 | Status: ACTIVE | COMMUNITY
Start: 2025-02-14 | End: 1900-01-01

## 2025-02-14 RX ORDER — AZELASTINE HYDROCHLORIDE AND FLUTICASONE PROPIONATE 137; 50 UG/1; UG/1
137-50 SPRAY, METERED NASAL
Qty: 1 | Refills: 0 | Status: ACTIVE | COMMUNITY
Start: 2025-02-14 | End: 1900-01-01

## 2025-02-23 ENCOUNTER — EMERGENCY (EMERGENCY)
Facility: HOSPITAL | Age: 65
LOS: 1 days | Discharge: HOME | End: 2025-02-23
Attending: STUDENT IN AN ORGANIZED HEALTH CARE EDUCATION/TRAINING PROGRAM | Admitting: STUDENT IN AN ORGANIZED HEALTH CARE EDUCATION/TRAINING PROGRAM
Payer: COMMERCIAL

## 2025-02-23 VITALS
HEART RATE: 62 BPM | WEIGHT: 182.98 LBS | TEMPERATURE: 98 F | RESPIRATION RATE: 16 BRPM | HEIGHT: 71 IN | OXYGEN SATURATION: 96 % | SYSTOLIC BLOOD PRESSURE: 137 MMHG | DIASTOLIC BLOOD PRESSURE: 84 MMHG

## 2025-02-23 VITALS
RESPIRATION RATE: 17 BRPM | HEART RATE: 61 BPM | SYSTOLIC BLOOD PRESSURE: 138 MMHG | OXYGEN SATURATION: 97 % | DIASTOLIC BLOOD PRESSURE: 74 MMHG

## 2025-02-23 DIAGNOSIS — Z98.890 OTHER SPECIFIED POSTPROCEDURAL STATES: Chronic | ICD-10-CM

## 2025-02-23 DIAGNOSIS — Z95.2 PRESENCE OF PROSTHETIC HEART VALVE: Chronic | ICD-10-CM

## 2025-02-23 LAB
ALBUMIN SERPL ELPH-MCNC: 3.1 G/DL — LOW (ref 3.3–5)
ALP SERPL-CCNC: 103 U/L — SIGNIFICANT CHANGE UP (ref 40–120)
ALT FLD-CCNC: 25 U/L — SIGNIFICANT CHANGE UP (ref 10–45)
ANION GAP SERPL CALC-SCNC: 9 MMOL/L — SIGNIFICANT CHANGE UP (ref 5–17)
AST SERPL-CCNC: 22 U/L — SIGNIFICANT CHANGE UP (ref 10–40)
BASOPHILS # BLD AUTO: 0.12 K/UL — SIGNIFICANT CHANGE UP (ref 0–0.2)
BASOPHILS NFR BLD AUTO: 1 % — SIGNIFICANT CHANGE UP (ref 0–2)
BILIRUB SERPL-MCNC: 0.5 MG/DL — SIGNIFICANT CHANGE UP (ref 0.2–1.2)
BUN SERPL-MCNC: 32 MG/DL — HIGH (ref 7–23)
CALCIUM SERPL-MCNC: 8.6 MG/DL — SIGNIFICANT CHANGE UP (ref 8.4–10.5)
CHLORIDE SERPL-SCNC: 106 MMOL/L — SIGNIFICANT CHANGE UP (ref 96–108)
CO2 SERPL-SCNC: 27 MMOL/L — SIGNIFICANT CHANGE UP (ref 22–31)
CREAT SERPL-MCNC: 1.45 MG/DL — HIGH (ref 0.5–1.3)
EGFR: 54 ML/MIN/1.73M2 — LOW
EOSINOPHIL # BLD AUTO: 1.19 K/UL — HIGH (ref 0–0.5)
EOSINOPHIL NFR BLD AUTO: 10 % — HIGH (ref 0–6)
FLUAV AG NPH QL: SIGNIFICANT CHANGE UP
FLUBV AG NPH QL: SIGNIFICANT CHANGE UP
GLUCOSE SERPL-MCNC: 135 MG/DL — HIGH (ref 70–99)
HCT VFR BLD CALC: 32.2 % — LOW (ref 39–50)
HGB BLD-MCNC: 10.2 G/DL — LOW (ref 13–17)
IMM GRANULOCYTES NFR BLD AUTO: 0.7 % — SIGNIFICANT CHANGE UP (ref 0–0.9)
LACTATE SERPL-SCNC: 0.7 MMOL/L — SIGNIFICANT CHANGE UP (ref 0.7–2)
LYMPHOCYTES # BLD AUTO: 1.39 K/UL — SIGNIFICANT CHANGE UP (ref 1–3.3)
LYMPHOCYTES # BLD AUTO: 11.7 % — LOW (ref 13–44)
MCHC RBC-ENTMCNC: 18.4 PG — LOW (ref 27–34)
MCHC RBC-ENTMCNC: 31.7 G/DL — LOW (ref 32–36)
MCV RBC AUTO: 58.1 FL — LOW (ref 80–100)
MONOCYTES # BLD AUTO: 1.05 K/UL — HIGH (ref 0–0.9)
MONOCYTES NFR BLD AUTO: 8.8 % — SIGNIFICANT CHANGE UP (ref 2–14)
NEUTROPHILS # BLD AUTO: 8.09 K/UL — HIGH (ref 1.8–7.4)
NEUTROPHILS NFR BLD AUTO: 67.8 % — SIGNIFICANT CHANGE UP (ref 43–77)
NRBC BLD AUTO-RTO: 0 /100 WBCS — SIGNIFICANT CHANGE UP (ref 0–0)
NT-PROBNP SERPL-SCNC: 872 PG/ML — HIGH (ref 0–300)
PLATELET # BLD AUTO: 254 K/UL — SIGNIFICANT CHANGE UP (ref 150–400)
POTASSIUM SERPL-MCNC: 3 MMOL/L — LOW (ref 3.5–5.3)
POTASSIUM SERPL-SCNC: 3 MMOL/L — LOW (ref 3.5–5.3)
PROT SERPL-MCNC: 6.9 G/DL — SIGNIFICANT CHANGE UP (ref 6–8.3)
RBC # BLD: 5.54 M/UL — SIGNIFICANT CHANGE UP (ref 4.2–5.8)
RBC # FLD: 17.5 % — HIGH (ref 10.3–14.5)
RSV RNA NPH QL NAA+NON-PROBE: SIGNIFICANT CHANGE UP
SARS-COV-2 RNA SPEC QL NAA+PROBE: SIGNIFICANT CHANGE UP
SODIUM SERPL-SCNC: 142 MMOL/L — SIGNIFICANT CHANGE UP (ref 135–145)
TROPONIN I, HIGH SENSITIVITY RESULT: 25.3 NG/L — SIGNIFICANT CHANGE UP
WBC # BLD: 11.92 K/UL — HIGH (ref 3.8–10.5)
WBC # FLD AUTO: 11.92 K/UL — HIGH (ref 3.8–10.5)

## 2025-02-23 PROCEDURE — 93005 ELECTROCARDIOGRAM TRACING: CPT

## 2025-02-23 PROCEDURE — 80053 COMPREHEN METABOLIC PANEL: CPT

## 2025-02-23 PROCEDURE — 84484 ASSAY OF TROPONIN QUANT: CPT

## 2025-02-23 PROCEDURE — 99285 EMERGENCY DEPT VISIT HI MDM: CPT | Mod: 25

## 2025-02-23 PROCEDURE — 96374 THER/PROPH/DIAG INJ IV PUSH: CPT

## 2025-02-23 PROCEDURE — 36415 COLL VENOUS BLD VENIPUNCTURE: CPT

## 2025-02-23 PROCEDURE — 96375 TX/PRO/DX INJ NEW DRUG ADDON: CPT

## 2025-02-23 PROCEDURE — 99284 EMERGENCY DEPT VISIT MOD MDM: CPT

## 2025-02-23 PROCEDURE — 85025 COMPLETE CBC W/AUTO DIFF WBC: CPT

## 2025-02-23 PROCEDURE — 83605 ASSAY OF LACTIC ACID: CPT

## 2025-02-23 PROCEDURE — 94640 AIRWAY INHALATION TREATMENT: CPT

## 2025-02-23 PROCEDURE — 71250 CT THORAX DX C-: CPT | Mod: MC

## 2025-02-23 PROCEDURE — 93010 ELECTROCARDIOGRAM REPORT: CPT

## 2025-02-23 PROCEDURE — 83880 ASSAY OF NATRIURETIC PEPTIDE: CPT

## 2025-02-23 PROCEDURE — 87637 SARSCOV2&INF A&B&RSV AMP PRB: CPT

## 2025-02-23 PROCEDURE — 71250 CT THORAX DX C-: CPT | Mod: 26

## 2025-02-23 RX ORDER — PREDNISONE 5 MG/1
2 TABLET ORAL
Qty: 8 | Refills: 0
Start: 2025-02-23 | End: 2025-02-26

## 2025-02-23 RX ORDER — METHYLPREDNISOLONE ACETATE 40 MG/ML
125 VIAL (ML) INJECTION ONCE
Refills: 0 | Status: COMPLETED | OUTPATIENT
Start: 2025-02-23 | End: 2025-02-23

## 2025-02-23 RX ORDER — IPRATROPIUM BROMIDE AND ALBUTEROL SULFATE .5; 2.5 MG/3ML; MG/3ML
3 SOLUTION RESPIRATORY (INHALATION)
Refills: 0 | Status: COMPLETED | OUTPATIENT
Start: 2025-02-23 | End: 2025-02-23

## 2025-02-23 RX ORDER — ONDANSETRON 4 MG/1
4 TABLET, ORALLY DISINTEGRATING ORAL ONCE
Refills: 0 | Status: COMPLETED | OUTPATIENT
Start: 2025-02-23 | End: 2025-02-23

## 2025-02-23 RX ORDER — ALBUTEROL 90 MCG
2 AEROSOL REFILL (GRAM) INHALATION
Qty: 1 | Refills: 0
Start: 2025-02-23 | End: 2025-03-01

## 2025-02-23 RX ORDER — POTASSIUM CHLORIDE 750 MG/1
40 TABLET, EXTENDED RELEASE ORAL ONCE
Refills: 0 | Status: COMPLETED | OUTPATIENT
Start: 2025-02-23 | End: 2025-02-23

## 2025-02-23 RX ADMIN — IPRATROPIUM BROMIDE AND ALBUTEROL SULFATE 3 MILLILITER(S): .5; 2.5 SOLUTION RESPIRATORY (INHALATION) at 03:25

## 2025-02-23 RX ADMIN — ONDANSETRON 4 MILLIGRAM(S): 4 TABLET, ORALLY DISINTEGRATING ORAL at 02:10

## 2025-02-23 RX ADMIN — IPRATROPIUM BROMIDE AND ALBUTEROL SULFATE 3 MILLILITER(S): .5; 2.5 SOLUTION RESPIRATORY (INHALATION) at 03:48

## 2025-02-23 RX ADMIN — Medication 125 MILLIGRAM(S): at 02:56

## 2025-02-23 RX ADMIN — POTASSIUM CHLORIDE 40 MILLIEQUIVALENT(S): 750 TABLET, EXTENDED RELEASE ORAL at 04:36

## 2025-02-23 RX ADMIN — IPRATROPIUM BROMIDE AND ALBUTEROL SULFATE 3 MILLILITER(S): .5; 2.5 SOLUTION RESPIRATORY (INHALATION) at 02:56

## 2025-02-23 NOTE — ED ADULT NURSE NOTE - OBJECTIVE STATEMENT
Patient BIBAS for nausea vomiting and cough, Patient A&Ox4 RA no signs or symptoms of cardiac or respiratory distress @this time, patient states he has had this cough for the last few days and cannot sleep or rest because of the cough, patient states recent hospitalization @ [facility] for heart surgery, safety measures maintained, call bell within reach, nursing care continued

## 2025-02-23 NOTE — ED ADULT NURSE NOTE - NSFALLUNIVINTERV_ED_ALL_ED
Bed/Stretcher in lowest position, wheels locked, appropriate side rails in place/Call bell, personal items and telephone in reach/Instruct patient to call for assistance before getting out of bed/chair/stretcher/Non-slip footwear applied when patient is off stretcher/[place] to call system/Physically safe environment - no spills, clutter or unnecessary equipment/Purposeful proactive rounding/Room/bathroom lighting operational, light cord in reach

## 2025-02-23 NOTE — ED PROVIDER NOTE - OBJECTIVE STATEMENT
65 yo m ho HTN, GERD, HPL, chronic venous stasis dermatitis with bilateral leg swelling, CHF (EF 35-40% 1/2024) 2/2 severe AS, AVR complicated requiring multiple transfusions, afib, and pericardial effusion requiring VATS presents with cough, sob x weeks. Pt admits for 2 mos has been having a progressively worsening cough, exertional sob. 2 weeks ago saw PCP and completed Z-Piter. Today began having a coughing fit causing post-tussive emesis. Denies active cp, sob. Denies orthopenea, leg swelling, fevers, chills, nausea, vomiting, diarrhea

## 2025-02-23 NOTE — ED PROVIDER NOTE - PATIENT PORTAL LINK FT
You can access the FollowMyHealth Patient Portal offered by [facility] by registering at the following website: [email or web address]. By joining [employer]’s FollowMyHealth portal, you will also be able to view your health information using other applications (apps) compatible with our system.

## 2025-02-23 NOTE — ED ADULT TRIAGE NOTE - CHIEF COMPLAINT QUOTE
Pt BIBEMS from home c/o nausea/ vomiting x5 days and a cough x1 month. In route patient given 4mg Zofran IVP.

## 2025-02-23 NOTE — ED PROVIDER NOTE - IV ALTEPLASE EXCL REL HIDDEN
Detail Level: Detailed
Quality 130: Documentation Of Current Medications In The Medical Record: Current Medications Documented
show

## 2025-02-23 NOTE — ED PROVIDER NOTE - CLINICAL SUMMARY MEDICAL DECISION MAKING FREE TEXT BOX
63 yo m ho HTN, GERD, HPL, chronic venous stasis dermatitis with bilateral leg swelling, CHF (EF 35-40% 1/2024) 2/2 severe AS, AVR complicated requiring multiple transfusions, afib, and pericardial effusion requiring VATS presents with cough, sob x weeks. Pt admits for 2 mos has been having a progressively worsening cough, exertional sob. 2 weeks ago saw PCP and completed Z-Piter. Today began having a coughing fit causing post-tussive emesis. Denies active cp, sob. Denies orthopenea, leg swelling, fevers, chills, nausea, vomiting, diarrhea  Exam as stated   Plan for labs, CT. DDX includes not limited to pna, acs, pleural effusion. no h/o copd/asthma, not a smoker 65 yo m ho HTN, GERD, HPL, chronic venous stasis dermatitis with bilateral leg swelling, CHF (EF 35-40% 1/2024) 2/2 severe AS, AVR complicated requiring multiple transfusions, afib, and pericardial effusion requiring VATS presents with cough, sob x weeks. Pt admits for 2 mos has been having a progressively worsening cough, exertional sob. 2 weeks ago saw PCP and completed Z-Piter. Today began having a coughing fit causing post-tussive emesis. Denies active cp, sob. Denies orthopenea, leg swelling, fevers, chills, nausea, vomiting, diarrhea  Exam as stated   Plan for labs, CT. DDX includes not limited to pna, acs, pleural effusion. no h/o copd/asthma, not a smoker  CT neg for acute findings, no infilrate.  Sxs sig improved sp nebs. Bronchitis vs RAD. Plan to dc with meds and fu op   Patient to be discharged from ED. Any available test results were discussed with patient and/or family. Verbal instructions given, including instructions to return to ED immediately for any new, worsening, or concerning symptoms. Patient endorsed understanding. Written discharge instructions additionally given, including follow-up plan. 63 yo m ho HTN, GERD, HPL, chronic venous stasis dermatitis with bilateral leg swelling, CHF (EF 35-40% 1/2024) 2/2 severe AS, AVR complicated requiring multiple transfusions, afib, and pericardial effusion requiring VATS presents with cough, sob x weeks. Pt admits for 2 mos has been having a progressively worsening cough, exertional sob. 2 weeks ago saw PCP and completed Z-Piter. Today began having a coughing fit causing post-tussive emesis. Denies active cp, sob. Denies orthopenea, leg swelling, fevers, chills, nausea, vomiting, diarrhea  Exam as stated   Plan for labs, CT. DDX includes not limited to pna, acs, pleural effusion. no h/o copd/asthma, not a smoker  CT neg for acute findings, no infilrate.  Pt with chronic hypoK, on K oral, will give additional po  Sxs sig improved sp nebs. Bronchitis vs RAD. Plan to dc with meds and fu op   Patient to be discharged from ED. Any available test results were discussed with patient and/or family. Verbal instructions given, including instructions to return to ED immediately for any new, worsening, or concerning symptoms. Patient endorsed understanding. Written discharge instructions additionally given, including follow-up plan.

## 2025-02-24 ENCOUNTER — APPOINTMENT (OUTPATIENT)
Dept: GASTROENTEROLOGY | Facility: CLINIC | Age: 65
End: 2025-02-24

## 2025-03-04 ENCOUNTER — APPOINTMENT (OUTPATIENT)
Dept: FAMILY MEDICINE | Facility: CLINIC | Age: 65
End: 2025-03-04
Payer: COMMERCIAL

## 2025-03-04 ENCOUNTER — LABORATORY RESULT (OUTPATIENT)
Age: 65
End: 2025-03-04

## 2025-03-04 VITALS
DIASTOLIC BLOOD PRESSURE: 76 MMHG | SYSTOLIC BLOOD PRESSURE: 128 MMHG | HEIGHT: 66 IN | BODY MASS INDEX: 30.05 KG/M2 | OXYGEN SATURATION: 95 % | TEMPERATURE: 97.2 F | WEIGHT: 187 LBS | HEART RATE: 7412876 BPM | RESPIRATION RATE: 18 BRPM

## 2025-03-04 DIAGNOSIS — R05.9 COUGH, UNSPECIFIED: ICD-10-CM

## 2025-03-04 DIAGNOSIS — E87.6 HYPOKALEMIA: ICD-10-CM

## 2025-03-04 DIAGNOSIS — M54.9 DORSALGIA, UNSPECIFIED: ICD-10-CM

## 2025-03-04 PROCEDURE — 81002 URINALYSIS NONAUTO W/O SCOPE: CPT

## 2025-03-04 PROCEDURE — 99215 OFFICE O/P EST HI 40 MIN: CPT

## 2025-03-04 PROCEDURE — G2211 COMPLEX E/M VISIT ADD ON: CPT | Mod: NC

## 2025-03-05 PROBLEM — M54.9 UPPER BACK PAIN ON LEFT SIDE: Status: ACTIVE | Noted: 2025-03-05

## 2025-03-07 DIAGNOSIS — Z86.2 PERSONAL HISTORY OF DISEASES OF THE BLOOD AND BLOOD-FORMING ORGANS AND CERTAIN DISORDERS INVOLVING THE IMMUNE MECHANISM: ICD-10-CM

## 2025-03-11 DIAGNOSIS — S22.39XA FRACTURE OF ONE RIB, UNSPECIFIED SIDE, INITIAL ENCOUNTER FOR CLOSED FRACTURE: ICD-10-CM

## 2025-03-11 LAB
ALBUMIN SERPL ELPH-MCNC: 4.2 G/DL
ALP BLD-CCNC: 112 U/L
ALT SERPL-CCNC: 19 U/L
ANION GAP SERPL CALC-SCNC: 11 MMOL/L
AST SERPL-CCNC: 20 U/L
BASOPHILS # BLD AUTO: 0.12 K/UL
BASOPHILS NFR BLD AUTO: 1 %
BILIRUB SERPL-MCNC: 0.4 MG/DL
BUN SERPL-MCNC: 23 MG/DL
CALCIUM SERPL-MCNC: 9 MG/DL
CHLORIDE SERPL-SCNC: 103 MMOL/L
CO2 SERPL-SCNC: 27 MMOL/L
CREAT SERPL-MCNC: 1.4 MG/DL
EGFRCR SERPLBLD CKD-EPI 2021: 56 ML/MIN/1.73M2
EOSINOPHIL # BLD AUTO: 0.71 K/UL
EOSINOPHIL NFR BLD AUTO: 5.9 %
GLUCOSE SERPL-MCNC: 108 MG/DL
HCT VFR BLD CALC: 32.7 %
HGB BLD-MCNC: 10.5 G/DL
IMM GRANULOCYTES NFR BLD AUTO: 1 %
LYMPHOCYTES # BLD AUTO: 1.59 K/UL
LYMPHOCYTES NFR BLD AUTO: 13.2 %
MAN DIFF?: NORMAL
MCHC RBC-ENTMCNC: 19.7 PG
MCHC RBC-ENTMCNC: 32.1 G/DL
MCV RBC AUTO: 61.5 FL
MONOCYTES # BLD AUTO: 1.1 K/UL
MONOCYTES NFR BLD AUTO: 9.1 %
NEUTROPHILS # BLD AUTO: 8.42 K/UL
NEUTROPHILS NFR BLD AUTO: 69.8 %
PLATELET # BLD AUTO: 254 K/UL
POTASSIUM SERPL-SCNC: 3.5 MMOL/L
PROT SERPL-MCNC: 6.6 G/DL
RBC # BLD: 5.32 M/UL
RBC # FLD: 19.9 %
SODIUM SERPL-SCNC: 141 MMOL/L
WBC # FLD AUTO: 12.06 K/UL

## 2025-03-11 RX ORDER — LIDOCAINE 5% 700 MG/1
5 PATCH TOPICAL
Qty: 1 | Refills: 0 | Status: ACTIVE | COMMUNITY
Start: 2025-03-11 | End: 1900-01-01

## 2025-03-12 ENCOUNTER — APPOINTMENT (OUTPATIENT)
Dept: HEART FAILURE | Facility: CLINIC | Age: 65
End: 2025-03-12

## 2025-03-12 ENCOUNTER — APPOINTMENT (OUTPATIENT)
Dept: ELECTROPHYSIOLOGY | Facility: CLINIC | Age: 65
End: 2025-03-12
Payer: COMMERCIAL

## 2025-03-12 VITALS
OXYGEN SATURATION: 95 % | HEART RATE: 58 BPM | WEIGHT: 187 LBS | DIASTOLIC BLOOD PRESSURE: 78 MMHG | BODY MASS INDEX: 30.18 KG/M2 | SYSTOLIC BLOOD PRESSURE: 150 MMHG

## 2025-03-12 DIAGNOSIS — I48.91 UNSPECIFIED ATRIAL FIBRILLATION: ICD-10-CM

## 2025-03-12 PROCEDURE — 93000 ELECTROCARDIOGRAM COMPLETE: CPT

## 2025-03-12 PROCEDURE — 99214 OFFICE O/P EST MOD 30 MIN: CPT | Mod: 25

## 2025-03-27 ENCOUNTER — APPOINTMENT (OUTPATIENT)
Dept: ORTHOPEDIC SURGERY | Facility: CLINIC | Age: 65
End: 2025-03-27
Payer: COMMERCIAL

## 2025-03-27 VITALS — HEIGHT: 66 IN | WEIGHT: 187 LBS | BODY MASS INDEX: 30.05 KG/M2

## 2025-03-27 PROCEDURE — 99214 OFFICE O/P EST MOD 30 MIN: CPT

## 2025-03-28 ENCOUNTER — APPOINTMENT (OUTPATIENT)
Dept: ORTHOPEDIC SURGERY | Facility: CLINIC | Age: 65
End: 2025-03-28
Payer: COMMERCIAL

## 2025-03-28 VITALS — HEIGHT: 66 IN | BODY MASS INDEX: 31.82 KG/M2 | WEIGHT: 198 LBS

## 2025-03-28 DIAGNOSIS — M17.11 UNILATERAL PRIMARY OSTEOARTHRITIS, RIGHT KNEE: ICD-10-CM

## 2025-03-28 DIAGNOSIS — M17.12 UNILATERAL PRIMARY OSTEOARTHRITIS, LEFT KNEE: ICD-10-CM

## 2025-03-28 PROCEDURE — 73562 X-RAY EXAM OF KNEE 3: CPT | Mod: RT

## 2025-03-28 PROCEDURE — 99215 OFFICE O/P EST HI 40 MIN: CPT

## 2025-04-02 ENCOUNTER — LABORATORY RESULT (OUTPATIENT)
Age: 65
End: 2025-04-02

## 2025-04-02 ENCOUNTER — APPOINTMENT (OUTPATIENT)
Dept: FAMILY MEDICINE | Facility: CLINIC | Age: 65
End: 2025-04-02
Payer: COMMERCIAL

## 2025-04-02 VITALS
TEMPERATURE: 97.8 F | WEIGHT: 189 LBS | DIASTOLIC BLOOD PRESSURE: 78 MMHG | RESPIRATION RATE: 18 BRPM | OXYGEN SATURATION: 97 % | SYSTOLIC BLOOD PRESSURE: 138 MMHG | HEART RATE: 59 BPM | BODY MASS INDEX: 30.37 KG/M2 | HEIGHT: 66 IN

## 2025-04-02 DIAGNOSIS — I48.91 UNSPECIFIED ATRIAL FIBRILLATION: ICD-10-CM

## 2025-04-02 DIAGNOSIS — R05.9 COUGH, UNSPECIFIED: ICD-10-CM

## 2025-04-02 DIAGNOSIS — K44.9 DIAPHRAGMATIC HERNIA W/OUT OBSTRUCTION OR GANGRENE: ICD-10-CM

## 2025-04-02 PROCEDURE — 99214 OFFICE O/P EST MOD 30 MIN: CPT

## 2025-04-02 PROCEDURE — G2211 COMPLEX E/M VISIT ADD ON: CPT | Mod: NC

## 2025-04-02 PROCEDURE — 36415 COLL VENOUS BLD VENIPUNCTURE: CPT

## 2025-04-02 RX ORDER — OMEPRAZOLE 20 MG/1
20 CAPSULE, DELAYED RELEASE ORAL
Qty: 30 | Refills: 4 | Status: ACTIVE | COMMUNITY
Start: 2025-04-02 | End: 1900-01-01

## 2025-04-04 LAB
ALBUMIN SERPL ELPH-MCNC: 4.2 G/DL
ALP BLD-CCNC: 104 U/L
ALT SERPL-CCNC: 18 U/L
ANION GAP SERPL CALC-SCNC: 14 MMOL/L
AST SERPL-CCNC: 24 U/L
BASOPHILS # BLD AUTO: 0.2 K/UL
BASOPHILS NFR BLD AUTO: 1.8 %
BILIRUB SERPL-MCNC: 0.4 MG/DL
BUN SERPL-MCNC: 18 MG/DL
CALCIUM SERPL-MCNC: 8.9 MG/DL
CHLORIDE SERPL-SCNC: 100 MMOL/L
CO2 SERPL-SCNC: 26 MMOL/L
CREAT SERPL-MCNC: 1.65 MG/DL
EGFRCR SERPLBLD CKD-EPI 2021: 46 ML/MIN/1.73M2
EOSINOPHIL # BLD AUTO: 0.5 K/UL
EOSINOPHIL NFR BLD AUTO: 4.5 %
FERRITIN SERPL-MCNC: 211 NG/ML
GLUCOSE SERPL-MCNC: 126 MG/DL
HCT VFR BLD CALC: 33.9 %
HGB BLD-MCNC: 11.1 G/DL
LYMPHOCYTES # BLD AUTO: 1.88 K/UL
LYMPHOCYTES NFR BLD AUTO: 17 %
MAN DIFF?: NORMAL
MCHC RBC-ENTMCNC: 20.2 PG
MCHC RBC-ENTMCNC: 32.7 G/DL
MCV RBC AUTO: 61.6 FL
MONOCYTES # BLD AUTO: 0.68 K/UL
MONOCYTES NFR BLD AUTO: 6.2 %
NEUTROPHILS # BLD AUTO: 7.78 K/UL
NEUTROPHILS NFR BLD AUTO: 70.5 %
PLATELET # BLD AUTO: 271 K/UL
POTASSIUM SERPL-SCNC: 3.2 MMOL/L
PROT SERPL-MCNC: 6.8 G/DL
RBC # BLD: 5.5 M/UL
RBC # FLD: 21 %
SODIUM SERPL-SCNC: 141 MMOL/L
WBC # FLD AUTO: 11.03 K/UL

## 2025-04-09 ENCOUNTER — RX RENEWAL (OUTPATIENT)
Age: 65
End: 2025-04-09

## 2025-04-16 ENCOUNTER — OUTPATIENT (OUTPATIENT)
Dept: OUTPATIENT SERVICES | Facility: HOSPITAL | Age: 65
LOS: 1 days | End: 2025-04-16
Payer: COMMERCIAL

## 2025-04-16 VITALS
OXYGEN SATURATION: 96 % | DIASTOLIC BLOOD PRESSURE: 78 MMHG | TEMPERATURE: 98 F | HEIGHT: 68.2 IN | HEART RATE: 71 BPM | RESPIRATION RATE: 20 BRPM | SYSTOLIC BLOOD PRESSURE: 127 MMHG | WEIGHT: 193.79 LBS

## 2025-04-16 DIAGNOSIS — Z86.79 PERSONAL HISTORY OF OTHER DISEASES OF THE CIRCULATORY SYSTEM: ICD-10-CM

## 2025-04-16 DIAGNOSIS — Z01.818 ENCOUNTER FOR OTHER PREPROCEDURAL EXAMINATION: ICD-10-CM

## 2025-04-16 DIAGNOSIS — Z98.890 OTHER SPECIFIED POSTPROCEDURAL STATES: Chronic | ICD-10-CM

## 2025-04-16 DIAGNOSIS — M17.11 UNILATERAL PRIMARY OSTEOARTHRITIS, RIGHT KNEE: ICD-10-CM

## 2025-04-16 DIAGNOSIS — Z95.2 PRESENCE OF PROSTHETIC HEART VALVE: Chronic | ICD-10-CM

## 2025-04-16 LAB
ANION GAP SERPL CALC-SCNC: 15 MMOL/L — SIGNIFICANT CHANGE UP (ref 5–17)
BUN SERPL-MCNC: 21 MG/DL — SIGNIFICANT CHANGE UP (ref 7–23)
CALCIUM SERPL-MCNC: 8.9 MG/DL — SIGNIFICANT CHANGE UP (ref 8.4–10.5)
CHLORIDE SERPL-SCNC: 104 MMOL/L — SIGNIFICANT CHANGE UP (ref 96–108)
CO2 SERPL-SCNC: 21 MMOL/L — LOW (ref 22–31)
CREAT SERPL-MCNC: 1.39 MG/DL — HIGH (ref 0.5–1.3)
EGFR: 57 ML/MIN/1.73M2 — LOW
EGFR: 57 ML/MIN/1.73M2 — LOW
GLUCOSE SERPL-MCNC: 184 MG/DL — HIGH (ref 70–99)
HCT VFR BLD CALC: 34.6 % — LOW (ref 39–50)
HGB BLD-MCNC: 10.4 G/DL — LOW (ref 13–17)
MCHC RBC-ENTMCNC: 18.1 PG — LOW (ref 27–34)
MCHC RBC-ENTMCNC: 30.1 G/DL — LOW (ref 32–36)
MCV RBC AUTO: 60.1 FL — LOW (ref 80–100)
NRBC BLD AUTO-RTO: 0 /100 WBCS — SIGNIFICANT CHANGE UP (ref 0–0)
PLATELET # BLD AUTO: 200 K/UL — SIGNIFICANT CHANGE UP (ref 150–400)
POTASSIUM SERPL-MCNC: 3.5 MMOL/L — SIGNIFICANT CHANGE UP (ref 3.5–5.3)
POTASSIUM SERPL-SCNC: 3.5 MMOL/L — SIGNIFICANT CHANGE UP (ref 3.5–5.3)
RBC # BLD: 5.76 M/UL — SIGNIFICANT CHANGE UP (ref 4.2–5.8)
RBC # FLD: 18.8 % — HIGH (ref 10.3–14.5)
SODIUM SERPL-SCNC: 140 MMOL/L — SIGNIFICANT CHANGE UP (ref 135–145)
WBC # BLD: 10.11 K/UL — SIGNIFICANT CHANGE UP (ref 3.8–10.5)
WBC # FLD AUTO: 10.11 K/UL — SIGNIFICANT CHANGE UP (ref 3.8–10.5)

## 2025-04-16 PROCEDURE — 73700 CT LOWER EXTREMITY W/O DYE: CPT | Mod: MC

## 2025-04-16 PROCEDURE — 87640 STAPH A DNA AMP PROBE: CPT

## 2025-04-16 PROCEDURE — 73700 CT LOWER EXTREMITY W/O DYE: CPT | Mod: 26,RT

## 2025-04-16 PROCEDURE — G0463: CPT

## 2025-04-16 PROCEDURE — 83036 HEMOGLOBIN GLYCOSYLATED A1C: CPT

## 2025-04-16 PROCEDURE — 87641 MR-STAPH DNA AMP PROBE: CPT

## 2025-04-16 PROCEDURE — 80048 BASIC METABOLIC PNL TOTAL CA: CPT

## 2025-04-16 PROCEDURE — 85027 COMPLETE CBC AUTOMATED: CPT

## 2025-04-16 RX ORDER — TRAMADOL HYDROCHLORIDE 50 MG/1
50 TABLET, FILM COATED ORAL ONCE
Refills: 0 | Status: DISCONTINUED | OUTPATIENT
Start: 2025-05-05 | End: 2025-05-05

## 2025-04-16 RX ORDER — CEFAZOLIN SODIUM IN 0.9 % NACL 3 G/100 ML
2000 INTRAVENOUS SOLUTION, PIGGYBACK (ML) INTRAVENOUS ONCE
Refills: 0 | Status: COMPLETED | OUTPATIENT
Start: 2025-05-05 | End: 2025-05-05

## 2025-04-16 RX ORDER — LIDOCAINE HCL/PF 10 MG/ML
0.2 VIAL (ML) INJECTION ONCE
Refills: 0 | Status: DISCONTINUED | OUTPATIENT
Start: 2025-05-05 | End: 2025-05-05

## 2025-04-16 NOTE — H&P PST ADULT - PROBLEM SELECTOR PLAN 1
Right Total Knee Arthroplasty with Piotr on 5/5/25  Pre- Op Instructions discussed   Labs sent   ERP protocol followed    chlorhexidine wash given  advise to hold Farxiga 3 days prior to surgery   medical eval

## 2025-04-16 NOTE — H&P PST ADULT - HISTORY OF PRESENT ILLNESS
63 y/o male M  PMHx HTN, GERD, HPL, chronic venous stasis dermatitis with bilateral leg swelling, and AS s/p AVR/JOANNA Clip  (2/24) c/b intraop bleeding requiring multiple blood products; postop prolonged inotropic support/ CHF following and afib. TTE revealed pericardial effusion required  pericardial window.   65 y/o male M  PMHx HTN, GERD, HDL, chronic venous stasis dermatitis with swelling, CHF,  severe AS s/p AVR/JOANNA Clip  (2/23/24) postoperative course complicated by pericardial effusion t  which he underwent a Right VATS lysis of adhesions and pericardial windopericardial window . Pt with c/o right knee pain for several months  He has pain located to the medial aspect of the knee which is worse with activities. The pain is worse with prolonged walking, and flexion. The pain is affecting his daily activities. Presents to PST for scheduled Right Total Knee Arthroplasty  with Piotr on 5/5/25.     CT knee done today

## 2025-04-16 NOTE — H&P PST ADULT - GENERAL
Vital Signs Last 24 Hrs  T(C): 36.8 (2021 02:45), Max: 36.8 (2021 02:45)  T(F): 98.3 (2021 02:45), Max: 98.3 (2021 02:45)  HR: 69 (2021 02:45) (69 - 91)  BP: 115/71 (2021 02:45) (115/71 - 144/92)  BP(mean): --  RR: 16 (2021 02:45) (16 - 18)  SpO2: 95% (2021 02:45) (95% - 97%)        LABS:                        12.2   12.94 )-----------( 118      ( 2021 20:39 )             37.6     07-23    139  |  101  |  42.0<H>  ----------------------------<  210<H>  5.3   |  27.0  |  1.12    Ca    9.4      2021 20:39    TPro  7.0  /  Alb  3.4  /  TBili  0.5  /  DBili  x   /  AST  48<H>  /  ALT  15  /  AlkPhos  96  07-23      Urinalysis Basic - ( 2021 22:49 )    Color: Yellow / Appearance: Slightly Turbid / S.015 / pH: x  Gluc: x / Ketone: Negative  / Bili: Negative / Urobili: Negative   Blood: x / Protein: 30 mg/dL / Nitrite: Positive   Leuk Esterase: Moderate / RBC: 3-5 /HPF / WBC >50   Sq Epi: x / Non Sq Epi: Few / Bacteria: Few      IMAGING:  CT Chest/A/P with T/L-spine reformat   IMPRESSION:  Minimally displaced acute fracture of the left lateral eighth rib.    There are deformities of multiple right posterior ribs which were not seen on the previous exam involving the 10th 9th and eighth ribs. These may represent acute nondisplaced fractures.    There is multilevel vertebral body height loss in the thorax spine which is stable except for T3 which shows new height loss since 2019. This is of uncertain chronicity. Correlate clinically for pain at this site.    ERYN MCNEIL MD; Attending Radiologist  This document has been electronically signed. 2021 12:44AM        CT C-spine  IMPRESSION:  Acute nondisplaced fracture of the C5 spinous process. Acute appearing fracture through the T3 vertebral body with minimal bony retropulsion. Chronic compression fractures of T2 and T4. No evidence of traumatic malalignment.    CANDICE BAL DO; Attending Radiologist  This document has been electronically signed. 2021 12:32AM      CT Head:  IMPRESSION:  No acute intracranial hemorrhage, mass effect, or acute osseous fracture.    Mild to moderate chronic ischemic changes in the frontoparietal white matter and old right cerebellar infarct.    CANDICE BAL DO; Attending Radiologist  This document has been electronically signed. 2021 12:19AM details…

## 2025-04-16 NOTE — H&P PST ADULT - NSICDXPASTMEDICALHX_GEN_ALL_CORE_FT
PAST MEDICAL HISTORY:  Chronic GERD     Chronic venous stasis dermatitis     Dyslipidemia     History of aortic stenosis     Hypertension      PAST MEDICAL HISTORY:  Chronic GERD     Chronic venous stasis dermatitis     Dyslipidemia     H/O CHF     History of aortic stenosis     Hypertension

## 2025-04-17 LAB
A1C WITH ESTIMATED AVERAGE GLUCOSE RESULT: 6.3 % — HIGH (ref 4–5.6)
ESTIMATED AVERAGE GLUCOSE: 134 MG/DL — HIGH (ref 68–114)
MRSA PCR RESULT.: SIGNIFICANT CHANGE UP
S AUREUS DNA NOSE QL NAA+PROBE: SIGNIFICANT CHANGE UP

## 2025-04-18 ENCOUNTER — NON-APPOINTMENT (OUTPATIENT)
Age: 65
End: 2025-04-18

## 2025-04-23 NOTE — ED PROVIDER NOTE - PHYSICAL EXAMINATION
Daily Progress Note    Nataliia Rodriguez is a 23 y.o. female on day 3 of admission presenting with Hypertensive emergency.    Subjective   No acute events overnight. Nataliia did not receive correction at midnight as dose would be do small per floor policy. This morning, Nataliia does not have any complaints. Denies headache, vision changes, nausea, vomiting, abdominal pain.    Dietary Orders (From admission, onward)               Pediatric diet CCD Non-Restricted; 1000 mL fluid  Diet effective now        Comments: Also renal diet  946ml fluid restriction   Question Answer Comment   Diet type CCD Non-Restricted    Dietary fluid restriction / 24h: 1000 mL fluid            May Participate in Room Service  Once        Question:  .  Answer:  Yes                      Objective     Vitals  Temp:  [36 °C (96.8 °F)-37.2 °C (99 °F)] 36.8 °C (98.2 °F)  Heart Rate:  [71-90] 77  Resp:  [18-20] 18  BP: (111-148)/(69-87) 111/71  PEWS Score: 0    0-10 (Numeric) Pain Score: 0 - No pain       Peripheral IV 04/20/25 22 G Anterior;Right Forearm (Active)   Number of days: 2       Peripheral IV 04/20/25 24 G Anterior;Right Foot (Active)   Number of days: 2          Intake/Output Summary (Last 24 hours) at 4/23/2025 1525  Last data filed at 4/23/2025 1400  Gross per 24 hour   Intake 930 ml   Output 1175 ml   Net -245 ml     Physical Exam  Constitutional:       General: She is not in acute distress.  HENT:      Head: Normocephalic.      Nose: Nose normal.      Mouth/Throat:      Mouth: Mucous membranes are moist.   Eyes:      Conjunctiva/sclera: Conjunctivae normal.      Pupils: Pupils are equal, round, and reactive to light.   Cardiovascular:      Rate and Rhythm: Normal rate and regular rhythm.      Pulses: Normal pulses.      Heart sounds: No murmur heard.  Pulmonary:      Effort: Pulmonary effort is normal. No respiratory distress.      Breath sounds: Normal breath sounds. No wheezing, rhonchi or rales.   Abdominal:      General:  Abdomen is flat. Bowel sounds are normal.      Palpations: Abdomen is soft.      Tenderness: There is no abdominal tenderness.   Musculoskeletal:      Right lower leg: No edema.      Left lower leg: No edema.   Skin:     General: Skin is warm.      Capillary Refill: Capillary refill takes less than 2 seconds.   Neurological:      Mental Status: She is alert.   Psychiatric:         Mood and Affect: Mood normal.         Behavior: Behavior normal.        Relevant Results  Results for orders placed or performed during the hospital encounter of 04/19/25 (from the past 24 hours)   POCT GLUCOSE   Result Value Ref Range    POCT Glucose 92 74 - 99 mg/dL   POCT GLUCOSE   Result Value Ref Range    POCT Glucose 231 (H) 74 - 99 mg/dL   POCT GLUCOSE   Result Value Ref Range    POCT Glucose 156 (H) 74 - 99 mg/dL   POCT GLUCOSE   Result Value Ref Range    POCT Glucose 89 74 - 99 mg/dL   POCT GLUCOSE   Result Value Ref Range    POCT Glucose 146 (H) 74 - 99 mg/dL     *Note: Due to a large number of results and/or encounters for the requested time period, some results have not been displayed. A complete set of results can be found in Results Review.     CT head wo IV contrast  Result Date: 4/22/2025  1. No acute intracranial abnormality.   2. Chronic findings as above.      ECG 12 lead  Result Date: 4/21/2025  Normal sinus rhythm with sinus arrhythmia [normal respiratory variation] Borderline prolonged QT interval Borderline ECG When compared with ECG of 09-JAN-2025 17:45, QT interval has prologned Confirmed by Harmeet Good ([id number]) on 4/21/2025 10:59:39 AM    Vascular US Upper Extremity Venous Duplex Right  Result Date: 4/20/2025  Occlusive thrombus of the right innominate vein and partially occlusive thrombus of the right subclavian vein.      XR abdomen 1 view  Result Date: 4/20/2025  1.  Nonobstructive bowel-gas pattern.   2. Moderate amount of formed stool throughout the colon.       MR Jess Intracranial WO IV  Contrast  Result Date: 4/18/2025  Status post left STA-MCA bypass. There is focal high-grade stenosis of the intracranial portion of the bypass, which appears progressed compared to previous MRIs 12/26/2024. However, phase contrast imaging demonstrates preserved velocity within the extracranial and intracranial components of the bypass. Flow within the basilar artery is decreased though retrograde flow within the right posterior communicating artery is increased. Persistent stenosis/occlusions with associated low velocities within bilateral ICAs, similar to previous MRI.          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 She was also hyperglycemic on initial arrival without DKA and required an insulin drip that was weaned and discontinued 4/20, now stable on home subcutaneous insulin regimen. After discussion with NSGY, Heme/Onc, and Radiology, we will plan to initiate Lovenox today to treat presumed new DVTs. Per Radiology, it cannot be definitively stated if the RUE DVTs found on US are chronic versus acute. NSGY is agreeable to starting Lovenox today but aspirin will have to be discontinued (and restarted if Lovenox discontinued); she will also need a CTA once therapeutic. With regard to HTN, her pressures have continued to improve with more SBPs in the 120s. Today, Nataliia is about ~1 kg above her weight post-dialysis yesterday, but her exam is reassuring without edema.  Next dialysis appointment is tomorrow.    CNS:  - taper Keppra per NSGY   Keppra 250 BID x 7 days then 250 daily x 7 days then off   - Tylenol PRN    CVS:  #access: pIV x2  #hypertension  - c/h clonidine patch 0.2 mg/24 hours every 7 days  - c/h metoprolol 50 mg XR nightly   - PRN isradipine 2.5 mg PO q6h PRN for sustained SBP >170    RESP:  - stable on RA    FEN/GI:  - renal and carbhohydrate counted diet  - c/h omeprazole  - c/h Nephrocaps    HEME:  #history of ICA occlusion status-post left frontoparietal superficial temporal artery to mid-cerebral artery bypass (STA-MCA bypass)  #history of DVTs  - NSGY and Heme/Onc consulted  - discontinue aspirin   - initiate Lovenox 1 mg/kg subcutaneous q24h, plan for 5 days (2 days inpatient), draw level 4-6 hours after second dose   Therapeutic range is 0.5-1  - obtain CTA on Friday 4/25 per NSGY    ID:  #s/p sepsis rule-out   - vancomycin (4/20 - 4/21)  - cefepime (4/20 - 4/22)    - blood culture ngtd    ENDO:  #T1DM  - continue Lantus 6 units  - ISF 1:80 > 150  - ICR 1:12  - consult Endo  - CGM in place  #Graves' Disease  - c/h methimazole       Vernon Yates MD  PGY-1, Pediatrics   CONSTITUTIONAL: NAD  SKIN: Warm dry  HEAD: NCAT  EYES: NL inspection  ENT: MMM  CARD: RRR  RESP: Unlabored respirations, +BL wheeze, intermittent cough   EXT: no pedal edema bl   NEURO: Grossly unremarkable  PSYCH: Cooperative, appropriate.

## 2025-04-25 ENCOUNTER — APPOINTMENT (OUTPATIENT)
Dept: CARDIOLOGY | Facility: CLINIC | Age: 65
End: 2025-04-25

## 2025-04-25 VITALS
BODY MASS INDEX: 30.7 KG/M2 | WEIGHT: 191 LBS | HEIGHT: 66 IN | OXYGEN SATURATION: 94 % | SYSTOLIC BLOOD PRESSURE: 122 MMHG | DIASTOLIC BLOOD PRESSURE: 76 MMHG | HEART RATE: 60 BPM

## 2025-04-25 PROCEDURE — 93000 ELECTROCARDIOGRAM COMPLETE: CPT

## 2025-04-25 PROCEDURE — 99214 OFFICE O/P EST MOD 30 MIN: CPT | Mod: 25

## 2025-04-28 ENCOUNTER — APPOINTMENT (OUTPATIENT)
Dept: FAMILY MEDICINE | Facility: CLINIC | Age: 65
End: 2025-04-28
Payer: COMMERCIAL

## 2025-04-28 VITALS
BODY MASS INDEX: 30.53 KG/M2 | WEIGHT: 190 LBS | TEMPERATURE: 98 F | HEART RATE: 73 BPM | RESPIRATION RATE: 16 BRPM | SYSTOLIC BLOOD PRESSURE: 132 MMHG | OXYGEN SATURATION: 96 % | HEIGHT: 66 IN | DIASTOLIC BLOOD PRESSURE: 62 MMHG

## 2025-04-28 DIAGNOSIS — I48.91 UNSPECIFIED ATRIAL FIBRILLATION: ICD-10-CM

## 2025-04-28 DIAGNOSIS — I48.0 PAROXYSMAL ATRIAL FIBRILLATION: ICD-10-CM

## 2025-04-28 DIAGNOSIS — Z01.818 ENCOUNTER FOR OTHER PREPROCEDURAL EXAMINATION: ICD-10-CM

## 2025-04-28 DIAGNOSIS — M17.11 UNILATERAL PRIMARY OSTEOARTHRITIS, RIGHT KNEE: ICD-10-CM

## 2025-04-28 PROCEDURE — G2211 COMPLEX E/M VISIT ADD ON: CPT | Mod: NC

## 2025-04-28 PROCEDURE — 99215 OFFICE O/P EST HI 40 MIN: CPT

## 2025-04-29 ENCOUNTER — RX RENEWAL (OUTPATIENT)
Age: 65
End: 2025-04-29

## 2025-04-29 PROBLEM — Z86.79 PERSONAL HISTORY OF OTHER DISEASES OF THE CIRCULATORY SYSTEM: Chronic | Status: ACTIVE | Noted: 2025-04-16

## 2025-05-05 ENCOUNTER — APPOINTMENT (OUTPATIENT)
Dept: ORTHOPEDIC SURGERY | Facility: HOSPITAL | Age: 65
End: 2025-05-05

## 2025-05-05 ENCOUNTER — OUTPATIENT (OUTPATIENT)
Dept: INPATIENT UNIT | Facility: HOSPITAL | Age: 65
LOS: 1 days | End: 2025-05-05
Payer: COMMERCIAL

## 2025-05-05 ENCOUNTER — TRANSCRIPTION ENCOUNTER (OUTPATIENT)
Age: 65
End: 2025-05-05

## 2025-05-05 VITALS
HEART RATE: 62 BPM | TEMPERATURE: 98 F | WEIGHT: 193.79 LBS | DIASTOLIC BLOOD PRESSURE: 89 MMHG | HEIGHT: 68 IN | SYSTOLIC BLOOD PRESSURE: 156 MMHG | OXYGEN SATURATION: 99 % | RESPIRATION RATE: 16 BRPM

## 2025-05-05 DIAGNOSIS — Z95.2 PRESENCE OF PROSTHETIC HEART VALVE: Chronic | ICD-10-CM

## 2025-05-05 DIAGNOSIS — I50.32 CHRONIC DIASTOLIC (CONGESTIVE) HEART FAILURE: ICD-10-CM

## 2025-05-05 DIAGNOSIS — K21.9 GASTRO-ESOPHAGEAL REFLUX DISEASE WITHOUT ESOPHAGITIS: ICD-10-CM

## 2025-05-05 DIAGNOSIS — M10.9 GOUT, UNSPECIFIED: ICD-10-CM

## 2025-05-05 DIAGNOSIS — M17.11 UNILATERAL PRIMARY OSTEOARTHRITIS, RIGHT KNEE: ICD-10-CM

## 2025-05-05 DIAGNOSIS — I10 ESSENTIAL (PRIMARY) HYPERTENSION: ICD-10-CM

## 2025-05-05 DIAGNOSIS — Z29.9 ENCOUNTER FOR PROPHYLACTIC MEASURES, UNSPECIFIED: ICD-10-CM

## 2025-05-05 DIAGNOSIS — Z98.890 OTHER SPECIFIED POSTPROCEDURAL STATES: Chronic | ICD-10-CM

## 2025-05-05 LAB
GLUCOSE BLDC GLUCOMTR-MCNC: 112 MG/DL — HIGH (ref 70–99)
GLUCOSE BLDC GLUCOMTR-MCNC: 129 MG/DL — HIGH (ref 70–99)
GLUCOSE BLDC GLUCOMTR-MCNC: 165 MG/DL — HIGH (ref 70–99)
GLUCOSE BLDC GLUCOMTR-MCNC: 180 MG/DL — HIGH (ref 70–99)

## 2025-05-05 DEVICE — COMP FEM CRUCIATE RETAINING: Type: IMPLANTABLE DEVICE | Site: RIGHT | Status: FUNCTIONAL

## 2025-05-05 DEVICE — MAKO BONE PIN 4MM X 140MM: Type: IMPLANTABLE DEVICE | Site: RIGHT | Status: FUNCTIONAL

## 2025-05-05 DEVICE — MAKO BONE PIN 4MM X 110MM: Type: IMPLANTABLE DEVICE | Site: RIGHT | Status: FUNCTIONAL

## 2025-05-05 DEVICE — TIBIAL COMPONENT: Type: IMPLANTABLE DEVICE | Site: RIGHT | Status: FUNCTIONAL

## 2025-05-05 DEVICE — INSERT TIB BEARING CS X3 SZ 5 9MM: Type: IMPLANTABLE DEVICE | Site: RIGHT | Status: FUNCTIONAL

## 2025-05-05 RX ORDER — DEXTROSE 50 % IN WATER 50 %
12.5 SYRINGE (ML) INTRAVENOUS ONCE
Refills: 0 | Status: DISCONTINUED | OUTPATIENT
Start: 2025-05-05 | End: 2025-05-06

## 2025-05-05 RX ORDER — POLYETHYLENE GLYCOL 3350 17 G/17G
17 POWDER, FOR SOLUTION ORAL
Qty: 0 | Refills: 0 | DISCHARGE
Start: 2025-05-05

## 2025-05-05 RX ORDER — SODIUM CHLORIDE 9 G/1000ML
1000 INJECTION, SOLUTION INTRAVENOUS
Refills: 0 | Status: DISCONTINUED | OUTPATIENT
Start: 2025-05-05 | End: 2025-05-06

## 2025-05-05 RX ORDER — CEFAZOLIN SODIUM IN 0.9 % NACL 3 G/100 ML
2000 INTRAVENOUS SOLUTION, PIGGYBACK (ML) INTRAVENOUS EVERY 8 HOURS
Refills: 0 | Status: COMPLETED | OUTPATIENT
Start: 2025-05-05 | End: 2025-05-05

## 2025-05-05 RX ORDER — INSULIN LISPRO 100 U/ML
INJECTION, SOLUTION INTRAVENOUS; SUBCUTANEOUS
Refills: 0 | Status: DISCONTINUED | OUTPATIENT
Start: 2025-05-05 | End: 2025-05-06

## 2025-05-05 RX ORDER — ACETAMINOPHEN 500 MG/5ML
1000 LIQUID (ML) ORAL ONCE
Refills: 0 | Status: COMPLETED | OUTPATIENT
Start: 2025-05-05 | End: 2025-05-05

## 2025-05-05 RX ORDER — TRAMADOL HYDROCHLORIDE 50 MG/1
50 TABLET, FILM COATED ORAL EVERY 6 HOURS
Refills: 0 | Status: DISCONTINUED | OUTPATIENT
Start: 2025-05-05 | End: 2025-05-06

## 2025-05-05 RX ORDER — ROSUVASTATIN CALCIUM 20 MG/1
10 TABLET, FILM COATED ORAL AT BEDTIME
Refills: 0 | Status: DISCONTINUED | OUTPATIENT
Start: 2025-05-05 | End: 2025-05-06

## 2025-05-05 RX ORDER — KETOROLAC TROMETHAMINE 30 MG/ML
15 INJECTION, SOLUTION INTRAMUSCULAR; INTRAVENOUS EVERY 6 HOURS
Refills: 0 | Status: DISCONTINUED | OUTPATIENT
Start: 2025-05-05 | End: 2025-05-05

## 2025-05-05 RX ORDER — ACETAMINOPHEN 500 MG/5ML
1000 LIQUID (ML) ORAL ONCE
Refills: 0 | Status: DISCONTINUED | OUTPATIENT
Start: 2025-05-05 | End: 2025-05-05

## 2025-05-05 RX ORDER — DEXTROSE 50 % IN WATER 50 %
25 SYRINGE (ML) INTRAVENOUS ONCE
Refills: 0 | Status: DISCONTINUED | OUTPATIENT
Start: 2025-05-05 | End: 2025-05-06

## 2025-05-05 RX ORDER — POLYETHYLENE GLYCOL 3350 17 G/17G
17 POWDER, FOR SOLUTION ORAL AT BEDTIME
Refills: 0 | Status: DISCONTINUED | OUTPATIENT
Start: 2025-05-05 | End: 2025-05-06

## 2025-05-05 RX ORDER — SODIUM CHLORIDE 9 G/1000ML
500 INJECTION, SOLUTION INTRAVENOUS ONCE
Refills: 0 | Status: DISCONTINUED | OUTPATIENT
Start: 2025-05-05 | End: 2025-05-05

## 2025-05-05 RX ORDER — ACETAMINOPHEN 500 MG/5ML
1000 LIQUID (ML) ORAL ONCE
Refills: 0 | Status: COMPLETED | OUTPATIENT
Start: 2025-05-06 | End: 2025-05-06

## 2025-05-05 RX ORDER — OMEPRAZOLE 20 MG/1
1 CAPSULE, DELAYED RELEASE ORAL
Refills: 0 | DISCHARGE

## 2025-05-05 RX ORDER — ASPIRIN 325 MG
1 TABLET ORAL
Qty: 0 | Refills: 0 | DISCHARGE
Start: 2025-05-05

## 2025-05-05 RX ORDER — BUMETANIDE 1 MG/1
2 TABLET ORAL
Refills: 0 | Status: DISCONTINUED | OUTPATIENT
Start: 2025-05-05 | End: 2025-05-06

## 2025-05-05 RX ORDER — SODIUM CHLORIDE 9 G/1000ML
500 INJECTION, SOLUTION INTRAVENOUS ONCE
Refills: 0 | Status: COMPLETED | OUTPATIENT
Start: 2025-05-05 | End: 2025-05-05

## 2025-05-05 RX ORDER — ACETAMINOPHEN 500 MG/5ML
3 LIQUID (ML) ORAL
Qty: 0 | Refills: 0 | DISCHARGE
Start: 2025-05-05

## 2025-05-05 RX ORDER — DAPAGLIFLOZIN 5 MG/1
1 TABLET, FILM COATED ORAL
Refills: 0 | DISCHARGE

## 2025-05-05 RX ORDER — OXYCODONE HYDROCHLORIDE 30 MG/1
5 TABLET ORAL EVERY 4 HOURS
Refills: 0 | Status: DISCONTINUED | OUTPATIENT
Start: 2025-05-05 | End: 2025-05-06

## 2025-05-05 RX ORDER — ACETAMINOPHEN 500 MG/5ML
975 LIQUID (ML) ORAL EVERY 8 HOURS
Refills: 0 | Status: DISCONTINUED | OUTPATIENT
Start: 2025-05-06 | End: 2025-05-06

## 2025-05-05 RX ORDER — TAMSULOSIN HYDROCHLORIDE 0.4 MG/1
0.8 CAPSULE ORAL AT BEDTIME
Refills: 0 | Status: DISCONTINUED | OUTPATIENT
Start: 2025-05-05 | End: 2025-05-06

## 2025-05-05 RX ORDER — HYDROMORPHONE/SOD CHLOR,ISO/PF 2 MG/10 ML
0.5 SYRINGE (ML) INJECTION
Refills: 0 | Status: DISCONTINUED | OUTPATIENT
Start: 2025-05-05 | End: 2025-05-05

## 2025-05-05 RX ORDER — INSULIN LISPRO 100 U/ML
INJECTION, SOLUTION INTRAVENOUS; SUBCUTANEOUS AT BEDTIME
Refills: 0 | Status: DISCONTINUED | OUTPATIENT
Start: 2025-05-05 | End: 2025-05-06

## 2025-05-05 RX ORDER — ONDANSETRON HCL/PF 4 MG/2 ML
4 VIAL (ML) INJECTION ONCE
Refills: 0 | Status: DISCONTINUED | OUTPATIENT
Start: 2025-05-05 | End: 2025-05-05

## 2025-05-05 RX ORDER — ONDANSETRON HCL/PF 4 MG/2 ML
4 VIAL (ML) INJECTION EVERY 6 HOURS
Refills: 0 | Status: DISCONTINUED | OUTPATIENT
Start: 2025-05-05 | End: 2025-05-06

## 2025-05-05 RX ORDER — ASPIRIN 325 MG
81 TABLET ORAL
Refills: 0 | Status: DISCONTINUED | OUTPATIENT
Start: 2025-05-05 | End: 2025-05-06

## 2025-05-05 RX ORDER — OXYCODONE HYDROCHLORIDE 30 MG/1
10 TABLET ORAL EVERY 4 HOURS
Refills: 0 | Status: DISCONTINUED | OUTPATIENT
Start: 2025-05-05 | End: 2025-05-06

## 2025-05-05 RX ORDER — DEXAMETHASONE 0.5 MG/1
8 TABLET ORAL ONCE
Refills: 0 | Status: COMPLETED | OUTPATIENT
Start: 2025-05-06 | End: 2025-05-06

## 2025-05-05 RX ORDER — OXYCODONE HYDROCHLORIDE 30 MG/1
1 TABLET ORAL
Qty: 0 | Refills: 0 | DISCHARGE
Start: 2025-05-05

## 2025-05-05 RX ORDER — SENNA 187 MG
2 TABLET ORAL AT BEDTIME
Refills: 0 | Status: DISCONTINUED | OUTPATIENT
Start: 2025-05-05 | End: 2025-05-06

## 2025-05-05 RX ORDER — SENNA 187 MG
2 TABLET ORAL
Qty: 0 | Refills: 0 | DISCHARGE
Start: 2025-05-05

## 2025-05-05 RX ORDER — DEXTROSE 50 % IN WATER 50 %
15 SYRINGE (ML) INTRAVENOUS ONCE
Refills: 0 | Status: DISCONTINUED | OUTPATIENT
Start: 2025-05-05 | End: 2025-05-06

## 2025-05-05 RX ORDER — AMLODIPINE BESYLATE 10 MG/1
1 TABLET ORAL
Refills: 0 | DISCHARGE

## 2025-05-05 RX ORDER — TRAMADOL HYDROCHLORIDE 50 MG/1
1 TABLET, FILM COATED ORAL
Qty: 0 | Refills: 0 | DISCHARGE
Start: 2025-05-05

## 2025-05-05 RX ORDER — GLUCAGON 3 MG/1
1 POWDER NASAL ONCE
Refills: 0 | Status: DISCONTINUED | OUTPATIENT
Start: 2025-05-05 | End: 2025-05-06

## 2025-05-05 RX ORDER — ROSUVASTATIN CALCIUM 5 MG/1
1 TABLET, FILM COATED ORAL
Refills: 0 | DISCHARGE

## 2025-05-05 RX ORDER — AMLODIPINE BESYLATE 10 MG/1
10 TABLET ORAL DAILY
Refills: 0 | Status: DISCONTINUED | OUTPATIENT
Start: 2025-05-05 | End: 2025-05-06

## 2025-05-05 RX ADMIN — Medication 20 MILLIEQUIVALENT(S): at 12:37

## 2025-05-05 RX ADMIN — TAMSULOSIN HYDROCHLORIDE 0.8 MILLIGRAM(S): 0.4 CAPSULE ORAL at 21:17

## 2025-05-05 RX ADMIN — OXYCODONE HYDROCHLORIDE 10 MILLIGRAM(S): 30 TABLET ORAL at 12:37

## 2025-05-05 RX ADMIN — BUMETANIDE 2 MILLIGRAM(S): 1 TABLET ORAL at 14:21

## 2025-05-05 RX ADMIN — Medication 81 MILLIGRAM(S): at 17:39

## 2025-05-05 RX ADMIN — OXYCODONE HYDROCHLORIDE 10 MILLIGRAM(S): 30 TABLET ORAL at 13:30

## 2025-05-05 RX ADMIN — ROSUVASTATIN CALCIUM 10 MILLIGRAM(S): 20 TABLET, FILM COATED ORAL at 21:17

## 2025-05-05 RX ADMIN — Medication 400 MILLIGRAM(S): at 23:17

## 2025-05-05 RX ADMIN — Medication 400 MILLIGRAM(S): at 16:38

## 2025-05-05 RX ADMIN — Medication 1000 MILLIGRAM(S): at 17:30

## 2025-05-05 RX ADMIN — INSULIN LISPRO 1: 100 INJECTION, SOLUTION INTRAVENOUS; SUBCUTANEOUS at 17:39

## 2025-05-05 RX ADMIN — TRAMADOL HYDROCHLORIDE 50 MILLIGRAM(S): 50 TABLET, FILM COATED ORAL at 07:21

## 2025-05-05 RX ADMIN — Medication 25 MILLIGRAM(S): at 14:21

## 2025-05-05 RX ADMIN — Medication 100 MILLIGRAM(S): at 16:40

## 2025-05-05 RX ADMIN — Medication 100 MILLIGRAM(S): at 14:21

## 2025-05-05 RX ADMIN — SODIUM CHLORIDE 500 MILLILITER(S): 9 INJECTION, SOLUTION INTRAVENOUS at 11:07

## 2025-05-05 RX ADMIN — Medication 100 MILLIGRAM(S): at 23:17

## 2025-05-05 NOTE — DISCHARGE NOTE PROVIDER - NSDCFUADDINST_GEN_ALL_CORE_FT
Please call Dr. Witt's office within next few days to schedule a follow up appointment about 14 days after surgery.   Recommend follow up with Medical MD within next 4 weeks.  Dressing will be changed during office visit.   Patient may shower after 5 days post surgery, limit direct water to dressing, if wet pat dry   Out of bed, ambulate, weight bearing as tolerated-   Physical therapy to assist with exercise and help increase endurance.   Please contact Doctors office regarding arrangements for out patient Physical therapy.

## 2025-05-05 NOTE — PHYSICAL THERAPY INITIAL EVALUATION ADULT - GAIT TRAINING, PT EVAL
GOAL: patient will be able to amb (I) with rolling walker for >10 ft in 2 weeks GOAL: patient will be able to amb (I) with rolling walker for >150 ft in 2 weeks

## 2025-05-05 NOTE — DISCHARGE NOTE PROVIDER - HOSPITAL COURSE
History of Present Illness	  63 y/o male M  PMHx HTN, GERD, HDL, chronic venous stasis dermatitis with swelling, CHF,  severe AS s/p AVR/JOANNA Clip  (2/23/24) postoperative course complicated by pericardial effusion t  which he underwent a Right VATS lysis of adhesions and pericardial windopericardial window . Pt with c/o right knee pain for several months  He has pain located to the medial aspect of the knee which is worse with activities. The pain is worse with prolonged walking, and flexion. The pain is affecting his daily activities. Presents to PST for scheduled Right Total Knee Arthroplasty  with Piotr on 5/5/25.     Goal " pain free "    Allergies:  	No Known Drug Allergies:   	garlic: Food, Confirmed, Patient, Angioedema, Swelling, Anaphylaxis    PAST MEDICAL HISTORY:  -Chronic GERD   -Chronic venous stasis dermatitis   -Dyslipidemia   -H/O CHF   -History of aortic stenosis   -Hypertension.     PAST SURGICAL HISTORY:  -S/P aortic valve replacement   -S/P pericardial window creation.      Patient presents to the hospital for elective surgery, underwent a right total knee replacement-  -tolerated procedure without complications.  Patient was evaluated by Physical therapy whom recommended home with home PT for discharge plan.  Patient is stable for discharge when cleared by PT.

## 2025-05-05 NOTE — PHYSICAL THERAPY INITIAL EVALUATION ADULT - ACTIVE RANGE OF MOTION EXAMINATION, REHAB EVAL
Rt hip n/t d/t pain/bilateral upper extremity Active ROM was WFL (within functional limits)/Left LE Active ROM was WFL (within functional limits)/deficits as listed below bilateral upper extremity Active ROM was WFL (within functional limits)/Left LE Active ROM was WFL (within functional limits) Rt knee AROM -6 to 70 deg/bilateral upper extremity Active ROM was WFL (within functional limits)/Left LE Active ROM was WFL (within functional limits)/deficits as listed below

## 2025-05-05 NOTE — PRE-ANESTHESIA EVALUATION ADULT - NSANTHPMHFT_GEN_ALL_CORE
63 y/o male M  PMHx HTN (Controlled), GERD (no current sx), HDL, chronic venous stasis dermatitis with swelling, CHF, severe AS s/p AVR/JOANNA Clip  (2/23/24) postoperative course complicated by pericardial effusion which he underwent a Right VATS lysis of adhesions and pericardial window. Not on AC.  Appreciate cardiology, PCP and EP clearance notes.  ET>4 METS

## 2025-05-05 NOTE — PATIENT PROFILE ADULT - FALL HARM RISK - RISK INTERVENTIONS

## 2025-05-05 NOTE — PHYSICAL THERAPY INITIAL EVALUATION ADULT - ADDITIONAL COMMENTS
as per pt, resides in basement of a PH, +8 stairs to enter  with railings, with a roommate, PTA, pt had BKA 2/21/25, revision 2/28/25, then DC home in April, able to hop short distance with rolling walker, wheelchair for long distance. owns shower chair. as per pt, resides in a  with spouse, +4 stairs to enter with railings, can stay on 1st floor, PTA, pt amb (I), (I) with ADLs. owns rolling walker and SAC

## 2025-05-05 NOTE — CHART NOTE - NSCHARTNOTEFT_GEN_A_CORE
Resting without complaints.  No Chest Pain, SOB, N/V.    T(C): 36.7 (05-05-25 @ 09:30), Max: 36.7 (05-05-25 @ 06:04)  HR: 48 (05-05-25 @ 11:00) (47 - 62)  BP: 133/68 (05-05-25 @ 10:30) (119/63 - 156/89)  RR: 16 (05-05-25 @ 10:30) (14 - 17)  SpO2: 100% (05-05-25 @ 11:00) (94% - 100%)  Wt(kg): --    Exam:  Alert and [place], No Acute Distress  Card: +S1/S2, RRR  Pulm: CTAB  Laterality: R Knee  Calves soft, non-tender bilaterally  Will re-assess motor sensory return when spinal wear off  + DP pulse    Xray: S/P R total knee arthroplasty, prosthesis in place and intact with no signs of jon-prosthetic Fx.          A/P: 64y Male S/p R Total Knee Arthroplasty. VSS. NAD  -Re-assess motor sensory return and addend this document with findings.  -PT/OT-WBAT RLE  -FU AM labs   -IS  -DVT PPx: ASA 81mg PO BID, SCDs, early OOB and ambulation  -Pain Control  -Continue Current Tx  -Dispo planning PACU to Floor    Karsten Hester PA-C  Orthopedic Surgery Team  Team Pager #[phone]

## 2025-05-05 NOTE — DISCHARGE NOTE PROVIDER - NSDCMRMEDTOKEN_GEN_ALL_CORE_FT
acetaminophen 325 mg oral tablet: 3 tab(s) orally every 8 hours Continue for 7 days, then as needed  alfuzosin 10 mg oral tablet, extended release: 1 tab(s) orally once a day (at bedtime)  alfuzosin 10 mg oral tablet, extended release: 1 tab(s) orally once a day (at bedtime) HOme  allopurinol 100 mg oral tablet: 1 tab(s) orally once a day  amLODIPine 10 mg oral tablet: 1 tab(s) orally once a day  aspirin 81 mg oral delayed release tablet: 1 tab(s) orally 2 times a day  bumetanide 2 mg oral tablet: 1 tab(s) orally 2 times a day  Farxiga 10 mg oral tablet: 1 tab(s) orally once a day  hydrALAZINE 25 mg oral tablet: 1 tab(s) orally 3 times a day  omeprazole 20 mg oral delayed release tablet: 1 tab(s) orally once a day  oxyCODONE 5 mg oral tablet: 1 tab(s) orally every 4 hours as needed for Moderate Pain (4 - 6) Or 2 tabs orally every 4 hours as needed for severe pain (7-10)  pantoprazole 40 mg oral delayed release tablet: 1 tab(s) orally once a day (before a meal)  polyethylene glycol 3350 oral powder for reconstitution: 17 gram(s) orally once a day (at bedtime) As needed Constipation  Potassium Chloride (Eqv-K-Tab) 20 mEq oral tablet, extended release: 1 tab(s) orally once a day  rosuvastatin 10 mg oral tablet: 1 tab(s) orally once a day  senna leaf extract oral tablet: 2 tab(s) orally once a day (at bedtime) As needed Constipation  traMADol 50 mg oral tablet: 1 tab(s) orally every 6 hours As needed Mild Pain (1 - 3)   acetaminophen 325 mg oral tablet: 3 tab(s) orally every 8 hours Continue for 7 days, then as needed  alfuzosin 10 mg oral tablet, extended release: 1 tab(s) orally once a day (at bedtime)  allopurinol 100 mg oral tablet: 1 tab(s) orally once a day  amLODIPine 10 mg oral tablet: 1 tab(s) orally once a day  aspirin 81 mg oral delayed release tablet: 1 tab(s) orally 2 times a day MDD: 2  bumetanide 2 mg oral tablet: 1 tab(s) orally 2 times a day  Farxiga 10 mg oral tablet: 1 tab(s) orally once a day  hydrALAZINE 25 mg oral tablet: 1 tab(s) orally 3 times a day  naloxone 4 mg/0.1 mL nasal spray: 1 spray(s) intranasally every 2 minutes as needed for Antidote Alternate nostrils  omeprazole 20 mg oral delayed release tablet: 1 tab(s) orally once a day  oxyCODONE 5 mg oral tablet: 1 tab(s) orally every 4 hours as needed for Moderate Pain (4 - 6) Or 2 tabs orally every 4 hours as needed for severe pain (7-10) MDD: 6  polyethylene glycol 3350 oral powder for reconstitution: 17 gram(s) orally once a day (at bedtime) As needed Constipation  Potassium Chloride (Eqv-K-Tab) 20 mEq oral tablet, extended release: 1 tab(s) orally once a day  rosuvastatin 10 mg oral tablet: 1 tab(s) orally once a day  senna leaf extract oral tablet: 2 tab(s) orally once a day (at bedtime) As needed Constipation  traMADol 50 mg oral tablet: 1 tab(s) orally every 6 hours as needed for Mild Pain (1 - 3) MDD: 4

## 2025-05-05 NOTE — CONSULT NOTE ADULT - PROBLEM SELECTOR RECOMMENDATION 4
-per wife gets regular endoscopies  -on both omeprazole and pantoprazole at home, rec stopping omeprazole and continuing with pantoprazole, patient/wife aware

## 2025-05-05 NOTE — CONSULT NOTE ADULT - PROBLEM SELECTOR RECOMMENDATION 6
Diet: per primary  DVT: per primary  DIspo: pending PT    Resume alfuzosin or equivalent to prevent urinary retention

## 2025-05-05 NOTE — PHYSICAL THERAPY INITIAL EVALUATION ADULT - NSPTDISCHREC_GEN_A_CORE
pt preferred home, if home, Home with Home PT for strengthening, bed mob, transfer, gait and balance training, home with assist for all functional mobility, 3-1 commode/Sub-acute Rehab Sub-acute Rehab Home PT

## 2025-05-05 NOTE — CONSULT NOTE ADULT - PROBLEM SELECTOR RECOMMENDATION 3
-Not in exacerbation, cannot lie flat at baseline, requires 2 pillows to sleep. Understands need to weigh daily, close follow up with Cardiology outpatient   -c/w bumex 2mg BID  -Resume Farxiga when okay per primary  -can resume home K supplementation on discharge while on bumex  -judicious IVF utilization

## 2025-05-05 NOTE — DISCHARGE NOTE PROVIDER - NSDCFUSCHEDAPPT_GEN_ALL_CORE_FT
Micah Nguyễn  [place] [facility]  CARDIOLOGY [address]  Scheduled Appointment: 05/30/2025    Randolph Finley  [facility]  [facility] [address]  Scheduled Appointment: 06/25/2025

## 2025-05-05 NOTE — CONSULT NOTE ADULT - SUBJECTIVE AND OBJECTIVE BOX
INCOMPLETE NOTE     HOSPITAL MEDICINE CO-MANAGEMENT INITIAL VISIT NOTE  Caity Nur  Reachable on MS Teams    CHIEF COMPLAINT: Patient is a 64y old  Male who presents with a chief complaint of Right arthritic knee pain/ Right total knee replacement (05 May 2025 11:22)      HPI: 65 y/o male M  PMHx HTN, GERD, HDL, chronic venous stasis dermatitis with swelling, CHF,  severe AS s/p AVR/JOANNA Clip  (2/23/24) postoperative course complicated by pericardial effusion t  which he underwent a Right VATS lysis of adhesions and pericardial windopericardial window . Pt with c/o right knee pain for several months  He has pain located to the medial aspect of the knee which is worse with activities. The pain is worse with prolonged walking, and flexion. The pain is affecting his daily activities. Presents to PST for scheduled Right Total Knee Arthroplasty  with Piotr on 5/5/25.     CT knee done today     (16 Apr 2025 15:49)      History limited due to: [ ] Dementia  [ ] Delirium  [ ] Condition    Allergies    garlic (Angioedema; Swelling; Anaphylaxis)  No Known Drug Allergies    Intolerances        HOME MEDICATIONS: [ ] Reviewed  Home Medications:  acetaminophen 325 mg oral tablet: 3 tab(s) orally every 8 hours Continue for 7 days, then as needed (05 May 2025 11:35)  alfuzosin 10 mg oral tablet, extended release: 1 tab(s) orally once a day (at bedtime) (05 May 2025 09:14)  alfuzosin 10 mg oral tablet, extended release: 1 tab(s) orally once a day (at bedtime) HOme (05 May 2025 05:53)  allopurinol 100 mg oral tablet: 1 tab(s) orally once a day (05 May 2025 05:53)  amLODIPine 10 mg oral tablet: 1 tab(s) orally once a day (05 May 2025 05:53)  aspirin 81 mg oral delayed release tablet: 1 tab(s) orally 2 times a day (05 May 2025 11:35)  Farxiga 10 mg oral tablet: 1 tab(s) orally once a day (05 May 2025 05:53)  hydrALAZINE 25 mg oral tablet: 1 tab(s) orally 3 times a day (05 May 2025 05:53)  omeprazole 20 mg oral delayed release tablet: 1 tab(s) orally once a day (05 May 2025 05:53)  oxyCODONE 5 mg oral tablet: 1 tab(s) orally every 4 hours as needed for Moderate Pain (4 - 6) Or 2 tabs orally every 4 hours as needed for severe pain (7-10) (05 May 2025 11:35)  polyethylene glycol 3350 oral powder for reconstitution: 17 gram(s) orally once a day (at bedtime) As needed Constipation (05 May 2025 11:35)  rosuvastatin 10 mg oral tablet: 1 tab(s) orally once a day (05 May 2025 05:53)  senna leaf extract oral tablet: 2 tab(s) orally once a day (at bedtime) As needed Constipation (05 May 2025 11:35)  traMADol 50 mg oral tablet: 1 tab(s) orally every 6 hours As needed Mild Pain (1 - 3) (05 May 2025 11:35)      MEDICATIONS  (STANDING):  acetaminophen   IVPB .. 1000 milliGRAM(s) IV Intermittent once  acetaminophen   IVPB .. 1000 milliGRAM(s) IV Intermittent once  allopurinol 100 milliGRAM(s) Oral daily  amLODIPine   Tablet 10 milliGRAM(s) Oral daily  aspirin enteric coated 81 milliGRAM(s) Oral two times a day  buMETAnide 2 milliGRAM(s) Oral two times a day  ceFAZolin   IVPB 2000 milliGRAM(s) IV Intermittent every 8 hours  chlorhexidine 2% Cloths 1 Application(s) Topical once  dextrose 5%. 1000 milliLiter(s) (100 mL/Hr) IV Continuous <Continuous>  dextrose 5%. 1000 milliLiter(s) (50 mL/Hr) IV Continuous <Continuous>  dextrose 50% Injectable 25 Gram(s) IV Push once  dextrose 50% Injectable 12.5 Gram(s) IV Push once  dextrose 50% Injectable 25 Gram(s) IV Push once  glucagon  Injectable 1 milliGRAM(s) IntraMuscular once  hydrALAZINE 25 milliGRAM(s) Oral three times a day  insulin lispro (ADMELOG) corrective regimen sliding scale   SubCutaneous three times a day before meals  insulin lispro (ADMELOG) corrective regimen sliding scale   SubCutaneous at bedtime  pantoprazole    Tablet 40 milliGRAM(s) Oral before breakfast  potassium chloride    Tablet ER 20 milliEquivalent(s) Oral daily  rosuvastatin 10 milliGRAM(s) Oral at bedtime  tamsulosin 0.8 milliGRAM(s) Oral at bedtime    MEDICATIONS  (PRN):  dextrose Oral Gel 15 Gram(s) Oral once PRN Blood Glucose LESS THAN 70 milliGRAM(s)/deciliter  ondansetron Injectable 4 milliGRAM(s) IV Push every 6 hours PRN Nausea and/or Vomiting  oxyCODONE    IR 5 milliGRAM(s) Oral every 4 hours PRN Moderate Pain (4 - 6)  oxyCODONE    IR 10 milliGRAM(s) Oral every 4 hours PRN Severe Pain (7 - 10)  polyethylene glycol 3350 17 Gram(s) Oral at bedtime PRN Constipation  senna 2 Tablet(s) Oral at bedtime PRN Constipation  traMADol 50 milliGRAM(s) Oral every 6 hours PRN Mild Pain (1 - 3)      PAST MEDICAL & SURGICAL HISTORY:  Dyslipidemia      Hypertension      Chronic GERD      History of aortic stenosis      Chronic venous stasis dermatitis      H/O CHF      S/P aortic valve replacement      S/P pericardial window creation      [ ] Reviewed     Functional Assessment: [ ] Independent  [ ] Assistance  [ ] Total care  [ ] Non-ambulatory    SOCIAL HISTORY:  Residence: [ ] [facility]  [ ] SNF  [ ] Community  [ ] Substance abuse:   [ ] Tobacco:   [ ] Alcohol use:     FAMILY HISTORY:  FH: congestive heart failure (Father, Sibling)    FH: coronary artery disease (Sibling)    FH: brain cancer (Mother)    [ ] No pertinent family history in first degree relatives     REVIEW OF SYSTEMS:    CONSTITUTIONAL: No fever, weight loss, or fatigue  EYES: No eye pain, visual disturbances, or discharge  ENMT:  No difficulty hearing, tinnitus, vertigo; No sinus or throat pain  NECK: No pain or stiffness  BREASTS: No pain, masses, or nipple discharge  RESPIRATORY: No cough, wheezing, chills or hemoptysis; No shortness of breath  CARDIOVASCULAR: No chest pain, palpitations, dizziness, or leg swelling  GASTROINTESTINAL: No abdominal or epigastric pain. No nausea, vomiting, or hematemesis; No diarrhea or constipation. No melena or hematochezia.  GENITOURINARY: No dysuria, frequency, hematuria, or incontinence  NEUROLOGICAL: No headaches, memory loss, loss of strength, numbness, or tremors  SKIN: No itching, burning, rashes, or lesions   LYMPH NODES: No enlarged glands  ENDOCRINE: No heat or cold intolerance; No hair loss  MUSCULOSKELETAL: No muscle or back pain  PSYCHIATRIC: No depression, anxiety, mood swings, or difficulty sleeping  HEME/LYMPH: No easy bruising, or bleeding gums  ALLERGY AND IMMUNOLOGIC: No hives or eczema    [  ] All other ROS negative  [  ] Unable to obtain due to poor mental status    PHYSICAL EXAM:    Vital Signs Last 24 Hrs  T(C): 36.7 (05 May 2025 15:30), Max: 36.7 (05 May 2025 06:04)  T(F): 98 (05 May 2025 15:30), Max: 98.1 (05 May 2025 13:30)  HR: 60 (05 May 2025 15:30) (47 - 62)  BP: 147/78 (05 May 2025 15:30) (119/63 - 156/89)  BP(mean): 88 (05 May 2025 11:30) (81 - 95)  RR: 18 (05 May 2025 15:30) (14 - 18)  SpO2: 95% (05 May 2025 15:30) (94% - 100%)    Parameters below as of 05 May 2025 15:30  Patient On (Oxygen Delivery Method): room air        CONSTITUTIONAL: Well-groomed, in no apparent distress  EYES: No conjunctival or scleral injection, non-icteric; PERRLA and symmetric  ENMT: No external nasal lesions; nasal mucosa not inflamed; no pharyngeal injection or exudates, oral mucosa with moist membranes  NECK: Trachea midline without palpable neck mass; thyroid not enlarged and non-tender  RESPIRATORY: Breathing comfortably; no dullness to percussion; lungs CTA without wheeze/rhonchi/rales  CARDIOVASCULAR: +S1S2, RRR, no M/G/R; no carotid bruits; pedal pulses full and symmetric; no lower extremity edema  CHEST/BREAST: Breasts are symmetric in appearance; no palpable masses or lumps  GASTROINTESTINAL: No palpable masses or tenderness, +BS throughout, no rebound/guarding; no hepatosplenomegaly; no hernia palpated  GENITOURINARY:       MALE: Normal appearing external genitalia, no penile lesion; no palpable testicular or scrotal mass; prostate not enlarged and without palpable nodule       FEMALE: Normal appearing external genitalia, no vaginal discharge or lesion noted; examination of urethra WNL; bladder without fullness or tenderness on palpation; palpation of uterus and adnexa without tenderness or mass  LYMPHATIC: No cervical LAD or tenderness; no axillary LAD or tenderness  MUSCULOSKELETAL: Normal gait and station; no digital clubbing or cyanosis; no paraspinal tenderness; examination of the  (head/neck, spine/ribs/pelvis, RUE, LUE, RLE, LLE) without misalignment, normal strength and tone of extremities  SKIN: No rashes or ulcers noted; no subcutaneous nodules or induration palpable  NEUROLOGIC: CN II-XII intact; normal reflexes in upper and lower extremities; sensation intact in LEs b/l to light touch  PSYCHIATRIC: A+O x 3; mood and affect appropriate; appropriate insight and judgment      LABS:                CAPILLARY BLOOD GLUCOSE      POCT Blood Glucose.: 129 mg/dL (05 May 2025 10:56)        RADIOLOGY & ADDITIONAL STUDIES:    EKG:   Personally Reviewed:  [ ] YES     Imaging:   Personally Reviewed:  [ ] YES               [ ] Consultant(s) Notes Reviewed  [x] Care Discussed with Consultants/Other Providers: Trauma Team   hospitals MEDICINE CO-MANAGEMENT INITIAL VISIT NOTE  Caity Nur  Reachable on MS Teams    CHIEF COMPLAINT: Patient is a 64y old  Male who presents with a chief complaint of Right arthritic knee pain/ Right total knee replacement (05 May 2025 11:22)      HPI: 65 y/o male M  PMHx HTN, GERD, HDL, chronic venous stasis dermatitis with swelling, CHF,  severe AS s/p AVR/JOANNA Clip  (2/23/24) postoperative course complicated by pericardial effusion t  which he underwent a Right VATS lysis of adhesions and pericardial window . Pt with c/o right knee pain for several months.  He has pain located to the medial aspect of the knee which is worse with activities. The pain is worse with prolonged walking, and flexion. The pain is affecting his daily activities. Presents to PST for scheduled Right Total Knee Arthroplasty  with Piotr on 5/5/25.     CT knee done today     (16 Apr 2025 15:49)    Seen in PACU post-op, wife at bedside. PMHx confirmed, med list reconfirmed with patient's spouse. Advised patient to stop taking omeprazole and continue taking pantoprazole. There are plans to start ASA as outpatient after this admission.       Allergies    garlic (Angioedema; Swelling; Anaphylaxis)  No Known Drug Allergies    Intolerances        HOME MEDICATIONS: [X] Reviewed  Home Medications:  acetaminophen 325 mg oral tablet: 3 tab(s) orally every 8 hours Continue for 7 days, then as needed (05 May 2025 11:35)  alfuzosin 10 mg oral tablet, extended release: 1 tab(s) orally once a day (at bedtime) (05 May 2025 09:14)  alfuzosin 10 mg oral tablet, extended release: 1 tab(s) orally once a day (at bedtime) HOme (05 May 2025 05:53)  allopurinol 100 mg oral tablet: 1 tab(s) orally once a day (05 May 2025 05:53)  amLODIPine 10 mg oral tablet: 1 tab(s) orally once a day (05 May 2025 05:53)  aspirin 81 mg oral delayed release tablet: 1 tab(s) orally 2 times a day (05 May 2025 11:35)  Farxiga 10 mg oral tablet: 1 tab(s) orally once a day (05 May 2025 05:53)  hydrALAZINE 25 mg oral tablet: 1 tab(s) orally 3 times a day (05 May 2025 05:53)  omeprazole 20 mg oral delayed release tablet: 1 tab(s) orally once a day (05 May 2025 05:53)  oxyCODONE 5 mg oral tablet: 1 tab(s) orally every 4 hours as needed for Moderate Pain (4 - 6) Or 2 tabs orally every 4 hours as needed for severe pain (7-10) (05 May 2025 11:35)  polyethylene glycol 3350 oral powder for reconstitution: 17 gram(s) orally once a day (at bedtime) As needed Constipation (05 May 2025 11:35)  rosuvastatin 10 mg oral tablet: 1 tab(s) orally once a day (05 May 2025 05:53)  senna leaf extract oral tablet: 2 tab(s) orally once a day (at bedtime) As needed Constipation (05 May 2025 11:35)  traMADol 50 mg oral tablet: 1 tab(s) orally every 6 hours As needed Mild Pain (1 - 3) (05 May 2025 11:35)      MEDICATIONS  (STANDING):  acetaminophen   IVPB .. 1000 milliGRAM(s) IV Intermittent once  acetaminophen   IVPB .. 1000 milliGRAM(s) IV Intermittent once  allopurinol 100 milliGRAM(s) Oral daily  amLODIPine   Tablet 10 milliGRAM(s) Oral daily  aspirin enteric coated 81 milliGRAM(s) Oral two times a day  buMETAnide 2 milliGRAM(s) Oral two times a day  ceFAZolin   IVPB 2000 milliGRAM(s) IV Intermittent every 8 hours  chlorhexidine 2% Cloths 1 Application(s) Topical once  dextrose 5%. 1000 milliLiter(s) (100 mL/Hr) IV Continuous <Continuous>  dextrose 5%. 1000 milliLiter(s) (50 mL/Hr) IV Continuous <Continuous>  dextrose 50% Injectable 25 Gram(s) IV Push once  dextrose 50% Injectable 12.5 Gram(s) IV Push once  dextrose 50% Injectable 25 Gram(s) IV Push once  glucagon  Injectable 1 milliGRAM(s) IntraMuscular once  hydrALAZINE 25 milliGRAM(s) Oral three times a day  insulin lispro (ADMELOG) corrective regimen sliding scale   SubCutaneous three times a day before meals  insulin lispro (ADMELOG) corrective regimen sliding scale   SubCutaneous at bedtime  pantoprazole    Tablet 40 milliGRAM(s) Oral before breakfast  potassium chloride    Tablet ER 20 milliEquivalent(s) Oral daily  rosuvastatin 10 milliGRAM(s) Oral at bedtime  tamsulosin 0.8 milliGRAM(s) Oral at bedtime    MEDICATIONS  (PRN):  dextrose Oral Gel 15 Gram(s) Oral once PRN Blood Glucose LESS THAN 70 milliGRAM(s)/deciliter  ondansetron Injectable 4 milliGRAM(s) IV Push every 6 hours PRN Nausea and/or Vomiting  oxyCODONE    IR 5 milliGRAM(s) Oral every 4 hours PRN Moderate Pain (4 - 6)  oxyCODONE    IR 10 milliGRAM(s) Oral every 4 hours PRN Severe Pain (7 - 10)  polyethylene glycol 3350 17 Gram(s) Oral at bedtime PRN Constipation  senna 2 Tablet(s) Oral at bedtime PRN Constipation  traMADol 50 milliGRAM(s) Oral every 6 hours PRN Mild Pain (1 - 3)      PAST MEDICAL & SURGICAL HISTORY:  Dyslipidemia      Hypertension      Chronic GERD      History of aortic stenosis      Chronic venous stasis dermatitis      H/O CHF      S/P aortic valve replacement      S/P pericardial window creation      SOCIAL HISTORY:  Residence: [ ] [facility]  [ ] [facility]  [ ] Community  [ ] Substance abuse: -  [ ] Tobacco: -  [ ] Alcohol use: -    FAMILY HISTORY:  FH: congestive heart failure (Father, Sibling)    FH: coronary artery disease (Sibling)    FH: brain cancer (Mother)    [ ] No pertinent family history in first degree relatives     REVIEW OF SYSTEMS:    CONSTITUTIONAL: No weight loss, or fatigue  ENMT:  + difficulty hearing (didn't have hearing aides in)  NECK: No pain or stiffness  RESPIRATORY: No cough, wheezing, chills; No shortness of breath  CARDIOVASCULAR: No chest pain, palpitations, dizziness, or leg swelling  GASTROINTESTINAL: No abdominal or epigastric pain. No nausea, vomiting, or hematemesis; No diarrhea or constipation. No melena or hematochezia.  GENITOURINARY: No dysuria, frequency, hematuria, or incontinence  NEUROLOGICAL: No headaches, memory loss, loss of strength, numbness, or tremors  SKIN: No itching, burning, rashes, or lesions   MUSCULOSKELETAL: No muscle or back pain  PSYCHIATRIC: No depression, anxiety, mood swings, or difficulty sleeping      [  ] All other ROS negative  [  ] Unable to obtain due to poor mental status    PHYSICAL EXAM:    Vital Signs Last 24 Hrs  T(C): 36.7 (05 May 2025 15:30), Max: 36.7 (05 May 2025 06:04)  T(F): 98 (05 May 2025 15:30), Max: 98.1 (05 May 2025 13:30)  HR: 60 (05 May 2025 15:30) (47 - 62)  BP: 147/78 (05 May 2025 15:30) (119/63 - 156/89)  BP(mean): 88 (05 May 2025 11:30) (81 - 95)  RR: 18 (05 May 2025 15:30) (14 - 18)  SpO2: 95% (05 May 2025 15:30) (94% - 100%)    Parameters below as of 05 May 2025 15:30  Patient On (Oxygen Delivery Method): room air      CONSTITUTIONAL: Well-groomed, in no apparent distress, hard of hearing  EYES: No conjunctival or scleral injection, non-icteric  ENMT: No external nasal lesions; nasal mucosa not inflamed; no pharyngeal injection or exudates, oral mucosa with moist membranes  NECK: Trachea midline without palpable neck mass; thyroid not enlarged and non-tender  RESPIRATORY: Breathing comfortably; no dullness to percussion; lungs CTA without wheeze/rhonchi/rales  CARDIOVASCULAR: +S1S2, RRR, +murmur  GASTROINTESTINAL: No palpable masses or tenderness, +BS throughout  MUSCULOSKELETAL: no digital clubbing or cyanosis; unable to assess as pt post-op, anesthesia still in effect  SKIN: No rashes or ulcers noted; no subcutaneous nodules or induration palpable, surgical dressing   NEUROLOGIC: CN II-XII grossly intact  PSYCHIATRIC: A+O x 3; mood and affect appropriate; appropriate insight and judgment      LABS:          CAPILLARY BLOOD GLUCOSE      POCT Blood Glucose.: 129 mg/dL (05 May 2025 10:56)        RADIOLOGY & ADDITIONAL STUDIES:    EKG:   Personally Reviewed:  [ ] YES     Imaging:   Personally Reviewed:  [ ] YES               [ ] Consultant(s) Notes Reviewed  [x] Care Discussed with Consultants/Other Providers

## 2025-05-05 NOTE — DISCHARGE NOTE PROVIDER - CARE PROVIDER_API CALL
Reshma Witt  Orthopaedic Surgery  [address], [address]  [place] [address]  Phone: [phone]  Fax: [phone]  Follow Up Time:

## 2025-05-05 NOTE — PHYSICAL THERAPY INITIAL EVALUATION ADULT - PERTINENT HX OF CURRENT PROBLEM, REHAB EVAL
65 y/o male M  PMHx HTN, GERD, HDL, chronic venous stasis dermatitis with swelling, CHF,  severe AS s/p AVR/JOANNA Clip  (2/23/24) postoperative course complicated by pericardial effusion t  which he underwent a Right VATS lysis of adhesions and pericardial windopericardial window . Pt with c/o right knee pain for several months  He has pain located to the medial aspect of the knee which is worse with activities. The pain is worse with prolonged walking, and flexion. The pain is affecting his daily activities. Presents to PST for scheduled Right Total Knee Arthroplasty  with Piotr on 5/5/25.   image pending

## 2025-05-05 NOTE — CONSULT NOTE ADULT - PROBLEM SELECTOR PROBLEM 1
V-tach less likely  Will discuss with Cardiology    Continue with Xarelto and Plavix therapy Unilateral primary osteoarthritis, right knee

## 2025-05-05 NOTE — PHYSICAL THERAPY INITIAL EVALUATION ADULT - PLANNED THERAPY INTERVENTIONS, PT EVAL
stairs: GOAL: patient will be able to negotiated 8 stairs (I) with one HR in 2 weeks/bed mobility training/gait training/transfer training

## 2025-05-05 NOTE — CONSULT NOTE ADULT - ASSESSMENT
63 y/o male M  PMHx HTN, GERD, HLD, chronic venous stasis dermatitis with swelling, CHF 2/2 severe AS s/p AVR/JOANNA Clip  (2/23/24) postoperative course complicated by pericardial effusion s/p Right VATS lysis of adhesions and pericardial window now admitted for planned R TKA.

## 2025-05-06 ENCOUNTER — RX RENEWAL (OUTPATIENT)
Age: 65
End: 2025-05-06

## 2025-05-06 ENCOUNTER — TRANSCRIPTION ENCOUNTER (OUTPATIENT)
Age: 65
End: 2025-05-06

## 2025-05-06 VITALS
TEMPERATURE: 99 F | OXYGEN SATURATION: 97 % | HEART RATE: 63 BPM | DIASTOLIC BLOOD PRESSURE: 73 MMHG | RESPIRATION RATE: 18 BRPM | SYSTOLIC BLOOD PRESSURE: 138 MMHG

## 2025-05-06 LAB
ANION GAP SERPL CALC-SCNC: 9 MMOL/L — SIGNIFICANT CHANGE UP (ref 5–17)
BUN SERPL-MCNC: 31 MG/DL — HIGH (ref 7–23)
CALCIUM SERPL-MCNC: 8.8 MG/DL — SIGNIFICANT CHANGE UP (ref 8.4–10.5)
CHLORIDE SERPL-SCNC: 103 MMOL/L — SIGNIFICANT CHANGE UP (ref 96–108)
CO2 SERPL-SCNC: 24 MMOL/L — SIGNIFICANT CHANGE UP (ref 22–31)
CREAT SERPL-MCNC: 1.23 MG/DL — SIGNIFICANT CHANGE UP (ref 0.5–1.3)
EGFR: 66 ML/MIN/1.73M2 — SIGNIFICANT CHANGE UP
EGFR: 66 ML/MIN/1.73M2 — SIGNIFICANT CHANGE UP
GLUCOSE BLDC GLUCOMTR-MCNC: 169 MG/DL — HIGH (ref 70–99)
GLUCOSE BLDC GLUCOMTR-MCNC: 206 MG/DL — HIGH (ref 70–99)
GLUCOSE SERPL-MCNC: 130 MG/DL — HIGH (ref 70–99)
HCT VFR BLD CALC: 30.8 % — LOW (ref 39–50)
HGB BLD-MCNC: 9.6 G/DL — LOW (ref 13–17)
MCHC RBC-ENTMCNC: 18.7 PG — LOW (ref 27–34)
MCHC RBC-ENTMCNC: 31.2 G/DL — LOW (ref 32–36)
MCV RBC AUTO: 59.9 FL — LOW (ref 80–100)
NRBC BLD AUTO-RTO: 0 /100 WBCS — SIGNIFICANT CHANGE UP (ref 0–0)
PLATELET # BLD AUTO: 165 K/UL — SIGNIFICANT CHANGE UP (ref 150–400)
POTASSIUM SERPL-MCNC: 3.4 MMOL/L — LOW (ref 3.5–5.3)
POTASSIUM SERPL-SCNC: 3.4 MMOL/L — LOW (ref 3.5–5.3)
RBC # BLD: 5.14 M/UL — SIGNIFICANT CHANGE UP (ref 4.2–5.8)
RBC # FLD: 18.4 % — HIGH (ref 10.3–14.5)
SODIUM SERPL-SCNC: 136 MMOL/L — SIGNIFICANT CHANGE UP (ref 135–145)
WBC # BLD: 19.77 K/UL — HIGH (ref 3.8–10.5)
WBC # FLD AUTO: 19.77 K/UL — HIGH (ref 3.8–10.5)

## 2025-05-06 PROCEDURE — C1776: CPT

## 2025-05-06 PROCEDURE — 97116 GAIT TRAINING THERAPY: CPT

## 2025-05-06 PROCEDURE — 27447 TOTAL KNEE ARTHROPLASTY: CPT | Mod: LT

## 2025-05-06 PROCEDURE — 97162 PT EVAL MOD COMPLEX 30 MIN: CPT

## 2025-05-06 PROCEDURE — C1713: CPT

## 2025-05-06 PROCEDURE — 73560 X-RAY EXAM OF KNEE 1 OR 2: CPT

## 2025-05-06 PROCEDURE — S2900: CPT

## 2025-05-06 PROCEDURE — 97110 THERAPEUTIC EXERCISES: CPT

## 2025-05-06 PROCEDURE — 82962 GLUCOSE BLOOD TEST: CPT

## 2025-05-06 PROCEDURE — 97165 OT EVAL LOW COMPLEX 30 MIN: CPT

## 2025-05-06 PROCEDURE — 85027 COMPLETE CBC AUTOMATED: CPT

## 2025-05-06 PROCEDURE — 80048 BASIC METABOLIC PNL TOTAL CA: CPT

## 2025-05-06 PROCEDURE — 20985 CPTR-ASST DIR MS PX: CPT

## 2025-05-06 RX ORDER — ASPIRIN 325 MG
1 TABLET ORAL
Qty: 84 | Refills: 0
Start: 2025-05-06 | End: 2025-06-16

## 2025-05-06 RX ORDER — TRAMADOL HYDROCHLORIDE 50 MG/1
1 TABLET, FILM COATED ORAL
Qty: 28 | Refills: 0
Start: 2025-05-06 | End: 2025-05-12

## 2025-05-06 RX ORDER — NALOXONE HYDROCHLORIDE 0.4 MG/ML
1 INJECTION, SOLUTION INTRAMUSCULAR; INTRAVENOUS; SUBCUTANEOUS
Qty: 1 | Refills: 0
Start: 2025-05-06

## 2025-05-06 RX ORDER — OXYCODONE HYDROCHLORIDE 30 MG/1
1 TABLET ORAL
Qty: 42 | Refills: 0
Start: 2025-05-06 | End: 2025-05-12

## 2025-05-06 RX ADMIN — Medication 1000 MILLIGRAM(S): at 00:17

## 2025-05-06 RX ADMIN — DEXAMETHASONE 101.6 MILLIGRAM(S): 0.5 TABLET ORAL at 05:36

## 2025-05-06 RX ADMIN — OXYCODONE HYDROCHLORIDE 10 MILLIGRAM(S): 30 TABLET ORAL at 04:07

## 2025-05-06 RX ADMIN — Medication 20 MILLIEQUIVALENT(S): at 11:55

## 2025-05-06 RX ADMIN — OXYCODONE HYDROCHLORIDE 10 MILLIGRAM(S): 30 TABLET ORAL at 10:58

## 2025-05-06 RX ADMIN — Medication 40 MILLIGRAM(S): at 05:36

## 2025-05-06 RX ADMIN — INSULIN LISPRO 1: 100 INJECTION, SOLUTION INTRAVENOUS; SUBCUTANEOUS at 08:52

## 2025-05-06 RX ADMIN — BUMETANIDE 2 MILLIGRAM(S): 1 TABLET ORAL at 05:36

## 2025-05-06 RX ADMIN — Medication 400 MILLIGRAM(S): at 08:51

## 2025-05-06 RX ADMIN — INSULIN LISPRO 2: 100 INJECTION, SOLUTION INTRAVENOUS; SUBCUTANEOUS at 12:13

## 2025-05-06 RX ADMIN — Medication 81 MILLIGRAM(S): at 05:36

## 2025-05-06 RX ADMIN — OXYCODONE HYDROCHLORIDE 10 MILLIGRAM(S): 30 TABLET ORAL at 10:12

## 2025-05-06 RX ADMIN — Medication 1000 MILLIGRAM(S): at 09:25

## 2025-05-06 RX ADMIN — Medication 100 MILLIGRAM(S): at 11:55

## 2025-05-06 RX ADMIN — OXYCODONE HYDROCHLORIDE 10 MILLIGRAM(S): 30 TABLET ORAL at 04:37

## 2025-05-06 NOTE — PROGRESS NOTE ADULT - PROBLEM SELECTOR PLAN 6
Diet: regular  DVT: per primary  DIspo: HPT    Resume alfuzosin or equivalent to prevent urinary retention.

## 2025-05-06 NOTE — PROGRESS NOTE ADULT - TIME BILLING
time spent reviewing prior charts, meds, discussing plan with patient, wife, primary team= 53 minutes

## 2025-05-06 NOTE — OCCUPATIONAL THERAPY INITIAL EVALUATION ADULT - LEVEL OF INDEPENDENCE: DRESS LOWER BODY, OT EVAL
6/8/2017        [facility]  [address] [place] [id number]          Dear Mai,      Enclosed is a copy of your recent labs.  Please make an appointment with Dr. Gudino to discuss these as per conversation of 6-2-2017  Thanks for your prompt attention to this matter.          Sincerely,          Jacqueline per Dr. Ольга Gudino   [address] [address] [phone]    
independent

## 2025-05-06 NOTE — DISCHARGE NOTE NURSING/CASE MANAGEMENT/SOCIAL WORK - NSDCVIVACCINE_GEN_ALL_CORE_FT
Tdap; 27-Jul-2022 20:19; Danielle Mcleod (RN); [employer]; [id number] (Exp. Date: 18-Apr-2024); IntraMuscular; Deltoid Left.; 0.5 milliLiter(s); VIS (VIS Published: 09-May-2013, VIS Presented: 27-Jul-2022);

## 2025-05-06 NOTE — PROGRESS NOTE ADULT - PROBLEM SELECTOR PLAN 3
-Not in exacerbation, cannot lie flat at baseline, requires 2 pillows to sleep. Understands need to weigh daily, close follow up with Cardiology outpatient   -clarified home dose of 2mg bumex 1 tab BID which patient can continue on discharge  -Resume Farxiga  -can resume home K supplementation on discharge while on bumex  -judicious IVF utilization.

## 2025-05-06 NOTE — PROGRESS NOTE ADULT - PROBLEM SELECTOR PLAN 4
-per wife gets regular endoscopies  -on both omeprazole and pantoprazole at home, rec stopping omeprazole and continuing with pantoprazole, patient/wife aware.

## 2025-05-06 NOTE — DISCHARGE NOTE NURSING/CASE MANAGEMENT/SOCIAL WORK - PATIENT PORTAL LINK FT
You can access the FollowMyHealth Patient Portal offered by [facility] by registering at the following website: [email or web address]. By joining [employer]’s FollowMyHealth portal, you will also be able to view your health information using other applications (apps) compatible with our system.

## 2025-05-06 NOTE — PROGRESS NOTE ADULT - SUBJECTIVE AND OBJECTIVE BOX
SUBJECTIVE:   Patient seen and examined at bedside. Pt doing generally well.    Pain well controlled with medication     OBJECTIVE:   Vital Signs Last 24 Hrs  T(C): 36.7 (06 May 2025 04:30), Max: 36.7 (05 May 2025 06:04)  T(F): 98.1 (06 May 2025 04:30), Max: 98.1 (05 May 2025 13:30)  HR: 54 (06 May 2025 04:30) (47 - 62)  BP: 136/70 (06 May 2025 04:30) (119/63 - 162/75)  BP(mean): 88 (05 May 2025 11:30) (81 - 95)  RR: 18 (06 May 2025 04:30) (14 - 18)  SpO2: 96% (06 May 2025 04:30) (94% - 100%)    Parameters below as of 06 May 2025 04:30  Patient On (Oxygen Delivery Method): room air        General: NAD  Neuro: Alert  and oriented  Resp: Non-labored breathing, no accessory muscle use  Right  Lower extremity:         Skin intact         Dressing: clean/dry/intact            Sensation: SILT         Motor exam: + TA/GS/EHL/FHL       compartments soft         warm well perfused        +DP pulse  MSK: Moving all other extremities spontaneously    LABS:              MEDS:  MEDICATIONS  (PRN):  dextrose Oral Gel 15 Gram(s) Oral once PRN Blood Glucose LESS THAN 70 milliGRAM(s)/deciliter  ondansetron Injectable 4 milliGRAM(s) IV Push every 6 hours PRN Nausea and/or Vomiting  oxyCODONE    IR 5 milliGRAM(s) Oral every 4 hours PRN Moderate Pain (4 - 6)  oxyCODONE    IR 10 milliGRAM(s) Oral every 4 hours PRN Severe Pain (7 - 10)  polyethylene glycol 3350 17 Gram(s) Oral at bedtime PRN Constipation  senna 2 Tablet(s) Oral at bedtime PRN Constipation  traMADol 50 milliGRAM(s) Oral every 6 hours PRN Mild Pain (1 - 3)    acetaminophen     Tablet .. 975 milliGRAM(s) Oral every 8 hours  acetaminophen   IVPB .. 1000 milliGRAM(s) IV Intermittent once  allopurinol 100 milliGRAM(s) Oral daily  amLODIPine   Tablet 10 milliGRAM(s) Oral daily  aspirin enteric coated 81 milliGRAM(s) Oral two times a day  buMETAnide 2 milliGRAM(s) Oral two times a day  chlorhexidine 2% Cloths 1 Application(s) Topical once  dextrose 5%. 1000 milliLiter(s) IV Continuous <Continuous>  dextrose 5%. 1000 milliLiter(s) IV Continuous <Continuous>  dextrose 50% Injectable 25 Gram(s) IV Push once  dextrose 50% Injectable 12.5 Gram(s) IV Push once  dextrose 50% Injectable 25 Gram(s) IV Push once  dextrose Oral Gel 15 Gram(s) Oral once PRN  glucagon  Injectable 1 milliGRAM(s) IntraMuscular once  hydrALAZINE 25 milliGRAM(s) Oral three times a day  insulin lispro (ADMELOG) corrective regimen sliding scale   SubCutaneous three times a day before meals  insulin lispro (ADMELOG) corrective regimen sliding scale   SubCutaneous at bedtime  ondansetron Injectable 4 milliGRAM(s) IV Push every 6 hours PRN  oxyCODONE    IR 5 milliGRAM(s) Oral every 4 hours PRN  oxyCODONE    IR 10 milliGRAM(s) Oral every 4 hours PRN  pantoprazole    Tablet 40 milliGRAM(s) Oral before breakfast  polyethylene glycol 3350 17 Gram(s) Oral at bedtime PRN  potassium chloride    Tablet ER 20 milliEquivalent(s) Oral daily  rosuvastatin 10 milliGRAM(s) Oral at bedtime  senna 2 Tablet(s) Oral at bedtime PRN  tamsulosin 0.8 milliGRAM(s) Oral at bedtime  traMADol 50 milliGRAM(s) Oral every 6 hours PRN      ASSESSMENT & PLAN:  Pt is a 65 y/o  Male   s/p Right TKA on 5/5 . Patient is hemodynamically stable; recovering well from orthopaedic standpoint.  -    Multimodal Pain control  -    DVT ppx: ASA   -    Resumed home meds  -    Check AM labs  -    Weight bearing status: WBAT   -    PT/OT  -    Dispo: Home likely today        Orthopaedic Surgery  [facility] [id number]  [facility]        [id number]  [facility]  [id number]/ [phone]  
Caity Nur MD  Division of Hospital Medicine  Reachable on MS Teams    PROGRESS NOTE:     Patient is a 64y old  Male who presents with a chief complaint of R TKA (05 May 2025 14:34)      SUBJECTIVE / OVERNIGHT EVENTS: No acute events overnight, seen this AM. Ambulated with PT, pain controlled with medication. Was not sure about his medications he takes at home, clarified with pt's wife.     ADDITIONAL REVIEW OF SYSTEMS:    MEDICATIONS  (STANDING):  acetaminophen     Tablet .. 975 milliGRAM(s) Oral every 8 hours  allopurinol 100 milliGRAM(s) Oral daily  amLODIPine   Tablet 10 milliGRAM(s) Oral daily  aspirin enteric coated 81 milliGRAM(s) Oral two times a day  buMETAnide 2 milliGRAM(s) Oral two times a day  chlorhexidine 2% Cloths 1 Application(s) Topical once  dextrose 5%. 1000 milliLiter(s) (50 mL/Hr) IV Continuous <Continuous>  dextrose 5%. 1000 milliLiter(s) (100 mL/Hr) IV Continuous <Continuous>  dextrose 50% Injectable 25 Gram(s) IV Push once  dextrose 50% Injectable 12.5 Gram(s) IV Push once  dextrose 50% Injectable 25 Gram(s) IV Push once  glucagon  Injectable 1 milliGRAM(s) IntraMuscular once  hydrALAZINE 25 milliGRAM(s) Oral three times a day  insulin lispro (ADMELOG) corrective regimen sliding scale   SubCutaneous three times a day before meals  insulin lispro (ADMELOG) corrective regimen sliding scale   SubCutaneous at bedtime  pantoprazole    Tablet 40 milliGRAM(s) Oral before breakfast  potassium chloride    Tablet ER 20 milliEquivalent(s) Oral daily  rosuvastatin 10 milliGRAM(s) Oral at bedtime  tamsulosin 0.8 milliGRAM(s) Oral at bedtime    MEDICATIONS  (PRN):  dextrose Oral Gel 15 Gram(s) Oral once PRN Blood Glucose LESS THAN 70 milliGRAM(s)/deciliter  ondansetron Injectable 4 milliGRAM(s) IV Push every 6 hours PRN Nausea and/or Vomiting  oxyCODONE    IR 5 milliGRAM(s) Oral every 4 hours PRN Moderate Pain (4 - 6)  oxyCODONE    IR 10 milliGRAM(s) Oral every 4 hours PRN Severe Pain (7 - 10)  polyethylene glycol 3350 17 Gram(s) Oral at bedtime PRN Constipation  senna 2 Tablet(s) Oral at bedtime PRN Constipation  traMADol 50 milliGRAM(s) Oral every 6 hours PRN Mild Pain (1 - 3)      CAPILLARY BLOOD GLUCOSE      POCT Blood Glucose.: 206 mg/dL (06 May 2025 11:59)  POCT Blood Glucose.: 169 mg/dL (06 May 2025 08:43)  POCT Blood Glucose.: 165 mg/dL (05 May 2025 21:20)  POCT Blood Glucose.: 180 mg/dL (05 May 2025 17:31)    I&O's Summary    05 May 2025 07:01  -  06 May 2025 07:00  --------------------------------------------------------  IN: 360 mL / OUT: 1800 mL / NET: -1440 mL    06 May 2025 07:01  -  06 May 2025 12:26  --------------------------------------------------------  IN: 0 mL / OUT: 900 mL / NET: -900 mL        PHYSICAL EXAM:  Vital Signs Last 24 Hrs  T(C): 36.9 (06 May 2025 11:54), Max: 36.9 (06 May 2025 11:54)  T(F): 98.5 (06 May 2025 11:54), Max: 98.5 (06 May 2025 11:54)  HR: 55 (06 May 2025 11:54) (52 - 64)  BP: 131/69 (06 May 2025 11:54) (131/69 - 162/75)  BP(mean): --  RR: 18 (06 May 2025 11:54) (18 - 18)  SpO2: 98% (06 May 2025 11:54) (94% - 98%)    Parameters below as of 06 May 2025 11:54  Patient On (Oxygen Delivery Method): room air        CONSTITUTIONAL: NAD, well-developed  RESPIRATORY: Normal respiratory effort; lungs are clear to auscultation bilaterally  CARDIOVASCULAR: Regular rate and rhythm, normal S1 and S2, +murmur. No LE edema  ABDOMEN: Nontender to palpation, normoactive bowel sounds, no rebound/guarding  MUSCLOSKELETAL: no clubbing or cyanosis of digits; no joint swelling or tenderness to palpation, R knee dressing in place, c/d/i  PSYCH: A+O to person, place, and time; affect appropriate    LABS:                        9.6    19.77 )-----------( 165      ( 06 May 2025 05:50 )             30.8     05-06    136  |  103  |  31[H]  ----------------------------<  130[H]  3.4[L]   |  24  |  1.23    Ca    8.8      06 May 2025 05:50            Urinalysis Basic - ( 06 May 2025 05:50 )    Color: x / Appearance: x / SG: x / pH: x  Gluc: 130 mg/dL / Ketone: x  / Bili: x / Urobili: x   Blood: x / Protein: x / Nitrite: x   Leuk Esterase: x / RBC: x / WBC x   Sq Epi: x / Non Sq Epi: x / Bacteria: x          RADIOLOGY & ADDITIONAL TESTS:  Results Reviewed:   Imaging Personally Reviewed:  Electrocardiogram Personally Reviewed:    COORDINATION OF CARE:  Care Discussed with Consultants/Other Providers [Y/N]: Ortho   Prior or Outpatient Records Reviewed [Y/N]:

## 2025-05-06 NOTE — OCCUPATIONAL THERAPY INITIAL EVALUATION ADULT - PERTINENT HX OF CURRENT PROBLEM, REHAB EVAL
63 y/o male M  PMHx HTN, GERD, HDL, chronic venous stasis dermatitis with swelling, CHF,  severe AS s/p AVR/JOANNA Clip  (2/23/24) postoperative course complicated by pericardial effusion t  which he underwent a Right VATS lysis of adhesions and pericardial windopericardial window . Pt with c/o right knee pain for several months  He has pain located to the medial aspect of the knee which is worse with activities. The pain is worse with prolonged walking, and flexion. The pain is affecting his daily activities. Presents to PST for scheduled Right Total Knee Arthroplasty  with Piotr on 5/5/25. Pt s/p R TKA 5/5

## 2025-05-06 NOTE — DISCHARGE NOTE NURSING/CASE MANAGEMENT/SOCIAL WORK - FINANCIAL ASSISTANCE
[facility] provides services at a reduced cost to those who are determined to be eligible through [facility]’s financial assistance program. Information regarding [facility]’s financial assistance program can be found by going to [email or web address] or by calling [phone].

## 2025-05-06 NOTE — PROGRESS NOTE ADULT - ASSESSMENT
65 y/o male M  PMHx HTN, GERD, HLD, chronic venous stasis dermatitis with swelling, CHF 2/2 severe AS s/p AVR/JOANNA Clip  (2/23/24) postoperative course complicated by pericardial effusion s/p Right VATS lysis of adhesions and pericardial window now s/p R TKA 5/5.

## 2025-05-06 NOTE — OCCUPATIONAL THERAPY INITIAL EVALUATION ADULT - LIVES WITH, PROFILE
Pt lives with family in private home, 4 steps to enter, 1st floor setup, walk in shower. Pt I in ADLs and ambulation prior to admission

## 2025-05-07 RX ORDER — ALFUZOSIN HYDROCHLORIDE 10 MG/1
1 TABLET, EXTENDED RELEASE ORAL
Refills: 0 | DISCHARGE

## 2025-05-13 ENCOUNTER — EMERGENCY (EMERGENCY)
Facility: HOSPITAL | Age: 65
LOS: 1 days | End: 2025-05-13
Attending: EMERGENCY MEDICINE | Admitting: EMERGENCY MEDICINE
Payer: COMMERCIAL

## 2025-05-13 VITALS
TEMPERATURE: 98 F | RESPIRATION RATE: 18 BRPM | SYSTOLIC BLOOD PRESSURE: 147 MMHG | WEIGHT: 199.96 LBS | HEART RATE: 86 BPM | HEIGHT: 68 IN | DIASTOLIC BLOOD PRESSURE: 85 MMHG | OXYGEN SATURATION: 97 %

## 2025-05-13 DIAGNOSIS — Z98.890 OTHER SPECIFIED POSTPROCEDURAL STATES: Chronic | ICD-10-CM

## 2025-05-13 DIAGNOSIS — Z95.2 PRESENCE OF PROSTHETIC HEART VALVE: Chronic | ICD-10-CM

## 2025-05-13 PROCEDURE — 99284 EMERGENCY DEPT VISIT MOD MDM: CPT

## 2025-05-13 PROCEDURE — 99283 EMERGENCY DEPT VISIT LOW MDM: CPT

## 2025-05-13 RX ORDER — POLYETHYLENE GLYCOL-3350 AND ELECTROLYTES 236; 6.74; 5.86; 2.97; 22.74 G/274.31G; G/274.31G; G/274.31G; G/274.31G; G/274.31G
1 POWDER, FOR SOLUTION ORAL
Qty: 1 | Refills: 0
Start: 2025-05-13 | End: 2025-05-13

## 2025-05-13 RX ORDER — DOCUSATE SODIUM 100 MG
1 CAPSULE ORAL
Qty: 20 | Refills: 0
Start: 2025-05-13 | End: 2025-05-22

## 2025-05-13 RX ORDER — POLYETHYLENE GLYCOL-3350 AND ELECTROLYTES 236; 6.74; 5.86; 2.97; 22.74 G/274.31G; G/274.31G; G/274.31G; G/274.31G; G/274.31G
0 POWDER, FOR SOLUTION ORAL
Refills: 0
Start: 2025-05-13

## 2025-05-13 RX ORDER — POLYETHYLENE GLYCOL 3350 17 G/17G
34 POWDER, FOR SOLUTION ORAL ONCE
Refills: 0 | Status: COMPLETED | OUTPATIENT
Start: 2025-05-13 | End: 2025-05-13

## 2025-05-13 RX ADMIN — POLYETHYLENE GLYCOL 3350 34 GRAM(S): 17 POWDER, FOR SOLUTION ORAL at 11:10

## 2025-05-13 NOTE — ED PROVIDER NOTE - PATIENT PORTAL LINK FT
You can access the FollowMyHealth Patient Portal offered by [facility] by registering at the following website: [email or web address]. By joining [employer]’s FollowMyHealth portal, you will also be able to view your health information using other applications (apps) compatible with our system.

## 2025-05-13 NOTE — ED ADULT NURSE NOTE - OBJECTIVE STATEMENT
64 yr old male to ED for complaints of constipation and abdominal cramping. Patient states last Bowel Movement yesterday. Patient states he has been taking "pain meds for knee surgery". Patient denies n/v. Dr. Corral evaluating. Comfort and safety provided.

## 2025-05-13 NOTE — ED ADULT TRIAGE NOTE - CHIEF COMPLAINT QUOTE
constipation, last BM yest. abdominal pain and cramping- taking oxycodone for right knee replacement on 5/5 with no laxative

## 2025-05-13 NOTE — ED PROVIDER NOTE - PHYSICAL EXAMINATION
Gen: Well appearing in NAD  Head: NC/AT  Neck: trachea midline  Resp:  No distress  Abd: soft NT ND  Rectum with hard brown stool. At the introitus. Removed. Rectal vault with hard firm stool. Digitally removed however, after two draws, pt could not tolerate further.

## 2025-05-13 NOTE — ED PROVIDER NOTE - CLINICAL SUMMARY MEDICAL DECISION MAKING FREE TEXT BOX
64-year-old male with recent knee surgery on oxycodone presents to the emergency department for constipation.  Last bowel movement yesterday however today feels a hard bolus in the rectum and cannot pass it.  No vomiting.  No fever.  Exam as stated. Digital disimpaction attempted. Pt unable to fully tolerate. Preps given/Enemas.  Tried again. Another two passes of removal. Long discussion with patient that he needs to take stool softeners and less of the oxycodone. He takes 1 in the morning and 2 every night. Informed that it is PRN severe pain. Otherwise, take Tylenol. He has small bms in between in ED.   Golytely sent. Colace as well.     1) Follow up with your doctor in 1-2 days  2) Return to the ER for worsening or concerning symptoms

## 2025-05-13 NOTE — ED ADULT NURSE NOTE - NSFALLHARMRISKINTERV_ED_ALL_ED

## 2025-05-13 NOTE — ED PROVIDER NOTE - NSFOLLOWUPINSTRUCTIONS_ED_ALL_ED_FT
Constipation    Take Golytely solution 4000mL ; 8 ounces every 30 minutes until finished. Stay home.   Limit the oxycodone and take only IF NEEDED for severe pain.   Oxycodone is the reason why you have such severe constipation.   Drink fluid. Increase fruits and fiber in diet.   Take Colace (prescribed to you also) as prescribed.   1) Follow up with your doctor in 1-2 days  2) Return to the ER for worsening or concerning symptoms       Constipation is when a person has fewer than three bowel movements a week, has difficulty having a bowel movement, or has stools that are dry, hard, or larger than normal. Other symptoms can include abdominal pain or bloating. As people grow older, constipation is more common. A low-fiber diet, not taking in enough fluids, and taking certain medicines, including opioid painkillers, may make constipation worse. Treatment varies but may include dietary modifications (more fiber-rich foods), lifestyle modifications, and possible medications.     SEEK IMMEDIATE MEDICAL CARE IF YOU HAVE ANY OF THE FOLLOWING SYMPTOMS: bright red blood in your stool, constipation for longer than 4 days, abdominal or rectal pain, unexplained weight loss, or inability to pass gas.

## 2025-05-13 NOTE — ED ADULT NURSE NOTE - NSICDXPASTMEDICALHX_GEN_ALL_CORE_FT
PAST MEDICAL HISTORY:  Chronic GERD     Chronic venous stasis dermatitis     Dyslipidemia     H/O CHF     History of aortic stenosis     Hypertension

## 2025-05-14 ENCOUNTER — EMERGENCY (EMERGENCY)
Facility: HOSPITAL | Age: 65
LOS: 1 days | End: 2025-05-14
Attending: EMERGENCY MEDICINE | Admitting: EMERGENCY MEDICINE
Payer: COMMERCIAL

## 2025-05-14 VITALS
HEIGHT: 68 IN | OXYGEN SATURATION: 96 % | HEART RATE: 61 BPM | SYSTOLIC BLOOD PRESSURE: 133 MMHG | TEMPERATURE: 99 F | WEIGHT: 192.02 LBS | DIASTOLIC BLOOD PRESSURE: 75 MMHG | RESPIRATION RATE: 16 BRPM

## 2025-05-14 DIAGNOSIS — Z98.890 OTHER SPECIFIED POSTPROCEDURAL STATES: Chronic | ICD-10-CM

## 2025-05-14 DIAGNOSIS — Z95.2 PRESENCE OF PROSTHETIC HEART VALVE: Chronic | ICD-10-CM

## 2025-05-14 LAB
ALBUMIN SERPL ELPH-MCNC: 3.5 G/DL — SIGNIFICANT CHANGE UP (ref 3.3–5)
ALP SERPL-CCNC: 96 U/L — SIGNIFICANT CHANGE UP (ref 40–120)
ALT FLD-CCNC: 22 U/L — SIGNIFICANT CHANGE UP (ref 10–45)
ANION GAP SERPL CALC-SCNC: 5 MMOL/L — SIGNIFICANT CHANGE UP (ref 5–17)
AST SERPL-CCNC: 24 U/L — SIGNIFICANT CHANGE UP (ref 10–40)
BASOPHILS # BLD AUTO: 0.1 K/UL — SIGNIFICANT CHANGE UP (ref 0–0.2)
BASOPHILS NFR BLD AUTO: 0.6 % — SIGNIFICANT CHANGE UP (ref 0–2)
BILIRUB SERPL-MCNC: 1.3 MG/DL — HIGH (ref 0.2–1.2)
BLD GP AB SCN SERPL QL: SIGNIFICANT CHANGE UP
BUN SERPL-MCNC: 23 MG/DL — SIGNIFICANT CHANGE UP (ref 7–23)
CALCIUM SERPL-MCNC: 9.4 MG/DL — SIGNIFICANT CHANGE UP (ref 8.4–10.5)
CHLORIDE SERPL-SCNC: 103 MMOL/L — SIGNIFICANT CHANGE UP (ref 96–108)
CO2 SERPL-SCNC: 29 MMOL/L — SIGNIFICANT CHANGE UP (ref 22–31)
CREAT SERPL-MCNC: 1.55 MG/DL — HIGH (ref 0.5–1.3)
EGFR: 50 ML/MIN/1.73M2 — LOW
EGFR: 50 ML/MIN/1.73M2 — LOW
EOSINOPHIL # BLD AUTO: 0.62 K/UL — HIGH (ref 0–0.5)
EOSINOPHIL NFR BLD AUTO: 3.4 % — SIGNIFICANT CHANGE UP (ref 0–6)
GLUCOSE SERPL-MCNC: 129 MG/DL — HIGH (ref 70–99)
HCT VFR BLD CALC: 30.3 % — LOW (ref 39–50)
HGB BLD-MCNC: 9.5 G/DL — LOW (ref 13–17)
IMM GRANULOCYTES NFR BLD AUTO: 0.6 % — SIGNIFICANT CHANGE UP (ref 0–0.9)
LIDOCAIN IGE QN: 23 U/L — SIGNIFICANT CHANGE UP (ref 16–77)
LYMPHOCYTES # BLD AUTO: 0.92 K/UL — LOW (ref 1–3.3)
LYMPHOCYTES # BLD AUTO: 5.1 % — LOW (ref 13–44)
MCHC RBC-ENTMCNC: 18.3 PG — LOW (ref 27–34)
MCHC RBC-ENTMCNC: 31.4 G/DL — LOW (ref 32–36)
MCV RBC AUTO: 58.3 FL — LOW (ref 80–100)
MONOCYTES # BLD AUTO: 1.05 K/UL — HIGH (ref 0–0.9)
MONOCYTES NFR BLD AUTO: 5.8 % — SIGNIFICANT CHANGE UP (ref 2–14)
NEUTROPHILS # BLD AUTO: 15.25 K/UL — HIGH (ref 1.8–7.4)
NEUTROPHILS NFR BLD AUTO: 84.5 % — HIGH (ref 43–77)
NRBC BLD AUTO-RTO: 0 /100 WBCS — SIGNIFICANT CHANGE UP (ref 0–0)
PLATELET # BLD AUTO: 247 K/UL — SIGNIFICANT CHANGE UP (ref 150–400)
POTASSIUM SERPL-MCNC: 3.6 MMOL/L — SIGNIFICANT CHANGE UP (ref 3.5–5.3)
POTASSIUM SERPL-SCNC: 3.6 MMOL/L — SIGNIFICANT CHANGE UP (ref 3.5–5.3)
PROT SERPL-MCNC: 7.5 G/DL — SIGNIFICANT CHANGE UP (ref 6–8.3)
RBC # BLD: 5.2 M/UL — SIGNIFICANT CHANGE UP (ref 4.2–5.8)
RBC # FLD: 16.5 % — HIGH (ref 10.3–14.5)
SODIUM SERPL-SCNC: 137 MMOL/L — SIGNIFICANT CHANGE UP (ref 135–145)
WBC # BLD: 18.05 K/UL — HIGH (ref 3.8–10.5)
WBC # FLD AUTO: 18.05 K/UL — HIGH (ref 3.8–10.5)

## 2025-05-14 PROCEDURE — 86901 BLOOD TYPING SEROLOGIC RH(D): CPT

## 2025-05-14 PROCEDURE — 86850 RBC ANTIBODY SCREEN: CPT

## 2025-05-14 PROCEDURE — 36415 COLL VENOUS BLD VENIPUNCTURE: CPT

## 2025-05-14 PROCEDURE — 99285 EMERGENCY DEPT VISIT HI MDM: CPT

## 2025-05-14 PROCEDURE — 83690 ASSAY OF LIPASE: CPT

## 2025-05-14 PROCEDURE — 74176 CT ABD & PELVIS W/O CONTRAST: CPT

## 2025-05-14 PROCEDURE — 96360 HYDRATION IV INFUSION INIT: CPT

## 2025-05-14 PROCEDURE — 86900 BLOOD TYPING SEROLOGIC ABO: CPT

## 2025-05-14 PROCEDURE — 99284 EMERGENCY DEPT VISIT MOD MDM: CPT | Mod: 25

## 2025-05-14 PROCEDURE — 74176 CT ABD & PELVIS W/O CONTRAST: CPT | Mod: 26

## 2025-05-14 PROCEDURE — 85025 COMPLETE CBC W/AUTO DIFF WBC: CPT

## 2025-05-14 PROCEDURE — 80053 COMPREHEN METABOLIC PANEL: CPT

## 2025-05-14 RX ADMIN — Medication 1000 MILLILITER(S): at 11:50

## 2025-05-14 RX ADMIN — Medication 1000 MILLILITER(S): at 10:49

## 2025-05-14 NOTE — ED PROVIDER NOTE - OBJECTIVE STATEMENT
64-year-old male with recent knee surgery on oxycodone presents to the emergency department for constipation.  Last bowel movement this morning however still feels a hard bolus and cannot pass it. Pt was here yesterday and was given treatment with minimal improvement.  No vomiting.  No fever. 64-year-old male with recent knee surgery on oxycodone presents to the emergency department for constipation.  Last bowel movement this morning however still feels a hard bolus and cannot pass it. C/O continued pain. Pt was here yesterday and was given treatment with minimal improvement.  No vomiting.  No fever.

## 2025-05-14 NOTE — ED PROVIDER NOTE - PHYSICAL EXAMINATION
Gen: Well appearing in NAD  Head: NC/AT  Neck: trachea midline  Resp:  No distress  Abd: soft NT ND  Rectum with hard brown stool. At the introitus. Removed. Rectal vault with hard firm stool. Digitally removed however, after two draws, pt could not tolerate further. Gen: Well appearing in NAD  Head: NC/AT  Neck: trachea midline  Resp:  No distress  Abd: soft NT ND  Rectum with hard brown stool. At the introitus. Removed. Rectal vault with hard firm stool. Digitally removed however, after two draws, pt could not tolerate further.  No blood. +leakage of liquid brown stool noted.   Right knee with bandage. Mild surrounding erythema. Stable.

## 2025-05-14 NOTE — ED PROVIDER NOTE - ATTENDING CONTRIBUTION TO CARE
64-year-old male with recent knee surgery on oxycodone presents to the emergency department for constipation.  Last bowel movement this morning however still feels a hard bolus and cannot pass it. C/O continued pain. Pt was here yesterday and was given treatment with minimal improvement.  No vomiting.  No fever.     Exam as stated.  Patient was seen yesterday and received 2 scoops of MiraLAX, enema soapsuds, smog enema and had minimal bowel movements.  Patient was motivated to go home.  Instructed specifically to decrease oxycodone usage and substitute for Tylenol as needed.  Prescription sent however patient states his wife picked up one of them which it is unclear what.  Patient does not describe drinking any solutions at home.  Returns today because of continued pain and now noting leakage of loose watery stool but still no relief of the severe constipation that he is experiencing.  No fever reported.  Patient was instructed that if he did not produce a decent bowel movement to return to the ER.  Patient has returned.  Will perform CAT scan and labs.    White blood cell count noted at 18.  Concern for stercoral colitis.  CT scan ordered.  Contrast not to be administered due to creatinine of 1.5.  Plan for admission to further manage.     I have discussed the patient's plan of care with student. I agree with note as stated above.  This includes History of Present Illness, HIV, Past Medical/Surgical/Family/Social History, Allergies and Home Medications, Review of Systems, Physical Exam, and any Progress Notes during the time I functioned as the attending physician for this patient.

## 2025-05-14 NOTE — ED PROVIDER NOTE - PATIENT PORTAL LINK FT
You can access the FollowMyHealth Patient Portal offered by [facility] by registering at the following website: [email or web address]. By joining [employer]’s FollowMyHealth portal, you will also be able to view your health information using other applications (apps) compatible with our system.

## 2025-05-14 NOTE — ED PROVIDER NOTE - CLINICAL SUMMARY MEDICAL DECISION MAKING FREE TEXT BOX
64-year-old male with recent knee surgery on oxycodone presents to the emergency department for constipation.  Last bowel movement this morning however still feels a hard bolus and cannot pass it. Pt was here yesterday and was given treatment with minimal improvement.  No vomiting.  No fever. 64-year-old male with recent knee surgery on oxycodone presents to the emergency department for constipation.  Last bowel movement this morning however still feels a hard bolus and cannot pass it. C/O continued pain. Pt was here yesterday and was given treatment with minimal improvement.  No vomiting.  No fever.     Exam as stated.  Patient was seen yesterday and received 2 scoops of MiraLAX, enema soapsuds, smog enema and had minimal bowel movements.  Patient was motivated to go home.  Instructed specifically to decrease oxycodone usage and substitute for Tylenol as needed.  Prescription sent however patient states his wife picked up one of them which it is unclear what.  Patient does not describe drinking any solutions at home.  Returns today because of continued pain and now noting leakage of loose watery stool but still no relief of the severe constipation that he is experiencing.  No fever reported.  Patient was instructed that if he did not produce a decent bowel movement to return to the ER.  Patient has returned.  Will perform CAT scan and labs.    White blood cell count noted at 18.  Concern for stercoral colitis.  CT scan ordered.  Contrast not to be administered due to creatinine of 1.5.  Plan for admission to further manage. 64-year-old male with recent knee surgery on oxycodone presents to the emergency department for constipation.  Last bowel movement this morning however still feels a hard bolus and cannot pass it. C/O continued pain. Pt was here yesterday and was given treatment with minimal improvement.  No vomiting.  No fever.     Exam as stated.  Patient was seen yesterday and received 2 scoops of MiraLAX, enema soapsuds, smog enema and had minimal bowel movements.  Patient was motivated to go home.  Instructed specifically to decrease oxycodone usage and substitute for Tylenol as needed.  Prescription sent however patient states his wife picked up one of them which it is unclear what.  Patient does not describe drinking any solutions at home.  Returns today because of continued pain and now noting leakage of loose watery stool but still no relief of the severe constipation that he is experiencing.  No fever reported.  Patient was instructed that if he did not produce a decent bowel movement to return to the ER.  Patient has returned.  Will perform CAT scan and labs.    White blood cell count noted at 18.  Concern for stercoral colitis.  CT scan ordered.  Contrast not to be administered due to creatinine of 1.5.      Patient given a smog enema without bowel movement.  CT scan pending.  Patient refused CAT scan when brought over to the CAT scan room.  Upon return to his room, had a very large, impressive bowel movement.  Patient reporting feeling much relief.  Because white blood cell count elevated, wife at bedside, patient now agreeable to doing the CAT scan.  CAT scan reviewed.  Mild constipation.  No large fecal burden remaining and no sign of infection.  Wife concerned that patient is not doing his physical therapy for his right knee and after discussing with his team, she was advised to discuss with social work.  Discussed with social work who will talk with patient and wife for planning accordingly as it relates to her concerns for his knee and rehabilitation.

## 2025-05-14 NOTE — ED ADULT NURSE NOTE - OBJECTIVE STATEMENT
Pt arrives to ED reporting abd pain, states he was seen yesterday for constipation and medications did not resolve the issue.

## 2025-05-14 NOTE — ED PROVIDER NOTE - NSFOLLOWUPINSTRUCTIONS_ED_ALL_ED_FT
Constipation    Limit the oxycodone and take only IF NEEDED for severe pain.   Oxycodone is the reason why you had such severe constipation.   Drink fluid. Increase fruits and fiber in diet.   Take Colace (prescribed to you also) as prescribed.   1) Follow up with your doctor in 1-2 days  2) Return to the ER for worsening or concerning symptoms     Constipation is when a person has fewer than three bowel movements a week, has difficulty having a bowel movement, or has stools that are dry, hard, or larger than normal. Other symptoms can include abdominal pain or bloating. As people grow older, constipation is more common. A low-fiber diet, not taking in enough fluids, and taking certain medicines, including opioid painkillers, may make constipation worse. Treatment varies but may include dietary modifications (more fiber-rich foods), lifestyle modifications, and possible medications.     SEEK IMMEDIATE MEDICAL CARE IF YOU HAVE ANY OF THE FOLLOWING SYMPTOMS: bright red blood in your stool, constipation for longer than 4 days, abdominal or rectal pain, unexplained weight loss, or inability to pass gas.

## 2025-05-22 ENCOUNTER — APPOINTMENT (OUTPATIENT)
Dept: ORTHOPEDIC SURGERY | Facility: CLINIC | Age: 65
End: 2025-05-22
Payer: COMMERCIAL

## 2025-05-22 VITALS — WEIGHT: 185 LBS | BODY MASS INDEX: 29.73 KG/M2 | HEIGHT: 66 IN

## 2025-05-22 DIAGNOSIS — Z96.651 PRESENCE OF RIGHT ARTIFICIAL KNEE JOINT: ICD-10-CM

## 2025-05-22 PROCEDURE — 99024 POSTOP FOLLOW-UP VISIT: CPT

## 2025-05-22 PROCEDURE — 73562 X-RAY EXAM OF KNEE 3: CPT | Mod: RT

## 2025-05-28 PROBLEM — Z96.651 STATUS POST TOTAL RIGHT KNEE REPLACEMENT: Status: ACTIVE | Noted: 2025-05-21

## 2025-06-02 ENCOUNTER — RX RENEWAL (OUTPATIENT)
Age: 65
End: 2025-06-02

## 2025-06-06 ENCOUNTER — APPOINTMENT (OUTPATIENT)
Dept: CARDIOLOGY | Facility: CLINIC | Age: 65
End: 2025-06-06
Payer: COMMERCIAL

## 2025-06-06 ENCOUNTER — APPOINTMENT (OUTPATIENT)
Dept: FAMILY MEDICINE | Facility: CLINIC | Age: 65
End: 2025-06-06
Payer: COMMERCIAL

## 2025-06-06 VITALS
HEIGHT: 66 IN | WEIGHT: 193 LBS | HEART RATE: 67 BPM | OXYGEN SATURATION: 94 % | DIASTOLIC BLOOD PRESSURE: 80 MMHG | SYSTOLIC BLOOD PRESSURE: 136 MMHG | BODY MASS INDEX: 31.02 KG/M2

## 2025-06-06 VITALS
HEIGHT: 66 IN | BODY MASS INDEX: 31.02 KG/M2 | OXYGEN SATURATION: 94 % | WEIGHT: 193 LBS | TEMPERATURE: 97.3 F | RESPIRATION RATE: 17 BRPM | SYSTOLIC BLOOD PRESSURE: 140 MMHG | HEART RATE: 83 BPM | DIASTOLIC BLOOD PRESSURE: 66 MMHG

## 2025-06-06 PROCEDURE — 93000 ELECTROCARDIOGRAM COMPLETE: CPT

## 2025-06-06 PROCEDURE — 99214 OFFICE O/P EST MOD 30 MIN: CPT | Mod: 25

## 2025-06-06 PROCEDURE — 99214 OFFICE O/P EST MOD 30 MIN: CPT

## 2025-06-06 RX ORDER — BENZONATATE 200 MG/1
200 CAPSULE ORAL 3 TIMES DAILY
Qty: 30 | Refills: 0 | Status: ACTIVE | COMMUNITY
Start: 2025-06-06 | End: 1900-01-01

## 2025-06-06 RX ORDER — LEVOCETIRIZINE DIHYDROCHLORIDE 5 MG/1
5 TABLET ORAL
Qty: 30 | Refills: 1 | Status: ACTIVE | COMMUNITY
Start: 2025-06-06 | End: 1900-01-01

## 2025-06-07 ENCOUNTER — APPOINTMENT (OUTPATIENT)
Dept: RADIOLOGY | Facility: HOSPITAL | Age: 65
End: 2025-06-07
Payer: COMMERCIAL

## 2025-06-07 ENCOUNTER — OUTPATIENT (OUTPATIENT)
Dept: OUTPATIENT SERVICES | Facility: HOSPITAL | Age: 65
LOS: 1 days | End: 2025-06-07
Payer: COMMERCIAL

## 2025-06-07 DIAGNOSIS — R05.9 COUGH, UNSPECIFIED: ICD-10-CM

## 2025-06-07 DIAGNOSIS — Z95.2 PRESENCE OF PROSTHETIC HEART VALVE: Chronic | ICD-10-CM

## 2025-06-07 DIAGNOSIS — Z98.890 OTHER SPECIFIED POSTPROCEDURAL STATES: Chronic | ICD-10-CM

## 2025-06-07 PROCEDURE — 71046 X-RAY EXAM CHEST 2 VIEWS: CPT | Mod: 26

## 2025-06-07 PROCEDURE — 71046 X-RAY EXAM CHEST 2 VIEWS: CPT

## 2025-06-17 ENCOUNTER — LABORATORY RESULT (OUTPATIENT)
Age: 65
End: 2025-06-17

## 2025-06-17 ENCOUNTER — APPOINTMENT (OUTPATIENT)
Dept: FAMILY MEDICINE | Facility: CLINIC | Age: 65
End: 2025-06-17
Payer: COMMERCIAL

## 2025-06-17 VITALS
DIASTOLIC BLOOD PRESSURE: 63 MMHG | SYSTOLIC BLOOD PRESSURE: 146 MMHG | BODY MASS INDEX: 31.02 KG/M2 | HEIGHT: 66 IN | TEMPERATURE: 97.7 F | WEIGHT: 193 LBS | OXYGEN SATURATION: 96 % | HEART RATE: 81 BPM | RESPIRATION RATE: 16 BRPM

## 2025-06-17 PROCEDURE — 99215 OFFICE O/P EST HI 40 MIN: CPT

## 2025-06-17 PROCEDURE — 36415 COLL VENOUS BLD VENIPUNCTURE: CPT

## 2025-06-17 RX ORDER — DOXYCYCLINE HYCLATE 100 MG/1
100 TABLET, COATED ORAL
Qty: 14 | Refills: 0 | Status: ACTIVE | COMMUNITY
Start: 2025-06-17 | End: 1900-01-01

## 2025-06-17 RX ORDER — METHYLPREDNISOLONE 4 MG/1
4 TABLET ORAL
Qty: 1 | Refills: 1 | Status: ACTIVE | COMMUNITY
Start: 2025-06-17 | End: 1900-01-01

## 2025-06-19 LAB
ALBUMIN SERPL ELPH-MCNC: 4.2 G/DL
ALP BLD-CCNC: 105 U/L
ALT SERPL-CCNC: 22 U/L
ANION GAP SERPL CALC-SCNC: 13 MMOL/L
AST SERPL-CCNC: 26 U/L
BASOPHILS # BLD AUTO: 0.17 K/UL
BASOPHILS NFR BLD AUTO: 1.9 %
BILIRUB SERPL-MCNC: 0.4 MG/DL
BUN SERPL-MCNC: 17 MG/DL
CALCIUM SERPL-MCNC: 9.1 MG/DL
CHLORIDE SERPL-SCNC: 103 MMOL/L
CHOLEST SERPL-MCNC: 117 MG/DL
CO2 SERPL-SCNC: 22 MMOL/L
CREAT SERPL-MCNC: 1.28 MG/DL
EGFRCR SERPLBLD CKD-EPI 2021: 62 ML/MIN/1.73M2
EOSINOPHIL # BLD AUTO: 1.25 K/UL
EOSINOPHIL NFR BLD AUTO: 14 %
FERRITIN SERPL-MCNC: 152 NG/ML
FOLATE SERPL-MCNC: 16.4 NG/ML
GLUCOSE SERPL-MCNC: 103 MG/DL
HCT VFR BLD CALC: 31.2 %
HDLC SERPL-MCNC: 48 MG/DL
HGB BLD-MCNC: 9.5 G/DL
IRON SATN MFR SERPL: 11 %
IRON SERPL-MCNC: 32 UG/DL
LDLC SERPL-MCNC: 54 MG/DL
LYMPHOCYTES # BLD AUTO: 0.84 K/UL
LYMPHOCYTES NFR BLD AUTO: 9.4 %
MAN DIFF?: NORMAL
MCHC RBC-ENTMCNC: 19 PG
MCHC RBC-ENTMCNC: 30.4 G/DL
MCV RBC AUTO: 62.5 FL
MONOCYTES # BLD AUTO: 0.58 K/UL
MONOCYTES NFR BLD AUTO: 6.5 %
NEUTROPHILS # BLD AUTO: 6.1 K/UL
NEUTROPHILS NFR BLD AUTO: 68.2 %
NONHDLC SERPL-MCNC: 69 MG/DL
PLATELET # BLD AUTO: 201 K/UL
POTASSIUM SERPL-SCNC: 4.1 MMOL/L
PROT SERPL-MCNC: 7 G/DL
PSA SERPL-MCNC: 2.32 NG/ML
RBC # BLD: 4.99 M/UL
RBC # FLD: 19.5 %
SODIUM SERPL-SCNC: 138 MMOL/L
TIBC SERPL-MCNC: 303 UG/DL
TRIGL SERPL-MCNC: 70 MG/DL
UIBC SERPL-MCNC: 271 UG/DL
VIT B12 SERPL-MCNC: 415 PG/ML
WBC # FLD AUTO: 8.95 K/UL

## 2025-06-24 ENCOUNTER — RX RENEWAL (OUTPATIENT)
Age: 65
End: 2025-06-24

## 2025-06-25 ENCOUNTER — APPOINTMENT (OUTPATIENT)
Dept: FAMILY MEDICINE | Facility: CLINIC | Age: 65
End: 2025-06-25
Payer: COMMERCIAL

## 2025-06-25 VITALS
SYSTOLIC BLOOD PRESSURE: 148 MMHG | RESPIRATION RATE: 16 BRPM | HEART RATE: 68 BPM | WEIGHT: 193 LBS | BODY MASS INDEX: 31.02 KG/M2 | TEMPERATURE: 97.3 F | OXYGEN SATURATION: 95 % | HEIGHT: 66 IN | DIASTOLIC BLOOD PRESSURE: 82 MMHG

## 2025-06-25 PROCEDURE — G2211 COMPLEX E/M VISIT ADD ON: CPT | Mod: NC

## 2025-06-25 PROCEDURE — 99213 OFFICE O/P EST LOW 20 MIN: CPT

## 2025-06-30 ENCOUNTER — RX RENEWAL (OUTPATIENT)
Age: 65
End: 2025-06-30

## 2025-07-09 NOTE — DIETITIAN INITIAL EVALUATION ADULT - WEIGHT (KG)
Chronic Disease Management (CDM) Services  Hypertension - Progress Note    Referring Provider: Joshua Russell DO  Supervising Provider: Joshua Russell DO  Order Date: 06/04/25    [facility]    Xavier Foley Jr. is a 68 year old Black/ male presenting for follow-up clinic visit for management of hypertension.    Patient transitioned from nifedipine to amlodipine 10 mg daily as instructed without issues. States he stopped losartan a few weeks ago because his SBP was ranging between 100-110s mmHg. Per patient, blood pressure has remained at goal since stopping losartan. Reports compliance to other medications and BP monitoring however doesn't regular use his CPAP machine. Brought in BP monitor today for assessment. Endorses a low sodium diet. Of note, patient also has a PCP who he follows with at [facility] (Dr. Zayra Clement). Denies any acute complaints at today's visit.    Pertinent PMH:   HTN  CAD  HLD  Meningioma  Depression  GERD  NEELAM  LBP    Visit History:   07/09/25: STOP losartan  06/04/25: START amlodipine 10 mg daily, DECR losartan 50 mg daily, STOP nifedipine  07/31/24: RESUME aspirin 81 mg daily  05/01/24: Lifestyle modifications    Hospitalization/Procedure/ED History:   05/12/25: ptosis repair b/l upper eyelids  11/04/24: L eye cataract removal  10/21/24: R eye cataract removal  08/01/24: colonoscopy    Diet:  Sodium restriction: yes - HTN    Lifestyle:  Exercise: walking or rides stationary bicycle 2-3 times per week  Alcohol consumption: no  Nicotine use: 1 cigar a few times per year  Substance use: no  Caffeine use: 8 oz coffee w/ cream 1x/wk, 12 oz diet soda 1-2x/wk    Medication Adherence: medications reconciled at today's visit  Barriers to Adherence: none  Missed doses of medications: Yes, see HPI above for details  Taken medications today? Yes  Medication intolerances: None    Current Outpatient Medications   Medication Instructions    albuterol-ipratropium (COMBIVENT  RESPIMAT) 100-20 MCG/ACT inhaler USE 1 PUFF BY INHALATION FOUR TIMES A DAY FOR BREATHING    amitriptyline (ELAVIL) 25 MG tablet 2 tabs q hs    amLODIPine (NORVASC) 10 mg, Oral, DAILY    carbamide peroxide (DEBROX) 6.5 % otic solution INSTILL 5 DROPS IN BOTH EARS TWICE A DAY FOR EAR WAX REMOVAL FOR EAR WAX REMOVAL FOR EAR WAX REMOVAL:USE DAILY UNTIL THE DAY OF YOUR APPOINTMENT    cyclobenzaprine (FLEXERIL) 10 mg    famotidine (PEPCID) 20 mg    fluticasone (FLONASE) 50 MCG/ACT nasal spray USE 2 SPRAYS IN EACH NOSTRIL EVERY DAY FOR NASAL SYMPTOMS    ibuprofen (MOTRIN) 800 mg    ipratropium (ATROVENT) 0.06 % nasal spray 1 spray, Each Nare, 3 TIMES DAILY    losartan (COZAAR) 50 mg, Oral, DAILY    Lotilaner (Xdemvy) 0.25 % Solution 2 times daily    rizatriptan (MAXALT) 10 mg    rosuvastatin (CRESTOR) 40 mg, Oral, DAILY    sildenafil (VIAGRA) 100 mg, Oral, DAILY PRN    topiramate (TOPAMAX) 25 mg    traZODone (DESYREL) 50 mg, Oral, NIGHTLY, Insomnia and anxiety.    ubrogepant 50 mg, Oral, DAILY PRN    ubrogepant 100 mg, Oral, DAILY PRN     ALLERGIES:  No Known Allergies    Vitals:   BP Readings from Last 3 Encounters:   07/09/25 124/74   06/04/25 127/77   06/04/25 127/77     Pulse Readings from Last 3 Encounters:   07/09/25 74   06/04/25 82   06/04/25 82     Wt Readings from Last 3 Encounters:   07/09/25 101.1 kg   06/04/25 99.8 kg   06/04/25 99.8 kg     Labs:  Sodium (mmol/L)   Date Value   04/18/2025 144     Potassium (mmol/L)   Date Value   04/18/2025 4.3     Creatinine (mg/dL)   Date Value   04/18/2025 0.99     Glomerular Filtration Rate (no units)   Date Value   04/18/2025 83     Microalbumin/ Creatinine Ratio (mg/g)   Date Value   05/01/2024 8.3     TSH (mcUnits/mL)   Date Value   05/01/2024 0.440     VITAMIND, 25 HYDROXY (ng/ml)   Date Value   01/14/2015 20.4 (L)      Cholesterol (mg/dL)   Date Value   04/18/2025 147     HDL (mg/dL)   Date Value   04/18/2025 60     LDL (mg/dL)   Date Value   04/18/2025 76      Triglycerides (mg/dL)   Date Value   04/18/2025 57     Lipoprotein(a) (mg/dL)   Date Value   07/31/2024 45     The 10-year ASCVD risk score (Mary Ellen BATITSA, et al., 2019) is: 14.7%    Values used to calculate the score:      Age: 68 years      Sex: Male      Is Non- : Yes      Diabetic: No      Tobacco smoker: No      Systolic Blood Pressure: 124 mmHg      Is BP treated: Yes      HDL Cholesterol: 60 mg/dL      Total Cholesterol: 147 mg/dL       Assessment / Plan    Hypertension    Goal(s): <130/80 mmHg    Assessment:  SMBP: 110-120s/80s mmHg; at goal  Office BP: at goal  Sleep apnea: nonadherent to CPAP machine    Current medications:  Amlodipine 10 mg daily  Losartan 50 mg daily (prescribed, but not taking)    Recommendations:   STOP losartan given BP controlled without the medication  CPM amlodipine  Compared home BP monitor to clinic reading today which was ~5-10 mmHg higher  Advised patient to call clinic or message via portal if BP consistently > 140/90 mmHg   Reviewed low sodium, heart healthy diet with patient. Reviewed hidden sources of sodium, encouraged to read food labels using the 5%/20% rule.   Encouraged patient to continue monitoring BP at home 2-3 times per week and log indicating any mitigating factors    Lipids    Goal(s): LDL < 70 mg/dL, secondary prevention (CAD)    Assessment:  Lipids: LDL not at goal, lipoprotein(a) wnl    Current medications:  Rosuvastatin 40 mg daily  Aspirin 81 mg daily    Recommendations:   CPM rosuvastatin, aspirin per patient preference; would like to work on lifestyle modifications instead of adding ezetimibe at this time  Discussed with patient how a heart healthy diet low in saturated fats and moderate physical activity as tolerated can positively impact lipid levels    Labs due within the next 6 months :  Lipid, BMP    Follow-up:   PharmD follow up: 09/08/25  PCP follow up: 09/08/25    Counseling:  General  -Continue home blood pressure  monitoring  -Importance of blood pressure control  -Importance of weight management  -Importance of low salt diet  -Importance of exercise  -Importance of eating a balanced diet that includes vegetables, fruits, and whole grains  -Importance of avoiding fried foods, red meat, butter, cheese, and other foods with saturated fats  -Importance of smoking cessation  -Importance of limiting caffeine intake    Medications  -Patient was provided with an updated medication list with all current medications and instructions on how / what time to take  -NSAIDs - avoid / minimize use       Patient verbalized understanding and agreement with plan. Patient provided with information about how to contact CDM Clinician with any questions.     30 minutes were spent via face-to-face discussion related to the medical management of the patient.     Bravo Dos Santos, PharmD, BCACP, [facility]  Ambulatory Pharmacy Specialist - Chronic Disease Management   [facility] [address] [address]  [email or web address]  (P) [phone]  (F) [phone]                   74.8

## 2025-07-21 ENCOUNTER — RX RENEWAL (OUTPATIENT)
Age: 65
End: 2025-07-21

## 2025-07-23 ENCOUNTER — APPOINTMENT (OUTPATIENT)
Dept: FAMILY MEDICINE | Facility: CLINIC | Age: 65
End: 2025-07-23
Payer: COMMERCIAL

## 2025-07-23 ENCOUNTER — LABORATORY RESULT (OUTPATIENT)
Age: 65
End: 2025-07-23

## 2025-07-23 VITALS
HEIGHT: 66 IN | SYSTOLIC BLOOD PRESSURE: 162 MMHG | OXYGEN SATURATION: 97 % | WEIGHT: 197 LBS | TEMPERATURE: 97.2 F | RESPIRATION RATE: 16 BRPM | DIASTOLIC BLOOD PRESSURE: 74 MMHG | BODY MASS INDEX: 31.66 KG/M2 | HEART RATE: 89 BPM

## 2025-07-23 DIAGNOSIS — I48.0 PAROXYSMAL ATRIAL FIBRILLATION: ICD-10-CM

## 2025-07-23 DIAGNOSIS — J30.89 OTHER ALLERGIC RHINITIS: ICD-10-CM

## 2025-07-23 DIAGNOSIS — R05.9 COUGH, UNSPECIFIED: ICD-10-CM

## 2025-07-23 DIAGNOSIS — I48.91 UNSPECIFIED ATRIAL FIBRILLATION: ICD-10-CM

## 2025-07-23 DIAGNOSIS — E66.9 OBESITY, UNSPECIFIED: ICD-10-CM

## 2025-07-23 PROCEDURE — 36415 COLL VENOUS BLD VENIPUNCTURE: CPT

## 2025-07-23 PROCEDURE — 99214 OFFICE O/P EST MOD 30 MIN: CPT

## 2025-07-23 PROCEDURE — G2211 COMPLEX E/M VISIT ADD ON: CPT | Mod: NC

## 2025-07-29 LAB
ALBUMIN SERPL ELPH-MCNC: 4.3 G/DL
ALP BLD-CCNC: 89 U/L
ALT SERPL-CCNC: 18 U/L
ANION GAP SERPL CALC-SCNC: 16 MMOL/L
AST SERPL-CCNC: 27 U/L
BASOPHILS # BLD AUTO: 0.17 K/UL
BASOPHILS NFR BLD AUTO: 1.8 %
BILIRUB SERPL-MCNC: 0.4 MG/DL
BUN SERPL-MCNC: 12 MG/DL
CALCIUM SERPL-MCNC: 8.4 MG/DL
CHLORIDE SERPL-SCNC: 105 MMOL/L
CO2 SERPL-SCNC: 20 MMOL/L
CREAT SERPL-MCNC: 1.26 MG/DL
EGFRCR SERPLBLD CKD-EPI 2021: 63 ML/MIN/1.73M2
EOSINOPHIL # BLD AUTO: 0.94 K/UL
EOSINOPHIL NFR BLD AUTO: 10.1 %
FERRITIN SERPL-MCNC: 78 NG/ML
GLUCOSE SERPL-MCNC: 178 MG/DL
HCT VFR BLD CALC: 34.9 %
HGB BLD-MCNC: 10.5 G/DL
IMM GRANULOCYTES NFR BLD AUTO: 0.2 %
IRON SATN MFR SERPL: 10 %
IRON SERPL-MCNC: 32 UG/DL
LYMPHOCYTES # BLD AUTO: 1.19 K/UL
LYMPHOCYTES NFR BLD AUTO: 12.8 %
MAN DIFF?: NORMAL
MCHC RBC-ENTMCNC: 18.8 PG
MCHC RBC-ENTMCNC: 30.1 G/DL
MCV RBC AUTO: 62.5 FL
MONOCYTES # BLD AUTO: 0.64 K/UL
MONOCYTES NFR BLD AUTO: 6.9 %
NEUTROPHILS # BLD AUTO: 6.33 K/UL
NEUTROPHILS NFR BLD AUTO: 68.2 %
PLATELET # BLD AUTO: 206 K/UL
POTASSIUM SERPL-SCNC: 3.4 MMOL/L
PROT SERPL-MCNC: 6.7 G/DL
RBC # BLD: 5.58 M/UL
RBC # FLD: 22.4 %
SODIUM SERPL-SCNC: 140 MMOL/L
TIBC SERPL-MCNC: 326 UG/DL
TSH SERPL-ACNC: 0.86 UIU/ML
UIBC SERPL-MCNC: 294 UG/DL
WBC # FLD AUTO: 9.29 K/UL

## 2025-08-11 ENCOUNTER — RX RENEWAL (OUTPATIENT)
Age: 65
End: 2025-08-11

## 2025-08-14 ENCOUNTER — APPOINTMENT (OUTPATIENT)
Dept: FAMILY MEDICINE | Facility: CLINIC | Age: 65
End: 2025-08-14

## 2025-08-27 ENCOUNTER — RX RENEWAL (OUTPATIENT)
Age: 65
End: 2025-08-27

## 2025-08-27 ENCOUNTER — APPOINTMENT (OUTPATIENT)
Dept: ORTHOPEDIC SURGERY | Facility: CLINIC | Age: 65
End: 2025-08-27
Payer: MEDICARE

## 2025-08-27 VITALS — BODY MASS INDEX: 31.34 KG/M2 | HEIGHT: 66 IN | WEIGHT: 195 LBS

## 2025-08-27 DIAGNOSIS — M25.561 PAIN IN RIGHT KNEE: ICD-10-CM

## 2025-08-27 DIAGNOSIS — Z96.651 PRESENCE OF RIGHT ARTIFICIAL KNEE JOINT: ICD-10-CM

## 2025-08-27 PROCEDURE — 73562 X-RAY EXAM OF KNEE 3: CPT | Mod: RT

## 2025-08-27 PROCEDURE — 99203 OFFICE O/P NEW LOW 30 MIN: CPT

## 2025-08-29 ENCOUNTER — LABORATORY RESULT (OUTPATIENT)
Age: 65
End: 2025-08-29

## 2025-08-29 ENCOUNTER — APPOINTMENT (OUTPATIENT)
Dept: FAMILY MEDICINE | Facility: CLINIC | Age: 65
End: 2025-08-29

## 2025-08-29 VITALS
DIASTOLIC BLOOD PRESSURE: 90 MMHG | HEIGHT: 66 IN | BODY MASS INDEX: 31.66 KG/M2 | HEART RATE: 52 BPM | WEIGHT: 197 LBS | SYSTOLIC BLOOD PRESSURE: 160 MMHG | OXYGEN SATURATION: 96 %

## 2025-08-29 DIAGNOSIS — I48.0 PAROXYSMAL ATRIAL FIBRILLATION: ICD-10-CM

## 2025-08-29 DIAGNOSIS — Z86.2 PERSONAL HISTORY OF DISEASES OF THE BLOOD AND BLOOD-FORMING ORGANS AND CERTAIN DISORDERS INVOLVING THE IMMUNE MECHANISM: ICD-10-CM

## 2025-08-29 DIAGNOSIS — E87.6 HYPOKALEMIA: ICD-10-CM

## 2025-08-29 DIAGNOSIS — Z86.79 PERSONAL HISTORY OF OTHER DISEASES OF THE CIRCULATORY SYSTEM: ICD-10-CM

## 2025-08-29 DIAGNOSIS — I25.10 ATHEROSCLEROTIC HEART DISEASE OF NATIVE CORONARY ARTERY W/OUT ANGINA PECTORIS: ICD-10-CM

## 2025-08-29 DIAGNOSIS — R19.5 OTHER FECAL ABNORMALITIES: ICD-10-CM

## 2025-08-29 DIAGNOSIS — I10 ESSENTIAL (PRIMARY) HYPERTENSION: ICD-10-CM

## 2025-08-29 DIAGNOSIS — I42.8 OTHER CARDIOMYOPATHIES: ICD-10-CM

## 2025-08-29 DIAGNOSIS — E03.9 HYPOTHYROIDISM, UNSPECIFIED: ICD-10-CM

## 2025-08-29 DIAGNOSIS — J20.9 ACUTE BRONCHITIS, UNSPECIFIED: ICD-10-CM

## 2025-08-29 DIAGNOSIS — M54.6 PAIN IN THORACIC SPINE: ICD-10-CM

## 2025-08-29 PROCEDURE — G0009: CPT

## 2025-08-29 PROCEDURE — G0402 INITIAL PREVENTIVE EXAM: CPT

## 2025-08-29 PROCEDURE — G2211 COMPLEX E/M VISIT ADD ON: CPT

## 2025-08-29 PROCEDURE — 90677 PCV20 VACCINE IM: CPT

## 2025-08-29 PROCEDURE — 36415 COLL VENOUS BLD VENIPUNCTURE: CPT

## 2025-08-29 PROCEDURE — 99213 OFFICE O/P EST LOW 20 MIN: CPT | Mod: 25

## 2025-08-29 RX ORDER — POTASSIUM CHLORIDE 1.5 G/1.58G
20 POWDER, FOR SOLUTION ORAL
Refills: 0 | Status: ACTIVE | COMMUNITY
Start: 2025-08-29

## 2025-08-29 RX ORDER — DICLOFENAC SODIUM 10 MG/G
1 GEL TOPICAL
Qty: 100 | Refills: 1 | Status: ACTIVE | COMMUNITY
Start: 2025-08-29 | End: 1900-01-01

## 2025-08-30 LAB
ALBUMIN SERPL ELPH-MCNC: 4.2 G/DL
ALP BLD-CCNC: 87 U/L
ALT SERPL-CCNC: 18 U/L
ANION GAP SERPL CALC-SCNC: 16 MMOL/L
AST SERPL-CCNC: 27 U/L
BASOPHILS # BLD AUTO: 0.14 K/UL
BASOPHILS NFR BLD AUTO: 1.3 %
BILIRUB SERPL-MCNC: 0.3 MG/DL
BUN SERPL-MCNC: 15 MG/DL
CALCIUM SERPL-MCNC: 9 MG/DL
CHLORIDE SERPL-SCNC: 104 MMOL/L
CHOLEST SERPL-MCNC: 116 MG/DL
CO2 SERPL-SCNC: 22 MMOL/L
CREAT SERPL-MCNC: 1.25 MG/DL
EGFRCR SERPLBLD CKD-EPI 2021: 64 ML/MIN/1.73M2
EOSINOPHIL # BLD AUTO: 1.04 K/UL
EOSINOPHIL NFR BLD AUTO: 9.3 %
ESTIMATED AVERAGE GLUCOSE: 140 MG/DL
FERRITIN SERPL-MCNC: 108 NG/ML
GLUCOSE SERPL-MCNC: 94 MG/DL
HBA1C MFR BLD HPLC: 6.5 %
HCT VFR BLD CALC: 33 %
HDLC SERPL-MCNC: 47 MG/DL
HGB BLD-MCNC: 10.2 G/DL
IMM GRANULOCYTES NFR BLD AUTO: 0.2 %
IRON SATN MFR SERPL: 6 %
IRON SERPL-MCNC: 22 UG/DL
LDLC SERPL-MCNC: 56 MG/DL
LYMPHOCYTES # BLD AUTO: 1.41 K/UL
LYMPHOCYTES NFR BLD AUTO: 12.7 %
MAN DIFF?: NORMAL
MCHC RBC-ENTMCNC: 18.4 PG
MCHC RBC-ENTMCNC: 30.9 G/DL
MCV RBC AUTO: 59.7 FL
MONOCYTES # BLD AUTO: 0.89 K/UL
MONOCYTES NFR BLD AUTO: 8 %
NEUTROPHILS # BLD AUTO: 7.63 K/UL
NEUTROPHILS NFR BLD AUTO: 68.5 %
NONHDLC SERPL-MCNC: 69 MG/DL
PLATELET # BLD AUTO: 197 K/UL
POTASSIUM SERPL-SCNC: 3.5 MMOL/L
PROT SERPL-MCNC: 6.8 G/DL
RBC # BLD: 5.53 M/UL
RBC # FLD: 19.9 %
SODIUM SERPL-SCNC: 142 MMOL/L
TIBC SERPL-MCNC: 338 UG/DL
TRIGL SERPL-MCNC: 60 MG/DL
TSH SERPL-ACNC: 1.38 UIU/ML
UIBC SERPL-MCNC: 317 UG/DL
WBC # FLD AUTO: 11.13 K/UL

## 2025-09-05 ENCOUNTER — OUTPATIENT (OUTPATIENT)
Dept: OUTPATIENT SERVICES | Facility: HOSPITAL | Age: 65
LOS: 1 days | End: 2025-09-05
Payer: COMMERCIAL

## 2025-09-05 ENCOUNTER — APPOINTMENT (OUTPATIENT)
Dept: RADIOLOGY | Facility: HOSPITAL | Age: 65
End: 2025-09-05
Payer: COMMERCIAL

## 2025-09-05 DIAGNOSIS — M54.6 PAIN IN THORACIC SPINE: ICD-10-CM

## 2025-09-05 DIAGNOSIS — Z98.890 OTHER SPECIFIED POSTPROCEDURAL STATES: Chronic | ICD-10-CM

## 2025-09-05 DIAGNOSIS — Z95.2 PRESENCE OF PROSTHETIC HEART VALVE: Chronic | ICD-10-CM

## 2025-09-05 LAB — HEMOCCULT STL QL IA: POSITIVE

## 2025-09-05 PROCEDURE — 72070 X-RAY EXAM THORAC SPINE 2VWS: CPT

## 2025-09-05 PROCEDURE — 72070 X-RAY EXAM THORAC SPINE 2VWS: CPT | Mod: 26

## 2025-09-11 ENCOUNTER — NON-APPOINTMENT (OUTPATIENT)
Age: 65
End: 2025-09-11

## 2025-09-11 DIAGNOSIS — S22.000D WEDGE COMPRESSION FRACTURE OF UNSPECIFIED THORACIC VERTEBRA, SUBSEQUENT ENCOUNTER FOR FRACTURE WITH ROUTINE HEALING: ICD-10-CM

## 2025-09-17 ENCOUNTER — RX RENEWAL (OUTPATIENT)
Age: 65
End: 2025-09-17

## (undated) DEVICE — GLV 7.5 DERMAPRENE ULTRA

## (undated) DEVICE — Device

## (undated) DEVICE — SUT QUILL MONODERM 2-0 3/8 CIRCLE 45CM

## (undated) DEVICE — CATH IV SAFE BC 20G X 1.16" (PINK)

## (undated) DEVICE — SUCTION YANKAUER NO CONTROL VENT

## (undated) DEVICE — FOLEY TRAY 16FR 5CC LF LUBRISIL ADVANCE TEMP CLOSED

## (undated) DEVICE — MAKO BLADE STANDARD

## (undated) DEVICE — CHEST DRAIN PLEUR-EVAC WET/WET ADULT-PEDS SINGLE (QUICK)

## (undated) DEVICE — LAP PAD 18 X 18"

## (undated) DEVICE — DRAPE INSTRUMENT POUCH 6.75" X 11"

## (undated) DEVICE — SUT TICRON 2-0 36" CV-316 DA

## (undated) DEVICE — TRAP SPECIMEN SPUTUM 40CC

## (undated) DEVICE — DRSG XEROFORM 1 X 8"

## (undated) DEVICE — SENSOR MYOCARDIAL TEMP 15MM

## (undated) DEVICE — SUT BIOSYN 4-0 18" P-12

## (undated) DEVICE — DRAPE 3/4 SHEET W REINFORCEMENT 56X77"

## (undated) DEVICE — SUT SOFSILK 0 30" V-20

## (undated) DEVICE — SYR LUER LOK 20CC

## (undated) DEVICE — DRSG DERMABOND 0.7ML

## (undated) DEVICE — STOPCOCK 4-WAY W SWIVEL MALE LUER LOCK NON VENTED RED CAP

## (undated) DEVICE — WARMING BLANKET UPPER ADULT

## (undated) DEVICE — XI OBTURATOR OPTICAL BLADELESS 8MM

## (undated) DEVICE — PACK TOTAL KNEE (2 PACKS)

## (undated) DEVICE — NDL COUNTER FOAM AND MAGNET 40-70

## (undated) DEVICE — POSITIONER FOAM EGG CRATE ULNAR 2PCS (PINK)

## (undated) DEVICE — PACK GENERAL LAPAROSCOPY

## (undated) DEVICE — DRAPE 3/4 SHEET 52X76"

## (undated) DEVICE — SUT SURGIPRO 0 30" GS-22

## (undated) DEVICE — MULTIPLE PERFUSION SET FEMALE 1 INLET LEG W 4 LEGS 15" (BLUE/RED)

## (undated) DEVICE — SOL INJ NS 0.9% 500ML 2 PORT

## (undated) DEVICE — VESSEL LOOP EXTRA MAXI-BLUE 0.200" X 22"

## (undated) DEVICE — TROCAR COVIDIEN VERSASTEP PLUS 12MM LONG

## (undated) DEVICE — DRSG DERMABOND PRINEO 60CM

## (undated) DEVICE — FEEDING TUBE NG SUMP 16FR 48"

## (undated) DEVICE — CATH IV SAFE BC 22G X 1" (BLUE)

## (undated) DEVICE — DRSG STERISTRIPS 0.5 X 4"

## (undated) DEVICE — SUT SOFSILK 2 60" TIES

## (undated) DEVICE — MARKING PEN W RULER

## (undated) DEVICE — FILTER REINFUSION FOR SALVAGED BLOOD DISP

## (undated) DEVICE — TUBING SUCTION 20FT

## (undated) DEVICE — SUMP PERICARDIAL 20FR 1/4" ADULT

## (undated) DEVICE — SUCTION CATH ARGYLE WHISTLE TIP 14FR STRAIGHT PACKED

## (undated) DEVICE — SUT DOUBLE 6 WIRE STERNAL

## (undated) DEVICE — SUT PROLENE 3-0 36" SH

## (undated) DEVICE — SUT PROLENE 4-0 36" SH

## (undated) DEVICE — NDL HYPO SAFE 18G X 1.5" (PINK)

## (undated) DEVICE — ELCTR BOVIE TIP BLADE INSULATED 2.75" EDGE

## (undated) DEVICE — ELCTR BOVIE TIP BLADE VALLEYLAB 6.5"

## (undated) DEVICE — NDL HYPO REGULAR BEVEL 22G X 1.5" (TURQUOISE)

## (undated) DEVICE — SOL IRR POUR H2O 1000ML

## (undated) DEVICE — SOL IRR POUR NS 0.9% 500ML

## (undated) DEVICE — DRAIN CHANNEL 32FR ROUND HUBLESS FULL FLUTED

## (undated) DEVICE — FOLEY TRAY 16FR 5CC LTX UMETER CLOSED

## (undated) DEVICE — ENDOCATCH II 15MM

## (undated) DEVICE — PACK CUSTOM W/INSPIRE OXYGENATOR

## (undated) DEVICE — DRAPE CV 106" X 135"

## (undated) DEVICE — URETERAL CATH RED RUBBER 20FR (YELLOW)

## (undated) DEVICE — URETERAL CATH RED RUBBER 8FR

## (undated) DEVICE — XI ARM NEEDLE DRIVER MEGA

## (undated) DEVICE — DRAPE MAYO STAND 30"

## (undated) DEVICE — SUT POLYSORB 2-0 30" GS-21 UNDYED

## (undated) DEVICE — FOLEY HOLDER STATLOCK 2 WAY ADULT

## (undated) DEVICE — DRAIN CHANNEL 19FR ROUND FULL FLUTED

## (undated) DEVICE — MAKO DRAPE KIT

## (undated) DEVICE — SUT ETHIBOND 2-0 4-30" RB-1 WHITE

## (undated) DEVICE — SET PERF Y TYPE 13.5IN STRL

## (undated) DEVICE — PACING CABLE (BROWN) A/V TEMP SCREW DOWN 12FT

## (undated) DEVICE — GOWN XL

## (undated) DEVICE — SYR LUER LOK 1CC

## (undated) DEVICE — DISSECTOR ENDO PEANUT 5MM

## (undated) DEVICE — VENODYNE/SCD SLEEVE CALF MEDIUM

## (undated) DEVICE — DRSG OPSITE 2.5 X 2"

## (undated) DEVICE — SUT POLYSORB 2 30" GS-26

## (undated) DEVICE — GOWN XXXL

## (undated) DEVICE — SUT STAINLESS STEEL 5 18" SCC

## (undated) DEVICE — TUBING IV SET GRAVITY 3Y 100" MACRO

## (undated) DEVICE — BLADE SURGICAL #10 STAINLESS

## (undated) DEVICE — PHRENIC NERVE PAD MEDIUM

## (undated) DEVICE — PACK CARDIAC YELLOW

## (undated) DEVICE — BALLOON US ENDO

## (undated) DEVICE — SUT QUILL PDO 0 45CM 36MM

## (undated) DEVICE — POLY TRAP ETRAP

## (undated) DEVICE — GLV 8.5 PROTEXIS (WHITE)

## (undated) DEVICE — TUBING STRYKEFLOW II SUCTION / IRRIGATOR

## (undated) DEVICE — WARMING BLANKET DUO-THERM HYPER/HYPOTHERM ADULT

## (undated) DEVICE — SOL IRR POUR H2O 250ML

## (undated) DEVICE — BITE BLOCK ADULT 20 X 27MM (GREEN)

## (undated) DEVICE — POSITIONER CARDIAC BUMP

## (undated) DEVICE — ELCTR BOVIE PENCIL HANDPIECE ROCKER SWITCH 15FT

## (undated) DEVICE — PREP CHLORAPREP HI-LITE ORANGE 26ML

## (undated) DEVICE — RIGID ADULT SUCKER

## (undated) DEVICE — DRAIN RESERVOIR FOR JACKSON PRATT 100CC CARDINAL

## (undated) DEVICE — SUT GORETEX CV-4 (3-0) 36" TH-18

## (undated) DEVICE — WOUND IRR IRRISEPT W 0.5 CHG

## (undated) DEVICE — HOOD T7 NON-PEELAWAY

## (undated) DEVICE — TUBING SUCTION CONN 6FT STERILE

## (undated) DEVICE — GOWN TRIMAX LG

## (undated) DEVICE — POSITIONER FOAM HEADREST (PINK)

## (undated) DEVICE — SUT POLYSORB 1-0 36" GS-21 UNDYED

## (undated) DEVICE — TUBING ATS SUCTION LINE

## (undated) DEVICE — DRAPE LIGHT HANDLE COVER (BLUE)

## (undated) DEVICE — DRAPE 1/2 SHEET 40X57"

## (undated) DEVICE — CHEST DRAIN PLEUR-EVAC DRY/WET ADULT-PEDS SINGLE (QUICK)

## (undated) DEVICE — DRSG TEGADERM 6"X8"

## (undated) DEVICE — SUT POLYSORB 0 36" GS-25 UNDYED

## (undated) DEVICE — DRAPE SLUSH MACHINE 48" X 48"

## (undated) DEVICE — SYR ALLIANCE II INFLATION 60ML

## (undated) DEVICE — MAKO VIZADISC KNEE TRACKING KIT

## (undated) DEVICE — XI DRAPE ARM

## (undated) DEVICE — CONNECTOR "Y" 1/2 X 3/8 X 3/8"

## (undated) DEVICE — SOL IRR BAG NS 0.9% 3000ML

## (undated) DEVICE — DRAPE TOWEL BLUE 17" X 24"

## (undated) DEVICE — SAW BLADE MICROAIRE STERNUM 1.1X50X42MM

## (undated) DEVICE — DRSG TAPE UMBILICAL COTTON 2" X 30 X 1/8"

## (undated) DEVICE — XI DRAPE COLUMN

## (undated) DEVICE — SUT PROLENE 5-0 30" RB-2

## (undated) DEVICE — GOWN LG

## (undated) DEVICE — STRYKER INTERPULSE HANDPIECE W IRR SUCTION TUBE

## (undated) DEVICE — SUT VICRYL 2-0 27" CT-1 UNDYED

## (undated) DEVICE — DRAPE IOBAN 23" X 23"

## (undated) DEVICE — MEDICATION LABELS W MARKER

## (undated) DEVICE — SUT POLYSORB 2-0 30" V-20 UNDYED

## (undated) DEVICE — D HELP - CLEARVIEW CLEARIFY SYSTEM

## (undated) DEVICE — SUT PROLENE 4-0 36" BB

## (undated) DEVICE — SPECIMEN CONTAINER 100ML

## (undated) DEVICE — SUT PROLENE 4-0 36" RB-1

## (undated) DEVICE — XI SEAL UNIV 5- 8 MM

## (undated) DEVICE — SAW BLADE MICROAIRE STERNUM AGGRESSIVE 9.4X34X1MM

## (undated) DEVICE — GLV 8 PROTEXIS (WHITE)

## (undated) DEVICE — SAW BLADE STRYKER SAGITTAL DUAL CUT 64X35X.89MM

## (undated) DEVICE — BLADE SCALPEL SAFETYLOCK #10

## (undated) DEVICE — ELCTR PLASMA BLADE X 3.0S WIDE TIP

## (undated) DEVICE — NDL HYPO SAFE 22G X 1.5" (BLACK)

## (undated) DEVICE — TUBING AIRSEAL TRI-LUMEN FILTERED

## (undated) DEVICE — CONNECTOR STRAIGHT 3/8 X 1/2"

## (undated) DEVICE — GLV 7 PROTEXIS (WHITE)

## (undated) DEVICE — PACK UNIVERSAL CARDIAC

## (undated) DEVICE — STAPLER SKIN VISI-STAT 35 WIDE

## (undated) DEVICE — SUT SURGICAL STEEL 6 30" BP-1

## (undated) DEVICE — CHEST DRAIN OASIS DRY SUCTION WATER SEAL

## (undated) DEVICE — TROCAR COVIDIEN VERSASTEP PLUS 15MM STANDARD

## (undated) DEVICE — SYR ASEPTO

## (undated) DEVICE — SYR LUER LOK 50CC

## (undated) DEVICE — CONNECTOR "Y" 1/2 X 1/2 X 3/8"

## (undated) DEVICE — TUBING IV SET MICROCLAVE ADAPTER

## (undated) DEVICE — VISITEC 4X4

## (undated) DEVICE — SUT PLEDGET PRE PUNCH 4.8 X 9.5 X 1.5 MM

## (undated) DEVICE — SUT BOOT STANDARD (ASSORTED) 5 PAIR

## (undated) DEVICE — GLV 8.5 PROTEXIS (BLUE)

## (undated) DEVICE — SUT POLYSORB 4-0 P-12 UNDYED

## (undated) DEVICE — SUT ETHIBOND 2-0 36" SH

## (undated) DEVICE — SUT BLUNT SZ 5

## (undated) DEVICE — GLV 8 PROTEXIS ORTHO (CREAM)

## (undated) DEVICE — SOL NORMOSOL-R PH7.4 1000ML

## (undated) DEVICE — TOURNIQUET SET 12FR (1 RED, 1 BLUE, 1 SNARE) 7"

## (undated) DEVICE — GLV 6.5 PROTEXIS (WHITE)

## (undated) DEVICE — TUBING TRUWAVE PRESSURE MALE/FEMALE 72"

## (undated) DEVICE — SUT TICRON 5 30" KV-40

## (undated) DEVICE — SAW BLADE MICROAIRE STERNUM 1X34X9.4MM

## (undated) DEVICE — LUBRICANT INST ELECTROLUBE Z SOLUTION

## (undated) DEVICE — DRSG MAMMARY SUPPORT XL SIZE 5

## (undated) DEVICE — BLADE SCALPEL SAFETYLOCK #11

## (undated) DEVICE — GOWN TRIMAX XXL

## (undated) DEVICE — VENTING ADAPTER "Y" (RED/BLUE) 7.5"

## (undated) DEVICE — SUT ETHIBOND 2-0 4-30" RB-1 GREEN

## (undated) DEVICE — DRSG OPSITE 13.75 X 4"

## (undated) DEVICE — TUBING KIT FAST START ATF 40

## (undated) DEVICE — ENDOCATCH 10MM SPECIMEN POUCH

## (undated) DEVICE — SUT PDO 2 1/2 CIRCLE 40MM NDL 45CM

## (undated) DEVICE — BEAVER BLADE MINI SHARP ALL ROUND (BLUE)

## (undated) DEVICE — SENSOR O2 FINGER ADULT

## (undated) DEVICE — DRAPE SLUSH / WARMER 44 X 66"

## (undated) DEVICE — BLADE SCALPEL SAFETYLOCK #15

## (undated) DEVICE — TUBING OLYMPUS INSUFFLATION

## (undated) DEVICE — PACK MAJOR ABDOMINAL SUPINE

## (undated) DEVICE — PACK UNIVERSAL CARDIAC SUPPLEMNTAL B

## (undated) DEVICE — SNARE OVAL LOOP MICOR

## (undated) DEVICE — SUMP INTRACARDIAC 20FR 1/4" ADULT

## (undated) DEVICE — PACK IV START WITH CHG

## (undated) DEVICE — DRSG MEPILEX 10 X 30CM (4 X 12") AG

## (undated) DEVICE — TUBING INSUFFLATION LAP FILTER 10FT

## (undated) DEVICE — TOURNIQUET CUFF 34" DUAL PORT W PLC

## (undated) DEVICE — GLV 7.5 PROTEXIS (WHITE)

## (undated) DEVICE — ELCTR BOVIE PENCIL BLADE 10FT

## (undated) DEVICE — ELCTR BOVIE TIP BLADE INSULATED 6.5" EDGE

## (undated) DEVICE — WARMING BLANKET LOWER ADULT